# Patient Record
Sex: FEMALE | Race: BLACK OR AFRICAN AMERICAN | Employment: FULL TIME | URBAN - METROPOLITAN AREA
[De-identification: names, ages, dates, MRNs, and addresses within clinical notes are randomized per-mention and may not be internally consistent; named-entity substitution may affect disease eponyms.]

---

## 2017-03-12 ENCOUNTER — HOSPITAL ENCOUNTER (EMERGENCY)
Age: 42
Discharge: HOME | End: 2017-03-12
Attending: EMERGENCY MEDICINE
Payer: COMMERCIAL

## 2017-03-12 VITALS
DIASTOLIC BLOOD PRESSURE: 103 MMHG | TEMPERATURE: 98.7 F | RESPIRATION RATE: 16 BRPM | HEART RATE: 107 BPM | WEIGHT: 167 LBS | BODY MASS INDEX: 26.21 KG/M2 | OXYGEN SATURATION: 98 % | HEIGHT: 67 IN | SYSTOLIC BLOOD PRESSURE: 147 MMHG

## 2017-03-12 DIAGNOSIS — N30.00 ACUTE CYSTITIS WITHOUT HEMATURIA: Primary | ICD-10-CM

## 2017-03-12 LAB
BACTERIA URNS QL MICRO: ABNORMAL /HPF
CASTS URNS QL MICRO: 0 /LPF
EPI CELLS #/AREA URNS HPF: ABNORMAL /HPF
GLUCOSE BLD STRIP.AUTO-MCNC: 313 MG/DL (ref 65–100)
RBC #/AREA URNS HPF: ABNORMAL /HPF
WBC URNS QL MICRO: ABNORMAL /HPF

## 2017-03-12 PROCEDURE — 82962 GLUCOSE BLOOD TEST: CPT

## 2017-03-12 PROCEDURE — 81015 MICROSCOPIC EXAM OF URINE: CPT | Performed by: EMERGENCY MEDICINE

## 2017-03-12 PROCEDURE — 99284 EMERGENCY DEPT VISIT MOD MDM: CPT | Performed by: EMERGENCY MEDICINE

## 2017-03-12 PROCEDURE — 74011250637 HC RX REV CODE- 250/637: Performed by: EMERGENCY MEDICINE

## 2017-03-12 PROCEDURE — 81003 URINALYSIS AUTO W/O SCOPE: CPT | Performed by: EMERGENCY MEDICINE

## 2017-03-12 RX ORDER — SULFAMETHOXAZOLE AND TRIMETHOPRIM 800; 160 MG/1; MG/1
1 TABLET ORAL 2 TIMES DAILY
Qty: 10 TAB | Refills: 0 | Status: SHIPPED | OUTPATIENT
Start: 2017-03-12 | End: 2017-03-17

## 2017-03-12 RX ORDER — SULFAMETHOXAZOLE AND TRIMETHOPRIM 800; 160 MG/1; MG/1
1 TABLET ORAL
Status: COMPLETED | OUTPATIENT
Start: 2017-03-12 | End: 2017-03-12

## 2017-03-12 RX ORDER — PHENAZOPYRIDINE HYDROCHLORIDE 100 MG/1
200 TABLET, FILM COATED ORAL
Status: COMPLETED | OUTPATIENT
Start: 2017-03-12 | End: 2017-03-12

## 2017-03-12 RX ADMIN — SULFAMETHOXAZOLE AND TRIMETHOPRIM 1 TABLET: 800; 160 TABLET ORAL at 20:52

## 2017-03-12 RX ADMIN — PHENAZOPYRIDINE HYDROCHLORIDE 200 MG: 100 TABLET ORAL at 20:52

## 2017-03-13 PROBLEM — G60.9 NEUROPATHY, PERIPHERAL, IDIOPATHIC: Status: ACTIVE | Noted: 2017-03-13

## 2017-03-13 PROBLEM — H35.00 RETINOPATHY, BILATERAL: Status: ACTIVE | Noted: 2017-03-13

## 2017-03-13 PROBLEM — N18.4 CKD (CHRONIC KIDNEY DISEASE), STAGE IV (HCC): Status: ACTIVE | Noted: 2017-03-13

## 2017-03-13 PROBLEM — L98.499: Status: ACTIVE | Noted: 2017-03-13

## 2017-03-13 NOTE — DISCHARGE INSTRUCTIONS
As we discussed, having normal blood sugars elevated increases your risk for developing bladder and other kinds of infections. Therefore, it is important for you to try to keep her blood sugars under control. Return to the emergency department with any fevers, vomiting, worsening symptoms, or additional concerns. Otherwise, follow-up with your primary care doctor in 2 or 3 days.

## 2017-03-13 NOTE — ED PROVIDER NOTES
HPI Comments: 49-year-old lady with a history of diabetes who presents with concerns about painful urination and some suprapubic pain. Patient says that she also takes her blood sugar is high. She denies any fevers, nausea, vomiting, or diarrhea. Additionally, she notes that she saw her gynecologist last week who did a Pap smear and a pelvic exam that was positive for a yeast infection. She was then treated with a single dose of Diflucan. Patient says in the past 24 hours she has developed the painful urination. She denies any blood in her urine. Patient says she is not having any current vaginal itch, discharge, burning, or odor. Elements of this note were created using speech recognition software. As such, errors of speech recognition may be present. Patient is a 35 y.o. female presenting with urinary pain. The history is provided by the patient. Urinary Pain    Pertinent negatives include no chills, no nausea, no vomiting, no hematuria and no abdominal pain. 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      Social History Main Topics    Smoking status: Never Smoker    Smokeless tobacco: Not on file    Alcohol use Yes      Comment: socially    Drug use: No    Sexual activity: No     Other Topics Concern    Not on file     Social History Narrative         ALLERGIES: Review of patient's allergies indicates no known allergies. Review of Systems   Constitutional: Negative for chills, diaphoresis and fever. HENT: Negative for congestion, rhinorrhea and sore throat. Eyes: Negative for redness and visual disturbance. Respiratory: Negative for cough, chest tightness, shortness of breath and wheezing. Cardiovascular: Negative for chest pain and palpitations. Gastrointestinal: Negative for abdominal pain, blood in stool, diarrhea, nausea and vomiting. Endocrine: Positive for polyuria. Negative for polydipsia. Genitourinary: Positive for dysuria. Negative for hematuria. Musculoskeletal: Negative for arthralgias, myalgias and neck stiffness. Skin: Negative for rash. Allergic/Immunologic: Negative for environmental allergies and food allergies. Neurological: Negative for dizziness, weakness and headaches. Hematological: Negative for adenopathy. Does not bruise/bleed easily. Psychiatric/Behavioral: Negative for confusion and sleep disturbance. The patient is not nervous/anxious. Vitals:    03/12/17 2005   BP: (!) 147/103   Pulse: (!) 107   Resp: 16   Temp: 98.7 °F (37.1 °C)   SpO2: 98%   Weight: 75.8 kg (167 lb)   Height: 5' 7\" (1.702 m)            Physical Exam   Constitutional: She is oriented to person, place, and time. She appears well-developed and well-nourished. Abdominal: There is no tenderness. There is no rebound and no guarding. Neurological: She is alert and oriented to person, place, and time. Nursing note and vitals reviewed. MDM  Number of Diagnoses or Management Options  Diagnosis management comments: Patient's UA is consistent with UTI. I will plan on prescribing some antibodies for that.     ED Course       Procedures

## 2017-04-12 ENCOUNTER — HOSPITAL ENCOUNTER (EMERGENCY)
Age: 42
Discharge: HOME | End: 2017-04-12
Attending: EMERGENCY MEDICINE
Payer: COMMERCIAL

## 2017-04-12 VITALS
OXYGEN SATURATION: 95 % | TEMPERATURE: 98.1 F | SYSTOLIC BLOOD PRESSURE: 135 MMHG | HEIGHT: 68 IN | BODY MASS INDEX: 25.61 KG/M2 | HEART RATE: 85 BPM | WEIGHT: 169 LBS | DIASTOLIC BLOOD PRESSURE: 83 MMHG | RESPIRATION RATE: 16 BRPM

## 2017-04-12 DIAGNOSIS — R30.9 URINARY PAIN: Primary | ICD-10-CM

## 2017-04-12 LAB
ALBUMIN SERPL BCP-MCNC: 3.5 G/DL (ref 3.5–5)
ALBUMIN/GLOB SERPL: ABNORMAL {RATIO} (ref 1.2–3.5)
ALP SERPL-CCNC: 188 U/L (ref 50–136)
ALT SERPL-CCNC: 44 U/L (ref 12–65)
ANION GAP BLD CALC-SCNC: 11 MMOL/L (ref 7–16)
AST SERPL W P-5'-P-CCNC: 43 U/L (ref 15–37)
ATRIAL RATE: 85 BPM
BACTERIA URNS QL MICRO: 0 /HPF
BASOPHILS # BLD AUTO: 0 K/UL (ref 0–0.2)
BASOPHILS # BLD: 0 % (ref 0–2)
BILIRUB SERPL-MCNC: 0.4 MG/DL (ref 0.2–1.1)
BUN SERPL-MCNC: 18 MG/DL (ref 6–23)
CALCIUM SERPL-MCNC: 8.9 MG/DL (ref 8.3–10.4)
CALCULATED P AXIS, ECG09: 50 DEGREES
CALCULATED R AXIS, ECG10: 131 DEGREES
CALCULATED T AXIS, ECG11: 45 DEGREES
CASTS URNS QL MICRO: 0 /LPF
CHLORIDE SERPL-SCNC: 108 MMOL/L (ref 98–107)
CO2 SERPL-SCNC: 23 MMOL/L (ref 21–32)
CREAT SERPL-MCNC: 2.06 MG/DL (ref 0.6–1)
DIAGNOSIS, 93000: NORMAL
DIFFERENTIAL METHOD BLD: ABNORMAL
EOSINOPHIL # BLD: 0.5 K/UL (ref 0–0.8)
EOSINOPHIL NFR BLD: 8 % (ref 0.5–7.8)
EPI CELLS #/AREA URNS HPF: NORMAL /HPF
ERYTHROCYTE [DISTWIDTH] IN BLOOD BY AUTOMATED COUNT: 15.3 % (ref 11.9–14.6)
GLOBULIN SER CALC-MCNC: ABNORMAL G/DL
GLUCOSE SERPL-MCNC: 184 MG/DL (ref 65–100)
HCG UR QL: NEGATIVE
HCT VFR BLD AUTO: 40.8 % (ref 35.8–46.3)
HGB BLD-MCNC: 13 G/DL (ref 11.7–15.4)
IMM GRANULOCYTES # BLD: 0 K/UL (ref 0–0.5)
IMM GRANULOCYTES NFR BLD AUTO: 0.1 % (ref 0–5)
LYMPHOCYTES # BLD AUTO: 35 % (ref 13–44)
LYMPHOCYTES # BLD: 2.3 K/UL (ref 0.5–4.6)
MCH RBC QN AUTO: 26.6 PG (ref 26.1–32.9)
MCHC RBC AUTO-ENTMCNC: 31.9 G/DL (ref 31.4–35)
MCV RBC AUTO: 83.4 FL (ref 79.6–97.8)
MONOCYTES # BLD: 0.4 K/UL (ref 0.1–1.3)
MONOCYTES NFR BLD AUTO: 6 % (ref 4–12)
NEUTS SEG # BLD: 3.3 K/UL (ref 1.7–8.2)
NEUTS SEG NFR BLD AUTO: 51 % (ref 43–78)
P-R INTERVAL, ECG05: 104 MS
PLATELET # BLD AUTO: 357 K/UL (ref 150–450)
PMV BLD AUTO: 9.3 FL (ref 10.8–14.1)
POTASSIUM SERPL-SCNC: 4.3 MMOL/L (ref 3.5–5.1)
POTASSIUM SERPL-SCNC: 5.3 MMOL/L (ref 3.5–5.1)
PROT SERPL-MCNC: 8.4 G/DL (ref 6.3–8.2)
Q-T INTERVAL, ECG07: 364 MS
QRS DURATION, ECG06: 102 MS
QTC CALCULATION (BEZET), ECG08: 433 MS
RBC # BLD AUTO: 4.89 M/UL (ref 4.05–5.25)
RBC #/AREA URNS HPF: NORMAL /HPF
SERVICE CMNT-IMP: NORMAL
SODIUM SERPL-SCNC: 142 MMOL/L (ref 136–145)
VENTRICULAR RATE, ECG03: 85 BPM
WBC # BLD AUTO: 6.7 K/UL (ref 4.3–11.1)
WBC URNS QL MICRO: NORMAL /HPF
WET PREP GENITAL: NORMAL

## 2017-04-12 PROCEDURE — 84132 ASSAY OF SERUM POTASSIUM: CPT | Performed by: EMERGENCY MEDICINE

## 2017-04-12 PROCEDURE — 74011250636 HC RX REV CODE- 250/636: Performed by: EMERGENCY MEDICINE

## 2017-04-12 PROCEDURE — 81025 URINE PREGNANCY TEST: CPT

## 2017-04-12 PROCEDURE — 74011000250 HC RX REV CODE- 250: Performed by: EMERGENCY MEDICINE

## 2017-04-12 PROCEDURE — 81003 URINALYSIS AUTO W/O SCOPE: CPT | Performed by: EMERGENCY MEDICINE

## 2017-04-12 PROCEDURE — 87491 CHLMYD TRACH DNA AMP PROBE: CPT | Performed by: EMERGENCY MEDICINE

## 2017-04-12 PROCEDURE — 96372 THER/PROPH/DIAG INJ SC/IM: CPT | Performed by: EMERGENCY MEDICINE

## 2017-04-12 PROCEDURE — 93005 ELECTROCARDIOGRAM TRACING: CPT | Performed by: EMERGENCY MEDICINE

## 2017-04-12 PROCEDURE — 85025 COMPLETE CBC W/AUTO DIFF WBC: CPT | Performed by: EMERGENCY MEDICINE

## 2017-04-12 PROCEDURE — 99285 EMERGENCY DEPT VISIT HI MDM: CPT | Performed by: EMERGENCY MEDICINE

## 2017-04-12 PROCEDURE — 87210 SMEAR WET MOUNT SALINE/INK: CPT | Performed by: EMERGENCY MEDICINE

## 2017-04-12 PROCEDURE — 80053 COMPREHEN METABOLIC PANEL: CPT | Performed by: EMERGENCY MEDICINE

## 2017-04-12 PROCEDURE — 81015 MICROSCOPIC EXAM OF URINE: CPT | Performed by: EMERGENCY MEDICINE

## 2017-04-12 RX ORDER — METRONIDAZOLE 500 MG/1
500 TABLET ORAL 2 TIMES DAILY
Qty: 14 TAB | Refills: 0 | Status: SHIPPED | OUTPATIENT
Start: 2017-04-12 | End: 2017-04-12

## 2017-04-12 RX ORDER — DOXYCYCLINE HYCLATE 100 MG
100 TABLET ORAL 2 TIMES DAILY
Qty: 20 TAB | Refills: 0 | Status: SHIPPED | OUTPATIENT
Start: 2017-04-12 | End: 2017-04-12

## 2017-04-12 RX ORDER — METRONIDAZOLE 500 MG/1
500 TABLET ORAL 2 TIMES DAILY
Qty: 14 TAB | Refills: 0 | Status: SHIPPED | OUTPATIENT
Start: 2017-04-12 | End: 2017-04-19

## 2017-04-12 RX ORDER — DOXYCYCLINE HYCLATE 100 MG
100 TABLET ORAL 2 TIMES DAILY
Qty: 20 TAB | Refills: 0 | Status: SHIPPED | OUTPATIENT
Start: 2017-04-12 | End: 2017-04-22

## 2017-04-12 RX ADMIN — LIDOCAINE HYDROCHLORIDE 250 MG: 10 INJECTION, SOLUTION INFILTRATION; PERINEURAL at 14:57

## 2017-04-12 NOTE — DISCHARGE INSTRUCTIONS
Painful Urination (Dysuria): Care Instructions  Your Care Instructions  Burning pain with urination (dysuria) is a common symptom of a urinary tract infection or other urinary problems. The bladder may become inflamed. This can cause pain when the bladder fills and empties. You may also feel pain if the tube that carries urine from the bladder to the outside of the body (urethra) gets irritated or infected. Sexually transmitted infections (STIs) also may cause pain when you urinate. Sometimes the pain can be caused by things other than an infection. The urethra can be irritated by soaps, perfumes, or foreign objects in the urethra. Kidney stones can cause pain when they pass through the urethra. The cause may be hard to find. You may need tests. Treatment for painful urination depends on the cause. Follow-up care is a key part of your treatment and safety. Be sure to make and go to all appointments, and call your doctor if you are having problems. It's also a good idea to know your test results and keep a list of the medicines you take. How can you care for yourself at home? · Drink extra water for the next day or two. This will help make the urine less concentrated. (If you have kidney, heart, or liver disease and have to limit fluids, talk with your doctor before you increase the amount of fluids you drink.)  · Avoid drinks that are carbonated or have caffeine. They can irritate the bladder. · Urinate often. Try to empty your bladder each time. For women:  · Urinate right after you have sex. · After going to the bathroom, wipe from front to back. · Avoid douches, bubble baths, and feminine hygiene sprays. And avoid other feminine hygiene products that have deodorants. When should you call for help? Call your doctor now or seek immediate medical care if:  · You have new symptoms, such as fever, nausea, or vomiting. · You have new or worse symptoms of a urinary problem.  For example:  ¨ You have blood or pus in your urine. ¨ You have chills or body aches. ¨ It hurts worse to urinate. ¨ You have groin or belly pain. ¨ You have pain in your back just below your rib cage (the flank area). Watch closely for changes in your health, and be sure to contact your doctor if you have any problems. Where can you learn more? Go to http://damon-chad.info/. Enter K205 in the search box to learn more about \"Painful Urination (Dysuria): Care Instructions. \"  Current as of: November 28, 2016  Content Version: 11.2  © 7336-9813 [employer]. Care instructions adapted under license by [employer] (which disclaims liability or warranty for this information). If you have questions about a medical condition or this instruction, always ask your healthcare professional. [facility] any warranty or liability for your use of this information. Metronidazole (By mouth)   Metronidazole (met-destiny-HI-da-zole)  Treats bacterial infections. Brand Name(s):Flagyl, Flagyl 375, Flagyl ER   There may be other brand names for this medicine. When This Medicine Should Not Be Used: This medicine is not right for everyone. Do not use if you had an allergic reaction to metronidazole or similar medicines. Do not use this medicine to treat trichomoniasis if you are in the first 3 months of pregnancy. How to Use This Medicine:   Capsule, Tablet, Long Acting Tablet  · Take this medicine as directed, and take it at the same time each day. · Capsule or Tablet: Take with food or milk to avoid stomach upset. · Extended-release tablet: Take it on an empty stomach, 1 hour before or 2 hours after a meal.  · Swallow the extended-release tablet whole. Do not crush, break, or chew it. · Take all of the medicine in your prescription to clear up your infection, even if you feel better after the first few doses. · Missed dose: Take a dose as soon as you remember.  If it is almost time for your next dose, wait until then and take a regular dose. Do not take extra medicine to make up for a missed dose. · Store the medicine in a closed container at room temperature, away from heat, moisture, and direct light. Drugs and Foods to Avoid:   Ask your doctor or pharmacist before using any other medicine, including over-the-counter medicines, vitamins, and herbal products. · Do not use this medicine if you have taken disulfiram within the last 2 weeks. Do not drink alcohol or use medicine that contains alcohol or propylene glycol while using this medicine and for at least 3 days after you have finished metronidazole treatment. · Some foods and medicines can affect how metronidazole works. Tell your doctor if you are using busulfan, cimetidine, lithium, phenobarbital, phenytoin, or warfarin or another blood thinner. Warnings While Using This Medicine:   · Tell your doctor if you are pregnant or breastfeeding, or if you have kidney disease, liver disease, blood or bone marrow problems, oral thrush, yeast infection, or a history of seizures. · This medicine may cause the following problems:  ¨ Brain or nervous system problems, including seizures, meningitis, or vision problems  · Trichomoniasis treatment:  Your doctor may want to also treat your sexual partner, even if he or she has no symptoms. Also, you may want to use a condom during sexual intercourse. These measures will help keep you from getting the infection back again from your partner. If you have any questions, ask your doctor. · Call your doctor if your symptoms do not improve or if they get worse. · Your doctor will do lab tests at regular visits to check on the effects of this medicine. Keep all appointments. · Tell any doctor or dentist who treats you that you are using this medicine. This medicine may affect certain medical test results. · Keep all medicine out of the reach of children.  Never share your medicine with anyone. Possible Side Effects While Using This Medicine:   Call your doctor right away if you notice any of these side effects:  · Allergic reaction: Itching or hives, swelling in your face or hands, swelling or tingling in your mouth or throat, chest tightness, trouble breathing  · Confusion, drowsiness, fever, headache, loss of appetite, nausea or vomiting, stiff neck or back  · Dizziness, problems with muscle control, clumsiness, shakiness, trouble talking  · Fever, chills, cough, sore throat, and body aches  · Numbness, tingling, or burning pain in your hands, arms, legs, or feet  · Seizures  If you notice these less serious side effects, talk with your doctor:   · Mild nausea  · Unusual or unpleasant taste in your mouth  If you notice other side effects that you think are caused by this medicine, tell your doctor. Call your doctor for medical advice about side effects. You may report side effects to FDA at 6-648-BBU-9142  © 2016 [address] is for End User's use only and may not be sold, redistributed or otherwise used for commercial purposes. The above information is an  only. It is not intended as medical advice for individual conditions or treatments. Talk to your doctor, nurse or pharmacist before following any medical regimen to see if it is safe and effective for you. Exposure to Sexually Transmitted Infections: Care Instructions  Your Care Instructions  Sexually transmitted infections (STIs) are those diseases spread by sexual contact. There are at least 20 different STIs, including chlamydia, gonorrhea, syphilis, and human immunodeficiency virus (HIV), which causes AIDS. Bacteria-caused STIs can be treated and cured. STIs caused by viruses, such as HIV, can be treated but not cured. Some STIs can reduce a woman's chances of getting pregnant in the future.   STIs are spread during sexual contact, such as vaginal intercourse and oral or anal sex.  Follow-up care is a key part of your treatment and safety. Be sure to make and go to all appointments, and call your doctor if you are having problems. Its also a good idea to know your test results and keep a list of the medicines you take. How can you care for yourself at home? · Your doctor may have given you a shot of antibiotics. If your doctor prescribed antibiotic pills, take them as directed. Do not stop taking them just because you feel better. You need to take the full course of antibiotics. · Do not have sexual contact while you have symptoms of an STI or are being treated for an STI. · Tell your sex partner (or partners) that he or she will need treatment. · If you are a woman, do not douche. Douching changes the normal balance of bacteria in the vagina and may spread an infection up into your reproductive organs. To prevent exposure to STIs in the future  · Use latex condoms every time you have sex. Use them from the beginning to the end of sexual contact. · Talk to your partner before you have sex. Find out if he or she has or is at risk for any STI. Keep in mind that a person may be able to spread an STI even if he or she does not have symptoms. · Do not have sex if you are being treated for an STI. · Do not have sex with anyone who has symptoms of an STI, such as sores on the genitals or mouth. · Having one sex partner (who does not have STIs and does not have sex with anyone else) is a good way to avoid STIs. When should you call for help? Call your doctor now or seek immediate medical care if:  · You have new pain in your belly or pelvis. · You have symptoms of a urinary tract infection. These may include:  ¨ Pain or burning when you urinate. ¨ A frequent need to urinate without being able to pass much urine. ¨ Pain in the flank, which is just below the rib cage and above the waist on either side of the back. ¨ Blood in your urine. ¨ A fever.   · You have new or worsening pain or swelling in the scrotum. Watch closely for changes in your health, and be sure to contact your doctor if:  · You have unusual vaginal bleeding. · You have a discharge from the vagina or penis. · You have any new symptoms, such as sores, bumps, rashes, blisters, or warts. · You have itching, tingling, pain, or burning in the genital or anal area. · You think you may have an STI. Where can you learn more? Go to http://damon-chad.info/. Enter V025 in the search box to learn more about \"Exposure to Sexually Transmitted Infections: Care Instructions. \"  Current as of: May 27, 2016  Content Version: 11.2  © 2897-4521 [employer]. Care instructions adapted under license by [employer] (which disclaims liability or warranty for this information). If you have questions about a medical condition or this instruction, always ask your healthcare professional. [place] [address] any warranty or liability for your use of this information. Doxycycline (By mouth)   Doxycycline (fyu-u-ZZV-kleen)  Treats and prevents infections. Also used to prevent malaria and treat rosacea or severe acne. This medicine is a tetracycline antibiotic. Brand Name(s):Acticlate, Adoxa, Adoxa Tarun 1/150, Avidoxy, Avidoxy DK, BenzoDox 30 Kit, BenzoDox 60 Kit, Doryx, Monodox, Morgidox [id number], Morgidox [id number] Kit, Morgidox [id number], Morgidox [id number] Kit, NutriDox Convenience, Oracea   There may be other brand names for this medicine. When This Medicine Should Not Be Used: This medicine is not right for everyone. Do not use it if you had an allergic reaction to doxycycline or another tetracycline antibiotic, or if you are pregnant or breastfeeding. How to Use This Medicine:   Capsule, Delayed Release Capsule, Long Acting Capsule, Liquid, Tablet, Delayed Release Tablet  · Your doctor will tell you how much medicine to use. Do not use more than directed.   · Ask your pharmacist or doctor if you need to take this medicine with or without food. Some forms can be taken with food or milk, but others must be taken on an empty stomach. · Tablets: You may take this medicine with food or milk to avoid stomach irritation. To break a tablet, hold the tablet between your thumb and index fingers close to the appropriate scored line. Then, apply enough pressure to snap the tablet segments apart. Do not use the tablet if it does not break on the scored lines. · Delayed-release tablets: You may also take this medicine by sprinkling the broken tablets onto room-temperature applesauce. Swallow this mixture right away; do not chew. Do not store the mixture for later use. · Oracea® capsules: This medicine must be taken on an empty stomach, at least 1 hour before or 2 hours after a meal.  · Capsule: Swallow whole. Do not break, crush, chew, or open it. · Oral liquid: Shake the bottle well just before each use. Measure the oral liquid medicine with a marked measuring spoon, oral syringe, or medicine cup. · Take all of the medicine in your prescription to clear up your infection, even if you feel better after the first few doses. · Drink plenty of fluids to avoid throat problems, if you take the capsule or tablet form. · Malaria prevention: Start taking the medicine 1 or 2 days before you travel. Take the medicine every day during your trip. Keep taking it for 4 weeks after you return. However, do not use the medicine for longer than 4 months. · Do not use this medicine for more than 9 months if you are using it for rosacea. · Use only the brand of medicine your doctor prescribed. Other brands may not work the same way. · Read and follow the patient instructions that come with this medicine. Talk to your doctor or pharmacist if you have any questions. · Missed dose: Take a dose as soon as you remember. If it is almost time for your next dose, wait until then and take a regular dose.  Do not take extra medicine to make up for a missed dose. · Store the medicine in a closed container at room temperature, away from heat, moisture, and direct light. Do not freeze the oral liquid. Drugs and Foods to Avoid:   Ask your doctor or pharmacist before using any other medicine, including over-the-counter medicines, vitamins, and herbal products. · Some foods and medicines can affect how doxycycline works. Tell your doctor if you are using any of the following:  ¨ Bismuth subsalicylate, isotretinoin or other acne medicines, acitretin or other medicine to treat psoriasis  ¨ Penicillin antibiotic, birth control pills, medicine for seizures (such as carbamazepine, phenobarbital, phenytoin), stomach medicine, a blood thinner (such as warfarin), or any medicine that contains aluminum, calcium, or iron (such an antacid or vitamin supplement)  Warnings While Using This Medicine:   · This medicine may cause birth defects if either partner is using it during conception or pregnancy. Tell your doctor right away if you or your partner becomes pregnant. Birth control pills may not work as well when used with this medicine. Use a second form of birth control to keep from getting pregnant. · Tell your doctor if you have kidney disease, liver disease, asthma, or an allergy to sulfites. Tell your doctor if you had surgery on your stomach, or if you have a history of yeast infections. · This medicine may cause the following problems:  ¨ Permanent change in tooth color (in children younger than 6years old)  ¨ Increased pressure inside the head  ¨ Yeast infection  ¨ Immune system problems  · This medicine can cause diarrhea. Call your doctor if the diarrhea becomes severe, does not stop, or is bloody. Do not take any medicine to stop diarrhea until you have talked to your doctor. Diarrhea can occur 2 months or more after you stop taking this medicine. · This medicine may make your skin more sensitive to sunlight. Wear sunscreen.  Do not use sunlamps or tanning beds. · Tell any doctor or dentist who treats you that you are using this medicine. This medicine may affect certain medical test results. · Call your doctor if your symptoms do not improve or if they get worse. · Keep all medicine out of the reach of children. Never share your medicine with anyone. Possible Side Effects While Using This Medicine:   Call your doctor right away if you notice any of these side effects:  · Allergic reaction: Itching or hives, swelling in your face or hands, swelling or tingling in your mouth or throat, chest tightness, trouble breathing  · Blistering, peeling, red skin rash  · Burning, pain, or irritation in your upper stomach or throat  · Diarrhea that may contain blood  · Fever, chills, cough, runny or stuffy nose, sore throat, and body aches  · Joint pain, fever, rash, and unusual tiredness or weakness  · Severe headache, dizziness, or vision changes  If you notice these less serious side effects, talk with your doctor:   · Darkening of your skin, scars, teeth, or gums  · Sores or white patches on your lips, mouth, or throat  If you notice other side effects that you think are caused by this medicine, tell your doctor. Call your doctor for medical advice about side effects. You may report side effects to FDA at 7-896-FDA-0376  © 2016 [address] is for End User's use only and may not be sold, redistributed or otherwise used for commercial purposes. The above information is an  only. It is not intended as medical advice for individual conditions or treatments. Talk to your doctor, nurse or pharmacist before following any medical regimen to see if it is safe and effective for you.

## 2017-04-12 NOTE — ED TRIAGE NOTES
Patient complains of dysuria that began about a month ago. States she was given abx and completed them. States she has been seen at a [address] and  for the same. Reports continued burning with urination. Denies vaginal discharge.

## 2017-04-12 NOTE — ED PROVIDER NOTES
HPI Comments: Patient is a 36 yo female with burning with urination. Patient states pain after urinating \"in my vagina\". States pain has been present for 2-3 weeks, s/p abx for UTI however continues. She denies any abdominal pain, no nausea or vomiting, no fevers or chills, no chest pain or SOB, no further complaints. Denies vaginal discharge, or and swelling or mass noted. Overall patient well appearing, NAD. Patient is a 35 y.o. female presenting with urinary pain. The history is provided by the patient. No  was used. Urinary Pain    Pertinent negatives include no chills, no nausea, no vomiting, no urgency, no flank pain, no vaginal discharge, no abdominal pain and no back pain. Past Medical History:   Diagnosis Date    Acute renal failure ([address] 1/24/2012    CKD (chronic kidney disease) stage 3, GFR 30-59 ml/min     CKD (chronic kidney disease), stage IV ([address] 3/13/2017    Gastroenteritis, acute 1/24/2012    IDDM (insulin dependent diabetes mellitus) ([address] 1/24/2012    Intractable nausea and vomiting 12/25/2014    Nausea & vomiting 1/24/2012    Neuropathy, peripheral, idiopathic 3/13/2017    Retinopathy, bilateral 3/13/2017    Trichomoniasis 3/13/2017    Ulcer, skin, chronic ([address] 3/13/2017       Past Surgical History:   Procedure Laterality Date    HX AMPUTATION  1/27/12    gangrene    HX AMPUTATION Left     5th Toe due to Diabetic Ulcer         Family History:   Problem Relation Age of Onset    Diabetes Father      DM Type II    Breast Cancer Mother     Diabetes Maternal Grandmother      DM Type II    Hypertension Maternal Grandmother     Diabetes Paternal Grandfather      DM Type II        Social History     Social History    Marital status: SINGLE     Spouse name: N/A    Number of children: N/A    Years of education: N/A     Occupational History    Not on file.      Social History Main Topics    Smoking status: Former Smoker     Quit date: 11/2013    Smokeless tobacco: Not on file    Alcohol use Yes      Comment: socially; occasional alcohol use    Drug use: No    Sexual activity: No     Other Topics Concern    Not on file     Social History Narrative         ALLERGIES: Review of patient's allergies indicates no known allergies. Review of Systems   Constitutional: Negative for chills and fever. HENT: Negative for rhinorrhea and sore throat. Eyes: Negative for visual disturbance. Respiratory: Negative for cough and shortness of breath. Cardiovascular: Negative for chest pain and leg swelling. Gastrointestinal: Negative for abdominal pain, diarrhea, nausea and vomiting. Genitourinary: Positive for dysuria and vaginal pain. Negative for decreased urine volume, flank pain, pelvic pain, urgency, vaginal bleeding and vaginal discharge. Musculoskeletal: Negative for back pain and neck pain. Skin: Negative for rash. Neurological: Negative for weakness and headaches. Psychiatric/Behavioral: The patient is not nervous/anxious. Vitals:    04/12/17 1125   BP: 135/83   Pulse: (!) 104   Resp: 16   Temp: 98.1 °F (36.7 °C)   SpO2: 95%   Weight: 76.7 kg (169 lb)   Height: 5' 8\" (1.727 m)            Physical Exam   Constitutional: She is oriented to person, place, and time. She appears well-developed and well-nourished. HENT:   Head: Normocephalic. Right Ear: External ear normal.   Left Ear: External ear normal.   Eyes: Conjunctivae and EOM are normal. Pupils are equal, round, and reactive to light. Neck: Normal range of motion. Neck supple. No tracheal deviation present. Cardiovascular: Normal rate, regular rhythm, normal heart sounds and intact distal pulses. No murmur heard. Pulmonary/Chest: Effort normal and breath sounds normal. No respiratory distress. Abdominal: Soft. She exhibits no distension. There is no tenderness. There is no rebound. Non-tender to deep palpation in all quadrants. Very benign abdomen. Musculoskeletal: Normal range of motion. Neurological: She is alert and oriented to person, place, and time. No cranial nerve deficit. Skin: No rash noted. Nursing note and vitals reviewed. MDM  Number of Diagnoses or Management Options  Urinary pain: new and requires workup     Amount and/or Complexity of Data Reviewed  Clinical lab tests: ordered and reviewed  Tests in the radiology section of CPT®: ordered and reviewed  Tests in the medicine section of CPT®: ordered and reviewed  Review and summarize past medical records: yes    Risk of Complications, Morbidity, and/or Mortality  Presenting problems: moderate  Diagnostic procedures: moderate  Management options: moderate    Patient Progress  Patient progress: stable    ED Course       Procedures    Recent Results (from the past 12 hour(s))   URINE MICROSCOPIC    Collection Time: 04/12/17 11:30 AM   Result Value Ref Range    WBC 0-3 0 /hpf    RBC 0-3 0 /hpf    Epithelial cells 5-10 0 /hpf    Bacteria 0 0 /hpf    Casts 0 0 /lpf   HCG URINE, QL. - POC    Collection Time: 04/12/17 11:34 AM   Result Value Ref Range    Pregnancy test,urine (POC) NEGATIVE  NEG     CBC WITH AUTOMATED DIFF    Collection Time: 04/12/17 12:15 PM   Result Value Ref Range    WBC 6.7 4.3 - 11.1 K/uL    RBC 4.89 4.05 - 5.25 M/uL    HGB 13.0 11.7 - 15.4 g/dL    HCT 40.8 35.8 - 46.3 %    MCV 83.4 79.6 - 97.8 FL    MCH 26.6 26.1 - 32.9 PG    MCHC 31.9 31.4 - 35.0 g/dL    RDW 15.3 (H) 11.9 - 14.6 %    PLATELET 497 852 - 872 K/uL    MPV 9.3 (L) 10.8 - 14.1 FL    DF AUTOMATED      NEUTROPHILS 51 43 - 78 %    LYMPHOCYTES 35 13 - 44 %    MONOCYTES 6 4.0 - 12.0 %    EOSINOPHILS 8 (H) 0.5 - 7.8 %    BASOPHILS 0 0.0 - 2.0 %    IMMATURE GRANULOCYTES 0.1 0.0 - 5.0 %    ABS. NEUTROPHILS 3.3 1.7 - 8.2 K/UL    ABS. LYMPHOCYTES 2.3 0.5 - 4.6 K/UL    ABS. MONOCYTES 0.4 0.1 - 1.3 K/UL    ABS. EOSINOPHILS 0.5 0.0 - 0.8 K/UL    ABS. BASOPHILS 0.0 0.0 - 0.2 K/UL    ABS. IMM.  GRANS. 0.0 0.0 - 0.5 K/UL METABOLIC PANEL, COMPREHENSIVE    Collection Time: 04/12/17 12:15 PM   Result Value Ref Range    Sodium 142 136 - 145 mmol/L    Potassium 5.3 (H) 3.5 - 5.1 mmol/L    Chloride 108 (H) 98 - 107 mmol/L    CO2 23 21 - 32 mmol/L    Anion gap 11 7 - 16 mmol/L    Glucose 184 (H) 65 - 100 mg/dL    BUN 18 6 - 23 MG/DL    Creatinine 2.06 (H) 0.6 - 1.0 MG/DL    GFR est AA 34 (L) >60 ml/min/1.73m2    GFR est non-AA 28 (L) >60 ml/min/1.73m2    Calcium 8.9 8.3 - 10.4 MG/DL    Bilirubin, total 0.4 0.2 - 1.1 MG/DL    ALT (SGPT) 44 12 - 65 U/L    AST (SGOT) 43 (H) 15 - 37 U/L    Alk. phosphatase 188 (H) 50 - 136 U/L    Protein, total 8.4 (H) 6.3 - 8.2 g/dL    Albumin 3.5 3.5 - 5.0 g/dL    Globulin Cannot be calulated g/dL    A-G Ratio Cannot be calulated 1.2 - 3.5     POTASSIUM    Collection Time: 04/12/17  1:57 PM   Result Value Ref Range    Potassium 4.3 3.5 - 5.1 mmol/L   EKG, 12 LEAD, INITIAL    Collection Time: 04/12/17  2:04 PM   Result Value Ref Range    Ventricular Rate 85 BPM    Atrial Rate 85 BPM    P-R Interval 104 ms    QRS Duration 102 ms    Q-T Interval 364 ms    QTC Calculation (Bezet) 433 ms    Calculated P Axis 50 degrees    Calculated R Axis 131 degrees    Calculated T Axis 45 degrees    Diagnosis       !! AGE AND GENDER SPECIFIC ECG ANALYSIS !! Sinus rhythm with short PA  Right axis deviation  Cannot rule out Anterior infarct , age undetermined  Abnormal ECG  When compared with ECG of 18-APR-2001 12:26,  Significant changes have occurred  Confirmed by Barbara Moreno ([id number]) on 4/12/2017 3:01:32 PM     WET PREP    Collection Time: 04/12/17  2:18 PM   Result Value Ref Range    Special Requests: NO SPECIAL REQUESTS      Wet prep 15 TO 20 WBCS SEEN PER HPF     Wet prep MANY  CLUE CELLS PRESENT        Wet prep NO YEAST SEEN      Wet prep NO TRICHOMONAS SEEN       No results found. 34 yo female with urinary pain:    Patient treated for GC/Chlamydia and bacterial vaginosis.   Vaginal exam with NO cervical motion tenderness, moderate white discharge with positive whiff test with NO pelvic tenderness/abdominal tenderness or adnexal masses. Patient stable for discharge at this time with follow up with PCP tomorrow for recheck and follow up on cultures sent with return with any abdominal pain, any nausea or vomiting, any fevers or chills, or any chest pain or SOB or any further concerns.

## 2017-04-13 LAB
C TRACH RRNA SPEC QL NAA+PROBE: NEGATIVE
N GONORRHOEA RRNA SPEC QL NAA+PROBE: NEGATIVE
SPECIMEN SOURCE: NORMAL

## 2017-04-13 NOTE — PROGRESS NOTES
Return call from [facility] at Dr Catrina Moss office who states patient has an appointment today at 3pm.  States she has had several 'no shows' with them with both the providers and the lab. Asked if they could refer her to [facility] or an Endocrinologist at [employer]. Notified patient stated that she cannot return to her previous Endocrinologist because of too many no shows. Amilcar Martinez states she will call me and let me know if patient shows for the appointment.   Called patient and left her a message to remind her of importance of 3pm appointment today so she can be referred to an Endocrinologist.

## 2017-04-18 NOTE — PROGRESS NOTES
No call from [facility] at [facility] - called her and she states patient did show up for appointment. States in looking at record she does not see a referral to an Endocrinologist and states that MD increased her Lantis at bedtime.

## 2017-04-24 PROBLEM — R10.2 PELVIC PAIN: Status: ACTIVE | Noted: 2017-04-24

## 2017-04-24 PROBLEM — B37.31 YEAST VAGINITIS: Status: ACTIVE | Noted: 2017-04-24

## 2018-01-30 PROBLEM — B37.31 YEAST VAGINITIS: Status: RESOLVED | Noted: 2017-04-24 | Resolved: 2018-01-30

## 2018-01-30 PROBLEM — E28.39 PREMATURE OVARIAN FAILURE: Status: ACTIVE | Noted: 2018-01-30

## 2018-01-30 PROBLEM — B37.31 CANDIDA VAGINITIS: Status: ACTIVE | Noted: 2018-01-30

## 2018-01-30 PROBLEM — R10.2 PELVIC PAIN: Status: RESOLVED | Noted: 2017-04-24 | Resolved: 2018-01-30

## 2018-01-30 PROBLEM — N91.0 AMENORRHEA, PRIMARY: Status: RESOLVED | Noted: 2018-01-30 | Resolved: 2018-01-30

## 2018-01-30 PROBLEM — E11.319 TYPE 2 DIABETES MELLITUS WITH DIABETIC RETINOPATHY (HCC): Status: ACTIVE | Noted: 2018-01-30

## 2018-01-30 PROBLEM — E11.21 TYPE 2 DIABETES MELLITUS WITH NEPHROPATHY (HCC): Status: ACTIVE | Noted: 2018-01-30

## 2018-01-30 PROBLEM — N91.0 AMENORRHEA, PRIMARY: Status: ACTIVE | Noted: 2018-01-30

## 2018-02-05 NOTE — PROGRESS NOTES
Visited with patient who states she sees Dr Farrah Osullivan at [facility] as PCP and last saw them about a month ago. States her last A1C was '10' and she has not been able to regulate her blood sugars. States she has a meter at home and checks her blood sugar 3-4x/day. States she was seeing an endocrinologist at [facility] on [address] but the 'let her go' because they said she missed too many appointments. Call to Dr Saroj Reddy' office and left message with his assistance to request referral to [facility] since they don't take referrals from ER. I also counseled patient on the importance of keeping her appointments so she does not get 'let go' again. Patient is aware that someone will call her to make an appointment after referral is made. I have given patient my contact information and told her to call me if she did not hear from them by next week. Patient verbalizes understanding. NPO. Discussed with family possibly NGT for meds and feeding. Family states she would NOT want that. Discussed if still not able to take PO - PEG?? Informed them of comfort feed option.

## 2018-08-16 PROBLEM — Z91.14 NONCOMPLIANCE WITH MEDICATION REGIMEN: Status: ACTIVE | Noted: 2018-08-16

## 2018-08-21 PROBLEM — E10.42 DIABETIC POLYNEUROPATHY ASSOCIATED WITH TYPE 1 DIABETES MELLITUS (HCC): Status: ACTIVE | Noted: 2017-02-03

## 2018-08-21 PROBLEM — B96.89 BACTERIAL VAGINOSIS: Status: ACTIVE | Noted: 2017-04-13

## 2018-08-21 PROBLEM — N18.30 TYPE 1 DIABETES MELLITUS WITH STAGE 3 CHRONIC KIDNEY DISEASE (HCC): Status: ACTIVE | Noted: 2018-08-16

## 2018-08-21 PROBLEM — N76.0 BACTERIAL VAGINOSIS: Status: ACTIVE | Noted: 2017-04-13

## 2018-08-21 PROBLEM — E10.22 TYPE 1 DIABETES MELLITUS WITH STAGE 3 CHRONIC KIDNEY DISEASE (HCC): Status: ACTIVE | Noted: 2018-08-16

## 2018-08-21 PROBLEM — Z12.4 CERVICAL CANCER SCREENING: Status: ACTIVE | Noted: 2017-02-03

## 2018-08-21 PROBLEM — E11.3599 TYPE 2 DIABETES MELLITUS WITH PROLIFERATIVE RETINOPATHY (HCC): Status: ACTIVE | Noted: 2018-08-21

## 2018-08-21 PROBLEM — F32.1 MODERATE SINGLE CURRENT EPISODE OF MAJOR DEPRESSIVE DISORDER (HCC): Status: ACTIVE | Noted: 2017-02-03

## 2018-08-21 PROBLEM — F41.8 DEPRESSION WITH ANXIETY: Status: ACTIVE | Noted: 2017-07-11

## 2018-08-22 PROBLEM — Z71.2 ENCOUNTER TO DISCUSS TEST RESULTS: Status: ACTIVE | Noted: 2018-08-22

## 2018-11-27 PROBLEM — Z12.4 CERVICAL CANCER SCREENING: Status: RESOLVED | Noted: 2017-02-03 | Resolved: 2018-11-27

## 2018-11-27 PROBLEM — Z01.419 ENCOUNTER FOR ANNUAL ROUTINE GYNECOLOGICAL EXAMINATION: Status: ACTIVE | Noted: 2018-11-27

## 2019-01-18 PROBLEM — E11.319 TYPE 2 DIABETES MELLITUS WITH DIABETIC RETINOPATHY (HCC): Status: RESOLVED | Noted: 2018-01-30 | Resolved: 2019-01-18

## 2019-01-18 PROBLEM — E11.3599 TYPE 2 DIABETES MELLITUS WITH PROLIFERATIVE RETINOPATHY (HCC): Status: RESOLVED | Noted: 2018-08-21 | Resolved: 2019-01-18

## 2019-01-18 PROBLEM — E11.21 TYPE 2 DIABETES MELLITUS WITH NEPHROPATHY (HCC): Status: RESOLVED | Noted: 2018-01-30 | Resolved: 2019-01-18

## 2019-01-18 PROBLEM — E10.319 DIABETIC RETINOPATHY ASSOCIATED WITH TYPE 1 DIABETES MELLITUS (HCC): Status: ACTIVE | Noted: 2017-03-13

## 2019-02-22 PROBLEM — E10.3319: Status: ACTIVE | Noted: 2017-03-13

## 2019-03-31 PROBLEM — B37.31 CANDIDA VAGINITIS: Chronic | Status: ACTIVE | Noted: 2018-01-30

## 2019-07-20 ENCOUNTER — HOSPITAL ENCOUNTER (OUTPATIENT)
Dept: MAMMOGRAPHY | Age: 44
Discharge: HOME | End: 2019-07-20
Attending: INTERNAL MEDICINE
Payer: COMMERCIAL

## 2019-07-20 DIAGNOSIS — Z12.39 ENCOUNTER FOR SCREENING FOR MALIGNANT NEOPLASM OF BREAST: ICD-10-CM

## 2019-07-20 PROCEDURE — 77067 SCR MAMMO BI INCL CAD: CPT

## 2019-08-15 ENCOUNTER — HOSPITAL ENCOUNTER (OUTPATIENT)
Dept: MAMMOGRAPHY | Age: 44
Discharge: HOME | End: 2019-08-15
Payer: COMMERCIAL

## 2019-08-15 DIAGNOSIS — R92.8 ABNORMAL SCREENING MAMMOGRAM: ICD-10-CM

## 2019-08-15 PROCEDURE — 76642 ULTRASOUND BREAST LIMITED: CPT

## 2019-08-15 PROCEDURE — 77065 DX MAMMO INCL CAD UNI: CPT

## 2019-08-30 ENCOUNTER — APPOINTMENT (OUTPATIENT)
Dept: GENERAL RADIOLOGY | Age: 44
End: 2019-08-30
Attending: EMERGENCY MEDICINE
Payer: COMMERCIAL

## 2019-08-30 ENCOUNTER — HOSPITAL ENCOUNTER (EMERGENCY)
Age: 44
Discharge: HOME | End: 2019-08-31
Payer: COMMERCIAL

## 2019-08-30 DIAGNOSIS — J06.9 ACUTE UPPER RESPIRATORY INFECTION: Primary | ICD-10-CM

## 2019-08-30 LAB — HCG UR QL: NEGATIVE

## 2019-08-30 PROCEDURE — 81025 URINE PREGNANCY TEST: CPT

## 2019-08-30 PROCEDURE — 99284 EMERGENCY DEPT VISIT MOD MDM: CPT

## 2019-08-30 PROCEDURE — 71046 X-RAY EXAM CHEST 2 VIEWS: CPT

## 2019-08-30 RX ORDER — AZITHROMYCIN 250 MG/1
TABLET, FILM COATED ORAL
Qty: 6 TAB | Refills: 0 | Status: SHIPPED | OUTPATIENT
Start: 2019-08-30 | End: 2019-09-16

## 2019-08-30 RX ORDER — PREDNISONE 10 MG/1
TABLET ORAL
Qty: 21 TAB | Refills: 0 | Status: SHIPPED | OUTPATIENT
Start: 2019-08-30 | End: 2019-09-16

## 2019-08-30 RX ORDER — HYDROXYZINE PAMOATE 25 MG/1
25 CAPSULE ORAL
Qty: 10 CAP | Refills: 0 | Status: SHIPPED | OUTPATIENT
Start: 2019-08-30 | End: 2019-09-02

## 2019-08-31 VITALS
BODY MASS INDEX: 27 KG/M2 | HEIGHT: 67 IN | SYSTOLIC BLOOD PRESSURE: 150 MMHG | OXYGEN SATURATION: 97 % | WEIGHT: 172 LBS | TEMPERATURE: 98.8 F | HEART RATE: 99 BPM | DIASTOLIC BLOOD PRESSURE: 90 MMHG | RESPIRATION RATE: 17 BRPM

## 2019-08-31 NOTE — ED NOTES
I have reviewed discharge instructions with the patient. The patient verbalized understanding. Patient left ED via Discharge Method: ambulatory to Home with self. Opportunity for questions and clarification provided. Patient given 3 scripts. Medication explained to pt and pt v\u to medication. Pt in no acute distress at discharge. To continue your aftercare when you leave the hospital, you may receive an automated call from our care team to check in on how you are doing. This is a free service and part of our promise to provide the best care and service to meet your aftercare needs.  If you have questions, or wish to unsubscribe from this service please call [phone]. Thank you for Choosing our [facility] Emergency Department.

## 2019-08-31 NOTE — ED PROVIDER NOTES
68-year-old female with cough congestion generalized malaise. Patient also had a sore throat yesterday but is gotten better. The history is provided by the patient. Nasal Congestion   This is a new problem. The current episode started 2 days ago. The problem occurs constantly. The problem has not changed since onset. Nothing aggravates the symptoms. Nothing relieves the symptoms. She has tried nothing for the symptoms.    Cough          Past Medical History:   Diagnosis Date    Acute renal failure ([facility] 1/24/2012    CKD (chronic kidney disease) stage 3, GFR 30-59 ml/min ([facility])     CKD (chronic kidney disease), stage IV ([address] 3/13/2017    Gastroenteritis, acute 1/24/2012    IDDM (insulin dependent diabetes mellitus) ([address] 1/24/2012    Intractable nausea and vomiting 12/25/2014    Irregular menses     Nausea & vomiting 1/24/2012    Neuropathy, peripheral, idiopathic 3/13/2017    Retinopathy, bilateral 3/13/2017    Trichomoniasis 3/13/2017    Ulcer, skin, chronic ([address] 3/13/2017    Vaginal burning        Past Surgical History:   Procedure Laterality Date    HX AMPUTATION  1/27/12    gangrene    HX AMPUTATION Left     5th Toe due to Diabetic Ulcer    HX OPEN REDUCTION INTERNAL FIXATION Right 2011    ankle         Family History:   Problem Relation Age of Onset    Diabetes Father         DM Type II    Prostate Cancer Father     Stroke Father     Breast Cancer Mother 76    Diabetes Maternal Grandmother         DM Type II    Hypertension Maternal Grandmother     Diabetes Paternal Grandfather         DM Type II     Diabetes Paternal Grandmother        Social History     Socioeconomic History    Marital status: SINGLE     Spouse name: Not on file    Number of children: Not on file    Years of education: Not on file    Highest education level: Not on file   Occupational History    Not on file   Social Needs    Financial resource strain: Not on file    Food insecurity:     Worry: Not on file Inability: Not on file    Transportation needs:     Medical: Not on file     Non-medical: Not on file   Tobacco Use    Smoking status: Former Smoker     Last attempt to quit: 2013     Years since quittin.8    Smokeless tobacco: Never Used   Substance and Sexual Activity    Alcohol use: Yes     Comment: socially; occasional alcohol use    Drug use: No    Sexual activity: Yes     Partners: Male     Birth control/protection: None   Lifestyle    Physical activity:     Days per week: Not on file     Minutes per session: Not on file    Stress: Not on file   Relationships    Social connections:     Talks on phone: Not on file     Gets together: Not on file     Attends Hoahaoism service: Not on file     Active member of club or organization: Not on file     Attends meetings of clubs or organizations: Not on file     Relationship status: Not on file    Intimate partner violence:     Fear of current or ex partner: Not on file     Emotionally abused: Not on file     Physically abused: Not on file     Forced sexual activity: Not on file   Other Topics Concern     Service Not Asked    Blood Transfusions Not Asked    Caffeine Concern Yes    Occupational Exposure Not Asked   Steva Bertrand Hazards Not Asked    Sleep Concern Not Asked    Stress Concern Not Asked    Weight Concern Not Asked    Special Diet Not Asked    Back Care Not Asked    Exercise No    Bike Helmet Not Asked    Seat Belt Yes    Self-Exams No   Social History Narrative    Abuse: Feels safe at home, no history of physical abuse, no history of sexual abuse         ALLERGIES: Patient has no known allergies. Review of Systems   Constitutional: Negative. Negative for activity change. HENT: Negative. Eyes: Negative. Respiratory: Positive for cough. Cardiovascular: Negative. Gastrointestinal: Negative. Genitourinary: Negative. Musculoskeletal: Negative. Skin: Negative. Neurological: Negative. Psychiatric/Behavioral: Negative. All other systems reviewed and are negative. Vitals:    08/30/19 2154   BP: (!) 155/91   Pulse: (!) 105   Resp: 17   Temp: 99.9 °F (37.7 °C)   SpO2: 97%   Weight: 78 kg (172 lb)   Height: 5' 7\" (1.702 m)            Physical Exam   Constitutional: She is oriented to person, place, and time. She appears well-developed and well-nourished. No distress. HENT:   Head: Normocephalic and atraumatic. Right Ear: External ear normal.   Left Ear: External ear normal.   Nose: Mucosal edema and rhinorrhea present. Mouth/Throat: Mucous membranes are normal. Posterior oropharyngeal edema present. No posterior oropharyngeal erythema. Eyes: Pupils are equal, round, and reactive to light. Conjunctivae and EOM are normal. Right eye exhibits no discharge. Left eye exhibits no discharge. No scleral icterus. Neck: Normal range of motion. Neck supple. No JVD present. No tracheal deviation present. Cardiovascular: Normal rate, regular rhythm and intact distal pulses. Pulmonary/Chest: Effort normal and breath sounds normal. No stridor. No respiratory distress. She has no wheezes. She exhibits no tenderness. Abdominal: Soft. Bowel sounds are normal. She exhibits no distension and no mass. There is no tenderness. Musculoskeletal: Normal range of motion. She exhibits no edema or tenderness. Neurological: She is alert and oriented to person, place, and time. No cranial nerve deficit. Skin: Skin is warm and dry. No rash noted. She is not diaphoretic. No erythema. No pallor. Psychiatric: She has a normal mood and affect. Her behavior is normal. Thought content normal.   Nursing note and vitals reviewed.        MDM  Number of Diagnoses or Management Options  Diagnosis management comments: Assessment: Patient appears to have an upper respiratory infection is been going on for greater than 24 hours slightly tachycardic but able to take p.o. without difficulty we will start her on antibiotics have her follow-up closely with her primary care physician.          Procedures

## 2019-08-31 NOTE — DISCHARGE INSTRUCTIONS
Patient Education        Upper Respiratory Infection (Cold): Care Instructions  Your Care Instructions    An upper respiratory infection, or URI, is an infection of the nose, sinuses, or throat. URIs are spread by coughs, sneezes, and direct contact. The common cold is the most frequent kind of URI. The flu and sinus infections are other kinds of URIs. Almost all URIs are caused by viruses. Antibiotics won't cure them. But you can treat most infections with home care. This may include drinking lots of fluids and taking over-the-counter pain medicine. You will probably feel better in 4 to 10 days. The doctor has checked you carefully, but problems can develop later. If you notice any problems or new symptoms, get medical treatment right away. Follow-up care is a key part of your treatment and safety. Be sure to make and go to all appointments, and call your doctor if you are having problems. It's also a good idea to know your test results and keep a list of the medicines you take. How can you care for yourself at home? · To prevent dehydration, drink plenty of fluids, enough so that your urine is light yellow or clear like water. Choose water and other caffeine-free clear liquids until you feel better. If you have kidney, heart, or liver disease and have to limit fluids, talk with your doctor before you increase the amount of fluids you drink. · Take an over-the-counter pain medicine, such as acetaminophen (Tylenol), ibuprofen (Advil, Motrin), or naproxen (Aleve). Read and follow all instructions on the label. · Before you use cough and cold medicines, check the label. These medicines may not be safe for young children or for people with certain health problems. · Be careful when taking over-the-counter cold or flu medicines and Tylenol at the same time. Many of these medicines have acetaminophen, which is Tylenol. Read the labels to make sure that you are not taking more than the recommended dose.  Too much acetaminophen (Tylenol) can be harmful. · Get plenty of rest.  · Do not smoke or allow others to smoke around you. If you need help quitting, talk to your doctor about stop-smoking programs and medicines. These can increase your chances of quitting for good. When should you call for help? Call 911 anytime you think you may need emergency care. For example, call if:    · You have severe trouble breathing.    Call your doctor now or seek immediate medical care if:    · You seem to be getting much sicker.     · You have new or worse trouble breathing.     · You have a new or higher fever.     · You have a new rash.    Watch closely for changes in your health, and be sure to contact your doctor if:    · You have a new symptom, such as a sore throat, an earache, or sinus pain.     · You cough more deeply or more often, especially if you notice more mucus or a change in the color of your mucus.     · You do not get better as expected. Where can you learn more? Go to http://damon-chad.info/. Enter K415 in the search box to learn more about \"Upper Respiratory Infection (Cold): Care Instructions. \"  Current as of: September 5, 2018  Content Version: 12.1  © 9225-2175 [employer]. Care instructions adapted under license by [employer] (which disclaims liability or warranty for this information). If you have questions about a medical condition or this instruction, always ask your healthcare professional. [place] [address] any warranty or liability for your use of this information.

## 2020-02-15 ENCOUNTER — HOSPITAL ENCOUNTER (EMERGENCY)
Age: 45
Discharge: HOME | End: 2020-02-15
Attending: EMERGENCY MEDICINE
Payer: COMMERCIAL

## 2020-02-15 VITALS
DIASTOLIC BLOOD PRESSURE: 87 MMHG | HEART RATE: 84 BPM | OXYGEN SATURATION: 99 % | RESPIRATION RATE: 18 BRPM | SYSTOLIC BLOOD PRESSURE: 132 MMHG | BODY MASS INDEX: 26.07 KG/M2 | HEIGHT: 68 IN | TEMPERATURE: 98.4 F | WEIGHT: 172 LBS

## 2020-02-15 DIAGNOSIS — A59.9 TRICHOMONAS INFECTION: ICD-10-CM

## 2020-02-15 DIAGNOSIS — B96.89 BV (BACTERIAL VAGINOSIS): Primary | ICD-10-CM

## 2020-02-15 DIAGNOSIS — N76.0 BV (BACTERIAL VAGINOSIS): Primary | ICD-10-CM

## 2020-02-15 LAB
BACTERIA URNS QL MICRO: ABNORMAL /HPF
CASTS URNS QL MICRO: ABNORMAL /LPF
EPI CELLS #/AREA URNS HPF: ABNORMAL /HPF
HCG UR QL: NEGATIVE
RBC #/AREA URNS HPF: ABNORMAL /HPF
SERVICE CMNT-IMP: NORMAL
WBC URNS QL MICRO: ABNORMAL /HPF
WET PREP GENITAL: NORMAL

## 2020-02-15 PROCEDURE — 99283 EMERGENCY DEPT VISIT LOW MDM: CPT

## 2020-02-15 PROCEDURE — 96372 THER/PROPH/DIAG INJ SC/IM: CPT

## 2020-02-15 PROCEDURE — 87491 CHLMYD TRACH DNA AMP PROBE: CPT

## 2020-02-15 PROCEDURE — 81015 MICROSCOPIC EXAM OF URINE: CPT

## 2020-02-15 PROCEDURE — 87186 SC STD MICRODIL/AGAR DIL: CPT

## 2020-02-15 PROCEDURE — 74011250636 HC RX REV CODE- 250/636: Performed by: EMERGENCY MEDICINE

## 2020-02-15 PROCEDURE — 87086 URINE CULTURE/COLONY COUNT: CPT

## 2020-02-15 PROCEDURE — 74011250637 HC RX REV CODE- 250/637: Performed by: EMERGENCY MEDICINE

## 2020-02-15 PROCEDURE — 81025 URINE PREGNANCY TEST: CPT

## 2020-02-15 PROCEDURE — 87088 URINE BACTERIA CULTURE: CPT

## 2020-02-15 PROCEDURE — 87210 SMEAR WET MOUNT SALINE/INK: CPT

## 2020-02-15 PROCEDURE — 74011000250 HC RX REV CODE- 250: Performed by: EMERGENCY MEDICINE

## 2020-02-15 RX ORDER — METRONIDAZOLE 500 MG/1
500 TABLET ORAL 2 TIMES DAILY
Qty: 14 TAB | Refills: 0 | Status: SHIPPED | OUTPATIENT
Start: 2020-02-15 | End: 2020-02-22

## 2020-02-15 RX ORDER — AZITHROMYCIN 250 MG/1
1000 TABLET, FILM COATED ORAL
Status: COMPLETED | OUTPATIENT
Start: 2020-02-15 | End: 2020-02-15

## 2020-02-15 RX ADMIN — AZITHROMYCIN 1000 MG: 250 TABLET, FILM COATED ORAL at 21:18

## 2020-02-15 RX ADMIN — LIDOCAINE HYDROCHLORIDE 250 MG: 10 INJECTION, SOLUTION INFILTRATION; PERINEURAL at 21:20

## 2020-02-16 NOTE — ED NOTES
I have reviewed discharge instructions with the patient. The patient verbalized understanding. Patient to follow up with PMD and/or health department. Patient advised that any partners will need to be treated before resuming sexual contact. Patent expresses understanding. Patient ambulatory from ED in NAD with Rx x 1.

## 2020-02-16 NOTE — ED PROVIDER NOTES
The history is provided by the patient. Urinary Pain    This is a new problem. The current episode started more than 2 days ago. The problem occurs every urination. The problem has been gradually worsening. The quality of the pain is described as burning and aching. The pain is mild. There has been no fever. She is sexually active. There is no history of pyelonephritis. Associated symptoms include frequency and vaginal discharge. Pertinent negatives include no chills, no sweats, no nausea, no vomiting, no hematuria, no hesitancy, no urgency, no flank pain, no abdominal pain and no back pain. The patient is not pregnant. Treatments tried: Patient received antibiotics for 7 days approximately 2 weeks ago with partial improvement. The treatment provided no relief. Her past medical history is significant for recurrent UTIs. Her past medical history does not include kidney stones, single kidney, urological procedure, urinary stasis, catheterization or urinary catheter problem.         Past Medical History:   Diagnosis Date    Acute renal failure ([facility] [address] 1/24/2012    CKD (chronic kidney disease) stage 3, GFR 30-59 ml/min ([facility])     CKD (chronic kidney disease), stage IV ([address] 3/13/2017    Gastroenteritis, acute 1/24/2012    IDDM (insulin dependent diabetes mellitus) ([address] 1/24/2012    Intractable nausea and vomiting 12/25/2014    Irregular menses     Nausea & vomiting 1/24/2012    Neuropathy, peripheral, idiopathic 3/13/2017    Retinopathy, bilateral 3/13/2017    Trichomoniasis 3/13/2017    Ulcer, skin, chronic ([address] 3/13/2017    Vaginal burning        Past Surgical History:   Procedure Laterality Date    HX AMPUTATION  1/27/12    gangrene    HX AMPUTATION Left     5th Toe due to Diabetic Ulcer    HX OPEN REDUCTION INTERNAL FIXATION Right 2011    ankle         Family History:   Problem Relation Age of Onset    Diabetes Father         DM Type II    Prostate Cancer Father     Stroke Father     Breast Cancer Mother 76    Diabetes Maternal Grandmother         DM Type II    Hypertension Maternal Grandmother     Diabetes Paternal Grandfather         DM Type II     Diabetes Paternal Grandmother        Social History     Socioeconomic History    Marital status: SINGLE     Spouse name: Not on file    Number of children: Not on file    Years of education: Not on file    Highest education level: Not on file   Occupational History    Not on file   Social Needs    Financial resource strain: Not on file    Food insecurity:     Worry: Not on file     Inability: Not on file    Transportation needs:     Medical: Not on file     Non-medical: Not on file   Tobacco Use    Smoking status: Former Smoker     Last attempt to quit: 2013     Years since quittin.2    Smokeless tobacco: Never Used   Substance and Sexual Activity    Alcohol use: Yes     Comment: socially; occasional alcohol use    Drug use: No    Sexual activity: Yes     Partners: Male     Birth control/protection: None   Lifestyle    Physical activity:     Days per week: Not on file     Minutes per session: Not on file    Stress: Not on file   Relationships    Social connections:     Talks on phone: Not on file     Gets together: Not on file     Attends [employer] service: Not on file     Active member of club or organization: Not on file     Attends meetings of clubs or organizations: Not on file     Relationship status: Not on file    Intimate partner violence:     Fear of current or ex partner: Not on file     Emotionally abused: Not on file     Physically abused: Not on file     Forced sexual activity: Not on file   Other Topics Concern     Service Not Asked    Blood Transfusions Not Asked    Caffeine Concern Yes    Occupational Exposure Not Asked   [place] Hazards Not Asked    Sleep Concern Not Asked    Stress Concern Not Asked    Weight Concern Not Asked    Special Diet Not Asked    Back Care Not Asked    Exercise No    Bike Helmet Not Asked    Seat Belt Yes    Self-Exams No   Social History Narrative    Abuse: Feels safe at home, no history of physical abuse, no history of sexual abuse         ALLERGIES: Patient has no known allergies. Review of Systems   Constitutional: Negative for chills and fever. Gastrointestinal: Negative for abdominal pain, nausea and vomiting. Endocrine: Negative for polydipsia, polyphagia and polyuria. Genitourinary: Positive for frequency, pelvic pain and vaginal discharge. Negative for flank pain, hematuria, hesitancy, urgency and vaginal bleeding. Musculoskeletal: Negative for back pain. All other systems reviewed and are negative. Vitals:    02/15/20 1954   Pulse: 85   Resp: 15   Temp: 98.8 °F (37.1 °C)   SpO2: 97%   Weight: 78 kg (172 lb)   Height: 5' 8\" (1.727 m)            Physical Exam  Vitals signs and nursing note reviewed. Constitutional:       General: She is not in acute distress. Appearance: She is well-developed. HENT:      Head: Normocephalic and atraumatic. Right Ear: External ear normal.      Left Ear: External ear normal.      Nose: Nose normal.      Mouth/Throat:      Mouth: Mucous membranes are moist.   Eyes:      Extraocular Movements: Extraocular movements intact. Conjunctiva/sclera: Conjunctivae normal.      Pupils: Pupils are equal, round, and reactive to light. Neck:      Musculoskeletal: Normal range of motion and neck supple. Cardiovascular:      Rate and Rhythm: Normal rate and regular rhythm. Heart sounds: Normal heart sounds. Pulmonary:      Effort: Pulmonary effort is normal.      Breath sounds: Normal breath sounds. Abdominal:      General: Bowel sounds are normal.      Palpations: Abdomen is soft. Tenderness: There is no abdominal tenderness. There is no right CVA tenderness or left CVA tenderness. Hernia: No hernia is present. Musculoskeletal: Normal range of motion. Skin:     General: Skin is warm and dry. Capillary Refill: Capillary refill takes less than 2 seconds. Neurological:      General: No focal deficit present. Mental Status: She is alert and oriented to person, place, and time. Psychiatric:         Mood and Affect: Mood normal.         Behavior: Behavior normal.          MDM  Number of Diagnoses or Management Options  BV (bacterial vaginosis): new and requires workup  Trichomonas infection: new and requires workup     Amount and/or Complexity of Data Reviewed  Clinical lab tests: ordered and reviewed  Review and summarize past medical records: yes    Risk of Complications, Morbidity, and/or Mortality  Presenting problems: low  Diagnostic procedures: minimal  Management options: low    Patient Progress  Patient progress: improved         Procedures        Results Reviewed:      Recent Results (from the past 24 hour(s))   HCG URINE, QL. - POC    Collection Time: 02/15/20  8:22 PM   Result Value Ref Range    Pregnancy test,urine (POC) NEGATIVE  NEG     URINE MICROSCOPIC    Collection Time: 02/15/20  8:23 PM   Result Value Ref Range    WBC 20-50 0 /hpf    RBC 0-3 0 /hpf    Epithelial cells 3-5 0 /hpf    Bacteria 4+ (H) 0 /hpf    Casts 0-3 0 /lpf   WET PREP    Collection Time: 02/15/20  8:44 PM   Result Value Ref Range    Special Requests: NO SPECIAL REQUESTS      Wet prep >100 WBCS PER HPF     Wet prep MANY  CLUE CELLS PRESENT        Wet prep NO YEAST SEEN      Wet prep MANY  MOTILE TRICHOMONAS NOTED           I discussed the results of all labs, procedures, radiographs, and treatments with the patient and available family. Treatment plan is agreed upon and the patient is ready for discharge. All voiced understanding of the discharge plan and medication instructions or changes as appropriate. Questions about treatment in the ED were answered. All were encouraged to return should symptoms worsen or new problems develop.

## 2020-02-16 NOTE — DISCHARGE INSTRUCTIONS
Patient Education           Bacterial Vaginosis: Care Instructions  Your Care Instructions    Bacterial vaginosis is a type of vaginal infection. It is caused by excess growth of certain bacteria that are normally found in the vagina. Symptoms can include itching, swelling, pain when you urinate or have sex, and a gray or yellow discharge with a \"fishy\" odor. It is not considered an infection that is spread through sexual contact. Although symptoms can be annoying and uncomfortable, bacterial vaginosis does not usually cause other health problems. However, if you have it while you are pregnant, it can cause complications. While the infection may go away on its own, most doctors use antibiotics to treat it. You may have been prescribed pills or vaginal cream. With treatment, bacterial vaginosis usually clears up in 5 to 7 days. Follow-up care is a key part of your treatment and safety. Be sure to make and go to all appointments, and call your doctor if you are having problems. It's also a good idea to know your test results and keep a list of the medicines you take. How can you care for yourself at home? · Take your antibiotics as directed. Do not stop taking them just because you feel better. You need to take the full course of antibiotics. · Do not eat or drink anything that contains alcohol if you are taking metronidazole (Flagyl). · Keep using your medicine if you start your period. Use pads instead of tampons while using a vaginal cream or suppository. Tampons can absorb the medicine. · Wear loose cotton clothing. Do not wear nylon and other materials that hold body heat and moisture close to the skin. · Do not scratch. Relieve itching with a cold pack or a cool bath. · Do not wash your vaginal area more than once a day. Use plain water or a mild, unscented soap. Do not douche. When should you call for help?   Watch closely for changes in your health, and be sure to contact your doctor if:    · You have unexpected vaginal bleeding.     · You have a fever.     · You have new or increased pain in your vagina or pelvis.     · You are not getting better after 1 week.     · Your symptoms return after you finish the course of your medicine. Where can you learn more? Go to http://damon-chad.info/. Job Ben in the search box to learn more about \"Bacterial Vaginosis: Care Instructions. \"  Current as of: February 19, 2019  Content Version: 12.2  © 4352-6025 [employer]. Care instructions adapted under license by [employer] (which disclaims liability or warranty for this information). If you have questions about a medical condition or this instruction, always ask your healthcare professional. [address] any warranty or liability for your use of this information. Trichomoniasis: Care Instructions  Your Care Instructions  Trichomoniasis is a sexually transmitted infection (STI) that is spread by having sex with an infected partner. Trichomoniasis is commonly called trich (say \"trick\"). In women, trich may cause vaginal itching and a smelly discharge. But in many cases, especially in men, there are no symptoms. Analy Ruth is treated so that you do not spread it to others. Both you and your sex partner or partners should be treated at the same time so you do not infect each other again. Trich may cause problems with pregnancy. Your doctor will talk with you about treatment for Trich if you are pregnant. Follow-up care is a key part of your treatment and safety. Be sure to make and go to all appointments, and call your doctor if you are having problems. It's also a good idea to know your test results and keep a list of the medicines you take. How can you care for yourself at home? · Take your antibiotics as directed. Do not stop taking them just because you feel better. You need to take the full course of antibiotics.   · Do not have sex while you are being treated. If your doctor gave you a single dose of antibiotics, do not have sex for one week after being treated and until your partner also has been treated. · Tell your sex partner (or partners) that he or she will also need to be tested and treated. · Use a cold water compress or cool baths to relieve itching. To prevent trichomoniasis in the future  · Use latex condoms every time you have sex. Use them from the beginning to the end of sexual contact. · Talk to your partner before having sex. Find out if he or she has or is at risk for trich or any other STI. Keep in mind that a person may be able to spread an STI even if he or she does not have symptoms. · Do not have sex if you are being treated for trich or any other STI. · Do not have sex with anyone who has symptoms of an STI, such as sores on the genitals or mouth. · Having one sex partner (who does not have STIs and does not have sex with anyone else) is a good way to avoid STIs. When should you call for help? Call your doctor now or seek immediate medical care if:    · You have unusual vaginal bleeding.     · You have a fever.     · You have new discharge from the vagina or penis.     · You have pelvic pain.    Watch closely for changes in your health, and be sure to contact your doctor if:    · You do not get better as expected.     · You have any new symptoms or your symptoms get worse. Where can you learn more? Go to http://damon-chad.info/. Enter S162 in the search box to learn more about \"Trichomoniasis: Care Instructions. \"  Current as of: September 11, 2018  Content Version: 12.2  © 0213-5580 [employer]. Care instructions adapted under license by [employer] (which disclaims liability or warranty for this information).  If you have questions about a medical condition or this instruction, always ask your healthcare professional. Kizzy Connors disclaims any warranty or liability for your use of this information.

## 2020-02-16 NOTE — ED TRIAGE NOTES
Pt was treated for a UTI 2 weeks ago and states she feels like it has not gone away even though she finished her antibiotics. She still has burning with urination.

## 2020-02-18 LAB
BACTERIA SPEC CULT: ABNORMAL
SERVICE CMNT-IMP: ABNORMAL

## 2020-03-04 ENCOUNTER — HOSPITAL ENCOUNTER (EMERGENCY)
Age: 45
Discharge: HOME | End: 2020-03-04
Attending: EMERGENCY MEDICINE
Payer: COMMERCIAL

## 2020-03-04 VITALS
HEIGHT: 68 IN | OXYGEN SATURATION: 100 % | SYSTOLIC BLOOD PRESSURE: 156 MMHG | RESPIRATION RATE: 16 BRPM | WEIGHT: 172 LBS | BODY MASS INDEX: 26.07 KG/M2 | TEMPERATURE: 98.2 F | DIASTOLIC BLOOD PRESSURE: 98 MMHG | HEART RATE: 85 BPM

## 2020-03-04 DIAGNOSIS — N76.0 ACUTE VAGINITIS: Primary | ICD-10-CM

## 2020-03-04 LAB
SERVICE CMNT-IMP: NORMAL
WET PREP GENITAL: NORMAL

## 2020-03-04 PROCEDURE — 99283 EMERGENCY DEPT VISIT LOW MDM: CPT

## 2020-03-04 PROCEDURE — 87491 CHLMYD TRACH DNA AMP PROBE: CPT

## 2020-03-04 PROCEDURE — 87210 SMEAR WET MOUNT SALINE/INK: CPT

## 2020-03-04 RX ORDER — METRONIDAZOLE 500 MG/1
2000 TABLET ORAL
Status: DISCONTINUED | OUTPATIENT
Start: 2020-03-04 | End: 2020-03-04 | Stop reason: HOSPADM

## 2020-03-04 RX ORDER — ONDANSETRON 4 MG/1
4 TABLET, ORALLY DISINTEGRATING ORAL
Status: DISCONTINUED | OUTPATIENT
Start: 2020-03-04 | End: 2020-03-04 | Stop reason: HOSPADM

## 2020-03-04 NOTE — ED PROVIDER NOTES
Patient states she was seen 2 weeks ago diagnosed with trichomonas and BV, she admits to taking only 4 days of antibiotics and wants to be rechecked for same infections. She denies any unprotected sex    The history is provided by the patient. Vaginal Discharge    This is a recurrent problem. The current episode started more than 2 days ago. The problem occurs constantly. The problem has not changed since onset. The discharge occurs spontaneously. The discharge was white. She is not pregnant. She has not missed her period. Treatments tried: flagyl. The treatment provided mild relief. Her past medical history is significant for STD.         Past Medical History:   Diagnosis Date    Acute renal failure ([facility] [address] 1/24/2012    CKD (chronic kidney disease) stage 3, GFR 30-59 ml/min [facility]     CKD (chronic kidney disease), stage IV ([address] 3/13/2017    Gastroenteritis, acute 1/24/2012    IDDM (insulin dependent diabetes mellitus) ([address] 1/24/2012    Intractable nausea and vomiting 12/25/2014    Irregular menses     Nausea & vomiting 1/24/2012    Neuropathy, peripheral, idiopathic 3/13/2017    Retinopathy, bilateral 3/13/2017    Trichomoniasis 3/13/2017    Ulcer, skin, chronic ([address] 3/13/2017    Vaginal burning        Past Surgical History:   Procedure Laterality Date    HX AMPUTATION  1/27/12    gangrene    HX AMPUTATION Left     5th Toe due to Diabetic Ulcer    HX OPEN REDUCTION INTERNAL FIXATION Right 2011    ankle         Family History:   Problem Relation Age of Onset    Diabetes Father         DM Type II    Prostate Cancer Father     Stroke Father     Breast Cancer Mother 76    Diabetes Maternal Grandmother         DM Type II    Hypertension Maternal Grandmother     Diabetes Paternal Grandfather         DM Type II     Diabetes Paternal Grandmother        Social History     Socioeconomic History    Marital status: SINGLE     Spouse name: Not on file    Number of children: Not on file    Years of education: Not on file    Highest education level: Not on file   Occupational History    Not on file   Social Needs    Financial resource strain: Not on file    Food insecurity:     Worry: Not on file     Inability: Not on file    Transportation needs:     Medical: Not on file     Non-medical: Not on file   Tobacco Use    Smoking status: Former Smoker     Last attempt to quit: 2013     Years since quittin.3    Smokeless tobacco: Never Used   Substance and Sexual Activity    Alcohol use: Yes     Comment: socially; occasional alcohol use    Drug use: No    Sexual activity: Yes     Partners: Male     Birth control/protection: None   Lifestyle    Physical activity:     Days per week: Not on file     Minutes per session: Not on file    Stress: Not on file   Relationships    Social connections:     Talks on phone: Not on file     Gets together: Not on file     Attends [employer] service: Not on file     Active member of club or organization: Not on file     Attends meetings of clubs or organizations: Not on file     Relationship status: Not on file    Intimate partner violence:     Fear of current or ex partner: Not on file     Emotionally abused: Not on file     Physically abused: Not on file     Forced sexual activity: Not on file   Other Topics Concern     Service Not Asked    Blood Transfusions Not Asked    Caffeine Concern Yes    Occupational Exposure Not Asked   [place] [employer] Hazards Not Asked    Sleep Concern Not Asked    Stress Concern Not Asked    Weight Concern Not Asked    Special Diet Not Asked    Back Care Not Asked    Exercise No    Bike Helmet Not Asked    Seat Belt Yes    Self-Exams No   Social History Narrative    Abuse: Feels safe at home, no history of physical abuse, no history of sexual abuse         ALLERGIES: Patient has no known allergies. Review of Systems   Genitourinary: Positive for vaginal discharge.    All other systems reviewed and are negative. Vitals:    03/04/20 1204   BP: 144/89   Pulse: 88   Resp: 16   Temp: 98.2 °F (36.8 °C)   SpO2: 97%   Weight: 78 kg (172 lb)   Height: 5' 8\" (1.727 m)            Physical Exam  Vitals signs and nursing note reviewed. Constitutional:       General: She is not in acute distress. Appearance: Normal appearance. She is well-developed and normal weight. She is not diaphoretic. HENT:      Head: Normocephalic and atraumatic. Nose: Nose normal.      Mouth/Throat:      Mouth: Mucous membranes are moist.   Eyes:      Pupils: Pupils are equal, round, and reactive to light. Neck:      Musculoskeletal: Normal range of motion and neck supple. Cardiovascular:      Rate and Rhythm: Normal rate and regular rhythm. Pulmonary:      Effort: Pulmonary effort is normal.      Breath sounds: Normal breath sounds. Abdominal:      General: Bowel sounds are normal.      Palpations: Abdomen is soft. Tenderness: There is no abdominal tenderness. Genitourinary:     Vagina: Vaginal discharge present. Comments: sMall amount of thin white discharge in the vault, no lesions seen no cervical motion tenderness no adnexal pain  Musculoskeletal: Normal range of motion. Skin:     General: Skin is warm. Neurological:      Mental Status: She is alert and oriented to person, place, and time.           MDM  Number of Diagnoses or Management Options  Diagnosis management comments: Wet prep with few wbc abd clue cells, no trich  Single dose flagyl given to complete treatment  Stressed to avoid any unprotected sex        Amount and/or Complexity of Data Reviewed  Clinical lab tests: ordered and reviewed  Review and summarize past medical records: yes    Risk of Complications, Morbidity, and/or Mortality  Presenting problems: low  Diagnostic procedures: low  Management options: low    Patient Progress  Patient progress: improved         Procedures

## 2020-03-04 NOTE — ED TRIAGE NOTES
Pt states she was treated for BV and trichomas on Feb. 15 here and states she is still having some discharge and cannot be evaluated by GYN until the end of March.

## 2020-03-04 NOTE — ED NOTES
I have reviewed discharge instructions with the patient. The patient verbalized understanding. Patient left ED via Discharge Method: ambulatory to Home with self. Opportunity for questions and clarification provided. Patient given 0 scripts. To continue your aftercare when you leave the hospital, you may receive an automated call from our care team to check in on how you are doing. This is a free service and part of our promise to provide the best care and service to meet your aftercare needs.  If you have questions, or wish to unsubscribe from this service please call [phone]. Thank you for Choosing our [facility] Emergency Department.

## 2020-04-11 ENCOUNTER — HOSPITAL ENCOUNTER (EMERGENCY)
Age: 45
Discharge: HOME | End: 2020-04-12
Attending: STUDENT IN AN ORGANIZED HEALTH CARE EDUCATION/TRAINING PROGRAM
Payer: COMMERCIAL

## 2020-04-11 DIAGNOSIS — R73.9 HYPERGLYCEMIA: Primary | ICD-10-CM

## 2020-04-11 DIAGNOSIS — N39.0 URINARY TRACT INFECTION WITHOUT HEMATURIA, SITE UNSPECIFIED: ICD-10-CM

## 2020-04-11 LAB
ALBUMIN SERPL-MCNC: 3.2 G/DL (ref 3.5–5)
ALBUMIN/GLOB SERPL: 0.8 {RATIO} (ref 1.2–3.5)
ALP SERPL-CCNC: 267 U/L (ref 50–136)
ALT SERPL-CCNC: 27 U/L (ref 12–65)
ANION GAP SERPL CALC-SCNC: 10 MMOL/L (ref 7–16)
AST SERPL-CCNC: 24 U/L (ref 15–37)
BACTERIA URNS QL MICRO: ABNORMAL /HPF
BASOPHILS # BLD: 0 K/UL (ref 0–0.2)
BASOPHILS NFR BLD: 0 % (ref 0–2)
BILIRUB SERPL-MCNC: 0.2 MG/DL (ref 0.2–1.1)
BUN SERPL-MCNC: 16 MG/DL (ref 6–23)
CALCIUM SERPL-MCNC: 8.1 MG/DL (ref 8.3–10.4)
CASTS URNS QL MICRO: ABNORMAL /LPF
CHLORIDE SERPL-SCNC: 107 MMOL/L (ref 98–107)
CO2 SERPL-SCNC: 18 MMOL/L (ref 21–32)
CREAT SERPL-MCNC: 2.08 MG/DL (ref 0.6–1)
DIFFERENTIAL METHOD BLD: ABNORMAL
EOSINOPHIL # BLD: 0.5 K/UL (ref 0–0.8)
EOSINOPHIL NFR BLD: 6 % (ref 0.5–7.8)
EPI CELLS #/AREA URNS HPF: ABNORMAL /HPF
ERYTHROCYTE [DISTWIDTH] IN BLOOD BY AUTOMATED COUNT: 14.6 % (ref 11.9–14.6)
GLOBULIN SER CALC-MCNC: 3.9 G/DL (ref 2.3–3.5)
GLUCOSE SERPL-MCNC: 438 MG/DL (ref 65–100)
HCG UR QL: NEGATIVE
HCT VFR BLD AUTO: 38.1 % (ref 35.8–46.3)
HGB BLD-MCNC: 11.8 G/DL (ref 11.7–15.4)
IMM GRANULOCYTES # BLD AUTO: 0 K/UL (ref 0–0.5)
IMM GRANULOCYTES NFR BLD AUTO: 0 % (ref 0–5)
LYMPHOCYTES # BLD: 1.2 K/UL (ref 0.5–4.6)
LYMPHOCYTES NFR BLD: 15 % (ref 13–44)
MAGNESIUM SERPL-MCNC: 2 MG/DL (ref 1.8–2.4)
MCH RBC QN AUTO: 26.5 PG (ref 26.1–32.9)
MCHC RBC AUTO-ENTMCNC: 31 G/DL (ref 31.4–35)
MCV RBC AUTO: 85.4 FL (ref 79.6–97.8)
MONOCYTES # BLD: 0.6 K/UL (ref 0.1–1.3)
MONOCYTES NFR BLD: 7 % (ref 4–12)
NEUTS SEG # BLD: 5.6 K/UL (ref 1.7–8.2)
NEUTS SEG NFR BLD: 71 % (ref 43–78)
NRBC # BLD: 0 K/UL (ref 0–0.2)
PLATELET # BLD AUTO: 289 K/UL (ref 150–450)
PMV BLD AUTO: 9.8 FL (ref 9.4–12.3)
POTASSIUM SERPL-SCNC: 3.7 MMOL/L (ref 3.5–5.1)
PROT SERPL-MCNC: 7.1 G/DL (ref 6.3–8.2)
RBC # BLD AUTO: 4.46 M/UL (ref 4.05–5.2)
RBC #/AREA URNS HPF: ABNORMAL /HPF
SODIUM SERPL-SCNC: 135 MMOL/L (ref 136–145)
WBC # BLD AUTO: 7.9 K/UL (ref 4.3–11.1)
WBC URNS QL MICRO: ABNORMAL /HPF

## 2020-04-11 PROCEDURE — 81025 URINE PREGNANCY TEST: CPT

## 2020-04-11 PROCEDURE — 85025 COMPLETE CBC W/AUTO DIFF WBC: CPT

## 2020-04-11 PROCEDURE — 99285 EMERGENCY DEPT VISIT HI MDM: CPT

## 2020-04-11 PROCEDURE — 74011250636 HC RX REV CODE- 250/636: Performed by: STUDENT IN AN ORGANIZED HEALTH CARE EDUCATION/TRAINING PROGRAM

## 2020-04-11 PROCEDURE — 93005 ELECTROCARDIOGRAM TRACING: CPT | Performed by: STUDENT IN AN ORGANIZED HEALTH CARE EDUCATION/TRAINING PROGRAM

## 2020-04-11 PROCEDURE — 74011000258 HC RX REV CODE- 258: Performed by: STUDENT IN AN ORGANIZED HEALTH CARE EDUCATION/TRAINING PROGRAM

## 2020-04-11 PROCEDURE — 80053 COMPREHEN METABOLIC PANEL: CPT

## 2020-04-11 PROCEDURE — 81003 URINALYSIS AUTO W/O SCOPE: CPT

## 2020-04-11 PROCEDURE — 83735 ASSAY OF MAGNESIUM: CPT

## 2020-04-11 PROCEDURE — 96375 TX/PRO/DX INJ NEW DRUG ADDON: CPT

## 2020-04-11 PROCEDURE — 96365 THER/PROPH/DIAG IV INF INIT: CPT

## 2020-04-11 PROCEDURE — 81015 MICROSCOPIC EXAM OF URINE: CPT

## 2020-04-11 RX ADMIN — SODIUM CHLORIDE 1000 ML: 900 INJECTION, SOLUTION INTRAVENOUS at 23:23

## 2020-04-11 RX ADMIN — CEFTRIAXONE 1 G: 1 INJECTION, POWDER, FOR SOLUTION INTRAMUSCULAR; INTRAVENOUS at 23:57

## 2020-04-12 VITALS
DIASTOLIC BLOOD PRESSURE: 67 MMHG | TEMPERATURE: 98.3 F | HEIGHT: 68 IN | SYSTOLIC BLOOD PRESSURE: 150 MMHG | RESPIRATION RATE: 16 BRPM | OXYGEN SATURATION: 100 % | WEIGHT: 171 LBS | HEART RATE: 82 BPM | BODY MASS INDEX: 25.91 KG/M2

## 2020-04-12 LAB
ATRIAL RATE: 103 BPM
CALCULATED P AXIS, ECG09: 74 DEGREES
CALCULATED R AXIS, ECG10: 108 DEGREES
CALCULATED T AXIS, ECG11: 74 DEGREES
DIAGNOSIS, 93000: NORMAL
GLUCOSE BLD STRIP.AUTO-MCNC: 294 MG/DL (ref 65–100)
P-R INTERVAL, ECG05: 146 MS
Q-T INTERVAL, ECG07: 366 MS
QRS DURATION, ECG06: 80 MS
QTC CALCULATION (BEZET), ECG08: 479 MS
VENTRICULAR RATE, ECG03: 103 BPM

## 2020-04-12 PROCEDURE — 74011636637 HC RX REV CODE- 636/637: Performed by: STUDENT IN AN ORGANIZED HEALTH CARE EDUCATION/TRAINING PROGRAM

## 2020-04-12 PROCEDURE — 74011250636 HC RX REV CODE- 250/636: Performed by: STUDENT IN AN ORGANIZED HEALTH CARE EDUCATION/TRAINING PROGRAM

## 2020-04-12 PROCEDURE — 82962 GLUCOSE BLOOD TEST: CPT

## 2020-04-12 RX ORDER — CEPHALEXIN 500 MG/1
500 CAPSULE ORAL 4 TIMES DAILY
Qty: 28 CAP | Refills: 0 | Status: SHIPPED | OUTPATIENT
Start: 2020-04-12 | End: 2020-04-19

## 2020-04-12 RX ADMIN — SODIUM CHLORIDE 500 ML: 900 INJECTION, SOLUTION INTRAVENOUS at 00:50

## 2020-04-12 RX ADMIN — INSULIN HUMAN 10 UNITS: 100 INJECTION, SOLUTION PARENTERAL at 00:50

## 2020-04-12 NOTE — ED NOTES
I have reviewed discharge instructions with the patient. The patient verbalized understanding. Patient left ED via Discharge Method: ambulatory to Home with self. Opportunity for questions and clarification provided. Patient given 1 script. To continue your aftercare when you leave the hospital, you may receive an automated call from our care team to check in on how you are doing. This is a free service and part of our promise to provide the best care and service to meet your aftercare needs.  If you have questions, or wish to unsubscribe from this service please call [phone]. Thank you for Choosing our [facility] Emergency Department.

## 2020-04-12 NOTE — DISCHARGE INSTRUCTIONS
Patient Education   Take the antibiotic prescribed as directed to run out of medication. Drink plenty of clear liquids to ensure hydration and check your blood sugar regularly. Arrange follow-up care with both your primary care provider and endocrinologist as discussed. Return immediately for worsening symptoms, concerns or questions. Learning About High Blood Sugar  What is high blood sugar? Your body turns the food you eat into glucose (sugar), which it uses for energy. But if your body isn't able to use the sugar right away, it can build up in your blood and lead to high blood sugar. When the amount of sugar in your blood stays too high for too much of the time, you may have diabetes. Diabetes is a disease that can cause serious health problems. The good news is that lifestyle changes may help you get your blood sugar back to normal and avoid or delay diabetes. What causes high blood sugar? Sugar (glucose) can build up in your blood if you:  · Are overweight. · Have a family history of diabetes. · Take certain medicines, such as steroids. What are the symptoms? Having high blood sugar may not cause any symptoms at all. Or it may make you feel very thirsty or very hungry. You may also urinate more often than usual, have blurry vision, or lose weight without trying. How is high blood sugar treated? You can take steps to lower your blood sugar level if you understand what makes it get higher. Your doctor may want you to learn how to test your blood sugar level at home. Then you can see how illness, stress, or different kinds of food or medicine raise or lower your blood sugar level. Other tests may be needed to see if you have diabetes. How can you prevent high blood sugar? · Watch your weight. If you're overweight, losing just a small amount of weight may help. Reducing fat around your waist is most important. · Limit the amount of calories, sweets, and unhealthy fat you eat.  Ask your doctor if a dietitian can help you. A registered dietitian can help you create meal plans that fit your lifestyle. · Get at least 30 minutes of exercise on most days of the week. Exercise helps control your blood sugar. It also helps you maintain a healthy weight. Walking is a good choice. You also may want to do other activities, such as running, swimming, cycling, or playing tennis or team sports. · If your doctor prescribed medicines, take them exactly as prescribed. Call your doctor if you think you are having a problem with your medicine. You will get more details on the specific medicines your doctor prescribes. Follow-up care is a key part of your treatment and safety. Be sure to make and go to all appointments, and call your doctor if you are having problems. It's also a good idea to know your test results and keep a list of the medicines you take. Where can you learn more? Go to http://damon-chad.info/  Enter O108 in the search box to learn more about \"Learning About High Blood Sugar. \"  Current as of: December 19, 2019Content Version: 12.4  © 0343-2826 Healthwise, [employer]. Care instructions adapted under license by [employer] (which disclaims liability or warranty for this information). If you have questions about a medical condition or this instruction, always ask your healthcare professional. [place] [address] any warranty or liability for your use of this information. Patient Education        Urinary Tract Infection in Women: Care Instructions  Your Care Instructions    A urinary tract infection, or UTI, is a general term for an infection anywhere between the kidneys and the urethra (where urine comes out). Most UTIs are bladder infections. They often cause pain or burning when you urinate. UTIs are caused by bacteria and can be cured with antibiotics. Be sure to complete your treatment so that the infection goes away.   Follow-up care is a key part of your treatment and safety. Be sure to make and go to all appointments, and call your doctor if you are having problems. It's also a good idea to know your test results and keep a list of the medicines you take. How can you care for yourself at home? · Take your antibiotics as directed. Do not stop taking them just because you feel better. You need to take the full course of antibiotics. · Drink extra water and other fluids for the next day or two. This may help wash out the bacteria that are causing the infection. (If you have kidney, heart, or liver disease and have to limit fluids, talk with your doctor before you increase your fluid intake.)  · Avoid drinks that are carbonated or have caffeine. They can irritate the bladder. · Urinate often. Try to empty your bladder each time. · To relieve pain, take a hot bath or lay a heating pad set on low over your lower belly or genital area. Never go to sleep with a heating pad in place. To prevent UTIs  · Drink plenty of water each day. This helps you urinate often, which clears bacteria from your system. (If you have kidney, heart, or liver disease and have to limit fluids, talk with your doctor before you increase your fluid intake.)  · Urinate when you need to. · Urinate right after you have sex. · Change sanitary pads often. · Avoid douches, bubble baths, feminine hygiene sprays, and other feminine hygiene products that have deodorants. · After going to the bathroom, wipe from front to back. When should you call for help? Call your doctor now or seek immediate medical care if:    · Symptoms such as fever, chills, nausea, or vomiting get worse or appear for the first time.     · You have new pain in your back just below your rib cage.  This is called flank pain.     · There is new blood or pus in your urine.     · You have any problems with your antibiotic medicine.    Watch closely for changes in your health, and be sure to contact your doctor if:    · You are not getting better after taking an antibiotic for 2 days.     · Your symptoms go away but then come back. Where can you learn more? Go to http://damon-chad.info/  Enter O968 in the search box to learn more about \"Urinary Tract Infection in Women: Care Instructions. \"  Current as of: August 21, 2019Content Version: 12.4  © 8658-7886 Healthwise, [employer]. Care instructions adapted under license by [employer] (which disclaims liability or warranty for this information). If you have questions about a medical condition or this instruction, always ask your healthcare professional. [address] any warranty or liability for your use of this information.

## 2020-04-12 NOTE — ED TRIAGE NOTES
Pt reports she started feeling dizzy, lightheaded and tingling this afternoon. Pt reports history of anxiety. Pt denies chest pain or shortness of breath.     Ciera Santiago RN

## 2020-04-12 NOTE — ED PROVIDER NOTES
49-year-old female patient with a history of insulin-dependent diabetes presents to the emergency department with reports of anxious feeling, hand and foot tingling and lightheadedness. Patient states her symptoms started yesterday. She notes lightheadedness upon standing, after ambulating for a while. She denies full syncopal episode. Symptoms resolve with rest and food intake. She states they have been more present today throughout the day. She states her hand and foot tingling occurred when walking into the department tonight. This is resolved at this time. She reported tingling over the pad of the third and fourth digit on the left hand and at the arch of her foot on the left side. Is no associated numbness or weakness. She denies any perioral numbness or numbness on the other side of her body. She denies visual change, trouble speaking or swallowing. Patient states she has been unable to check her blood sugar over the past 24 hours as she is currently out of strips. She is to obtain these tomorrow. She denies any nausea, vomiting, chest pain pressure or tightness. There is no significant shortness of breath at this time though she does report some occasional exertional dyspnea that is chronic in nature. Patient does report some darkened appearing, foul-smelling urine. She reports normal bowel movements as of late. Patient reports onset of symptoms this evening after discussing coronavirus with her friend.            Past Medical History:   Diagnosis Date    Acute renal failure ([address] 1/24/2012    CKD (chronic kidney disease) stage 3, GFR 30-59 ml/min (formerly [facility])     CKD (chronic kidney disease), stage IV ([address] 3/13/2017    Gastroenteritis, acute 1/24/2012    IDDM (insulin dependent diabetes mellitus) ([address] 1/24/2012    Intractable nausea and vomiting 12/25/2014    Irregular menses     Nausea & vomiting 1/24/2012    Neuropathy, peripheral, idiopathic 3/13/2017    Retinopathy, bilateral 3/13/2017    Trichomoniasis 3/13/2017    Ulcer, skin, chronic ([facility] 3/13/2017    Vaginal burning        Past Surgical History:   Procedure Laterality Date    HX AMPUTATION  12    gangrene    HX AMPUTATION Left     5th Toe due to Diabetic Ulcer    HX OPEN REDUCTION INTERNAL FIXATION Right 2011    ankle         Family History:   Problem Relation Age of Onset    Diabetes Father         DM Type II    Prostate Cancer Father     Stroke Father     Breast Cancer Mother 76    Diabetes Maternal Grandmother         DM Type II    Hypertension Maternal Grandmother     Diabetes Paternal Grandfather         DM Type II     Diabetes Paternal Grandmother        Social History     Socioeconomic History    Marital status: SINGLE     Spouse name: Not on file    Number of children: Not on file    Years of education: Not on file    Highest education level: Not on file   Occupational History    Not on file   Social Needs    Financial resource strain: Not on file    Food insecurity     Worry: Not on file     Inability: Not on file    Transportation needs     Medical: Not on file     Non-medical: Not on file   Tobacco Use    Smoking status: Former Smoker     Last attempt to quit: 2013     Years since quittin.4    Smokeless tobacco: Never Used   Substance and Sexual Activity    Alcohol use: Yes     Comment: socially; occasional alcohol use    Drug use: No    Sexual activity: Yes     Partners: Male     Birth control/protection: None   Lifestyle    Physical activity     Days per week: Not on file     Minutes per session: Not on file    Stress: Not on file   Relationships    Social connections     Talks on phone: Not on file     Gets together: Not on file     Attends [employer] service: Not on file     Active member of club or organization: Not on file     Attends meetings of clubs or organizations: Not on file     Relationship status: Not on file    Intimate partner violence     Fear of current or ex partner: Not on file     Emotionally abused: Not on file     Physically abused: Not on file     Forced sexual activity: Not on file   Other Topics Concern     Service Not Asked    Blood Transfusions Not Asked    Caffeine Concern Yes    Occupational Exposure Not Asked    Hobby Hazards Not Asked    Sleep Concern Not Asked    Stress Concern Not Asked    Weight Concern Not Asked    Special Diet Not Asked    Back Care Not Asked    Exercise No    Bike Helmet Not Asked    Seat Belt Yes    Self-Exams No   Social History Narrative    Abuse: Feels safe at home, no history of physical abuse, no history of sexual abuse         ALLERGIES: Patient has no known allergies. Review of Systems   Constitutional: Negative for chills, diaphoresis and fever. HENT: Negative for congestion, sneezing and sore throat. Eyes: Negative for visual disturbance. Respiratory: Negative for cough, chest tightness, shortness of breath and wheezing. Cardiovascular: Negative for chest pain and leg swelling. Gastrointestinal: Negative for abdominal pain, blood in stool, diarrhea, nausea and vomiting. Endocrine: Negative for polyuria. Genitourinary: Negative for difficulty urinating, dysuria, flank pain, hematuria and urgency. Musculoskeletal: Negative for back pain, myalgias, neck pain and neck stiffness. Skin: Negative for color change and rash. Neurological: Positive for light-headedness. Negative for dizziness, syncope, speech difficulty, weakness, numbness and headaches. Psychiatric/Behavioral: Negative for behavioral problems. All other systems reviewed and are negative. Vitals:    04/11/20 2245   BP: (!) 155/96   Pulse: (!) 112   Resp: 16   Temp: 98.3 °F (36.8 °C)   SpO2: 98%   Weight: 77.6 kg (171 lb)   Height: 5' 8\" (1.727 m)            Physical Exam  Vitals signs and nursing note reviewed. Constitutional:       General: She is not in acute distress. Appearance: She is well-developed. She is not diaphoretic. Comments: Alert and oriented to person place and time. No acute distress, speaks in clear, fluid sentences. HENT:      Head: Normocephalic and atraumatic. Right Ear: External ear normal.      Left Ear: External ear normal.      Nose: Nose normal.   Eyes:      Pupils: Pupils are equal, round, and reactive to light. Neck:      Musculoskeletal: Normal range of motion. Cardiovascular:      Rate and Rhythm: Normal rate and regular rhythm. Heart sounds: Normal heart sounds. No murmur. No friction rub. No gallop. Pulmonary:      Effort: Pulmonary effort is normal. No respiratory distress. Breath sounds: Normal breath sounds. No stridor. No decreased breath sounds, wheezing, rhonchi or rales. Chest:      Chest wall: No tenderness. Abdominal:      General: There is no distension. Palpations: Abdomen is soft. There is no mass. Tenderness: There is no abdominal tenderness. There is no guarding or rebound. Hernia: No hernia is present. Musculoskeletal: Normal range of motion. General: No tenderness or deformity. Skin:     General: Skin is warm and dry. Neurological:      General: No focal deficit present. Mental Status: She is alert and oriented to person, place, and time. GCS: GCS eye subscore is 4. GCS verbal subscore is 5. GCS motor subscore is 6. Cranial Nerves: No cranial nerve deficit. Sensory: No sensory deficit. Motor: Motor function is intact. Coordination: Coordination is intact. MDM  Number of Diagnoses or Management Options  Hyperglycemia: new and requires workup  Urinary tract infection without hematuria, site unspecified: new and requires workup  Diagnosis management comments: EKG obtained on arrival shows sinus tachycardia with a rate of 103. No evidence of acute ischemia. Patient's labs show significant hyperglycemia at 438. She does not have a anion gap present. Bicarb is low at 18. We will give a subsequent fluid bolus and small dose of insulin and reassess blood sugar. Patient admittedly has not been checking her blood sugar over the past day. She is to  her glucose monitoring strips tomorrow. In light of this hyperglycemia and urinalysis result, we will treat with Rocephin and plan to put patient on outpatient antibiotics for UTI. She is very well-appearing, ambulating about the department without difficulty and displays very stable vital signs. Her labs do not suggest DKA nor do I think she requires admission at this time. Voice dictation software was used during the making of this note. This software is not perfect and grammatical and other typographical errors may be present. This note has been proofread, but may still contain errors.   [address] [facility]; 4/12/2020 @12:40 AM   ===================================================================         Amount and/or Complexity of Data Reviewed  Clinical lab tests: ordered and reviewed  Tests in the radiology section of CPT®: ordered and reviewed  Tests in the medicine section of CPT®: ordered and reviewed  Independent visualization of images, tracings, or specimens: yes    Risk of Complications, Morbidity, and/or Mortality  Presenting problems: moderate  Diagnostic procedures: low  Management options: moderate    Patient Progress  Patient progress: stable         Procedures

## 2020-04-13 ENCOUNTER — PATIENT OUTREACH (OUTPATIENT)
Dept: CASE MANAGEMENT | Age: 45
End: 2020-04-13

## 2020-04-14 ENCOUNTER — PATIENT OUTREACH (OUTPATIENT)
Dept: CASE MANAGEMENT | Age: 45
End: 2020-04-14

## 2020-04-14 NOTE — PROGRESS NOTES
RNCM received call back from patient. RNCM spoke w/pt regarding recent discharge and COVID-19 risk and performed post discharge assessment. Verified name and  with patient as identifiers. Patient has following risk factors of:    DM       ACM reviewed discharge instructions, medical action plan and red flags related to discharge diagnosis. Reviewed and educated them on any new and changed medications related to discharge diagnosis. Advised obtaining a 90-day supply of all daily and as-needed medications. Education provided regarding infection prevention, and signs and symptoms of COVID-19 and when to seek medical attention with patient who verbalized understanding. Discussed exposure protocols and quarantine from [address] you at higher risk for severe illness  and given an opportunity for questions and concerns. The patient agrees to contact the COVID-19 hotline [phone] or PCP office for questions related to their healthcare. ACM provided contact information for future reference. From CDC: Are you at higher risk for severe illness?  Wash your hands often.  Avoid close contact (6 feet, which is about two arm lengths) with people who are sick.  Put distance between yourself and other people if COVID-19 is spreading in your community.  Clean and disinfect frequently touched surfaces.  Avoid all cruise travel and non-essential air travel.  Call your healthcare professional if you have concerns about COVID-19 and your underlying condition or if you are sick. For more information on steps you can take to protect yourself, see CDC's How to Protect Yourself     Patient/family/caregiver given information for [facility] and agrees to enroll     Yes  Patient's preferred e-mail: [email or web address]    Patient's preferred phone number: [phone]  Based on Loop alert triggers, patient will be contacted by nurse care manager for worsening symptoms.     Plan for follow-up call in 7-14d based on severity of symptoms and risk factors.

## 2020-04-15 ENCOUNTER — HOSPITAL ENCOUNTER (EMERGENCY)
Age: 45
Discharge: HOME | End: 2020-04-15
Attending: EMERGENCY MEDICINE
Payer: COMMERCIAL

## 2020-04-15 VITALS
DIASTOLIC BLOOD PRESSURE: 71 MMHG | RESPIRATION RATE: 22 BRPM | HEART RATE: 82 BPM | SYSTOLIC BLOOD PRESSURE: 130 MMHG | TEMPERATURE: 97.5 F | OXYGEN SATURATION: 100 %

## 2020-04-15 DIAGNOSIS — R07.89 ATYPICAL CHEST PAIN: ICD-10-CM

## 2020-04-15 DIAGNOSIS — R42 DIZZINESS: Primary | ICD-10-CM

## 2020-04-15 DIAGNOSIS — F41.1 ANXIETY STATE: ICD-10-CM

## 2020-04-15 LAB
ALBUMIN SERPL-MCNC: 3.1 G/DL (ref 3.5–5)
ALBUMIN/GLOB SERPL: 0.7 {RATIO} (ref 1.2–3.5)
ALP SERPL-CCNC: 272 U/L (ref 50–130)
ALT SERPL-CCNC: 27 U/L (ref 12–65)
ANION GAP SERPL CALC-SCNC: 7 MMOL/L (ref 7–16)
APPEARANCE UR: CLEAR
AST SERPL-CCNC: 28 U/L (ref 15–37)
ATRIAL RATE: 84 BPM
BACTERIA URNS QL MICRO: 0 /HPF
BASOPHILS # BLD: 0 K/UL (ref 0–0.2)
BASOPHILS NFR BLD: 0 % (ref 0–2)
BILIRUB SERPL-MCNC: 0.2 MG/DL (ref 0.2–1.1)
BILIRUB UR QL: NEGATIVE
BUN SERPL-MCNC: 12 MG/DL (ref 6–23)
CALCIUM SERPL-MCNC: 8.2 MG/DL (ref 8.3–10.4)
CALCULATED P AXIS, ECG09: 68 DEGREES
CALCULATED R AXIS, ECG10: 131 DEGREES
CALCULATED T AXIS, ECG11: 63 DEGREES
CHLORIDE SERPL-SCNC: 112 MMOL/L (ref 98–107)
CO2 SERPL-SCNC: 20 MMOL/L (ref 21–32)
COLOR UR: YELLOW
CREAT SERPL-MCNC: 1.83 MG/DL (ref 0.6–1)
DIAGNOSIS, 93000: NORMAL
DIFFERENTIAL METHOD BLD: ABNORMAL
EOSINOPHIL # BLD: 0.4 K/UL (ref 0–0.8)
EOSINOPHIL NFR BLD: 6 % (ref 0.5–7.8)
EPI CELLS #/AREA URNS HPF: ABNORMAL /HPF
ERYTHROCYTE [DISTWIDTH] IN BLOOD BY AUTOMATED COUNT: 14.9 % (ref 11.9–14.6)
GLOBULIN SER CALC-MCNC: 4.3 G/DL (ref 2.3–3.5)
GLUCOSE SERPL-MCNC: 266 MG/DL (ref 65–100)
GLUCOSE UR STRIP.AUTO-MCNC: NEGATIVE MG/DL
HCG UR QL: NEGATIVE
HCT VFR BLD AUTO: 40.8 % (ref 35.8–46.3)
HGB BLD-MCNC: 12.7 G/DL (ref 11.7–15.4)
HGB UR QL STRIP: ABNORMAL
IMM GRANULOCYTES # BLD AUTO: 0 K/UL (ref 0–0.5)
IMM GRANULOCYTES NFR BLD AUTO: 0 % (ref 0–5)
KETONES UR QL STRIP.AUTO: NEGATIVE MG/DL
LEUKOCYTE ESTERASE UR QL STRIP.AUTO: ABNORMAL
LYMPHOCYTES # BLD: 1.2 K/UL (ref 0.5–4.6)
LYMPHOCYTES NFR BLD: 19 % (ref 13–44)
MCH RBC QN AUTO: 26.8 PG (ref 26.1–32.9)
MCHC RBC AUTO-ENTMCNC: 31.1 G/DL (ref 31.4–35)
MCV RBC AUTO: 86.3 FL (ref 79.6–97.8)
MONOCYTES # BLD: 0.5 K/UL (ref 0.1–1.3)
MONOCYTES NFR BLD: 8 % (ref 4–12)
NEUTS SEG # BLD: 4.5 K/UL (ref 1.7–8.2)
NEUTS SEG NFR BLD: 67 % (ref 43–78)
NITRITE UR QL STRIP.AUTO: NEGATIVE
NRBC # BLD: 0 K/UL (ref 0–0.2)
OTHER OBSERVATIONS,UCOM: ABNORMAL
P-R INTERVAL, ECG05: 144 MS
PH UR STRIP: 6 [PH] (ref 5–9)
PLATELET # BLD AUTO: 285 K/UL (ref 150–450)
PMV BLD AUTO: 9.6 FL (ref 9.4–12.3)
POTASSIUM SERPL-SCNC: 3.9 MMOL/L (ref 3.5–5.1)
PROT SERPL-MCNC: 7.4 G/DL (ref 6.3–8.2)
PROT UR STRIP-MCNC: 30 MG/DL
Q-T INTERVAL, ECG07: 346 MS
QRS DURATION, ECG06: 80 MS
QTC CALCULATION (BEZET), ECG08: 408 MS
RBC # BLD AUTO: 4.73 M/UL (ref 4.05–5.2)
RBC #/AREA URNS HPF: ABNORMAL /HPF
SODIUM SERPL-SCNC: 139 MMOL/L (ref 136–145)
SP GR UR REFRACTOMETRY: 1.01 (ref 1–1.02)
TROPONIN I SERPL-MCNC: <0.02 NG/ML (ref 0.02–0.05)
UROBILINOGEN UR QL STRIP.AUTO: 0.2 EU/DL (ref 0.2–1)
VENTRICULAR RATE, ECG03: 84 BPM
WBC # BLD AUTO: 6.6 K/UL (ref 4.3–11.1)
WBC URNS QL MICRO: ABNORMAL /HPF
YEAST URNS QL MICRO: ABNORMAL

## 2020-04-15 PROCEDURE — 74011250636 HC RX REV CODE- 250/636: Performed by: EMERGENCY MEDICINE

## 2020-04-15 PROCEDURE — 93005 ELECTROCARDIOGRAM TRACING: CPT | Performed by: EMERGENCY MEDICINE

## 2020-04-15 PROCEDURE — 85025 COMPLETE CBC W/AUTO DIFF WBC: CPT

## 2020-04-15 PROCEDURE — 81025 URINE PREGNANCY TEST: CPT

## 2020-04-15 PROCEDURE — 80053 COMPREHEN METABOLIC PANEL: CPT

## 2020-04-15 PROCEDURE — 84484 ASSAY OF TROPONIN QUANT: CPT

## 2020-04-15 PROCEDURE — 81001 URINALYSIS AUTO W/SCOPE: CPT

## 2020-04-15 PROCEDURE — 99285 EMERGENCY DEPT VISIT HI MDM: CPT

## 2020-04-15 RX ORDER — SODIUM CHLORIDE 0.9 % (FLUSH) 0.9 %
5-40 SYRINGE (ML) INJECTION EVERY 8 HOURS
Status: DISCONTINUED | OUTPATIENT
Start: 2020-04-15 | End: 2020-04-15 | Stop reason: HOSPADM

## 2020-04-15 RX ORDER — PAROXETINE HYDROCHLORIDE 20 MG/1
TABLET, FILM COATED ORAL
COMMUNITY
Start: 2020-04-14 | End: 2020-04-20 | Stop reason: SINTOL

## 2020-04-15 RX ORDER — SODIUM CHLORIDE 0.9 % (FLUSH) 0.9 %
5-40 SYRINGE (ML) INJECTION AS NEEDED
Status: DISCONTINUED | OUTPATIENT
Start: 2020-04-15 | End: 2020-04-15 | Stop reason: HOSPADM

## 2020-04-15 RX ADMIN — SODIUM CHLORIDE 1000 ML: 900 INJECTION, SOLUTION INTRAVENOUS at 04:38

## 2020-04-15 NOTE — ED PROVIDER NOTES
57-year-old diabetic presents emergency department complaining of feeling strange, dizzy for the last couple of days and worse tonight. Patient states she talked to her psychiatrist earlier today and he called in Paxil for suspected panic attack which she started tonight. Patient states she was seen 4 days ago here with elevated blood sugars and a UTI and felt a little better when she left, but is now feeling worse again. The history is provided by the patient. Dizziness   This is a new problem. The current episode started more than 2 days ago. The problem has not changed since onset. There was no focality noted. Pertinent negatives include no focal weakness, no loss of sensation, no loss of balance, no slurred speech, no speech difficulty, no memory loss, no movement disorder, no agitation, no visual change, no auditory change, no mental status change, no unresponsiveness and no disorientation. There has been no fever. Associated symptoms include chest pain (slight earlier today, none now. Patient spoke to her psychiatrist who started her on Paxil this evening). Pertinent negatives include no shortness of breath, no vomiting, no altered mental status, no confusion, no headaches, no choking, no nausea, no bowel incontinence and no bladder incontinence. There were no medications administered prior to arrival. Associated medical issues include mood changes. Associated medical issues do not include trauma, bleeding disorder, seizures, dementia, CVA or clotting disorder.         Past Medical History:   Diagnosis Date    Acute renal failure ([facility] [address] 1/24/2012    CKD (chronic kidney disease) stage 3, GFR 30-59 ml/min ([facility])     CKD (chronic kidney disease), stage IV ([address] 3/13/2017    Gastroenteritis, acute 1/24/2012    IDDM (insulin dependent diabetes mellitus) ([facility] [address] 1/24/2012    Intractable nausea and vomiting 12/25/2014    Irregular menses     Nausea & vomiting 1/24/2012    Neuropathy, peripheral, idiopathic 3/13/2017    Retinopathy, bilateral 3/13/2017    Trichomoniasis 3/13/2017    Ulcer, skin, chronic ([facility] [address] 3/13/2017    Vaginal burning        Past Surgical History:   Procedure Laterality Date    HX AMPUTATION  12    gangrene    HX AMPUTATION Left     5th Toe due to Diabetic Ulcer    HX OPEN REDUCTION INTERNAL FIXATION Right 2011    ankle         Family History:   Problem Relation Age of Onset    Diabetes Father         DM Type II    Prostate Cancer Father     Stroke Father     Breast Cancer Mother 76    Diabetes Maternal Grandmother         DM Type II    Hypertension Maternal Grandmother     Diabetes Paternal Grandfather         DM Type II     Diabetes Paternal Grandmother        Social History     Socioeconomic History    Marital status: SINGLE     Spouse name: Not on file    Number of children: Not on file    Years of education: Not on file    Highest education level: Not on file   Occupational History    Not on file   Social Needs    Financial resource strain: Not on file    Food insecurity     Worry: Not on file     Inability: Not on file    Transportation needs     Medical: Not on file     Non-medical: Not on file   Tobacco Use    Smoking status: Former Smoker     Last attempt to quit: 2013     Years since quittin.4    Smokeless tobacco: Never Used   Substance and Sexual Activity    Alcohol use: Yes     Comment: socially; occasional alcohol use    Drug use: No    Sexual activity: Yes     Partners: Male     Birth control/protection: None   Lifestyle    Physical activity     Days per week: Not on file     Minutes per session: Not on file    Stress: Not on file   Relationships    Social connections     Talks on phone: Not on file     Gets together: Not on file     Attends Voodoo service: Not on file     Active member of club or organization: Not on file     Attends meetings of clubs or organizations: Not on file     Relationship status: Not on file    Intimate partner violence     Fear of current or ex partner: Not on file     Emotionally abused: Not on file     Physically abused: Not on file     Forced sexual activity: Not on file   Other Topics Concern     Service Not Asked    Blood Transfusions Not Asked    Caffeine Concern Yes    Occupational Exposure Not Asked    Hobby Hazards Not Asked    Sleep Concern Not Asked    Stress Concern Not Asked    Weight Concern Not Asked    Special Diet Not Asked    Back Care Not Asked    Exercise No    Bike Helmet Not Asked    Seat Belt Yes    Self-Exams No   Social History Narrative    Abuse: Feels safe at home, no history of physical abuse, no history of sexual abuse         ALLERGIES: Patient has no known allergies. Review of Systems   Constitutional: Positive for fatigue. Negative for appetite change, chills and fever. Respiratory: Negative for choking and shortness of breath. Cardiovascular: Positive for chest pain (slight earlier today, none now. Patient spoke to her psychiatrist who started her on Paxil this evening). Gastrointestinal: Negative for abdominal pain, bowel incontinence, nausea and vomiting. Genitourinary: Negative for bladder incontinence, dysuria, frequency and vaginal discharge. Musculoskeletal: Negative for neck pain and neck stiffness. Skin: Negative for rash. Neurological: Positive for dizziness. Negative for focal weakness, speech difficulty, headaches and loss of balance. Psychiatric/Behavioral: Negative for agitation, confusion and memory loss. All other systems reviewed and are negative. Vitals:    04/15/20 0354 04/15/20 0358   BP: (!) 165/101    Pulse: 95    Resp: 18    Temp: 97.5 °F (36.4 °C)    SpO2: 99% 99%            Physical Exam  Vitals signs and nursing note reviewed. Constitutional:       General: She is not in acute distress. Appearance: She is well-developed. HENT:      Head: Normocephalic and atraumatic.       Right Ear: External ear normal. Left Ear: External ear normal.      Nose: Nose normal.      Mouth/Throat:      Mouth: Mucous membranes are moist.   Eyes:      Extraocular Movements: Extraocular movements intact. Conjunctiva/sclera: Conjunctivae normal.      Pupils: Pupils are equal, round, and reactive to light. Neck:      Musculoskeletal: Normal range of motion and neck supple. Cardiovascular:      Rate and Rhythm: Normal rate and regular rhythm. Pulses: Normal pulses. Heart sounds: Normal heart sounds. No murmur. Pulmonary:      Effort: Pulmonary effort is normal.      Breath sounds: Normal breath sounds. No wheezing, rhonchi or rales. Abdominal:      General: Bowel sounds are normal.      Palpations: Abdomen is soft. Tenderness: There is no abdominal tenderness. There is no right CVA tenderness or left CVA tenderness. Hernia: No hernia is present. Musculoskeletal: Normal range of motion. Skin:     General: Skin is warm and dry. Capillary Refill: Capillary refill takes less than 2 seconds. Neurological:      General: No focal deficit present. Mental Status: She is alert and oriented to person, place, and time. Psychiatric:         Mood and Affect: Mood normal.         Behavior: Behavior normal.          MDM  Number of Diagnoses or Management Options  Anxiety state: new and requires workup  Atypical chest pain: new and requires workup  Dizziness: new and requires workup     Amount and/or Complexity of Data Reviewed  Clinical lab tests: ordered and reviewed  Review and summarize past medical records: yes  Independent visualization of images, tracings, or specimens: yes    Risk of Complications, Morbidity, and/or Mortality  Presenting problems: moderate  Diagnostic procedures: minimal  Management options: moderate    Patient Progress  Patient progress: improved         Procedures      The patient was observed in the ED.  after review of the old labs from 4 days ago with a bicarb of 18, the patient was given another liter of saline and labs were repeated. UTI appears to be of treated, and bicarbonate had improved to 20 with a normal anion gap. I suspect a lot of the patient's dizziness has to do with starting her Paxil this evening, and the patient was reassured that this side effect should go away in the next week or 2. Results Reviewed:      Recent Results (from the past 24 hour(s))   URINALYSIS W/ RFLX MICROSCOPIC    Collection Time: 04/15/20  4:05 AM   Result Value Ref Range    Color YELLOW      Appearance CLEAR      Specific gravity 1.008 1.001 - 1.023      pH (UA) 6.0 5.0 - 9.0      Protein 30 (A) NEG mg/dL    Glucose Negative mg/dL    Ketone Negative NEG mg/dL    Bilirubin Negative NEG      Blood TRACE (A) NEG      Urobilinogen 0.2 0.2 - 1.0 EU/dL    Nitrites Negative NEG      Leukocyte Esterase TRACE (A) NEG      WBC 3-5 0 /hpf    RBC 0-3 0 /hpf    Epithelial cells 3-5 0 /hpf    Bacteria 0 0 /hpf    Yeast OCCASIONAL      Other observations RESULTS VERIFIED MANUALLY     HCG URINE, QL. - POC    Collection Time: 04/15/20  4:05 AM   Result Value Ref Range    Pregnancy test,urine (POC) Negative NEG     CBC WITH AUTOMATED DIFF    Collection Time: 04/15/20  4:34 AM   Result Value Ref Range    WBC 6.6 4.3 - 11.1 K/uL    RBC 4.73 4.05 - 5.2 M/uL    HGB 12.7 11.7 - 15.4 g/dL    HCT 40.8 35.8 - 46.3 %    MCV 86.3 79.6 - 97.8 FL    MCH 26.8 26.1 - 32.9 PG    MCHC 31.1 (L) 31.4 - 35.0 g/dL    RDW 14.9 (H) 11.9 - 14.6 %    PLATELET 521 397 - 591 K/uL    MPV 9.6 9.4 - 12.3 FL    ABSOLUTE NRBC 0.00 0.0 - 0.2 K/uL    DF AUTOMATED      NEUTROPHILS 67 43 - 78 %    LYMPHOCYTES 19 13 - 44 %    MONOCYTES 8 4.0 - 12.0 %    EOSINOPHILS 6 0.5 - 7.8 %    BASOPHILS 0 0.0 - 2.0 %    IMMATURE GRANULOCYTES 0 0.0 - 5.0 %    ABS. NEUTROPHILS 4.5 1.7 - 8.2 K/UL    ABS. LYMPHOCYTES 1.2 0.5 - 4.6 K/UL    ABS. MONOCYTES 0.5 0.1 - 1.3 K/UL    ABS. EOSINOPHILS 0.4 0.0 - 0.8 K/UL    ABS. BASOPHILS 0.0 0.0 - 0.2 K/UL    ABS. IMM. GRANS. 0.0 0.0 - 0.5 K/UL   METABOLIC PANEL, COMPREHENSIVE    Collection Time: 04/15/20  4:34 AM   Result Value Ref Range    Sodium 139 136 - 145 mmol/L    Potassium 3.9 3.5 - 5.1 mmol/L    Chloride 112 (H) 98 - 107 mmol/L    CO2 20 (L) 21 - 32 mmol/L    Anion gap 7 7 - 16 mmol/L    Glucose 266 (H) 65 - 100 mg/dL    BUN 12 6 - 23 MG/DL    Creatinine 1.83 (H) 0.6 - 1.0 MG/DL    GFR est AA 38 (L) >60 ml/min/1.73m2    GFR est non-AA 32 (L) >60 ml/min/1.73m2    Calcium 8.2 (L) 8.3 - 10.4 MG/DL    Bilirubin, total 0.2 0.2 - 1.1 MG/DL    ALT (SGPT) 27 12 - 65 U/L    AST (SGOT) 28 15 - 37 U/L    Alk. phosphatase 272 (H) 50 - 130 U/L    Protein, total 7.4 6.3 - 8.2 g/dL    Albumin 3.1 (L) 3.5 - 5.0 g/dL    Globulin 4.3 (H) 2.3 - 3.5 g/dL    A-G Ratio 0.7 (L) 1.2 - 3.5     TROPONIN I    Collection Time: 04/15/20  4:34 AM   Result Value Ref Range    Troponin-I, Qt. <0.02 (L) 0.02 - 0.05 NG/ML       I discussed the results of all labs, procedures, radiographs, and treatments with the patient and available family. Treatment plan is agreed upon and the patient is ready for discharge. All voiced understanding of the discharge plan and medication instructions or changes as appropriate. Questions about treatment in the ED were answered. All were encouraged to return should symptoms worsen or new problems develop.

## 2020-04-15 NOTE — ED TRIAGE NOTES
Pt started on Keflex for UTI on Saturday, states the abx is \"too strong\" so she stopped taking it. She also started on Paxil today and states she has been waking up feeling lightheaded and off-balance.

## 2020-04-15 NOTE — ED NOTES
I have reviewed discharge instructions with the patient. The patient verbalized understanding. Patient left ED via Discharge Method: ambulatory to Home with self. Opportunity for questions and clarification provided. Patient given 0 scripts. To continue your aftercare when you leave the hospital, you may receive an automated call from our care team to check in on how you are doing. This is a free service and part of our promise to provide the best care and service to meet your aftercare needs.  If you have questions, or wish to unsubscribe from this service please call [phone]. Thank you for Choosing our [facility] Emergency Department.

## 2020-04-15 NOTE — DISCHARGE INSTRUCTIONS
Patient Education        Dizziness: Care Instructions  Your Care Instructions  Dizziness is the feeling of unsteadiness or fuzziness in your head. It is different than having vertigo, which is a feeling that the room is spinning or that you are moving or falling. It is also different from lightheadedness, which is the feeling that you are about to faint. It can be hard to know what causes dizziness. Some people feel dizzy when they have migraine headaches. Sometimes bouts of flu can make you feel dizzy. Some medical conditions, such as heart problems or high blood pressure, can make you feel dizzy. Many medicines can cause dizziness, including medicines for high blood pressure, pain, or anxiety. If a medicine causes your symptoms, your doctor may recommend that you stop or change the medicine. If it is a problem with your heart, you may need medicine to help your heart work better. If there is no clear reason for your symptoms, your doctor may suggest watching and waiting for a while to see if the dizziness goes away on its own. Follow-up care is a key part of your treatment and safety. Be sure to make and go to all appointments, and call your doctor if you are having problems. It's also a good idea to know your test results and keep a list of the medicines you take. How can you care for yourself at home? · If your doctor recommends or prescribes medicine, take it exactly as directed. Call your doctor if you think you are having a problem with your medicine. · Do not drive while you feel dizzy. · Try to prevent falls. Steps you can take include:  ? Using nonskid mats, adding grab bars near the tub, and using night-lights. ? Clearing your home so that walkways are free of anything you might trip on.  ? Letting family and friends know that you have been feeling dizzy. This will help them know how to help you. When should you call for help? Call 911 anytime you think you may need emergency care.  For example, call if:    · You passed out (lost consciousness).     · You have dizziness along with symptoms of a heart attack. These may include:  ? Chest pain or pressure, or a strange feeling in the chest.  ? Sweating. ? Shortness of breath. ? Nausea or vomiting. ? Pain, pressure, or a strange feeling in the back, neck, jaw, or upper belly or in one or both shoulders or arms. ? Lightheadedness or sudden weakness. ? A fast or irregular heartbeat.     · You have symptoms of a stroke. These may include:  ? Sudden numbness, tingling, weakness, or loss of movement in your face, arm, or leg, especially on only one side of your body. ? Sudden vision changes. ? Sudden trouble speaking. ? Sudden confusion or trouble understanding simple statements. ? Sudden problems with walking or balance. ? A sudden, severe headache that is different from past headaches.    Call your doctor now or seek immediate medical care if:    · You feel dizzy and have a fever, headache, or ringing in your ears.     · You have new or increased nausea and vomiting.     · Your dizziness does not go away or comes back.    Watch closely for changes in your health, and be sure to contact your doctor if:    · You do not get better as expected. Where can you learn more? Go to http://damon-chad.info/  Enter Q823 in the search box to learn more about \"Dizziness: Care Instructions. \"  Current as of: June 26, 2019Content Version: 12.4  © 4768-3069 [employer]. Care instructions adapted under license by [employer] (which disclaims liability or warranty for this information). If you have questions about a medical condition or this instruction, always ask your healthcare professional. [place] [address] any warranty or liability for your use of this information. Patient Education        Chest Pain: Care Instructions  Your Care Instructions    There are many things that can cause chest pain. Some are not serious and will get better on their own in a few days. But some kinds of chest pain need more testing and treatment. Your doctor may have recommended a follow-up visit in the next 8 to 12 hours. If you are not getting better, you may need more tests or treatment. Even though your doctor has released you, you still need to watch for any problems. The doctor carefully checked you, but sometimes problems can develop later. If you have new symptoms or if your symptoms do not get better, get medical care right away. If you have worse or different chest pain or pressure that lasts more than 5 minutes or you passed out (lost consciousness), call 911 or seek other emergency help right away. A medical visit is only one step in your treatment. Even if you feel better, you still need to do what your doctor recommends, such as going to all suggested follow-up appointments and taking medicines exactly as directed. This will help you recover and help prevent future problems. How can you care for yourself at home? · Rest until you feel better. · Take your medicine exactly as prescribed. Call your doctor if you think you are having a problem with your medicine. · Do not drive after taking a prescription pain medicine. When should you call for help? Call 911 if:    · You passed out (lost consciousness).     · You have severe difficulty breathing.     · You have symptoms of a heart attack. These may include:  ? Chest pain or pressure, or a strange feeling in your chest.  ? Sweating. ? Shortness of breath. ? Nausea or vomiting. ? Pain, pressure, or a strange feeling in your back, neck, jaw, or upper belly or in one or both shoulders or arms. ? Lightheadedness or sudden weakness. ? A fast or irregular heartbeat. After you call  911, the  may tell you to chew 1 adult-strength or 2 to 4 low-dose aspirin. Wait for an ambulance.  Do not try to drive yourself.    Call your doctor today if:    · You have any trouble breathing.     · Your chest pain gets worse.     · You are dizzy or lightheaded, or you feel like you may faint.     · You are not getting better as expected.     · You are having new or different chest pain. Where can you learn more? Go to http://damon-chad.info/  Enter A120 in the search box to learn more about \"Chest Pain: Care Instructions. \"  Current as of: June 26, 2019Content Version: 12.4  © 7729-2357 [employer]. Care instructions adapted under license by [employer] (which disclaims liability or warranty for this information). If you have questions about a medical condition or this instruction, always ask your healthcare professional. [address] any warranty or liability for your use of this information.

## 2020-04-16 ENCOUNTER — PATIENT OUTREACH (OUTPATIENT)
Dept: CASE MANAGEMENT | Age: 45
End: 2020-04-16

## 2020-04-16 NOTE — PROGRESS NOTES
Patient contacted regarding recent discharge and COVID-19 risk. Ambulatory Care Manager contacted the patient by telephone to perform post discharge assessment. Verified name and  with patient as identifiers. Patient has following risk factors of:  DM    Pt states she did not receive [employer]vation email for [facility], or may have deleted by accident. Will request be resent to pt. ACM reviewed discharge instructions, medical action plan and red flags related to discharge diagnosis. Reviewed and educated them on any new and changed medications related to discharge diagnosis. Advised obtaining a 90-day supply of all daily and as-needed medications. Education provided regarding infection prevention, and signs and symptoms of COVID-19 and when to seek medical attention with patient who verbalized understanding. Discussed exposure protocols and quarantine from [address] you at higher risk for severe illness  and given an opportunity for questions and concerns. The patient agrees to contact the COVID-19 hotline [phone] or PCP office for questions related to their healthcare. ACM provided contact information for future reference. From CDC: Are you at higher risk for severe illness?  Wash your hands often.  Avoid close contact (6 feet, which is about two arm lengths) with people who are sick.  Put distance between yourself and other people if COVID-19 is spreading in your community.  Clean and disinfect frequently touched surfaces.  Avoid all cruise travel and non-essential air travel.  Call your healthcare professional if you have concerns about COVID-19 and your underlying condition or if you are sick. For more information on steps you can take to protect yourself, see CDC's How to Protect Yourself     Patient/family/caregiver given information for Rinku García and agrees to enroll   Yes  Patient's preferred e-mail:   [email or web address]. [email or web address]    Patient's preferred phone number:  [phone]  Based on Loop alert triggers, patient will be contacted by nurse care manager for worsening symptoms.

## 2020-05-01 ENCOUNTER — PATIENT OUTREACH (OUTPATIENT)
Dept: CASE MANAGEMENT | Age: 45
End: 2020-05-01

## 2020-05-01 ENCOUNTER — APPOINTMENT (OUTPATIENT)
Dept: GENERAL RADIOLOGY | Age: 45
End: 2020-05-01
Attending: EMERGENCY MEDICINE
Payer: COMMERCIAL

## 2020-05-01 ENCOUNTER — HOSPITAL ENCOUNTER (EMERGENCY)
Age: 45
Discharge: HOME | End: 2020-05-01
Attending: EMERGENCY MEDICINE
Payer: COMMERCIAL

## 2020-05-01 VITALS
DIASTOLIC BLOOD PRESSURE: 83 MMHG | BODY MASS INDEX: 25.33 KG/M2 | WEIGHT: 171 LBS | OXYGEN SATURATION: 99 % | TEMPERATURE: 98 F | HEART RATE: 93 BPM | SYSTOLIC BLOOD PRESSURE: 138 MMHG | RESPIRATION RATE: 23 BRPM | HEIGHT: 69 IN

## 2020-05-01 DIAGNOSIS — K59.00 CONSTIPATION, UNSPECIFIED CONSTIPATION TYPE: ICD-10-CM

## 2020-05-01 DIAGNOSIS — R07.89 ATYPICAL CHEST PAIN: Primary | ICD-10-CM

## 2020-05-01 LAB
ALBUMIN SERPL-MCNC: 3.6 G/DL (ref 3.5–5)
ALBUMIN/GLOB SERPL: 0.8 {RATIO} (ref 1.2–3.5)
ALP SERPL-CCNC: 224 U/L (ref 50–136)
ALT SERPL-CCNC: 22 U/L (ref 12–65)
ANION GAP SERPL CALC-SCNC: 10 MMOL/L (ref 7–16)
AST SERPL-CCNC: 26 U/L (ref 15–37)
ATRIAL RATE: 102 BPM
BASOPHILS # BLD: 0 K/UL (ref 0–0.2)
BASOPHILS NFR BLD: 0 % (ref 0–2)
BILIRUB SERPL-MCNC: 0.4 MG/DL (ref 0.2–1.1)
BUN SERPL-MCNC: 16 MG/DL (ref 6–23)
CALCIUM SERPL-MCNC: 8.7 MG/DL (ref 8.3–10.4)
CALCULATED P AXIS, ECG09: 57 DEGREES
CALCULATED R AXIS, ECG10: 132 DEGREES
CALCULATED T AXIS, ECG11: 52 DEGREES
CHLORIDE SERPL-SCNC: 113 MMOL/L (ref 98–107)
CO2 SERPL-SCNC: 19 MMOL/L (ref 21–32)
CREAT SERPL-MCNC: 1.8 MG/DL (ref 0.6–1)
DIAGNOSIS, 93000: NORMAL
DIFFERENTIAL METHOD BLD: ABNORMAL
EOSINOPHIL # BLD: 0.6 K/UL (ref 0–0.8)
EOSINOPHIL NFR BLD: 6 % (ref 0.5–7.8)
ERYTHROCYTE [DISTWIDTH] IN BLOOD BY AUTOMATED COUNT: 14.7 % (ref 11.9–14.6)
GLOBULIN SER CALC-MCNC: 4.6 G/DL (ref 2.3–3.5)
GLUCOSE BLD STRIP.AUTO-MCNC: 111 MG/DL (ref 65–100)
GLUCOSE SERPL-MCNC: 104 MG/DL (ref 65–100)
HCT VFR BLD AUTO: 41.7 % (ref 35.8–46.3)
HGB BLD-MCNC: 13.1 G/DL (ref 11.7–15.4)
IMM GRANULOCYTES # BLD AUTO: 0 K/UL (ref 0–0.5)
IMM GRANULOCYTES NFR BLD AUTO: 0 % (ref 0–5)
LYMPHOCYTES # BLD: 2.7 K/UL (ref 0.5–4.6)
LYMPHOCYTES NFR BLD: 27 % (ref 13–44)
MCH RBC QN AUTO: 26.7 PG (ref 26.1–32.9)
MCHC RBC AUTO-ENTMCNC: 31.4 G/DL (ref 31.4–35)
MCV RBC AUTO: 85.1 FL (ref 79.6–97.8)
MONOCYTES # BLD: 0.8 K/UL (ref 0.1–1.3)
MONOCYTES NFR BLD: 8 % (ref 4–12)
NEUTS SEG # BLD: 5.8 K/UL (ref 1.7–8.2)
NEUTS SEG NFR BLD: 58 % (ref 43–78)
NRBC # BLD: 0 K/UL (ref 0–0.2)
P-R INTERVAL, ECG05: 152 MS
PLATELET # BLD AUTO: 340 K/UL (ref 150–450)
PMV BLD AUTO: 10.1 FL (ref 9.4–12.3)
POTASSIUM SERPL-SCNC: 4.5 MMOL/L (ref 3.5–5.1)
PROT SERPL-MCNC: 8.2 G/DL (ref 6.3–8.2)
Q-T INTERVAL, ECG07: 340 MS
QRS DURATION, ECG06: 74 MS
QTC CALCULATION (BEZET), ECG08: 443 MS
RBC # BLD AUTO: 4.9 M/UL (ref 4.05–5.2)
SODIUM SERPL-SCNC: 142 MMOL/L (ref 136–145)
TROPONIN I SERPL-MCNC: <0.02 NG/ML (ref 0.02–0.05)
VENTRICULAR RATE, ECG03: 102 BPM
WBC # BLD AUTO: 10 K/UL (ref 4.3–11.1)

## 2020-05-01 PROCEDURE — 84484 ASSAY OF TROPONIN QUANT: CPT

## 2020-05-01 PROCEDURE — 85025 COMPLETE CBC W/AUTO DIFF WBC: CPT

## 2020-05-01 PROCEDURE — 82962 GLUCOSE BLOOD TEST: CPT

## 2020-05-01 PROCEDURE — 93005 ELECTROCARDIOGRAM TRACING: CPT | Performed by: EMERGENCY MEDICINE

## 2020-05-01 PROCEDURE — 74011250637 HC RX REV CODE- 250/637: Performed by: EMERGENCY MEDICINE

## 2020-05-01 PROCEDURE — 99285 EMERGENCY DEPT VISIT HI MDM: CPT

## 2020-05-01 PROCEDURE — 80053 COMPREHEN METABOLIC PANEL: CPT

## 2020-05-01 PROCEDURE — 74022 RADEX COMPL AQT ABD SERIES: CPT

## 2020-05-01 RX ORDER — SUCRALFATE 1 G/1
1 TABLET ORAL ONCE
Status: COMPLETED | OUTPATIENT
Start: 2020-05-01 | End: 2020-05-01

## 2020-05-01 RX ORDER — DIAZEPAM 5 MG/1
2.5 TABLET ORAL
Status: COMPLETED | OUTPATIENT
Start: 2020-05-01 | End: 2020-05-01

## 2020-05-01 RX ORDER — HYDROXYZINE 25 MG/1
25 TABLET, FILM COATED ORAL 2 TIMES DAILY
Qty: 30 TAB | Refills: 0 | Status: SHIPPED | OUTPATIENT
Start: 2020-05-01 | End: 2020-05-13

## 2020-05-01 RX ADMIN — SUCRALFATE 1 G: 1 TABLET ORAL at 01:36

## 2020-05-01 RX ADMIN — DIAZEPAM 2.5 MG: 5 TABLET ORAL at 01:36

## 2020-05-01 NOTE — DISCHARGE INSTRUCTIONS
Return with any fevers, vomiting, worsening symptoms, or additional concerns. You need to follow-up with your primary care doctor in 7 to 10 days for reevaluation.

## 2020-05-01 NOTE — ED NOTES
I have reviewed discharge instructions with the patient. The patient verbalized understanding. Patient left ED via Discharge Method: ambulatory to Home with self. Opportunity for questions and clarification provided. Patient given 2 scripts. To continue your aftercare when you leave the hospital, you may receive an automated call from our care team to check in on how you are doing. This is a free service and part of our promise to provide the best care and service to meet your aftercare needs.  If you have questions, or wish to unsubscribe from this service please call [phone]. Thank you for Choosing our [facility] Emergency Department.

## 2020-05-01 NOTE — ED TRIAGE NOTES
Pt c/o chest tightness/ feeling anxious beginning a few hours ago. Pt denies SOB, fever, or cough. Pt reports hx of anxiety.

## 2020-05-01 NOTE — ED PROVIDER NOTES
66-year-old lady presents with concerns about chest discomfort as well as anxiety. She says that she has been at home and is thinking about COVID-19 and the pandemic all the time. She said that this evening she started to feel anxious and started to get some chest discomfort. She said it was not really pain or pressure she said it just did not feel quite right. She did not take any medications for it. She denies nausea, vomiting, cough, fevers, chills, diarrhea, loss of sense of smell, or shortness of breath. No other associated symptoms. Elements of this note were created using speech recognition software. As such, errors of speech recognition may be present.            Past Medical History:   Diagnosis Date    Acute renal failure ([facility] 1/24/2012    CKD (chronic kidney disease) stage 3, GFR 30-59 ml/min ([facility])     CKD (chronic kidney disease), stage IV ([address] 3/13/2017    Gastroenteritis, acute 1/24/2012    IDDM (insulin dependent diabetes mellitus) ([address] 1/24/2012    Intractable nausea and vomiting 12/25/2014    Irregular menses     Nausea & vomiting 1/24/2012    Neuropathy, peripheral, idiopathic 3/13/2017    Retinopathy, bilateral 3/13/2017    Trichomoniasis 3/13/2017    Ulcer, skin, chronic ([address] 3/13/2017    Vaginal burning        Past Surgical History:   Procedure Laterality Date    HX AMPUTATION  1/27/12    gangrene    HX AMPUTATION Left     5th Toe due to Diabetic Ulcer    HX OPEN REDUCTION INTERNAL FIXATION Right 2011    ankle         Family History:   Problem Relation Age of Onset    Diabetes Father         DM Type II    Prostate Cancer Father     Stroke Father     Breast Cancer Mother 76    Diabetes Maternal Grandmother         DM Type II    Hypertension Maternal Grandmother     Diabetes Paternal Grandfather         DM Type II     Diabetes Paternal Grandmother        Social History     Socioeconomic History    Marital status: SINGLE     Spouse name: Not on file    Number of children: Not on file    Years of education: Not on file    Highest education level: Not on file   Occupational History    Not on file   Social Needs    Financial resource strain: Not on file    Food insecurity     Worry: Not on file     Inability: Not on file    Transportation needs     Medical: Not on file     Non-medical: Not on file   Tobacco Use    Smoking status: Former Smoker     Last attempt to quit: 2013     Years since quittin.5    Smokeless tobacco: Never Used   Substance and Sexual Activity    Alcohol use: Yes     Comment: socially; occasional alcohol use    Drug use: No    Sexual activity: Yes     Partners: Male     Birth control/protection: None   Lifestyle    Physical activity     Days per week: Not on file     Minutes per session: Not on file    Stress: Not on file   Relationships    Social connections     Talks on phone: Not on file     Gets together: Not on file     Attends Jain service: Not on file     Active member of club or organization: Not on file     Attends meetings of clubs or organizations: Not on file     Relationship status: Not on file    Intimate partner violence     Fear of current or ex partner: Not on file     Emotionally abused: Not on file     Physically abused: Not on file     Forced sexual activity: Not on file   Other Topics Concern     Service Not Asked    Blood Transfusions Not Asked    Caffeine Concern Yes    Occupational Exposure Not Asked   [employer] Hazards Not Asked    Sleep Concern Not Asked    Stress Concern Not Asked    Weight Concern Not Asked    Special Diet Not Asked    Back Care Not Asked    Exercise No    Bike Helmet Not Asked    Seat Belt Yes    Self-Exams No   Social History Narrative    Abuse: Feels safe at home, no history of physical abuse, no history of sexual abuse         ALLERGIES: Patient has no known allergies. Review of Systems   Constitutional: Negative for chills, diaphoresis and fever. HENT: Negative for congestion, rhinorrhea and sore throat. Eyes: Negative for redness and visual disturbance. Respiratory: Negative for cough, chest tightness, shortness of breath and wheezing. Cardiovascular: Positive for chest pain. Negative for palpitations. Gastrointestinal: Negative for abdominal pain, blood in stool, diarrhea, nausea and vomiting. Endocrine: Negative for polydipsia and polyuria. Genitourinary: Negative for dysuria and hematuria. Musculoskeletal: Negative for arthralgias, myalgias and neck stiffness. Skin: Negative for rash. Allergic/Immunologic: Negative for environmental allergies and food allergies. Neurological: Negative for dizziness, weakness and headaches. Hematological: Negative for adenopathy. Does not bruise/bleed easily. Psychiatric/Behavioral: Negative for confusion, sleep disturbance and suicidal ideas. The patient is nervous/anxious. Vitals:    05/01/20 0107   BP: (!) 176/95   Pulse: (!) 103   Resp: 18   Temp: 98 °F (36.7 °C)   SpO2: 100%   Weight: 77.6 kg (171 lb)   Height: 5' 9\" (1.753 m)            Physical Exam  Vitals signs and nursing note reviewed. Constitutional:       General: She is not in acute distress. Appearance: She is well-developed. She is not toxic-appearing. HENT:      Head: Normocephalic and atraumatic. Eyes:      General: No scleral icterus. Right eye: No discharge. Left eye: No discharge. Conjunctiva/sclera: Conjunctivae normal.      Pupils: Pupils are equal, round, and reactive to light. Neck:      Musculoskeletal: Normal range of motion. No neck rigidity. Cardiovascular:      Rate and Rhythm: Normal rate and regular rhythm. Heart sounds: Normal heart sounds. Pulmonary:      Effort: Pulmonary effort is normal. No respiratory distress. Breath sounds: Normal breath sounds. No wheezing or rales. Chest:      Chest wall: No tenderness.    Abdominal:      General: Bowel sounds are normal. There is no distension. Palpations: Abdomen is soft. Tenderness: There is no guarding or rebound. Musculoskeletal: Normal range of motion. General: No tenderness. Lymphadenopathy:      Cervical: No cervical adenopathy. Skin:     General: Skin is warm and dry. Neurological:      General: No focal deficit present. Mental Status: She is alert and oriented to person, place, and time. Psychiatric:         Mood and Affect: Mood normal.         Behavior: Behavior normal.          MDM  Number of Diagnoses or Management Options  Diagnosis management comments: Initial EKG shows no acute ischemic changes with a normal sinus rhythm. I will check a troponin and her basic blood work. I will treat with some Valium and Carafate.          Procedures

## 2020-05-01 NOTE — ED NOTES
Pt informed RN that she has not had BM in almost 2 weeks and that this is abnormal for her. Pt states that she did not take any laxatives because she was \"unsure of what to take\".  Pt denies any pain in abdomen

## 2020-05-01 NOTE — PROGRESS NOTES
Attempted outreach # 1 to patient for [facility] for ER visit on 5/1/20 for chest pain and anxiety  UTR at this time no answer  PLAN:  CCM will attempt outreach # 2 next week

## 2020-05-01 NOTE — Clinical Note
Cherise Brown, I tried outreaching to this patient of yours on 5/1/20 for [facility] for ER visit on 5/1/20 for chest pain and anxiety - UTR (probably because she didn't discharge until after 3 in the morning). Just thought I would let you know.

## 2020-05-04 ENCOUNTER — PATIENT OUTREACH (OUTPATIENT)
Dept: CASE MANAGEMENT | Age: 45
End: 2020-05-04

## 2020-05-04 NOTE — PROGRESS NOTES
RNCM attempted to reach pt regarding ED ROGER, no answer and no name on vm. Will continue attempts to reach pt.

## 2020-05-04 NOTE — PROGRESS NOTES
RNCM received call back from pt. Patient contacted regarding recent discharge and COVID-19 risk   Ambulatory Care Manager contacted the patient by telephone to perform post discharge assessment. Verified name and  with patient as identifiers. Patient has following risk factors of: diabetes and chronic kidney disease. CTN/ACM reviewed discharge instructions, medical action plan and red flags related to discharge diagnosis. Reviewed and educated them on any new and changed medications related to discharge diagnosis. Advised obtaining a 90-day supply of all daily and as-needed medications. Education provided regarding infection prevention, and signs and symptoms of COVID-19 and when to seek medical attention with patient who verbalized understanding. Discussed exposure protocols and quarantine from [address] you at higher risk for severe illness  and given an opportunity for questions and concerns. The patient agrees to contact the COVID-19 hotline [phone] or PCP office for questions related to their healthcare. ACM provided contact information for future reference. From CDC: Are you at higher risk for severe illness?  Wash your hands often.  Avoid close contact (6 feet, which is about two arm lengths) with people who are sick.  Put distance between yourself and other people if COVID-19 is spreading in your community.  Clean and disinfect frequently touched surfaces.  Avoid all cruise travel and non-essential air travel.  Call your healthcare professional if you have concerns about COVID-19 and your underlying condition or if you are sick. For more information on steps you can take to protect yourself, see CDC's How to Protect Yourself      Patient/family/caregiver given information for Rinku García and agrees to enroll no      Plan for follow-up call in 7-14 days based on severity of symptoms and risk factors.

## 2020-05-16 ENCOUNTER — HOSPITAL ENCOUNTER (EMERGENCY)
Age: 45
Discharge: HOME | End: 2020-05-16
Payer: COMMERCIAL

## 2020-05-16 VITALS
OXYGEN SATURATION: 97 % | WEIGHT: 172 LBS | DIASTOLIC BLOOD PRESSURE: 91 MMHG | HEIGHT: 68 IN | BODY MASS INDEX: 26.07 KG/M2 | RESPIRATION RATE: 18 BRPM | TEMPERATURE: 98.1 F | HEART RATE: 125 BPM | SYSTOLIC BLOOD PRESSURE: 157 MMHG

## 2020-05-16 DIAGNOSIS — E16.2 HYPOGLYCEMIA: Primary | ICD-10-CM

## 2020-05-16 LAB
AMPHET UR QL SCN: NEGATIVE
BACTERIA URNS QL MICRO: 0 /HPF
BARBITURATES UR QL SCN: NEGATIVE
BENZODIAZ UR QL: NEGATIVE
CANNABINOIDS UR QL SCN: NEGATIVE
CASTS URNS QL MICRO: 0 /LPF
COCAINE UR QL SCN: NEGATIVE
CRYSTALS URNS QL MICRO: 0 /LPF
EPI CELLS #/AREA URNS HPF: NORMAL /HPF
GLUCOSE BLD STRIP.AUTO-MCNC: 308 MG/DL (ref 65–100)
GLUCOSE BLD STRIP.AUTO-MCNC: 315 MG/DL (ref 65–100)
METHADONE UR QL: NEGATIVE
MUCOUS THREADS URNS QL MICRO: 0 /LPF
OPIATES UR QL: NEGATIVE
PCP UR QL: NEGATIVE
RBC #/AREA URNS HPF: NORMAL /HPF
WBC URNS QL MICRO: NORMAL /HPF
YEAST URNS QL MICRO: NORMAL

## 2020-05-16 PROCEDURE — 81003 URINALYSIS AUTO W/O SCOPE: CPT

## 2020-05-16 PROCEDURE — 82962 GLUCOSE BLOOD TEST: CPT

## 2020-05-16 PROCEDURE — 80307 DRUG TEST PRSMV CHEM ANLYZR: CPT

## 2020-05-16 PROCEDURE — 99284 EMERGENCY DEPT VISIT MOD MDM: CPT

## 2020-05-16 PROCEDURE — 81015 MICROSCOPIC EXAM OF URINE: CPT

## 2020-05-16 RX ORDER — SODIUM CHLORIDE 9 MG/ML
1000 INJECTION, SOLUTION INTRAVENOUS ONCE
Status: DISCONTINUED | OUTPATIENT
Start: 2020-05-16 | End: 2020-05-16

## 2020-05-16 NOTE — ED PROVIDER NOTES
38-year-old female diabetic who had low blood sugar earlier. Patient is being run out of her test strips after that last blood test.  She ate some carbohydrates and drink some fluids and her blood sugars now above 300 however she still feels like her heart is racing and she does not feel well. Low Blood Sugar    This is a new problem. The current episode started 1 to 2 hours ago. The problem has not changed since onset. Associated symptoms include weakness. Pertinent negatives include no confusion.  Mental status baseline is normal.         Past Medical History:   Diagnosis Date    Acute renal failure ([address] 1/24/2012    CKD (chronic kidney disease) stage 3, GFR 30-59 ml/min [facility]     CKD (chronic kidney disease), stage IV ([address] 3/13/2017    Gastroenteritis, acute 1/24/2012    IDDM (insulin dependent diabetes mellitus) 1/24/2012    Intractable nausea and vomiting 12/25/2014    Irregular menses     Nausea & vomiting 1/24/2012    Neuropathy, peripheral, idiopathic 3/13/2017    Retinopathy, bilateral 3/13/2017    Trichomoniasis 3/13/2017    Ulcer, skin, chronic ([address] 3/13/2017    Vaginal burning        Past Surgical History:   Procedure Laterality Date    HX AMPUTATION  1/27/12    gangrene    HX AMPUTATION Left     5th Toe due to Diabetic Ulcer    HX OPEN REDUCTION INTERNAL FIXATION Right 2011    ankle         Family History:   Problem Relation Age of Onset    Diabetes Father         DM Type II    Prostate Cancer Father     Stroke Father     Breast Cancer Mother 76    Diabetes Maternal Grandmother         DM Type II    Hypertension Maternal Grandmother     Diabetes Paternal Grandfather         DM Type II     Diabetes Paternal Grandmother        Social History     Socioeconomic History    Marital status: SINGLE     Spouse name: Not on file    Number of children: Not on file    Years of education: Not on file    Highest education level: Not on file   Occupational History    Not on file Social Needs    Financial resource strain: Not on file    Food insecurity     Worry: Not on file     Inability: Not on file    Transportation needs     Medical: Not on file     Non-medical: Not on file   Tobacco Use    Smoking status: Former Smoker     Last attempt to quit: 2013     Years since quittin.5    Smokeless tobacco: Never Used   Substance and Sexual Activity    Alcohol use: Yes     Frequency: Monthly or less     Comment: socially; occasional alcohol use    Drug use: No    Sexual activity: Yes     Partners: Male     Birth control/protection: None   Lifestyle    Physical activity     Days per week: Not on file     Minutes per session: Not on file    Stress: Not on file   Relationships    Social connections     Talks on phone: Not on file     Gets together: Not on file     Attends Hindu service: Not on file     Active member of club or organization: Not on file     Attends meetings of clubs or organizations: Not on file     Relationship status: Not on file    Intimate partner violence     Fear of current or ex partner: Not on file     Emotionally abused: Not on file     Physically abused: Not on file     Forced sexual activity: Not on file   Other Topics Concern     Service Not Asked    Blood Transfusions Not Asked    Caffeine Concern Yes    Occupational Exposure Not Asked   Tershandra Hylan Hazards Not Asked    Sleep Concern Not Asked    Stress Concern Not Asked    Weight Concern Not Asked    Special Diet Not Asked    Back Care Not Asked    Exercise No    Bike Helmet Not Asked    Seat Belt Yes    Self-Exams No   Social History Narrative    Abuse: Feels safe at home, no history of physical abuse, no history of sexual abuse         ALLERGIES: Patient has no known allergies. Review of Systems   Constitutional: Negative. Negative for activity change. HENT: Negative. Eyes: Negative. Respiratory: Negative. Cardiovascular: Negative. Gastrointestinal: Negative. Genitourinary: Negative. Musculoskeletal: Negative. Skin: Negative. Neurological: Positive for weakness. Psychiatric/Behavioral: Negative. Negative for confusion. All other systems reviewed and are negative. Vitals:    05/16/20 0033   BP: (!) 157/91   Pulse: (!) 125   Resp: 18   Temp: 98.1 °F (36.7 °C)   SpO2: 97%   Weight: 78 kg (172 lb)   Height: 5' 8\" (1.727 m)            Physical Exam  Vitals signs and nursing note reviewed. Constitutional:       General: She is not in acute distress. Appearance: She is well-developed. She is not diaphoretic. HENT:      Head: Normocephalic and atraumatic. Right Ear: External ear normal.      Left Ear: External ear normal.      Nose: Nose normal.      Mouth/Throat:      Pharynx: No oropharyngeal exudate. Eyes:      General: No scleral icterus. Right eye: No discharge. Left eye: No discharge. Conjunctiva/sclera: Conjunctivae normal.      Pupils: Pupils are equal, round, and reactive to light. Neck:      Musculoskeletal: Normal range of motion and neck supple. Vascular: No JVD. Trachea: No tracheal deviation. Cardiovascular:      Rate and Rhythm: Normal rate and regular rhythm. Pulmonary:      Effort: Pulmonary effort is normal. No respiratory distress. Breath sounds: Normal breath sounds. No stridor. No wheezing. Chest:      Chest wall: No tenderness. Abdominal:      General: Bowel sounds are normal. There is no distension. Palpations: Abdomen is soft. There is no mass. Tenderness: There is no abdominal tenderness. Musculoskeletal: Normal range of motion. General: No tenderness. Skin:     General: Skin is warm and dry. Coloration: Skin is not pale. Findings: Erythema present. No rash. Neurological:      Mental Status: She is alert and oriented to person, place, and time. Cranial Nerves: No cranial nerve deficit.    Psychiatric:         Behavior: Behavior normal.         Thought Content: Thought content normal.          MDM  Number of Diagnoses or Management Options  Hypoglycemia:   Diagnosis management comments: Patient has a small bruise on her left inner thigh approximately size 3 cm. Patient was feeling better deferred any blood work tonight. She is to take her insulin as prescribed and follow-up with diabetic nurse educator.        Amount and/or Complexity of Data Reviewed  Clinical lab tests: ordered and reviewed    Risk of Complications, Morbidity, and/or Mortality  Presenting problems: low  Diagnostic procedures: moderate  Management options: low           Procedures

## 2020-05-16 NOTE — ED NOTES
I have reviewed discharge instructions with the patient. The patient verbalized understanding. Patient left ED via Discharge Method: ambulatory to Home with self. Opportunity for questions and clarification provided. Patient given 0 scripts. To continue your aftercare when you leave the hospital, you may receive an automated call from our care team to check in on how you are doing. This is a free service and part of our promise to provide the best care and service to meet your aftercare needs.  If you have questions, or wish to unsubscribe from this service please call [phone]. Thank you for Choosing our [facility] Emergency Department.

## 2020-05-16 NOTE — ED TRIAGE NOTES
Pt states that she had a episode of hypoglycemia. Pt states that her blood sugar 57 she believes. Pt states that she ate a snack and drank a Mt Dew. Pt states that she does not have any more strips to check her blood sugar. Pt states that she still feels off. Pt states that she has a spot on her left leg that she wants to get checked out.

## 2020-05-16 NOTE — ED NOTES
Patient refusing IV and blood work and only wants the spot on her leg looked at. MD informed. No further orders received.

## 2020-05-18 ENCOUNTER — PATIENT OUTREACH (OUTPATIENT)
Dept: CASE MANAGEMENT | Age: 45
End: 2020-05-18

## 2020-05-18 NOTE — PROGRESS NOTES
RNCM attempted to reach pt for ED ROGER, no answer and no name on vm. Will continue attempts to reach pt.

## 2020-05-19 ENCOUNTER — PATIENT OUTREACH (OUTPATIENT)
Dept: CASE MANAGEMENT | Age: 45
End: 2020-05-19

## 2020-05-19 NOTE — PROGRESS NOTES
RNCM 2nd unsuccessful attempt to reach pt regarding ED ROGER, no answer vm full, and no name on mobile. Will close ED ROGER as pt is unable to be reached.

## 2020-05-20 ENCOUNTER — HOSPITAL ENCOUNTER (EMERGENCY)
Age: 45
Discharge: HOME | End: 2020-05-20
Attending: EMERGENCY MEDICINE | Admitting: EMERGENCY MEDICINE
Payer: COMMERCIAL

## 2020-05-20 VITALS
RESPIRATION RATE: 16 BRPM | WEIGHT: 172 LBS | DIASTOLIC BLOOD PRESSURE: 84 MMHG | BODY MASS INDEX: 27 KG/M2 | HEART RATE: 102 BPM | TEMPERATURE: 98.4 F | SYSTOLIC BLOOD PRESSURE: 128 MMHG | OXYGEN SATURATION: 98 % | HEIGHT: 67 IN

## 2020-05-20 DIAGNOSIS — F41.1 ANXIETY STATE: Primary | ICD-10-CM

## 2020-05-20 PROCEDURE — 93005 ELECTROCARDIOGRAM TRACING: CPT | Performed by: EMERGENCY MEDICINE

## 2020-05-20 PROCEDURE — 99283 EMERGENCY DEPT VISIT LOW MDM: CPT

## 2020-05-20 NOTE — ED TRIAGE NOTES
Pt ambulatory to triage wearing a mask. Pt c/o \"panic attacks\" x 2 months. Pt states, \"I feel real nervous all the time, and I can't sleep at night. I feel panicky. It all started when all this coronavirus stuff started happening. \" Pt also c/o post-nasal drip and bilateral ear pain x 3 weeks. Pt states, \"I feel like I have to clear my throat all the time. I've been seen on telehealth, but I can't really get anyone to do anything to help me with it. \" Pt reports her concerns over the nasal congestion and cough \"might be adding to my anxiety. \" Pt reports she has been Nasocort and Claritin for the nasal congestion and post-nasal drip. Pt states, \"It seems like it helps, but then it just comes back. \" Pt also reports she was also prescribed Amoxicillan and is still taking it. Pt becomes upset in triage and begins visibly shaking and crying. Pt states, \"I'm just so scared. I see all this stuff about the coronavirus, and I'm so scared these are the symptoms or I'm going to get it. \"

## 2020-05-21 ENCOUNTER — PATIENT OUTREACH (OUTPATIENT)
Dept: CASE MANAGEMENT | Age: 45
End: 2020-05-21

## 2020-05-21 LAB
ATRIAL RATE: 110 BPM
CALCULATED P AXIS, ECG09: 75 DEGREES
CALCULATED R AXIS, ECG10: 145 DEGREES
CALCULATED T AXIS, ECG11: 48 DEGREES
DIAGNOSIS, 93000: NORMAL
P-R INTERVAL, ECG05: 130 MS
Q-T INTERVAL, ECG07: 322 MS
QRS DURATION, ECG06: 80 MS
QTC CALCULATION (BEZET), ECG08: 435 MS
VENTRICULAR RATE, ECG03: 110 BPM

## 2020-05-21 NOTE — ED NOTES
I have reviewed discharge instructions with the patient. The patient verbalized understanding. Patient left ED via Discharge Method: ambulatory to Home with self. Opportunity for questions and clarification provided. Patient given 0 scripts. To continue your aftercare when you leave the hospital, you may receive an automated call from our care team to check in on how you are doing. This is a free service and part of our promise to provide the best care and service to meet your aftercare needs.  If you have questions, or wish to unsubscribe from this service please call [phone]. Thank you for Choosing our [facility] AT THE [facility] Emergency Department.

## 2020-05-21 NOTE — PROGRESS NOTES
This note will not be viewable in [address] Referral via COVID-19 ED Response call List  Patient present to ED 5/20 with ear pain, right dental pain, high anxiety re: COVID-19. Unable to reach today. Will make second attempt tomorrow. Of note - patient has been counseled/educated on numerous calls by our team secondary to previous and frequent visits to the ED.

## 2020-05-21 NOTE — DISCHARGE INSTRUCTIONS
Follow-up with your doctor to discuss medications for anxiety. Continue Augmentin, Zyrtec or Claritin, and try Mucinex. Return for worsening or concerning symptoms.

## 2020-05-21 NOTE — ED PROVIDER NOTES
75-year-old female presents with 3 weeks of intermittent bilateral ear pain, right dental pain, and a thick feeling in her throat. She was seen by PCP on the 13th and prescribed Augmentin for rhinosinusitis. She just got it filled yesterday. She had a virtual visit with endocrinology yesterday. She has been extremely anxious and does not take antidepressants or anxiety medications. She was followed by psychiatry previously and took Paxil, but did not help. She is very concerned about catching coronavirus. She has been tearful and shaky. She denies any fever, cough, or shortness of breath. No travel or ill contacts. Panic Attack    Pertinent negatives include no abdominal pain, no back pain, no cough, no fever, no headaches, no nausea, no palpitations, no shortness of breath, no vomiting and no weakness. Nasal Congestion   Pertinent negatives include no chest pain, no abdominal pain, no headaches and no shortness of breath.         Past Medical History:   Diagnosis Date    Acute renal failure ([facility] [address] 1/24/2012    CKD (chronic kidney disease) stage 3, GFR 30-59 ml/min ([facility])     CKD (chronic kidney disease), stage IV ([address] 3/13/2017    Gastroenteritis, acute 1/24/2012    IDDM (insulin dependent diabetes mellitus) 1/24/2012    Intractable nausea and vomiting 12/25/2014    Irregular menses     Nausea & vomiting 1/24/2012    Neuropathy, peripheral, idiopathic 3/13/2017    Retinopathy, bilateral 3/13/2017    Trichomoniasis 3/13/2017    Ulcer, skin, chronic ([address] 3/13/2017    Vaginal burning        Past Surgical History:   Procedure Laterality Date    HX AMPUTATION  1/27/12    gangrene    HX AMPUTATION Left     5th Toe due to Diabetic Ulcer    HX OPEN REDUCTION INTERNAL FIXATION Right 2011    ankle         Family History:   Problem Relation Age of Onset    Diabetes Father         DM Type II    Prostate Cancer Father     Stroke Father     Breast Cancer Mother 76    Diabetes Maternal Grandmother         DM Type II    Hypertension Maternal Grandmother     Diabetes Paternal Grandfather         DM Type II     Diabetes Paternal Grandmother        Social History     Socioeconomic History    Marital status: SINGLE     Spouse name: Not on file    Number of children: Not on file    Years of education: Not on file    Highest education level: Not on file   Occupational History    Not on file   Social Needs    Financial resource strain: Not on file    Food insecurity     Worry: Not on file     Inability: Not on file    Transportation needs     Medical: Not on file     Non-medical: Not on file   Tobacco Use    Smoking status: Former Smoker     Last attempt to quit: 2013     Years since quittin.5    Smokeless tobacco: Never Used   Substance and Sexual Activity    Alcohol use: Yes     Frequency: Monthly or less     Comment: socially; occasional alcohol use    Drug use: No    Sexual activity: Yes     Partners: Male     Birth control/protection: None   Lifestyle    Physical activity     Days per week: Not on file     Minutes per session: Not on file    Stress: Not on file   Relationships    Social connections     Talks on phone: Not on file     Gets together: Not on file     Attends Jehovah's witness service: Not on file     Active member of club or organization: Not on file     Attends meetings of clubs or organizations: Not on file     Relationship status: Not on file    Intimate partner violence     Fear of current or ex partner: Not on file     Emotionally abused: Not on file     Physically abused: Not on file     Forced sexual activity: Not on file   Other Topics Concern     Service Not Asked    Blood Transfusions Not Asked    Caffeine Concern Yes    Occupational Exposure Not Asked   Caprice Fontan Hazards Not Asked    Sleep Concern Not Asked    Stress Concern Not Asked    Weight Concern Not Asked    Special Diet Not Asked    Back Care Not Asked    Exercise No    Bike Helmet Not Asked    Seat Belt Yes    Self-Exams No   Social History Narrative    Abuse: Feels safe at home, no history of physical abuse, no history of sexual abuse         ALLERGIES: Patient has no known allergies. Review of Systems   Constitutional: Negative for chills and fever. HENT: Positive for ear pain and trouble swallowing. Negative for ear discharge, hearing loss and sore throat. Eyes: Negative for visual disturbance. Respiratory: Negative for cough and shortness of breath. Cardiovascular: Negative for chest pain and palpitations. Gastrointestinal: Negative for abdominal pain, diarrhea, nausea and vomiting. Musculoskeletal: Negative for back pain. Skin: Negative for rash. Neurological: Negative for weakness and headaches. Psychiatric/Behavioral: Negative for confusion. The patient is nervous/anxious. Vitals:    05/20/20 1943   BP: 132/89   Pulse: (!) 121   Resp: 18   Temp: 98.2 °F (36.8 °C)   SpO2: 97%   Weight: 78 kg (172 lb)   Height: 5' 7\" (1.702 m)            Physical Exam  Vitals signs and nursing note reviewed. Constitutional:       Appearance: She is well-developed. HENT:      Head: Normocephalic and atraumatic. Right Ear: Tympanic membrane normal.      Left Ear: Tympanic membrane normal.      Nose: Nose normal.      Mouth/Throat:      Mouth: Mucous membranes are moist.      Pharynx: No oropharyngeal exudate or posterior oropharyngeal erythema. Eyes:      Conjunctiva/sclera: Conjunctivae normal.      Pupils: Pupils are equal, round, and reactive to light. Neck:      Musculoskeletal: Normal range of motion and neck supple. Cardiovascular:      Rate and Rhythm: Regular rhythm. Tachycardia present. Heart sounds: Normal heart sounds. Pulmonary:      Effort: Pulmonary effort is normal.      Breath sounds: Normal breath sounds. Abdominal:      Palpations: Abdomen is soft. Tenderness: There is no abdominal tenderness.    Musculoskeletal: Normal range of motion. Skin:     General: Skin is warm and dry. Neurological:      Mental Status: She is alert. Psychiatric:         Mood and Affect: Mood is anxious. MDM  Number of Diagnoses or Management Options  Anxiety state:   Diagnosis management comments: Parts of this document were created using dragon voice recognition software. The chart has been reviewed but errors may still be present. I wore appropriate PPE throughout this patient's ED visit. Jihan Cervantes MD, 8:15 PM      I do not believe she requires further work-up. Advised to continue Augmentin, Claritin, and Mucinex. No indication for COVID testing. I discussed the results of all labs, procedures, radiographs, and treatments with the patient and available family. Treatment plan is agreed upon and the patient is ready for discharge. Questions about treatment in the ED and differential diagnosis of presenting condition were answered. Patient was given verbal discharge instructions including, but not limited to, importance of returning to the emergency department for any concern of worsening or continued symptoms. Instructions were given to follow up with a primary care provider or specialist within 1-2 days. Adverse effects of medications, if prescribed, were discussed and patient was advised to refrain from significant physical activity until followed up by primary care physician and to not drive or operate heavy machinery after taking any sedating substances.              Procedures

## 2020-05-22 ENCOUNTER — PATIENT OUTREACH (OUTPATIENT)
Dept: CASE MANAGEMENT | Age: 45
End: 2020-05-22

## 2020-05-22 NOTE — PROGRESS NOTES
Second outreach relative to ED visit 5/20  Unable to reach  No episode opened    This note will not be viewable in [address]

## 2020-05-25 ENCOUNTER — HOSPITAL ENCOUNTER (EMERGENCY)
Age: 45
Discharge: HOME | End: 2020-05-25
Attending: EMERGENCY MEDICINE
Payer: COMMERCIAL

## 2020-05-25 ENCOUNTER — APPOINTMENT (OUTPATIENT)
Dept: CT IMAGING | Age: 45
End: 2020-05-25
Attending: EMERGENCY MEDICINE
Payer: COMMERCIAL

## 2020-05-25 VITALS
RESPIRATION RATE: 18 BRPM | SYSTOLIC BLOOD PRESSURE: 175 MMHG | DIASTOLIC BLOOD PRESSURE: 87 MMHG | BODY MASS INDEX: 27 KG/M2 | HEART RATE: 101 BPM | WEIGHT: 172 LBS | OXYGEN SATURATION: 100 % | HEIGHT: 67 IN | TEMPERATURE: 97.9 F

## 2020-05-25 DIAGNOSIS — R51.9 NONINTRACTABLE HEADACHE, UNSPECIFIED CHRONICITY PATTERN, UNSPECIFIED HEADACHE TYPE: Primary | ICD-10-CM

## 2020-05-25 LAB — GLUCOSE BLD STRIP.AUTO-MCNC: 187 MG/DL (ref 65–100)

## 2020-05-25 PROCEDURE — 70450 CT HEAD/BRAIN W/O DYE: CPT

## 2020-05-25 PROCEDURE — 74011250637 HC RX REV CODE- 250/637: Performed by: EMERGENCY MEDICINE

## 2020-05-25 PROCEDURE — 99284 EMERGENCY DEPT VISIT MOD MDM: CPT

## 2020-05-25 PROCEDURE — 82962 GLUCOSE BLOOD TEST: CPT

## 2020-05-25 RX ORDER — DIPHENHYDRAMINE HCL 25 MG
25 CAPSULE ORAL
Status: COMPLETED | OUTPATIENT
Start: 2020-05-25 | End: 2020-05-25

## 2020-05-25 RX ORDER — BUTALBITAL, ACETAMINOPHEN AND CAFFEINE 300; 40; 50 MG/1; MG/1; MG/1
1 CAPSULE ORAL
Qty: 14 CAP | Refills: 0 | Status: SHIPPED | OUTPATIENT
Start: 2020-05-25 | End: 2020-06-01

## 2020-05-25 RX ORDER — METOCLOPRAMIDE 10 MG/1
10 TABLET ORAL
Status: COMPLETED | OUTPATIENT
Start: 2020-05-25 | End: 2020-05-25

## 2020-05-25 RX ADMIN — DIPHENHYDRAMINE HYDROCHLORIDE 25 MG: 25 CAPSULE ORAL at 01:42

## 2020-05-25 RX ADMIN — METOCLOPRAMIDE 10 MG: 10 TABLET ORAL at 01:42

## 2020-05-25 NOTE — ED NOTES
I have reviewed discharge instructions with the patient. The patient verbalized understanding. Patient left ED via Discharge Method: ambulatory to Home with friend. Opportunity for questions and clarification provided. Patient given 2 scripts. To continue your aftercare when you leave the hospital, you may receive an automated call from our care team to check in on how you are doing. This is a free service and part of our promise to provide the best care and service to meet your aftercare needs.  If you have questions, or wish to unsubscribe from this service please call [phone]. Thank you for Choosing our [facility] Emergency Department.

## 2020-05-25 NOTE — DISCHARGE INSTRUCTIONS
Take medication as directed for headache/migraine. Schedule close follow-up with primary care physician in regards to headache as well as elevated blood pressure. Will need to be reevaluated and rechecked for elevated blood pressure. Schedule outpatient follow-up with neurology in regards to headache. Return to ED if symptoms worsen or progress in any way. Additionally patient to be sent home with a prescription for test strips. Head or Face Pain: Care Instructions  Your Care Instructions    Common causes of head or face pain are allergies, stress, and injuries. Other causes include tooth problems and sinus infections. Eating certain foods, such as chocolate or cheese, or drinking certain liquids, such as coffee or cola, can cause head pain for some people. If you have mild head pain, you may not need treatment. It is important to watch your symptoms and talk to your doctor if your pain continues or gets worse. Follow-up care is a key part of your treatment and safety. Be sure to make and go to all appointments, and call your doctor if you are having problems. It's also a good idea to know your test results and keep a list of the medicines you take. How can you care for yourself at home? · Take pain medicines exactly as directed. ? If the doctor gave you a prescription medicine for pain, take it as prescribed. ? If you are not taking a prescription pain medicine, ask your doctor if you can take an over-the-counter pain medicine. · Take it easy for the next few days or longer if you are not feeling well. · Use a warm, moist towel or heating pad set on low to relax tight muscles in your shoulder and neck. Have someone gently massage your neck and shoulders. · Put ice or a cold pack on the area for 10 to 20 minutes at a time. Put a thin cloth between the ice and your skin. When should you call for help? Call 911 anytime you think you may need emergency care.  For example, call if:    · You have twitching, jerking, or a seizure.     · You passed out (lost consciousness).     · You have symptoms of a stroke. These may include:  ? Sudden numbness, tingling, weakness, or loss of movement in your face, arm, or leg, especially on only one side of your body. ? Sudden vision changes. ? Sudden trouble speaking. ? Sudden confusion or trouble understanding simple statements. ? Sudden problems with walking or balance. ? A sudden, severe headache that is different from past headaches.     · You have jaw pain and pain in your chest, shoulder, neck, or arm.    Call your doctor now or seek immediate medical care if:    · You have a fever with a stiff neck or a severe headache.     · You have nausea and vomiting, or you cannot keep food or liquids down.    Watch closely for changes in your health, and be sure to contact your doctor if:    · Your head or face pain does not get better as expected. Where can you learn more? Go to http://damon-chad.info/  Enter P568 in the search box to learn more about \"Head or Face Pain: Care Instructions. \"  Current as of: June 26, 2019Content Version: 12.4  © 9665-2197 [employer]. Care instructions adapted under license by [employer] (which disclaims liability or warranty for this information). If you have questions about a medical condition or this instruction, always ask your healthcare professional. [address] any warranty or liability for your use of this information. Patient Education        Headache: Care Instructions  Your Care Instructions    Headaches have many possible causes. Most headaches aren't a sign of a more serious problem, and they will get better on their own. Home treatment may help you feel better faster. The doctor has checked you carefully, but problems can develop later. If you notice any problems or new symptoms, get medical treatment right away.   Follow-up care is a key part of your treatment and safety. Be sure to make and go to all appointments, and call your doctor if you are having problems. It's also a good idea to know your test results and keep a list of the medicines you take. How can you care for yourself at home? · Do not drive if you have taken a prescription pain medicine. · Rest in a quiet, dark room until your headache is gone. Close your eyes and try to relax or go to sleep. Don't watch TV or read. · Put a cold, moist cloth or cold pack on the painful area for 10 to 20 minutes at a time. Put a thin cloth between the cold pack and your skin. · Use a warm, moist towel or a heating pad set on low to relax tight shoulder and neck muscles. · Have someone gently massage your neck and shoulders. · Take pain medicines exactly as directed. ? If the doctor gave you a prescription medicine for pain, take it as prescribed. ? If you are not taking a prescription pain medicine, ask your doctor if you can take an over-the-counter medicine. · Be careful not to take pain medicine more often than the instructions allow, because you may get worse or more frequent headaches when the medicine wears off. · Do not ignore new symptoms that occur with a headache, such as a fever, weakness or numbness, vision changes, or confusion. These may be signs of a more serious problem. To prevent headaches  · Keep a headache diary so you can figure out what triggers your headaches. Avoiding triggers may help you prevent headaches. Record when each headache began, how long it lasted, and what the pain was like (throbbing, aching, stabbing, or dull). Write down any other symptoms you had with the headache, such as nausea, flashing lights or dark spots, or sensitivity to bright light or loud noise. Note if the headache occurred near your period.  List anything that might have triggered the headache, such as certain foods (chocolate, cheese, wine) or odors, smoke, bright light, stress, or lack of sleep.  · Find healthy ways to deal with stress. Headaches are most common during or right after stressful times. Take time to relax before and after you do something that has caused a headache in the past.  · Try to keep your muscles relaxed by keeping good posture. Check your jaw, face, neck, and shoulder muscles for tension, and try relaxing them. When sitting at a desk, change positions often, and stretch for 30 seconds each hour. · Get plenty of sleep and exercise. · Eat regularly and well. Long periods without food can trigger a headache. · Treat yourself to a massage. Some people find that regular massages are very helpful in relieving tension. · Limit caffeine by not drinking too much coffee, tea, or soda. But don't quit caffeine suddenly, because that can also give you headaches. · Reduce eyestrain from computers by blinking frequently and looking away from the computer screen every so often. Make sure you have proper eyewear and that your monitor is set up properly, about an arm's length away. · Seek help if you have depression or anxiety. Your headaches may be linked to these conditions. Treatment can both prevent headaches and help with symptoms of anxiety or depression. When should you call for help? Call 911 anytime you think you may need emergency care. For example, call if:    · You have signs of a stroke. These may include:  ? Sudden numbness, paralysis, or weakness in your face, arm, or leg, especially on only one side of your body. ? Sudden vision changes. ? Sudden trouble speaking. ? Sudden confusion or trouble understanding simple statements. ? Sudden problems with walking or balance. ? A sudden, severe headache that is different from past headaches.    Call your doctor now or seek immediate medical care if:    · You have a new or worse headache.     · Your headache gets much worse. Where can you learn more?   Go to http://damon-chad.info/  Enter M271 in the search box to learn more about \"Headache: Care Instructions. \"  Current as of: November 19, 2019Content Version: 12.4  © 0445-7356 [employer]. Care instructions adapted under license by [employer] (which disclaims liability or warranty for this information). If you have questions about a medical condition or this instruction, always ask your healthcare professional. [address] any warranty or liability for your use of this information. Elevated Blood Pressure: Care Instructions  Your Care Instructions    Blood pressure is a measure of how hard the blood pushes against the walls of your arteries. It's normal for blood pressure to go up and down throughout the day. But if it stays up over time, you have high blood pressure. Two numbers tell you your blood pressure. The first number is the systolic pressure. It shows how hard the blood pushes when your heart is pumping. The second number is the diastolic pressure. It shows how hard the blood pushes between heartbeats, when your heart is relaxed and filling with blood. An ideal blood pressure in adults is less than 120/80 (say \"120 over 80\"). High blood pressure is 140/90 or higher. You have high blood pressure if your top number is 140 or higher or your bottom number is 90 or higher, or both. The main test for high blood pressure is simple, fast, and painless. To diagnose high blood pressure, your doctor will test your blood pressure at different times. After testing your blood pressure, your doctor may ask you to test it again when you are home. If you are diagnosed with high blood pressure, you can work with your doctor to make a long-term plan to manage it. Follow-up care is a key part of your treatment and safety. Be sure to make and go to all appointments, and call your doctor if you are having problems. It's also a good idea to know your test results and keep a list of the medicines you take.   How can you care for yourself at home? · Do not smoke. Smoking increases your risk for heart attack and stroke. If you need help quitting, talk to your doctor about stop-smoking programs and medicines. These can increase your chances of quitting for good. · Stay at a healthy weight. · Try to limit how much sodium you eat to less than 2,300 milligrams (mg) a day. Your doctor may ask you to try to eat less than 1,500 mg a day. · Be physically active. Get at least 30 minutes of exercise on most days of the week. Walking is a good choice. You also may want to do other activities, such as running, swimming, cycling, or playing tennis or team sports. · Avoid or limit alcohol. Talk to your doctor about whether you can drink any alcohol. · Eat plenty of fruits, vegetables, and low-fat dairy products. Eat less saturated and total fats. · Learn how to check your blood pressure at home. When should you call for help? Call your doctor now or seek immediate medical care if:  ? · Your blood pressure is much higher than normal (such as 180/110 or higher). ? · You think high blood pressure is causing symptoms such as:  ? Severe headache.  ? Blurry vision. ? Watch closely for changes in your health, and be sure to contact your doctor if:  ? · You do not get better as expected. Where can you learn more? Go to http://damon-chad.info/. Enter H491 in the search box to learn more about \"Elevated Blood Pressure: Care Instructions. \"  Current as of: September 21, 2016  Content Version: 11.4  © 5073-2353 [employer]. Care instructions adapted under license by [employer] (which disclaims liability or warranty for this information).  If you have questions about a medical condition or this instruction, always ask your healthcare professional. [address] any warranty or liability for your use of this information.

## 2020-05-25 NOTE — ED PROVIDER NOTES
53-year-old female with history of diabetes presents with complaint of left frontal headache without radiation is been present over the past several days. Reports mild nausea associated w/ HA. Denies sudden onset of pain. Denies neck pain, fever, chills, vision disturbance, dizziness, focal weakness, numbness, tingling, facial droop, slurred speech, recent trauma or injury, chest pain, shortness of breath, vomiting. Patient denies cough, rhinorrhea, nasal congestion. Patient states that she had similar headaches in the past.  Denies \"worst headache of life\". Patient denies any significant recent sick contacts. Patient has recently been seen numerous times in the ER for different complaints including anxiousness and ear pain and dental pain. Patient denies exposure to anyone that has tested positive for COVID-19. Denies any alleviating exacerbating factors. Reports attempting over-the-counter medications without relief. In contrast to triage note, patient denies any back discomfort at this time. The history is provided by the patient. No  was used. Headache    The current episode started more than 2 days ago. The problem occurs constantly. The problem has not changed since onset. The pain is located in the frontal and left unilateral region. The pain is at a severity of 3/10. The pain is mild. Associated symptoms include nausea. Pertinent negatives include no anorexia, no fever, no malaise/fatigue, no chest pressure, no near-syncope, no orthopnea, no palpitations, no shortness of breath, no weakness, no tingling, no dizziness, no visual change and no vomiting. The treatment provided no relief.         Past Medical History:   Diagnosis Date    Acute renal failure ([address] 1/24/2012    CKD (chronic kidney disease) stage 3, GFR 30-59 ml/min ([facility])     CKD (chronic kidney disease), stage IV ([address] 3/13/2017    Gastroenteritis, acute 1/24/2012    IDDM (insulin dependent diabetes mellitus) 2012    Intractable nausea and vomiting 2014    Irregular menses     Nausea & vomiting 2012    Neuropathy, peripheral, idiopathic 3/13/2017    Retinopathy, bilateral 3/13/2017    Trichomoniasis 3/13/2017    Ulcer, skin, chronic ([facility] 3/13/2017    Vaginal burning        Past Surgical History:   Procedure Laterality Date    HX AMPUTATION  12    gangrene    HX AMPUTATION Left     5th Toe due to Diabetic Ulcer    HX OPEN REDUCTION INTERNAL FIXATION Right 2011    ankle         Family History:   Problem Relation Age of Onset    Diabetes Father         DM Type II    Prostate Cancer Father     Stroke Father     Breast Cancer Mother 76    Diabetes Maternal Grandmother         DM Type II    Hypertension Maternal Grandmother     Diabetes Paternal Grandfather         DM Type II     Diabetes Paternal Grandmother        Social History     Socioeconomic History    Marital status: SINGLE     Spouse name: Not on file    Number of children: Not on file    Years of education: Not on file    Highest education level: Not on file   Occupational History    Not on file   Social Needs    Financial resource strain: Not on file    Food insecurity     Worry: Not on file     Inability: Not on file    Transportation needs     Medical: Not on file     Non-medical: Not on file   Tobacco Use    Smoking status: Former Smoker     Last attempt to quit: 2013     Years since quittin.5    Smokeless tobacco: Never Used   Substance and Sexual Activity    Alcohol use: Yes     Frequency: Monthly or less     Comment: socially; occasional alcohol use    Drug use: No    Sexual activity: Yes     Partners: Male     Birth control/protection: None   Lifestyle    Physical activity     Days per week: Not on file     Minutes per session: Not on file    Stress: Not on file   Relationships    Social connections     Talks on phone: Not on file     Gets together: Not on file     Attends Zoroastrianism service: Not on file     Active member of club or organization: Not on file     Attends meetings of clubs or organizations: Not on file     Relationship status: Not on file    Intimate partner violence     Fear of current or ex partner: Not on file     Emotionally abused: Not on file     Physically abused: Not on file     Forced sexual activity: Not on file   Other Topics Concern     Service Not Asked    Blood Transfusions Not Asked    Caffeine Concern Yes    Occupational Exposure Not Asked   [place] [employer] Hazards Not Asked    Sleep Concern Not Asked    Stress Concern Not Asked    Weight Concern Not Asked    Special Diet Not Asked    Back Care Not Asked    Exercise No    Bike Helmet Not Asked    Seat Belt Yes    Self-Exams No   Social History Narrative    Abuse: Feels safe at home, no history of physical abuse, no history of sexual abuse         ALLERGIES: Patient has no known allergies. Review of Systems   Constitutional: Negative for chills, diaphoresis, fatigue, fever and malaise/fatigue. HENT: Negative for congestion, rhinorrhea and sore throat. Eyes: Negative for photophobia, pain, redness and visual disturbance. Respiratory: Negative for cough and shortness of breath. Cardiovascular: Negative for chest pain, palpitations, orthopnea and near-syncope. Gastrointestinal: Positive for nausea. Negative for abdominal pain, anorexia, diarrhea and vomiting. Genitourinary: Negative for dysuria and flank pain. Musculoskeletal: Negative for back pain, gait problem, neck pain and neck stiffness. Skin: Negative for rash. Neurological: Positive for headaches. Negative for dizziness, tingling, seizures, syncope, speech difficulty, weakness, light-headedness and numbness. Psychiatric/Behavioral: Negative for confusion.        Vitals:    05/25/20 0111   BP: (!) 172/96   Pulse: 100   Resp: 18   Temp: 97.9 °F (36.6 °C)   SpO2: 100%   Weight: 78 kg (172 lb)   Height: 5' 7\" (1.702 m)            Physical Exam  Vitals signs and nursing note reviewed. Constitutional:       Appearance: Normal appearance. Comments: Patient sitting up in bed no acute distress. HENT:      Head: Normocephalic. Comments: Atraumatic. Right Ear: Tympanic membrane and ear canal normal.      Left Ear: Tympanic membrane and ear canal normal.      Mouth/Throat:      Mouth: Mucous membranes are moist.      Comments: Poor dentition; no dental abscess visaluzed. Uvula midline. No tonsillar erythema or exudate noted. Eyes:      Extraocular Movements: Extraocular movements intact. Pupils: Pupils are equal, round, and reactive to light. Comments: No nystagmus. Neck:      Musculoskeletal: Normal range of motion. No neck rigidity. Comments: FROM. No nuchal rigidity. Cardiovascular:      Rate and Rhythm: Normal rate. Pulses: Normal pulses. Pulmonary:      Effort: Pulmonary effort is normal.      Breath sounds: Normal breath sounds. Abdominal:      Palpations: Abdomen is soft. Tenderness: There is no abdominal tenderness. There is no guarding. Musculoskeletal: Normal range of motion. Skin:     Findings: No erythema or rash. Neurological:      General: No focal deficit present. Mental Status: She is alert and oriented to person, place, and time. Motor: No weakness. Comments: Strength 5 out of 5 throughout. Normal sensory exam.  No facial droop. No dysarthria. No meningeal signs. Normal gait. MDM  Number of Diagnoses or Management Options  Diagnosis management comments: 28-year-old female presents with complaint of left frontal headache without radiation is been present for the past several days. States no relief with over-the-counter medication. Reports mild nausea. Denies any other symptoms. States similar headaches in the past.  Initial blood pressure slightly elevated. Will repeat.   Patient declines urine pregnancy test.  States that she has not had a menstrual cycle in several years. States she is also not sexually active and there is no way she is pregnant. Given patient with mild headache symptoms CT head without IV contrast ordered. POC glucose 187. Order placed for Reglan 10 mg po + Benadryl 25 mg po. Will reassess once patient has received medications to ensure improvement of discomfort. Amount and/or Complexity of Data Reviewed  Tests in the radiology section of CPT®: ordered and reviewed  Tests in the medicine section of CPT®: ordered and reviewed  Review and summarize past medical records: yes  Independent visualization of images, tracings, or specimens: yes    Risk of Complications, Morbidity, and/or Mortality  Presenting problems: moderate  Diagnostic procedures: low  Management options: low  General comments: Plan is to discharge home if improvement of headache and normal CT head. Will instruct on need for close outpatient follow-up with PCP and neurology. Patient additionally instructed on need for follow-up with primary care physician for blood pressure recheck. Patient given strict return precautions. Patient Progress  Patient progress: stable    ED Course as of May 25 0151   Mon May 25, 2020   0150 CT head IMPRESSION:     No acute abnormalities.     Ethmoid sinusitis. [DF]      ED Course User Index  [DF] Kelly Shah MD       Procedures        Results Include:    Recent Results (from the past 24 hour(s))   GLUCOSE, POC    Collection Time: 05/25/20  1:15 AM   Result Value Ref Range    Glucose (POC) 187 (H) 65 - 100 mg/dL            Paco Menezes MD; 5/25/2020 @1:33 AM Voice dictation software was used during the making of this note. This software is not perfect and grammatical and other typographical errors may be present.   This note has not been proofread for errors.  ===================================================================     '

## 2020-05-26 ENCOUNTER — PATIENT OUTREACH (OUTPATIENT)
Dept: CASE MANAGEMENT | Age: 45
End: 2020-05-26

## 2020-05-26 NOTE — PROGRESS NOTES
Attempted outreach # 1 to patient for [facility] for ER visit on 5/25/20 for headache  UTR at either numbers listed  PLAN:  Will attempt outreach # 2 tomorrow

## 2020-05-27 ENCOUNTER — PATIENT OUTREACH (OUTPATIENT)
Dept: CASE MANAGEMENT | Age: 45
End: 2020-05-27

## 2020-05-27 NOTE — PROGRESS NOTES
Attempted outreach # 2 to patient for [facility] for ER visit on 5/25/20 for headache  UTR at either numbers listed  PLAN:  Case closed due to 2 unsuccessful outreach attempts

## 2020-06-05 PROBLEM — K21.9 GASTROESOPHAGEAL REFLUX DISEASE WITHOUT ESOPHAGITIS: Status: ACTIVE | Noted: 2020-06-05

## 2020-06-13 ENCOUNTER — APPOINTMENT (OUTPATIENT)
Dept: GENERAL RADIOLOGY | Age: 45
End: 2020-06-13
Attending: EMERGENCY MEDICINE
Payer: COMMERCIAL

## 2020-06-13 ENCOUNTER — HOSPITAL ENCOUNTER (EMERGENCY)
Age: 45
Discharge: HOME | End: 2020-06-13
Attending: EMERGENCY MEDICINE
Payer: COMMERCIAL

## 2020-06-13 VITALS
DIASTOLIC BLOOD PRESSURE: 86 MMHG | HEIGHT: 68 IN | HEART RATE: 89 BPM | WEIGHT: 172 LBS | RESPIRATION RATE: 16 BRPM | BODY MASS INDEX: 26.07 KG/M2 | TEMPERATURE: 98.6 F | OXYGEN SATURATION: 99 % | SYSTOLIC BLOOD PRESSURE: 136 MMHG

## 2020-06-13 DIAGNOSIS — K59.00 CONSTIPATION, UNSPECIFIED CONSTIPATION TYPE: Primary | ICD-10-CM

## 2020-06-13 PROCEDURE — 74018 RADEX ABDOMEN 1 VIEW: CPT

## 2020-06-13 PROCEDURE — 99282 EMERGENCY DEPT VISIT SF MDM: CPT

## 2020-06-13 PROCEDURE — 99283 EMERGENCY DEPT VISIT LOW MDM: CPT

## 2020-06-13 RX ORDER — DOCUSATE SODIUM 100 MG/1
100 CAPSULE, LIQUID FILLED ORAL 2 TIMES DAILY
Qty: 60 CAP | Refills: 2 | Status: SHIPPED | OUTPATIENT
Start: 2020-06-13 | End: 2020-09-11

## 2020-06-13 RX ORDER — POLYETHYLENE GLYCOL 3350 17 G/17G
17 POWDER, FOR SOLUTION ORAL
Qty: 289 G | Refills: 0 | Status: SHIPPED | OUTPATIENT
Start: 2020-06-13 | End: 2021-08-16

## 2020-06-13 NOTE — ED PROVIDER NOTES
Mask was worn during the entire patient examination. Jere Jerry is a 53 y.o. female who presents to the ED with a chief complaint of constipation she has been dealing with it for a while we did take some Dulcolax and  had some relief from in a couple days ago. Has not taken anything for it today. She occasionally has some reflux and burning in her esophagus which occurred yesterday. Today she denies any abdominal pain or other symptoms.            Past Medical History:   Diagnosis Date    Acute renal failure ([address] 1/24/2012    CKD (chronic kidney disease) stage 3, GFR 30-59 ml/min ([facility])     CKD (chronic kidney disease), stage IV ([address] 3/13/2017    Gastroenteritis, acute 1/24/2012    IDDM (insulin dependent diabetes mellitus) 1/24/2012    Intractable nausea and vomiting 12/25/2014    Irregular menses     Nausea & vomiting 1/24/2012    Neuropathy, peripheral, idiopathic 3/13/2017    Retinopathy, bilateral 3/13/2017    Trichomoniasis 3/13/2017    Ulcer, skin, chronic ([address] 3/13/2017    Vaginal burning        Past Surgical History:   Procedure Laterality Date    HX AMPUTATION  1/27/12    gangrene    HX AMPUTATION Left     5th Toe due to Diabetic Ulcer    HX OPEN REDUCTION INTERNAL FIXATION Right 2011    ankle         Family History:   Problem Relation Age of Onset    Diabetes Father         DM Type II    Prostate Cancer Father     Stroke Father     Breast Cancer Mother 76    Diabetes Maternal Grandmother         DM Type II    Hypertension Maternal Grandmother     Diabetes Paternal Grandfather         DM Type II     Diabetes Paternal Grandmother        Social History     Socioeconomic History    Marital status: SINGLE     Spouse name: Not on file    Number of children: Not on file    Years of education: Not on file    Highest education level: Not on file   Occupational History    Not on file   Social Needs    Financial resource strain: Not on file    Food insecurity     Worry: Not on file     Inability: Not on file    Transportation needs     Medical: Not on file     Non-medical: Not on file   Tobacco Use    Smoking status: Former Smoker     Last attempt to quit: 2013     Years since quittin.6    Smokeless tobacco: Never Used   Substance and Sexual Activity    Alcohol use: Yes     Frequency: Monthly or less     Comment: socially; occasional alcohol use    Drug use: No    Sexual activity: Yes     Partners: Male     Birth control/protection: None   Lifestyle    Physical activity     Days per week: Not on file     Minutes per session: Not on file    Stress: Not on file   Relationships    Social connections     Talks on phone: Not on file     Gets together: Not on file     Attends Congregation service: Not on file     Active member of club or organization: Not on file     Attends meetings of clubs or organizations: Not on file     Relationship status: Not on file    Intimate partner violence     Fear of current or ex partner: Not on file     Emotionally abused: Not on file     Physically abused: Not on file     Forced sexual activity: Not on file   Other Topics Concern     Service Not Asked    Blood Transfusions Not Asked    Caffeine Concern Yes    Occupational Exposure Not Asked   Onalee Colon Hazards Not Asked    Sleep Concern Not Asked    Stress Concern Not Asked    Weight Concern Not Asked    Special Diet Not Asked    Back Care Not Asked    Exercise No    Bike Helmet Not Asked    Seat Belt Yes    Self-Exams No   Social History Narrative    Abuse: Feels safe at home, no history of physical abuse, no history of sexual abuse         ALLERGIES: Patient has no known allergies. Review of Systems   Constitutional: Negative for chills and fever. Respiratory: Negative for chest tightness and shortness of breath. Cardiovascular: Negative for chest pain and palpitations. Gastrointestinal: Negative for abdominal pain, diarrhea, nausea and vomiting. Musculoskeletal: Negative for arthralgias, gait problem, joint swelling and myalgias. Skin: Negative for pallor and rash. All other systems reviewed and are negative. Vitals:    06/13/20 1114   BP: 136/86   Pulse: 100   Resp: 16   Temp: 98.6 °F (37 °C)   SpO2: 98%   Weight: 78 kg (172 lb)   Height: 5' 8\" (1.727 m)            Physical Exam  Vitals signs and nursing note reviewed. Constitutional:       General: She is not in acute distress. Appearance: Normal appearance. She is well-developed. She is not ill-appearing, toxic-appearing or diaphoretic. HENT:      Head: Normocephalic and atraumatic. Eyes:      General: No scleral icterus. Conjunctiva/sclera: Conjunctivae normal.   Neck:      Musculoskeletal: No neck rigidity. Cardiovascular:      Rate and Rhythm: Normal rate and regular rhythm. Pulmonary:      Effort: Pulmonary effort is normal. No respiratory distress. Breath sounds: No stridor. No wheezing, rhonchi or rales. Abdominal:      General: Abdomen is flat. There is no distension. Palpations: There is no mass. Tenderness: There is no abdominal tenderness. There is no guarding or rebound. Hernia: No hernia is present. Skin:     Capillary Refill: Capillary refill takes less than 2 seconds. Coloration: Skin is not jaundiced or pale. Neurological:      General: No focal deficit present. Mental Status: She is alert and oriented to person, place, and time. Mental status is at baseline. Cranial Nerves: No cranial nerve deficit. Psychiatric:         Mood and Affect: Mood normal.         Behavior: Behavior normal.         Thought Content: Thought content normal.          MDM  Number of Diagnoses or Management Options  Diagnosis management comments: Patient will be treated aggressively for constipation. Her abdomen is completely benign. Deyvi Singh MD; 6/13/2020 @11:59 AM Voice dictation software was used during the making of this note. This software is not perfect and grammatical and other typographical errors may be present. This note has not been proofread for errors.  ===================================================================          Amount and/or Complexity of Data Reviewed  Tests in the radiology section of CPT®: reviewed and ordered (Xr Abd (kub)    Result Date: 6/13/2020  Supine frontal view of the abdomen 6/13/2020 COMPARISON: Abdominal series 5/1/2020 INDICATION: Constipation, nausea FINDINGS: Large amount retained colonic stool again identified throughout the colon. No bowel junction. No osseous lesion.      IMPRESSION: Constipation.   )           Procedures

## 2020-06-13 NOTE — ED TRIAGE NOTES
Pt c/o constipation and nausea, states she has not had a bowel movement in three weeks. Pt states she took laxative on Monday and had a hard small stool. Pt states she had a virtual doctor several days ago for reflux and was placed on medication.

## 2020-06-13 NOTE — ED NOTES
I have reviewed discharge instructions with the patient. The patient verbalized understanding. Patient left ED via Discharge Method: ambulatory to Home with self. Opportunity for questions and clarification provided. Patient given 3 scripts. To continue your aftercare when you leave the hospital, you may receive an automated call from our care team to check in on how you are doing. This is a free service and part of our promise to provide the best care and service to meet your aftercare needs.  If you have questions, or wish to unsubscribe from this service please call [phone]. Thank you for Choosing our [facility].

## 2020-06-15 ENCOUNTER — PATIENT OUTREACH (OUTPATIENT)
Dept: CASE MANAGEMENT | Age: 45
End: 2020-06-15

## 2020-06-15 NOTE — PROGRESS NOTES
CTN attempted to outreach to patient per ED discharge 6/13/2020 for concern for COVID call (to provide education due to at risk conditions) Unable to reach patient. Message left with contact information requesting a return call.  Will continue to outreach per protocol

## 2020-06-16 ENCOUNTER — PATIENT OUTREACH (OUTPATIENT)
Dept: CASE MANAGEMENT | Age: 45
End: 2020-06-16

## 2020-06-16 NOTE — PROGRESS NOTES
2nd attempt to outreach to patient for concern for COVID call (to provide education due to at risk conditions). Another message left with contact information requesting a return call. Episode will be resolved at this time due to no return call.

## 2020-06-19 PROBLEM — Z87.19 H/O GASTROESOPHAGEAL REFLUX (GERD): Status: ACTIVE | Noted: 2020-06-19

## 2020-08-04 ENCOUNTER — HOSPITAL ENCOUNTER (EMERGENCY)
Age: 45
Discharge: HOME | End: 2020-08-04
Attending: EMERGENCY MEDICINE
Payer: COMMERCIAL

## 2020-08-04 VITALS
TEMPERATURE: 98.1 F | SYSTOLIC BLOOD PRESSURE: 137 MMHG | HEART RATE: 91 BPM | RESPIRATION RATE: 18 BRPM | BODY MASS INDEX: 24.55 KG/M2 | DIASTOLIC BLOOD PRESSURE: 75 MMHG | HEIGHT: 68 IN | WEIGHT: 162 LBS | OXYGEN SATURATION: 99 %

## 2020-08-04 DIAGNOSIS — M62.838 MUSCLE SPASM: Primary | ICD-10-CM

## 2020-08-04 DIAGNOSIS — F41.9 ANXIETY: ICD-10-CM

## 2020-08-04 LAB — HCG UR QL: NEGATIVE

## 2020-08-04 PROCEDURE — 99284 EMERGENCY DEPT VISIT MOD MDM: CPT

## 2020-08-04 PROCEDURE — 81003 URINALYSIS AUTO W/O SCOPE: CPT

## 2020-08-04 PROCEDURE — 81025 URINE PREGNANCY TEST: CPT

## 2020-08-04 PROCEDURE — 74011250636 HC RX REV CODE- 250/636: Performed by: EMERGENCY MEDICINE

## 2020-08-04 PROCEDURE — 96372 THER/PROPH/DIAG INJ SC/IM: CPT

## 2020-08-04 PROCEDURE — 74011250637 HC RX REV CODE- 250/637: Performed by: EMERGENCY MEDICINE

## 2020-08-04 RX ORDER — HYDROXYZINE PAMOATE 25 MG/1
50 CAPSULE ORAL
Status: COMPLETED | OUTPATIENT
Start: 2020-08-04 | End: 2020-08-04

## 2020-08-04 RX ORDER — KETOROLAC TROMETHAMINE 30 MG/ML
30 INJECTION, SOLUTION INTRAMUSCULAR; INTRAVENOUS
Status: COMPLETED | OUTPATIENT
Start: 2020-08-04 | End: 2020-08-04

## 2020-08-04 RX ORDER — MELOXICAM 7.5 MG/1
7.5 TABLET ORAL
Qty: 30 TAB | Refills: 0 | Status: SHIPPED | OUTPATIENT
Start: 2020-08-04 | End: 2020-09-03

## 2020-08-04 RX ORDER — CYCLOBENZAPRINE HCL 10 MG
10 TABLET ORAL
Qty: 10 TAB | Refills: 0 | Status: SHIPPED | OUTPATIENT
Start: 2020-08-04 | End: 2020-08-14

## 2020-08-04 RX ORDER — HYDROXYZINE 50 MG/1
50 TABLET, FILM COATED ORAL
Qty: 20 TAB | Refills: 0 | Status: SHIPPED | OUTPATIENT
Start: 2020-08-04 | End: 2020-08-14

## 2020-08-04 RX ADMIN — KETOROLAC TROMETHAMINE 30 MG: 30 INJECTION, SOLUTION INTRAMUSCULAR at 01:54

## 2020-08-04 RX ADMIN — HYDROXYZINE PAMOATE 50 MG: 25 CAPSULE ORAL at 02:14

## 2020-08-04 NOTE — ED PROVIDER NOTES
Patient is a 49-year-old female presenting to the emergency department today stating that she is just been very nervous lately since the pandemic started it has made her underlying anxiety much worse. She says that she follows with her family doctor on a regular basis and went and saw a GI specialist today and had an ultrasound done. She has a colonoscopy scheduled. She is not complaining of any abdominal pain chest pain shortness of breath or nausea and vomiting. Her main complaint today is that her back is been hurting her. She says she lives alone but since the pandemic started she is just been more nervous than normal and she is falling asleep watching TV on the couch and staying there most of the night. Patient works from home and sits in a chair a computer for hours during the day. She does not have any kind of exercise or workout routine. Patient says that her back from the neck to the mid thoracic and lower lumbar spine hurts with a throbbing aching pain which is made worse with movement.            Past Medical History:   Diagnosis Date    Acute renal failure ([address] 1/24/2012    CKD (chronic kidney disease) stage 3, GFR 30-59 ml/min ([facility])     CKD (chronic kidney disease), stage IV ([address] 3/13/2017    Gastroenteritis, acute 1/24/2012    IDDM (insulin dependent diabetes mellitus) 1/24/2012    Intractable nausea and vomiting 12/25/2014    Irregular menses     Nausea & vomiting 1/24/2012    Neuropathy, peripheral, idiopathic 3/13/2017    Retinopathy, bilateral 3/13/2017    Trichomoniasis 3/13/2017    Ulcer, skin, chronic ([address] 3/13/2017    Vaginal burning        Past Surgical History:   Procedure Laterality Date    HX AMPUTATION  1/27/12    gangrene    HX AMPUTATION Left     5th Toe due to Diabetic Ulcer    HX OPEN REDUCTION INTERNAL FIXATION Right 2011    ankle         Family History:   Problem Relation Age of Onset    Diabetes Father         DM Type II    Prostate Cancer Father    [facility] Stroke Father     Breast Cancer Mother 76    Diabetes Maternal Grandmother         DM Type II    Hypertension Maternal Grandmother     Diabetes Paternal Grandfather         DM Type II     Diabetes Paternal Grandmother        Social History     Socioeconomic History    Marital status: SINGLE     Spouse name: Not on file    Number of children: Not on file    Years of education: Not on file    Highest education level: Not on file   Occupational History    Not on file   Social Needs    Financial resource strain: Not on file    Food insecurity     Worry: Not on file     Inability: Not on file    Transportation needs     Medical: Not on file     Non-medical: Not on file   Tobacco Use    Smoking status: Former Smoker     Last attempt to quit: 2013     Years since quittin.7    Smokeless tobacco: Never Used   Substance and Sexual Activity    Alcohol use: Yes     Frequency: Monthly or less     Comment: socially; occasional alcohol use    Drug use: No    Sexual activity: Yes     Partners: Male     Birth control/protection: None   Lifestyle    Physical activity     Days per week: Not on file     Minutes per session: Not on file    Stress: Not on file   Relationships    Social connections     Talks on phone: Not on file     Gets together: Not on file     Attends Bahai service: Not on file     Active member of club or organization: Not on file     Attends meetings of clubs or organizations: Not on file     Relationship status: Not on file    Intimate partner violence     Fear of current or ex partner: Not on file     Emotionally abused: Not on file     Physically abused: Not on file     Forced sexual activity: Not on file   Other Topics Concern     Service Not Asked    Blood Transfusions Not Asked    Caffeine Concern Yes    Occupational Exposure Not Asked   Kurtis Fent Hazards Not Asked    Sleep Concern Not Asked    Stress Concern Not Asked    Weight Concern Not Asked    Special Diet Not Asked    Back Care Not Asked    Exercise No    Bike Helmet Not Asked    Seat Belt Yes    Self-Exams No   Social History Narrative    Abuse: Feels safe at home, no history of physical abuse, no history of sexual abuse         ALLERGIES: Patient has no known allergies. Review of Systems   Constitutional: Negative. Respiratory: Negative. Cardiovascular: Negative. Genitourinary: Negative. Musculoskeletal: Positive for back pain. Skin: Negative. Neurological: Negative. Hematological: Negative. Vitals:    08/04/20 0121 08/04/20 0126 08/04/20 0129   BP: 137/78  137/78   Pulse:   93   Resp:   17   Temp:   98.1 °F (36.7 °C)   SpO2:  99% 98%   Weight:   73.5 kg (162 lb)   Height:   5' 8\" (1.727 m)            Physical Exam     GENERAL:The patient is well nourished, and well-hydrated. VITAL SIGNS: Heart rate, blood pressure, respiratory rate reviewed as recorded in  nurse's notes  EYES: Pupils reactive. Extraocular motion intact. No conjunctival redness or drainage. NECK: Supple, no meningeal signs. Trachea midline. No masses or thyromegaly. LUNGS: no accessory muscle use  CHEST: No deformity  CARDIOVASCULAR: Regular rate and rhythm  ABDOMEN: Soft without tenderness. No palpable masses or organomegaly. No  peritoneal signs. No rigidity. BACK: Increased tenderness palpation along the right paraspinal muscles of the cervical thoracic and lumbar spine. The patient does not have any step-off deformity or tenderness palpation of the spinous processes. The patient has some increased tension on the left but not as bad as on the right. EXTREMITIES: No clubbing or cyanosis. No joint swelling. Normal muscle tone. No  restricted range of motion appreciated. NEUROLOGIC: Sensation is grossly intact. Cranial nerve exam reveals face is  symmetrical, tongue is midline speech is clear. SKIN: No rash or petechiae. Good skin turgor palpated. PSYCHIATRIC: Alert and oriented. Anxious      MDM  Number of Diagnoses or Management Options  Diagnosis management comments: Chronic back pain, contusion, myofascial strain, musculoskeletal injury, lumbar strain,  muscle spasm,    Disc herniation, compression fracture,    Retroperitoneal injury, renal colic, pyelonephritis, nephrolithiasis           Amount and/or Complexity of Data Reviewed  Tests in the medicine section of CPT®: reviewed and ordered  Review and summarize past medical records: yes           Procedures

## 2020-08-04 NOTE — DISCHARGE INSTRUCTIONS
Take the meloxicam once daily in the morning with food for the next week then as needed. Do the stretching exercises like we discussed. Drink 1 to 2 L of water daily. Take the Flexeril at nighttime before going to bed as needed. Use the Lidoderm patch over the affected area 12 hours on and a minimum of 12 hours off before putting a new patch on your body    Take the Atarax as needed. Talk to your family doctor if your symptoms persist about getting started on something to help control your anxiety on a daily basis.   Move back to the bed and start doing the exercises as directed

## 2020-08-04 NOTE — ED NOTES
I have reviewed discharge instructions with the patient. The patient verbalized understanding. Patient left ED via Discharge Method: ambulatory to Home with self. Opportunity for questions and clarification provided. Patient given 4 scripts. To continue your aftercare when you leave the hospital, you may receive an automated call from our care team to check in on how you are doing. This is a free service and part of our promise to provide the best care and service to meet your aftercare needs.  If you have questions, or wish to unsubscribe from this service please call [phone]. Thank you for Choosing our [facility] Emergency Department.

## 2020-08-04 NOTE — ED TRIAGE NOTES
Pt complains of upper back pain since Thursday of last week, \"maybe before that. \" Pt reports she has been sleeping on her couch and has been sitting in an unsupportive chair for work. Pt states she has not taken anything for her back pain.

## 2020-10-10 ENCOUNTER — HOSPITAL ENCOUNTER (EMERGENCY)
Age: 45
Discharge: HOME | End: 2020-10-10
Attending: EMERGENCY MEDICINE
Payer: COMMERCIAL

## 2020-10-10 VITALS
BODY MASS INDEX: 26.07 KG/M2 | TEMPERATURE: 98.6 F | OXYGEN SATURATION: 99 % | HEART RATE: 90 BPM | RESPIRATION RATE: 18 BRPM | HEIGHT: 68 IN | WEIGHT: 172 LBS | DIASTOLIC BLOOD PRESSURE: 91 MMHG | SYSTOLIC BLOOD PRESSURE: 142 MMHG

## 2020-10-10 DIAGNOSIS — B37.31 CANDIDA VAGINITIS: Primary | ICD-10-CM

## 2020-10-10 DIAGNOSIS — N76.0 BV (BACTERIAL VAGINOSIS): ICD-10-CM

## 2020-10-10 DIAGNOSIS — B96.89 BV (BACTERIAL VAGINOSIS): ICD-10-CM

## 2020-10-10 LAB
BACTERIA URNS QL MICRO: ABNORMAL /HPF
CASTS URNS QL MICRO: 0 /LPF
CRYSTALS URNS QL MICRO: 0 /LPF
EPI CELLS #/AREA URNS HPF: ABNORMAL /HPF
GLUCOSE BLD STRIP.AUTO-MCNC: 267 MG/DL (ref 65–100)
HCG UR QL: NEGATIVE
MUCOUS THREADS URNS QL MICRO: 0 /LPF
OTHER OBSERVATIONS,UCOM: ABNORMAL
RBC #/AREA URNS HPF: ABNORMAL /HPF
SERVICE CMNT-IMP: NORMAL
WBC URNS QL MICRO: ABNORMAL /HPF
WET PREP GENITAL: NORMAL
YEAST URNS QL MICRO: ABNORMAL

## 2020-10-10 PROCEDURE — 87210 SMEAR WET MOUNT SALINE/INK: CPT

## 2020-10-10 PROCEDURE — 87491 CHLMYD TRACH DNA AMP PROBE: CPT

## 2020-10-10 PROCEDURE — 81003 URINALYSIS AUTO W/O SCOPE: CPT

## 2020-10-10 PROCEDURE — 81025 URINE PREGNANCY TEST: CPT

## 2020-10-10 PROCEDURE — 74011250637 HC RX REV CODE- 250/637: Performed by: EMERGENCY MEDICINE

## 2020-10-10 PROCEDURE — 81015 MICROSCOPIC EXAM OF URINE: CPT

## 2020-10-10 PROCEDURE — 82962 GLUCOSE BLOOD TEST: CPT

## 2020-10-10 PROCEDURE — 99285 EMERGENCY DEPT VISIT HI MDM: CPT

## 2020-10-10 RX ORDER — FLUCONAZOLE 100 MG/1
200 TABLET ORAL
Status: COMPLETED | OUTPATIENT
Start: 2020-10-10 | End: 2020-10-10

## 2020-10-10 RX ORDER — FLUCONAZOLE 150 MG/1
150 TABLET ORAL
Qty: 1 TAB | Refills: 0 | Status: SHIPPED | OUTPATIENT
Start: 2020-10-10 | End: 2020-10-10

## 2020-10-10 RX ORDER — METRONIDAZOLE 500 MG/1
500 TABLET ORAL 2 TIMES DAILY
Qty: 14 TAB | Refills: 0 | Status: SHIPPED | OUTPATIENT
Start: 2020-10-10 | End: 2020-11-14

## 2020-10-10 RX ADMIN — FLUCONAZOLE 200 MG: 100 TABLET ORAL at 21:11

## 2020-10-11 NOTE — ED NOTES
I have reviewed discharge instructions with the patient. The patient verbalized understanding. Patient left ED via Discharge Method: ambulatory to Home with self. Opportunity for questions and clarification provided. Patient given 1 scripts. To continue your aftercare when you leave the hospital, you may receive an automated call from our care team to check in on how you are doing. This is a free service and part of our promise to provide the best care and service to meet your aftercare needs.  If you have questions, or wish to unsubscribe from this service please call [phone]. Thank you for Choosing our [facility] Emergency Department.

## 2020-10-11 NOTE — ED PROVIDER NOTES
68-year-old female has history of insulin-dependent diabetes. She has had 24-hour history of vaginal burning. No dysuria. No abnormal bleeding. Minimal discharge. No vomiting. Slight suprapubic pressure for the last 24 hours. Patient states she has a history of trichomonas and this feels similar. Foul smell. Concern exposed STD last week. The history is provided by the patient. Exposure to STD   The incident occurred more than 1 week ago. Associated symptoms include abdominal pain and vaginal pain. Pertinent negatives include no vomiting, no vaginal discharge and no vaginal bleeding. There has been no physical assault. There is a concern regarding sexually transmitted diseases.         Past Medical History:   Diagnosis Date    Acute renal failure ([address] 1/24/2012    CKD (chronic kidney disease) stage 3, GFR 30-59 ml/min     CKD (chronic kidney disease), stage IV ([address] 3/13/2017    Gastroenteritis, acute 1/24/2012    IDDM (insulin dependent diabetes mellitus) 1/24/2012    Intractable nausea and vomiting 12/25/2014    Irregular menses     Nausea & vomiting 1/24/2012    Neuropathy, peripheral, idiopathic 3/13/2017    Retinopathy, bilateral 3/13/2017    Trichomoniasis 3/13/2017    Ulcer, skin, chronic ([address] 3/13/2017    Vaginal burning        Past Surgical History:   Procedure Laterality Date    HX AMPUTATION  1/27/12    gangrene    HX AMPUTATION Left     5th Toe due to Diabetic Ulcer    HX OPEN REDUCTION INTERNAL FIXATION Right 2011    ankle         Family History:   Problem Relation Age of Onset    Diabetes Father         DM Type II    Prostate Cancer Father     Stroke Father     Breast Cancer Mother 76    Diabetes Maternal Grandmother         DM Type II    Hypertension Maternal Grandmother     Diabetes Paternal Grandfather         DM Type II     Diabetes Paternal Grandmother        Social History     Socioeconomic History    Marital status: SINGLE     Spouse name: Not on file    Number of children: Not on file    Years of education: Not on file    Highest education level: Not on file   Occupational History    Not on file   Social Needs    Financial resource strain: Not on file    Food insecurity     Worry: Not on file     Inability: Not on file    Transportation needs     Medical: Not on file     Non-medical: Not on file   Tobacco Use    Smoking status: Former Smoker     Last attempt to quit: 2013     Years since quittin.9    Smokeless tobacco: Never Used   Substance and Sexual Activity    Alcohol use: Yes     Frequency: Monthly or less     Comment: socially; occasional alcohol use    Drug use: No    Sexual activity: Yes     Partners: Male     Birth control/protection: None   Lifestyle    Physical activity     Days per week: Not on file     Minutes per session: Not on file    Stress: Not on file   Relationships    Social connections     Talks on phone: Not on file     Gets together: Not on file     Attends Buddhist service: Not on file     Active member of club or organization: Not on file     Attends meetings of clubs or organizations: Not on file     Relationship status: Not on file    Intimate partner violence     Fear of current or ex partner: Not on file     Emotionally abused: Not on file     Physically abused: Not on file     Forced sexual activity: Not on file   Other Topics Concern     Service Not Asked    Blood Transfusions Not Asked    Caffeine Concern Yes    Occupational Exposure Not Asked   Natalee  Hazards Not Asked    Sleep Concern Not Asked    Stress Concern Not Asked    Weight Concern Not Asked    Special Diet Not Asked    Back Care Not Asked    Exercise No    Bike Helmet Not Asked    Seat Belt Yes    Self-Exams No   Social History Narrative    Abuse: Feels safe at home, no history of physical abuse, no history of sexual abuse         ALLERGIES: Patient has no known allergies.     Review of Systems   Constitutional: Negative for chills and fever. Gastrointestinal: Positive for abdominal pain. Negative for diarrhea and vomiting. Genitourinary: Positive for vaginal pain. Negative for dysuria, frequency, hematuria, vaginal bleeding and vaginal discharge. Musculoskeletal: Negative for back pain. Skin: Negative for color change and rash. Vitals:    10/10/20 2005   BP: (!) 142/91   Pulse: 90   Resp: 18   Temp: 98.6 °F (37 °C)   SpO2: 99%   Weight: 78 kg (172 lb)   Height: 5' 8\" (1.727 m)            Physical Exam  Vitals signs reviewed. Constitutional:       Appearance: She is not ill-appearing. Abdominal:      General: Abdomen is flat. Tenderness: There is abdominal tenderness (Minimal suprapubic.). Skin:     General: Skin is warm and dry. Neurological:      Mental Status: She is alert. MDM  Number of Diagnoses or Management Options  Diagnosis management comments: Patient declines Pap examination. Will rather self swab. Check for STDs. Check blood sugar. Screen for vaginitis.        Amount and/or Complexity of Data Reviewed  Clinical lab tests: reviewed and ordered    Risk of Complications, Morbidity, and/or Mortality  Presenting problems: moderate  Diagnostic procedures: minimal  Management options: low    Patient Progress  Patient progress: stable         Procedures      Results Include:    Recent Results (from the past 24 hour(s))   HCG URINE, QL. - POC    Collection Time: 10/10/20  8:11 PM   Result Value Ref Range    Pregnancy test,urine (POC) Negative NEG     URINE MICROSCOPIC    Collection Time: 10/10/20  8:14 PM   Result Value Ref Range    WBC 20-50 0 /hpf    RBC 3-5 0 /hpf    Epithelial cells 5-10 0 /hpf    Bacteria 4+ (H) 0 /hpf    Casts 0 0 /lpf    Crystals, urine 0 0 /LPF    Mucus 0 0 /lpf    Yeast OCCASIONAL      Other observations RESULTS VERIFIED MANUALLY     WET PREP    Collection Time: 10/10/20  8:19 PM    Specimen: Vagina   Result Value Ref Range    Special Requests: NO SPECIAL REQUESTS Wet prep 0 TO 3 WBCS PER HPF     Wet prep FEW  CLUE CELLS PRESENT        Wet prep NO TRICHOMONAS SEEN      Wet prep NO YEAST SEEN     GLUCOSE, POC    Collection Time: 10/10/20  8:33 PM   Result Value Ref Range    Glucose (POC) 267 (H) 65 - 100 mg/dL

## 2020-10-11 NOTE — ED TRIAGE NOTES
Patient presents from home with c/o with c/o vaginal burning and pain. Wants to be tested for STDs.  Patient masked on arrival.

## 2020-10-11 NOTE — DISCHARGE INSTRUCTIONS
Medications as directed. Recheck with primary care doctor 1 week if not improving. Recheck sooner for worse or worrisome symptoms. Patient Education        Vaginal Yeast Infection: Care Instructions  Your Care Instructions     A vaginal yeast infection is caused by too many yeast cells in the vagina. This is common in women of all ages. Itching, vaginal discharge and irritation, and other symptoms can bother you. But yeast infections don't often cause other health problems. Some medicines can increase your risk of getting a yeast infection. These include antibiotics, birth control pills, hormones, and steroids. You may also be more likely to get a yeast infection if you are pregnant, have diabetes, douche, or wear tight clothes. With treatment, most yeast infections get better in 2 to 3 days. Follow-up care is a key part of your treatment and safety. Be sure to make and go to all appointments, and call your doctor if you are having problems. It's also a good idea to know your test results and keep a list of the medicines you take. How can you care for yourself at home? · Take your medicines exactly as prescribed. Call your doctor if you think you are having a problem with your medicine. · Ask your doctor about over-the-counter (OTC) medicines for yeast infections. They may cost less than prescription medicines. If you use an OTC treatment, read and follow all instructions on the label. · Do not use tampons while using a vaginal cream or suppository. The tampons can absorb the medicine. Use pads instead. · Wear loose cotton clothing. Do not wear nylon or other fabric that holds body heat and moisture close to the skin. · Try sleeping without underwear. · Do not scratch. Relieve itching with a cold pack or a cool bath. · Do not wash your vaginal area more than once a day. Use plain water or a mild, unscented soap. Air-dry the vaginal area. · Change out of wet swimsuits after swimming.   · Do not have sex until you have finished your treatment. · Do not douche. When should you call for help? Call your doctor now or seek immediate medical care if:    · You have unexpected vaginal bleeding.     · You have new or increased pain in your vagina or pelvis. Watch closely for changes in your health, and be sure to contact your doctor if:    · You have a fever.     · You are not getting better after 2 days.     · Your symptoms come back after you finish your medicines. Where can you learn more? Go to http://www.gray.com/  Enter C621 in the search box to learn more about \"Vaginal Yeast Infection: Care Instructions. \"  Current as of: November 8, 2019               Content Version: 12.6  © 9569-7696 [employer]. Care instructions adapted under license by [employer] (which disclaims liability or warranty for this information). If you have questions about a medical condition or this instruction, always ask your healthcare professional. [place] [address] any warranty or liability for your use of this information. Patient Education        Bacterial Vaginosis: Care Instructions  Overview     Bacterial vaginosis is a type of vaginal infection. It is caused by excess growth of certain bacteria that are normally found in the vagina. Symptoms can include itching, swelling, pain when you urinate or have sex, and a gray or yellow discharge with a \"fishy\" odor. It is not considered an infection that is spread through sexual contact. Symptoms can be annoying and uncomfortable. But bacterial vaginosis does not usually cause other health problems. However, if you have it while you are pregnant, it can cause complications. While the infection may go away on its own, most doctors use antibiotics to treat it. You may have been prescribed pills or vaginal cream. With treatment, bacterial vaginosis usually clears up in 5 to 7 days.   Follow-up care is a key part of your treatment and safety. Be sure to make and go to all appointments, and call your doctor if you are having problems. It's also a good idea to know your test results and keep a list of the medicines you take. How can you care for yourself at home? · Take your antibiotics as directed. Do not stop taking them just because you feel better. You need to take the full course of antibiotics. · Do not eat or drink anything that contains alcohol if you are taking metronidazole or tinidazole. · Keep using your medicine if you start your period. Use pads instead of tampons while using a vaginal cream or suppository. Tampons can absorb the medicine. · Wear loose cotton clothing. Do not wear nylon and other materials that hold body heat and moisture close to the skin. · Do not scratch. Relieve itching with a cold pack or a cool bath. · Do not wash your vaginal area more than once a day. Use plain water or a mild, unscented soap. Do not douche. When should you call for help? Watch closely for changes in your health, and be sure to contact your doctor if:    · You have unexpected vaginal bleeding.     · You have a fever.     · You have new or increased pain in your vagina or pelvis.     · You are not getting better after 1 week.     · Your symptoms return after you finish the course of your medicine. Where can you learn more? Go to http://www.gray.com/  Enter X360 in the search box to learn more about \"Bacterial Vaginosis: Care Instructions. \"  Current as of: November 8, 2019               Content Version: 12.6  © 8698-8175 [employer]. Care instructions adapted under license by [employer] (which disclaims liability or warranty for this information). If you have questions about a medical condition or this instruction, always ask your healthcare professional. [address] any warranty or liability for your use of this information.

## 2020-10-14 ENCOUNTER — PATIENT OUTREACH (OUTPATIENT)
Dept: CASE MANAGEMENT | Age: 45
End: 2020-10-14

## 2020-10-14 NOTE — PROGRESS NOTES
Transitions of care outreach in follow up to ED visit 10/10/2020  Presented with vaginal burning. Chart indicates [employer] message dated 10/12 re: a foot wound  Unable to reach today  Unable to leave a message, mailbox full  No episode opened  Reminder set for another call.

## 2020-10-15 ENCOUNTER — PATIENT OUTREACH (OUTPATIENT)
Dept: CASE MANAGEMENT | Age: 45
End: 2020-10-15

## 2020-10-15 NOTE — PROGRESS NOTES
Transitions of Care outreach - second attempt  Patient has been in touch with PCP and also with podiatrist re foot wound.   No plans for further outreach

## 2020-11-14 ENCOUNTER — HOSPITAL ENCOUNTER (EMERGENCY)
Age: 45
Discharge: HOME | End: 2020-11-15
Attending: EMERGENCY MEDICINE
Payer: COMMERCIAL

## 2020-11-14 VITALS
HEART RATE: 85 BPM | BODY MASS INDEX: 24.73 KG/M2 | OXYGEN SATURATION: 100 % | TEMPERATURE: 97.8 F | SYSTOLIC BLOOD PRESSURE: 155 MMHG | WEIGHT: 163.14 LBS | HEIGHT: 68 IN | RESPIRATION RATE: 16 BRPM | DIASTOLIC BLOOD PRESSURE: 81 MMHG

## 2020-11-14 DIAGNOSIS — N30.00 ACUTE CYSTITIS WITHOUT HEMATURIA: Primary | ICD-10-CM

## 2020-11-14 LAB
BACTERIA URNS QL MICRO: 0 /HPF
CASTS URNS QL MICRO: NORMAL /LPF
EPI CELLS #/AREA URNS HPF: NORMAL /HPF
RBC #/AREA URNS HPF: NORMAL /HPF
SERVICE CMNT-IMP: NORMAL
WBC URNS QL MICRO: NORMAL /HPF
WET PREP GENITAL: NORMAL

## 2020-11-14 PROCEDURE — 81015 MICROSCOPIC EXAM OF URINE: CPT

## 2020-11-14 PROCEDURE — 87210 SMEAR WET MOUNT SALINE/INK: CPT

## 2020-11-14 PROCEDURE — 87491 CHLMYD TRACH DNA AMP PROBE: CPT

## 2020-11-14 PROCEDURE — 81003 URINALYSIS AUTO W/O SCOPE: CPT

## 2020-11-14 PROCEDURE — 99284 EMERGENCY DEPT VISIT MOD MDM: CPT

## 2020-11-14 RX ORDER — CIPROFLOXACIN 500 MG/1
500 TABLET ORAL 2 TIMES DAILY
Qty: 14 TAB | Refills: 0 | Status: SHIPPED | OUTPATIENT
Start: 2020-11-14 | End: 2020-11-21

## 2020-11-15 NOTE — ED TRIAGE NOTES
Pt states that she is having frequent, painful urination and burning in her vagina for several days.  Masked on arrival to the ED

## 2020-11-15 NOTE — ED PROVIDER NOTES
49-year-old lady presents with concerns about painful urination. She said that she was diagnosed with a bladder infection a couple of weeks ago but only took 2 days of her antibiotic. She initially thought that the antibiotic was Keflex but on further reflection she thinks it was Cipro. Additionally, she is concerned because she has been having unprotected sex and she wanted to check for possible STD. She said that she has had no vaginal discharge or symptoms but was worried. No other associated symptoms. Elements of this note were created using speech recognition software. As such, errors of speech recognition may be present.            Past Medical History:   Diagnosis Date    Acute renal failure ([facility] [address] 1/24/2012    CKD (chronic kidney disease) stage 3, GFR 30-59 ml/min     CKD (chronic kidney disease), stage IV ([address] 3/13/2017    Gastroenteritis, acute 1/24/2012    IDDM (insulin dependent diabetes mellitus) 1/24/2012    Intractable nausea and vomiting 12/25/2014    Irregular menses     Nausea & vomiting 1/24/2012    Neuropathy, peripheral, idiopathic 3/13/2017    Retinopathy, bilateral 3/13/2017    Trichomoniasis 3/13/2017    Ulcer, skin, chronic ([address] 3/13/2017    Vaginal burning        Past Surgical History:   Procedure Laterality Date    HX AMPUTATION  1/27/12    gangrene    HX AMPUTATION Left     5th Toe due to Diabetic Ulcer    HX OPEN REDUCTION INTERNAL FIXATION Right 2011    ankle         Family History:   Problem Relation Age of Onset    Diabetes Father         DM Type II    Prostate Cancer Father     Stroke Father     Breast Cancer Mother 76    Diabetes Maternal Grandmother         DM Type II    Hypertension Maternal Grandmother     Diabetes Paternal Grandfather         DM Type II     Diabetes Paternal Grandmother        Social History     Socioeconomic History    Marital status: SINGLE     Spouse name: Not on file    Number of children: Not on file    Years of education: Not on file    Highest education level: Not on file   Occupational History    Not on file   Social Needs    Financial resource strain: Not on file    Food insecurity     Worry: Not on file     Inability: Not on file    Transportation needs     Medical: Not on file     Non-medical: Not on file   Tobacco Use    Smoking status: Former Smoker     Last attempt to quit: 2013     Years since quittin.0    Smokeless tobacco: Never Used   Substance and Sexual Activity    Alcohol use: Yes     Frequency: Monthly or less     Comment: socially; occasional alcohol use    Drug use: No    Sexual activity: Yes     Partners: Male     Birth control/protection: None   Lifestyle    Physical activity     Days per week: Not on file     Minutes per session: Not on file    Stress: Not on file   Relationships    Social connections     Talks on phone: Not on file     Gets together: Not on file     Attends Jehovah's witness service: Not on file     Active member of club or organization: Not on file     Attends meetings of clubs or organizations: Not on file     Relationship status: Not on file    Intimate partner violence     Fear of current or ex partner: Not on file     Emotionally abused: Not on file     Physically abused: Not on file     Forced sexual activity: Not on file   Other Topics Concern     Service Not Asked    Blood Transfusions Not Asked    Caffeine Concern Yes    Occupational Exposure Not Asked   Jose Deidre Hazards Not Asked    Sleep Concern Not Asked    Stress Concern Not Asked    Weight Concern Not Asked    Special Diet Not Asked    Back Care Not Asked    Exercise No    Bike Helmet Not Asked    Seat Belt Yes    Self-Exams No   Social History Narrative    Abuse: Feels safe at home, no history of physical abuse, no history of sexual abuse         ALLERGIES: Patient has no known allergies. Review of Systems   Constitutional: Negative for chills and fever.    Respiratory: Negative for cough and shortness of breath. Gastrointestinal: Negative for nausea and vomiting. Genitourinary: Positive for dysuria and frequency. Negative for vaginal discharge and vaginal pain. Vitals:    11/14/20 2254   BP: (!) 155/81   Pulse: 85   Resp: 16   Temp: 97.8 °F (36.6 °C)   SpO2: 100%   Weight: 74 kg (163 lb 2.3 oz)   Height: 5' 8\" (1.727 m)            Physical Exam  Vitals signs reviewed. Constitutional:       Appearance: She is well-developed. Abdominal:      General: Bowel sounds are normal.      Palpations: Abdomen is soft. Genitourinary:     Vagina: No vaginal discharge. Comments: No cervical motion tenderness  Skin:     General: Skin is warm and dry. Neurological:      Mental Status: She is alert and oriented to person, place, and time. Psychiatric:         Behavior: Behavior normal.          [facility]    ED Course as of Nov 14 2358   Sat Nov 14, 2020 2354 Patient's urine does show signs of infection. Her vaginal swabs were unremarkable and her exam was negative. I do not think the rare clue cells represent a bacterial vaginosis infection. I will discharge her home with antibiotics to treat a probable UTI. [AC]   6886 Review of the urgent care chart reveals that her antibiotic was in fact Keflex.   For I will give her a prescription for a different antibiotic.    [AC]      ED Course User Index  [AC] Tello Ospina MD       Procedures

## 2020-11-15 NOTE — ED NOTES
I have reviewed discharge instructions with the patient. The patient verbalized understanding. Patient left ED via Discharge Method: ambulatory to Home with self. Opportunity for questions and clarification provided. Patient given 1 scripts. To continue your aftercare when you leave the hospital, you may receive an automated call from our care team to check in on how you are doing. This is a free service and part of our promise to provide the best care and service to meet your aftercare needs.  If you have questions, or wish to unsubscribe from this service please call [phone]. Thank you for Choosing our [address] [place] [address] Emergency Department.

## 2020-11-17 ENCOUNTER — PATIENT OUTREACH (OUTPATIENT)
Dept: CASE MANAGEMENT | Age: 45
End: 2020-11-17

## 2020-11-17 NOTE — PROGRESS NOTES
Transitions of Care outreach in follow up to ED visit 11/14/2020  Presented with probably UTI - discharged home with Cipro    Unable to reach today - unable to leave a voice mail, phone just rings and rings  Reminder sent for a second call

## 2020-11-20 ENCOUNTER — PATIENT OUTREACH (OUTPATIENT)
Dept: CASE MANAGEMENT | Age: 45
End: 2020-11-20

## 2020-11-20 NOTE — PROGRESS NOTES
Second outreach for Transitions of Care subsequent to ED visit 11/14/2020 and to evaluate for ongoing care management needs. Unable to reach, unable to leave a voice mail.     No Episode opened  No further outreach planned

## 2021-02-27 ENCOUNTER — HOSPITAL ENCOUNTER (EMERGENCY)
Age: 46
Discharge: HOME | End: 2021-02-27
Attending: EMERGENCY MEDICINE
Payer: COMMERCIAL

## 2021-02-27 ENCOUNTER — APPOINTMENT (OUTPATIENT)
Dept: GENERAL RADIOLOGY | Age: 46
End: 2021-02-27
Attending: NURSE PRACTITIONER
Payer: COMMERCIAL

## 2021-02-27 VITALS
RESPIRATION RATE: 16 BRPM | HEART RATE: 90 BPM | SYSTOLIC BLOOD PRESSURE: 153 MMHG | WEIGHT: 172 LBS | OXYGEN SATURATION: 97 % | DIASTOLIC BLOOD PRESSURE: 91 MMHG | HEIGHT: 68 IN | TEMPERATURE: 98.9 F | BODY MASS INDEX: 26.07 KG/M2

## 2021-02-27 DIAGNOSIS — R07.89 MUSCULOSKELETAL CHEST PAIN: Primary | ICD-10-CM

## 2021-02-27 LAB — GLUCOSE BLD STRIP.AUTO-MCNC: 86 MG/DL (ref 65–100)

## 2021-02-27 PROCEDURE — 71046 X-RAY EXAM CHEST 2 VIEWS: CPT

## 2021-02-27 PROCEDURE — 99283 EMERGENCY DEPT VISIT LOW MDM: CPT

## 2021-02-27 PROCEDURE — 82962 GLUCOSE BLOOD TEST: CPT

## 2021-02-27 RX ORDER — LIDOCAINE 4 G/100G
PATCH TOPICAL
Qty: 10 PATCH | Refills: 0 | Status: SHIPPED | OUTPATIENT
Start: 2021-02-27 | End: 2021-05-19 | Stop reason: ALTCHOICE

## 2021-02-27 RX ORDER — METHOCARBAMOL 500 MG/1
500 TABLET, FILM COATED ORAL 3 TIMES DAILY
Qty: 30 TAB | Refills: 0 | Status: SHIPPED | OUTPATIENT
Start: 2021-02-27 | End: 2021-03-09

## 2021-02-27 NOTE — Clinical Note
[address] [address] EMERGENCY DEPT 
[address] [facility] [id number] [phone] Work/School Note Date: 2/27/2021 To Whom It May concern: 
 
 
Daja Fritz was seen and treated today in the emergency room by the following provider(s): 
Attending Provider: Agustín Anne MD 
Nurse Practitioner: Anahi Stroud NP. Daja Fritz is excused from work/school on 02/27/21. She is clear to return to work/school on 02/28/21. Sincerely, Donald [place] NP

## 2021-02-27 NOTE — ED PROVIDER NOTES
51 y/o f w hsx ckd, iddm, and gerd to ed with complaint of pain to left beast since Wednesday. She says it hurts when she pushes on it and when she moves her arm. No pain now Also tells me her bgl was 67 this am, and was low enough earlier in the week that she had a near syncopal episode that was relieved by drinking orange juice.   Records reviewed           Past Medical History:   Diagnosis Date    Acute renal failure ([address] 1/24/2012    CKD (chronic kidney disease) stage 3, GFR 30-59 ml/min     CKD (chronic kidney disease), stage IV ([address] 3/13/2017    Gastroenteritis, acute 1/24/2012    IDDM (insulin dependent diabetes mellitus) 1/24/2012    Intractable nausea and vomiting 12/25/2014    Irregular menses     Nausea & vomiting 1/24/2012    Neuropathy, peripheral, idiopathic 3/13/2017    Retinopathy, bilateral 3/13/2017    Trichomoniasis 3/13/2017    Ulcer, skin, chronic ([address] 3/13/2017    Vaginal burning        Past Surgical History:   Procedure Laterality Date    HX AMPUTATION  1/27/12    gangrene    HX AMPUTATION Left     5th Toe due to Diabetic Ulcer    HX OPEN REDUCTION INTERNAL FIXATION Right 2011    ankle         Family History:   Problem Relation Age of Onset    Diabetes Father         DM Type II    Prostate Cancer Father     Stroke Father     Breast Cancer Mother 76    Diabetes Maternal Grandmother         DM Type II    Hypertension Maternal Grandmother     Diabetes Paternal Grandfather         DM Type II     Diabetes Paternal Grandmother        Social History     Socioeconomic History    Marital status: SINGLE     Spouse name: Not on file    Number of children: Not on file    Years of education: Not on file    Highest education level: Not on file   Occupational History    Not on file   Social Needs    Financial resource strain: Not on file    Food insecurity     Worry: Not on file     Inability: Not on file    Transportation needs     Medical: Not on file     Non-medical: Not on file   Tobacco Use    Smoking status: Former Smoker     Quit date: 2013     Years since quittin.3    Smokeless tobacco: Never Used   Substance and Sexual Activity    Alcohol use: Yes     Frequency: Monthly or less     Comment: socially; occasional alcohol use    Drug use: No    Sexual activity: Yes     Partners: Male     Birth control/protection: None   Lifestyle    Physical activity     Days per week: Not on file     Minutes per session: Not on file    Stress: Not on file   Relationships    Social connections     Talks on phone: Not on file     Gets together: Not on file     Attends Taoist service: Not on file     Active member of club or organization: Not on file     Attends meetings of clubs or organizations: Not on file     Relationship status: Not on file    Intimate partner violence     Fear of current or ex partner: Not on file     Emotionally abused: Not on file     Physically abused: Not on file     Forced sexual activity: Not on file   Other Topics Concern     Service Not Asked    Blood Transfusions Not Asked    Caffeine Concern Yes    Occupational Exposure Not Asked   Lindle Magyar Hazards Not Asked    Sleep Concern Not Asked    Stress Concern Not Asked    Weight Concern Not Asked    Special Diet Not Asked    Back Care Not Asked    Exercise No    Bike Helmet Not Asked    Seat Belt Yes    Self-Exams No   Social History Narrative    Abuse: Feels safe at home, no history of physical abuse, no history of sexual abuse         ALLERGIES: Patient has no known allergies. Review of Systems   Constitutional: Negative for chills and fever. HENT: Negative for facial swelling and sore throat. Eyes: Negative for photophobia and visual disturbance. Respiratory: Negative for cough and shortness of breath. Cardiovascular: Positive for chest pain. Negative for palpitations. Gastrointestinal: Negative for abdominal pain, diarrhea, nausea and vomiting.    Endocrine: Negative for polydipsia and polyuria. Genitourinary: Negative for difficulty urinating and dysuria. Musculoskeletal: Negative for back pain and neck pain. Skin: Negative for rash and wound. Neurological: Positive for light-headedness. Negative for dizziness and syncope. Psychiatric/Behavioral: Negative for confusion and decreased concentration. Vitals:    02/27/21 1132   BP: (!) 153/91   Pulse: 90   Resp: 16   Temp: 98.9 °F (37.2 °C)   SpO2: 97%   Weight: 78 kg (172 lb)   Height: 5' 8\" (1.727 m)            Physical Exam  Vitals signs and nursing note reviewed. Constitutional:       General: She is not in acute distress. Appearance: She is well-developed. HENT:      Head: Normocephalic and atraumatic. Right Ear: External ear normal.      Left Ear: External ear normal.      Nose: Nose normal.   Eyes:      Conjunctiva/sclera: Conjunctivae normal.      Pupils: Pupils are equal, round, and reactive to light. Neck:      Musculoskeletal: Normal range of motion and neck supple. Cardiovascular:      Rate and Rhythm: Normal rate and regular rhythm. Heart sounds: Normal heart sounds. Pulmonary:      Effort: Pulmonary effort is normal. No respiratory distress. Breath sounds: Normal breath sounds. No wheezing. Chest:       Abdominal:      General: Bowel sounds are normal. There is no distension. Palpations: Abdomen is soft. Tenderness: There is no abdominal tenderness. Musculoskeletal: Normal range of motion. General: No tenderness. Skin:     General: Skin is warm and dry. Findings: No rash. Neurological:      Mental Status: She is alert and oriented to person, place, and time. Cranial Nerves: No cranial nerve deficit. Coordination: Coordination normal.   Psychiatric:         Behavior: Behavior normal.         Thought Content:  Thought content normal.         Judgment: Judgment normal.          MDM  Number of Diagnoses or Management Options  Diagnosis management comments: 49 y/o f w hsx ckd, iddm, and gerd to ed with complaint of pain to left beast since Wednesday. She says it hurts when she pushes on it and when she moves her arm. No pain now Also tells me her bgl was 67 this am, and was low enough earlier in the week that she had a near syncopal episode that was relieved by drinking orange juice. Records reviewed    I have discussed with dr Pierre Hurtado, lester eval baseline labs, cxr and ekg. 1:14 PM  bgl 86. Pt has refused blood work and ekg. Will wait cxr  1:26 PM    cxr is neg.   Dc to hoem       Amount and/or Complexity of Data Reviewed  Clinical lab tests: ordered and reviewed  Tests in the radiology section of CPT®: ordered and reviewed  Discuss the patient with other providers: yes (samria)    Risk of Complications, Morbidity, and/or Mortality  Presenting problems: minimal  Diagnostic procedures: minimal  Management options: minimal    Patient Progress  Patient progress: stable         Procedures

## 2021-05-19 PROBLEM — B96.89 BACTERIAL VAGINOSIS: Status: RESOLVED | Noted: 2017-04-13 | Resolved: 2021-05-19

## 2021-05-19 PROBLEM — N76.0 BACTERIAL VAGINOSIS: Status: RESOLVED | Noted: 2017-04-13 | Resolved: 2021-05-19

## 2021-05-19 PROBLEM — B37.31 CANDIDA VAGINITIS: Status: RESOLVED | Noted: 2018-01-30 | Resolved: 2021-05-19

## 2021-05-25 ENCOUNTER — DOCUMENTATION ONLY (OUTPATIENT)
Dept: DIABETES SERVICES | Age: 46
End: 2021-05-25

## 2021-05-25 NOTE — PROGRESS NOTES
No show Diabetes Assessment today. Called patient. She reports she thought it was tomorrow.  Rescheduled for 6-1-2021 at 6:30 am.

## 2021-06-01 ENCOUNTER — DOCUMENTATION ONLY (OUTPATIENT)
Dept: DIABETES SERVICES | Age: 46
End: 2021-06-01

## 2021-07-10 NOTE — DISCHARGE INSTRUCTIONS
Problem: At Risk for Falls  Goal: # Patient does not fall  Outcome: Outcome Met, Continue evaluating goal progress toward completion  Goal: # Takes action to control fall-related risks  Outcome: Outcome Met, Continue evaluating goal progress toward completion  Goal: # Verbalizes understanding of fall risk/precautions  Description: Document education using the patient education activity  Outcome: Outcome Met, Continue evaluating goal progress toward completion     Problem: Pain  Goal: #Acceptable pain level achieved/maintained at rest using NRS/Faces  Description: This goal is used for patients who can self-report.  Acceptable means the level is at or below the identified comfort/function goal.  Outcome: Outcome Met, Continue evaluating goal progress toward completion  Goal: # Acceptable pain level achieved/maintained at rest using NRS/Faces without oversedation (opioid naive or PCA/Epidural infusion)  Description: This goal is used if Opioid-naïve or on PCA/Epidural Infusion.  Outcome: Outcome Met, Continue evaluating goal progress toward completion  Goal: # Acceptable pain level achieved/maintained with activity using NRS/Faces  Description: This goal is used for patients who can self-report and are not achieving acceptable pain control during activity.  Outcome: Outcome Met, Continue evaluating goal progress toward completion     Problem: VTE (Actual)  Goal: # Verbalizes understanding of VTE and treatment plan  Description: Document education using the patient education activity.   Outcome: Outcome Met, Continue evaluating goal progress toward completion      Patient Education        Hypoglycemia: Care Instructions  Your Care Instructions    Hypoglycemia means that your blood sugar is low and your body is not getting enough fuel. Some people get low blood sugar from not eating often enough. Some medicines to treat diabetes can cause low blood sugar. People who have had surgery on their stomachs or intestines may get hypoglycemia. Problems with the pancreas, kidneys, or liver also can cause low blood sugar. A snack or drink with sugar in it will raise your blood sugar and should ease your symptoms right away. Your doctor may recommend that you change or stop your medicines until you can get your blood sugar levels under control. In the long run, you may need to change your diet and eating habits so that you get enough fuel for your body throughout the day. Follow-up care is a key part of your treatment and safety. Be sure to make and go to all appointments, and call your doctor if you are having problems. It's also a good idea to know your test results and keep a list of the medicines you take. How can you care for yourself at home? · Learn to recognize the early signs of low blood sugar. Signs include:  ? Nausea. ? Hunger. ? Feeling nervous, irritable, or shaky. ? Cold, clammy, wet skin. ? Sweating (when you are not exercising). ? A fast heartbeat.  ? Numbness or tingling of the fingertips or lips. · If you feel an episode of low blood sugar coming on, eat or drink a quick-sugar food. Some examples of quick-sugar foods are glucose tablets, table sugar, hard candy (such as Life Savers), fruit juice, and regular (not diet) soda. · Eat small, frequent meals so that you do not get too hungry between meals. · Balance extra exercise with eating more. · Keep a written record of your low blood sugar episodes, including when you last ate and what you ate, so that you can learn what causes your blood sugar to drop.   · Make sure your family, friends, and coworkers know the symptoms of low blood sugar and know what to do to get your sugar level up. · Wear medical alert jewelry that lists your condition. You can buy this at most drugstores. When should you call for help? Call 911 anytime you think you may need emergency care. For example, call if:    · You passed out (lost consciousness).     · You are confused or cannot think clearly.     · Your blood sugar is very high or very low.    Watch closely for changes in your health, and be sure to contact your doctor if:    · Your blood sugar stays outside the level your doctor set for you.     · You have any problems. Where can you learn more? Go to http://damon-chad.info/  Enter R955 in the search box to learn more about \"Hypoglycemia: Care Instructions. \"  Current as of: December 19, 2019Content Version: 12.4  © 5872-1198 [employer]. Care instructions adapted under license by [employer] (which disclaims liability or warranty for this information). If you have questions about a medical condition or this instruction, always ask your healthcare professional. [address] any warranty or liability for your use of this information.

## 2021-07-19 ENCOUNTER — HOSPITAL ENCOUNTER (OUTPATIENT)
Dept: DIABETES SERVICES | Age: 46
Discharge: HOME | End: 2021-07-19
Payer: COMMERCIAL

## 2021-07-19 PROCEDURE — 95249 CONT GLUC MNTR PT PROV EQP: CPT

## 2021-08-23 ENCOUNTER — HOSPITAL ENCOUNTER (EMERGENCY)
Age: 46
Discharge: LEFT BEFORE DISCHARGE | End: 2021-08-24
Payer: COMMERCIAL

## 2021-08-23 PROCEDURE — 75810000275 HC EMERGENCY DEPT VISIT NO LEVEL OF CARE

## 2021-11-05 ENCOUNTER — HOSPITAL ENCOUNTER (EMERGENCY)
Age: 46
Discharge: LEFT BEFORE DISCHARGE | End: 2021-11-05
Attending: EMERGENCY MEDICINE
Payer: COMMERCIAL

## 2021-11-05 VITALS
BODY MASS INDEX: 29.1 KG/M2 | WEIGHT: 192 LBS | HEART RATE: 98 BPM | SYSTOLIC BLOOD PRESSURE: 174 MMHG | TEMPERATURE: 98.1 F | HEIGHT: 68 IN | DIASTOLIC BLOOD PRESSURE: 91 MMHG | OXYGEN SATURATION: 98 % | RESPIRATION RATE: 18 BRPM

## 2021-11-05 PROCEDURE — 75810000275 HC EMERGENCY DEPT VISIT NO LEVEL OF CARE

## 2021-11-05 NOTE — ED NOTES
Advised by registration patient approached desk and told them her head felt better and she was leaving. Eloped. IV

## 2021-11-05 NOTE — ED TRIAGE NOTES
Patient reports trip and fall while retreaving trash can from curb. Patient states she hit head, right knee and left hand. Ambulatory to triage.  Masked

## 2021-11-05 NOTE — PROGRESS NOTES
6:13 PM  Notified by RN that pt stated \"my head feels better\" and walked out. She left prior to my evaluation.

## 2021-11-16 PROBLEM — R82.90 MALODOROUS URINE: Status: ACTIVE | Noted: 2021-11-16

## 2022-03-18 PROBLEM — L98.499: Status: ACTIVE | Noted: 2017-03-13

## 2022-03-18 PROBLEM — F41.8 DEPRESSION WITH ANXIETY: Status: ACTIVE | Noted: 2017-07-11

## 2022-03-19 PROBLEM — N18.30 TYPE 1 DIABETES MELLITUS WITH STAGE 3 CHRONIC KIDNEY DISEASE (HCC): Status: ACTIVE | Noted: 2018-08-16

## 2022-03-19 PROBLEM — E10.42 DIABETIC POLYNEUROPATHY ASSOCIATED WITH TYPE 1 DIABETES MELLITUS (HCC): Status: ACTIVE | Noted: 2017-02-03

## 2022-03-19 PROBLEM — Z87.19 H/O GASTROESOPHAGEAL REFLUX (GERD): Status: ACTIVE | Noted: 2020-06-19

## 2022-03-19 PROBLEM — G60.9 NEUROPATHY, PERIPHERAL, IDIOPATHIC: Status: ACTIVE | Noted: 2017-03-13

## 2022-03-19 PROBLEM — Z71.2 ENCOUNTER TO DISCUSS TEST RESULTS: Status: ACTIVE | Noted: 2018-08-22

## 2022-03-19 PROBLEM — N18.4 CKD (CHRONIC KIDNEY DISEASE), STAGE IV (HCC): Status: ACTIVE | Noted: 2017-03-13

## 2022-03-19 PROBLEM — Z01.419 ENCOUNTER FOR ANNUAL ROUTINE GYNECOLOGICAL EXAMINATION: Status: ACTIVE | Noted: 2018-11-27

## 2022-03-19 PROBLEM — E10.22 TYPE 1 DIABETES MELLITUS WITH STAGE 3 CHRONIC KIDNEY DISEASE (HCC): Status: ACTIVE | Noted: 2018-08-16

## 2022-03-19 PROBLEM — F32.1 MODERATE SINGLE CURRENT EPISODE OF MAJOR DEPRESSIVE DISORDER (HCC): Status: ACTIVE | Noted: 2017-02-03

## 2022-03-19 PROBLEM — R82.90 MALODOROUS URINE: Status: ACTIVE | Noted: 2021-11-16

## 2022-03-20 PROBLEM — E10.3319: Status: ACTIVE | Noted: 2017-03-13

## 2022-03-20 PROBLEM — Z91.148 NONCOMPLIANCE WITH MEDICATION REGIMEN: Status: ACTIVE | Noted: 2018-08-16

## 2022-03-20 PROBLEM — Z91.14 NONCOMPLIANCE WITH MEDICATION REGIMEN: Status: ACTIVE | Noted: 2018-08-16

## 2022-03-20 PROBLEM — E28.39 PREMATURE OVARIAN FAILURE: Status: ACTIVE | Noted: 2018-01-30

## 2022-03-21 ENCOUNTER — HOSPITAL ENCOUNTER (EMERGENCY)
Age: 47
Discharge: HOME | End: 2022-03-21
Attending: EMERGENCY MEDICINE
Payer: COMMERCIAL

## 2022-03-21 VITALS
RESPIRATION RATE: 20 BRPM | WEIGHT: 179 LBS | BODY MASS INDEX: 27.13 KG/M2 | HEART RATE: 87 BPM | DIASTOLIC BLOOD PRESSURE: 95 MMHG | OXYGEN SATURATION: 99 % | HEIGHT: 68 IN | TEMPERATURE: 98.3 F | SYSTOLIC BLOOD PRESSURE: 193 MMHG

## 2022-03-21 DIAGNOSIS — N39.0 URINARY TRACT INFECTION WITHOUT HEMATURIA, SITE UNSPECIFIED: Primary | ICD-10-CM

## 2022-03-21 DIAGNOSIS — R03.0 ELEVATED BP WITHOUT DIAGNOSIS OF HYPERTENSION: ICD-10-CM

## 2022-03-21 LAB
APPEARANCE UR: ABNORMAL
BACTERIA URNS QL MICRO: ABNORMAL /HPF
BILIRUB UR QL: NEGATIVE
CASTS URNS QL MICRO: 0 /LPF
COLOR UR: YELLOW
CRYSTALS URNS QL MICRO: 0 /LPF
EPI CELLS #/AREA URNS HPF: ABNORMAL /HPF
GLUCOSE UR STRIP.AUTO-MCNC: 500 MG/DL
HCG UR QL: NEGATIVE
HGB UR QL STRIP: ABNORMAL
KETONES UR QL STRIP.AUTO: NEGATIVE MG/DL
LEUKOCYTE ESTERASE UR QL STRIP.AUTO: ABNORMAL
MUCOUS THREADS URNS QL MICRO: 0 /LPF
NITRITE UR QL STRIP.AUTO: NEGATIVE
PH UR STRIP: 7 [PH] (ref 5–9)
PROT UR STRIP-MCNC: ABNORMAL MG/DL
RBC #/AREA URNS HPF: ABNORMAL /HPF
SP GR UR REFRACTOMETRY: 1.01 (ref 1–1.02)
UROBILINOGEN UR QL STRIP.AUTO: 0.2 EU/DL (ref 0.2–1)
WBC URNS QL MICRO: ABNORMAL /HPF

## 2022-03-21 PROCEDURE — 81001 URINALYSIS AUTO W/SCOPE: CPT

## 2022-03-21 PROCEDURE — 87086 URINE CULTURE/COLONY COUNT: CPT

## 2022-03-21 PROCEDURE — 99283 EMERGENCY DEPT VISIT LOW MDM: CPT

## 2022-03-21 PROCEDURE — 74011250637 HC RX REV CODE- 250/637: Performed by: PHYSICIAN ASSISTANT

## 2022-03-21 PROCEDURE — 81015 MICROSCOPIC EXAM OF URINE: CPT

## 2022-03-21 PROCEDURE — 81025 URINE PREGNANCY TEST: CPT

## 2022-03-21 PROCEDURE — 87088 URINE BACTERIA CULTURE: CPT

## 2022-03-21 PROCEDURE — 87186 SC STD MICRODIL/AGAR DIL: CPT

## 2022-03-21 RX ORDER — ONDANSETRON 4 MG/1
4 TABLET, ORALLY DISINTEGRATING ORAL
Qty: 20 TABLET | Refills: 0 | Status: ON HOLD | OUTPATIENT
Start: 2022-03-21 | End: 2022-04-22 | Stop reason: SDUPTHER

## 2022-03-21 RX ORDER — ONDANSETRON 4 MG/1
4 TABLET, ORALLY DISINTEGRATING ORAL
Status: COMPLETED | OUTPATIENT
Start: 2022-03-21 | End: 2022-03-21

## 2022-03-21 RX ORDER — CEFDINIR 300 MG/1
300 CAPSULE ORAL 2 TIMES DAILY
Qty: 20 CAPSULE | Refills: 0 | Status: SHIPPED | OUTPATIENT
Start: 2022-03-21 | End: 2022-03-31

## 2022-03-21 RX ADMIN — ONDANSETRON 4 MG: 4 TABLET, ORALLY DISINTEGRATING ORAL at 09:08

## 2022-03-21 NOTE — DISCHARGE INSTRUCTIONS
Keep a check on BP, check it once or twice weekly with same blood pressure monitor, write the number down with date and time and take that with you to see a PCP for recheck of BP and UTI. Take all of the antibiotics as directed. Return to the ED if worse.

## 2022-03-21 NOTE — ED PROVIDER NOTES
Patient is here with dysuria and some pelvic pain that started yesterday. She has some nausea but no vomiting this morning. She has recurrent urinary tract infections and thinks she might have one now. She is a diabetic. She had a urinary tract infection at the end of November, Klebsiella pneumoniae that was pansensitive to cephalosporins. No vaginal discharge or bleeding. She has a history of very irregular periods and cannot remember her last one. She does have stage IV kidney disease. She has had no chest pain, shortness of breath, upper abdominal pain, dizziness, weakness, back pain, swelling/tingling or weakness to her arms or legs or other new symptoms. She ambulated to the room without difficulty and is well-hydrated. The history is provided by the patient. Urinary Pain  This is a new problem. The current episode started yesterday. The problem occurs constantly. The problem has been gradually worsening. Pertinent negatives include no chest pain, no abdominal pain, no headaches and no shortness of breath. Nothing aggravates the symptoms. Nothing relieves the symptoms. She has tried nothing for the symptoms.         Past Medical History:   Diagnosis Date    Acute renal failure ([address] 1/24/2012    CKD (chronic kidney disease) stage 3, GFR 30-59 ml/min ([facility])     CKD (chronic kidney disease), stage IV ([address] 3/13/2017    Gastroenteritis, acute 1/24/2012    IDDM (insulin dependent diabetes mellitus) 1/24/2012    Intractable nausea and vomiting 12/25/2014    Irregular menses     Nausea & vomiting 1/24/2012    Neuropathy, peripheral, idiopathic 3/13/2017    Retinopathy, bilateral 3/13/2017    Trichomoniasis 3/13/2017    Ulcer, skin, chronic ([address] 3/13/2017    Vaginal burning        Past Surgical History:   Procedure Laterality Date    HX AMPUTATION  1/27/12    gangrene    HX AMPUTATION Left     5th Toe due to Diabetic Ulcer    HX OPEN REDUCTION INTERNAL FIXATION Right 2011    ankle Family History:   Problem Relation Age of Onset    Diabetes Father         DM Type II    Prostate Cancer Father     Stroke Father     Breast Cancer Mother 76    Diabetes Maternal Grandmother         DM Type II    Hypertension Maternal Grandmother     Diabetes Paternal Grandfather         DM Type II     Diabetes Paternal Grandmother     Colon Cancer Neg Hx     Ovarian Cancer Neg Hx     Uterine Cancer Neg Hx        Social History     Socioeconomic History    Marital status: SINGLE     Spouse name: Not on file    Number of children: Not on file    Years of education: Not on file    Highest education level: Not on file   Occupational History    Not on file   Tobacco Use    Smoking status: Former Smoker     Quit date: 2013     Years since quittin.3    Smokeless tobacco: Never Used   Substance and Sexual Activity    Alcohol use: Yes     Comment: socially; occasional alcohol use    Drug use: No    Sexual activity: Yes     Partners: Male     Birth control/protection: None   Other Topics Concern     Service Not Asked    Blood Transfusions Not Asked    Caffeine Concern Yes    Occupational Exposure Not Asked    Hobby Hazards Not Asked    Sleep Concern Not Asked    Stress Concern Not Asked    Weight Concern Not Asked    Special Diet Not Asked    Back Care Not Asked    Exercise No    Bike Helmet Not Asked    Seat Belt Yes    Self-Exams No   Social History Narrative    Abuse: Feels safe at home, no history of physical abuse, no history of sexual abuse     Social Determinants of Health     Financial Resource Strain:     Difficulty of Paying Living Expenses: Not on file   Food Insecurity:     Worried About Running Out of Food in the Last Year: Not on file    Jennifer of Food in the Last Year: Not on file   Transportation Needs:     Lack of Transportation (Medical): Not on file    Lack of Transportation (Non-Medical):  Not on file   Physical Activity:     Days of Exercise per Week: Not on file    Minutes of Exercise per Session: Not on file   Stress:     Feeling of Stress : Not on file   Social Connections:     Frequency of Communication with Friends and Family: Not on file    Frequency of Social Gatherings with Friends and Family: Not on file    Attends Mu-ism Services: Not on file    Active Member of [address] [place] [address] or Organizations: Not on file    Attends Club or Organization Meetings: Not on file    Marital Status: Not on file   Intimate Partner Violence:     Fear of Current or Ex-Partner: Not on file    Emotionally Abused: Not on file    Physically Abused: Not on file    Sexually Abused: Not on file   Housing Stability:     Unable to Pay for Housing in the Last Year: Not on file    Number of [place] in the Last Year: Not on file    Unstable Housing in the Last Year: Not on file         ALLERGIES: Patient has no known allergies. Review of Systems   Constitutional: Negative. HENT: Negative. Eyes: Negative. Respiratory: Negative. Negative for shortness of breath. Cardiovascular: Negative. Negative for chest pain. Gastrointestinal: Negative. Negative for abdominal pain. Genitourinary: Positive for dysuria and pelvic pain. Musculoskeletal: Negative. Skin: Negative. Neurological: Negative. Negative for headaches. Psychiatric/Behavioral: Negative. All other systems reviewed and are negative. Vitals:    03/21/22 0820 03/21/22 0830   BP: (!) 187/86 (!) 193/95   Pulse: 87    Resp: 20    Temp: 98.3 °F (36.8 °C)    SpO2: 100% 99%   Weight: 81.2 kg (179 lb)    Height: 5' 8\" (1.727 m)             Physical Exam  Vitals and nursing note reviewed. Constitutional:       Appearance: Normal appearance. She is well-developed. HENT:      Head: Normocephalic and atraumatic.       Right Ear: External ear normal.      Left Ear: External ear normal.      Nose: Nose normal.      Mouth/Throat:      Mouth: Mucous membranes are moist.   Eyes: Extraocular Movements: Extraocular movements intact. Conjunctiva/sclera: Conjunctivae normal.      Pupils: Pupils are equal, round, and reactive to light. Cardiovascular:      Rate and Rhythm: Normal rate. Pulses: Normal pulses. Pulmonary:      Effort: Pulmonary effort is normal.   Abdominal:      General: Abdomen is flat. Palpations: Abdomen is soft. Tenderness: There is no abdominal tenderness. Musculoskeletal:         General: Normal range of motion. Cervical back: Normal range of motion. Skin:     General: Skin is warm and dry. Capillary Refill: Capillary refill takes less than 2 seconds. Neurological:      General: No focal deficit present. Mental Status: She is alert and oriented to person, place, and time. Deep Tendon Reflexes: Reflexes are normal and symmetric. Psychiatric:         Mood and Affect: Mood normal.         Behavior: Behavior normal.         Thought Content: Thought content normal.         Judgment: Judgment normal.          MDM  Number of Diagnoses or Management Options     Amount and/or Complexity of Data Reviewed  Clinical lab tests: reviewed    Risk of Complications, Morbidity, and/or Mortality  Presenting problems: moderate  Diagnostic procedures: moderate  Management options: moderate    Patient Progress  Patient progress: improved         Procedures      The patient was observed in the ED.     Results Reviewed:      Recent Results (from the past 24 hour(s))   URINALYSIS W/ RFLX MICROSCOPIC    Collection Time: 03/21/22  8:23 AM   Result Value Ref Range    Color YELLOW      Appearance CLOUDY      Specific gravity 1.009 1.001 - 1.023      pH (UA) 7.0 5.0 - 9.0      Protein TRACE (A) NEG mg/dL    Glucose 500 mg/dL    Ketone Negative NEG mg/dL    Bilirubin Negative NEG      Blood SMALL (A) NEG      Urobilinogen 0.2 0.2 - 1.0 EU/dL    Nitrites Negative NEG      Leukocyte Esterase LARGE (A) NEG     URINE MICROSCOPIC    Collection Time: 03/21/22 8: 23 AM   Result Value Ref Range    WBC  0 /hpf    RBC 0-3 0 /hpf    Epithelial cells 0-3 0 /hpf    Bacteria 1+ (H) 0 /hpf    Casts 0 0 /lpf    Crystals, urine 0 0 /LPF    Mucus 0 0 /lpf   HCG URINE, QL. - POC    Collection Time: 03/21/22  8:25 AM   Result Value Ref Range    Pregnancy test,urine (POC) Negative NEG       Patient's last urine in November was pansensitive to cephalosporins. I have written her for [employer] with her history of recurrent UTIs. She does have a history of stage IV kidney disease but does have a nephrologist she can follow-up with. She is not having CVA tenderness. No vomiting. No fever. She can take her current medications she takes daily when she gets home. She has no history of hypertension but was told to keep a check on BP, check it once or twice weekly with same blood pressure monitor, write the number down with date and time and take that with you to see a PCP for recheck of BP. She should also have her urine rechecked for resolution by the PCP. It was sent for culture with her history of recurrent UTIs. We will call her if it looks like the antibiotics need to be changed. She was given Zofran here for her nausea. She does not want us to check her blood sugar so she will monitor it when she gets home. I will let that take effect and she can fill her prescription to get her antibiotic going. She was instructed to return to the ED if she is worsening in any way. She is stable for discharge and ambulatory out of the ED without difficulty at this time. I discussed the results of all labs, procedures, radiographs, and treatments with the patient and available family. Treatment plan is agreed upon and the patient is ready for discharge. All voiced understanding of the discharge plan and medication instructions or changes as appropriate. Questions about treatment in the ED were answered.   All were encouraged to return should symptoms worsen or new problems develop.

## 2022-03-21 NOTE — ED TRIAGE NOTES
Pt to ED with c/o lower abdominal pain, odorous urine, urinary frequency, and back pain starting yesterday. Masked.

## 2022-03-21 NOTE — ED NOTES
I have reviewed discharge instructions with the patient. The patient verbalized understanding. Patient left ED via Discharge Method: ambulatory to Home with self    Opportunity for questions and clarification provided. Patient given 2 scripts sent to pharmacy. To continue your aftercare when you leave the hospital, you may receive an automated call from our care team to check in on how you are doing. This is a free service and part of our promise to provide the best care and service to meet your aftercare needs.  If you have questions, or wish to unsubscribe from this service please call [phone]. Thank you for Choosing our [facility] Emergency Department.

## 2022-03-24 LAB
BACTERIA SPEC CULT: ABNORMAL
BACTERIA SPEC CULT: ABNORMAL
SERVICE CMNT-IMP: ABNORMAL

## 2022-04-18 ENCOUNTER — HOSPITAL ENCOUNTER (OUTPATIENT)
Age: 47
Setting detail: OBSERVATION
Discharge: OTHER INSTITUTION | End: 2022-04-19
Attending: EMERGENCY MEDICINE | Admitting: HOSPITALIST
Payer: COMMERCIAL

## 2022-04-18 DIAGNOSIS — E87.6 HYPOKALEMIA: ICD-10-CM

## 2022-04-18 DIAGNOSIS — N20.1 RIGHT URETERAL STONE: Primary | ICD-10-CM

## 2022-04-18 DIAGNOSIS — E83.42 HYPOMAGNESEMIA: ICD-10-CM

## 2022-04-18 DIAGNOSIS — N28.89 URETERITIS: ICD-10-CM

## 2022-04-18 DIAGNOSIS — N39.0 ACUTE UTI: ICD-10-CM

## 2022-04-18 DIAGNOSIS — R11.2 INTRACTABLE NAUSEA AND VOMITING: ICD-10-CM

## 2022-04-18 LAB
BACTERIA URNS QL MICRO: ABNORMAL /HPF
BASOPHILS # BLD: 0 K/UL (ref 0–0.2)
BASOPHILS NFR BLD: 0 % (ref 0–2)
CASTS URNS QL MICRO: 0 /LPF
DIFFERENTIAL METHOD BLD: ABNORMAL
EOSINOPHIL # BLD: 0.7 K/UL (ref 0–0.8)
EOSINOPHIL NFR BLD: 7 % (ref 0.5–7.8)
EPI CELLS #/AREA URNS HPF: ABNORMAL /HPF
ERYTHROCYTE [DISTWIDTH] IN BLOOD BY AUTOMATED COUNT: 15.4 % (ref 11.9–14.6)
HCG UR QL: NEGATIVE
HCT VFR BLD AUTO: 39.4 % (ref 35.8–46.3)
HGB BLD-MCNC: 12.4 G/DL (ref 11.7–15.4)
IMM GRANULOCYTES # BLD AUTO: 0 K/UL (ref 0–0.5)
IMM GRANULOCYTES NFR BLD AUTO: 0 % (ref 0–5)
LIPASE SERPL-CCNC: 13 U/L (ref 73–393)
LYMPHOCYTES # BLD: 1.8 K/UL (ref 0.5–4.6)
LYMPHOCYTES NFR BLD: 20 % (ref 13–44)
MCH RBC QN AUTO: 27.6 PG (ref 26.1–32.9)
MCHC RBC AUTO-ENTMCNC: 31.5 G/DL (ref 31.4–35)
MCV RBC AUTO: 87.8 FL (ref 79.6–97.8)
MONOCYTES # BLD: 0.7 K/UL (ref 0.1–1.3)
MONOCYTES NFR BLD: 8 % (ref 4–12)
NEUTS SEG # BLD: 5.9 K/UL (ref 1.7–8.2)
NEUTS SEG NFR BLD: 65 % (ref 43–78)
NRBC # BLD: 0 K/UL (ref 0–0.2)
PLATELET # BLD AUTO: 340 K/UL (ref 150–450)
PMV BLD AUTO: 9.4 FL (ref 9.4–12.3)
RBC # BLD AUTO: 4.49 M/UL (ref 4.05–5.2)
RBC #/AREA URNS HPF: ABNORMAL /HPF
WBC # BLD AUTO: 9.2 K/UL (ref 4.3–11.1)
WBC URNS QL MICRO: >100 /HPF

## 2022-04-18 PROCEDURE — 99285 EMERGENCY DEPT VISIT HI MDM: CPT

## 2022-04-18 PROCEDURE — 81025 URINE PREGNANCY TEST: CPT

## 2022-04-18 PROCEDURE — 74011250636 HC RX REV CODE- 250/636: Performed by: EMERGENCY MEDICINE

## 2022-04-18 PROCEDURE — 85025 COMPLETE CBC W/AUTO DIFF WBC: CPT

## 2022-04-18 PROCEDURE — 83605 ASSAY OF LACTIC ACID: CPT

## 2022-04-18 PROCEDURE — 81015 MICROSCOPIC EXAM OF URINE: CPT

## 2022-04-18 PROCEDURE — 83690 ASSAY OF LIPASE: CPT

## 2022-04-18 PROCEDURE — 83735 ASSAY OF MAGNESIUM: CPT

## 2022-04-18 PROCEDURE — 96375 TX/PRO/DX INJ NEW DRUG ADDON: CPT

## 2022-04-18 PROCEDURE — 81003 URINALYSIS AUTO W/O SCOPE: CPT

## 2022-04-18 PROCEDURE — 87040 BLOOD CULTURE FOR BACTERIA: CPT

## 2022-04-18 PROCEDURE — 87086 URINE CULTURE/COLONY COUNT: CPT

## 2022-04-18 PROCEDURE — 80053 COMPREHEN METABOLIC PANEL: CPT

## 2022-04-18 RX ORDER — SODIUM CHLORIDE 0.9 % (FLUSH) 0.9 %
5-10 SYRINGE (ML) INJECTION AS NEEDED
Status: DISCONTINUED | OUTPATIENT
Start: 2022-04-18 | End: 2022-04-19 | Stop reason: SDUPTHER

## 2022-04-18 RX ORDER — ONDANSETRON 2 MG/ML
4 INJECTION INTRAMUSCULAR; INTRAVENOUS
Status: COMPLETED | OUTPATIENT
Start: 2022-04-18 | End: 2022-04-18

## 2022-04-18 RX ORDER — SODIUM CHLORIDE 0.9 % (FLUSH) 0.9 %
5-10 SYRINGE (ML) INJECTION EVERY 8 HOURS
Status: DISCONTINUED | OUTPATIENT
Start: 2022-04-18 | End: 2022-04-19 | Stop reason: SDUPTHER

## 2022-04-18 RX ORDER — ONDANSETRON 2 MG/ML
4 INJECTION INTRAMUSCULAR; INTRAVENOUS
Status: DISCONTINUED | OUTPATIENT
Start: 2022-04-18 | End: 2022-04-18

## 2022-04-18 RX ADMIN — ONDANSETRON 4 MG: 2 INJECTION INTRAMUSCULAR; INTRAVENOUS at 23:57

## 2022-04-19 ENCOUNTER — APPOINTMENT (OUTPATIENT)
Dept: CT IMAGING | Age: 47
End: 2022-04-19
Attending: EMERGENCY MEDICINE
Payer: COMMERCIAL

## 2022-04-19 ENCOUNTER — APPOINTMENT (OUTPATIENT)
Dept: ULTRASOUND IMAGING | Age: 47
End: 2022-04-19
Attending: STUDENT IN AN ORGANIZED HEALTH CARE EDUCATION/TRAINING PROGRAM
Payer: COMMERCIAL

## 2022-04-19 ENCOUNTER — ANESTHESIA EVENT (OUTPATIENT)
Dept: SURGERY | Age: 47
End: 2022-04-19
Payer: COMMERCIAL

## 2022-04-19 ENCOUNTER — ANESTHESIA (OUTPATIENT)
Dept: SURGERY | Age: 47
End: 2022-04-19
Payer: COMMERCIAL

## 2022-04-19 ENCOUNTER — HOSPITAL ENCOUNTER (OUTPATIENT)
Age: 47
Setting detail: OBSERVATION
LOS: 1 days | Discharge: HOME | End: 2022-04-22
Attending: INTERNAL MEDICINE | Admitting: HOSPITALIST
Payer: COMMERCIAL

## 2022-04-19 VITALS
BODY MASS INDEX: 25.61 KG/M2 | OXYGEN SATURATION: 98 % | TEMPERATURE: 99 F | HEIGHT: 68 IN | HEART RATE: 97 BPM | DIASTOLIC BLOOD PRESSURE: 92 MMHG | SYSTOLIC BLOOD PRESSURE: 198 MMHG | WEIGHT: 169 LBS | RESPIRATION RATE: 18 BRPM

## 2022-04-19 DIAGNOSIS — N30.00 ACUTE CYSTITIS WITHOUT HEMATURIA: ICD-10-CM

## 2022-04-19 DIAGNOSIS — N20.1 URETERIC STONE: ICD-10-CM

## 2022-04-19 DIAGNOSIS — N13.30 HYDRONEPHROSIS OF RIGHT KIDNEY: ICD-10-CM

## 2022-04-19 PROBLEM — N39.0 UTI (URINARY TRACT INFECTION): Status: ACTIVE | Noted: 2022-04-19

## 2022-04-19 PROBLEM — E10.9 DIABETES MELLITUS TYPE 1 (HCC): Status: ACTIVE | Noted: 2018-08-22

## 2022-04-19 LAB
ALBUMIN SERPL-MCNC: 2.1 G/DL (ref 3.5–5)
ALBUMIN/GLOB SERPL: 0.6 {RATIO} (ref 1.2–3.5)
ALP SERPL-CCNC: 139 U/L (ref 50–136)
ALT SERPL-CCNC: 15 U/L (ref 12–65)
ANION GAP SERPL CALC-SCNC: 8 MMOL/L (ref 7–16)
AST SERPL-CCNC: 18 U/L (ref 15–37)
ATRIAL RATE: 106 BPM
BILIRUB SERPL-MCNC: 0.2 MG/DL (ref 0.2–1.1)
BUN SERPL-MCNC: 12 MG/DL (ref 6–23)
CALCIUM SERPL-MCNC: 6.2 MG/DL (ref 8.3–10.4)
CALCULATED P AXIS, ECG09: 43 DEGREES
CALCULATED R AXIS, ECG10: 89 DEGREES
CALCULATED T AXIS, ECG11: 18 DEGREES
CHLORIDE SERPL-SCNC: 117 MMOL/L (ref 98–107)
CO2 SERPL-SCNC: 19 MMOL/L (ref 21–32)
CREAT SERPL-MCNC: 1.68 MG/DL (ref 0.6–1)
DIAGNOSIS, 93000: NORMAL
GLOBULIN SER CALC-MCNC: 3.4 G/DL (ref 2.3–3.5)
GLUCOSE BLD STRIP.AUTO-MCNC: 142 MG/DL (ref 65–100)
GLUCOSE BLD STRIP.AUTO-MCNC: 164 MG/DL (ref 65–100)
GLUCOSE BLD STRIP.AUTO-MCNC: 207 MG/DL (ref 65–100)
GLUCOSE BLD STRIP.AUTO-MCNC: 249 MG/DL (ref 65–100)
GLUCOSE BLD STRIP.AUTO-MCNC: 285 MG/DL (ref 65–100)
GLUCOSE BLD STRIP.AUTO-MCNC: 331 MG/DL (ref 65–100)
GLUCOSE BLD STRIP.AUTO-MCNC: 442 MG/DL (ref 65–100)
GLUCOSE BLD STRIP.AUTO-MCNC: 496 MG/DL (ref 65–100)
GLUCOSE BLD STRIP.AUTO-MCNC: 529 MG/DL (ref 65–100)
GLUCOSE BLD STRIP.AUTO-MCNC: 99 MG/DL (ref 65–100)
GLUCOSE SERPL-MCNC: 227 MG/DL (ref 65–100)
LACTATE SERPL-SCNC: 1 MMOL/L (ref 0.4–2)
MAGNESIUM SERPL-MCNC: 1.6 MG/DL (ref 1.8–2.4)
P-R INTERVAL, ECG05: 152 MS
POTASSIUM SERPL-SCNC: 3 MMOL/L (ref 3.5–5.1)
POTASSIUM SERPL-SCNC: 4.3 MMOL/L (ref 3.5–5.1)
PROT SERPL-MCNC: 5.5 G/DL (ref 6.3–8.2)
Q-T INTERVAL, ECG07: 338 MS
QRS DURATION, ECG06: 80 MS
QTC CALCULATION (BEZET), ECG08: 448 MS
SERVICE CMNT-IMP: ABNORMAL
SERVICE CMNT-IMP: NORMAL
SODIUM SERPL-SCNC: 144 MMOL/L (ref 136–145)
VENTRICULAR RATE, ECG03: 106 BPM

## 2022-04-19 PROCEDURE — 84132 ASSAY OF SERUM POTASSIUM: CPT

## 2022-04-19 PROCEDURE — 96367 TX/PROPH/DG ADDL SEQ IV INF: CPT

## 2022-04-19 PROCEDURE — 74011250636 HC RX REV CODE- 250/636: Performed by: ANESTHESIOLOGY

## 2022-04-19 PROCEDURE — 96365 THER/PROPH/DIAG IV INF INIT: CPT

## 2022-04-19 PROCEDURE — G0378 HOSPITAL OBSERVATION PER HR: HCPCS

## 2022-04-19 PROCEDURE — 74011636637 HC RX REV CODE- 636/637: Performed by: STUDENT IN AN ORGANIZED HEALTH CARE EDUCATION/TRAINING PROGRAM

## 2022-04-19 PROCEDURE — 96375 TX/PRO/DX INJ NEW DRUG ADDON: CPT

## 2022-04-19 PROCEDURE — 93005 ELECTROCARDIOGRAM TRACING: CPT | Performed by: EMERGENCY MEDICINE

## 2022-04-19 PROCEDURE — 96374 THER/PROPH/DIAG INJ IV PUSH: CPT

## 2022-04-19 PROCEDURE — 77030040361 HC SLV COMPR DVT MDII -B: Performed by: UROLOGY

## 2022-04-19 PROCEDURE — 77030039425 HC BLD LARYNG TRULITE DISP TELE -A: Performed by: ANESTHESIOLOGY

## 2022-04-19 PROCEDURE — 96372 THER/PROPH/DIAG INJ SC/IM: CPT

## 2022-04-19 PROCEDURE — 74011000636 HC RX REV CODE- 636: Performed by: EMERGENCY MEDICINE

## 2022-04-19 PROCEDURE — C1769 GUIDE WIRE: HCPCS | Performed by: UROLOGY

## 2022-04-19 PROCEDURE — 96361 HYDRATE IV INFUSION ADD-ON: CPT

## 2022-04-19 PROCEDURE — 74011250636 HC RX REV CODE- 250/636: Performed by: EMERGENCY MEDICINE

## 2022-04-19 PROCEDURE — 36415 COLL VENOUS BLD VENIPUNCTURE: CPT

## 2022-04-19 PROCEDURE — 74011250637 HC RX REV CODE- 250/637: Performed by: EMERGENCY MEDICINE

## 2022-04-19 PROCEDURE — 2709999900 HC NON-CHARGEABLE SUPPLY

## 2022-04-19 PROCEDURE — 76770 US EXAM ABDO BACK WALL COMP: CPT

## 2022-04-19 PROCEDURE — 99204 OFFICE O/P NEW MOD 45 MIN: CPT | Performed by: UROLOGY

## 2022-04-19 PROCEDURE — 82803 BLOOD GASES ANY COMBINATION: CPT

## 2022-04-19 PROCEDURE — 74011000250 HC RX REV CODE- 250: Performed by: ANESTHESIOLOGY

## 2022-04-19 PROCEDURE — 76010000154 HC OR TIME FIRST 0.5 HR: Performed by: UROLOGY

## 2022-04-19 PROCEDURE — 74011250636 HC RX REV CODE- 250/636: Performed by: HOSPITALIST

## 2022-04-19 PROCEDURE — 77030037088 HC TUBE ENDOTRACH ORAL NSL COVD-A: Performed by: ANESTHESIOLOGY

## 2022-04-19 PROCEDURE — 2709999900 HC NON-CHARGEABLE SUPPLY: Performed by: UROLOGY

## 2022-04-19 PROCEDURE — 52332 CYSTOSCOPY AND TREATMENT: CPT | Performed by: UROLOGY

## 2022-04-19 PROCEDURE — 74011000250 HC RX REV CODE- 250: Performed by: STUDENT IN AN ORGANIZED HEALTH CARE EDUCATION/TRAINING PROGRAM

## 2022-04-19 PROCEDURE — C2617 STENT, NON-COR, TEM W/O DEL: HCPCS | Performed by: UROLOGY

## 2022-04-19 PROCEDURE — 96366 THER/PROPH/DIAG IV INF ADDON: CPT

## 2022-04-19 PROCEDURE — 74011250637 HC RX REV CODE- 250/637: Performed by: ANESTHESIOLOGY

## 2022-04-19 PROCEDURE — 76060000032 HC ANESTHESIA 0.5 TO 1 HR: Performed by: UROLOGY

## 2022-04-19 PROCEDURE — 74177 CT ABD & PELVIS W/CONTRAST: CPT

## 2022-04-19 PROCEDURE — 82962 GLUCOSE BLOOD TEST: CPT

## 2022-04-19 PROCEDURE — 74011000258 HC RX REV CODE- 258: Performed by: EMERGENCY MEDICINE

## 2022-04-19 PROCEDURE — 76210000063 HC OR PH I REC FIRST 0.5 HR: Performed by: UROLOGY

## 2022-04-19 PROCEDURE — 74011636637 HC RX REV CODE- 636/637: Performed by: HOSPITALIST

## 2022-04-19 PROCEDURE — 74011250636 HC RX REV CODE- 250/636: Performed by: STUDENT IN AN ORGANIZED HEALTH CARE EDUCATION/TRAINING PROGRAM

## 2022-04-19 PROCEDURE — 96376 TX/PRO/DX INJ SAME DRUG ADON: CPT

## 2022-04-19 DEVICE — URETERAL STENT
Type: IMPLANTABLE DEVICE | Site: URETER | Status: FUNCTIONAL
Brand: PERCUFLEX™ PLUS

## 2022-04-19 RX ORDER — ONDANSETRON 2 MG/ML
4 INJECTION INTRAMUSCULAR; INTRAVENOUS
Status: DISCONTINUED | OUTPATIENT
Start: 2022-04-19 | End: 2022-04-19

## 2022-04-19 RX ORDER — ACETAMINOPHEN 650 MG/1
650 SUPPOSITORY RECTAL
Status: CANCELLED | OUTPATIENT
Start: 2022-04-19

## 2022-04-19 RX ORDER — INSULIN LISPRO 100 [IU]/ML
INJECTION, SOLUTION INTRAVENOUS; SUBCUTANEOUS
Status: DISCONTINUED | OUTPATIENT
Start: 2022-04-19 | End: 2022-04-20

## 2022-04-19 RX ORDER — DEXTROSE MONOHYDRATE 100 MG/ML
125-250 INJECTION, SOLUTION INTRAVENOUS AS NEEDED
Status: DISCONTINUED | OUTPATIENT
Start: 2022-04-19 | End: 2022-04-22 | Stop reason: HOSPADM

## 2022-04-19 RX ORDER — SODIUM CHLORIDE, SODIUM LACTATE, POTASSIUM CHLORIDE, CALCIUM CHLORIDE 600; 310; 30; 20 MG/100ML; MG/100ML; MG/100ML; MG/100ML
75 INJECTION, SOLUTION INTRAVENOUS CONTINUOUS
Status: DISCONTINUED | OUTPATIENT
Start: 2022-04-19 | End: 2022-04-22

## 2022-04-19 RX ORDER — POTASSIUM CHLORIDE 14.9 MG/ML
20 INJECTION INTRAVENOUS
Status: CANCELLED | OUTPATIENT
Start: 2022-04-19 | End: 2022-04-19

## 2022-04-19 RX ORDER — SODIUM CHLORIDE 0.9 % (FLUSH) 0.9 %
5-40 SYRINGE (ML) INJECTION EVERY 8 HOURS
Status: DISCONTINUED | OUTPATIENT
Start: 2022-04-19 | End: 2022-04-19 | Stop reason: HOSPADM

## 2022-04-19 RX ORDER — ONDANSETRON 2 MG/ML
4 INJECTION INTRAMUSCULAR; INTRAVENOUS
Status: CANCELLED | OUTPATIENT
Start: 2022-04-19

## 2022-04-19 RX ORDER — POTASSIUM CHLORIDE 14.9 MG/ML
20 INJECTION INTRAVENOUS ONCE
Status: COMPLETED | OUTPATIENT
Start: 2022-04-19 | End: 2022-04-19

## 2022-04-19 RX ORDER — ONDANSETRON 8 MG/1
4 TABLET, ORALLY DISINTEGRATING ORAL
Status: DISCONTINUED | OUTPATIENT
Start: 2022-04-19 | End: 2022-04-22 | Stop reason: HOSPADM

## 2022-04-19 RX ORDER — ACETAMINOPHEN 325 MG/1
650 TABLET ORAL
Status: CANCELLED | OUTPATIENT
Start: 2022-04-19

## 2022-04-19 RX ORDER — INSULIN LISPRO 100 [IU]/ML
0-10 INJECTION, SOLUTION INTRAVENOUS; SUBCUTANEOUS
Status: DISCONTINUED | OUTPATIENT
Start: 2022-04-19 | End: 2022-04-19

## 2022-04-19 RX ORDER — ACETAMINOPHEN 650 MG/1
650 SUPPOSITORY RECTAL
Status: DISCONTINUED | OUTPATIENT
Start: 2022-04-19 | End: 2022-04-19

## 2022-04-19 RX ORDER — INSULIN LISPRO 100 [IU]/ML
INJECTION, SOLUTION INTRAVENOUS; SUBCUTANEOUS
Status: DISCONTINUED | OUTPATIENT
Start: 2022-04-19 | End: 2022-04-19

## 2022-04-19 RX ORDER — ONDANSETRON 4 MG/1
4 TABLET, ORALLY DISINTEGRATING ORAL
Status: CANCELLED | OUTPATIENT
Start: 2022-04-19

## 2022-04-19 RX ORDER — INSULIN GLARGINE 100 [IU]/ML
30 INJECTION, SOLUTION SUBCUTANEOUS
Status: DISCONTINUED | OUTPATIENT
Start: 2022-04-19 | End: 2022-04-22 | Stop reason: HOSPADM

## 2022-04-19 RX ORDER — CEFAZOLIN SODIUM/WATER 2 G/20 ML
SYRINGE (ML) INTRAVENOUS AS NEEDED
Status: DISCONTINUED | OUTPATIENT
Start: 2022-04-19 | End: 2022-04-19 | Stop reason: HOSPADM

## 2022-04-19 RX ORDER — ACETAMINOPHEN 650 MG/1
650 SUPPOSITORY RECTAL
Status: DISCONTINUED | OUTPATIENT
Start: 2022-04-19 | End: 2022-04-22 | Stop reason: HOSPADM

## 2022-04-19 RX ORDER — ONDANSETRON 4 MG/1
4 TABLET, ORALLY DISINTEGRATING ORAL
Status: DISCONTINUED | OUTPATIENT
Start: 2022-04-19 | End: 2022-04-19

## 2022-04-19 RX ORDER — SODIUM CHLORIDE 0.9 % (FLUSH) 0.9 %
5-40 SYRINGE (ML) INJECTION AS NEEDED
Status: DISCONTINUED | OUTPATIENT
Start: 2022-04-19 | End: 2022-04-19

## 2022-04-19 RX ORDER — ACETAMINOPHEN 325 MG/1
650 TABLET ORAL
Status: DISCONTINUED | OUTPATIENT
Start: 2022-04-19 | End: 2022-04-19

## 2022-04-19 RX ORDER — MORPHINE SULFATE 4 MG/ML
4 INJECTION INTRAVENOUS
Status: DISCONTINUED | OUTPATIENT
Start: 2022-04-19 | End: 2022-04-19

## 2022-04-19 RX ORDER — MORPHINE SULFATE 4 MG/ML
4 INJECTION INTRAVENOUS
Status: CANCELLED | OUTPATIENT
Start: 2022-04-19

## 2022-04-19 RX ORDER — INSULIN LISPRO 100 [IU]/ML
INJECTION, SOLUTION INTRAVENOUS; SUBCUTANEOUS
Status: CANCELLED | OUTPATIENT
Start: 2022-04-19

## 2022-04-19 RX ORDER — HYDRALAZINE HYDROCHLORIDE 20 MG/ML
20 INJECTION INTRAMUSCULAR; INTRAVENOUS
Status: DISCONTINUED | OUTPATIENT
Start: 2022-04-19 | End: 2022-04-22 | Stop reason: HOSPADM

## 2022-04-19 RX ORDER — ONDANSETRON 2 MG/ML
4 INJECTION INTRAMUSCULAR; INTRAVENOUS
Status: COMPLETED | OUTPATIENT
Start: 2022-04-19 | End: 2022-04-19

## 2022-04-19 RX ORDER — POLYETHYLENE GLYCOL 3350 17 G/17G
17 POWDER, FOR SOLUTION ORAL DAILY PRN
Status: DISCONTINUED | OUTPATIENT
Start: 2022-04-19 | End: 2022-04-22 | Stop reason: HOSPADM

## 2022-04-19 RX ORDER — LIDOCAINE HYDROCHLORIDE 20 MG/ML
INJECTION, SOLUTION EPIDURAL; INFILTRATION; INTRACAUDAL; PERINEURAL AS NEEDED
Status: DISCONTINUED | OUTPATIENT
Start: 2022-04-19 | End: 2022-04-19 | Stop reason: HOSPADM

## 2022-04-19 RX ORDER — SODIUM CHLORIDE 0.9 % (FLUSH) 0.9 %
5-40 SYRINGE (ML) INJECTION AS NEEDED
Status: CANCELLED | OUTPATIENT
Start: 2022-04-19

## 2022-04-19 RX ORDER — SODIUM CHLORIDE 0.9 % (FLUSH) 0.9 %
5-40 SYRINGE (ML) INJECTION AS NEEDED
Status: DISCONTINUED | OUTPATIENT
Start: 2022-04-19 | End: 2022-04-22 | Stop reason: HOSPADM

## 2022-04-19 RX ORDER — POLYETHYLENE GLYCOL 3350 17 G/17G
17 POWDER, FOR SOLUTION ORAL DAILY PRN
Status: DISCONTINUED | OUTPATIENT
Start: 2022-04-19 | End: 2022-04-19

## 2022-04-19 RX ORDER — PROPOFOL 10 MG/ML
INJECTION, EMULSION INTRAVENOUS AS NEEDED
Status: DISCONTINUED | OUTPATIENT
Start: 2022-04-19 | End: 2022-04-19 | Stop reason: HOSPADM

## 2022-04-19 RX ORDER — SODIUM CHLORIDE 0.9 % (FLUSH) 0.9 %
10 SYRINGE (ML) INJECTION
Status: COMPLETED | OUTPATIENT
Start: 2022-04-19 | End: 2022-04-19

## 2022-04-19 RX ORDER — MAGNESIUM SULFATE HEPTAHYDRATE 40 MG/ML
2 INJECTION, SOLUTION INTRAVENOUS ONCE
Status: COMPLETED | OUTPATIENT
Start: 2022-04-19 | End: 2022-04-19

## 2022-04-19 RX ORDER — INSULIN GLARGINE 100 [IU]/ML
10 INJECTION, SOLUTION SUBCUTANEOUS DAILY
Status: DISCONTINUED | OUTPATIENT
Start: 2022-04-20 | End: 2022-04-19

## 2022-04-19 RX ORDER — PROMETHAZINE HYDROCHLORIDE 25 MG/ML
25 INJECTION, SOLUTION INTRAMUSCULAR; INTRAVENOUS
Status: COMPLETED | OUTPATIENT
Start: 2022-04-19 | End: 2022-04-19

## 2022-04-19 RX ORDER — ACETAMINOPHEN 325 MG/1
650 TABLET ORAL
Status: DISCONTINUED | OUTPATIENT
Start: 2022-04-19 | End: 2022-04-22 | Stop reason: HOSPADM

## 2022-04-19 RX ORDER — ONDANSETRON 2 MG/ML
4 INJECTION INTRAMUSCULAR; INTRAVENOUS
Status: DISCONTINUED | OUTPATIENT
Start: 2022-04-19 | End: 2022-04-22 | Stop reason: HOSPADM

## 2022-04-19 RX ORDER — EPHEDRINE SULFATE/0.9% NACL/PF 50 MG/5 ML
SYRINGE (ML) INTRAVENOUS AS NEEDED
Status: DISCONTINUED | OUTPATIENT
Start: 2022-04-19 | End: 2022-04-19 | Stop reason: HOSPADM

## 2022-04-19 RX ORDER — INSULIN LISPRO 100 [IU]/ML
5 INJECTION, SOLUTION INTRAVENOUS; SUBCUTANEOUS
Status: DISCONTINUED | OUTPATIENT
Start: 2022-04-19 | End: 2022-04-22 | Stop reason: HOSPADM

## 2022-04-19 RX ORDER — CEFAZOLIN SODIUM/WATER 2 G/20 ML
2 SYRINGE (ML) INTRAVENOUS
Status: DISCONTINUED | OUTPATIENT
Start: 2022-04-19 | End: 2022-04-19 | Stop reason: HOSPADM

## 2022-04-19 RX ORDER — POLYETHYLENE GLYCOL 3350 17 G/17G
17 POWDER, FOR SOLUTION ORAL DAILY PRN
Status: CANCELLED | OUTPATIENT
Start: 2022-04-19

## 2022-04-19 RX ORDER — INSULIN GLARGINE 100 [IU]/ML
3 INJECTION, SOLUTION SUBCUTANEOUS DAILY
Status: DISCONTINUED | OUTPATIENT
Start: 2022-04-19 | End: 2022-04-19

## 2022-04-19 RX ORDER — POTASSIUM CHLORIDE 14.9 MG/ML
20 INJECTION INTRAVENOUS
Status: COMPLETED | OUTPATIENT
Start: 2022-04-19 | End: 2022-04-19

## 2022-04-19 RX ORDER — MORPHINE SULFATE 4 MG/ML
4 INJECTION INTRAVENOUS ONCE
Status: COMPLETED | OUTPATIENT
Start: 2022-04-19 | End: 2022-04-19

## 2022-04-19 RX ORDER — ONDANSETRON 2 MG/ML
INJECTION INTRAMUSCULAR; INTRAVENOUS AS NEEDED
Status: DISCONTINUED | OUTPATIENT
Start: 2022-04-19 | End: 2022-04-19 | Stop reason: HOSPADM

## 2022-04-19 RX ORDER — MORPHINE SULFATE 4 MG/ML
4 INJECTION INTRAVENOUS
Status: DISCONTINUED | OUTPATIENT
Start: 2022-04-19 | End: 2022-04-22 | Stop reason: HOSPADM

## 2022-04-19 RX ORDER — MAGNESIUM SULFATE HEPTAHYDRATE 40 MG/ML
2 INJECTION, SOLUTION INTRAVENOUS
Status: COMPLETED | OUTPATIENT
Start: 2022-04-19 | End: 2022-04-19

## 2022-04-19 RX ADMIN — INSULIN LISPRO 10 UNITS: 100 INJECTION, SOLUTION INTRAVENOUS; SUBCUTANEOUS at 11:52

## 2022-04-19 RX ADMIN — INSULIN GLARGINE 30 UNITS: 100 INJECTION, SOLUTION SUBCUTANEOUS at 23:36

## 2022-04-19 RX ADMIN — SODIUM CHLORIDE 100 ML: 9 INJECTION, SOLUTION INTRAVENOUS at 03:53

## 2022-04-19 RX ADMIN — MAGNESIUM SULFATE HEPTAHYDRATE 2 G: 40 INJECTION, SOLUTION INTRAVENOUS at 01:38

## 2022-04-19 RX ADMIN — POTASSIUM BICARBONATE 40 MEQ: 391 TABLET, EFFERVESCENT ORAL at 00:32

## 2022-04-19 RX ADMIN — LIDOCAINE HYDROCHLORIDE 100 MG: 20 INJECTION, SOLUTION EPIDURAL; INFILTRATION; INTRACAUDAL; PERINEURAL at 21:52

## 2022-04-19 RX ADMIN — POTASSIUM CHLORIDE 20 MEQ: 14.9 INJECTION, SOLUTION INTRAVENOUS at 07:40

## 2022-04-19 RX ADMIN — Medication 10 MG: at 21:55

## 2022-04-19 RX ADMIN — Medication 10 ML: at 03:53

## 2022-04-19 RX ADMIN — ONDANSETRON 4 MG: 2 INJECTION INTRAMUSCULAR; INTRAVENOUS at 07:36

## 2022-04-19 RX ADMIN — MORPHINE SULFATE 4 MG: 4 INJECTION INTRAVENOUS at 04:34

## 2022-04-19 RX ADMIN — IOPAMIDOL 100 ML: 755 INJECTION, SOLUTION INTRAVENOUS at 03:52

## 2022-04-19 RX ADMIN — ONDANSETRON 4 MG: 2 INJECTION INTRAMUSCULAR; INTRAVENOUS at 01:40

## 2022-04-19 RX ADMIN — INSULIN LISPRO 6 UNITS: 100 INJECTION, SOLUTION INTRAVENOUS; SUBCUTANEOUS at 16:01

## 2022-04-19 RX ADMIN — INSULIN LISPRO 5 UNITS: 100 INJECTION, SOLUTION INTRAVENOUS; SUBCUTANEOUS at 17:16

## 2022-04-19 RX ADMIN — SODIUM CHLORIDE, POTASSIUM CHLORIDE, SODIUM LACTATE AND CALCIUM CHLORIDE 250 ML/HR: 600; 310; 30; 20 INJECTION, SOLUTION INTRAVENOUS at 15:44

## 2022-04-19 RX ADMIN — PROMETHAZINE HYDROCHLORIDE 25 MG: 25 INJECTION INTRAMUSCULAR; INTRAVENOUS at 02:32

## 2022-04-19 RX ADMIN — SODIUM CHLORIDE 1000 ML: 900 INJECTION, SOLUTION INTRAVENOUS at 00:04

## 2022-04-19 RX ADMIN — INSULIN LISPRO 10 UNITS: 100 INJECTION, SOLUTION INTRAVENOUS; SUBCUTANEOUS at 08:12

## 2022-04-19 RX ADMIN — PROCHLORPERAZINE EDISYLATE 5 MG: 5 INJECTION INTRAMUSCULAR; INTRAVENOUS at 13:09

## 2022-04-19 RX ADMIN — Medication 3 UNITS: at 09:00

## 2022-04-19 RX ADMIN — Medication 2 G: at 21:52

## 2022-04-19 RX ADMIN — SODIUM CHLORIDE 1000 ML: 900 INJECTION, SOLUTION INTRAVENOUS at 03:17

## 2022-04-19 RX ADMIN — MAGNESIUM SULFATE HEPTAHYDRATE 2 G: 40 INJECTION, SOLUTION INTRAVENOUS at 15:43

## 2022-04-19 RX ADMIN — DEXTROSE MONOHYDRATE 125 ML: 100 INJECTION, SOLUTION INTRAVENOUS at 20:19

## 2022-04-19 RX ADMIN — SODIUM CHLORIDE, POTASSIUM CHLORIDE, SODIUM LACTATE AND CALCIUM CHLORIDE 250 ML/HR: 600; 310; 30; 20 INJECTION, SOLUTION INTRAVENOUS at 19:29

## 2022-04-19 RX ADMIN — PROPOFOL 200 MG: 10 INJECTION, EMULSION INTRAVENOUS at 21:52

## 2022-04-19 RX ADMIN — SODIUM CHLORIDE 1000 ML: 900 INJECTION, SOLUTION INTRAVENOUS at 03:00

## 2022-04-19 RX ADMIN — ONDANSETRON 4 MG: 2 INJECTION INTRAMUSCULAR; INTRAVENOUS at 03:50

## 2022-04-19 RX ADMIN — POTASSIUM CHLORIDE 20 MEQ: 14.9 INJECTION, SOLUTION INTRAVENOUS at 04:11

## 2022-04-19 RX ADMIN — POTASSIUM BICARBONATE 20 MEQ: 782 TABLET, EFFERVESCENT ORAL at 15:54

## 2022-04-19 RX ADMIN — MORPHINE SULFATE 4 MG: 4 INJECTION INTRAVENOUS at 07:37

## 2022-04-19 RX ADMIN — INSULIN LISPRO 4 UNITS: 100 INJECTION, SOLUTION INTRAVENOUS; SUBCUTANEOUS at 23:37

## 2022-04-19 RX ADMIN — ONDANSETRON 4 MG: 2 INJECTION INTRAMUSCULAR; INTRAVENOUS at 22:01

## 2022-04-19 RX ADMIN — PHENYLEPHRINE HYDROCHLORIDE 100 MCG: 10 INJECTION INTRAVENOUS at 21:58

## 2022-04-19 RX ADMIN — CEFTRIAXONE 1 G: 1 INJECTION, POWDER, FOR SOLUTION INTRAMUSCULAR; INTRAVENOUS at 00:27

## 2022-04-19 RX ADMIN — POTASSIUM CHLORIDE 20 MEQ: 14.9 INJECTION, SOLUTION INTRAVENOUS at 15:54

## 2022-04-19 NOTE — PROGRESS NOTES
[address] attempted to visit PT, but PT was sleeping. [address] prayed silently for PT in [facility] as chart states PT is [address]. Farzad Barbour M.Div.

## 2022-04-19 NOTE — PROGRESS NOTES
Chart screened by  for discharge planning. No needs identified at this time. Please consult  if any new issues arise. Care Management Interventions  Mode of Transport at Discharge:  Other (see comment)  Transition of Care Consult (CM Consult): Discharge Planning  Discharge Durable Medical Equipment: No  Physical Therapy Consult: No  Occupational Therapy Consult: No  Speech Therapy Consult: No  Support Systems: Parent(s)  Confirm Follow Up Transport: Family  The Patient and/or Patient Representative was Provided with a Choice of Provider and Agrees with the Discharge Plan?: No  Freedom of Choice List was Provided with Basic Dialogue that Supports the Patient's Individualized Plan of Care/Goals, Treatment Preferences and Shares the Quality Data Associated with the Providers?: No   Resource Information Provided?: No  Discharge Location  Patient Expects to be Discharged to[de-identified] Home

## 2022-04-19 NOTE — ED NOTES
Pt has been voiding large amounts of clear yellow urine into bedpan. Pt chose to use bedpan as getting up to restroom was making her more nauseous.

## 2022-04-19 NOTE — ED NOTES
Accompanied pt to CT. Took Zofran along per MD order. CT tech attempted to transfer pt, pt began vomiting. Zofran was given. Pt was able to continue with CT. Pt vomited again once back into her room in the ED.

## 2022-04-19 NOTE — PROGRESS NOTES
Pt was admitted to room 602 at 07:15. BP elevated and pt is tachy to low 100s at time of admission. Blood glucose >500 at time of admission, MD notified. Pt has n/v at time of admit. PRN morphine and PRN Zofran given. Pt declined admission questions at this time, requested to be completed later.

## 2022-04-19 NOTE — PROGRESS NOTES
Pt conrad via transport for scheduled surgery. Informed monitor room, pt will be off the monitor while conrad.

## 2022-04-19 NOTE — ED PROVIDER NOTES
23-year-old female with history of CKD, hypertension, gastroenteritis, diabetes mellitus, CVA who presents with complaint of worsening right lower quadrant pain over the past several days with subjective fever and chills. Patient states she was diagnosed with UTI at the end of March. States she completed antibiotics. Denies diarrhea, constipation, chest pain, shortness breath, cough. Denies pelvic pain, vaginal pain, vaginal discharge. Rates pain is moderate. Reports persistent nausea, vomiting. Denies alcohol use. The history is provided by the patient. No  was used. Nausea   This is a new problem. The current episode started more than 2 days ago. The problem occurs 2 to 4 times per day. The problem has not changed since onset. The emesis has an appearance of stomach contents. Patient reports a subjective fever - was not measured. Associated symptoms include chills, a fever and abdominal pain. Pertinent negatives include no sweats, no diarrhea, no headaches, no arthralgias, no myalgias, no cough, no URI and no headaches.         Past Medical History:   Diagnosis Date    Acute renal failure ([address] 1/24/2012    CKD (chronic kidney disease) stage 3, GFR 30-59 ml/min ([facility])     CKD (chronic kidney disease), stage IV ([address] 3/13/2017    Gastroenteritis, acute 1/24/2012    IDDM (insulin dependent diabetes mellitus) 1/24/2012    Intractable nausea and vomiting 12/25/2014    Irregular menses     Nausea & vomiting 1/24/2012    Neuropathy, peripheral, idiopathic 3/13/2017    Retinopathy, bilateral 3/13/2017    Trichomoniasis 3/13/2017    Ulcer, skin, chronic ([address] 3/13/2017    Vaginal burning        Past Surgical History:   Procedure Laterality Date    HX AMPUTATION  1/27/12    gangrene    HX AMPUTATION Left     5th Toe due to Diabetic Ulcer    HX OPEN REDUCTION INTERNAL FIXATION Right 2011    ankle         Family History:   Problem Relation Age of Onset    Diabetes Father DM Type II    Prostate Cancer Father     Stroke Father     Breast Cancer Mother 76    Diabetes Maternal Grandmother         DM Type II    Hypertension Maternal Grandmother     Diabetes Paternal Grandfather         DM Type II     Diabetes Paternal Grandmother     Colon Cancer Neg Hx     Ovarian Cancer Neg Hx     Uterine Cancer Neg Hx        Social History     Socioeconomic History    Marital status: SINGLE     Spouse name: Not on file    Number of children: Not on file    Years of education: Not on file    Highest education level: Not on file   Occupational History    Not on file   Tobacco Use    Smoking status: Former Smoker     Quit date: 2013     Years since quittin.4    Smokeless tobacco: Never Used   Substance and Sexual Activity    Alcohol use: Yes     Comment: socially; occasional alcohol use    Drug use: No    Sexual activity: Yes     Partners: Male     Birth control/protection: None   Other Topics Concern     Service Not Asked    Blood Transfusions Not Asked    Caffeine Concern Yes    Occupational Exposure Not Asked    Hobby Hazards Not Asked    Sleep Concern Not Asked    Stress Concern Not Asked    Weight Concern Not Asked    Special Diet Not Asked    Back Care Not Asked    Exercise No    Bike Helmet Not Asked    Seat Belt Yes    Self-Exams No   Social History Narrative    Abuse: Feels safe at home, no history of physical abuse, no history of sexual abuse     Social Determinants of Health     Financial Resource Strain:     Difficulty of Paying Living Expenses: Not on file   Food Insecurity:     Worried About Running Out of Food in the Last Year: Not on file    Jennifer of Food in the Last Year: Not on file   Transportation Needs:     Lack of Transportation (Medical): Not on file    Lack of Transportation (Non-Medical):  Not on file   Physical Activity:     Days of Exercise per Week: Not on file    Minutes of Exercise per Session: Not on file   Stress:  Feeling of Stress : Not on file   Social Connections:     Frequency of Communication with Friends and Family: Not on file    Frequency of Social Gatherings with Friends and Family: Not on file    Attends Christianity Services: Not on file    Active Member of Clubs or Organizations: Not on file    Attends Club or Organization Meetings: Not on file    Marital Status: Not on file   Intimate Partner Violence:     Fear of Current or Ex-Partner: Not on file    Emotionally Abused: Not on file    Physically Abused: Not on file    Sexually Abused: Not on file   Housing Stability:     Unable to Pay for Housing in the Last Year: Not on file    Number of [place] in the Last Year: Not on file    Unstable Housing in the Last Year: Not on file         ALLERGIES: Patient has no known allergies. Review of Systems   Constitutional: Positive for chills, fatigue and fever. HENT: Negative for congestion, rhinorrhea, sore throat, trouble swallowing and voice change. Respiratory: Negative for cough and shortness of breath. Cardiovascular: Negative for chest pain. Gastrointestinal: Positive for abdominal pain, nausea and vomiting. Negative for blood in stool, constipation and diarrhea. Genitourinary: Positive for dysuria. Negative for flank pain, hematuria, pelvic pain, vaginal bleeding, vaginal discharge and vaginal pain. Musculoskeletal: Negative for arthralgias and myalgias. Skin: Negative for rash and wound. Neurological: Negative for dizziness, syncope, weakness, light-headedness and headaches. Hematological: Does not bruise/bleed easily. Vitals:    04/18/22 2234 04/18/22 2248   BP: (!) 161/93    Pulse: (!) 105    Resp: 18    Temp:  99.7 °F (37.6 °C)   SpO2: 98%    Weight: 76.7 kg (169 lb)    Height: 5' 8\" (1.727 m)             Physical Exam  Vitals and nursing note reviewed. Constitutional:       Appearance: She is ill-appearing. HENT:      Head: Normocephalic.       Nose: Nose normal.      Mouth/Throat:      Mouth: Mucous membranes are moist.   Eyes:      Extraocular Movements: Extraocular movements intact. Pupils: Pupils are equal, round, and reactive to light. Neck:      Comments: No nuchal rigidity. Cardiovascular:      Rate and Rhythm: Normal rate. Pulses: Normal pulses. Heart sounds: Normal heart sounds. Pulmonary:      Effort: Pulmonary effort is normal.      Breath sounds: Normal breath sounds. Comments: CTAB. Abdominal:      General: Bowel sounds are normal.      Palpations: Abdomen is soft. Tenderness: There is abdominal tenderness. There is no right CVA tenderness, left CVA tenderness, guarding or rebound. Comments: Right lower quadrant tenderness palpation. No rebound or guarding. No CVA tenderness. Musculoskeletal:         General: No tenderness or deformity. Normal range of motion. Cervical back: Normal range of motion. Skin:     Findings: No erythema or rash. Neurological:      General: No focal deficit present. Mental Status: She is alert and oriented to person, place, and time. Cranial Nerves: No cranial nerve deficit. Sensory: No sensory deficit. Motor: No weakness. Comments: No meningismus. MDM  Number of Diagnoses or Management Options  Acute UTI: new and requires workup  Hypokalemia: new and requires workup  Hypomagnesemia: new and requires workup  Intractable nausea and vomiting: new and requires workup  Right ureteral stone  Ureteritis  Diagnosis management comments: Vital signs stable. Patient slightly hypertensive. No leukocytosis. UA with greater than 100 WBCs, 1+ bacteria with urine culture added on. Rocephin 1 g IV given. CMP with glucose of 227. CO2 19. No elevated anion gap. Creatinine 1.69. Most recent creatinine for comparison 2.03 in June 2020. VBG w/ pH 7.38. Potassium 3.0. Mag 1.6. Repleted with oral potassium the patient subsequently vomited.   Magnesium 2 g IV given. IV potassium supplementation ordered. Patient with persistent vomiting throughout ED course. Patient given numerous doses of Zofran, Phenergan. Patient was taken to CT and reportedly vomited and was brought back. Patient with prolonged ED course before CT scan was obtained. Called CT tech numerous times for patient to go back for imaging. Also informed nurses of concern and need for CT.  ================================================  Official read of CT abdomen pelvis pending at this time. Hospitalist to be consulted for admission. CT with 3 mm obstructing calculus in the proximal right ureter causing hydronephrosis with enhancement suggesting ureteritis. Discussed case with urology Dr. Elza Berumen request the hospitalist transfer and admit downtown.        Amount and/or Complexity of Data Reviewed  Clinical lab tests: ordered and reviewed  Tests in the radiology section of CPT®: ordered and reviewed  Tests in the medicine section of CPT®: ordered and reviewed  Review and summarize past medical records: yes  Discuss the patient with other providers: yes  Independent visualization of images, tracings, or specimens: yes    Risk of Complications, Morbidity, and/or Mortality  Presenting problems: high  Diagnostic procedures: moderate  Management options: high  General comments: Results Include:    Recent Results (from the past 24 hour(s))  -HCG URINE, QL. - POC:   Collection Time: 04/18/22 10:58 PM       Result                      Value             Ref Range           Pregnancy test,urine (*     Negative          NEG            -CBC WITH AUTOMATED DIFF:   Collection Time: 04/18/22 11:13 PM       Result                      Value             Ref Range           WBC                         9.2               4.3 - 11.1 K*       RBC                         4.49              4.05 - 5.2 M*       HGB                         12.4              11.7 - 15.4 *       HCT                         39.4 35.8 - 46.3 %       MCV                         87.8              79.6 - 97.8 *       MCH                         27.6              26.1 - 32.9 *       MCHC                        31.5              31.4 - 35.0 *       RDW                         15.4 (H)          11.9 - 14.6 %       PLATELET                    340               150 - 450 K/*       MPV                         9.4               9.4 - 12.3 FL       ABSOLUTE NRBC               0.00              0.0 - 0.2 K/*       DF                          AUTOMATED                             NEUTROPHILS                 65                43 - 78 %           LYMPHOCYTES                 20                13 - 44 %           MONOCYTES                   8                 4.0 - 12.0 %        EOSINOPHILS                 7                 0.5 - 7.8 %         BASOPHILS                   0                 0.0 - 2.0 %         IMMATURE GRANULOCYTES       0                 0.0 - 5.0 %         ABS. NEUTROPHILS            5.9               1.7 - 8.2 K/*       ABS. LYMPHOCYTES            1.8               0.5 - 4.6 K/*       ABS. MONOCYTES              0.7               0.1 - 1.3 K/*       ABS. EOSINOPHILS            0.7               0.0 - 0.8 K/*       ABS. BASOPHILS              0.0               0.0 - 0.2 K/*       ABS. IMM.  GRANS.            0.0               0.0 - 0.5 K/*  -LIPASE:   Collection Time: 04/18/22 11:13 PM       Result                      Value             Ref Range           Lipase                      13 (L)            73 - 393 U/L   -URINE MICROSCOPIC:   Collection Time: 04/18/22 11:13 PM       Result                      Value             Ref Range           WBC                         >100 (H)          0 /hpf              RBC                         3-5               0 /hpf              Epithelial cells            10-20             0 /hpf              Bacteria                    1+ (H)            0 /hpf              Casts                       0 0 /lpf         -LACTIC ACID:   Collection Time: 04/18/22 11:13 PM       Result                      Value             Ref Range           Lactic acid                 1.0               0.4 - 2.0 MM*  -METABOLIC PANEL, COMPREHENSIVE:   Collection Time: 04/18/22 11:53 PM       Result                      Value             Ref Range           Sodium                      144               136 - 145 mm*       Potassium                   3.0 (L)           3.5 - 5.1 mm*       Chloride                    117 (H)           98 - 107 mmo*       CO2                         19 (L)            21 - 32 mmol*       Anion gap                   8                 7 - 16 mmol/L       Glucose                     227 (H)           65 - 100 mg/*       BUN                         12                6 - 23 MG/DL        Creatinine                  1.68 (H)          0.6 - 1.0 MG*       GFR est AA                  42 (L)            >60 ml/min/1*       GFR est non-AA              35 (L)            >60 ml/min/1*       Calcium                     6.2 (L)           8.3 - 10.4 M*       Bilirubin, total            0.2               0.2 - 1.1 MG*       ALT (SGPT)                  15                12 - 65 U/L         AST (SGOT)                  18                15 - 37 U/L         Alk.  phosphatase            139 (H)           50 - 136 U/L        Protein, total              5.5 (L)           6.3 - 8.2 g/*       Albumin                     2.1 (L)           3.5 - 5.0 g/*       Globulin                    3.4               2.3 - 3.5 g/*       A-G Ratio                   0.6 (L)           1.2 - 3.5      -MAGNESIUM:   Collection Time: 04/18/22 11:53 PM       Result                      Value             Ref Range           Magnesium                   1.6 (L)           1.8 - 2.4 mg*      Patient Progress  Patient progress: stable    ED Course as of 04/19/22 0434   Mon Apr 18, 2022   2355 Bacteria(!): 1+ [DF]   2355 WBC(!): >100 [DF]   Tue Apr 19, 2022 0159 CO2(!): 19 [DF]   0308 VBG w/ pH 7.38. [DF]   0424 CT abdomen/pelvis IMPRESSION  There is an approximately 3 mm obstructing calcification in the proximal right  ureter. This is causing mild right hydronephrosis.      There is enhancement of the right ureteral wall suggesting ureteritis.     Both kidneys contain small cystic appearing structures.     A grossly normal-appearing appendix is seen in the right pelvis. [DF]      ED Course User Index  [DF] Eleonora Cruz MD       EKG    Date/Time: 4/19/2022 4:36 AM  Performed by: Eleonora Cruz MD  Authorized by: Eleonora Cruz MD     ECG reviewed by ED Physician in the absence of a cardiologist: yes    Rate:     ECG rate:  106    ECG rate assessment: tachycardic    Rhythm:     Rhythm: sinus tachycardia    Ectopy:     Ectopy: PVCs    QRS:     QRS intervals:  Normal  Conduction:     Conduction: normal    ST segments:     ST segments:  Normal  T waves:     T waves: normal                       Paco Marinelli MD; 4/19/2022 @12:36 AM Voice dictation software was used during the making of this note. This software is not perfect and grammatical and other typographical errors may be present.   This note has not been proofread for errors.  ===================================================================

## 2022-04-19 NOTE — PROGRESS NOTES
Paged MD Jenniffer Rout at 15:50 to verify K+ orders. 20 mEq IV K+ given this morning. Additional 20 mEq IV K+ and PO K+ ordered for pre-procedure. This RN clarified orders w MD and requested telemetry.

## 2022-04-19 NOTE — CONSULTS
Urology Consult    Requesting MD:  Rutha Kayser     Patient: Aislinn Guevara MRN: [id number]  SSN: [id number]    YOB: 1975  Age: 48 y.o. Sex: female      Subjective:      Aislinn Guevara is a 48 y.o. female with history of CKD, hypertension, DM1 presented to ER with c/o worsening right lower quadrant abd pain for past several days with fever,chills. Patient reports some nausea going on for past several days with few episodes of vomiting. Patient reportedly was treated for UTI in March. Patient completed antibiotics. Evaluated in ER, UA with >100 WBC, 3-5 RBC, +1 bacteria. Cr 1.68 (baseline 1.8-2.0). WBC 9.2. Afebrile while in hospital.  . BP elevated. CT AP shows 3 mm proximal right ureteral stone with hydroureter/hydronephrosis. Pt was admitted by hospitalist and transferred downtown. We are consulted. Pt currently in bed, she is NPO, she continues to have intermittent vomiting. She is not feeling well. Reports ongoing abd pain. She is voiding clear yellow urine.     Past Medical History:   Diagnosis Date    Acute renal failure ([address] 1/24/2012    CKD (chronic kidney disease) stage 3, GFR 30-59 ml/min ([facility]ly [facility])     CKD (chronic kidney disease), stage IV ([address] 3/13/2017    Gastroenteritis, acute 1/24/2012    IDDM (insulin dependent diabetes mellitus) 1/24/2012    Intractable nausea and vomiting 12/25/2014    Irregular menses     Nausea & vomiting 1/24/2012    Neuropathy, peripheral, idiopathic 3/13/2017    Retinopathy, bilateral 3/13/2017    Trichomoniasis 3/13/2017    Ulcer, skin, chronic ([address] 3/13/2017    Vaginal burning      Past Surgical History:   Procedure Laterality Date    HX AMPUTATION  1/27/12    gangrene    HX AMPUTATION Left     5th Toe due to Diabetic Ulcer    HX OPEN REDUCTION INTERNAL FIXATION Right 2011    ankle      Family History   Problem Relation Age of Onset    Diabetes Father         DM Type II    Prostate Cancer Father     Stroke Father     Breast Cancer Mother 76    Diabetes Maternal Grandmother         DM Type II    Hypertension Maternal Grandmother     Diabetes Paternal Grandfather         DM Type II     Diabetes Paternal Grandmother     Colon Cancer Neg Hx     Ovarian Cancer Neg Hx     Uterine Cancer Neg Hx      Social History     Tobacco Use    Smoking status: Former Smoker     Quit date: 2013     Years since quittin.4    Smokeless tobacco: Never Used   Substance Use Topics    Alcohol use: Yes     Comment: socially; occasional alcohol use      Prior to Admission medications    Medication Sig Start Date End Date Taking? Authorizing Provider   ondansetron (ZOFRAN ODT) 4 mg disintegrating tablet Take 1 Tablet by mouth every eight (8) hours as needed for Nausea or Vomiting. 3/21/22   Annemarie Ferrera PA   HumaLOG KwikPen Insulin 100 unit/mL kwikpen 6 TIDAC correction scale >150, max daily dose 50 units 21   Cayden Bass PA-C   Tresiba FlexTouch U-200 200 unit/mL (3 mL) inpn pen 30 Units by SubCUTAneous route daily. 21   Nikos Bell PA-C   Dexcom G6 Sensor wolf Use to monitor blood glucose. E10.65 21   Nikos Bell PA-C   Dexcom G6 Transmitter wolf Use to monitor blood glucose. E10.65 21   Nikos Bell PA-C   losartan (COZAAR) 50 mg tablet  21   Provider, Historical   brimonidine (Alphagan P) 0.1 % ophthalmic solution 1 drop in left eye twice a day 21   Provider, Historical   gabapentin (NEURONTIN) 300 mg capsule Take 300 mg by mouth daily. 21   Provider, Historical   aspirin (ASPIRIN) 325 mg tablet  6/10/21   Provider, Historical   atorvastatin (LIPITOR) 80 mg tablet Take 1 Tablet by mouth daily. 21   Nikos Bell PA-C   Oriana Pen Needle 32 gauge x \" ndle Use with insulin injections 4 times per day.   Dx:  E10.8,  E10.65 21   Nikos Bell PA-C   ergocalciferol (ERGOCALCIFEROL) 1,250 mcg (50,000 unit) capsule Take 1 Cap by mouth two (2) times a week on Wednesday and Saturday. 4/7/21   Rhunette Wallacek JERSEY HOUGH   Blood-Glucose Meter misc Check blood sugar 4 times daily. E10.65 7/21/20   Rhunette Mink JERSEY HOUGH   glucose blood VI test strips (ASCENSIA AUTODISC VI, ONE TOUCH ULTRA TEST VI) strip Check blood glucose 4 times daily. E10.65 7/21/20   Rhunette Mink JERSEY HOUGH   lancets 31 gauge misc Check blood glucose 4 times daily. E10.65 7/21/20   Glorikatie FootJERSEY kahn        No Known Allergies    Review of Systems:  A comprehensive review of systems was negative except for that written in the History of Present Illness. Objective:     Vitals:    04/19/22 0714   BP: (!) 165/76   Pulse: (!) 105   Resp: 20   Temp: 98.7 °F (37.1 °C)   SpO2: 91%        Physical Exam:  GENERAL: alert, non interactive, currently uncomfortable in pain and with nausea  EYE: PERRLA  THROAT & NECK:neck supple  LUNG: clear to auscultation bilaterally  HEART: regular rate and rhythm, S1, S2   ABDOMEN: soft, non-tender  EXTREMITIES:  extremities normal, atraumatic, no cyanosis or edema  SKIN: no rash or abnormalities  NEUROLOGIC: AOx3    Assessment:     Hospital Problems  Date Reviewed: 11/16/2021            Codes Class Noted POA    Ureteric stone ICD-10-CM: N20.1  ICD-9-CM: 592.1  4/19/2022 Unknown        UTI (urinary tract infection) ICD-10-CM: N39.0  ICD-9-CM: 599.0  4/19/2022 Unknown        Hydronephrosis of right kidney ICD-10-CM: N13.30  ICD-9-CM: 469  4/19/2022 Unknown                Plan:     Due to concern for fever at home, previous UTI in March, current UTI and ongoing N/V and pain from 3 mm ureteral stone, we recommend cystoscopy, R ureteral stent placement and possible R ureteroscopy and R laser lithotripsy (stone will only be treated today if urine DOES NOT appear infected). This will be determined intraoperatively. Pt understands she may only have stent placed and that this is temporary and stone would be treated at later date.   Pt opts to move forward with procedure. This is scheduled for later this evening. Continue NPO. Continue IV pain/nausea meds. Continue IV abx, follow up cultures. Verify consent has been obtained for cystoscopy, RIGHT ureteral stent placement and possible R ureteroscopy and R laser lithotripsy. Signed By: Cristi Norman NP     April 19, 2022      Urology Attending Physician Note:     Admit Date: 4/19/2022    Subjective:     Patient reports R flank pain today . Has not passed stone. Does report subjective fevers/chills. U/A with + 1 bacteria. Blood glucose also very elevated. No history of stones. Objective:     Patient Vitals for the past 8 hrs:   BP Temp Pulse Resp SpO2   04/19/22 1553 (!) 114/56 98.5 °F (36.9 °C) 96 20 93 %   04/19/22 1056 (!) 151/80 98.7 °F (37.1 °C) 100 24 97 %     04/19 0701 - 04/19 1900  In: 0   Out: 1500 [Urine:1400]  No intake/output data recorded.     Physical Exam:   Visit Vitals  BP (!) 114/56 (BP 1 Location: Right upper arm, BP Patient Position: At rest;Lying left side)   Pulse 96   Temp 98.5 °F (36.9 °C)   Resp 20   SpO2 93%        GENERAL: No acute distress, Awake, Alert, Oriented X 3  CARDIAC: regular rate and rhythm  CHEST AND LUNG: Easy work of breathing  ABDOMEN: soft, RUQ / RLQ and R flank tender, non-distended,          Data Review   Recent Results (from the past 24 hour(s))   HCG URINE, QL. - POC    Collection Time: 04/18/22 10:58 PM   Result Value Ref Range    Pregnancy test,urine (POC) Negative NEG     CBC WITH AUTOMATED DIFF    Collection Time: 04/18/22 11:13 PM   Result Value Ref Range    WBC 9.2 4.3 - 11.1 K/uL    RBC 4.49 4.05 - 5.2 M/uL    HGB 12.4 11.7 - 15.4 g/dL    HCT 39.4 35.8 - 46.3 %    MCV 87.8 79.6 - 97.8 FL    MCH 27.6 26.1 - 32.9 PG    MCHC 31.5 31.4 - 35.0 g/dL    RDW 15.4 (H) 11.9 - 14.6 %    PLATELET 512 426 - 111 K/uL    MPV 9.4 9.4 - 12.3 FL    ABSOLUTE NRBC 0.00 0.0 - 0.2 K/uL    DF AUTOMATED      NEUTROPHILS 65 43 - 78 %    LYMPHOCYTES 20 13 - 44 %    MONOCYTES 8 4.0 - 12.0 %    EOSINOPHILS 7 0.5 - 7.8 %    BASOPHILS 0 0.0 - 2.0 %    IMMATURE GRANULOCYTES 0 0.0 - 5.0 %    ABS. NEUTROPHILS 5.9 1.7 - 8.2 K/UL    ABS. LYMPHOCYTES 1.8 0.5 - 4.6 K/UL    ABS. MONOCYTES 0.7 0.1 - 1.3 K/UL    ABS. EOSINOPHILS 0.7 0.0 - 0.8 K/UL    ABS. BASOPHILS 0.0 0.0 - 0.2 K/UL    ABS. IMM. GRANS. 0.0 0.0 - 0.5 K/UL   LIPASE    Collection Time: 04/18/22 11:13 PM   Result Value Ref Range    Lipase 13 (L) 73 - 393 U/L   URINE MICROSCOPIC    Collection Time: 04/18/22 11:13 PM   Result Value Ref Range    WBC >100 (H) 0 /hpf    RBC 3-5 0 /hpf    Epithelial cells 10-20 0 /hpf    Bacteria 1+ (H) 0 /hpf    Casts 0 0 /lpf   LACTIC ACID    Collection Time: 04/18/22 11:13 PM   Result Value Ref Range    Lactic acid 1.0 0.4 - 2.0 MMOL/L   CULTURE, URINE    Collection Time: 04/18/22 11:17 PM    Specimen: Clean catch; Urine   Result Value Ref Range    Special Requests: NO SPECIAL REQUESTS      Culture result:        NO GROWTH AFTER SHORT PERIOD OF INCUBATION. FURTHER RESULTS TO FOLLOW AFTER OVERNIGHT INCUBATION. METABOLIC PANEL, COMPREHENSIVE    Collection Time: 04/18/22 11:53 PM   Result Value Ref Range    Sodium 144 136 - 145 mmol/L    Potassium 3.0 (L) 3.5 - 5.1 mmol/L    Chloride 117 (H) 98 - 107 mmol/L    CO2 19 (L) 21 - 32 mmol/L    Anion gap 8 7 - 16 mmol/L    Glucose 227 (H) 65 - 100 mg/dL    BUN 12 6 - 23 MG/DL    Creatinine 1.68 (H) 0.6 - 1.0 MG/DL    GFR est AA 42 (L) >60 ml/min/1.73m2    GFR est non-AA 35 (L) >60 ml/min/1.73m2    Calcium 6.2 (L) 8.3 - 10.4 MG/DL    Bilirubin, total 0.2 0.2 - 1.1 MG/DL    ALT (SGPT) 15 12 - 65 U/L    AST (SGOT) 18 15 - 37 U/L    Alk.  phosphatase 139 (H) 50 - 136 U/L    Protein, total 5.5 (L) 6.3 - 8.2 g/dL    Albumin 2.1 (L) 3.5 - 5.0 g/dL    Globulin 3.4 2.3 - 3.5 g/dL    A-G Ratio 0.6 (L) 1.2 - 3.5     MAGNESIUM    Collection Time: 04/18/22 11:53 PM   Result Value Ref Range    Magnesium 1.6 (L) 1.8 - 2.4 mg/dL   EKG, 12 LEAD, INITIAL    Collection Time: 04/19/22 4:30 AM   Result Value Ref Range    Ventricular Rate 106 BPM    Atrial Rate 106 BPM    P-R Interval 152 ms    QRS Duration 80 ms    Q-T Interval 338 ms    QTC Calculation (Bezet) 448 ms    Calculated P Axis 43 degrees    Calculated R Axis 89 degrees    Calculated T Axis 18 degrees    Diagnosis       !! AGE AND GENDER SPECIFIC ECG ANALYSIS !!   Sinus tachycardia with Premature supraventricular complexes  Possible Left atrial enlargement  RSR' or QR pattern in V1 suggests right ventricular conduction delay  Abnormal ECG  When compared with ECG of 20-MAY-2020 19:46,  Premature supraventricular complexes are now Present  RSR' pattern in V1 is now Present  Anteroseptal infarct is now Present  Confirmed by ST MIR MORROW MD (), ADORE GRAJEDA ([id number]) on 4/19/2022 9:07:12 AM     GLUCOSE, POC    Collection Time: 04/19/22  8:11 AM   Result Value Ref Range    Glucose (POC) 529 (HH) 65 - 100 mg/dL    Performed by LewisMalloryBSN    GLUCOSE, POC    Collection Time: 04/19/22 10:38 AM   Result Value Ref Range    Glucose (POC) 496 (HH) 65 - 100 mg/dL    Performed by LewisMalloryBSN    GLUCOSE, POC    Collection Time: 04/19/22 11:49 AM   Result Value Ref Range    Glucose (POC) 442 (H) 65 - 100 mg/dL    Performed by LewisMalloryBSN    GLUCOSE, POC    Collection Time: 04/19/22  2:33 PM   Result Value Ref Range    Glucose (POC) 331 (H) 65 - 100 mg/dL    Performed by LewisMalloryBSN    GLUCOSE, POC    Collection Time: 04/19/22  3:43 PM   Result Value Ref Range    Glucose (POC) 285 (H) 65 - 100 mg/dL    Performed by LewisMalloryBSN    GLUCOSE, POC    Collection Time: 04/19/22  4:57 PM   Result Value Ref Range    Glucose (POC) 207 (H) 65 - 100 mg/dL    Performed by LewisMalloryBSN            Assessment:     Active Problems:    Ureteric stone (4/19/2022)      UTI (urinary tract infection) (4/19/2022)      Hydronephrosis of right kidney (4/19/2022)      48year old female with poorly controlled DM type 2, 3 mm R proximal ureteral stone and R flank pain admitted for treatment. Plan:     -To OR today for cystoscopy, right ureteral stent placement, possible right ureteroscopy, laser lithotripsy. Discussed likely need for interval treatment of stone given UTi and poorly controlled DM after she is more medically optimized with R ureteral stent.   -Consented  -Antibiotic on call to OR  -NPO for procedure. In addition to my documentation above, I have also reviewed the nurse practitioner note and personally examined the patient. I agree with the HPI, exam, assessment and plan. Marco A Mcdermott M.D.     [facility]  [address]  Phone: [phone]  Fax: [phone]

## 2022-04-19 NOTE — PROGRESS NOTES
TRANSFER - IN REPORT:    Verbal report received from Emily Horta RN on [employer]  being received as transfer from OSH routine progression of care      Report consisted of patients Situation, Background, Assessment and   Recommendations(SBAR). Information from the following report(s) SBAR was reviewed with the receiving nurse. Opportunity for questions and clarification was provided. Assessment completed upon patients arrival to unit and care assumed.

## 2022-04-19 NOTE — PROGRESS NOTES
Eliceo Ba at 17:08 to clarify insulin orders. Pt was given 6 units sliding scale at 16:01 for blood glucose of 282. POCT BS at 17:00 is 208. Clarifying if MD would like to administer 5 units standing dose of Humalog while pt is NPO. MD clarified to continue, and to administer the 5 units standing humalog even w pt NPO.

## 2022-04-19 NOTE — PROGRESS NOTES
Pt is A&Ox4. All VSS on RA. Pt c/o 7/10 pain, medicated w PRN IV Morphine w adequate pain control. Pt cont to have nausea and vomiting, medicated w PRN Zofran. Sup IV K+ administered this morning. Pt had a retroperitoneal ultrasound completed this morning. Pt remains NPO for potential cysto. Q1h safety checks cont, safety precautions in place, will cont to monitor. Problem: Pain  Goal: *Control of Pain  Outcome: Progressing Towards Goal     Problem: Patient Education: Go to Patient Education Activity  Goal: Patient/Family Education  Outcome: Progressing Towards Goal     Problem: Diabetes Self-Management  Goal: *Disease process and treatment process  Description: Define diabetes and identify own type of diabetes; list 3 options for treating diabetes. Outcome: Progressing Towards Goal  Goal: *Incorporating nutritional management into lifestyle  Description: Describe effect of type, amount and timing of food on blood glucose; list 3 methods for planning meals. Outcome: Progressing Towards Goal  Goal: *Incorporating physical activity into lifestyle  Description: State effect of exercise on blood glucose levels. Outcome: Progressing Towards Goal  Goal: *Developing strategies to promote health/change behavior  Description: Define the ABC's of diabetes; identify appropriate screenings, schedule and personal plan for screenings. Outcome: Progressing Towards Goal  Goal: *Using medications safely  Description: State effect of diabetes medications on diabetes; name diabetes medication taking, action and side effects. Outcome: Progressing Towards Goal  Goal: *Monitoring blood glucose, interpreting and using results  Description: Identify recommended blood glucose targets  and personal targets.   Outcome: Progressing Towards Goal  Goal: *Prevention, detection, treatment of acute complications  Description: List symptoms of hyper- and hypoglycemia; describe how to treat low blood sugar and actions for lowering  high blood glucose level. Outcome: Progressing Towards Goal  Goal: *Prevention, detection and treatment of chronic complications  Description: Define the natural course of diabetes and describe the relationship of blood glucose levels to long term complications of diabetes.   Outcome: Progressing Towards Goal  Goal: *Developing strategies to address psychosocial issues  Description: Describe feelings about living with diabetes; identify support needed and support network  Outcome: Progressing Towards Goal  Goal: *Insulin pump training  Outcome: Progressing Towards Goal  Goal: *Sick day guidelines  Outcome: Progressing Towards Goal  Goal: *Patient Specific Goal (EDIT GOAL, INSERT TEXT)  Outcome: Progressing Towards Goal

## 2022-04-19 NOTE — ED NOTES
TRANSFER - OUT REPORT:    Verbal report given to Alejandro Kong RN on [employer]  being transferred to [facility] for routine progression of care       Report consisted of patients Situation, Background, Assessment and   Recommendations(SBAR). Information from the following report(s) ED Summary was reviewed with the receiving nurse. Lines:   Peripheral IV 04/18/22 Right Hand (Active)   Site Assessment Clean, dry, & intact 04/18/22 2325   Phlebitis Assessment 0 04/18/22 2325   Infiltration Assessment 0 04/18/22 2325   Dressing Status Clean, dry, & intact 04/18/22 2325   Dressing Type Tape;Transparent 04/18/22 2325   Hub Color/Line Status Pink;Flushed;Patent 04/18/22 2325   Action Taken Blood drawn 04/18/22 2325       Peripheral IV 04/19/22 Left Hand (Active)   Site Assessment Clean, dry, & intact 04/19/22 0247   Phlebitis Assessment 0 04/19/22 0247   Infiltration Assessment 0 04/19/22 0247   Dressing Status Clean, dry, & intact 04/19/22 0247        Opportunity for questions and clarification was provided.       Patient transported with:   Monitor with Jeannie

## 2022-04-19 NOTE — H&P
Hospitalist History and Physical   Admit Date:  2022 11:00 PM   Name:  Lindsay Vaughn   Age:  48 y.o. Sex:  female  :  1975   MRN:  [id number]     Presenting Complaint: Flank Pain and Nausea    Reason(s) for Admission: Hydronephrosis of right kidney [N13.30]  Ureteric stone [N20.1]  UTI (urinary tract infection) [N39.0]     History of Present Illness:     48years old female with history of CKD, hypertension, diabetes type 1 presented to emergency room with chief complaint of worsening of abdominal pain or right lower quadrant for past several days with fever, chills. Patient reports some nausea going on for past several days with few episodes of vomiting. Patient was recently treated for UTI in March. Patient completed antibiotics, denies any diarrhea. Evaluated in emergency room, CT scan of abdomen was obtained shows evidence of ureteric stone which is 3 mm with hydroureter/hydronephrosis. Case was discussed with urology by ER physician who requested hospitalist to admit and transfer the patient to the [facility]. While I was in the room patient still complaining of pain, not feeling very well. Patient is agreed for admission. Review of Systems:  10 systems reviewed and negative except as noted in HPI. Assessment & Plan:   Principal Problem:    Hydronephrosis of right kidney (2022)  We will transfer patient to [facility], requested neurology consultation, neurology decide further plans. Active Problems:    Ureteric stone (2022)  Management as per urology      UTI (urinary tract infection) (2022)  Initiated on antibiotics      CKD (chronic kidney disease) stage 3, GFR 30-59 ml/min ([facility]) (2014)  Creatinine looks better than before.       Diabetes mellitus type 1 ([facility] [address] (2018)  Placed on sliding scale insulin, monitor blood sugars closely if no procedure planned will restart back on home dose of insulin after initiating diet.        Disposition/Discharge Planning:     Diet: N.p.o. VTE ppx: SCDs, Lovenox after  procedure  Code status: Full Code      Past Medical History:   Diagnosis Date    Acute renal failure ([facility] [address] 2012    CKD (chronic kidney disease) stage 3, GFR 30-59 ml/min ([facility])     CKD (chronic kidney disease), stage IV ([facility] [address] 3/13/2017    Gastroenteritis, acute 2012    IDDM (insulin dependent diabetes mellitus) 2012    Intractable nausea and vomiting 2014    Irregular menses     Nausea & vomiting 2012    Neuropathy, peripheral, idiopathic 3/13/2017    Retinopathy, bilateral 3/13/2017    Trichomoniasis 3/13/2017    Ulcer, skin, chronic ([facility] [address] 3/13/2017    Vaginal burning      Past Surgical History:   Procedure Laterality Date    HX AMPUTATION  12    gangrene    HX AMPUTATION Left     5th Toe due to Diabetic Ulcer    HX OPEN REDUCTION INTERNAL FIXATION Right     ankle      No Known Allergies   Social History     Tobacco Use    Smoking status: Former Smoker     Quit date: 2013     Years since quittin.4    Smokeless tobacco: Never Used   Substance Use Topics    Alcohol use: Yes     Comment: socially; occasional alcohol use      Family History   Problem Relation Age of Onset    Diabetes Father         DM Type II    Prostate Cancer Father     Stroke Father     Breast Cancer Mother 76    Diabetes Maternal Grandmother         DM Type II    Hypertension Maternal Grandmother     Diabetes Paternal Grandfather         DM Type II     Diabetes Paternal Grandmother     Colon Cancer Neg Hx     Ovarian Cancer Neg Hx     Uterine Cancer Neg Hx       Family history reviewed and noncontributory to patient's acute condition; no relevant family history unless otherwise noted above.   Immunization History   Administered Date(s) Administered    COVID-19, Pfizer Purple top, DILUTE for use, 12+ yrs, 30mcg/0.3mL dose 02/10/2021     PTA Medications:  Current Outpatient Medications Medication Instructions    aspirin (ASPIRIN) 325 mg tablet No dose, route, or frequency recorded.  atorvastatin (LIPITOR) 80 mg, Oral, DAILY    Blood-Glucose Meter misc Check blood sugar 4 times daily. E10.65    brimonidine (Alphagan P) 0.1 % ophthalmic solution 1 drop in left eye twice a day    Dexcom G6 Sensor wolf Use to monitor blood glucose. E10.65    Dexcom G6 Transmitter wolf Use to monitor blood glucose. E10.65    ergocalciferol (ERGOCALCIFEROL) 50,000 Units, Oral, 2 TIMES A WEEK (WED & SAT)    gabapentin (NEURONTIN) 300 mg, Oral, DAILY    glucose blood VI test strips (ASCENSIA AUTODISC VI, ONE TOUCH ULTRA TEST VI) strip Check blood glucose 4 times daily. E10.65    HumaLOG KwikPen Insulin 100 unit/mL kwikpen 6 TIDAC correction scale 1/50>150, max daily dose 50 units    lancets 31 gauge misc Check blood glucose 4 times daily. E10.65    losartan (COZAAR) 50 mg tablet No dose, route, or frequency recorded.  Oriana Pen Needle 32 gauge x 5/32\" ndle Use with insulin injections 4 times per day. Dx:  E10.8,  E10.65    ondansetron (ZOFRAN ODT) 4 mg, Oral, EVERY 8 HOURS AS NEEDED    Tresiba FlexTouch U-200 30 Units, SubCUTAneous, DAILY       Objective:     Patient Vitals for the past 24 hrs:   Temp Pulse Resp BP SpO2   04/19/22 0316  97  (!) 198/92 98 %   04/19/22 0304 99 °F (37.2 °C) 97  (!) 179/85 97 %   04/19/22 0138 100.2 °F (37.9 °C) (!) 105  (!) 184/86    04/18/22 2248 99.7 °F (37.6 °C)       04/18/22 2234  (!) 105 18 (!) 161/93 98 %     Oxygen Therapy  O2 Sat (%): 98 % (04/19/22 0316)  Pulse via Oximetry: 97 beats per minute (04/19/22 0316)  O2 Device: None (Room air) (04/18/22 2234)    Estimated body mass index is 25.7 kg/m² as calculated from the following:    Height as of this encounter: 5' 8\" (1.727 m). Weight as of this encounter: 76.7 kg (169 lb).   No intake or output data in the 24 hours ending 04/19/22 5528      Physical Exam:    General:    Patient looks like in discomfort. Head:  Normocephalic, atraumatic  Eyes:  Sclerae appear normal.  Pupils equally round. HENT:  Nares appear normal, no drainage. Moist mucous membranes  Neck:  No restricted ROM. Trachea midline  CV:   RRR. No m/r/g. No JVD  Lungs:   CTAB. No wheezing, rhonchi, or rales. Appears even, unlabored  Abdomen:   Right lower quadrant tenderness noted. Extremities: Warm and dry. No cyanosis or clubbing. No edema. Skin:     No rashes. Normal turgor. Normal coloration  Neuro:  Cranial nerves II-XII grossly intact. Sensation intact  Psych:  Normal mood and affect. Alert and oriented x3    Data Ordered and Personally Reviewed:    Last 24hr Labs:  Recent Results (from the past 24 hour(s))   HCG URINE, QL. - POC    Collection Time: 04/18/22 10:58 PM   Result Value Ref Range    Pregnancy test,urine (POC) Negative NEG     CBC WITH AUTOMATED DIFF    Collection Time: 04/18/22 11:13 PM   Result Value Ref Range    WBC 9.2 4.3 - 11.1 K/uL    RBC 4.49 4.05 - 5.2 M/uL    HGB 12.4 11.7 - 15.4 g/dL    HCT 39.4 35.8 - 46.3 %    MCV 87.8 79.6 - 97.8 FL    MCH 27.6 26.1 - 32.9 PG    MCHC 31.5 31.4 - 35.0 g/dL    RDW 15.4 (H) 11.9 - 14.6 %    PLATELET 383 421 - 028 K/uL    MPV 9.4 9.4 - 12.3 FL    ABSOLUTE NRBC 0.00 0.0 - 0.2 K/uL    DF AUTOMATED      NEUTROPHILS 65 43 - 78 %    LYMPHOCYTES 20 13 - 44 %    MONOCYTES 8 4.0 - 12.0 %    EOSINOPHILS 7 0.5 - 7.8 %    BASOPHILS 0 0.0 - 2.0 %    IMMATURE GRANULOCYTES 0 0.0 - 5.0 %    ABS. NEUTROPHILS 5.9 1.7 - 8.2 K/UL    ABS. LYMPHOCYTES 1.8 0.5 - 4.6 K/UL    ABS. MONOCYTES 0.7 0.1 - 1.3 K/UL    ABS. EOSINOPHILS 0.7 0.0 - 0.8 K/UL    ABS. BASOPHILS 0.0 0.0 - 0.2 K/UL    ABS. IMM.  GRANS. 0.0 0.0 - 0.5 K/UL   LIPASE    Collection Time: 04/18/22 11:13 PM   Result Value Ref Range    Lipase 13 (L) 73 - 393 U/L   URINE MICROSCOPIC    Collection Time: 04/18/22 11:13 PM   Result Value Ref Range    WBC >100 (H) 0 /hpf    RBC 3-5 0 /hpf    Epithelial cells 10-20 0 /hpf Bacteria 1+ (H) 0 /hpf    Casts 0 0 /lpf   LACTIC ACID    Collection Time: 04/18/22 11:13 PM   Result Value Ref Range    Lactic acid 1.0 0.4 - 2.0 MMOL/L   METABOLIC PANEL, COMPREHENSIVE    Collection Time: 04/18/22 11:53 PM   Result Value Ref Range    Sodium 144 136 - 145 mmol/L    Potassium 3.0 (L) 3.5 - 5.1 mmol/L    Chloride 117 (H) 98 - 107 mmol/L    CO2 19 (L) 21 - 32 mmol/L    Anion gap 8 7 - 16 mmol/L    Glucose 227 (H) 65 - 100 mg/dL    BUN 12 6 - 23 MG/DL    Creatinine 1.68 (H) 0.6 - 1.0 MG/DL    GFR est AA 42 (L) >60 ml/min/1.73m2    GFR est non-AA 35 (L) >60 ml/min/1.73m2    Calcium 6.2 (L) 8.3 - 10.4 MG/DL    Bilirubin, total 0.2 0.2 - 1.1 MG/DL    ALT (SGPT) 15 12 - 65 U/L    AST (SGOT) 18 15 - 37 U/L    Alk. phosphatase 139 (H) 50 - 136 U/L    Protein, total 5.5 (L) 6.3 - 8.2 g/dL    Albumin 2.1 (L) 3.5 - 5.0 g/dL    Globulin 3.4 2.3 - 3.5 g/dL    A-G Ratio 0.6 (L) 1.2 - 3.5     MAGNESIUM    Collection Time: 04/18/22 11:53 PM   Result Value Ref Range    Magnesium 1.6 (L) 1.8 - 2.4 mg/dL       All Micro Results     Procedure Component Value Units Date/Time    CULTURE, BLOOD [[id number]] Collected: 04/18/22 2311    Order Status: Completed Specimen: Blood Updated: 04/19/22 0041    CULTURE, BLOOD [[id number]] Collected: 04/18/22 2311    Order Status: Completed Specimen: Blood Updated: 04/19/22 0041    CULTURE, URINE [[id number]] Collected: 04/18/22 2317    Order Status: Completed Specimen: Urine from Clean catch Updated: 04/19/22 0036          Other Studies:  CT ABD PELV W CONT    Result Date: 4/19/2022  EXAM: CT ABD PELV W CONT HISTORY: RLQ pain. TECHNIQUE: Axial images of the abdomen and pelvis were performed following the administration of intravenous contrast. Images were obtained in the axial plane and coronal reformatted images were created and reviewed. Dose reduction technique used:  Automated exposure control/Adjustment of the mA and/or kV according to patient size/Use of iterative reconstruction technique. 100 mL of Isovue-370 were utilized. COMPARISON: None. FINDINGS: The visualized lung bases and mediastinum are unremarkable. The liver, spleen, pancreas, adrenal glands, gallbladder, are within normal limits. Both kidneys contain small cystic appearing structures. There is a mildly delayed nephrogram on the right. There is mild right perinephric fatty stranding. There is an approximately 3 mm obstructing calcification in the proximal right ureter. This is causing mild right hydronephrosis. There is enhancement of the right ureteral wall suggesting ureteritis. The bladder appears grossly normal. Distal esophagus and stomach are unremarkable. Small and large bowel are without evidence of obstruction. There is a normal appendix in the right pelvis. Pelvic reproductive organs are seen and appear grossly normal. The uterus appears to be tilted to the left. No evidence of free fluid, free air, focal fluid collection. No pathologic adenopathy. No abdominal aortic aneurysm. Osseous structures are without evidence of acute fracture or suspicious lesion. There is an approximately 3 mm obstructing calcification in the proximal right ureter. This is causing mild right hydronephrosis. There is enhancement of the right ureteral wall suggesting ureteritis. Both kidneys contain small cystic appearing structures. A grossly normal-appearing appendix is seen in the right pelvis.          Medications Administered     cefTRIAXone (ROCEPHIN) 1 g in 0.9% sodium chloride (MBP/ADV) 50 mL MBP     Admin Date  04/19/2022 Action  New Bag Dose  1 g Rate  100 mL/hr Route  IntraVENous Administered By  Jovon Oconnor, JERMAINE          iopamidoL (ISOVUE-370) 76 % injection 100 mL     Admin Date  04/19/2022 Action  Given Dose  100 mL Route  IntraVENous Administered By  Chata Noonan, RT, R, CT          magnesium sulfate 2 g/50 ml IVPB (premix or compounded)     Admin Date  04/19/2022 Action  New Bag Dose  2 g Rate  50 mL/hr Route  IntraVENous Administered By  Sushma Cee, RN          morphine injection 4 mg     Admin Date  04/19/2022 Action  Given Dose  4 mg Route  IntraVENous Administered By  Sada Myers, RN          [facility]ron [facility] injection 4 mg     Admin Date  04/18/2022 Action  Given Dose  4 mg Route  IntraVENous Administered By  Sushma Cee, RN           Admin Date  04/19/2022 Action  Given Dose  4 mg Route  IntraVENous Administered By  Sushma Cee, RN           Admin Date  04/19/2022 Action  Given Dose  4 mg Route  IntraVENous Administered By  Sada Myers, RN          potassium bicarb-citric acid (EFFER-K) tablet 40 mEq     Admin Date  04/19/2022 Action  Given Dose  40 mEq Route  Oral Administered By  Sushma Cee, RN          potassium chloride 20 mEq in 100 ml IVPB     Admin Date  04/19/2022 Action  New Bag Dose  20 mEq Rate  50 mL/hr Route  IntraVENous Administered By  Sada Myers, JERMAINE          promethazine (PHENERGAN) injection 25 mg     Admin Date  04/19/2022 Action  Given Dose  25 mg Route  IntraMUSCular Administered By  Sushma Cee, RN          saline peripheral flush soln 10 mL     Admin Date  04/19/2022 Action  Given Dose  10 mL Route  InterCATHeter Administered By  Will Rosales, RT, R, CT          sodium chloride 0.9 % bolus infusion 1,000 mL     Admin Date  04/19/2022 Action  New Bag Dose  1,000 mL Route  IntraVENous Administered By  Sushma Cee, RN           Admin Date  04/19/2022 Action  New Bag Dose  1,000 mL Route  IntraVENous Administered By  Sushma Cee, JERMAINE           Admin Date  04/19/2022 Action  New Bag Dose  1,000 mL Route  IntraVENous Administered By  Sada Myers, RN          sodium chloride 0.9 % bolus infusion 100 mL     Admin Date  04/19/2022 Action  New Bag Dose  100 mL Route  IntraVENous Administered By  Will Rosales, RT, R, CT                  Signed:  Natalie Horton MD    Part of this note may have been written by using a voice dictation software.   The note has been proof read but may still contain some grammatical/other typographical errors.

## 2022-04-20 ENCOUNTER — APPOINTMENT (OUTPATIENT)
Dept: ULTRASOUND IMAGING | Age: 47
End: 2022-04-20
Attending: STUDENT IN AN ORGANIZED HEALTH CARE EDUCATION/TRAINING PROGRAM
Payer: COMMERCIAL

## 2022-04-20 ENCOUNTER — APPOINTMENT (OUTPATIENT)
Dept: GENERAL RADIOLOGY | Age: 47
End: 2022-04-20
Attending: STUDENT IN AN ORGANIZED HEALTH CARE EDUCATION/TRAINING PROGRAM
Payer: COMMERCIAL

## 2022-04-20 LAB
ALBUMIN SERPL-MCNC: 2.9 G/DL (ref 3.5–5)
ALBUMIN/GLOB SERPL: 0.7 {RATIO} (ref 1.2–3.5)
ALP SERPL-CCNC: 163 U/L (ref 50–136)
ALT SERPL-CCNC: 20 U/L (ref 12–65)
ANION GAP SERPL CALC-SCNC: 12 MMOL/L (ref 7–16)
AST SERPL-CCNC: 18 U/L (ref 15–37)
BASE DEFICIT BLDV-SCNC: 3.5 MMOL/L
BILIRUB SERPL-MCNC: 0.2 MG/DL (ref 0.2–1.1)
BUN SERPL-MCNC: 26 MG/DL (ref 6–23)
CALCIUM SERPL-MCNC: 8.5 MG/DL (ref 8.3–10.4)
CHLORIDE SERPL-SCNC: 114 MMOL/L (ref 98–107)
CO2 SERPL-SCNC: 21 MMOL/L (ref 21–32)
CREAT SERPL-MCNC: 3 MG/DL (ref 0.6–1)
ERYTHROCYTE [DISTWIDTH] IN BLOOD BY AUTOMATED COUNT: 15.9 % (ref 11.9–14.6)
GAS FLOW.O2 O2 DELIVERY SYS: ABNORMAL L/MIN
GLOBULIN SER CALC-MCNC: 4.3 G/DL (ref 2.3–3.5)
GLUCOSE BLD STRIP.AUTO-MCNC: 102 MG/DL (ref 65–100)
GLUCOSE BLD STRIP.AUTO-MCNC: 114 MG/DL (ref 65–100)
GLUCOSE BLD STRIP.AUTO-MCNC: 138 MG/DL (ref 65–100)
GLUCOSE BLD STRIP.AUTO-MCNC: 183 MG/DL (ref 65–100)
GLUCOSE BLD STRIP.AUTO-MCNC: 192 MG/DL (ref 65–100)
GLUCOSE BLD STRIP.AUTO-MCNC: 408 MG/DL (ref 65–100)
GLUCOSE BLD STRIP.AUTO-MCNC: 536 MG/DL (ref 65–100)
GLUCOSE BLD STRIP.AUTO-MCNC: 91 MG/DL (ref 65–100)
GLUCOSE SERPL-MCNC: 408 MG/DL (ref 65–100)
HCO3 BLDV-SCNC: 21 MMOL/L (ref 23–28)
HCT VFR BLD AUTO: 35.2 % (ref 35.8–46.3)
HGB BLD-MCNC: 11.2 G/DL (ref 11.7–15.4)
MAGNESIUM SERPL-MCNC: 2.8 MG/DL (ref 1.8–2.4)
MCH RBC QN AUTO: 27.4 PG (ref 26.1–32.9)
MCHC RBC AUTO-ENTMCNC: 31.8 G/DL (ref 31.4–35)
MCV RBC AUTO: 86.1 FL (ref 79.6–97.8)
NRBC # BLD: 0 K/UL (ref 0–0.2)
PCO2 BLDV: 35.4 MMHG (ref 41–51)
PH BLDV: 7.38 [PH] (ref 7.32–7.42)
PLATELET # BLD AUTO: 355 K/UL (ref 150–450)
PMV BLD AUTO: 9.6 FL (ref 9.4–12.3)
PO2 BLDV: 50 MMHG
POTASSIUM SERPL-SCNC: 4.1 MMOL/L (ref 3.5–5.1)
PROT SERPL-MCNC: 7.2 G/DL (ref 6.3–8.2)
RBC # BLD AUTO: 4.09 M/UL (ref 4.05–5.2)
SAO2 % BLDV: 84.9 % (ref 65–88)
SERVICE CMNT-IMP: ABNORMAL
SERVICE CMNT-IMP: NORMAL
SODIUM SERPL-SCNC: 147 MMOL/L (ref 136–145)
SPECIMEN TYPE: ABNORMAL
WBC # BLD AUTO: 15.5 K/UL (ref 4.3–11.1)

## 2022-04-20 PROCEDURE — 99214 OFFICE O/P EST MOD 30 MIN: CPT | Performed by: UROLOGY

## 2022-04-20 PROCEDURE — 85027 COMPLETE CBC AUTOMATED: CPT

## 2022-04-20 PROCEDURE — 74011636637 HC RX REV CODE- 636/637: Performed by: STUDENT IN AN ORGANIZED HEALTH CARE EDUCATION/TRAINING PROGRAM

## 2022-04-20 PROCEDURE — 97161 PT EVAL LOW COMPLEX 20 MIN: CPT

## 2022-04-20 PROCEDURE — G0378 HOSPITAL OBSERVATION PER HR: HCPCS

## 2022-04-20 PROCEDURE — 83735 ASSAY OF MAGNESIUM: CPT

## 2022-04-20 PROCEDURE — 97165 OT EVAL LOW COMPLEX 30 MIN: CPT

## 2022-04-20 PROCEDURE — 80053 COMPREHEN METABOLIC PANEL: CPT

## 2022-04-20 PROCEDURE — 74011250636 HC RX REV CODE- 250/636: Performed by: HOSPITALIST

## 2022-04-20 PROCEDURE — 74011000258 HC RX REV CODE- 258: Performed by: HOSPITALIST

## 2022-04-20 PROCEDURE — 74011000250 HC RX REV CODE- 250: Performed by: HOSPITALIST

## 2022-04-20 PROCEDURE — 36415 COLL VENOUS BLD VENIPUNCTURE: CPT

## 2022-04-20 PROCEDURE — 74011000250 HC RX REV CODE- 250: Performed by: FAMILY MEDICINE

## 2022-04-20 PROCEDURE — 74011250636 HC RX REV CODE- 250/636: Performed by: FAMILY MEDICINE

## 2022-04-20 PROCEDURE — 2709999900 HC NON-CHARGEABLE SUPPLY

## 2022-04-20 PROCEDURE — 93971 EXTREMITY STUDY: CPT

## 2022-04-20 PROCEDURE — 74011250636 HC RX REV CODE- 250/636: Performed by: ANESTHESIOLOGY

## 2022-04-20 PROCEDURE — 74011250637 HC RX REV CODE- 250/637: Performed by: FAMILY MEDICINE

## 2022-04-20 PROCEDURE — 74011250636 HC RX REV CODE- 250/636: Performed by: STUDENT IN AN ORGANIZED HEALTH CARE EDUCATION/TRAINING PROGRAM

## 2022-04-20 RX ORDER — DIPHENHYDRAMINE HCL 25 MG
25 CAPSULE ORAL
Status: DISCONTINUED | OUTPATIENT
Start: 2022-04-20 | End: 2022-04-22 | Stop reason: HOSPADM

## 2022-04-20 RX ORDER — HEPARIN SODIUM 5000 [USP'U]/ML
5000 INJECTION, SOLUTION INTRAVENOUS; SUBCUTANEOUS EVERY 8 HOURS
Status: DISCONTINUED | OUTPATIENT
Start: 2022-04-20 | End: 2022-04-22 | Stop reason: HOSPADM

## 2022-04-20 RX ORDER — METOPROLOL TARTRATE 5 MG/5ML
5 INJECTION INTRAVENOUS
Status: DISCONTINUED | OUTPATIENT
Start: 2022-04-20 | End: 2022-04-22 | Stop reason: HOSPADM

## 2022-04-20 RX ORDER — INSULIN LISPRO 100 [IU]/ML
INJECTION, SOLUTION INTRAVENOUS; SUBCUTANEOUS
Status: DISCONTINUED | OUTPATIENT
Start: 2022-04-20 | End: 2022-04-22 | Stop reason: HOSPADM

## 2022-04-20 RX ORDER — METOPROLOL TARTRATE 5 MG/5ML
5 INJECTION INTRAVENOUS
Status: DISCONTINUED | OUTPATIENT
Start: 2022-04-20 | End: 2022-04-20

## 2022-04-20 RX ORDER — DIPHENHYDRAMINE HYDROCHLORIDE 50 MG/ML
25 INJECTION, SOLUTION INTRAMUSCULAR; INTRAVENOUS
Status: DISCONTINUED | OUTPATIENT
Start: 2022-04-20 | End: 2022-04-22 | Stop reason: HOSPADM

## 2022-04-20 RX ADMIN — DIPHENHYDRAMINE HYDROCHLORIDE 25 MG: 50 INJECTION, SOLUTION INTRAMUSCULAR; INTRAVENOUS at 02:41

## 2022-04-20 RX ADMIN — INSULIN LISPRO 5 UNITS: 100 INJECTION, SOLUTION INTRAVENOUS; SUBCUTANEOUS at 08:29

## 2022-04-20 RX ADMIN — INSULIN LISPRO 10 UNITS: 100 INJECTION, SOLUTION INTRAVENOUS; SUBCUTANEOUS at 08:28

## 2022-04-20 RX ADMIN — HEPARIN SODIUM 5000 UNITS: 5000 INJECTION INTRAVENOUS; SUBCUTANEOUS at 13:37

## 2022-04-20 RX ADMIN — METOPROLOL TARTRATE 5 MG: 1 INJECTION, SOLUTION INTRAVENOUS at 04:18

## 2022-04-20 RX ADMIN — INSULIN GLARGINE 30 UNITS: 100 INJECTION, SOLUTION SUBCUTANEOUS at 21:03

## 2022-04-20 RX ADMIN — HEPARIN SODIUM 5000 UNITS: 5000 INJECTION INTRAVENOUS; SUBCUTANEOUS at 21:02

## 2022-04-20 RX ADMIN — DIPHENHYDRAMINE HYDROCHLORIDE 25 MG: 25 CAPSULE ORAL at 20:06

## 2022-04-20 RX ADMIN — SODIUM CHLORIDE, PRESERVATIVE FREE 10 ML: 5 INJECTION INTRAVENOUS at 13:37

## 2022-04-20 RX ADMIN — CEFTRIAXONE 1 G: 1 INJECTION, POWDER, FOR SOLUTION INTRAMUSCULAR; INTRAVENOUS at 00:24

## 2022-04-20 RX ADMIN — INSULIN LISPRO 2 UNITS: 100 INJECTION, SOLUTION INTRAVENOUS; SUBCUTANEOUS at 11:54

## 2022-04-20 RX ADMIN — INSULIN LISPRO 5 UNITS: 100 INJECTION, SOLUTION INTRAVENOUS; SUBCUTANEOUS at 11:55

## 2022-04-20 RX ADMIN — INSULIN LISPRO 1 UNITS: 100 INJECTION, SOLUTION INTRAVENOUS; SUBCUTANEOUS at 21:03

## 2022-04-20 RX ADMIN — HYDRALAZINE HYDROCHLORIDE 20 MG: 20 INJECTION INTRAMUSCULAR; INTRAVENOUS at 01:49

## 2022-04-20 RX ADMIN — SODIUM CHLORIDE, POTASSIUM CHLORIDE, SODIUM LACTATE AND CALCIUM CHLORIDE 250 ML/HR: 600; 310; 30; 20 INJECTION, SOLUTION INTRAVENOUS at 07:06

## 2022-04-20 RX ADMIN — PROCHLORPERAZINE EDISYLATE 5 MG: 5 INJECTION INTRAMUSCULAR; INTRAVENOUS at 00:13

## 2022-04-20 NOTE — DISCHARGE INSTRUCTIONS
Ureteral Stent Placement: What to Expect at [address] [place] [address]  A ureteral (say \"you-REE-ter-ul\") stent is a thin, hollow tube that is placed in the ureter to help urine pass from the kidney into the bladder. Ureters are the tubes that connect the kidneys to the bladder. You may have a small amount of blood in your urine for 1 to 3 days after the procedure. While the stent is in place, you may have to urinate more often, feel a sudden need to urinate, or feel like you cannot completely empty your bladder. You may feel some pain when you urinate or do strenuous activity. You also may notice a small amount of blood in your urine after strenuous activities. These side effects usually do not prevent people from doing their normal daily activities. You may have a string attached to your stent. Do not to pull the string unless the doctor tells you to. The doctor will use the string to pull out the stent when you no longer need it. After the procedure, urine may flow better from your kidneys to your bladder. A ureteral stent may be left in place for several days or for as long as several months. Your doctor will take it out when you no longer need it. Cystoscopy: What to Expect at [address] [facility] [facility] [address]  A cystoscopy is a procedure that lets a doctor look inside of the bladder and the urethra. The urethra is the tube that carries urine from the bladder to outside the body. The doctor uses a thin, lighted tube called a cystoscope to look for kidney or bladder stones, tumors, bleeding, or infection. After the cystoscopy, your urethra may be sore at first, and it may burn when you urinate for the first few days after the procedure. You may feel the need to urinate more often, and your urine may be pink. These symptoms should get better in 1 or 2 days. You will probably be able to go back to work or most of your usual activities in 1 or 2 days. How can you care for yourself at home?   Activity  · Rest when you feel tired. Getting enough sleep will help you recover. · Try to walk each day. Start by walking a little more than you did the day before. Bit by bit, increase the amount you walk. Walking boosts blood flow and helps prevent pneumonia and constipation. · Avoid strenuous activities, such as bicycle riding, jogging, weight lifting, or aerobic exercise, until your doctor says it is okay. · Ask your doctor when you can drive again. · Most people are able to return to work within 1 or 2 days after the procedure. · You may shower and take baths as usual.  · Ask your doctor when it is okay for you to have sex. Diet  · You can eat your normal diet. If your stomach is upset, try bland, low-fat foods like plain rice, broiled chicken, toast, and yogurt. · Drink plenty of fluids (unless your doctor tells you not to). Medicines  · Take pain medicines exactly as directed. ¨ If the doctor gave you a prescription medicine for pain, take it as prescribed. ¨ If you are not taking a prescription pain medicine, ask your doctor if you can take an over-the-counter medicine. · If you think your pain medicine is making you sick to your stomach:  ¨ Take your medicine after meals (unless your doctor has told you not to). ¨ Ask your doctor for a different pain medicine. · If your doctor prescribed antibiotics, take them as directed. Do not stop taking them just because you feel better. You need to take the full course of antibiotics. Medication Interaction:  During your procedure you potentially received a medication or medications which may reduce the effectiveness of oral contraceptives. Please consider other forms of contraception for 1 month following your procedure if you are currently using oral contraceptives as your primary form of birth control. In addition to this, we recommend continuing your oral contraceptive as prescribed, unless otherwise instructed by your physician, during this time.     Follow-up care is a key part of your treatment and safety. Be sure to make and go to all appointments, and call your doctor if you are having problems. It's also a good idea to know your test results and keep a list of the medicines you take. When should you call for help? Call 911 anytime you think you may need emergency care. For example, call if:  · You passed out (lost consciousness). · You have severe trouble breathing. · You have sudden chest pain and shortness of breath, or you cough up blood. · You have severe belly pain. Call your doctor now or seek immediate medical care if:  · You are sick to your stomach or cannot keep fluids down. · Your urine is still red or you see blood clots after you have urinated several times. · You have trouble passing urine or stool, especially if you have pain or swelling in your lower belly. · You have signs of a blood clot, such as:  ¨ Pain in your calf, back of the knee, thigh, or groin. ¨ Redness and swelling in your leg or groin. · You develop a fever or severe chills. · You have pain in your back just below your rib cage. This is called flank pain. Watch closely for changes in your health, and be sure to contact your doctor if:  · A burning feeling is normal for a day or two after the test, but call if it does not get better. · You have a frequent urge to urinate but can pass only small amounts of urine. · It is normal for the urine to have a pinkish color for a few days after the test, but call if it does not get better or if your urine is cloudy, smells bad, or has pus in it. After general anesthesia or intravenous sedation, for 24 hours or while taking prescription Narcotics:  · Limit your activities. · A responsible adult needs to be with you for the next 24 hours. · Do not drive and operate hazardous machinery. · Do not make important personal or business decisions. · Do not drink alcoholic beverages.   · If you have not urinated within 8 hours after discharge, and you are experiencing discomfort from urinary retention, please go to the nearest Emergency Dept. · If you have sleep apnea and have a CPAP machine, please use it for all naps and sleeping. · Please use caution when taking narcotics and any of your home medications that may cause drowsiness. *  Please give a list of your current medications to your Primary Care Provider. *  Please update this list whenever your medications are discontinued, doses are      changed, or new medications (including over-the-counter products) are added. *  Please carry medication information at all times in case of emergency situations. These are general instructions for a healthy lifestyle:  No smoking/ No tobacco products/ Avoid exposure to second hand smoke  Surgeon General's Warning:  Quitting smoking now greatly reduces serious risk to your health. Obesity, smoking, and sedentary lifestyle greatly increases your risk for illness  A healthy diet, regular physical exercise & weight monitoring are important for maintaining a healthy lifestyle    You may be retaining fluid if you have a history of heart failure or if you experience any of the following symptoms:  Weight gain of 3 pounds or more overnight or 5 pounds in a week, increased swelling in our hands or feet or shortness of breath while lying flat in bed. Please call your doctor as soon as you notice any of these symptoms; do not wait until your next office visit.

## 2022-04-20 NOTE — PROGRESS NOTES
Admit Date: 4/19/2022    Subjective:     Treasure Mancera is resting. Feeling better today. No complaints. Voiding.       Objective:     Patient Vitals for the past 8 hrs:   BP Temp Pulse Resp SpO2   04/20/22 1141 (!) 152/83 98.5 °F (36.9 °C) (!) 102 18 97 %   04/20/22 0724 (!) 154/95 98.6 °F (37 °C) (!) 112 18 97 %     04/20 0701 - 04/20 1900  In: -   Out: 500 [Urine:500]  04/18 1901 - 04/20 0700  In: 1747 [I.V.:1747]  Out: 2150 [Urine:1600]    Physical Exam:  GENERAL: alert, cooperative, no distress, slowed mentation, appears stated age  LUNG: clear to auscultation bilaterally  HEART: regular rate and rhythm, S1, S2  ABDOMEN: soft, non-tender  NEUROLOGIC: AOx3      Data Review   Recent Results (from the past 24 hour(s))   GLUCOSE, POC    Collection Time: 04/19/22  2:33 PM   Result Value Ref Range    Glucose (POC) 331 (H) 65 - 100 mg/dL    [facility]    GLUCOSE, POC    Collection Time: 04/19/22  3:43 PM   Result Value Ref Range    Glucose (POC) 285 (H) 65 - 100 mg/dL    Performed by [facility]    POTASSIUM    Collection Time: 04/19/22  4:43 PM   Result Value Ref Range    Potassium 4.3 3.5 - 5.1 mmol/L   GLUCOSE, POC    Collection Time: 04/19/22  4:57 PM   Result Value Ref Range    Glucose (POC) 207 (H) 65 - 100 mg/dL    Performed by Avinash    GLUCOSE, POC    Collection Time: 04/19/22  7:25 PM   Result Value Ref Range    Glucose (POC) 99 65 - 100 mg/dL    Performed by Siomara    GLUCOSE, POC    Collection Time: 04/19/22  9:08 PM   Result Value Ref Range    Glucose (POC) 142 (H) 65 - 100 mg/dL    Performed by Siomara    GLUCOSE, POC    Collection Time: 04/19/22 10:22 PM   Result Value Ref Range    Glucose (POC) 164 (H) 65 - 100 mg/dL    Performed by Lu    GLUCOSE, POC    Collection Time: 04/19/22 11:05 PM   Result Value Ref Range    Glucose (POC) 249 (H) 65 - 100 mg/dL    Performed by Prerna Lucas, POC    Collection Time: 04/20/22 7:21 AM   Result Value Ref Range    Glucose (POC) 408 (H) 65 - 100 mg/dL    [facility] by [facility]    CBC W/O DIFF    Collection Time: 04/20/22  8:01 AM   Result Value Ref Range    WBC 15.5 (H) 4.3 - 11.1 K/uL    RBC 4.09 4.05 - 5.2 M/uL    HGB 11.2 (L) 11.7 - 15.4 g/dL    HCT 35.2 (L) 35.8 - 46.3 %    MCV 86.1 79.6 - 97.8 FL    MCH 27.4 26.1 - 32.9 PG    MCHC 31.8 31.4 - 35.0 g/dL    RDW 15.9 (H) 11.9 - 14.6 %    PLATELET 293 830 - 166 K/uL    MPV 9.6 9.4 - 12.3 FL    ABSOLUTE NRBC 0.00 0.0 - 0.2 K/uL   MAGNESIUM    Collection Time: 04/20/22  8:01 AM   Result Value Ref Range    Magnesium 2.8 (H) 1.8 - 2.4 mg/dL   METABOLIC PANEL, COMPREHENSIVE    Collection Time: 04/20/22  8:01 AM   Result Value Ref Range    Sodium 147 (H) 136 - 145 mmol/L    Potassium 4.1 3.5 - 5.1 mmol/L    Chloride 114 (H) 98 - 107 mmol/L    CO2 21 21 - 32 mmol/L    Anion gap 12 7 - 16 mmol/L    Glucose 408 (H) 65 - 100 mg/dL    BUN 26 (H) 6 - 23 MG/DL    Creatinine 3.00 (H) 0.6 - 1.0 MG/DL    GFR est AA 21 (L) >60 ml/min/1.73m2    GFR est non-AA 18 (L) >60 ml/min/1.73m2    Calcium 8.5 8.3 - 10.4 MG/DL    Bilirubin, total 0.2 0.2 - 1.1 MG/DL    ALT (SGPT) 20 12 - 65 U/L    AST (SGOT) 18 15 - 37 U/L    Alk.  phosphatase 163 (H) 50 - 136 U/L    Protein, total 7.2 6.3 - 8.2 g/dL    Albumin 2.9 (L) 3.5 - 5.0 g/dL    Globulin 4.3 (H) 2.3 - 3.5 g/dL    A-G Ratio 0.7 (L) 1.2 - 3.5     GLUCOSE, POC    Collection Time: 04/20/22 11:40 AM   Result Value Ref Range    Glucose (POC) 192 (H) 65 - 100 mg/dL    Performed by Marcela Dunn    GLUCOSE, POC    Collection Time: 04/20/22  1:19 PM   Result Value Ref Range    Glucose (POC) 114 (H) 65 - 100 mg/dL    Performed by Mendez        Assessment:     Principal Problem:    Hydronephrosis of right kidney (4/19/2022)    Active Problems:    Ureteric stone (4/19/2022)      UTI (urinary tract infection) (4/19/2022)      48year old female with poorly controlled DM type 2, 3 mm R proximal ureteral stone and R flank pain admitted for treatment. Questionable UTI. S/P cystoscopy/R stent 4/19. WBC up to 15.5. Cr up to 3.00. Plan:     Cont txt for UTI, follow up cultures. Continue IVF. Needs abx txt for at least 1 week. She will follow up for cystoscopy, right ureteroscopy, laser lithotripsy and right ureteral stent exchange in 1-2 weeks, we will get this scheduled. D/c per primary team.      We will s/o. Alita Goodpasture, NP  [facility]    Urology Attending Physician Note:     Admit Date: 4/19/2022    Subjective:     Afebrile. Pain much improved. Overall reports feeling much better today. Objective:     Patient Vitals for the past 8 hrs:   BP Temp Pulse Resp SpO2   04/20/22 1856 137/89 98.9 °F (37.2 °C) 98 16 100 %   04/20/22 1608 (!) 156/81 99.2 °F (37.3 °C) (!) 105 18 98 %     No intake/output data recorded.   04/19 0701 - 04/20 1900  In: 2189 [I.V.:1747]  Out: 2650 [Urine:2100]    Physical Exam:   Visit Vitals  /89 (BP 1 Location: Right upper arm, BP Patient Position: At rest)   Pulse 98   Temp 98.9 °F (37.2 °C)   Resp 16   Ht 5' 8\" (1.727 m)   Wt 172 lb (78 kg)   SpO2 100%   BMI 26.15 kg/m²        GENERAL: No acute distress, Awake, Alert, Oriented X 3, Gait normal  CARDIAC: regular rate and rhythm  CHEST AND LUNG: Easy work of breathing, clear to auscultation bilaterally, no cyanosis  ABDOMEN: soft, non tender, non-distended, positive bowel sounds, no organomegaly, no palpable masses, no guarding, no rebound tenderness  SKIN: No rash, no erythema, no lacerations or abrasions, no ecchymosis  NEUROLOGIC: cranial nerves 2-12 grossly intact           Data Review   Recent Results (from the past 24 hour(s))   GLUCOSE, POC    Collection Time: 04/19/22  9:08 PM   Result Value Ref Range    Glucose (POC) 142 (H) 65 - 100 mg/dL    Performed by [facility]ara[facility]    GLUCOSE, POC    Collection Time: 04/19/22 10:22 PM   Result Value Ref Range    Glucose (POC) 164 (H) 65 - 100 mg/dL    Performed by Lu    GLUCOSE, POC    Collection Time: 04/19/22 11:05 PM   Result Value Ref Range    Glucose (POC) 249 (H) 65 - 100 mg/dL    Performed by Prerna Lucas, POC    Collection Time: 04/20/22  7:21 AM   Result Value Ref Range    Glucose (POC) 408 (H) 65 - 100 mg/dL    Performed by Tasha    CBC W/O DIFF    Collection Time: 04/20/22  8:01 AM   Result Value Ref Range    WBC 15.5 (H) 4.3 - 11.1 K/uL    RBC 4.09 4.05 - 5.2 M/uL    HGB 11.2 (L) 11.7 - 15.4 g/dL    HCT 35.2 (L) 35.8 - 46.3 %    MCV 86.1 79.6 - 97.8 FL    MCH 27.4 26.1 - 32.9 PG    MCHC 31.8 31.4 - 35.0 g/dL    RDW 15.9 (H) 11.9 - 14.6 %    PLATELET 325 557 - 749 K/uL    MPV 9.6 9.4 - 12.3 FL    ABSOLUTE NRBC 0.00 0.0 - 0.2 K/uL   MAGNESIUM    Collection Time: 04/20/22  8:01 AM   Result Value Ref Range    Magnesium 2.8 (H) 1.8 - 2.4 mg/dL   METABOLIC PANEL, COMPREHENSIVE    Collection Time: 04/20/22  8:01 AM   Result Value Ref Range    Sodium 147 (H) 136 - 145 mmol/L    Potassium 4.1 3.5 - 5.1 mmol/L    Chloride 114 (H) 98 - 107 mmol/L    CO2 21 21 - 32 mmol/L    Anion gap 12 7 - 16 mmol/L    Glucose 408 (H) 65 - 100 mg/dL    BUN 26 (H) 6 - 23 MG/DL    Creatinine 3.00 (H) 0.6 - 1.0 MG/DL    GFR est AA 21 (L) >60 ml/min/1.73m2    GFR est non-AA 18 (L) >60 ml/min/1.73m2    Calcium 8.5 8.3 - 10.4 MG/DL    Bilirubin, total 0.2 0.2 - 1.1 MG/DL    ALT (SGPT) 20 12 - 65 U/L    AST (SGOT) 18 15 - 37 U/L    Alk.  phosphatase 163 (H) 50 - 136 U/L    Protein, total 7.2 6.3 - 8.2 g/dL    Albumin 2.9 (L) 3.5 - 5.0 g/dL    Globulin 4.3 (H) 2.3 - 3.5 g/dL    A-G Ratio 0.7 (L) 1.2 - 3.5     GLUCOSE, POC    Collection Time: 04/20/22 11:40 AM   Result Value Ref Range    Glucose (POC) 192 (H) 65 - 100 mg/dL    Performed by Tasha    GLUCOSE, POC    Collection Time: 04/20/22  1:19 PM   Result Value Ref Range    Glucose (POC) 114 (H) 65 - 100 mg/dL    Performed by Neeta Tam    GLUCOSE, POC    Collection Time: 04/20/22  1:48 PM Result Value Ref Range    Glucose (POC) 102 (H) 65 - 100 mg/dL    Performed by Arely Gillespie    GLUCOSE, POC    Collection Time: 04/20/22  4:05 PM   Result Value Ref Range    Glucose (POC) 91 65 - 100 mg/dL    Performed by Isamar    GLUCOSE, POC    Collection Time: 04/20/22  5:49 PM   Result Value Ref Range    Glucose (POC) 138 (H) 65 - 100 mg/dL    Performed by Isamar    GLUCOSE, POC    Collection Time: 04/20/22  7:56 PM   Result Value Ref Range    Glucose (POC) 183 (H) 65 - 100 mg/dL    Performed by Tasha            Assessment:     Principal Problem:    Hydronephrosis of right kidney (4/19/2022)    Active Problems:    Ureteric stone (4/19/2022)      UTI (urinary tract infection) (4/19/2022)      POD 1 s/p R stent placement. Much improved this AM.    Plan:     -Cont txt for UTI, follow up cultures.    -Continue IVF. -  -Needs abx txt for at least 1 week. -follow up for cystoscopy, right ureteroscopy, laser lithotripsy and right ureteral stent exchange in 1-2 weeks, we will get this scheduled.    -Will sign off. In addition to my documentation above, I have also reviewed the nurse practitioner note and personally examined the patient. I agree with the HPI, exam, assessment and plan. Marco A Pacheco M.D.     [facility]  [place] [address] [address] [facility]  Phone: [phone]  Fax: [phone]

## 2022-04-20 NOTE — OP NOTES
[facility]  OPERATIVE REPORT    Name:  Lefty Dowling  MR#:  [id number]  :  1975  ACCOUNT #:  [de-identified]  DATE OF SERVICE:  2022    PREOPERATIVE DIAGNOSIS:  Right ureter stone. POSTOPERATIVE DIAGNOSIS:  Right ureter stone. PROCEDURE PERFORMED:  Cystoscopy, right ureteral stent placement. SURGEON:  David Bell MD    ASSISTANT:  None. ANESTHESIA:  General.    COMPLICATIONS:  None. SPECIMENS REMOVED:  None. IMPLANTS:  A 6 x 26 double-J right ureteral stent without strings. ESTIMATED BLOOD LOSS:  Minimal.    DRAINS:  None. FINDINGS:  Cloudy urine. INDICATIONS FOR OPERATIVE PROCEDURE:  The patient is a 49-year-old female who presented with right flank pain and concern for UTI and subjective fevers. She also had worsening creatinine and poorly controlled diabetes. She was taken urgently to the operating room today for cysto, right stent placement after risk-benefit discussion was had. PROCEDURE:  After informed consent was obtained and the correct patient was identified in the preoperative holding area, she was taken back to operating suite and placed on the table in supine position. Time out was performed confirming correct patient and planned procedure. She received 2 g IV Ancef prior to the smooth induction of general endotracheal anesthesia. She was moved into dorsal lithotomy position and prepped and draped in the usual sterile fashion. I began the case by inserting a 22-Yoruba rigid cystoscope with a 30-degree lens in the urethra. I advanced it in the patient's bladder. Pancystoscopy was performed which was unremarkable. The right ureteral orifice was cannulated with a sensor wire with return of cloudy urine. I then loaded a 6 x 26 stent onto the wire and passed under fluoroscopic guidance into the right renal pelvis. Once the stent was in position, I pulled the wire noting a good curl in the renal pelvis as well as the bladder.   I cut the strings short. I left a small tail at the bladder. I confirmed placement on fluoroscopy and the stent was in the correct place. I then drained the bladder completely. She was awoken from anesthesia, transferred to PACU in stable condition. She tolerated the procedure well. There were no complications. All counts were correct at the end of the procedure. Once her infection clears, she will be scheduled for interval ureteroscopy to treat her stone.       Cathy Muñoz MD      [id number]  D:  04/19/2022 22:12  T:  04/20/2022 3:08  JOB #:  [id number]

## 2022-04-20 NOTE — PROGRESS NOTES
04/19/22 0248   Dual Skin Pressure Injury Assessment   Dual Skin Pressure Injury Assessment WDL   Second Care Provider (Based on [address] [place] [address]) JERMAINE Waldron   Skin Integumentary   Skin Integumentary (WDL) X    Pressure  Injury Documentation No Pressure Injury Noted-Pressure Ulcer Prevention Initiated   Skin Condition/Temp Warm;Dry;Flaky   Skin Integrity Abrasion; Other (comment); Tattoos (comment)  (RFA, callus on left foot, amputated 5th toe, )   Dual skin assessment completed with Cass Washington RN. Abrasion on Right Forearm. Scattered tattoos, Callus and amputated 5th toe on Left foot.

## 2022-04-20 NOTE — PERIOP NOTES
TRANSFER - OUT REPORT:    Verbal report given to Yesi Menard RN on [employer] being transferred to room 602 for routine post - op. Report consisted of patients Situation, Background, Assessment and Recommendations (SBAR). Information from the following report(s) SBAR, OR Summary, Procedure Summary, Intake/Output, MAR and Recent Results was reviewed with the receiving nurse. Lines:   Peripheral IV 04/18/22 Right Hand (Active)   Site Assessment Clean, dry, & intact 04/19/22 2215   Phlebitis Assessment 0 04/19/22 2215   Infiltration Assessment 0 04/19/22 2215   Dressing Status Clean, dry, & intact; Occlusive 04/19/22 2215   Dressing Type Tape;Transparent 04/19/22 2215   Hub Color/Line Status Pink; Infusing;Patent 04/19/22 2215   Action Taken Blood drawn 04/18/22 2325   Alcohol Cap Used No 04/19/22 2215       Peripheral IV 04/19/22 Left Hand (Active)   Site Assessment Clean, dry, & intact 04/19/22 2215   Phlebitis Assessment 0 04/19/22 2215   Infiltration Assessment 0 04/19/22 2215   Dressing Status Clean, dry, & intact; Occlusive 04/19/22 2215   Dressing Type Tape;Transparent 04/19/22 2215   Hub Color/Line Status Pink;Capped; Patent 04/19/22 2215   Alcohol Cap Used No 04/19/22 2215        Opportunity for questions and clarification was provided. Patient transported with:   O2 @ 0 liters    VTE prophylaxis orders have been written for [employer]. Patient and family given floor number and nurses name. Family updated re: pt status after security code verified.

## 2022-04-20 NOTE — PERIOP NOTES
D10 up per md orders after blood sugar-99 upon arrival to Preop. Pt remains asymptomatic and states she feels 'much better' and denies pain or lightheadedness.   Continue to await surgery/thogan,rn

## 2022-04-20 NOTE — PROGRESS NOTES
Hospitalist Progress Note   Admit Date:  2022  7:08 AM   Name:  Victorina Méndez   Age:  48 y.o. Sex:  female  :  1975   MRN:  [id number]   Room:  602/    Presenting Complaint: No chief complaint on file. Reason(s) for Admission: Hydronephrosis of right kidney [N13.30]  Ureteric stone [N20.1]  UTI (urinary tract infection) [N39.0]     Hospital Course & Interval History:   22-year-old female past medical history of type 1 diabetes, hypertension, CKD presented with chief complaint of worsening abdominal pain and right lower quadrant pain for the past several days with fevers and chills. Patient also reported continuous nausea with episodes of vomiting. Patient recently treated for urinary tract infection in March and completed antibiotics. CT of abdomen showed evidence of ureteric stone 3 mm with hydroureter/hydronephrosis. Case was discussed with urology. Subjective/24hr Events (22): Patient was seen and examined at the bedside. No overnight events. Patient continues to feel nauseous this morning. Patient denied any cardiac chest pain, shortness of breath, abdominal pain. Patient feeling slightly better than she did yesterday with the exception of the nausea. ROS:  10 systems reviewed and negative except as noted above.      Assessment & Plan:   Hydronephrosis of right kidney (2022)  -Secondary to kidney stone  - Urology consulted  - Status post cystoscopy with stent placement      Ureteric stone (2022)  -Urology consulted, appreciate recommendations  - Status post cystoscopy with stent placement  -will need outpatient follow-up with urology    Nausea  - As needed Zofran  - As needed Phenergan    UTI (urinary tract infection) (2022)  -Continue IV antibiotics    CKD stage III  - Pending improvement  - Follow-up daily BMP    Type 1 diabetes  - Glucose difficult to control  - Diabetic educator consulted  - Lantus 30 units nightly  - Sliding scale insulin    Discharge Planning:    Dispo pending clinical course    Diet:  ADULT DIET Regular; 3 carb choices (45 gm/meal)  DVT PPx: Heparin subcu  Code status: Full Code    Hospital Problems as of 4/20/2022 Date Reviewed: 11/16/2021          Codes Class Noted - Resolved POA    Ureteric stone ICD-10-CM: N20.1  ICD-9-CM: 592.1  4/19/2022 - Present Unknown        UTI (urinary tract infection) ICD-10-CM: N39.0  ICD-9-CM: 599.0  4/19/2022 - Present Unknown        * (Principal) Hydronephrosis of right kidney ICD-10-CM: N13.30  ICD-9-CM: 532  4/19/2022 - Present Unknown              Objective:     Patient Vitals for the past 24 hrs:   Temp Pulse Resp BP SpO2   04/20/22 0724 98.6 °F (37 °C) (!) 112 18 (!) 154/95 97 %   04/20/22 0457 -- (!) 103 -- -- --   04/20/22 0403 98.6 °F (37 °C) (!) 113 24 (!) 158/78 97 %   04/20/22 0131 -- (!) 112 -- (!) 172/88 --   04/20/22 0053 -- (!) 111 -- -- --   04/19/22 2346 -- (!) 116 -- -- --   04/19/22 2308 98.8 °F (37.1 °C) (!) 118 21 (!) 166/88 98 %   04/19/22 2240 98.7 °F (37.1 °C) (!) 114 17 (!) 173/85 97 %   04/19/22 2235 -- (!) 114 20 (!) 168/81 97 %   04/19/22 2230 -- (!) 118 20 (!) 161/80 94 %   04/19/22 2225 -- (!) 112 19 (!) 168/79 96 %   04/19/22 2220 -- (!) 110 20 (!) 145/70 97 %   04/19/22 2215 99.1 °F (37.3 °C) (!) 111 19 (!) 147/66 96 %   04/19/22 1932 98.6 °F (37 °C) 94 18 108/60 97 %   04/19/22 1902 -- 93 -- -- --   04/19/22 1553 98.5 °F (36.9 °C) 96 20 (!) 114/56 93 %     Oxygen Therapy  O2 Sat (%): 97 % (04/20/22 0724)  Pulse via Oximetry: 114 beats per minute (04/19/22 2240)  O2 Device: None (Room air) (04/19/22 2240)    Estimated body mass index is 26.15 kg/m² as calculated from the following:    Height as of this encounter: 5' 8\" (1.727 m). Weight as of this encounter: 78 kg (172 lb).     Intake/Output Summary (Last 24 hours) at 4/20/2022 1139  Last data filed at 4/20/2022 1043  Gross per 24 hour   Intake 1747 ml   Output 1950 ml   Net -203 ml         Physical Exam: Blood pressure (!) 154/95, pulse (!) 112, temperature 98.6 °F (37 °C), resp. rate 18, height 5' 8\" (1.727 m), weight 78 kg (172 lb), SpO2 97 %. General:    Well nourished. No overt distress  Head:  Normocephalic, atraumatic  Eyes:  Sclerae appear normal.  Pupils equally round. ENT:  Nares appear normal, no drainage. Moist oral mucosa  Neck:  No restricted ROM. Trachea midline   CV:   RRR. No m/r/g. No jugular venous distension. Lungs:   CTAB. No wheezing, rhonchi, or rales. Respirations even, unlabored  Abdomen: Bowel sounds present. Soft, mild flank tenderness to the right., nondistended. Extremities: No cyanosis or clubbing. No edema  Skin:     No rashes and normal coloration. Warm and dry. Neuro:  CN II-XII grossly intact. Sensation intact. A&Ox3  Psych:  Normal mood and affect.       I have reviewed ordered lab tests and independently visualized imaging below:    Recent Labs:  Recent Results (from the past 48 hour(s))   HCG URINE, QL. - POC    Collection Time: 04/18/22 10:58 PM   Result Value Ref Range    Pregnancy test,urine (POC) Negative NEG     CULTURE, BLOOD    Collection Time: 04/18/22 11:11 PM    Specimen: Blood   Result Value Ref Range    Special Requests: RIGHT  HAND        Culture result: NO GROWTH 1 DAY     CULTURE, BLOOD    Collection Time: 04/18/22 11:11 PM    Specimen: Blood   Result Value Ref Range    Special Requests: RIGHT ANTECUBITAL      Culture result: NO GROWTH 1 DAY     CBC WITH AUTOMATED DIFF    Collection Time: 04/18/22 11:13 PM   Result Value Ref Range    WBC 9.2 4.3 - 11.1 K/uL    RBC 4.49 4.05 - 5.2 M/uL    HGB 12.4 11.7 - 15.4 g/dL    HCT 39.4 35.8 - 46.3 %    MCV 87.8 79.6 - 97.8 FL    MCH 27.6 26.1 - 32.9 PG    MCHC 31.5 31.4 - 35.0 g/dL    RDW 15.4 (H) 11.9 - 14.6 %    PLATELET 122 261 - 026 K/uL    MPV 9.4 9.4 - 12.3 FL    ABSOLUTE NRBC 0.00 0.0 - 0.2 K/uL    DF AUTOMATED      NEUTROPHILS 65 43 - 78 %    LYMPHOCYTES 20 13 - 44 %    MONOCYTES 8 4.0 - 12.0 % EOSINOPHILS 7 0.5 - 7.8 %    BASOPHILS 0 0.0 - 2.0 %    IMMATURE GRANULOCYTES 0 0.0 - 5.0 %    ABS. NEUTROPHILS 5.9 1.7 - 8.2 K/UL    ABS. LYMPHOCYTES 1.8 0.5 - 4.6 K/UL    ABS. MONOCYTES 0.7 0.1 - 1.3 K/UL    ABS. EOSINOPHILS 0.7 0.0 - 0.8 K/UL    ABS. BASOPHILS 0.0 0.0 - 0.2 K/UL    ABS. IMM. GRANS. 0.0 0.0 - 0.5 K/UL   LIPASE    Collection Time: 04/18/22 11:13 PM   Result Value Ref Range    Lipase 13 (L) 73 - 393 U/L   URINE MICROSCOPIC    Collection Time: 04/18/22 11:13 PM   Result Value Ref Range    WBC >100 (H) 0 /hpf    RBC 3-5 0 /hpf    Epithelial cells 10-20 0 /hpf    Bacteria 1+ (H) 0 /hpf    Casts 0 0 /lpf   LACTIC ACID    Collection Time: 04/18/22 11:13 PM   Result Value Ref Range    Lactic acid 1.0 0.4 - 2.0 MMOL/L   CULTURE, URINE    Collection Time: 04/18/22 11:17 PM    Specimen: Clean catch; Urine   Result Value Ref Range    Special Requests: NO SPECIAL REQUESTS      Culture result:        50,000-100,000 COLONIES/mL MIXED SKIN SOHAM ISOLATED    Culture result: CULTURE IN PROGRESS,FURTHER UPDATES TO FOLLOW     METABOLIC PANEL, COMPREHENSIVE    Collection Time: 04/18/22 11:53 PM   Result Value Ref Range    Sodium 144 136 - 145 mmol/L    Potassium 3.0 (L) 3.5 - 5.1 mmol/L    Chloride 117 (H) 98 - 107 mmol/L    CO2 19 (L) 21 - 32 mmol/L    Anion gap 8 7 - 16 mmol/L    Glucose 227 (H) 65 - 100 mg/dL    BUN 12 6 - 23 MG/DL    Creatinine 1.68 (H) 0.6 - 1.0 MG/DL    GFR est AA 42 (L) >60 ml/min/1.73m2    GFR est non-AA 35 (L) >60 ml/min/1.73m2    Calcium 6.2 (L) 8.3 - 10.4 MG/DL    Bilirubin, total 0.2 0.2 - 1.1 MG/DL    ALT (SGPT) 15 12 - 65 U/L    AST (SGOT) 18 15 - 37 U/L    Alk.  phosphatase 139 (H) 50 - 136 U/L    Protein, total 5.5 (L) 6.3 - 8.2 g/dL    Albumin 2.1 (L) 3.5 - 5.0 g/dL    Globulin 3.4 2.3 - 3.5 g/dL    A-G Ratio 0.6 (L) 1.2 - 3.5     MAGNESIUM    Collection Time: 04/18/22 11:53 PM   Result Value Ref Range    Magnesium 1.6 (L) 1.8 - 2.4 mg/dL   POC VENOUS BLOOD GAS    Collection Time: 04/19/22  2:53 AM   Result Value Ref Range    Device: ROOM AIR      pH, venous (POC) 7.38 7.32 - 7.42      pCO2, venous (POC) 35.4 (L) 41 - 51 MMHG    pO2, venous (POC) 50 mmHg    HCO3, venous (POC) 21.0 (L) 23 - 28 MMOL/L    sO2, venous (POC) 84.9 65 - 88 %    Base deficit, venous (POC) 3.5 mmol/L    Specimen type (POC) VENOUS BLOOD      Performed by [facility]    EKG, 12 LEAD, INITIAL    Collection Time: 04/19/22  4:30 AM   Result Value Ref Range    Ventricular Rate 106 BPM    Atrial Rate 106 BPM    P-R Interval 152 ms    QRS Duration 80 ms    Q-T Interval 338 ms    QTC Calculation (Bezet) 448 ms    Calculated P Axis 43 degrees    Calculated R Axis 89 degrees    Calculated T Axis 18 degrees    Diagnosis       !! AGE AND GENDER SPECIFIC ECG ANALYSIS !!   Sinus tachycardia with Premature supraventricular complexes  Possible Left atrial enlargement  RSR' or QR pattern in V1 suggests right ventricular conduction delay  Abnormal ECG  When compared with ECG of 20-MAY-2020 19:46,  Premature supraventricular complexes are now Present  RSR' pattern in V1 is now Present  Anteroseptal infarct is now Present  Confirmed by ST MIR MORROW MD (), ADORE GRAJEDA ([id number]) on 4/19/2022 9:07:12 AM     GLUCOSE, POC    Collection Time: 04/19/22  7:12 AM   Result Value Ref Range    Glucose (POC) 536 (HH) 65 - 100 mg/dL    Performed by Houston Griffith    GLUCOSE, POC    Collection Time: 04/19/22  8:11 AM   Result Value Ref Range    Glucose (POC) 529 (HH) 65 - 100 mg/dL    Performed by Avinash    GLUCOSE, POC    Collection Time: 04/19/22 10:38 AM   Result Value Ref Range    Glucose (POC) 496 (HH) 65 - 100 mg/dL    Performed by [email or web address]    GLUCOSE, POC    Collection Time: 04/19/22 11:49 AM   Result Value Ref Range    Glucose (POC) 442 (H) 65 - 100 mg/dL    Performed by Avinash    GLUCOSE, POC    Collection Time: 04/19/22  2:33 PM   Result Value Ref Range    Glucose (POC) 331 (H) 65 - 100 mg/dL    Performed by Avinash    GLUCOSE, POC    Collection Time: 04/19/22  3:43 PM   Result Value Ref Range    Glucose (POC) 285 (H) 65 - 100 mg/dL    Performed by [facility]    POTASSIUM    Collection Time: 04/19/22  4:43 PM   Result Value Ref Range    Potassium 4.3 3.5 - 5.1 mmol/L   GLUCOSE, POC    Collection Time: 04/19/22  4:57 PM   Result Value Ref Range    Glucose (POC) 207 (H) 65 - 100 mg/dL    Performed by Avinash    GLUCOSE, POC    Collection Time: 04/19/22  7:25 PM   Result Value Ref Range    Glucose (POC) 99 65 - 100 mg/dL    Performed by Siomara    GLUCOSE, POC    Collection Time: 04/19/22  9:08 PM   Result Value Ref Range    Glucose (POC) 142 (H) 65 - 100 mg/dL    Performed by Siomara    GLUCOSE, POC    Collection Time: 04/19/22 10:22 PM   Result Value Ref Range    Glucose (POC) 164 (H) 65 - 100 mg/dL    Performed by Lu    GLUCOSE, POC    Collection Time: 04/19/22 11:05 PM   Result Value Ref Range    Glucose (POC) 249 (H) 65 - 100 mg/dL    Performed by Prerna Lucas, POC    Collection Time: 04/20/22  7:21 AM   Result Value Ref Range    Glucose (POC) 408 (H) 65 - 100 mg/dL    Performed by Tasha    CBC W/O DIFF    Collection Time: 04/20/22  8:01 AM   Result Value Ref Range    WBC 15.5 (H) 4.3 - 11.1 K/uL    RBC 4.09 4.05 - 5.2 M/uL    HGB 11.2 (L) 11.7 - 15.4 g/dL    HCT 35.2 (L) 35.8 - 46.3 %    MCV 86.1 79.6 - 97.8 FL    MCH 27.4 26.1 - 32.9 PG    MCHC 31.8 31.4 - 35.0 g/dL    RDW 15.9 (H) 11.9 - 14.6 %    PLATELET 120 312 - 428 K/uL    MPV 9.6 9.4 - 12.3 FL    ABSOLUTE NRBC 0.00 0.0 - 0.2 K/uL   MAGNESIUM    Collection Time: 04/20/22  8:01 AM   Result Value Ref Range    Magnesium 2.8 (H) 1.8 - 2.4 mg/dL   METABOLIC PANEL, COMPREHENSIVE    Collection Time: 04/20/22  8:01 AM   Result Value Ref Range    Sodium 147 (H) 136 - 145 mmol/L    Potassium 4.1 3.5 - 5.1 mmol/L    Chloride 114 (H) 98 - 107 mmol/L    CO2 21 21 - 32 mmol/L    Anion gap 12 7 - 16 mmol/L    Glucose 408 (H) 65 - 100 mg/dL    BUN 26 (H) 6 - 23 MG/DL    Creatinine 3.00 (H) 0.6 - 1.0 MG/DL    GFR est AA 21 (L) >60 ml/min/1.73m2    GFR est non-AA 18 (L) >60 ml/min/1.73m2    Calcium 8.5 8.3 - 10.4 MG/DL    Bilirubin, total 0.2 0.2 - 1.1 MG/DL    ALT (SGPT) 20 12 - 65 U/L    AST (SGOT) 18 15 - 37 U/L    Alk. phosphatase 163 (H) 50 - 136 U/L    Protein, total 7.2 6.3 - 8.2 g/dL    Albumin 2.9 (L) 3.5 - 5.0 g/dL    Globulin 4.3 (H) 2.3 - 3.5 g/dL    A-G Ratio 0.7 (L) 1.2 - 3.5         All Micro Results     None          Other Studies:  NC XR TECHNOLOGIST SERVICE    Result Date: 4/20/2022  Administrative Report Fluoroscopy was provided in the operating room ON 4/19/2022. Images may have been saved as a reference for the surgical procedure. The operative report can be viewed in [address] [place] [address] and/or retrieved by the Medical Records department.      A Radiologist has not reviewed images associated with this exam.      Current Meds:  Current Facility-Administered Medications   Medication Dose Route Frequency    prochlorperazine (COMPAZINE) with saline injection 5 mg  5 mg IntraVENous Q6H PRN    diphenhydrAMINE (BENADRYL) injection 25 mg  25 mg IntraVENous Q6H PRN    metoprolol (LOPRESSOR) injection 5 mg  5 mg IntraVENous Q6H PRN    acetaminophen (TYLENOL) tablet 650 mg  650 mg Oral Q6H PRN    Or    acetaminophen (TYLENOL) suppository 650 mg  650 mg Rectal Q6H PRN    ondansetron (ZOFRAN ODT) tablet 4 mg  4 mg Oral Q8H PRN    Or    ondansetron (ZOFRAN) injection 4 mg  4 mg IntraVENous Q6H PRN    polyethylene glycol (MIRALAX) packet 17 g  17 g Oral DAILY PRN    sodium chloride (NS) flush 5-40 mL  5-40 mL IntraVENous PRN    cefTRIAXone (ROCEPHIN) 1 g in 0.9% sodium chloride (MBP/ADV) 50 mL MBP  1 g IntraVENous Q24H    morphine injection 4 mg  4 mg IntraVENous Q4H PRN    insulin lispro (HUMALOG) injection   SubCUTAneous AC&HS    hydrALAZINE (APRESOLINE) 20 mg/mL injection 20 mg  20 mg IntraVENous Q4H PRN    insulin glargine (LANTUS) injection 30 Units  30 Units SubCUTAneous QHS    And    insulin lispro (HUMALOG) injection 5 Units  5 Units SubCUTAneous TID WITH MEALS    lactated Ringers infusion  250 mL/hr IntraVENous CONTINUOUS    dextrose 10% infusion 125-250 mL  125-250 mL IntraVENous PRN       Signed:  Neva Francois MD    Part of this note may have been written by using a voice dictation software. The note has been proof read but may still contain some grammatical/other typographical errors.

## 2022-04-20 NOTE — DIABETES MGMT
Patient admitted with hydronephrosis of right kidney. Patient has PMH of  Type 1 diabetes. Admitting blood glucose 227. Current HbA1c unavailable. Last available HgbA1c 9.6 in June 2020. Blood glucose ranged  yesterday with patient receiving Lantus 33 units and Humalog 35 units. Blood glucose this morning was 408 . Reviewed patient current regimen: Lantus 30 units, Humalog 5 units prandial, and Humalog correctional insulin. Patient would likely benefit from a change in correctional insulin to sensitive scale to reduce the risk of overcorrection of hyperglycemia and subsequent hypoglycemia. Provider updated via [employer] regarding recommendations and glycemic control. Patient seen for assessment regarding diabetes management. Patient resting in bed with eyes closed. Patient not interested in talking. Patient responds to questions appropriately. Patient itching all over (primary RN aware). Patient has a past medical history of DM type 1, CKD stage 4, and neuropathy. Patient states they have been living with diabetes since she was in her 25s. Per patient they typically check blood glucose levels 3 times a day. Patient states she did have a Dexcom, but is currently unable to afford the supplies. Patient states they are currently taking Novolin 70/30 40 units AM and 15 units PM vials from [facility] at home for management of diabetes. Patient voices that they have experienced hypoglycemia in the past. Educated regarding hypoglycemia signs, symptoms, and treatment. Patient has attended formal diabetes education in the past.  Discussed with patient [place] $35 coupon as an affordable insulin option. Patient does see Praful Moe with endocrinology. Noted patient states she cannot eat at this time. Patient received Humalog 7 units at 1155. Requested fingerstick blood sugar.

## 2022-04-20 NOTE — DIABETES MGMT
Finger stick blood sugar 114. Patient provided 8 oz apple juice as she states she cannot eat a meal right night and lunch tray is untouched. Patient required encouragement to drink juice to reduce risk of hypoglycemia. So far patient has drank 1 oz of juice. Encouraged to continue. Primary RN updated. Recommend POC glucose around 1400, if patient not willing or able to take PO intake may require dextrose if patient becomes hypoglycemic.

## 2022-04-20 NOTE — ANESTHESIA PREPROCEDURE EVALUATION
Anesthetic History     PONV          Review of Systems / Medical History  Patient summary reviewed, nursing notes reviewed and pertinent labs reviewed    Pulmonary                   Neuro/Psych         Psychiatric history     Cardiovascular                  Exercise tolerance: <4 METS     GI/Hepatic/Renal         Renal disease: CRI      Comments: Gastroenteritis Endo/Other    Diabetes: poorly controlled, type 1, using insulin         Other Findings              Physical Exam    Airway  Mallampati: I  TM Distance: 4 - 6 cm  Neck ROM: normal range of motion   Mouth opening: Normal     Cardiovascular  Regular rate and rhythm,  S1 and S2 normal,  no murmur, click, rub, or gallop  Rhythm: regular  Rate: normal      Pertinent negatives: No murmur   Dental    Dentition: Full upper dentures     Pulmonary  Breath sounds clear to auscultation               Abdominal  GI exam deferred       Other Findings            Anesthetic Plan    ASA: 3, emergent  Anesthesia type: general          Induction: Intravenous  Anesthetic plan and risks discussed with: Patient      LMA

## 2022-04-20 NOTE — PROGRESS NOTES
ACUTE PHYSICAL THERAPY GOALS:  (Developed with and agreed upon by patient and/or caregiver.)  (1.) Nicole Tang  will move from supine to sit and sit to supine , scoot up and down and roll side to side with INDEPENDENCE within 7 treatment day(s). (2.) Nicole Tang will transfer from bed to chair and chair to bed with INDEPENDENCE using the least restrictive device within 7 treatment day(s). (3.) Nicole Tang will ambulate with INDEPENDENCE for 450+ feet with the least restrictive device within 7 treatment day(s). (4.) Nicole Tang will perform standing static and dynamic balance activities x 25 minutes with INDEPENDENCE to improve safety within 7 treatment day(s). (5.) Nicole Tang will perform therapeutic exercises x 20 min for HEP with INDEPENDENCE to improve strength, endurance, and functional mobility within 7 treatment day(s).      PHYSICAL THERAPY ASSESSMENT: Initial Assessment PT Treatment Day #      Nicole Tang is a 48 y.o. female   PRIMARY DIAGNOSIS: Hydronephrosis of right kidney  Hydronephrosis of right kidney [N13.30]  Ureteric stone [N20.1]  UTI (urinary tract infection) [N39.0]  Procedure(s) (LRB):  CYSTOSCOPY, RIGHT STENT PLACEMENT (Right)  1 Day Post-Op  Reason for Referral:    ICD-10: Treatment Diagnosis: Difficulty in walking, Not elsewhere classified (R26.2)  Other abnormalities of gait and mobility (R26.89)  OBSERVATION: Payor[place] BLUE CR[place] / [place] [facility] / Product Type: PPO /     ASSESSMENT:     REHAB RECOMMENDATIONS:   Recommendation to date pending progress:  Setting:   No further skilled therapy after discharge from hospital  Equipment:    None     PRIOR LEVEL OF FUNCTION:  (Prior to Hospitalization) INITIAL/CURRENT LEVEL OF FUNCTION:  (Most Recently Demonstrated)   Bed Mobility:   Independent  Sit to Stand:   Independent  Transfers:   Independent  Gait/Mobility:   Independent Bed Mobility:   Standby Assistance  Sit to Stand:   Contact Guard Assistance  Transfers:   Contact Guard Assistance  Gait/Mobility:   Contact Guard Assistance     ASSESSMENT:  Ms. Belgica Monzon is a 48year old F who presents s/p cystoscoy and R stent placement. This date pt performs mobility including bed mobility, sitting balance activities, sit <> stand transfers, and a bit of ambulation around the room with CGA. Pt lethargic and with mild unsteadiness, appears to be quite drowsy. Did not tolerate much mobility, she states she will do fine once she is feeling better. Will keep on caseload as a precaution to ensure return to baseline prior to DC. Pt presents as functioning below her baseline, with deficits in mobility including transfers, gait, balance, and activity tolerance. Pt will benefit from skilled therapy services to address stated deficits to promote return to highest level of function, independence, and safety. Will continue to follow. SUBJECTIVE:   Ms. Belgica Monzon states, \"I am just so tired\"    SOCIAL HISTORY/LIVING ENVIRONMENT: Lives alone in a 1 level home with 3 steps to enter with a handrail. No falls, no DME use.   Home Environment: Independent living  One/Two Story Residence: One story  Living Alone: Yes  Support Systems: Parent(s)  OBJECTIVE:     PAIN: VITAL SIGNS: LINES/DRAINS:   Pre Treatment: Pain Screen  Pain Scale 1: Numeric (0 - 10)  Pain Intensity 1: 0  Post Treatment: 0/10   None  O2 Device: None (Room air)     GROSS EVALUATION:  BLE Within Functional Limits Abnormal/ Functional Abnormal/ Non-Functional (see comments) Not Tested Comments:   AROM [x] [] [] []    PROM [x] [] [] []    Strength [x] [] [] []    Balance [] [x] [] []  unsteadiness on feet   Posture [x] [] [] []    Sensation [x] [] [] []    Coordination [x] [] [] []    Tone [] [] [] [x]    Edema [] [] [] [x]    Activity Tolerance [] [x] [] []  fatigues quickly    [] [] [] []      COGNITION/  PERCEPTION: Intact Impaired   (see comments) Comments:   Orientation [x] []    Vision [x] []    Hearing [x] []    Command Following [x] []    Safety Awareness [x] []     [] []      MOBILITY: I Mod I S SBA CGA Min Mod Max Total  NT x2 Comments:   Bed Mobility    Rolling [] [] [] [x] [] [] [] [] [] [] []    Supine to Sit [] [] [] [x] [] [] [] [] [] [] []    Scooting [] [] [] [x] [] [] [] [] [] [] []    Sit to Supine [] [] [] [x] [] [] [] [] [] [] []    Transfers    Sit to Stand [] [] [] [] [x] [] [] [] [] [] []    Bed to Chair [] [] [] [] [x] [] [] [] [] [] []    Stand to Sit [] [] [] [] [x] [] [] [] [] [] []    I=Independent, Mod I=Modified Independent, S=Supervision, SBA=Standby Assistance, CGA=Contact Guard Assistance,   Min=Minimal Assistance, Mod=Moderate Assistance, Max=Maximal Assistance, Total=Total Assistance, NT=Not Tested  GAIT: I Mod I S SBA CGA Min Mod Max Total  NT x2 Comments:   Level of Assistance [] [] [] [] [x] [] [] [] [] [] []    Distance 20 ft    DME None    Gait Quality Slow, unsteady    Weightbearing Status N/A     I=Independent, Mod I=Modified Independent, S=Supervision, SBA=Standby Assistance, CGA=Contact Guard Assistance,   Min=Minimal Assistance, Mod=Moderate Assistance, Max=Maximal Assistance, Total=Total Assistance, NT=Not Tested    [facility] Form       How much difficulty does the patient currently have. .. Unable A Lot A Little None   1. Turning over in bed (including adjusting bedclothes, sheets and blankets)? [] 1   [] 2   [] 3   [x] 4   2. Sitting down on and standing up from a chair with arms ( e.g., wheelchair, bedside commode, etc.)   [] 1   [] 2   [x] 3   [] 4   3. Moving from lying on back to sitting on the side of the bed? [] 1   [] 2   [] 3   [x] 4   How much help from another person does the patient currently need. .. Total A Lot A Little None   4. Moving to and from a bed to a chair (including a wheelchair)? [] 1   [] 2   [] 3   [x] 4   5. Need to walk in hospital room? [] 1   [] 2   [x] 3   [] 4   6. Climbing 3-5 steps with a railing? [] 1   [] 2   [x] 3   [] 4   © 2007, Trustees of [address] [place] [address] [address] [address], under license to [employer]. All rights reserved     Score:  Initial: 21 Most Recent: X (Date: -- )    Interpretation of Tool:  Represents activities that are increasingly more difficult (i.e. Bed mobility, Transfers, Gait). PLAN:   FREQUENCY/DURATION: PT Plan of Care: 3 times/week for duration of hospital stay or until stated goals are met, whichever comes first.    PROBLEM LIST:   (Skilled intervention is medically necessary to address:)  1. Decreased Activity Tolerance  2. Decreased Balance  3. Decreased Coordination  4. Decreased Gait Ability  5. Decreased Strength  6. Decreased Transfer Abilities   INTERVENTIONS PLANNED:   (Benefits and precautions of physical therapy have been discussed with the patient.)  1. Therapeutic Activity  2. Therapeutic Exercise/HEP  3. Neuromuscular Re-education  4. Gait Training  5. Manual Therapy  6.  Education     TREATMENT:     EVALUATION: Low Complexity : (Untimed Charge)    TREATMENT:   (     )  No tx provided or billed this date    TREATMENT GRID:  N/A    AFTER TREATMENT POSITION/PRECAUTIONS:  Bed, Needs within reach and RN notified    INTERDISCIPLINARY COLLABORATION:  RN/PCT, PT/PTA and OT/MCCLELLAND    TOTAL TREATMENT DURATION:  PT Patient Time In/Time Out  Time In: 1126  Time Out: [address] [facility],

## 2022-04-20 NOTE — PROGRESS NOTES
Ch attempted to visit PT twice. PT was with Staff or sleeping. [address] prayed for PT as chart states PT is [address]. Vandana Damian M.Div. .

## 2022-04-20 NOTE — ANESTHESIA POSTPROCEDURE EVALUATION
Procedure(s):  CYSTOSCOPY, RIGHT STENT PLACEMENT. general    Anesthesia Post Evaluation      Multimodal analgesia: multimodal analgesia used between 6 hours prior to anesthesia start to PACU discharge  Patient location during evaluation: bedside  Patient participation: complete - patient participated  Level of consciousness: awake and responsive to light touch  Pain management: adequate  Airway patency: patent  Anesthetic complications: no  Cardiovascular status: acceptable, hemodynamically stable, blood pressure returned to baseline and stable  Respiratory status: acceptable, unassisted, spontaneous ventilation and nonlabored ventilation  Hydration status: acceptable  Post anesthesia nausea and vomiting:  controlled  Final Post Anesthesia Temperature Assessment:  Normothermia (36.0-37.5 degrees C)      INITIAL Post-op Vital signs:   Vitals Value Taken Time   /70 04/19/22 2218   Temp     Pulse 111 04/19/22 2223   Resp     SpO2 97 % 04/19/22 2223   Vitals shown include unvalidated device data.

## 2022-04-20 NOTE — BRIEF OP NOTE
Brief Postoperative Note    Patient: Yulissa Bethea  YOB: 1975  MRN: [id number]    Date of Procedure: 4/19/2022     Pre-Op Diagnosis: RIGHT URETERAL STONE    Post-Op Diagnosis: Same as preoperative diagnosis. Procedure(s):  CYSTOSCOPY, RIGHT URETERAL STENT PLACEMENT    Surgeon(s): Eulalio Ramirez MD    Surgical Assistant: None    Anesthesia: General     Estimated Blood Loss (mL): Minimal    Complications: None    Specimens: * No specimens in log *     Implants:   Implant Name Type Inv. Item Serial No.  Lot No. LRB No. Used Action   STENT URET 6F 26CM -- [address] [place] [address] [id number] - [id number]  STENT URET 6F 26CM -- [address] [place] [address] [id number]  [facility] UROLOGY_ [id number] Right 1 Implanted       Drains: * No LDAs found *    Findings: Cloudy urine.       Electronically Signed by Haresh Logan MD on 4/19/2022 at 10:08 PM

## 2022-04-20 NOTE — PROGRESS NOTES
.. TRANSFER - IN REPORT:    Verbal report received from JERMAINE Albarran on [employer]  being received from PACU for routine post - op      Report consisted of patients Situation, Background, Assessment and   Recommendations(SBAR). Information from the following report(s) Kardex, OR Summary and MAR was reviewed with the receiving nurse. Opportunity for questions and clarification was provided. Assessment completed upon patients arrival to unit and care assumed.

## 2022-04-20 NOTE — PROGRESS NOTES
ACUTE OT GOALS:  (Developed with and agreed upon by patient and/or caregiver.)  1. Patient will complete total body bathing and dressing with independence. 2. Patient will complete toileting with independence. 3. Patient will tolerate 30 minutes of OT treatment with 1-2 rest breaks to increase activity tolerance for ADLs. 4. Patient will complete functional transfers and functional mobility with independence and good safety awareness. Timeframe: 7 visits       OCCUPATIONAL THERAPY ASSESSMENT: Initial Assessment OT Treatment Day Liudmila Rosario is a 48 y.o. female   PRIMARY DIAGNOSIS: Hydronephrosis of right kidney  Hydronephrosis of right kidney [N13.30]  Ureteric stone [N20.1]  UTI (urinary tract infection) [N39.0]  Procedure(s) (LRB):  CYSTOSCOPY, RIGHT STENT PLACEMENT (Right)  1 Day Post-Op  Reason for Referral:    ICD-10: Treatment Diagnosis: Generalized Muscle Weakness (M62.81)  Other lack of cordination (R27.8)  OBSERVATION: Pay[place]UE CR[place] [address] / Product Type: PPO /   ASSESSMENT:     REHAB RECOMMENDATIONS:   Recommendation to date pending progress:  Setting:   Likely no needs at discharge  Equipment:    To Be Determined     PRIOR LEVEL OF FUNCTION:  (Prior to Hospitalization)  INITIAL/CURRENT LEVEL OF FUNCTION:  (Based on today's evaluation)   Bathing:   Independent  Dressing:   Independent  Feeding/Grooming:   Independent  Toileting:   Independent  Functional Mobility:   Independent Bathing:   Contact Guard Assistance  [address]in [address]  Feeding/Grooming:   Set Up  Toileting:   Contact Guard Assistance  Functional Mobility:   Contact Guard Assistance     ASSESSMENT:  Ms. Rosa Sanchez presents to the hospital with hydronephrosis and ureteric stone and is s/p cystoscopy/stent placement. Pt lives alone and baseline and typically independent. Pt appears \"sleepy\" and appears drowsy throughout session.  Pt reports difficulty keeping anything down but denies any vomiting today. Pt sat to edge of bed with SBA and was able to don R sock with encouragement and therapist assisted with L sock due to pt having swelling in L hand with decreased use. Pt completed standing and functional mobility around in the room and was a bit unsteady needing CGA for balance (medication related?) Pt did not tolerate much activity today and was asking to return back to bed. Pt currently demonstrated decreased activity tolerance but will likely have no needs at discharge. Will continue to follow for acute OT services to address stated goals and plan of care.       SUBJECTIVE:   Ms. Kenia Batres states, \"I'm just so sleepy\"    SOCIAL HISTORY/LIVING ENVIRONMENT: Lives alone; 1 level home; 3 ARSEN; tub/shower; independent with ADL/functional mobility; no falls; drives and gets out into community with no difficulty at baseline  Home Environment: Independent living  One/Two Story Residence: One story  Living Alone: Yes  Support Systems: Parent(s)    OBJECTIVE:     PAIN: VITAL SIGNS: LINES/DRAINS:   Pre Treatment: Pain Screen  Pain Scale 1: Numeric (0 - 10)  Pain Intensity 1: 0  Post Treatment: 0   None  O2 Device: None (Room air)     GROSS EVALUATION:   Within Functional Limits Abnormal/ Functional Abnormal/ Non-Functional (see comments) Not Tested Comments:   AROM [x] [] [] []    PROM [] [] [] [x]    Strength [] [x] [] [] Generally decreased   Balance [] [x] [] [] Fair+ sitting; fair standing   Posture [] [x] [] []    Sensation [] [x] [] [] Numbness in L hand    Coordination [] [x] [] []    Tone [x] [] [] []    Edema [] [x] [] [] L hand (where IV previously placed)   Activity Tolerance [] [x] [] []     [] [] [] []      COGNITION/  PERCEPTION: Intact Impaired   (see comments) Comments:   Orientation [x] []    Vision [x] []    Hearing [x] []    Judgment/ Insight [x] []    Attention [] [x] drowsy   Memory [x] []    Command Following [x] []    Emotional Regulation [x] []     [] []      ACTIVITIES OF DAILY LIVING: I Mod I S SBA CGA Min Mod Max Total NT Comments   BASIC ADLs:              Bathing/ Showering [] [] [] [] [] [] [] [] [] [x]    Toileting [] [] [] [] [] [] [] [] [] [x]    Dressing [] [] [] [] [] [x] [] [] [] [] Donning L sock    Feeding [] [] [] [] [] [] [] [] [] [x]    Grooming [] [] [] [] [x] [] [] [] [] []    Personal Device Care [] [] [] [] [] [] [] [] [] [x]    Functional Mobility [] [] [] [] [x] [] [] [] [] []    I=Independent, Mod I=Modified Independent, S=Supervision, SBA=Standby Assistance, CGA=Contact Guard Assistance,   Min=Minimal Assistance, Mod=Moderate Assistance, Max=Maximal Assistance, Total=Total Assistance, NT=Not Tested    MOBILITY: I Mod I S SBA CGA Min Mod Max Total  NT x2 Comments:   Supine to sit [] [] [] [x] [] [] [] [] [] [] []    Sit to supine [] [] [] [x] [] [] [] [] [] [] []    Sit to stand [] [] [] [] [x] [] [] [] [] [] []    Bed to chair [] [] [] [] [x] [] [] [] [] [] []    I=Independent, Mod I=Modified Independent, S=Supervision, SBA=Standby Assistance, CGA=Contact Guard Assistance,   Min=Minimal Assistance, Mod=Moderate Assistance, Max=Maximal Assistance, Total=Total Assistance, NT=Not Tested    [address] [facility] [place] [address] [address] AM-PAC 6 Clicks   Daily Activity Inpatient Short Form        How much help from another person does the patient currently need. .. Total A Lot A Little None   1. Putting on and taking off regular lower body clothing? [] 1   [] 2   [x] 3   [] 4   2. Bathing (including washing, rinsing, drying)? [] 1   [] 2   [x] 3   [] 4   3. Toileting, which includes using toilet, bedpan or urinal?   [] 1   [] 2   [x] 3   [] 4   4. Putting on and taking off regular upper body clothing? [] 1   [] 2   [x] 3   [] 4   5. Taking care of personal grooming such as brushing teeth? [] 1   [] 2   [x] 3   [] 4   6. Eating meals? [] 1   [] 2   [x] 3   [] 4   © 2007, Trustees of [address] [place] [address] [address] [address], under license to [facility].  All rights reserved Score:  Initial: 18 Most Recent: X (Date: -- )   Interpretation of Tool:  Represents activities that are increasingly more difficult (i.e. Bed mobility, Transfers, Gait). PLAN:   FREQUENCY/DURATION: OT Plan of Care: 3 times/week for duration of hospital stay or until stated goals are met, whichever comes first.    PROBLEM LIST:   (Skilled intervention is medically necessary to address:)  1. Decreased ADL/Functional Activities  2. Decreased Activity Tolerance  3. Decreased Balance  4. Decreased Coordination  5. Decreased Gait Ability  6. Decreased Strength  7. Decreased Transfer Abilities   INTERVENTIONS PLANNED:   (Benefits and precautions of occupational therapy have been discussed with the patient.)  1. Self Care Training  2. Therapeutic Activity  3. Therapeutic Exercise/HEP  4. Neuromuscular Re-education  5.  Education     TREATMENT:     EVALUATION: Low Complexity : (Untimed Charge)    TREATMENT:   (     )  Evaluation only    TREATMENT GRID:  N/A    AFTER TREATMENT POSITION/PRECAUTIONS:  Bed, Needs within reach and RN notified    INTERDISCIPLINARY COLLABORATION:  RN/PCT, PT/PTA and OT/MCCLELLAND    TOTAL TREATMENT DURATION:  OT Patient Time In/Time Out  Time In: 1126  Time Out: Ca Orozco 1160, OT

## 2022-04-21 PROBLEM — E10.65 HYPERGLYCEMIA DUE TO TYPE 1 DIABETES MELLITUS (HCC): Status: ACTIVE | Noted: 2022-04-21

## 2022-04-21 PROBLEM — E83.42 HYPOMAGNESEMIA: Status: RESOLVED | Noted: 2022-04-18 | Resolved: 2022-04-20

## 2022-04-21 PROBLEM — N17.9 AKI (ACUTE KIDNEY INJURY) (HCC): Status: ACTIVE | Noted: 2022-04-21

## 2022-04-21 PROBLEM — E87.6 HYPOKALEMIA: Status: RESOLVED | Noted: 2022-04-18 | Resolved: 2022-04-20

## 2022-04-21 LAB
ALBUMIN SERPL-MCNC: 3.1 G/DL (ref 3.5–5)
ALBUMIN/GLOB SERPL: 0.7 {RATIO} (ref 1.2–3.5)
ALP SERPL-CCNC: 160 U/L (ref 50–136)
ALT SERPL-CCNC: 17 U/L (ref 12–65)
ANION GAP SERPL CALC-SCNC: 7 MMOL/L (ref 7–16)
AST SERPL-CCNC: 19 U/L (ref 15–37)
BACTERIA SPEC CULT: NORMAL
BACTERIA SPEC CULT: NORMAL
BILIRUB SERPL-MCNC: 0.3 MG/DL (ref 0.2–1.1)
BUN SERPL-MCNC: 20 MG/DL (ref 6–23)
CALCIUM SERPL-MCNC: 8.7 MG/DL (ref 8.3–10.4)
CHLORIDE SERPL-SCNC: 115 MMOL/L (ref 98–107)
CO2 SERPL-SCNC: 24 MMOL/L (ref 21–32)
CREAT SERPL-MCNC: 2.6 MG/DL (ref 0.6–1)
ERYTHROCYTE [DISTWIDTH] IN BLOOD BY AUTOMATED COUNT: 15.8 % (ref 11.9–14.6)
GLOBULIN SER CALC-MCNC: 4.5 G/DL (ref 2.3–3.5)
GLUCOSE BLD STRIP.AUTO-MCNC: 120 MG/DL (ref 65–100)
GLUCOSE BLD STRIP.AUTO-MCNC: 222 MG/DL (ref 65–100)
GLUCOSE BLD STRIP.AUTO-MCNC: 72 MG/DL (ref 65–100)
GLUCOSE BLD STRIP.AUTO-MCNC: 93 MG/DL (ref 65–100)
GLUCOSE SERPL-MCNC: 227 MG/DL (ref 65–100)
HCT VFR BLD AUTO: 35.8 % (ref 35.8–46.3)
HGB BLD-MCNC: 11.4 G/DL (ref 11.7–15.4)
MAGNESIUM SERPL-MCNC: 2.5 MG/DL (ref 1.8–2.4)
MCH RBC QN AUTO: 27.7 PG (ref 26.1–32.9)
MCHC RBC AUTO-ENTMCNC: 31.8 G/DL (ref 31.4–35)
MCV RBC AUTO: 87.1 FL (ref 79.6–97.8)
NRBC # BLD: 0 K/UL (ref 0–0.2)
PLATELET # BLD AUTO: 331 K/UL (ref 150–450)
PMV BLD AUTO: 9.6 FL (ref 9.4–12.3)
POTASSIUM SERPL-SCNC: 3.6 MMOL/L (ref 3.5–5.1)
PROT SERPL-MCNC: 7.6 G/DL (ref 6.3–8.2)
RBC # BLD AUTO: 4.11 M/UL (ref 4.05–5.2)
SERVICE CMNT-IMP: ABNORMAL
SERVICE CMNT-IMP: ABNORMAL
SERVICE CMNT-IMP: NORMAL
SODIUM SERPL-SCNC: 146 MMOL/L (ref 136–145)
WBC # BLD AUTO: 12.8 K/UL (ref 4.3–11.1)

## 2022-04-21 PROCEDURE — 76937 US GUIDE VASCULAR ACCESS: CPT

## 2022-04-21 PROCEDURE — 82962 GLUCOSE BLOOD TEST: CPT

## 2022-04-21 PROCEDURE — 74011250637 HC RX REV CODE- 250/637: Performed by: HOSPITALIST

## 2022-04-21 PROCEDURE — 96376 TX/PRO/DX INJ SAME DRUG ADON: CPT

## 2022-04-21 PROCEDURE — 77030041974 HC CATH SYS PERIPH TELE -B

## 2022-04-21 PROCEDURE — 85027 COMPLETE CBC AUTOMATED: CPT

## 2022-04-21 PROCEDURE — 74011000250 HC RX REV CODE- 250: Performed by: HOSPITALIST

## 2022-04-21 PROCEDURE — 74011000250 HC RX REV CODE- 250: Performed by: FAMILY MEDICINE

## 2022-04-21 PROCEDURE — 74011250636 HC RX REV CODE- 250/636: Performed by: ANESTHESIOLOGY

## 2022-04-21 PROCEDURE — 36415 COLL VENOUS BLD VENIPUNCTURE: CPT

## 2022-04-21 PROCEDURE — 74011250636 HC RX REV CODE- 250/636: Performed by: FAMILY MEDICINE

## 2022-04-21 PROCEDURE — 74011000258 HC RX REV CODE- 258: Performed by: HOSPITALIST

## 2022-04-21 PROCEDURE — 77030018842 HC SOL IRR SOD CL 9% BAXT -A

## 2022-04-21 PROCEDURE — 74011250636 HC RX REV CODE- 250/636: Performed by: STUDENT IN AN ORGANIZED HEALTH CARE EDUCATION/TRAINING PROGRAM

## 2022-04-21 PROCEDURE — 74011250636 HC RX REV CODE- 250/636: Performed by: HOSPITALIST

## 2022-04-21 PROCEDURE — 74011636637 HC RX REV CODE- 636/637: Performed by: STUDENT IN AN ORGANIZED HEALTH CARE EDUCATION/TRAINING PROGRAM

## 2022-04-21 PROCEDURE — 83735 ASSAY OF MAGNESIUM: CPT

## 2022-04-21 PROCEDURE — 96372 THER/PROPH/DIAG INJ SC/IM: CPT

## 2022-04-21 PROCEDURE — 80053 COMPREHEN METABOLIC PANEL: CPT

## 2022-04-21 PROCEDURE — G0378 HOSPITAL OBSERVATION PER HR: HCPCS

## 2022-04-21 PROCEDURE — 2709999900 HC NON-CHARGEABLE SUPPLY

## 2022-04-21 PROCEDURE — 96375 TX/PRO/DX INJ NEW DRUG ADDON: CPT

## 2022-04-21 RX ADMIN — DIPHENHYDRAMINE HYDROCHLORIDE 25 MG: 50 INJECTION, SOLUTION INTRAMUSCULAR; INTRAVENOUS at 05:57

## 2022-04-21 RX ADMIN — INSULIN LISPRO 2 UNITS: 100 INJECTION, SOLUTION INTRAVENOUS; SUBCUTANEOUS at 08:12

## 2022-04-21 RX ADMIN — CEFTRIAXONE 1 G: 1 INJECTION, POWDER, FOR SOLUTION INTRAMUSCULAR; INTRAVENOUS at 00:07

## 2022-04-21 RX ADMIN — DIPHENHYDRAMINE HYDROCHLORIDE 25 MG: 50 INJECTION, SOLUTION INTRAMUSCULAR; INTRAVENOUS at 00:03

## 2022-04-21 RX ADMIN — CEFTRIAXONE 1 G: 1 INJECTION, POWDER, FOR SOLUTION INTRAMUSCULAR; INTRAVENOUS at 23:35

## 2022-04-21 RX ADMIN — HEPARIN SODIUM 5000 UNITS: 5000 INJECTION INTRAVENOUS; SUBCUTANEOUS at 21:41

## 2022-04-21 RX ADMIN — ACETAMINOPHEN 650 MG: 325 TABLET ORAL at 19:41

## 2022-04-21 RX ADMIN — SODIUM CHLORIDE, POTASSIUM CHLORIDE, SODIUM LACTATE AND CALCIUM CHLORIDE 75 ML/HR: 600; 310; 30; 20 INJECTION, SOLUTION INTRAVENOUS at 19:03

## 2022-04-21 RX ADMIN — HEPARIN SODIUM 5000 UNITS: 5000 INJECTION INTRAVENOUS; SUBCUTANEOUS at 05:55

## 2022-04-21 RX ADMIN — SODIUM CHLORIDE, PRESERVATIVE FREE 10 ML: 5 INJECTION INTRAVENOUS at 21:57

## 2022-04-21 RX ADMIN — PROCHLORPERAZINE EDISYLATE 5 MG: 5 INJECTION INTRAMUSCULAR; INTRAVENOUS at 01:31

## 2022-04-21 RX ADMIN — SODIUM CHLORIDE, PRESERVATIVE FREE 10 ML: 5 INJECTION INTRAVENOUS at 13:18

## 2022-04-21 RX ADMIN — SODIUM CHLORIDE, POTASSIUM CHLORIDE, SODIUM LACTATE AND CALCIUM CHLORIDE 250 ML/HR: 600; 310; 30; 20 INJECTION, SOLUTION INTRAVENOUS at 10:28

## 2022-04-21 RX ADMIN — INSULIN LISPRO 5 UNITS: 100 INJECTION, SOLUTION INTRAVENOUS; SUBCUTANEOUS at 08:11

## 2022-04-21 RX ADMIN — HEPARIN SODIUM 5000 UNITS: 5000 INJECTION INTRAVENOUS; SUBCUTANEOUS at 13:17

## 2022-04-21 RX ADMIN — HYDRALAZINE HYDROCHLORIDE 20 MG: 20 INJECTION INTRAMUSCULAR; INTRAVENOUS at 05:55

## 2022-04-21 RX ADMIN — PROCHLORPERAZINE EDISYLATE 5 MG: 5 INJECTION INTRAMUSCULAR; INTRAVENOUS at 16:08

## 2022-04-21 NOTE — PROGRESS NOTES
Hourly rounds completed. All needs met. 1955 Pt without an adequate IV, however, pt c/o itching. Made MD aware. Orders placed for PO benedryl. After several attempts, ICU rosharon was able to place an IV. However, IV was not patent enough for prescribed continuous IVF. Called VAT to place a more patent IV in patient d/t patient being a hard stick. Pt c/o itching and n/v. Interventions per MAR. Pt hypertensive this am but asymptomatic. Interventions per MAR. Will give report to the oncoming day shift nurse.

## 2022-04-21 NOTE — DIABETES MGMT
Patient admitted with hydronephrosis of right kidney. Blood glucose ranged  yesterday with patient receiving Lantus 30 units and Humalog 23 units. Blood glucose this morning was 222. Creatinine 2.60. GFR 25. Reviewed with patient current regimen: Lantus 30 units nightly, Humalog 5 units with meals, and Humalog correctional insulin. Noted patient breakfast tray untouched. Patient states she does not want to eat right now, educated patient that if she chooses not to eat she needs to let primary RN know so she does not receive her prandial insulin that is meant to cover caloric intake. Patient verbalized understanding. Reviewed affordability of insulin information. Patient accepted coupons for $35 per month Humalog pens (already have prescription at pharmacy) and [address] (will need prescription for at discharge). Provider updated regarding patient insulin cost concerns. Encouraged compliance with discharge regimen. Reviewed prevention of DKA and sick day management. Patient voices PMH of DKA. Encouraged patient to continue to work on lifestyle modifications and to follow up with endocrinology  for further titration of regimen. Blood glucose log provided, encouraged patient to fill out and take to endocrinology appointment for further titration of regimen. Reviewed target blood glucose goals. Patient verbalized understanding and voices no further questions regarding diabetes management at this time.

## 2022-04-21 NOTE — PROGRESS NOTES
Patient has vomited several times this shift, clear in color. Compazine given, as patients choice over zofran. Pt states she has not been in any pain this shift.

## 2022-04-21 NOTE — PROGRESS NOTES
US Guided PIV access-   Skin was cleaned and disinfected prior to IV puncture. Ultrasound was used to find the vein which was compressible and does not have any ultrasound features of an artery or nerve bundle. Under real-time ultrasound guidance peripheral access was obtained in the left forearm using 20 G 6 CM Extended Dwell Peripheral IV catheter LOT # [id number] . Blood return was present and IV flushed without difficulty with no clinical signs of infiltration. IV dressing applied and there were no immediate complications noted and patient tolerated the procedure well.

## 2022-04-21 NOTE — PROGRESS NOTES
Hospitalist Progress Note   Admit Date:  2022  7:08 AM   Name:  Yves Dee   Age:  48 y.o. Sex:  female  :  1975   MRN:  [id number]   Room:  602/    Presenting Complaint: No chief complaint on file. Reason(s) for Admission: Hydronephrosis of right kidney [N13.30]  Ureteric stone [N20.1]  UTI (urinary tract infection) [N39.0]     Hospital Course & Interval History:   Patient is a 35 y/o female with PMH Type 1 DM, HTN, CKD III who presented with cc worsening RLQ abdominal pain of several-day duration a/w fever, chills, nausea, vomiting. Patient completed antibiotic course for Klebsiella pneumoniae UTI 2022. CT A/P showed evidence of obstructing right ureteric stone 3 mm causing hydronephrosis, ureteritis, and appendix grossly-normal. Urology was consulted. Patient underwent cystoscopy with right ureteral stent placement  with Dr. Nikita De Leon. Renal function improving. UCx finalized contaminated, will continue po antibiotic at discharge to complete 10 day course. Anticipate d/c in am, outpatient Urology follow-up 1-2 weeks. Subjective/24hr Events (22): Patient evaluated supine in bed, denies chest pain, abdominal pain, SOB. Still with nausea.      Assessment & Plan:     Ureteric stone   -s/p cystoscopy with right ureteral stent placement  with Dr. Nikita De Leon, Urology  -Urology will schedule 1-2 week follow-up for cystoscopy, right ureteroscopy, laser lithotripsy and right ureteral stent exchange    Hydronephrosis of right kidney   -due to above, s/p cystoscopy and stent placement with Urology    UTI (urinary tract infection)   -UA infectious, UCx finalized contaminated, cont Ceftriaxone day 3, will transition to po agent at discharge for additional 7 day course at discharge (10 day total)     MEGHANA on CKD Stage III ([address]  -Improving, dec IVF today, monitor am BMP    Type 1 Diabetes Mellitus ([address]  -DM educator following, appreciate assistance  -Cost of insulin an issue, providing coupons and adjusting to Basaglar at d/c  -Cont Lantus 30 U QHS, Humalog prandial 5 units, and Humalog SSI ACHS    Hypertension  -Patient denies home medications, cont prn Hydralazine and Metoprolol, dec IVF today, should improve BP, may need to initiate ACEi or ARB    Nausea  -prn anti-emetics    Hypokalemia, resolved  Hypomagnesemia, resolved    Discharge Planning:  Anticipate d/c home in am    Diet:  ADULT DIET Regular; 3 carb choices (45 gm/meal)  DVT PPx: Heparin SQ  Code status: Full Code    Hospital Problems as of 4/21/2022 Date Reviewed: 11/16/2021          Codes Class Noted - Resolved POA    MEGHANA (acute kidney injury) ([facility] ICD-10-CM: N17.9  ICD-9-CM: 584.9  4/21/2022 - Present Unknown        Ureteric stone ICD-10-CM: N20.1  ICD-9-CM: 592.1  4/19/2022 - Present Unknown        UTI (urinary tract infection) ICD-10-CM: N39.0  ICD-9-CM: 599.0  4/19/2022 - Present Unknown        * (Principal) Hydronephrosis of right kidney ICD-10-CM: N13.30  ICD-9-CM: 620  4/19/2022 - Present Unknown        Diabetes mellitus type 1 ([facility] ICD-10-CM: E10.9  ICD-9-CM: 250.01  8/22/2018 - Present Yes    Overview Signed 8/22/2018  7:07 PM by Jackson Lou MD     Start lantus 10 units  humalog sliding scale  Keep BS log-call in few days to titrate lantus  Diet discussed  Need to see her labs for further addition of meds             CKD (chronic kidney disease) stage 3, GFR 30-59 ml/min (HCC) (Chronic) ICD-10-CM: N18.30  ICD-9-CM: 585.3  12/25/2014 - Present Yes    Overview Addendum 8/21/2018 12:35 PM by Katty HAJI     Last Assessment & Plan:   Secondary to uncontrolled DM type 1. Continue management of underlying diabetes. Avoid nephrotoxic medications such as NSAIDs.   Baseline Cr~1.8mg/dl             Nausea ICD-10-CM: R11.0  ICD-9-CM: 787.02  1/24/2012 - Present Unknown        RESOLVED: Hypokalemia ICD-10-CM: E87.6  ICD-9-CM: 276.8  4/18/2022 - 4/20/2022 Yes        RESOLVED: Hypomagnesemia ICD-10-CM: E83.42  ICD-9-CM: 275.2  4/18/2022 - 4/20/2022 Yes              Objective:     Patient Vitals for the past 24 hrs:   Temp Pulse Resp BP SpO2   04/21/22 0540 98.8 °F (37.1 °C) 98 20 (!) 172/100 95 %   04/21/22 0319 -- (!) 101 -- -- --   04/21/22 0136 -- -- -- (!) 163/100 --   04/20/22 2244 98.5 °F (36.9 °C) (!) 110 17 (!) 180/116 96 %   04/20/22 2207 -- (!) 112 -- -- --   04/20/22 1856 98.9 °F (37.2 °C) 98 16 137/89 100 %   04/20/22 1608 99.2 °F (37.3 °C) (!) 105 18 (!) 156/81 98 %   04/20/22 1141 98.5 °F (36.9 °C) (!) 102 18 (!) 152/83 97 %   04/20/22 0724 98.6 °F (37 °C) (!) 112 18 (!) 154/95 97 %     Oxygen Therapy  O2 Sat (%): 95 % (04/21/22 0540)  Pulse via Oximetry: 114 beats per minute (04/19/22 2240)  O2 Device: None (Room air) (04/19/22 2240)    Estimated body mass index is 26.15 kg/m² as calculated from the following:    Height as of this encounter: 5' 8\" (1.727 m). Weight as of this encounter: 78 kg (172 lb). Intake/Output Summary (Last 24 hours) at 4/21/2022 0711  Last data filed at 4/21/2022 0136  Gross per 24 hour   Intake --   Output 1400 ml   Net -1400 ml         Physical Exam:   Blood pressure (!) 172/100, pulse 98, temperature 98.8 °F (37.1 °C), resp. rate 20, height 5' 8\" (1.727 m), weight 78 kg (172 lb), SpO2 95 %. General:    Alert, ill-appearing, no acute distress, calm, conversational.  Head:  Normocephalic, atraumatic  Eyes:  Sclerae appear normal.  Pupils equally round. ENT:  Nares appear normal, no drainage. Moist oral mucosa  Neck:  No restricted ROM. Trachea midline   CV:   RRR. Int tachycardia, regular. No m/r/g. No jugular venous distension. Lungs:   CTAB. No wheezing, rhonchi. Respirations even, unlabored on RA  Abdomen: Bowel sounds present. Soft, nontender, nondistended. Extremities: No cyanosis or clubbing. No edema  Skin:     No rashes and normal coloration. Warm and dry. Neuro:  CN II-XII grossly intact. Sensation intact. A&Ox3  Psych:  Calm.     I have reviewed ordered lab tests and independently visualized imaging below:    Recent Labs:  Recent Results (from the past 48 hour(s))   GLUCOSE, POC    Collection Time: 04/19/22  7:12 AM   Result Value Ref Range    Glucose (POC) 536 (HH) 65 - 100 mg/dL    Performed by Aurea Negro, POC    Collection Time: 04/19/22  8:11 AM   Result Value Ref Range    Glucose (POC) 529 (HH) 65 - 100 mg/dL    Performed by JulienloryBSN    GLUCOSE, POC    Collection Time: 04/19/22 10:38 AM   Result Value Ref Range    Glucose (POC) 496 (HH) 65 - 100 mg/dL    Performed by JulienloryBSN    GLUCOSE, POC    Collection Time: 04/19/22 11:49 AM   Result Value Ref Range    Glucose (POC) 442 (H) 65 - 100 mg/dL    Performed by JulienloryBSN    GLUCOSE, POC    Collection Time: 04/19/22  2:33 PM   Result Value Ref Range    Glucose (POC) 331 (H) 65 - 100 mg/dL    Performed by JulienloryBSN    GLUCOSE, POC    Collection Time: 04/19/22  3:43 PM   Result Value Ref Range    Glucose (POC) 285 (H) 65 - 100 mg/dL    Performed by [facility]    POTASSIUM    Collection Time: 04/19/22  4:43 PM   Result Value Ref Range    Potassium 4.3 3.5 - 5.1 mmol/L   GLUCOSE, POC    Collection Time: 04/19/22  4:57 PM   Result Value Ref Range    Glucose (POC) 207 (H) 65 - 100 mg/dL    Performed by PattySN    GLUCOSE, POC    Collection Time: 04/19/22  7:25 PM   Result Value Ref Range    Glucose (POC) 99 65 - 100 mg/dL    Performed by [facility]    GLUCOSE, POC    Collection Time: 04/19/22  9:08 PM   Result Value Ref Range    Glucose (POC) 142 (H) 65 - 100 mg/dL    Performed by [facility]    GLUCOSE, POC    Collection Time: 04/19/22 10:22 PM   Result Value Ref Range    Glucose (POC) 164 (H) 65 - 100 mg/dL    Performed by Lu    GLUCOSE, POC    Collection Time: 04/19/22 11:05 PM   Result Value Ref Range    Glucose (POC) 249 (H) 65 - 100 mg/dL    Performed by Prerna Lucas, POC    Collection Time: 04/20/22  7:21 AM   Result Value Ref Range    Glucose (POC) 408 (H) 65 - 100 mg/dL    Performed by [place]    CBC W/O DIFF    Collection Time: 04/20/22  8:01 AM   Result Value Ref Range    WBC 15.5 (H) 4.3 - 11.1 K/uL    RBC 4.09 4.05 - 5.2 M/uL    HGB 11.2 (L) 11.7 - 15.4 g/dL    HCT 35.2 (L) 35.8 - 46.3 %    MCV 86.1 79.6 - 97.8 FL    MCH 27.4 26.1 - 32.9 PG    MCHC 31.8 31.4 - 35.0 g/dL    RDW 15.9 (H) 11.9 - 14.6 %    PLATELET 049 850 - 243 K/uL    MPV 9.6 9.4 - 12.3 FL    ABSOLUTE NRBC 0.00 0.0 - 0.2 K/uL   MAGNESIUM    Collection Time: 04/20/22  8:01 AM   Result Value Ref Range    Magnesium 2.8 (H) 1.8 - 2.4 mg/dL   METABOLIC PANEL, COMPREHENSIVE    Collection Time: 04/20/22  8:01 AM   Result Value Ref Range    Sodium 147 (H) 136 - 145 mmol/L    Potassium 4.1 3.5 - 5.1 mmol/L    Chloride 114 (H) 98 - 107 mmol/L    CO2 21 21 - 32 mmol/L    Anion gap 12 7 - 16 mmol/L    Glucose 408 (H) 65 - 100 mg/dL    BUN 26 (H) 6 - 23 MG/DL    Creatinine 3.00 (H) 0.6 - 1.0 MG/DL    GFR est AA 21 (L) >60 ml/min/1.73m2    GFR est non-AA 18 (L) >60 ml/min/1.73m2    Calcium 8.5 8.3 - 10.4 MG/DL    Bilirubin, total 0.2 0.2 - 1.1 MG/DL    ALT (SGPT) 20 12 - 65 U/L    AST (SGOT) 18 15 - 37 U/L    Alk.  phosphatase 163 (H) 50 - 136 U/L    Protein, total 7.2 6.3 - 8.2 g/dL    Albumin 2.9 (L) 3.5 - 5.0 g/dL    Globulin 4.3 (H) 2.3 - 3.5 g/dL    A-G Ratio 0.7 (L) 1.2 - 3.5     GLUCOSE, POC    Collection Time: 04/20/22 11:40 AM   Result Value Ref Range    Glucose (POC) 192 (H) 65 - 100 mg/dL    Performed by Anahi Bloom    GLUCOSE, POC    Collection Time: 04/20/22  1:19 PM   Result Value Ref Range    Glucose (POC) 114 (H) 65 - 100 mg/dL    Performed by Dalton Jaimes    GLUCOSE, POC    Collection Time: 04/20/22  1:48 PM   Result Value Ref Range    Glucose (POC) 102 (H) 65 - 100 mg/dL    Performed by Tasha    GLUCOSE, POC    Collection Time: 04/20/22  4:05 PM   Result Value Ref Range    Glucose (POC) 91 65 - 100 mg/dL    Performed by Iasmar    GLUCOSE, POC    Collection Time: 04/20/22  5:49 PM   Result Value Ref Range    Glucose (POC) 138 (H) 65 - 100 mg/dL    Performed by Isamar    GLUCOSE, POC    Collection Time: 04/20/22  7:56 PM   Result Value Ref Range    Glucose (POC) 183 (H) 65 - 100 mg/dL    Performed by Tasha    CBC W/O DIFF    Collection Time: 04/21/22  4:38 AM   Result Value Ref Range    WBC 12.8 (H) 4.3 - 11.1 K/uL    RBC 4.11 4.05 - 5.2 M/uL    HGB 11.4 (L) 11.7 - 15.4 g/dL    HCT 35.8 35.8 - 46.3 %    MCV 87.1 79.6 - 97.8 FL    MCH 27.7 26.1 - 32.9 PG    MCHC 31.8 31.4 - 35.0 g/dL    RDW 15.8 (H) 11.9 - 14.6 %    PLATELET 151 006 - 586 K/uL    MPV 9.6 9.4 - 12.3 FL    ABSOLUTE NRBC 0.00 0.0 - 0.2 K/uL   METABOLIC PANEL, COMPREHENSIVE    Collection Time: 04/21/22  4:38 AM   Result Value Ref Range    Sodium 146 (H) 136 - 145 mmol/L    Potassium 3.6 3.5 - 5.1 mmol/L    Chloride 115 (H) 98 - 107 mmol/L    CO2 24 21 - 32 mmol/L    Anion gap 7 7 - 16 mmol/L    Glucose 227 (H) 65 - 100 mg/dL    BUN 20 6 - 23 MG/DL    Creatinine 2.60 (H) 0.6 - 1.0 MG/DL    GFR est AA 25 (L) >60 ml/min/1.73m2    GFR est non-AA 21 (L) >60 ml/min/1.73m2    Calcium 8.7 8.3 - 10.4 MG/DL    Bilirubin, total 0.3 0.2 - 1.1 MG/DL    ALT (SGPT) 17 12 - 65 U/L    AST (SGOT) 19 15 - 37 U/L    Alk. phosphatase 160 (H) 50 - 136 U/L    Protein, total 7.6 6.3 - 8.2 g/dL    Albumin 3.1 (L) 3.5 - 5.0 g/dL    Globulin 4.5 (H) 2.3 - 3.5 g/dL    A-G Ratio 0.7 (L) 1.2 - 3.5     MAGNESIUM    Collection Time: 04/21/22  4:38 AM   Result Value Ref Range    Magnesium 2.5 (H) 1.8 - 2.4 mg/dL       All Micro Results     None          Other Studies:  NC XR TECHNOLOGIST SERVICE    Result Date: 4/20/2022  Administrative Report Fluoroscopy was provided in the operating room ON 4/19/2022. Images may have been saved as a reference for the surgical procedure. The operative report can be viewed in [address] [place] [address] and/or retrieved by the Medical Records department. A Radiologist has not reviewed images associated with this exam.    DUPLEX UPPER EXT VENOUS LEFT    Result Date: 4/20/2022  Left upper extremity venous duplex exam. CLINICAL INDICATION: Left upper extremity swelling. PROCEDURE: Real-time grayscale, color, and spectral Doppler analysis of the left upper extremity deep venous system from the internal jugular vein through the radial and ulnar veins. COMPARISON: No prior similar study available for direct comparison. FINDINGS: There is normal compressibility where the veins are compressible. No intraluminal echogenic filling defect identified to indicate deep venous thrombosis. Negative left upper extremity venous duplex exam. No evidence for DVT.        Current Meds:  Current Facility-Administered Medications   Medication Dose Route Frequency    prochlorperazine (COMPAZINE) with saline injection 5 mg  5 mg IntraVENous Q6H PRN    diphenhydrAMINE (BENADRYL) injection 25 mg  25 mg IntraVENous Q6H PRN    metoprolol (LOPRESSOR) injection 5 mg  5 mg IntraVENous Q6H PRN    heparin (porcine) injection 5,000 Units  5,000 Units SubCUTAneous Q8H    insulin lispro (HUMALOG) injection   SubCUTAneous AC&HS    diphenhydrAMINE (BENADRYL) capsule 25 mg  25 mg Oral Q6H PRN    acetaminophen (TYLENOL) tablet 650 mg  650 mg Oral Q6H PRN    Or    acetaminophen (TYLENOL) suppository 650 mg  650 mg Rectal Q6H PRN    ondansetron (ZOFRAN ODT) tablet 4 mg  4 mg Oral Q8H PRN    Or    ondansetron (ZOFRAN) injection 4 mg  4 mg IntraVENous Q6H PRN    polyethylene glycol (MIRALAX) packet 17 g  17 g Oral DAILY PRN    sodium chloride (NS) flush 5-40 mL  5-40 mL IntraVENous PRN    cefTRIAXone (ROCEPHIN) 1 g in 0.9% sodium chloride (MBP/ADV) 50 mL MBP  1 g IntraVENous Q24H    morphine injection 4 mg  4 mg IntraVENous Q4H PRN    hydrALAZINE (APRESOLINE) 20 mg/mL injection 20 mg  20 mg IntraVENous Q4H PRN    insulin glargine (LANTUS) injection 30 Units  30 Units SubCUTAneous QHS And    insulin lispro (HUMALOG) injection 5 Units  5 Units SubCUTAneous TID WITH MEALS    lactated Ringers infusion  250 mL/hr IntraVENous CONTINUOUS    dextrose 10% infusion 125-250 mL  125-250 mL IntraVENous PRN     Labs, vital signs, diagnostic imaging reviewed. Case discussed with patient, care team, Dr. Aurora De La Rosa.   Signed:  Bharathi Diaz NP

## 2022-04-22 VITALS
RESPIRATION RATE: 20 BRPM | DIASTOLIC BLOOD PRESSURE: 79 MMHG | SYSTOLIC BLOOD PRESSURE: 141 MMHG | TEMPERATURE: 98.7 F | WEIGHT: 172 LBS | BODY MASS INDEX: 26.07 KG/M2 | HEART RATE: 98 BPM | OXYGEN SATURATION: 99 % | HEIGHT: 68 IN

## 2022-04-22 LAB
ALBUMIN SERPL-MCNC: 2.6 G/DL (ref 3.5–5)
ALBUMIN/GLOB SERPL: 0.6 {RATIO} (ref 1.2–3.5)
ALP SERPL-CCNC: 126 U/L (ref 50–136)
ALT SERPL-CCNC: 15 U/L (ref 12–65)
ANION GAP SERPL CALC-SCNC: 10 MMOL/L (ref 7–16)
AST SERPL-CCNC: 21 U/L (ref 15–37)
BASOPHILS # BLD: 0 K/UL (ref 0–0.2)
BASOPHILS NFR BLD: 0 % (ref 0–2)
BILIRUB SERPL-MCNC: 0.1 MG/DL (ref 0.2–1.1)
BUN SERPL-MCNC: 16 MG/DL (ref 6–23)
CALCIUM SERPL-MCNC: 8.4 MG/DL (ref 8.3–10.4)
CHLORIDE SERPL-SCNC: 111 MMOL/L (ref 98–107)
CO2 SERPL-SCNC: 22 MMOL/L (ref 21–32)
CREAT SERPL-MCNC: 2.5 MG/DL (ref 0.6–1)
DIFFERENTIAL METHOD BLD: ABNORMAL
EOSINOPHIL # BLD: 0 K/UL (ref 0–0.8)
EOSINOPHIL NFR BLD: 0 % (ref 0.5–7.8)
ERYTHROCYTE [DISTWIDTH] IN BLOOD BY AUTOMATED COUNT: 17.3 % (ref 11.9–14.6)
GLOBULIN SER CALC-MCNC: 4.3 G/DL (ref 2.3–3.5)
GLUCOSE BLD STRIP.AUTO-MCNC: 133 MG/DL (ref 65–100)
GLUCOSE BLD STRIP.AUTO-MCNC: 208 MG/DL (ref 65–100)
GLUCOSE BLD STRIP.AUTO-MCNC: 363 MG/DL (ref 65–100)
GLUCOSE BLD STRIP.AUTO-MCNC: 90 MG/DL (ref 65–100)
GLUCOSE SERPL-MCNC: 345 MG/DL (ref 65–100)
HCT VFR BLD AUTO: 40.2 % (ref 35.8–46.3)
HGB BLD-MCNC: 12.4 G/DL (ref 11.7–15.4)
IMM GRANULOCYTES # BLD AUTO: 0.1 K/UL (ref 0–0.5)
IMM GRANULOCYTES NFR BLD AUTO: 1 % (ref 0–5)
LYMPHOCYTES # BLD: 1.8 K/UL (ref 0.5–4.6)
LYMPHOCYTES NFR BLD: 18 % (ref 13–44)
MAGNESIUM SERPL-MCNC: 2.2 MG/DL (ref 1.8–2.4)
MCH RBC QN AUTO: 27.7 PG (ref 26.1–32.9)
MCHC RBC AUTO-ENTMCNC: 30.8 G/DL (ref 31.4–35)
MCV RBC AUTO: 89.7 FL (ref 79.6–97.8)
MONOCYTES # BLD: 0.7 K/UL (ref 0.1–1.3)
MONOCYTES NFR BLD: 7 % (ref 4–12)
NEUTS SEG # BLD: 7.3 K/UL (ref 1.7–8.2)
NEUTS SEG NFR BLD: 74 % (ref 43–78)
NRBC # BLD: 0 K/UL (ref 0–0.2)
PLATELET # BLD AUTO: 331 K/UL (ref 150–450)
PMV BLD AUTO: 10.6 FL (ref 9.4–12.3)
POTASSIUM SERPL-SCNC: 4 MMOL/L (ref 3.5–5.1)
PROT SERPL-MCNC: 6.9 G/DL (ref 6.3–8.2)
RBC # BLD AUTO: 4.48 M/UL (ref 4.05–5.2)
SERVICE CMNT-IMP: ABNORMAL
SERVICE CMNT-IMP: NORMAL
SODIUM SERPL-SCNC: 143 MMOL/L (ref 136–145)
WBC # BLD AUTO: 9.9 K/UL (ref 4.3–11.1)

## 2022-04-22 PROCEDURE — 80053 COMPREHEN METABOLIC PANEL: CPT

## 2022-04-22 PROCEDURE — 74011250637 HC RX REV CODE- 250/637: Performed by: STUDENT IN AN ORGANIZED HEALTH CARE EDUCATION/TRAINING PROGRAM

## 2022-04-22 PROCEDURE — 36415 COLL VENOUS BLD VENIPUNCTURE: CPT

## 2022-04-22 PROCEDURE — 74011636637 HC RX REV CODE- 636/637: Performed by: FAMILY MEDICINE

## 2022-04-22 PROCEDURE — 74011000250 HC RX REV CODE- 250: Performed by: HOSPITALIST

## 2022-04-22 PROCEDURE — 74011250636 HC RX REV CODE- 250/636: Performed by: STUDENT IN AN ORGANIZED HEALTH CARE EDUCATION/TRAINING PROGRAM

## 2022-04-22 PROCEDURE — 74011636637 HC RX REV CODE- 636/637: Performed by: STUDENT IN AN ORGANIZED HEALTH CARE EDUCATION/TRAINING PROGRAM

## 2022-04-22 PROCEDURE — 82962 GLUCOSE BLOOD TEST: CPT

## 2022-04-22 PROCEDURE — 97530 THERAPEUTIC ACTIVITIES: CPT

## 2022-04-22 PROCEDURE — 85025 COMPLETE CBC W/AUTO DIFF WBC: CPT

## 2022-04-22 PROCEDURE — 83735 ASSAY OF MAGNESIUM: CPT

## 2022-04-22 PROCEDURE — G0378 HOSPITAL OBSERVATION PER HR: HCPCS

## 2022-04-22 PROCEDURE — 96372 THER/PROPH/DIAG INJ SC/IM: CPT

## 2022-04-22 RX ORDER — CEPHALEXIN 500 MG/1
500 CAPSULE ORAL 4 TIMES DAILY
Qty: 28 CAPSULE | Refills: 0 | Status: SHIPPED | OUTPATIENT
Start: 2022-04-22 | End: 2022-04-29

## 2022-04-22 RX ORDER — LOSARTAN POTASSIUM 25 MG/1
25 TABLET ORAL DAILY
Qty: 14 TABLET | Refills: 0 | Status: SHIPPED | OUTPATIENT
Start: 2022-04-22 | End: 2022-05-06

## 2022-04-22 RX ORDER — INSULIN GLARGINE 100 [IU]/ML
8 INJECTION, SOLUTION SUBCUTANEOUS DAILY
Status: DISCONTINUED | OUTPATIENT
Start: 2022-04-22 | End: 2022-04-22 | Stop reason: HOSPADM

## 2022-04-22 RX ORDER — LOSARTAN POTASSIUM 50 MG/1
25 TABLET ORAL DAILY
Status: DISCONTINUED | OUTPATIENT
Start: 2022-04-22 | End: 2022-04-22 | Stop reason: HOSPADM

## 2022-04-22 RX ORDER — INSULIN GLARGINE 100 [IU]/ML
30 INJECTION, SOLUTION SUBCUTANEOUS DAILY
Qty: 9 ML | Refills: 0 | Status: SHIPPED | OUTPATIENT
Start: 2022-04-22 | End: 2022-05-22

## 2022-04-22 RX ORDER — ONDANSETRON 4 MG/1
4 TABLET, ORALLY DISINTEGRATING ORAL
Qty: 15 TABLET | Refills: 0 | Status: SHIPPED | OUTPATIENT
Start: 2022-04-22 | End: 2022-04-27

## 2022-04-22 RX ADMIN — INSULIN LISPRO 5 UNITS: 100 INJECTION, SOLUTION INTRAVENOUS; SUBCUTANEOUS at 11:59

## 2022-04-22 RX ADMIN — METOPROLOL TARTRATE 5 MG: 1 INJECTION, SOLUTION INTRAVENOUS at 00:57

## 2022-04-22 RX ADMIN — INSULIN GLARGINE 8 UNITS: 100 INJECTION, SOLUTION SUBCUTANEOUS at 08:10

## 2022-04-22 RX ADMIN — INSULIN LISPRO 10 UNITS: 100 INJECTION, SOLUTION INTRAVENOUS; SUBCUTANEOUS at 11:33

## 2022-04-22 RX ADMIN — SODIUM CHLORIDE, PRESERVATIVE FREE 10 ML: 5 INJECTION INTRAVENOUS at 05:20

## 2022-04-22 RX ADMIN — SODIUM CHLORIDE, PRESERVATIVE FREE 10 ML: 5 INJECTION INTRAVENOUS at 13:18

## 2022-04-22 RX ADMIN — LOSARTAN POTASSIUM 25 MG: 50 TABLET, FILM COATED ORAL at 08:10

## 2022-04-22 RX ADMIN — HEPARIN SODIUM 5000 UNITS: 5000 INJECTION INTRAVENOUS; SUBCUTANEOUS at 13:17

## 2022-04-22 NOTE — DISCHARGE SUMMARY
Hospitalist Discharge Summary   Admit Date:  2022  7:08 AM   DC Note date: 2022  Name:  La Castillo   Age:  48 y.o. Sex:  female  :  1975   MRN:  [id number]   Room:  [facility]  PCP:  Khloe Hunter MD    Presenting Complaint: No chief complaint on file. Initial Admission Diagnosis: Hydronephrosis of right kidney [N13.30]  Ureteric stone [N20.1]  UTI (urinary tract infection) [N39.0]     Problem List for this Hospitalization:  Hospital Problems as of 2022 Date Reviewed: 2021          Codes Class Noted - Resolved POA    MEGHANA (acute kidney injury) ([facility] ICD-10-CM: N17.9  ICD-9-CM: 584.9  2022 - Present Unknown        Ureteric stone ICD-10-CM: N20.1  ICD-9-CM: 592.1  2022 - Present Unknown        UTI (urinary tract infection) ICD-10-CM: N39.0  ICD-9-CM: 599.0  2022 - Present Unknown        * (Principal) Hydronephrosis of right kidney ICD-10-CM: N13.30  ICD-9-CM: 591  2022 - Present Unknown        Diabetes mellitus type 1 ([facility] ICD-10-CM: E10.9  ICD-9-CM: 250.01  2018 - Present Yes    Overview Signed 2018  7:07 PM by Brayden Hayes MD     Start lantus 10 units  humalog sliding scale  Keep BS log-call in few days to titrate lantus  Diet discussed  Need to see her labs for further addition of meds             CKD (chronic kidney disease) stage 3, GFR 30-59 ml/min (HCC) (Chronic) ICD-10-CM: N18.30  ICD-9-CM: 585.3  2014 - Present Yes    Overview Addendum 2018 12:35 PM by Claudette Spell D     Last Assessment & Plan:   Secondary to uncontrolled DM type 1. Continue management of underlying diabetes. Avoid nephrotoxic medications such as NSAIDs.   Baseline Cr~1.8mg/dl             Nausea ICD-10-CM: R11.0  ICD-9-CM: 787.02  2012 - Present Unknown        RESOLVED: Hypokalemia ICD-10-CM: E87.6  ICD-9-CM: 276.8  2022 - 2022 Yes        RESOLVED: Hypomagnesemia ICD-10-CM: E83.42  ICD-9-CM: 275.2  2022 - 2022 Yes Did Patient have Sepsis (YES OR NO): NO    Hospital Course:  Patient is a 33 y/o female with PMH Type 1 DM, HTN, CKD III who presented with cc worsening RLQ abdominal pain of several-day duration a/w fever, chills, nausea, vomiting. Patient completed antibiotic course for Klebsiella pneumoniae UTI March 2022. CT A/P showed evidence of obstructing right ureteric stone 3 mm causing hydronephrosis, ureteritis; noted appendix grossly-normal. Labs remarkable for MEGHANA on CKD, hypokalemia and hypomagnesemia, leukocytosis. Urology was consulted. Patient underwent cystoscopy with right ureteral stent placement 4/19 with Dr. Melva Mccann. Renal function not currently at baseline but has continued to improve daily, patient declines continued hospitalization for renal monitoring. Recommend PCP repeat BMP in 1 week to ensure renal function returns to baseline. UCx finalized contaminated, will continue po antibiotic at discharge to complete 10 day course. Dosage appropriate for current renal function, CrCl 34 mL/min. Blood cultures negative x 3 days. Urology will call patient to schedule 1-2 week follow-up for cystoscopy, right ureteroscopy, laser lithotripsy and right ureteral stent exchange. DM educator assisted with patient education due to hx of DM I with cost of insulin an issue. Patient currently has Rx for Humalog prandial and sliding scale pen ready for pharmacy pickup. [employer] and will provide Rx for Basaglar 30 units daily. DM educator has provided coupons for both Humalog and Basaglar pens to assist with cost of insulin. Encourage diet compliance at discharge. PCP follow-up 1 week. Patient persistently hypertensive while hospitalized. Initiated Losartan given CKD history. Tolerated well, continue at discharge. PCP follow-up 1 week. May need to titrate dosage further. Electrolyte abnormalities have resolved. Leukocytosis resolved, remains afebrile at time of discharge.      Nausea and vomiting persistent but improving, likely component of gastroparesis, tolerated clear liquid diet and fruit at lunch without further nausea or vomiting today, does not wish to remain hospitalized for further diet progression. Continue prn anti-emetics at discharge. Patient requesting discharge home today. Discussed discharge plan in depth with patient including medications, necessary follow-up appointments, s/sx that would indicate need for prompt re-evaluation by physician. She is hemodynamically stable for discharge home this afternoon. Disposition: Home or Self Care  Diet: ADULT DIET Clear Liquid; 3 carb choices (45 gm/meal)  Code Status: Full Code    Follow Up Orders: Follow-up Appointments   Procedures    FOLLOW UP VISIT Appointment in: Other (Specify) PCP 1 week Urology will call to schedule 1-2 week follow-up     PCP 1 week  Urology will call to schedule 1-2 week follow-up     Standing Status:   Standing     Number of Occurrences:   1     Order Specific Question:   Appointment in     Answer: Other (Specify)       Follow-up Information     Follow up With Specialties Details Why Contact Juana Johnston MD Internal Medicine   [place] [phone]            Follow up labs/diagnostics (ultimately defer to outpatient provider): BMP 1 week    Time spent in patient discharge and coordination 40 minutes. Discharge Info:   Current Discharge Medication List      START taking these medications    Details   insulin glargine (Basaglar KwikPen U-100 Insulin) 100 unit/mL (3 mL) inpn 30 Units by SubCUTAneous route daily for 30 days. Indications: type 1 diabetes mellitus  Qty: 9 mL, Refills: 0  Start date: 4/22/2022, End date: 5/22/2022      cephALEXin (KEFLEX) 500 mg capsule Take 1 Capsule by mouth four (4) times daily for 7 days.  Indications: bacterial urinary tract infection  Qty: 28 Capsule, Refills: 0  Start date: 4/22/2022, End date: 4/29/2022         CONTINUE these medications which have CHANGED    Details   ondansetron (ZOFRAN ODT) 4 mg disintegrating tablet Take 1 Tablet by mouth every eight (8) hours as needed for Nausea or Vomiting for up to 5 days. Qty: 15 Tablet, Refills: 0  Start date: 4/22/2022, End date: 4/27/2022      losartan (COZAAR) 25 mg tablet Take 1 Tablet by mouth daily for 14 days. Indications: high blood pressure  Qty: 14 Tablet, Refills: 0  Start date: 4/22/2022, End date: 5/6/2022         CONTINUE these medications which have NOT CHANGED    Details   gabapentin (NEURONTIN) 300 mg capsule Take 300 mg by mouth daily. ergocalciferol (ERGOCALCIFEROL) 1,250 mcg (50,000 unit) capsule Take 1 Cap by mouth two (2) times a week on Wednesday and Saturday. Qty: 24 Cap, Refills: 1    Associated Diagnoses: Vitamin D deficiency      HumaLOG KwikPen Insulin 100 unit/mL kwikpen 6 TIDAC correction scale 1/50>150, max daily dose 50 units  Qty: 15 Pen, Refills: 3    Comments: Please dispense 15 PENS  Associated Diagnoses: Uncontrolled type 1 diabetes mellitus with complication      Dexcom G6 Sensor wolf Use to monitor blood glucose. E10.65  Qty: 9 Each, Refills: 3    Associated Diagnoses: Uncontrolled type 1 diabetes mellitus with complication      Dexcom G6 Transmitter wolf Use to monitor blood glucose. E10.65  Qty: 1 Each, Refills: 3    Associated Diagnoses: Uncontrolled type 1 diabetes mellitus with complication      brimonidine (Alphagan P) 0.1 % ophthalmic solution 1 drop in left eye twice a day      atorvastatin (LIPITOR) 80 mg tablet Take 1 Tablet by mouth daily. Qty: 30 Tablet, Refills: 11    Associated Diagnoses: Uncontrolled type 1 diabetes mellitus with complication      Oriana Pen Needle 32 gauge x 5/32\" ndle Use with insulin injections 4 times per day. Dx:  E10.8,  E10.65  Qty: 200 Pen Needle, Refills: 3    Associated Diagnoses: Uncontrolled type 1 diabetes mellitus with complication      Blood-Glucose Meter misc Check blood sugar 4 times daily. E10.65  Qty: 1 Each, Refills: 0    Comments: accu chek or one touch whichever is covered by insurance. glucose blood VI test strips (ASCENSIA AUTODISC VI, ONE TOUCH ULTRA TEST VI) strip Check blood glucose 4 times daily. E10.65  Qty: 400 Strip, Refills: 3    Comments: accu chek or one touch whichever is covered by insurance. lancets 31 gauge misc Check blood glucose 4 times daily. E10.65  Qty: 400 Lancet, Refills: 3         STOP taking these medications       insulin NPH/insulin regular (NovoLIN 70/30 U-100 Insulin) 100 unit/mL (70-30) injection Comments:   Reason for Stopping:         Tresiba FlexTouch U-200 200 unit/mL (3 mL) inpn pen Comments:   Reason for Stopping:         aspirin (ASPIRIN) 325 mg tablet Comments:   Reason for Stopping:               Procedures done this admission:  Procedure(s):  CYSTOSCOPY, RIGHT STENT PLACEMENT    Consults this admission:  IP CONSULT TO UROLOGY    Echocardiogram/EKG results:  No results found for this or any previous visit. EKG Results     None          Diagnostic Imaging/Tests:   CT ABD PELV W CONT    Result Date: 4/19/2022  EXAM: CT ABD PELV W CONT HISTORY: RLQ pain. TECHNIQUE: Axial images of the abdomen and pelvis were performed following the administration of intravenous contrast. Images were obtained in the axial plane and coronal reformatted images were created and reviewed. Dose reduction technique used: Automated exposure control/Adjustment of the mA and/or kV according to patient size/Use of iterative reconstruction technique. 100 mL of Isovue-370 were utilized. COMPARISON: None. FINDINGS: The visualized lung bases and mediastinum are unremarkable. The liver, spleen, pancreas, adrenal glands, gallbladder, are within normal limits. Both kidneys contain small cystic appearing structures. There is a mildly delayed nephrogram on the right. There is mild right perinephric fatty stranding.  There is an approximately 3 mm obstructing calcification in the proximal right ureter. This is causing mild right hydronephrosis. There is enhancement of the right ureteral wall suggesting ureteritis. The bladder appears grossly normal. Distal esophagus and stomach are unremarkable. Small and large bowel are without evidence of obstruction. There is a normal appendix in the right pelvis. Pelvic reproductive organs are seen and appear grossly normal. The uterus appears to be tilted to the left. No evidence of free fluid, free air, focal fluid collection. No pathologic adenopathy. No abdominal aortic aneurysm. Osseous structures are without evidence of acute fracture or suspicious lesion. There is an approximately 3 mm obstructing calcification in the proximal right ureter. This is causing mild right hydronephrosis. There is enhancement of the right ureteral wall suggesting ureteritis. Both kidneys contain small cystic appearing structures. A grossly normal-appearing appendix is seen in the right pelvis. US RETROPERITONEUM COMP    Result Date: 4/19/2022  INDICATION:  Hydronephrosis TECHNIQUE: Real-time sonography of the kidneys, retroperitoneum and bladder was performed with multiple static images obtained. FINDINGS: The right kidney measures 10.5 cm and the left kidney measures 10.3 cm in length. Both kidneys are echogenic. There is mild right hydronephrosis. Left lower pole calyx is also slightly prominent. There is a small cyst in the lower pole the left kidney. No other renal masses. The aorta and IVC are normal in caliber. The urinary bladder is distended. Persistent right hydronephrosis, probably improved compared to the earlier CT scan. NC XR TECHNOLOGIST SERVICE    Result Date: 4/20/2022  Administrative Report Fluoroscopy was provided in the operating room ON 4/19/2022. Images may have been saved as a reference for the surgical procedure. The operative report can be viewed in [facility] and/or retrieved by the Medical Records department.      A Radiologist has not reviewed images associated with this exam.    Hallie Frederick EXT VENOUS LEFT    Result Date: 4/20/2022  Left upper extremity venous duplex exam. CLINICAL INDICATION: Left upper extremity swelling. PROCEDURE: Real-time grayscale, color, and spectral Doppler analysis of the left upper extremity deep venous system from the internal jugular vein through the radial and ulnar veins. COMPARISON: No prior similar study available for direct comparison. FINDINGS: There is normal compressibility where the veins are compressible. No intraluminal echogenic filling defect identified to indicate deep venous thrombosis. Negative left upper extremity venous duplex exam. No evidence for DVT.        All Micro Results     None          Labs: Results:       BMP, Mg, Phos Recent Labs     04/22/22  1000 04/21/22  0438 04/20/22  0801    146* 147*   K 4.0 3.6 4.1   * 115* 114*   CO2 22 24 21   AGAP 10 7 12   BUN 16 20 26*   CREA 2.50* 2.60* 3.00*   CA 8.4 8.7 8.5   * 227* 408*   MG 2.2 2.5* 2.8*      CBC Recent Labs     04/22/22  0502 04/21/22  0438 04/20/22  0801   WBC 9.9 12.8* 15.5*   RBC 4.48 4.11 4.09   HGB 12.4 11.4* 11.2*   HCT 40.2 35.8 35.2*    331 355   GRANS 74  --   --    LYMPH 18  --   --    EOS 0*  --   --    MONOS 7  --   --    BASOS 0  --   --    IG 1  --   --    ANEU 7.3  --   --    ABL 1.8  --   --    MARIA LUZ 0.0  --   --    ABM 0.7  --   --    ABB 0.0  --   --    AIG 0.1  --   --       LFT Recent Labs     04/22/22  1000 04/21/22  0438 04/20/22  0801   ALT 15 17 20    160* 163*   TP 6.9 7.6 7.2   ALB 2.6* 3.1* 2.9*   GLOB 4.3* 4.5* 4.3*   AGRAT 0.6* 0.7* 0.7*      Cardiac Testing Lab Results   Component Value Date/Time    Troponin-I, Qt. <0.02 (L) 05/01/2020 01:20 AM    Troponin-I, Qt. <0.02 (L) 04/15/2020 04:34 AM      Coagulation Tests No results found for: PTP, INR, APTT, INREXT   A1c Lab Results   Component Value Date/Time    Hemoglobin A1c 9.6 (H) 06/19/2020 12:40 PM Hemoglobin A1c 10.5 (H) 12/25/2014 07:20 AM    Hemoglobin A1c, External 10.3 07/12/2016 12:00 AM      Lipid Panel Lab Results   Component Value Date/Time    Cholesterol, total 156 06/19/2020 12:11 PM    HDL Cholesterol 52 06/19/2020 12:11 PM    LDL, calculated 84 06/19/2020 12:11 PM    VLDL, calculated 20 06/19/2020 12:11 PM    Triglyceride 100 06/19/2020 12:11 PM      Thyroid Panel Lab Results   Component Value Date/Time    TSH 2.400 06/19/2020 12:11 PM    TSH 0.972 09/16/2019 02:50 PM        Most Recent UA Lab Results   Component Value Date/Time    Color YELLOW 03/21/2022 08:23 AM    Appearance CLOUDY 03/21/2022 08:23 AM    Specific gravity 1.009 03/21/2022 08:23 AM    pH (UA) 7.0 03/21/2022 08:23 AM    Protein TRACE (A) 03/21/2022 08:23 AM    Glucose 500 03/21/2022 08:23 AM    Ketone Negative 03/21/2022 08:23 AM    Bilirubin Negative 03/21/2022 08:23 AM    Blood SMALL (A) 03/21/2022 08:23 AM    Urobilinogen 0.2 03/21/2022 08:23 AM    Nitrites Negative 03/21/2022 08:23 AM    Leukocyte Esterase LARGE (A) 03/21/2022 08:23 AM    WBC >100 (H) 04/18/2022 11:13 PM    RBC 3-5 04/18/2022 11:13 PM    Epithelial cells 10-20 04/18/2022 11:13 PM    Bacteria 1+ (H) 04/18/2022 11:13 PM    Casts 0 04/18/2022 11:13 PM    Crystals, urine 0 03/21/2022 08:23 AM    Mucus 0 03/21/2022 08:23 AM    Other observations RESULTS VERIFIED MANUALLY 10/10/2020 08:14 PM          All Labs from Last 24 Hrs:  Recent Results (from the past 24 hour(s))   GLUCOSE, POC    Collection Time: 04/21/22  4:11 PM   Result Value Ref Range    Glucose (POC) 120 (H) 65 - 100 mg/dL    Performed by Rosette Franco    GLUCOSE, POC    Collection Time: 04/21/22  9:04 PM   Result Value Ref Range    Glucose (POC) 72 65 - 100 mg/dL    Performed by SynataSHRUTI    GLUCOSE, POC    Collection Time: 04/22/22 12:18 AM   Result Value Ref Range    Glucose (POC) 90 65 - 100 mg/dL    Performed by IngrisInform TechnologiesJean Carlos    CBC WITH AUTOMATED DIFF    Collection Time: 04/22/22  5:02 AM Result Value Ref Range    WBC 9.9 4.3 - 11.1 K/uL    RBC 4.48 4.05 - 5.2 M/uL    HGB 12.4 11.7 - 15.4 g/dL    HCT 40.2 35.8 - 46.3 %    MCV 89.7 79.6 - 97.8 FL    MCH 27.7 26.1 - 32.9 PG    MCHC 30.8 (L) 31.4 - 35.0 g/dL    RDW 17.3 (H) 11.9 - 14.6 %    PLATELET 250 118 - 032 K/uL    MPV 10.6 9.4 - 12.3 FL    ABSOLUTE NRBC 0.00 0.0 - 0.2 K/uL    DF AUTOMATED      NEUTROPHILS 74 43 - 78 %    LYMPHOCYTES 18 13 - 44 %    MONOCYTES 7 4.0 - 12.0 %    EOSINOPHILS 0 (L) 0.5 - 7.8 %    BASOPHILS 0 0.0 - 2.0 %    IMMATURE GRANULOCYTES 1 0.0 - 5.0 %    ABS. NEUTROPHILS 7.3 1.7 - 8.2 K/UL    ABS. LYMPHOCYTES 1.8 0.5 - 4.6 K/UL    ABS. MONOCYTES 0.7 0.1 - 1.3 K/UL    ABS. EOSINOPHILS 0.0 0.0 - 0.8 K/UL    ABS. BASOPHILS 0.0 0.0 - 0.2 K/UL    ABS. IMM. GRANS. 0.1 0.0 - 0.5 K/UL   GLUCOSE, POC    Collection Time: 04/22/22  7:05 AM   Result Value Ref Range    Glucose (POC) 133 (H) 65 - 100 mg/dL    Performed by Cortez    MAGNESIUM    Collection Time: 04/22/22 10:00 AM   Result Value Ref Range    Magnesium 2.2 1.8 - 2.4 mg/dL   METABOLIC PANEL, COMPREHENSIVE    Collection Time: 04/22/22 10:00 AM   Result Value Ref Range    Sodium 143 136 - 145 mmol/L    Potassium 4.0 3.5 - 5.1 mmol/L    Chloride 111 (H) 98 - 107 mmol/L    CO2 22 21 - 32 mmol/L    Anion gap 10 7 - 16 mmol/L    Glucose 345 (H) 65 - 100 mg/dL    BUN 16 6 - 23 MG/DL    Creatinine 2.50 (H) 0.6 - 1.0 MG/DL    GFR est AA 26 (L) >60 ml/min/1.73m2    GFR est non-AA 22 (L) >60 ml/min/1.73m2    Calcium 8.4 8.3 - 10.4 MG/DL    Bilirubin, total 0.1 (L) 0.2 - 1.1 MG/DL    ALT (SGPT) 15 12 - 65 U/L    AST (SGOT) 21 15 - 37 U/L    Alk.  phosphatase 126 50 - 136 U/L    Protein, total 6.9 6.3 - 8.2 g/dL    Albumin 2.6 (L) 3.5 - 5.0 g/dL    Globulin 4.3 (H) 2.3 - 3.5 g/dL    A-G Ratio 0.6 (L) 1.2 - 3.5     GLUCOSE, POC    Collection Time: 04/22/22 11:00 AM   Result Value Ref Range    Glucose (POC) 363 (H) 65 - 100 mg/dL    Performed by Cortez    GLUCOSE, POC Collection Time: 04/22/22  1:08 PM   Result Value Ref Range    Glucose (POC) 208 (H) 65 - 100 mg/dL    Performed by Austyn Guzmán in Hospital:   Current Facility-Administered Medications   Medication Dose Route Frequency    losartan (COZAAR) tablet 25 mg  25 mg Oral DAILY    insulin glargine (LANTUS) injection 8 Units  8 Units SubCUTAneous DAILY    prochlorperazine (COMPAZINE) with saline injection 5 mg  5 mg IntraVENous Q6H PRN    diphenhydrAMINE (BENADRYL) injection 25 mg  25 mg IntraVENous Q6H PRN    metoprolol (LOPRESSOR) injection 5 mg  5 mg IntraVENous Q6H PRN    heparin (porcine) injection 5,000 Units  5,000 Units SubCUTAneous Q8H    insulin lispro (HUMALOG) injection   SubCUTAneous AC&HS    diphenhydrAMINE (BENADRYL) capsule 25 mg  25 mg Oral Q6H PRN    acetaminophen (TYLENOL) tablet 650 mg  650 mg Oral Q6H PRN    Or    acetaminophen (TYLENOL) suppository 650 mg  650 mg Rectal Q6H PRN    ondansetron (ZOFRAN ODT) tablet 4 mg  4 mg Oral Q8H PRN    Or    ondansetron (ZOFRAN) injection 4 mg  4 mg IntraVENous Q6H PRN    polyethylene glycol (MIRALAX) packet 17 g  17 g Oral DAILY PRN    sodium chloride (NS) flush 5-40 mL  5-40 mL IntraVENous PRN    cefTRIAXone (ROCEPHIN) 1 g in 0.9% sodium chloride (MBP/ADV) 50 mL MBP  1 g IntraVENous Q24H    morphine injection 4 mg  4 mg IntraVENous Q4H PRN    hydrALAZINE (APRESOLINE) 20 mg/mL injection 20 mg  20 mg IntraVENous Q4H PRN    [Held by provider] insulin glargine (LANTUS) injection 30 Units  30 Units SubCUTAneous QHS    And    insulin lispro (HUMALOG) injection 5 Units  5 Units SubCUTAneous TID WITH MEALS    dextrose 10% infusion 125-250 mL  125-250 mL IntraVENous PRN       No Known Allergies  Immunization History   Administered Date(s) Administered    COVID-19, Pfizer Purple top, DILUTE for use, 12+ yrs, 30mcg/0.3mL dose 02/10/2021       Recent Vital Data:  Patient Vitals for the past 24 hrs:   Temp Pulse Resp BP SpO2 04/22/22 1508 98.7 °F (37.1 °C) 98 20 (!) 141/79 99 %   04/22/22 1110 99.3 °F (37.4 °C) 92 12 (!) 168/91 99 %   04/22/22 0717 98.4 °F (36.9 °C) 98 12 (!) 163/99 100 %   04/22/22 0328 99.2 °F (37.3 °C) 92 18 (!) 153/87 99 %   04/21/22 2336 99 °F (37.2 °C) (!) 106 -- (!) 161/97 99 %   04/21/22 2013 -- 98 -- -- --   04/21/22 1915 98.7 °F (37.1 °C) (!) 102 18 (!) 157/88 99 %     Oxygen Therapy  O2 Sat (%): 99 % (04/22/22 1508)  Pulse via Oximetry: 114 beats per minute (04/19/22 2240)  O2 Device: None (Room air) (04/19/22 2240)    Estimated body mass index is 26.15 kg/m² as calculated from the following:    Height as of this encounter: 5' 8\" (1.727 m). Weight as of this encounter: 78 kg (172 lb). Intake/Output Summary (Last 24 hours) at 4/22/2022 1549  Last data filed at 4/21/2022 1643  Gross per 24 hour   Intake --   Output 200 ml   Net -200 ml         Physical Exam:  General:          Alert, no acute distress, calm, conversational.  Head:               Normocephalic, atraumatic  Eyes:               Sclerae appear normal.  Pupils equally round. ENT:                Nares appear normal, no drainage. Moist oral mucosa  Neck:               No restricted ROM. Trachea midline   CV:                  RRR. No m/r/g. No jugular venous distension. Lungs:             CTAB. No wheezing, rhonchi. Respirations even, unlabored on RA  Abdomen: Bowel sounds present. Soft, nontender, nondistended. Extremities:     No cyanosis or clubbing. No edema  Skin:                No rashes and normal coloration. Warm and dry. Neuro:             CN II-XII grossly intact. Sensation intact. A&Ox3  Psych:             Calm.       Signed:  Emogene Peels, NP    Part of this note may have been written by using a voice dictation software. The note has been proof read but may still contain some grammatical/other typographical errors.

## 2022-04-22 NOTE — PROGRESS NOTES
Chart reviewed by CM. No supportive care orders received or CM needs identified. Patient discharged home.     Care Management Interventions  Transition of Care Consult (CM Consult): Discharge Planning  Discharge Durable Medical Equipment: No  Physical Therapy Consult: No  Occupational Therapy Consult: No  Speech Therapy Consult: No  Support Systems: Parent(s)  Confirm Follow Up Transport: Family  The Patient and/or Patient Representative was Provided with a Choice of Provider and Agrees with the Discharge Plan?: No  Freedom of Choice List was Provided with Basic Dialogue that Supports the Patient's Individualized Plan of Care/Goals, Treatment Preferences and Shares the Quality Data Associated with the Providers?: No  [facility] Resource Information Provided?: No  Discharge Location  Patient Expects to be Discharged to[de-identified] Home

## 2022-04-22 NOTE — PROGRESS NOTES
ACUTE PHYSICAL THERAPY GOALS:  (Developed with and agreed upon by patient and/or caregiver.)  (1.) Se Carbone  will move from supine to sit and sit to supine , scoot up and down and roll side to side with INDEPENDENCE within 7 treatment day(s). (2.) Se Carbone will transfer from bed to chair and chair to bed with INDEPENDENCE using the least restrictive device within 7 treatment day(s). (3.) Se Carbone will ambulate with INDEPENDENCE for 450+ feet with the least restrictive device within 7 treatment day(s). (4.) Se Carbone will perform standing static and dynamic balance activities x 25 minutes with INDEPENDENCE to improve safety within 7 treatment day(s). (5.) Se Carbone will perform therapeutic exercises x 20 min for HEP with INDEPENDENCE to improve strength, endurance, and functional mobility within 7 treatment day(s). PHYSICAL THERAPY: Daily Note and PM Treatment Day Liudmila Carbone is a 48 y.o. female   PRIMARY DIAGNOSIS: Hydronephrosis of right kidney  Hydronephrosis of right kidney [N13.30]  Ureteric stone [N20.1]  UTI (urinary tract infection) [N39.0]  Procedure(s) (LRB):  CYSTOSCOPY, RIGHT STENT PLACEMENT (Right)  3 Days Post-Op    ASSESSMENT:     REHAB RECOMMENDATIONS: CURRENT LEVEL OF FUNCTION:  (Most Recently Demonstrated)   Recommendation to date pending progress:  Setting:   No further skilled therapy after discharge from hospital  Equipment:    None Bed Mobility:   Independent  Sit to Stand:   Independent  Transfers:   Independent  Gait/Mobility:   Supervision     ASSESSMENT:  Ms. Kenia Batres was supine at arrival waking up. She agreed to participate and stated that she has been getting up by herself. She ambulated 200ft with no AD. Several times feet crossed midline and was able to recover with no difficulty. She returned to room and wanted to go BTB.  Suggested to pt to cont walking around when desired during day. Improved since last visit with longer distance and less asst levels. SUBJECTIVE:   Ms. Jacqueline Ryan states, \"I get up walking around by myself\"    SOCIAL HISTORY/ LIVING ENVIRONMENT: Lives alone in a 1 level home with 3 steps to enter with a handrail. No falls, no DME use.   Home Environment: Independent living  One/Two Story Residence: One story  Living Alone: Yes  Support Systems: Parent(s)  OBJECTIVE:     PAIN: VITAL SIGNS: LINES/DRAINS:   Pre Treatment: Pain Screen  Pain Scale 1: Numeric (0 - 10)  Pain Intensity 1: 0  Post Treatment: 0   None  O2 Device: None (Room air)     MOBILITY: I Mod I S SBA CGA Min Mod Max Total  NT x2 Comments:   Bed Mobility    Rolling [x] [] [] [] [] [] [] [] [] [] []    Supine to Sit [x] [] [] [] [] [] [] [] [] [] []    Scooting [x] [] [] [] [] [] [] [] [] [] []    Sit to Supine [x] [] [] [] [] [] [] [] [] [] []    Transfers    Sit to Stand [x] [] [] [] [] [] [] [] [] [] []    Bed to Chair [] [] [] [] [] [] [] [] [] [x] []    Stand to Sit [x] [] [] [] [] [] [] [] [] [] []    I=Independent, Mod I=Modified Independent, S=Supervision, SBA=Standby Assistance, CGA=Contact Guard Assistance,   Min=Minimal Assistance, Mod=Moderate Assistance, Max=Maximal Assistance, Total=Total Assistance, NT=Not Tested    BALANCE: Good Fair+ Fair Fair- Poor NT Comments   Sitting Static [x] [] [] [] [] []    Sitting Dynamic [x] [] [] [] [] []              Standing Static [] [x] [] [] [] []    Standing Dynamic [] [x] [] [] [] []      GAIT: I Mod I S SBA CGA Min Mod Max Total  NT x2 Comments:   Level of Assistance [] [] [] [x] [] [] [] [] [] [] []    Distance 200    DME None    Gait Quality Some midline cross steps    Weightbearing  Status N/A     I=Independent, Mod I=Modified Independent, S=Supervision, SBA=Standby Assistance, CGA=Contact Guard Assistance,   Min=Minimal Assistance, Mod=Moderate Assistance, Max=Maximal Assistance, Total=Total Assistance, NT=Not Tested    PLAN: FREQUENCY/DURATION: PT Plan of Care: 3 times/week for duration of hospital stay or until stated goals are met, whichever comes first.  TREATMENT:     TREATMENT:   (     )  Therapeutic Activity (24 Minutes): Therapeutic activity included Rolling, Supine to Sit, Scooting, Lateral Scooting, Transfer Training, Ambulation on level ground, Sitting balance  and Standing balance to improve functional Mobility, Strength and Activity tolerance.     TREATMENT GRID:  N/A    AFTER TREATMENT POSITION/PRECAUTIONS:  Bed, Needs within reach and RN notified    INTERDISCIPLINARY COLLABORATION:  RN/PCT and PT/PTA    TOTAL TREATMENT DURATION:  PT Patient Time In/Time Out  Time In: 1340  Time Out: [address], DPT

## 2022-04-22 NOTE — DIABETES MGMT
Patient admitted with hydronephrosis of right kidney. Blood glucose ranged  yesterday with patient receiving Humalog 7 units. Blood glucose this morning was 133. Reviewed patient current regimen: Lantus 30 units nightly (on hold per provider), Humalog 5 units with meals (on hold per provider), and Humalog correctional insulin sensitive scale. Patient has PMH of type 1 diabetes. Patient would likely benefit from low dose basal insulin this AM to reduce risk of hyperglycemia. Provider updated via [facility] regarding recommendations and patient glycemic control. Patient seen for follow up diabetes education. Noted patient ate 0% of breakfast but drank 1 can of regular samir mist. Also noted large bottle of regular Gatorade at bedside and 1 empty can diet samir mist, and 1 can regular samir mist. Reviewed sick day rules. Discussed hydration with unsweetened beverages as well encouraged patient to drink 8 ounces of sugar-free fluids every hour. Patient provided with diet samir mist as requested and has pitcher of ice water at bedside. Encouraged follow up with endocrinology at discharge. Patient will need prescription for Martir Valiente at discharge as patient states she would be willing to try using $35 coupons for Humalog and Basaglar pens instead of 70/30 insulin.

## 2022-04-22 NOTE — PROGRESS NOTES
Hourly rounds completed. All needs met. 2017 Requested to d/c cardiac monitor d/t patient heart rhythm and rate was NSR @ 98 bpm and k+ witihin normal range. Orders were placed to d/c monitor. Absence of n/v during the shift. Pt c/o abdominal pain. Interventions per MAR. Pt tachycardiac and hypertensive in the middle of the shift. Interventions per MAR. Will give report to the oncoming day shift nurse.

## 2022-04-24 LAB
BACTERIA SPEC CULT: NORMAL
BACTERIA SPEC CULT: NORMAL
SERVICE CMNT-IMP: NORMAL
SERVICE CMNT-IMP: NORMAL

## 2022-05-13 ENCOUNTER — APPOINTMENT (OUTPATIENT)
Dept: CT IMAGING | Age: 47
End: 2022-05-13
Attending: EMERGENCY MEDICINE
Payer: COMMERCIAL

## 2022-05-13 ENCOUNTER — APPOINTMENT (OUTPATIENT)
Dept: GENERAL RADIOLOGY | Age: 47
End: 2022-05-13
Attending: EMERGENCY MEDICINE
Payer: COMMERCIAL

## 2022-05-13 ENCOUNTER — HOSPITAL ENCOUNTER (EMERGENCY)
Age: 47
Discharge: HOME | End: 2022-05-14
Attending: EMERGENCY MEDICINE
Payer: COMMERCIAL

## 2022-05-13 DIAGNOSIS — N12 PYELONEPHRITIS: Primary | ICD-10-CM

## 2022-05-13 LAB
APPEARANCE UR: ABNORMAL
BACTERIA URNS QL MICRO: ABNORMAL /HPF
BASOPHILS # BLD: 0 K/UL (ref 0–0.2)
BASOPHILS NFR BLD: 0 % (ref 0–2)
BILIRUB UR QL: NEGATIVE
CASTS URNS QL MICRO: 0 /LPF
COLOR UR: YELLOW
CRYSTALS URNS QL MICRO: 0 /LPF
DIFFERENTIAL METHOD BLD: ABNORMAL
EOSINOPHIL # BLD: 0.2 K/UL (ref 0–0.8)
EOSINOPHIL NFR BLD: 2 % (ref 0.5–7.8)
EPI CELLS #/AREA URNS HPF: ABNORMAL /HPF
ERYTHROCYTE [DISTWIDTH] IN BLOOD BY AUTOMATED COUNT: 14.6 % (ref 11.9–14.6)
GLUCOSE UR STRIP.AUTO-MCNC: NEGATIVE MG/DL
HCT VFR BLD AUTO: 35 % (ref 35.8–46.3)
HGB BLD-MCNC: 10.9 G/DL (ref 11.7–15.4)
HGB UR QL STRIP: ABNORMAL
IMM GRANULOCYTES # BLD AUTO: 0 K/UL (ref 0–0.5)
IMM GRANULOCYTES NFR BLD AUTO: 0 % (ref 0–5)
KETONES UR QL STRIP.AUTO: NEGATIVE MG/DL
LEUKOCYTE ESTERASE UR QL STRIP.AUTO: ABNORMAL
LYMPHOCYTES # BLD: 1.7 K/UL (ref 0.5–4.6)
LYMPHOCYTES NFR BLD: 15 % (ref 13–44)
MCH RBC QN AUTO: 26.5 PG (ref 26.1–32.9)
MCHC RBC AUTO-ENTMCNC: 31.1 G/DL (ref 31.4–35)
MCV RBC AUTO: 85 FL (ref 79.6–97.8)
MONOCYTES # BLD: 1.1 K/UL (ref 0.1–1.3)
MONOCYTES NFR BLD: 10 % (ref 4–12)
MUCOUS THREADS URNS QL MICRO: 0 /LPF
NEUTS SEG # BLD: 8 K/UL (ref 1.7–8.2)
NEUTS SEG NFR BLD: 73 % (ref 43–78)
NITRITE UR QL STRIP.AUTO: POSITIVE
NRBC # BLD: 0 K/UL (ref 0–0.2)
OTHER OBSERVATIONS,UCOM: ABNORMAL
PH UR STRIP: 6.5 [PH] (ref 5–9)
PLATELET # BLD AUTO: 280 K/UL (ref 150–450)
PMV BLD AUTO: 9 FL (ref 9.4–12.3)
PROT UR STRIP-MCNC: 100 MG/DL
RBC # BLD AUTO: 4.12 M/UL (ref 4.05–5.2)
RBC #/AREA URNS HPF: ABNORMAL /HPF
SP GR UR REFRACTOMETRY: 1.01 (ref 1–1.02)
UROBILINOGEN UR QL STRIP.AUTO: 0.2 EU/DL (ref 0.2–1)
WBC # BLD AUTO: 11 K/UL (ref 4.3–11.1)
WBC URNS QL MICRO: >100 /HPF

## 2022-05-13 PROCEDURE — 99285 EMERGENCY DEPT VISIT HI MDM: CPT

## 2022-05-13 PROCEDURE — 83605 ASSAY OF LACTIC ACID: CPT

## 2022-05-13 PROCEDURE — 71045 X-RAY EXAM CHEST 1 VIEW: CPT

## 2022-05-13 PROCEDURE — 74011000258 HC RX REV CODE- 258: Performed by: EMERGENCY MEDICINE

## 2022-05-13 PROCEDURE — 85025 COMPLETE CBC W/AUTO DIFF WBC: CPT

## 2022-05-13 PROCEDURE — 80053 COMPREHEN METABOLIC PANEL: CPT

## 2022-05-13 PROCEDURE — 81003 URINALYSIS AUTO W/O SCOPE: CPT

## 2022-05-13 PROCEDURE — 93005 ELECTROCARDIOGRAM TRACING: CPT

## 2022-05-13 PROCEDURE — 96374 THER/PROPH/DIAG INJ IV PUSH: CPT

## 2022-05-13 PROCEDURE — 81015 MICROSCOPIC EXAM OF URINE: CPT

## 2022-05-13 PROCEDURE — 87086 URINE CULTURE/COLONY COUNT: CPT

## 2022-05-13 PROCEDURE — 74011250637 HC RX REV CODE- 250/637: Performed by: EMERGENCY MEDICINE

## 2022-05-13 PROCEDURE — 94762 N-INVAS EAR/PLS OXIMTRY CONT: CPT

## 2022-05-13 PROCEDURE — 96372 THER/PROPH/DIAG INJ SC/IM: CPT

## 2022-05-13 PROCEDURE — 74011250636 HC RX REV CODE- 250/636: Performed by: EMERGENCY MEDICINE

## 2022-05-13 RX ORDER — SODIUM CHLORIDE 0.9 % (FLUSH) 0.9 %
5-10 SYRINGE (ML) INJECTION EVERY 8 HOURS
Status: DISCONTINUED | OUTPATIENT
Start: 2022-05-13 | End: 2022-05-14 | Stop reason: HOSPADM

## 2022-05-13 RX ORDER — SODIUM CHLORIDE 0.9 % (FLUSH) 0.9 %
5-10 SYRINGE (ML) INJECTION AS NEEDED
Status: DISCONTINUED | OUTPATIENT
Start: 2022-05-13 | End: 2022-05-14 | Stop reason: HOSPADM

## 2022-05-13 RX ORDER — ACETAMINOPHEN 325 MG/1
650 TABLET ORAL
Status: COMPLETED | OUTPATIENT
Start: 2022-05-13 | End: 2022-05-13

## 2022-05-13 RX ADMIN — SODIUM CHLORIDE 1000 ML: 9 INJECTION, SOLUTION INTRAVENOUS at 23:29

## 2022-05-13 RX ADMIN — ACETAMINOPHEN 650 MG: 325 TABLET ORAL at 19:05

## 2022-05-13 RX ADMIN — CEFTRIAXONE 1 G: 1 INJECTION, POWDER, FOR SOLUTION INTRAMUSCULAR; INTRAVENOUS at 23:29

## 2022-05-13 NOTE — ED NOTES
49-year-old female presents to the ED with complaint of right flank and bilateral hip pain, difficulty urinating, decreased oral intake. States she has a history of renal stones, CKD, DM. Patient with lithotripsy and ureteral stent placement at this facility by Dr. Darleen Chaidez on 4/19, UTI,  Admission. With right flank tenderness in the ED, febrile and tachycardic. Requesting patient be roomed promptly  Patient evaluated initially in triage. Rapid Medical Evaluation was conducted and necessary orders have been placed. I have performed a medical screening exam.  Care will now be transferred to the provider in the back of the emergency department.   Antonietta Dietrich NP 7:26 PM

## 2022-05-13 NOTE — ED TRIAGE NOTES
Pt arrives ambulatory to triage. States hx of kidney stones. Reports right flank pain and bilateral hip pain. Also states trouble with urinating and decreased appetite. NAD. Masked.

## 2022-05-14 ENCOUNTER — APPOINTMENT (OUTPATIENT)
Dept: CT IMAGING | Age: 47
End: 2022-05-14
Attending: EMERGENCY MEDICINE
Payer: COMMERCIAL

## 2022-05-14 ENCOUNTER — HOSPITAL ENCOUNTER (EMERGENCY)
Dept: CT IMAGING | Age: 47
Discharge: HOME | End: 2022-05-14
Attending: EMERGENCY MEDICINE
Payer: COMMERCIAL

## 2022-05-14 VITALS
DIASTOLIC BLOOD PRESSURE: 96 MMHG | WEIGHT: 172 LBS | HEIGHT: 68 IN | HEART RATE: 113 BPM | OXYGEN SATURATION: 98 % | SYSTOLIC BLOOD PRESSURE: 188 MMHG | TEMPERATURE: 98.6 F | BODY MASS INDEX: 26.07 KG/M2 | RESPIRATION RATE: 17 BRPM

## 2022-05-14 VITALS
RESPIRATION RATE: 20 BRPM | BODY MASS INDEX: 26.07 KG/M2 | HEIGHT: 68 IN | SYSTOLIC BLOOD PRESSURE: 133 MMHG | TEMPERATURE: 98.4 F | HEART RATE: 96 BPM | OXYGEN SATURATION: 99 % | WEIGHT: 172 LBS | DIASTOLIC BLOOD PRESSURE: 99 MMHG

## 2022-05-14 DIAGNOSIS — N30.00 ACUTE CYSTITIS WITHOUT HEMATURIA: ICD-10-CM

## 2022-05-14 DIAGNOSIS — N18.9 CHRONIC KIDNEY DISEASE, UNSPECIFIED CKD STAGE: ICD-10-CM

## 2022-05-14 DIAGNOSIS — R73.9 HYPERGLYCEMIA: Primary | ICD-10-CM

## 2022-05-14 LAB
ALBUMIN SERPL-MCNC: 2.4 G/DL (ref 3.5–5)
ALBUMIN/GLOB SERPL: 0.5 {RATIO} (ref 1.2–3.5)
ALBUMIN/GLOB SERPL: ABNORMAL {RATIO} (ref 1.2–3.5)
ALP SERPL-CCNC: 213 U/L (ref 50–136)
ALP SERPL-CCNC: 252 U/L (ref 50–130)
ALT SERPL-CCNC: 38 U/L (ref 12–65)
ALT SERPL-CCNC: 39 U/L (ref 12–65)
ANION GAP SERPL CALC-SCNC: 13 MMOL/L (ref 7–16)
ANION GAP SERPL CALC-SCNC: 6 MMOL/L (ref 7–16)
ARTERIAL PATENCY WRIST A: ABNORMAL
AST SERPL-CCNC: 25 U/L (ref 15–37)
AST SERPL-CCNC: 37 U/L (ref 15–37)
BASE DEFICIT BLDV-SCNC: 2.7 MMOL/L
BASOPHILS # BLD: 0 K/UL (ref 0–0.2)
BASOPHILS NFR BLD: 0 % (ref 0–2)
BDY SITE: ABNORMAL
BILIRUB SERPL-MCNC: 0.4 MG/DL (ref 0.2–1.1)
BILIRUB SERPL-MCNC: 0.5 MG/DL (ref 0.2–1.1)
BUN SERPL-MCNC: 17 MG/DL (ref 6–23)
BUN SERPL-MCNC: 20 MG/DL (ref 6–23)
CALCIUM SERPL-MCNC: 8.9 MG/DL (ref 8.3–10.4)
CALCIUM SERPL-MCNC: 9.4 MG/DL (ref 8.3–10.4)
CHLORIDE SERPL-SCNC: 108 MMOL/L (ref 98–107)
CHLORIDE SERPL-SCNC: 109 MMOL/L (ref 98–107)
CO2 BLD-SCNC: 23 MMOL/L (ref 13–23)
CO2 SERPL-SCNC: 19 MMOL/L (ref 21–32)
CO2 SERPL-SCNC: 25 MMOL/L (ref 21–32)
CREAT SERPL-MCNC: 2.5 MG/DL (ref 0.6–1)
CREAT SERPL-MCNC: 2.69 MG/DL (ref 0.6–1)
DIFFERENTIAL METHOD BLD: ABNORMAL
EOSINOPHIL # BLD: 0 K/UL (ref 0–0.8)
EOSINOPHIL NFR BLD: 0 % (ref 0.5–7.8)
ERYTHROCYTE [DISTWIDTH] IN BLOOD BY AUTOMATED COUNT: 14.5 % (ref 11.9–14.6)
GAS FLOW.O2 O2 DELIVERY SYS: ABNORMAL L/MIN
GLOBULIN SER CALC-MCNC: 4.9 G/DL (ref 2.3–3.5)
GLOBULIN SER CALC-MCNC: ABNORMAL G/DL (ref 2.3–3.5)
GLUCOSE BLD STRIP.AUTO-MCNC: 230 MG/DL (ref 65–100)
GLUCOSE BLD STRIP.AUTO-MCNC: 402 MG/DL (ref 65–100)
GLUCOSE SERPL-MCNC: 234 MG/DL (ref 65–100)
GLUCOSE SERPL-MCNC: 464 MG/DL (ref 65–100)
HCO3 BLDV-SCNC: 22.2 MMOL/L (ref 23–28)
HCT VFR BLD AUTO: 40.8 % (ref 35.8–46.3)
HGB BLD-MCNC: 12.6 G/DL (ref 11.7–15.4)
IMM GRANULOCYTES # BLD AUTO: 0 K/UL (ref 0–0.5)
IMM GRANULOCYTES NFR BLD AUTO: 0 % (ref 0–5)
LACTATE SERPL-SCNC: 0.7 MMOL/L (ref 0.4–2)
LACTATE SERPL-SCNC: 1.7 MMOL/L (ref 0.4–2)
LIPASE SERPL-CCNC: 57 U/L (ref 73–393)
LYMPHOCYTES # BLD: 1.6 K/UL (ref 0.5–4.6)
LYMPHOCYTES NFR BLD: 17 % (ref 13–44)
MAGNESIUM SERPL-MCNC: 2.2 MG/DL (ref 1.8–2.4)
MCH RBC QN AUTO: 26.6 PG (ref 26.1–32.9)
MCHC RBC AUTO-ENTMCNC: 30.9 G/DL (ref 31.4–35)
MCV RBC AUTO: 86.1 FL (ref 79.6–97.8)
MONOCYTES # BLD: 0.7 K/UL (ref 0.1–1.3)
MONOCYTES NFR BLD: 7 % (ref 4–12)
NEUTS SEG # BLD: 6.7 K/UL (ref 1.7–8.2)
NEUTS SEG NFR BLD: 74 % (ref 43–78)
NRBC # BLD: 0 K/UL (ref 0–0.2)
O2/TOTAL GAS SETTING VFR VENT: 21 %
PCO2 BLDV: 37.8 MMHG (ref 41–51)
PH BLDV: 7.38 [PH] (ref 7.32–7.42)
PLATELET # BLD AUTO: 310 K/UL (ref 150–450)
PMV BLD AUTO: 9.5 FL (ref 9.4–12.3)
PO2 BLDV: 27 MMHG
POTASSIUM SERPL-SCNC: 4 MMOL/L (ref 3.5–5.1)
POTASSIUM SERPL-SCNC: 5.3 MMOL/L (ref 3.5–5.1)
PROT SERPL-MCNC: 7.3 G/DL (ref 6.3–8.2)
PROT SERPL-MCNC: 8.4 G/DL (ref 6.3–8.2)
RBC # BLD AUTO: 4.74 M/UL (ref 4.05–5.2)
SAO2 % BLDV: 48.8 % (ref 65–88)
SERVICE CMNT-IMP: ABNORMAL
SODIUM SERPL-SCNC: 140 MMOL/L (ref 136–145)
SODIUM SERPL-SCNC: 140 MMOL/L (ref 136–145)
SPECIMEN TYPE: ABNORMAL
WBC # BLD AUTO: 9 K/UL (ref 4.3–11.1)

## 2022-05-14 PROCEDURE — 99284 EMERGENCY DEPT VISIT MOD MDM: CPT

## 2022-05-14 PROCEDURE — 74011636637 HC RX REV CODE- 636/637: Performed by: EMERGENCY MEDICINE

## 2022-05-14 PROCEDURE — 96374 THER/PROPH/DIAG INJ IV PUSH: CPT

## 2022-05-14 PROCEDURE — 85025 COMPLETE CBC W/AUTO DIFF WBC: CPT

## 2022-05-14 PROCEDURE — 83605 ASSAY OF LACTIC ACID: CPT

## 2022-05-14 PROCEDURE — 80053 COMPREHEN METABOLIC PANEL: CPT

## 2022-05-14 PROCEDURE — 74011250636 HC RX REV CODE- 250/636: Performed by: EMERGENCY MEDICINE

## 2022-05-14 PROCEDURE — 36600 WITHDRAWAL OF ARTERIAL BLOOD: CPT

## 2022-05-14 PROCEDURE — 74011000250 HC RX REV CODE- 250: Performed by: EMERGENCY MEDICINE

## 2022-05-14 PROCEDURE — 96361 HYDRATE IV INFUSION ADD-ON: CPT

## 2022-05-14 PROCEDURE — 74176 CT ABD & PELVIS W/O CONTRAST: CPT

## 2022-05-14 PROCEDURE — 83690 ASSAY OF LIPASE: CPT

## 2022-05-14 PROCEDURE — 96376 TX/PRO/DX INJ SAME DRUG ADON: CPT

## 2022-05-14 PROCEDURE — 82803 BLOOD GASES ANY COMBINATION: CPT

## 2022-05-14 PROCEDURE — 82962 GLUCOSE BLOOD TEST: CPT

## 2022-05-14 PROCEDURE — 83735 ASSAY OF MAGNESIUM: CPT

## 2022-05-14 PROCEDURE — 96375 TX/PRO/DX INJ NEW DRUG ADDON: CPT

## 2022-05-14 RX ORDER — ONDANSETRON 4 MG/1
4 TABLET, ORALLY DISINTEGRATING ORAL
Qty: 20 TABLET | Refills: 0 | OUTPATIENT
Start: 2022-05-14 | End: 2022-05-14

## 2022-05-14 RX ORDER — MORPHINE SULFATE 4 MG/ML
4 INJECTION INTRAVENOUS
Status: COMPLETED | OUTPATIENT
Start: 2022-05-14 | End: 2022-05-14

## 2022-05-14 RX ORDER — ONDANSETRON 2 MG/ML
4 INJECTION INTRAMUSCULAR; INTRAVENOUS ONCE
Status: COMPLETED | OUTPATIENT
Start: 2022-05-14 | End: 2022-05-14

## 2022-05-14 RX ORDER — ONDANSETRON 4 MG/1
4 TABLET, ORALLY DISINTEGRATING ORAL
Qty: 20 TABLET | Refills: 0 | Status: SHIPPED | OUTPATIENT
Start: 2022-05-14

## 2022-05-14 RX ORDER — CEFDINIR 300 MG/1
300 CAPSULE ORAL 2 TIMES DAILY
Qty: 10 CAPSULE | Refills: 0 | Status: SHIPPED | OUTPATIENT
Start: 2022-05-14 | End: 2022-05-16

## 2022-05-14 RX ORDER — SODIUM CHLORIDE 0.9 % (FLUSH) 0.9 %
5-10 SYRINGE (ML) INJECTION EVERY 8 HOURS
Status: DISCONTINUED | OUTPATIENT
Start: 2022-05-14 | End: 2022-05-14 | Stop reason: HOSPADM

## 2022-05-14 RX ORDER — SODIUM CHLORIDE 0.9 % (FLUSH) 0.9 %
5-10 SYRINGE (ML) INJECTION AS NEEDED
Status: DISCONTINUED | OUTPATIENT
Start: 2022-05-14 | End: 2022-05-14 | Stop reason: HOSPADM

## 2022-05-14 RX ADMIN — SODIUM CHLORIDE 1000 ML: 900 INJECTION, SOLUTION INTRAVENOUS at 14:00

## 2022-05-14 RX ADMIN — LIDOCAINE HYDROCHLORIDE 1 G: 10 INJECTION, SOLUTION INFILTRATION; PERINEURAL at 00:07

## 2022-05-14 RX ADMIN — SODIUM CHLORIDE 1000 ML: 900 INJECTION, SOLUTION INTRAVENOUS at 12:53

## 2022-05-14 RX ADMIN — Medication 10 UNITS: at 14:00

## 2022-05-14 RX ADMIN — ONDANSETRON 4 MG: 2 INJECTION INTRAMUSCULAR; INTRAVENOUS at 12:53

## 2022-05-14 RX ADMIN — MORPHINE SULFATE 4 MG: 4 INJECTION INTRAVENOUS at 14:00

## 2022-05-14 RX ADMIN — Medication 10 UNITS: at 15:34

## 2022-05-14 NOTE — DISCHARGE INSTRUCTIONS
Call urologist on Monday and inform of active infection as this may change your surgical plan. Take next dose of antibiotic tomorrow. Return for worsening or concerning symptoms. Continue Tylenol as needed for fever.

## 2022-05-14 NOTE — ED NOTES
Unable to initiate IV access in triage after 2 attempts. 2 other RNs attempted and were unsuccessful.

## 2022-05-14 NOTE — ED PROVIDER NOTES
Patient seen in our ER yesterday. Note follows. 49-year-old female presents with generalized weakness and right-sided abdominal pain with urinary frequency for the past 3 days. Pain now only occurs with urination. She has had excessive sleepiness today and upon arrival had a fever of 101. She was unaware of a fever at home. She denies nausea or vomiting. She had right ureteral stent placement last month for 3 mm obstructing stone and hydronephrosis. She is scheduled for a stent exchange and lithotripsy next week. They had difficulty getting an IV but were able to get blood work. IM Rocephin given for UTI and pyelonephritis. Patient returns today feeling worse with nausea and vomiting and continued pain. Most of her pain is around her umbilicus. Cannot keep any medication down. The history is provided by the patient. No  was used. Vomiting   This is a new problem. The current episode started yesterday. The problem occurs continuously. The problem has been gradually worsening. The emesis has an appearance of stomach contents. There has been no fever. Associated symptoms include abdominal pain. Pertinent negatives include no chills, no fever, no diarrhea, no headaches, no myalgias, no cough and no headaches. Risk factors include new medication. Her past medical history is significant for DM.         Past Medical History:   Diagnosis Date    Acute renal failure ([address] 1/24/2012    CKD (chronic kidney disease) stage 3, GFR 30-59 ml/min ([facility])     CKD (chronic kidney disease), stage IV ([address] 3/13/2017    Gastroenteritis, acute 1/24/2012    IDDM (insulin dependent diabetes mellitus) 1/24/2012    Intractable nausea and vomiting 12/25/2014    Irregular menses     Nausea & vomiting 1/24/2012    Neuropathy, peripheral, idiopathic 3/13/2017    Retinopathy, bilateral 3/13/2017    Trichomoniasis 3/13/2017    Ulcer, skin, chronic ([address] 3/13/2017    Vaginal burning        Past Surgical History:   Procedure Laterality Date    HX AMPUTATION  12    gangrene    HX AMPUTATION Left     5th Toe due to Diabetic Ulcer    HX OPEN REDUCTION INTERNAL FIXATION Right 2011    ankle         Family History:   Problem Relation Age of Onset    Diabetes Father         DM Type II    Prostate Cancer Father     Stroke Father     Breast Cancer Mother 76    Diabetes Maternal Grandmother         DM Type II    Hypertension Maternal Grandmother     Diabetes Paternal Grandfather         DM Type II     Diabetes Paternal Grandmother     Colon Cancer Neg Hx     Ovarian Cancer Neg Hx     Uterine Cancer Neg Hx        Social History     Socioeconomic History    Marital status: SINGLE     Spouse name: Not on file    Number of children: Not on file    Years of education: Not on file    Highest education level: Not on file   Occupational History    Not on file   Tobacco Use    Smoking status: Former Smoker     Quit date: 2013     Years since quittin.5    Smokeless tobacco: Never Used   Substance and Sexual Activity    Alcohol use: Yes     Comment: socially; occasional alcohol use    Drug use: No    Sexual activity: Yes     Partners: Male     Birth control/protection: None   Other Topics Concern     Service Not Asked    Blood Transfusions Not Asked    Caffeine Concern Yes    Occupational Exposure Not Asked    Hobby Hazards Not Asked    Sleep Concern Not Asked    Stress Concern Not Asked    Weight Concern Not Asked    Special Diet Not Asked    Back Care Not Asked    Exercise No    Bike Helmet Not Asked    Seat Belt Yes    Self-Exams No   Social History Narrative    Abuse: Feels safe at home, no history of physical abuse, no history of sexual abuse     Social Determinants of Health     Financial Resource Strain:     Difficulty of Paying Living Expenses: Not on file   Food Insecurity:     Worried About Running Out of Food in the Last Year: Not on file    [employer] in the Last Year: Not on file   Transportation Needs:     Lack of Transportation (Medical): Not on file    Lack of Transportation (Non-Medical): Not on file   Physical Activity:     Days of Exercise per Week: Not on file    Minutes of Exercise per Session: Not on file   Stress:     Feeling of Stress : Not on file   Social Connections:     Frequency of Communication with Friends and Family: Not on file    Frequency of Social Gatherings with Friends and Family: Not on file    Attends [employer] Services: Not on file    Active Member of [address] [place] [address] [employer] or Organizations: Not on file    Attends Club or Organization Meetings: Not on file    Marital Status: Not on file   Intimate Partner Violence:     Fear of Current or Ex-Partner: Not on file    Emotionally Abused: Not on file    Physically Abused: Not on file    Sexually Abused: Not on file   Housing Stability:     Unable to Pay for Housing in the Last Year: Not on file    Number of [place] in the Last Year: Not on file    Unstable Housing in the Last Year: Not on file         ALLERGIES: Patient has no known allergies. Review of Systems   Constitutional: Positive for fatigue. Negative for chills and fever. HENT: Negative for rhinorrhea and sore throat. Eyes: Negative for pain and redness. Respiratory: Negative for cough, chest tightness, shortness of breath and wheezing. Cardiovascular: Negative for chest pain and leg swelling. Gastrointestinal: Positive for abdominal pain, nausea and vomiting. Negative for diarrhea. Genitourinary: Positive for dysuria. Negative for hematuria, vaginal bleeding and vaginal discharge. Musculoskeletal: Negative for back pain, gait problem, myalgias, neck pain and neck stiffness. Skin: Negative for color change and rash. Neurological: Negative for weakness, numbness and headaches.        Vitals:    05/14/22 1250   BP: (!) 188/96   Pulse: (!) 113   Resp: 17   Temp: 98.6 °F (37 °C)   SpO2: 98%   Weight: 78 kg (172 lb)   Height: 5' 8\" (1.727 m)            Physical Exam  Constitutional:       Appearance: Normal appearance. She is well-developed. HENT:      Head: Normocephalic and atraumatic. Cardiovascular:      Rate and Rhythm: Regular rhythm. Tachycardia present. Pulmonary:      Effort: Pulmonary effort is normal. No respiratory distress. Breath sounds: Normal breath sounds. No wheezing. Abdominal:      General: Bowel sounds are normal.      Palpations: Abdomen is soft. Tenderness: There is no abdominal tenderness. Musculoskeletal:         General: No swelling. Normal range of motion. Cervical back: Normal range of motion and neck supple. Skin:     General: Skin is warm and dry. Neurological:      General: No focal deficit present. Mental Status: She is alert and oriented to person, place, and time. MDM  Number of Diagnoses or Management Options  Chronic kidney disease, unspecified CKD stage  Hyperglycemia  Diagnosis management comments: Pt with diabetes and hyperglycemia here having NV and diffuse abd pain. Did not take her insulin today. Does not appear to be in DKA. pH 7.38. pt with urinary stent in place on right. CT shows stable stent. abx on board for UTI. Will continue and discharge with zofran for nausea.         Amount and/or Complexity of Data Reviewed  Clinical lab tests: ordered and reviewed  Tests in the radiology section of CPT®: ordered and reviewed  Tests in the medicine section of CPT®: ordered and reviewed    Patient Progress  Patient progress: stable         Procedures        Results Include:    Recent Results (from the past 24 hour(s))   EKG, 12 LEAD, INITIAL    Collection Time: 05/13/22  6:59 PM   Result Value Ref Range    Ventricular Rate 115 BPM    Atrial Rate 115 BPM    P-R Interval 126 ms    QRS Duration 76 ms    Q-T Interval 310 ms    QTC Calculation (Bezet) 428 ms    Calculated P Axis 72 degrees    Calculated R Axis 150 degrees    Calculated T Axis 63 degrees    Diagnosis       Sinus tachycardia  Right atrial enlargement  Right ventricular hypertrophy  Abnormal ECG  When compared with ECG of 19-APR-2022 04:30,  Premature supraventricular complexes are no longer Present  Criteria for Anteroseptal infarct are no longer Present  T wave amplitude has decreased in Lateral leads     CULTURE, URINE    Collection Time: 05/13/22  9:53 PM    Specimen: Clean catch; Urine    URINE   Result Value Ref Range    Special Requests: NO SPECIAL REQUESTS      Culture result:        NO GROWTH AFTER SHORT PERIOD OF INCUBATION. FURTHER RESULTS TO FOLLOW AFTER OVERNIGHT INCUBATION.    URINALYSIS W/ RFLX MICROSCOPIC    Collection Time: 05/13/22  9:53 PM   Result Value Ref Range    Color YELLOW      Appearance TURBID      Specific gravity 1.015 1.001 - 1.023      pH (UA) 6.5 5.0 - 9.0      Protein 100 (A) NEG mg/dL    Glucose Negative mg/dL    Ketone Negative NEG mg/dL    Bilirubin Negative NEG      Blood MODERATE (A) NEG      Urobilinogen 0.2 0.2 - 1.0 EU/dL    Nitrites Positive (A) NEG      Leukocyte Esterase MODERATE (A) NEG     URINE MICROSCOPIC    Collection Time: 05/13/22  9:53 PM   Result Value Ref Range    WBC >100 0 /hpf    RBC  0 /hpf    Epithelial cells 5-10 0 /hpf    Bacteria 1+ (H) 0 /hpf    Casts 0 0 /lpf    Crystals, urine 0 0 /LPF    Mucus 0 0 /lpf    Other observations RESULTS VERIFIED MANUALLY     LACTIC ACID    Collection Time: 05/13/22 11:19 PM   Result Value Ref Range    Lactic acid 0.7 0.4 - 2.0 MMOL/L   CBC WITH AUTOMATED DIFF    Collection Time: 05/13/22 11:19 PM   Result Value Ref Range    WBC 11.0 4.3 - 11.1 K/uL    RBC 4.12 4.05 - 5.2 M/uL    HGB 10.9 (L) 11.7 - 15.4 g/dL    HCT 35.0 (L) 35.8 - 46.3 %    MCV 85.0 79.6 - 97.8 FL    MCH 26.5 26.1 - 32.9 PG    MCHC 31.1 (L) 31.4 - 35.0 g/dL    RDW 14.6 11.9 - 14.6 %    PLATELET 429 141 - 762 K/uL    MPV 9.0 (L) 9.4 - 12.3 FL    ABSOLUTE NRBC 0.00 0.0 - 0.2 K/uL    DF AUTOMATED      NEUTROPHILS 73 43 - 78 % LYMPHOCYTES 15 13 - 44 %    MONOCYTES 10 4.0 - 12.0 %    EOSINOPHILS 2 0.5 - 7.8 %    BASOPHILS 0 0.0 - 2.0 %    IMMATURE GRANULOCYTES 0 0.0 - 5.0 %    ABS. NEUTROPHILS 8.0 1.7 - 8.2 K/UL    ABS. LYMPHOCYTES 1.7 0.5 - 4.6 K/UL    ABS. MONOCYTES 1.1 0.1 - 1.3 K/UL    ABS. EOSINOPHILS 0.2 0.0 - 0.8 K/UL    ABS. BASOPHILS 0.0 0.0 - 0.2 K/UL    ABS. IMM. GRANS. 0.0 0.0 - 0.5 K/UL   METABOLIC PANEL, COMPREHENSIVE    Collection Time: 05/13/22 11:19 PM   Result Value Ref Range    Sodium 140 136 - 145 mmol/L    Potassium 4.0 3.5 - 5.1 mmol/L    Chloride 109 (H) 98 - 107 mmol/L    CO2 25 21 - 32 mmol/L    Anion gap 6 (L) 7 - 16 mmol/L    Glucose 234 (H) 65 - 100 mg/dL    BUN 17 6 - 23 MG/DL    Creatinine 2.50 (H) 0.6 - 1.0 MG/DL    GFR est AA 26 (L) >60 ml/min/1.73m2    GFR est non-AA 22 (L) >60 ml/min/1.73m2    Calcium 8.9 8.3 - 10.4 MG/DL    Bilirubin, total 0.4 0.2 - 1.1 MG/DL    ALT (SGPT) 38 12 - 65 U/L    AST (SGOT) 25 15 - 37 U/L    Alk. phosphatase 213 (H) 50 - 136 U/L    Protein, total 7.3 6.3 - 8.2 g/dL    Albumin 2.4 (L) 3.5 - 5.0 g/dL    Globulin 4.9 (H) 2.3 - 3.5 g/dL    A-G Ratio 0.5 (L) 1.2 - 3.5     CBC WITH AUTOMATED DIFF    Collection Time: 05/14/22  1:05 PM   Result Value Ref Range    WBC 9.0 4.3 - 11.1 K/uL    RBC 4.74 4.05 - 5.2 M/uL    HGB 12.6 11.7 - 15.4 g/dL    HCT 40.8 35.8 - 46.3 %    MCV 86.1 79.6 - 97.8 FL    MCH 26.6 26.1 - 32.9 PG    MCHC 30.9 (L) 31.4 - 35.0 g/dL    RDW 14.5 11.9 - 14.6 %    PLATELET 177 898 - 320 K/uL    MPV 9.5 9.4 - 12.3 FL    ABSOLUTE NRBC 0.00 0.0 - 0.2 K/uL    DF AUTOMATED      NEUTROPHILS 74 43 - 78 %    LYMPHOCYTES 17 13 - 44 %    MONOCYTES 7 4.0 - 12.0 %    EOSINOPHILS 0 (L) 0.5 - 7.8 %    BASOPHILS 0 0.0 - 2.0 %    IMMATURE GRANULOCYTES 0 0.0 - 5.0 %    ABS. NEUTROPHILS 6.7 1.7 - 8.2 K/UL    ABS. LYMPHOCYTES 1.6 0.5 - 4.6 K/UL    ABS. MONOCYTES 0.7 0.1 - 1.3 K/UL    ABS. EOSINOPHILS 0.0 0.0 - 0.8 K/UL    ABS. BASOPHILS 0.0 0.0 - 0.2 K/UL    ABS. IMM.  GRANS. 0.0 0.0 - 0.5 K/UL   METABOLIC PANEL, COMPREHENSIVE    Collection Time: 05/14/22  1:05 PM   Result Value Ref Range    Sodium 140 136 - 145 mmol/L    Potassium 5.3 (H) 3.5 - 5.1 mmol/L    Chloride 108 (H) 98 - 107 mmol/L    CO2 19 (L) 21 - 32 mmol/L    Anion gap 13 7 - 16 mmol/L    Glucose 464 (HH) 65 - 100 mg/dL    BUN 20 6 - 23 MG/DL    Creatinine 2.69 (H) 0.6 - 1.0 MG/DL    GFR est AA 24 (L) >60 ml/min/1.73m2    GFR est non-AA 20 (L) >60 ml/min/1.73m2    Calcium 9.4 8.3 - 10.4 MG/DL    Bilirubin, total 0.5 0.2 - 1.1 MG/DL    ALT (SGPT) 39 12 - 65 U/L    AST (SGOT) 37 15 - 37 U/L    Alk. phosphatase 252 (H) 50 - 130 U/L    Protein, total 8.4 (H) 6.3 - 8.2 g/dL    Globulin Cannot be calculated 2.3 - 3.5 g/dL    A-G Ratio Cannot be calculated 1.2 - 3.5     MAGNESIUM    Collection Time: 05/14/22  1:05 PM   Result Value Ref Range    Magnesium 2.2 1.8 - 2.4 mg/dL   LIPASE    Collection Time: 05/14/22  1:05 PM   Result Value Ref Range    Lipase 57 (L) 73 - 393 U/L   POC VENOUS BLOOD GAS    Collection Time: 05/14/22  2:24 PM   Result Value Ref Range    Device: ROOM AIR      FIO2 (POC) 21 %    pH, venous (POC) 7.38 7.32 - 7.42      pCO2, venous (POC) 37.8 (L) 41 - 51 MMHG    pO2, venous (POC) 27 (LL) mmHg    HCO3, venous (POC) 22.2 (L) 23 - 28 MMOL/L    sO2, venous (POC) 48.8 (L) 65 - 88 %    Base deficit, venous (POC) 2.7 mmol/L    Allens test (POC) NOT APPLICABLE      Site VENOUS BLOOD      Specimen type (POC) VENOUS BLOOD      Performed by Amador     CO2, POC 23 13 - 23 MMOL/L   LACTIC ACID    Collection Time: 05/14/22  2:26 PM   Result Value Ref Range    Lactic acid 1.7 0.4 - 2.0 MMOL/L       CT ABD/PEL FOR RENAL STONE (Final result)  Result time 05/14/22 16:02:50  Final result by Chalo Molina MD (05/14/22 16:02:50)                Impression:    1.  Tiny nonobstructing left renal stone. 2.  Right double-J nephroureteral stent with proximal pigtail located within the   renal pelvis. Mild right pelvocaliectasis. 3.  Tiny calculi within the right proximal ureter. Narrative:    CT OF THE ABDOMEN AND PELVIS WITHOUT CONTRAST STONE PROTOCOL     INDICATION: flank pain. .     COMPARISON: CT 4/19/2022     TECHNIQUE: Multiple axial images were obtained through the abdomen and pelvis   without IV contrast. Radiation dose reduction techniques were used for this   study. Our CT scanners use one or all of the following: Automated exposure   control, adjustment of the mA and/or kV according to patient size, iterative   reconstruction. FINDINGS:   Visualized thorax: Normal.     Liver: Normal in size and morphology.  No focal lesions. Gallbladder/biliary: Normal gallbladder. No biliary dilatation. Pancreas: Normal.     Spleen: Normal.     Adrenals: Normal.     Kidneys: Right double-J nephroureteral stent. The proximal pigtail is located   within the renal pelvis. Mild right pelvocaliectasis. Small stones appear to be   present within the proximal ureter on axial image 38. Left renal simple   appearing cysts. Tiny nonobstructing left renal stone. Bladder: Normal.     Pelvic organs: Status post hysterectomy.  No adnexal mass.      Gastrointestinal: Normal.     Peritoneum/retroperitoneum: Normal.     Lymph nodes: Normal.     Vessels: Normal.     Bones/Soft tissues: Normal.

## 2022-05-14 NOTE — PROGRESS NOTES
Venous Blood Gas Results as of 5/14/2022 14:35     Ref.  Range 4/19/2022 02:53 5/14/2022 14:24   FIO2 (POC) Latest Units: %  21   Specimen type (POC)  VENOUS BLOOD VENOUS BLOOD   pH, venous (POC) Latest Ref Range: 7.32 - 7.42   7.38 7.38   pCO2, venous (POC) Latest Ref Range: 41 - 51 MMHG 35.4 (L) 37.8 (L)   pO2, venous (POC) Latest Units: mmHg 50 27 (LL)   HCO3, venous (POC) Latest Ref Range: 23 - 28 MMOL/L 21.0 (L) 22.2 (L)   sO2, venous (POC) Latest Ref Range: 65 - 88 % 84.9 48.8 (L)   Base deficit, venous (POC) Latest Units: mmol/L 3.5 2.7   Site   VENOUS BLOOD   Device:  ROOM AIR ROOM AIR   Allens test (POC)   NOT APPLICABLE

## 2022-05-14 NOTE — ED NOTES
I have reviewed discharge instructions with the patient. The patient verbalized understanding. Patient left ED via Discharge Method: ambulatory to Home by self     Opportunity for questions and clarification provided. Patient given 1 scripts. To continue your aftercare when you leave the hospital, you may receive an automated call from our care team to check in on how you are doing. This is a free service and part of our promise to provide the best care and service to meet your aftercare needs.  If you have questions, or wish to unsubscribe from this service please call [phone]. Thank you for Choosing our [address] [place] [address] Emergency Department.

## 2022-05-14 NOTE — ED NOTES
I have reviewed discharge instructions with the patient. The patient verbalized understanding. Patient left ED via Discharge Method: ambulatory to Home with self. Opportunity for questions and clarification provided. Patient given 2 scripts. To continue your aftercare when you leave the hospital, you may receive an automated call from our care team to check in on how you are doing. This is a free service and part of our promise to provide the best care and service to meet your aftercare needs.  If you have questions, or wish to unsubscribe from this service please call [phone]. Thank you for Choosing our [address] [place] [address] Emergency Department.

## 2022-05-14 NOTE — ED TRIAGE NOTES
Pt c/o vomiting that started this morning. Pt denies abdominal pain, denies diarrhea. Pt states she was seen at [facility] yesterday for kidney infection.

## 2022-05-14 NOTE — ED PROVIDER NOTES
40-year-old female presents with generalized weakness and right-sided abdominal pain with urinary frequency for the past 3 days. Pain now only occurs with urination. She has had excessive sleepiness today and upon arrival had a fever of 101. She was unaware of a fever at home. She denies nausea or vomiting. She had right ureteral stent placement last month for 3 mm obstructing stone and hydronephrosis. She is scheduled for a stent exchange and lithotripsy next week. Flank Pain   Associated symptoms include a fever, abdominal pain and dysuria. Pertinent negatives include no chest pain and no headaches.         Past Medical History:   Diagnosis Date    Acute renal failure ([address] 1/24/2012    CKD (chronic kidney disease) stage 3, GFR 30-59 ml/min ([facility])     CKD (chronic kidney disease), stage IV ([address] 3/13/2017    Gastroenteritis, acute 1/24/2012    IDDM (insulin dependent diabetes mellitus) 1/24/2012    Intractable nausea and vomiting 12/25/2014    Irregular menses     Nausea & vomiting 1/24/2012    Neuropathy, peripheral, idiopathic 3/13/2017    Retinopathy, bilateral 3/13/2017    Trichomoniasis 3/13/2017    Ulcer, skin, chronic ([address] 3/13/2017    Vaginal burning        Past Surgical History:   Procedure Laterality Date    HX AMPUTATION  1/27/12    gangrene    HX AMPUTATION Left     5th Toe due to Diabetic Ulcer    HX OPEN REDUCTION INTERNAL FIXATION Right 2011    ankle         Family History:   Problem Relation Age of Onset    Diabetes Father         DM Type II    Prostate Cancer Father     Stroke Father     Breast Cancer Mother 76    Diabetes Maternal Grandmother         DM Type II    Hypertension Maternal Grandmother     Diabetes Paternal Grandfather         DM Type II     Diabetes Paternal Grandmother     Colon Cancer Neg Hx     Ovarian Cancer Neg Hx     Uterine Cancer Neg Hx        Social History     Socioeconomic History    Marital status: SINGLE     Spouse name: Not on file  Number of children: Not on file    Years of education: Not on file    Highest education level: Not on file   Occupational History    Not on file   Tobacco Use    Smoking status: Former Smoker     Quit date: 2013     Years since quittin.5    Smokeless tobacco: Never Used   Substance and Sexual Activity    Alcohol use: Yes     Comment: socially; occasional alcohol use    Drug use: No    Sexual activity: Yes     Partners: Male     Birth control/protection: None   Other Topics Concern     Service Not Asked    Blood Transfusions Not Asked    Caffeine Concern Yes    Occupational Exposure Not Asked    Hobby Hazards Not Asked    Sleep Concern Not Asked    Stress Concern Not Asked    Weight Concern Not Asked    Special Diet Not Asked    Back Care Not Asked    Exercise No    Bike Helmet Not Asked    Seat Belt Yes    Self-Exams No   Social History Narrative    Abuse: Feels safe at home, no history of physical abuse, no history of sexual abuse     Social Determinants of Health     Financial Resource Strain:     Difficulty of Paying Living Expenses: Not on file   Food Insecurity:     Worried About Running Out of Food in the Last Year: Not on file    Jennifer of Food in the Last Year: Not on file   Transportation Needs:     Lack of Transportation (Medical): Not on file    Lack of Transportation (Non-Medical):  Not on file   Physical Activity:     Days of Exercise per Week: Not on file    Minutes of Exercise per Session: Not on file   Stress:     Feeling of Stress : Not on file   Social Connections:     Frequency of Communication with Friends and Family: Not on file    Frequency of Social Gatherings with Friends and Family: Not on file    Attends Adventism Services: Not on file    Active Member of Clubs or Organizations: Not on file    Attends Club or Organization Meetings: Not on file    Marital Status: Not on file   Intimate Partner Violence:     Fear of Current or Ex-Partner: Not on file    Emotionally Abused: Not on file    Physically Abused: Not on file    Sexually Abused: Not on file   Housing Stability:     Unable to Pay for Housing in the Last Year: Not on file    Number of Places Lived in the Last Year: Not on file    Unstable Housing in the Last Year: Not on file         ALLERGIES: Patient has no known allergies. Review of Systems   Constitutional: Positive for fatigue and fever. HENT: Negative for hearing loss. Eyes: Negative for visual disturbance. Respiratory: Negative for cough and shortness of breath. Cardiovascular: Negative for chest pain. Gastrointestinal: Positive for abdominal pain. Negative for diarrhea, nausea and vomiting. Genitourinary: Positive for difficulty urinating, dysuria, flank pain and frequency. Musculoskeletal: Positive for back pain. Skin: Negative for rash. Neurological: Negative for headaches. Psychiatric/Behavioral: Negative for confusion. All other systems reviewed and are negative. Vitals:    05/13/22 1857 05/13/22 2128   BP: (!) 134/101 137/82   Pulse: (!) 110 (!) 103   Resp: 20 23   Temp: (!) 101 °F (38.3 °C) 99.2 °F (37.3 °C)   SpO2: 98% 99%   Weight: 78 kg (172 lb)    Height: 5' 8\" (1.727 m)             Physical Exam  Vitals and nursing note reviewed. Constitutional:       Appearance: Normal appearance. She is well-developed. HENT:      Head: Normocephalic and atraumatic. Nose: Nose normal.      Mouth/Throat:      Mouth: Mucous membranes are moist.   Eyes:      Pupils: Pupils are equal, round, and reactive to light. Cardiovascular:      Rate and Rhythm: Regular rhythm. Tachycardia present. Heart sounds: Normal heart sounds. Pulmonary:      Effort: Pulmonary effort is normal.      Breath sounds: Normal breath sounds. Abdominal:      Palpations: Abdomen is soft. Tenderness: There is no abdominal tenderness. Musculoskeletal:         General: No deformity.  Normal range of motion. Cervical back: Normal range of motion and neck supple. Skin:     General: Skin is warm and dry. Neurological:      General: No focal deficit present. Mental Status: She is alert. Mental status is at baseline. Psychiatric:         Mood and Affect: Mood normal.         Behavior: Behavior normal.          MDM  Number of Diagnoses or Management Options  Diagnosis management comments: ItParts of this document were created using dragon voice recognition software. The chart has been reviewed but errors may still be present. I wore appropriate PPE throughout this patient's ED visit. Matilda Montague MD, 9:36 PM    No tenderness on exam    12:23 AM  Renal function near baseline. Patient has kidney infection with known stone. CT ordered, but patient does not want to stay for imaging. She has had 2 IVs infiltrated, so eventually received IM Rocephin cata importance of calling urology on Monday to update on current infection prior to her procedure on Tuesday as this may change their plan. She expressed understanding and understands to return for any worsening or concerning symptoms. I discussed the results of all labs, procedures, radiographs, and treatments with the patient and available family. Treatment plan is agreed upon and the patient is ready for discharge. Questions about treatment in the ED and differential diagnosis of presenting condition were answered. Patient was given verbal discharge instructions including, but not limited to, importance of returning to the emergency department for any concern of worsening or continued symptoms. Instructions were given to follow up with a primary care provider or specialist within 1-2 days.   Adverse effects of medications, if prescribed, were discussed and patient was advised to refrain from significant physical activity until followed up by primary care physician and to not drive or operate heavy machinery after taking any sedating substances. Amount and/or Complexity of Data Reviewed  Clinical lab tests: ordered and reviewed (Results for orders placed or performed during the hospital encounter of 05/13/22  -LACTIC ACID:        Result                      Value             Ref Range           Lactic acid                 0.7               0.4 - 2.0 MM*  -CBC WITH AUTOMATED DIFF:        Result                      Value             Ref Range           WBC                         11.0              4.3 - 11.1 K*       RBC                         4.12              4.05 - 5.2 M*       HGB                         10.9 (L)          11.7 - 15.4 *       HCT                         35.0 (L)          35.8 - 46.3 %       MCV                         85.0              79.6 - 97.8 *       MCH                         26.5              26.1 - 32.9 *       MCHC                        31.1 (L)          31.4 - 35.0 *       RDW                         14.6              11.9 - 14.6 %       PLATELET                    280               150 - 450 K/*       MPV                         9.0 (L)           9.4 - 12.3 FL       ABSOLUTE NRBC               0.00              0.0 - 0.2 K/*       DF                          AUTOMATED                             NEUTROPHILS                 73                43 - 78 %           LYMPHOCYTES                 15                13 - 44 %           MONOCYTES                   10                4.0 - 12.0 %        EOSINOPHILS                 2                 0.5 - 7.8 %         BASOPHILS                   0                 0.0 - 2.0 %         IMMATURE GRANULOCYTES       0                 0.0 - 5.0 %         ABS. NEUTROPHILS            8.0               1.7 - 8.2 K/*       ABS. LYMPHOCYTES            1.7               0.5 - 4.6 K/*       ABS. MONOCYTES              1.1               0.1 - 1.3 K/*       ABS. EOSINOPHILS            0.2               0.0 - 0.8 K/*       ABS.  BASOPHILS              0.0               0.0 - 0.2 K/* ABS. IMM. GRANS.            0.0               0.0 - 0.5 K/*  -METABOLIC PANEL, COMPREHENSIVE:        Result                      Value             Ref Range           Sodium                      140               136 - 145 mm*       Potassium                   4.0               3.5 - 5.1 mm*       Chloride                    109 (H)           98 - 107 mmo*       CO2                         25                21 - 32 mmol*       Anion gap                   6 (L)             7 - 16 mmol/L       Glucose                     234 (H)           65 - 100 mg/*       BUN                         17                6 - 23 MG/DL        Creatinine                  2.50 (H)          0.6 - 1.0 MG*       GFR est AA                  26 (L)            >60 ml/min/1*       GFR est non-AA              22 (L)            >60 ml/min/1*       Calcium                     8.9               8.3 - 10.4 M*       Bilirubin, total            0.4               0.2 - 1.1 MG*       ALT (SGPT)                  38                12 - 65 U/L         AST (SGOT)                  25                15 - 37 U/L         Alk.  phosphatase            213 (H)           50 - 136 U/L        Protein, total              7.3               6.3 - 8.2 g/*       Albumin                     2.4 (L)           3.5 - 5.0 g/*       Globulin                    4.9 (H)           2.3 - 3.5 g/*       A-G Ratio                   0.5 (L)           1.2 - 3.5      -URINALYSIS W/ RFLX MICROSCOPIC:        Result                      Value             Ref Range           Color                       YELLOW                                Appearance                  TURBID                                Specific gravity            1.015             1.001 - 1.02*       pH (UA)                     6.5               5.0 - 9.0           Protein                     100 (A)           NEG mg/dL           Glucose                     Negative          mg/dL               Ketone Negative          NEG mg/dL           Bilirubin                   Negative          NEG                 Blood                       MODERATE (A)      NEG                 Urobilinogen                0.2               0.2 - 1.0 EU*       Nitrites                    Positive (A)      NEG                 Leukocyte Esterase          MODERATE (A)      NEG            -URINE MICROSCOPIC:        Result                      Value             Ref Range           WBC                         >100              0 /hpf              RBC                                     0 /hpf              Epithelial cells            5-10              0 /hpf              Bacteria                    1+ (H)            0 /hpf              Casts                       0                 0 /lpf              Crystals, urine             0                 0 /LPF              Mucus                       0                 0 /lpf              Other observations                                            RESULTS VERIFIED MANUALLY  -EKG, 12 LEAD, INITIAL:        Result                      Value             Ref Range           Ventricular Rate            115               BPM                 Atrial Rate                 115               BPM                 P-R Interval                126               ms                  QRS Duration                76                ms                  Q-T Interval                310               ms                  QTC Calculation (Bezet)     428               ms                  Calculated P Axis           72                degrees             Calculated R Axis           150               degrees             Calculated T Axis           63                degrees             Diagnosis                                                     Sinus tachycardia   Right atrial enlargement   Right ventricular hypertrophy   Abnormal ECG   When compared with ECG of 19-APR-2022 04:30,   Premature supraventricular complexes are no longer Present   Criteria for Anteroseptal infarct are no longer Present   T wave amplitude has decreased in Lateral leads     )  Tests in the medicine section of CPT®: reviewed and ordered           EKG    Date/Time: 5/13/2022 9:36 PM  Performed by: Claudio Santoro MD  Authorized by: Claudio Santoro MD     ECG reviewed by ED Physician in the absence of a cardiologist: yes    Interpretation:     Interpretation: abnormal    Rate:     ECG rate:  115    ECG rate assessment: tachycardic    Rhythm:     Rhythm: sinus tachycardia    Ectopy:     Ectopy: none    Conduction:     Conduction: normal    ST segments:     ST segments:  Non-specific  T waves:     T waves: non-specific    Other findings:     Other findings: ALBERTO    Other Procedure    Date/Time: 5/13/2022 11:12 PM  Performed by: Claudio Santoro MD  Authorized by: Claudio Santoro MD     Consent:     Consent obtained:  Verbal    Consent given by:  Patient    Risks discussed:  Pain    Alternatives discussed:  Delayed treatment  Indications:     Indications:  IV access, labs  Pre-procedure details:     Skin preparation:  ChloraPrep  Anesthesia (see MAR for exact dosages): Anesthesia method:  None  Post-procedure details:     Patient tolerance of procedure:   Tolerated well, no immediate complications  Comments:      Left AC US guided 20 G IV placed with blood draw

## 2022-05-15 LAB
ATRIAL RATE: 115 BPM
CALCULATED P AXIS, ECG09: 72 DEGREES
CALCULATED R AXIS, ECG10: 150 DEGREES
CALCULATED T AXIS, ECG11: 63 DEGREES
DIAGNOSIS, 93000: NORMAL
P-R INTERVAL, ECG05: 126 MS
Q-T INTERVAL, ECG07: 310 MS
QRS DURATION, ECG06: 76 MS
QTC CALCULATION (BEZET), ECG08: 428 MS
VENTRICULAR RATE, ECG03: 115 BPM

## 2022-05-16 ENCOUNTER — ANESTHESIA EVENT (OUTPATIENT)
Dept: SURGERY | Age: 47
End: 2022-05-16
Payer: COMMERCIAL

## 2022-05-16 LAB
BACTERIA SPEC CULT: NORMAL
SERVICE CMNT-IMP: NORMAL

## 2022-05-17 ENCOUNTER — ANESTHESIA (OUTPATIENT)
Dept: SURGERY | Age: 47
End: 2022-05-17
Payer: COMMERCIAL

## 2022-05-17 ENCOUNTER — HOSPITAL ENCOUNTER (OUTPATIENT)
Age: 47
Setting detail: OUTPATIENT SURGERY
Discharge: HOME | End: 2022-05-17
Attending: UROLOGY | Admitting: UROLOGY
Payer: COMMERCIAL

## 2022-05-17 VITALS
TEMPERATURE: 97.8 F | WEIGHT: 172 LBS | DIASTOLIC BLOOD PRESSURE: 75 MMHG | RESPIRATION RATE: 16 BRPM | BODY MASS INDEX: 26.07 KG/M2 | OXYGEN SATURATION: 100 % | HEART RATE: 107 BPM | SYSTOLIC BLOOD PRESSURE: 151 MMHG | HEIGHT: 68 IN

## 2022-05-17 DIAGNOSIS — N20.1 RIGHT URETERAL STONE: ICD-10-CM

## 2022-05-17 LAB
APPEARANCE UR: CLEAR
BACTERIA URNS QL MICRO: 0 /HPF
BILIRUB UR QL: NEGATIVE
CASTS URNS QL MICRO: 0 /LPF
COLOR UR: YELLOW
CRYSTALS URNS QL MICRO: 0 /LPF
EPI CELLS #/AREA URNS HPF: NORMAL /HPF
GLUCOSE BLD STRIP.AUTO-MCNC: 305 MG/DL (ref 65–100)
GLUCOSE BLD STRIP.AUTO-MCNC: 339 MG/DL (ref 65–100)
GLUCOSE UR STRIP.AUTO-MCNC: 500 MG/DL
HCG UR QL: NEGATIVE
HGB UR QL STRIP: ABNORMAL
KETONES UR QL STRIP.AUTO: NEGATIVE MG/DL
LEUKOCYTE ESTERASE UR QL STRIP.AUTO: ABNORMAL
MUCOUS THREADS URNS QL MICRO: 0 /LPF
NITRITE UR QL STRIP.AUTO: NEGATIVE
OTHER OBSERVATIONS,UCOM: NORMAL
PH UR STRIP: 7.5 [PH] (ref 5–9)
POTASSIUM BLD-SCNC: 4.2 MMOL/L (ref 3.5–5.1)
PROT UR STRIP-MCNC: 30 MG/DL
RBC #/AREA URNS HPF: NORMAL /HPF
SERVICE CMNT-IMP: ABNORMAL
SERVICE CMNT-IMP: ABNORMAL
SP GR UR REFRACTOMETRY: 1.01 (ref 1–1.02)
UROBILINOGEN UR QL STRIP.AUTO: 0.2 EU/DL (ref 0.2–1)
WBC URNS QL MICRO: NORMAL /HPF

## 2022-05-17 PROCEDURE — 77030019927 HC TBNG IRR CYSTO BAXT -A: Performed by: UROLOGY

## 2022-05-17 PROCEDURE — 84132 ASSAY OF SERUM POTASSIUM: CPT

## 2022-05-17 PROCEDURE — 74011000250 HC RX REV CODE- 250: Performed by: NURSE ANESTHETIST, CERTIFIED REGISTERED

## 2022-05-17 PROCEDURE — 77030038846 HC SCPE URETSCP LITHOVUE DISP BSC -H: Performed by: UROLOGY

## 2022-05-17 PROCEDURE — 88300 SURGICAL PATH GROSS: CPT

## 2022-05-17 PROCEDURE — 81015 MICROSCOPIC EXAM OF URINE: CPT

## 2022-05-17 PROCEDURE — 74011250636 HC RX REV CODE- 250/636: Performed by: ANESTHESIOLOGY

## 2022-05-17 PROCEDURE — 76210000021 HC REC RM PH II 0.5 TO 1 HR: Performed by: UROLOGY

## 2022-05-17 PROCEDURE — 77030040361 HC SLV COMPR DVT MDII -B: Performed by: UROLOGY

## 2022-05-17 PROCEDURE — C2617 STENT, NON-COR, TEM W/O DEL: HCPCS | Performed by: UROLOGY

## 2022-05-17 PROCEDURE — 74420 UROGRAPHY RTRGR +-KUB: CPT | Performed by: UROLOGY

## 2022-05-17 PROCEDURE — 74011250636 HC RX REV CODE- 250/636: Performed by: NURSE ANESTHETIST, CERTIFIED REGISTERED

## 2022-05-17 PROCEDURE — 77030037088 HC TUBE ENDOTRACH ORAL NSL COVD-A: Performed by: ANESTHESIOLOGY

## 2022-05-17 PROCEDURE — 81025 URINE PREGNANCY TEST: CPT

## 2022-05-17 PROCEDURE — 82962 GLUCOSE BLOOD TEST: CPT

## 2022-05-17 PROCEDURE — 74011636637 HC RX REV CODE- 636/637: Performed by: ANESTHESIOLOGY

## 2022-05-17 PROCEDURE — 77030006974 HC BSKT URET RTVR BSC -C: Performed by: UROLOGY

## 2022-05-17 PROCEDURE — C1894 INTRO/SHEATH, NON-LASER: HCPCS | Performed by: UROLOGY

## 2022-05-17 PROCEDURE — 76010000161 HC OR TIME 1 TO 1.5 HR INTENSV-TIER 1: Performed by: UROLOGY

## 2022-05-17 PROCEDURE — 74011250636 HC RX REV CODE- 250/636: Performed by: UROLOGY

## 2022-05-17 PROCEDURE — 76210000006 HC OR PH I REC 0.5 TO 1 HR: Performed by: UROLOGY

## 2022-05-17 PROCEDURE — 77030039425 HC BLD LARYNG TRULITE DISP TELE -A: Performed by: ANESTHESIOLOGY

## 2022-05-17 PROCEDURE — 76060000033 HC ANESTHESIA 1 TO 1.5 HR: Performed by: UROLOGY

## 2022-05-17 PROCEDURE — 77030019908 HC STETH ESOPH SIMS -A: Performed by: ANESTHESIOLOGY

## 2022-05-17 PROCEDURE — 74011000250 HC RX REV CODE- 250: Performed by: ANESTHESIOLOGY

## 2022-05-17 PROCEDURE — C1769 GUIDE WIRE: HCPCS | Performed by: UROLOGY

## 2022-05-17 PROCEDURE — 82355 CALCULUS ANALYSIS QUAL: CPT

## 2022-05-17 PROCEDURE — 81003 URINALYSIS AUTO W/O SCOPE: CPT

## 2022-05-17 PROCEDURE — 77030040922 HC BLNKT HYPOTHRM STRY -A: Performed by: ANESTHESIOLOGY

## 2022-05-17 PROCEDURE — 52356 CYSTO/URETERO W/LITHOTRIPSY: CPT | Performed by: UROLOGY

## 2022-05-17 PROCEDURE — 2709999900 HC NON-CHARGEABLE SUPPLY: Performed by: UROLOGY

## 2022-05-17 DEVICE — URETERAL STENT
Type: IMPLANTABLE DEVICE | Site: URETER | Status: FUNCTIONAL
Brand: PERCUFLEX™ PLUS

## 2022-05-17 RX ORDER — CEPHALEXIN 500 MG/1
500 CAPSULE ORAL 2 TIMES DAILY
Qty: 10 CAPSULE | Refills: 0 | Status: SHIPPED | OUTPATIENT
Start: 2022-05-17 | End: 2022-05-22

## 2022-05-17 RX ORDER — HYOSCYAMINE SULFATE 0.12 MG/1
0.12 TABLET SUBLINGUAL
Qty: 30 TABLET | Refills: 1 | Status: SHIPPED | OUTPATIENT
Start: 2022-05-17

## 2022-05-17 RX ORDER — SUCCINYLCHOLINE CHLORIDE 20 MG/ML
INJECTION INTRAMUSCULAR; INTRAVENOUS AS NEEDED
Status: DISCONTINUED | OUTPATIENT
Start: 2022-05-17 | End: 2022-05-17 | Stop reason: HOSPADM

## 2022-05-17 RX ORDER — ROCURONIUM BROMIDE 10 MG/ML
INJECTION, SOLUTION INTRAVENOUS AS NEEDED
Status: DISCONTINUED | OUTPATIENT
Start: 2022-05-17 | End: 2022-05-17 | Stop reason: HOSPADM

## 2022-05-17 RX ORDER — MIDAZOLAM HYDROCHLORIDE 1 MG/ML
2 INJECTION, SOLUTION INTRAMUSCULAR; INTRAVENOUS
Status: DISCONTINUED | OUTPATIENT
Start: 2022-05-17 | End: 2022-05-17 | Stop reason: HOSPADM

## 2022-05-17 RX ORDER — HYDROMORPHONE HYDROCHLORIDE 2 MG/ML
0.5 INJECTION, SOLUTION INTRAMUSCULAR; INTRAVENOUS; SUBCUTANEOUS ONCE
Status: COMPLETED | OUTPATIENT
Start: 2022-05-17 | End: 2022-05-17

## 2022-05-17 RX ORDER — HYDROCODONE BITARTRATE AND ACETAMINOPHEN 5; 325 MG/1; MG/1
1 TABLET ORAL
Qty: 20 TABLET | Refills: 0 | Status: SHIPPED | OUTPATIENT
Start: 2022-05-17 | End: 2022-05-24

## 2022-05-17 RX ORDER — SODIUM CHLORIDE, SODIUM LACTATE, POTASSIUM CHLORIDE, CALCIUM CHLORIDE 600; 310; 30; 20 MG/100ML; MG/100ML; MG/100ML; MG/100ML
100 INJECTION, SOLUTION INTRAVENOUS CONTINUOUS
Status: DISCONTINUED | OUTPATIENT
Start: 2022-05-17 | End: 2022-05-17 | Stop reason: HOSPADM

## 2022-05-17 RX ORDER — MIDAZOLAM HYDROCHLORIDE 1 MG/ML
2 INJECTION, SOLUTION INTRAMUSCULAR; INTRAVENOUS ONCE
Status: DISCONTINUED | OUTPATIENT
Start: 2022-05-17 | End: 2022-05-17 | Stop reason: HOSPADM

## 2022-05-17 RX ORDER — FENTANYL CITRATE 50 UG/ML
100 INJECTION, SOLUTION INTRAMUSCULAR; INTRAVENOUS ONCE
Status: DISCONTINUED | OUTPATIENT
Start: 2022-05-17 | End: 2022-05-17 | Stop reason: HOSPADM

## 2022-05-17 RX ORDER — LIDOCAINE HYDROCHLORIDE 10 MG/ML
0.1 INJECTION INFILTRATION; PERINEURAL AS NEEDED
Status: DISCONTINUED | OUTPATIENT
Start: 2022-05-17 | End: 2022-05-17 | Stop reason: HOSPADM

## 2022-05-17 RX ORDER — NALOXONE HYDROCHLORIDE 0.4 MG/ML
0.04 INJECTION, SOLUTION INTRAMUSCULAR; INTRAVENOUS; SUBCUTANEOUS
Status: DISCONTINUED | OUTPATIENT
Start: 2022-05-17 | End: 2022-05-17 | Stop reason: HOSPADM

## 2022-05-17 RX ORDER — ONDANSETRON 2 MG/ML
4 INJECTION INTRAMUSCULAR; INTRAVENOUS ONCE
Status: COMPLETED | OUTPATIENT
Start: 2022-05-17 | End: 2022-05-17

## 2022-05-17 RX ORDER — OXYCODONE HYDROCHLORIDE 5 MG/1
5 TABLET ORAL
Status: DISCONTINUED | OUTPATIENT
Start: 2022-05-17 | End: 2022-05-17 | Stop reason: HOSPADM

## 2022-05-17 RX ORDER — LABETALOL HYDROCHLORIDE 5 MG/ML
15 INJECTION, SOLUTION INTRAVENOUS ONCE
Status: COMPLETED | OUTPATIENT
Start: 2022-05-17 | End: 2022-05-17

## 2022-05-17 RX ORDER — PROCHLORPERAZINE EDISYLATE 5 MG/ML
5 INJECTION INTRAMUSCULAR; INTRAVENOUS ONCE
Status: COMPLETED | OUTPATIENT
Start: 2022-05-17 | End: 2022-05-17

## 2022-05-17 RX ORDER — CEFAZOLIN SODIUM/WATER 2 G/20 ML
2 SYRINGE (ML) INTRAVENOUS
Status: COMPLETED | OUTPATIENT
Start: 2022-05-17 | End: 2022-05-17

## 2022-05-17 RX ORDER — PROPOFOL 10 MG/ML
INJECTION, EMULSION INTRAVENOUS AS NEEDED
Status: DISCONTINUED | OUTPATIENT
Start: 2022-05-17 | End: 2022-05-17 | Stop reason: HOSPADM

## 2022-05-17 RX ORDER — INSULIN LISPRO 100 [IU]/ML
6 INJECTION, SOLUTION INTRAVENOUS; SUBCUTANEOUS ONCE
Status: COMPLETED | OUTPATIENT
Start: 2022-05-17 | End: 2022-05-17

## 2022-05-17 RX ORDER — ONDANSETRON 2 MG/ML
INJECTION INTRAMUSCULAR; INTRAVENOUS AS NEEDED
Status: DISCONTINUED | OUTPATIENT
Start: 2022-05-17 | End: 2022-05-17 | Stop reason: HOSPADM

## 2022-05-17 RX ORDER — HYDROMORPHONE HYDROCHLORIDE 2 MG/ML
0.5 INJECTION, SOLUTION INTRAMUSCULAR; INTRAVENOUS; SUBCUTANEOUS
Status: DISCONTINUED | OUTPATIENT
Start: 2022-05-17 | End: 2022-05-17 | Stop reason: HOSPADM

## 2022-05-17 RX ORDER — LIDOCAINE HYDROCHLORIDE 20 MG/ML
INJECTION, SOLUTION EPIDURAL; INFILTRATION; INTRACAUDAL; PERINEURAL AS NEEDED
Status: DISCONTINUED | OUTPATIENT
Start: 2022-05-17 | End: 2022-05-17 | Stop reason: HOSPADM

## 2022-05-17 RX ADMIN — PHENYLEPHRINE HYDROCHLORIDE 100 MCG: 10 INJECTION INTRAVENOUS at 11:30

## 2022-05-17 RX ADMIN — PROPOFOL 200 MG: 10 INJECTION, EMULSION INTRAVENOUS at 11:14

## 2022-05-17 RX ADMIN — HYDROMORPHONE HYDROCHLORIDE 0.5 MG: 2 INJECTION, SOLUTION INTRAMUSCULAR; INTRAVENOUS; SUBCUTANEOUS at 10:49

## 2022-05-17 RX ADMIN — PHENYLEPHRINE HYDROCHLORIDE 100 MCG: 10 INJECTION INTRAVENOUS at 11:39

## 2022-05-17 RX ADMIN — INSULIN LISPRO 6 UNITS: 100 INJECTION, SOLUTION INTRAVENOUS; SUBCUTANEOUS at 10:30

## 2022-05-17 RX ADMIN — SUCCINYLCHOLINE CHLORIDE 160 MG: 20 INJECTION, SOLUTION INTRAMUSCULAR; INTRAVENOUS at 11:14

## 2022-05-17 RX ADMIN — Medication 2 G: at 11:20

## 2022-05-17 RX ADMIN — ONDANSETRON 4 MG: 2 INJECTION INTRAMUSCULAR; INTRAVENOUS at 11:20

## 2022-05-17 RX ADMIN — PHENYLEPHRINE HYDROCHLORIDE 200 MCG: 10 INJECTION INTRAVENOUS at 11:37

## 2022-05-17 RX ADMIN — ONDANSETRON 4 MG: 2 INJECTION INTRAMUSCULAR; INTRAVENOUS at 10:03

## 2022-05-17 RX ADMIN — PROCHLORPERAZINE EDISYLATE 5 MG: 5 INJECTION INTRAMUSCULAR; INTRAVENOUS at 13:03

## 2022-05-17 RX ADMIN — LABETALOL HYDROCHLORIDE 15 MG: 5 INJECTION INTRAVENOUS at 12:23

## 2022-05-17 RX ADMIN — ROCURONIUM BROMIDE 5 MG: 50 INJECTION, SOLUTION INTRAVENOUS at 11:14

## 2022-05-17 RX ADMIN — PHENYLEPHRINE HYDROCHLORIDE 100 MCG: 10 INJECTION INTRAVENOUS at 11:25

## 2022-05-17 RX ADMIN — LIDOCAINE HYDROCHLORIDE 100 MG: 20 INJECTION, SOLUTION EPIDURAL; INFILTRATION; INTRACAUDAL; PERINEURAL at 11:14

## 2022-05-17 RX ADMIN — SODIUM CHLORIDE, POTASSIUM CHLORIDE, SODIUM LACTATE AND CALCIUM CHLORIDE 100 ML/HR: 600; 310; 30; 20 INJECTION, SOLUTION INTRAVENOUS at 10:05

## 2022-05-17 NOTE — BRIEF OP NOTE
Brief Postoperative Note    Patient: Jesús Roldan  YOB: 1975  MRN: [id number]    Date of Procedure: 5/17/2022     Pre-Op Diagnosis: Kidney stone [N20.0]  Hydronephrosis, unspecified hydronephrosis type [N13.30]    Post-Op Diagnosis: Same as preoperative diagnosis. Procedure(s):  CYSTOSCOPY RIGHT URETEROSCOPY/ RIGHT STENT EXCHANGE/ LASER LITHOTRIPSY/BASKET STONE EXTRACTION    Surgeon(s): Josefina Jean MD    Surgical Assistant: None    Anesthesia: General     Estimated Blood Loss (mL): Minimal    Complications: None    Specimens:   ID Type Source Tests Collected by Time Destination   1 : Right ureteral stone Fresh URETER, RIGHT  Josefina Jean MD 5/17/2022 1146 Pathology        Implants:   Implant Name Type Inv. Item Serial No.  Lot No. LRB No. Used Action   STENT URET 6F 26CM -- PERCUFLEX PLUS [id number] - [id number]  STENT URET 6F 26CM -- PERCUFLEX PLUS [id number]  [employer]_ [id number] Right 1 Implanted       Drains: * No LDAs found *    Findings: 3 mm proximal R ureter stone fragmented and removed.      Electronically Signed by Geoff Ayala MD on 5/17/2022 at 12:15 PM

## 2022-05-17 NOTE — ANESTHESIA PREPROCEDURE EVALUATION
Anesthetic History     PONV          Review of Systems / Medical History  Patient summary reviewed, nursing notes reviewed and pertinent labs reviewed    Pulmonary                   Neuro/Psych         Psychiatric history    Comments: neuropathy Cardiovascular                  Exercise tolerance: <4 METS  Comments: 6/21:  There is concentric left ventricular hypertrophy present. · The left ventricular systolic function is normal (55-65%). · Normal left ventricular diastolic function. · The right ventricular systolic function is normal.   · No significant valvular abnormalities. · In apical color doppler views, there is possible color flow across the   atrial septum which could represent flow in the coronary sinus. Cannot   rule out small ASD though bubble study was negative for shunt. GI/Hepatic/Renal         Renal disease: CRI      Comments: Gastroenteritis Endo/Other    Diabetes: poorly controlled, type 1, using insulin         Other Findings            Physical Exam    Airway  Mallampati: I  TM Distance: 4 - 6 cm  Neck ROM: normal range of motion   Mouth opening: Normal     Cardiovascular  Regular rate and rhythm,  S1 and S2 normal,  no murmur, click, rub, or gallop  Rhythm: regular  Rate: normal      Pertinent negatives: No murmur   Dental    Dentition: Full upper dentures     Pulmonary  Breath sounds clear to auscultation               Abdominal  GI exam deferred       Other Findings            Anesthetic Plan    ASA: 3  Anesthesia type: general          Induction: Intravenous and RSI  Anesthetic plan and risks discussed with: Patient      GETA.  +N/V today. Will treat elevated BG.

## 2022-05-17 NOTE — PERIOP NOTES
Pt and mother Maria Eugenia Wyman given discharge instructions at bedside, both verbalize understanding. All questions answered.

## 2022-05-17 NOTE — OP NOTES
[facility]  OPERATIVE REPORT    Name:  Tim Oleary  MR#:  [id number]  :  1975  ACCOUNT #:  [de-identified]  DATE OF SERVICE:  2022    PREOPERATIVE DIAGNOSIS:  Right ureter stone. POSTOPERATIVE DIAGNOSIS:  Right ureter stone. PROCEDURE PERFORMED:  Cystoscopy, right ureteroscopy, laser lithotripsy, basket stone extraction, right ureteral stent exchange. SURGEON:  Araceli Adair MD    ASSISTANT:  None    ANESTHESIA:  General.    COMPLICATIONS:  None. SPECIMENS REMOVED:  Right ureter stone for analysis. IMPLANTS:  6 x 26 double-J right ureteral stent with strings. ESTIMATED BLOOD LOSS:  Minimal.    DRAINS:  None. FINDINGS:  A 3-mm proximal right ureter stone, fragmented and removed. INDICATIONS FOR OPERATIVE PROCEDURE:  The patient is a 25-year-old female with a history of a 3-mm obstructing right ureter stone and urosepsis. She underwent urgent stent placement back in 2022. Her sepsis was treated with culture-appropriate antibiotics and resolved and she presents today for interval treatment of the stone now that her infection has cleared. After risk-benefit discussion was had, she wanted to proceed. PROCEDURE:  After informed consent was obtained and the correct patient was identified in the preoperative holding area, she was taken back to the operating room suite and placed on the table in the supine position. Time-out was performed confirming the correct patient and planned procedure. She received 2 g IV Ancef prior to the smooth induction of general endotracheal anesthesia. She was moved in the dorsal lithotomy position, prepped and draped in the usual sterile fashion. I began the case by inserting a 22-Danish rigid cystoscope with a 30-degree lens in the urethra and advanced in the patient's bladder. Pancystoscopy was performed, which was unremarkable except for a stent extruding from the right ureteral orifice.   I cannulated the orifice with a sensor wire and advanced alongside the stent under fluoroscopic guidance into the right renal pelvis. Once the wire was in position, I backloaded the scope off the wire. I reinserted it with the flexible stent grasper. I grasped the stent and removed it completely intact. I then inserted my semi-rigid ureteroscope and inspected the entire course of the ureter. It was clear of stone and the small stone fragments in the proximal right ureter had blown back up into the kidney. I, therefore, replaced the second sensor wire through the semi-rigid ureteroscope and removed the semi-rigid ureteroscope, leaving two wires in place. One was secured to the drape as a safety wire. The other wire was used as a working wire and I placed an 11 x 13 ureteral access catheter over this wire into the proximal right ureter. Once the access catheter was in place, I removed the working wire and inner sheath, leaving the outer sheath for access. I then switched to my flexible ureteroscope and used pressure bag to perform pyeloscopy. The patient had two 3-mm stones in the right renal pelvis. I used a 242-micron laser fiber with the settings of 0.6 joules and 6 Hz to laser these into a few pieces. The [place] basket was used to remove the pieces and sent them off for stone analysis. I then performed complete pyeloscopy after removing the stone fragments. No further fragments were identified. I then carefully backed my scope down the ureter, inspecting the ureter on the way out. It was completely clear of stone. The access sheath and ureteroscope were removed together, leaving the wire in place as a safety. I then loaded the wire onto the rigid cystoscope and passed a 6 x 26 double-J right ureteral stent with strings over the wire under fluoroscopic guidance in the right renal pelvis. Once the stent was in position, I pulled the wire, noting a good curl in the right renal pelvis as well as the bladder.   I then drained the bladder completely. I left strings extruding from the meatus and cut them to size. The patient was then awoken from anesthesia, transferred to the PACU in stable condition. She tolerated the procedure well. There were no complications. All counts were correct at the end of the procedure. We will have her follow up later this week, on Thursday or Friday, for stent removal.  My office will contact her to schedule.       Cameron Rojas MD      [id number]  D:  05/17/2022 12:25  T:  05/17/2022 17:05  JOB #:  [id number]

## 2022-05-17 NOTE — DISCHARGE INSTRUCTIONS
You will be called to schedule stent removal later this week. Please call my office at [phone] if you develop fever, uncontrolled pain, inability to urinate, other symptoms/concerns. Ureteral Stent Placement: What to Expect at [address] [place] [address]  A ureteral (say \"you-REE-ter-ul\") stent is a thin, hollow tube that is placed in the ureter to help urine pass from the kidney into the bladder. Ureters are the tubes that connect the kidneys to the bladder. You may have a small amount of blood in your urine for 1 to 3 days after the procedure. While the stent is in place, you may have to urinate more often, feel a sudden need to urinate, or feel like you cannot completely empty your bladder. You may feel some pain when you urinate or do strenuous activity. You also may notice a small amount of blood in your urine after strenuous activities. These side effects usually do not prevent people from doing their normal daily activities. You may have a string attached to your stent. Do not to pull the string unless the doctor tells you to. The doctor will use the string to pull out the stent when you no longer need it. After the procedure, urine may flow better from your kidneys to your bladder. A ureteral stent may be left in place for several days or for as long as several months. Your doctor will take it out when you no longer need it. Cystoscopy: What to Expect at [address] [place] [address]  A cystoscopy is a procedure that lets a doctor look inside of the bladder and the urethra. The urethra is the tube that carries urine from the bladder to outside the body. The doctor uses a thin, lighted tube called a cystoscope to look for kidney or bladder stones, tumors, bleeding, or infection. After the cystoscopy, your urethra may be sore at first, and it may burn when you urinate for the first few days after the procedure. You may feel the need to urinate more often, and your urine may be pink.  These symptoms should get better in 1 or 2 days. You will probably be able to go back to work or most of your usual activities in 1 or 2 days. How can you care for yourself at home? Activity  · Rest when you feel tired. Getting enough sleep will help you recover. · Try to walk each day. Start by walking a little more than you did the day before. Bit by bit, increase the amount you walk. Walking boosts blood flow and helps prevent pneumonia and constipation. · Avoid strenuous activities, such as bicycle riding, jogging, weight lifting, or aerobic exercise, until your doctor says it is okay. · Ask your doctor when you can drive again. · Most people are able to return to work within 1 or 2 days after the procedure. · You may shower and take baths as usual.  · Ask your doctor when it is okay for you to have sex. Diet  · You can eat your normal diet. If your stomach is upset, try bland, low-fat foods like plain rice, broiled chicken, toast, and yogurt. · Drink plenty of fluids (unless your doctor tells you not to). Medicines  · Take pain medicines exactly as directed. ¨ If the doctor gave you a prescription medicine for pain, take it as prescribed. ¨ If you are not taking a prescription pain medicine, ask your doctor if you can take an over-the-counter medicine. · If you think your pain medicine is making you sick to your stomach:  ¨ Take your medicine after meals (unless your doctor has told you not to). ¨ Ask your doctor for a different pain medicine. · If your doctor prescribed antibiotics, take them as directed. Do not stop taking them just because you feel better. You need to take the full course of antibiotics. Medication Interaction:  During your procedure you potentially received a medication or medications which may reduce the effectiveness of oral contraceptives.  Please consider other forms of contraception for 1 month following your procedure if you are currently using oral contraceptives as your primary form of birth control. In addition to this, we recommend continuing your oral contraceptive as prescribed, unless otherwise instructed by your physician, during this time. Follow-up care is a key part of your treatment and safety. Be sure to make and go to all appointments, and call your doctor if you are having problems. It's also a good idea to know your test results and keep a list of the medicines you take. When should you call for help? Call 911 anytime you think you may need emergency care. For example, call if:  · You passed out (lost consciousness). · You have severe trouble breathing. · You have sudden chest pain and shortness of breath, or you cough up blood. · You have severe belly pain. Call your doctor now or seek immediate medical care if:  · You are sick to your stomach or cannot keep fluids down. · Your urine is still red or you see blood clots after you have urinated several times. · You have trouble passing urine or stool, especially if you have pain or swelling in your lower belly. · You have signs of a blood clot, such as:  ¨ Pain in your calf, back of the knee, thigh, or groin. ¨ Redness and swelling in your leg or groin. · You develop a fever or severe chills. · You have pain in your back just below your rib cage. This is called flank pain. Watch closely for changes in your health, and be sure to contact your doctor if:  · A burning feeling is normal for a day or two after the test, but call if it does not get better. · You have a frequent urge to urinate but can pass only small amounts of urine. · It is normal for the urine to have a pinkish color for a few days after the test, but call if it does not get better or if your urine is cloudy, smells bad, or has pus in it.     After general anesthesia or intravenous sedation, for 24 hours or while taking prescription Narcotics:  · Limit your activities  · A responsible adult needs to be with you for the next 24 hours  · Do not drive and operate hazardous machinery  · Do not make important personal or business decisions  · Do not drink alcoholic beverages  · If you have not urinated within 8 hours after discharge, and you are experiencing discomfort from urinary retention, please go to the nearest ED. · If you have sleep apnea and have a CPAP machine, please use it for all naps and sleeping. · Please use caution when taking narcotics and any of your home medications that may cause drowsiness. *  Please give a list of your current medications to your Primary Care Provider. *  Please update this list whenever your medications are discontinued, doses are      changed, or new medications (including over-the-counter products) are added. *  Please carry medication information at all times in case of emergency situations. These are general instructions for a healthy lifestyle:  No smoking/ No tobacco products/ Avoid exposure to second hand smoke  Surgeon General's Warning:  Quitting smoking now greatly reduces serious risk to your health. Obesity, smoking, and sedentary lifestyle greatly increases your risk for illness  A healthy diet, regular physical exercise & weight monitoring are important for maintaining a healthy lifestyle    You may be retaining fluid if you have a history of heart failure or if you experience any of the following symptoms:  Weight gain of 3 pounds or more overnight or 5 pounds in a week, increased swelling in our hands or feet or shortness of breath while lying flat in bed. Please call your doctor as soon as you notice any of these symptoms; do not wait until your next office visit.

## 2022-05-18 ENCOUNTER — HOSPITAL ENCOUNTER (OUTPATIENT)
Dept: GENERAL RADIOLOGY | Age: 47
Discharge: HOME | End: 2022-05-18
Attending: UROLOGY

## 2022-05-19 PROBLEM — N39.0 UTI (URINARY TRACT INFECTION): Status: RESOLVED | Noted: 2022-04-19 | Resolved: 2022-05-19

## 2022-05-21 ENCOUNTER — APPOINTMENT (OUTPATIENT)
Dept: CT IMAGING | Age: 47
DRG: 870 | End: 2022-05-21
Payer: COMMERCIAL

## 2022-05-21 ENCOUNTER — HOSPITAL ENCOUNTER (INPATIENT)
Age: 47
LOS: 12 days | Discharge: NURSING, REHAB OR LONG-TERM CARE | DRG: 870 | End: 2022-06-02
Attending: STUDENT IN AN ORGANIZED HEALTH CARE EDUCATION/TRAINING PROGRAM | Admitting: INTERNAL MEDICINE
Payer: COMMERCIAL

## 2022-05-21 ENCOUNTER — APPOINTMENT (OUTPATIENT)
Dept: GENERAL RADIOLOGY | Age: 47
DRG: 870 | End: 2022-05-21
Payer: COMMERCIAL

## 2022-05-21 DIAGNOSIS — I46.9 CARDIAC ARREST (HCC): Primary | ICD-10-CM

## 2022-05-21 DIAGNOSIS — R65.21 SEPTIC SHOCK (HCC): ICD-10-CM

## 2022-05-21 DIAGNOSIS — J96.00 ACUTE RESPIRATORY FAILURE, UNSPECIFIED WHETHER WITH HYPOXIA OR HYPERCAPNIA (HCC): ICD-10-CM

## 2022-05-21 DIAGNOSIS — A41.9 SEPTIC SHOCK (HCC): ICD-10-CM

## 2022-05-21 DIAGNOSIS — N17.9 ACUTE RENAL FAILURE, UNSPECIFIED ACUTE RENAL FAILURE TYPE (HCC): ICD-10-CM

## 2022-05-21 DIAGNOSIS — E87.5 HYPERKALEMIA: ICD-10-CM

## 2022-05-21 PROBLEM — J96.01 ACUTE RESPIRATORY FAILURE WITH HYPOXIA (HCC): Status: ACTIVE | Noted: 2022-05-21

## 2022-05-21 PROBLEM — I12.9 BENIGN HYPERTENSION WITH CKD (CHRONIC KIDNEY DISEASE) STAGE III (HCC): Status: ACTIVE | Noted: 2022-01-04

## 2022-05-21 PROBLEM — J15.9 PNEUMONIA, BACTERIAL: Status: ACTIVE | Noted: 2022-05-21

## 2022-05-21 PROBLEM — N18.30 BENIGN HYPERTENSION WITH CKD (CHRONIC KIDNEY DISEASE) STAGE III (HCC): Status: ACTIVE | Noted: 2022-01-04

## 2022-05-21 PROBLEM — N18.9 ACUTE KIDNEY INJURY SUPERIMPOSED ON CHRONIC KIDNEY DISEASE (HCC): Status: ACTIVE | Noted: 2021-06-09

## 2022-05-21 PROBLEM — E10.11 DIABETIC KETOACIDOSIS WITH COMA ASSOCIATED WITH TYPE 1 DIABETES MELLITUS (HCC): Status: ACTIVE | Noted: 2022-05-21

## 2022-05-21 PROBLEM — N39.0 URINARY TRACT INFECTION WITHOUT HEMATURIA: Status: ACTIVE | Noted: 2022-04-19

## 2022-05-21 LAB
ALBUMIN SERPL-MCNC: 2.3 G/DL (ref 3.5–5)
ALBUMIN/GLOB SERPL: 0.5 {RATIO} (ref 1.2–3.5)
ALP SERPL-CCNC: 179 U/L (ref 50–136)
ALT SERPL-CCNC: 11 U/L (ref 12–65)
AMMONIA PLAS-SCNC: 68 UMOL/L (ref 11–32)
ANION GAP SERPL CALC-SCNC: 29 MMOL/L (ref 7–16)
ANION GAP SERPL CALC-SCNC: 32 MMOL/L (ref 7–16)
APPEARANCE UR: CLEAR
ARTERIAL PATENCY WRIST A: ABNORMAL
ARTERIAL PATENCY WRIST A: POSITIVE
ARTERIAL PATENCY WRIST A: POSITIVE
AST SERPL-CCNC: 14 U/L (ref 15–37)
BACTERIA URNS QL MICRO: 0 /HPF
BASE DEFICIT BLD-SCNC: 17.5 MMOL/L
BASE DEFICIT BLD-SCNC: 27.5 MMOL/L
BASE DEFICIT BLDV-SCNC: 25.8 MMOL/L
BDY SITE: ABNORMAL
BDY SITE: ABNORMAL
BILIRUB SERPL-MCNC: 0.4 MG/DL (ref 0.2–1.1)
BILIRUB UR QL: NEGATIVE
BUN SERPL-MCNC: 116 MG/DL (ref 6–23)
BUN SERPL-MCNC: 130 MG/DL (ref 6–23)
CA-I BLD-MCNC: 1.2 MMOL/L (ref 1.12–1.32)
CALCIUM SERPL-MCNC: 8 MG/DL (ref 8.3–10.4)
CALCIUM SERPL-MCNC: 9.3 MG/DL (ref 8.3–10.4)
CASTS URNS QL MICRO: 0 /LPF
CHLORIDE SERPL-SCNC: 103 MMOL/L (ref 98–107)
CHLORIDE SERPL-SCNC: 89 MMOL/L (ref 98–107)
CHP ED QC CHECK: YES
CO2 BLD-SCNC: 11 MMOL/L (ref 13–23)
CO2 SERPL-SCNC: 5 MMOL/L (ref 21–32)
CO2 SERPL-SCNC: 9 MMOL/L (ref 21–32)
COLOR UR: YELLOW
CREAT SERPL-MCNC: 9.2 MG/DL (ref 0.6–1)
CREAT SERPL-MCNC: 9.7 MG/DL (ref 0.6–1)
CRYSTALS URNS QL MICRO: 0 /LPF
EPI CELLS #/AREA URNS HPF: NORMAL /HPF
ERYTHROCYTE [DISTWIDTH] IN BLOOD BY AUTOMATED COUNT: 15.9 % (ref 11.9–14.6)
FIO2 ON VENT: 60 %
GAS FLOW.O2 O2 DELIVERY SYS: ABNORMAL L/MIN
GLOBULIN SER CALC-MCNC: 4.2 G/DL (ref 2.3–3.5)
GLUCOSE BLD STRIP.AUTO-MCNC: 550 MG/DL (ref 65–100)
GLUCOSE BLD STRIP.AUTO-MCNC: >700 MG/DL (ref 65–100)
GLUCOSE SERPL-MCNC: 1593 MG/DL (ref 65–100)
GLUCOSE SERPL-MCNC: 2075 MG/DL (ref 65–100)
GLUCOSE UR STRIP.AUTO-MCNC: >1000 MG/DL
HCG SERPL QL: NEGATIVE
HCO3 BLD-SCNC: 5.4 MMOL/L (ref 22–26)
HCO3 BLD-SCNC: 9.2 MMOL/L (ref 22–26)
HCO3 BLDV-SCNC: 4.1 MMOL/L (ref 23–28)
HCT VFR BLD AUTO: 43 % (ref 35.8–46.3)
HGB BLD-MCNC: 10.1 G/DL (ref 11.7–15.4)
HGB UR QL STRIP: ABNORMAL
KETONES UR QL STRIP.AUTO: ABNORMAL MG/DL
LACTATE SERPL-SCNC: 3.9 MMOL/L (ref 0.4–2)
LACTATE SERPL-SCNC: 5.8 MMOL/L (ref 0.4–2)
LEUKOCYTE ESTERASE UR QL STRIP.AUTO: ABNORMAL
MCH RBC QN AUTO: 26.6 PG (ref 26.1–32.9)
MCHC RBC AUTO-ENTMCNC: 23.5 G/DL (ref 31.4–35)
MCV RBC AUTO: 113.2 FL (ref 79.6–97.8)
MUCOUS THREADS URNS QL MICRO: 0 /LPF
NITRITE UR QL STRIP.AUTO: NEGATIVE
NRBC # BLD: 0.04 K/UL (ref 0–0.2)
O2/TOTAL GAS SETTING VFR VENT: 100 %
O2/TOTAL GAS SETTING VFR VENT: 21 %
PCO2 BLD: 24.5 MMHG (ref 35–45)
PCO2 BLD: 30.4 MMHG (ref 35–45)
PCO2 BLDV: 17.3 MMHG (ref 41–51)
PEEP RESPIRATORY: 8 CMH2O
PEEP RESPIRATORY: 8 CM[H2O]
PH BLD: 6.86 [PH] (ref 7.35–7.45)
PH BLD: 7.18 [PH] (ref 7.35–7.45)
PH BLDV: 6.99 [PH] (ref 7.32–7.42)
PH UR STRIP: 8 [PH] (ref 5–9)
PLATELET # BLD AUTO: 502 K/UL (ref 150–450)
PMV BLD AUTO: 10.6 FL (ref 9.4–12.3)
PO2 BLD: 242 MMHG (ref 75–100)
PO2 BLD: 450 MMHG (ref 75–100)
PO2 BLDV: 86 MMHG
POTASSIUM BLD-SCNC: 3 MMOL/L (ref 3.5–5.1)
POTASSIUM SERPL-SCNC: 4.2 MMOL/L (ref 3.5–5.1)
POTASSIUM SERPL-SCNC: 6.7 MMOL/L (ref 3.5–5.1)
PROT SERPL-MCNC: 6.5 G/DL (ref 6.3–8.2)
PROT UR STRIP-MCNC: 100 MG/DL
RBC # BLD AUTO: 3.8 M/UL (ref 4.05–5.2)
RBC #/AREA URNS HPF: >100 /HPF
RESPIRATORY RATE: 28
SAO2 % BLD: 100 %
SAO2 % BLD: 99.9 % (ref 95–98)
SAO2 % BLDV: 89.6 % (ref 65–88)
SERVICE CMNT-IMP: ABNORMAL
SODIUM BLD-SCNC: 141 MMOL/L (ref 136–145)
SODIUM SERPL-SCNC: 126 MMOL/L (ref 136–145)
SODIUM SERPL-SCNC: 141 MMOL/L (ref 136–145)
SP GR UR REFRACTOMETRY: 1.02 (ref 1–1.02)
SPECIMEN SITE: ABNORMAL
SPECIMEN TYPE: ABNORMAL
SPECIMEN TYPE: ABNORMAL
UROBILINOGEN UR QL STRIP.AUTO: 0.2 EU/DL (ref 0.2–1)
VENTILATION MODE VENT: ABNORMAL
VENTILATION MODE VENT: ABNORMAL
VT SETTING VENT: 500 ML
VT SETTING VENT: 500 ML
WBC # BLD AUTO: 29.1 K/UL (ref 4.3–11.1)
WBC URNS QL MICRO: NORMAL /HPF

## 2022-05-21 PROCEDURE — 96367 TX/PROPH/DG ADDL SEQ IV INF: CPT

## 2022-05-21 PROCEDURE — 82962 GLUCOSE BLOOD TEST: CPT

## 2022-05-21 PROCEDURE — 83605 ASSAY OF LACTIC ACID: CPT

## 2022-05-21 PROCEDURE — 71045 X-RAY EXAM CHEST 1 VIEW: CPT

## 2022-05-21 PROCEDURE — 96365 THER/PROPH/DIAG IV INF INIT: CPT

## 2022-05-21 PROCEDURE — 96366 THER/PROPH/DIAG IV INF ADDON: CPT

## 2022-05-21 PROCEDURE — 96374 THER/PROPH/DIAG INJ IV PUSH: CPT

## 2022-05-21 PROCEDURE — 81003 URINALYSIS AUTO W/O SCOPE: CPT

## 2022-05-21 PROCEDURE — 84703 CHORIONIC GONADOTROPIN ASSAY: CPT

## 2022-05-21 PROCEDURE — 2500000003 HC RX 250 WO HCPCS

## 2022-05-21 PROCEDURE — 36556 INSERT NON-TUNNEL CV CATH: CPT

## 2022-05-21 PROCEDURE — 96376 TX/PRO/DX INJ SAME DRUG ADON: CPT

## 2022-05-21 PROCEDURE — 2580000003 HC RX 258: Performed by: INTERNAL MEDICINE

## 2022-05-21 PROCEDURE — 6370000000 HC RX 637 (ALT 250 FOR IP): Performed by: STUDENT IN AN ORGANIZED HEALTH CARE EDUCATION/TRAINING PROGRAM

## 2022-05-21 PROCEDURE — 82140 ASSAY OF AMMONIA: CPT

## 2022-05-21 PROCEDURE — 74018 RADEX ABDOMEN 1 VIEW: CPT

## 2022-05-21 PROCEDURE — 36600 WITHDRAWAL OF ARTERIAL BLOOD: CPT

## 2022-05-21 PROCEDURE — 82803 BLOOD GASES ANY COMBINATION: CPT

## 2022-05-21 PROCEDURE — 99291 CRITICAL CARE FIRST HOUR: CPT | Performed by: INTERNAL MEDICINE

## 2022-05-21 PROCEDURE — 5A1955Z RESPIRATORY VENTILATION, GREATER THAN 96 CONSECUTIVE HOURS: ICD-10-PCS | Performed by: STUDENT IN AN ORGANIZED HEALTH CARE EDUCATION/TRAINING PROGRAM

## 2022-05-21 PROCEDURE — 96368 THER/DIAG CONCURRENT INF: CPT

## 2022-05-21 PROCEDURE — 99292 CRITICAL CARE ADDL 30 MIN: CPT | Performed by: INTERNAL MEDICINE

## 2022-05-21 PROCEDURE — 2000000000 HC ICU R&B

## 2022-05-21 PROCEDURE — 2500000003 HC RX 250 WO HCPCS: Performed by: INTERNAL MEDICINE

## 2022-05-21 PROCEDURE — 2580000003 HC RX 258: Performed by: STUDENT IN AN ORGANIZED HEALTH CARE EDUCATION/TRAINING PROGRAM

## 2022-05-21 PROCEDURE — 6360000002 HC RX W HCPCS

## 2022-05-21 PROCEDURE — 84100 ASSAY OF PHOSPHORUS: CPT

## 2022-05-21 PROCEDURE — 51702 INSERT TEMP BLADDER CATH: CPT

## 2022-05-21 PROCEDURE — 96375 TX/PRO/DX INJ NEW DRUG ADDON: CPT

## 2022-05-21 PROCEDURE — 0BH17EZ INSERTION OF ENDOTRACHEAL AIRWAY INTO TRACHEA, VIA NATURAL OR ARTIFICIAL OPENING: ICD-10-PCS | Performed by: STUDENT IN AN ORGANIZED HEALTH CARE EDUCATION/TRAINING PROGRAM

## 2022-05-21 PROCEDURE — 80048 BASIC METABOLIC PNL TOTAL CA: CPT

## 2022-05-21 PROCEDURE — 70450 CT HEAD/BRAIN W/O DYE: CPT

## 2022-05-21 PROCEDURE — 2500000003 HC RX 250 WO HCPCS: Performed by: STUDENT IN AN ORGANIZED HEALTH CARE EDUCATION/TRAINING PROGRAM

## 2022-05-21 PROCEDURE — 99285 EMERGENCY DEPT VISIT HI MDM: CPT

## 2022-05-21 PROCEDURE — 6360000002 HC RX W HCPCS: Performed by: STUDENT IN AN ORGANIZED HEALTH CARE EDUCATION/TRAINING PROGRAM

## 2022-05-21 PROCEDURE — 6370000000 HC RX 637 (ALT 250 FOR IP): Performed by: INTERNAL MEDICINE

## 2022-05-21 PROCEDURE — 80053 COMPREHEN METABOLIC PANEL: CPT

## 2022-05-21 PROCEDURE — 94640 AIRWAY INHALATION TREATMENT: CPT

## 2022-05-21 PROCEDURE — 31500 INSERT EMERGENCY AIRWAY: CPT

## 2022-05-21 PROCEDURE — 85027 COMPLETE CBC AUTOMATED: CPT

## 2022-05-21 PROCEDURE — 93005 ELECTROCARDIOGRAM TRACING: CPT

## 2022-05-21 PROCEDURE — 81015 MICROSCOPIC EXAM OF URINE: CPT

## 2022-05-21 PROCEDURE — 94002 VENT MGMT INPAT INIT DAY: CPT

## 2022-05-21 PROCEDURE — 83735 ASSAY OF MAGNESIUM: CPT

## 2022-05-21 PROCEDURE — 84132 ASSAY OF SERUM POTASSIUM: CPT

## 2022-05-21 PROCEDURE — 6360000002 HC RX W HCPCS: Performed by: INTERNAL MEDICINE

## 2022-05-21 RX ORDER — SODIUM CHLORIDE 0.9 % (FLUSH) 0.9 %
5-40 SYRINGE (ML) INJECTION EVERY 12 HOURS SCHEDULED
Status: DISCONTINUED | OUTPATIENT
Start: 2022-05-21 | End: 2022-06-02 | Stop reason: HOSPADM

## 2022-05-21 RX ORDER — NOREPINEPHRINE BIT/0.9 % NACL 16MG/250ML
2-100 INFUSION BOTTLE (ML) INTRAVENOUS CONTINUOUS
Status: DISCONTINUED | OUTPATIENT
Start: 2022-05-21 | End: 2022-05-27

## 2022-05-21 RX ORDER — ROCURONIUM BROMIDE 10 MG/ML
INJECTION, SOLUTION INTRAVENOUS
Status: COMPLETED | OUTPATIENT
Start: 2022-05-21 | End: 2022-05-21

## 2022-05-21 RX ORDER — ACETAMINOPHEN 325 MG/1
650 TABLET ORAL EVERY 6 HOURS PRN
Status: DISCONTINUED | OUTPATIENT
Start: 2022-05-21 | End: 2022-06-02 | Stop reason: HOSPADM

## 2022-05-21 RX ORDER — FENTANYL CITRATE-0.9 % NACL/PF 10 MCG/ML
25-200 PLASTIC BAG, INJECTION (ML) INTRAVENOUS CONTINUOUS
Status: DISCONTINUED | OUTPATIENT
Start: 2022-05-21 | End: 2022-05-25

## 2022-05-21 RX ORDER — EPINEPHRINE 0.1 MG/ML
SYRINGE (ML) INJECTION
Status: COMPLETED | OUTPATIENT
Start: 2022-05-21 | End: 2022-05-21

## 2022-05-21 RX ORDER — ACETAMINOPHEN 650 MG/1
650 SUPPOSITORY RECTAL EVERY 6 HOURS PRN
Status: DISCONTINUED | OUTPATIENT
Start: 2022-05-21 | End: 2022-06-02 | Stop reason: HOSPADM

## 2022-05-21 RX ORDER — LORAZEPAM 2 MG/ML
1 INJECTION INTRAMUSCULAR ONCE
Status: COMPLETED | OUTPATIENT
Start: 2022-05-21 | End: 2022-05-21

## 2022-05-21 RX ORDER — POLYETHYLENE GLYCOL 3350 17 G/17G
17 POWDER, FOR SOLUTION ORAL DAILY PRN
Status: DISCONTINUED | OUTPATIENT
Start: 2022-05-21 | End: 2022-06-02 | Stop reason: HOSPADM

## 2022-05-21 RX ORDER — DEXTROSE MONOHYDRATE 50 MG/ML
100 INJECTION, SOLUTION INTRAVENOUS PRN
Status: DISCONTINUED | OUTPATIENT
Start: 2022-05-21 | End: 2022-05-21 | Stop reason: CLARIF

## 2022-05-21 RX ORDER — DEXMEDETOMIDINE HYDROCHLORIDE 4 UG/ML
.1-1.5 INJECTION, SOLUTION INTRAVENOUS CONTINUOUS
Status: DISCONTINUED | OUTPATIENT
Start: 2022-05-21 | End: 2022-05-21

## 2022-05-21 RX ORDER — SODIUM CHLORIDE 9 MG/ML
INJECTION, SOLUTION INTRAVENOUS CONTINUOUS
Status: DISCONTINUED | OUTPATIENT
Start: 2022-05-21 | End: 2022-05-23

## 2022-05-21 RX ORDER — 0.9 % SODIUM CHLORIDE 0.9 %
15 INTRAVENOUS SOLUTION INTRAVENOUS ONCE
Status: COMPLETED | OUTPATIENT
Start: 2022-05-21 | End: 2022-05-21

## 2022-05-21 RX ORDER — CALCIUM GLUCONATE 94 MG/ML
1000 INJECTION, SOLUTION INTRAVENOUS ONCE
Status: COMPLETED | OUTPATIENT
Start: 2022-05-21 | End: 2022-05-21

## 2022-05-21 RX ORDER — FENTANYL CITRATE 50 UG/ML
50 INJECTION, SOLUTION INTRAMUSCULAR; INTRAVENOUS ONCE
Status: COMPLETED | OUTPATIENT
Start: 2022-05-21 | End: 2022-05-21

## 2022-05-21 RX ORDER — POTASSIUM CHLORIDE 7.45 MG/ML
10 INJECTION INTRAVENOUS PRN
Status: DISCONTINUED | OUTPATIENT
Start: 2022-05-21 | End: 2022-06-02 | Stop reason: HOSPADM

## 2022-05-21 RX ORDER — CALCIUM CHLORIDE 100 MG/ML
1000 INJECTION INTRAVENOUS; INTRAVENTRICULAR ONCE
Status: DISCONTINUED | OUTPATIENT
Start: 2022-05-21 | End: 2022-05-21 | Stop reason: SDUPTHER

## 2022-05-21 RX ORDER — DEXTROSE AND SODIUM CHLORIDE 5; .45 G/100ML; G/100ML
INJECTION, SOLUTION INTRAVENOUS CONTINUOUS PRN
Status: DISCONTINUED | OUTPATIENT
Start: 2022-05-21 | End: 2022-06-02 | Stop reason: HOSPADM

## 2022-05-21 RX ORDER — CALCIUM GLUCONATE 94 MG/ML
1000 INJECTION, SOLUTION INTRAVENOUS
Status: DISCONTINUED | OUTPATIENT
Start: 2022-05-21 | End: 2022-05-21 | Stop reason: SDUPTHER

## 2022-05-21 RX ORDER — SODIUM CHLORIDE 0.9 % (FLUSH) 0.9 %
5-40 SYRINGE (ML) INJECTION PRN
Status: DISCONTINUED | OUTPATIENT
Start: 2022-05-21 | End: 2022-06-02 | Stop reason: HOSPADM

## 2022-05-21 RX ORDER — ONDANSETRON 2 MG/ML
4 INJECTION INTRAMUSCULAR; INTRAVENOUS EVERY 6 HOURS PRN
Status: DISCONTINUED | OUTPATIENT
Start: 2022-05-21 | End: 2022-06-02 | Stop reason: HOSPADM

## 2022-05-21 RX ORDER — MAGNESIUM SULFATE 1 G/100ML
1000 INJECTION INTRAVENOUS PRN
Status: DISCONTINUED | OUTPATIENT
Start: 2022-05-21 | End: 2022-06-02 | Stop reason: HOSPADM

## 2022-05-21 RX ORDER — CHLORHEXIDINE GLUCONATE 0.12 MG/ML
15 RINSE ORAL 2 TIMES DAILY
Status: DISCONTINUED | OUTPATIENT
Start: 2022-05-21 | End: 2022-05-25

## 2022-05-21 RX ORDER — CALCIUM CHLORIDE 100 MG/ML
1000 INJECTION INTRAVENOUS; INTRAVENTRICULAR ONCE
Status: COMPLETED | OUTPATIENT
Start: 2022-05-21 | End: 2022-05-21

## 2022-05-21 RX ORDER — SODIUM CHLORIDE 9 MG/ML
INJECTION, SOLUTION INTRAVENOUS PRN
Status: DISCONTINUED | OUTPATIENT
Start: 2022-05-21 | End: 2022-05-24

## 2022-05-21 RX ORDER — PROPOFOL 10 MG/ML
5-50 INJECTION, EMULSION INTRAVENOUS CONTINUOUS
Status: DISCONTINUED | OUTPATIENT
Start: 2022-05-21 | End: 2022-05-25

## 2022-05-21 RX ORDER — ONDANSETRON 4 MG/1
4 TABLET, ORALLY DISINTEGRATING ORAL EVERY 8 HOURS PRN
Status: DISCONTINUED | OUTPATIENT
Start: 2022-05-21 | End: 2022-06-02 | Stop reason: HOSPADM

## 2022-05-21 RX ORDER — SODIUM CHLORIDE 9 MG/ML
INJECTION, SOLUTION INTRAVENOUS PRN
Status: DISCONTINUED | OUTPATIENT
Start: 2022-05-21 | End: 2022-06-02 | Stop reason: HOSPADM

## 2022-05-21 RX ORDER — KETAMINE HYDROCHLORIDE 50 MG/ML
INJECTION, SOLUTION, CONCENTRATE INTRAMUSCULAR; INTRAVENOUS
Status: COMPLETED
Start: 2022-05-21 | End: 2022-05-21

## 2022-05-21 RX ORDER — ETOMIDATE 2 MG/ML
INJECTION INTRAVENOUS
Status: COMPLETED | OUTPATIENT
Start: 2022-05-21 | End: 2022-05-21

## 2022-05-21 RX ORDER — FENTANYL CITRATE 50 UG/ML
50 INJECTION, SOLUTION INTRAMUSCULAR; INTRAVENOUS ONCE
Status: DISCONTINUED | OUTPATIENT
Start: 2022-05-21 | End: 2022-05-21 | Stop reason: SDUPTHER

## 2022-05-21 RX ORDER — FENTANYL CITRATE-0.9 % NACL/PF 20 MCG/2ML
50 SYRINGE (ML) INTRAVENOUS EVERY 30 MIN PRN
Status: DISCONTINUED | OUTPATIENT
Start: 2022-05-21 | End: 2022-05-25

## 2022-05-21 RX ORDER — KETAMINE HYDROCHLORIDE 50 MG/ML
100 INJECTION, SOLUTION, CONCENTRATE INTRAMUSCULAR; INTRAVENOUS ONCE
Status: COMPLETED | OUTPATIENT
Start: 2022-05-21 | End: 2022-05-21

## 2022-05-21 RX ORDER — LORAZEPAM 1 MG/1
1 TABLET ORAL ONCE
Status: DISCONTINUED | OUTPATIENT
Start: 2022-05-21 | End: 2022-05-21 | Stop reason: CLARIF

## 2022-05-21 RX ORDER — HEPARIN SODIUM 5000 [USP'U]/ML
5000 INJECTION, SOLUTION INTRAVENOUS; SUBCUTANEOUS EVERY 8 HOURS
Status: DISCONTINUED | OUTPATIENT
Start: 2022-05-21 | End: 2022-06-02 | Stop reason: HOSPADM

## 2022-05-21 RX ADMIN — SODIUM CHLORIDE, PRESERVATIVE FREE 10 ML: 5 INJECTION INTRAVENOUS at 21:28

## 2022-05-21 RX ADMIN — ROCURONIUM BROMIDE 80 MG: 50 INJECTION, SOLUTION INTRAVENOUS at 16:32

## 2022-05-21 RX ADMIN — ETOMIDATE 20 MG: 2 INJECTION, SOLUTION INTRAVENOUS at 16:30

## 2022-05-21 RX ADMIN — SODIUM BICARBONATE 50 MEQ: 84 INJECTION, SOLUTION INTRAVENOUS at 19:18

## 2022-05-21 RX ADMIN — ALBUTEROL SULFATE 10 MG: 2.5 SOLUTION RESPIRATORY (INHALATION) at 16:55

## 2022-05-21 RX ADMIN — INSULIN HUMAN 10 UNITS: 100 INJECTION, SOLUTION PARENTERAL at 16:07

## 2022-05-21 RX ADMIN — SODIUM CHLORIDE 1170 ML: 9 INJECTION, SOLUTION INTRAVENOUS at 21:25

## 2022-05-21 RX ADMIN — EPINEPHRINE 1 MG: 0.1 INJECTION, SOLUTION ENDOTRACHEAL; INTRACARDIAC; INTRAVENOUS at 16:25

## 2022-05-21 RX ADMIN — PROPOFOL 10 MCG/KG/MIN: 10 INJECTION, EMULSION INTRAVENOUS at 17:36

## 2022-05-21 RX ADMIN — KETAMINE HYDROCHLORIDE 100 MG: 50 INJECTION, SOLUTION INTRAMUSCULAR; INTRAVENOUS at 16:48

## 2022-05-21 RX ADMIN — SODIUM CHLORIDE 32.5 UNITS/HR: 9 INJECTION, SOLUTION INTRAVENOUS at 22:33

## 2022-05-21 RX ADMIN — VASOPRESSIN 0.03 UNITS/MIN: 0.2 INJECTION INTRAVENOUS at 22:47

## 2022-05-21 RX ADMIN — HEPARIN SODIUM 15 ML: 5000 INJECTION INTRAVENOUS; SUBCUTANEOUS at 22:16

## 2022-05-21 RX ADMIN — Medication 1750 MG: at 17:17

## 2022-05-21 RX ADMIN — FENTANYL CITRATE 50 MCG: 50 INJECTION INTRAMUSCULAR; INTRAVENOUS at 16:45

## 2022-05-21 RX ADMIN — CALCIUM CHLORIDE 1000 MG: 100 INJECTION INTRAVENOUS; INTRAVENTRICULAR at 16:54

## 2022-05-21 RX ADMIN — PIPERACILLIN SODIUM AND TAZOBACTAM SODIUM 4500 MG: 4; .5 INJECTION, POWDER, LYOPHILIZED, FOR SOLUTION INTRAVENOUS at 22:15

## 2022-05-21 RX ADMIN — KETAMINE HYDROCHLORIDE 100 MG: 50 INJECTION, SOLUTION, CONCENTRATE INTRAMUSCULAR; INTRAVENOUS at 16:48

## 2022-05-21 RX ADMIN — SODIUM BICARBONATE 50 MEQ: 84 INJECTION, SOLUTION INTRAVENOUS at 16:25

## 2022-05-21 RX ADMIN — CALCIUM GLUCONATE 1000 MG: 98 INJECTION, SOLUTION INTRAVENOUS at 16:05

## 2022-05-21 RX ADMIN — CEFEPIME HYDROCHLORIDE 2000 MG: 2 INJECTION, POWDER, FOR SOLUTION INTRAVENOUS at 17:08

## 2022-05-21 RX ADMIN — SODIUM CHLORIDE 10.8 UNITS/HR: 9 INJECTION, SOLUTION INTRAVENOUS at 17:47

## 2022-05-21 RX ADMIN — HEPARIN SODIUM 5000 UNITS: 5000 INJECTION INTRAVENOUS; SUBCUTANEOUS at 22:22

## 2022-05-21 RX ADMIN — Medication 50 MCG/HR: at 23:47

## 2022-05-21 RX ADMIN — POTASSIUM CHLORIDE 10 MEQ: 7.46 INJECTION, SOLUTION INTRAVENOUS at 22:41

## 2022-05-21 RX ADMIN — SODIUM CHLORIDE: 9 INJECTION, SOLUTION INTRAVENOUS at 22:26

## 2022-05-21 RX ADMIN — SODIUM CHLORIDE 19.6 UNITS/HR: 9 INJECTION, SOLUTION INTRAVENOUS at 20:12

## 2022-05-21 RX ADMIN — LORAZEPAM 1 MG: 2 INJECTION INTRAMUSCULAR; INTRAVENOUS at 17:07

## 2022-05-21 RX ADMIN — SODIUM BICARBONATE 50 MEQ: 84 INJECTION, SOLUTION INTRAVENOUS at 16:26

## 2022-05-21 RX ADMIN — NOREPINEPHRINE BITARTRATE 12 MCG/MIN: 1 SOLUTION INTRAVENOUS at 16:23

## 2022-05-21 RX ADMIN — CALCIUM CHLORIDE 1000 MG: 100 INJECTION INTRAVENOUS; INTRAVENTRICULAR at 16:45

## 2022-05-21 RX ADMIN — VASOPRESSIN 0.03 UNITS/MIN: 20 INJECTION PARENTERAL at 17:29

## 2022-05-21 ASSESSMENT — PULMONARY FUNCTION TESTS
PIF_VALUE: 25
PIF_VALUE: 25
PIF_VALUE: 24

## 2022-05-21 NOTE — PROGRESS NOTES
ABG results: pH                6.86                        PCO2        30.4                        PO2         450.1                        HCO3          5.4                        BE            -28.2                        SO2           99.9    Results given to Dr Melody Gamino

## 2022-05-21 NOTE — ED TRIAGE NOTES
Patient arrives via EMS from home. Family called d/t unresponsiveness. Alert to only painful stimuli. None verbal at this time. Hypotensive, 62/34, SR. 100% RA. Unable to obtain temp. BGL \"HI\". Ureter stent placement Tuesday.  Per family, patient went to sleep last night normal, found today lethargic

## 2022-05-21 NOTE — ED PROVIDER NOTES
[facility] Emergency Department Provider Note                     PCP:                Alejandro Dos Santos MD               Age: 48 y.o. Sex: female         No diagnosis found. DISPOSITION         New Prescriptions    No medications on file         Orders Placed This Encounter   Procedures    XR CHEST PORTABLE    XR ABDOMEN (KUB) (SINGLE AP VIEW)    CBC    Comprehensive Metabolic Panel    Blood Gas, Venous    HCG Qualitative, Serum    Lactic Acid    Ammonia    Urinalysis    Drug Screen Psych, Urine    Urine with Reflexed Micro    HYPOGLYCEMIA TREATMENT: blood glucose less than 50 mg/dL and patient  ALERT and TOLERATING PO    HYPOGLYCEMIA TREATMENT: blood glucose less than 70 mg/dL and patient ALERT and TOLERATING PO    HYPOGLYCEMIA TREATMENT: blood glucose less than 70 mg/dL and patient NOT ALERT or NPO    Insert hernandez catheter    POCT Urine Dipstick    POCT Glucose    POCT Glucose    POCT Glucose    POCT Glucose    Venous Blood Gas, POC    EKG 12 Lead    Restraints non-violent or non-self-destructive        No follow-up provider specified. Beth Schilder is a 48 y.o. female who presents to the Emergency Department with chief complaint of    Chief Complaint   Patient presents with    Altered Mental Status      58-year-old female presents to the emerged part via EMS with concern for dehydration. Patient lives alone, unclear who called EMS. EMS is primary historians. EMS unable to get IV access in route, reports hypotension and states patient was exquisitely somnolent. Apparently patient recently had some type of urologic procedure that she was prescribed antibiotics as well as pain medication for. EMS is unclear if patient has been taking his medications as the antibiotics bottle appeared to be full. Review of Systems   Unable to perform ROS: Acuity of condition      All other systems reviewed and are negative.       Past Medical History:   Diagnosis Date    Acute renal failure ([facility] [address] 1/24/2012    CKD (chronic kidney disease) stage 3, GFR 30-59 ml/min ([facility])     CKD (chronic kidney disease), stage IV ([facility] [address] 3/13/2017    Gastroenteritis, acute 1/24/2012    IDDM (insulin dependent diabetes mellitus) 1/24/2012    Type 1 av -150 can tell Hypo below 70    Intractable nausea and vomiting 12/25/2014    Irregular menses     Kidney stone     Nausea & vomiting 1/24/2012    Neuropathy, peripheral, idiopathic 3/13/2017    Retinopathy, bilateral 3/13/2017    Trichomoniasis 3/13/2017    Ulcer, skin, chronic ([facility] [address] 3/13/2017    Vaginal burning         Past Surgical History:   Procedure Laterality Date    AMPUTATION Left     5th Toe due to Diabetic Ulcer    AMPUTATION  1/27/12    gangrene    CYSTOSCOPY,INSERT URETERAL STENT  04/2022    HX OPEN REDUCTION INTERNAL FIXATION Right 2011    ankle        Family History   Problem Relation Age of Onset    Prostate Cancer Father     Diabetes Paternal Grandmother     Colon Cancer Neg Hx     Uterine Cancer Neg Hx     Diabetes Father         DM Type II    Stroke Father     Diabetes Paternal Grandfather         DM Type II     Hypertension Maternal Grandmother     Ovarian Cancer Neg Hx     Breast Cancer Mother 76    Diabetes Maternal Grandmother         DM Type II           Social Connections:     Frequency of Communication with Friends and Family: Not on file    Frequency of Social Gatherings with Friends and Family: Not on file    Attends Mandaen Services: Not on file    Active Member of Clubs or Organizations: Not on file    Attends Club or Organization Meetings: Not on file    Marital Status: Not on file        No Known Allergies     Vitals signs and nursing note reviewed.    Patient Vitals for the past 4 hrs:   Temp Pulse Resp BP SpO2   05/21/22 1708 -- 153 -- 103/66 --   05/21/22 1706 -- 141 -- 93/67 --   05/21/22 1700 -- 131 -- (!) 67/43 --   05/21/22 1658 -- 128 -- (!) 63/38 --   05/21/22 1656 -- 117 -- Placement assessment:     Tube secured with:  ETT nelson    Breath sounds:  Equal    CXR findings:  ETT in proper place  Post-procedure details:     Patient tolerance of procedure: Tolerated well, no immediate complications  Critical Care  Performed by: Adair Curling, DO  Authorized by: Adair Curling, DO     Critical care provider statement:     Critical care time (minutes):  48    Critical care time was exclusive of:  Separately billable procedures and treating other patients    Critical care was necessary to treat or prevent imminent or life-threatening deterioration of the following conditions:  Renal failure, sepsis, shock, metabolic crisis and dehydration    Critical care was time spent personally by me on the following activities:  Blood draw for specimens, development of treatment plan with patient or surrogate, discussions with consultants, evaluation of patient's response to treatment, examination of patient, obtaining history from patient or surrogate, ordering and performing treatments and interventions, ordering and review of laboratory studies, ordering and review of radiographic studies, re-evaluation of patient's condition and review of old charts  Central Line    Date/Time: 5/21/2022 7:32 PM  Performed by: Adair Curling, DO  Authorized by: Adair Curling, DO     Consent:     Consent obtained:  Emergent situation  Pre-procedure details:     Sterile barrier technique:  All elements of maximal sterile technique followed      Skin preparation:  2% chlorhexidine    Skin preparation agent: Skin preparation agent completely dried prior to procedure    Procedure details:     Patient position:  Flat    Procedural supplies:  Triple lumen    Catheter size:  7.5 Fr    Ultrasound guidance: yes      Sterile ultrasound techniques: Sterile gel and sterile probe covers were used      Number of attempts:  1  Post-procedure details:     Post-procedure:  Dressing applied and line sutured    Assessment: Blood return through all ports, free fluid flow, no pneumothorax on x-ray and placement verified by x-ray    Patient tolerance of procedure: Tolerated well, no immediate complications        Labs Reviewed   CBC - Abnormal; Notable for the following components:       Result Value    WBC 29.1 (*)     RBC 3.80 (*)     Hemoglobin 10.1 (*)     .2 (*)     MCHC 23.5 (*)     RDW 15.9 (*)     Platelets 871 (*)     All other components within normal limits   URINALYSIS - Abnormal; Notable for the following components:    Protein,  (*)     Ketones, Urine TRACE (*)     Blood, Urine LARGE (*)     Leukocyte Esterase, Urine   (*)     Value: SMALL  SMALL      All other components within normal limits   POCT GLUCOSE - Normal   COMPREHENSIVE METABOLIC PANEL   HCG, SERUM, QUALITATIVE   LACTIC ACID   URINE WITH REFLEXED MICRO   BLOOD GAS, VENOUS   AMMONIA   DRUG SCREEN PSYCH, URINE   VENOUS BLOOD GAS, POINT OF CARE   POCT GLUCOSE   POCT GLUCOSE   POCT GLUCOSE   POCT GLUCOSE   POCT GLUCOSE        XR CHEST PORTABLE    (Results Pending)   XR ABDOMEN (KUB) (SINGLE AP VIEW)    (Results Pending)              [place] Coma Scale  Eye Opening: To pain  Best Verbal Response: None  Best Motor Response: Localizes pain  [place] Coma Scale Score: 8                     CIWA Assessment  BP: 103/66  Pulse: 153                        MDM  Number of Diagnoses or Management Options  Acute renal failure, unspecified acute renal failure type (HCC)  Acute respiratory failure, unspecified whether with hypoxia or hypercapnia (HCC)  Hyperkalemia  Septic shock (HCC)  Diagnosis management comments: 57-year-old female arrives via EMS hypotensive with decreased level consciousness. Patient placed on monitor, IV access established, blood work obtained. Point-of-care labs show elevated creatinine, elevated potassium as well as elevated glucose. Patient given calcium gluconate, albuterol for hyper-K, as well as 10 units IV insulin.   Patient rapidly infused with 2 L normal saline. Deep peripheral IV placed via ultrasound by me in patient's right AC. Patient remained hypotensive, given peripheral Levophed initially. Patient blood pressure began to improve, patient did have a seizure-like episode where her eyes deviated to the left, patient began to become bradycardic and had nonsustained V. tach. Patient then given sodium bicarb as well as additional doses of calcium as patient's apnea likely led to worsening acidosis and worsening hyperkalemia. Patient was intubated using etomidate and rocuronium. Right IJ central line was also placed. Lab resulted showing white blood cell count 29,000, given broad-spectrum antibiotics. Patient given additional 500 cc IV fluid for total of 30 cc/kg. Patient requiring Levophed as well as vasopressin for blood pressure support. Patient vent rate increased to 28 secondary to patient's PCO2 rising causing her to become acidotic. ICU consulted for admission. Patient's family was updated. EKG interpretation from 1612 shows sinus rhythm, rate 78, , , QTc 519, appears normal axis, abnormal EKG with T wave inversion with some scattered ST depression, no significant ST elevation.              Amount and/or Complexity of Data Reviewed  Clinical lab tests: ordered and reviewed  Tests in the radiology section of CPT®: ordered and reviewed  Discussion of test results with the performing providers: yes  Decide to obtain previous medical records or to obtain history from someone other than the patient: yes  Obtain history from someone other than the patient: yes  Review and summarize past medical records: yes  Discuss the patient with other providers: yes  Independent visualization of images, tracings, or specimens: yes    Risk of Complications, Morbidity, and/or Mortality  Presenting problems: high  Diagnostic procedures: high  Management options: high         Voice dictation software was used during the making of this note. This software is not perfect and grammatical and other typographical errors may be present. This note has not been completely proofread for errors.       Brandie Cheema DO  05/21/22 1943

## 2022-05-21 NOTE — PROGRESS NOTES
Venous ABG results: pH           6.99                                     PCO2    17.3                                     PO2       89.5                                      HCO3       4.1                                     BE         -27.4                                     SO2        89.6    Results given to Dr Castle Favor

## 2022-05-22 ENCOUNTER — APPOINTMENT (OUTPATIENT)
Dept: NON INVASIVE DIAGNOSTICS | Age: 47
DRG: 870 | End: 2022-05-22
Payer: COMMERCIAL

## 2022-05-22 LAB
ANION GAP SERPL CALC-SCNC: 13 MMOL/L (ref 7–16)
ANION GAP SERPL CALC-SCNC: 15 MMOL/L (ref 7–16)
ANION GAP SERPL CALC-SCNC: 18 MMOL/L (ref 7–16)
ANION GAP SERPL CALC-SCNC: 19 MMOL/L (ref 7–16)
ANION GAP SERPL CALC-SCNC: 24 MMOL/L (ref 7–16)
ARTERIAL PATENCY WRIST A: ABNORMAL
ARTERIAL PATENCY WRIST A: POSITIVE
BASE DEFICIT BLD-SCNC: 11.9 MMOL/L
BASE DEFICIT BLDV-SCNC: 12.3 MMOL/L
BASE DEFICIT BLDV-SCNC: 12.6 MMOL/L
BASE DEFICIT BLDV-SCNC: 13.6 MMOL/L
BASE DEFICIT BLDV-SCNC: 14 MMOL/L
BASE DEFICIT BLDV-SCNC: 15.3 MMOL/L
BASOPHILS # BLD: 0.1 K/UL (ref 0–0.2)
BASOPHILS # BLD: 0.1 K/UL (ref 0–0.2)
BASOPHILS NFR BLD: 0 % (ref 0–2)
BASOPHILS NFR BLD: 0 % (ref 0–2)
BDY SITE: ABNORMAL
BUN SERPL-MCNC: 106 MG/DL (ref 6–23)
BUN SERPL-MCNC: 113 MG/DL (ref 6–23)
BUN SERPL-MCNC: 121 MG/DL (ref 6–23)
BUN SERPL-MCNC: 90 MG/DL (ref 6–23)
BUN SERPL-MCNC: 93 MG/DL (ref 6–23)
BUN SERPL-MCNC: 95 MG/DL (ref 6–23)
BUN SERPL-MCNC: 96 MG/DL (ref 6–23)
CALCIUM SERPL-MCNC: 7.9 MG/DL (ref 8.3–10.4)
CALCIUM SERPL-MCNC: 8.1 MG/DL (ref 8.3–10.4)
CALCIUM SERPL-MCNC: 8.2 MG/DL (ref 8.3–10.4)
CALCIUM SERPL-MCNC: 8.3 MG/DL (ref 8.3–10.4)
CALCIUM SERPL-MCNC: 8.5 MG/DL (ref 8.3–10.4)
CALCIUM SERPL-MCNC: 8.5 MG/DL (ref 8.3–10.4)
CALCIUM SERPL-MCNC: 8.8 MG/DL (ref 8.3–10.4)
CHLORIDE SERPL-SCNC: 112 MMOL/L (ref 98–107)
CHLORIDE SERPL-SCNC: 116 MMOL/L (ref 98–107)
CHLORIDE SERPL-SCNC: 118 MMOL/L (ref 98–107)
CHLORIDE SERPL-SCNC: 120 MMOL/L (ref 98–107)
CHLORIDE SERPL-SCNC: 122 MMOL/L (ref 98–107)
CHLORIDE SERPL-SCNC: 123 MMOL/L (ref 98–107)
CHLORIDE SERPL-SCNC: 124 MMOL/L (ref 98–107)
CO2 SERPL-SCNC: 11 MMOL/L (ref 21–32)
CO2 SERPL-SCNC: 14 MMOL/L (ref 21–32)
CO2 SERPL-SCNC: 15 MMOL/L (ref 21–32)
CO2 SERPL-SCNC: 16 MMOL/L (ref 21–32)
CO2 SERPL-SCNC: 16 MMOL/L (ref 21–32)
CREAT SERPL-MCNC: 7.8 MG/DL (ref 0.6–1)
CREAT SERPL-MCNC: 7.9 MG/DL (ref 0.6–1)
CREAT SERPL-MCNC: 8.1 MG/DL (ref 0.6–1)
CREAT SERPL-MCNC: 8.2 MG/DL (ref 0.6–1)
CREAT SERPL-MCNC: 8.3 MG/DL (ref 0.6–1)
CREAT SERPL-MCNC: 8.6 MG/DL (ref 0.6–1)
CREAT SERPL-MCNC: 8.7 MG/DL (ref 0.6–1)
DIFFERENTIAL METHOD BLD: ABNORMAL
DIFFERENTIAL METHOD BLD: ABNORMAL
ECHO AO ASC DIAM: 2.7 CM
ECHO AO ASCENDING AORTA INDEX: 1.38 CM/M2
ECHO AO ROOT DIAM: 2.9 CM
ECHO AO ROOT INDEX: 1.48 CM/M2
ECHO AV MEAN GRADIENT: 23 MMHG
ECHO AV MEAN VELOCITY: 2.3 M/S
ECHO AV PEAK GRADIENT: 41 MMHG
ECHO AV PEAK VELOCITY: 3.2 M/S
ECHO AV VTI: 40.4 CM
ECHO BSA: 2 M2
ECHO LA AREA 2C: 18.2 CM2
ECHO LA AREA 4C: 18.4 CM2
ECHO LA DIAMETER INDEX: 1.68 CM/M2
ECHO LA DIAMETER: 3.3 CM
ECHO LA MAJOR AXIS: 6.2 CM
ECHO LA MINOR AXIS: 6.4 CM
ECHO LA TO AORTIC ROOT RATIO: 1.14
ECHO LA VOL 2C: 42 ML (ref 22–52)
ECHO LA VOL 4C: 44 ML (ref 22–52)
ECHO LA VOL BP: 43 ML (ref 22–52)
ECHO LA VOL/BSA BIPLANE: 22 ML/M2 (ref 16–34)
ECHO LA VOLUME INDEX A2C: 21 ML/M2 (ref 16–34)
ECHO LA VOLUME INDEX A4C: 22 ML/M2 (ref 16–34)
ECHO LV E' LATERAL VELOCITY: 7 CM/S
ECHO LV E' SEPTAL VELOCITY: 7 CM/S
ECHO LV EDV A2C: 37 ML
ECHO LV EDV A4C: 63 ML
ECHO LV EDV INDEX A4C: 32 ML/M2
ECHO LV EDV NDEX A2C: 19 ML/M2
ECHO LV ESV A2C: 16 ML
ECHO LV ESV A4C: 20 ML
ECHO LV ESV INDEX A2C: 8 ML/M2
ECHO LV ESV INDEX A4C: 10 ML/M2
ECHO LV FRACTIONAL SHORTENING: 50 % (ref 28–44)
ECHO LV INTERNAL DIMENSION DIASTOLE INDEX: 2.04 CM/M2
ECHO LV INTERNAL DIMENSION DIASTOLIC: 4 CM (ref 3.9–5.3)
ECHO LV INTERNAL DIMENSION SYSTOLIC INDEX: 1.02 CM/M2
ECHO LV INTERNAL DIMENSION SYSTOLIC: 2 CM
ECHO LV IVSD: 1.1 CM (ref 0.6–0.9)
ECHO LV MASS 2D: 136.2 G (ref 67–162)
ECHO LV MASS INDEX 2D: 69.5 G/M2 (ref 43–95)
ECHO LV POSTERIOR WALL DIASTOLIC: 1 CM (ref 0.6–0.9)
ECHO LV RELATIVE WALL THICKNESS RATIO: 0.5
ECHO LVOT AREA: 2.3 CM2
ECHO LVOT DIAM: 1.7 CM
ECHO MV A VELOCITY: 1.28 M/S
ECHO MV E DECELERATION TIME (DT): 269 MS
ECHO MV E VELOCITY: 0.92 M/S
ECHO MV E/A RATIO: 0.72
ECHO MV E/E' LATERAL: 13.14
ECHO MV E/E' RATIO (AVERAGED): 13.14
ECHO MV E/E' SEPTAL: 13.14
ECHO MV MAX VELOCITY: 2 M/S
ECHO MV MEAN GRADIENT: 5 MMHG
ECHO MV MEAN VELOCITY: 1 M/S
ECHO MV PEAK GRADIENT: 17 MMHG
ECHO MV VTI: 29.9 CM
ECHO RV INTERNAL DIMENSION: 2.6 CM
ECHO RV TAPSE: 1.9 CM (ref 1.7–?)
EKG ATRIAL RATE: 78 BPM
EKG DIAGNOSIS: NORMAL
EKG P AXIS: 75 DEGREES
EKG P-R INTERVAL: 148 MS
EKG Q-T INTERVAL: 455 MS
EKG QRS DURATION: 125 MS
EKG QTC CALCULATION (BAZETT): 519 MS
EKG R AXIS: 137 DEGREES
EKG T AXIS: -25 DEGREES
EKG VENTRICULAR RATE: 78 BPM
EOSINOPHIL # BLD: 0.3 K/UL (ref 0–0.8)
EOSINOPHIL # BLD: 0.4 K/UL (ref 0–0.8)
EOSINOPHIL NFR BLD: 1 % (ref 0.5–7.8)
EOSINOPHIL NFR BLD: 1 % (ref 0.5–7.8)
ERYTHROCYTE [DISTWIDTH] IN BLOOD BY AUTOMATED COUNT: 14.7 % (ref 11.9–14.6)
ERYTHROCYTE [DISTWIDTH] IN BLOOD BY AUTOMATED COUNT: 15 % (ref 11.9–14.6)
GAS FLOW.O2 O2 DELIVERY SYS: ABNORMAL L/MIN
GLUCOSE BLD STRIP.AUTO-MCNC: 156 MG/DL (ref 65–100)
GLUCOSE BLD STRIP.AUTO-MCNC: 162 MG/DL (ref 65–100)
GLUCOSE BLD STRIP.AUTO-MCNC: 207 MG/DL (ref 65–100)
GLUCOSE BLD STRIP.AUTO-MCNC: 218 MG/DL (ref 65–100)
GLUCOSE BLD STRIP.AUTO-MCNC: 218 MG/DL (ref 65–100)
GLUCOSE BLD STRIP.AUTO-MCNC: 222 MG/DL (ref 65–100)
GLUCOSE BLD STRIP.AUTO-MCNC: 233 MG/DL (ref 65–100)
GLUCOSE BLD STRIP.AUTO-MCNC: 239 MG/DL (ref 65–100)
GLUCOSE BLD STRIP.AUTO-MCNC: 296 MG/DL (ref 65–100)
GLUCOSE BLD STRIP.AUTO-MCNC: 399 MG/DL (ref 65–100)
GLUCOSE BLD STRIP.AUTO-MCNC: 406 MG/DL (ref 65–100)
GLUCOSE BLD STRIP.AUTO-MCNC: 418 MG/DL (ref 65–100)
GLUCOSE BLD STRIP.AUTO-MCNC: 458 MG/DL (ref 65–100)
GLUCOSE BLD STRIP.AUTO-MCNC: 478 MG/DL (ref 65–100)
GLUCOSE BLD STRIP.AUTO-MCNC: 506 MG/DL (ref 65–100)
GLUCOSE BLD STRIP.AUTO-MCNC: 524 MG/DL (ref 65–100)
GLUCOSE SERPL-MCNC: 111 MG/DL (ref 65–100)
GLUCOSE SERPL-MCNC: 1157 MG/DL (ref 65–100)
GLUCOSE SERPL-MCNC: 193 MG/DL (ref 65–100)
GLUCOSE SERPL-MCNC: 257 MG/DL (ref 65–100)
GLUCOSE SERPL-MCNC: 457 MG/DL (ref 65–100)
GLUCOSE SERPL-MCNC: 716 MG/DL (ref 65–100)
GLUCOSE SERPL-MCNC: 995 MG/DL (ref 65–100)
HCO3 BLD-SCNC: 13.6 MMOL/L (ref 22–26)
HCO3 BLDV-SCNC: 10.9 MMOL/L (ref 23–28)
HCO3 BLDV-SCNC: 12.6 MMOL/L (ref 23–28)
HCO3 BLDV-SCNC: 13 MMOL/L (ref 23–28)
HCO3 BLDV-SCNC: 13.5 MMOL/L (ref 23–28)
HCO3 BLDV-SCNC: 14 MMOL/L (ref 23–28)
HCT VFR BLD AUTO: 31.9 % (ref 35.8–46.3)
HCT VFR BLD AUTO: 32.4 % (ref 35.8–46.3)
HGB BLD-MCNC: 10 G/DL (ref 11.7–15.4)
HGB BLD-MCNC: 10 G/DL (ref 11.7–15.4)
IMM GRANULOCYTES # BLD AUTO: 0.8 K/UL (ref 0–0.5)
IMM GRANULOCYTES # BLD AUTO: 0.9 K/UL (ref 0–0.5)
IMM GRANULOCYTES NFR BLD AUTO: 3 % (ref 0–5)
IMM GRANULOCYTES NFR BLD AUTO: 3 % (ref 0–5)
IPAP/PIP/HIGH PEEP: 21
LACTATE SERPL-SCNC: 4.2 MMOL/L (ref 0.4–2)
LACTATE SERPL-SCNC: 4.2 MMOL/L (ref 0.4–2)
LACTATE SERPL-SCNC: 4.4 MMOL/L (ref 0.4–2)
LACTATE SERPL-SCNC: 4.8 MMOL/L (ref 0.4–2)
LYMPHOCYTES # BLD: 1.2 K/UL (ref 0.5–4.6)
LYMPHOCYTES # BLD: 1.3 K/UL (ref 0.5–4.6)
LYMPHOCYTES NFR BLD: 5 % (ref 13–44)
LYMPHOCYTES NFR BLD: 5 % (ref 13–44)
MAGNESIUM SERPL-MCNC: 1.6 MG/DL (ref 1.8–2.4)
MAGNESIUM SERPL-MCNC: 1.8 MG/DL (ref 1.8–2.4)
MAGNESIUM SERPL-MCNC: 1.8 MG/DL (ref 1.8–2.4)
MAGNESIUM SERPL-MCNC: 2 MG/DL (ref 1.8–2.4)
MAGNESIUM SERPL-MCNC: 2.2 MG/DL (ref 1.8–2.4)
MAGNESIUM SERPL-MCNC: 2.3 MG/DL (ref 1.8–2.4)
MAGNESIUM SERPL-MCNC: 2.6 MG/DL (ref 1.8–2.4)
MCH RBC QN AUTO: 26.3 PG (ref 26.1–32.9)
MCH RBC QN AUTO: 26.4 PG (ref 26.1–32.9)
MCHC RBC AUTO-ENTMCNC: 30.9 G/DL (ref 31.4–35)
MCHC RBC AUTO-ENTMCNC: 31.3 G/DL (ref 31.4–35)
MCV RBC AUTO: 83.9 FL (ref 79.6–97.8)
MCV RBC AUTO: 85.5 FL (ref 79.6–97.8)
MONOCYTES # BLD: 1.3 K/UL (ref 0.1–1.3)
MONOCYTES # BLD: 1.6 K/UL (ref 0.1–1.3)
MONOCYTES NFR BLD: 5 % (ref 4–12)
MONOCYTES NFR BLD: 6 % (ref 4–12)
NEUTS SEG # BLD: 22.3 K/UL (ref 1.7–8.2)
NEUTS SEG # BLD: 24.2 K/UL (ref 1.7–8.2)
NEUTS SEG NFR BLD: 85 % (ref 43–78)
NEUTS SEG NFR BLD: 86 % (ref 43–78)
NRBC # BLD: 0.04 K/UL (ref 0–0.2)
NRBC # BLD: 0.04 K/UL (ref 0–0.2)
O2/TOTAL GAS SETTING VFR VENT: 30 %
O2/TOTAL GAS SETTING VFR VENT: 30 %
O2/TOTAL GAS SETTING VFR VENT: 40 %
PCO2 BLD: 29.2 MMHG (ref 35–45)
PCO2 BLDV: 26.3 MMHG (ref 41–51)
PCO2 BLDV: 31.1 MMHG (ref 41–51)
PCO2 BLDV: 31.3 MMHG (ref 41–51)
PCO2 BLDV: 31.7 MMHG (ref 41–51)
PCO2 BLDV: 32.3 MMHG (ref 41–51)
PEEP RESPIRATORY: 8 CMH2O
PH BLD: 7.28 [PH] (ref 7.35–7.45)
PH BLDV: 7.21 [PH] (ref 7.32–7.42)
PH BLDV: 7.22 [PH] (ref 7.32–7.42)
PH BLDV: 7.22 [PH] (ref 7.32–7.42)
PH BLDV: 7.24 [PH] (ref 7.32–7.42)
PH BLDV: 7.24 [PH] (ref 7.32–7.42)
PHOSPHATE SERPL-MCNC: 2.1 MG/DL (ref 2.5–4.5)
PHOSPHATE SERPL-MCNC: 2.7 MG/DL (ref 2.5–4.5)
PHOSPHATE SERPL-MCNC: 3.3 MG/DL (ref 2.5–4.5)
PHOSPHATE SERPL-MCNC: 3.4 MG/DL (ref 2.5–4.5)
PHOSPHATE SERPL-MCNC: 3.4 MG/DL (ref 2.5–4.5)
PHOSPHATE SERPL-MCNC: 3.7 MG/DL (ref 2.5–4.5)
PHOSPHATE SERPL-MCNC: 3.7 MG/DL (ref 2.5–4.5)
PLATELET # BLD AUTO: 433 K/UL (ref 150–450)
PLATELET # BLD AUTO: 441 K/UL (ref 150–450)
PMV BLD AUTO: 9.6 FL (ref 9.4–12.3)
PMV BLD AUTO: 9.9 FL (ref 9.4–12.3)
PO2 BLD: 140 MMHG (ref 75–100)
PO2 BLDV: 40 MMHG
PO2 BLDV: 41 MMHG
PO2 BLDV: 43 MMHG
PO2 BLDV: 43 MMHG
PO2 BLDV: 50 MMHG
POTASSIUM SERPL-SCNC: 3.2 MMOL/L (ref 3.5–5.1)
POTASSIUM SERPL-SCNC: 3.4 MMOL/L (ref 3.5–5.1)
POTASSIUM SERPL-SCNC: 3.5 MMOL/L (ref 3.5–5.1)
POTASSIUM SERPL-SCNC: 3.6 MMOL/L (ref 3.5–5.1)
POTASSIUM SERPL-SCNC: 3.6 MMOL/L (ref 3.5–5.1)
POTASSIUM SERPL-SCNC: 3.7 MMOL/L (ref 3.5–5.1)
POTASSIUM SERPL-SCNC: 3.8 MMOL/L (ref 3.5–5.1)
RBC # BLD AUTO: 3.79 M/UL (ref 4.05–5.2)
RBC # BLD AUTO: 3.8 M/UL (ref 4.05–5.2)
RESPIRATORY RATE, POC: 24 (ref 5–40)
RESPIRATORY RATE, POC: 24 (ref 5–40)
SAO2 % BLD: 98.9 % (ref 95–98)
SAO2 % BLDV: 66 % (ref 65–88)
SAO2 % BLDV: 67.6 % (ref 65–88)
SAO2 % BLDV: 69.9 % (ref 65–88)
SAO2 % BLDV: 70.8 % (ref 65–88)
SAO2 % BLDV: 78.8 % (ref 65–88)
SERVICE CMNT-IMP: 33
SERVICE CMNT-IMP: ABNORMAL
SODIUM SERPL-SCNC: 147 MMOL/L (ref 136–145)
SODIUM SERPL-SCNC: 149 MMOL/L (ref 136–145)
SODIUM SERPL-SCNC: 150 MMOL/L (ref 136–145)
SODIUM SERPL-SCNC: 151 MMOL/L (ref 136–145)
SODIUM SERPL-SCNC: 152 MMOL/L (ref 136–145)
SODIUM SERPL-SCNC: 152 MMOL/L (ref 136–145)
SODIUM SERPL-SCNC: 153 MMOL/L (ref 136–145)
SPECIMEN TYPE: ABNORMAL
VENTILATION MODE VENT: ABNORMAL
VT SETTING VENT: 460 ML
VT SETTING VENT: 500 ML
WBC # BLD AUTO: 26.3 K/UL (ref 4.3–11.1)
WBC # BLD AUTO: 28.1 K/UL (ref 4.3–11.1)

## 2022-05-22 PROCEDURE — 85025 COMPLETE CBC W/AUTO DIFF WBC: CPT

## 2022-05-22 PROCEDURE — 80048 BASIC METABOLIC PNL TOTAL CA: CPT

## 2022-05-22 PROCEDURE — 36600 WITHDRAWAL OF ARTERIAL BLOOD: CPT

## 2022-05-22 PROCEDURE — 2580000003 HC RX 258: Performed by: INTERNAL MEDICINE

## 2022-05-22 PROCEDURE — 2500000003 HC RX 250 WO HCPCS: Performed by: INTERNAL MEDICINE

## 2022-05-22 PROCEDURE — 83605 ASSAY OF LACTIC ACID: CPT

## 2022-05-22 PROCEDURE — 84100 ASSAY OF PHOSPHORUS: CPT

## 2022-05-22 PROCEDURE — 99291 CRITICAL CARE FIRST HOUR: CPT | Performed by: INTERNAL MEDICINE

## 2022-05-22 PROCEDURE — 94003 VENT MGMT INPAT SUBQ DAY: CPT

## 2022-05-22 PROCEDURE — 83735 ASSAY OF MAGNESIUM: CPT

## 2022-05-22 PROCEDURE — 6360000002 HC RX W HCPCS: Performed by: INTERNAL MEDICINE

## 2022-05-22 PROCEDURE — 82803 BLOOD GASES ANY COMBINATION: CPT

## 2022-05-22 PROCEDURE — 82962 GLUCOSE BLOOD TEST: CPT

## 2022-05-22 PROCEDURE — 87086 URINE CULTURE/COLONY COUNT: CPT

## 2022-05-22 PROCEDURE — 6370000000 HC RX 637 (ALT 250 FOR IP): Performed by: INTERNAL MEDICINE

## 2022-05-22 PROCEDURE — 2700000000 HC OXYGEN THERAPY PER DAY

## 2022-05-22 PROCEDURE — 2000000000 HC ICU R&B

## 2022-05-22 PROCEDURE — 93306 TTE W/DOPPLER COMPLETE: CPT | Performed by: INTERNAL MEDICINE

## 2022-05-22 PROCEDURE — 93306 TTE W/DOPPLER COMPLETE: CPT

## 2022-05-22 RX ORDER — DIMETHICONE, CAMPHOR (SYNTHETIC), MENTHOL, AND PHENOL 1.1; .5; .625; .5 G/100G; G/100G; G/100G; G/100G
OINTMENT TOPICAL PRN
Status: DISCONTINUED | OUTPATIENT
Start: 2022-05-22 | End: 2022-06-02 | Stop reason: HOSPADM

## 2022-05-22 RX ORDER — DEXTROSE, SODIUM CHLORIDE, AND POTASSIUM CHLORIDE 5; .45; .15 G/100ML; G/100ML; G/100ML
INJECTION INTRAVENOUS CONTINUOUS
Status: DISCONTINUED | OUTPATIENT
Start: 2022-05-22 | End: 2022-05-23

## 2022-05-22 RX ORDER — DEXMEDETOMIDINE HYDROCHLORIDE 4 UG/ML
.1-1.5 INJECTION, SOLUTION INTRAVENOUS CONTINUOUS
Status: DISCONTINUED | OUTPATIENT
Start: 2022-05-22 | End: 2022-05-26

## 2022-05-22 RX ORDER — POTASSIUM CHLORIDE AND SODIUM CHLORIDE 450; 150 MG/100ML; MG/100ML
INJECTION, SOLUTION INTRAVENOUS CONTINUOUS
Status: DISCONTINUED | OUTPATIENT
Start: 2022-05-22 | End: 2022-05-22

## 2022-05-22 RX ADMIN — POTASSIUM CHLORIDE 10 MEQ: 7.46 INJECTION, SOLUTION INTRAVENOUS at 00:49

## 2022-05-22 RX ADMIN — Medication 26 MCG/MIN: at 21:00

## 2022-05-22 RX ADMIN — SODIUM CHLORIDE, PRESERVATIVE FREE 10 ML: 5 INJECTION INTRAVENOUS at 20:18

## 2022-05-22 RX ADMIN — SODIUM CHLORIDE 54.1 UNITS/HR: 9 INJECTION, SOLUTION INTRAVENOUS at 04:01

## 2022-05-22 RX ADMIN — SODIUM CHLORIDE, PRESERVATIVE FREE 20 MG: 5 INJECTION INTRAVENOUS at 08:51

## 2022-05-22 RX ADMIN — Medication 0.2 MCG/KG/HR: at 17:25

## 2022-05-22 RX ADMIN — SODIUM CHLORIDE 65 UNITS/HR: 9 INJECTION, SOLUTION INTRAVENOUS at 09:27

## 2022-05-22 RX ADMIN — HEPARIN SODIUM 5000 UNITS: 5000 INJECTION INTRAVENOUS; SUBCUTANEOUS at 13:39

## 2022-05-22 RX ADMIN — HEPARIN SODIUM 5000 UNITS: 5000 INJECTION INTRAVENOUS; SUBCUTANEOUS at 21:51

## 2022-05-22 RX ADMIN — SODIUM CHLORIDE 29.4 UNITS/HR: 9 INJECTION, SOLUTION INTRAVENOUS at 15:07

## 2022-05-22 RX ADMIN — POTASSIUM CHLORIDE AND SODIUM CHLORIDE: 450; 150 INJECTION, SOLUTION INTRAVENOUS at 09:51

## 2022-05-22 RX ADMIN — SODIUM CHLORIDE, PRESERVATIVE FREE 10 ML: 5 INJECTION INTRAVENOUS at 08:55

## 2022-05-22 RX ADMIN — PIPERACILLIN AND TAZOBACTAM 3375 MG: 3; .375 INJECTION, POWDER, LYOPHILIZED, FOR SOLUTION INTRAVENOUS at 05:43

## 2022-05-22 RX ADMIN — SODIUM CHLORIDE 75.6 UNITS/HR: 9 INJECTION, SOLUTION INTRAVENOUS at 07:44

## 2022-05-22 RX ADMIN — SODIUM CHLORIDE 48.7 UNITS/HR: 9 INJECTION, SOLUTION INTRAVENOUS at 01:52

## 2022-05-22 RX ADMIN — SODIUM CHLORIDE 21.1 UNITS/HR: 9 INJECTION, SOLUTION INTRAVENOUS at 22:49

## 2022-05-22 RX ADMIN — SODIUM CHLORIDE: 9 INJECTION, SOLUTION INTRAVENOUS at 02:28

## 2022-05-22 RX ADMIN — PIPERACILLIN AND TAZOBACTAM 3375 MG: 3; .375 INJECTION, POWDER, LYOPHILIZED, FOR SOLUTION INTRAVENOUS at 18:00

## 2022-05-22 RX ADMIN — PROPOFOL 10 MCG/KG/MIN: 10 INJECTION, EMULSION INTRAVENOUS at 01:34

## 2022-05-22 RX ADMIN — SODIUM CHLORIDE 60.9 UNITS/HR: 9 INJECTION, SOLUTION INTRAVENOUS at 13:15

## 2022-05-22 RX ADMIN — HEPARIN SODIUM 5000 UNITS: 5000 INJECTION INTRAVENOUS; SUBCUTANEOUS at 05:39

## 2022-05-22 RX ADMIN — SODIUM CHLORIDE 20.4 UNITS/HR: 9 INJECTION, SOLUTION INTRAVENOUS at 18:30

## 2022-05-22 RX ADMIN — Medication 30 MCG/MIN: at 01:26

## 2022-05-22 RX ADMIN — Medication 50 MCG/HR: at 16:03

## 2022-05-22 RX ADMIN — SODIUM CHLORIDE 64.9 UNITS/HR: 9 INJECTION, SOLUTION INTRAVENOUS at 05:33

## 2022-05-22 RX ADMIN — POTASSIUM CHLORIDE AND SODIUM CHLORIDE: 450; 150 INJECTION, SOLUTION INTRAVENOUS at 05:47

## 2022-05-22 RX ADMIN — Medication 30 MCG/MIN: at 11:26

## 2022-05-22 RX ADMIN — HEPARIN SODIUM 15 ML: 5000 INJECTION INTRAVENOUS; SUBCUTANEOUS at 08:51

## 2022-05-22 RX ADMIN — HEPARIN SODIUM 15 ML: 5000 INJECTION INTRAVENOUS; SUBCUTANEOUS at 20:17

## 2022-05-22 RX ADMIN — POTASSIUM CHLORIDE, DEXTROSE MONOHYDRATE AND SODIUM CHLORIDE: 150; 5; 450 INJECTION, SOLUTION INTRAVENOUS at 16:43

## 2022-05-22 RX ADMIN — POTASSIUM CHLORIDE AND SODIUM CHLORIDE: 450; 150 INJECTION, SOLUTION INTRAVENOUS at 13:40

## 2022-05-22 RX ADMIN — SODIUM CHLORIDE 71.4 UNITS/HR: 9 INJECTION, SOLUTION INTRAVENOUS at 11:23

## 2022-05-22 RX ADMIN — POTASSIUM CHLORIDE 10 MEQ: 7.46 INJECTION, SOLUTION INTRAVENOUS at 02:28

## 2022-05-22 RX ADMIN — POTASSIUM CHLORIDE, DEXTROSE MONOHYDRATE AND SODIUM CHLORIDE: 150; 5; 450 INJECTION, SOLUTION INTRAVENOUS at 23:06

## 2022-05-22 ASSESSMENT — PULMONARY FUNCTION TESTS
PIF_VALUE: 20
PIF_VALUE: 21
PIF_VALUE: 20
PIF_VALUE: 21
PIF_VALUE: 23
PIF_VALUE: 20
PIF_VALUE: 21
PIF_VALUE: 20

## 2022-05-22 ASSESSMENT — PAIN SCALES - GENERAL
PAINLEVEL_OUTOF10: 0

## 2022-05-22 NOTE — PROGRESS NOTES
Ventilator check complete; patient has a #7.5 ET tube secured at the 24 at the lip. Patient is sedated. Patient is able to open eyes for her  Name. Breath sounds are coarse to clear with sx. Trachea is midline, negative for subcutaneous air, and chest excursion is symmetric. Patient is also negative for cyanosis and is megatie for pitting edema. All alarms are set and audible. Resuscitation bag is at the head of the bed. Ventilator Settings  Mode FIO2 Rate Tidal Volume Pressure PEEP I:E Ratio        20           1:2.0      Peak airway pressure:     Minute ventilation:       ABG: No results for input(s): PH, PCO2, PO2, HCO3 in the last 72 hours.       Mitzi Schaefer RCP

## 2022-05-22 NOTE — PROGRESS NOTES
Last two BG checks 233/ 215    LA 4.2  K 3.8  Na 151    MD contacted     New orders for D5 KCL . 45% NaCL @ 150 mL/ hr    Will stop propofol and will add precedex if needed

## 2022-05-22 NOTE — PROGRESS NOTES
[facility] Critical Care Note[de-identified] 5/22/2022  Manjit Molina  Admission Date: 5/21/2022     Length of Stay: 1 days    Background: 48 y.o. female presents with unresponsiveness.     46F with htn, hld, dm, ckd3, recent admission for right sided ureteral obstruction s/p stent placement presents with unresponsiveness. The patient had not been heard from the last several days. He was found unresponsive in her home. Brought in by EMS and was found to have numerous lab abnormalities. Patient was found to be in DKA with a serum glucose of greater than 2000. She was in acute renal failure with a creatinine of 9 and a potassium of 6.7. She was markedly acidotic with a pH of 6.8. The patient was intubated. She suffered a brief cardiac arrest.  She was started on IV hydration, insulin drip, and broad-spectrum antibiotics. CT of the head was negative    Notable PMH:  has a past medical history of Acute renal failure ([address] CKD (chronic kidney disease) stage 3, GFR 30-59 ml/min ([facility]), CKD (chronic kidney disease), stage IV ([facility]), Gastroenteritis, acute, IDDM (insulin dependent diabetes mellitus), Intractable nausea and vomiting, Irregular menses, Kidney stone, Nausea & vomiting, Neuropathy, peripheral, idiopathic, Retinopathy, bilateral, Trichomoniasis, Ulcer, skin, chronic ([address] and Vaginal burning. 24 Hour events:   Admitted with dka, hypotension with sepsis- remains on pressors , resp. failure    ROS: unable to obtain/negative except as listed elsewhere.      Lines: (insertion date)   ETT:   Avalos:   OGT:   Central line:       Drips: current dose (range)  Dose (mcg/kg/min) Propofol : 5 mcg/kg/min  Dose (mcg/hr) Fentanyl: 50 mcg/hr  Dose (mcg/min) Norepinephrine: 30 mcg/min  Dose (units/hr) Vasopressin: 0.6 Units/hr (MAP=79)  Dose (units/hr) Insulin : 62.7 Units/hr (478)     Pertinent Exam:         Blood pressure (!) 95/53, pulse 111, temperature 97.9 °F (36.6 °C), temperature source Axillary, resp. rate 24, height 5' 6\" (1.676 m), weight 190 lb 0.6 oz (86.2 kg), SpO2 100 %. Intake/Output Summary (Last 24 hours) at 5/22/2022 0951  Last data filed at 5/22/2022 0855  Gross per 24 hour   Intake 2142.09 ml   Output 275 ml   Net 1867.09 ml     Constitutional:  intubated and mechanically ventilated. EENMT:  Sclera clear, pupils equal, oral mucosa moist  Respiratory: clear  Cardiovascular:  RRR  Gastrointestinal:  soft with no tenderness; positive bowel sounds present  Musculoskeletal:  warm with no cyanosis, no lower extremity edema  Skin:  no jaundice or ecchymosis  Neurologic:sedated  Psychiatric: sedated     CXR:       Recent Labs     05/21/22 1600 05/22/22  0306 05/22/22  0631   WBC 29.1* 28.1* 26.3*   HGB 10.1* 10.0* 10.0*   HCT 43.0 32.4* 31.9*   * 441 433     Recent Labs     05/21/22 1600 05/21/22 1942 05/21/22  2345 05/22/22  0306 05/22/22  0631   *   < > 147* 149* 150*   K 6.7*   < > 3.2* 3.4* 3.5   CL 89*   < > 112* 116* 118*   CO2 5*   < > 11* 14* 14*   *   < > 121* 113* 106*   MG  --   --  2.6* 2.3 2.2   PHOS  --   --  3.3 3.4 3.7   BILITOT 0.4  --   --   --   --    AST 14*  --   --   --   --    ALT 11*  --   --   --   --    ALKPHOS 179*  --   --   --   --     < > = values in this interval not displayed. No results for input(s): TROPHS, BNPNT, CRP, ESR in the last 72 hours. No results for input(s): GLUCPOC, A1 in the last 72 hours. ECHO: No results found for this or any previous visit. Microbiology:       Ventilator Settings:  Ideal body weight: 59.3 kg (130 lb 11.7 oz)  Adjusted ideal body weight: 70.1 kg (154 lb 7.3 oz)  Mode FIO2 Rate Tidal Volume Pressure PEEP        20             Peak airway pressure:         Plateau Pressure: Plateau Pressure (cm H2O): 18 cm H2O   Minute ventilation:    ABG:No results for input(s): PH, PCO2, PO2, HCO3 in the last 72 hours.   Assessment and Plan:  (Medical Decision Making)     46F with htn, hld, dm, ckd3, recent admission for right sided ureteral obstruction s/p stent placement presents with cardiac arrest 2/2 septic shock and dka. ARF on ckd.      Neuro - Encephalopathy  - 2/2 sepsis and severe hyperglycemia  - ct head neg     CV - shock  - shock 2/2 sepsis and possible cardiogenic component  - iv hydration - getting 1/4 ns with kcl na up to 150  - levo-30 kinjal, vasopressin-wean as tolerated- keep mean >65  - check tte     Pulm - acute hypoxic respiratory failure  - 2/2 aspiration pneumonia  - wean vent as tolerated     ID - septic shock  - 2/2 aspiration pneumonia vs recurrent UTI   - iv hydration  - check cultures  - empiric vanc/zosyn  - serial lactates  - sepsis protocol     GI - npo     Renal - no with hyperkalemia, hyponatremia  - 2/2 dka/sepsis  - pseudohyponatremia 2/2 hyperglycemia  - iv hydration  - strict I/o  - monitor lytes  - renal consult  Done       - Urology eval for stent removal if urine infected     Heme - monitor cbc     Endo - DKA with coma  - aggressive hydration  - insulin gtt  - serial bmp  - replete lytes     Lines - RIJ TLC  PPx - h2b/hsq  Code - full  Condition - Critical  Dispo - Admit to ICU for multiorgan system failure requiring vent/vasopressor support. High risk for morbidity and mortality. Full Code    The patient is critically ill with respiratory failure, circulatory failure and requires high complexity decision making for assessment and support including frequent ventilator adjustment , frequent evaluation and titration of therapies , application of advanced monitoring technologies and extensive interpretation of multiple databases    Cumulative time devoted to patient care services by me for day of service is 46 mins.     Charlotte Preston MD

## 2022-05-22 NOTE — CONSULTS
Comprehensive Nutrition Assessment    Type and Reason for Visit: Initial,Consult,Positive Nutrition Screen  Tube Feeding Management (Pulmonology)    Nutrition Recommendations/Plan:    Defer start of TF today d/t hemodynamic instability. Malnutrition Assessment:  Malnutrition Status: At risk for malnutrition (Comment) (found down at home-unknown time, intubated on admission, possible wt loss-unable to validate)  Nutrition Assessment:  Nutrition History:     5/22: Unable to obtain  Nutrition Background:   H/O: HTN, HLD, DM, CKD3, recent admission for right sided ureteral obstruction s/p stent placement. P/W found unresponsiveness after not heard from the last several days. Findings of DKA, BLANCA. Had brief cardiac arrest and was intubated in ED. Nutrition Interval:  Remains on vent  Abdominal Status (last documented):   soft abdomen with active   bowel sounds.  Last BM  .5/22  Pertinent Medications: pepcid, zosyn, vancomycin  Electrolyte replacement:  Unable to determine at this time  Continuous: fentanyl, levophed (currently at 30 mcg/min), diprivan (27 ml received yesterday, currently at 2 ml/hr),insulin(502 units received 5/21, currently at 60 units/hr))  vasostrict stopped 5/22-AM  IVF: 1/2 NS with 20 meq KCL at 250 ml/hr  Pertinent Labs:   Lab Results   Component Value Date     05/22/2022    K 3.7 05/22/2022     05/22/2022    CO2 16 05/22/2022    BUN 96 05/22/2022    MG 2.0 05/22/2022    PHOS 3.7 05/22/2022   24 hr POC  Range:399-2075    Nutrition Related Findings:   NFPE deferred d/t remote assessment      Current Nutrition Therapies:  Diet NPO    Current Intake:   Average Meal Intake: NPO        Anthropometric Measures:  Height: 5' 6\" (167.6 cm)  Current Body Wt: 190 lb 0.6 oz (86.2 kg) (5/22), Weight source: Bed Scale  BMI: 30.7  Admission Body Weight: 172 lb (78 kg) (estimated)  Ideal Body Weight (Kg) (Calculated): 59 kg (130 lbs),    Usual Body Wt:  (see below),   179# (OV GYN 11/26/21), 180# OV PCP 8/16/2021)  Percent weight change:   .Weight variances likely d/t fluid and scale variances with h/o CKD         Edema: No data recorded  BMI Category  (defer d/t wide weight range)  Estimated Daily Nutrient Needs:  Energy (kcal/day): 3358-1745 (25-30 kcal/kg) (Kcal/kg Weight used: 59 kg Ideal (59 kg)  Protein (g/day):  (1.5-1.7 g/kg)-BLANCA/CKD-critically ill Weight Used: (Ideal (59 kg)) 59 kg  Fluid (ml/day):   (Other (Comment) (per MD))    Nutrition Diagnosis:   · Inadequate oral intake related to impaired respiratory function as evidenced by NPO or clear liquid status due to medical condition,intubation    Nutrition Interventions:   Food and/or Nutrient Delivery: Continue NPO     Coordination of Nutrition Care: Continue to monitor while inpatient  Plan of Care discussed with: Glendy Cristobal RN    Goals: Active Goal: Initiate nutrition support,within 2 days       Nutrition Monitoring and Evaluation:         Physical Signs/Symptoms Outcomes: Hemodynamic Status    Discharge Planning:     Too soon to determine    Patrick Garcia, RD,LD, [id number] [facility]

## 2022-05-22 NOTE — PROGRESS NOTES
Pt intubated by Dr Nikhil Redman using Glidescope. Pt has a 7.5 ETT inserted to 23 cm secured at the lip. Pt placed on the ventilator with settings of 24 RR, 500 VT, 100% FIO2 and +5 peep. Pt has a current 02 sat of 100%. Dr Nikhil Redman is aware of ventilator settings. Pt is sedated and resting comfortably on ventilator. Will continue to monitor pt and make changes as needed.

## 2022-05-22 NOTE — PROGRESS NOTES
VANCO DAILY FOLLOW UP RENAL INSUFFICIENCY PATIENT   [address] [facility] Pharmacokinetic Monitoring Service - Vancomycin    Consulting Provider: Vu  Indication: aspiration PNA  Target Concentration: Random level ? 15 mg/L  Day of Therapy: 2  Additional Antimicrobials: zosyn    Pertinent Laboratory Values: Wt Readings from Last 1 Encounters:   05/22/22 190 lb 0.6 oz (86.2 kg)     Temp Readings from Last 1 Encounters:   05/22/22 97.9 °F (36.6 °C) (Axillary)     Recent Labs     05/21/22  1600 05/21/22  1942 05/21/22  2345 05/22/22  0306 05/22/22  0631   *   < > 121* 113* 106*   WBC 29.1*  --   --  28.1* 26.3*    < > = values in this interval not displayed. No components found for: Maryan Barron      Date:  Dose/Freq Admin Times Level/Time:   5/21 1750mg x 1  1717    5/22 5/23   Rd @ (0400)                 Plan:  Concentration-guided dosing due to renal impairment  Vancomycin concentration ordered for 5/23 @ 0400    Pharmacy will continue to monitor patient and adjust therapy as indicated    Thank you for the consult,  [place].  Rock De Jesus, PharmD, BCPS  Clinical Pharmacist

## 2022-05-22 NOTE — PROGRESS NOTES
Pt arrived via stretcher, VSS on multiple pressors. CHG bath completed upon pt's arrival to the unit. Dual skin assessment completed with Laurie De La Torre RN. Sacrum unremarkable- new Allevyn dressing applied. Bilateral heels unremarkable- Prevalon boots applied. Scattered tattoos present.

## 2022-05-22 NOTE — CONSULTS
Nephrology consult    Admission Date:  5/21/2022    Admission Diagnosis  Cardiac arrest ([facility] [I46.9]  Hyperkalemia [E87.5]  Septic shock ([facility] [A41.9, R65.21]  Acute respiratory failure, unspecified whether with hypoxia or hypercapnia (HCC) [J96.00]  Acute renal failure, unspecified acute renal failure type ([facility] [N17.9]    We are asked by Dr. Magdiel Yi    History of Present Illness:    Moni Barone is an 48 y.o. female has been admitted after being found unresponsive. She has a history of hypertension, diabetes, CKD stage III with baseline creatinine 1.5-2.0. Recently had a right sided ureteral obstruction which required a stent placement most recent imaging shows the stent is in place and there is no hydronephrosis. The scan was May 14. She was found unresponsive at home in the emergency department found to have DKA with a serum glucose of greater than 2000 acute kidney injury with a creatinine of 9 hyperkalemia 6.7 markedly acidemic with a pH of 6.8. She required intubation and briefly coded requiring chest compressions. Now is in the ICU intubated on support. Most recent blood sugar is 716 potassium 3.5.  CO2/bicarb is improving and anion gap is closing. She is making some urine      No Known Allergies    No current facility-administered medications on file prior to encounter. Current Outpatient Medications on File Prior to Encounter   Medication Sig Dispense Refill    atorvastatin (LIPITOR) 80 MG tablet Take 80 mg by mouth daily      brimonidine (ALPHAGAN P) 0.1 % SOLN 1 drop in left eye twice a day      ergocalciferol (ERGOCALCIFEROL) 1.25 MG (70924 UT) capsule Take 50,000 Units by mouth      gabapentin (NEURONTIN) 300 MG capsule Take 300 mg by mouth daily.       Insulin Degludec (TRESIBA FLEXTOUCH) 200 UNIT/ML SOPN Inject 30 Units into the skin daily      insulin lispro, 1 Unit Dial, (HUMALOG KWIKPEN) 100 UNIT/ML SOPN 6 TIDAC correction scale 1/50>150, max daily dose 50 units  ondansetron (ZOFRAN-ODT) 4 MG disintegrating tablet Take 4 mg by mouth every 8 hours as needed       Immunization History   Administered Date(s) Administered    COVID-19, Pfizer Purple top, DILUTE for use, 12+ yrs, 30mcg/0.3mL dose 02/10/2021         Past Medical History:   Diagnosis Date    Acute renal failure ([facility] 1/24/2012    CKD (chronic kidney disease) stage 3, GFR 30-59 ml/min ([facility])     CKD (chronic kidney disease), stage IV ([address] 3/13/2017    Gastroenteritis, acute 1/24/2012    IDDM (insulin dependent diabetes mellitus) 1/24/2012    Type 1 av -150 can tell Hypo below 70    Intractable nausea and vomiting 12/25/2014    Irregular menses     Kidney stone     Nausea & vomiting 1/24/2012    Neuropathy, peripheral, idiopathic 3/13/2017    Retinopathy, bilateral 3/13/2017    Trichomoniasis 3/13/2017    Ulcer, skin, chronic ([facility] 3/13/2017    Vaginal burning      Past Surgical History:   Procedure Laterality Date    AMPUTATION Left     5th Toe due to Diabetic Ulcer    AMPUTATION  1/27/12    gangrene    CYSTOSCOPY,INSERT URETERAL STENT  04/2022    HX OPEN REDUCTION INTERNAL FIXATION Right 2011    ankle         Patient Care Team:  Alejandro Dos Santos MD as PCP - Shae Blum MD as PCP - [facility] Empaneled Provider  Cecelia Hollingsworth RN as Care Transitions Nurse      Family History   Problem Relation Age of Onset    Prostate Cancer Father     Diabetes Paternal Grandmother     Colon Cancer Neg Hx     Uterine Cancer Neg Hx     Diabetes Father         DM Type II    Stroke Father     Diabetes Paternal Grandfather         DM Type II     Hypertension Maternal Grandmother     Ovarian Cancer Neg Hx     Breast Cancer Mother 76    Diabetes Maternal Grandmother         DM Type II      Social History     Socioeconomic History    Marital status: Single     Spouse name: Not on file    Number of children: Not on file    Years of education: Not on file    Highest education level: Not on file   Occupational History    Not on file   Tobacco Use    Smoking status: Former Smoker     Quit date: 2013     Years since quittin.5    Smokeless tobacco: Never Used   Substance and Sexual Activity    Alcohol use: Yes    Drug use: No    Sexual activity: Not on file   Other Topics Concern    Not on file   Social History Narrative    Abuse: Feels safe at home, no history of physical abuse, no history of sexual abuse       Social Determinants of Health     Financial Resource Strain:     Difficulty of Paying Living Expenses: Not on file   Food Insecurity:     Worried About Running Out of Food in the Last Year: Not on file    Slava of Food in the Last Year: Not on file   Transportation Needs:     Lack of Transportation (Medical): Not on file    Lack of Transportation (Non-Medical):  Not on file   Physical Activity:     Days of Exercise per Week: Not on file    Minutes of Exercise per Session: Not on file   Stress:     Feeling of Stress : Not on file   Social Connections:     Frequency of Communication with Friends and Family: Not on file    Frequency of Social Gatherings with Friends and Family: Not on file    Attends Religion Services: Not on file    Active Member of [address] [place] [address] or Organizations: Not on file    Attends Club or Organization Meetings: Not on file    Marital Status: Not on file   Intimate Partner Violence:     Fear of Current or Ex-Partner: Not on file    Emotionally Abused: Not on file    Physically Abused: Not on file    Sexually Abused: Not on file   Housing Stability:     Unable to Pay for Housing in the Last Year: Not on file    Number of [place] in the Last Year: Not on file    Unstable Housing in the Last Year: Not on file         Review of Systems  Unable       Objective:     Vitals:    22 0730 22 0745 22 0800 22 0803   BP: (!) 101/59 (!) 97/50 (!) 95/53    Pulse: 111 110 111 111   Resp: 24 11 14 24   Temp:       TempSrc: SpO2: 100% 100% 100% 100%   Weight:       Height:           Intake/Output Summary (Last 24 hours) at 5/22/2022 0840  Last data filed at 5/22/2022 0700  Gross per 24 hour   Intake 2132.09 ml   Output 275 ml   Net 1857.09 ml       Physical Exam  General appearance: Intubated and sedated on the ventilator. Head: atraumatic  Eyes: conjunctivae/corneas clear. Nose: no discharge  Throat: Lips, mucosa, and tongue normal.   Neck: supple, symmetrical, trachea midline and no JVD  Lungs: clear to auscultation bilaterally  Heart: regular rate and rhythm, S1, S2, no pericardial friction rub  Abdomen: soft, non-tender. Bowel sounds normal.   Extremities: No edema    Skin:  No rashes or lesions        Data Review:   Recent Labs     05/21/22  1600 05/22/22  0306 05/22/22  0631   WBC 29.1* 28.1* 26.3*   HGB 10.1* 10.0* 10.0*   HCT 43.0 32.4* 31.9*   * 441 433     Recent Labs     05/21/22  2345 05/22/22  0306 05/22/22  0631   * 149* 150*   K 3.2* 3.4* 3.5   * 116* 118*   CO2 11* 14* 14*   * 113* 106*   MG 2.6* 2.3 2.2   PHOS 3.3 3.4 3.7     No results for input(s): PH, PCO2, PO2, PCO2 in the last 72 hours.     Problem List:     Patient Active Problem List    Diagnosis Date Noted    Card[facility] arrest ([facility] 05/21/2022    Diabetic ketoacidosis with coma associated with type 1 diabetes mellitus ([facility] 05/21/2022    Shock, septic ([facility] 05/21/2022    Pneumonia, bacterial 05/21/2022    Acute respiratory failure with hypoxia ([facility] 05/21/2022    Benign hypertension with CKD (chronic kidney disease) stage III ([facility] 01/04/2022    Acute kidney injury superimposed on chronic kidney disease ([facility] 06/09/2021    IDDM (insulin dependent diabetes mellitus) 01/24/2012    BLANCA (acute kidney injury) ([address] 04/21/2022    Ureteric stone 04/19/2022    Urinary tract infection without hematuria 04/19/2022    Hydronephrosis of right kidney 04/19/2022    Malodorous urine 11/16/2021    H/O gastroesophageal reflux (GERD) 06/19/2020    Encounter for annual routine gynecological examination 11/27/2018    Encounter to discuss test results 08/22/2018    Diabetes mellitus type 1 ([facility] 08/22/2018    Type 1 diabetes mellitus with stage 3 chronic kidney disease ([facility] 08/16/2018    Noncompliance with medication regimen 08/16/2018    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    Depression with anxiety 07/11/2017    Ulcer, skin, chronic ([facility] 03/13/2017    CKD (chronic kidney disease), stage IV ([facility] 03/13/2017    Neuropathy, peripheral, idiopathic 03/13/2017    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 And shock    Currently support is volume resuscitation treatment of her hyperglycemia. Electrolytes have improved    Recent imaging with no signs of obstruction. It might be worth reimaging the kidneys at some point to make sure the stent is still in place and is functional but as long as the creatinine is improving, would expect it to be. She certainly is at risk for needing renal replacement therapy given the degree and severity of the injury pattern.   At this point there is no indication for dialysis and would continue general support with fluid and electrolyte management    Will be following closely

## 2022-05-22 NOTE — ED NOTES
Critical reported to dr Corey Majano of pts GBL 1593 and CO2 243 [facility] [place]  05/21/22 2042

## 2022-05-22 NOTE — PROGRESS NOTES
TRANSFER - IN REPORT:    Verbal report received from Sury jean baptiste RN on [employer]  being received from ED for routine progression of patient care      Report consisted of patient's Situation, Background, Assessment and   Recommendations(SBAR). Information from the following report(s) Nurse Handoff Report, ED Encounter Summary, MAR, Recent Results, Med Rec Status, Cardiac Rhythm ST/NSR and Alarm Parameters was reviewed with the receiving nurse. Opportunity for questions and clarification was provided. Assessment completed upon patient's arrival to unit and care assumed.

## 2022-05-22 NOTE — H&P
HISTORY AND PHYSICAL  Roosevelt Gamino  5/21/2022   Date of Admission:  5/21/2022    The patient's chart is reviewed and the patient is discussed with the staff. Subjective:     Patient is a 48 y.o. female presents with unresponsiveness. 46F with htn, hld, dm, ckd3, recent admission for right sided ureteral obstruction s/p stent placement presents with unresponsiveness. The patient had not been heard from the last several days. He was found unresponsive in her home. Brought in by EMS and was found to have numerous lab abnormalities. Patient was found to be in DKA with a serum glucose of greater than 2000. She was in acute renal failure with a creatinine of 9 and a potassium of 6.7. She was markedly acidotic with a pH of 6.8. The patient was intubated. She suffered a brief cardiac arrest.  She was started on IV hydration, insulin drip, and broad-spectrum antibiotics. CT of the head was negative. Review of Systems  Review of systems not obtained due to patient factors.     Current Outpatient Medications   Medication Instructions    atorvastatin (LIPITOR) 80 mg, Oral, DAILY    brimonidine (ALPHAGAN P) 0.1 % SOLN 1 drop in left eye twice a day    ergocalciferol (ERGOCALCIFEROL) 50,000 Units, Oral    gabapentin (NEURONTIN) 300 mg, Oral, DAILY    insulin lispro, 1 Unit Dial, (HUMALOG KWIKPEN) 100 UNIT/ML SOPN 6 TIDAC correction scale 1/50>150, max daily dose 50 units    ondansetron (ZOFRAN-ODT) 4 mg, Oral, EVERY 8 HOURS PRN   [facility] Anel Witt FlexTouch 30 Units, SubCUTAneous, DAILY      Past Medical History:   Diagnosis Date    Acute renal failure (HCC) 1/24/2012    CKD (chronic kidney disease) stage 3, GFR 30-59 ml/min ([facility])     CKD (chronic kidney disease), stage IV ([address] 3/13/2017    Gastroenteritis, acute 1/24/2012    IDDM (insulin dependent diabetes mellitus) 1/24/2012    Type 1 av -150 can tell Hypo below 70    Intractable nausea and vomiting 2014    Irregular menses     Kidney stone     Nausea & vomiting 2012    Neuropathy, peripheral, idiopathic 3/13/2017    Retinopathy, bilateral 3/13/2017    Trichomoniasis 3/13/2017    Ulcer, skin, chronic ([facility] [address] 3/13/2017    Vaginal burning      Past Surgical History:   Procedure Laterality Date    AMPUTATION Left     5th Toe due to Diabetic Ulcer    AMPUTATION  12    gangrene    CYSTOSCOPY,INSERT URETERAL STENT  2022    HX OPEN REDUCTION INTERNAL FIXATION Right 2011    ankle     Social History     Socioeconomic History    Marital status: Single     Spouse name: Not on file    Number of children: Not on file    Years of education: Not on file    Highest education level: Not on file   Occupational History    Not on file   Tobacco Use    Smoking status: Former Smoker     Quit date: 2013     Years since quittin.5    Smokeless tobacco: Never Used   Substance and Sexual Activity    Alcohol use: Yes    Drug use: No    Sexual activity: Not on file   Other Topics Concern    Not on file   Social History Narrative    Abuse: Feels safe at home, no history of physical abuse, no history of sexual abuse       Social Determinants of Health     Financial Resource Strain:     Difficulty of Paying Living Expenses: Not on file   Food Insecurity:     Worried About Running Out of Food in the Last Year: Not on file    Slava of Food in the Last Year: Not on file   Transportation Needs:     Lack of Transportation (Medical): Not on file    Lack of Transportation (Non-Medical):  Not on file   Physical Activity:     Days of Exercise per Week: Not on file    Minutes of Exercise per Session: Not on file   Stress:     Feeling of Stress : Not on file   Social Connections:     Frequency of Communication with Friends and Family: Not on file    Frequency of Social Gatherings with Friends and Family: Not on file    Attends Congregational Services: Not on file   Kyle Rubio Active Member of Clubs or Organizations: Not on file    Attends Club or Organization Meetings: Not on file    Marital Status: Not on file   Intimate Partner Violence:     Fear of Current or Ex-Partner: Not on file    Emotionally Abused: Not on file    Physically Abused: Not on file    Sexually Abused: Not on file   Housing Stability:     Unable to Pay for Housing in the Last Year: Not on file    Number of Places Lived in the Last Year: Not on file    Unstable Housing in the Last Year: Not on file     Family History   Problem Relation Age of Onset    Prostate Cancer Father     Diabetes Paternal Grandmother     Colon Cancer Neg Hx     Uterine Cancer Neg Hx     Diabetes Father         DM Type II    Stroke Father     Diabetes Paternal Grandfather         DM Type II     Hypertension Maternal Grandmother     Ovarian Cancer Neg Hx     Breast Cancer Mother 76    Diabetes Maternal Grandmother         DM Type II     No Known Allergies  CURRENT DRIPS:    propofol Last Rate: Stopped (05/21/22 1941)    vasopressin Last Rate: 0.03 Units/min (05/21/22 1930)    insulin Last Rate: 19.6 Units/hr (05/21/22 2012)    dexmedetomidine    fentaNYL **AND** fentaNYL  SCHEDULED MEDS:  sodium zirconium cyclosilicate, 10 g, Oral, Once    chlorhexidine, 15 mL, Mouth/Throat, BID    [START ON 5/22/2022] famotidine (PEPCID) injection, 20 mg, IntraVENous, Daily  PRN MEDS:  fentaNYL **AND** fentaNYL  Objective:     Vitals:    05/21/22 1845 05/21/22 1853 05/21/22 1900 05/21/22 1915   BP: (!) 100/55 (!) 105/50 (!) 99/51 (!) 100/54   Pulse: 112 113 114 122   Resp:       Temp: 95 °F (35 °C)      TempSrc: Rectal      SpO2:  99% 94% 100%   Weight:       Height:         PHYSICAL EXAM   Constitutional:  Intubated and sedated  HEENMT:  Ncat, pupils pinpoint  Respiratory:  +ronchi  Cardiovascular:  S1/s2, tachy  Gastrointestinal: soft and non-tender; with positive bowel sounds.   Musculoskeletal: no cce, +prior toe amputation  Skin: no martine  Neurologic: unresponsive    CXR:  1.  ET tube in appropriate position. 2.  Right IJ vascular catheter tip overlies the inferior cavoatrial junction. Consider retraction of 6 cm to overlie the superior cavoatrial junction. 3.  Mild left basilar atelectasis. LAB:  Recent Labs     05/21/22  1600   WBC 29.1*   HGB 10.1*   HCT 43.0   *     Recent Labs     05/21/22  1600   *   K 6.7*   CL 89*   CO2 5*   *   BILITOT 0.4   AST 14*   ALT 11*   ALKPHOS 179*     No results for input(s): LACTATE, TROPHS, BNPNT, CRP, ESR in the last 72 hours. No results for input(s): PH, PCO2, PO2, HCO3 in the last 72 hours. Microbiology:   Urine Culture:  No components found for: CURINE  Blood Culture:  No components found for: CBLOOD, CFUNGUSBL     Assessment and Plan:  (Medical Decision Making)   46F with htn, hld, dm, ckd3, recent admission for right sided ureteral obstruction s/p stent placement presents with cardiac arrest 2/2 septic shock and dka. ARF on ckd.      Neuro - Encephalopathy  - 2/2 sepsis and severe hyperglycemia  - ct head neg    CV - shock  - shock 2/2 sepsis and possible cardiogenic component  - iv hydration  - levo/vaso for bp support  - check tte    Pulm - acute hypoxic respiratory failure  - 2/2 aspiration pneumonia  - wean vent as tolerated    ID - septic shock  - 2/2 aspiration pneumonia vs recurrent UTI   - iv hydration  - check cultures  - empiric vanc/zosyn  - serial lactates  - sepsis protocol    GI - npo    Renal - no with hyperkalemia, hyponatremia  - 2/2 dka/sepsis  - pseudohyponatremia 2/2 hyperglycemia  - iv hydration  - strict I/o  - monitor lytes  - renal consult in am     - Urology eval for stent removal if urine infected    Heme - monitor cbc    Endo - DKA with coma  - aggressive hydration  - insulin gtt  - serial bmp  - replete lytes    Lines - RIJ TLC  PPx - h2b/hsq  Code - full  Condition - Critical  Dispo - Admit to ICU for multiorgan system failure requiring vent/vasopressor support. High risk for morbidity and mortality. Critical Care Time: 92 mins     More than 50% of the time documented was spent in face-to-face contact with the patient and in the care of the patient on the floor/unit where the patient is located. Nellie Snellen, DO    Dictated using voice recognition software.   Proof read but unrecognized errors may exist.

## 2022-05-22 NOTE — PROGRESS NOTES
Ventilator check complete; patient has a #7.5 ET tube secured at the 24 at the lip. Patient NEG sedated. Patient NEG able to follow commands. Breath sounds are clear. Trachea is midline, NEG for subcutaneous air, and chest excursion is symmetric. Patient is also NEG for cyanosis and is NEG for pitting edema. All alarms are set and audible. Resuscitation bag IS at the head of the bed.       Ventilator Settings  Mode FIO2 Rate Tidal Volume Pressure PEEP I:E Ratio     PRVC  40 28   500     8  1:2      Peak airway pressure:   23  Minute ventilation:   13.8        Candida Mayorga RCP

## 2022-05-22 NOTE — PROGRESS NOTES
VANCO RENAL INSUFFICIENCY NOTE [address] [facility] Pharmacokinetic Monitoring Service - Vancomycin    Silvia Arauz is a 48 y.o. female starting on vancomycin therapy for asp pna. Pharmacy consulted by gio for monitoring and adjustment. Target Concentration: Random level ? 15 mg/L  Additional Antimicrobials: zosyn    Pertinent Laboratory Values: Wt Readings from Last 1 Encounters:   05/21/22 171 lb 15.3 oz (78 kg)     Temp Readings from Last 1 Encounters:   05/21/22 96.7 °F (35.9 °C) (Rectal)     Recent Labs     05/21/22  1600 05/21/22  1942   * 116*   WBC 29.1*  --        No components found for: Bartolome Lipa    MRSA Nasal Swab: not ordered. Order placed by pharmacy. .    Plan:  Concentration-guided dosing due to renal impairment  Start vancomycin 1.75gm x1  Vancomycin concentrations will be ordered as clinically appropriate   Pharmacy will continue to monitor patient and adjust therapy as indicated    Thank you for the consult,  Sariah Nelson, LashaunD

## 2022-05-22 NOTE — PROGRESS NOTES
Bedside and verbal shift change report received from  Sheryl  (offgoing nurse). Report included the following information SBAR, Kardex, ED Summary, Intake/Output, MAR, Recent Results, Med Rec Status, Cardiac Rhythm ST, Alarm Parameters  and Quality Measures.      Dual skin assessment completed at bedside: WNL  (list pertinent skin assessment findings)    Dual verification of gtts completed (name of gtts verified): FENT/ INSULIN/ PROP/ K CL/ VASO/ LEVO/ ABX

## 2022-05-23 ENCOUNTER — APPOINTMENT (OUTPATIENT)
Dept: CT IMAGING | Age: 47
DRG: 870 | End: 2022-05-23
Payer: COMMERCIAL

## 2022-05-23 ENCOUNTER — APPOINTMENT (OUTPATIENT)
Dept: GENERAL RADIOLOGY | Age: 47
DRG: 870 | End: 2022-05-23
Payer: COMMERCIAL

## 2022-05-23 ENCOUNTER — APPOINTMENT (OUTPATIENT)
Dept: ULTRASOUND IMAGING | Age: 47
DRG: 870 | End: 2022-05-23
Payer: COMMERCIAL

## 2022-05-23 PROBLEM — J96.00 ACUTE RESPIRATORY FAILURE (HCC): Status: ACTIVE | Noted: 2022-05-21

## 2022-05-23 LAB
ANION GAP SERPL CALC-SCNC: 10 MMOL/L (ref 7–16)
ANION GAP SERPL CALC-SCNC: 11 MMOL/L (ref 7–16)
ANION GAP SERPL CALC-SCNC: 13 MMOL/L (ref 7–16)
ANION GAP SERPL CALC-SCNC: 14 MMOL/L (ref 7–16)
ARTERIAL PATENCY WRIST A: POSITIVE
ARTERIAL PATENCY WRIST A: POSITIVE
BASE DEFICIT BLD-SCNC: 11.5 MMOL/L
BASE DEFICIT BLD-SCNC: 13 MMOL/L
BDY SITE: ABNORMAL
BDY SITE: ABNORMAL
BUN SERPL-MCNC: 81 MG/DL (ref 6–23)
BUN SERPL-MCNC: 85 MG/DL (ref 6–23)
BUN SERPL-MCNC: 86 MG/DL (ref 6–23)
BUN SERPL-MCNC: 86 MG/DL (ref 6–23)
CALCIUM SERPL-MCNC: 7.7 MG/DL (ref 8.3–10.4)
CALCIUM SERPL-MCNC: 8 MG/DL (ref 8.3–10.4)
CALCIUM SERPL-MCNC: 8.1 MG/DL (ref 8.3–10.4)
CALCIUM SERPL-MCNC: 8.2 MG/DL (ref 8.3–10.4)
CHLORIDE SERPL-SCNC: 118 MMOL/L (ref 98–107)
CHLORIDE SERPL-SCNC: 121 MMOL/L (ref 98–107)
CHLORIDE SERPL-SCNC: 123 MMOL/L (ref 98–107)
CHLORIDE SERPL-SCNC: 124 MMOL/L (ref 98–107)
CO2 SERPL-SCNC: 13 MMOL/L (ref 21–32)
CO2 SERPL-SCNC: 15 MMOL/L (ref 21–32)
CREAT SERPL-MCNC: 8 MG/DL (ref 0.6–1)
CREAT SERPL-MCNC: 8.1 MG/DL (ref 0.6–1)
GAS FLOW.O2 O2 DELIVERY SYS: ABNORMAL L/MIN
GAS FLOW.O2 O2 DELIVERY SYS: ABNORMAL L/MIN
GLUCOSE BLD STRIP.AUTO-MCNC: 116 MG/DL (ref 65–100)
GLUCOSE BLD STRIP.AUTO-MCNC: 119 MG/DL (ref 65–100)
GLUCOSE BLD STRIP.AUTO-MCNC: 159 MG/DL (ref 65–100)
GLUCOSE BLD STRIP.AUTO-MCNC: 276 MG/DL (ref 65–100)
GLUCOSE BLD STRIP.AUTO-MCNC: 302 MG/DL (ref 65–100)
GLUCOSE BLD STRIP.AUTO-MCNC: 367 MG/DL (ref 65–100)
GLUCOSE BLD STRIP.AUTO-MCNC: 398 MG/DL (ref 65–100)
GLUCOSE BLD STRIP.AUTO-MCNC: 58 MG/DL (ref 65–100)
GLUCOSE BLD STRIP.AUTO-MCNC: 68 MG/DL (ref 65–100)
GLUCOSE BLD STRIP.AUTO-MCNC: 77 MG/DL (ref 65–100)
GLUCOSE BLD STRIP.AUTO-MCNC: 90 MG/DL (ref 65–100)
GLUCOSE BLD STRIP.AUTO-MCNC: 93 MG/DL (ref 65–100)
GLUCOSE BLD STRIP.AUTO-MCNC: >600 MG/DL (ref 65–100)
GLUCOSE SERPL-MCNC: 227 MG/DL (ref 65–100)
GLUCOSE SERPL-MCNC: 340 MG/DL (ref 65–100)
GLUCOSE SERPL-MCNC: 460 MG/DL (ref 65–100)
GLUCOSE SERPL-MCNC: 67 MG/DL (ref 65–100)
HAV IGM SER QL: NONREACTIVE
HBV CORE IGM SER QL: NONREACTIVE
HBV SURFACE AG SER QL: NONREACTIVE
HBV SURFACE AG SER QL: NONREACTIVE
HCO3 BLD-SCNC: 12.9 MMOL/L (ref 22–26)
HCO3 BLD-SCNC: 13.5 MMOL/L (ref 22–26)
HCV AB SER QL: NONREACTIVE
LACTATE SERPL-SCNC: 1.6 MMOL/L (ref 0.4–2)
LACTATE SERPL-SCNC: 1.6 MMOL/L (ref 0.4–2)
LACTATE SERPL-SCNC: 1.8 MMOL/L (ref 0.4–2)
LACTATE SERPL-SCNC: 4 MMOL/L (ref 0.4–2)
LACTATE SERPL-SCNC: 5.2 MMOL/L (ref 0.4–2)
MAGNESIUM SERPL-MCNC: 1.6 MG/DL (ref 1.8–2.4)
MAGNESIUM SERPL-MCNC: 1.7 MG/DL (ref 1.8–2.4)
O2/TOTAL GAS SETTING VFR VENT: 30 %
O2/TOTAL GAS SETTING VFR VENT: 30 %
PCO2 BLD: 27.1 MMHG (ref 35–45)
PCO2 BLD: 29 MMHG (ref 35–45)
PEEP RESPIRATORY: 8 CMH2O
PEEP RESPIRATORY: 8 CMH2O
PH BLD: 7.26 [PH] (ref 7.35–7.45)
PH BLD: 7.31 [PH] (ref 7.35–7.45)
PHOSPHATE SERPL-MCNC: 1.9 MG/DL (ref 2.5–4.5)
PHOSPHATE SERPL-MCNC: 2.1 MG/DL (ref 2.5–4.5)
PHOSPHATE SERPL-MCNC: 2.5 MG/DL (ref 2.5–4.5)
PHOSPHATE SERPL-MCNC: 2.5 MG/DL (ref 2.5–4.5)
PO2 BLD: 111 MMHG (ref 75–100)
PO2 BLD: 116 MMHG (ref 75–100)
POTASSIUM SERPL-SCNC: 3.7 MMOL/L (ref 3.5–5.1)
POTASSIUM SERPL-SCNC: 4.7 MMOL/L (ref 3.5–5.1)
POTASSIUM SERPL-SCNC: 5.4 MMOL/L (ref 3.5–5.1)
POTASSIUM SERPL-SCNC: 5.9 MMOL/L (ref 3.5–5.1)
SAO2 % BLD: 97.6 % (ref 95–98)
SAO2 % BLD: 98.2 % (ref 95–98)
SERVICE CMNT-IMP: ABNORMAL
SERVICE CMNT-IMP: NORMAL
SODIUM SERPL-SCNC: 142 MMOL/L (ref 136–145)
SODIUM SERPL-SCNC: 146 MMOL/L (ref 136–145)
SODIUM SERPL-SCNC: 151 MMOL/L (ref 136–145)
SODIUM SERPL-SCNC: 153 MMOL/L (ref 136–145)
SPECIMEN TYPE: ABNORMAL
SPECIMEN TYPE: ABNORMAL
VANCOMYCIN SERPL-MCNC: 31.6 UG/ML
VENTILATION MODE VENT: ABNORMAL
VENTILATION MODE VENT: ABNORMAL
VT SETTING VENT: 460 ML
VT SETTING VENT: 460 ML

## 2022-05-23 PROCEDURE — 83735 ASSAY OF MAGNESIUM: CPT

## 2022-05-23 PROCEDURE — 6370000000 HC RX 637 (ALT 250 FOR IP): Performed by: INTERNAL MEDICINE

## 2022-05-23 PROCEDURE — A4216 STERILE WATER/SALINE, 10 ML: HCPCS | Performed by: INTERNAL MEDICINE

## 2022-05-23 PROCEDURE — 36600 WITHDRAWAL OF ARTERIAL BLOOD: CPT

## 2022-05-23 PROCEDURE — 2580000003 HC RX 258: Performed by: NURSE PRACTITIONER

## 2022-05-23 PROCEDURE — 80202 ASSAY OF VANCOMYCIN: CPT

## 2022-05-23 PROCEDURE — 2000000000 HC ICU R&B

## 2022-05-23 PROCEDURE — 82803 BLOOD GASES ANY COMBINATION: CPT

## 2022-05-23 PROCEDURE — C1713 ANCHOR/SCREW BN/BN,TIS/BN: HCPCS

## 2022-05-23 PROCEDURE — 99222 1ST HOSP IP/OBS MODERATE 55: CPT | Performed by: INTERNAL MEDICINE

## 2022-05-23 PROCEDURE — 71045 X-RAY EXAM CHEST 1 VIEW: CPT

## 2022-05-23 PROCEDURE — C1894 INTRO/SHEATH, NON-LASER: HCPCS

## 2022-05-23 PROCEDURE — 87040 BLOOD CULTURE FOR BACTERIA: CPT

## 2022-05-23 PROCEDURE — 36415 COLL VENOUS BLD VENIPUNCTURE: CPT

## 2022-05-23 PROCEDURE — 2500000003 HC RX 250 WO HCPCS: Performed by: INTERNAL MEDICINE

## 2022-05-23 PROCEDURE — 6360000002 HC RX W HCPCS

## 2022-05-23 PROCEDURE — 83605 ASSAY OF LACTIC ACID: CPT

## 2022-05-23 PROCEDURE — 36556 INSERT NON-TUNNEL CV CATH: CPT | Performed by: INTERNAL MEDICINE

## 2022-05-23 PROCEDURE — 84100 ASSAY OF PHOSPHORUS: CPT

## 2022-05-23 PROCEDURE — 94003 VENT MGMT INPAT SUBQ DAY: CPT

## 2022-05-23 PROCEDURE — 82962 GLUCOSE BLOOD TEST: CPT

## 2022-05-23 PROCEDURE — 36620 INSERTION CATHETER ARTERY: CPT | Performed by: INTERNAL MEDICINE

## 2022-05-23 PROCEDURE — 80074 ACUTE HEPATITIS PANEL: CPT

## 2022-05-23 PROCEDURE — 99233 SBSQ HOSP IP/OBS HIGH 50: CPT | Performed by: INTERNAL MEDICINE

## 2022-05-23 PROCEDURE — 76770 US EXAM ABDO BACK WALL COMP: CPT

## 2022-05-23 PROCEDURE — 70450 CT HEAD/BRAIN W/O DYE: CPT

## 2022-05-23 PROCEDURE — 36620 INSERTION CATHETER ARTERY: CPT

## 2022-05-23 PROCEDURE — 80048 BASIC METABOLIC PNL TOTAL CA: CPT

## 2022-05-23 PROCEDURE — 03HY32Z INSERTION OF MONITORING DEVICE INTO UPPER ARTERY, PERCUTANEOUS APPROACH: ICD-10-PCS | Performed by: INTERNAL MEDICINE

## 2022-05-23 PROCEDURE — 2580000003 HC RX 258: Performed by: INTERNAL MEDICINE

## 2022-05-23 PROCEDURE — 37799 UNLISTED PX VASCULAR SURGERY: CPT

## 2022-05-23 PROCEDURE — 87340 HEPATITIS B SURFACE AG IA: CPT

## 2022-05-23 PROCEDURE — 36556 INSERT NON-TUNNEL CV CATH: CPT

## 2022-05-23 PROCEDURE — 6360000002 HC RX W HCPCS: Performed by: INTERNAL MEDICINE

## 2022-05-23 RX ORDER — GLUCAGON 1 MG/ML
1 KIT INJECTION PRN
Status: DISCONTINUED | OUTPATIENT
Start: 2022-05-23 | End: 2022-06-02 | Stop reason: HOSPADM

## 2022-05-23 RX ORDER — DEXTROSE MONOHYDRATE 50 MG/ML
INJECTION, SOLUTION INTRAVENOUS CONTINUOUS
Status: DISCONTINUED | OUTPATIENT
Start: 2022-05-23 | End: 2022-05-24

## 2022-05-23 RX ORDER — INSULIN LISPRO 100 [IU]/ML
0-4 INJECTION, SOLUTION INTRAVENOUS; SUBCUTANEOUS EVERY 4 HOURS
Status: DISCONTINUED | OUTPATIENT
Start: 2022-05-23 | End: 2022-05-23

## 2022-05-23 RX ORDER — LORAZEPAM 2 MG/ML
INJECTION INTRAMUSCULAR
Status: COMPLETED
Start: 2022-05-23 | End: 2022-05-23

## 2022-05-23 RX ORDER — HEPARIN SODIUM 1000 [USP'U]/ML
1000 INJECTION, SOLUTION INTRAVENOUS; SUBCUTANEOUS ONCE
Status: DISCONTINUED | OUTPATIENT
Start: 2022-05-23 | End: 2022-05-24

## 2022-05-23 RX ORDER — INSULIN LISPRO 100 [IU]/ML
0-16 INJECTION, SOLUTION INTRAVENOUS; SUBCUTANEOUS EVERY 4 HOURS
Status: DISCONTINUED | OUTPATIENT
Start: 2022-05-23 | End: 2022-05-24

## 2022-05-23 RX ORDER — LORAZEPAM 2 MG/ML
2 INJECTION INTRAMUSCULAR ONCE
Status: COMPLETED | OUTPATIENT
Start: 2022-05-23 | End: 2022-05-23

## 2022-05-23 RX ADMIN — SODIUM ZIRCONIUM CYCLOSILICATE 10 G: 10 POWDER, FOR SUSPENSION ORAL at 16:09

## 2022-05-23 RX ADMIN — SODIUM CHLORIDE, PRESERVATIVE FREE 20 MG: 5 INJECTION INTRAVENOUS at 08:54

## 2022-05-23 RX ADMIN — INSULIN LISPRO 3 UNITS: 100 INJECTION, SOLUTION INTRAVENOUS; SUBCUTANEOUS at 13:13

## 2022-05-23 RX ADMIN — LORAZEPAM 2 MG: 2 INJECTION INTRAMUSCULAR at 03:59

## 2022-05-23 RX ADMIN — DEXTROSE MONOHYDRATE 17 ML: 100 INJECTION, SOLUTION INTRAVENOUS at 04:11

## 2022-05-23 RX ADMIN — HEPARIN SODIUM 5000 UNITS: 5000 INJECTION INTRAVENOUS; SUBCUTANEOUS at 22:28

## 2022-05-23 RX ADMIN — INSULIN LISPRO 2 UNITS: 100 INJECTION, SOLUTION INTRAVENOUS; SUBCUTANEOUS at 07:07

## 2022-05-23 RX ADMIN — SODIUM CHLORIDE, PRESERVATIVE FREE 10 ML: 5 INJECTION INTRAVENOUS at 08:55

## 2022-05-23 RX ADMIN — Medication 0.4 MCG/KG/HR: at 03:27

## 2022-05-23 RX ADMIN — POTASSIUM CHLORIDE, DEXTROSE MONOHYDRATE AND SODIUM CHLORIDE: 150; 5; 450 INJECTION, SOLUTION INTRAVENOUS at 06:07

## 2022-05-23 RX ADMIN — SODIUM CHLORIDE, PRESERVATIVE FREE 10 ML: 5 INJECTION INTRAVENOUS at 20:18

## 2022-05-23 RX ADMIN — PIPERACILLIN AND TAZOBACTAM 3375 MG: 3; .375 INJECTION, POWDER, LYOPHILIZED, FOR SOLUTION INTRAVENOUS at 06:06

## 2022-05-23 RX ADMIN — DEXTROSE MONOHYDRATE: 50 INJECTION, SOLUTION INTRAVENOUS at 19:09

## 2022-05-23 RX ADMIN — DEXTROSE MONOHYDRATE: 50 INJECTION, SOLUTION INTRAVENOUS at 10:19

## 2022-05-23 RX ADMIN — Medication 18 MCG/MIN: at 21:06

## 2022-05-23 RX ADMIN — Medication 18 MCG/MIN: at 09:20

## 2022-05-23 RX ADMIN — Medication 0.2 MCG/KG/HR: at 21:06

## 2022-05-23 RX ADMIN — PIPERACILLIN AND TAZOBACTAM 3375 MG: 3; .375 INJECTION, POWDER, LYOPHILIZED, FOR SOLUTION INTRAVENOUS at 17:42

## 2022-05-23 RX ADMIN — DEXTROSE MONOHYDRATE 125 ML: 100 INJECTION, SOLUTION INTRAVENOUS at 04:43

## 2022-05-23 RX ADMIN — Medication 60 MCG/HR: at 10:55

## 2022-05-23 RX ADMIN — LORAZEPAM 2 MG: 2 INJECTION INTRAMUSCULAR; INTRAVENOUS at 03:59

## 2022-05-23 RX ADMIN — INSULIN LISPRO 16 UNITS: 100 INJECTION, SOLUTION INTRAVENOUS; SUBCUTANEOUS at 22:15

## 2022-05-23 RX ADMIN — HEPARIN SODIUM 5000 UNITS: 5000 INJECTION INTRAVENOUS; SUBCUTANEOUS at 06:06

## 2022-05-23 RX ADMIN — HEPARIN SODIUM 15 ML: 5000 INJECTION INTRAVENOUS; SUBCUTANEOUS at 20:18

## 2022-05-23 RX ADMIN — HEPARIN SODIUM 15 ML: 5000 INJECTION INTRAVENOUS; SUBCUTANEOUS at 08:54

## 2022-05-23 RX ADMIN — INSULIN LISPRO 16 UNITS: 100 INJECTION, SOLUTION INTRAVENOUS; SUBCUTANEOUS at 17:40

## 2022-05-23 ASSESSMENT — PAIN SCALES - GENERAL
PAINLEVEL_OUTOF10: 0

## 2022-05-23 ASSESSMENT — PULMONARY FUNCTION TESTS
PIF_VALUE: 21
PIF_VALUE: 22
PIF_VALUE: 21
PIF_VALUE: 22
PIF_VALUE: 22

## 2022-05-23 NOTE — PROGRESS NOTES
05/23/22 0823   Patient Observation   Pulse 95   Resp 22   SpO2 100 %   Vent Information   Vent Mode PRVC   Vent Settings   FiO2  30 %   Resp Rate (Set) 22 bmp   Vt (Set, mL) 460 mL   PEEP/CPAP (cmH2O) 8   PEEP High (cm H2O) 15 cm H2O   PEEP Low (cm H2O) 5 cm H2O   Insp Time (sec) 0.91 sec   Insp Rise Time (%) 5 %   PIP Set (cm H2O) 50 cm H2O   Trigger Sensitivity Flow (L/min) 2 L/min   Insp Pressure Low (cm H2O) 5 cm H2O   Insp Pressure High (cm H2O) 50 cm H2O   Vent Patient Data (Readings)   Vt Exhaled 457 mL   Rate Measured 22 br/min   Minute Ventilation (l/min) 10 l/min   PEEP Intrinsic (cm H2O) 0 cm H2O   Vent Alarm Settings   High Pressure  50 cmH2O   Low Pressure 2 cmH2O   Low Minute Volume Alarm 2 L/min   RR High 50 br/min   RR Low  5   Ambu Bag With Mask At Bedside Yes   ETT (adult)   Placement Date/Time: 05/21/22 1635   Airway Type: Cuffed  Airway Tube Size: 7.5 mm  Location: Oral  Insertion attempts: 1  Secured At: 24 cm  Measured From: Lips   Secured At 24 cm   Measured From Lips   Secured By Commercial tube nelson   Cuff Pressure 32 cm H2O   Respiratory   Respiratory (WDL) X

## 2022-05-23 NOTE — PROGRESS NOTES
Bedside and verbal shift change report received from  [facility] (offgoing nurse). Report included the following information SBAR, Kardex, ED Summary, Intake/Output, MAR, Recent Results, Med Rec Status, Cardiac Rhythm ST/NSR and Alarm Parameters . Dual skin assessment completed at bedside:  WDL- Allevyn dressing C/D/I, Prevalon boots in place (list pertinent skin assessment findings)    Dual verification of gtts completed (name of gtts verified): Fent/Insulin/Precedex/Levo

## 2022-05-23 NOTE — PROGRESS NOTES
Ventilator check complete; patient has a #7.5 ET tube secured at the 24 at the lip. Patient IS sedated. Patient IS NOT able to follow commands. Breath sounds are diminished. Trachea is midline, NEG for subcutaneous air, and chest excursion is symmetric. Patient is also NEG for cyanosis and is NEG for pitting edema. All alarms are set and audible. Resuscitation bag IS at the head of the bed.       Ventilator Settings  Mode FIO2 Rate Tidal Volume Pressure PEEP I:E Ratio     PRVC  30 22   460      8 1:2      Peak airway pressure:  21   Minute ventilation:   10.4        [place]ow[place] Look, RCP

## 2022-05-23 NOTE — PROGRESS NOTES
VANCO DAILY FOLLOW UP RENAL INSUFFICIENCY PATIENT   [address] [facility] Pharmacokinetic Monitoring Service - Vancomycin    Consulting Provider: Dr. Mila Smith  Indication: aspiration PNA  Target Concentration: Random level ? 15 mg/L  Day of Therapy: 3  Additional Antimicrobials: pip/tazo    Pertinent Laboratory Values: Wt Readings from Last 1 Encounters:   05/22/22 190 lb (86.2 kg)     Temp Readings from Last 1 Encounters:   05/23/22 97.6 °F (36.4 °C) (Oral)     Recent Labs     05/21/22  1600 05/21/22  1942 05/22/22  0306 05/22/22  0306 05/22/22  0631 05/22/22  1028 05/22/22  2315 05/23/22  0401 05/23/22  0917   *   < > 113*   < > 106*   < > 90* 85* 86*   WBC 29.1*  --  28.1*  --  26.3*  --   --   --   --     < > = values in this interval not displayed.      No components found for: Yariel Beasley    Date:  Dose/Freq Admin Times Level/Time:   5/21 1750mg x 1  1717    5/22 5/23   Rd @ 7001 = 31.6                 Plan:  Concentration-guided dosing due to renal impairment  Random level is supra-therapeutic   No dose needed at this time  Repeat vancomycin concentrations will be ordered as clinically appropriate   Pharmacy will continue to monitor patient and adjust therapy as indicated    Thank you for the consult,  Lashaun IngramD

## 2022-05-23 NOTE — PROGRESS NOTES
Ventilator check complete; patient has a #7.5 ET tube secured at the 24 at the lip. Patient IS NOT sedated. Patient IS able to follow commands. Breath sounds are coarse. Trachea is midline, NEG for subcutaneous air, and chest excursion is symmetric. Patient is also NEG for cyanosis and is NEG for pitting edema. All alarms are set and audible. Resuscitation bag IS at the head of the bed.       Ventilator Settings  Mode FIO2 Rate Tidal Volume Pressure PEEP I:E Ratio     PRVC  40 24   460      8 1:2      Peak airway pressure:   20  Minute ventilation:   11.0      America Urbina RCP

## 2022-05-23 NOTE — PROCEDURES
Procedure:     US GUIDED 15 cm Mahurkar temporary dialysis catheter placement. Indication:     Acute oliguric renal failure    Chlorohexidine Skin Antiseptic: Yes  Hand Hygiene: Yes  Maximal Barrier Precautions: Yes  Optimal Catheter Site Selection: Yes  Sterile Ultrasound Technique: Yes    Summary:    Informed consent was obtained from the patient's family. Using the [employer] I-LOOK 25 ultrasound with the 5-10 MHz vascular probe transducer and sterile seldinger technique, the L IJ vein was identified and under direct real time visualization was cannulated. The dialysis catheter kit guide wire was then inserted and its position within the vein verified in long axis views on vascular probe US. The dialysis catheter was then inserted over the guide wire without complication after adequate dilation of the insertion site. There was no significant bleeding during the procedure and good flush and drawback was noted. The catheter was then affixed with supplied sutures via the proximal limiter with the insertion point affixed at the hub. A chest x-ray to confirm proper placement of dialysis catheter is pending. There were no immediate complications.     Giacomo Goodman MD

## 2022-05-23 NOTE — PROGRESS NOTES
[facility]    Follow-Up on: BLANCA on CKD stage IV    HPI: Pt seen and examined. Intubated and sedated    ROS:  Denies CP, SOB.     Current Facility-Administered Medications   Medication Dose Route Frequency    dextrose bolus 10% 125 mL  125 mL IntraVENous PRN    Or    dextrose bolus 10% 250 mL  250 mL IntraVENous PRN    glucagon injection 1 mg  1 mg IntraMUSCular PRN    insulin lispro (HUMALOG) injection vial 0-4 Units  0-4 Units SubCUTAneous Q4H    dextrose 5 % solution   IntraVENous Continuous    dexmedetomidine (PRECEDEX) 400 mcg in sodium chloride 0.9 % 100 mL infusion  0.1-1.5 mcg/kg/hr IntraVENous Continuous    medicated lip ointment (BLISTEX)   Topical PRN    propofol injection  5-50 mcg/kg/min IntraVENous Continuous    sodium zirconium cyclosilicate (LOKELMA) oral suspension 10 g  10 g Oral Once    sodium chloride flush 0.9 % injection 5-40 mL  5-40 mL IntraVENous 2 times per day    sodium chloride flush 0.9 % injection 5-40 mL  5-40 mL IntraVENous PRN    0.9 % sodium chloride infusion   IntraVENous PRN    heparin (porcine) injection 5,000 Units  5,000 Units SubCUTAneous q8h    ondansetron (ZOFRAN-ODT) disintegrating tablet 4 mg  4 mg Oral Q8H PRN    Or    ondansetron (ZOFRAN) injection 4 mg  4 mg IntraVENous Q6H PRN    polyethylene glycol (GLYCOLAX) packet 17 g  17 g Oral Daily PRN    acetaminophen (TYLENOL) tablet 650 mg  650 mg Oral Q6H PRN    Or    acetaminophen (TYLENOL) suppository 650 mg  650 mg Rectal Q6H PRN    norepinephrine (LEVOPHED) 16 mg in sodium chloride 0.9 % 250 mL infusion  2-100 mcg/min IntraVENous Continuous    dextrose bolus 10% 250 mL  250 mL IntraVENous PRN    potassium chloride 10 mEq/100 mL IVPB (Peripheral Line)  10 mEq IntraVENous PRN    magnesium sulfate 1000 mg in dextrose 5% 100 mL IVPB  1,000 mg IntraVENous PRN    sodium phosphate 10 mmol in sodium chloride 0.9 % 250 mL IVPB  10 mmol IntraVENous PRN    Or    sodium phosphate 15 mmol in dextrose 5 % 250 mL IVPB  15 mmol IntraVENous PRN    Or    sodium phosphate 20 mmol in dextrose 5 % 500 mL IVPB  20 mmol IntraVENous PRN    dextrose 5 % and 0.45 % sodium chloride infusion   IntraVENous Continuous PRN    sodium chloride flush 0.9 % injection 5-40 mL  5-40 mL IntraVENous 2 times per day    sodium chloride flush 0.9 % injection 5-40 mL  5-40 mL IntraVENous PRN    0.9 % sodium chloride infusion   IntraVENous PRN    piperacillin-tazobactam (ZOSYN) 3,375 mg in sodium chloride 0.9 % 50 mL IVPB (mini-bag)  3,375 mg IntraVENous Q12H    chlorhexidine (PERIDEX) 0.12 % solution 15 mL  15 mL Mouth/Throat BID    famotidine (PEPCID) 20 mg in sodium chloride (PF) 10 mL injection  20 mg IntraVENous Daily    fentaNYL (SUBLIMAZE) 1,000 mcg in sodium chloride 0.9% 100 mL infusion   mcg/hr IntraVENous Continuous    And    fentaNYL bolus from bag  50 mcg IntraVENous Q30 Min PRN    vancomycin (VANCOCIN) intermittent dosing (placeholder)   Other RX Placeholder    vasopressin (VASOSTRICT) 20 units in dextrose 5% 100 mL infusion  0.01-0.03 Units/min IntraVENous Continuous       Exam:  Vitals:    05/23/22 0845 05/23/22 0900 05/23/22 0945 05/23/22 1000   BP: 102/68 101/68 (!) 86/57 89/60   Pulse: 94 93 92 91   Resp: 22 22 22 22   Temp:       TempSrc:       SpO2: 100% 100% 100% 100%   Weight:       Height:             Intake/Output Summary (Last 24 hours) at 5/23/2022 1103  Last data filed at 5/23/2022 0475  Gross per 24 hour   Intake 6105.06 ml   Output 600 ml   Net 5505.06 ml     PE:  GEN - in no distress  CV - regular, no murmur, no rub  Lung - clear bilaterally  Abd - soft, nontender  Ext - no edema    Labs  Recent Labs     05/21/22  1600 05/22/22  0306 05/22/22  0631   WBC 29.1* 28.1* 26.3*   HGB 10.1* 10.0* 10.0*   HCT 43.0 32.4* 31.9*   * 441 433     Recent Labs     05/22/22  2315 05/23/22  0401 05/23/22  0917   * 153* 151*   K 3.6 3.7 4.7   * 124* 123*   CO2 14* 15* 15*   BUN 90* 85* 86*   MG 1. 6* 1.7* 1.6*   PHOS 2.1* 1.9* 2.1*     No results for input(s): PH, PCO2, PO2, PCO2 in the last 72 hours. Problem List:  Patient Active Problem List    Diagnosis Date Noted    Cardiac arrest ([facility] 05/21/2022    Diabetic ketoacidosis with coma associated with type 1 diabetes mellitus ([facility] 05/21/2022    Septic shock ([address] 05/21/2022    Pneumonia, bacterial 05/21/2022    Acute respiratory failure with hypoxia ([address] 05/21/2022    Benign hypertension with CKD (chronic kidney disease) stage III ([address] 01/04/2022    Acute kidney injury superimposed on chronic kidney disease ([facility] 06/09/2021    IDDM (insulin dependent diabetes mellitus) 01/24/2012    BLANCA (acute kidney injury) ([facility] [address] [facility] 04/21/2022    Ureteric stone 04/19/2022    Urinary tract infection without hematuria 04/19/2022    Hydronephrosis of right kidney 04/19/2022    Malodorous urine 11/16/2021    H/O gastroesophageal reflux (GERD) 06/19/2020    Encounter for annual routine gynecological examination 11/27/2018    Encounter to discuss test results 08/22/2018    Diabetes mellitus type 1 ([facility] 08/22/2018    Type 1 diabetes mellitus with stage 3 chronic kidney disease ([facility] 08/16/2018    Noncompliance with medication regimen 08/16/2018    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    Depression with anxiety 07/11/2017    Ulcer, skin, chronic ([address] 03/13/2017    CKD (chronic kidney disease), stage IV ([address] 03/13/2017    Neuropathy, peripheral, idiopathic 03/13/2017    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 Type I DM  2. DKA BS>2000 on admit  3. BLANCA on CKD stage IV Now oliguric. Pt will need HD/SED in next 24 hours. I have spoken to both of her parents and explained dialysis to them and all questions answered. Case d/w Dr. Archana Abreu who has graciously agreed to place a temp line and I will initiate dialysis within the next 24 hours, then prn.

## 2022-05-23 NOTE — PROGRESS NOTES
[facility] Critical Care Note[de-identified] 5/23/2022  Derick Solomon  Admission Date: 5/21/2022     Length of Stay: 2 days    Background: 48 y.o. y/o female with unresponsiveness.     46F with htn, hld, dm, ckd3, had recent admission for right sided ureteral obstruction s/p stent placement presents with unresponsiveness.  The patient had not been heard from the last several days. Kristi Jones was found unresponsive in her home.  Brought in by EMS and was found to have numerous lab abnormalities. Jacquelyn Sy was found to be in DKA with a serum glucose of greater than 2000.  She was in acute renal failure with a creatinine of 9 and a potassium of 6.7.  She was acidotic with a pH of 6.8.  The patient was intubated on admission.  She suffered a brief cardiac arrest.  She was started on IV hydration, insulin drip, and broad-spectrum antibiotics.  CT of the head was negative. Nephrology is following for renal failure. Notable PMH:    CKD (chronic kidney disease) stage 3, GFR 30-59 ml/min ([facility]), Gastroenteritis, acute, IDDM (insulin dependent diabetes mellitus), Intractable nausea and vomiting, Irregular menses, Kidney stone, Neuropathy, peripheral, idiopathic, Retinopathy, bilateral, Trichomoniasis, Ulcer, skin, chronic ([address] and Vaginal burning. 24 Hour events:    Had an episode of bradycardia with HR in the 50s but MAP dropped to the 30s. Has been less responsive so had head CT which was negative. Weaning off Precedex and Fentanyl. Still on Levophed. + NG with dark brown drainage. WBC up further.        ROS: unable to obtain    Lines: (insertion date)   ETT: (5/21)  Avalos: (5/21)  OGT: (5/21)  Central line: (5/21)  Arterial Line (NA)  PIV (5/21)    Drips: current dose (range)  Dose (mcg/kg/min) Propofol : 0 mcg/kg/min  Dose (mcg/kg/hr) Dexmedetomidine: 0.4 mcg/kg/hr  Dose (mcg/hr) Fentanyl: 60 mcg/hr  Dose (mcg/min) Norepinephrine: 21 mcg/min (MAP=78)  Dose (units/hr) Vasopressin: 0 Units/hr  Dose (units/hr) Insulin : 0 Units/hr     Pertinent Exam:         Blood pressure 109/71, pulse 99, temperature 99.3 °F (37.4 °C), temperature source Oral, resp. rate 22, height 5' 6\" (1.676 m), weight 190 lb (86.2 kg), SpO2 100 %. Intake/Output Summary (Last 24 hours) at 5/23/2022 0812  Last data filed at 5/23/2022 9963  Gross per 24 hour   Intake 6115.06 ml   Output 600 ml   Net 5515.06 ml     Constitutional:  intubated and mechanically ventilated. EENMT:  Sclera clear, pupils equal, orally intubated  Respiratory: clear bilaterally from anterior. Decreased on the left. Cardiovascular:  RRR - sinus per telemetry  Gastrointestinal:  soft with no evidence tenderness; positive bowel sounds but hypoactive. OG to wall suction  Musculoskeletal:  warm (except cool feet),  1+ generalized extremity edema  Skin:  no jaundice or ecchymosis  Neurologic: sedated but tries to open eyes with stimulation. Moved toes some on left foot  Psychiatric: cannot assess    CXR: pending    Head CT:   1. No CT evidence of acute intracranial abnormality. 2. No significant change compared to prior exam.       Recent Labs     05/21/22  1600 05/22/22  0306 05/22/22  0631   WBC 29.1* 28.1* 26.3*   HGB 10.1* 10.0* 10.0*   HCT 43.0 32.4* 31.9*   * 441 433     Recent Labs     05/21/22  1600 05/21/22  1942 05/22/22  1832 05/22/22  2315 05/23/22  0401   *   < > 152* 153* 153*   K 6.7*   < > 3.6 3.6 3.7   CL 89*   < > 123* 124* 124*   CO2 5*   < > 16* 14* 15*   *   < > 93* 90* 85*   MG  --    < > 1.8 1.6* 1.7*   PHOS  --    < > 2.7 2.1* 1.9*   BILITOT 0.4  --   --   --   --    AST 14*  --   --   --   --    ALT 11*  --   --   --   --    ALKPHOS 179*  --   --   --   --     < > = values in this interval not displayed.      ECHO: 05/21/22    TRANSTHORACIC ECHOCARDIOGRAM (TTE) COMPLETE (CONTRAST/BUBBLE/3D PRN) 05/22/2022  1:30 PM (Final)    Interpretation Summary    Left Ventricle: Hyperdynamic left ventricular systolic function with a visually estimated EF of 70 - 75%. Left ventricle size is normal. Mildly increased wall thickness. Normal diastolic function.   Aortic Valve: Tricuspid valve. Mild sclerosis of the aortic valve cusp. The AV is incompletely interrogated though the peak velocity and mean gradient is suggestive of moderate stenosis of the aortic valve. AV mean gradient is 23 mmHg. AV peak velocity is 3.2 m/s. Signed by: Mayte De Leon MD on 5/22/2022  1:30 PM    Microbiology:   Urine culture negative,   Blood cultures pending    Ventilator Settings:  Ideal body weight: 59.3 kg (130 lb 11.7 oz)  Adjusted ideal body weight: 70.1 kg (154 lb 7 oz)  Mode FIO2 Rate Tidal Volume Pressure PEEP   PRVC 30% 24  460     8   Peak airway pressure:         Plateau Pressure: Plateau Pressure (cm H2O): 18 cm H2O   Minute ventilation:    ABG:   ph 7.26/PO2 111/CO2 29/base excess 13    Assessment and Plan:  (Medical Decision Making)   Impression: 48 y.o. female with found unresponsive at home. Admitted with BS > 2000 - corrected with insulin infusion. Acute on chronic renal failure. Nephrology following. Had recent ureteral stent placed. Required intubation on admission for airway maintenance. + septic shock requiring pressor support. On abx for suspected aspiration event. NEURO:   Sedation: On Precedex but weaning off - episode bradycardia overnight and now with decreased LOC. Head CT negative  Analgesia: Fentanyl infusion - trying to wean secondary to decreased LOC  Paralytics: NA  CV:   Volume Status: + 5.5 liters over past 24 hours and + 7.3 liters overall. IV fluids per nephrology  Septic shock: on Levo for support but weaning per nursing. IV fluids per nephrology. Urine culture negative. Blood cultures pending. Day 3 Vanc and Zosyn. Bradycardia: episode of - will stop Precedex and reassess. Engage cardiology if needed  PULM:   Acute hypoxemic/hypercapneic respiratory failure:  Day 3 intubation. Low FIO2 needs. Will check CXR.  On abx for ? Aspiration pneumonia. RENAL  BLANCA:  Acute on chronic renal failure. Urine output 525 mls over the past 24 hours. Creatinine stable at 8.1  Lactic acidosis: NA  Electrolytes: K+ corrected. Supplementing phos and mg. Na up more - will change IV fluids. GI:   Nutrition: none at present - patient on pressor  ? GI bleed: NG to wall suction for now. 75 mls out over past 24 hours. Hgb stable. Check gastrocult  HEME:   Anemia: stable  Thrombocytopenia: NA  Anticoagulation: low dose heparin  ID:   Day 3 Vanc and Zosyn. Urine culture negative. Blood cultures pending. WBC up more. Has multiple lines and recent ureteral stent. ENDO:   DM: BS down to 67. Off insulin gtt. Add glucose to IV fluids if needed. Using SSI for now. hospitalist following   Skin: no decub, turns, preventive care  Prophy: Pepcid/low dose heparin      Full Code    The patient is critically ill with respiratory failure, circulatory failure and requires high complexity decision making for assessment and support including frequent ventilator adjustment , frequent evaluation and titration of therapies , application of advanced monitoring technologies and extensive interpretation of multiple databases   mins. Buster Jarvis, NP    I have spoken with and examined the patient. I agree with the above assessment and plan as documented. I contributed more than 50% of the clinical time to this encounter today. Gen: NAD, sedated, intubated  Lungs:  CTA B, no w/r/r  Heart:  RRR with no Murmur/Rubs/Gallops  Abd: soft, nt, nd, nabs  Ext: 2+ B LE edema    Patient will need HD catheter placed today per nephrology. In the meantime will collect blood and sputum cultures with leukocytosis. On vanc and zosyn. Continue. Renal US to eval patency of stents. Give D5 with hypernatremia and follow. Bradycardic episodes but on precedex. Will try to wean off and monitor. AMS but in the setting of hypotension.  Will likewise try to address likely underlying cause and monitor.      Leia Valverde MD

## 2022-05-23 NOTE — CARE COORDINATION
Chart reviewed and pt seen in ICU ,currently intubated/vent. 30% fio2, unresponsive per staff. Nephrology following for poss dialysis needs. Spoke with mother, Sandra Tavarez, via phone. Confirms demographics. Pt lives alone and independent of ADLS. No DME's for ambulation, or Hh/rehab services. Does have glucometer, but mother states, not sure how compliant. ACP completed, not , no children. Parents are legal NOK. No siblings. Aware CM to follow for any assist and d/c POC when stable per MD.        05/23/22 1521   Service Assessment   Patient Orientation Sedated;Unresponsive   History Provided By Other (see comment)  (Mother, Sandra Tavarez)   Primary [address] [facility]  Parent   Patient's Healthcare Decision Maker is: Legal Next of Kin   PCP Verified by CM Yes   Prior Functional Level Independent in ADLs/IADLs   Can patient return to prior living arrangement Unknown at present   Financial Resources Other (Comment)  (BCBS)   Social/Functional History   Lives With Alone   Type of [address] Responsibilities Yes   Ambulation Assistance Independent   Active  Yes   Mode of Transportation Car   Condition of Participation: Discharge Planning   Freedom of Choice list was provided with basic dialogue that supports the patient's individualized plan of care/goals, treatment preferences, and shares the quality data associated with the providers?   Yes

## 2022-05-23 NOTE — PROGRESS NOTES
Bedside and verbal shift change report received from  Dasha [facility] (offgoing nurse). Report included the following information SBAR, Kardex, ED Summary, Procedure Summary, Intake/Output, MAR, Recent Results, Cardiac Rhythm SR and Alarm Parameters . Dual skin assessment completed at bedside:  WNL - allevyn foam pad c/d/i, heel boots in place (list pertinent skin assessment findings)    Dual verification of gtts completed (name of gtts verified): Fentanyl, precedex, and levophed gtts

## 2022-05-23 NOTE — ACP (ADVANCE CARE PLANNING)
Advance Care Planning     General Advance Care Planning (ACP) Conversation    Date of Conversation: 5/23/2022    Healthcare Decision Maker:    Primary Decision Maker: Milvia Sparks - Parent - [phone]    Primary Decision Maker: Anna Fox - Parent  Click here to complete Healthcare Decision Makers including selection of the Healthcare Decision Maker Relationship (ie \"Primary\"). Today we documented Decision Maker(s) consistent with Legal Next of Kin hierarchy. Content/Action Overview:  No LW/HCPOA. Pt not . No children. Parents are legal NOK unless document presented stating otherwise. Full code per MD order.      Length of Voluntary ACP Conversation in minutes:  <16 minutes (Non-Billable)    Danielle Pickens RN

## 2022-05-23 NOTE — CONSULTS
Patient: Roosevelt Gamino MRN: [id number]  SSN: [id number]    YOB: 1975  Age: 48 y.o. Sex: female       Date of Request: 5/23/2022  Date of Consult:  5/23/2022  Reason for Consult:  family support and education and goals of care  Requesting Physician: Dr. Kole Gill     Assessment/Plan:     Principal Diagnosis:     Altered Mental Status R41.82    Additional Diagnoses:   · Counseling, Encounter for Medical Advice  Z71.9  · Encounter for Palliative Care  Z51.5    Palliative Performance Scale (PPS):       Medical Decision Making:   Reviewed and summarized labs and imaging from admission. Pt intubated and unresponsive. Still on pressors with renal failure. Unclear neurological status at this time. I spoke with pt mother via phone. Updated on current condition and concerns. Answered questions regarding ongoing care. Mother is taking things one day at a time. She wishes to continue FULL code at this time. Will follow and continue goals discussions    Will discuss findings with members of the interdisciplinary team.      Thank you for this referral.         Subjective:     History obtained from:  Family and Chart    Chief Complaint: altered mental status  History of Present Illness:  46F with htn, hld, dm, ckd3, had recent admission for right sided ureteral obstruction s/p stent placement presents with unresponsiveness.  The patient had not been heard from the last several days. Roosevelt Rawls was found unresponsive in her home.  Brought in by EMS and was found to have numerous lab abnormalities. Yesenia Verde was found to be in DKA with a serum glucose of greater than 2000.  She was in acute renal failure with a creatinine of 9 and a potassium of 6.7.  She was acidotic with a pH of 6.8.  The patient was intubated on admission.  She suffered a brief cardiac arrest.  She was started on IV hydration, insulin drip, and broad-spectrum antibiotics.  CT of the head was negative.   Nephrology is following for renal failure    Advance Directive: No       Code Status:  Full Code            Health Care Power of : pt mother is decision maker    Past Medical History:   Diagnosis Date    Acute renal failure ([facility] [address] 2012    CKD (chronic kidney disease) stage 3, GFR 30-59 ml/min ([facility])     Gastroenteritis, acute 2012    IDDM (insulin dependent diabetes mellitus) 2012    Type 1 av -150 can tell Hypo below 70    Intractable nausea and vomiting 2014    Irregular menses     Kidney stone     Neuropathy, peripheral, idiopathic 3/13/2017    Retinopathy, bilateral 3/13/2017    Trichomoniasis 3/13/2017    Ulcer, skin, chronic ([facility] [address] 3/13/2017    Vaginal burning       Past Surgical History:   Procedure Laterality Date    AMPUTATION Left     5th Toe due to Diabetic Ulcer    AMPUTATION  12    gangrene    CYSTOSCOPY,INSERT URETERAL STENT  2022    HX OPEN REDUCTION INTERNAL FIXATION Right     ankle     Family History   Problem Relation Age of Onset    Prostate Cancer Father     Diabetes Paternal Grandmother     Colon Cancer Neg Hx     Uterine Cancer Neg Hx     Diabetes Father         DM Type II    Stroke Father     Diabetes Paternal Grandfather         DM Type II     Hypertension Maternal Grandmother     Ovarian Cancer Neg Hx     Breast Cancer Mother 76    Diabetes Maternal Grandmother         DM Type II      Social History     Tobacco Use    Smoking status: Former Smoker     Quit date: 2013     Years since quittin.5    Smokeless tobacco: Never Used   Substance Use Topics    Alcohol use: Yes     Prior to Admission medications    Medication Sig Start Date End Date Taking?  Authorizing Provider   atorvastatin (LIPITOR) 80 MG tablet Take 80 mg by mouth daily 21   Ar Automatic Reconciliation   brimonidine (ALPHAGAN P) 0.1 % SOLN 1 drop in left eye twice a day 21   Ar Automatic Reconciliation   ergocalciferol (ERGOCALCIFEROL) 1.25 MG (03473 UT) capsule Take 50,000 Units by mouth 4/7/21   Ar Automatic Reconciliation   gabapentin (NEURONTIN) 300 MG capsule Take 300 mg by mouth daily. 5/20/21   Ar Automatic Reconciliation   Insulin Degludec (TRESIBA FLEXTOUCH) 200 UNIT/ML SOPN Inject 30 Units into the skin daily 11/16/21   Ar Automatic Reconciliation   insulin lispro, 1 Unit Dial, (HUMALOG KWIKPEN) 100 UNIT/ML SOPN 6 TIDAC correction scale 1/50>150, max daily dose 50 units 11/16/21   Ar Automatic Reconciliation   ondansetron (ZOFRAN-ODT) 4 MG disintegrating tablet Take 4 mg by mouth every 8 hours as needed 3/21/22   Ar Automatic Reconciliation       No Known Allergies     Comprehensive review of systems unable to obtain due to mentation. Objective:     Visit Vitals  BP (!) 97/58   Pulse 90   Temp 97.6 °F (36.4 °C) (Oral)   Resp 22   Ht 5' 6\" (1.676 m)   Wt 190 lb (86.2 kg)   SpO2 100%   BMI 30.67 kg/m²        Physical Exam:    General:  Unresponsive on vent. Eyes:  Conjunctivae/corneas clear    Nose: Nares normal. Septum midline. Neck: Supple, symmetrical, trachea midline, no JVD   Lungs:   Coarse bilateral bs   Heart:  Regular rate and rhythm, no murmur    Abdomen:   Soft, non-tender, non-distended. Positive bowel sounds   Extremities: Normal, atraumatic, no cyanosis or edema   Skin: Skin color, texture, turgor normal. No rash or lesions.    Neurologic: Not following commands   Psych: Unresponsive       Assessment:     [unfilled]    Signed By: Bree Nash MD     May 23, 2022

## 2022-05-23 NOTE — PROGRESS NOTES
Multi-D Rounds/Checklist (leapfrog):  Lines: can any be removed?: No   DVT Prophylaxis: Yes   Vent: HOB elevated? Yes              CC/Kg?: 5             Vent Day? 3  Nutrition Ordered/appropriate: Yes   Can antibiotics or other drugs be stopped?: No   Consults needed: No   A: Is pain control adequate? Yes   B: Sedation break and SBT? Yes   C: Is sedation choice appropriate? Yes   D: Delirium/CAM-ICU? Unscreenable   E: Mobility goals/appropriateness? Yes   F: Family update and plan? Mother is primary contact and is being updated daily by primary attending and nursing staff.     Isabella Neri, APRN - CNP

## 2022-05-24 ENCOUNTER — APPOINTMENT (OUTPATIENT)
Dept: GENERAL RADIOLOGY | Age: 47
DRG: 870 | End: 2022-05-24
Payer: COMMERCIAL

## 2022-05-24 LAB
ALBUMIN SERPL-MCNC: 1.5 G/DL (ref 3.5–5)
ALBUMIN/GLOB SERPL: 0.4 {RATIO} (ref 1.2–3.5)
ALP SERPL-CCNC: 227 U/L (ref 50–136)
ALT SERPL-CCNC: 165 U/L (ref 12–65)
ANION GAP SERPL CALC-SCNC: 10 MMOL/L (ref 7–16)
ANION GAP SERPL CALC-SCNC: 12 MMOL/L (ref 7–16)
ANION GAP SERPL CALC-SCNC: 14 MMOL/L (ref 7–16)
ANION GAP SERPL CALC-SCNC: 14 MMOL/L (ref 7–16)
ANION GAP SERPL CALC-SCNC: 7 MMOL/L (ref 7–16)
AST SERPL-CCNC: 120 U/L (ref 15–37)
BACTERIA SPEC CULT: NORMAL
BASE DEFICIT BLD-SCNC: 15.2 MMOL/L
BASOPHILS # BLD: 0 K/UL (ref 0–0.2)
BASOPHILS NFR BLD: 0 % (ref 0–2)
BDY SITE: ABNORMAL
BILIRUB SERPL-MCNC: 0.3 MG/DL (ref 0.2–1.1)
BUN SERPL-MCNC: 24 MG/DL (ref 6–23)
BUN SERPL-MCNC: 27 MG/DL (ref 6–23)
BUN SERPL-MCNC: 58 MG/DL (ref 6–23)
BUN SERPL-MCNC: 79 MG/DL (ref 6–23)
BUN SERPL-MCNC: 82 MG/DL (ref 6–23)
CALCIUM SERPL-MCNC: 7.6 MG/DL (ref 8.3–10.4)
CALCIUM SERPL-MCNC: 8 MG/DL (ref 8.3–10.4)
CALCIUM SERPL-MCNC: 8.1 MG/DL (ref 8.3–10.4)
CHLORIDE SERPL-SCNC: 102 MMOL/L (ref 98–107)
CHLORIDE SERPL-SCNC: 103 MMOL/L (ref 98–107)
CHLORIDE SERPL-SCNC: 113 MMOL/L (ref 98–107)
CHLORIDE SERPL-SCNC: 116 MMOL/L (ref 98–107)
CHLORIDE SERPL-SCNC: 117 MMOL/L (ref 98–107)
CO2 SERPL-SCNC: 11 MMOL/L (ref 21–32)
CO2 SERPL-SCNC: 13 MMOL/L (ref 21–32)
CO2 SERPL-SCNC: 19 MMOL/L (ref 21–32)
CO2 SERPL-SCNC: 27 MMOL/L (ref 21–32)
CO2 SERPL-SCNC: 30 MMOL/L (ref 21–32)
CREAT SERPL-MCNC: 3.3 MG/DL (ref 0.6–1)
CREAT SERPL-MCNC: 3.4 MG/DL (ref 0.6–1)
CREAT SERPL-MCNC: 6 MG/DL (ref 0.6–1)
CREAT SERPL-MCNC: 8.2 MG/DL (ref 0.6–1)
CREAT SERPL-MCNC: 8.3 MG/DL (ref 0.6–1)
DIFFERENTIAL METHOD BLD: ABNORMAL
EOSINOPHIL # BLD: 0 K/UL (ref 0–0.8)
EOSINOPHIL NFR BLD: 0 % (ref 0.5–7.8)
ERYTHROCYTE [DISTWIDTH] IN BLOOD BY AUTOMATED COUNT: 15.8 % (ref 11.9–14.6)
GAS FLOW.O2 O2 DELIVERY SYS: ABNORMAL L/MIN
GLOBULIN SER CALC-MCNC: 3.8 G/DL (ref 2.3–3.5)
GLUCOSE BLD STRIP.AUTO-MCNC: 103 MG/DL (ref 65–100)
GLUCOSE BLD STRIP.AUTO-MCNC: 126 MG/DL (ref 65–100)
GLUCOSE BLD STRIP.AUTO-MCNC: 130 MG/DL (ref 65–100)
GLUCOSE BLD STRIP.AUTO-MCNC: 135 MG/DL (ref 65–100)
GLUCOSE BLD STRIP.AUTO-MCNC: 139 MG/DL (ref 65–100)
GLUCOSE BLD STRIP.AUTO-MCNC: 140 MG/DL (ref 65–100)
GLUCOSE BLD STRIP.AUTO-MCNC: 140 MG/DL (ref 65–100)
GLUCOSE BLD STRIP.AUTO-MCNC: 156 MG/DL (ref 65–100)
GLUCOSE BLD STRIP.AUTO-MCNC: 202 MG/DL (ref 65–100)
GLUCOSE BLD STRIP.AUTO-MCNC: 217 MG/DL (ref 65–100)
GLUCOSE BLD STRIP.AUTO-MCNC: 224 MG/DL (ref 65–100)
GLUCOSE BLD STRIP.AUTO-MCNC: 256 MG/DL (ref 65–100)
GLUCOSE BLD STRIP.AUTO-MCNC: 266 MG/DL (ref 65–100)
GLUCOSE BLD STRIP.AUTO-MCNC: 267 MG/DL (ref 65–100)
GLUCOSE BLD STRIP.AUTO-MCNC: 296 MG/DL (ref 65–100)
GLUCOSE BLD STRIP.AUTO-MCNC: 339 MG/DL (ref 65–100)
GLUCOSE BLD STRIP.AUTO-MCNC: 443 MG/DL (ref 65–100)
GLUCOSE BLD STRIP.AUTO-MCNC: 460 MG/DL (ref 65–100)
GLUCOSE SERPL-MCNC: 113 MG/DL (ref 65–100)
GLUCOSE SERPL-MCNC: 124 MG/DL (ref 65–100)
GLUCOSE SERPL-MCNC: 237 MG/DL (ref 65–100)
GLUCOSE SERPL-MCNC: 285 MG/DL (ref 65–100)
GLUCOSE SERPL-MCNC: 402 MG/DL (ref 65–100)
HCO3 BLD-SCNC: 10.4 MMOL/L (ref 22–26)
HCT VFR BLD AUTO: 29.4 % (ref 35.8–46.3)
HGB BLD-MCNC: 9.5 G/DL (ref 11.7–15.4)
IMM GRANULOCYTES # BLD AUTO: 0.4 K/UL (ref 0–0.5)
IMM GRANULOCYTES NFR BLD AUTO: 2 % (ref 0–5)
LACTATE SERPL-SCNC: 2.6 MMOL/L (ref 0.4–2)
LYMPHOCYTES # BLD: 1.5 K/UL (ref 0.5–4.6)
LYMPHOCYTES NFR BLD: 8 % (ref 13–44)
MAGNESIUM SERPL-MCNC: 1.5 MG/DL (ref 1.8–2.4)
MAGNESIUM SERPL-MCNC: 1.6 MG/DL (ref 1.8–2.4)
MCH RBC QN AUTO: 26.6 PG (ref 26.1–32.9)
MCHC RBC AUTO-ENTMCNC: 32.3 G/DL (ref 31.4–35)
MCV RBC AUTO: 82.4 FL (ref 79.6–97.8)
MONOCYTES # BLD: 1 K/UL (ref 0.1–1.3)
MONOCYTES NFR BLD: 5 % (ref 4–12)
NEUTS SEG # BLD: 16.1 K/UL (ref 1.7–8.2)
NEUTS SEG NFR BLD: 85 % (ref 43–78)
NRBC # BLD: 0.06 K/UL (ref 0–0.2)
O2/TOTAL GAS SETTING VFR VENT: 30 %
PCO2 BLD: 23.5 MMHG (ref 35–45)
PEEP RESPIRATORY: 8 CMH2O
PH BLD: 7.25 [PH] (ref 7.35–7.45)
PHOSPHATE SERPL-MCNC: 1.3 MG/DL (ref 2.5–4.5)
PHOSPHATE SERPL-MCNC: 1.7 MG/DL (ref 2.5–4.5)
PHOSPHATE SERPL-MCNC: 2.2 MG/DL (ref 2.5–4.5)
PHOSPHATE SERPL-MCNC: 2.4 MG/DL (ref 2.5–4.5)
PLATELET # BLD AUTO: 276 K/UL (ref 150–450)
PLATELET COMMENT: ADEQUATE
PMV BLD AUTO: 9.8 FL (ref 9.4–12.3)
PO2 BLD: 114 MMHG (ref 75–100)
POTASSIUM SERPL-SCNC: 3.6 MMOL/L (ref 3.5–5.1)
POTASSIUM SERPL-SCNC: 3.9 MMOL/L (ref 3.5–5.1)
POTASSIUM SERPL-SCNC: 4 MMOL/L (ref 3.5–5.1)
POTASSIUM SERPL-SCNC: 4.5 MMOL/L (ref 3.5–5.1)
POTASSIUM SERPL-SCNC: 4.6 MMOL/L (ref 3.5–5.1)
PROT SERPL-MCNC: 5.3 G/DL (ref 6.3–8.2)
RBC # BLD AUTO: 3.57 M/UL (ref 4.05–5.2)
RBC MORPH BLD: ABNORMAL
SAO2 % BLD: 97.8 % (ref 95–98)
SERVICE CMNT-IMP: ABNORMAL
SERVICE CMNT-IMP: NORMAL
SODIUM SERPL-SCNC: 139 MMOL/L (ref 136–145)
SODIUM SERPL-SCNC: 140 MMOL/L (ref 136–145)
SODIUM SERPL-SCNC: 141 MMOL/L (ref 136–145)
SODIUM SERPL-SCNC: 144 MMOL/L (ref 136–145)
SODIUM SERPL-SCNC: 144 MMOL/L (ref 136–145)
SPECIMEN TYPE: ABNORMAL
VENTILATION MODE VENT: ABNORMAL
VT SETTING VENT: 460 ML
WBC # BLD AUTO: 19 K/UL (ref 4.3–11.1)
WBC MORPH BLD: ABNORMAL

## 2022-05-24 PROCEDURE — 6370000000 HC RX 637 (ALT 250 FOR IP): Performed by: INTERNAL MEDICINE

## 2022-05-24 PROCEDURE — 37799 UNLISTED PX VASCULAR SURGERY: CPT

## 2022-05-24 PROCEDURE — 80053 COMPREHEN METABOLIC PANEL: CPT

## 2022-05-24 PROCEDURE — 99232 SBSQ HOSP IP/OBS MODERATE 35: CPT | Performed by: INTERNAL MEDICINE

## 2022-05-24 PROCEDURE — 99291 CRITICAL CARE FIRST HOUR: CPT | Performed by: INTERNAL MEDICINE

## 2022-05-24 PROCEDURE — 82803 BLOOD GASES ANY COMBINATION: CPT

## 2022-05-24 PROCEDURE — 2580000003 HC RX 258: Performed by: INTERNAL MEDICINE

## 2022-05-24 PROCEDURE — 2500000003 HC RX 250 WO HCPCS: Performed by: INTERNAL MEDICINE

## 2022-05-24 PROCEDURE — 83735 ASSAY OF MAGNESIUM: CPT

## 2022-05-24 PROCEDURE — 2000000000 HC ICU R&B

## 2022-05-24 PROCEDURE — 83605 ASSAY OF LACTIC ACID: CPT

## 2022-05-24 PROCEDURE — 90945 DIALYSIS ONE EVALUATION: CPT

## 2022-05-24 PROCEDURE — 36415 COLL VENOUS BLD VENIPUNCTURE: CPT

## 2022-05-24 PROCEDURE — 6360000002 HC RX W HCPCS: Performed by: INTERNAL MEDICINE

## 2022-05-24 PROCEDURE — 85025 COMPLETE CBC W/AUTO DIFF WBC: CPT

## 2022-05-24 PROCEDURE — 82962 GLUCOSE BLOOD TEST: CPT

## 2022-05-24 PROCEDURE — A4216 STERILE WATER/SALINE, 10 ML: HCPCS | Performed by: INTERNAL MEDICINE

## 2022-05-24 PROCEDURE — 71045 X-RAY EXAM CHEST 1 VIEW: CPT

## 2022-05-24 PROCEDURE — 94003 VENT MGMT INPAT SUBQ DAY: CPT

## 2022-05-24 PROCEDURE — 87040 BLOOD CULTURE FOR BACTERIA: CPT

## 2022-05-24 PROCEDURE — 80048 BASIC METABOLIC PNL TOTAL CA: CPT

## 2022-05-24 PROCEDURE — 84100 ASSAY OF PHOSPHORUS: CPT

## 2022-05-24 RX ORDER — DEXTROSE AND SODIUM CHLORIDE 5; .45 G/100ML; G/100ML
INJECTION, SOLUTION INTRAVENOUS CONTINUOUS PRN
Status: DISCONTINUED | OUTPATIENT
Start: 2022-05-24 | End: 2022-06-02 | Stop reason: HOSPADM

## 2022-05-24 RX ORDER — INSULIN GLARGINE 100 [IU]/ML
10 INJECTION, SOLUTION SUBCUTANEOUS ONCE
Status: COMPLETED | OUTPATIENT
Start: 2022-05-24 | End: 2022-05-24

## 2022-05-24 RX ORDER — GLUCAGON 1 MG/ML
1 KIT INJECTION PRN
Status: DISCONTINUED | OUTPATIENT
Start: 2022-05-24 | End: 2022-06-02 | Stop reason: HOSPADM

## 2022-05-24 RX ORDER — HEPARIN SODIUM 1000 [USP'U]/ML
5000 INJECTION, SOLUTION INTRAVENOUS; SUBCUTANEOUS
Status: COMPLETED | OUTPATIENT
Start: 2022-05-24 | End: 2022-05-24

## 2022-05-24 RX ORDER — HEPARIN SODIUM 1000 [USP'U]/ML
1000 INJECTION, SOLUTION INTRAVENOUS; SUBCUTANEOUS ONCE
Status: DISCONTINUED | OUTPATIENT
Start: 2022-05-24 | End: 2022-05-25 | Stop reason: SDUPTHER

## 2022-05-24 RX ORDER — INSULIN LISPRO 100 [IU]/ML
0-16 INJECTION, SOLUTION INTRAVENOUS; SUBCUTANEOUS EVERY 6 HOURS
Status: DISCONTINUED | OUTPATIENT
Start: 2022-05-25 | End: 2022-05-31

## 2022-05-24 RX ORDER — DEXTROSE AND SODIUM CHLORIDE 5; .45 G/100ML; G/100ML
INJECTION, SOLUTION INTRAVENOUS CONTINUOUS
Status: DISCONTINUED | OUTPATIENT
Start: 2022-05-24 | End: 2022-05-24

## 2022-05-24 RX ORDER — DEXTROSE MONOHYDRATE 50 MG/ML
100 INJECTION, SOLUTION INTRAVENOUS PRN
Status: DISCONTINUED | OUTPATIENT
Start: 2022-05-24 | End: 2022-06-02 | Stop reason: HOSPADM

## 2022-05-24 RX ORDER — SODIUM CHLORIDE 450 MG/100ML
INJECTION, SOLUTION INTRAVENOUS CONTINUOUS
Status: DISCONTINUED | OUTPATIENT
Start: 2022-05-24 | End: 2022-05-24

## 2022-05-24 RX ORDER — HEPARIN SODIUM 1000 [USP'U]/ML
1000 INJECTION, SOLUTION INTRAVENOUS; SUBCUTANEOUS ONCE
Status: DISCONTINUED | OUTPATIENT
Start: 2022-05-24 | End: 2022-05-24 | Stop reason: SDUPTHER

## 2022-05-24 RX ADMIN — SODIUM CHLORIDE: 450 INJECTION, SOLUTION INTRAVENOUS at 08:39

## 2022-05-24 RX ADMIN — Medication 50 MCG: at 12:29

## 2022-05-24 RX ADMIN — INSULIN LISPRO 16 UNITS: 100 INJECTION, SOLUTION INTRAVENOUS; SUBCUTANEOUS at 02:30

## 2022-05-24 RX ADMIN — Medication 50 MCG/HR: at 07:03

## 2022-05-24 RX ADMIN — PIPERACILLIN AND TAZOBACTAM 3375 MG: 3; .375 INJECTION, POWDER, LYOPHILIZED, FOR SOLUTION INTRAVENOUS at 06:19

## 2022-05-24 RX ADMIN — SODIUM CHLORIDE: 450 INJECTION, SOLUTION INTRAVENOUS at 04:04

## 2022-05-24 RX ADMIN — SODIUM CHLORIDE, PRESERVATIVE FREE 10 ML: 5 INJECTION INTRAVENOUS at 09:45

## 2022-05-24 RX ADMIN — SODIUM CHLORIDE, PRESERVATIVE FREE 10 ML: 5 INJECTION INTRAVENOUS at 09:15

## 2022-05-24 RX ADMIN — SODIUM CHLORIDE, PRESERVATIVE FREE 10 ML: 5 INJECTION INTRAVENOUS at 21:46

## 2022-05-24 RX ADMIN — INSULIN LISPRO 8 UNITS: 100 INJECTION, SOLUTION INTRAVENOUS; SUBCUTANEOUS at 23:03

## 2022-05-24 RX ADMIN — HEPARIN SODIUM 15 ML: 5000 INJECTION INTRAVENOUS; SUBCUTANEOUS at 21:07

## 2022-05-24 RX ADMIN — SODIUM CHLORIDE, PRESERVATIVE FREE 10 ML: 5 INJECTION INTRAVENOUS at 21:06

## 2022-05-24 RX ADMIN — SODIUM PHOSPHATE, MONOBASIC, MONOHYDRATE 10 MMOL: 276; 142 INJECTION, SOLUTION INTRAVENOUS at 07:19

## 2022-05-24 RX ADMIN — INSULIN GLARGINE 10 UNITS: 100 INJECTION, SOLUTION SUBCUTANEOUS at 21:06

## 2022-05-24 RX ADMIN — HEPARIN SODIUM 5000 UNITS: 1000 INJECTION INTRAVENOUS; SUBCUTANEOUS at 10:00

## 2022-05-24 RX ADMIN — PIPERACILLIN AND TAZOBACTAM 3375 MG: 3; .375 INJECTION, POWDER, LYOPHILIZED, FOR SOLUTION INTRAVENOUS at 17:47

## 2022-05-24 RX ADMIN — Medication 50 MCG: at 11:45

## 2022-05-24 RX ADMIN — DEXTROSE AND SODIUM CHLORIDE: 5; 450 INJECTION, SOLUTION INTRAVENOUS at 10:05

## 2022-05-24 RX ADMIN — HEPARIN SODIUM 15 ML: 5000 INJECTION INTRAVENOUS; SUBCUTANEOUS at 09:14

## 2022-05-24 RX ADMIN — SODIUM CHLORIDE 8 UNITS/HR: 9 INJECTION, SOLUTION INTRAVENOUS at 03:59

## 2022-05-24 RX ADMIN — HEPARIN SODIUM 5000 UNITS: 5000 INJECTION INTRAVENOUS; SUBCUTANEOUS at 06:19

## 2022-05-24 RX ADMIN — Medication 50 MCG: at 19:27

## 2022-05-24 RX ADMIN — INSULIN HUMAN 12 UNITS: 100 INJECTION, SOLUTION PARENTERAL at 01:07

## 2022-05-24 RX ADMIN — MAGNESIUM SULFATE HEPTAHYDRATE 1000 MG: 1 INJECTION, SOLUTION INTRAVENOUS at 05:56

## 2022-05-24 RX ADMIN — HEPARIN SODIUM 5000 UNITS: 5000 INJECTION INTRAVENOUS; SUBCUTANEOUS at 21:30

## 2022-05-24 RX ADMIN — SODIUM CHLORIDE 8.3 UNITS/HR: 9 INJECTION, SOLUTION INTRAVENOUS at 07:16

## 2022-05-24 RX ADMIN — HEPARIN SODIUM 5000 UNITS: 5000 INJECTION INTRAVENOUS; SUBCUTANEOUS at 13:38

## 2022-05-24 RX ADMIN — SODIUM CHLORIDE, PRESERVATIVE FREE 20 MG: 5 INJECTION INTRAVENOUS at 09:14

## 2022-05-24 RX ADMIN — SODIUM CHLORIDE 3.6 UNITS/HR: 9 INJECTION, SOLUTION INTRAVENOUS at 10:28

## 2022-05-24 ASSESSMENT — PULMONARY FUNCTION TESTS
PIF_VALUE: 19
PIF_VALUE: 15
PIF_VALUE: 19
PIF_VALUE: 19
PIF_VALUE: 18
PIF_VALUE: 21
PIF_VALUE: 18

## 2022-05-24 ASSESSMENT — PAIN SCALES - GENERAL
PAINLEVEL_OUTOF10: 0

## 2022-05-24 NOTE — PROGRESS NOTES
05/24/22 0815   Patient Observation   Pulse 91   Resp 21   SpO2 99 %   Airway Clearance   Suction ET Tube   Sputum Method Obtained Endotracheal   Vent Information   Ventilator Day(s) 4   Vent Mode AC/PC   Vent Settings   FiO2  30 %   Resp Rate (Set) 12 bmp   Vt (Set, mL) 0 mL   PEEP/CPAP (cmH2O) 8   Insp Time (sec) 1 sec   Trigger Sensitivity Flow (L/min) 2 L/min   Pressure Control 10 cmH2O   Vent Patient Data (Readings)   Vt Exhaled 560 mL   Rate Measured 23 br/min   Minute Ventilation (l/min) 12.8 l/min   Peak Inspiratory Pressure 19 cmH2O   Mean Airway Pressure 12 cmH20   Auto PEEP Observed (cm H2O) 0 cm H2O   Plateau Pressure (cm H2O) 0 cm H2O   Vent Alarm Settings   High Pressure  50 cmH2O   Low Pressure 2 cmH2O   Low Minute Volume Alarm 2 L/min   Ve Max 22   Ve Min 0   RR High 50 br/min   RR Low  5   Ambu Bag With Mask At Bedside Yes   ETT (adult)   Placement Date/Time: 05/21/22 1635   Airway Type: Cuffed  Airway Tube Size: 7.5 mm  Location: Oral  Insertion attempts: 1  Secured At: 24 cm  Measured From: Lips   Secured At 24 cm   Measured From Lips   Secured By Commercial tube nelson   Cuff Pressure 28 cm H2O   Respiratory   Respiratory (WDL) X

## 2022-05-24 NOTE — INTERDISCIPLINARY ROUNDS
Interdisciplinary team rounds were held 5/24/2022 with the following team members:Care Management, Nursing, Nurse Practitioner, Nutrition, Palliative Care, Pastoral Care, Pharmacy, Physician, Respiratory Therapy and Clinical Coordinator and the patient. Plan of care discussed. See clinical pathway and/or care plan for interventions and desired outcomes.

## 2022-05-24 NOTE — DIALYSIS
8 HR CRRT  initiated using left IJ. Aspirated and flushed both ports without difficulty. Machine settings per MD order. 400 UFR  300 DFR  200 BFR  4K Bath. Reported to primary nurse. Dialysis nurse available as needed.

## 2022-05-24 NOTE — PROGRESS NOTES
Bedside and verbal shift change report received from  Nai [facility] (offgoing nurse). Report included the following information SBAR, Kardex, ED Summary, Intake/Output, MAR, Recent Results, Med Rec Status, Cardiac Rhythm ST/NSR and Alarm Parameters .      Dual skin assessment completed at bedside: Sacrum unremarkable- Allevyn dressing C/D/I, Prevalon Boots in place (list pertinent skin assessment findings)    Dual verification of gtts completed (name of gtts verified): Fentanyl

## 2022-05-24 NOTE — PROGRESS NOTES
(units/hr) Vasopressin: 0 Units/hr  Dose (units/hr) Insulin : 8.3 Units/hr (titrate per glucostabilizer)     Pertinent Exam:         Blood pressure (!) 140/65, pulse 92, temperature 98.4 °F (36.9 °C), temperature source Axillary, resp. rate 19, height 5' 6\" (1.676 m), weight 190 lb (86.2 kg), SpO2 100 %. Intake/Output Summary (Last 24 hours) at 5/24/2022 0743  Last data filed at 5/24/2022 4193  Gross per 24 hour   Intake 5090.91 ml   Output 725 ml   Net 4365.91 ml     Constitutional:  intubated and mechanically ventilated. EENMT:  Sclera clear, pupils equal, orally intubated  Respiratory: CTA B, no w/r/r. Cardiovascular:  RRR no m/r/g  Gastrointestinal:  soft with no evidence tenderness; positive bowel sounds but hypoactive. Musculoskeletal:  warm (except cool feet),  1+ generalized extremity edema  Skin:  no jaundice or ecchymosis  Neurologic: eyes open. Follows commands with both hands. Psychiatric: calm    CXR: pending    Head CT:   1. No CT evidence of acute intracranial abnormality. 2. No significant change compared to prior exam.       Recent Labs     05/22/22  0306 05/22/22  0631 05/24/22  0229   WBC 28.1* 26.3* 19.0*   HGB 10.0* 10.0* 9.5*   HCT 32.4* 31.9* 29.4*    433 276     Recent Labs     05/21/22  1600 05/21/22  1942 05/23/22  1253 05/23/22  1253 05/23/22  1745 05/24/22  0229 05/24/22  0549   *   < > 146*   < > 142 141 144   K 6.7*   < > 5.4*   < > 5.9* 4.6 4.5   CL 89*   < > 121*   < > 118* 116* 117*   CO2 5*   < > 15*   < > 13* 11* 13*   *   < > 86*   < > 81* 82* 79*   MG  --    < > 1.6*  --  1.6* 1.6*  --    PHOS  --    < > 2.5  --  2.5 2.4*  --    BILITOT 0.4  --   --   --   --   --  0.3   AST 14*  --   --   --   --   --  120*   ALT 11*  --   --   --   --   --  165*   ALKPHOS 179*  --   --   --   --   --  227*    < > = values in this interval not displayed.      ECHO: 05/21/22    TRANSTHORACIC ECHOCARDIOGRAM (TTE) COMPLETE (CONTRAST/BUBBLE/3D PRN) 05/22/2022  1:30 PM (Final)    Interpretation Summary    Left Ventricle: Hyperdynamic left ventricular systolic function with a visually estimated EF of 70 - 75%. Left ventricle size is normal. Mildly increased wall thickness. Normal diastolic function.   Aortic Valve: Tricuspid valve. Mild sclerosis of the aortic valve cusp. The AV is incompletely interrogated though the peak velocity and mean gradient is suggestive of moderate stenosis of the aortic valve. AV mean gradient is 23 mmHg. AV peak velocity is 3.2 m/s. Signed by: Jameson Stoll MD on 5/22/2022  1:30 PM    Microbiology:   Urine culture negative,   Blood cultures pending    Ventilator Settings:  Ideal body weight: 59.3 kg (130 lb 11.7 oz)  Adjusted ideal body weight: 70.1 kg (154 lb 7 oz)  Mode FIO2 Rate Tidal Volume Pressure PEEP   PRVC 30% 24  460     8   Peak airway pressure:         Plateau Pressure: Plateau Pressure (cm H2O): 18 cm H2O   Minute ventilation:    ABG:   ph 7.26/PO2 111/CO2 29/base excess 13    Assessment and Plan:  (Medical Decision Making)   Impression: 48 y.o. female with found unresponsive at home. Admitted with BS > 2000 - corrected with insulin infusion. Acute on chronic renal failure. Nephrology following. Had recent ureteral stent placed. Required intubation on admission for airway maintenance. + septic shock requiring pressor support. On abx for suspected aspiration event. NEURO:   Sedation: On Precedex but weaning off  Analgesia: Fentanyl infusion - trying to wean secondary to decreased LOC  Paralytics: NA  CV:   Volume Status: volume overloaded. HD cath placed and to start dialysis soon. Septic shock: on Levo for support. Art line placed and using flotrack now. Bradycardia: no further episodes with reduction in precedex dose. PULM:   Acute hypoxemic/hypercapneic respiratory failure:  Day 4 intubation. Low FIO2 needs. On abx for ? Aspiration pneumonia. RENAL  BLANCA:  Acute on chronic renal failure.  HD cath in and dialysis per nephrology. Metabolic acidosis: correct with dialysis. No hydro seen on renal US  Lactic acidosis: NA  Electrolytes: dialysis  GI:   Nutrition: nutrition consult to start TF today. HEME:   Anemia: stable. Monitor and transfuse if < 7. Anticoagulation: low dose heparin  ID:   ?Possible Aspiration: Day 4 Vanc and Zosyn. Urine culture negative. Blood cultures pending. WBC coming down. ENDO:   DM: back on insulin drip evening of 5/23. Continue for now. Change IVF to contain glucose. Skin: no decub, turns, preventive care  Prophy: Pepcid/low dose heparin      Full Code      Critically ill patient with complicated medical issues and high risk of further decompensation.  Total critical care time spent thus far (exclusive of procedures): 33 minutes    Janee Miles MD

## 2022-05-24 NOTE — PROGRESS NOTES
MD notified of pt's AM lab results: Glucose 402, Gap 14, CO2 11. New orders given for Insulin gtt with glucostabilizer protocol- see MAR.

## 2022-05-24 NOTE — PROGRESS NOTES
[facility]    Follow-Up on: BLANCA on CKD stage IV    HPI: Pt seen and examined. Intubated and opening eyes. Seen on SED    ROS:  Denies CP, SOB.     Current Facility-Administered Medications   Medication Dose Route Frequency    insulin regular (HUMULIN R;NOVOLIN R) 100 Units in sodium chloride 0.9 % 100 mL infusion  1-50 Units/hr IntraVENous Continuous    insulin regular (HUMULIN R;NOVOLIN R) injection 0-10 Units  0-10 Units IntraVENous PRN    dextrose 5 % and 0.45 % sodium chloride infusion   IntraVENous Continuous PRN    dextrose 5 % and 0.45 % sodium chloride infusion   IntraVENous Continuous    dextrose bolus 10% 125 mL  125 mL IntraVENous PRN    Or    dextrose bolus 10% 250 mL  250 mL IntraVENous PRN    glucagon injection 1 mg  1 mg IntraMUSCular PRN    dexmedetomidine (PRECEDEX) 400 mcg in sodium chloride 0.9 % 100 mL infusion  0.1-1.5 mcg/kg/hr IntraVENous Continuous    medicated lip ointment (BLISTEX)   Topical PRN    propofol injection  5-50 mcg/kg/min IntraVENous Continuous    sodium zirconium cyclosilicate (LOKELMA) oral suspension 10 g  10 g Oral Once    sodium chloride flush 0.9 % injection 5-40 mL  5-40 mL IntraVENous 2 times per day    sodium chloride flush 0.9 % injection 5-40 mL  5-40 mL IntraVENous PRN    heparin (porcine) injection 5,000 Units  5,000 Units SubCUTAneous q8h    ondansetron (ZOFRAN-ODT) disintegrating tablet 4 mg  4 mg Oral Q8H PRN    Or    ondansetron (ZOFRAN) injection 4 mg  4 mg IntraVENous Q6H PRN    polyethylene glycol (GLYCOLAX) packet 17 g  17 g Oral Daily PRN    acetaminophen (TYLENOL) tablet 650 mg  650 mg Oral Q6H PRN    Or    acetaminophen (TYLENOL) suppository 650 mg  650 mg Rectal Q6H PRN    norepinephrine (LEVOPHED) 16 mg in sodium chloride 0.9 % 250 mL infusion  2-100 mcg/min IntraVENous Continuous    dextrose bolus 10% 250 mL  250 mL IntraVENous PRN    potassium chloride 10 mEq/100 mL IVPB (Peripheral Line)  10 mEq IntraVENous PRN    magnesium sulfate 1000 mg in dextrose 5% 100 mL IVPB  1,000 mg IntraVENous PRN    sodium phosphate 10 mmol in sodium chloride 0.9 % 250 mL IVPB  10 mmol IntraVENous PRN    Or    sodium phosphate 15 mmol in dextrose 5 % 250 mL IVPB  15 mmol IntraVENous PRN    Or    sodium phosphate 20 mmol in dextrose 5 % 500 mL IVPB  20 mmol IntraVENous PRN    dextrose 5 % and 0.45 % sodium chloride infusion   IntraVENous Continuous PRN    sodium chloride flush 0.9 % injection 5-40 mL  5-40 mL IntraVENous 2 times per day    sodium chloride flush 0.9 % injection 5-40 mL  5-40 mL IntraVENous PRN    0.9 % sodium chloride infusion   IntraVENous PRN    piperacillin-tazobactam (ZOSYN) 3,375 mg in sodium chloride 0.9 % 50 mL IVPB (mini-bag)  3,375 mg IntraVENous Q12H    chlorhexidine (PERIDEX) 0.12 % solution 15 mL  15 mL Mouth/Throat BID    famotidine (PEPCID) 20 mg in sodium chloride (PF) 10 mL injection  20 mg IntraVENous Daily    fentaNYL (SUBLIMAZE) 1,000 mcg in sodium chloride 0.9% 100 mL infusion   mcg/hr IntraVENous Continuous    And    fentaNYL bolus from bag  50 mcg IntraVENous Q30 Min PRN    vancomycin (VANCOCIN) intermittent dosing (placeholder)   Other RX Placeholder    vasopressin (VASOSTRICT) 20 units in dextrose 5% 100 mL infusion  0.01-0.03 Units/min IntraVENous Continuous       Exam:  Vitals:    05/24/22 1115 05/24/22 1130 05/24/22 1145 05/24/22 1147   BP:       Pulse: 109 109 128 120   Resp: 18 19 26 20   Temp:       TempSrc:       SpO2: 100% 99% 100% 99%   Weight:       Height:             Intake/Output Summary (Last 24 hours) at 5/24/2022 1223  Last data filed at 5/24/2022 1100  Gross per 24 hour   Intake 5090.91 ml   Output 1242 ml   Net 3848.91 ml     PE:  GEN - in no distress  CV - regular, no murmur, no rub  Lung - clear bilaterally  Abd - soft, nontender  Ext - no edema    Labs  Recent Labs     05/22/22  0306 05/22/22  0631 05/24/22  0229   WBC 28.1* 26.3* 19.0*   HGB 10.0* 10.0* 9.5*   HCT 32.4* 31.9* 29.4*    433 276     Recent Labs     05/23/22  1253 05/23/22  1253 05/23/22  1745 05/24/22  0229 05/24/22  0549   *   < > 142 141 144   K 5.4*   < > 5.9* 4.6 4.5   *   < > 118* 116* 117*   CO2 15*   < > 13* 11* 13*   BUN 86*   < > 81* 82* 79*   MG 1.6*  --  1.6* 1.6*  --    PHOS 2.5  --  2.5 2.4*  --     < > = values in this interval not displayed. No results for input(s): PH, PCO2, PO2, PCO2 in the last 72 hours.     Problem List:  Patient Active Problem List    Diagnosis Date Noted    Altered mental status     Frailty     Cardiac arrest ([facility] 05/21/2022    Diabetic ketoacidosis with coma associated with type 1 diabetes mellitus ([facility] 05/21/2022    Severe sepsis with septic shock (CODE) ([facility] 05/21/2022    Pneumonia, bacterial 05/21/2022    Acute respiratory failure ([facility] 05/21/2022    Benign hypertension with CKD (chronic kidney disease) stage III ([facility] 01/04/2022    Acute renal failure ([facility] 06/09/2021    IDDM (insulin dependent diabetes mellitus) 01/24/2012    BLANCA (acute kidney injury) ([facility] 04/21/2022    Ureteric stone 04/19/2022    Urinary tract infection without hematuria 04/19/2022    Hydronephrosis of right kidney 04/19/2022    Malodorous urine 11/16/2021    H/O gastroesophageal reflux (GERD) 06/19/2020    Encounter for annual routine gynecological examination 11/27/2018    Encounter to discuss test results 08/22/2018    Diabetes mellitus type 1 ([facility] 08/22/2018    Type 1 diabetes mellitus with stage 3 chronic kidney disease ([facility] 08/16/2018    Noncompliance with medication regimen 08/16/2018    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    Depression with anxiety 07/11/2017    Ulcer, skin, chronic ([facility] 03/13/2017    CKD (chronic kidney disease), stage IV ([facility] 03/13/2017    Neuropathy, peripheral, idiopathic 03/13/2017    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 Type I DM  2. DKA BS>2000 on admit  3. BLANCA on CKD stage IV Now oliguric. Pt  Seen on SED. SED tomorrow again  4. AGMA 2/2 renal failure. This will improve with dialysis. Labs drawn during dialysis will be inaccurate because of intracellular shifts. I recommend suspending labs until 2 hours after treatment and this will be more accurate.   Case d/w DYANA cantu

## 2022-05-24 NOTE — INTERDISCIPLINARY ROUNDS
Multi-D Rounds/Checklist (leapfrog):  Lines: can any be removed?: No   DVT Prophylaxis: Yes   Vent: HOB elevated? Yes              CC/Kg?: 5              Vent Day?: 4  Nutrition Ordered/appropriate: No   Can antibiotics or other drugs be stopped?: No   Consults needed: Yes   A: Is pain control adequate? Yes   B: Sedation break and SBT? Yes   C: Is sedation choice appropriate? No   D: Delirium/CAM-ICU? No   E: Mobility goals/appropriateness? Yes   F: Family update and plan? Mother is primary contact and is being updated daily by primary attending and nursing staff.     Isabella Neri, APRN - CNP

## 2022-05-24 NOTE — PROGRESS NOTES
This is a follow-up visit to the patient, providing a spiritual presence, emotional support and prayer. The patient appears to be resting.     Tien Danielle, [address] [facility], [facility]

## 2022-05-24 NOTE — PROGRESS NOTES
Bedside shift change report given to Alba Persaud RN (oncoming nurse) by Kleber Mares RN (offgoing nurse). Report included the following information Nurse Handoff Report, ED Encounter Summary, Intake/Output, MAR, Recent Results, Med Rec Status, Cardiac Rhythm NSR/ST and Alarm Parameters. Fentanyl/Insulin gtt dual verified. Dual skin assessment completed at bedside.

## 2022-05-24 NOTE — PROGRESS NOTES
Comprehensive Nutrition Assessment    Type and Reason for Visit: Reassess  Tube Feeding Management (Pulmonology)    Nutrition Recommendations/Plan:   Enteral Nutrition:   Enteral Access: Orogastric  Formula: Renal (Nepro with Carbsteady)  Delivery: Continuous feeding: Initiate  10ml/hour, progress by 10ml/8 hours to goal rate of 40ml/hour. Water flush 25 ml every hour  Modulars: None deferred until tolerance of base tube feeding regimen achieved   Enteral regimen at goal to provide:  1558 calories (100% estimated calorie needs), 71 grams protein (80% estimated protein needs) and 1132 ml free fluid (standard renal) calculations based on 22 hour infusion. IV Fluids:  Continue per MD order Anticipate pt will require decreasing IVF once TF established. Nutritional Supplement Therapy:   Active electrolyte replacement per nutrition support protocols  Replacement indicated:  Completed  Labs:   EN labs: BMP, Mg and Phos active at RD assessment. POC Glucoses Active  Meals and Snacks:  Continue current diet. NPO. Malnutrition Assessment:  Malnutrition Status: At risk for malnutrition (Comment) (found down at home-unknown time, intubated on admission, possible wt loss-unable to validate)  Nutrition Assessment:  Nutrition History:     5/22: Unable to obtain  Nutrition Background:   H/O: HTN, HLD, DM, CKD3, recent admission for right sided ureteral obstruction s/p stent placement. P/W found unresponsiveness after not heard from the last several days. Findings of DKA, BLANCA. Had brief cardiac arrest and was intubated in ED. Nutrition Interval:  Remains intubated, alert unable to follow commands. +CRRT started 5/24. Abdominal Status (last documented): Abdominal Status (last documented):   Last BM (including prior to admit): 05/22/22  Pertinent Medications: pepcid, zosyn, vancomycin  Continuous: fentanyl, insulin, levophed (4 mcg/min), propofol (stopped 5/22)   Electrolyte Replacement: 5/24: Mg sulfate 1000 mg  IVF: D51/2NS @ 100ml/hr (5/24 start)  Pertinent Labs:   Lab Results   Component Value Date     05/24/2022    K 4.0 05/24/2022     05/24/2022    CO2 19 05/24/2022    BUN 58 05/24/2022    CREATININE 6.00 05/24/2022    GLUCOSE 113 05/24/2022    CALCIUM 8.0 05/24/2022    PHOS 2.2 05/24/2022    MG 1.6 05/24/2022   Last POC glucose 103    Nutrition Related Findings:   5/4: NFPE limited to exposed upper body secondary to intubation, dialysis.         Current Nutrition Therapies:  Diet NPO  ADULT TUBE FEEDING; Orogastric; Renal Formula; Continuous; 10; Yes; 10; Q 8 hours; 40; 25; Q 1 hour    Current Intake:   Average Meal Intake: NPO        Anthropometric Measures:  Height: 5' 6\" (167.6 cm)  Current Body Wt: 190 lb 0.6 oz (86.2 kg) (5/22), Weight source: Bed Scale  BMI: 30.7  Admission Body Weight: 172 lb (78 kg) (estimated)  Ideal Body Weight (Kg) (Calculated): 59 kg (130 lbs),    Usual Body Wt:  (see below),   179# (OV GYN 11/26/21), 180# OV PCP 8/16/2021)  Percent weight change:   .Weight variances likely d/t fluid and scale variances with h/o CKD         Edema: No data recorded  BMI Category  (defer d/t wide weight range)  Estimated Daily Nutrient Needs:  Energy (kcal/day): 9895-4592 (25-30 kcal/kg) (Kcal/kg Weight used: 59 kg Ideal (59 kg)  Protein (g/day):  (1.5-2 g/kg +CRRT) Weight Used: (Ideal (59 kg)) 59 kg  Fluid (ml/day):   (Standard Renal)    Nutrition Diagnosis:   · Inadequate oral intake related to impaired respiratory function as evidenced by NPO or clear liquid status due to medical condition,intubation    Nutrition Interventions:   Food and/or Nutrient Delivery: Continue NPO,Start Tube Feeding     Coordination of Nutrition Care: Continue to monitor while inpatient  Plan of Care discussed with: Bev Souza RN    Goals:   Previous Goal Met: Goal(s) Achieved  Active Goal: Tolerate nutrition support at goal rate,within 7 days       Nutrition Monitoring and Evaluation:      Food/Nutrient Intake Outcomes: Enteral Nutrition Intake/Tolerance  Physical Signs/Symptoms Outcomes: Biochemical Data,GI Status,Fluid Status or Edema,Weight    Discharge Planning:     Too soon to determine    Rito Navjot Duran [address], [address]

## 2022-05-24 NOTE — PROGRESS NOTES
Palliative Care Progress Note    Patient: Aaron Leyva MRN: [id number]  SSN: [id number]    YOB: 1975  Age: 48 y.o. Sex: female       Assessment/Plan:     Chief Complaint/Interval History: pt opens eyes but not following commands. Starting SLED today    Principal Diagnosis:    · Altered Mental Status R41.82    Additional Diagnoses:   · Frailty  R54  · Counseling, Encounter for Medical Advice  Z71.9  · Encounter for Palliative Care  Z51.5    Palliative Performance Scale (PPS)       Medical Decision Making:   Reviewed and summarized labs and imaging over past 24 hours. Pt opens eyes this am.  Starting SLED. I updated pt mother via phone and reviewed ongoing care. Encouraged her to take things one day at a time. Still unclear of overall neurological status or potential deficits at this time. Will continue to follow. Will discuss findings with members of the interdisciplinary team.      More than 50% of this 25 minute visit was spent counseling and coordination of care as outlined above. Subjective:     Comprehensive Review of Systems unable to complete due to mentation. Objective:     Visit Vitals  BP (!) 140/65   Pulse 99   Temp 98.2 °F (36.8 °C) (Oral)   Resp 21   Ht 5' 6\" (1.676 m)   Wt 190 lb (86.2 kg)   SpO2 100%   BMI 30.67 kg/m²       Physical Exam:    General:  Calm. Eyes:  Conjunctivae/corneas clear    Nose: Nares normal. Septum midline. Neck: Supple, symmetrical, trachea midline, no JVD   Lungs:   Clear to auscultation bilaterally, unlabored   Heart:  Regular rate and rhythm, no murmur    Abdomen:   Soft, non-tender, non-distended   Extremities: Normal, atraumatic, no cyanosis or edema   Skin: Skin color, texture, turgor normal. No rash or lesions. Neurologic: Opening eyes.   Not moving extremities or following commands   Psych: Alert, calm     Signed By: Cedric Falcon MD     May 24, 2022

## 2022-05-25 ENCOUNTER — APPOINTMENT (OUTPATIENT)
Dept: GENERAL RADIOLOGY | Age: 47
DRG: 870 | End: 2022-05-25
Payer: COMMERCIAL

## 2022-05-25 LAB
ANION GAP SERPL CALC-SCNC: 7 MMOL/L (ref 7–16)
BASE EXCESS BLD CALC-SCNC: 6.1 MMOL/L
BASOPHILS # BLD: 0 K/UL (ref 0–0.2)
BASOPHILS NFR BLD: 0 % (ref 0–2)
BDY SITE: ABNORMAL
BUN SERPL-MCNC: 30 MG/DL (ref 6–23)
CALCIUM SERPL-MCNC: 7.4 MG/DL (ref 8.3–10.4)
CHLORIDE SERPL-SCNC: 101 MMOL/L (ref 98–107)
CO2 SERPL-SCNC: 30 MMOL/L (ref 21–32)
CREAT SERPL-MCNC: 4 MG/DL (ref 0.6–1)
DIFFERENTIAL METHOD BLD: ABNORMAL
EOSINOPHIL # BLD: 0 K/UL (ref 0–0.8)
EOSINOPHIL NFR BLD: 0 % (ref 0.5–7.8)
ERYTHROCYTE [DISTWIDTH] IN BLOOD BY AUTOMATED COUNT: 14.7 % (ref 11.9–14.6)
GAS FLOW.O2 O2 DELIVERY SYS: ABNORMAL L/MIN
GLUCOSE BLD STRIP.AUTO-MCNC: 145 MG/DL (ref 65–100)
GLUCOSE BLD STRIP.AUTO-MCNC: 234 MG/DL (ref 65–100)
GLUCOSE BLD STRIP.AUTO-MCNC: 357 MG/DL (ref 65–100)
GLUCOSE SERPL-MCNC: 314 MG/DL (ref 65–100)
HCO3 BLD-SCNC: 29.3 MMOL/L (ref 22–26)
HCT VFR BLD AUTO: 25.4 % (ref 35.8–46.3)
HGB BLD-MCNC: 8.8 G/DL (ref 11.7–15.4)
IMM GRANULOCYTES # BLD AUTO: 0.4 K/UL (ref 0–0.5)
IMM GRANULOCYTES NFR BLD AUTO: 2 % (ref 0–5)
LACTATE SERPL-SCNC: 1.4 MMOL/L (ref 0.4–2)
LACTATE SERPL-SCNC: 3.5 MMOL/L (ref 0.4–2)
LYMPHOCYTES # BLD: 1.6 K/UL (ref 0.5–4.6)
LYMPHOCYTES NFR BLD: 8 % (ref 13–44)
MAGNESIUM SERPL-MCNC: 1.7 MG/DL (ref 1.8–2.4)
MCH RBC QN AUTO: 27.2 PG (ref 26.1–32.9)
MCHC RBC AUTO-ENTMCNC: 34.6 G/DL (ref 31.4–35)
MCV RBC AUTO: 78.4 FL (ref 79.6–97.8)
MONOCYTES # BLD: 1.3 K/UL (ref 0.1–1.3)
MONOCYTES NFR BLD: 6 % (ref 4–12)
NEUTS SEG # BLD: 17.1 K/UL (ref 1.7–8.2)
NEUTS SEG NFR BLD: 84 % (ref 43–78)
NRBC # BLD: 0.02 K/UL (ref 0–0.2)
O2/TOTAL GAS SETTING VFR VENT: 30 %
PCO2 BLD: 36 MMHG (ref 35–45)
PEEP RESPIRATORY: 8 CMH2O
PH BLD: 7.52 [PH] (ref 7.35–7.45)
PHOSPHATE SERPL-MCNC: 2.4 MG/DL (ref 2.5–4.5)
PHOSPHATE SERPL-MCNC: 2.5 MG/DL (ref 2.5–4.5)
PHOSPHATE SERPL-MCNC: 2.8 MG/DL (ref 2.5–4.5)
PLATELET # BLD AUTO: 217 K/UL (ref 150–450)
PMV BLD AUTO: 10.2 FL (ref 9.4–12.3)
PO2 BLD: 126 MMHG (ref 75–100)
POTASSIUM SERPL-SCNC: 4.4 MMOL/L (ref 3.5–5.1)
RBC # BLD AUTO: 3.24 M/UL (ref 4.05–5.2)
SAO2 % BLD: 99.2 % (ref 95–98)
SERVICE CMNT-IMP: ABNORMAL
SODIUM SERPL-SCNC: 138 MMOL/L (ref 136–145)
SPECIMEN TYPE: ABNORMAL
VENTILATION MODE VENT: ABNORMAL
WBC # BLD AUTO: 20.5 K/UL (ref 4.3–11.1)

## 2022-05-25 PROCEDURE — 99232 SBSQ HOSP IP/OBS MODERATE 35: CPT | Performed by: INTERNAL MEDICINE

## 2022-05-25 PROCEDURE — 82803 BLOOD GASES ANY COMBINATION: CPT

## 2022-05-25 PROCEDURE — 2500000003 HC RX 250 WO HCPCS: Performed by: INTERNAL MEDICINE

## 2022-05-25 PROCEDURE — 2580000003 HC RX 258: Performed by: INTERNAL MEDICINE

## 2022-05-25 PROCEDURE — 83735 ASSAY OF MAGNESIUM: CPT

## 2022-05-25 PROCEDURE — 83605 ASSAY OF LACTIC ACID: CPT

## 2022-05-25 PROCEDURE — 71045 X-RAY EXAM CHEST 1 VIEW: CPT

## 2022-05-25 PROCEDURE — 6370000000 HC RX 637 (ALT 250 FOR IP): Performed by: INTERNAL MEDICINE

## 2022-05-25 PROCEDURE — 94003 VENT MGMT INPAT SUBQ DAY: CPT

## 2022-05-25 PROCEDURE — 6360000002 HC RX W HCPCS: Performed by: INTERNAL MEDICINE

## 2022-05-25 PROCEDURE — 90945 DIALYSIS ONE EVALUATION: CPT

## 2022-05-25 PROCEDURE — 80048 BASIC METABOLIC PNL TOTAL CA: CPT

## 2022-05-25 PROCEDURE — 84100 ASSAY OF PHOSPHORUS: CPT

## 2022-05-25 PROCEDURE — 85025 COMPLETE CBC W/AUTO DIFF WBC: CPT

## 2022-05-25 PROCEDURE — 82962 GLUCOSE BLOOD TEST: CPT

## 2022-05-25 PROCEDURE — 37799 UNLISTED PX VASCULAR SURGERY: CPT

## 2022-05-25 PROCEDURE — 99291 CRITICAL CARE FIRST HOUR: CPT | Performed by: INTERNAL MEDICINE

## 2022-05-25 PROCEDURE — A4216 STERILE WATER/SALINE, 10 ML: HCPCS | Performed by: INTERNAL MEDICINE

## 2022-05-25 PROCEDURE — 2000000000 HC ICU R&B

## 2022-05-25 RX ORDER — ALBUTEROL SULFATE 2.5 MG/3ML
2.5 SOLUTION RESPIRATORY (INHALATION) EVERY 4 HOURS PRN
Status: DISCONTINUED | OUTPATIENT
Start: 2022-05-25 | End: 2022-06-02 | Stop reason: HOSPADM

## 2022-05-25 RX ORDER — INSULIN GLARGINE 100 [IU]/ML
15 INJECTION, SOLUTION SUBCUTANEOUS DAILY
Status: DISCONTINUED | OUTPATIENT
Start: 2022-05-25 | End: 2022-05-31

## 2022-05-25 RX ORDER — HEPARIN SODIUM 1000 [USP'U]/ML
10000 INJECTION, SOLUTION INTRAVENOUS; SUBCUTANEOUS PRN
Status: DISCONTINUED | OUTPATIENT
Start: 2022-05-25 | End: 2022-06-02 | Stop reason: HOSPADM

## 2022-05-25 RX ADMIN — HEPARIN SODIUM 9000 UNITS: 1000 INJECTION INTRAVENOUS; SUBCUTANEOUS at 08:53

## 2022-05-25 RX ADMIN — HEPARIN SODIUM 5000 UNITS: 5000 INJECTION INTRAVENOUS; SUBCUTANEOUS at 05:30

## 2022-05-25 RX ADMIN — INSULIN GLARGINE 15 UNITS: 100 INJECTION, SOLUTION SUBCUTANEOUS at 08:59

## 2022-05-25 RX ADMIN — SODIUM CHLORIDE, PRESERVATIVE FREE 10 ML: 5 INJECTION INTRAVENOUS at 09:03

## 2022-05-25 RX ADMIN — HEPARIN SODIUM 15 ML: 5000 INJECTION INTRAVENOUS; SUBCUTANEOUS at 22:09

## 2022-05-25 RX ADMIN — Medication 50 MCG: at 02:47

## 2022-05-25 RX ADMIN — SODIUM CHLORIDE, PRESERVATIVE FREE 10 ML: 5 INJECTION INTRAVENOUS at 21:45

## 2022-05-25 RX ADMIN — HEPARIN SODIUM 5000 UNITS: 5000 INJECTION INTRAVENOUS; SUBCUTANEOUS at 14:29

## 2022-05-25 RX ADMIN — Medication 50 MCG: at 00:16

## 2022-05-25 RX ADMIN — INSULIN LISPRO 12 UNITS: 100 INJECTION, SOLUTION INTRAVENOUS; SUBCUTANEOUS at 05:25

## 2022-05-25 RX ADMIN — Medication 25 MCG/HR: at 05:15

## 2022-05-25 RX ADMIN — HEPARIN SODIUM 5000 UNITS: 5000 INJECTION INTRAVENOUS; SUBCUTANEOUS at 22:09

## 2022-05-25 RX ADMIN — HEPARIN SODIUM 15 ML: 5000 INJECTION INTRAVENOUS; SUBCUTANEOUS at 09:03

## 2022-05-25 RX ADMIN — INSULIN LISPRO 4 UNITS: 100 INJECTION, SOLUTION INTRAVENOUS; SUBCUTANEOUS at 11:47

## 2022-05-25 RX ADMIN — PIPERACILLIN AND TAZOBACTAM 3375 MG: 3; .375 INJECTION, POWDER, LYOPHILIZED, FOR SOLUTION INTRAVENOUS at 17:59

## 2022-05-25 RX ADMIN — PIPERACILLIN AND TAZOBACTAM 3375 MG: 3; .375 INJECTION, POWDER, LYOPHILIZED, FOR SOLUTION INTRAVENOUS at 05:30

## 2022-05-25 RX ADMIN — SODIUM CHLORIDE, PRESERVATIVE FREE 20 MG: 5 INJECTION INTRAVENOUS at 09:02

## 2022-05-25 ASSESSMENT — PAIN SCALES - GENERAL
PAINLEVEL_OUTOF10: 0

## 2022-05-25 ASSESSMENT — PULMONARY FUNCTION TESTS
PIF_VALUE: 18
PIF_VALUE: 15

## 2022-05-25 NOTE — INTERDISCIPLINARY ROUNDS
Interdisciplinary team rounds were held 5/25/2022 with the following team members:Care Management, Nursing, Nurse Practitioner, Nutrition, Palliative Care, Pastoral Care, Pharmacy, Physical Therapy, Physician, Respiratory Therapy and Clinical Coordinator and the patient. Plan of care discussed. See clinical pathway and/or care plan for interventions and desired outcomes.

## 2022-05-25 NOTE — PROGRESS NOTES
[place] [facility]    Follow-Up on: BLANCA on CKD stage IV    HPI: Pt seen and examined. Intubated and opening eyes. Seen on SED    ROS:  Denies CP, SOB.     Current Facility-Administered Medications   Medication Dose Route Frequency    insulin glargine (LANTUS) injection vial 15 Units  15 Units SubCUTAneous Daily    heparin (porcine) injection 10,000 Units  10,000 Units IntraVENous PRN    insulin regular (HUMULIN R;NOVOLIN R) injection 0-10 Units  0-10 Units IntraVENous PRN    dextrose 5 % and 0.45 % sodium chloride infusion   IntraVENous Continuous PRN    glucose chewable tablet 16 g  4 tablet Oral PRN    dextrose bolus 10% 125 mL  125 mL IntraVENous PRN    Or    dextrose bolus 10% 250 mL  250 mL IntraVENous PRN    glucagon injection 1 mg  1 mg IntraMUSCular PRN    dextrose 5 % solution  100 mL/hr IntraVENous PRN    insulin lispro (HUMALOG) injection vial 0-16 Units  0-16 Units SubCUTAneous Q6H    dextrose bolus 10% 125 mL  125 mL IntraVENous PRN    Or    dextrose bolus 10% 250 mL  250 mL IntraVENous PRN    glucagon injection 1 mg  1 mg IntraMUSCular PRN    dexmedetomidine (PRECEDEX) 400 mcg in sodium chloride 0.9 % 100 mL infusion  0.1-1.5 mcg/kg/hr IntraVENous Continuous    medicated lip ointment (BLISTEX)   Topical PRN    propofol injection  5-50 mcg/kg/min IntraVENous Continuous    sodium zirconium cyclosilicate (LOKELMA) oral suspension 10 g  10 g Oral Once    sodium chloride flush 0.9 % injection 5-40 mL  5-40 mL IntraVENous 2 times per day    sodium chloride flush 0.9 % injection 5-40 mL  5-40 mL IntraVENous PRN    heparin (porcine) injection 5,000 Units  5,000 Units SubCUTAneous q8h    ondansetron (ZOFRAN-ODT) disintegrating tablet 4 mg  4 mg Oral Q8H PRN    Or    ondansetron (ZOFRAN) injection 4 mg  4 mg IntraVENous Q6H PRN    polyethylene glycol (GLYCOLAX) packet 17 g  17 g Oral Daily PRN    acetaminophen (TYLENOL) tablet 650 mg  650 mg Oral Q6H PRN    Or    acetaminophen (TYLENOL) suppository 650 mg  650 mg Rectal Q6H PRN    norepinephrine (LEVOPHED) 16 mg in sodium chloride 0.9 % 250 mL infusion  2-100 mcg/min IntraVENous Continuous    dextrose bolus 10% 250 mL  250 mL IntraVENous PRN    potassium chloride 10 mEq/100 mL IVPB (Peripheral Line)  10 mEq IntraVENous PRN    magnesium sulfate 1000 mg in dextrose 5% 100 mL IVPB  1,000 mg IntraVENous PRN    sodium phosphate 10 mmol in sodium chloride 0.9 % 250 mL IVPB  10 mmol IntraVENous PRN    Or    sodium phosphate 15 mmol in dextrose 5 % 250 mL IVPB  15 mmol IntraVENous PRN    Or    sodium phosphate 20 mmol in dextrose 5 % 500 mL IVPB  20 mmol IntraVENous PRN    dextrose 5 % and 0.45 % sodium chloride infusion   IntraVENous Continuous PRN    sodium chloride flush 0.9 % injection 5-40 mL  5-40 mL IntraVENous 2 times per day    sodium chloride flush 0.9 % injection 5-40 mL  5-40 mL IntraVENous PRN    0.9 % sodium chloride infusion   IntraVENous PRN    piperacillin-tazobactam (ZOSYN) 3,375 mg in sodium chloride 0.9 % 50 mL IVPB (mini-bag)  3,375 mg IntraVENous Q12H    chlorhexidine (PERIDEX) 0.12 % solution 15 mL  15 mL Mouth/Throat BID    famotidine (PEPCID) 20 mg in sodium chloride (PF) 10 mL injection  20 mg IntraVENous Daily    fentaNYL (SUBLIMAZE) 1,000 mcg in sodium chloride 0.9% 100 mL infusion   mcg/hr IntraVENous Continuous    And    fentaNYL bolus from bag  50 mcg IntraVENous Q30 Min PRN    vasopressin (VASOSTRICT) 20 units in dextrose 5% 100 mL infusion  0.01-0.03 Units/min IntraVENous Continuous       Exam:  Vitals:    05/25/22 0837 05/25/22 0845 05/25/22 0900 05/25/22 0910   BP:       Pulse:  (!) 104 (!) 107 (!) 109   Resp:  14 15 18   Temp: 96.8 °F (36 °C)      TempSrc:       SpO2:  100% 100% 100%   Weight:       Height:             Intake/Output Summary (Last 24 hours) at 5/25/2022 0954  Last data filed at 5/25/2022 0545  Gross per 24 hour   Intake 2395.84 ml   Output 3674 ml   Net -1278.16 ml     PE:  GEN - in no distress  CV - regular, no murmur, no rub  Lung - clear bilaterally  Abd - soft, nontender  Ext - no edema    Labs  Recent Labs     05/24/22  0229 05/25/22  0238   WBC 19.0* 20.5*   HGB 9.5* 8.8*   HCT 29.4* 25.4*    217     Recent Labs     05/24/22  1540 05/24/22  1540 05/24/22  1944 05/25/22  0238 05/25/22  0725     --  139 138  --    K 3.6  --  3.9 4.4  --      --  102 101  --    CO2 30  --  27 30  --    BUN 27*  --  24* 30*  --    MG 1.6*  --  1.5* 1.7*  --    PHOS 1.3*   < > 1.7* 2.5 2.8    < > = values in this interval not displayed. No results for input(s): PH, PCO2, PO2, PCO2 in the last 72 hours.     Problem List:  Patient Active Problem List    Diagnosis Date Noted    Altered mental status     Frailty     [facility] arrest ([facility] 05/21/2022    Diabetic ketoacidosis with coma associated with type 1 diabetes mellitus ([facility] 05/21/2022    Severe sepsis with septic shock (CODE) ([facility] 05/21/2022    Pneumonia, bacterial 05/21/2022    Acute respiratory failure ([facility] 05/21/2022    Benign hypertension with CKD (chronic kidney disease) stage III ([facility] 01/04/2022    Acute renal failure ([facility] 06/09/2021    IDDM (insulin dependent diabetes mellitus) 01/24/2012    BLANCA (acute kidney injury) ([facility] 04/21/2022    Ureteric stone 04/19/2022    Urinary tract infection without hematuria 04/19/2022    Hydronephrosis of right kidney 04/19/2022    Malodorous urine 11/16/2021    H/O gastroesophageal reflux (GERD) 06/19/2020    Encounter for annual routine gynecological examination 11/27/2018    Encounter to discuss test results 08/22/2018    Diabetes mellitus type 1 (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 Anticipate next SLED/HD will be Friday. 2) Volume-  Aggressive UF with SLED. Nearing euvolumia    3) Hypoxic respiratory failure-  Weaning vent. Possible extubation after dialysis today. 4) Hypotension-  On levophed with HD. Weaning as tolerated    5) Anemia-  Monitoring Hb. Stable.

## 2022-05-25 NOTE — PROGRESS NOTES
Multi-D Rounds/Checklist (leapfrog):  Lines: can any be removed?: No   DVT Prophylaxis: Yes   Vent: HOB elevated? Yes              CC/Kg?: 5             Vent Day?: 5   Nutrition Ordered/appropriate: Yes   Can antibiotics or other drugs be stopped?: No   Consults needed: No   A: Is pain control adequate? Yes   B: Sedation break and SBT? Yes   C: Is sedation choice appropriate? Yes   D: Delirium/CAM-ICU? No   E: Mobility goals/appropriateness? Yes   F: Family update and plan? Mother is primary contact and is being updated daily by primary attending and nursing staff.     Mandie Schaeffer, DEANDRE, APRN - CNP

## 2022-05-25 NOTE — PROGRESS NOTES
Chart reviewed and pt discussed in am IDR. Continues ICU. Intubated/vent. SLED per Nephrology. Levo with dialysis. MD hoping to extubate post dialysis per notes. CM will continue to follow for d/c needs/POC. LOS 4 days.

## 2022-05-25 NOTE — DIALYSIS
8 hr SLED initiated using  Left I J catheter. Verified consent on file. Aspirated and flushed both ports without problem. Machine settings per MD order. 150 BFR,  300 DFR,  375 UF Rate. Heparin 1000 unit bolus and 1000 units/hr programmed. Dialysis nurse available as needed. Report given to Chio Sellers RN and machine settings verified at bedside.

## 2022-05-25 NOTE — PROGRESS NOTES
Palliative Care Progress Note    Patient: Shahida Whitehead MRN: [id number]  SSN: [id number]    YOB: 1975  Age: 48 y.o. Sex: female       Assessment/Plan:     Chief Complaint/Interval History: pt opens eyes and following commands. Has tolerated SED    Principal Diagnosis:    · Altered Mental Status R41.82    Additional Diagnoses:   · Frailty  R54  · Counseling, Encounter for Medical Advice  Z71.9  · Encounter for Palliative Care  Z51.5    Palliative Performance Scale (PPS)       Medical Decision Making:   Reviewed and summarized labs and imaging over past 24 hours. Improving daily. pulm with plans for extubation. Attempted to call mother to provide update but was unable to reach  Will sign off but remain available if needed. Will discuss findings with members of the interdisciplinary team.      More than 50% of this 25 minute visit was spent counseling and coordination of care as outlined above. Subjective:     Comprehensive Review of Systems unable to complete due to mentation. Objective:     Visit Vitals  BP (!) 112/50   Pulse (!) 110   Temp 98.9 °F (37.2 °C) (Oral)   Resp 17   Ht 5' 6\" (1.676 m)   Wt 190 lb (86.2 kg)   SpO2 100%   BMI 30.67 kg/m²       Physical Exam:    General:  Calm. Eyes:  Conjunctivae/corneas clear    Nose: Nares normal. Septum midline. Neck: Supple, symmetrical, trachea midline, no JVD   Lungs:   Clear to auscultation bilaterally, unlabored   Heart:  Regular rate and rhythm, no murmur    Abdomen:   Soft, non-tender, non-distended   Extremities: Normal, atraumatic, no cyanosis or edema   Skin: Skin color, texture, turgor normal. No rash or lesions. Neurologic: Opening eyes.   Not moving extremities or following commands   Psych: Alert, calm     Signed By: Bob Villaseñor MD     May 25, 2022

## 2022-05-25 NOTE — PROGRESS NOTES
Patient mother called, provided security code, and updated on patient current status. All questions answered.

## 2022-05-25 NOTE — PROGRESS NOTES
Ventilator check complete; patient has a #7.5 ET tube secured at the 24 at the lip. .  Breath sounds are coarse and diminished. Trachea is midline, Negative for subcutaneous air, and chest excursion is symmetric. Patient is also Negative for cyanosis and is Negative for pitting edema. All alarms are set and audible. Resuscitation bag is at the head of the bed. Ventilator Settings  Mode FIO2 Rate Tidal Volume Pressure PEEP I:E Ratio        20     10 cm H2O     1:2      Peak airway pressure:     Minute ventilation:       ABG: No results for input(s): PH, PCO2, PO2, HCO3 in the last 72 hours.       Misha Colón RCP

## 2022-05-25 NOTE — PROGRESS NOTES
Ventilator check complete; patient has a #7.5 ET tube secured at the 24 at the lip. Patient IS NOT sedated. Patient IS NOT able to follow commands. Breath sounds are clear. Trachea is midline, NEG for subcutaneous air, and chest excursion is symmetric. Patient is also NEG for cyanosis and is NEG for pitting edema. All alarms are set and audible. Resuscitation bag IS at the head of the bed.       Ventilator Settings  Mode FIO2 Rate Tidal Volume Pressure PEEP I:E Ratio     PS  30     10 cm H2O    8 1:2      Peak airway pressure:   18  Minute ventilation:   9.5        Tho Churchill RCP

## 2022-05-25 NOTE — PROGRESS NOTES
Respiratory Mechanics completed and are as follows: RSBI -40. NIF-20     Patient extubated to a 4L NC. Patient is able to communicate and  Is negative for stridor. Breath sounds are coarse. No complications with extubation.      Sophia Patel RCP

## 2022-05-25 NOTE — PROGRESS NOTES
Patient extubated to 4L NC and tolerating well. O2sat 100%. Breath sounds coarse bilaterally. Patient educated on suction device at bedside. No complaints at this time.

## 2022-05-25 NOTE — PROGRESS NOTES
[facility] Critical Care Note[de-identified] 5/25/2022  Hollis Pollock  Admission Date: 5/21/2022     Length of Stay: 4 days    Background: 48 y.o. y/o female with unresponsiveness.     46F with htn, hld, dm, ckd3, had recent admission for right sided ureteral obstruction s/p stent placement presents with unresponsiveness.  The patient had not been heard from the last several days. Juliane Elder was found unresponsive in her home.  Brought in by EMS and was found to have numerous lab abnormalities. Jaimie Samuel was found to be in DKA with a serum glucose of greater than 2000.  She was in acute renal failure with a creatinine of 9 and a potassium of 6.7.  She was acidotic with a pH of 6.8.  The patient was intubated on admission.  She suffered a brief cardiac arrest.  She was started on IV hydration, insulin drip, and broad-spectrum antibiotics.  CT of the head was negative. Nephrology is following for renal failure. Notable PMH:    CKD (chronic kidney disease) stage 3, GFR 30-59 ml/min ([facility]), Gastroenteritis, acute, IDDM (insulin dependent diabetes mellitus), Intractable nausea and vomiting, Irregular menses, Kidney stone, Neuropathy, peripheral, idiopathic, Retinopathy, bilateral, Trichomoniasis, Ulcer, skin, chronic ([address] and Vaginal burning. 24 Hour events:    Pt was able to wean off insulin drip again overnight. Was placed on PS 10 and has tolerated this well. Day 5 on vent. Fentanyl for sedation. Following commands. Plan for additional dialysis today. Levo weaned to 4mcg.        ROS: unable to obtain    Lines: (insertion date)   ETT: (5/21)  Avalos: (5/21)  OGT: (5/21)  Central line: (5/21)  Arterial Line (NA)  PIV (5/21)    Drips: current dose (range)  Dose (mcg/kg/min) Propofol : 0 mcg/kg/min  Dose (mcg/kg/hr) Dexmedetomidine: 0 mcg/kg/hr  Dose (mcg/hr) Fentanyl: 25 mcg/hr  Dose (mcg/min) Norepinephrine: 2 mcg/min (MAP 72)  Dose (units/hr) Vasopressin: 0 Units/hr  Dose (units/hr) Insulin : 0 Units/hr     Pertinent Exam:         Blood pressure (!) 112/50, pulse (!) 109, temperature 99.3 °F (37.4 °C), temperature source Oral, resp. rate 14, height 5' 6\" (1.676 m), weight 190 lb (86.2 kg), SpO2 100 %. Intake/Output Summary (Last 24 hours) at 5/25/2022 0804  Last data filed at 5/25/2022 0545  Gross per 24 hour   Intake 2395.84 ml   Output 3674 ml   Net -1278.16 ml     Constitutional:  intubated and mechanically ventilated. EENMT:  Sclera clear, pupils equal, orally intubated  Respiratory: CTA B, no w/r/r. Cardiovascular:  RRR no m/r/g  Gastrointestinal:  soft with no evidence tenderness; positive bowel sounds but hypoactive. Musculoskeletal:  warm,  No LE edema. Skin:  no jaundice or ecchymosis  Neurologic: eyes open. Follows commands with both hands. Psychiatric: calm    CXR:   5/25/22      Head CT:   1. No CT evidence of acute intracranial abnormality. 2. No significant change compared to prior exam.       Recent Labs     05/24/22  0229 05/25/22  0238   WBC 19.0* 20.5*   HGB 9.5* 8.8*   HCT 29.4* 25.4*    217     Recent Labs     05/24/22  0549 05/24/22  1117 05/24/22  1540 05/24/22  1944 05/25/22  0238      < > 140 139 138   K 4.5   < > 3.6 3.9 4.4   *   < > 103 102 101   CO2 13*   < > 30 27 30   BUN 79*   < > 27* 24* 30*   MG  --    < > 1.6* 1.5* 1.7*   PHOS  --    < > 1.3* 1.7* 2.5   BILITOT 0.3  --   --   --   --    *  --   --   --   --    *  --   --   --   --    ALKPHOS 227*  --   --   --   --     < > = values in this interval not displayed. ECHO: 05/21/22    TRANSTHORACIC ECHOCARDIOGRAM (TTE) COMPLETE (CONTRAST/BUBBLE/3D PRN) 05/22/2022  1:30 PM (Final)    Interpretation Summary    Left Ventricle: Hyperdynamic left ventricular systolic function with a visually estimated EF of 70 - 75%. Left ventricle size is normal. Mildly increased wall thickness. Normal diastolic function.   Aortic Valve: Tricuspid valve.  Mild sclerosis of the aortic valve cusp. The AV is incompletely interrogated though the peak velocity and mean gradient is suggestive of moderate stenosis of the aortic valve. AV mean gradient is 23 mmHg. AV peak velocity is 3.2 m/s. Signed by: Hernan Sesay MD on 5/22/2022  1:30 PM    Microbiology:   Urine culture negative,   Blood cultures pending    Ventilator Settings:  Ideal body weight: 59.3 kg (130 lb 11.7 oz)  Adjusted ideal body weight: 70.1 kg (154 lb 7 oz)  Mode FIO2 Rate Tidal Volume Pressure PEEP   PRVC 30% 24  460     8   Peak airway pressure:         Plateau Pressure: Plateau Pressure (cm H2O): 0 cm H2O   Minute ventilation:    ABG:   ph 7.26/PO2 111/CO2 29/base excess 13    Assessment and Plan:  (Medical Decision Making)   Impression: 48 y.o. female with found unresponsive at home. Admitted with BS > 2000 - corrected with insulin infusion. Acute on chronic renal failure. Nephrology following. Had recent ureteral stent placed. Required intubation on admission for airway maintenance. + septic shock requiring pressor support. On abx for suspected aspiration event. NEURO:   Sedation: none  Analgesia: Fentanyl infusion - on 25mcg. CV:   Volume Status: appears more euvolemic. SLED restarted and BP dropping and HR climbing some. SVV up to 12. May not need additional fluid removal at this time. Septic shock: on Levo for support. Art line placed and using flotrack now. Bradycardia: no further episodes with reduction in precedex dose. PULM:   Acute hypoxemic/hypercapneic respiratory failure:  Day 5 intubation. Low FIO2 needs. Changed to 8/8 PS and if doing well after 1 hour will extubate. RENAL  BLANCA:  Acute on chronic renal failure. Getting SLED  Metabolic acidosis: corrected with dialysis. Lactic acidosis: NA  Electrolytes: dialysis  GI:   Nutrition: TF  HEME:   Anemia: stable. Monitor and transfuse if < 7. Anticoagulation: low dose heparin  ID:   ?Possible Aspiration: Day 5 Vanc and Zosyn. Stop vanc today.    ENDO: DM: back on insulin drip evening of 5/23. Weaned off last night. Replacing with glargine and monitor. SSI   Skin: no decub, turns, preventive care  Prophy: Pepcid/low dose heparin      Full Code      Critically ill patient with complicated medical issues and high risk of further decompensation.  Total critical care time spent thus far (exclusive of procedures): 31 minutes    Mounika Rosado MD

## 2022-05-26 ENCOUNTER — APPOINTMENT (OUTPATIENT)
Dept: GENERAL RADIOLOGY | Age: 47
DRG: 870 | End: 2022-05-26
Payer: COMMERCIAL

## 2022-05-26 ENCOUNTER — CARE COORDINATION (OUTPATIENT)
Dept: CARE COORDINATION | Facility: CLINIC | Age: 47
End: 2022-05-26

## 2022-05-26 LAB
ANION GAP SERPL CALC-SCNC: 4 MMOL/L (ref 7–16)
BUN SERPL-MCNC: 23 MG/DL (ref 6–23)
CALCIUM SERPL-MCNC: 7.8 MG/DL (ref 8.3–10.4)
CHLORIDE SERPL-SCNC: 101 MMOL/L (ref 98–107)
CO2 SERPL-SCNC: 34 MMOL/L (ref 21–32)
CREAT SERPL-MCNC: 3.4 MG/DL (ref 0.6–1)
ERYTHROCYTE [DISTWIDTH] IN BLOOD BY AUTOMATED COUNT: 14.4 % (ref 11.9–14.6)
GLUCOSE BLD STRIP.AUTO-MCNC: 165 MG/DL (ref 65–100)
GLUCOSE BLD STRIP.AUTO-MCNC: 184 MG/DL (ref 65–100)
GLUCOSE BLD STRIP.AUTO-MCNC: 197 MG/DL (ref 65–100)
GLUCOSE BLD STRIP.AUTO-MCNC: 213 MG/DL (ref 65–100)
GLUCOSE SERPL-MCNC: 168 MG/DL (ref 65–100)
HCT VFR BLD AUTO: 25.3 % (ref 35.8–46.3)
HGB BLD-MCNC: 8.6 G/DL (ref 11.7–15.4)
MCH RBC QN AUTO: 26.6 PG (ref 26.1–32.9)
MCHC RBC AUTO-ENTMCNC: 34 G/DL (ref 31.4–35)
MCV RBC AUTO: 78.3 FL (ref 79.6–97.8)
NRBC # BLD: 0 K/UL (ref 0–0.2)
PHOSPHATE SERPL-MCNC: 2.6 MG/DL (ref 2.5–4.5)
PLATELET # BLD AUTO: 192 K/UL (ref 150–450)
PMV BLD AUTO: 10.3 FL (ref 9.4–12.3)
POTASSIUM SERPL-SCNC: 4.1 MMOL/L (ref 3.5–5.1)
RBC # BLD AUTO: 3.23 M/UL (ref 4.05–5.2)
SERVICE CMNT-IMP: ABNORMAL
SODIUM SERPL-SCNC: 139 MMOL/L (ref 136–145)
WBC # BLD AUTO: 17 K/UL (ref 4.3–11.1)

## 2022-05-26 PROCEDURE — 82962 GLUCOSE BLOOD TEST: CPT

## 2022-05-26 PROCEDURE — 92610 EVALUATE SWALLOWING FUNCTION: CPT

## 2022-05-26 PROCEDURE — 6370000000 HC RX 637 (ALT 250 FOR IP): Performed by: INTERNAL MEDICINE

## 2022-05-26 PROCEDURE — 85027 COMPLETE CBC AUTOMATED: CPT

## 2022-05-26 PROCEDURE — 6360000002 HC RX W HCPCS

## 2022-05-26 PROCEDURE — 99233 SBSQ HOSP IP/OBS HIGH 50: CPT | Performed by: INTERNAL MEDICINE

## 2022-05-26 PROCEDURE — 6360000002 HC RX W HCPCS: Performed by: INTERNAL MEDICINE

## 2022-05-26 PROCEDURE — 84100 ASSAY OF PHOSPHORUS: CPT

## 2022-05-26 PROCEDURE — 80048 BASIC METABOLIC PNL TOTAL CA: CPT

## 2022-05-26 PROCEDURE — 2580000003 HC RX 258: Performed by: INTERNAL MEDICINE

## 2022-05-26 PROCEDURE — 2000000000 HC ICU R&B

## 2022-05-26 PROCEDURE — 71045 X-RAY EXAM CHEST 1 VIEW: CPT

## 2022-05-26 RX ORDER — HALOPERIDOL 5 MG/ML
2 INJECTION INTRAMUSCULAR ONCE
Status: COMPLETED | OUTPATIENT
Start: 2022-05-26 | End: 2022-05-26

## 2022-05-26 RX ORDER — HALOPERIDOL 5 MG/ML
INJECTION INTRAMUSCULAR
Status: COMPLETED
Start: 2022-05-26 | End: 2022-05-26

## 2022-05-26 RX ADMIN — HEPARIN SODIUM 5000 UNITS: 5000 INJECTION INTRAVENOUS; SUBCUTANEOUS at 05:56

## 2022-05-26 RX ADMIN — HALOPERIDOL 2 MG: 5 INJECTION INTRAMUSCULAR at 13:07

## 2022-05-26 RX ADMIN — INSULIN GLARGINE 15 UNITS: 100 INJECTION, SOLUTION SUBCUTANEOUS at 08:26

## 2022-05-26 RX ADMIN — SODIUM CHLORIDE, PRESERVATIVE FREE 10 ML: 5 INJECTION INTRAVENOUS at 08:28

## 2022-05-26 RX ADMIN — HEPARIN SODIUM 5000 UNITS: 5000 INJECTION INTRAVENOUS; SUBCUTANEOUS at 13:07

## 2022-05-26 RX ADMIN — SODIUM CHLORIDE, PRESERVATIVE FREE 10 ML: 5 INJECTION INTRAVENOUS at 21:54

## 2022-05-26 RX ADMIN — PIPERACILLIN AND TAZOBACTAM 3375 MG: 3; .375 INJECTION, POWDER, LYOPHILIZED, FOR SOLUTION INTRAVENOUS at 05:56

## 2022-05-26 RX ADMIN — HEPARIN SODIUM 5000 UNITS: 5000 INJECTION INTRAVENOUS; SUBCUTANEOUS at 21:55

## 2022-05-26 RX ADMIN — SODIUM CHLORIDE, PRESERVATIVE FREE 10 ML: 5 INJECTION INTRAVENOUS at 08:25

## 2022-05-26 RX ADMIN — SODIUM CHLORIDE, PRESERVATIVE FREE 10 ML: 5 INJECTION INTRAVENOUS at 21:55

## 2022-05-26 RX ADMIN — Medication: at 08:26

## 2022-05-26 RX ADMIN — INSULIN LISPRO 4 UNITS: 100 INJECTION, SOLUTION INTRAVENOUS; SUBCUTANEOUS at 17:59

## 2022-05-26 RX ADMIN — PIPERACILLIN AND TAZOBACTAM 3375 MG: 3; .375 INJECTION, POWDER, LYOPHILIZED, FOR SOLUTION INTRAVENOUS at 17:58

## 2022-05-26 RX ADMIN — HALOPERIDOL LACTATE 2 MG: 5 INJECTION, SOLUTION INTRAMUSCULAR at 13:07

## 2022-05-26 ASSESSMENT — PAIN SCALES - GENERAL
PAINLEVEL_OUTOF10: 0

## 2022-05-26 NOTE — CARE COORDINATION
CTN will close episode at this time. Patient currently inpatient ICU. Possible reassignment per discharge.

## 2022-05-26 NOTE — PROGRESS NOTES
PT Note:   Participated in IDT rounds and believe patient could benefit from skilled physical therapy when deemed appropriate. Please place consult when/if patient cleared for mobility. Thanks,  Lynsey Morgan.  Terell Hu

## 2022-05-26 NOTE — PROGRESS NOTES
[facility]    Follow-Up on: BLANCA on CKD stage IV    HPI: Pt seen and examined. Awake and alert extubated. ROS:  Denies CP, SOB.     Current Facility-Administered Medications   Medication Dose Route Frequency    insulin glargine (LANTUS) injection vial 15 Units  15 Units SubCUTAneous Daily    heparin (porcine) injection 10,000 Units  10,000 Units IntraVENous PRN    albuterol (PROVENTIL) nebulizer solution 2.5 mg  2.5 mg Nebulization Q4H PRN    insulin regular (HUMULIN R;NOVOLIN R) injection 0-10 Units  0-10 Units IntraVENous PRN    dextrose 5 % and 0.45 % sodium chloride infusion   IntraVENous Continuous PRN    glucose chewable tablet 16 g  4 tablet Oral PRN    dextrose bolus 10% 125 mL  125 mL IntraVENous PRN    Or    dextrose bolus 10% 250 mL  250 mL IntraVENous PRN    glucagon injection 1 mg  1 mg IntraMUSCular PRN    dextrose 5 % solution  100 mL/hr IntraVENous PRN    insulin lispro (HUMALOG) injection vial 0-16 Units  0-16 Units SubCUTAneous Q6H    dextrose bolus 10% 125 mL  125 mL IntraVENous PRN    Or    dextrose bolus 10% 250 mL  250 mL IntraVENous PRN    glucagon injection 1 mg  1 mg IntraMUSCular PRN    medicated lip ointment (BLISTEX)   Topical PRN    sodium zirconium cyclosilicate (LOKELMA) oral suspension 10 g  10 g Oral Once    sodium chloride flush 0.9 % injection 5-40 mL  5-40 mL IntraVENous 2 times per day    sodium chloride flush 0.9 % injection 5-40 mL  5-40 mL IntraVENous PRN    heparin (porcine) injection 5,000 Units  5,000 Units SubCUTAneous q8h    ondansetron (ZOFRAN-ODT) disintegrating tablet 4 mg  4 mg Oral Q8H PRN    Or    ondansetron (ZOFRAN) injection 4 mg  4 mg IntraVENous Q6H PRN    polyethylene glycol (GLYCOLAX) packet 17 g  17 g Oral Daily PRN    acetaminophen (TYLENOL) tablet 650 mg  650 mg Oral Q6H PRN    Or    acetaminophen (TYLENOL) suppository 650 mg  650 mg Rectal Q6H PRN    norepinephrine (LEVOPHED) 16 mg in sodium chloride 0.9 % 250 mL infusion  2-100 mcg/min IntraVENous Continuous    dextrose bolus 10% 250 mL  250 mL IntraVENous PRN    potassium chloride 10 mEq/100 mL IVPB (Peripheral Line)  10 mEq IntraVENous PRN    magnesium sulfate 1000 mg in dextrose 5% 100 mL IVPB  1,000 mg IntraVENous PRN    sodium phosphate 10 mmol in sodium chloride 0.9 % 250 mL IVPB  10 mmol IntraVENous PRN    Or    sodium phosphate 15 mmol in dextrose 5 % 250 mL IVPB  15 mmol IntraVENous PRN    Or    sodium phosphate 20 mmol in dextrose 5 % 500 mL IVPB  20 mmol IntraVENous PRN    dextrose 5 % and 0.45 % sodium chloride infusion   IntraVENous Continuous PRN    sodium chloride flush 0.9 % injection 5-40 mL  5-40 mL IntraVENous 2 times per day    sodium chloride flush 0.9 % injection 5-40 mL  5-40 mL IntraVENous PRN    0.9 % sodium chloride infusion   IntraVENous PRN    piperacillin-tazobactam (ZOSYN) 3,375 mg in sodium chloride 0.9 % 50 mL IVPB (mini-bag)  3,375 mg IntraVENous Q12H       Exam:  Vitals:    05/26/22 0930 05/26/22 1004 05/26/22 1103 05/26/22 1118   BP:  127/70 132/72    Pulse: (!) 107 (!) 106 (!) 105    Resp: 21 21     Temp:    98.2 °F (36.8 °C)   TempSrc:    Oral   SpO2: 97% 95% 97%    Weight:       Height:             Intake/Output Summary (Last 24 hours) at 5/26/2022 1247  Last data filed at 5/26/2022 1119  Gross per 24 hour   Intake 655.85 ml   Output 5630 ml   Net -4974.15 ml     PE:  GEN - in no distress  CV - regular, no murmur, no rub  Lung - clear bilaterally  Abd - soft, nontender  Ext - 2+ edema    Labs  Recent Labs     05/24/22  0229 05/25/22  0238 05/26/22  0323   WBC 19.0* 20.5* 17.0*   HGB 9.5* 8.8* 8.6*   HCT 29.4* 25.4* 25.3*    217 192     Recent Labs     05/24/22  1540 05/24/22  1540 05/24/22  1944 05/24/22  1944 05/25/22  0238 05/25/22  0238 05/25/22  0725 05/25/22  1138 05/26/22  0323      < > 139  --  138  --   --   --  139   K 3.6   < > 3.9  --  4.4  --   --   --  4.1      < > 102  --  101  --   --   -- 101   CO2 30   < > 27  --  30  --   --   --  34*   BUN 27*   < > 24*  --  30*  --   --   --  23   MG 1.6*  --  1.5*  --  1.7*  --   --   --   --    PHOS 1.3*   < > 1.7*   < > 2.5   < > 2.8 2.4* 2.6    < > = values in this interval not displayed. No results for input(s): PH, PCO2, PO2, PCO2 in the last 72 hours.     Problem List:  Patient Active Problem List    Diagnosis Date Noted    Altered mental status     Frailty     Cardiac arrest ([facility] 05/21/2022    Diabetic ketoacidosis with coma associated with type 1 diabetes mellitus ([facility] 05/21/2022    Severe sepsis with septic shock (CODE) ([facility] 05/21/2022    Pneumonia, bacterial 05/21/2022    Acute respiratory failure ([facility] 05/21/2022    Benign hypertension with CKD (chronic kidney disease) stage III ([facility] 01/04/2022    Acute renal failure ([facility] 06/09/2021    IDDM (insulin dependent diabetes mellitus) 01/24/2012    BLANCA (acute kidney injury) ([facility] 04/21/2022    Ureteric stone 04/19/2022    Urinary tract infection without hematuria 04/19/2022    Hydronephrosis of right kidney 04/19/2022    Malodorous urine 11/16/2021    H/O gastroesophageal reflux (GERD) 06/19/2020    Encounter for annual routine gynecological examination 11/27/2018    Encounter to discuss test results 08/22/2018    Diabetes mellitus type 1 ([facility] 08/22/2018    Type 1 diabetes mellitus with stage 3 chronic kidney disease ([facility] 08/16/2018    Noncompliance with medication regimen 08/16/2018    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    Depression with anxiety 07/11/2017    Ulcer, skin, chronic ([facility] 03/13/2017    CKD (chronic kidney disease), stage IV ([facility] 03/13/2017    Neuropathy, peripheral, idiopathic 03/13/2017    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 Type I DM  2. DKA BS>2000 on admit  3. BLANCA on CKD stage IV Now oliguric. Pt  Looks good. 4. Anemia   5.  Plan HD in am

## 2022-05-26 NOTE — PROGRESS NOTES
LTG: patient will tolerate safest, least restrictive oral diet without s/sx airway compromise  STG: Patient will tolerate ongoing po trials in efforts to advance diet  STG: Patient will participate in modified barium swallow study to objectively assess oropharyngeal swallow      SPEECH LANGUAGE PATHOLOGY: DYSPHAGIA  Initial Assessment    NAME: Manjit Molina  : 1975  MRN: [id number]    ADMISSION DATE: 2022  ADMITTING DIAGNOSIS: has Ulcer, skin, chronic ([address] Depression with anxiety; Moderate single current episode of major depressive disorder ([address] Encounter to discuss test results; Encounter for annual routine gynecological examination; CKD (chronic kidney disease), stage IV ([address] Diabetic polyneuropathy associated with type 1 diabetes mellitus ([address] Malodorous urine; CKD (chronic kidney disease) stage 3, GFR 30-59 ml/min ([address] Type 1 diabetes mellitus with stage 3 chronic kidney disease ([address] H/O gastroesophageal reflux (GERD); Neuropathy, peripheral, idiopathic; 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 associated with type 1 diabetes mellitus ([address] Premature ovarian failure; Noncompliance with medication regimen; Ureteric stone; Urinary tract infection without hematuria; Hydronephrosis of right kidney; Diabetes mellitus type 1 ([address] Nausea; BLANCA (acute kidney injury) ([address] Acute renal failure ([address] Benign hypertension with CKD (chronic kidney disease) stage III (HCC); IDDM (insulin dependent diabetes mellitus); Cardiac arrest ([address] Diabetic ketoacidosis with coma associated with type 1 diabetes mellitus ([address] Severe sepsis with septic shock (CODE) ([address] Pneumonia, bacterial; Acute respiratory failure ([address]  Altered mental status; and Frailty on their problem list.  Date of Eval: 2022  ICD-10: Treatment Diagnosis: R13.12 Dysphagia, Oropharyngeal Phase    RECOMMENDATIONS   Diet:  Diet Solids Recommendation: NPO  Liquid Consistency Recommendation: NPO    Medications:  (Crushed in puree if must be given orally)     Recommendations: NPO;Dysphagia treatment   Compensatory Swallowing Strategies: (N/A)   Therapeutic Intervention:Oral care; Patient/Family education; Therapeutic PO trials with SLP   Patient continues to require skilled intervention: Yes   D/C Recommendations: Ongoing speech therapy is recommended during this hospitalization     ASSESSMENT    Dysphagia Diagnosis: Moderate to severe oral stage dysphagia,Moderate to severe pharyngeal stage dysphagia    Patient presents with moderate-severe oropharyngeal dysphagia. She is slow to respond to questions and voice remains hoarse s/p extubation. Minimal oral opening to accept po trials despite verbal cues to fully open mouth. Delayed and incoordinated swallow upon palpation with thin by tsp which resulted in immediate strong cough response. Oral holding and suspected piecemeal swallow with puree. She took over 15 seconds to clear oral cavity of less than 1/2 tsp of puree and multiple swallows palpated. Unable to rule out pharyngeal stasis at this time. Recommend NPO with Q4 hour oral care. Non-oral medications, but can be crushed in puree if must be given orally. Speech will continue following for dysphagia management in attempt to identify least restrictive oral diet. GENERAL    Chart Reviewed: Yes  Subjective: Awake, slow to respond. S/p extubation 5/25. Voice remains hoarse.   Follows Directions: Simple  Respiratory Status: O2 via nasual cannula      Current Diet : NPO     Pain:   Patient does not c/o pain         History of Present Injury/Illness: Ms. Martha Cantor  has a past medical history of Acute renal failure ([facility] [address] CKD (chronic kidney disease) stage 3, GFR 30-59 ml/min ([facility]), Gastroenteritis, acute, IDDM (insulin dependent diabetes mellitus), Intractable nausea and vomiting, Irregular menses, Kidney stone, Neuropathy, peripheral, idiopathic, Retinopathy, bilateral, Trichomoniasis, Ulcer, skin, chronic ([address] and Vaginal burning. . She also  has a past surgical history that includes cystoscopy,insert ureteral stent (04/2022); amputation (Left); amputation (1/27/12); and hx open reduction internal fixation (Right, 2011). OBJECTIVE        Oral Motor   Labial: No impairment  Dentition: Intact  Oral Hygiene: Moist  Lingual: Decreased rate;Decreased strength  Dentition: Adequate     Oropharyngeal Phase     Vocal Quality: Hoarse  Consistency Presented: Ice Chips; Thin;Pureed  How Presented: Self-fed/presented;Spoon  Bolus Acceptance: Impaired (Minimal oral opening to accept trials despite moderate verbal cues to open mouth)  Bolus Formation/Control: Impaired  Type of Impairment: Oral holding;Piecemeal  Propulsion: Delayed (# of seconds); Discoordination  Oral Residue: Less than 10% of bolus  Initiation of Swallow: Delayed (# of seconds)  Laryngeal Elevation: Weak;Decreased  Aspiration Signs/Symptoms: Strong cough; Watery eyes (with thin liquids by tsp)  Pharyngeal Phase Characteristics: Poor endurance; Suspected pharyngeal residue;Multiple swallows (Noted with puree trials.)  Effective Modifications: None  Cues for Modifications: Moderate to maximal       Oral Phase - Comment: Moderate-severe impairment  Pharyngeal Phase: Moderate-severe impairment    PLAN    Duration/Frequency: Continue to follow patient 3x/week for duration of hospitalization and/or until goals met    Dysphagia Outcome and Severity Scale (OTIS)  Dysphagia Outcome Severity Scale: Level 2: Moderate Severe dysphagia- Maximum assistance or maximum use of strategies with partial PO only  Interpretation of Tool: The Dysphagia Outcome and Severity Scale (OTIS) is a simple, easy-to-use, 7-point scale developed to systematically rate the functional severity of dysphagia based on objective assessment and make recommendations for diet level, independence level, and type of nutrition.    Normal(7), Functional(6), Mild(5), Mild-Moderate(4), Moderate(3), Moderate-Severe(2), Severe(1)    Speech Therapy Prognosis  Prognosis: Good  Prognosis Considerations: Medical Diagnosis;Participation Level    Education: Alexander Aviles Patient Education: Role of speech therapy, dysphagia   Patient Education Response: Verbalizes understanding,Needs reinforcement    Current Medications:   No current facility-administered medications on file prior to encounter. Current Outpatient Medications on File Prior to Encounter   Medication Sig Dispense Refill    atorvastatin (LIPITOR) 80 MG tablet Take 80 mg by mouth daily      brimonidine (ALPHAGAN P) 0.1 % SOLN 1 drop in left eye twice a day      ergocalciferol (ERGOCALCIFEROL) 1.25 MG (90055 UT) capsule Take 50,000 Units by mouth      gabapentin (NEURONTIN) 300 MG capsule Take 300 mg by mouth daily.  Insulin Degludec (TRESIBA FLEXTOUCH) 200 UNIT/ML SOPN Inject 30 Units into the skin daily      insulin lispro, 1 Unit Dial, (HUMALOG KWIKPEN) 100 UNIT/ML SOPN 6 TIDAC correction scale 1/50>150, max daily dose 50 units      ondansetron (ZOFRAN-ODT) 4 MG disintegrating tablet Take 4 mg by mouth every 8 hours as needed         PRECAUTIONS/ALLERGIES: Patient has no known allergies.    Safety Devices in place: Yes  Type of devices: Left in bed,Nurse notified    Therapy Time  SLP Individual Minutes  Time In: 7354  Time Out: 4605  Minutes: [address] RYLEY Sams  5/26/2022 9:15 AM
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
## 2022-05-26 NOTE — PLAN OF CARE
Problem: Pain  Goal: Verbalizes/displays adequate comfort level or baseline comfort level  Outcome: Progressing     Problem: Confusion  Goal: Confusion, delirium, dementia, or psychosis is improved or at baseline  Description: INTERVENTIONS:  1. Assess for possible contributors to thought disturbance, including medications, impaired vision or hearing, underlying metabolic abnormalities, dehydration, psychiatric diagnoses, and notify attending LIP  2. [place] high risk fall precautions, as indicated  3. Provide frequent short contacts to provide reality reorientation, refocusing and direction  4. Decrease environmental stimuli, including noise as appropriate  5. Monitor and intervene to maintain adequate nutrition, hydration, elimination, sleep and activity  6. If unable to ensure safety without constant attention obtain sitter and review sitter guidelines with assigned personnel  7.  Initiate Psychosocial CNS and Spiritual Care consult, as indicated  Outcome: Progressing     Problem: Respiratory - Adult  Goal: Achieves optimal ventilation and oxygenation  Outcome: Progressing     Problem: Cardiovascular - Adult  Goal: Maintains optimal cardiac output and hemodynamic stability  Outcome: Progressing  Flowsheets (Taken 5/26/2022 0710)  Maintains optimal cardiac output and hemodynamic stability: Monitor urine output and notify Licensed Independent Practitioner for values outside of normal range  Goal: Absence of cardiac dysrhythmias or at baseline  Outcome: Progressing  Flowsheets (Taken 5/26/2022 0710)  Absence of cardiac dysrhythmias or at baseline: Monitor cardiac rate and rhythm

## 2022-05-26 NOTE — PROGRESS NOTES
Comprehensive Nutrition Assessment    Type and Reason for Visit: Reassess  Tube Feeding Management (Pulmonology)    Nutrition Recommendations/Plan:   Enteral Nutrition:   Discontinue TF orders pt currently without EN access. Nutritional Supplement Therapy:   Active electrolyte replacement per nutrition support protocols  Replacement indicated:  None  Labs:   EN labs: Discontinued. POC Glucoses Active  Meals and Snacks:  Continue current diet. NPO. Progression per SLP. If pt is unable to initiate oral diet with SLP follow-up, NGFT should be pursued for primary needs if within goals of care. Malnutrition Assessment:  Malnutrition Status: At risk for malnutrition (Comment) (found down at home-unknown time, intubated on admission, possible wt loss-unable to validate)  Nutrition Assessment:  Nutrition History:     5/22: Unable to obtain  Nutrition Background:   H/O: HTN, HLD, DM, CKD3, recent admission for right sided ureteral obstruction s/p stent placement. P/W found unresponsiveness after not heard from the last several days. Findings of DKA, BLANCA. Had brief cardiac arrest and was intubated in ED. Nutrition Interval:  TF initiated 5/24. Extubated 5/25, EN access d/c. +SLED 5/25, plan for HD 5/27. SLP eval 5/26: NPO. PT sleeping at RD visit, s/p haldol. Pertinent Medications: lantus 15 units daily, SSI, zosyn    Pertinent Labs:   Lab Results   Component Value Date     05/26/2022    K 4.1 05/26/2022     05/26/2022    CO2 34 05/26/2022    BUN 23 05/26/2022    CREATININE 3.40 05/26/2022    GLUCOSE 168 05/26/2022    CALCIUM 7.8 05/26/2022    PHOS 2.6 05/26/2022    MG 1.7 05/25/2022     Nutrition Related Findings:   5/24: NFPE limited to exposed upper body secondary to intubation, dialysis.         Current Nutrition Therapies:  Diet NPO  ADULT TUBE FEEDING; Orogastric; Renal Formula; Continuous; 10; Yes; 10; Q 8 hours; 40; 25; Q 1 hour    Current Intake:   Average Meal Intake: NPO Anthropometric Measures:  Height: 5' 6\" (167.6 cm)  Current Body Wt: 196 lb 13.9 oz (89.3 kg), Weight source: Bed Scale  BMI: 31.8  Admission Body Weight: 172 lb (78 kg) (estimated)  Ideal Body Weight (Kg) (Calculated): 59 kg (130 lbs),    Usual Body Wt:  (see below),   179# (OV GYN 11/26/21), 180# OV PCP 8/16/2021)  Percent weight change:   .Weight variances likely d/t fluid and scale variances with h/o CKD         Edema: No data recorded  BMI Category  (defer d/t wide weight range)  Estimated Daily Nutrient Needs:  Energy (kcal/day): 5533-6646 (25-30 kcal/kg) (Kcal/kg Weight used: 59 kg Ideal (59 kg)  Protein (g/day): 77-89 (1.3-1.5 g/kg) Weight Used: (Ideal (59 kg)) 59 kg  Fluid (ml/day):   (Standard Renal)    Nutrition Diagnosis:   · Inadequate oral intake related to swallowing difficulty as evidenced by swallow study results (extubated)    Nutrition Interventions:   Food and/or Nutrient Delivery: Continue NPO,Discontinue Tube Feeding     Coordination of Nutrition Care: Continue to monitor while inpatient  Plan of Care discussed with: Teri Rehman RN    Goals:   Previous Goal Met: Progress towards Goal(s) Declining  Active Goal: by next RD assessment (Tolerate initiation of nutrition regimen)       Nutrition Monitoring and Evaluation:      Food/Nutrient Intake Outcomes: Diet Advancement/Tolerance  Physical Signs/Symptoms Outcomes: Biochemical Data,GI Status,Fluid Status or Edema,Weight    Discharge Planning:     Too soon to determine    Rito Navjot [address], [address]

## 2022-05-26 NOTE — PROGRESS NOTES
[facility] Critical Care Note[de-identified] 5/26/2022  Radha Montague  Admission Date: 5/21/2022     Length of Stay: 5 days    Background: 48 y.o. y/o female with unresponsiveness.     46F with htn, hld, dm, ckd3, had recent admission for right sided ureteral obstruction s/p stent placement presents with unresponsiveness.  The patient had not been heard from the last several days. Karolina Moore was found unresponsive in her home.  Brought in by EMS and was found to have numerous lab abnormalities. Karyn Stevenson was found to be in DKA with a serum glucose of greater than 2000.  She was in acute renal failure with a creatinine of 9 and a potassium of 6.7.  She was acidotic with a pH of 6.8.  The patient was intubated on admission.  She suffered a brief cardiac arrest.  She was started on IV hydration, insulin drip, and broad-spectrum antibiotics.  CT of the head was negative. Nephrology is following for renal failure. Extubated 5/25       Notable PMH:    CKD (chronic kidney disease) stage 3, GFR 30-59 ml/min ([facility]), Gastroenteritis, acute, IDDM (insulin dependent diabetes mellitus), Intractable nausea and vomiting, Irregular menses, Kidney stone, Neuropathy, peripheral, idiopathic, Retinopathy, bilateral, Trichomoniasis, Ulcer, skin, chronic ([address] and Vaginal burning. 24 Hour events:  Patient was extubated yesterday. On RA. Ran SLED yesterday. Off pressors. Pt with raspy sounding voice. No specific complaints. ROS: Negative except as above.     Lines: (insertion date)   ETT: (5/21-5/25)  Avalos: (5/21)  OGT: (5/21)  Central line: (5/21)  Arterial Line (NA)  PIV (5/21)    Drips: current dose (range)  Dose (mcg/kg/min) Propofol : 0 mcg/kg/min  Dose (mcg/kg/hr) Dexmedetomidine: 0 mcg/kg/hr  Dose (mcg/hr) Fentanyl: 0 mcg/hr  Dose (mcg/min) Norepinephrine: 0 mcg/min (map 79)  Dose (units/hr) Vasopressin: 0 Units/hr  Dose (units/hr) Insulin : 0 Units/hr     Pertinent Exam:         Blood pressure (!) 125/57, pulse (!) 111, temperature 98.2 °F (36.8 °C), temperature source Oral, resp. rate 20, height 5' 6\" (1.676 m), weight 196 lb 13.9 oz (89.3 kg), SpO2 98 %. Intake/Output Summary (Last 24 hours) at 5/26/2022 7157  Last data filed at 5/26/2022 0600  Gross per 24 hour   Intake 655.85 ml   Output 6601 ml   Net -5945.15 ml     Constitutional:  alert and in NAD  EENMT:  Sclera clear, pupils equal, orally intubated  Respiratory: Mild B rhonchi that mostly clears with cough. Cardiovascular:  RRR no m/r/g  Gastrointestinal:  soft with no evidence tenderness; positive bowel sounds but hypoactive. Musculoskeletal:  warm,  No LE edema. Skin:  no jaundice or ecchymosis  Neurologic: alert and oriented to person, place. Weak extremities. Psychiatric: calm    CXR:   5/26/22      Head CT:   1. No CT evidence of acute intracranial abnormality. 2. No significant change compared to prior exam.       Recent Labs     05/24/22  0229 05/25/22  0238 05/26/22  0323   WBC 19.0* 20.5* 17.0*   HGB 9.5* 8.8* 8.6*   HCT 29.4* 25.4* 25.3*    217 192     Recent Labs     05/24/22  0549 05/24/22  1117 05/24/22  1540 05/24/22  1540 05/24/22  1944 05/24/22  1944 05/25/22  0238 05/25/22  0238 05/25/22  0725 05/25/22  1138 05/26/22  0323      < > 140   < > 139  --  138  --   --   --  139   K 4.5   < > 3.6   < > 3.9  --  4.4  --   --   --  4.1   *   < > 103   < > 102  --  101  --   --   --  101   CO2 13*   < > 30   < > 27  --  30  --   --   --  34*   BUN 79*   < > 27*   < > 24*  --  30*  --   --   --  23   MG  --    < > 1.6*  --  1.5*  --  1.7*  --   --   --   --    PHOS  --    < > 1.3*   < > 1.7*   < > 2.5   < > 2.8 2.4* 2.6   BILITOT 0.3  --   --   --   --   --   --   --   --   --   --    *  --   --   --   --   --   --   --   --   --   --    *  --   --   --   --   --   --   --   --   --   --    ALKPHOS 227*  --   --   --   --   --   --   --   --   --   --     < > = values in this interval not displayed.      ECHO: 05/21/22    TRANSTHORACIC ECHOCARDIOGRAM (TTE) COMPLETE (CONTRAST/BUBBLE/3D PRN) 05/22/2022  1:30 PM (Final)    Interpretation Summary    Left Ventricle: Hyperdynamic left ventricular systolic function with a visually estimated EF of 70 - 75%. Left ventricle size is normal. Mildly increased wall thickness. Normal diastolic function.   Aortic Valve: Tricuspid valve. Mild sclerosis of the aortic valve cusp. The AV is incompletely interrogated though the peak velocity and mean gradient is suggestive of moderate stenosis of the aortic valve. AV mean gradient is 23 mmHg. AV peak velocity is 3.2 m/s. Signed by: Piero Macias MD on 5/22/2022  1:30 PM    Microbiology:   Urine culture negative,   Blood cultures pending    Ventilator Settings:  Ideal body weight: 59.3 kg (130 lb 11.7 oz)  Adjusted ideal body weight: 71.3 kg (157 lb 3 oz)  Mode FIO2 Rate Tidal Volume Pressure PEEP   PRVC 30% 24  460     8   Peak airway pressure:         Plateau Pressure: Plateau Pressure (cm H2O): 0 cm H2O   Minute ventilation:    ABG:   ph 7.26/PO2 111/CO2 29/base excess 13    Assessment and Plan:  (Medical Decision Making)   Impression: 48 y.o. female with found unresponsive at home. Admitted with BS > 2000 - corrected with insulin infusion. Acute on chronic renal failure. Nephrology following. Had recent ureteral stent placed. Required intubation on admission for airway maintenance. + septic shock requiring pressor support. On abx for suspected aspiration event. NEURO:   Sedation: none  Analgesia: fentanyl off  CV:   Volume Status: appears more euvolemic. SLED  Septic shock: now off pressors. Bradycardia: no further episodes with reduction in precedex dose. PULM:   Acute hypoxemic/hypercapneic respiratory failure:  Extubated 5/25. RENAL  BLANCA:  Acute on chronic renal failure. Getting SLED  Metabolic acidosis: corrected with dialysis.   Lactic acidosis: NA  Electrolytes: dialysis  GI:   Nutrition: Speech eval then diet if

## 2022-05-26 NOTE — INTERDISCIPLINARY ROUNDS
Multi-D Rounds/Checklist (leapfrog):  Lines: can any be removed?: Yes   DVT Prophylaxis: Yes   Vent: HOB elevated? N/A              CC/Kg?: N/A              Vent Day?: N/A   Nutrition Ordered/appropriate: Yes   Can antibiotics or other drugs be stopped?: No  Consults needed: Yes   A: Is pain control adequate? Yes   B: Sedation break and SBT? N/A   C: Is sedation choice appropriate? N/A   D: Delirium/CAM-ICU? No   E: Mobility goals/appropriateness? Yes   F: Family update and plan? Mother is primary contact and is being updated daily by primary attending and nursing staff.     Lisa Cheema, APRN - CNP

## 2022-05-27 PROBLEM — N17.9 AKI (ACUTE KIDNEY INJURY) (HCC): Status: ACTIVE | Noted: 2022-04-21

## 2022-05-27 PROBLEM — E10.9 DIABETES MELLITUS TYPE 1 (HCC): Status: ACTIVE | Noted: 2018-08-22

## 2022-05-27 LAB
ANION GAP SERPL CALC-SCNC: 9 MMOL/L (ref 7–16)
BUN SERPL-MCNC: 38 MG/DL (ref 6–23)
CALCIUM SERPL-MCNC: 8.1 MG/DL (ref 8.3–10.4)
CHLORIDE SERPL-SCNC: 103 MMOL/L (ref 98–107)
CO2 SERPL-SCNC: 30 MMOL/L (ref 21–32)
CREAT SERPL-MCNC: 5.2 MG/DL (ref 0.6–1)
ERYTHROCYTE [DISTWIDTH] IN BLOOD BY AUTOMATED COUNT: 14.6 % (ref 11.9–14.6)
GLUCOSE BLD STRIP.AUTO-MCNC: 124 MG/DL (ref 65–100)
GLUCOSE BLD STRIP.AUTO-MCNC: 134 MG/DL (ref 65–100)
GLUCOSE BLD STRIP.AUTO-MCNC: 168 MG/DL (ref 65–100)
GLUCOSE BLD STRIP.AUTO-MCNC: 179 MG/DL (ref 65–100)
GLUCOSE BLD STRIP.AUTO-MCNC: 182 MG/DL (ref 65–100)
GLUCOSE SERPL-MCNC: 180 MG/DL (ref 65–100)
HCT VFR BLD AUTO: 24.7 % (ref 35.8–46.3)
HGB BLD-MCNC: 8 G/DL (ref 11.7–15.4)
MCH RBC QN AUTO: 26.4 PG (ref 26.1–32.9)
MCHC RBC AUTO-ENTMCNC: 32.4 G/DL (ref 31.4–35)
MCV RBC AUTO: 81.5 FL (ref 79.6–97.8)
NRBC # BLD: 0.02 K/UL (ref 0–0.2)
PHOSPHATE SERPL-MCNC: 3.7 MG/DL (ref 2.5–4.5)
PLATELET # BLD AUTO: 185 K/UL (ref 150–450)
PMV BLD AUTO: 10.4 FL (ref 9.4–12.3)
POTASSIUM SERPL-SCNC: 3.9 MMOL/L (ref 3.5–5.1)
RBC # BLD AUTO: 3.03 M/UL (ref 4.05–5.2)
SERVICE CMNT-IMP: ABNORMAL
SODIUM SERPL-SCNC: 142 MMOL/L (ref 136–145)
WBC # BLD AUTO: 13.9 K/UL (ref 4.3–11.1)

## 2022-05-27 PROCEDURE — 2580000003 HC RX 258: Performed by: INTERNAL MEDICINE

## 2022-05-27 PROCEDURE — 6360000002 HC RX W HCPCS: Performed by: INTERNAL MEDICINE

## 2022-05-27 PROCEDURE — 99232 SBSQ HOSP IP/OBS MODERATE 35: CPT | Performed by: INTERNAL MEDICINE

## 2022-05-27 PROCEDURE — 97162 PT EVAL MOD COMPLEX 30 MIN: CPT

## 2022-05-27 PROCEDURE — 80048 BASIC METABOLIC PNL TOTAL CA: CPT

## 2022-05-27 PROCEDURE — 84100 ASSAY OF PHOSPHORUS: CPT

## 2022-05-27 PROCEDURE — 1100000000 HC RM PRIVATE

## 2022-05-27 PROCEDURE — 92526 ORAL FUNCTION THERAPY: CPT

## 2022-05-27 PROCEDURE — 82962 GLUCOSE BLOOD TEST: CPT

## 2022-05-27 PROCEDURE — 85027 COMPLETE CBC AUTOMATED: CPT

## 2022-05-27 PROCEDURE — 6370000000 HC RX 637 (ALT 250 FOR IP): Performed by: INTERNAL MEDICINE

## 2022-05-27 PROCEDURE — 97166 OT EVAL MOD COMPLEX 45 MIN: CPT

## 2022-05-27 PROCEDURE — 97530 THERAPEUTIC ACTIVITIES: CPT

## 2022-05-27 PROCEDURE — 97535 SELF CARE MNGMENT TRAINING: CPT

## 2022-05-27 RX ADMIN — PIPERACILLIN AND TAZOBACTAM 3375 MG: 3; .375 INJECTION, POWDER, LYOPHILIZED, FOR SOLUTION INTRAVENOUS at 17:22

## 2022-05-27 RX ADMIN — PIPERACILLIN AND TAZOBACTAM 3375 MG: 3; .375 INJECTION, POWDER, LYOPHILIZED, FOR SOLUTION INTRAVENOUS at 05:33

## 2022-05-27 RX ADMIN — SODIUM CHLORIDE, PRESERVATIVE FREE 10 ML: 5 INJECTION INTRAVENOUS at 08:51

## 2022-05-27 RX ADMIN — HEPARIN SODIUM 10000 UNITS: 1000 INJECTION INTRAVENOUS; SUBCUTANEOUS at 09:44

## 2022-05-27 RX ADMIN — SODIUM CHLORIDE, PRESERVATIVE FREE 5 ML: 5 INJECTION INTRAVENOUS at 21:30

## 2022-05-27 RX ADMIN — HEPARIN SODIUM 5000 UNITS: 5000 INJECTION INTRAVENOUS; SUBCUTANEOUS at 05:32

## 2022-05-27 RX ADMIN — HEPARIN SODIUM 5000 UNITS: 5000 INJECTION INTRAVENOUS; SUBCUTANEOUS at 14:30

## 2022-05-27 RX ADMIN — SODIUM CHLORIDE, PRESERVATIVE FREE 10 ML: 5 INJECTION INTRAVENOUS at 21:30

## 2022-05-27 RX ADMIN — INSULIN GLARGINE 15 UNITS: 100 INJECTION, SOLUTION SUBCUTANEOUS at 08:48

## 2022-05-27 RX ADMIN — HEPARIN SODIUM 5000 UNITS: 5000 INJECTION INTRAVENOUS; SUBCUTANEOUS at 21:30

## 2022-05-27 RX ADMIN — SODIUM CHLORIDE, PRESERVATIVE FREE 10 ML: 5 INJECTION INTRAVENOUS at 08:48

## 2022-05-27 ASSESSMENT — PAIN SCALES - GENERAL
PAINLEVEL_OUTOF10: 0

## 2022-05-27 NOTE — PROGRESS NOTES
[facility] Hospitalist Consult   Admit Date:  2022  3:51 PM   Name:  Kary Lopes   Age:  48 y.o. Sex:  female  :  1975   MRN:  [id number]   Room:  [address] [place] [address]    Presenting Complaint: Altered Mental Status    Reason(s) for Admission: Cardiac arrest ([facility] [address] [I46.9]  Hyperkalemia [E87.5]  Septic shock ([facility] [address] [A41.9, R65.21]  Acute respiratory failure, unspecified whether with hypoxia or hypercapnia (HCC) [J96.00]  Acute renal failure, unspecified acute renal failure type [facility]) [N17.9]     Hospitalists consulted by Gabe Borden MD for: assuming of care     History of Presenting Illness:   Kary Lopes is a 48 y.o. female with history of   Diabetes mellitus type 1  Hypertension   CKD stage III     who was admitted initially to intensivist service for unresponsiveness. Prior to this admission, patient was admitted recently for right sided ureteral obstruction s/p stent placement. Patient was not heard from by family members, so EMS was called to see her and she was found unresponsive. Initially her BS was more than 2000. She had DKA. She had BLANCA with creatinine of 9 and K of 6.7. patient was intubated. She suffered a brief cardiac arrest. She was treated for DKA and respiratory failure. CT of head was negative for acute findings. Nephrology was consulted for her renal failure and she was started on SLED. She was extubated on May 25, 2022. Currently she is alert and oriented. Patient is being transferred out of ICU and Hospitalist service is requested to assume care for the patient. Review of Systems:  10 systems reviewed and negative except as noted in HPI. Assessment & Plan:     DKA at the time of admission   Now improved. Blood sugar is in mid to high 100s ranges. No acidosis now. Patient is on dialysis to help acidosis too. On Lantus and sliding scale coverage. Respiratory failure   S/P intubation and extubation   Improved. Resolved. BLANCA on CKD stage III   Now dialysis dependent. Will continue to monitor renal function and see if renal function will recover. Hypertension   BP is controlled at 138/72 mmHg today. Recent septic shock   So far, cultures are negative. On empiric Zosyn   Will continue to follow on clinical response and culture result. I have discussed the plan of care with patient and family members at bedside.            Discharge Planning:    to be determined     Diet:  ADULT DIET; Easy to Chew  DVT PPx: heparin SC   Code status: full code     Past History:  Past Medical History:   Diagnosis Date    Acute renal failure ([facility] [address] 2012    CKD (chronic kidney disease) stage 3, GFR 30-59 ml/min ([facility])     Gastroenteritis, acute 2012    IDDM (insulin dependent diabetes mellitus) 2012    Type 1 av -150 can tell Hypo below 70    Intractable nausea and vomiting 2014    Irregular menses     Kidney stone     Neuropathy, peripheral, idiopathic 3/13/2017    Retinopathy, bilateral 3/13/2017    Trichomoniasis 3/13/2017    Ulcer, skin, chronic ([facility] [address] 3/13/2017    Vaginal burning       Past Surgical History:   Procedure Laterality Date    AMPUTATION Left     5th Toe due to Diabetic Ulcer    AMPUTATION  12    gangrene    CYSTOSCOPY,INSERT URETERAL STENT  2022    HX OPEN REDUCTION INTERNAL FIXATION Right     ankle      No Known Allergies   Social History     Tobacco Use    Smoking status: Former Smoker     Quit date: 2013     Years since quittin.5    Smokeless tobacco: Never Used   Substance Use Topics    Alcohol use: Yes      Family History   Problem Relation Age of Onset    Prostate Cancer Father     Diabetes Paternal Grandmother     Colon Cancer Neg Hx     Uterine Cancer Neg Hx     Diabetes Father         DM Type II    Stroke Father     Diabetes Paternal Grandfather         DM Type II     Hypertension Maternal Grandmother     Ovarian Cancer Neg Hx     Breast Cancer Mother 76    Diabetes Maternal Grandmother         DM Type II      Family history reviewed and negative except as otherwise noted.     Immunization History   Administered Date(s) Administered    COVID-19, Pfizer Purple top, DILUTE for use, 12+ yrs, 30mcg/0.3mL dose 02/10/2021     Current Facility-Administered Medications   Medication Dose Route Frequency    insulin glargine (LANTUS) injection vial 15 Units  15 Units SubCUTAneous Daily    heparin (porcine) injection 10,000 Units  10,000 Units IntraVENous PRN    albuterol (PROVENTIL) nebulizer solution 2.5 mg  2.5 mg Nebulization Q4H PRN    insulin regular (HUMULIN R;NOVOLIN R) injection 0-10 Units  0-10 Units IntraVENous PRN    dextrose 5 % and 0.45 % sodium chloride infusion   IntraVENous Continuous PRN    glucose chewable tablet 16 g  4 tablet Oral PRN    dextrose bolus 10% 125 mL  125 mL IntraVENous PRN    Or    dextrose bolus 10% 250 mL  250 mL IntraVENous PRN    glucagon injection 1 mg  1 mg IntraMUSCular PRN    dextrose 5 % solution  100 mL/hr IntraVENous PRN    insulin lispro (HUMALOG) injection vial 0-16 Units  0-16 Units SubCUTAneous Q6H    dextrose bolus 10% 125 mL  125 mL IntraVENous PRN    Or    dextrose bolus 10% 250 mL  250 mL IntraVENous PRN    glucagon injection 1 mg  1 mg IntraMUSCular PRN    medicated lip ointment (BLISTEX)   Topical PRN    sodium zirconium cyclosilicate (LOKELMA) oral suspension 10 g  10 g Oral Once    sodium chloride flush 0.9 % injection 5-40 mL  5-40 mL IntraVENous 2 times per day    sodium chloride flush 0.9 % injection 5-40 mL  5-40 mL IntraVENous PRN    heparin (porcine) injection 5,000 Units  5,000 Units SubCUTAneous q8h    ondansetron (ZOFRAN-ODT) disintegrating tablet 4 mg  4 mg Oral Q8H PRN    Or    ondansetron (ZOFRAN) injection 4 mg  4 mg IntraVENous Q6H PRN    polyethylene glycol (GLYCOLAX) packet 17 g  17 g Oral Daily PRN    acetaminophen (TYLENOL) tablet 650 mg  650 mg Oral Q6H PRN Or    acetaminophen (TYLENOL) suppository 650 mg  650 mg Rectal Q6H PRN    norepinephrine (LEVOPHED) 16 mg in sodium chloride 0.9 % 250 mL infusion  2-100 mcg/min IntraVENous Continuous    dextrose bolus 10% 250 mL  250 mL IntraVENous PRN    potassium chloride 10 mEq/100 mL IVPB (Peripheral Line)  10 mEq IntraVENous PRN    magnesium sulfate 1000 mg in dextrose 5% 100 mL IVPB  1,000 mg IntraVENous PRN    sodium phosphate 10 mmol in sodium chloride 0.9 % 250 mL IVPB  10 mmol IntraVENous PRN    Or    sodium phosphate 15 mmol in dextrose 5 % 250 mL IVPB  15 mmol IntraVENous PRN    Or    sodium phosphate 20 mmol in dextrose 5 % 500 mL IVPB  20 mmol IntraVENous PRN    dextrose 5 % and 0.45 % sodium chloride infusion   IntraVENous Continuous PRN    sodium chloride flush 0.9 % injection 5-40 mL  5-40 mL IntraVENous 2 times per day    sodium chloride flush 0.9 % injection 5-40 mL  5-40 mL IntraVENous PRN    0.9 % sodium chloride infusion   IntraVENous PRN    piperacillin-tazobactam (ZOSYN) 3,375 mg in sodium chloride 0.9 % 50 mL IVPB (mini-bag)  3,375 mg IntraVENous Q12H       Objective:     Patient Vitals for the past 24 hrs:   Temp Pulse Resp BP SpO2   05/27/22 1145 -- (!) 101 19 138/71 (!) 88 %   05/27/22 1130 -- (!) 102 19 -- 91 %   05/27/22 1115 -- (!) 101 19 -- 99 %   05/27/22 1100 -- (!) 103 19 138/71 96 %   05/27/22 1045 -- (!) 101 17 -- 96 %   05/27/22 1030 -- (!) 108 19 -- (!) 80 %   05/27/22 1016 -- (!) 103 -- 134/72 --   05/27/22 1015 -- (!) 101 19 -- 97 %   05/27/22 1000 -- (!) 102 21 134/72 97 %   05/27/22 0945 -- (!) 101 19 -- 96 %   05/27/22 0932 -- (!) 103 21 107/64 95 %   05/27/22 0930 -- (!) 106 14 -- 96 %   05/27/22 0915 -- (!) 105 22 107/64 97 %   05/27/22 0900 -- (!) 106 -- 130/60 97 %   05/27/22 0845 -- (!) 104 23 -- 95 %   05/27/22 0830 -- (!) 105 20 -- 98 %   05/27/22 0815 -- (!) 104 20 -- 99 %   05/27/22 0800 98.4 °F (36.9 °C) (!) 102 23 117/61 97 %   05/27/22 0745 -- (!) 106 21 -- 99 % 05/27/22 0730 -- (!) 105 21 -- 95 %   05/27/22 0715 -- (!) 104 19 -- 94 %   05/27/22 0700 -- (!) 105 21 130/72 94 %   05/27/22 0604 -- (!) 107 21 132/65 94 %   05/27/22 0530 -- (!) 108 29 -- 95 %   05/27/22 0504 -- (!) 109 22 (!) 121/56 94 %   05/27/22 0430 -- (!) 109 22 -- 92 %   05/27/22 0404 -- (!) 109 21 (!) 114/59 92 %   05/27/22 0331 98.6 °F (37 °C) (!) 109 21 (!) 114/59 93 %   05/27/22 0304 -- (!) 112 22 101/60 94 %   05/27/22 0229 -- (!) 112 23 -- 92 %   05/27/22 0204 -- (!) 112 22 (!) 103/50 94 %   05/27/22 0130 -- (!) 109 23 -- 94 %   05/27/22 0105 -- (!) 110 24 110/71 91 %   05/27/22 0030 -- (!) 107 21 -- 94 %   05/27/22 0004 98.6 °F (37 °C) (!) 108 22 114/61 94 %   05/26/22 2231 -- (!) 101 20 -- 92 %   05/26/22 2204 -- (!) 102 19 (!) 111/56 92 %   05/26/22 2104 -- (!) 102 -- 111/60 93 %   05/26/22 2030 -- 100 -- -- 96 %   05/26/22 2007 98.7 °F (37.1 °C) (!) 103 20 (!) 104/58 94 %   05/26/22 1930 -- (!) 107 19 -- 96 %   05/26/22 1904 -- (!) 109 19 119/71 94 %   05/26/22 1804 -- (!) 107 17 125/71 95 %   05/26/22 1718 -- (!) 108 17 133/69 95 %   05/26/22 1604 -- (!) 108 -- 127/64 97 %   05/26/22 1524 98.2 °F (36.8 °C) -- -- -- --   05/26/22 1504 -- (!) 109 -- 130/68 95 %   05/26/22 1404 -- (!) 107 -- 129/61 94 %   05/26/22 1304 -- (!) 113 -- 128/62 92 %       Estimated body mass index is 31.35 kg/m² as calculated from the following:    Height as of this encounter: 5' 6\" (1.676 m). Weight as of this encounter: 194 lb 3.6 oz (88.1 kg). Intake/Output Summary (Last 24 hours) at 5/27/2022 1224  Last data filed at 5/27/2022 0548  Gross per 24 hour   Intake 111.25 ml   Output 725 ml   Net -613.75 ml         Physical Exam:    Blood pressure 138/71, pulse (!) 101, temperature 98.4 °F (36.9 °C), temperature source Oral, resp. rate 19, height 5' 6\" (1.676 m), weight 194 lb 3.6 oz (88.1 kg), SpO2 (!) 88 %. General:    Well nourished. No overt distress. Patient is lying flat in bed.  alert and oriented to MCV 78.3 (L) 79.6 - 97.8 FL    MCH 26.6 26.1 - 32.9 PG    MCHC 34.0 31.4 - 35.0 g/dL    RDW 14.4 11.9 - 14.6 %    Platelets 456 094 - 576 K/uL    MPV 10.3 9.4 - 12.3 FL    nRBC 0.00 0.0 - 0.2 K/uL   POCT Glucose    Collection Time: 05/26/22  5:53 AM   Result Value Ref Range    POC Glucose 184 (H) 65 - 100 mg/dL    Performed by: Lisa    POCT Glucose    Collection Time: 05/26/22 11:17 AM   Result Value Ref Range    POC Glucose 197 (H) 65 - 100 mg/dL    Performed by: Trujillo (Alverson)    POCT Glucose    Collection Time: 05/26/22  5:57 PM   Result Value Ref Range    POC Glucose 213 (H) 65 - 100 mg/dL    Performed by: Trujillo (Alverson)    POCT Glucose    Collection Time: 05/26/22 11:48 PM   Result Value Ref Range    POC Glucose 134 (H) 65 - 100 mg/dL    Performed by: Judy Blount    Basic Metabolic Panel    Collection Time: 05/27/22  4:59 AM   Result Value Ref Range    Sodium 142 136 - 145 mmol/L    Potassium 3.9 3.5 - 5.1 mmol/L    Chloride 103 98 - 107 mmol/L    CO2 30 21 - 32 mmol/L    Anion Gap 9 7 - 16 mmol/L    Glucose 180 (H) 65 - 100 mg/dL    BUN 38 (H) 6 - 23 MG/DL    CREATININE 5.20 (H) 0.6 - 1.0 MG/DL    GFR African American 11 (L) >60 ml/min/1.73m2    GFR Non- 9 (L) >60 ml/min/1.73m2    Calcium 8.1 (L) 8.3 - 10.4 MG/DL   Phosphorus    Collection Time: 05/27/22  4:59 AM   Result Value Ref Range    Phosphorus 3.7 2.5 - 4.5 MG/DL   CBC    Collection Time: 05/27/22  4:59 AM   Result Value Ref Range    WBC 13.9 (H) 4.3 - 11.1 K/uL    RBC 3.03 (L) 4.05 - 5.2 M/uL    Hemoglobin 8.0 (L) 11.7 - 15.4 g/dL    Hematocrit 24.7 (L) 35.8 - 46.3 %    MCV 81.5 79.6 - 97.8 FL    MCH 26.4 26.1 - 32.9 PG    MCHC 32.4 31.4 - 35.0 g/dL    RDW 14.6 11.9 - 14.6 %    Platelets 398 870 - 785 K/uL    MPV 10.4 9.4 - 12.3 FL    nRBC 0.02 0.0 - 0.2 K/uL   POCT Glucose    Collection Time: 05/27/22  5:25 AM   Result Value Ref Range    POC Glucose 179 (H) 65 - 100 mg/dL    Performed by: Katie    POCT Glucose    Collection Time: 05/27/22 11:50 AM   Result Value Ref Range    POC Glucose 182 (H) 65 - 100 mg/dL    Performed by: Aga        [email or web address]    Other Studies:  [unfilled]    Signed:  Christophre Daniel MD    Part of this note may have been written by using a voice dictation software. The note has been proof read but may still contain some grammatical/other typographical errors.

## 2022-05-27 NOTE — PROGRESS NOTES
Bedside and verbal shift change report received [facility]  [facility] Report included the following information SBAR, Kardex, Procedure Summary, Intake/Output, MAR, Recent Results and Cardiac Rhythm Sinus Tach. Dual skin assessment completed at bedside:   Scattered abrasions on upper extremities and back.    (list pertinent skin assessment findings)    Dual verification of gtts completed (name of gtts verified): N/A

## 2022-05-27 NOTE — PROGRESS NOTES
Bedside and verbal shift change report received from  [facility] nurse). Report included the following information SBAR, Kardex, Intake/Output, MAR, Recent Results and Cardiac Rhythm SR/ST. Dual skin assessment completed at bedside: moisture blister gluteal cleft.  Abrasions L shoulder (list pertinent skin assessment findings)    Dual verification of gtts completed (name of gtts verified): none

## 2022-05-27 NOTE — PROGRESS NOTES
Pt arrived to floor via patient bed. Alert and oriented times 3 at this time. On RA. No s/sx of distress noted. Dual skin assessment done with Tia RN. Small tear on right side of back noted. No pressure injuries noted. Avalos in place and draining clear yellow urine. Denies any pain. Pt oriented to room and surroundings. Call light explained and placed in reach of pt. Encouraged to call for assistance as needed. Will continue to monitor.

## 2022-05-27 NOTE — PROGRESS NOTES
Pt completed 8 Hr run of dialysis. Blood rinsed back and lines disconnected.  BP 97/56.   NAD  Total removed: 4K

## 2022-05-27 NOTE — PROGRESS NOTES
PHYSICAL THERAPY Initial Assessment and AM  (Link to Caseload Tracking: PT Visit Days : 1  Acknowledge Orders  Time In/Out  PT Charge Capture  Rehab Caseload Tracker    Annalisa Butler is a 48 y.o. female   PRIMARY DIAGNOSIS: Cardiac arrest ([facility] [address]  Cardiac arrest ([facility] [address] [I46.9]  Hyperkalemia [E87.5]  Septic shock ([facility] [address] [A41.9, R65.21]  Acute respiratory failure, unspecified whether with hypoxia or hypercapnia (HCC) [J96.00]  Acute renal failure, unspecified acute renal failure type ([facility] [address] [N17.9]       Reason for Referral: Generalized Muscle Weakness (M62.81)  Difficulty in walking, Not elsewhere classified (R26.2)  Inpatient: Payor: Mike Sen / Plan: [facility] [place] PPO / Product Type: *No Product type* /     ASSESSMENT:     REHAB RECOMMENDATIONS:   Recommendation to date pending progress:  [address] [place] [address]    Equipment:     To Be Determined     ASSESSMENT:  Ms. Hilaria Peterson presents to PT with [facility] and decreased strength in B LEs. Pt was alert but not completely oriented today. She reported in tact sensation in B LEs today but decreased coordination. Pt reported increased dizziness with upright activity but no hypotension noted. Pt was able to come to sitting on EOB with modA x 2 and fair- sitting balance. She stood several times with min-modA x 2/RW and poor standing balance. Pt fatigued quickly today requiring frequent rest breaks. She also demonstrated decreased safety awareness today. Ms. Hilaria Peterson could benefit from skilled PT as she is currently functioning below her baseline.         [address] [facility] [address] [address] AM-PAC 6 Clicks Basic Mobility Inpatient Short Form  AM-PAC Mobility Inpatient   How much difficulty turning over in bed?: A Little  How much difficulty sitting down on / standing up from a chair with arms?: A Lot  How much difficulty moving from lying on back to sitting on side of bed?: A Lot  How much help from another person moving to and from a bed to a chair?: A Lot  How much help from another person needed to walk in hospital room?: A Lot  How much help from another person for climbing 3-5 steps with a railing?: Total  AM-PAC Inpatient Mobility Raw Score : 12  AM-PAC Inpatient T-Scale Score : 35.33  Mobility Inpatient CMS 0-100% Score: 68.66  Mobility Inpatient CMS G-Code Modifier : CL    SUBJECTIVE:   Ms. Nella Negrete states, \"What happened to me\"     Social/Functional Lives With: Alone  Type of Home: House  ADL Assistance: [place]: Independent  Homemaking Responsibilities: Yes  Ambulation Assistance: Independent  Active : Yes  Mode of Transportation: Car    OBJECTIVE:     PAIN: Gale  / O2: Leitha Cork / Leim Locker / Felicita Pian:   Pre Treatment:   Pain Assessment: None - Denies Pain  Pain Level: 0      Post Treatment:0 Vitals        Oxygen      Continuous Pulse Oximetry, Avalos Catheter, IV and Telemetry     RESTRICTIONS/PRECAUTIONS:  Restrictions/Precautions: Fall Risk                 GROSS EVALUATION: Intact Impaired (Comments):   AROM [x]     PROM []    Strength []  generalized weakness observed   Balance []  fair- sitting and poor standing   Posture [] N/A   Sensation [x]     Coordination []   decreased   Tone []     Edema []    Activity Tolerance []  fatigued quickly with activity    []      COGNITION/  PERCEPTION: Intact Impaired (Comments):   Orientation []  confused to situation   Vision []     Hearing []     Cognition  []  decreased insight in to deficits and safety awareness     MOBILITY: I Mod I S SBA CGA Min Mod Max Total  NT x2 Comments:   Bed Mobility    Rolling [] [] [] [] [] [] [x] [] [] [] [x]    Supine to Sit [] [] [] [] [] [] [x] [] [] [] [x]    Scooting [] [] [] [] [] [x] [x] [] [] [] [x]    Sit to Supine [] [] [] [] [] [x] [] [] [] [] [x]    Transfers    Sit to Stand [] [] [] [] [] [] [x] [] [] [] [x]    Bed to Chair [] [] [] [] [] [] [] [] [] [] []    Stand to Sit [] [] [] [] [] [] [x] [] [] [] [x]    I=Independent, Mod I=Modified Independent, S=Supervision, SBA=Standby Assistance, CGA=Contact Guard Assistance,   Min=Minimal Assistance, Mod=Moderate Assistance, Max=Maximal Assistance, Total=Total Assistance, NT=Not Tested    GAIT: I Mod I S SBA CGA Min Mod Max Total  NT x2 Comments:   Level of Assistance [] [] [] [] [] [] [] [] [] [x] []    Distance   feet    DME NT    Gait Quality NT    Weightbearing Status      Stairs      I=Independent, Mod I=Modified Independent, S=Supervision, SBA=Standby Assistance, CGA=Contact Guard Assistance,   Min=Minimal Assistance, Mod=Moderate Assistance, Max=Maximal Assistance, Total=Total Assistance, NT=Not Tested    PLAN:   ACUTE PHYSICAL THERAPY GOALS:   (Developed with and agreed upon by patient and/or caregiver. )  LTG:  (1.)Ms. Julia Russo will move from supine to sit and sit to supine , scoot up and down and roll side to side in bed with SUPERVISION within 7 treatment day(s). (2.)Ms. Julia Russo will transfer from bed to chair and chair to bed with STAND BY ASSIST using the least restrictive device within 7 treatment day(s). (3.)Ms. Julia Russo will ambulate with CONTACT GUARD ASSIST for 100 feet with the least restrictive device within 7 treatment day(s). (4.)Ms. Julia Russo will participate in therapeutic activity/exercises x 25 minutes for increased activity tolerance within 7 treatment days. (5.)Ms. Julia Russo will perform standing static and dynamic balance activities x 15 minutes with STAND BY ASSIST to improve safety within 7 day(s).     ________________________________________________________________________________________________          FREQUENCY AND DURATION: 3 times/week for duration of hospital stay or until stated goals are met, whichever comes first.    THERAPY PROGNOSIS: Good    PROBLEM LIST:   (Skilled intervention is medically necessary to address:)  Decreased ADL/Functional Activities  Decreased Activity Tolerance  Decreased Balance  Decreased Cognition  Decreased Coordination  Decreased Gait Ability  Decreased Safety Awareness  Decreased Strength  Decreased Transfer Abilities INTERVENTIONS PLANNED:   (Benefits and precautions of physical therapy have been discussed with the patient.)  Self Care Training  Therapeutic Activity  Therapeutic Exercise/HEP  Neuromuscular Re-education  Gait Training  Education       TREATMENT:   EVALUATION: MODERATE COMPLEXITY: (Untimed Charge)    TREATMENT:   Co-Treatment PT/OT necessary due to patient's decreased overall endurance/tolerance levels, as well as need for high level skilled assistance to complete functional transfers/mobility and functional tasks  Therapeutic Activity (24 Minutes): Therapeutic activity included Rolling, Supine to Sit, Sit to Supine, Scooting, Transfer Training, Sitting balance  and Standing balance to improve functional Activity tolerance, Balance, Coordination, Mobility and Strength. TREATMENT GRID:  N/A    AFTER TREATMENT PRECAUTIONS: Bed, Call light within reach and RN notified    INTERDISCIPLINARY COLLABORATION:  RN/ PCT, PT/ PTA and OT/ KAUR    EDUCATION: Education Given To: Patient  Education Provided: Role of Therapy    TIME IN/OUT:  Time In: 4581  Time Out: Giuseppe Ferrara 45  Minutes: [address]  Stefanie Kim

## 2022-05-27 NOTE — PLAN OF CARE
Problem: Discharge Planning  Goal: Discharge to home or other facility with appropriate resources  Outcome: Progressing     Problem: Pain  Goal: Verbalizes/displays adequate comfort level or baseline comfort level  5/27/2022 1518 by Preston Fuentes RN  Outcome: Progressing  5/27/2022 0312 by Angela Philip RN  Outcome: Progressing     Problem: Confusion  Goal: Confusion, delirium, dementia, or psychosis is improved or at baseline  Description: INTERVENTIONS:  1. Assess for possible contributors to thought disturbance, including medications, impaired vision or hearing, underlying metabolic abnormalities, dehydration, psychiatric diagnoses, and notify attending LIP  2. [place] high risk fall precautions, as indicated  3. Provide frequent short contacts to provide reality reorientation, refocusing and direction  4. Decrease environmental stimuli, including noise as appropriate  5. Monitor and intervene to maintain adequate nutrition, hydration, elimination, sleep and activity  6. If unable to ensure safety without constant attention obtain sitter and review sitter guidelines with assigned personnel  7.  Initiate Psychosocial CNS and Spiritual Care consult, as indicated  5/27/2022 1518 by Preston Fuentes RN  Outcome: Progressing  5/27/2022 0312 by Angela Philip RN  Outcome: Progressing     Problem: Neurosensory - Adult  Goal: Achieves stable or improved neurological status  Outcome: Progressing  Goal: Achieves maximal functionality and self care  Outcome: Progressing     Problem: Respiratory - Adult  Goal: Achieves optimal ventilation and oxygenation  5/27/2022 1518 by Preston Fuentes RN  Outcome: Progressing  5/27/2022 0312 by Angela Philip RN  Outcome: Adequate for Discharge     Problem: Cardiovascular - Adult  Goal: Maintains optimal cardiac output and hemodynamic stability  5/27/2022 1518 by Preston Fuentes RN  Outcome: Progressing  5/27/2022 0312 by Angela Philip RN  Outcome: Progressing  Goal: Absence of cardiac dysrhythmias or at baseline  5/27/2022 1518 by Shelby Franco RN  Outcome: Progressing  5/27/2022 0312 by Alexsandra Salamanca RN  Outcome: Progressing     Problem: Skin/Tissue Integrity - Adult  Goal: Skin integrity remains intact  5/27/2022 1518 by Shelby Franco RN  Outcome: Progressing  5/27/2022 0312 by Alexsandra Salamanca RN  Outcome: Progressing  Flowsheets (Taken 5/26/2022 2007)  Skin Integrity Remains Intact: Monitor for areas of redness and/or skin breakdown  Goal: Incisions, wounds, or drain sites healing without S/S of infection  5/27/2022 1518 by Shelby Franco RN  Outcome: Progressing  5/27/2022 0312 by Alexsandra Salamanca RN  Outcome: Progressing  Goal: Oral mucous membranes remain intact  5/27/2022 1518 by Shelby Franco RN  Outcome: Progressing  5/27/2022 0312 by Alexsandra Salamanca RN  Outcome: Progressing     Problem: Musculoskeletal - Adult  Goal: Return mobility to safest level of function  Outcome: Progressing  Goal: Maintain proper alignment of affected body part  Outcome: Progressing  Goal: Return ADL status to a safe level of function  Outcome: Progressing     Problem: Gastrointestinal - Adult  Goal: Minimal or absence of nausea and vomiting  Outcome: Progressing  Goal: Maintains or returns to baseline bowel function  Outcome: Progressing  Goal: Maintains adequate nutritional intake  Outcome: Progressing     Problem: Genitourinary - Adult  Goal: Absence of urinary retention  Outcome: Progressing  Goal: Urinary catheter remains patent  Outcome: Progressing     Problem: Infection - Adult  Goal: Absence of infection at discharge  Outcome: Progressing  Goal: Absence of infection during hospitalization  Outcome: Progressing  Goal: Absence of fever/infection during anticipated neutropenic period  Outcome: Progressing     Problem: Metabolic/Fluid and Electrolytes - Adult  Goal: Electrolytes maintained within normal limits  Outcome: Progressing  Goal: Hemodynamic stability and optimal renal function maintained  Outcome: Progressing  Goal: Glucose maintained within prescribed range  Outcome: Progressing     Problem: Hematologic - Adult  Goal: Maintains hematologic stability  Outcome: Progressing     Problem: Skin/Tissue Integrity  Goal: Absence of new skin breakdown  Description: 1. Monitor for areas of redness and/or skin breakdown  2. Assess vascular access sites hourly  3. Every 4-6 hours minimum:  Change oxygen saturation probe site  4. Every 4-6 hours:  If on nasal continuous positive airway pressure, respiratory therapy assess nares and determine need for appliance change or resting period.   Outcome: Progressing     Problem: ABCDS Injury Assessment  Goal: Absence of physical injury  Outcome: Progressing     Problem: Chronic Conditions and Co-morbidities  Goal: Patient's chronic conditions and co-morbidity symptoms are monitored and maintained or improved  Outcome: Progressing

## 2022-05-27 NOTE — PROGRESS NOTES
Chart reviewed and pt discussed in am IDR. Continues HD per Nephrology. On RA. PT/OT/ST following. OT recommending Inpt rehab. Consult placed for Dr. Claudy Castañeda to zack. CM following. LOS 6 days.

## 2022-05-27 NOTE — PROGRESS NOTES
OCCUPATIONAL THERAPY Initial Assessment, Daily Note and AM       OT Visit Days: 1  Acknowledge Orders  Time  OT Charge Capture  Rehab Caseload Tracker      Aaron Leyva is a 48 y.o. female   PRIMARY DIAGNOSIS: Cardiac arrest ([facility]  Cardiac arrest ([facility] [I46.9]  Hyperkalemia [E87.5]  Septic shock ([facility] [A41.9, R65.21]  Acute respiratory failure, unspecified whether with hypoxia or hypercapnia (HCC) [J96.00]  Acute renal failure, unspecified acute renal failure type ([facility] [N17.9]       Reason for Referral: Generalized Muscle Weakness (M62.81)  Other lack of cordination (R27.8)  Difficulty in walking, Not elsewhere classified (R26.2)  Dizziness and Giddiness (R42)  Inpatient: Payor: Rosa Sen / Plan: Rosa Sen - [place] PPO / Product Type: *No Product type* /     ASSESSMENT:     REHAB RECOMMENDATIONS:   Recommendation to date pending progress:  [address] [place] [address]    Equipment:     To Be Determined     ASSESSMENT:  Ms. Lisandro Carrasco is a 47 YO R dominant AAF admitted from home found unresponsive on the floor, BS 2000 and MI. Pt doesn't remember anything. Today, agreeable to OT and getting up but reported increased dizziness with positional changes, however BPs were WFLs throughout session. Groggy overall. Mod A x2 up to EOB, unable to hold and hunter socks. Fair sitting balance. Pt with B swollen hands, unable to make complete fists, and thus had trouble with holding toothbrush and paste, unable to squeeze. After setup, was able to brush her teeth with min A, needed 2 hands to hold cup with water for rise and spit. Trouble holding and wiping mouth with paper towel. Washed face after setup with open hand. Trouble holding comb to brush hair. Mod A bed mobility and sit to stand to RW with mod A. Sit to stand mod A x2 for 3 standing attempts for BM cleaning, dep cleaning, changing linens, pt unaware she had BM, unable to bear much weight and reported increased dizziness and asked to return to supine.  Mod Ax2 return to supine and all lines reattached. Hernandez in place. OT called pt's mom Xochilt Hu to bring in eyeglasses as pt reports she wears them all the time, thinking this might help with her dizziness. Mom reports she only wears them for reading. Pt is functioning below her baseline and could benefit from skilled OT to address her deficits and maximize her independence, safety and activity tolerance for ADLs and functional mobility. Would be a good IRC candidate. Discussed with Dr Veronique Matute. [address] [facility] [address] [id number] AM-PAC 6 Clicks Daily Activity Inpatient Short Form:    AM-PAC Daily Activity Inpatient   How much help for putting on and taking off regular lower body clothing?: A Lot  How much help for Bathing?: A Lot  How much help for Toileting?: Total (hernandez in place)  How much help for putting on and taking off regular upper body clothing?: A Lot  How much help for taking care of personal grooming?: A Lot  How much help for eating meals?: A Lot  AM-[facility] Inpatient Daily Activity Raw Score: 11  AM-PAC Inpatient ADL T-Scale Score : 29.04  ADL Inpatient CMS 0-100% Score: 70.42  ADL Inpatient CMS G-Code Modifier : CL           SUBJECTIVE:     Ms. Tash Angeles states, \"I need to lay back down. \"     Social/Functional Lives With: Alone  Type of Home: House  ADL Assistance: Independent  Homemaking Assistance: Independent  Homemaking Responsibilities: Yes  Ambulation Assistance: Independent  Active : Yes  Mode of Transportation: Car    OBJECTIVE:     Puma Sanchez / Jose Second / AIRWAY: Continuous Pulse Oximetry, Hernandez Catheter, IV, Telemetry  and ICU monitoring    RESTRICTIONS/PRECAUTIONS:  Restrictions/Precautions: Fall Risk    PAIN: VITALS / O2:   Pre Treatment:   Pain Assessment: None - Denies Pain      Post Treatment: reported pain from chest compressions once up and moving, RN aware       Vitals 130/60, 107/64 WFLs throughout session         Oxygen  O2 Therapy: Room air         GROSS EVALUATION: INTACT IMPAIRED   (See Comments)   UE AROM [] Limited B throughout, edema, can't make fists   UE PROM [] []imited by edema   Strength []  generally weak throughout B UEs     Posture / Balance [] Posture: Good  Sitting - Static: Fair  Sitting - Dynamic: Fair,-  Standing - Static: Poor  Standing - Dynamic: Poor   Sensation []  light touch impaired, d/t edema?    Coordination []   impaired B UEs, [address] and [facility], missing L pinky toe ampuation   Tone [x]       Edema [] 2+ in B hands and feet    Activity Tolerance [] Patient limited by fatigue,Patient limited by pain     Hand Dominance R [x] L []      COGNITION/  PERCEPTION: INTACT IMPAIRED   (See Comments)   Orientation [] Oriented to place,Oriented to person,Disoriented to time,Disoriented to situation   Vision [] Vision: Impaired  Vision Exceptions: Wears glasses for reading,Wears glasses at all times   Hearing []     Cognition  [] Overall Cognitive Status: Exceptions  Arousal/Alertness: Inconsistent responses to stimuli  Following Commands: Inconsistently follows commands  Attention Span: Difficulty attending to directions  Memory: Decreased recall of biographical Information,Decreased recall of precautions,Decreased recall of recent events  Safety Judgement: Decreased awareness of need for assistance,Decreased awareness of need for safety  Problem Solving: Decreased awareness of errors  Insights: Decreased awareness of deficits  Initiation: Requires cues for some  Sequencing: Requires cues for some     MOBILITY: I Mod I S SBA CGA Min Mod Max Total  NT x2 Comments:   Bed Mobility    Rolling [] [] [] [] [] [] [x] [] [] [] [x]    Supine to Sit [] [] [] [] [] [] [x] [] [] [] []    Scooting [] [] [] [] [] [] [x] [] [] [] []    Sit to Supine [] [] [] [] [] [] [x] [] [] [] [x]    Transfers    Sit to Stand [] [] [] [] [] [] [x] [] [] [] [x]    Bed to Chair [] [] [] [] [] [] [] [] [] [x] []    Stand to Sit [] [] [] [] [] [] [x] [] [] [] [x]    I=Independent, Mod I=Modified Independent, S=Supervision/Setup, SBA=Standby Assistance, CGA=Contact Guard Assistance, Min=Minimal Assistance, Mod=Moderate Assistance, Max=Maximal Assistance, Total=Total Assistance, NT=Not Tested    ACTIVITIES OF DAILY LIVING: I Mod I S SBA CGA Min Mod Max Total NT Comments   BASIC ADLs:              Bathing/ Showering [] [] [] [] [] [] [] [x] [] []    Toileting [] [] [] [] [] [] [] [] [x] [] Avalos in place, had BM in bed, unaware   Upper Body Dressing [] [] [] [] [] [] [x] [] [] []    Lower Body Dressing [] [] [] [] [] [] [] [] [x] [] Unable to hunter socks   Feeding [] [] [] [] [] [] [x] [] [] [] Hand over hand help, unable to squeeze and hold items   Grooming [] [] [] [] [] [] [x] [] [] []    Personal Device Care [] [] [] [] [] [] [] [] [] []    Functional Mobility [] [] [] [] [] [] [x] [] [] [] x2   I=Independent, Mod I=Modified Independent, S=Supervision/Setup, SBA=Standby Assistance, CGA=Contact Guard Assistance, Min=Minimal Assistance, Mod=Moderate Assistance, Max=Maximal Assistance, Total=Total Assistance, NT=Not Tested    PLAN:     FREQUENCY/DURATION   OT Plan of Care: 3 times/week for duration of hospital stay or until stated goals are met, whichever comes first.    ACUTE OCCUPATIONAL THERAPY GOALS:   (Developed with and agreed upon by patient and/or caregiver.)  1. Patient will complete upper body bathing and dressing with setup and adaptive equipment as needed. 2. Patient will complete lower body bathing and dressing with min A and adaptive equipment as needed. 3. Patient will complete toileting with min A.   4. Patient will tolerate 23 minutes of OT treatment with as needed rest breaks to increase activity tolerance for ADLs. 5. Patient will complete functional transfers with mod I and adaptive equipment as needed. 6. Patient will complete grooming tasks in standing at sink level with SBA. 7. Patient will complete self feeding 100% of meals after setup.      Timeframe: 7 visits       PROBLEM LIST:   (Skilled intervention is medically necessary to address:)  Decreased ADL/Functional Activities  Decreased Activity Tolerance  Decreased AROM/PROM  Decreased Balance  Decreased Cognition  Decreased Coordination  Decreased Gait Ability  Decreased Safety Awareness  Decreased Strength  Decreased Transfer Abilities  Increased Pain   INTERVENTIONS PLANNED:  (Benefits and precautions of occupational therapy have been discussed with the patient.)  Self Care Training  Therapeutic Activity  Therapeutic Exercise/HEP  Neuromuscular Re-education  Manual Therapy  Education         TREATMENT:     EVALUATION: MODERATE COMPLEXITY: (Untimed Charge)    TREATMENT:   Co-Treatment PT/OT necessary due to patient's decreased overall endurance/tolerance levels, as well as need for high level skilled assistance to complete functional transfers/mobility and functional tasks  Self Care: (27): Procedure(s) (per grid) utilized to improve and/or restore self-care/home management as related to dressing, toileting, grooming, self feeding and sitting balance on EOB. Required moderate visual, verbal, manual, tactile, physical hand over hand assist and   cueing to facilitate activities of daily living skills and compensatory activities. TREATMENT GRID:  N/A    AFTER TREATMENT PRECAUTIONS: Alarm Activated, Bed, Call light within reach, Heels floated, Needs within reach, RN at bedside, RN notified and Side rails x3    INTERDISCIPLINARY COLLABORATION:  RN/ PCT, PT/ PTA, OT/ KAUR and RN Case Manager/      EDUCATION:  Education Given To: Patient  Education Provided: Role of Therapy;Plan of Care;Precautions; ADL Adaptive Strategies;Transfer Training;Energy Conservation; Family Education; Fall Prevention Strategies  Education Method: Demonstration;Verbal  Barriers to Learning: Cognition  Education Outcome: Continued education needed    TOTAL TREATMENT DURATION AND TIME:  Time In: 3711  Time Out: 2219  Minutes: 32    LANDON ALBERTO, [id number] [facility], MS, OTR/L

## 2022-05-27 NOTE — INTERDISCIPLINARY ROUNDS
Multi-D Rounds/Checklist (leapfrog):  Lines: can any be removed?: No   DVT Prophylaxis: Yes   Vent: HOB elevated? N/A              CC/Kg?: N/A              Vent Day?: N/A   Nutrition Ordered/appropriate: Yes   Can antibiotics or other drugs be stopped?: No   Consults needed: No   A: Is pain control adequate? Yes   B: Sedation break and SBT? N/A   C: Is sedation choice appropriate? N/A   D: Delirium/CAM-ICU? No   E: Mobility goals/appropriateness? Yes   F: Family update and plan? Mother is primary contact and is being updated daily by primary attending and nursing staff.     Gilbert Bruce, APRN - CNP

## 2022-05-27 NOTE — PROGRESS NOTES
[facility]    Follow-Up on: BLANCA on CKD stage IV    HPI: Pt seen and examined. Awake and alert looks great! Seen on HD     ROS:  Denies CP, SOB.     Current Facility-Administered Medications   Medication Dose Route Frequency    insulin glargine (LANTUS) injection vial 15 Units  15 Units SubCUTAneous Daily    heparin (porcine) injection 10,000 Units  10,000 Units IntraVENous PRN    albuterol (PROVENTIL) nebulizer solution 2.5 mg  2.5 mg Nebulization Q4H PRN    insulin regular (HUMULIN R;NOVOLIN R) injection 0-10 Units  0-10 Units IntraVENous PRN    dextrose 5 % and 0.45 % sodium chloride infusion   IntraVENous Continuous PRN    glucose chewable tablet 16 g  4 tablet Oral PRN    dextrose bolus 10% 125 mL  125 mL IntraVENous PRN    Or    dextrose bolus 10% 250 mL  250 mL IntraVENous PRN    glucagon injection 1 mg  1 mg IntraMUSCular PRN    dextrose 5 % solution  100 mL/hr IntraVENous PRN    insulin lispro (HUMALOG) injection vial 0-16 Units  0-16 Units SubCUTAneous Q6H    dextrose bolus 10% 125 mL  125 mL IntraVENous PRN    Or    dextrose bolus 10% 250 mL  250 mL IntraVENous PRN    glucagon injection 1 mg  1 mg IntraMUSCular PRN    medicated lip ointment (BLISTEX)   Topical PRN    sodium zirconium cyclosilicate (LOKELMA) oral suspension 10 g  10 g Oral Once    sodium chloride flush 0.9 % injection 5-40 mL  5-40 mL IntraVENous 2 times per day    sodium chloride flush 0.9 % injection 5-40 mL  5-40 mL IntraVENous PRN    heparin (porcine) injection 5,000 Units  5,000 Units SubCUTAneous q8h    ondansetron (ZOFRAN-ODT) disintegrating tablet 4 mg  4 mg Oral Q8H PRN    Or    ondansetron (ZOFRAN) injection 4 mg  4 mg IntraVENous Q6H PRN    polyethylene glycol (GLYCOLAX) packet 17 g  17 g Oral Daily PRN    acetaminophen (TYLENOL) tablet 650 mg  650 mg Oral Q6H PRN    Or    acetaminophen (TYLENOL) suppository 650 mg  650 mg Rectal Q6H PRN    norepinephrine (LEVOPHED) 16 mg in sodium chloride 0.9 % 250 mL infusion  2-100 mcg/min IntraVENous Continuous    dextrose bolus 10% 250 mL  250 mL IntraVENous PRN    potassium chloride 10 mEq/100 mL IVPB (Peripheral Line)  10 mEq IntraVENous PRN    magnesium sulfate 1000 mg in dextrose 5% 100 mL IVPB  1,000 mg IntraVENous PRN    sodium phosphate 10 mmol in sodium chloride 0.9 % 250 mL IVPB  10 mmol IntraVENous PRN    Or    sodium phosphate 15 mmol in dextrose 5 % 250 mL IVPB  15 mmol IntraVENous PRN    Or    sodium phosphate 20 mmol in dextrose 5 % 500 mL IVPB  20 mmol IntraVENous PRN    dextrose 5 % and 0.45 % sodium chloride infusion   IntraVENous Continuous PRN    sodium chloride flush 0.9 % injection 5-40 mL  5-40 mL IntraVENous 2 times per day    sodium chloride flush 0.9 % injection 5-40 mL  5-40 mL IntraVENous PRN    0.9 % sodium chloride infusion   IntraVENous PRN    piperacillin-tazobactam (ZOSYN) 3,375 mg in sodium chloride 0.9 % 50 mL IVPB (mini-bag)  3,375 mg IntraVENous Q12H       Exam:  Vitals:    05/27/22 0815 05/27/22 0830 05/27/22 0845 05/27/22 0932   BP:    (P) 107/64   Pulse: (!) 104 (!) 105 (!) 104 (!) 103   Resp: 20 20 23 21   Temp:       TempSrc:       SpO2: 99% 98% 95% 95%   Weight:       Height:             Intake/Output Summary (Last 24 hours) at 5/27/2022 0936  Last data filed at 5/27/2022 0548  Gross per 24 hour   Intake 111.25 ml   Output 975 ml   Net -863.75 ml     PE:  GEN - in no distress  CV - regular, no murmur, no rub  Lung - clear bilaterally  Abd - soft, nontender  Ext - 1-2+ edema    Labs  Recent Labs     05/25/22  0238 05/26/22  0323 05/27/22  0459   WBC 20.5* 17.0* 13.9*   HGB 8.8* 8.6* 8.0*   HCT 25.4* 25.3* 24.7*    192 185     Recent Labs     05/24/22  1540 05/24/22  1540 05/24/22  1944 05/24/22  1944 05/25/22  0238 05/25/22  0725 05/25/22  1138 05/26/22  0323 05/27/22  0459      < > 139   < > 138  --   --  139 142   K 3.6   < > 3.9   < > 4.4  --   --  4.1 3.9      < > 102   < > 101  --   --  101 103 CO2 30   < > 27   < > 30  --   --  34* 30   BUN 27*   < > 24*   < > 30*  --   --  23 38*   MG 1.6*  --  1.5*  --  1.7*  --   --   --   --    PHOS 1.3*   < > 1.7*   < > 2.5   < > 2.4* 2.6 3.7    < > = values in this interval not displayed. No results for input(s): PH, PCO2, PO2, PCO2 in the last 72 hours.     Problem List:  Patient Active Problem List    Diagnosis Date Noted    Altered mental status     Frailty     Cardiac arrest ([facility] 05/21/2022    Diabetic ketoacidosis with coma associated with type 1 diabetes mellitus ([facility] 05/21/2022    Severe sepsis with septic shock (CODE) ([facility] 05/21/2022    Pneumonia, bacterial 05/21/2022    Acute respiratory failure ([facility] 05/21/2022    Benign hypertension with CKD (chronic kidney disease) stage III ([facility] 01/04/2022    Acute renal failure ([facility] 06/09/2021    IDDM (insulin dependent diabetes mellitus) 01/24/2012    BLANCA (acute kidney injury) ([facility] 04/21/2022    Ureteric stone 04/19/2022    Urinary tract infection without hematuria 04/19/2022    Hydronephrosis of right kidney 04/19/2022    Malodorous urine 11/16/2021    H/O gastroesophageal reflux (GERD) 06/19/2020    Encounter for annual routine gynecological examination 11/27/2018    Encounter to discuss test results 08/22/2018    Diabetes mellitus type 1 ([facility] 08/22/2018    Type 1 diabetes mellitus with stage 3 chronic kidney disease ([facility] 08/16/2018    Noncompliance with medication regimen 08/16/2018    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    Depression with anxiety 07/11/2017    Ulcer, skin, chronic ([address] 03/13/2017    CKD (chronic kidney disease), stage IV ([address] 03/13/2017    Neuropathy, peripheral, idiopathic 03/13/2017    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 Type I DM  2. DKA BS>2000 on admit  3. BLANCA on CKD stage IV Now oliguric. Pt  Looks good. 4. Anemia   5.  Plan seen on HD  Hold HD over weekend evaluating for renal recovery

## 2022-05-27 NOTE — PROGRESS NOTES
TRANSFER - IN REPORT:    Verbal report received from ICU RN on [facility]  being received from [phone] for routine progression of patient care      Report consisted of patient's Situation, Background, Assessment and   Recommendations(SBAR). Information from the following report(s) MAR and Recent Results was reviewed with the receiving nurse. Opportunity for questions and clarification was provided. Assessment to be completed upon patient's arrival to unit and care assumed.

## 2022-05-27 NOTE — DIALYSIS
8 hr SLED initiated using  L IJ. Verified consent on file. Aspirated and flushed both ports without problem. Machine settings per MD order. 150 BFR,  300 DFR,  500 UF Rate. Heparin 1000 unit bolus and 1000 units/hr programmed. Dialysis nurse available as needed. Report given to primary RN and machine settings verified at bedside.

## 2022-05-27 NOTE — PROGRESS NOTES
[facility] Critical Care Note[de-identified] 5/27/2022  Suly Smith  Admission Date: 5/21/2022     Length of Stay: 6 days    Background: 48 y.o. y/o female with unresponsiveness.     46F with htn, hld, dm, ckd3, had recent admission for right sided ureteral obstruction s/p stent placement presents with unresponsiveness.  The patient had not been heard from the last several days. Jorge Craft was found unresponsive in her home.  Brought in by EMS and was found to have numerous lab abnormalities. Jany Iverson was found to be in DKA with a serum glucose of greater than 2000.  She was in acute renal failure with a creatinine of 9 and a potassium of 6.7.  She was acidotic with a pH of 6.8.  The patient was intubated on admission.  She suffered a brief cardiac arrest.  She was started on IV hydration, insulin drip, and broad-spectrum antibiotics.  CT of the head was negative. Nephrology is following for renal failure. Extubated 5/25       Notable PMH:    CKD (chronic kidney disease) stage 3, GFR 30-59 ml/min ([facility]), Gastroenteritis, acute, IDDM (insulin dependent diabetes mellitus), Intractable nausea and vomiting, Irregular menses, Kidney stone, Neuropathy, peripheral, idiopathic, Retinopathy, bilateral, Trichomoniasis, Ulcer, skin, chronic ([address] and Vaginal burning. 24 Hour events:  Patient remains extubated. On room air. Currently running 8 hour SLED session. She denies any complaints currently. Some combativeness yesterday and was given haldol. ROS: Negative except as above.     Lines: (insertion date)   ETT: (5/21-5/25)  Avalos: (5/21)  OGT: (5/21)  Central line: (5/21)  Arterial Line (NA)  PIV (5/21)    Drips: current dose (range)  Dose (mcg/kg/min) Propofol : 0 mcg/kg/min  Dose (mcg/kg/hr) Dexmedetomidine: 0 mcg/kg/hr  Dose (mcg/hr) Fentanyl: 0 mcg/hr  Dose (mcg/min) Norepinephrine: 0 mcg/min (map 79)  Dose (units/hr) Vasopressin: 0 Units/hr  Dose (units/hr) Insulin : 0 Units/hr     Pertinent Exam: Blood pressure 132/65, pulse (!) 107, temperature 98.6 °F (37 °C), temperature source Oral, resp. rate 21, height 5' 6\" (1.676 m), weight 194 lb 3.6 oz (88.1 kg), SpO2 94 %. Intake/Output Summary (Last 24 hours) at 5/27/2022 0742  Last data filed at 5/27/2022 0548  Gross per 24 hour   Intake 111.25 ml   Output 975 ml   Net -863.75 ml     Constitutional:  alert and in NAD  EENMT:  Sclera clear, pupils equal, orally intubated  Respiratory: Mild B rhonchi that mostly clears with cough. Cardiovascular:  RRR no m/r/g  Gastrointestinal:  soft with no evidence tenderness; positive bowel sounds but hypoactive. Musculoskeletal:  warm,  No LE edema. Skin:  no jaundice or ecchymosis  Neurologic: alert and oriented to person, place. Weak extremities. Psychiatric: calm    CXR:   5/26/22      Head CT:   1. No CT evidence of acute intracranial abnormality. 2. No significant change compared to prior exam.       Recent Labs     05/25/22  0238 05/26/22  0323 05/27/22  0459   WBC 20.5* 17.0* 13.9*   HGB 8.8* 8.6* 8.0*   HCT 25.4* 25.3* 24.7*    192 185     Recent Labs     05/24/22  1540 05/24/22  1540 05/24/22  1944 05/24/22  1944 05/25/22  0238 05/25/22  0725 05/25/22  1138 05/26/22  0323 05/27/22  0459      < > 139   < > 138  --   --  139 142   K 3.6   < > 3.9   < > 4.4  --   --  4.1 3.9      < > 102   < > 101  --   --  101 103   CO2 30   < > 27   < > 30  --   --  34* 30   BUN 27*   < > 24*   < > 30*  --   --  23 38*   MG 1.6*  --  1.5*  --  1.7*  --   --   --   --    PHOS 1.3*   < > 1.7*   < > 2.5   < > 2.4* 2.6 3.7    < > = values in this interval not displayed. ECHO: 05/21/22    TRANSTHORACIC ECHOCARDIOGRAM (TTE) COMPLETE (CONTRAST/BUBBLE/3D PRN) 05/22/2022  1:30 PM (Final)    Interpretation Summary    Left Ventricle: Hyperdynamic left ventricular systolic function with a visually estimated EF of 70 - 75%. Left ventricle size is normal. Mildly increased wall thickness.  Normal diastolic function.   Aortic Valve: Tricuspid valve. Mild sclerosis of the aortic valve cusp. The AV is incompletely interrogated though the peak velocity and mean gradient is suggestive of moderate stenosis of the aortic valve. AV mean gradient is 23 mmHg. AV peak velocity is 3.2 m/s. Signed by: Yogesh Bautista MD on 5/22/2022  1:30 PM    Microbiology:   Urine culture negative,   Blood cultures pending    Ventilator Settings:  Ideal body weight: 59.3 kg (130 lb 11.7 oz)  Adjusted ideal body weight: 70.8 kg (156 lb 2.1 oz)  Mode FIO2 Rate Tidal Volume Pressure PEEP   PRVC 30% 24  460     8   Peak airway pressure:         Plateau Pressure: Plateau Pressure (cm H2O): 0 cm H2O   Minute ventilation:    ABG:   ph 7.26/PO2 111/CO2 29/base excess 13    Assessment and Plan:  (Medical Decision Making)   Impression: 48 y.o. female with found unresponsive at home. Admitted with BS > 2000 - corrected with insulin infusion. Acute on chronic renal failure. Nephrology following. Had recent ureteral stent placed. Required intubation on admission for airway maintenance. + septic shock requiring pressor support. On abx for suspected aspiration event. NEURO:   Sedation: none  Analgesia: none  CV:   Volume Status: appears more euvolemic. SLED today again   Septic shock: off pressors. Bradycardia: no further episodes with reduction in precedex dose. PULM:   Acute hypoxemic/hypercapneic respiratory failure:  Extubated 5/25. Currently on room air. RENAL  BLANCA:  Acute on chronic renal failure. Getting SLED  Metabolic acidosis: corrected with dialysis. Lactic acidosis: NA  Electrolytes: dialysis  GI:   Nutrition: Speech eval then diet if able. HEME:   Anemia: stable. Monitor and transfuse if < 7. Anticoagulation: low dose heparin  ID:   ?Possible Aspiration: Day 6  Zosyn. EOT 5/28. Completed 5 days of vanc prior to dc. ENDO:   DM: off insulin drip. Cont glargine with SSI. Well controlled at present.     Skin: no decub, turns, preventive care  Prophy: Pepcid/low dose heparin    PT/OT consults. Transfer to floor today.      Full Code    Johanny Robbins MD

## 2022-05-27 NOTE — PROGRESS NOTES
LTG: patient will tolerate safest, least restrictive oral diet without s/sx airway compromise  STG: Patient will tolerate ongoing po trials in efforts to advance diet- PROGRESSING   STG: Patient will consume easy to chew diet and thin liquids without overt s/sx of airway compromise., ADDED   STG: Patient will participate in modified barium swallow study to objectively assess oropharyngeal swallow    SPEECH LANGUAGE PATHOLOGY: DYSPHAGIA  Daily Note #1    NAME: Mallika Riddle  : 1975  MRN: [id number]    ADMISSION DATE: 2022  ADMITTING DIAGNOSIS: has Ulcer, skin, chronic ([address] Depression with anxiety; Moderate single current episode of major depressive disorder ([address] Encounter to discuss test results; Encounter for annual routine gynecological examination; CKD (chronic kidney disease), stage IV ([address] Diabetic polyneuropathy associated with type 1 diabetes mellitus ([address] Malodorous urine; CKD (chronic kidney disease) stage 3, GFR 30-59 ml/min ([address] Type 1 diabetes mellitus with stage 3 chronic kidney disease ([address] H/O gastroesophageal reflux (GERD); Neuropathy, peripheral, idiopathic; 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 associated with type 1 diabetes mellitus ([address] Premature ovarian failure; Noncompliance with medication regimen; Ureteric stone; Urinary tract infection without hematuria; Hydronephrosis of right kidney; Diabetes mellitus type 1 ([address] Nausea; BLANCA (acute kidney injury) ([address] Acute renal failure ([address] Benign hypertension with CKD (chronic kidney disease) stage III (HCC); IDDM (insulin dependent diabetes mellitus); Cardiac arrest ([address] Diabetic ketoacidosis with coma associated with type 1 diabetes mellitus ([address] Severe sepsis with septic shock (CODE) ([address] Pneumonia, bacterial; Acute respiratory failure ([address]  Altered mental status; and Frailty on their problem list.  Date of Eval: 2022  ICD-10: Treatment Diagnosis: R13.11 Dysphagia, Oral Phase    RECOMMENDATIONS   Diet:  Diet Solids Recommendation: Easy to Chew  Liquid Consistency Recommendation: Thin    Medications:  (As tolerated)     Recommendations: Assistance with meals; Dysphagia treatment;Setup assist   Compensatory Swallowing Strategies:Small bites/sips;Upright as possible for all oral intake   Therapeutic Intervention:Patient/Family education;Diet tolerance monitoring   Patient continues to require skilled intervention: Yes   D/C Recommendations: Ongoing speech therapy is recommended during this hospitalization     ASSESSMENT    Dysphagia Diagnosis: Mild oral stage dysphagia  Dysphagia Impression : Improving swallow function. Mild dysphagia persists. Patient presents with improving swallow function. Mildly prolonged oral prep with chewable textures due to missing dentition. Functional oral prep with extended time. She reports using partials at home and mother can bring these in later this admission. Voice clear throughout trials. Cough on 1/4 mixed consistencies trials which occurred with visitor entered the room. No additional s/sx of airway compromise with thin liquids by serial straw sips, puree, mixed, or solid textures. Recommend easy to chew diet/thin liquids. Medications with liquid wash. Will follow up for diet tolerance x1. GENERAL    History of Present Injury/Illness: Ms. Angelia Campbell  has a past medical history of Acute renal failure ([address] CKD (chronic kidney disease) stage 3, GFR 30-59 ml/min ([address] Gastroenteritis, acute, IDDM (insulin dependent diabetes mellitus), Intractable nausea and vomiting, Irregular menses, Kidney stone, Neuropathy, peripheral, idiopathic, Retinopathy, bilateral, Trichomoniasis, Ulcer, skin, chronic ([address] and Vaginal burning. . She also  has a past surgical history that includes cystoscopy,insert ureteral stent (04/2022); amputation (Left); amputation (1/27/12); and hx open reduction internal fixation (Right, 2011).   Chart Reviewed: Yes  Subjective: Patient more awake and responsive today. Oriented x4. Voice still soft. Behavior/Cognition: Alert  Communication Observation: Functional  Follows Directions: Simple  Respiratory Status: O2 via nasual cannula     Current Diet : NPO     Pain:   Patient does not c/o pain                   OBJECTIVE    Oropharyngeal Phase:     Vocal Quality: Low volume  Consistency Presented: Thin;Mixed consistency; Regular;Pureed  How Presented: Self-fed/presented (with assistance)  Bolus Acceptance: No impairment  Bolus Formation/Control: Impaired  Type of Impairment: Delayed (due to missing dentition)  Propulsion: No impairment  Oral Residue: None  Initiation of Swallow: No impairment  Laryngeal Elevation: Functional  Aspiration Signs/Symptoms:  (Cough on 1 trials of mixed fruit only)  Pharyngeal Phase Characteristics: No impairment, issues, or problems  Effective Modifications:  (N/A)  Cues for Modifications: None        Oral Phase - Comment: MIld impairement due to missing dentition  Pharyngeal Phase: WFL     PLAN    Duration/Frequency: Continue to follow patient 3x/week for duration of hospitalization and/or until goals met    Dysphagia Outcome and Severity Scale (OTIS)  Dysphagia Outcome Severity Scale: Level 5: Mild dysphagia- Distant supervision. May need one diet consistency restricted  Interpretation of Tool: The Dysphagia Outcome and Severity Scale (OTIS) is a simple, easy-to-use, 7-point scale developed to systematically rate the functional severity of dysphagia based on objective assessment and make recommendations for diet level, independence level, and type of nutrition.    Normal(7), Functional(6), Mild(5), Mild-Moderate(4), Moderate(3), Moderate-Severe(2), Severe(1)    Speech Therapy Prognosis  Prognosis: Excellent  Prognosis Considerations: Medical Prognosis    Education: RN,Patient   Patient Education: Dysphagia, role of speech therapy   Patient Education Response: Verbalizes understanding    Current Medications:   No current facility-administered medications on file prior to encounter. Current Outpatient Medications on File Prior to Encounter   Medication Sig Dispense Refill    atorvastatin (LIPITOR) 80 MG tablet Take 80 mg by mouth daily      brimonidine (ALPHAGAN P) 0.1 % SOLN 1 drop in left eye twice a day      ergocalciferol (ERGOCALCIFEROL) 1.25 MG (09906 UT) capsule Take 50,000 Units by mouth      gabapentin (NEURONTIN) 300 MG capsule Take 300 mg by mouth daily.  Insulin Degludec (TRESIBA FLEXTOUCH) 200 UNIT/ML SOPN Inject 30 Units into the skin daily      insulin lispro, 1 Unit Dial, (HUMALOG KWIKPEN) 100 UNIT/ML SOPN 6 TIDAC correction scale 1/50>150, max daily dose 50 units      ondansetron (ZOFRAN-ODT) 4 MG disintegrating tablet Take 4 mg by mouth every 8 hours as needed         PRECAUTIONS/ALLERGIES: Patient has no known allergies.    Safety Devices in place: Yes  Type of devices: Nurse notified    Therapy Time  SLP Individual Minutes  Time In: 7222  Time Out: 4501  Minutes: [id number] [id number] RYLEY King  5/27/2022 10:53 AM

## 2022-05-27 NOTE — PLAN OF CARE
Problem: Pain  Goal: Verbalizes/displays adequate comfort level or baseline comfort level  5/27/2022 0312 by Natalya Love RN  Outcome: Progressing  5/26/2022 1814 by Trixie Vega RN  Outcome: Progressing     Problem: Confusion  Goal: Confusion, delirium, dementia, or psychosis is improved or at baseline  Description: INTERVENTIONS:  1. Assess for possible contributors to thought disturbance, including medications, impaired vision or hearing, underlying metabolic abnormalities, dehydration, psychiatric diagnoses, and notify attending LIP  2. [place] high risk fall precautions, as indicated  3. Provide frequent short contacts to provide reality reorientation, refocusing and direction  4. Decrease environmental stimuli, including noise as appropriate  5. Monitor and intervene to maintain adequate nutrition, hydration, elimination, sleep and activity  6. If unable to ensure safety without constant attention obtain sitter and review sitter guidelines with assigned personnel  7.  Initiate Psychosocial CNS and Spiritual Care consult, as indicated  5/27/2022 2075 by Natalya Love RN  Outcome: Progressing  5/26/2022 1814 by Trixie Vega RN  Outcome: Progressing     Problem: Cardiovascular - Adult  Goal: Maintains optimal cardiac output and hemodynamic stability  5/27/2022 0312 by Natalya Love RN  Outcome: Progressing  5/26/2022 1814 by Trixie Vega RN  Outcome: Progressing  Flowsheets (Taken 5/26/2022 0710)  Maintains optimal cardiac output and hemodynamic stability: Monitor urine output and notify Licensed Independent Practitioner for values outside of normal range  Goal: Absence of cardiac dysrhythmias or at baseline  5/27/2022 0312 by Natalya Love RN  Outcome: Progressing  5/26/2022 1814 by Trixie Vega RN  Outcome: Progressing  Flowsheets (Taken 5/26/2022 0710)  Absence of cardiac dysrhythmias or at baseline: Monitor cardiac rate and rhythm     Problem: Skin/Tissue Integrity - Adult  Goal: Skin integrity remains intact  Outcome: Progressing  Flowsheets (Taken 5/26/2022 2007)  Skin Integrity Remains Intact: Monitor for areas of redness and/or skin breakdown  Goal: Incisions, wounds, or drain sites healing without S/S of infection  Outcome: Progressing  Goal: Oral mucous membranes remain intact  Outcome: Progressing

## 2022-05-27 NOTE — CONSULTS
Harish White MD  Medical Director  [address] [facility], [address] [facility]  Tel: [phone]       Physical Medicine & Rehabilitation Consult    Subjective:     Date of Consultation:  May 27, 2022  Referring Provider: Hospitalist service    Aixa Cherry is a 48 y.o. Black female who is being evaluated at the request of Hospitalist service for consideration for inpatient rehabilitation following altered mental status. HPI: The patient is a right-hand dominant, previously functionally independent 46yo BF with PMH including DM1, CKD4, retinopathy, neuropathy and ureteral stones. 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 She was found unresponsive in her home by EMS and brought into the ED 5/21/2022. ED findings included BS ~1600, Cr 9.7, K+ 6.7, pH 6.8, WBC 29K. Initial /150, she was soon hypotensive to 62/31. She was intubated and admitted to ICU for septic shock, DKA and ARF. Head CT unrevealing. She had cardiac arrest that required chest compressions. Pressors, IV insulin, broad-spectrum ABX and resuscitation fluids were started. Nephrology consulted for ARF; dialysis initiated when she became oliguric (5/24). UCx NGTD. Tube feeds started 5/24. She was extubated 5/25 and vancomycin was stopped. SLP kept her NPO 5/26 due to moderate-severe oropharyngeal dysphagia. She was combative and agitated 5/26 but calmed with Haldol. SLP re-evaluation 5/27 cleared patient for easy-to-chew solids with thin liquids. BP and BS normalized. BCx x 2 remained NGTD; Zosyn EOT 5/28. Nephrology planned to hold HD over weekend to assess renal recovery. PT and OT were initiated, and patient was found to have significant mobility and self-care deficits.     PLOF: functionally independent at baseline for ADLs, IADLs, mobility and driving  Per SLP: easy-to-chew solids with thin liquids  Per PT: mod A x 2 for bed mobility, transfers; poor standing balance, not safe to ambulate  Per OT: mod A for feeding / grooming, UB dressing, max A for bathing, total A for toileting, LB dressing  Home environment: lives alone in private residence      Principal Problem:    Cardiac arrest [facility])  Active Problems:    Acute renal failure ([facility]    Benign hypertension with CKD (chronic kidney disease) stage III ([facility]    Diabetic ketoacidosis with coma associated with type 1 diabetes mellitus ([facility]    Severe sepsis with septic shock (CODE) ([facility])    Pneumonia, bacterial    Acute respiratory failure ([facility])    Altered mental status    Frailty    Urinary tract infection without hematuria    Diabetes mellitus type 1 ([facility]    BLANCA (acute kidney injury) ([facility]  Resolved Problems:    * No resolved hospital problems.  *    Past Medical History:   Diagnosis Date    Acute renal failure ([facility] 1/24/2012    CKD (chronic kidney disease) stage 3, GFR 30-59 ml/min ([facility])     Gastroenteritis, acute 1/24/2012    IDDM (insulin dependent diabetes mellitus) 1/24/2012    Type 1 av -150 can tell Hypo below 70    Intractable nausea and vomiting 12/25/2014    Irregular menses     Kidney stone     Neuropathy, peripheral, idiopathic 3/13/2017    Retinopathy, bilateral 3/13/2017    Trichomoniasis 3/13/2017    Ulcer, skin, chronic ([facility] 3/13/2017    Vaginal burning       Past Surgical History:   Procedure Laterality Date    AMPUTATION Left     5th Toe due to Diabetic Ulcer    AMPUTATION  1/27/12    gangrene    CYSTOSCOPY,INSERT URETERAL STENT  04/2022    HX OPEN REDUCTION INTERNAL FIXATION Right 2011    ankle      Family History   Problem Relation Age of Onset    Prostate Cancer Father     Diabetes Paternal Grandmother     Colon Cancer Neg Hx     Uterine Cancer Neg Hx     Diabetes Father         DM Type II    Stroke Father     Diabetes Paternal Grandfather         DM Type II     Hypertension Maternal Grandmother     Ovarian Cancer Neg Hx  Breast Cancer Mother 76    Diabetes Maternal Grandmother         DM Type II      Social History     Tobacco Use    Smoking status: Former Smoker     Quit date: 2013     Years since quittin.5    Smokeless tobacco: Never Used   Substance Use Topics    Alcohol use: Yes     Prior to Admission medications    Medication Sig Start Date End Date Taking? Authorizing Provider   atorvastatin (LIPITOR) 80 MG tablet Take 80 mg by mouth daily  Patient not taking: Reported on 2022   Ar Automatic Reconciliation   brimonidine (ALPHAGAN P) 0.1 % SOLN 1 drop in left eye twice a day  Patient not taking: Reported on 2022   Ar Automatic Reconciliation   ergocalciferol (ERGOCALCIFEROL) 1.25 MG (80308 UT) capsule Take 50,000 Units by mouth  Patient not taking: Reported on 2022   Ar Automatic Reconciliation   gabapentin (NEURONTIN) 300 MG capsule Take 300 mg by mouth daily.   Patient not taking: Reported on 2022   Ar Automatic Reconciliation   Insulin Degludec (TRESIBA FLEXTOUCH) 200 UNIT/ML SOPN Inject 30 Units into the skin daily  Patient not taking: Reported on 21   Ar Automatic Reconciliation   insulin lispro, 1 Unit Dial, (HUMALOG KWIKPEN) 100 UNIT/ML SOPN 6 TIDAC correction scale 1/50>150, max daily dose 50 units  Patient not taking: Reported on 21   Ar Automatic Reconciliation   ondansetron (ZOFRAN-ODT) 4 MG disintegrating tablet Take 4 mg by mouth every 8 hours as needed  Patient not taking: Reported on 2022 3/21/22   Ar Automatic Reconciliation     No Known Allergies     Review of Systems:   Constitutional: positive for fatigue, negative for anorexia, chills, fevers and malaise  Eyes: negative for vision changes  Ears, nose, mouth, throat, and face: positive for hoarseness, voice change and sore throat, negative for epistaxis, nasal congestion and tinnitus  Respiratory: positive for cough, dyspnea on exertion and shortness of breath  Cardiovascular: negative for chest pain, orthopnea and palpitations  Gastrointestinal: negative for abdominal pain, dysphagia, nausea and vomiting  Genitourinary: negative for dysuria  Integument: negative for pruritus and rash  Musculoskeletal: positive for muscle weakness and MSK chest pain (from compressions), negative for back pain and myalgias  Neurological: positive for coordination problems, gait problems, memory problems and weakness, negative for dizziness, headaches, seizures and tremors    Objective:     Vitals:  Blood pressure (!) 97/55, pulse (!) 106, temperature 98.4 °F (36.9 °C), temperature source Oral, resp. rate 22, height 5' 6\" (1.676 m), weight 194 lb 3.6 oz (88.1 kg), SpO2 99 %.   Temp (24hrs), Av.5 °F (36.9 °C), Min:98.2 °F (36.8 °C), Max:98.7 °F (37.1 °C)    Physical Exam:  General: Drowsy but alerts to voice, overweight, cooperative BF   Skin:  Warm, moist with texture appropriate for age  Eyes:  Extraocular muscles grossly intact  HENT:  Normocephalic; nares patent, oral mucosa moist, neck supple  Respiratory: Rhonchi bilaterally with shallow respirations until prompted, breathing is non-labored   CV:  Tachycardic, regular underlying rhythm, no appreciable murmur  Abdomen: Soft, non-tender, protuberant but not distended; bowel sounds infrequent but of normal pitch and character  Extremities: UE strength at biceps and WE 4+/5, unable to follow commands for MMT of triceps, WF and finger flexion, decreased but symmetric  strength; lifts B LE off bed against gravity, strength at hip flexion 4/5, knee flexion 4+/5, knee extension 4-/5, ankle DF/PF 4+/5 bilaterally; mild distal extremity edema, no cyanosis or clubbing  Neurological: Oriented to person, year, situation, remembered day of week from being told earlier; follows ~75% 1-step commands, impaired attention and focus, good comprehension but impaired STM; hypophonia but no dysarthria, no noted word-finding deficits; face symmetric to cheek puff / brow furrow / grin / smile, tongue midline; EOM intact, visual fields are full to finger confrontation; symmetric shoulder shrug, diminished but symmetric rapid alternating movements; able to read, perform simple math, identify common object and state its use      Labs/Studies:  Recent Results (from the past 72 hour(s))   POCT Glucose    Collection Time: 05/24/22  3:39 PM   Result Value Ref Range    POC Glucose 130 (H) 65 - 100 mg/dL    Performed by: Dayami Jay    Basic Metabolic Panel    Collection Time: 05/24/22  3:40 PM   Result Value Ref Range    Sodium 140 136 - 145 mmol/L    Potassium 3.6 3.5 - 5.1 mmol/L    Chloride 103 98 - 107 mmol/L    CO2 30 21 - 32 mmol/L    Anion Gap 7 7 - 16 mmol/L    Glucose 124 (H) 65 - 100 mg/dL    BUN 27 (H) 6 - 23 MG/DL    CREATININE 3.40 (H) 0.6 - 1.0 MG/DL    GFR  19 (L) >60 ml/min/1.73m2    GFR Non- 15 (L) >60 ml/min/1.73m2    Calcium 8.1 (L) 8.3 - 10.4 MG/DL   Magnesium    Collection Time: 05/24/22  3:40 PM   Result Value Ref Range    Magnesium 1.6 (L) 1.8 - 2.4 mg/dL   Phosphorus    Collection Time: 05/24/22  3:40 PM   Result Value Ref Range    Phosphorus 1.3 (L) 2.5 - 4.5 MG/DL   POCT Glucose    Collection Time: 05/24/22  4:42 PM   Result Value Ref Range    POC Glucose 139 (H) 65 - 100 mg/dL    Performed by: Saira    POCT Glucose    Collection Time: 05/24/22  5:45 PM   Result Value Ref Range    POC Glucose 156 (H) 65 - 100 mg/dL    Performed by: Dayami Jay    POCT Glucose    Collection Time: 05/24/22  6:48 PM   Result Value Ref Range    POC Glucose 202 (H) 65 - 100 mg/dL    Performed by: Saira    Magnesium    Collection Time: 05/24/22  7:44 PM   Result Value Ref Range    Magnesium 1.5 (L) 1.8 - 2.4 mg/dL   Phosphorus    Collection Time: 05/24/22  7:44 PM   Result Value Ref Range    Phosphorus 1.7 (L) 2.5 - 4.5 MG/DL   Lactic Acid    Collection Time: 05/24/22  7:44 PM   Result Value Ref Range    Lactic Acid, Plasma 2.6 (H) 0.4 - 2.0 MMOL/L   Basic Metabolic Panel    Collection Time: 05/24/22  7:44 PM   Result Value Ref Range    Sodium 139 136 - 145 mmol/L    Potassium 3.9 3.5 - 5.1 mmol/L    Chloride 102 98 - 107 mmol/L    CO2 27 21 - 32 mmol/L    Anion Gap 10 7 - 16 mmol/L    Glucose 237 (H) 65 - 100 mg/dL    BUN 24 (H) 6 - 23 MG/DL    CREATININE 3.30 (H) 0.6 - 1.0 MG/DL    GFR  19 (L) >60 ml/min/1.73m2    GFR Non- 16 (L) >60 ml/min/1.73m2    Calcium 7.6 (L) 8.3 - 10.4 MG/DL   POCT Glucose    Collection Time: 05/24/22  7:51 PM   Result Value Ref Range    POC Glucose 224 (H) 65 - 100 mg/dL    Performed by: Federica Tolentino    POCT Glucose    Collection Time: 05/24/22 10:58 PM   Result Value Ref Range    POC Glucose 296 (H) 65 - 100 mg/dL    Performed by: Federica Tolentino    CBC with Auto Differential    Collection Time: 05/25/22  2:38 AM   Result Value Ref Range    WBC 20.5 (H) 4.3 - 11.1 K/uL    RBC 3.24 (L) 4.05 - 5.2 M/uL    Hemoglobin 8.8 (L) 11.7 - 15.4 g/dL    Hematocrit 25.4 (L) 35.8 - 46.3 %    MCV 78.4 (L) 79.6 - 97.8 FL    MCH 27.2 26.1 - 32.9 PG    MCHC 34.6 31.4 - 35.0 g/dL    RDW 14.7 (H) 11.9 - 14.6 %    Platelets 306 105 - 550 K/uL    MPV 10.2 9.4 - 12.3 FL    nRBC 0.02 0.0 - 0.2 K/uL    Differential Type AUTOMATED      Seg Neutrophils 84 (H) 43 - 78 %    Lymphocytes 8 (L) 13 - 44 %    Monocytes 6 4.0 - 12.0 %    Eosinophils % 0 (L) 0.5 - 7.8 %    Basophils 0 0.0 - 2.0 %    Immature Granulocytes 2 0.0 - 5.0 %    Segs Absolute 17.1 (H) 1.7 - 8.2 K/UL    Absolute Lymph # 1.6 0.5 - 4.6 K/UL    Absolute Mono # 1.3 0.1 - 1.3 K/UL    Absolute Eos # 0.0 0.0 - 0.8 K/UL    Basophils Absolute 0.0 0.0 - 0.2 K/UL    Absolute Immature Granulocyte 0.4 0.0 - 0.5 K/UL   Basic Metabolic Panel    Collection Time: 05/25/22  2:38 AM   Result Value Ref Range    Sodium 138 136 - 145 mmol/L    Potassium 4.4 3.5 - 5.1 mmol/L    Chloride 101 98 - 107 mmol/L    CO2 30 21 - 32 mmol/L    Anion Gap 7 7 - 16 mmol/L    Glucose 314 (H) 65 - 100 mg/dL    BUN 30 (H) 6 - 23 MG/DL    CREATININE 4.00 (H) 0.6 - 1.0 MG/DL    GFR African American 15 (L) >60 ml/min/1.73m2    GFR Non- 13 (L) >60 ml/min/1.73m2    Calcium 7.4 (L) 8.3 - 10.4 MG/DL   Magnesium    Collection Time: 05/25/22  2:38 AM   Result Value Ref Range    Magnesium 1.7 (L) 1.8 - 2.4 mg/dL   Phosphorus    Collection Time: 05/25/22  2:38 AM   Result Value Ref Range    Phosphorus 2.5 2.5 - 4.5 MG/DL   POC Blood, Cord, Arterial    Collection Time: 05/25/22  4:18 AM   Result Value Ref Range    DEVICE ADULT VENT      FIO2 30 %    pH, Arterial, POC 7.52 (H) 7.35 - 7.45      pCO2, Arterial, POC 36.0 35 - 45 MMHG    pO2, Arterial,  (H) 75 - 100 MMHG    HCO3, Mixed 29.3 (H) 22 - 26 MMOL/L    SO2c, Arterial, POC 99.2 (H) 95 - 98 %    Base Excess 6.1 mmol/L    Mode CPAP/PS      POC PEEP 8 cmH2O    Site DRAWN FROM ARTERIAL LINE      Specimen type: ARTERIAL      Performed by:  Morteza    POCT Glucose    Collection Time: 05/25/22  5:24 AM   Result Value Ref Range    POC Glucose 357 (H) 65 - 100 mg/dL    Performed by: Paige Cifuentes    Lactic Acid    Collection Time: 05/25/22  7:25 AM   Result Value Ref Range    Lactic Acid, Plasma 3.5 (H) 0.4 - 2.0 MMOL/L   Phosphorus    Collection Time: 05/25/22  7:25 AM   Result Value Ref Range    Phosphorus 2.8 2.5 - 4.5 MG/DL   Lactic Acid    Collection Time: 05/25/22 11:38 AM   Result Value Ref Range    Lactic Acid, Plasma 1.4 0.4 - 2.0 MMOL/L   Phosphorus    Collection Time: 05/25/22 11:38 AM   Result Value Ref Range    Phosphorus 2.4 (L) 2.5 - 4.5 MG/DL   POCT Glucose    Collection Time: 05/25/22 11:43 AM   Result Value Ref Range    POC Glucose 234 (H) 65 - 100 mg/dL    Performed by: Maik Steele    POCT Glucose    Collection Time: 05/25/22  5:59 PM   Result Value Ref Range    POC Glucose 145 (H) 65 - 100 mg/dL    Performed by: Maik Steele    POCT Glucose    Collection Time: 05/26/22 12:40 AM   Result Value Ref Range    POC Glucose 165 (H) 65 - 100 mg/dL    Performed by: Feroz Randolph    Basic Metabolic Panel    Collection Time: 05/26/22  3:23 AM   Result Value Ref Range    Sodium 139 136 - 145 mmol/L    Potassium 4.1 3.5 - 5.1 mmol/L    Chloride 101 98 - 107 mmol/L    CO2 34 (H) 21 - 32 mmol/L    Anion Gap 4 (L) 7 - 16 mmol/L    Glucose 168 (H) 65 - 100 mg/dL    BUN 23 6 - 23 MG/DL    CREATININE 3.40 (H) 0.6 - 1.0 MG/DL    GFR  19 (L) >60 ml/min/1.73m2    GFR Non- 15 (L) >60 ml/min/1.73m2    Calcium 7.8 (L) 8.3 - 10.4 MG/DL   Phosphorus    Collection Time: 05/26/22  3:23 AM   Result Value Ref Range    Phosphorus 2.6 2.5 - 4.5 MG/DL   CBC    Collection Time: 05/26/22  3:23 AM   Result Value Ref Range    WBC 17.0 (H) 4.3 - 11.1 K/uL    RBC 3.23 (L) 4.05 - 5.2 M/uL    Hemoglobin 8.6 (L) 11.7 - 15.4 g/dL    Hematocrit 25.3 (L) 35.8 - 46.3 %    MCV 78.3 (L) 79.6 - 97.8 FL    MCH 26.6 26.1 - 32.9 PG    MCHC 34.0 31.4 - 35.0 g/dL    RDW 14.4 11.9 - 14.6 %    Platelets 009 493 - 776 K/uL    MPV 10.3 9.4 - 12.3 FL    nRBC 0.00 0.0 - 0.2 K/uL   POCT Glucose    Collection Time: 05/26/22  5:53 AM   Result Value Ref Range    POC Glucose 184 (H) 65 - 100 mg/dL    Performed by: Lisa    POCT Glucose    Collection Time: 05/26/22 11:17 AM   Result Value Ref Range    POC Glucose 197 (H) 65 - 100 mg/dL    Performed by: Trujillo ([place])    POCT Glucose    Collection Time: 05/26/22  5:57 PM   Result Value Ref Range    POC Glucose 213 (H) 65 - 100 mg/dL    Performed by: Trujillo ([place])    POCT Glucose    Collection Time: 05/26/22 11:48 PM   Result Value Ref Range    POC Glucose 134 (H) 65 - 100 mg/dL    Performed by: Isabel Morales    Basic Metabolic Panel    Collection Time: 05/27/22  4:59 AM   Result Value Ref Range    Sodium 142 136 - 145 mmol/L    Potassium 3.9 3.5 - 5.1 mmol/L    Chloride 103 98 - 107 mmol/L    CO2 30 21 - 32 mmol/L Anion Gap 9 7 - 16 mmol/L    Glucose 180 (H) 65 - 100 mg/dL    BUN 38 (H) 6 - 23 MG/DL    CREATININE 5.20 (H) 0.6 - 1.0 MG/DL    GFR African American 11 (L) >60 ml/min/1.73m2    GFR Non- 9 (L) >60 ml/min/1.73m2    Calcium 8.1 (L) 8.3 - 10.4 MG/DL   Phosphorus    Collection Time: 05/27/22  4:59 AM   Result Value Ref Range    Phosphorus 3.7 2.5 - 4.5 MG/DL   CBC    Collection Time: 05/27/22  4:59 AM   Result Value Ref Range    WBC 13.9 (H) 4.3 - 11.1 K/uL    RBC 3.03 (L) 4.05 - 5.2 M/uL    Hemoglobin 8.0 (L) 11.7 - 15.4 g/dL    Hematocrit 24.7 (L) 35.8 - 46.3 %    MCV 81.5 79.6 - 97.8 FL    MCH 26.4 26.1 - 32.9 PG    MCHC 32.4 31.4 - 35.0 g/dL    RDW 14.6 11.9 - 14.6 %    Platelets 154 926 - 757 K/uL    MPV 10.4 9.4 - 12.3 FL    nRBC 0.02 0.0 - 0.2 K/uL   POCT Glucose    Collection Time: 05/27/22  5:25 AM   Result Value Ref Range    POC Glucose 179 (H) 65 - 100 mg/dL    Performed by: Derrell Ferreira    POCT Glucose    Collection Time: 05/27/22 11:50 AM   Result Value Ref Range    POC Glucose 182 (H) 65 - 100 mg/dL    Performed by: Aga        Functional Exam:  Per PT (5/27): decreased strength in B LE. Pt was alert but not completely oriented today. She reported intact sensation in B LE today but decreased coordination. Pt reported increased dizziness with upright activity but no hypotension noted. Pt was able to come to sitting on EOB with mod A x 2 and fair- sitting balance. She stood several times with min-mod A x 2 / RW and poor standing balance. Pt fatigued quickly today requiring frequent rest breaks. She also demonstrated decreased safety awareness today. Per OT (5/27): Groggy overall. Mod A x 2 up to EOB, unable to hold and hunter socks. Fair sitting balance. Pt with B swollen hands, unable to make complete fists, and thus had trouble with holding toothbrush and paste, unable to squeeze.  After setup, was able to brush her teeth with min A, needed 2 hands to hold cup with water for rise and spit. Trouble holding and wiping mouth with paper towel. Washed face after setup with open hand. Trouble holding comb to brush hair. Mod A bed mobility and sit to stand to RW with mod A. Sit to stand mod A x 2 for 3 standing attempts for BM cleaning, dep cleaning, changing linens, pt unaware she had BM, unable to bear much weight and reported increased dizziness and asked to return to supine. Mod A x 2 return to supine and all lines reattached. Speech Assessment:  5/27 - presents with improving swallow function. Mildly prolonged oral prep with chewable textures due to missing dentition. Functional oral prep with extended time. She reports using partials at home and mother can bring these in later this admission. Voice clear throughout trials. Cough on 1/4 mixed consistencies trials which occurred with visitor entered the room. No additional s/sx of airway compromise with thin liquids by serial straw sips, puree, mixed, or solid textures.        Impression / Assessment:     Principal Problem:    Cardiac arrest [facility])  Active Problems:    Acute renal failure (HCC)    Benign hypertension with CKD (chronic kidney disease) stage III ([facility])    Diabetic ketoacidosis with coma associated with type 1 diabetes mellitus ([facility])    Severe sepsis with septic shock (CODE) ([facility])    Pneumonia, bacterial    Acute respiratory failure ([facility])    Altered mental status    Frailty    Urinary tract infection without hematuria    Diabetes mellitus type 1 ([facility])    BLANCA (acute kidney injury) ([facility] [address]      Plan / Recommendations / Medical Decision Making:     Recommendations:   Continue Acute Rehab Program  Coordination of rehab / medical care  Counseling of PM&R care issues management  Monitoring and management of medical conditions per plan of care / orders     · Physical debility after cardiac arrest, septic shock, respiratory failure, diabetic ketoacidosis and acute renal failure; most critical medical issues are stable / resolving, but she remains somewhat fragile; awaiting return / declaration of renal function  · Patient is currently quite low-level functioning, especially vs. PLOF, but with good potential for rehab  · She will likely require 24/7 supervision at discharge  · Will discuss patient with Stephanie Lewis Director and admissions coordinators    Discussion with Family / Caregiver / Staff    Reviewed Therapies / Shubham Dempsey / Kayla Jackson / Records    Thank you very much for this referral. We appreciate the opportunity to participate in this patient's care. We will continue to follow. Nga De La Torre PA-C  Physician Assistant with [facility]  Shantal Ramos MD, Medical Director

## 2022-05-28 LAB
ANION GAP SERPL CALC-SCNC: 9 MMOL/L (ref 7–16)
BACTERIA SPEC CULT: NORMAL
BUN SERPL-MCNC: 22 MG/DL (ref 6–23)
CALCIUM SERPL-MCNC: 8.6 MG/DL (ref 8.3–10.4)
CHLORIDE SERPL-SCNC: 107 MMOL/L (ref 98–107)
CO2 SERPL-SCNC: 27 MMOL/L (ref 21–32)
CREAT SERPL-MCNC: 3.4 MG/DL (ref 0.6–1)
ERYTHROCYTE [DISTWIDTH] IN BLOOD BY AUTOMATED COUNT: 14.7 % (ref 11.9–14.6)
GLUCOSE BLD STRIP.AUTO-MCNC: 106 MG/DL (ref 65–100)
GLUCOSE BLD STRIP.AUTO-MCNC: 153 MG/DL (ref 65–100)
GLUCOSE BLD STRIP.AUTO-MCNC: 237 MG/DL (ref 65–100)
GLUCOSE BLD STRIP.AUTO-MCNC: 304 MG/DL (ref 65–100)
GLUCOSE SERPL-MCNC: 143 MG/DL (ref 65–100)
HCT VFR BLD AUTO: 27.7 % (ref 35.8–46.3)
HGB BLD-MCNC: 9 G/DL (ref 11.7–15.4)
MCH RBC QN AUTO: 26.1 PG (ref 26.1–32.9)
MCHC RBC AUTO-ENTMCNC: 32.5 G/DL (ref 31.4–35)
MCV RBC AUTO: 80.3 FL (ref 79.6–97.8)
NRBC # BLD: 0.06 K/UL (ref 0–0.2)
PHOSPHATE SERPL-MCNC: 2.8 MG/DL (ref 2.5–4.5)
PLATELET # BLD AUTO: 251 K/UL (ref 150–450)
PMV BLD AUTO: 10.8 FL (ref 9.4–12.3)
POTASSIUM SERPL-SCNC: 4.2 MMOL/L (ref 3.5–5.1)
RBC # BLD AUTO: 3.45 M/UL (ref 4.05–5.2)
SERVICE CMNT-IMP: ABNORMAL
SERVICE CMNT-IMP: NORMAL
SODIUM SERPL-SCNC: 143 MMOL/L (ref 136–145)
WBC # BLD AUTO: 13.6 K/UL (ref 4.3–11.1)

## 2022-05-28 PROCEDURE — 99232 SBSQ HOSP IP/OBS MODERATE 35: CPT | Performed by: INTERNAL MEDICINE

## 2022-05-28 PROCEDURE — 6370000000 HC RX 637 (ALT 250 FOR IP): Performed by: INTERNAL MEDICINE

## 2022-05-28 PROCEDURE — 2580000003 HC RX 258: Performed by: INTERNAL MEDICINE

## 2022-05-28 PROCEDURE — 82962 GLUCOSE BLOOD TEST: CPT

## 2022-05-28 PROCEDURE — 84100 ASSAY OF PHOSPHORUS: CPT

## 2022-05-28 PROCEDURE — 80048 BASIC METABOLIC PNL TOTAL CA: CPT

## 2022-05-28 PROCEDURE — 85027 COMPLETE CBC AUTOMATED: CPT

## 2022-05-28 PROCEDURE — 1100000000 HC RM PRIVATE

## 2022-05-28 PROCEDURE — 36415 COLL VENOUS BLD VENIPUNCTURE: CPT

## 2022-05-28 PROCEDURE — 6360000002 HC RX W HCPCS: Performed by: INTERNAL MEDICINE

## 2022-05-28 RX ADMIN — HEPARIN SODIUM 5000 UNITS: 5000 INJECTION INTRAVENOUS; SUBCUTANEOUS at 14:42

## 2022-05-28 RX ADMIN — INSULIN GLARGINE 15 UNITS: 100 INJECTION, SOLUTION SUBCUTANEOUS at 09:05

## 2022-05-28 RX ADMIN — SODIUM CHLORIDE, PRESERVATIVE FREE 10 ML: 5 INJECTION INTRAVENOUS at 09:09

## 2022-05-28 RX ADMIN — PIPERACILLIN AND TAZOBACTAM 3375 MG: 3; .375 INJECTION, POWDER, LYOPHILIZED, FOR SOLUTION INTRAVENOUS at 05:35

## 2022-05-28 RX ADMIN — HEPARIN SODIUM 5000 UNITS: 5000 INJECTION INTRAVENOUS; SUBCUTANEOUS at 21:42

## 2022-05-28 RX ADMIN — HEPARIN SODIUM 5000 UNITS: 5000 INJECTION INTRAVENOUS; SUBCUTANEOUS at 05:35

## 2022-05-28 RX ADMIN — SODIUM CHLORIDE, PRESERVATIVE FREE 5 ML: 5 INJECTION INTRAVENOUS at 21:42

## 2022-05-28 RX ADMIN — INSULIN LISPRO 12 UNITS: 100 INJECTION, SOLUTION INTRAVENOUS; SUBCUTANEOUS at 12:16

## 2022-05-28 RX ADMIN — PIPERACILLIN AND TAZOBACTAM 3375 MG: 3; .375 INJECTION, POWDER, LYOPHILIZED, FOR SOLUTION INTRAVENOUS at 17:23

## 2022-05-28 RX ADMIN — INSULIN LISPRO 4 UNITS: 100 INJECTION, SOLUTION INTRAVENOUS; SUBCUTANEOUS at 00:42

## 2022-05-28 ASSESSMENT — PAIN SCALES - GENERAL
PAINLEVEL_OUTOF10: 0
PAINLEVEL_OUTOF10: 0

## 2022-05-28 NOTE — PROGRESS NOTES
Pt resting quietly in bed. Respirations are even and unlabored on RA . No signs of distress noted or needs stated at this time. Call light in reach. Will continue to monitor. SBAR to be given to oncoming nurse.

## 2022-05-28 NOTE — PROGRESS NOTES
[facility]    Follow-Up on: BLANCA on CKD stage IV    HPI: Pt seen and examined. Awake and alert looks great! ROS:  Denies CP, SOB.     Current Facility-Administered Medications   Medication Dose Route Frequency    insulin glargine (LANTUS) injection vial 15 Units  15 Units SubCUTAneous Daily    heparin (porcine) injection 10,000 Units  10,000 Units IntraVENous PRN    albuterol (PROVENTIL) nebulizer solution 2.5 mg  2.5 mg Nebulization Q4H PRN    insulin regular (HUMULIN R;NOVOLIN R) injection 0-10 Units  0-10 Units IntraVENous PRN    dextrose 5 % and 0.45 % sodium chloride infusion   IntraVENous Continuous PRN    glucose chewable tablet 16 g  4 tablet Oral PRN    dextrose bolus 10% 125 mL  125 mL IntraVENous PRN    Or    dextrose bolus 10% 250 mL  250 mL IntraVENous PRN    glucagon injection 1 mg  1 mg IntraMUSCular PRN    dextrose 5 % solution  100 mL/hr IntraVENous PRN    insulin lispro (HUMALOG) injection vial 0-16 Units  0-16 Units SubCUTAneous Q6H    dextrose bolus 10% 125 mL  125 mL IntraVENous PRN    Or    dextrose bolus 10% 250 mL  250 mL IntraVENous PRN    glucagon injection 1 mg  1 mg IntraMUSCular PRN    medicated lip ointment (BLISTEX)   Topical PRN    sodium chloride flush 0.9 % injection 5-40 mL  5-40 mL IntraVENous 2 times per day    sodium chloride flush 0.9 % injection 5-40 mL  5-40 mL IntraVENous PRN    heparin (porcine) injection 5,000 Units  5,000 Units SubCUTAneous q8h    ondansetron (ZOFRAN-ODT) disintegrating tablet 4 mg  4 mg Oral Q8H PRN    Or    ondansetron (ZOFRAN) injection 4 mg  4 mg IntraVENous Q6H PRN    polyethylene glycol (GLYCOLAX) packet 17 g  17 g Oral Daily PRN    acetaminophen (TYLENOL) tablet 650 mg  650 mg Oral Q6H PRN    Or    acetaminophen (TYLENOL) suppository 650 mg  650 mg Rectal Q6H PRN    dextrose bolus 10% 250 mL  250 mL IntraVENous PRN    potassium chloride 10 mEq/100 mL IVPB (Peripheral Line)  10 mEq IntraVENous PRN    magnesium sulfate 1000 mg in dextrose 5% 100 mL IVPB  1,000 mg IntraVENous PRN    sodium phosphate 10 mmol in sodium chloride 0.9 % 250 mL IVPB  10 mmol IntraVENous PRN    Or    sodium phosphate 15 mmol in dextrose 5 % 250 mL IVPB  15 mmol IntraVENous PRN    Or    sodium phosphate 20 mmol in dextrose 5 % 500 mL IVPB  20 mmol IntraVENous PRN    dextrose 5 % and 0.45 % sodium chloride infusion   IntraVENous Continuous PRN    sodium chloride flush 0.9 % injection 5-40 mL  5-40 mL IntraVENous 2 times per day    sodium chloride flush 0.9 % injection 5-40 mL  5-40 mL IntraVENous PRN    0.9 % sodium chloride infusion   IntraVENous PRN    piperacillin-tazobactam (ZOSYN) 3,375 mg in sodium chloride 0.9 % 50 mL IVPB (mini-bag)  3,375 mg IntraVENous Q12H       Exam:  Vitals:    05/28/22 0545 05/28/22 0615 05/28/22 0744 05/28/22 1152   BP:   (!) 119/55 (!) 141/78   Pulse: (!) 114 (!) 109 (!) 107 (!) 111   Resp:   18 18   Temp:   99.1 °F (37.3 °C) 99.1 °F (37.3 °C)   TempSrc:   Oral    SpO2:   95% 91%   Weight:       Height:             Intake/Output Summary (Last 24 hours) at 5/28/2022 1159  Last data filed at 5/27/2022 1700  Gross per 24 hour   Intake 0 ml   Output 3137 ml   Net -3137 ml     PE:  GEN - in no distress  CV - regular, no murmur, no rub  Lung - clear bilaterally  Abd - soft, nontender  Ext - 1-2+ edema    Labs  Recent Labs     05/26/22  0323 05/27/22  0459 05/28/22  0441   WBC 17.0* 13.9* 13.6*   HGB 8.6* 8.0* 9.0*   HCT 25.3* 24.7* 27.7*    185 251     Recent Labs     05/26/22  0323 05/27/22  0459 05/28/22  0441    142 143   K 4.1 3.9 4.2    103 107   CO2 34* 30 27   BUN 23 38* 22   PHOS 2.6 3.7 2.8     No results for input(s): PH, PCO2, PO2, PCO2 in the last 72 hours.     Problem List:  Patient Active Problem List    Diagnosis Date Noted    Altered mental status     Frailty     Cardiac arrest ([facility] 05/21/2022    Diabetic ketoacidosis with coma associated with type 1 diabetes mellitus ([facility] 05/21/2022    Severe sepsis with septic shock (CODE) ([facility] 05/21/2022    Pneumonia, bacterial 05/21/2022    Acute respiratory failure ([facility] 05/21/2022    Benign hypertension with CKD (chronic kidney disease) stage III ([facility] 01/04/2022    Acute renal failure ([facility] 06/09/2021    IDDM (insulin dependent diabetes mellitus) 01/24/2012    BLANCA (acute kidney injury) ([facility] 04/21/2022    Ureteric stone 04/19/2022    Urinary tract infection without hematuria 04/19/2022    Hydronephrosis of right kidney 04/19/2022    Malodorous urine 11/16/2021    H/O gastroesophageal reflux (GERD) 06/19/2020    Encounter for annual routine gynecological examination 11/27/2018    Encounter to discuss test results 08/22/2018    Diabetes mellitus type 1 ([facility] 08/22/2018    Type 1 diabetes mellitus with stage 3 chronic kidney disease ([facility] 08/16/2018    Noncompliance with medication regimen 08/16/2018    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    Depression with anxiety 07/11/2017    Ulcer, skin, chronic ([facility] 03/13/2017    CKD (chronic kidney disease), stage IV ([facility] 03/13/2017    Neuropathy, peripheral, idiopathic 03/13/2017    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 associated with type 1 diabetes mellitus ([facility] 03/13/2017    Moderate single current episode of major depressive disorder ([facility] 02/03/2017    Diabetic polyneuropathy associated with type 1 diabetes mellitus ([facility] 02/03/2017    CKD (chronic kidney disease) stage 3, GFR 30-59 ml/min ([facility]) 12/25/2014    Nausea 01/24/2012       Issues Addressed By Nephrology:    Plan:  1. Type I DM  2. DKA BS>2000 on admit  3. BLANCA on CKD stage IV Now oliguric. Pt  Looks good. 4. Anemia   5.  Plan Hold HD over weekend evaluating for renal recovery

## 2022-05-28 NOTE — PROGRESS NOTES
Derick Solomon  Admission Date: 5/21/2022         Daily Progress Note: 5/28/2022    The patient's chart is reviewed and the patient is discussed with the staff. Background: 46F with htn, hld, dm, ckd3, had recent admission for right sided ureteral obstruction s/p stent placement presents with unresponsiveness.  The patient had not been heard from the last several days. Kristi Jones was found unresponsive in her home.  Brought in by EMS and was found to have numerous lab abnormalities. Jacquelyn Sy was found to be in DKA with a serum glucose of greater than 2000.  She was in acute renal failure with a creatinine of 9 and a potassium of 6.7.  She was acidotic with a pH of 6.8.  The patient was intubated on admission.  She suffered a brief cardiac arrest.  She was started on IV hydration, insulin drip, and broad-spectrum antibiotics.  CT of the head was negative. Nephrology is following for renal failure.    Extubated 5/25    Subjective:   Pt. Moved to floor and is off O2-     Current Facility-Administered Medications   Medication Dose Route Frequency    insulin glargine (LANTUS) injection vial 15 Units  15 Units SubCUTAneous Daily    heparin (porcine) injection 10,000 Units  10,000 Units IntraVENous PRN    albuterol (PROVENTIL) nebulizer solution 2.5 mg  2.5 mg Nebulization Q4H PRN    insulin regular (HUMULIN R;NOVOLIN R) injection 0-10 Units  0-10 Units IntraVENous PRN    dextrose 5 % and 0.45 % sodium chloride infusion   IntraVENous Continuous PRN    glucose chewable tablet 16 g  4 tablet Oral PRN    dextrose bolus 10% 125 mL  125 mL IntraVENous PRN    Or    dextrose bolus 10% 250 mL  250 mL IntraVENous PRN    glucagon injection 1 mg  1 mg IntraMUSCular PRN    dextrose 5 % solution  100 mL/hr IntraVENous PRN    insulin lispro (HUMALOG) injection vial 0-16 Units  0-16 Units SubCUTAneous Q6H    dextrose bolus 10% 125 mL  125 mL IntraVENous PRN    Or    dextrose bolus 10% 250 mL  250 mL IntraVENous PRN    glucagon injection 1 mg  1 mg IntraMUSCular PRN    medicated lip ointment (BLISTEX)   Topical PRN    sodium chloride flush 0.9 % injection 5-40 mL  5-40 mL IntraVENous 2 times per day    sodium chloride flush 0.9 % injection 5-40 mL  5-40 mL IntraVENous PRN    heparin (porcine) injection 5,000 Units  5,000 Units SubCUTAneous q8h    ondansetron (ZOFRAN-ODT) disintegrating tablet 4 mg  4 mg Oral Q8H PRN    Or    ondansetron (ZOFRAN) injection 4 mg  4 mg IntraVENous Q6H PRN    polyethylene glycol (GLYCOLAX) packet 17 g  17 g Oral Daily PRN    acetaminophen (TYLENOL) tablet 650 mg  650 mg Oral Q6H PRN    Or    acetaminophen (TYLENOL) suppository 650 mg  650 mg Rectal Q6H PRN    dextrose bolus 10% 250 mL  250 mL IntraVENous PRN    potassium chloride 10 mEq/100 mL IVPB (Peripheral Line)  10 mEq IntraVENous PRN    magnesium sulfate 1000 mg in dextrose 5% 100 mL IVPB  1,000 mg IntraVENous PRN    sodium phosphate 10 mmol in sodium chloride 0.9 % 250 mL IVPB  10 mmol IntraVENous PRN    Or    sodium phosphate 15 mmol in dextrose 5 % 250 mL IVPB  15 mmol IntraVENous PRN    Or    sodium phosphate 20 mmol in dextrose 5 % 500 mL IVPB  20 mmol IntraVENous PRN    dextrose 5 % and 0.45 % sodium chloride infusion   IntraVENous Continuous PRN    sodium chloride flush 0.9 % injection 5-40 mL  5-40 mL IntraVENous 2 times per day    sodium chloride flush 0.9 % injection 5-40 mL  5-40 mL IntraVENous PRN    0.9 % sodium chloride infusion   IntraVENous PRN    piperacillin-tazobactam (ZOSYN) 3,375 mg in sodium chloride 0.9 % 50 mL IVPB (mini-bag)  3,375 mg IntraVENous Q12H     Review of Systems    Constitutional: negative for fever, chills, sweats  Cardiovascular: negative for chest pain, palpitations, syncope, edema  Gastrointestinal:  negative for dysphagia, reflux, vomiting, diarrhea, abdominal pain, or melena  Neurologic:  negative for focal weakness, numbness, headache  Objective:     Vitals: 05/28/22 0545 05/28/22 0615 05/28/22 0744 05/28/22 1152   BP:   (!) 119/55 (!) 141/78   Pulse: (!) 114 (!) 109 (!) 107 (!) 111   Resp:   18 18   Temp:   99.1 °F (37.3 °C) 99.1 °F (37.3 °C)   TempSrc:   Oral    SpO2:   95% 91%   Weight:       Height:         [unfilled]  Physical Exam:   Constitution:  the patient is well developed and in no acute distress  HEENT:  Sclera clear, pupils equal, oral mucosa moist  Respiratory: clear  Cardiovascular:  RRR without M,G,R  Gastrointestinal: soft and non-tender; with positive bowel sounds. Musculoskeletal: warm without cyanosis. There is no lower extremity edema. Skin:  no jaundice or rashes, no wounds   Neurologic: no gross neuro deficits     Psychiatric:  alert and  calm    CXR:       LAB:  Recent Labs     05/26/22  0323 05/27/22 0459 05/28/22  0441   WBC 17.0* 13.9* 13.6*   HGB 8.6* 8.0* 9.0*   HCT 25.3* 24.7* 27.7*    185 251     Recent Labs     05/26/22  0323 05/27/22  0459 05/28/22  0441    142 143   K 4.1 3.9 4.2    103 107   CO2 34* 30 27   BUN 23 38* 22   PHOS 2.6 3.7 2.8     No results for input(s): TROPHS, BNPNT, CRP, ESR in the last 72 hours. Invalid input(s): LAC  No results for input(s): PH, PCO2, PO2, HCO3 in the last 72 hours. Invalid input(s): PHI, PCO2I, PO2I, HCO3I  No results for input(s): SDES in the last 72 hours. Invalid input(s): CULT  Assessment and Plan:  (Medical Decision Making)     Impression: 48 y.o. female with found unresponsive at home. Admitted with BS > 2000 - corrected with insulin infusion. Acute on chronic renal failure. Nephrology following. Had recent ureteral stent placed. Required intubation on admission for airway maintenance. + septic shock requiring pressor support. On abx for suspected aspiration event.      NEURO:   Sedation: none  Analgesia: none  CV:   Volume Status: appears more euvolemic. Dialysis per renal   Septic shock: off pressors.    Bradycardia: no further episodes with reduction in precedex dose. PULM:   Acute hypoxemic/hypercapneic respiratory failure:  Extubated 5/25. Currently on room air. RENAL  BLANCA:  Acute on chronic renal failure. Getting SLED  Metabolic acidosis: corrected with dialysis. Lactic acidosis: NA  Electrolytes: dialysis  GI:   Nutrition: Speech eval then diet if able. HEME:   Anemia: stable. Monitor and transfuse if < 7. Anticoagulation: low dose heparin  ID:   ?Possible Aspiration: Day 7  Zosyn. EOT 5/28. stop today Completed 5 days of vanc prior to dc. ENDO:   DM: off insulin drip. Cont glargine with SSI. Well controlled at present.     Skin: no decub, turns, preventive care  Prophy: Pepcid/low dose heparin     PT/OT consults  Agustin Sexton MD

## 2022-05-28 NOTE — PROGRESS NOTES
Patient resting in bed, alert with periods of confusion, but cooperative with care. Patient denies pain or distress, safety measures in place, call light within reach.

## 2022-05-28 NOTE — PROGRESS NOTES
Hospitalist Progress Note   Admit Date:  2022  3:51 PM   Name:  Annalisa Butler   Age:  48 y.o. Sex:  female  :  1975   MRN:  [id number]   Room:  [facility]/    Presenting Complaint: Altered Mental Status     Reason(s) for Admission: Cardiac arrest ([facility] [I46.9]  Hyperkalemia [E87.5]  Septic shock ([facility] [A41.9, R65.21]  Acute respiratory failure, unspecified whether with hypoxia or hypercapnia ([facility] [J96.00]  Acute renal failure, unspecified acute renal failure type [facility]) [N17.9]     Hospital Course & Interval History:   Annalisa Butler is a 48 y.o. female with history of   Diabetes mellitus type 1  Hypertension   CKD stage III      who was admitted initially to intensivist service for unresponsiveness.      Prior to this admission, patient was admitted recently for right sided ureteral obstruction s/p stent placement. Patient was not heard from by family members, so EMS was called to see her and she was found unresponsive.      Initially her BS was more than 2000. She had DKA. She had BLANCA with creatinine of 9 and K of 6.7. patient was intubated. She suffered a brief cardiac arrest. She was treated for DKA and respiratory failure.      CT of head was negative for acute findings. Nephrology was consulted for her renal failure and she was started on SLED.      She was extubated on May 25, 2022. Currently she is alert and oriented.      Patient is being transferred out of ICU and Hospitalist service is requested to assume care for the patient. Subjective/24hr Events (22):  Awake alert, says doing good, no difficulty breathing  Improved creatinine      Assessment & Plan:   DKA at the time of admission   Now improved. Blood sugar is in mid to high 100s ranges. No acidosis now. Patient is on dialysis to help acidosis too. On Lantus and sliding scale coverage.      Respiratory failure   S/P intubation and extubation   Improved.  Resolved.      BLANCA on CKD stage III Now dialysis dependent. Will continue to monitor renal function and see if renal function will recover. 5/28-improving creatinine      Hypertension   BP is controlled at 138/72 mmHg today.      Recent septic shock   So far, cultures are negative. On empiric Zosyn   Will continue to follow on clinical response and culture result.    5/28-continue Zosyn     I have discussed the plan of care with patient.             Discharge Planning:    to be determined      Diet:  ADULT DIET; Easy to Chew  DVT PPx: heparin SC   Code status: full code          Hospital Problems           Last Modified POA    * (Principal) Cardiac arrest ([address] 5/21/2022 Yes    Acute renal failure ([address] 5/23/2022 Yes    Benign hypertension with CKD (chronic kidney disease) stage III ([address] 5/27/2022 Yes    Diabetic ketoacidosis with coma associated with type 1 diabetes mellitus ([address] 5/21/2022 Yes    Severe sepsis with septic shock (CODE) ([address] 5/23/2022 Yes    Pneumonia, bacterial 5/21/2022 Yes    Acute respiratory failure ([address] 5/23/2022 Yes    Altered mental status 5/24/2022 Yes    Frailty 5/24/2022 Yes    Urinary tract infection without hematuria 5/21/2022 Yes    Diabetes mellitus type 1 ([address] 5/27/2022 Yes    Overview Signed 4/21/2022 12:01 PM by Maureen Feng MD     Start lantus 10 units  humalog sliding scale  Keep BS log-call in few days to titrate lantus  Diet discussed  Need to see her labs for further addition of meds           BLANCA (acute kidney injury) ([address] 5/27/2022 Yes            Objective:     Patient Vitals for the past 24 hrs:   Temp Pulse Resp BP SpO2   05/28/22 0744 99.1 °F (37.3 °C) (!) 107 18 (!) 119/55 95 %   05/28/22 0615 -- (!) 109 -- -- --   05/28/22 0545 -- (!) 114 -- -- --   05/28/22 0236 99.5 °F (37.5 °C) (!) 105 18 (!) 109/57 99 %   05/27/22 2257 99.1 °F (37.3 °C) (!) 105 18 120/66 95 %   05/27/22 1928 99.5 °F (37.5 °C) (!) 109 22 119/69 95 %   05/27/22 1815 -- (!) 114 (!) 32 -- 98 %   05/27/22 1800 -- (!) 113 22 110/61 93 %   05/27/22 1745 -- (!) 119 (!) 36 -- 91 %   05/27/22 1730 -- (!) 106 22 -- 98 %   05/27/22 1715 -- (!) 105 24 -- 98 %   05/27/22 1700 -- (!) 104 24 (!) 97/56 98 %   05/27/22 1645 -- (!) 106 24 -- 99 %   05/27/22 1631 -- (!) 106 -- (!) 84/51 --   05/27/22 1630 -- (!) 106 24 -- 96 %   05/27/22 1615 -- (!) 105 28 -- 94 %   05/27/22 1600 -- (!) 105 (!) 41 (!) 84/51 96 %   05/27/22 1530 -- (!) 104 22 -- --   05/27/22 1515 -- (!) 104 23 -- --   05/27/22 1500 98.6 °F (37 °C) (!) 104 24 (!) 107/58 97 %   05/27/22 1445 -- (!) 106 22 -- 99 %   05/27/22 1430 -- (!) 104 21 -- 95 %   05/27/22 1415 -- (!) 104 24 -- 97 %   05/27/22 1400 -- (!) 104 26 (!) 97/55 93 %   05/27/22 1345 -- (!) 104 23 -- 94 %   05/27/22 1330 -- (!) 104 21 -- 93 %   05/27/22 1315 -- (!) 106 23 -- 93 %   05/27/22 1300 -- (!) 104 26 (!) 101/57 93 %   05/27/22 1245 -- (!) 112 28 -- 94 %   05/27/22 1230 -- (!) 110 (!) 33 -- 92 %   05/27/22 1215 -- (!) 119 (!) 32 -- 92 %   05/27/22 1200 -- (!) 107 23 133/66 97 %   05/27/22 1145 98.4 °F (36.9 °C) (!) 101 19 138/71 (!) 88 %       Oxygen Therapy  SpO2: 95 %  Pulse Oximeter Device Mode: Continuous  Pulse Oximeter Device Location: Finger  O2 Device: None (Room air)  Skin Assessment: Clean, dry, & intact  Skin Protection for O2 Device: Yes  Orientation: Bilateral  Location: Cheek  Intervention(s): Mouth Care,Skin Barrier  FiO2 : 30 %  O2 Flow Rate (L/min): 2 L/min    Estimated body mass index is 28.63 kg/m² as calculated from the following:    Height as of this encounter: 5' 6\" (1.676 m). Weight as of this encounter: 177 lb 6.4 oz (80.5 kg). Intake/Output Summary (Last 24 hours) at 5/28/2022 1130  Last data filed at 5/27/2022 1700  Gross per 24 hour   Intake 60 ml   Output 3738 ml   Net -3678 ml         Physical Exam:     Blood pressure (!) 119/55, pulse (!) 107, temperature 99.1 °F (37.3 °C), temperature source Oral, resp.  rate 18, height 5' 6\" (1.676 m), weight 177 lb 6.4 oz (80.5 kg), SpO2 95 %.  General:    Well nourished. No overt distress  Head:  Normocephalic, atraumatic  Eyes:  Sclerae appear normal.  Pupils equally round. ENT:  Nares appear normal, no drainage. Moist oral mucosa  Neck:  No restricted ROM. Trachea midline   Left IJ dialysis catheter  CV:   RRR. No m/r/g. No jugular venous distension. Lungs:   Bilateral coarse breath sounds  Abdomen: Bowel sounds present. Soft, nontender, nondistended. Extremities: No cyanosis or clubbing. Skin:     No rashes and normal coloration. Warm and dry. Neuro:  CN II-XII grossly intact. Sensation intact. A&Ox3  Psych:  Normal mood and affect.       I have reviewed ordered lab tests and independently visualized imaging below:    Recent Labs:  Recent Results (from the past 48 hour(s))   POCT Glucose    Collection Time: 05/26/22  5:57 PM   Result Value Ref Range    POC Glucose 213 (H) 65 - 100 mg/dL    Performed by: [place] ([place])    POCT Glucose    Collection Time: 05/26/22 11:48 PM   Result Value Ref Range    POC Glucose 134 (H) 65 - 100 mg/dL    Performed by: Giorgio Sow    Basic Metabolic Panel    Collection Time: 05/27/22  4:59 AM   Result Value Ref Range    Sodium 142 136 - 145 mmol/L    Potassium 3.9 3.5 - 5.1 mmol/L    Chloride 103 98 - 107 mmol/L    CO2 30 21 - 32 mmol/L    Anion Gap 9 7 - 16 mmol/L    Glucose 180 (H) 65 - 100 mg/dL    BUN 38 (H) 6 - 23 MG/DL    CREATININE 5.20 (H) 0.6 - 1.0 MG/DL    GFR African American 11 (L) >60 ml/min/1.73m2    GFR Non- 9 (L) >60 ml/min/1.73m2    Calcium 8.1 (L) 8.3 - 10.4 MG/DL   Phosphorus    Collection Time: 05/27/22  4:59 AM   Result Value Ref Range    Phosphorus 3.7 2.5 - 4.5 MG/DL   CBC    Collection Time: 05/27/22  4:59 AM   Result Value Ref Range    WBC 13.9 (H) 4.3 - 11.1 K/uL    RBC 3.03 (L) 4.05 - 5.2 M/uL    Hemoglobin 8.0 (L) 11.7 - 15.4 g/dL    Hematocrit 24.7 (L) 35.8 - 46.3 %    MCV 81.5 79.6 - 97.8 FL    MCH 26.4 26.1 - 32.9 PG    MCHC 32.4 31.4 - 4:41 AM   Result Value Ref Range    Phosphorus 2.8 2.5 - 4.5 MG/DL         Other Studies:  No results found.     Current Meds:  Current Facility-Administered Medications   Medication Dose Route Frequency    insulin glargine (LANTUS) injection vial 15 Units  15 Units SubCUTAneous Daily    heparin (porcine) injection 10,000 Units  10,000 Units IntraVENous PRN    albuterol (PROVENTIL) nebulizer solution 2.5 mg  2.5 mg Nebulization Q4H PRN    insulin regular (HUMULIN R;NOVOLIN R) injection 0-10 Units  0-10 Units IntraVENous PRN    dextrose 5 % and 0.45 % sodium chloride infusion   IntraVENous Continuous PRN    glucose chewable tablet 16 g  4 tablet Oral PRN    dextrose bolus 10% 125 mL  125 mL IntraVENous PRN    Or    dextrose bolus 10% 250 mL  250 mL IntraVENous PRN    glucagon injection 1 mg  1 mg IntraMUSCular PRN    dextrose 5 % solution  100 mL/hr IntraVENous PRN    insulin lispro (HUMALOG) injection vial 0-16 Units  0-16 Units SubCUTAneous Q6H    dextrose bolus 10% 125 mL  125 mL IntraVENous PRN    Or    dextrose bolus 10% 250 mL  250 mL IntraVENous PRN    glucagon injection 1 mg  1 mg IntraMUSCular PRN    medicated lip ointment (BLISTEX)   Topical PRN    sodium zirconium cyclosilicate (LOKELMA) oral suspension 10 g  10 g Oral Once    sodium chloride flush 0.9 % injection 5-40 mL  5-40 mL IntraVENous 2 times per day    sodium chloride flush 0.9 % injection 5-40 mL  5-40 mL IntraVENous PRN    heparin (porcine) injection 5,000 Units  5,000 Units SubCUTAneous q8h    ondansetron (ZOFRAN-ODT) disintegrating tablet 4 mg  4 mg Oral Q8H PRN    Or    ondansetron (ZOFRAN) injection 4 mg  4 mg IntraVENous Q6H PRN    polyethylene glycol (GLYCOLAX) packet 17 g  17 g Oral Daily PRN    acetaminophen (TYLENOL) tablet 650 mg  650 mg Oral Q6H PRN    Or    acetaminophen (TYLENOL) suppository 650 mg  650 mg Rectal Q6H PRN    dextrose bolus 10% 250 mL  250 mL IntraVENous PRN    potassium chloride 10 mEq/100 mL IVPB (Peripheral Line)  10 mEq IntraVENous PRN    magnesium sulfate 1000 mg in dextrose 5% 100 mL IVPB  1,000 mg IntraVENous PRN    sodium phosphate 10 mmol in sodium chloride 0.9 % 250 mL IVPB  10 mmol IntraVENous PRN    Or    sodium phosphate 15 mmol in dextrose 5 % 250 mL IVPB  15 mmol IntraVENous PRN    Or    sodium phosphate 20 mmol in dextrose 5 % 500 mL IVPB  20 mmol IntraVENous PRN    dextrose 5 % and 0.45 % sodium chloride infusion   IntraVENous Continuous PRN    sodium chloride flush 0.9 % injection 5-40 mL  5-40 mL IntraVENous 2 times per day    sodium chloride flush 0.9 % injection 5-40 mL  5-40 mL IntraVENous PRN    0.9 % sodium chloride infusion   IntraVENous PRN    piperacillin-tazobactam (ZOSYN) 3,375 mg in sodium chloride 0.9 % 50 mL IVPB (mini-bag)  3,375 mg IntraVENous Q12H       Signed:  Rakel Castillo MD

## 2022-05-28 NOTE — PROGRESS NOTES
[facility]ITICAL CARE OUTREACH NURSE PROGRESS REPORT      SUBJECTIVE: Called to assess patient secondary to transfer from ICU. Vitals:    05/27/22 1745 05/27/22 1800 05/27/22 1815 05/27/22 1928   BP:  110/61  119/69   Pulse: (!) 119 (!) 113 (!) 114 (!) 109   Resp: (!) 36 22 (!) 32 22   Temp:    99.5 °F (37.5 °C)   TempSrc:       SpO2: 91% 93% 98% 95%   Weight:       Height:              LAB DATA:    Recent Labs     05/25/22  0238 05/25/22  0725 05/25/22  1138 05/26/22  0323 05/27/22  0459     --   --  139 142   K 4.4  --   --  4.1 3.9     --   --  101 103   CO2 30  --   --  34* 30   BUN 30*  --   --  23 38*   GFRAA 15*  --   --  19* 11*   MG 1.7*  --   --   --   --    PHOS 2.5   < > 2.4* 2.6 3.7    < > = values in this interval not displayed. Recent Labs     05/25/22  0238 05/26/22  0323 05/27/22  0459   WBC 20.5* 17.0* 13.9*   HGB 8.8* 8.6* 8.0*   HCT 25.4* 25.3* 24.7*    192 185          OBJECTIVE: On arrival to room, I found patient to be resting in bed. ASSESSMENT:  Alert and oriented x4, lung sounds course, room air, respirations even and unlabored. VSS. Patient states she is tired, was dialyzed today. PT/OT worked with patient. Labs and chart reviewed. No concerns at this time. PLAN:  Will continue to follow per outreach protocol. Paige Bustillo

## 2022-05-29 LAB
ANION GAP SERPL CALC-SCNC: 7 MMOL/L (ref 7–16)
BACTERIA SPEC CULT: NORMAL
BUN SERPL-MCNC: 27 MG/DL (ref 6–23)
CALCIUM SERPL-MCNC: 9 MG/DL (ref 8.3–10.4)
CHLORIDE SERPL-SCNC: 105 MMOL/L (ref 98–107)
CO2 SERPL-SCNC: 28 MMOL/L (ref 21–32)
CREAT SERPL-MCNC: 5.4 MG/DL (ref 0.6–1)
ERYTHROCYTE [DISTWIDTH] IN BLOOD BY AUTOMATED COUNT: 15.1 % (ref 11.9–14.6)
GLUCOSE BLD STRIP.AUTO-MCNC: 177 MG/DL (ref 65–100)
GLUCOSE BLD STRIP.AUTO-MCNC: 199 MG/DL (ref 65–100)
GLUCOSE BLD STRIP.AUTO-MCNC: 294 MG/DL (ref 65–100)
GLUCOSE BLD STRIP.AUTO-MCNC: 378 MG/DL (ref 65–100)
GLUCOSE SERPL-MCNC: 86 MG/DL (ref 65–100)
HCT VFR BLD AUTO: 26.1 % (ref 35.8–46.3)
HGB BLD-MCNC: 8.2 G/DL (ref 11.7–15.4)
MCH RBC QN AUTO: 26.4 PG (ref 26.1–32.9)
MCHC RBC AUTO-ENTMCNC: 31.4 G/DL (ref 31.4–35)
MCV RBC AUTO: 83.9 FL (ref 79.6–97.8)
NRBC # BLD: 0.07 K/UL (ref 0–0.2)
PHOSPHATE SERPL-MCNC: 2.8 MG/DL (ref 2.5–4.5)
PLATELET # BLD AUTO: 299 K/UL (ref 150–450)
PMV BLD AUTO: 10.2 FL (ref 9.4–12.3)
POTASSIUM SERPL-SCNC: 3.7 MMOL/L (ref 3.5–5.1)
RBC # BLD AUTO: 3.11 M/UL (ref 4.05–5.2)
SERVICE CMNT-IMP: ABNORMAL
SERVICE CMNT-IMP: NORMAL
SODIUM SERPL-SCNC: 140 MMOL/L (ref 136–145)
WBC # BLD AUTO: 17.6 K/UL (ref 4.3–11.1)

## 2022-05-29 PROCEDURE — 82962 GLUCOSE BLOOD TEST: CPT

## 2022-05-29 PROCEDURE — 6370000000 HC RX 637 (ALT 250 FOR IP): Performed by: FAMILY MEDICINE

## 2022-05-29 PROCEDURE — 2580000003 HC RX 258: Performed by: INTERNAL MEDICINE

## 2022-05-29 PROCEDURE — 6370000000 HC RX 637 (ALT 250 FOR IP): Performed by: INTERNAL MEDICINE

## 2022-05-29 PROCEDURE — 36415 COLL VENOUS BLD VENIPUNCTURE: CPT

## 2022-05-29 PROCEDURE — 85027 COMPLETE CBC AUTOMATED: CPT

## 2022-05-29 PROCEDURE — 1100000000 HC RM PRIVATE

## 2022-05-29 PROCEDURE — 92526 ORAL FUNCTION THERAPY: CPT

## 2022-05-29 PROCEDURE — 6360000002 HC RX W HCPCS: Performed by: FAMILY MEDICINE

## 2022-05-29 PROCEDURE — 99232 SBSQ HOSP IP/OBS MODERATE 35: CPT | Performed by: INTERNAL MEDICINE

## 2022-05-29 PROCEDURE — 84100 ASSAY OF PHOSPHORUS: CPT

## 2022-05-29 PROCEDURE — 2580000003 HC RX 258: Performed by: FAMILY MEDICINE

## 2022-05-29 PROCEDURE — 80048 BASIC METABOLIC PNL TOTAL CA: CPT

## 2022-05-29 PROCEDURE — 6360000002 HC RX W HCPCS: Performed by: INTERNAL MEDICINE

## 2022-05-29 RX ADMIN — SODIUM CHLORIDE, PRESERVATIVE FREE 10 ML: 5 INJECTION INTRAVENOUS at 08:17

## 2022-05-29 RX ADMIN — INSULIN LISPRO 8 UNITS: 100 INJECTION, SOLUTION INTRAVENOUS; SUBCUTANEOUS at 00:22

## 2022-05-29 RX ADMIN — PIPERACILLIN AND TAZOBACTAM 3375 MG: 3; .375 INJECTION, POWDER, LYOPHILIZED, FOR SOLUTION INTRAVENOUS at 17:01

## 2022-05-29 RX ADMIN — SODIUM CHLORIDE, PRESERVATIVE FREE 5 ML: 5 INJECTION INTRAVENOUS at 22:05

## 2022-05-29 RX ADMIN — INSULIN GLARGINE 15 UNITS: 100 INJECTION, SOLUTION SUBCUTANEOUS at 08:15

## 2022-05-29 RX ADMIN — HEPARIN SODIUM 5000 UNITS: 5000 INJECTION INTRAVENOUS; SUBCUTANEOUS at 21:13

## 2022-05-29 RX ADMIN — SODIUM CHLORIDE, PRESERVATIVE FREE 10 ML: 5 INJECTION INTRAVENOUS at 08:18

## 2022-05-29 RX ADMIN — HEPARIN SODIUM 5000 UNITS: 5000 INJECTION INTRAVENOUS; SUBCUTANEOUS at 06:24

## 2022-05-29 RX ADMIN — INSULIN LISPRO 15 UNITS: 100 INJECTION, SOLUTION INTRAVENOUS; SUBCUTANEOUS at 12:55

## 2022-05-29 RX ADMIN — HEPARIN SODIUM 5000 UNITS: 5000 INJECTION INTRAVENOUS; SUBCUTANEOUS at 14:21

## 2022-05-29 RX ADMIN — PIPERACILLIN AND TAZOBACTAM 3375 MG: 3; .375 INJECTION, POWDER, LYOPHILIZED, FOR SOLUTION INTRAVENOUS at 09:35

## 2022-05-29 NOTE — PROGRESS NOTES
[facility]    Follow-Up on: BLANCA on CKD stage IV    HPI: Pt seen and examined. Awake and alert looks great! ROS:  Denies CP, SOB.     Current Facility-Administered Medications   Medication Dose Route Frequency    piperacillin-tazobactam (ZOSYN) 3,375 mg in sodium chloride 0.9 % 50 mL IVPB (mini-bag)  3,375 mg IntraVENous Q8H    insulin glargine (LANTUS) injection vial 15 Units  15 Units SubCUTAneous Daily    heparin (porcine) injection 10,000 Units  10,000 Units IntraVENous PRN    albuterol (PROVENTIL) nebulizer solution 2.5 mg  2.5 mg Nebulization Q4H PRN    insulin regular (HUMULIN R;NOVOLIN R) injection 0-10 Units  0-10 Units IntraVENous PRN    dextrose 5 % and 0.45 % sodium chloride infusion   IntraVENous Continuous PRN    glucose chewable tablet 16 g  4 tablet Oral PRN    dextrose bolus 10% 125 mL  125 mL IntraVENous PRN    Or    dextrose bolus 10% 250 mL  250 mL IntraVENous PRN    glucagon injection 1 mg  1 mg IntraMUSCular PRN    dextrose 5 % solution  100 mL/hr IntraVENous PRN    insulin lispro (HUMALOG) injection vial 0-16 Units  0-16 Units SubCUTAneous Q6H    dextrose bolus 10% 125 mL  125 mL IntraVENous PRN    Or    dextrose bolus 10% 250 mL  250 mL IntraVENous PRN    glucagon injection 1 mg  1 mg IntraMUSCular PRN    medicated lip ointment (BLISTEX)   Topical PRN    sodium chloride flush 0.9 % injection 5-40 mL  5-40 mL IntraVENous 2 times per day    sodium chloride flush 0.9 % injection 5-40 mL  5-40 mL IntraVENous PRN    heparin (porcine) injection 5,000 Units  5,000 Units SubCUTAneous q8h    ondansetron (ZOFRAN-ODT) disintegrating tablet 4 mg  4 mg Oral Q8H PRN    Or    ondansetron (ZOFRAN) injection 4 mg  4 mg IntraVENous Q6H PRN    polyethylene glycol (GLYCOLAX) packet 17 g  17 g Oral Daily PRN    acetaminophen (TYLENOL) tablet 650 mg  650 mg Oral Q6H PRN    Or    acetaminophen (TYLENOL) suppository 650 mg  650 mg Rectal Q6H PRN    dextrose bolus 10% 250 mL  250 mL IntraVENous PRN    potassium chloride 10 mEq/100 mL IVPB (Peripheral Line)  10 mEq IntraVENous PRN    magnesium sulfate 1000 mg in dextrose 5% 100 mL IVPB  1,000 mg IntraVENous PRN    sodium phosphate 10 mmol in sodium chloride 0.9 % 250 mL IVPB  10 mmol IntraVENous PRN    Or    sodium phosphate 15 mmol in dextrose 5 % 250 mL IVPB  15 mmol IntraVENous PRN    Or    sodium phosphate 20 mmol in dextrose 5 % 500 mL IVPB  20 mmol IntraVENous PRN    dextrose 5 % and 0.45 % sodium chloride infusion   IntraVENous Continuous PRN    sodium chloride flush 0.9 % injection 5-40 mL  5-40 mL IntraVENous 2 times per day    sodium chloride flush 0.9 % injection 5-40 mL  5-40 mL IntraVENous PRN    0.9 % sodium chloride infusion   IntraVENous PRN       Exam:  Vitals:    05/29/22 0407 05/29/22 0742 05/29/22 1123 05/29/22 1501   BP: 129/62 (!) 108/53 104/61 (!) 99/51   Pulse: (!) 102 (!) 105 (!) 103 97   Resp: 18 19 19 19   Temp: 99.3 °F (37.4 °C) 98.8 °F (37.1 °C) 99.7 °F (37.6 °C) 99.9 °F (37.7 °C)   TempSrc:  Oral Oral Oral   SpO2: 95% 95% 94% 94%   Weight:       Height:             Intake/Output Summary (Last 24 hours) at 5/29/2022 1503  Last data filed at 5/29/2022 5074  Gross per 24 hour   Intake --   Output 500 ml   Net -500 ml     PE:  GEN - in no distress  CV - regular, no murmur, no rub  Lung - clear bilaterally  Abd - soft, nontender  Ext - 1-2+ edema    Labs  Recent Labs     05/27/22  0459 05/28/22  0441 05/29/22  0324   WBC 13.9* 13.6* 17.6*   HGB 8.0* 9.0* 8.2*   HCT 24.7* 27.7* 26.1*    251 299     Recent Labs     05/27/22  0459 05/28/22  0441 05/29/22  0523    143 140   K 3.9 4.2 3.7    107 105   CO2 30 27 28   BUN 38* 22 27*   PHOS 3.7 2.8 2.8     No results for input(s): PH, PCO2, PO2, PCO2 in the last 72 hours.     Problem List:  Patient Active Problem List    Diagnosis Date Noted    Septic shock ([facility]     Altered mental status     Frailty     [facility] 05/21/2022    Diabetic ketoacidosis with coma associated with type 1 diabetes mellitus ([facility] 05/21/2022    Severe sepsis with septic shock (CODE) ([facility] 05/21/2022    Pneumonia, bacterial 05/21/2022    Acute respiratory failure ([address] 05/21/2022    Benign hypertension with CKD (chronic kidney disease) stage III ([address] 01/04/2022    Acute kidney injury superimposed on chronic kidney disease ([address] 06/09/2021    IDDM (insulin dependent diabetes mellitus) 01/24/2012    BLANCA (acute kidney injury) ([facility] 04/21/2022    Ureteric stone 04/19/2022    Urinary tract infection without hematuria 04/19/2022    Hydronephrosis of right kidney 04/19/2022    Malodorous urine 11/16/2021    H/O gastroesophageal reflux (GERD) 06/19/2020    Encounter for annual routine gynecological examination 11/27/2018    Encounter to discuss test results 08/22/2018    Diabetes mellitus type 1 ([facility] 08/22/2018    Type 1 diabetes mellitus with stage 3 chronic kidney disease ([facility] 08/16/2018    Noncompliance with medication regimen 08/16/2018    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    Depression with anxiety 07/11/2017    Ulcer, skin, chronic ([address] 03/13/2017    CKD (chronic kidney disease), stage IV ([address] 03/13/2017    Neuropathy, peripheral, idiopathic 03/13/2017    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 Type I DM  2. DKA BS>2000 on admit  3. BLANCA on CKD stage IV Now oliguric. Pt  Looks good. 4. Anemia   5. Plan CR rising. Will arrange HD in am. May look at outpt slot next week.

## 2022-05-29 NOTE — PROGRESS NOTES
Patient resting in bed, alert, lethargic, but  cooperative with care. Patient denies pain or distress, safety measures in place, call light within reach.

## 2022-05-29 NOTE — PROGRESS NOTES
Hospitalist Progress Note   Admit Date:  2022  3:51 PM   Name:  Marquez Shane   Age:  48 y.o. Sex:  female  :  1975   MRN:  [id number]   Room:  [facility]/    Presenting Complaint: Altered Mental Status     Reason(s) for Admission: Cardiac arrest ([facility] [I46.9]  Hyperkalemia [E87.5]  Septic shock ([facility] [A41.9, R65.21]  Acute respiratory failure, unspecified whether with hypoxia or hypercapnia ([facility] [J96.00]  Acute renal failure, unspecified acute renal failure type [facility]) [N17.9]     Hospital Course & Interval History:   Marquez Shane is a 48 y.o. female with history of   Diabetes mellitus type 1  Hypertension   CKD stage III      who was admitted initially to intensivist service for unresponsiveness.      Prior to this admission, patient was admitted recently for right sided ureteral obstruction s/p stent placement. Patient was not heard from by family members, so EMS was called to see her and she was found unresponsive.      Initially her BS was more than 2000. She had DKA. She had BLANCA with creatinine of 9 and K of 6.7. patient was intubated. She suffered a brief cardiac arrest. She was treated for DKA and respiratory failure.      CT of head was negative for acute findings. Nephrology was consulted for her renal failure and she was started on SLED.      She was extubated on May 25, 2022. Currently she is alert and oriented.      Patient is being transferred out of ICU and Hospitalist service is requested to assume care for the patient. Subjective/24hr Events (22):    Awake alert, says doing good, no difficulty breathing  Improved creatinine      Mild elevated white blood count  Says cough while eating  Mild increased glucose last night      Assessment & Plan:   DKA at the time of admission   Now improved. Blood sugar is in mid to high 100s ranges. No acidosis now. Patient is on dialysis to help acidosis too. On Lantus and sliding scale coverage. addition of meds           BLANCA (acute kidney injury) ([facility] 5/27/2022 Yes            Objective:     Patient Vitals for the past 24 hrs:   Temp Pulse Resp BP SpO2   05/29/22 0742 98.8 °F (37.1 °C) (!) 105 19 (!) 108/53 95 %   05/29/22 0407 99.3 °F (37.4 °C) (!) 102 18 129/62 95 %   05/28/22 2303 99.1 °F (37.3 °C) (!) 104 18 (!) 102/44 93 %   05/28/22 2011 100 °F (37.8 °C) (!) 104 18 (!) 109/52 94 %   05/28/22 1603 99.3 °F (37.4 °C) (!) 102 18 (!) 115/59 94 %   05/28/22 1152 99.1 °F (37.3 °C) (!) 111 18 (!) 141/78 91 %       Oxygen Therapy  SpO2: 95 %  Pulse Oximeter Device Mode: Continuous  Pulse Oximeter Device Location: Finger  O2 Device: None (Room air)  Skin Assessment: Clean, dry, & intact  Skin Protection for O2 Device: Yes  Orientation: Bilateral  Location: Cheek  Intervention(s): Mouth Care,Skin Barrier  FiO2 : 30 %  O2 Flow Rate (L/min): 2 L/min    Estimated body mass index is 28.63 kg/m² as calculated from the following:    Height as of this encounter: 5' 6\" (1.676 m). Weight as of this encounter: 177 lb 6.4 oz (80.5 kg). Intake/Output Summary (Last 24 hours) at 5/29/2022 0838  Last data filed at 5/29/2022 6331  Gross per 24 hour   Intake 473 ml   Output 500 ml   Net -27 ml         Physical Exam:     Blood pressure (!) 108/53, pulse (!) 105, temperature 98.8 °F (37.1 °C), temperature source Oral, resp. rate 19, height 5' 6\" (1.676 m), weight 177 lb 6.4 oz (80.5 kg), SpO2 95 %. General:    Well nourished. No overt distress  Head:  Normocephalic, atraumatic  Eyes:  Sclerae appear normal.  Pupils equally round. ENT:  Nares appear normal, no drainage. Moist oral mucosa  Neck:  No restricted ROM. Trachea midline   Left IJ dialysis catheter  CV:   RRR. No m/r/g. No jugular venous distension. Lungs:   Bilateral coarse breath sounds  Abdomen: Bowel sounds present. Soft, nontender, nondistended. Extremities: No cyanosis or clubbing. Skin:     No rashes and normal coloration. Warm and dry. Neuro:  CN II-XII grossly intact. Sensation intact. A&Ox3  Psych:  Normal mood and affect.       I have reviewed ordered lab tests and independently visualized imaging below:    Recent Labs:  Recent Results (from the past 48 hour(s))   POCT Glucose    Collection Time: 05/27/22 11:50 AM   Result Value Ref Range    POC Glucose 182 (H) 65 - 100 mg/dL    Performed by: Aga    POCT Glucose    Collection Time: 05/27/22  5:17 PM   Result Value Ref Range    POC Glucose 124 (H) 65 - 100 mg/dL    Performed by: Aga    POCT Glucose    Collection Time: 05/27/22  8:29 PM   Result Value Ref Range    POC Glucose 168 (H) 65 - 100 mg/dL    Performed by: Des[facility]wn[facility]I    POCT Glucose    Collection Time: 05/28/22 12:17 AM   Result Value Ref Range    POC Glucose 237 (H) 65 - 100 mg/dL    Performed by: [facility]    POCT Glucose    Collection Time: 05/28/22  4:37 AM   Result Value Ref Range    POC Glucose 153 (H) 65 - 100 mg/dL    Performed by: [employer] [employer]    Basic Metabolic Panel    Collection Time: 05/28/22  4:41 AM   Result Value Ref Range    Sodium 143 136 - 145 mmol/L    Potassium 4.2 3.5 - 5.1 mmol/L    Chloride 107 98 - 107 mmol/L    CO2 27 21 - 32 mmol/L    Anion Gap 9 7 - 16 mmol/L    Glucose 143 (H) 65 - 100 mg/dL    BUN 22 6 - 23 MG/DL    CREATININE 3.40 (H) 0.6 - 1.0 MG/DL    GFR  19 (L) >60 ml/min/1.73m2    GFR Non- 15 (L) >60 ml/min/1.73m2    Calcium 8.6 8.3 - 10.4 MG/DL   CBC    Collection Time: 05/28/22  4:41 AM   Result Value Ref Range    WBC 13.6 (H) 4.3 - 11.1 K/uL    RBC 3.45 (L) 4.05 - 5.2 M/uL    Hemoglobin 9.0 (L) 11.7 - 15.4 g/dL    Hematocrit 27.7 (L) 35.8 - 46.3 %    MCV 80.3 79.6 - 97.8 FL    MCH 26.1 26.1 - 32.9 PG    MCHC 32.5 31.4 - 35.0 g/dL    RDW 14.7 (H) 11.9 - 14.6 %    Platelets 800 991 - 741 K/uL    MPV 10.8 9.4 - 12.3 FL    nRBC 0.06 0.0 - 0.2 K/uL   Phosphorus    Collection Time: 05/28/22  4:41 AM   Result Value Ref Range Phosphorus 2.8 2.5 - 4.5 MG/DL   POCT Glucose    Collection Time: 05/28/22 12:01 PM   Result Value Ref Range    POC Glucose 304 (H) 65 - 100 mg/dL    Performed by: [employer]    POCT Glucose    Collection Time: 05/28/22  5:53 PM   Result Value Ref Range    POC Glucose 106 (H) 65 - 100 mg/dL    Performed by: [employer]    POCT Glucose    Collection Time: 05/29/22 12:10 AM   Result Value Ref Range    POC Glucose 294 (H) 65 - 100 mg/dL    Performed by: [employer] [facility] Friends[facility]    CBC    Collection Time: 05/29/22  3:24 AM   Result Value Ref Range    WBC 17.6 (H) 4.3 - 11.1 K/uL    RBC 3.11 (L) 4.05 - 5.2 M/uL    Hemoglobin 8.2 (L) 11.7 - 15.4 g/dL    Hematocrit 26.1 (L) 35.8 - 46.3 %    MCV 83.9 79.6 - 97.8 FL    MCH 26.4 26.1 - 32.9 PG    MCHC 31.4 31.4 - 35.0 g/dL    RDW 15.1 (H) 11.9 - 14.6 %    Platelets 754 264 - 768 K/uL    MPV 10.2 9.4 - 12.3 FL    nRBC 0.07 0.0 - 0.2 K/uL   POCT Glucose    Collection Time: 05/29/22  6:22 AM   Result Value Ref Range    POC Glucose 177 (H) 65 - 100 mg/dL    Performed by: Neo Lara          Other Studies:  No results found.     Current Meds:  Current Facility-Administered Medications   Medication Dose Route Frequency    piperacillin-tazobactam (ZOSYN) 3,375 mg in sodium chloride 0.9 % 50 mL IVPB (mini-bag)  3,375 mg IntraVENous Q8H    insulin glargine (LANTUS) injection vial 15 Units  15 Units SubCUTAneous Daily    heparin (porcine) injection 10,000 Units  10,000 Units IntraVENous PRN    albuterol (PROVENTIL) nebulizer solution 2.5 mg  2.5 mg Nebulization Q4H PRN    insulin regular (HUMULIN R;NOVOLIN R) injection 0-10 Units  0-10 Units IntraVENous PRN    dextrose 5 % and 0.45 % sodium chloride infusion   IntraVENous Continuous PRN    glucose chewable tablet 16 g  4 tablet Oral PRN    dextrose bolus 10% 125 mL  125 mL IntraVENous PRN    Or    dextrose bolus 10% 250 mL  250 mL IntraVENous PRN    glucagon injection 1 mg  1 mg IntraMUSCular PRN    dextrose 5 % solution  100 mL/hr IntraVENous PRN    insulin lispro (HUMALOG) injection vial 0-16 Units  0-16 Units SubCUTAneous Q6H    dextrose bolus 10% 125 mL  125 mL IntraVENous PRN    Or    dextrose bolus 10% 250 mL  250 mL IntraVENous PRN    glucagon injection 1 mg  1 mg IntraMUSCular PRN    medicated lip ointment (BLISTEX)   Topical PRN    sodium chloride flush 0.9 % injection 5-40 mL  5-40 mL IntraVENous 2 times per day    sodium chloride flush 0.9 % injection 5-40 mL  5-40 mL IntraVENous PRN    heparin (porcine) injection 5,000 Units  5,000 Units SubCUTAneous q8h    ondansetron (ZOFRAN-ODT) disintegrating tablet 4 mg  4 mg Oral Q8H PRN    Or    ondansetron (ZOFRAN) injection 4 mg  4 mg IntraVENous Q6H PRN    polyethylene glycol (GLYCOLAX) packet 17 g  17 g Oral Daily PRN    acetaminophen (TYLENOL) tablet 650 mg  650 mg Oral Q6H PRN    Or    acetaminophen (TYLENOL) suppository 650 mg  650 mg Rectal Q6H PRN    dextrose bolus 10% 250 mL  250 mL IntraVENous PRN    potassium chloride 10 mEq/100 mL IVPB (Peripheral Line)  10 mEq IntraVENous PRN    magnesium sulfate 1000 mg in dextrose 5% 100 mL IVPB  1,000 mg IntraVENous PRN    sodium phosphate 10 mmol in sodium chloride 0.9 % 250 mL IVPB  10 mmol IntraVENous PRN    Or    sodium phosphate 15 mmol in dextrose 5 % 250 mL IVPB  15 mmol IntraVENous PRN    Or    sodium phosphate 20 mmol in dextrose 5 % 500 mL IVPB  20 mmol IntraVENous PRN    dextrose 5 % and 0.45 % sodium chloride infusion   IntraVENous Continuous PRN    sodium chloride flush 0.9 % injection 5-40 mL  5-40 mL IntraVENous 2 times per day    sodium chloride flush 0.9 % injection 5-40 mL  5-40 mL IntraVENous PRN    0.9 % sodium chloride infusion   IntraVENous PRN       Signed:  Long Wallace MD

## 2022-05-29 NOTE — PROGRESS NOTES
LTG: patient will tolerate safest, least restrictive oral diet without s/sx airway compromise  STG: Patient will tolerate ongoing po trials in efforts to advance diet- PROGRESSING   STG: Patient will consume easy to chew diet and thin liquids without overt s/sx of airway compromise., ADDED   STG: Patient will participate in modified barium swallow study to objectively assess oropharyngeal swallow    SPEECH LANGUAGE PATHOLOGY: DYSPHAGIA  Daily Note #2    NAME: Adin Sanchez  : 1975  MRN: [id number]    ADMISSION DATE: 2022  ADMITTING DIAGNOSIS: has Ulcer, skin, chronic ([address] Depression with anxiety; Moderate single current episode of major depressive disorder ([address] Encounter to discuss test results; Encounter for annual routine gynecological examination; CKD (chronic kidney disease), stage IV ([address] Diabetic polyneuropathy associated with type 1 diabetes mellitus ([address] Malodorous urine; CKD (chronic kidney disease) stage 3, GFR 30-59 ml/min ([address] Type 1 diabetes mellitus with stage 3 chronic kidney disease ([address] H/O gastroesophageal reflux (GERD); Neuropathy, peripheral, idiopathic; 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 associated with type 1 diabetes mellitus ([address] Premature ovarian failure; Noncompliance with medication regimen; Ureteric stone; Urinary tract infection without hematuria; Hydronephrosis of right kidney; Diabetes mellitus type 1 ([address] Nausea; BLANCA (acute kidney injury) ([address] Acute kidney injury superimposed on chronic kidney disease ([address] Benign hypertension with CKD (chronic kidney disease) stage III (HCC); IDDM (insulin dependent diabetes mellitus); Cardiac arrest ([address] Diabetic ketoacidosis with coma associated with type 1 diabetes mellitus ([address] Severe sepsis with septic shock (CODE) ([address] Pneumonia, bacterial; Acute respiratory failure ([address]  Altered mental status; Frailty; and Septic shock ([address] on their problem list.  Date of Eval: 5/29/2022  ICD-10: Treatment Diagnosis: R13.11 Dysphagia, Oral Phase    RECOMMENDATIONS   Diet:  Diet Solids Recommendation: Easy to Chew  Liquid Consistency Recommendation: Thin    Medications:       Recommendations: Assistance with meals; Dysphagia treatment;Setup assist   Compensatory Swallowing Strategies:Small bites/sips;Upright as possible for all oral intake;Remain upright for 30-45 minutes after meals   Therapeutic Intervention:Patient/Family education;Diet tolerance monitoring   Patient continues to require skilled intervention: Yes  D/C Recommendations: Ongoing speech therapy is recommended during this hospitalization     ASSESSMENT    Dysphagia Diagnosis: Mild oral stage dysphagia  Dysphagia Impression : Improving swallow function. Mild dysphagia persists. Possible fatigue. Encouraged use of safe swallowing strategies including fully upright, small sips and breaks as needed     No overt s/sx with assessment of all consistencies including thin liquids, puree, mixed consistency fruit and solids. Increased mastication time with solids attributed to lack of upper denture plate which patient reports her mom is bringing hopefully today. Recommend continue easy to chew diet/thin liquids. Medications with liquid wash. Safe swallowing guidelines reviewed including fully upright positioning, small presentations and taking breaks as needed. GENERAL    History of Present Injury/Illness: Ms. oTm Corcoran  has a past medical history of Acute renal failure ([address] CKD (chronic kidney disease) stage 3, GFR 30-59 ml/min ([address] Gastroenteritis, acute, IDDM (insulin dependent diabetes mellitus), Intractable nausea and vomiting, Irregular menses, Kidney stone, Neuropathy, peripheral, idiopathic, Retinopathy, bilateral, Trichomoniasis, Ulcer, skin, chronic ([address] and Vaginal burning. . She also  has a past surgical history that includes cystoscopy,insert ureteral stent (04/2022); amputation (Left); amputation (1/27/12); and hx open reduction internal fixation (Right, 2011). Chart Reviewed: Yes  General Comment  Comments: nurse states family reported some coughing with meals  Subjective: Patient alert, reports some coughing at end of meals  Behavior/Cognition: Alert  Communication Observation: Functional  Follows Directions: Simple     Patient Complaint: coughing at ends of meals with liquids  Current Diet : Easy to chew  Current Liquid Diet : Thin  Pain:   Patient does not c/o pain                   OBJECTIVE    Oropharyngeal Phase: All  Assessment Method(s): Observation  Vocal Quality: Low volume  Consistency Presented: All consistencies  How Presented: Cup/sip;Spoon;Straw;SLP-fed/Presented; Successive Swallows  Bolus Acceptance: No impairment  Bolus Formation/Control: Impaired  Type of Impairment: Piecemeal;Delayed;Mastication  Oral Residue: Less than 10% of bolus  Aspiration Signs/Symptoms: None  Pharyngeal Phase Characteristics: No impairment, issues, or problems                 PLAN    Duration/Frequency: Continue to follow patient 3x/week for duration of hospitalization and/or until goals met    Dysphagia Outcome and Severity Scale (OTIS)  Dysphagia Outcome Severity Scale: Level 5: Mild dysphagia- Distant supervision. May need one diet consistency restricted  Interpretation of Tool: The Dysphagia Outcome and Severity Scale (OTIS) is a simple, easy-to-use, 7-point scale developed to systematically rate the functional severity of dysphagia based on objective assessment and make recommendations for diet level, independence level, and type of nutrition. Normal(7), Functional(6), Mild(5), Mild-Moderate(4), Moderate(3), Moderate-Severe(2), Severe(1)         Education:    Patient Education: Aspiration precautions  Patient Education Response: Verbalizes understanding    Current Medications:   No current facility-administered medications on file prior to encounter.      Current Outpatient Medications on File Prior to Encounter   Medication Sig Dispense Refill    atorvastatin (LIPITOR) 80 MG tablet Take 80 mg by mouth daily (Patient not taking: Reported on 5/27/2022)      brimonidine (ALPHAGAN P) 0.1 % SOLN 1 drop in left eye twice a day (Patient not taking: Reported on 5/27/2022)      ergocalciferol (ERGOCALCIFEROL) 1.25 MG (39510 UT) capsule Take 50,000 Units by mouth (Patient not taking: Reported on 5/27/2022)      gabapentin (NEURONTIN) 300 MG capsule Take 300 mg by mouth daily. (Patient not taking: Reported on 5/27/2022)      Insulin Degludec (TRESIBA FLEXTOUCH) 200 UNIT/ML SOPN Inject 30 Units into the skin daily (Patient not taking: Reported on 5/27/2022)      insulin lispro, 1 Unit Dial, (HUMALOG KWIKPEN) 100 UNIT/ML SOPN 6 TIDAC correction scale 1/50>150, max daily dose 50 units (Patient not taking: Reported on 5/27/2022)      ondansetron (ZOFRAN-ODT) 4 MG disintegrating tablet Take 4 mg by mouth every 8 hours as needed (Patient not taking: Reported on 5/27/2022)         PRECAUTIONS/ALLERGIES: Patient has no known allergies. Therapy Time  SLP Individual Minutes  Time In: 1971  Time Out: 1034  Minutes: [address]  Gissell Diaz MS, CCC-SLP         Speech Language Pathologist          Acute Rehabilitation Services                   Contact: Kelvin

## 2022-05-29 NOTE — PROGRESS NOTES
Date of Outreach Update:  Lorrin Mcburney was seen and assessed. @[facility]([id number])@  Vitals:    05/28/22 0744 05/28/22 1152 05/28/22 1603 05/28/22 2011   BP: (!) 119/55 (!) 141/78 (!) 115/59 (!) 109/52   Pulse: (!) 107 (!) 111 (!) 102 (!) 104   Resp: 18 18 18 18   Temp: 99.1 °F (37.3 °C) 99.1 °F (37.3 °C) 99.3 °F (37.4 °C) 100 °F (37.8 °C)   TempSrc: Oral Oral Oral Oral   SpO2: 95% 91% 94% 94%   Weight:       Height:             Pain Assessment  @[facility]([id number])@               Previous Outreach assessment has been reviewed. Alert and oriented, VSS, lung sounds cta, respirations even and unlabored. Labs reviewed. Resting in bed. No concerns at this time. Will continue to follow up per outreach protocol.     Signed By:   France Paiz RN    May 28, 2022 9:50 PM

## 2022-05-29 NOTE — PROGRESS NOTES
[facility]  Admission Date: 5/21/2022         Daily Progress Note: 5/29/2022    The patient's chart is reviewed and the patient is discussed with the staff. Background: 46F with htn, hld, dm, ckd3, had recent admission for right sided ureteral obstruction s/p stent placement presents with unresponsiveness.  The patient had not been heard from the last several days. Roosevelt Rawls was found unresponsive in her home.  Brought in by EMS and was found to have numerous lab abnormalities. Yesenia Verde was found to be in DKA with a serum glucose of greater than 2000.  She was in acute renal failure with a creatinine of 9 and a potassium of 6.7.  She was acidotic with a pH of 6.8.  The patient was intubated on admission.  She suffered a brief cardiac arrest.  She was started on IV hydration, insulin drip, and broad-spectrum antibiotics.  CT of the head was negative.  Nephrology is following for renal failure. Extubated 5/25  Now on floor on RA     Subjective:   Remains on RA. Hospitalist now primary. Prior to Admission medications    Medication Sig Start Date End Date Taking? Authorizing Provider   atorvastatin (LIPITOR) 80 MG tablet Take 80 mg by mouth daily  Patient not taking: Reported on 5/27/2022 7/14/21   Ar Automatic Reconciliation   brimonidine (ALPHAGAN P) 0.1 % SOLN 1 drop in left eye twice a day  Patient not taking: Reported on 5/27/2022 6/23/21   Ar Automatic Reconciliation   ergocalciferol (ERGOCALCIFEROL) 1.25 MG (31157 UT) capsule Take 50,000 Units by mouth  Patient not taking: Reported on 5/27/2022 4/7/21   Ar Automatic Reconciliation   gabapentin (NEURONTIN) 300 MG capsule Take 300 mg by mouth daily.   Patient not taking: Reported on 5/27/2022 5/20/21   Ar Automatic Reconciliation   Insulin Degludec (TRESIBA FLEXTOUCH) 200 UNIT/ML SOPN Inject 30 Units into the skin daily  Patient not taking: Reported on 5/27/2022 11/16/21   Ar Automatic Reconciliation   insulin lispro, 1 Unit Dial, (HUMALOG KWIKPEN) 100 UNIT/ML SOPN 6 TIDAC correction scale 1/50>150, max daily dose 50 units  Patient not taking: Reported on 5/27/2022 11/16/21   Ar Automatic Reconciliation   ondansetron (ZOFRAN-ODT) 4 MG disintegrating tablet Take 4 mg by mouth every 8 hours as needed  Patient not taking: Reported on 5/27/2022 3/21/22   Ar Automatic Reconciliation        Current Facility-Administered Medications: piperacillin-tazobactam (ZOSYN) 3,375 mg in sodium chloride 0.9 % 50 mL IVPB (mini-bag), 3,375 mg, IntraVENous, Q8H  insulin glargine (LANTUS) injection vial 15 Units, 15 Units, SubCUTAneous, Daily  heparin (porcine) injection 10,000 Units, 10,000 Units, IntraVENous, PRN  albuterol (PROVENTIL) nebulizer solution 2.5 mg, 2.5 mg, Nebulization, Q4H PRN  insulin regular (HUMULIN R;NOVOLIN R) injection 0-10 Units, 0-10 Units, IntraVENous, PRN  dextrose 5 % and 0.45 % sodium chloride infusion, , IntraVENous, Continuous PRN  glucose chewable tablet 16 g, 4 tablet, Oral, PRN  dextrose bolus 10% 125 mL, 125 mL, IntraVENous, PRN **OR** dextrose bolus 10% 250 mL, 250 mL, IntraVENous, PRN  glucagon injection 1 mg, 1 mg, IntraMUSCular, PRN  dextrose 5 % solution, 100 mL/hr, IntraVENous, PRN  insulin lispro (HUMALOG) injection vial 0-16 Units, 0-16 Units, SubCUTAneous, Q6H  dextrose bolus 10% 125 mL, 125 mL, IntraVENous, PRN **OR** dextrose bolus 10% 250 mL, 250 mL, IntraVENous, PRN  glucagon injection 1 mg, 1 mg, IntraMUSCular, PRN  medicated lip ointment (BLISTEX), , Topical, PRN  sodium chloride flush 0.9 % injection 5-40 mL, 5-40 mL, IntraVENous, 2 times per day  sodium chloride flush 0.9 % injection 5-40 mL, 5-40 mL, IntraVENous, PRN  heparin (porcine) injection 5,000 Units, 5,000 Units, SubCUTAneous, q8h  ondansetron (ZOFRAN-ODT) disintegrating tablet 4 mg, 4 mg, Oral, Q8H PRN **OR** ondansetron (ZOFRAN) injection 4 mg, 4 mg, IntraVENous, Q6H PRN  polyethylene glycol (GLYCOLAX) packet 17 g, 17 g, Oral, Daily PRN  acetaminophen (TYLENOL) tablet 650 mg, 650 mg, Oral, Q6H PRN **OR** acetaminophen (TYLENOL) suppository 650 mg, 650 mg, Rectal, Q6H PRN  dextrose bolus 10% 250 mL, 250 mL, IntraVENous, PRN  potassium chloride 10 mEq/100 mL IVPB (Peripheral Line), 10 mEq, IntraVENous, PRN  magnesium sulfate 1000 mg in dextrose 5% 100 mL IVPB, 1,000 mg, IntraVENous, PRN  sodium phosphate 10 mmol in sodium chloride 0.9 % 250 mL IVPB, 10 mmol, IntraVENous, PRN **OR** sodium phosphate 15 mmol in dextrose 5 % 250 mL IVPB, 15 mmol, IntraVENous, PRN **OR** sodium phosphate 20 mmol in dextrose 5 % 500 mL IVPB, 20 mmol, IntraVENous, PRN  dextrose 5 % and 0.45 % sodium chloride infusion, , IntraVENous, Continuous PRN  sodium chloride flush 0.9 % injection 5-40 mL, 5-40 mL, IntraVENous, 2 times per day  sodium chloride flush 0.9 % injection 5-40 mL, 5-40 mL, IntraVENous, PRN  0.9 % sodium chloride infusion, , IntraVENous, PRN     Review of Systems  Constitutional: negative for fever, chills, sweats  Cardiovascular: negative for chest pain, palpitations, syncope, edema  Gastrointestinal:  negative for dysphagia, reflux, vomiting, diarrhea, abdominal pain, or melena  Neurologic:  negative for focal weakness, numbness, headache  Objective:     Vitals:    05/28/22 2303 05/29/22 0407 05/29/22 0742 05/29/22 1123   BP: (!) 102/44 129/62 (!) 108/53 104/61   Pulse: (!) 104 (!) 102 (!) 105 (!) 103   Resp: 18 18 19 19   Temp: 99.1 °F (37.3 °C) 99.3 °F (37.4 °C) 98.8 °F (37.1 °C) 99.7 °F (37.6 °C)   TempSrc: Oral  Oral Oral   SpO2: 93% 95% 95% 94%   Weight:       Height:         I/O last 3 completed shifts: In: 473 [P.O.:473]  Out: 500 [Urine:500]  No intake/output data recorded.      Physical Exam:   Constitution:  the patient is well developed and in no acute distress  HEENT:  Sclera clear, pupils equal, oral mucosa moist  Respiratory: clear  Cardiovascular:  RRR without M,G,R  Gastrointestinal: soft and non-tender; with positive bowel sounds. Musculoskeletal: warm without cyanosis. There is no lower extremity edema. Skin:  no jaundice or rashes, no wounds   Neurologic: no gross neuro deficits     Psychiatric:  alert and oriented x 3    CXR:   None today     LAB:  Recent Labs     05/27/22  0459 05/28/22  0441 05/29/22  0324   WBC 13.9* 13.6* 17.6*   HGB 8.0* 9.0* 8.2*   HCT 24.7* 27.7* 26.1*    251 299     Recent Labs     05/27/22  0459 05/28/22  0441 05/29/22  0523    143 140   K 3.9 4.2 3.7    107 105   CO2 30 27 28   BUN 38* 22 27*   PHOS 3.7 2.8 2.8     No results for input(s): TROPHS, BNPNT, CRP, ESR in the last 72 hours. Invalid input(s): LAC  No results for input(s): PH, PCO2, PO2, HCO3 in the last 72 hours. Invalid input(s): PHI, PCO2I, PO2I, HCO3I  No results for input(s): SDES in the last 72 hours.     Invalid input(s): CULT  Assessment and Plan:  (Medical Decision Making)     Hospital Problems           Last Modified POA    * (Principal) Cardiac arrest ([address] 5/21/2022 Yes    Acute kidney injury superimposed on chronic kidney disease ([address] 5/28/2022 Yes    Benign hypertension with CKD (chronic kidney disease) stage III ([address] 5/27/2022 Yes    Diabetic ketoacidosis with coma associated with type 1 diabetes mellitus ([address] 5/21/2022 Yes    Severe sepsis with septic shock (CODE) ([address] 5/23/2022 Yes    Pneumonia, bacterial 5/21/2022 Yes    Acute respiratory failure ([address] 5/23/2022 Yes    Altered mental status 5/24/2022 Yes    Frailty 5/24/2022 Yes    Septic shock ([address] 5/28/2022 Yes    Urinary tract infection without hematuria 5/21/2022 Yes    Diabetes mellitus type 1 ([address] 5/27/2022 Yes    Overview Signed 4/21/2022 12:01 PM by Tsering Resendiz MD     Start lantus 10 units  humalog sliding scale  Keep BS log-call in few days to titrate lantus  Diet discussed  Need to see her labs for further addition of meds           BLANCA (acute kidney injury) ([facility] 5/27/2022 Yes         (Principle) Cardiac arrest  Plan: resolved, s/p extubation and now on RA    Acute kidney injury superimposed on CKD  Plan: nephrology following    Benign hypertension with CKD  Plan: cont meds, per primary     Diabetic ketoacidosis with coma associated with type 1 diabetes mellitus  Plan: resolved, cont meds, per primary     Severe sepsis with septic shock  Plan: improved, per primary    PNA, bacterial  Plan: cont ABX therapy    Acute respiratory failure:   Plan: resolved, now on RA and extubated    Frailty:   Plan, chronic, per primary    UTI:   Plan: cont ABX therapy per primary    Diabetes type I:   Plan: per primary, cont meds     BLANCA:   Plan: cont to monitor, nephrology following      Nothing further to add from pulmonary standpoint, will sign off. Call if needed. More than 50% of the time documented was spent in face-to-face contact with the patient and in the care of the patient on the floor/unit where the patient is located. TANISHA Negrete - MAIRA   I have spoken with and examined the patient. I agree with the above assessment and plan as documented. I contributed more than 50% of the clinical time to this encounter today.     Gen:alert  Lungs:  clear  Heart:  RRR with no Murmur/Rubs/Gallops  Abd:soft  Ext: no edema     pt alert ,  Needs pt/ot,mobilize   Blood sugar 31 this am at 3:30- nurses say no one was notified until 8 AM!  hospitalist now primary-will sign off    Orquidea Lawrence MD

## 2022-05-30 ENCOUNTER — APPOINTMENT (OUTPATIENT)
Dept: GENERAL RADIOLOGY | Age: 47
DRG: 870 | End: 2022-05-30
Payer: COMMERCIAL

## 2022-05-30 LAB
ANION GAP SERPL CALC-SCNC: 13 MMOL/L (ref 7–16)
BASOPHILS # BLD: 0.1 K/UL (ref 0–0.2)
BASOPHILS NFR BLD: 0 % (ref 0–2)
BUN SERPL-MCNC: 28 MG/DL (ref 6–23)
CALCIUM SERPL-MCNC: 9 MG/DL (ref 8.3–10.4)
CHLORIDE SERPL-SCNC: 106 MMOL/L (ref 98–107)
CO2 SERPL-SCNC: 23 MMOL/L (ref 21–32)
CREAT SERPL-MCNC: 6.3 MG/DL (ref 0.6–1)
DIFFERENTIAL METHOD BLD: ABNORMAL
EOSINOPHIL # BLD: 0.3 K/UL (ref 0–0.8)
EOSINOPHIL NFR BLD: 2 % (ref 0.5–7.8)
ERYTHROCYTE [DISTWIDTH] IN BLOOD BY AUTOMATED COUNT: 15 % (ref 11.9–14.6)
GLUCOSE BLD STRIP.AUTO-MCNC: 139 MG/DL (ref 65–100)
GLUCOSE BLD STRIP.AUTO-MCNC: 258 MG/DL (ref 65–100)
GLUCOSE BLD STRIP.AUTO-MCNC: 258 MG/DL (ref 65–100)
GLUCOSE BLD STRIP.AUTO-MCNC: 77 MG/DL (ref 65–100)
GLUCOSE SERPL-MCNC: 35 MG/DL (ref 65–100)
GLUCOSE SERPL-MCNC: 96 MG/DL (ref 65–100)
HCT VFR BLD AUTO: 26 % (ref 35.8–46.3)
HGB BLD-MCNC: 8.3 G/DL (ref 11.7–15.4)
IMM GRANULOCYTES # BLD AUTO: 1.7 K/UL (ref 0–0.5)
IMM GRANULOCYTES NFR BLD AUTO: 9 % (ref 0–5)
LYMPHOCYTES # BLD: 3.6 K/UL (ref 0.5–4.6)
LYMPHOCYTES NFR BLD: 18 % (ref 13–44)
MCH RBC QN AUTO: 26.4 PG (ref 26.1–32.9)
MCHC RBC AUTO-ENTMCNC: 31.9 G/DL (ref 31.4–35)
MCV RBC AUTO: 82.8 FL (ref 79.6–97.8)
MONOCYTES # BLD: 3.6 K/UL (ref 0.1–1.3)
MONOCYTES NFR BLD: 18 % (ref 4–12)
NEUTS SEG # BLD: 10.7 K/UL (ref 1.7–8.2)
NEUTS SEG NFR BLD: 53 % (ref 43–78)
NRBC # BLD: 0.09 K/UL (ref 0–0.2)
PHOSPHATE SERPL-MCNC: 2.7 MG/DL (ref 2.5–4.5)
PLATELET # BLD AUTO: 367 K/UL (ref 150–450)
PMV BLD AUTO: 10 FL (ref 9.4–12.3)
POTASSIUM SERPL-SCNC: 3.3 MMOL/L (ref 3.5–5.1)
RBC # BLD AUTO: 3.14 M/UL (ref 4.05–5.2)
SERVICE CMNT-IMP: ABNORMAL
SERVICE CMNT-IMP: NORMAL
SODIUM SERPL-SCNC: 142 MMOL/L (ref 136–145)
WBC # BLD AUTO: 20 K/UL (ref 4.3–11.1)

## 2022-05-30 PROCEDURE — 80048 BASIC METABOLIC PNL TOTAL CA: CPT

## 2022-05-30 PROCEDURE — 2580000003 HC RX 258: Performed by: INTERNAL MEDICINE

## 2022-05-30 PROCEDURE — 6370000000 HC RX 637 (ALT 250 FOR IP): Performed by: INTERNAL MEDICINE

## 2022-05-30 PROCEDURE — 84100 ASSAY OF PHOSPHORUS: CPT

## 2022-05-30 PROCEDURE — 51702 INSERT TEMP BLADDER CATH: CPT

## 2022-05-30 PROCEDURE — 5A1D70Z PERFORMANCE OF URINARY FILTRATION, INTERMITTENT, LESS THAN 6 HOURS PER DAY: ICD-10-PCS | Performed by: INTERNAL MEDICINE

## 2022-05-30 PROCEDURE — 1100000000 HC RM PRIVATE

## 2022-05-30 PROCEDURE — 8010000000 HC HEMODIALYSIS ACUTE INPT

## 2022-05-30 PROCEDURE — 71046 X-RAY EXAM CHEST 2 VIEWS: CPT

## 2022-05-30 PROCEDURE — 6360000002 HC RX W HCPCS: Performed by: FAMILY MEDICINE

## 2022-05-30 PROCEDURE — 6370000000 HC RX 637 (ALT 250 FOR IP): Performed by: FAMILY MEDICINE

## 2022-05-30 PROCEDURE — 36415 COLL VENOUS BLD VENIPUNCTURE: CPT

## 2022-05-30 PROCEDURE — 6360000002 HC RX W HCPCS: Performed by: INTERNAL MEDICINE

## 2022-05-30 PROCEDURE — 85025 COMPLETE CBC W/AUTO DIFF WBC: CPT

## 2022-05-30 PROCEDURE — 82962 GLUCOSE BLOOD TEST: CPT

## 2022-05-30 PROCEDURE — 87040 BLOOD CULTURE FOR BACTERIA: CPT

## 2022-05-30 PROCEDURE — 2580000003 HC RX 258: Performed by: FAMILY MEDICINE

## 2022-05-30 RX ORDER — GUAIFENESIN/DEXTROMETHORPHAN 100-10MG/5
5 SYRUP ORAL EVERY 4 HOURS PRN
Status: DISCONTINUED | OUTPATIENT
Start: 2022-05-30 | End: 2022-06-02 | Stop reason: HOSPADM

## 2022-05-30 RX ADMIN — SODIUM CHLORIDE, PRESERVATIVE FREE 5 ML: 5 INJECTION INTRAVENOUS at 21:08

## 2022-05-30 RX ADMIN — HEPARIN SODIUM 5000 UNITS: 5000 INJECTION INTRAVENOUS; SUBCUTANEOUS at 21:05

## 2022-05-30 RX ADMIN — INSULIN LISPRO 6 UNITS: 100 INJECTION, SOLUTION INTRAVENOUS; SUBCUTANEOUS at 00:43

## 2022-05-30 RX ADMIN — PIPERACILLIN AND TAZOBACTAM 3375 MG: 3; .375 INJECTION, POWDER, LYOPHILIZED, FOR SOLUTION INTRAVENOUS at 12:45

## 2022-05-30 RX ADMIN — HEPARIN SODIUM 5000 UNITS: 5000 INJECTION INTRAVENOUS; SUBCUTANEOUS at 12:45

## 2022-05-30 RX ADMIN — INSULIN GLARGINE 15 UNITS: 100 INJECTION, SOLUTION SUBCUTANEOUS at 11:17

## 2022-05-30 RX ADMIN — HEPARIN SODIUM 5000 UNITS: 5000 INJECTION INTRAVENOUS; SUBCUTANEOUS at 06:08

## 2022-05-30 RX ADMIN — PIPERACILLIN AND TAZOBACTAM 3375 MG: 3; .375 INJECTION, POWDER, LYOPHILIZED, FOR SOLUTION INTRAVENOUS at 00:16

## 2022-05-30 RX ADMIN — VANCOMYCIN HYDROCHLORIDE 1500 MG: 10 INJECTION, POWDER, LYOPHILIZED, FOR SOLUTION INTRAVENOUS at 10:22

## 2022-05-30 RX ADMIN — INSULIN LISPRO 4 UNITS: 100 INJECTION, SOLUTION INTRAVENOUS; SUBCUTANEOUS at 12:46

## 2022-05-30 RX ADMIN — ACETAMINOPHEN 650 MG: 325 TABLET ORAL at 08:00

## 2022-05-30 ASSESSMENT — PAIN SCALES - GENERAL: PAINLEVEL_OUTOF10: 3

## 2022-05-30 NOTE — PROGRESS NOTES
Pt alert and oriented times 3. Pt is on room air with even and unlabored respirations. Pt has no complaints of pain or discomfort. Call light is in place, hernandez is clamped to the end of the bed and draining.

## 2022-05-30 NOTE — PROGRESS NOTES
Comprehensive Nutrition Assessment    Type and Reason for Visit: Reassess  Tube Feeding Management (Pulmonology)    Nutrition Recommendations/Plan:   Meals and Snacks:  Diet: Continue current order  Nutrition Supplement Therapy:  Medical food supplement therapy:  Initiate Glucerna Shake three times per day (this provides 220 kcal and 10 grams protein per bottle)     Malnutrition Assessment:  Malnutrition Status: At risk for malnutrition (Comment) (found down at home-unknown time, intubated on admission, possible wt loss-unable to validate)  Nutrition Assessment:  Nutrition History:     5/22: Unable to obtain  Nutrition Background:   H/O: HTN, HLD, DM, CKD3, recent admission for right sided ureteral obstruction s/p stent placement. P/W found unresponsiveness after not heard from the last several days. Findings of DKA, BLANCA. Had brief cardiac arrest and was intubated in ED. Nutrition Interval:  Pt seen lying in bed at time of visit. She provides limited answers when asked how much she is eating at meals. Pt stated eating ~50% of lunch today. She states she's not eating as much d/t chewing difficulties. She denies having her partial at this time. SLP continues to follow. RN states pt has been coughing while eating. Observed candy at bedside table. Nutrition Related Findings:   5/24: NFPE limited to exposed upper body secondary to intubation, dialysis. NPO per SLP 5/26. Diet advanced to easy to chew 5/27 per SLP. HD held over weekend (5/28-29). HD restarted 5/30.       Current Nutrition Therapies:  ADULT DIET; Easy to Chew    Current Intake:   Average Meal Intake: 1-25% Average Supplements Intake: None Ordered      Anthropometric Measures:  Height: 5' 6\" (167.6 cm)  Current Body Wt: 181 lb 3.5 oz (82.2 kg) (5/30), Weight source: Bed Scale  BMI: 29.3  Admission Body Weight: 172 lb (78 kg) (estimated)  Ideal Body Weight (Kg) (Calculated): 59 kg (130 lbs),    Usual Body Wt:  (see below),   179# (OV GYN 11/26/21), 180# OV PCP 8/16/2021)  Percent weight change:   .Weight variances likely d/t fluid and scale variances with h/o CKD         Edema: No data recorded  BMI Category  (defer d/t wide weight range)  Estimated Daily Nutrient Needs:  Energy (kcal/day): 0284-5330 (Kcal/kg (30-35) Weight used: 59 kg Ideal (59.1kg)  Protein (g/day): 71-77 (1.2-1.3gm/kg) Weight Used: (Ideal (59.1kg)) 59 kg  Fluid (ml/day):   (Standard Renal)    Nutrition Diagnosis:   · Inadequate oral intake related to biting/chewing (masticatory) difficulty as evidenced by intake 0-25% (pt states barrier to intake, missing dentition)    Nutrition Interventions:   Food and/or Nutrient Delivery: Modify Current Diet,Start Oral Nutrition Supplement     Coordination of Nutrition Care: Continue to monitor while inpatient    Goals:   Previous Goal Met: Goal(s) Achieved  Active Goal: Meet at least 75% of estimated needs,by next RD assessment       Nutrition Monitoring and Evaluation:      Food/Nutrient Intake Outcomes: Food and Nutrient Intake,Supplement Intake  Physical Signs/Symptoms Outcomes: Biochemical Data,GI Status,Meal Time Behavior,Weight    Discharge Planning:     Too soon to determine    Aarti Dia MS, RDN, LD  Contact: [phone]

## 2022-05-30 NOTE — PROGRESS NOTES
Patient asked if she could sleep through dinner tonight, and keep her tray until later if she wanted it. Reminded patient if she wanted to eat she needed to sit at a 90degree angle. Denies pain at this time. Call light within reach. Will monitor until shift report.

## 2022-05-30 NOTE — PLAN OF CARE
Problem: Pain  Goal: Verbalizes/displays adequate comfort level or baseline comfort level  Recent Flowsheet Documentation  Taken 5/30/2022 1017 by Nestor Mas RN  Verbalizes/displays adequate comfort level or baseline comfort level: Encourage patient to monitor pain and request assistance     Problem: Neurosensory - Adult  Goal: Achieves stable or improved neurological status  Recent Flowsheet Documentation  Taken 5/30/2022 1017 by Nestor Mas RN  Achieves stable or improved neurological status:   Assess for and report changes in neurological status   Initiate measures to prevent increased intracranial pressure   Maintain blood pressure and fluid volume within ordered parameters to optimize cerebral perfusion and minimize risk of hemorrhage   Monitor temperature, glucose, and sodium.  Initiate appropriate interventions as ordered  Goal: Achieves maximal functionality and self care  Recent Flowsheet Documentation  Taken 5/30/2022 1017 by Nestor Mas RN  Achieves maximal functionality and self care:   Monitor swallowing and airway patency with patient fatigue and changes in neurological status   Encourage and assist patient to increase activity and self care with guidance from physical therapy/occupational therapy   Encourage visually impaired, hearing impaired and aphasic patients to use assistive/communication devices     Problem: Respiratory - Adult  Goal: Achieves optimal ventilation and oxygenation  Recent Flowsheet Documentation  Taken 5/30/2022 1017 by Arik Salazar RN  Achieves optimal ventilation and oxygenation: Assess for changes in respiratory status     Problem: Cardiovascular - Adult  Goal: Maintains optimal cardiac output and hemodynamic stability  Recent Flowsheet Documentation  Taken 5/30/2022 1017 by Nestor Mas RN  Maintains optimal cardiac output and hemodynamic stability:   Monitor blood pressure and heart rate   Monitor urine output and notify Licensed Independent Practitioner for values outside of normal range   Assess for signs of decreased cardiac output   Administer fluid and/or volume expanders as ordered     Problem: Skin/Tissue Integrity - Adult  Goal: Skin integrity remains intact  Recent Flowsheet Documentation  Taken 5/30/2022 1017 by Farzana Salazar RN  Skin Integrity Remains Intact: Monitor for areas of redness and/or skin breakdown     Problem: Musculoskeletal - Adult  Goal: Return mobility to safest level of function  Recent Flowsheet Documentation  Taken 5/30/2022 1017 by Tre Escobedo RN  Return Mobility to [facility] Level of Function: Assess patient stability and activity tolerance for standing, transferring and ambulating with or without assistive devices     Problem: Genitourinary - Adult  Goal: Absence of urinary retention  Recent Flowsheet Documentation  Taken 5/30/2022 1017 by Tre Escobedo RN  Absence of urinary retention: Assess patients ability to void and empty bladder  Goal: Urinary catheter remains patent  Recent Flowsheet Documentation  Taken 5/30/2022 1017 by Tre Escobedo RN  Urinary catheter remains patent: Assess patency of urinary catheter     Problem: ABCDS Injury Assessment  Goal: Absence of physical injury  Recent Flowsheet Documentation  Taken 5/30/2022 1017 by Tre Escobedo RN  Absence of Physical Injury: Implement safety measures based on patient assessment     Problem: Safety - Adult  Goal: Free from fall injury  Recent Flowsheet Documentation  Taken 5/30/2022 1017 by Tre Escobedo RN  Free From Fall Injury: Instruct family/caregiver on patient safety

## 2022-05-30 NOTE — DIALYSIS
TRANSFER OUT- DIALYSIS    Hemodialysis treatment completed. PT ran 2.5 hours, with low BP. Machine clotted off 30 minutes prior to scheduled ending of treatment. Patient a/ox3 and VS stable  /70  P 101       0.1 Kg removed. Flushed both ports with 10 mL of NS.  CVC dressing clean, dry, and intact, tego caps intact, curos caps placed. Patient to [phone] after dialysis.

## 2022-05-30 NOTE — PROGRESS NOTES
VANCO DAILY FOLLOW UP RENAL INSUFFICIENCY PATIENT   [address] [facility] Pharmacokinetic Monitoring Service - Vancomycin    Consulting Provider: Dr. Alex Jenkins   Indication: PNA  Target Concentration: Pre-Dialysis Concentration 21-24 mg/L  Day of Therapy: 1  Additional Antimicrobials: pip/tazo    Pertinent Laboratory Values: Wt Readings from Last 1 Encounters:   05/30/22 181 lb 3.2 oz (82.2 kg)     Temp Readings from Last 1 Encounters:   05/30/22 98.8 °F (37.1 °C) (Oral)     Recent Labs     05/28/22  0441 05/29/22  0324 05/29/22  0523 05/30/22  0356   BUN 22  --  27* 28*   CREATININE 3.40*  --  5.40* 6.30*   WBC 13.6* 17.6*  --  20.0*     Lab Results   Component Value Date    VANCORANDOM 31.6 05/23/2022     MRSA Nasal Swab: not ordered. Order placed by pharmacy.     Assessment:  Date:  Dose/Freq Admin Times Level/Time:   5/30 HD 1500 mg x 1 (09)                              Plan:  Concentration-guided dosing due to intermittent hemodialysis  Start vancomycin 1500 mg x 1  Vancomycin concentrations will be ordered as clinically appropriate   Pharmacy will continue to monitor patient and adjust therapy as indicated    Thank you for the consult,  Boris Patricia, [address] [facility]

## 2022-05-30 NOTE — PROGRESS NOTES
Pt fingerstick glucose 139. Awaiting confirmatory from lab. Hypoglycemic protocol protocol in chart if we need.

## 2022-05-30 NOTE — PROGRESS NOTES
Physical Therapy Note:    Attempted to see patient this AM for physical therapy treatment  session. Patient off floor in HD. Will follow and re-attempt as schedule permits/patient available.  Thank you,    Novamichelle Meyer, PT     Rehab Caseload Tracker

## 2022-05-30 NOTE — PROGRESS NOTES
Hospitalist Progress Note   Admit Date:  2022  3:51 PM   Name:  Claire Mckeon   Age:  48 y.o. Sex:  female  :  1975   MRN:  [id number]   Room:  [facility]    Presenting Complaint: Altered Mental Status     Reason(s) for Admission: Cardiac arrest ([facility] [I46.9]  Hyperkalemia [E87.5]  Septic shock ([facility] [A41.9, R65.21]  Acute respiratory failure, unspecified whether with hypoxia or hypercapnia ([facility] [J96.00]  Acute renal failure, unspecified acute renal failure type [facility]) [N17.9]     Hospital Course & Interval History:   Claire Mckeon is a 48 y.o. female with history of   Diabetes mellitus type 1  Hypertension   CKD stage III      who was admitted initially to intensivist service for unresponsiveness.      Prior to this admission, patient was admitted recently for right sided ureteral obstruction s/p stent placement. Patient was not heard from by family members, so EMS was called to see her and she was found unresponsive.      Initially her BS was more than 2000. She had DKA. She had BLANCA with creatinine of 9 and K of 6.7. patient was intubated. She suffered a brief cardiac arrest. She was treated for DKA and respiratory failure.      CT of head was negative for acute findings. Nephrology was consulted for her renal failure and she was started on SLED.      She was extubated on May 25, 2022. Currently she is alert and oriented.      Patient is being transferred out of ICU and Hospitalist service is requested to assume care for the patient. Subjective/24hr Events (22):    Awake alert, says doing good, no difficulty breathing  Improved creatinine      Mild elevated white blood count  Says cough while eating  Mild increased glucose last night      C/o mild cough  Elevated wbc on zosyn  Hypoglycemic early this am      Assessment & Plan:   DKA at the time of admission   Now improved. Blood sugar is in mid to high 100s ranges. No acidosis now.    Patient is on dialysis to help acidosis too. On Lantus and sliding scale coverage. 5/29-decrease sliding scale parameters  5/30- changed insulin parameters     Respiratory failure   S/P intubation and extubation   Improved. Resolved. Pneumonia  Continue Zosyn  5/29-mild elevated white blood count. Finished course of Zosyn. Will extend the course as white blood count was still high. 5/30- elevated wbc on zosyn, added blood culture, added vancomycin and repeated the blood culture     BLANCA on CKD stage III   Now dialysis dependent. Will continue to monitor renal function and see if renal function will recover. 5/28-improving creatinine      Hypertension   BP is controlled at 138/72 mmHg today. 0/59 - in 87F systolic. Will follow     Recent septic shock   So far, cultures are negative. On empiric Zosyn   Will continue to follow on clinical response and culture result. 5/28-continue Zosyn    Cough while eating   ordered speech therapy consult    H/o-right sided ureteral obstruction s/p stent placement.     Cardiac arrest during the stay  Normal echo       I have discussed the plan of care with patient.             Discharge Planning:    to be determined      Diet:  ADULT DIET; Easy to Chew  DVT PPx: heparin SC   Code status: full code          Hospital Problems           Last Modified POA    * (Principal) Cardiac arrest ([address] 5/21/2022 Yes    Acute kidney injury superimposed on chronic kidney disease ([address] 5/28/2022 Yes    Benign hypertension with CKD (chronic kidney disease) stage III ([address] 5/27/2022 Yes    Diabetic ketoacidosis with coma associated with type 1 diabetes mellitus ([address] 5/21/2022 Yes    Severe sepsis with septic shock (CODE) ([address] 5/23/2022 Yes    Pneumonia, bacterial 5/21/2022 Yes    Acute respiratory failure ([address] 5/23/2022 Yes    Altered mental status 5/24/2022 Yes    Frailty 5/24/2022 Yes    Septic shock ([address] 5/28/2022 Yes    Urinary tract infection without hematuria 5/21/2022 Yes    Diabetes mellitus type 1 ([facility] 5/27/2022 Yes    Overview Signed 4/21/2022 12:01 PM by Rose Marie Estrella MD     Start lantus 10 units  humalog sliding scale  Keep BS log-call in few days to titrate lantus  Diet discussed  Need to see her labs for further addition of meds           BLANCA (acute kidney injury) ([facility] 5/27/2022 Yes            Objective:     Patient Vitals for the past 24 hrs:   Temp Pulse Resp BP SpO2   05/30/22 1017 99 °F (37.2 °C) 79 20 (!) 94/55 98 %   05/30/22 0927 -- (!) 101 -- 114/70 --   05/30/22 0900 -- (!) 101 -- 112/70 --   05/30/22 0830 -- (!) 105 -- 107/67 --   05/30/22 0800 -- (!) 104 -- 98/65 --   05/30/22 0730 -- 99 -- (!) 92/56 --   05/30/22 0700 -- 97 -- 107/63 --   05/30/22 0647 -- (!) 102 -- (!) 89/53 --   05/30/22 0437 98.8 °F (37.1 °C) 98 19 123/67 98 %   05/29/22 2357 99 °F (37.2 °C) 99 19 119/61 93 %   05/29/22 2041 99.5 °F (37.5 °C) 100 20 (!) 109/53 94 %   05/29/22 2004 -- (!) 102 -- (!) 105/54 --   05/29/22 1501 99.9 °F (37.7 °C) 97 19 (!) 99/51 94 %       Oxygen Therapy  SpO2: 98 %  Pulse Oximeter Device Mode: Continuous  Pulse Oximeter Device Location: Finger  O2 Device: None (Room air)  Skin Assessment: Clean, dry, & intact  Skin Protection for O2 Device: Yes  Orientation: Bilateral  Location: Cheek  Intervention(s): Mouth Care,Skin Barrier  FiO2 : 30 %  O2 Flow Rate (L/min): 2 L/min    Estimated body mass index is 29.25 kg/m² as calculated from the following:    Height as of this encounter: 5' 6\" (1.676 m). Weight as of this encounter: 181 lb 3.2 oz (82.2 kg). Intake/Output Summary (Last 24 hours) at 5/30/2022 1410  Last data filed at 5/30/2022 1355  Gross per 24 hour   Intake 720 ml   Output 1901 ml   Net -1181 ml         Physical Exam:     Blood pressure (!) 94/55, pulse 79, temperature 99 °F (37.2 °C), temperature source Axillary, resp. rate 20, height 5' 6\" (1.676 m), weight 181 lb 3.2 oz (82.2 kg), SpO2 98 %. General:    Well nourished.   No overt distress  Head:  Normocephalic, atraumatic  Eyes:  Sclerae appear normal.  Pupils equally round. ENT:  Nares appear normal, no drainage. Moist oral mucosa  Neck:  No restricted ROM. Trachea midline   Left IJ dialysis catheter  CV:   RRR. No m/r/g. No jugular venous distension. Lungs:   Bilateral coarse breath sounds  Abdomen: Bowel sounds present. Soft, nontender, nondistended. Extremities: No cyanosis or clubbing. Skin:     No rashes and normal coloration. Warm and dry. Neuro:  CN II-XII grossly intact. Sensation intact. A&Ox3  Psych:  Normal mood and affect.       I have reviewed ordered lab tests and independently visualized imaging below:    Recent Labs:  Recent Results (from the past 48 hour(s))   POCT Glucose    Collection Time: 05/28/22  5:53 PM   Result Value Ref Range    POC Glucose 106 (H) 65 - 100 mg/dL    Performed by: [employer]    POCT Glucose    Collection Time: 05/29/22 12:10 AM   Result Value Ref Range    POC Glucose 294 (H) 65 - 100 mg/dL    Performed by: [facility]    CBC    Collection Time: 05/29/22  3:24 AM   Result Value Ref Range    WBC 17.6 (H) 4.3 - 11.1 K/uL    RBC 3.11 (L) 4.05 - 5.2 M/uL    Hemoglobin 8.2 (L) 11.7 - 15.4 g/dL    Hematocrit 26.1 (L) 35.8 - 46.3 %    MCV 83.9 79.6 - 97.8 FL    MCH 26.4 26.1 - 32.9 PG    MCHC 31.4 31.4 - 35.0 g/dL    RDW 15.1 (H) 11.9 - 14.6 %    Platelets 468 703 - 959 K/uL    MPV 10.2 9.4 - 12.3 FL    nRBC 0.07 0.0 - 0.2 K/uL   Basic Metabolic Panel    Collection Time: 05/29/22  5:23 AM   Result Value Ref Range    Sodium 140 136 - 145 mmol/L    Potassium 3.7 3.5 - 5.1 mmol/L    Chloride 105 98 - 107 mmol/L    CO2 28 21 - 32 mmol/L    Anion Gap 7 7 - 16 mmol/L    Glucose 86 65 - 100 mg/dL    BUN 27 (H) 6 - 23 MG/DL    CREATININE 5.40 (H) 0.6 - 1.0 MG/DL    GFR African American 11 (L) >60 ml/min/1.73m2    GFR Non- 9 (L) >60 ml/min/1.73m2    Calcium 9.0 8.3 - 10.4 MG/DL   Phosphorus    Collection Time: 05/29/22  5:23 AM   Result Value Ref Range Phosphorus 2.8 2.5 - 4.5 MG/DL   POCT Glucose    Collection Time: 05/29/22  6:22 AM   Result Value Ref Range    POC Glucose 177 (H) 65 - 100 mg/dL    Performed by: Jeaneth Rosa    POCT Glucose    Collection Time: 05/29/22 12:20 PM   Result Value Ref Range    POC Glucose 378 (H) 65 - 100 mg/dL    Performed by: Albino    POCT Glucose    Collection Time: 05/29/22  6:02 PM   Result Value Ref Range    POC Glucose 199 (H) 65 - 100 mg/dL    Performed by:  FREDDY Mcclain    POCT Glucose    Collection Time: 05/30/22 12:24 AM   Result Value Ref Range    POC Glucose 258 (H) 65 - 100 mg/dL    Performed by: [facility] [address] Metabolic Panel    Collection Time: 05/30/22  3:56 AM   Result Value Ref Range    Sodium 142 136 - 145 mmol/L    Potassium 3.3 (L) 3.5 - 5.1 mmol/L    Chloride 106 98 - 107 mmol/L    CO2 23 21 - 32 mmol/L    Anion Gap 13 7 - 16 mmol/L    Glucose 35 (LL) 65 - 100 mg/dL    BUN 28 (H) 6 - 23 MG/DL    CREATININE 6.30 (H) 0.6 - 1.0 MG/DL    GFR African American 9 (L) >60 ml/min/1.73m2    GFR Non- 8 (L) >60 ml/min/1.73m2    Calcium 9.0 8.3 - 10.4 MG/DL   Phosphorus    Collection Time: 05/30/22  3:56 AM   Result Value Ref Range    Phosphorus 2.7 2.5 - 4.5 MG/DL   CBC with Auto Differential    Collection Time: 05/30/22  3:56 AM   Result Value Ref Range    WBC 20.0 (H) 4.3 - 11.1 K/uL    RBC 3.14 (L) 4.05 - 5.2 M/uL    Hemoglobin 8.3 (L) 11.7 - 15.4 g/dL    Hematocrit 26.0 (L) 35.8 - 46.3 %    MCV 82.8 79.6 - 97.8 FL    MCH 26.4 26.1 - 32.9 PG    MCHC 31.9 31.4 - 35.0 g/dL    RDW 15.0 (H) 11.9 - 14.6 %    Platelets 266 293 - 813 K/uL    MPV 10.0 9.4 - 12.3 FL    nRBC 0.09 0.0 - 0.2 K/uL    Differential Type AUTOMATED      Seg Neutrophils 53 43 - 78 %    Lymphocytes 18 13 - 44 %    Monocytes 18 (H) 4.0 - 12.0 %    Eosinophils % 2 0.5 - 7.8 %    Basophils 0 0.0 - 2.0 %    Immature Granulocytes 9 (H) 0.0 - 5.0 %    Segs Absolute 10.7 (H) 1.7 - 8.2 K/UL    Absolute Lymph # 3.6 0.5 - 4.6 K/UL Absolute Mono # 3.6 (H) 0.1 - 1.3 K/UL    Absolute Eos # 0.3 0.0 - 0.8 K/UL    Basophils Absolute 0.1 0.0 - 0.2 K/UL    Absolute Immature Granulocyte 1.7 (H) 0.0 - 0.5 K/UL   POCT Glucose    Collection Time: 05/30/22  5:41 AM   Result Value Ref Range    POC Glucose 139 (H) 65 - 100 mg/dL    Performed by: Suman    Glucose, Random    Collection Time: 05/30/22  6:01 AM   Result Value Ref Range    Glucose 96 65 - 100 mg/dL   POCT Glucose    Collection Time: 05/30/22 12:21 PM   Result Value Ref Range    POC Glucose 258 (H) 65 - 100 mg/dL    Performed by: Radha          Other Studies:  No results found.     Current Meds:  Current Facility-Administered Medications   Medication Dose Route Frequency    vancomycin (VANCOCIN) intermittent dosing (placeholder)   Other RX Placeholder    piperacillin-tazobactam (ZOSYN) 3,375 mg in sodium chloride 0.9 % 50 mL IVPB (mini-bag)  3,375 mg IntraVENous Q12H    insulin glargine (LANTUS) injection vial 15 Units  15 Units SubCUTAneous Daily    heparin (porcine) injection 10,000 Units  10,000 Units IntraVENous PRN    albuterol (PROVENTIL) nebulizer solution 2.5 mg  2.5 mg Nebulization Q4H PRN    insulin regular (HUMULIN R;NOVOLIN R) injection 0-10 Units  0-10 Units IntraVENous PRN    dextrose 5 % and 0.45 % sodium chloride infusion   IntraVENous Continuous PRN    glucose chewable tablet 16 g  4 tablet Oral PRN    dextrose bolus 10% 125 mL  125 mL IntraVENous PRN    Or    dextrose bolus 10% 250 mL  250 mL IntraVENous PRN    glucagon injection 1 mg  1 mg IntraMUSCular PRN    dextrose 5 % solution  100 mL/hr IntraVENous PRN    insulin lispro (HUMALOG) injection vial 0-16 Units  0-16 Units SubCUTAneous Q6H    dextrose bolus 10% 125 mL  125 mL IntraVENous PRN    Or    dextrose bolus 10% 250 mL  250 mL IntraVENous PRN    glucagon injection 1 mg  1 mg IntraMUSCular PRN    medicated lip ointment (BLISTEX)   Topical PRN    sodium chloride flush 0.9 % injection 5-40 mL  5-40 mL IntraVENous 2 times per day    sodium chloride flush 0.9 % injection 5-40 mL  5-40 mL IntraVENous PRN    heparin (porcine) injection 5,000 Units  5,000 Units SubCUTAneous q8h    ondansetron (ZOFRAN-ODT) disintegrating tablet 4 mg  4 mg Oral Q8H PRN    Or    ondansetron (ZOFRAN) injection 4 mg  4 mg IntraVENous Q6H PRN    polyethylene glycol (GLYCOLAX) packet 17 g  17 g Oral Daily PRN    acetaminophen (TYLENOL) tablet 650 mg  650 mg Oral Q6H PRN    Or    acetaminophen (TYLENOL) suppository 650 mg  650 mg Rectal Q6H PRN    dextrose bolus 10% 250 mL  250 mL IntraVENous PRN    potassium chloride 10 mEq/100 mL IVPB (Peripheral Line)  10 mEq IntraVENous PRN    magnesium sulfate 1000 mg in dextrose 5% 100 mL IVPB  1,000 mg IntraVENous PRN    sodium phosphate 10 mmol in sodium chloride 0.9 % 250 mL IVPB  10 mmol IntraVENous PRN    Or    sodium phosphate 15 mmol in dextrose 5 % 250 mL IVPB  15 mmol IntraVENous PRN    Or    sodium phosphate 20 mmol in dextrose 5 % 500 mL IVPB  20 mmol IntraVENous PRN    dextrose 5 % and 0.45 % sodium chloride infusion   IntraVENous Continuous PRN    sodium chloride flush 0.9 % injection 5-40 mL  5-40 mL IntraVENous 2 times per day    sodium chloride flush 0.9 % injection 5-40 mL  5-40 mL IntraVENous PRN    0.9 % sodium chloride infusion   IntraVENous PRN       Signed:  Gustavo Carlos MD

## 2022-05-30 NOTE — FLOWSHEET NOTE
Patient just returned from dialysis. Patient has a R triaylsis catheter. Pigtail not pulling blood. Patient states that she had a BM in her brief on the way to her room from dialysis. Patient weak throughout all extremities. Denies pain. No further needs voiced at this time. Call light within reach. Will monitor. 05/30/22 0703   Handoff   Communication Given Shift Handoff   Handoff Received From DYANA Leigh   Handoff Communication Face to Face; At bedside   Time Handoff Given 0682

## 2022-05-30 NOTE — PROGRESS NOTES
Pt resting in bed with no complaints of pain or discomfort. Pt has family at the bedside. Call light is in place.

## 2022-05-30 NOTE — PROGRESS NOTES
Pt alert and oriented times 3. Pt has family at the bedside. Pt is resting on room air with even and unlabored respirations. Pt hernandez is clamped to bed and draining. Call light is in place.

## 2022-05-30 NOTE — PROGRESS NOTES
[facility]    Follow-Up on: BLANCA on CKD stage IV    HPI: Pt seen and examined. She is on dialysis and hypotensive, UF adjusted. Awake and alert looks great! ROS:  Denies CP, SOB.     Current Facility-Administered Medications   Medication Dose Route Frequency    vancomycin (VANCOCIN) 1500 mg in sodium chloride 0.9% 500 mL IVPB  1,500 mg IntraVENous Once    vancomycin (VANCOCIN) intermittent dosing (placeholder)   Other RX Placeholder    piperacillin-tazobactam (ZOSYN) 3,375 mg in sodium chloride 0.9 % 50 mL IVPB (mini-bag)  3,375 mg IntraVENous Q12H    insulin glargine (LANTUS) injection vial 15 Units  15 Units SubCUTAneous Daily    heparin (porcine) injection 10,000 Units  10,000 Units IntraVENous PRN    albuterol (PROVENTIL) nebulizer solution 2.5 mg  2.5 mg Nebulization Q4H PRN    insulin regular (HUMULIN R;NOVOLIN R) injection 0-10 Units  0-10 Units IntraVENous PRN    dextrose 5 % and 0.45 % sodium chloride infusion   IntraVENous Continuous PRN    glucose chewable tablet 16 g  4 tablet Oral PRN    dextrose bolus 10% 125 mL  125 mL IntraVENous PRN    Or    dextrose bolus 10% 250 mL  250 mL IntraVENous PRN    glucagon injection 1 mg  1 mg IntraMUSCular PRN    dextrose 5 % solution  100 mL/hr IntraVENous PRN    insulin lispro (HUMALOG) injection vial 0-16 Units  0-16 Units SubCUTAneous Q6H    dextrose bolus 10% 125 mL  125 mL IntraVENous PRN    Or    dextrose bolus 10% 250 mL  250 mL IntraVENous PRN    glucagon injection 1 mg  1 mg IntraMUSCular PRN    medicated lip ointment (BLISTEX)   Topical PRN    sodium chloride flush 0.9 % injection 5-40 mL  5-40 mL IntraVENous 2 times per day    sodium chloride flush 0.9 % injection 5-40 mL  5-40 mL IntraVENous PRN    heparin (porcine) injection 5,000 Units  5,000 Units SubCUTAneous q8h    ondansetron (ZOFRAN-ODT) disintegrating tablet 4 mg  4 mg Oral Q8H PRN    Or    ondansetron (ZOFRAN) injection 4 mg  4 mg IntraVENous Q6H PRN    polyethylene glycol (GLYCOLAX) packet 17 g  17 g Oral Daily PRN    acetaminophen (TYLENOL) tablet 650 mg  650 mg Oral Q6H PRN    Or    acetaminophen (TYLENOL) suppository 650 mg  650 mg Rectal Q6H PRN    dextrose bolus 10% 250 mL  250 mL IntraVENous PRN    potassium chloride 10 mEq/100 mL IVPB (Peripheral Line)  10 mEq IntraVENous PRN    magnesium sulfate 1000 mg in dextrose 5% 100 mL IVPB  1,000 mg IntraVENous PRN    sodium phosphate 10 mmol in sodium chloride 0.9 % 250 mL IVPB  10 mmol IntraVENous PRN    Or    sodium phosphate 15 mmol in dextrose 5 % 250 mL IVPB  15 mmol IntraVENous PRN    Or    sodium phosphate 20 mmol in dextrose 5 % 500 mL IVPB  20 mmol IntraVENous PRN    dextrose 5 % and 0.45 % sodium chloride infusion   IntraVENous Continuous PRN    sodium chloride flush 0.9 % injection 5-40 mL  5-40 mL IntraVENous 2 times per day    sodium chloride flush 0.9 % injection 5-40 mL  5-40 mL IntraVENous PRN    0.9 % sodium chloride infusion   IntraVENous PRN       Exam:  Vitals:    05/30/22 0830 05/30/22 0900 05/30/22 0927 05/30/22 1017   BP: 107/67 112/70 114/70 (!) 94/55   Pulse: (!) 105 (!) 101 (!) 101 79   Resp:    20   Temp:    99 °F (37.2 °C)   TempSrc:    Axillary   SpO2:    98%   Weight:       Height:             Intake/Output Summary (Last 24 hours) at 5/30/2022 1143  Last data filed at 5/30/2022 3120  Gross per 24 hour   Intake 600 ml   Output 1901 ml   Net -1301 ml     PE:  GEN - in no distress  CV - regular, no murmur, no rub  Lung - clear bilaterally  Abd - soft, nontender  Ext - 1-2+ edema    Labs  Recent Labs     05/28/22  0441 05/29/22  0324 05/30/22  0356   WBC 13.6* 17.6* 20.0*   HGB 9.0* 8.2* 8.3*   HCT 27.7* 26.1* 26.0*    299 367     Recent Labs     05/28/22  0441 05/29/22  0523 05/30/22  0356    140 142   K 4.2 3.7 3.3*    105 106   CO2 27 28 23   BUN 22 27* 28*   PHOS 2.8 2.8 2.7     No results for input(s): PH, PCO2, PO2, PCO2 in the last 72 hours.     Problem List:  Patient Active Problem List    Diagnosis Date Noted    Sept[facility] shock ([facility]     Altered mental status     Frailty     [facility] 05/21/2022    Diabetic ketoacidosis with coma associated with type 1 diabetes mellitus ([facility] 05/21/2022    Severe sepsis with septic shock (CODE) ([facility] 05/21/2022    Pneumonia, bacterial 05/21/2022    Acute respiratory failure ([facility] 05/21/2022    Benign hypertension with CKD (chronic kidney disease) stage III ([facility] 01/04/2022    Acute kidney injury superimposed on chronic kidney disease ([facility] 06/09/2021    IDDM (insulin dependent diabetes mellitus) 01/24/2012    BLANCA (acute kidney injury) ([facility] 04/21/2022    Ureteric stone 04/19/2022    Urinary tract infection without hematuria 04/19/2022    Hydronephrosis of right kidney 04/19/2022    Malodorous urine 11/16/2021    H/O gastroesophageal reflux (GERD) 06/19/2020    Encounter for annual routine gynecological examination 11/27/2018    Encounter to discuss test results 08/22/2018    Diabetes mellitus type 1 ([facility] 08/22/2018    Type 1 diabetes mellitus with stage 3 chronic kidney disease ([facility] 08/16/2018    Noncompliance with medication regimen 08/16/2018    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    Depression with anxiety 07/11/2017    Ulcer, skin, chronic ([facility] 03/13/2017    CKD (chronic kidney disease), stage IV ([facility] 03/13/2017    Neuropathy, peripheral, idiopathic 03/13/2017    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 associated with type 1 diabetes mellitus ([facility] 03/13/2017    Moderate single current episode of major depressive disorder ([facility] 02/03/2017    Diabetic polyneuropathy associated with type 1 diabetes mellitus ([facility] 02/03/2017    CKD (chronic kidney disease) stage 3, GFR 30-59 ml/min ([facility]) 12/25/2014    Nausea 01/24/2012       Issues Addressed By Nephrology:    Plan:  1. Type I DM  2. DKA BS>2000 on admit  3. BLANCA on CKD stage IV Now oliguric.  Seen on HD no UF 2/2 hypotension  4. Anemia   5. Plan  May need to look at outpt slot this week.

## 2022-05-30 NOTE — DIALYSIS
TRANSFER IN - DIALYSIS    Received patient in dialysis unit  from [facility] (unit) for ordered procedure. Consent verified for renal replacement therapy. Procedure explained to patient, opportunity for Q&A provided. Call light given. Patient a/ox3 and vital signs stable. BP89/53,  P102  , 0L O2 via RA. Hemodialysis initiated using L temp IJ. Aspirated and flushed both ports without difficulty. Dressing clean, dry and intact. Machine settings per MD order. Heparin 0 unit bolus and 0 units/hr. Will monitor during treatment.

## 2022-05-31 LAB
ANION GAP SERPL CALC-SCNC: 8 MMOL/L (ref 7–16)
BASOPHILS # BLD: 0 K/UL (ref 0–0.2)
BASOPHILS NFR BLD: 0 % (ref 0–2)
BUN SERPL-MCNC: 18 MG/DL (ref 6–23)
CALCIUM SERPL-MCNC: 8 MG/DL (ref 8.3–10.4)
CHLORIDE SERPL-SCNC: 103 MMOL/L (ref 98–107)
CO2 SERPL-SCNC: 27 MMOL/L (ref 21–32)
CREAT SERPL-MCNC: 4.9 MG/DL (ref 0.6–1)
DIFFERENTIAL METHOD BLD: ABNORMAL
EOSINOPHIL # BLD: 0.3 K/UL (ref 0–0.8)
EOSINOPHIL NFR BLD: 2 % (ref 0.5–7.8)
ERYTHROCYTE [DISTWIDTH] IN BLOOD BY AUTOMATED COUNT: 14.9 % (ref 11.9–14.6)
FERRITIN SERPL-MCNC: 380 NG/ML (ref 8–388)
GLUCOSE BLD STRIP.AUTO-MCNC: 110 MG/DL (ref 65–100)
GLUCOSE BLD STRIP.AUTO-MCNC: 160 MG/DL (ref 65–100)
GLUCOSE BLD STRIP.AUTO-MCNC: 175 MG/DL (ref 65–100)
GLUCOSE BLD STRIP.AUTO-MCNC: 320 MG/DL (ref 65–100)
GLUCOSE SERPL-MCNC: 296 MG/DL (ref 65–100)
HCT VFR BLD AUTO: 23.6 % (ref 35.8–46.3)
HGB BLD-MCNC: 7.4 G/DL (ref 11.7–15.4)
HGB RETIC QN AUTO: 33 PG (ref 29–35)
IMM GRANULOCYTES # BLD AUTO: 1.3 K/UL (ref 0–0.5)
IMM GRANULOCYTES NFR BLD AUTO: 9 % (ref 0–5)
IMM RETICS NFR: 17.5 % (ref 3–15.9)
IRON SATN MFR SERPL: 32 %
IRON SERPL-MCNC: 45 UG/DL (ref 35–150)
LYMPHOCYTES # BLD: 2.2 K/UL (ref 0.5–4.6)
LYMPHOCYTES NFR BLD: 15 % (ref 13–44)
MCH RBC QN AUTO: 25.7 PG (ref 26.1–32.9)
MCHC RBC AUTO-ENTMCNC: 31.4 G/DL (ref 31.4–35)
MCV RBC AUTO: 81.9 FL (ref 79.6–97.8)
MONOCYTES # BLD: 2.1 K/UL (ref 0.1–1.3)
MONOCYTES NFR BLD: 14 % (ref 4–12)
NEUTS SEG # BLD: 8.9 K/UL (ref 1.7–8.2)
NEUTS SEG NFR BLD: 60 % (ref 43–78)
NRBC # BLD: 0.05 K/UL (ref 0–0.2)
PLATELET # BLD AUTO: 316 K/UL (ref 150–450)
PLATELET COMMENT: ADEQUATE
PMV BLD AUTO: 9.5 FL (ref 9.4–12.3)
POTASSIUM SERPL-SCNC: 3.6 MMOL/L (ref 3.5–5.1)
RBC # BLD AUTO: 2.88 M/UL (ref 4.05–5.2)
RBC MORPH BLD: ABNORMAL
RETICS # AUTO: 0.02 M/UL (ref 0.03–0.1)
RETICS/RBC NFR AUTO: 0.8 % (ref 0.3–2)
SERVICE CMNT-IMP: ABNORMAL
SODIUM SERPL-SCNC: 138 MMOL/L (ref 136–145)
TIBC SERPL-MCNC: 139 UG/DL (ref 250–450)
TRANSFERRIN SERPL-MCNC: 120 MG/DL (ref 202–364)
WBC # BLD AUTO: 14.8 K/UL (ref 4.3–11.1)
WBC MORPH BLD: ABNORMAL

## 2022-05-31 PROCEDURE — 6370000000 HC RX 637 (ALT 250 FOR IP): Performed by: FAMILY MEDICINE

## 2022-05-31 PROCEDURE — 2580000003 HC RX 258: Performed by: INTERNAL MEDICINE

## 2022-05-31 PROCEDURE — 1100000000 HC RM PRIVATE

## 2022-05-31 PROCEDURE — 97112 NEUROMUSCULAR REEDUCATION: CPT

## 2022-05-31 PROCEDURE — 97530 THERAPEUTIC ACTIVITIES: CPT

## 2022-05-31 PROCEDURE — 99231 SBSQ HOSP IP/OBS SF/LOW 25: CPT | Performed by: PHYSICAL MEDICINE & REHABILITATION

## 2022-05-31 PROCEDURE — 6370000000 HC RX 637 (ALT 250 FOR IP): Performed by: HOSPITALIST

## 2022-05-31 PROCEDURE — 85025 COMPLETE CBC W/AUTO DIFF WBC: CPT

## 2022-05-31 PROCEDURE — 82962 GLUCOSE BLOOD TEST: CPT

## 2022-05-31 PROCEDURE — 84466 ASSAY OF TRANSFERRIN: CPT

## 2022-05-31 PROCEDURE — 92526 ORAL FUNCTION THERAPY: CPT

## 2022-05-31 PROCEDURE — 51702 INSERT TEMP BLADDER CATH: CPT

## 2022-05-31 PROCEDURE — 36415 COLL VENOUS BLD VENIPUNCTURE: CPT

## 2022-05-31 PROCEDURE — 85046 RETICYTE/HGB CONCENTRATE: CPT

## 2022-05-31 PROCEDURE — 2580000003 HC RX 258: Performed by: FAMILY MEDICINE

## 2022-05-31 PROCEDURE — 80048 BASIC METABOLIC PNL TOTAL CA: CPT

## 2022-05-31 PROCEDURE — 6360000002 HC RX W HCPCS: Performed by: FAMILY MEDICINE

## 2022-05-31 PROCEDURE — 6370000000 HC RX 637 (ALT 250 FOR IP): Performed by: INTERNAL MEDICINE

## 2022-05-31 PROCEDURE — 6360000002 HC RX W HCPCS: Performed by: INTERNAL MEDICINE

## 2022-05-31 PROCEDURE — 82728 ASSAY OF FERRITIN: CPT

## 2022-05-31 RX ORDER — INSULIN GLARGINE 100 [IU]/ML
8 INJECTION, SOLUTION SUBCUTANEOUS DAILY
Status: DISCONTINUED | OUTPATIENT
Start: 2022-06-01 | End: 2022-06-01

## 2022-05-31 RX ORDER — INSULIN LISPRO 100 [IU]/ML
0-4 INJECTION, SOLUTION INTRAVENOUS; SUBCUTANEOUS EVERY 6 HOURS
Status: DISCONTINUED | OUTPATIENT
Start: 2022-05-31 | End: 2022-06-02 | Stop reason: HOSPADM

## 2022-05-31 RX ORDER — LOPERAMIDE HYDROCHLORIDE 2 MG/1
2 CAPSULE ORAL 4 TIMES DAILY PRN
Status: DISCONTINUED | OUTPATIENT
Start: 2022-05-31 | End: 2022-06-02 | Stop reason: HOSPADM

## 2022-05-31 RX ORDER — INSULIN LISPRO 100 [IU]/ML
1 INJECTION, SOLUTION INTRAVENOUS; SUBCUTANEOUS
Status: DISCONTINUED | OUTPATIENT
Start: 2022-05-31 | End: 2022-06-02

## 2022-05-31 RX ADMIN — HEPARIN SODIUM 5000 UNITS: 5000 INJECTION INTRAVENOUS; SUBCUTANEOUS at 06:12

## 2022-05-31 RX ADMIN — SODIUM CHLORIDE, PRESERVATIVE FREE 10 ML: 5 INJECTION INTRAVENOUS at 08:54

## 2022-05-31 RX ADMIN — SODIUM CHLORIDE, PRESERVATIVE FREE 10 ML: 5 INJECTION INTRAVENOUS at 08:58

## 2022-05-31 RX ADMIN — LOPERAMIDE HYDROCHLORIDE 2 MG: 2 CAPSULE ORAL at 00:27

## 2022-05-31 RX ADMIN — PIPERACILLIN AND TAZOBACTAM 3375 MG: 3; .375 INJECTION, POWDER, LYOPHILIZED, FOR SOLUTION INTRAVENOUS at 00:28

## 2022-05-31 RX ADMIN — HEPARIN SODIUM 5000 UNITS: 5000 INJECTION INTRAVENOUS; SUBCUTANEOUS at 13:31

## 2022-05-31 RX ADMIN — HEPARIN SODIUM 5000 UNITS: 5000 INJECTION INTRAVENOUS; SUBCUTANEOUS at 22:02

## 2022-05-31 RX ADMIN — INSULIN GLARGINE 15 UNITS: 100 INJECTION, SOLUTION SUBCUTANEOUS at 08:53

## 2022-05-31 RX ADMIN — INSULIN LISPRO 6 UNITS: 100 INJECTION, SOLUTION INTRAVENOUS; SUBCUTANEOUS at 06:39

## 2022-05-31 RX ADMIN — INSULIN LISPRO 1 UNITS: 100 INJECTION, SOLUTION INTRAVENOUS; SUBCUTANEOUS at 17:58

## 2022-05-31 RX ADMIN — SODIUM CHLORIDE, PRESERVATIVE FREE 5 ML: 5 INJECTION INTRAVENOUS at 21:32

## 2022-05-31 RX ADMIN — SODIUM CHLORIDE, PRESERVATIVE FREE 5 ML: 5 INJECTION INTRAVENOUS at 21:33

## 2022-05-31 RX ADMIN — PIPERACILLIN AND TAZOBACTAM 3375 MG: 3; .375 INJECTION, POWDER, LYOPHILIZED, FOR SOLUTION INTRAVENOUS at 13:31

## 2022-05-31 RX ADMIN — GUAIFENESIN SYRUP AND DEXTROMETHORPHAN 5 ML: 100; 10 SYRUP ORAL at 22:02

## 2022-05-31 ASSESSMENT — PAIN SCALES - GENERAL: PAINLEVEL_OUTOF10: 0

## 2022-05-31 NOTE — PROGRESS NOTES
OCCUPATIONAL THERAPY AM     OT Visit Days: 2   Time  OT Charge Capture  Rehab Caseload Tracker  OT Orders    Silvia Arauz is a 48 y.o. female   PRIMARY DIAGNOSIS: Cardiac arrest ([facility]  Cardiac arrest ([facility] [I46.9]  Hyperkalemia [E87.5]  Septic shock ([facility] [A41.9, R65.21]  Acute respiratory failure, unspecified whether with hypoxia or hypercapnia (HCC) [J96.00]  Acute renal failure, unspecified acute renal failure type ([facility] [N17.9]       Inpatient: Payor: Cr Ahn / Plan: [employer] - [place] PPO / Product Type: *No Product type* /     ASSESSMENT:     REHAB RECOMMENDATIONS: CURRENT LEVEL OF FUNCTION:  (Most Recently Demonstrated)   Recommendation to date pending progress:  [address] [place] [address]    Equipment:     To Be Determined Bathing:   Not Tested  Dressing:   Supervision/Setup  Feeding/Grooming:   Supervision/Setup  Toileting:   Not Tested  Functional Mobility:   Minimal Assist     ASSESSMENT:  Ms. Rosy Cardenas was supine in bed upon arrival. Pt completed bed mobility with supervision. Pt would sit up for a while and then state, \"I'm weak I need to lay back down. \" Pt was able to don sock independently. Pt required total A for bowel hygiene and brief change. Pt completed functional mobility  with min A X 2 for safety. Pt would stand and then suddenly sit back down saying, \"I can't do this, I am to weak. \" Continue POC.         SUBJECTIVE:     Ms. Rosy Cardenas states, \"Hey\"     Social/Functional Lives With: Alone  Type of Home: House  ADL Assistance: Independent  Homemaking Assistance: Independent  Homemaking Responsibilities: Yes  Ambulation Assistance: Independent  Active : Yes  Mode of Transportation: Car    OBJECTIVE:     Mekhi Chapa / Lou Marshalls / Lazarus iLlly: Avalos Catheter and IV    RESTRICTIONS/PRECAUTIONS:  Restrictions/Precautions  Restrictions/Precautions: Fall Risk,General Precautions        PAIN: VITALS / O2:   Pre Treatment:   Pain Assessment: (P) 0-10          Post Treatment: 0 Vitals and dressing with setup and adaptive equipment as needed. 2. Patient will complete lower body bathing and dressing with min A and adaptive equipment as needed. 3. Patient will complete toileting with min A.   4. Patient will tolerate 23 minutes of OT treatment with as needed rest breaks to increase activity tolerance for ADLs. 5. Patient will complete functional transfers with mod I and adaptive equipment as needed. 6. Patient will complete grooming tasks in standing at sink level with SBA. 7. Patient will complete self feeding 100% of meals after setup. TREATMENT:     TREATMENT:   Neuromuscular Re-education (23 Minutes): Neuromuscular Re-education included Balance Training, Coordination training, Postural training, Sitting balance training and Standing balance training to improve Coordination and Functional Mobility.     TREATMENT GRID:  N/A    AFTER TREATMENT PRECAUTIONS: Call light within reach, Chair, Needs within reach and RN notified    INTERDISCIPLINARY COLLABORATION:  RN/ PCT, PT/ PTA and OT/ KAUR    EDUCATION:       TOTAL TREATMENT DURATION AND TIME:  Time In: 8454  Time Out: Amy 58  Minutes: 1305 [facility]

## 2022-05-31 NOTE — PROGRESS NOTES
[facility]    Follow-Up on: BLANCA on CKD stage IV    HPI: Pt seen and examined in room, feeling better overall, good uop, no overt uremic symptoms.      ROS:  General: no fever/chills  CV: no CP  Lung: no SOB, no cough  GI: no N/V/D  : no dysuria  Ext: no edema       Current Facility-Administered Medications   Medication Dose Route Frequency    loperamide (IMODIUM) capsule 2 mg  2 mg Oral 4x Daily PRN    vancomycin (VANCOCIN) intermittent dosing (placeholder)   Other RX Placeholder    piperacillin-tazobactam (ZOSYN) 3,375 mg in sodium chloride 0.9 % 50 mL IVPB (mini-bag)  3,375 mg IntraVENous Q12H    guaiFENesin-dextromethorphan (ROBITUSSIN DM) 100-10 MG/5ML syrup 5 mL  5 mL Oral Q4H PRN    insulin glargine (LANTUS) injection vial 15 Units  15 Units SubCUTAneous Daily    heparin (porcine) injection 10,000 Units  10,000 Units IntraVENous PRN    albuterol (PROVENTIL) nebulizer solution 2.5 mg  2.5 mg Nebulization Q4H PRN    insulin regular (HUMULIN R;NOVOLIN R) injection 0-10 Units  0-10 Units IntraVENous PRN    dextrose 5 % and 0.45 % sodium chloride infusion   IntraVENous Continuous PRN    glucose chewable tablet 16 g  4 tablet Oral PRN    dextrose bolus 10% 125 mL  125 mL IntraVENous PRN    Or    dextrose bolus 10% 250 mL  250 mL IntraVENous PRN    glucagon injection 1 mg  1 mg IntraMUSCular PRN    dextrose 5 % solution  100 mL/hr IntraVENous PRN    insulin lispro (HUMALOG) injection vial 0-16 Units  0-16 Units SubCUTAneous Q6H    dextrose bolus 10% 125 mL  125 mL IntraVENous PRN    Or    dextrose bolus 10% 250 mL  250 mL IntraVENous PRN    glucagon injection 1 mg  1 mg IntraMUSCular PRN    medicated lip ointment (BLISTEX)   Topical PRN    sodium chloride flush 0.9 % injection 5-40 mL  5-40 mL IntraVENous 2 times per day    sodium chloride flush 0.9 % injection 5-40 mL  5-40 mL IntraVENous PRN    heparin (porcine) injection 5,000 Units  5,000 Units SubCUTAneous q8h    ondansetron (ZOFRAN-ODT) disintegrating tablet 4 mg  4 mg Oral Q8H PRN    Or    ondansetron (ZOFRAN) injection 4 mg  4 mg IntraVENous Q6H PRN    polyethylene glycol (GLYCOLAX) packet 17 g  17 g Oral Daily PRN    acetaminophen (TYLENOL) tablet 650 mg  650 mg Oral Q6H PRN    Or    acetaminophen (TYLENOL) suppository 650 mg  650 mg Rectal Q6H PRN    dextrose bolus 10% 250 mL  250 mL IntraVENous PRN    potassium chloride 10 mEq/100 mL IVPB (Peripheral Line)  10 mEq IntraVENous PRN    magnesium sulfate 1000 mg in dextrose 5% 100 mL IVPB  1,000 mg IntraVENous PRN    sodium phosphate 10 mmol in sodium chloride 0.9 % 250 mL IVPB  10 mmol IntraVENous PRN    Or    sodium phosphate 15 mmol in dextrose 5 % 250 mL IVPB  15 mmol IntraVENous PRN    Or    sodium phosphate 20 mmol in dextrose 5 % 500 mL IVPB  20 mmol IntraVENous PRN    dextrose 5 % and 0.45 % sodium chloride infusion   IntraVENous Continuous PRN    sodium chloride flush 0.9 % injection 5-40 mL  5-40 mL IntraVENous 2 times per day    sodium chloride flush 0.9 % injection 5-40 mL  5-40 mL IntraVENous PRN    0.9 % sodium chloride infusion   IntraVENous PRN       Exam:  Vitals:    05/30/22 2330 05/31/22 0421 05/31/22 0600 05/31/22 0728   BP: 118/65 116/63  (!) 116/53   Pulse: 99 93  94   Resp: 18 18  19   Temp: 99.5 °F (37.5 °C) 99.9 °F (37.7 °C)  99 °F (37.2 °C)   TempSrc: Oral Oral     SpO2: 96% 93%  99%   Weight:   181 lb 1.6 oz (82.1 kg)    Height:             Intake/Output Summary (Last 24 hours) at 5/31/2022 0924  Last data filed at 5/31/2022 0421  Gross per 24 hour   Intake 1020 ml   Output 1276 ml   Net -256 ml     PE:  GEN - in no distress, lalert and oriented   CV - regular rate, S1, S2, no murmur, no rub  Lung - clear bilaterally  Abd - soft, nontender  Ext - 1 BLE edema  Access-  Left temp line- intact    Labs  Recent Labs     05/29/22  0324 05/30/22  0356 05/31/22  0411   WBC 17.6* 20.0* 14.8*   HGB 8.2* 8.3* 7.4*   HCT 26.1* 26.0* 23.6*    367 316 Recent Labs     05/29/22  0523 05/30/22  0356 05/31/22  0411    142 138   K 3.7 3.3* 3.6    106 103   CO2 28 23 27   BUN 27* 28* 18   PHOS 2.8 2.7  --      No results for input(s): PH, PCO2, PO2, PCO2 in the last 72 hours.     Problem List:  Patient Active Problem List    Diagnosis Date Noted    Sept[facility] ([facility]     Altered mental status     Frailty     Cardiac arrest ([facility] 05/21/2022    Diabetic ketoacidosis with coma associated with type 1 diabetes mellitus ([facility] 05/21/2022    Severe sepsis [facility] shock ([facility]) [facility] 05/21/2022    Pneumonia, bacterial 05/21/2022    Acute respiratory failure ([facility] 05/21/2022    Benign hypertension with CKD (chronic kidney disease) stage III ([facility] 01/04/2022    Acute kidney injury superimposed on chronic kidney disease ([facility] 06/09/2021    IDDM (insulin dependent diabetes mellitus) 01/24/2012    BLANCA (acute kidney injury) ([facility] 04/21/2022    Ureteric stone 04/19/2022    Urinary tract infection without hematuria 04/19/2022    Hydronephrosis of right kidney 04/19/2022    Malodorous urine 11/16/2021    H/O gastroesophageal reflux (GERD) 06/19/2020    Encounter for annual routine gynecological examination 11/27/2018    Encounter to discuss test results 08/22/2018    Diabetes mellitus type 1 ([facility] 08/22/2018    Type 1 diabetes mellitus with stage 3 chronic kidney disease ([facility] 08/16/2018    Noncompliance with medication regimen 08/16/2018    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    Depression with anxiety 07/11/2017    Ulcer, skin, chronic ([facility] 03/13/2017    CKD (chronic kidney disease), stage IV ([facility] 03/13/2017    Neuropathy, peripheral, idiopathic 03/13/2017    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 BLANCA on CKD stage IV Now oliguric. uop 975 cc recorded last 24 hrs. 1st HD 5/24  -Check labs in AM will determine further dialysis and TCC in AM, NPO after midnight in case we need to have TCC placed tomorrow     2. Type I DM- SSI per primary     3. DKA BS>2000 on admit- un controlled- SSI per primary     4.  Anemia- check Fe studies

## 2022-05-31 NOTE — PROGRESS NOTES
PHYSICAL THERAPY Daily Note and AM  (Link to Caseload Tracking: PT Visit Days : 2  Time In/Out PT Charge Capture  Rehab Caseload Tracker  Orders      Shyann Claudio is a 48 y.o. female   PRIMARY DIAGNOSIS: Cardiac arrest ([facility]  Cardiac arrest ([address] [I46.9]  Hyperkalemia [E87.5]  Septic shock ([address] [A41.9, R65.21]  Acute respiratory failure, unspecified whether with hypoxia or hypercapnia ([address] [J96.00]  Acute renal failure, unspecified acute renal failure type ([address] [N17.9]       Inpatient: Payor: Eva Calderon / Plan: [facility] - [place] PPO / Product Type: *No Product type* /     ASSESSMENT:     REHAB RECOMMENDATIONS:   Recommendation to date pending progress:  [address] [place] [address]    Equipment:     To Be Determined     ASSESSMENT:  Ms. Kavita Acosta is supine on arrival, agreeable to mobility. Pt with improved bed mobility and overall strength this date. Pt grossly 4/5 B LEs MMT in sitting, good to fair+ sitting balance. Pt somewhat impulsive, throwing self back onto bed or sitting immediately when standing stating \"I can't, I'm tired\". Pt with less assist overall, MIN A x 2 for transfers and ambulation to chair in room for safety due to impulsiveness. Pt remains fall risk, safety concerns with mobility. PT to cont to follow for acute care needs, pt functioning well below baseline of independent. SUBJECTIVE:   Ms. Kavita Acosta states, \"I am tired. I am depressed. my neck hurts. my back hurts. I need to do this tomorrow. \"     Social/Functional Lives With: Alone  Type of Home: House  ADL Assistance: Independent  Homemaking Assistance: Independent  Homemaking Responsibilities: Yes  Ambulation Assistance: Independent  Active : Yes  Mode of Transportation: Car  OBJECTIVE:     PAIN: VITALS / O2: Cantrell Babinski / [place] / DRAINS:   Pre Treatment:   Pain Assessment: None - Denies Pain      Post Treatment: 0 Vitals   Vitals  BP: 127/63  Patient Position: Sitting    Oxygen  O2 Therapy: Room air Avalos Catheter    RESTRICTIONS/PRECAUTIONS:  Restrictions/Precautions  Restrictions/Precautions: Fall Risk,General Precautions  Restrictions/Precautions: Fall Risk,General Precautions     MOBILITY: I Mod I S SBA CGA Min Mod Max Total  NT x2 Comments:   Bed Mobility    Rolling [] [] [] [x] [] [] [] [] [] [] []    Supine to Sit [] [] [] [x] [x] [] [] [] [] [] []    Scooting [] [] [] [x] [] [] [] [] [] [] []    Sit to Supine [] [] [] [x] [] [] [] [] [] [] []    Transfers    Sit to Stand [] [] [] [] [] [x] [] [] [] [] [x]    Bed to Chair [] [] [] [] [] [x] [] [] [] [] [x] For safety   Stand to Sit [] [] [] [] [] [x] [] [] [] [] [x]    I=Independent, Mod I=Modified Independent, S=Supervision, SBA=Standby Assistance, CGA=Contact Guard Assistance,   Min=Minimal Assistance, Mod=Moderate Assistance, Max=Maximal Assistance, Total=Total Assistance, NT=Not Tested    BALANCE: Good Fair+ Fair Fair- Poor NT Comments   Sitting Static [x] [x] [] [] [] []    Sitting Dynamic [] [x] [] [] [] []              Standing Static [x] [x] [] [] [] []    Standing Dynamic [] [x] [] [] [] []      GAIT: I Mod I S SBA CGA Min Mod Max Total  NT x2 Comments:   Level of Assistance [] [] [] [] [] [x] [] [] [] [] [x]    Distance 5 feet    DME Gait Belt and Rolling Walker    Gait Quality Decreased cindy , Decreased step clearance, Decreased step length, Narrow base of support and Shuffling     Weightbearing Status      Stairs      I=Independent, Mod I=Modified Independent, S=Supervision, SBA=Standby Assistance, CGA=Contact Guard Assistance,   Min=Minimal Assistance, Mod=Moderate Assistance, Max=Maximal Assistance, Total=Total Assistance, NT=Not Tested    PLAN:   ACUTE PHYSICAL THERAPY GOALS:   (Developed with and agreed upon by patient and/or caregiver.)  (1.)Ms. Acevedo Early will move from supine to sit and sit to supine , scoot up and down and roll side to side in bed with SUPERVISION within 7 treatment day(s). (2.)Ms. Acevedo Early will transfer from bed to chair and chair to bed with STAND BY ASSIST using the least restrictive device within 7 treatment day(s). (3.)Ms. Damon Meyers will ambulate with CONTACT GUARD ASSIST for 100 feet with the least restrictive device within 7 treatment day(s). (4.)Ms. Damon Meyers will participate in therapeutic activity/exercises x 25 minutes for increased activity tolerance within 7 treatment days. (5.)Ms. Damon Meyers will perform standing static and dynamic balance activities x 15 minutes with STAND BY ASSIST to improve safety within 7 day(s). FREQUENCY AND DURATION: 3 times/week for duration of hospital stay or until stated goals are met, whichever comes first.    TREATMENT:   TREATMENT:   Co-Treatment PT/OT necessary due to patient's decreased overall endurance/tolerance levels, as well as need for high level skilled assistance to complete functional transfers/mobility and functional tasks  Therapeutic Activity (23 Minutes): Therapeutic activity included Supine to Sit, Sit to Supine, Scooting, Transfer Training, Ambulation on level ground, Sitting balance  and Standing balance to improve functional Activity tolerance, Balance, Mobility and Strength.     TREATMENT GRID:  N/A    AFTER TREATMENT PRECAUTIONS: Call light within reach, Chair, Needs within reach and RN notified    INTERDISCIPLINARY COLLABORATION:  RN/ PCT, PT/ PTA and OT/ KAUR    EDUCATION: Education Given To: Patient  Education Provided: Precautions;Transfer Training;Equipment  Education Method: Verbal  Barriers to Learning: None  Education Outcome: Continued education needed    TIME IN/OUT:  Time In: 7834  Time Out: Amy 58  Minutes: [address] [facility], PT

## 2022-05-31 NOTE — PROGRESS NOTES
LTG: Patient will tolerate least restrictive diet without overt signs or symptoms of airway compromise. STG: Patient will tolerate minced and moist and thin liquids without overt signs or symptoms of airway compromise. STG: Patient will participate in modified barium swallow study as clinically indicated. SPEECH LANGUAGE PATHOLOGY: DYSPHAGIA  Progress Report    NAME: Roosevelt Gamino  : 1975  MRN: [id number]    ADMISSION DATE: 2022  ADMITTING DIAGNOSIS: has Ulcer, skin, chronic ([address] Depression with anxiety; Moderate single current episode of major depressive disorder ([address] Encounter to discuss test results; Encounter for annual routine gynecological examination; CKD (chronic kidney disease), stage IV ([address] Diabetic polyneuropathy associated with type 1 diabetes mellitus ([address] Malodorous urine; CKD (chronic kidney disease) stage 3, GFR 30-59 ml/min ([address] Type 1 diabetes mellitus with stage 3 chronic kidney disease ([address] H/O gastroesophageal reflux (GERD); Neuropathy, peripheral, idiopathic; 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 associated with type 1 diabetes mellitus ([address] Premature ovarian failure; Noncompliance with medication regimen; Ureteric stone; Urinary tract infection without hematuria; Hydronephrosis of right kidney; Diabetes mellitus type 1 ([address] Nausea; BLANCA (acute kidney injury) ([address] Acute kidney injury superimposed on chronic kidney disease ([address] Benign hypertension with CKD (chronic kidney disease) stage III (HCC); IDDM (insulin dependent diabetes mellitus); Cardiac arrest ([address] Diabetic ketoacidosis with coma associated with type 1 diabetes mellitus ([address] Severe sepsis with septic shock (CODE) ([address] Pneumonia, bacterial; Acute respiratory failure ([address]  Altered mental status; Frailty; and Septic shock ([address] on their problem list.  Date of Eval: 2022  ICD-10: Treatment Diagnosis: R13.13 Dysphagia, Pharyngeal Phase    RECOMMENDATIONS   Diet:  Diet Solids Recommendation: Minced & Moist  Liquid Consistency Recommendation: Thin    Medications:  As Tolerated     Recommendations: Dysphagia treatment   Compensatory Swallowing Strategies:    Therapeutic Intervention:    Patient continues to require skilled intervention: Yes   D/C Recommendations: Ongoing speech therapy is recommended during this hospitalization,To be determined     ASSESSMENT    Dysphagia Diagnosis: Mild to moderate pharyngeal stage dysphagia  Dysphagia Impression : Coughing noted on single instance with solid trials. Patient requesting diet downgrade from easy to chew to minced as softer textures are easier to consume. Discussed options for swallow assessment including modified barium swallow study. Patient denies need for instrumental swallow assessment on this date though agreeable for ST to follow up tomorrow and if difficulty observed by patient/staff, patient will reconsider MBSS. GENERAL    History of Present Injury/Illness: Ms. Kristina Yanez  has a past medical history of Acute renal failure ([address] CKD (chronic kidney disease) stage 3, GFR 30-59 ml/min ([address] Gastroenteritis, acute, IDDM (insulin dependent diabetes mellitus), Intractable nausea and vomiting, Irregular menses, Kidney stone, Neuropathy, peripheral, idiopathic, Retinopathy, bilateral, Trichomoniasis, Ulcer, skin, chronic ([address] and Vaginal burning. . She also  has a past surgical history that includes cystoscopy,insert ureteral stent (04/2022); amputation (Left); amputation (1/27/12); and hx open reduction internal fixation (Right, 2011). Chart Reviewed: Yes  Subjective: Patient awake, alert, and agreeable to speech therapy services on this date. Patient's nurse, Susi Anderson, reports patient did exhibit coughing during PO intake on previous date.   Behavior/Cognition: Alert  Patient Position: Upright in chair     Respiratory Status: Room air                    Current Diet : Easy to chew  Current Liquid Diet : Thin  Pain:   Patient does not c/o pain                             Vision Exceptions: Wears glasses for reading,Wears glasses at all times          OBJECTIVE    Patient Positioning: Upright in chair            Volitional Cough: Strong  Baseline Vocal Quality: Dysphonic (Patient reporting dysphonia since hospitalization and post extubation. When questioned patient reports voice is \"improving\" daily.)  Oropharyngeal Phase:     Assessment Method(s): Observation  Patient Position: Upright in chair  Vocal Quality: Breathy;Low volume  Consistency Presented: Pureed;Regular;Thin;Mildly Thick  How Presented: Self-fed/presented  Bolus Formation/Control: No impairment  Oral Residue: None  Aspiration Signs/Symptoms: Strong cough (Single instance of coughing noted on solid trial.)  Effective Modifications:  (Patient reports swallowing improved when sitting upright.)        Oral Phase - Comment: Unremarkable aside from limited dentition. Pharyngeal Phase: Unremarkable as able to be assessed at bedside. Coughing noted per staff and observed on single instance during session thus unable to rule out possible phayngeal phase deficit. PLAN    Duration/Frequency: Continue to follow patient 3x/week for duration of hospitalization and/or until goals met    Dysphagia Outcome and Severity Scale (OTIS)  Dysphagia Outcome Severity Scale: Level 4: Mild moderate dysphagia- Intermittent supervision/cueing. One - two diet consistencies restricted  Interpretation of Tool: The Dysphagia Outcome and Severity Scale (OTIS) is a simple, easy-to-use, 7-point scale developed to systematically rate the functional severity of dysphagia based on objective assessment and make recommendations for diet level, independence level, and type of nutrition.    Normal(7), Functional(6), Mild(5), Mild-Moderate(4), Moderate(3), Moderate-Severe(2), Severe(1)         Education:   Provided as to role of ST services, diet options, as well as options for swallow assessment.           Current Medications:   No current facility-administered medications on file prior to encounter. Current Outpatient Medications on File Prior to Encounter   Medication Sig Dispense Refill    atorvastatin (LIPITOR) 80 MG tablet Take 80 mg by mouth daily (Patient not taking: Reported on 5/27/2022)      brimonidine (ALPHAGAN P) 0.1 % SOLN 1 drop in left eye twice a day (Patient not taking: Reported on 5/27/2022)      ergocalciferol (ERGOCALCIFEROL) 1.25 MG (78571 UT) capsule Take 50,000 Units by mouth (Patient not taking: Reported on 5/27/2022)      gabapentin (NEURONTIN) 300 MG capsule Take 300 mg by mouth daily. (Patient not taking: Reported on 5/27/2022)      Insulin Degludec (TRESIBA FLEXTOUCH) 200 UNIT/ML SOPN Inject 30 Units into the skin daily (Patient not taking: Reported on 5/27/2022)      insulin lispro, 1 Unit Dial, (HUMALOG KWIKPEN) 100 UNIT/ML SOPN 6 TIDAC correction scale 1/50>150, max daily dose 50 units (Patient not taking: Reported on 5/27/2022)      ondansetron (ZOFRAN-ODT) 4 MG disintegrating tablet Take 4 mg by mouth every 8 hours as needed (Patient not taking: Reported on 5/27/2022)         PRECAUTIONS/ALLERGIES: Patient has no known allergies.         Therapy Time  SLP Individual Minutes  Time In: 1998  Time Out: [facility]  Minutes: [address] [place], [place]  5/31/2022 11:57 AM

## 2022-05-31 NOTE — DIABETES MGMT
Patient admitted with cardiac arrest. Admitting blood glucose 2075. Blood glucose ranged  yesterday with patient receiving Lantus 15 units and Humalog 10 units. Blood glucose this morning was 320. Creatinine 4.90. GFR 12. Reviewed patient current regimen: Lantus 15 units daily and Humalog correctional insulin low dose scale q6h. Noted patient has dysphagia diet ordered with diabetic supplements and is to be NPO at midnight. Patient seen for assessment regarding diabetes management. Patient last seen by inpatient diabetes educator 4/22/22. Patient has a past medical history of DM type 1, CKD stage 4, and neuropathy. Patient states they have been living with type 1 diabetes for over 20 years. Patient states she doesn't remember anything from before coming to the hospital. Questioned if patient may have missed insulin doses or run out of insulin given patient admitting status. Patient states she was using insulin from [employer] with coupons and that she used 2 types but could not tell me the names or doses. Patient has endocrinology appointment scheduled 6/14/22. Patient had seen Shruthi Bell with endocrinology in the past but cancelled her appointment in March 2022. Patient states she currently has health insurance. Patient voices that they have experienced hypoglycemia in the past. Educated regarding hypoglycemia signs, symptoms, and treatment. Patient has attended formal diabetes education in the past. Discussed current glycemic control with patient. She states last night she had a regular pepsi which likely contributed to her hyperglycemia this AM. Discussed provider order for diabetic diet. Patient in agreement with educator throwing away almost empty 16oz pepsi bottle, 1/2 empty ginger ale can, and full giana mist can. Patient verbalized understanding that she may have diet drinks and that if she has a low glucose that is when staff would provider her with a regular soda. Encouraged dietary compliance. Patient in chair today and states she plans to continue to work with therapy. Patient states she has been seen by nephrology and required dialysis during this admission. Discussed renal impact on diabetes medications and glycemic control. Discussed importance of frequent blood glucose monitoring 4-6x per day, to record in log book. Reviewed target blood glucose goals. Patient verbalized understanding and voices no questions at this time. Patient would likely benefit from a reduction in basal insulin to reduce risk of hypoglycemia as patient is to be NPO at midnight. Patient would also likely benefit from initiation of prandial insulin to help offset hyperglycemia during the day related to caloric intake. Discussed with provider. New orders received to change Lantus to 8 units daily and start Humalog 1 unit with meals.

## 2022-05-31 NOTE — PROGRESS NOTES
Tonya Lawson MD  Medical Director  [address] [facility], [address] [facility]  Tel: [phone]       Physical Medicine & Rehab Consult Progress Note      Patient: Jaycee Garcia  Admit Date: 5/21/2022  Admit Diagnosis: Cardiac arrest ([address] [I46.9]  Hyperkalemia [E87.5]  Septic shock ([address] [A41.9, R65.21]  Acute respiratory failure, unspecified whether with hypoxia or hypercapnia ([address] [J96.00]  Acute renal failure, unspecified acute renal failure type ([address] [N17.9]    Recommendations:   Hospital Inpatient Rehab, Continue acute rehabilitation program, Coordination of rehab / medical care, Counseling of PM&R care issues management  -will begin [employer] precert for [facility] today. Very good rehab potential. Needs encouragement for full participation  -will cont on HD. Having some issues with hypotension during dialysis. Scheduled for tunnel cath placement tomorrow. History / Subjective / Complaint:     Patient seen and examined, interim EMR reviewed. Sitting in bedside chair. Worked with PT earlier. Fatigues quickly.  On RA sats 99%  No Known Allergies  Current Facility-Administered Medications   Medication Dose Route Frequency    loperamide (IMODIUM) capsule 2 mg  2 mg Oral 4x Daily PRN    vancomycin (VANCOCIN) intermittent dosing (placeholder)   Other RX Placeholder    piperacillin-tazobactam (ZOSYN) 3,375 mg in sodium chloride 0.9 % 50 mL IVPB (mini-bag)  3,375 mg IntraVENous Q12H    guaiFENesin-dextromethorphan (ROBITUSSIN DM) 100-10 MG/5ML syrup 5 mL  5 mL Oral Q4H PRN    insulin glargine (LANTUS) injection vial 15 Units  15 Units SubCUTAneous Daily    heparin (porcine) injection 10,000 Units  10,000 Units IntraVENous PRN    albuterol (PROVENTIL) nebulizer solution 2.5 mg  2.5 mg Nebulization Q4H PRN    insulin regular (HUMULIN R;NOVOLIN R) injection 0-10 Units  0-10 Units IntraVENous PRN    dextrose 5 % and 0.45 % sodium chloride infusion IntraVENous Continuous PRN    glucose chewable tablet 16 g  4 tablet Oral PRN    dextrose bolus 10% 125 mL  125 mL IntraVENous PRN    Or    dextrose bolus 10% 250 mL  250 mL IntraVENous PRN    glucagon injection 1 mg  1 mg IntraMUSCular PRN    dextrose 5 % solution  100 mL/hr IntraVENous PRN    insulin lispro (HUMALOG) injection vial 0-16 Units  0-16 Units SubCUTAneous Q6H    dextrose bolus 10% 125 mL  125 mL IntraVENous PRN    Or    dextrose bolus 10% 250 mL  250 mL IntraVENous PRN    glucagon injection 1 mg  1 mg IntraMUSCular PRN    medicated lip ointment (BLISTEX)   Topical PRN    sodium chloride flush 0.9 % injection 5-40 mL  5-40 mL IntraVENous 2 times per day    sodium chloride flush 0.9 % injection 5-40 mL  5-40 mL IntraVENous PRN    heparin (porcine) injection 5,000 Units  5,000 Units SubCUTAneous q8h    ondansetron (ZOFRAN-ODT) disintegrating tablet 4 mg  4 mg Oral Q8H PRN    Or    ondansetron (ZOFRAN) injection 4 mg  4 mg IntraVENous Q6H PRN    polyethylene glycol (GLYCOLAX) packet 17 g  17 g Oral Daily PRN    acetaminophen (TYLENOL) tablet 650 mg  650 mg Oral Q6H PRN    Or    acetaminophen (TYLENOL) suppository 650 mg  650 mg Rectal Q6H PRN    dextrose bolus 10% 250 mL  250 mL IntraVENous PRN    potassium chloride 10 mEq/100 mL IVPB (Peripheral Line)  10 mEq IntraVENous PRN    magnesium sulfate 1000 mg in dextrose 5% 100 mL IVPB  1,000 mg IntraVENous PRN    sodium phosphate 10 mmol in sodium chloride 0.9 % 250 mL IVPB  10 mmol IntraVENous PRN    Or    sodium phosphate 15 mmol in dextrose 5 % 250 mL IVPB  15 mmol IntraVENous PRN    Or    sodium phosphate 20 mmol in dextrose 5 % 500 mL IVPB  20 mmol IntraVENous PRN    dextrose 5 % and 0.45 % sodium chloride infusion   IntraVENous Continuous PRN    sodium chloride flush 0.9 % injection 5-40 mL  5-40 mL IntraVENous 2 times per day    sodium chloride flush 0.9 % injection 5-40 mL  5-40 mL IntraVENous PRN    0.9 % sodium chloride infusion   IntraVENous PRN       Objective:     Vitals:  Patient Vitals for the past 8 hrs:   BP Temp Temp src Pulse Resp SpO2 Weight   05/31/22 0926 127/63 -- -- -- -- -- --   05/31/22 0728 (!) 116/53 99 °F (37.2 °C) -- 94 19 99 % --   05/31/22 0600 -- -- -- -- -- -- 181 lb 1.6 oz (82.1 kg)   05/31/22 0421 116/63 99.9 °F (37.7 °C) Oral 93 18 93 % --        Physical Exam:  No significant changes  Awake , alert, Ox 3. Cooperative.  NAD  CV rrr no m; Left IJ c/d/i  LUNGS bilaterally coarse bs  ABD soft ntnd bs+  EXT; no edema  NEURO ; generalized prox>distal weakness  Labs/Studies:  Recent Results (from the past 72 hour(s))   POCT Glucose    Collection Time: 05/28/22 12:01 PM   Result Value Ref Range    POC Glucose 304 (H) 65 - 100 mg/dL    Performed by: Dokkankom    POCT Glucose    Collection Time: 05/28/22  5:53 PM   Result Value Ref Range    POC Glucose 106 (H) 65 - 100 mg/dL    Performed by: Dokkankom    POCT Glucose    Collection Time: 05/29/22 12:10 AM   Result Value Ref Range    POC Glucose 294 (H) 65 - 100 mg/dL    Performed by: Jamii    CBC    Collection Time: 05/29/22  3:24 AM   Result Value Ref Range    WBC 17.6 (H) 4.3 - 11.1 K/uL    RBC 3.11 (L) 4.05 - 5.2 M/uL    Hemoglobin 8.2 (L) 11.7 - 15.4 g/dL    Hematocrit 26.1 (L) 35.8 - 46.3 %    MCV 83.9 79.6 - 97.8 FL    MCH 26.4 26.1 - 32.9 PG    MCHC 31.4 31.4 - 35.0 g/dL    RDW 15.1 (H) 11.9 - 14.6 %    Platelets 311 217 - 806 K/uL    MPV 10.2 9.4 - 12.3 FL    nRBC 0.07 0.0 - 0.2 K/uL   Basic Metabolic Panel    Collection Time: 05/29/22  5:23 AM   Result Value Ref Range    Sodium 140 136 - 145 mmol/L    Potassium 3.7 3.5 - 5.1 mmol/L    Chloride 105 98 - 107 mmol/L    CO2 28 21 - 32 mmol/L    Anion Gap 7 7 - 16 mmol/L    Glucose 86 65 - 100 mg/dL    BUN 27 (H) 6 - 23 MG/DL    CREATININE 5.40 (H) 0.6 - 1.0 MG/DL    GFR African American 11 (L) >60 ml/min/1.73m2    GFR Non- 9 (L) >60 ml/min/1.73m2    Calcium 9.0 8.3 - 10.4 MG/DL Phosphorus    Collection Time: 05/29/22  5:23 AM   Result Value Ref Range    Phosphorus 2.8 2.5 - 4.5 MG/DL   POCT Glucose    Collection Time: 05/29/22  6:22 AM   Result Value Ref Range    POC Glucose 177 (H) 65 - 100 mg/dL    Performed by: Radha Mary    POCT Glucose    Collection Time: 05/29/22 12:20 PM   Result Value Ref Range    POC Glucose 378 (H) 65 - 100 mg/dL    Performed by: Albino    POCT Glucose    Collection Time: 05/29/22  6:02 PM   Result Value Ref Range    POC Glucose 199 (H) 65 - 100 mg/dL    Performed by:  FREDDY Mcclain    POCT Glucose    Collection Time: 05/30/22 12:24 AM   Result Value Ref Range    POC Glucose 258 (H) 65 - 100 mg/dL    Performed by: Muna [address] Metabolic Panel    Collection Time: 05/30/22  3:56 AM   Result Value Ref Range    Sodium 142 136 - 145 mmol/L    Potassium 3.3 (L) 3.5 - 5.1 mmol/L    Chloride 106 98 - 107 mmol/L    CO2 23 21 - 32 mmol/L    Anion Gap 13 7 - 16 mmol/L    Glucose 35 (LL) 65 - 100 mg/dL    BUN 28 (H) 6 - 23 MG/DL    CREATININE 6.30 (H) 0.6 - 1.0 MG/DL    GFR African American 9 (L) >60 ml/min/1.73m2    GFR Non- 8 (L) >60 ml/min/1.73m2    Calcium 9.0 8.3 - 10.4 MG/DL   Phosphorus    Collection Time: 05/30/22  3:56 AM   Result Value Ref Range    Phosphorus 2.7 2.5 - 4.5 MG/DL   CBC with Auto Differential    Collection Time: 05/30/22  3:56 AM   Result Value Ref Range    WBC 20.0 (H) 4.3 - 11.1 K/uL    RBC 3.14 (L) 4.05 - 5.2 M/uL    Hemoglobin 8.3 (L) 11.7 - 15.4 g/dL    Hematocrit 26.0 (L) 35.8 - 46.3 %    MCV 82.8 79.6 - 97.8 FL    MCH 26.4 26.1 - 32.9 PG    MCHC 31.9 31.4 - 35.0 g/dL    RDW 15.0 (H) 11.9 - 14.6 %    Platelets 336 483 - 417 K/uL    MPV 10.0 9.4 - 12.3 FL    nRBC 0.09 0.0 - 0.2 K/uL    Differential Type AUTOMATED      Seg Neutrophils 53 43 - 78 %    Lymphocytes 18 13 - 44 %    Monocytes 18 (H) 4.0 - 12.0 %    Eosinophils % 2 0.5 - 7.8 %    Basophils 0 0.0 - 2.0 %    Immature Granulocytes 9 (H) 0.0 - 5.0 % 11.7 - 15.4 g/dL    Hematocrit 23.6 (L) 35.8 - 46.3 %    MCV 81.9 79.6 - 97.8 FL    MCH 25.7 (L) 26.1 - 32.9 PG    MCHC 31.4 31.4 - 35.0 g/dL    RDW 14.9 (H) 11.9 - 14.6 %    Platelets 074 161 - 313 K/uL    MPV 9.5 9.4 - 12.3 FL    nRBC 0.05 0.0 - 0.2 K/uL    Seg Neutrophils 60 43 - 78 %    Lymphocytes 15 13 - 44 %    Monocytes 14 (H) 4.0 - 12.0 %    Eosinophils % 2 0.5 - 7.8 %    Basophils 0 0.0 - 2.0 %    Immature Granulocytes 9 (H) 0.0 - 5.0 %    Segs Absolute 8.9 (H) 1.7 - 8.2 K/UL    Absolute Lymph # 2.2 0.5 - 4.6 K/UL    Absolute Mono # 2.1 (H) 0.1 - 1.3 K/UL    Absolute Eos # 0.3 0.0 - 0.8 K/UL    Basophils Absolute 0.0 0.0 - 0.2 K/UL    Absolute Immature Granulocyte 1.3 (H) 0.0 - 0.5 K/UL    RBC Comment SLIGHT  HYPOCHROMIA        WBC Comment Result Confirmed By Smear      Platelet Comment ADEQUATE      Differential Type AUTOMATED     POCT Glucose    Collection Time: 05/31/22  6:34 AM   Result Value Ref Range    POC Glucose 320 (H) 65 - 100 mg/dL    Performed by: CharleyinaldBSN         Assessment:     Principal Problem:    Cardiac arrest (HCC)  Active Problems:    Acute kidney injury superimposed on chronic kidney disease ([facility])    Benign hypertension with CKD (chronic kidney disease) stage III ([facility])    Diabetic ketoacidosis with coma associated with type 1 diabetes mellitus ([facility]    Severe sepsis with septic shock (CODE) ([facility])    Pneumonia, bacterial    Acute respiratory failure ([facility])    Altered mental status    Frailty    Septic shock ([facility]    Urinary tract infection without hematuria    Diabetes mellitus type 1 ([facility])    BLANCA (acute kidney injury) ([facility]  Resolved Problems:    * No resolved hospital problems.  *      Plan / Recommendations / Medical Decision Making:     Recommendations:   Continue acute rehabilitation program  Coordination of rehab / medical care  Counseling of PM&R care issues management  Monitoring and management of medical conditions per plan of care / orders    Discussion with Family / Caregiver / Staff    Reviewed Therapies / Renell Pean / Medications / Records

## 2022-05-31 NOTE — PROGRESS NOTES
Chart reviewed by [facility] for discharge planning patient is being followed by 9th floor.  Vi Huff is being sent (pending)  If insurance is approve patient to go to [facility] 9th floor for next Level of care    Will continue to follow for discharge planning needs  Please consult  if any new issues arise

## 2022-05-31 NOTE — PLAN OF CARE
Problem: Respiratory - Adult  Goal: Achieves optimal ventilation and oxygenation  Recent Flowsheet Documentation  Taken 5/31/2022 0886 by Skip Rosario RN  Achieves optimal ventilation and oxygenation: Assess for changes in respiratory status     Problem: Cardiovascular - Adult  Goal: Maintains optimal cardiac output and hemodynamic stability  Recent Flowsheet Documentation  Taken 5/31/2022 7642 by Rayna Salazar RN  Maintains optimal cardiac output and hemodynamic stability: Monitor blood pressure and heart rate  Goal: Absence of cardiac dysrhythmias or at baseline  Recent Flowsheet Documentation  Taken 5/31/2022 9849 by Rayna Salazar RN  Absence of cardiac dysrhythmias or at baseline: Monitor cardiac rate and rhythm     Problem: Skin/Tissue Integrity - Adult  Goal: Skin integrity remains intact  Recent Flowsheet Documentation  Taken 5/31/2022 7413 by Rayna Salazar RN  Skin Integrity Remains Intact: Monitor for areas of redness and/or skin breakdown  Taken 5/31/2022 0727 by Rayna Salazar RN  Skin Integrity Remains Intact: Monitor for areas of redness and/or skin breakdown  Goal: Incisions, wounds, or drain sites healing without S/S of infection  Recent Flowsheet Documentation  Taken 5/31/2022 0728 by Rayna Salazar RN  Incisions, Wounds, or Drain Sites Healing Without Sign and Symptoms of Infection: ADMISSION and DAILY: Assess and document risk factors for pressure ulcer development  Taken 5/31/2022 0727 by Rayna Salazar RN  Incisions, Wounds, or Drain Sites Healing Without Sign and Symptoms of Infection: ADMISSION and DAILY: Assess and document risk factors for pressure ulcer development  Goal: Oral mucous membranes remain intact  Recent Flowsheet Documentation  Taken 5/31/2022 6682 by Rayna Salazar RN  Oral Mucous Membranes Remain Intact: Assess oral mucosa and hygiene practices  Taken 5/31/2022 0727 by Rayna Salazar RN  Oral Mucous Membranes Remain Intact: Assess oral mucosa and hygiene practices     Problem: Musculoskeletal - Adult  Goal: Return mobility to safest level of function  Recent Flowsheet Documentation  Taken 5/31/2022 2975 by Skip Rosario RN  Return Mobility to Safest Level of Function: Assess patient stability and activity tolerance for standing, transferring and ambulating with or without assistive devices  Goal: Maintain proper alignment of affected body part  Recent Flowsheet Documentation  Taken 5/31/2022 1107 by Rayna Salazar RN  Maintain proper alignment of affected body part: Support and protect limb and body alignment per provider's orders  Goal: Return ADL status to a safe level of function  Recent Flowsheet Documentation  Taken 5/31/2022 9237 by Skip Rosario RN  Return ADL Status to a Safe Level of Function: Administer medication as ordered     Problem: Genitourinary - Adult  Goal: Absence of urinary retention  Recent Flowsheet Documentation  Taken 5/31/2022 0728 by Skip Rosario RN  Absence of urinary retention: Assess patients ability to void and empty bladder  Goal: Urinary catheter remains patent  Recent Flowsheet Documentation  Taken 5/31/2022 7527 by Skip Rosario RN  Urinary catheter remains patent: Assess patency of urinary catheter     Problem: ABCDS Injury Assessment  Goal: Absence of physical injury  Recent Flowsheet Documentation  Taken 5/31/2022 0737 by Skip Rosario RN  Absence of Physical Injury: Implement safety measures based on patient assessment     Problem: Safety - Adult  Goal: Free from fall injury  Recent Flowsheet Documentation  Taken 5/31/2022 2498 by Skip Rosario RN  Free From Fall Injury: Instruct family/caregiver on patient safety

## 2022-05-31 NOTE — PROGRESS NOTES
Patient resting in bed. She sat in the recliner for approx 3.5 hours today. Denies pain. Triaylsis cath saline locked at this time. No further needs voiced. Call light within reach. Will monitor until shift report.

## 2022-05-31 NOTE — PROGRESS NOTES
Pt resting in bed with no complaints of pain or discomfort at this time. Pt is on room air with even and unlabored respirations. Pt is alert and oriented times 3. Door to pt room remains open. Call light is in place.

## 2022-05-31 NOTE — PROGRESS NOTES
Hospitalist Progress Note   Admit Date:  2022  3:51 PM   Name:  Marco Brice   Age:  48 y.o. Sex:  female  :  1975   MRN:  [id number]   Room:  [facility]/    Presenting Complaint: Altered Mental Status     Reason(s) for Admission: Cardiac arrest ([facility] [I46.9]  Hyperkalemia [E87.5]  Septic shock ([facility] [A41.9, R65.21]  Acute respiratory failure, unspecified whether with hypoxia or hypercapnia ([facility] [J96.00]  Acute renal failure, unspecified acute renal failure type [facility]) [N17.9]     Hospital Course & Interval History:   Marco Brice is a 48 y.o. female with history of   Diabetes mellitus type 1  Hypertension   CKD stage III      who was admitted initially to intensivist service for unresponsiveness.      Prior to this admission, patient was admitted recently for right sided ureteral obstruction s/p stent placement. Patient was not heard from by family members, so EMS was called to see her and she was found unresponsive.      Initially her BS was more than 2000. She had DKA. She had BLANCA with creatinine of 9 and K of 6.7. patient was intubated. She suffered a brief cardiac arrest. She was treated for DKA and respiratory failure.      CT of head was negative for acute findings. Nephrology was consulted for her renal failure and she was started on SLED.      She was extubated on May 25, 2022. Currently she is alert and oriented.      Patient is being transferred out of ICU and Hospitalist service is requested to assume care for the patient. Course since patient was transferred to the floor  Few episodes of hypoglycemia, adjusting insulin. mild elevated white blood count even on Zosyn, on  ordered blood cultures added vancomycin. Repeat blood cultures improving pneumonia and fluid. Improving white blood count, mild decrease in hemoglobin. Continuing dialysis as per nephrology. PT OT consulted.   May need rehab    Subjective/24hr Events (22):    Awake alert, says doing good, no difficulty breathing  Improved creatinine    5/29  Mild elevated white blood count  Says cough while eating  Mild increased glucose last night    5/30  C/o mild cough  Elevated wbc on zosyn  Hypoglycemic early this am    5/31  Mild elevated glucose this morning  Says cough better  Chest x-ray done yesterday showed improving fluid and infiltrate  Patient was started on vancomycin apart from patient being on Zosyn for elevated white blood count? Etiology. Improving white blood count, mild decrease in hemoglobin  N.p.o. after midnight for tunneled catheter placement tomorrow      Assessment & Plan:   DKA at the time of admission   Now improved. Blood sugar is in mid to high 100s ranges. No acidosis now. Patient is on dialysis to help acidosis too. On Lantus and sliding scale coverage. 5/29-decrease sliding scale parameters  5/30- changed insulin parameters  5/31-mildly elevated glucose, adjusting insulin, diabetic nurse management consult    Recent septic shock   So far, cultures are negative. On empiric Zosyn   Will continue to follow on clinical response and culture result. 5/28-continue Zosyn  5/30-with elevated white blood count still being on Zosyn, added blood cultures and also added vancomycin.     Respiratory failure   S/P intubation and extubation   Improved. Resolved. Pneumonia  Continue Zosyn  5/29-mild elevated white blood count. Finished course of Zosyn. Will extend the course as white blood count was still high. 5/30- elevated wbc on zosyn, added blood culture, added vancomycin and repeated the blood culture  5/31-chest x-ray improving infiltrate and edema, improving white blood count, continue vancomycin and Zosyn     BLANCA on CKD stage III   Now dialysis dependent. Will continue to monitor renal function and see if renal function will recover.    5/28-improving creatinine  5/31-dialysis as per nephrology  N.p.o. after midnight for tunneled catheter placement tomorrow. Cough while eating   ordered speech therapy consult  Continue speech therapy recommendation    Anemia  5/31-hemoglobin of 7.4  Will order iron profile  Clinically no active bleeding. Hypertension   BP is controlled at 138/72 mmHg today. 2/10 - in 59S systolic. Will follow  5/31-improving, stable     H/o-right sided ureteral obstruction s/p stent placement.     Cardiac arrest during the stay  Normal echo          Diet:  ADULT DIET; Easy to Chew  DVT PPx: heparin SC   Code status: full code          Hospital Problems           Last Modified POA    * (Principal) Cardiac arrest ([address] 5/21/2022 Yes    Acute kidney injury superimposed on chronic kidney disease ([address] 5/28/2022 Yes    Benign hypertension with CKD (chronic kidney disease) stage III ([address] 5/27/2022 Yes    Diabetic ketoacidosis with coma associated with type 1 diabetes mellitus ([address] 5/21/2022 Yes    Severe sepsis with septic shock (CODE) ([address] 5/23/2022 Yes    Pneumonia, bacterial 5/21/2022 Yes    Acute respiratory failure ([address] 5/23/2022 Yes    Altered mental status 5/24/2022 Yes    Frailty 5/24/2022 Yes    Septic shock ([address] 5/28/2022 Yes    Urinary tract infection without hematuria 5/21/2022 Yes    Diabetes mellitus type 1 ([address] 5/27/2022 Yes    Overview Signed 4/21/2022 12:01 PM by Daija Cuevas MD     Start lantus 10 units  humalog sliding scale  Keep BS log-call in few days to titrate lantus  Diet discussed  Need to see her labs for further addition of meds           BLANCA (acute kidney injury) ([address] 5/27/2022 Yes            Objective:     Patient Vitals for the past 24 hrs:   Temp Pulse Resp BP SpO2   05/31/22 0926 -- -- -- 127/63 --   05/31/22 0728 99 °F (37.2 °C) 94 19 (!) 116/53 99 %   05/31/22 0421 99.9 °F (37.7 °C) 93 18 116/63 93 %   05/30/22 2330 99.5 °F (37.5 °C) 99 18 118/65 96 %   05/30/22 1927 98.8 °F (37.1 °C) 98 18 112/60 96 %   05/30/22 1513 98.6 °F (37 °C) 98 20 116/69 96 %   05/30/22 1017 99 °F (37.2 °C) 79 20 (!) 94/55 98 Collection Time: 05/30/22 12:24 AM   Result Value Ref Range    POC Glucose 258 (H) 65 - 100 mg/dL    Performed by: [employer] [facility] Metabolic Panel    Collection Time: 05/30/22  3:56 AM   Result Value Ref Range    Sodium 142 136 - 145 mmol/L    Potassium 3.3 (L) 3.5 - 5.1 mmol/L    Chloride 106 98 - 107 mmol/L    CO2 23 21 - 32 mmol/L    Anion Gap 13 7 - 16 mmol/L    Glucose 35 (LL) 65 - 100 mg/dL    BUN 28 (H) 6 - 23 MG/DL    CREATININE 6.30 (H) 0.6 - 1.0 MG/DL    GFR African American 9 (L) >60 ml/min/1.73m2    GFR Non- 8 (L) >60 ml/min/1.73m2    Calcium 9.0 8.3 - 10.4 MG/DL   Phosphorus    Collection Time: 05/30/22  3:56 AM   Result Value Ref Range    Phosphorus 2.7 2.5 - 4.5 MG/DL   CBC with Auto Differential    Collection Time: 05/30/22  3:56 AM   Result Value Ref Range    WBC 20.0 (H) 4.3 - 11.1 K/uL    RBC 3.14 (L) 4.05 - 5.2 M/uL    Hemoglobin 8.3 (L) 11.7 - 15.4 g/dL    Hematocrit 26.0 (L) 35.8 - 46.3 %    MCV 82.8 79.6 - 97.8 FL    MCH 26.4 26.1 - 32.9 PG    MCHC 31.9 31.4 - 35.0 g/dL    RDW 15.0 (H) 11.9 - 14.6 %    Platelets 052 869 - 928 K/uL    MPV 10.0 9.4 - 12.3 FL    nRBC 0.09 0.0 - 0.2 K/uL    Differential Type AUTOMATED      Seg Neutrophils 53 43 - 78 %    Lymphocytes 18 13 - 44 %    Monocytes 18 (H) 4.0 - 12.0 %    Eosinophils % 2 0.5 - 7.8 %    Basophils 0 0.0 - 2.0 %    Immature Granulocytes 9 (H) 0.0 - 5.0 %    Segs Absolute 10.7 (H) 1.7 - 8.2 K/UL    Absolute Lymph # 3.6 0.5 - 4.6 K/UL    Absolute Mono # 3.6 (H) 0.1 - 1.3 K/UL    Absolute Eos # 0.3 0.0 - 0.8 K/UL    Basophils Absolute 0.1 0.0 - 0.2 K/UL    Absolute Immature Granulocyte 1.7 (H) 0.0 - 0.5 K/UL   POCT Glucose    Collection Time: 05/30/22  5:41 AM   Result Value Ref Range    POC Glucose 139 (H) 65 - 100 mg/dL    Performed by: Suman    Glucose, Random    Collection Time: 05/30/22  6:01 AM   Result Value Ref Range    Glucose 96 65 - 100 mg/dL   Culture, Blood 1    Collection Time: 05/30/22 8:46 AM    Specimen: Blood   Result Value Ref Range    Special Requests left cvc     Culture NO GROWTH AFTER 20 HOURS     Culture, Blood 1    Collection Time: 05/30/22  9:15 AM    Specimen: Blood   Result Value Ref Range    Special Requests left cvc     Culture NO GROWTH AFTER 20 HOURS     POCT Glucose    Collection Time: 05/30/22 12:21 PM   Result Value Ref Range    POC Glucose 258 (H) 65 - 100 mg/dL    Performed by: Tristen Nova    POCT Glucose    Collection Time: 05/30/22  6:26 PM   Result Value Ref Range    POC Glucose 77 65 - 100 mg/dL    Performed by: Radha    POCT Glucose    Collection Time: 05/31/22 12:50 AM   Result Value Ref Range    POC Glucose 160 (H) 65 - 100 mg/dL    Performed by: Suman    Basic Metabolic Panel    Collection Time: 05/31/22  4:11 AM   Result Value Ref Range    Sodium 138 136 - 145 mmol/L    Potassium 3.6 3.5 - 5.1 mmol/L    Chloride 103 98 - 107 mmol/L    CO2 27 21 - 32 mmol/L    Anion Gap 8 7 - 16 mmol/L    Glucose 296 (H) 65 - 100 mg/dL    BUN 18 6 - 23 MG/DL    CREATININE 4.90 (H) 0.6 - 1.0 MG/DL    GFR African American 12 (L) >60 ml/min/1.73m2    GFR Non- 10 (L) >60 ml/min/1.73m2    Calcium 8.0 (L) 8.3 - 10.4 MG/DL   CBC with Auto Differential    Collection Time: 05/31/22  4:11 AM   Result Value Ref Range    WBC 14.8 (H) 4.3 - 11.1 K/uL    RBC 2.88 (L) 4.05 - 5.2 M/uL    Hemoglobin 7.4 (L) 11.7 - 15.4 g/dL    Hematocrit 23.6 (L) 35.8 - 46.3 %    MCV 81.9 79.6 - 97.8 FL    MCH 25.7 (L) 26.1 - 32.9 PG    MCHC 31.4 31.4 - 35.0 g/dL    RDW 14.9 (H) 11.9 - 14.6 %    Platelets 904 793 - 250 K/uL    MPV 9.5 9.4 - 12.3 FL    nRBC 0.05 0.0 - 0.2 K/uL    Seg Neutrophils 60 43 - 78 %    Lymphocytes 15 13 - 44 %    Monocytes 14 (H) 4.0 - 12.0 %    Eosinophils % 2 0.5 - 7.8 %    Basophils 0 0.0 - 2.0 %    Immature Granulocytes 9 (H) 0.0 - 5.0 %    Segs Absolute 8.9 (H) 1.7 - 8.2 K/UL    Absolute Lymph # 2.2 0.5 - 4.6 K/UL    Absolute Mono # 2.1 (H) 0.1 - 1.3 K/UL    Absolute Eos # 0.3 0.0 - 0.8 K/UL    Basophils Absolute 0.0 0.0 - 0.2 K/UL    Absolute Immature Granulocyte 1.3 (H) 0.0 - 0.5 K/UL    RBC Comment SLIGHT  HYPOCHROMIA        WBC Comment Result Confirmed By Smear      Platelet Comment ADEQUATE      Differential Type AUTOMATED     POCT Glucose    Collection Time: 05/31/22  6:34 AM   Result Value Ref Range    POC Glucose 320 (H) 65 - 100 mg/dL    Performed by: Suman          Other Studies:  No results found.     Current Meds:  Current Facility-Administered Medications   Medication Dose Route Frequency    loperamide (IMODIUM) capsule 2 mg  2 mg Oral 4x Daily PRN    vancomycin (VANCOCIN) intermittent dosing (placeholder)   Other RX Placeholder    piperacillin-tazobactam (ZOSYN) 3,375 mg in sodium chloride 0.9 % 50 mL IVPB (mini-bag)  3,375 mg IntraVENous Q12H    guaiFENesin-dextromethorphan (ROBITUSSIN DM) 100-10 MG/5ML syrup 5 mL  5 mL Oral Q4H PRN    insulin glargine (LANTUS) injection vial 15 Units  15 Units SubCUTAneous Daily    heparin (porcine) injection 10,000 Units  10,000 Units IntraVENous PRN    albuterol (PROVENTIL) nebulizer solution 2.5 mg  2.5 mg Nebulization Q4H PRN    insulin regular (HUMULIN R;NOVOLIN R) injection 0-10 Units  0-10 Units IntraVENous PRN    dextrose 5 % and 0.45 % sodium chloride infusion   IntraVENous Continuous PRN    glucose chewable tablet 16 g  4 tablet Oral PRN    dextrose bolus 10% 125 mL  125 mL IntraVENous PRN    Or    dextrose bolus 10% 250 mL  250 mL IntraVENous PRN    glucagon injection 1 mg  1 mg IntraMUSCular PRN    dextrose 5 % solution  100 mL/hr IntraVENous PRN    insulin lispro (HUMALOG) injection vial 0-16 Units  0-16 Units SubCUTAneous Q6H    dextrose bolus 10% 125 mL  125 mL IntraVENous PRN    Or    dextrose bolus 10% 250 mL  250 mL IntraVENous PRN    glucagon injection 1 mg  1 mg IntraMUSCular PRN    medicated lip ointment (BLISTEX)   Topical PRN    sodium chloride flush 0.9 % injection 5-40 mL  5-40 mL IntraVENous 2 times per day    sodium chloride flush 0.9 % injection 5-40 mL  5-40 mL IntraVENous PRN    heparin (porcine) injection 5,000 Units  5,000 Units SubCUTAneous q8h    ondansetron (ZOFRAN-ODT) disintegrating tablet 4 mg  4 mg Oral Q8H PRN    Or    ondansetron (ZOFRAN) injection 4 mg  4 mg IntraVENous Q6H PRN    polyethylene glycol (GLYCOLAX) packet 17 g  17 g Oral Daily PRN    acetaminophen (TYLENOL) tablet 650 mg  650 mg Oral Q6H PRN    Or    acetaminophen (TYLENOL) suppository 650 mg  650 mg Rectal Q6H PRN    dextrose bolus 10% 250 mL  250 mL IntraVENous PRN    potassium chloride 10 mEq/100 mL IVPB (Peripheral Line)  10 mEq IntraVENous PRN    magnesium sulfate 1000 mg in dextrose 5% 100 mL IVPB  1,000 mg IntraVENous PRN    sodium phosphate 10 mmol in sodium chloride 0.9 % 250 mL IVPB  10 mmol IntraVENous PRN    Or    sodium phosphate 15 mmol in dextrose 5 % 250 mL IVPB  15 mmol IntraVENous PRN    Or    sodium phosphate 20 mmol in dextrose 5 % 500 mL IVPB  20 mmol IntraVENous PRN    dextrose 5 % and 0.45 % sodium chloride infusion   IntraVENous Continuous PRN    sodium chloride flush 0.9 % injection 5-40 mL  5-40 mL IntraVENous 2 times per day    sodium chloride flush 0.9 % injection 5-40 mL  5-40 mL IntraVENous PRN    0.9 % sodium chloride infusion   IntraVENous PRN       Signed:  Abida Green MD

## 2022-06-01 ENCOUNTER — APPOINTMENT (OUTPATIENT)
Dept: INTERVENTIONAL RADIOLOGY/VASCULAR | Age: 47
DRG: 870 | End: 2022-06-01
Payer: COMMERCIAL

## 2022-06-01 PROBLEM — D64.9 NORMOCYTIC ANEMIA: Status: ACTIVE | Noted: 2022-06-01

## 2022-06-01 LAB
ANION GAP SERPL CALC-SCNC: 9 MMOL/L (ref 7–16)
BUN SERPL-MCNC: 19 MG/DL (ref 6–23)
CALCIUM SERPL-MCNC: 8.3 MG/DL (ref 8.3–10.4)
CHLORIDE SERPL-SCNC: 106 MMOL/L (ref 98–107)
CO2 SERPL-SCNC: 27 MMOL/L (ref 21–32)
CREAT SERPL-MCNC: 5.7 MG/DL (ref 0.6–1)
ERYTHROCYTE [DISTWIDTH] IN BLOOD BY AUTOMATED COUNT: 15 % (ref 11.9–14.6)
FOLATE SERPL-MCNC: 4.9 NG/ML (ref 3.1–17.5)
GLUCOSE BLD STRIP.AUTO-MCNC: 113 MG/DL (ref 65–100)
GLUCOSE BLD STRIP.AUTO-MCNC: 212 MG/DL (ref 65–100)
GLUCOSE BLD STRIP.AUTO-MCNC: 255 MG/DL (ref 65–100)
GLUCOSE BLD STRIP.AUTO-MCNC: 386 MG/DL (ref 65–100)
GLUCOSE BLD STRIP.AUTO-MCNC: 518 MG/DL (ref 65–100)
GLUCOSE SERPL-MCNC: 113 MG/DL (ref 65–100)
HAV IGM SER QL: NONREACTIVE
HBV CORE IGM SER QL: NONREACTIVE
HBV SURFACE AG SER QL: NONREACTIVE
HCT VFR BLD AUTO: 24.3 % (ref 35.8–46.3)
HCV AB SER QL: NONREACTIVE
HGB BLD-MCNC: 7.7 G/DL (ref 11.7–15.4)
IRON SATN MFR SERPL: 24 %
IRON SERPL-MCNC: 36 UG/DL (ref 35–150)
MCH RBC QN AUTO: 26.4 PG (ref 26.1–32.9)
MCHC RBC AUTO-ENTMCNC: 31.7 G/DL (ref 31.4–35)
MCV RBC AUTO: 83.2 FL (ref 79.6–97.8)
NRBC # BLD: 0.05 K/UL (ref 0–0.2)
PLATELET # BLD AUTO: 389 K/UL (ref 150–450)
PMV BLD AUTO: 9.5 FL (ref 9.4–12.3)
POTASSIUM SERPL-SCNC: 3 MMOL/L (ref 3.5–5.1)
RBC # BLD AUTO: 2.92 M/UL (ref 4.05–5.2)
SERVICE CMNT-IMP: ABNORMAL
SODIUM SERPL-SCNC: 142 MMOL/L (ref 136–145)
TIBC SERPL-MCNC: 153 UG/DL (ref 250–450)
VANCOMYCIN SERPL-MCNC: 25.2 UG/ML
VIT B12 SERPL-MCNC: 1767 PG/ML (ref 193–986)
WBC # BLD AUTO: 13.9 K/UL (ref 4.3–11.1)

## 2022-06-01 PROCEDURE — 6360000002 HC RX W HCPCS: Performed by: PHYSICIAN ASSISTANT

## 2022-06-01 PROCEDURE — 36415 COLL VENOUS BLD VENIPUNCTURE: CPT

## 2022-06-01 PROCEDURE — 80048 BASIC METABOLIC PNL TOTAL CA: CPT

## 2022-06-01 PROCEDURE — 99152 MOD SED SAME PHYS/QHP 5/>YRS: CPT

## 2022-06-01 PROCEDURE — 6370000000 HC RX 637 (ALT 250 FOR IP): Performed by: FAMILY MEDICINE

## 2022-06-01 PROCEDURE — 82962 GLUCOSE BLOOD TEST: CPT

## 2022-06-01 PROCEDURE — 2500000003 HC RX 250 WO HCPCS: Performed by: PHYSICIAN ASSISTANT

## 2022-06-01 PROCEDURE — 2500000003 HC RX 250 WO HCPCS: Performed by: FAMILY MEDICINE

## 2022-06-01 PROCEDURE — 6360000002 HC RX W HCPCS: Performed by: INTERNAL MEDICINE

## 2022-06-01 PROCEDURE — 2580000003 HC RX 258: Performed by: FAMILY MEDICINE

## 2022-06-01 PROCEDURE — 6370000000 HC RX 637 (ALT 250 FOR IP): Performed by: HOSPITALIST

## 2022-06-01 PROCEDURE — 85027 COMPLETE CBC AUTOMATED: CPT

## 2022-06-01 PROCEDURE — 02HV33Z INSERTION OF INFUSION DEVICE INTO SUPERIOR VENA CAVA, PERCUTANEOUS APPROACH: ICD-10-PCS | Performed by: RADIOLOGY

## 2022-06-01 PROCEDURE — 6370000000 HC RX 637 (ALT 250 FOR IP): Performed by: NURSE PRACTITIONER

## 2022-06-01 PROCEDURE — 82607 VITAMIN B-12: CPT

## 2022-06-01 PROCEDURE — 2580000003 HC RX 258: Performed by: INTERNAL MEDICINE

## 2022-06-01 PROCEDURE — 82746 ASSAY OF FOLIC ACID SERUM: CPT

## 2022-06-01 PROCEDURE — 83550 IRON BINDING TEST: CPT

## 2022-06-01 PROCEDURE — 76937 US GUIDE VASCULAR ACCESS: CPT

## 2022-06-01 PROCEDURE — 80202 ASSAY OF VANCOMYCIN: CPT

## 2022-06-01 PROCEDURE — 1100000000 HC RM PRIVATE

## 2022-06-01 PROCEDURE — 8010000000 HC HEMODIALYSIS ACUTE INPT

## 2022-06-01 PROCEDURE — 6360000002 HC RX W HCPCS: Performed by: FAMILY MEDICINE

## 2022-06-01 PROCEDURE — 80074 ACUTE HEPATITIS PANEL: CPT

## 2022-06-01 PROCEDURE — 6360000002 HC RX W HCPCS: Performed by: RADIOLOGY

## 2022-06-01 RX ORDER — HEPARIN SODIUM 1000 [USP'U]/ML
INJECTION, SOLUTION INTRAVENOUS; SUBCUTANEOUS
Status: COMPLETED | OUTPATIENT
Start: 2022-06-01 | End: 2022-06-01

## 2022-06-01 RX ORDER — MIDAZOLAM HYDROCHLORIDE 1 MG/ML
INJECTION INTRAMUSCULAR; INTRAVENOUS
Status: COMPLETED | OUTPATIENT
Start: 2022-06-01 | End: 2022-06-01

## 2022-06-01 RX ORDER — FENTANYL CITRATE 50 UG/ML
INJECTION, SOLUTION INTRAMUSCULAR; INTRAVENOUS
Status: COMPLETED | OUTPATIENT
Start: 2022-06-01 | End: 2022-06-01

## 2022-06-01 RX ORDER — LIDOCAINE HYDROCHLORIDE AND EPINEPHRINE 10; 10 MG/ML; UG/ML
INJECTION, SOLUTION INFILTRATION; PERINEURAL
Status: COMPLETED | OUTPATIENT
Start: 2022-06-01 | End: 2022-06-01

## 2022-06-01 RX ORDER — FERROUS SULFATE 325(65) MG
325 TABLET ORAL 2 TIMES DAILY WITH MEALS
Status: DISCONTINUED | OUTPATIENT
Start: 2022-06-01 | End: 2022-06-02 | Stop reason: HOSPADM

## 2022-06-01 RX ORDER — INSULIN GLARGINE 100 [IU]/ML
8 INJECTION, SOLUTION SUBCUTANEOUS NIGHTLY
Status: DISCONTINUED | OUTPATIENT
Start: 2022-06-01 | End: 2022-06-02

## 2022-06-01 RX ADMIN — HEPARIN SODIUM 4200 UNITS: 1000 INJECTION, SOLUTION INTRAVENOUS; SUBCUTANEOUS at 13:54

## 2022-06-01 RX ADMIN — INSULIN GLARGINE 8 UNITS: 100 INJECTION, SOLUTION SUBCUTANEOUS at 23:12

## 2022-06-01 RX ADMIN — MIDAZOLAM 1 MG: 1 INJECTION INTRAMUSCULAR; INTRAVENOUS at 13:29

## 2022-06-01 RX ADMIN — INSULIN LISPRO 2 UNITS: 100 INJECTION, SOLUTION INTRAVENOUS; SUBCUTANEOUS at 01:56

## 2022-06-01 RX ADMIN — INSULIN HUMAN 5 UNITS: 100 INJECTION, SOLUTION PARENTERAL at 23:22

## 2022-06-01 RX ADMIN — INSULIN LISPRO 1 UNITS: 100 INJECTION, SOLUTION INTRAVENOUS; SUBCUTANEOUS at 17:22

## 2022-06-01 RX ADMIN — HEPARIN SODIUM 5000 UNITS: 5000 INJECTION INTRAVENOUS; SUBCUTANEOUS at 22:07

## 2022-06-01 RX ADMIN — LIDOCAINE HYDROCHLORIDE,EPINEPHRINE BITARTRATE 15 ML: 10; .01 INJECTION, SOLUTION INFILTRATION; PERINEURAL at 13:33

## 2022-06-01 RX ADMIN — SODIUM CHLORIDE, PRESERVATIVE FREE 10 ML: 5 INJECTION INTRAVENOUS at 22:07

## 2022-06-01 RX ADMIN — SODIUM CHLORIDE, PRESERVATIVE FREE 10 ML: 5 INJECTION INTRAVENOUS at 08:51

## 2022-06-01 RX ADMIN — FERROUS SULFATE TAB 325 MG (65 MG ELEMENTAL FE) 325 MG: 325 (65 FE) TAB at 17:14

## 2022-06-01 RX ADMIN — FENTANYL CITRATE 50 MCG: 50 INJECTION, SOLUTION INTRAMUSCULAR; INTRAVENOUS at 13:35

## 2022-06-01 RX ADMIN — MIDAZOLAM 1 MG: 1 INJECTION INTRAMUSCULAR; INTRAVENOUS at 13:35

## 2022-06-01 RX ADMIN — TUBERCULIN PURIFIED PROTEIN DERIVATIVE 5 UNITS: 5 INJECTION, SOLUTION INTRADERMAL at 17:23

## 2022-06-01 RX ADMIN — PIPERACILLIN AND TAZOBACTAM 3375 MG: 3; .375 INJECTION, POWDER, LYOPHILIZED, FOR SOLUTION INTRAVENOUS at 01:00

## 2022-06-01 RX ADMIN — INSULIN LISPRO 4 UNITS: 100 INJECTION, SOLUTION INTRAVENOUS; SUBCUTANEOUS at 17:22

## 2022-06-01 RX ADMIN — LOPERAMIDE HYDROCHLORIDE 2 MG: 2 CAPSULE ORAL at 22:07

## 2022-06-01 RX ADMIN — HEPARIN SODIUM 5000 UNITS: 5000 INJECTION INTRAVENOUS; SUBCUTANEOUS at 05:53

## 2022-06-01 RX ADMIN — PIPERACILLIN AND TAZOBACTAM 3375 MG: 3; .375 INJECTION, POWDER, LYOPHILIZED, FOR SOLUTION INTRAVENOUS at 14:51

## 2022-06-01 RX ADMIN — FENTANYL CITRATE 50 MCG: 50 INJECTION, SOLUTION INTRAMUSCULAR; INTRAVENOUS at 13:30

## 2022-06-01 ASSESSMENT — PAIN SCALES - GENERAL
PAINLEVEL_OUTOF10: 0
PAINLEVEL_OUTOF10: 0

## 2022-06-01 NOTE — CARE COORDINATION
Chart reviewed for continued stay. LOS 11 days. [facility]- with Ad[facility],  to alert CM when final determination has been made. CM continues to follow care plan. Update 5:08pm- CM received consult to assist with outpatient HD slot. Referral faxed to [facility] Admissions this afternoon. Patient aware. PPD Ordered. CM continues to follow.

## 2022-06-01 NOTE — PRE SEDATION
intervSedation Pre-Procedure Note    Patient Name: Lorrin Mcburney   YOB: 1975  Room/Bed: [facility]  Medical Record Number: [id number]  Date: 6/1/2022   Time: 1:15 PM       Indication:  BLANCA/CKD    Consent: I have discussed with the patient and/or the patient representative the indication, alternatives, and the possible risks and/or complications of the planned procedure and the anesthesia methods. The patient and/or patient representative appear to understand and agree to proceed. Vital Signs:   Vitals:    06/01/22 1246   BP: 108/60   Pulse: (!) 101   Resp: 17   Temp: 97.8 °F (36.6 °C)   SpO2: 96%       Past Medical History:   has a past medical history of Acute renal failure ([address] CKD (chronic kidney disease) stage 3, GFR 30-59 ml/min ([facility]), Gastroenteritis, acute, IDDM (insulin dependent diabetes mellitus), Intractable nausea and vomiting, Irregular menses, Kidney stone, Neuropathy, peripheral, idiopathic, Retinopathy, bilateral, Trichomoniasis, Ulcer, skin, chronic ([address] and Vaginal burning. Past Surgical History:   has a past surgical history that includes cystoscopy,insert ureteral stent (04/2022); amputation (Left); amputation (1/27/12); and hx open reduction internal fixation (Right, 2011).     Medications:   Scheduled Meds:    ferrous sulfate  325 mg Oral BID WC    insulin glargine  8 Units SubCUTAneous Daily    insulin lispro  0-4 Units SubCUTAneous Q6H    insulin lispro  1 Units SubCUTAneous TID WC    vancomycin (VANCOCIN) intermittent dosing (placeholder)   Other RX Placeholder    piperacillin-tazobactam  3,375 mg IntraVENous Q12H    sodium chloride flush  5-40 mL IntraVENous 2 times per day    heparin (porcine)  5,000 Units SubCUTAneous q8h    sodium chloride flush  5-40 mL IntraVENous 2 times per day     Continuous Infusions:    dextrose 5 % and 0.45 % NaCl      dextrose      dextrose 5 % and 0.45 % NaCl      sodium chloride       PRN Meds: loperamide, guaiFENesin-dextromethorphan, heparin (porcine), albuterol, dextrose 5 % and 0.45 % NaCl, glucose, dextrose bolus **OR** dextrose bolus, glucagon (rDNA), dextrose, dextrose bolus **OR** dextrose bolus, glucagon (rDNA), medicated lip ointment, sodium chloride flush, ondansetron **OR** ondansetron, polyethylene glycol, acetaminophen **OR** acetaminophen, dextrose bolus, potassium chloride, magnesium sulfate, sodium phosphate IVPB **OR** sodium phosphate IVPB **OR** sodium phosphate IVPB, dextrose 5 % and 0.45 % NaCl, sodium chloride flush, sodium chloride  Home Meds:   Prior to Admission medications    Medication Sig Start Date End Date Taking? Authorizing Provider   atorvastatin (LIPITOR) 80 MG tablet Take 80 mg by mouth daily  Patient not taking: Reported on 5/27/2022 7/14/21   Ar Automatic Reconciliation   brimonidine (ALPHAGAN P) 0.1 % SOLN 1 drop in left eye twice a day  Patient not taking: Reported on 5/27/2022 6/23/21   Ar Automatic Reconciliation   ergocalciferol (ERGOCALCIFEROL) 1.25 MG (90334 UT) capsule Take 50,000 Units by mouth  Patient not taking: Reported on 5/27/2022 4/7/21   Ar Automatic Reconciliation   gabapentin (NEURONTIN) 300 MG capsule Take 300 mg by mouth daily.   Patient not taking: Reported on 5/27/2022 5/20/21   Ar Automatic Reconciliation   Insulin Degludec (TRESIBA FLEXTOUCH) 200 UNIT/ML SOPN Inject 30 Units into the skin daily  Patient not taking: Reported on 5/27/2022 11/16/21   Ar Automatic Reconciliation   insulin lispro, 1 Unit Dial, (HUMALOG KWIKPEN) 100 UNIT/ML SOPN 6 TIDAC correction scale 1/50>150, max daily dose 50 units  Patient not taking: Reported on 5/27/2022 11/16/21   Ar Automatic Reconciliation   ondansetron (ZOFRAN-ODT) 4 MG disintegrating tablet Take 4 mg by mouth every 8 hours as needed  Patient not taking: Reported on 5/27/2022 3/21/22   Ar Automatic Reconciliation   Pre-Sedation Documentation and Exam:   I have personally completed a history, physical exam & review of systems for this patient (see notes).     Mallampati Airway Assessment:  dentition not prohibitive    ASA Classification:  Class 3 - A patient with severe systemic disease that limits activity but is not incapacitating    Sedation/ Anesthesia Plan:   intravenous sedation    Medications Planned:   midazolam (Versed)  intravenously and fentanyl intravenously    Patient is an appropriate candidate for plan of sedation: yes    Electronically signed by Arturo Puri PA-C on 6/1/2022 at 1:15 PM

## 2022-06-01 NOTE — FLOWSHEET NOTE
Dr Robertson Scale notified, continue to monitor        06/01/22 1507   Vital Signs   Temp 98.8 °F (37.1 °C)   Temp Source Oral   Heart Rate (!) 117   Resp 21   BP (!) 91/58   MAP (Calculated) 69   Level of Consciousness Alert (0)   MEWS Score 5

## 2022-06-01 NOTE — DIABETES MGMT
Noted per chart review patient has diet ordered. Per [facility] patient has not received basal insulin today. Patient has PMH of type 1 diabetes. Provider updated via [employer]. New orders received to change Lantus from daily to 8 units nightly to start tonight.

## 2022-06-01 NOTE — PROGRESS NOTES
[facility]    Follow-Up on: BLANCA on CKD stage IV    HPI: Pt seen and examined on HD,.  Dialyzing via left temp line 350 Qb,  tolerating dialysis     ROS:  General: no fever/chills  CV: no CP  Lung: no SOB, no cough  GI: no N/V/D  : no dysuria  Ext: no edema       Current Facility-Administered Medications   Medication Dose Route Frequency    loperamide (IMODIUM) capsule 2 mg  2 mg Oral 4x Daily PRN    insulin glargine (LANTUS) injection vial 8 Units  8 Units SubCUTAneous Daily    insulin lispro (HUMALOG) injection vial 0-4 Units  0-4 Units SubCUTAneous Q6H    insulin lispro (HUMALOG) injection vial 1 Units  1 Units SubCUTAneous TID WC    vancomycin (VANCOCIN) intermittent dosing (placeholder)   Other RX Placeholder    piperacillin-tazobactam (ZOSYN) 3,375 mg in sodium chloride 0.9 % 50 mL IVPB (mini-bag)  3,375 mg IntraVENous Q12H    guaiFENesin-dextromethorphan (ROBITUSSIN DM) 100-10 MG/5ML syrup 5 mL  5 mL Oral Q4H PRN    heparin (porcine) injection 10,000 Units  10,000 Units IntraVENous PRN    albuterol (PROVENTIL) nebulizer solution 2.5 mg  2.5 mg Nebulization Q4H PRN    dextrose 5 % and 0.45 % sodium chloride infusion   IntraVENous Continuous PRN    glucose chewable tablet 16 g  4 tablet Oral PRN    dextrose bolus 10% 125 mL  125 mL IntraVENous PRN    Or    dextrose bolus 10% 250 mL  250 mL IntraVENous PRN    glucagon injection 1 mg  1 mg IntraMUSCular PRN    dextrose 5 % solution  100 mL/hr IntraVENous PRN    dextrose bolus 10% 125 mL  125 mL IntraVENous PRN    Or    dextrose bolus 10% 250 mL  250 mL IntraVENous PRN    glucagon injection 1 mg  1 mg IntraMUSCular PRN    medicated lip ointment (BLISTEX)   Topical PRN    sodium chloride flush 0.9 % injection 5-40 mL  5-40 mL IntraVENous 2 times per day    sodium chloride flush 0.9 % injection 5-40 mL  5-40 mL IntraVENous PRN    heparin (porcine) injection 5,000 Units  5,000 Units SubCUTAneous q8h    ondansetron (ZOFRAN-ODT) disintegrating tablet 4 mg  4 mg Oral Q8H PRN    Or    ondansetron (ZOFRAN) injection 4 mg  4 mg IntraVENous Q6H PRN    polyethylene glycol (GLYCOLAX) packet 17 g  17 g Oral Daily PRN    acetaminophen (TYLENOL) tablet 650 mg  650 mg Oral Q6H PRN    Or    acetaminophen (TYLENOL) suppository 650 mg  650 mg Rectal Q6H PRN    dextrose bolus 10% 250 mL  250 mL IntraVENous PRN    potassium chloride 10 mEq/100 mL IVPB (Peripheral Line)  10 mEq IntraVENous PRN    magnesium sulfate 1000 mg in dextrose 5% 100 mL IVPB  1,000 mg IntraVENous PRN    sodium phosphate 10 mmol in sodium chloride 0.9 % 250 mL IVPB  10 mmol IntraVENous PRN    Or    sodium phosphate 15 mmol in dextrose 5 % 250 mL IVPB  15 mmol IntraVENous PRN    Or    sodium phosphate 20 mmol in dextrose 5 % 500 mL IVPB  20 mmol IntraVENous PRN    dextrose 5 % and 0.45 % sodium chloride infusion   IntraVENous Continuous PRN    sodium chloride flush 0.9 % injection 5-40 mL  5-40 mL IntraVENous 2 times per day    sodium chloride flush 0.9 % injection 5-40 mL  5-40 mL IntraVENous PRN    0.9 % sodium chloride infusion   IntraVENous PRN       Exam:  Vitals:    06/01/22 0600 06/01/22 0723 06/01/22 0955 06/01/22 1000   BP:  103/62 128/68 130/74   Pulse:  98 96 93   Resp:  19     Temp:  99 °F (37.2 °C)     TempSrc:  Oral     SpO2:  99%     Weight: 174 lb 1.6 oz (79 kg)      Height:             Intake/Output Summary (Last 24 hours) at 6/1/2022 1022  Last data filed at 6/1/2022 0428  Gross per 24 hour   Intake 0 ml   Output 1130 ml   Net -1130 ml     PE:  GEN - in no distress, lalert and oriented   CV - regular rate, S1, S2, no murmur, no rub  Lung - clear bilaterally  Abd - soft, nontender  Ext - 1 BLE edema  Access-  Left temp line- intact    Labs  Recent Labs     05/30/22  0356 05/31/22  0411 06/01/22  0424   WBC 20.0* 14.8* 13.9*   HGB 8.3* 7.4* 7.7*   HCT 26.0* 23.6* 24.3*    316 389     Recent Labs     05/30/22  0356 05/31/22  0411 06/01/22  0424   NA 142 138 142   K 3.3* 3.6 3.0*    103 106   CO2 23 27 27   BUN 28* 18 19   PHOS 2.7  --   --      No results for input(s): PH, PCO2, PO2, PCO2 in the last 72 hours.     Problem List:  Patient Active Problem List    Diagnosis Date Noted    Normocytic anemia 06/01/2022    Septic shock ([facility]     Altered mental status     Frailty     Cardiac arrest ([facility] [address] 05/21/2022    Diabetic ketoacidosis with coma associated with type 1 diabetes mellitus ([facility] [address] 05/21/2022    Severe sepsis with septic shock ([facility]) ([facility] 05/21/2022    Pneumonia, bacterial 05/21/2022    Acute respiratory failure ([facility] [address] 05/21/2022    Benign hypertension with CKD (chronic kidney disease) stage III ([facility] [address] 01/04/2022    Acute kidney injury superimposed on chronic kidney disease ([facility] [address] 06/09/2021    IDDM (insulin dependent diabetes mellitus) 01/24/2012    BLANCA (acute kidney injury) ([facility] [facility] 04/21/2022    Ureteric stone 04/19/2022    Urinary tract infection without hematuria 04/19/2022    Hydronephrosis of right kidney 04/19/2022    Malodorous urine 11/16/2021    H/O gastroesophageal reflux (GERD) 06/19/2020    Encounter for annual routine gynecological examination 11/27/2018    Encounter to discuss test results 08/22/2018    Diabetes mellitus type 1 ([facility] [address] 08/22/2018    Type 1 diabetes mellitus with stage 3 chronic kidney disease ([facility] [address] 08/16/2018    Noncompliance with medication regimen 08/16/2018    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    Depression with anxiety 07/11/2017    Ulcer, skin, chronic ([address] 03/13/2017    CKD (chronic kidney disease), stage IV ([address] 03/13/2017    Neuropathy, peripheral, idiopathic 03/13/2017    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 associated with type 1 diabetes mellitus ([address] 03/13/2017    Moderate single current episode of major depressive disorder ([facility] [address] 02/03/2017    Diabetic polyneuropathy associated with type 1 diabetes mellitus ([facility] [address] 02/03/2017    CKD (chronic kidney disease) stage 3, GFR 30-59 ml/min ([facility]) 12/25/2014    Nausea 01/24/2012       Issues Addressed By Nephrology:    Plan:  1. BLANCA on CKD stage IV Now oliguric. .   1st HD 5/24  -seen on HD, tolerating dialysis, plan for TCC and consulted [facility] for outpt HD slot     2. Type I DM- SSI per primary     3. DKA BS>2000 on admit- un controlled- SSI per primary     4.  Anemia-  Add Fe

## 2022-06-01 NOTE — PROGRESS NOTES
Patient attempted initial visit, though patient was out of room at the time.  provided prayer and is available to follow up as needed.   Signed by  Merlinda Marchi, M.Div.

## 2022-06-01 NOTE — OP NOTE
Department of Interventional Radiology  [phone]        Interventional Radiology Brief Procedure Note    Patient: Aaron Leyva MRN: [id number]  SSN: [id number]    YOB: 1975  Age: 48 y.o.   Sex: female      Date of Procedure: 6/1/2022    Pre-Procedure Diagnosis: BLANCA/CKD    Post-Procedure Diagnosis: SAME    Procedure(s): Tunneled Central Venous Catheter    Brief Description of Procedure: as above    Performed By: Priscilla David PA-C     Assistants: None    Anesthesia:Moderate Sedation per Alma Rosa Smith MD    Estimated Blood Loss: Less than 10ml    Specimens:  None    Implants:  Tunneled Hemodialysis Catheter    Findings: catheter tip in right atrium     Complications: None    Recommendations: ok to use catheter     Follow Up: prn    Signed By: Priscilla David PA-C     June 1, 2022

## 2022-06-01 NOTE — DIABETES MGMT
Patient admitted with cardiac arrest. Blood glucose ranged 110-320 yesterday with patient receiving Lantus 15 units and Humalog 9 units. Blood glucose this morning was 113 at 0449. Creatinine 5.70. GFR 10. Patient NPO. Reviewed patient current regimen: Lantus 8 units daily, Humalog 1 unit with meals, and Humalog correctional insulin q6h. Patient currently in bed. Reviewed with patient current regimen. Reviewed hypoglycemia signs, symptoms, and treatment and encouraged patient to notify primary RN if patient develops any of these symptoms. Patient verbalized understanding and has no questions regarding diabetes management at this time.

## 2022-06-01 NOTE — DIALYSIS
TRANSFER IN - DIALYSIS    Received patient in dialysis unit  from [facility] (unit) for ordered procedure. Consent verified for renal replacement therapy. Procedure explained to patient, opportunity for Q&A provided. Call light given. Patient alert and vital signs stable. /69,  P96 , room air. Hemodialysis initiated using right IJ catheter. arterial ports without difficulty. Venus port does not Aspirate, does  Flush. Dressing clean, dry and intact. Machine settings per MD order. Will monitor during treatment.

## 2022-06-01 NOTE — DIALYSIS
TRANSFER OUT- DIALYSIS    Hemodialysis treatment completed. PT ran 2 hours, without complications. Patient a/ox3 and VS stable  /64  P 99       0 Kg removed. Flushed both ports with 10 mL of NS.  CVC dressing clean, dry, and intact, tego caps intact, curos caps placed. Meds given: 0.    RBCs given during dialysis: 0    Patient to IR after dialysis.

## 2022-06-01 NOTE — PROGRESS NOTES
Pt laying in bed with no complaints of pain or discomfort. Pt is on room air with even and unlabored respirations. Door to pt room remains open upon request. Call light is in place.

## 2022-06-01 NOTE — PROGRESS NOTES
Hospitalist Progress Note   Admit Date:  2022  3:51 PM   Name:  Kay Pollack   Age:  48 y.o. Sex:  female  :  1975   MRN:  [id number]   Room:  [facility]    Presenting Complaint: Altered Mental Status     Reason(s) for Admission: Cardiac arrest ([facility] [I46.9]  Hyperkalemia [E87.5]  Septic shock ([facility] [A41.9, R65.21]  Acute respiratory failure, unspecified whether with hypoxia or hypercapnia ([facility] [J96.00]  Acute renal failure, unspecified acute renal failure type [facility]) [N17.9]     Hospital Course & Interval History:   78-year-old female with PMHx DM1, HTN, CKD stage III who was admitted to intensivist service on  with unresponsiveness. She was not heard for several days at home and was found unresponsive and brought in by EMS with numerous lab abnormalities. She was found to be in DKA with coma and BLANCA. CT head was unremarkable. She was markedly acidotic with a pH of 6.8, suffered a brief cardiac arrest due to septic shock and DKA and was intubated. There was a concern for aspiration PNA VS recurrent UTI. She was started on broad-spectrum antibiotics. She was started on vasopressors and glucose stabilizer per DKA protocol. Neurology was consulted for BLANCA. She required SLED that was initiated on . Palliative care has seen and patient's mother desires full code. IRC has evaluated and pre-CERT is pending. She was extubated on  and hospitalist was requested to assume care. Plans for tunneled cath on  and awaiting for renal recovery. Blood cultures on  and  NGTD. Urine culture negative. Patient remains on Vanco/Zosyn    Subjective/24hr Events (22): Patient alert and oriented x4 at bedside. Stated that she lives alone. Awaiting dialysis in tunnel cath today. Asked for ice. She is not aware of any melena or hematochezia. No fever, chills, SOB, chest pain, abdominal pain.       Assessment & Plan:     Cardiac arrest   Acute respiratory failure  Multifactorial most likely due to DKA with coma and septic shock. Intubated 5/21 and extubated 5/25. TTE on 5/21/2022 shows EF 70-75% with normal systolic and diastolic function; moderate stenosis of aortic valve. Treatment for infection per below  Now on room air    Severe sepsis with septic shock  Pneumonia, bacterial  Has been on broad-spectrum antibiotics since admission  Completing Vanco/Zosyn on 6/2  Blood cultures: NGTD   WBC trending down. Suspect leukocytosis due to stress response. Patient has received adequate course of antibiotics. BLANCA on CKD stage 3  On admission CR >9 (baseline~2.5). Renal US shows medical renal disease without hydronephrosis; stent located at right renal pelvis. Started on SLED on 5/24. Oliguric. Avoid nephrotoxic meds and avoid hypotension  Accurate I's and O's  Nephrology on board, appreciate recs  Tunneled cath plan for 6/1    Normocytic anemia  Hgb 7-8 (baseline >10-11). Iron studies 5/31 c/w anemia of chronic disease, suspect secondary to BLANCA on CKD and frequent blood draws. No active bleeding noted.   CTM, transfuse if Hgb <7 or if symptomatic  Check hemoccult, vitamin B12, folate    Benign hypertension with CKD stage 3  BP low- normal.  No antihypertensives needed    DKA with coma associated with T1DM  Resolved    Altered mental status, resolved  Most likely due to DKA with coma    Frailty  Adds to complexity of care  PT/OT--IRC pre-CERT pending    UTI  Most likely d/t contamination d/t DKA but has completed 7 day course of Vanc/Zosyn empirically  UCx: NGTD    Diabetes mellitus type 1  Holding home Tresiba in lispro  Continue Lantus and SSI  Diabetes management to assist        Discharge Planning:      TBD pending IRC precert    Diet:  Diet NPO  DVT PPx: Heparin SQ  Code status: Full Code    I spent 42 minutes of time caring for this patient at bedside or nearby on the unit, more than 50 percent of which was spent on coordination of care and/or counseling regarding the disease process, treatment options, and treatment plan. Hospital Problems:  Principal Problem:    Cardiac arrest [facility])  Active Problems:    Acute kidney injury superimposed on chronic kidney disease (HCC)    Benign hypertension with CKD (chronic kidney disease) stage III ([facility])    Diabetic ketoacidosis with coma associated with type 1 diabetes mellitus (HCC)    Severe sepsis with septic shock (CODE) ([facility])    Pneumonia, bacterial    Acute respiratory failure ([facility])    Altered mental status    Frailty    Septic shock ([facility]    Urinary tract infection without hematuria    Diabetes mellitus type 1 ([facility]    [facility]CA (acute kidney injury) ([facility]  Resolved Problems:    * No resolved hospital problems. *      Objective:     Patient Vitals for the past 24 hrs:   Temp Pulse Resp BP SpO2   06/01/22 0723 99 °F (37.2 °C) 98 19 103/62 99 %   06/01/22 0350 98.4 °F (36.9 °C) 91 18 114/72 97 %   05/31/22 2301 98.4 °F (36.9 °C) 89 17 112/63 96 %   05/31/22 2047 -- 85 -- -- --   05/31/22 1932 99.5 °F (37.5 °C) 94 17 109/61 96 %   05/31/22 1512 99.3 °F (37.4 °C) 94 19 (!) 102/55 96 %   05/31/22 1105 99.1 °F (37.3 °C) 88 19 106/60 98 %   05/31/22 0926 -- -- -- 127/63 --       Oxygen Therapy  SpO2: 99 %  Pulse Oximeter Device Mode: Continuous  Pulse Oximeter Device Location: Finger  O2 Device: None (Room air)  Skin Assessment: Clean, dry, & intact  Skin Protection for O2 Device: Yes  Orientation: Bilateral  Location: Cheek  Intervention(s): Mouth Care,Skin Barrier  FiO2 : 30 %  O2 Flow Rate (L/min): 2 L/min    Estimated body mass index is 28.1 kg/m² as calculated from the following:    Height as of this encounter: 5' 6\" (1.676 m). Weight as of this encounter: 174 lb 1.6 oz (79 kg).     Intake/Output Summary (Last 24 hours) at 6/1/2022 3546  Last data filed at 6/1/2022 0428  Gross per 24 hour   Intake 0 ml   Output 1130 ml   Net -1130 ml         Physical Exam:     Blood pressure 103/62, pulse 98, temperature 99 °F (37.2 °C), temperature source Oral, resp. rate 19, height 5' 6\" (1.676 m), weight 174 lb 1.6 oz (79 kg), SpO2 99 %. General:    Chronically ill-appearing  Head:  Normocephalic, atraumatic  Eyes:  Sclerae appear normal.  Pupils equally round. ENT:  Nares appear normal, no drainage. Moist oral mucosa. Central line in place  Neck:  No restricted ROM. Trachea midline   CV:   RRR. No m/r/g. No jugular venous distension. Lungs:   CTAB. No wheezing, rhonchi, or rales. Respirations even, unlabored  Abdomen: Bowel sounds present. Soft, nontender, nondistended. Extremities: No cyanosis or clubbing. No edema  Skin:     No rashes and normal coloration. Warm and dry. Neuro:  CN II-XII grossly intact. Sensation intact. A&Ox3  Psych:  Normal mood and affect.       I have reviewed ordered lab tests and independently visualized imaging below:    Recent Labs:  Recent Results (from the past 48 hour(s))   Culture, Blood 1    Collection Time: 05/30/22  9:15 AM    Specimen: Blood   Result Value Ref Range    Special Requests left cvc     Culture NO GROWTH 2 DAYS     POCT Glucose    Collection Time: 05/30/22 12:21 PM   Result Value Ref Range    POC Glucose 258 (H) 65 - 100 mg/dL    Performed by: GurmeetDearLocalRosa Elena    POCT Glucose    Collection Time: 05/30/22  6:26 PM   Result Value Ref Range    POC Glucose 77 65 - 100 mg/dL    Performed by: GurmeetDearLocalnCNA    POCT Glucose    Collection Time: 05/31/22 12:50 AM   Result Value Ref Range    POC Glucose 160 (H) 65 - 100 mg/dL    Performed by: CharleyinalLaura    Basic Metabolic Panel    Collection Time: 05/31/22  4:11 AM   Result Value Ref Range    Sodium 138 136 - 145 mmol/L    Potassium 3.6 3.5 - 5.1 mmol/L    Chloride 103 98 - 107 mmol/L    CO2 27 21 - 32 mmol/L    Anion Gap 8 7 - 16 mmol/L    Glucose 296 (H) 65 - 100 mg/dL    BUN 18 6 - 23 MG/DL    CREATININE 4.90 (H) 0.6 - 1.0 MG/DL    GFR African American 12 (L) >60 ml/min/1.73m2    GFR Non- 10 (L) >60 ml/min/1.73m2 Calcium 8.0 (L) 8.3 - 10.4 MG/DL   CBC with Auto Differential    Collection Time: 05/31/22  4:11 AM   Result Value Ref Range    WBC 14.8 (H) 4.3 - 11.1 K/uL    RBC 2.88 (L) 4.05 - 5.2 M/uL    Hemoglobin 7.4 (L) 11.7 - 15.4 g/dL    Hematocrit 23.6 (L) 35.8 - 46.3 %    MCV 81.9 79.6 - 97.8 FL    MCH 25.7 (L) 26.1 - 32.9 PG    MCHC 31.4 31.4 - 35.0 g/dL    RDW 14.9 (H) 11.9 - 14.6 %    Platelets 904 063 - 405 K/uL    MPV 9.5 9.4 - 12.3 FL    nRBC 0.05 0.0 - 0.2 K/uL    Seg Neutrophils 60 43 - 78 %    Lymphocytes 15 13 - 44 %    Monocytes 14 (H) 4.0 - 12.0 %    Eosinophils % 2 0.5 - 7.8 %    Basophils 0 0.0 - 2.0 %    Immature Granulocytes 9 (H) 0.0 - 5.0 %    Segs Absolute 8.9 (H) 1.7 - 8.2 K/UL    Absolute Lymph # 2.2 0.5 - 4.6 K/UL    Absolute Mono # 2.1 (H) 0.1 - 1.3 K/UL    Absolute Eos # 0.3 0.0 - 0.8 K/UL    Basophils Absolute 0.0 0.0 - 0.2 K/UL    Absolute Immature Granulocyte 1.3 (H) 0.0 - 0.5 K/UL    RBC Comment SLIGHT  HYPOCHROMIA        WBC Comment Result Confirmed By Smear      Platelet Comment ADEQUATE      Differential Type AUTOMATED     Transferrin Saturation    Collection Time: 05/31/22  4:11 AM   Result Value Ref Range    Iron 45 35 - 150 ug/dL    TIBC 139 (L) 250 - 450 ug/dL    TRANSFERRIN SATURATION 32 >20 %   Reticulocytes    Collection Time: 05/31/22  4:11 AM   Result Value Ref Range    Reticulocyte Count,Automated 0.8 0.3 - 2.0 %    Absolute Retic # 0.0222 (L) 0.026 - 0.095 M/ul    Immature Retic Fraction 17.5 (H) 3.0 - 15.9 %    Retic Hemoglobin conc. 33 29 - 35 pg   Ferritin    Collection Time: 05/31/22  4:11 AM   Result Value Ref Range    Ferritin 380 8 - 388 NG/ML   Transferrin    Collection Time: 05/31/22  4:11 AM   Result Value Ref Range    Transferrin 120 (L) 202 - 364 mg/dL   POCT Glucose    Collection Time: 05/31/22  6:34 AM   Result Value Ref Range    POC Glucose 320 (H) 65 - 100 mg/dL    Performed by: Suman    POCT Glucose    Collection Time: 05/31/22 11:31 AM   Result Value Ref Range    POC Glucose 175 (H) 65 - 100 mg/dL    Performed by: MollyCT    POCT Glucose    Collection Time: 05/31/22  5:53 PM   Result Value Ref Range    POC Glucose 110 (H) 65 - 100 mg/dL    Performed by: NathalieaPCT    POCT Glucose    Collection Time: 06/01/22  1:50 AM   Result Value Ref Range    POC Glucose 255 (H) 65 - 100 mg/dL    Performed by: NoePCT    Basic Metabolic Panel    Collection Time: 06/01/22  4:24 AM   Result Value Ref Range    Sodium 142 136 - 145 mmol/L    Potassium 3.0 (L) 3.5 - 5.1 mmol/L    Chloride 106 98 - 107 mmol/L    CO2 27 21 - 32 mmol/L    Anion Gap 9 7 - 16 mmol/L    Glucose 113 (H) 65 - 100 mg/dL    BUN 19 6 - 23 MG/DL    CREATININE 5.70 (H) 0.6 - 1.0 MG/DL    GFR African American 10 (L) >60 ml/min/1.73m2    GFR Non- 8 (L) >60 ml/min/1.73m2    Calcium 8.3 8.3 - 10.4 MG/DL   Vancomycin Level, Random    Collection Time: 06/01/22  4:24 AM   Result Value Ref Range    Vancomycin Rm 25.2 UG/ML   CBC    Collection Time: 06/01/22  4:24 AM   Result Value Ref Range    WBC 13.9 (H) 4.3 - 11.1 K/uL    RBC 2.92 (L) 4.05 - 5.2 M/uL    Hemoglobin 7.7 (L) 11.7 - 15.4 g/dL    Hematocrit 24.3 (L) 35.8 - 46.3 %    MCV 83.2 79.6 - 97.8 FL    MCH 26.4 26.1 - 32.9 PG    MCHC 31.7 31.4 - 35.0 g/dL    RDW 15.0 (H) 11.9 - 14.6 %    Platelets 094 140 - 841 K/uL    MPV 9.5 9.4 - 12.3 FL    nRBC 0.05 0.0 - 0.2 K/uL   POCT Glucose    Collection Time: 06/01/22  4:49 AM   Result Value Ref Range    POC Glucose 113 (H) 65 - 100 mg/dL    Performed by: Eric Gene        Other Studies:  XR CHEST (2 VW)    Result Date: 5/30/2022  Chest 2 view CLINICAL INDICATION: Coughing, subacute, moderate, leukocytosis, follow-up of bilateral lung opacities and left retrocardiac density COMPARISON: 5/26/2022 TECHNIQUE: Sitting upright AP and lateral views of the chest FINDINGS: Lung volumes are well inflated, overall slightly improved aeration since prior.  No evidence of a pneumothorax, increasing effusion, overt CHF or new consolidation. Persisting although decreased groundglass and partially consolidative opacity of the left lower lobe. Diffuse mild pulmonary reticular opacities are again seen most compatible with pulmonary edema although decreased since prior. Stable left jugular catheter. Interval removal of previous right jugular catheter. Artifact due to external tubes and wires. Cardiomediastinal silhouette and hilar contours within stable given technique and positioning. Patient is rotated slightly. No acute osseous abnormalities are seen. Lateral view details are limited given suboptimal positioning, prominence of overlapping structures. Stent tubing and/or drainage catheter is partially seen in the abdomen. 1. Decreasing left lower lobe infiltrate. 2. Decreasing pulmonary edema.        Current Meds:  Current Facility-Administered Medications   Medication Dose Route Frequency    loperamide (IMODIUM) capsule 2 mg  2 mg Oral 4x Daily PRN    insulin glargine (LANTUS) injection vial 8 Units  8 Units SubCUTAneous Daily    insulin lispro (HUMALOG) injection vial 0-4 Units  0-4 Units SubCUTAneous Q6H    insulin lispro (HUMALOG) injection vial 1 Units  1 Units SubCUTAneous TID WC    vancomycin (VANCOCIN) intermittent dosing (placeholder)   Other RX Placeholder    piperacillin-tazobactam (ZOSYN) 3,375 mg in sodium chloride 0.9 % 50 mL IVPB (mini-bag)  3,375 mg IntraVENous Q12H    guaiFENesin-dextromethorphan (ROBITUSSIN DM) 100-10 MG/5ML syrup 5 mL  5 mL Oral Q4H PRN    heparin (porcine) injection 10,000 Units  10,000 Units IntraVENous PRN    albuterol (PROVENTIL) nebulizer solution 2.5 mg  2.5 mg Nebulization Q4H PRN    dextrose 5 % and 0.45 % sodium chloride infusion   IntraVENous Continuous PRN    glucose chewable tablet 16 g  4 tablet Oral PRN    dextrose bolus 10% 125 mL  125 mL IntraVENous PRN    Or    dextrose bolus 10% 250 mL  250 mL IntraVENous PRN    glucagon injection 1 mg  1 mg IntraMUSCular PRN    dextrose 5 % solution  100 mL/hr IntraVENous PRN    dextrose bolus 10% 125 mL  125 mL IntraVENous PRN    Or    dextrose bolus 10% 250 mL  250 mL IntraVENous PRN    glucagon injection 1 mg  1 mg IntraMUSCular PRN    medicated lip ointment (BLISTEX)   Topical PRN    sodium chloride flush 0.9 % injection 5-40 mL  5-40 mL IntraVENous 2 times per day    sodium chloride flush 0.9 % injection 5-40 mL  5-40 mL IntraVENous PRN    heparin (porcine) injection 5,000 Units  5,000 Units SubCUTAneous q8h    ondansetron (ZOFRAN-ODT) disintegrating tablet 4 mg  4 mg Oral Q8H PRN    Or    ondansetron (ZOFRAN) injection 4 mg  4 mg IntraVENous Q6H PRN    polyethylene glycol (GLYCOLAX) packet 17 g  17 g Oral Daily PRN    acetaminophen (TYLENOL) tablet 650 mg  650 mg Oral Q6H PRN    Or    acetaminophen (TYLENOL) suppository 650 mg  650 mg Rectal Q6H PRN    dextrose bolus 10% 250 mL  250 mL IntraVENous PRN    potassium chloride 10 mEq/100 mL IVPB (Peripheral Line)  10 mEq IntraVENous PRN    magnesium sulfate 1000 mg in dextrose 5% 100 mL IVPB  1,000 mg IntraVENous PRN    sodium phosphate 10 mmol in sodium chloride 0.9 % 250 mL IVPB  10 mmol IntraVENous PRN    Or    sodium phosphate 15 mmol in dextrose 5 % 250 mL IVPB  15 mmol IntraVENous PRN    Or    sodium phosphate 20 mmol in dextrose 5 % 500 mL IVPB  20 mmol IntraVENous PRN    dextrose 5 % and 0.45 % sodium chloride infusion   IntraVENous Continuous PRN    sodium chloride flush 0.9 % injection 5-40 mL  5-40 mL IntraVENous 2 times per day    sodium chloride flush 0.9 % injection 5-40 mL  5-40 mL IntraVENous PRN    0.9 % sodium chloride infusion   IntraVENous PRN       Signed: Queen China DO    Part of this note may have been written by using a voice dictation software. The note has been proof read but may still contain some grammatical/other typographical errors.

## 2022-06-01 NOTE — PROGRESS NOTES
Pt laying in bed with no complaints at this time. Pt is on room air with even and unlabored respirations. Pt remains NPO. Call light is in place.

## 2022-06-01 NOTE — PROGRESS NOTES
Speech-Language Pathology    Patient NPO for procedure on this date. Speech therapy will continue plan of care and treat, as patient is available.      Lazarus Midget, M.S., ERIKA-PRATEEKT

## 2022-06-01 NOTE — PROGRESS NOTES
VANCO DAILY FOLLOW UP RENAL INSUFFICIENCY PATIENT   [address] [facility] Pharmacokinetic Monitoring Service - Vancomycin    Consulting Provider: Dr. Isabella Ch   Indication: PNA  Target Concentration: Pre-Dialysis Concentration 21-24 mg/L  Day of Therapy: 3  Additional Antimicrobials: pip/tazo    Pertinent Laboratory Values: Wt Readings from Last 1 Encounters:   06/01/22 174 lb 1.6 oz (79 kg)     Temp Readings from Last 1 Encounters:   06/01/22 97.8 °F (36.6 °C) (Temporal)     Recent Labs     05/30/22  0356 05/31/22  0411 06/01/22  0424   BUN 28* 18 19   CREATININE 6.30* 4.90* 5.70*   WBC 20.0* 14.8* 13.9*     Lab Results   Component Value Date    VANCORANDOM 25.2 06/01/2022     MRSA Nasal Swab: not ordered. Order placed by pharmacy.     Assessment:  Date:  Dose/Freq Admin Times Level/Time:   5/30 HD 1500 mg x 1 1022    5/31 6/1 HD   Rd @ 9601 = 25.2   6/2              Plan:  Concentration-guided dosing due to intermittent hemodialysis  HD performed today, pre-HD level is supratherapeutic  No dose today, will redose post next HD session  Will check pre-HD level tomorrow prior to re-dosing  Pharmacy will continue to monitor patient and adjust therapy as indicated    Thank you for the consult,  Johanna Diaz, [facility]

## 2022-06-01 NOTE — PROGRESS NOTES
TRANSFER - OUT REPORT:    Verbal report given to Amisha Gage RN on Nichole Tee  being transferred to [address] [place] [address] for routine progression of patient care       Report consisted of patient's Situation, Background, Assessment and   Recommendations(SBAR). Information from the following report(s) Nurse Handoff Report, Surgery Report, MAR and Event Log was reviewed with the receiving nurse. Lines:   Tunneled Hemodialysis Catheter Right Neck (Active)       Triple Lumen Hemodialysis Catheter (Trialysis) Left Neck (Active)   $Vascath $Yes 05/23/22 1648   Continued need for line? Yes 06/01/22 0955   Site Assessment Clean, dry & intact 06/01/22 0955   CVC Lumen Status Capped 06/01/22 0458   Venous Lumen Status No blood return;Flushed; Infusing 06/01/22 0955   Arterial Lumen Status Blood return noted; Flushed; Infusing 06/01/22 0955   Alcohol Cap Used Yes 06/01/22 [facility] disinfected 06/01/22 0955   Dressing Type Antimicrobial;Transparent 06/01/22 0955   Date of Last Dressing Change 05/30/22 06/01/22 0955   Dressing Status Clean, dry & intact 06/01/22 0458   Dressing Intervention New;Dressing changed 05/30/22 1926   Dressing Change Due 05/30/22 05/26/22 0710        Opportunity for questions and clarification was provided.

## 2022-06-02 ENCOUNTER — HOSPITAL ENCOUNTER (INPATIENT)
Age: 47
LOS: 9 days | Discharge: HOME HEALTH | DRG: 948 | End: 2022-06-11
Attending: PHYSICAL MEDICINE & REHABILITATION | Admitting: PHYSICAL MEDICINE & REHABILITATION
Payer: COMMERCIAL

## 2022-06-02 VITALS
WEIGHT: 174.1 LBS | OXYGEN SATURATION: 96 % | SYSTOLIC BLOOD PRESSURE: 101 MMHG | HEIGHT: 66 IN | TEMPERATURE: 99.5 F | RESPIRATION RATE: 18 BRPM | DIASTOLIC BLOOD PRESSURE: 52 MMHG | HEART RATE: 97 BPM | BODY MASS INDEX: 27.98 KG/M2

## 2022-06-02 PROBLEM — F41.8 DEPRESSION WITH ANXIETY: Status: ACTIVE | Noted: 2017-07-11

## 2022-06-02 PROBLEM — E10.42 DIABETIC POLYNEUROPATHY ASSOCIATED WITH TYPE 1 DIABETES MELLITUS (HCC): Status: ACTIVE | Noted: 2017-02-03

## 2022-06-02 PROBLEM — E10.3319: Status: ACTIVE | Noted: 2017-03-13

## 2022-06-02 PROBLEM — R53.81 PHYSICAL DEBILITY: Status: ACTIVE | Noted: 2022-06-02

## 2022-06-02 LAB
ANION GAP SERPL CALC-SCNC: 13 MMOL/L (ref 7–16)
APPEARANCE UR: ABNORMAL
BACTERIA URNS QL MICRO: ABNORMAL /HPF
BILIRUB UR QL: ABNORMAL
BUN SERPL-MCNC: 19 MG/DL (ref 6–23)
CALCIUM SERPL-MCNC: 7.9 MG/DL (ref 8.3–10.4)
CASTS URNS QL MICRO: 0 /LPF
CHLORIDE SERPL-SCNC: 100 MMOL/L (ref 98–107)
CO2 SERPL-SCNC: 25 MMOL/L (ref 21–32)
COLOR UR: YELLOW
CREAT SERPL-MCNC: 4.6 MG/DL (ref 0.6–1)
CRYSTALS URNS QL MICRO: 0 /LPF
EPI CELLS #/AREA URNS HPF: ABNORMAL /HPF
ERYTHROCYTE [DISTWIDTH] IN BLOOD BY AUTOMATED COUNT: 15.3 % (ref 11.9–14.6)
GLUCOSE BLD STRIP.AUTO-MCNC: 170 MG/DL (ref 65–100)
GLUCOSE BLD STRIP.AUTO-MCNC: 184 MG/DL (ref 65–100)
GLUCOSE BLD STRIP.AUTO-MCNC: 198 MG/DL (ref 65–100)
GLUCOSE BLD STRIP.AUTO-MCNC: 388 MG/DL (ref 65–100)
GLUCOSE BLD STRIP.AUTO-MCNC: 498 MG/DL (ref 65–100)
GLUCOSE SERPL-MCNC: 360 MG/DL (ref 65–100)
GLUCOSE UR STRIP.AUTO-MCNC: NEGATIVE MG/DL
HCT VFR BLD AUTO: 22.9 % (ref 35.8–46.3)
HGB BLD-MCNC: 7.3 G/DL (ref 11.7–15.4)
HGB UR QL STRIP: ABNORMAL
KETONES UR QL STRIP.AUTO: ABNORMAL MG/DL
LEUKOCYTE ESTERASE UR QL STRIP.AUTO: ABNORMAL
MCH RBC QN AUTO: 26.4 PG (ref 26.1–32.9)
MCHC RBC AUTO-ENTMCNC: 31.9 G/DL (ref 31.4–35)
MCV RBC AUTO: 83 FL (ref 79.6–97.8)
MM INDURATION, POC: 0 MM (ref 0–5)
MUCOUS THREADS URNS QL MICRO: 0 /LPF
NITRITE UR QL STRIP.AUTO: NEGATIVE
NRBC # BLD: 0.03 K/UL (ref 0–0.2)
OTHER OBSERVATIONS: ABNORMAL
PH UR STRIP: 7.5 [PH] (ref 5–9)
PLATELET # BLD AUTO: 381 K/UL (ref 150–450)
PMV BLD AUTO: 9.3 FL (ref 9.4–12.3)
POTASSIUM SERPL-SCNC: 3.5 MMOL/L (ref 3.5–5.1)
PPD, POC: NEGATIVE
PROT UR STRIP-MCNC: 30 MG/DL
RBC # BLD AUTO: 2.76 M/UL (ref 4.05–5.2)
RBC #/AREA URNS HPF: ABNORMAL /HPF
SERVICE CMNT-IMP: ABNORMAL
SODIUM SERPL-SCNC: 138 MMOL/L (ref 136–145)
SP GR UR REFRACTOMETRY: 1.02 (ref 1–1.02)
URINE CULTURE IF INDICATED: ABNORMAL
UROBILINOGEN UR QL STRIP.AUTO: 0.2 EU/DL (ref 0.2–1)
WBC # BLD AUTO: 15.2 K/UL (ref 4.3–11.1)
WBC URNS QL MICRO: ABNORMAL /HPF

## 2022-06-02 PROCEDURE — 87106 FUNGI IDENTIFICATION YEAST: CPT

## 2022-06-02 PROCEDURE — 97530 THERAPEUTIC ACTIVITIES: CPT

## 2022-06-02 PROCEDURE — 6370000000 HC RX 637 (ALT 250 FOR IP): Performed by: FAMILY MEDICINE

## 2022-06-02 PROCEDURE — 6360000002 HC RX W HCPCS: Performed by: FAMILY MEDICINE

## 2022-06-02 PROCEDURE — 81001 URINALYSIS AUTO W/SCOPE: CPT

## 2022-06-02 PROCEDURE — 97165 OT EVAL LOW COMPLEX 30 MIN: CPT

## 2022-06-02 PROCEDURE — 85027 COMPLETE CBC AUTOMATED: CPT

## 2022-06-02 PROCEDURE — 6370000000 HC RX 637 (ALT 250 FOR IP): Performed by: PHYSICIAN ASSISTANT

## 2022-06-02 PROCEDURE — 92526 ORAL FUNCTION THERAPY: CPT

## 2022-06-02 PROCEDURE — 6360000002 HC RX W HCPCS: Performed by: INTERNAL MEDICINE

## 2022-06-02 PROCEDURE — 97162 PT EVAL MOD COMPLEX 30 MIN: CPT

## 2022-06-02 PROCEDURE — 87086 URINE CULTURE/COLONY COUNT: CPT

## 2022-06-02 PROCEDURE — 2580000003 HC RX 258: Performed by: FAMILY MEDICINE

## 2022-06-02 PROCEDURE — 80048 BASIC METABOLIC PNL TOTAL CA: CPT

## 2022-06-02 PROCEDURE — 1180000000 HC REHAB R&B

## 2022-06-02 PROCEDURE — 36415 COLL VENOUS BLD VENIPUNCTURE: CPT

## 2022-06-02 PROCEDURE — 97535 SELF CARE MNGMENT TRAINING: CPT

## 2022-06-02 PROCEDURE — 99222 1ST HOSP IP/OBS MODERATE 55: CPT | Performed by: PHYSICAL MEDICINE & REHABILITATION

## 2022-06-02 PROCEDURE — 2580000003 HC RX 258: Performed by: INTERNAL MEDICINE

## 2022-06-02 PROCEDURE — 99152 MOD SED SAME PHYS/QHP 5/>YRS: CPT

## 2022-06-02 PROCEDURE — 6370000000 HC RX 637 (ALT 250 FOR IP): Performed by: NURSE PRACTITIONER

## 2022-06-02 PROCEDURE — 82962 GLUCOSE BLOOD TEST: CPT

## 2022-06-02 RX ORDER — FOLIC ACID 1 MG/1
1 TABLET ORAL DAILY
Status: CANCELLED | OUTPATIENT
Start: 2022-06-02

## 2022-06-02 RX ORDER — ACETAMINOPHEN 325 MG/1
650 TABLET ORAL EVERY 6 HOURS PRN
Status: CANCELLED | OUTPATIENT
Start: 2022-06-02

## 2022-06-02 RX ORDER — ACETAMINOPHEN 325 MG/1
650 TABLET ORAL EVERY 6 HOURS PRN
Status: DISCONTINUED | OUTPATIENT
Start: 2022-06-02 | End: 2022-06-11 | Stop reason: HOSPADM

## 2022-06-02 RX ORDER — ALBUTEROL SULFATE 2.5 MG/3ML
2.5 SOLUTION RESPIRATORY (INHALATION) EVERY 4 HOURS PRN
Status: CANCELLED | OUTPATIENT
Start: 2022-06-02

## 2022-06-02 RX ORDER — ONDANSETRON 4 MG/1
4 TABLET, ORALLY DISINTEGRATING ORAL EVERY 6 HOURS PRN
Status: DISCONTINUED | OUTPATIENT
Start: 2022-06-02 | End: 2022-06-11 | Stop reason: HOSPADM

## 2022-06-02 RX ORDER — FOLIC ACID 1 MG/1
1 TABLET ORAL DAILY
Qty: 30 TABLET | Refills: 3 | Status: ON HOLD
Start: 2022-06-03 | End: 2022-06-10 | Stop reason: SDUPTHER

## 2022-06-02 RX ORDER — GLUCAGON 1 MG/ML
1 KIT INJECTION PRN
Status: CANCELLED | OUTPATIENT
Start: 2022-06-02

## 2022-06-02 RX ORDER — LOPERAMIDE HYDROCHLORIDE 2 MG/1
2 CAPSULE ORAL 4 TIMES DAILY PRN
Status: CANCELLED | OUTPATIENT
Start: 2022-06-02

## 2022-06-02 RX ORDER — ACETAMINOPHEN 650 MG/1
650 SUPPOSITORY RECTAL EVERY 6 HOURS PRN
Status: CANCELLED | OUTPATIENT
Start: 2022-06-02

## 2022-06-02 RX ORDER — POLYETHYLENE GLYCOL 3350 17 G/17G
17 POWDER, FOR SOLUTION ORAL DAILY PRN
Status: DISCONTINUED | OUTPATIENT
Start: 2022-06-02 | End: 2022-06-11 | Stop reason: HOSPADM

## 2022-06-02 RX ORDER — FOLIC ACID 1 MG/1
1 TABLET ORAL DAILY
Status: DISCONTINUED | OUTPATIENT
Start: 2022-06-02 | End: 2022-06-02 | Stop reason: HOSPADM

## 2022-06-02 RX ORDER — FERROUS SULFATE 325(65) MG
325 TABLET ORAL 2 TIMES DAILY WITH MEALS
Qty: 60 TABLET | Refills: 3 | Status: ON HOLD
Start: 2022-06-02 | End: 2022-06-10 | Stop reason: SDUPTHER

## 2022-06-02 RX ORDER — POLYETHYLENE GLYCOL 3350 17 G/17G
17 POWDER, FOR SOLUTION ORAL DAILY PRN
Status: CANCELLED | OUTPATIENT
Start: 2022-06-02

## 2022-06-02 RX ORDER — ACETAMINOPHEN 650 MG/1
650 SUPPOSITORY RECTAL EVERY 6 HOURS PRN
Status: DISCONTINUED | OUTPATIENT
Start: 2022-06-02 | End: 2022-06-11 | Stop reason: HOSPADM

## 2022-06-02 RX ORDER — GLUCAGON 1 MG/ML
1 KIT INJECTION PRN
Status: DISCONTINUED | OUTPATIENT
Start: 2022-06-02 | End: 2022-06-03

## 2022-06-02 RX ORDER — GUAIFENESIN/DEXTROMETHORPHAN 100-10MG/5
5 SYRUP ORAL EVERY 4 HOURS PRN
Status: DISCONTINUED | OUTPATIENT
Start: 2022-06-02 | End: 2022-06-11 | Stop reason: HOSPADM

## 2022-06-02 RX ORDER — INSULIN GLARGINE 100 [IU]/ML
10 INJECTION, SOLUTION SUBCUTANEOUS NIGHTLY
Status: DISCONTINUED | OUTPATIENT
Start: 2022-06-02 | End: 2022-06-03

## 2022-06-02 RX ORDER — GUAIFENESIN/DEXTROMETHORPHAN 100-10MG/5
5 SYRUP ORAL EVERY 4 HOURS PRN
Status: CANCELLED | OUTPATIENT
Start: 2022-06-02

## 2022-06-02 RX ORDER — INSULIN LISPRO 100 [IU]/ML
5 INJECTION, SOLUTION INTRAVENOUS; SUBCUTANEOUS
Status: DISCONTINUED | OUTPATIENT
Start: 2022-06-02 | End: 2022-06-02 | Stop reason: HOSPADM

## 2022-06-02 RX ORDER — ALBUTEROL SULFATE 2.5 MG/3ML
2.5 SOLUTION RESPIRATORY (INHALATION) EVERY 4 HOURS PRN
Status: DISCONTINUED | OUTPATIENT
Start: 2022-06-02 | End: 2022-06-11 | Stop reason: HOSPADM

## 2022-06-02 RX ORDER — FOLIC ACID 1 MG/1
1 TABLET ORAL DAILY
Status: DISCONTINUED | OUTPATIENT
Start: 2022-06-02 | End: 2022-06-11 | Stop reason: HOSPADM

## 2022-06-02 RX ORDER — FERROUS SULFATE 325(65) MG
325 TABLET ORAL 2 TIMES DAILY WITH MEALS
Status: DISCONTINUED | OUTPATIENT
Start: 2022-06-02 | End: 2022-06-11 | Stop reason: HOSPADM

## 2022-06-02 RX ORDER — DIMETHICONE, CAMPHOR (SYNTHETIC), MENTHOL, AND PHENOL 1.1; .5; .625; .5 G/100G; G/100G; G/100G; G/100G
OINTMENT TOPICAL PRN
Status: DISCONTINUED | OUTPATIENT
Start: 2022-06-02 | End: 2022-06-11 | Stop reason: HOSPADM

## 2022-06-02 RX ORDER — INSULIN LISPRO 100 [IU]/ML
5 INJECTION, SOLUTION INTRAVENOUS; SUBCUTANEOUS ONCE
Status: COMPLETED | OUTPATIENT
Start: 2022-06-02 | End: 2022-06-02

## 2022-06-02 RX ORDER — ONDANSETRON 4 MG/1
4 TABLET, ORALLY DISINTEGRATING ORAL EVERY 6 HOURS PRN
Status: CANCELLED | OUTPATIENT
Start: 2022-06-02

## 2022-06-02 RX ORDER — FERROUS SULFATE 325(65) MG
325 TABLET ORAL 2 TIMES DAILY WITH MEALS
Status: CANCELLED | OUTPATIENT
Start: 2022-06-02

## 2022-06-02 RX ORDER — INSULIN LISPRO 100 [IU]/ML
5 INJECTION, SOLUTION INTRAVENOUS; SUBCUTANEOUS
Status: DISCONTINUED | OUTPATIENT
Start: 2022-06-02 | End: 2022-06-08

## 2022-06-02 RX ORDER — DIMETHICONE, CAMPHOR (SYNTHETIC), MENTHOL, AND PHENOL 1.1; .5; .625; .5 G/100G; G/100G; G/100G; G/100G
OINTMENT TOPICAL PRN
Status: CANCELLED | OUTPATIENT
Start: 2022-06-02

## 2022-06-02 RX ORDER — INSULIN GLARGINE 100 [IU]/ML
10 INJECTION, SOLUTION SUBCUTANEOUS NIGHTLY
Qty: 10 ML | Refills: 3 | Status: ON HOLD
Start: 2022-06-02 | End: 2022-06-10 | Stop reason: HOSPADM

## 2022-06-02 RX ORDER — INSULIN LISPRO 100 [IU]/ML
0-4 INJECTION, SOLUTION INTRAVENOUS; SUBCUTANEOUS
Qty: 10 ML | Refills: 0
Start: 2022-06-02

## 2022-06-02 RX ORDER — LOPERAMIDE HYDROCHLORIDE 2 MG/1
2 CAPSULE ORAL 4 TIMES DAILY PRN
Status: DISCONTINUED | OUTPATIENT
Start: 2022-06-02 | End: 2022-06-11 | Stop reason: HOSPADM

## 2022-06-02 RX ORDER — INSULIN LISPRO 100 [IU]/ML
0-4 INJECTION, SOLUTION INTRAVENOUS; SUBCUTANEOUS EVERY 6 HOURS
Status: CANCELLED | OUTPATIENT
Start: 2022-06-02

## 2022-06-02 RX ORDER — INSULIN GLARGINE 100 [IU]/ML
10 INJECTION, SOLUTION SUBCUTANEOUS NIGHTLY
Status: DISCONTINUED | OUTPATIENT
Start: 2022-06-02 | End: 2022-06-02 | Stop reason: HOSPADM

## 2022-06-02 RX ORDER — INSULIN LISPRO 100 [IU]/ML
5 INJECTION, SOLUTION INTRAVENOUS; SUBCUTANEOUS
Status: CANCELLED | OUTPATIENT
Start: 2022-06-02

## 2022-06-02 RX ORDER — INSULIN LISPRO 100 [IU]/ML
5 INJECTION, SOLUTION INTRAVENOUS; SUBCUTANEOUS
Qty: 10 ML | Refills: 0 | Status: ON HOLD
Start: 2022-06-02 | End: 2022-06-10 | Stop reason: SDUPTHER

## 2022-06-02 RX ORDER — ALBUTEROL SULFATE 2.5 MG/3ML
2.5 SOLUTION RESPIRATORY (INHALATION) EVERY 4 HOURS PRN
Qty: 120 EACH | Refills: 3 | Status: ON HOLD
Start: 2022-06-02 | End: 2022-06-10 | Stop reason: SDUPTHER

## 2022-06-02 RX ORDER — INSULIN GLARGINE 100 [IU]/ML
10 INJECTION, SOLUTION SUBCUTANEOUS NIGHTLY
Status: CANCELLED | OUTPATIENT
Start: 2022-06-02

## 2022-06-02 RX ORDER — INSULIN LISPRO 100 [IU]/ML
0-4 INJECTION, SOLUTION INTRAVENOUS; SUBCUTANEOUS EVERY 6 HOURS
Status: DISCONTINUED | OUTPATIENT
Start: 2022-06-02 | End: 2022-06-03

## 2022-06-02 RX ADMIN — SODIUM CHLORIDE, PRESERVATIVE FREE 10 ML: 5 INJECTION INTRAVENOUS at 07:29

## 2022-06-02 RX ADMIN — INSULIN LISPRO 5 UNITS: 100 INJECTION, SOLUTION INTRAVENOUS; SUBCUTANEOUS at 01:44

## 2022-06-02 RX ADMIN — PIPERACILLIN AND TAZOBACTAM 3375 MG: 3; .375 INJECTION, POWDER, LYOPHILIZED, FOR SOLUTION INTRAVENOUS at 00:27

## 2022-06-02 RX ADMIN — INSULIN LISPRO 5 UNITS: 100 INJECTION, SOLUTION INTRAVENOUS; SUBCUTANEOUS at 17:09

## 2022-06-02 RX ADMIN — HEPARIN SODIUM 5000 UNITS: 5000 INJECTION INTRAVENOUS; SUBCUTANEOUS at 05:33

## 2022-06-02 RX ADMIN — FOLIC ACID 1 MG: 1 TABLET ORAL at 08:22

## 2022-06-02 RX ADMIN — FERROUS SULFATE TAB 325 MG (65 MG ELEMENTAL FE) 325 MG: 325 (65 FE) TAB at 17:34

## 2022-06-02 RX ADMIN — INSULIN LISPRO 5 UNITS: 100 INJECTION, SOLUTION INTRAVENOUS; SUBCUTANEOUS at 11:32

## 2022-06-02 RX ADMIN — SODIUM CHLORIDE, PRESERVATIVE FREE 10 ML: 5 INJECTION INTRAVENOUS at 00:28

## 2022-06-02 RX ADMIN — INSULIN GLARGINE 10 UNITS: 100 INJECTION, SOLUTION SUBCUTANEOUS at 22:17

## 2022-06-02 RX ADMIN — SODIUM CHLORIDE, PRESERVATIVE FREE 10 ML: 5 INJECTION INTRAVENOUS at 00:26

## 2022-06-02 RX ADMIN — FERROUS SULFATE TAB 325 MG (65 MG ELEMENTAL FE) 325 MG: 325 (65 FE) TAB at 08:22

## 2022-06-02 RX ADMIN — SODIUM CHLORIDE, PRESERVATIVE FREE 10 ML: 5 INJECTION INTRAVENOUS at 07:30

## 2022-06-02 RX ADMIN — INSULIN LISPRO 5 UNITS: 100 INJECTION, SOLUTION INTRAVENOUS; SUBCUTANEOUS at 07:29

## 2022-06-02 RX ADMIN — INSULIN LISPRO 4 UNITS: 100 INJECTION, SOLUTION INTRAVENOUS; SUBCUTANEOUS at 07:28

## 2022-06-02 RX ADMIN — SODIUM CHLORIDE, PRESERVATIVE FREE 10 ML: 5 INJECTION INTRAVENOUS at 00:27

## 2022-06-02 ASSESSMENT — PAIN SCALES - GENERAL
PAINLEVEL_OUTOF10: 0
PAINLEVEL_OUTOF10: 0

## 2022-06-02 NOTE — PROGRESS NOTES
LTG: Patient will tolerate least restrictive diet without overt signs or symptoms of airway compromise. MET 22  STG: Patient will tolerate minced and moist and thin liquids without overt signs or symptoms of airway compromise. MET 22  STG: Patient will participate in modified barium swallow study as clinically indicated. NOT INDICATED    SPEECH LANGUAGE PATHOLOGY: DYSPHAGIA  Daily Note and Discharge    NAME: Marquez Sahne  : 1975  MRN: [id number]    ADMISSION DATE: 2022  ADMITTING DIAGNOSIS: has Ulcer, skin, chronic ([address] Depression with anxiety; Moderate single current episode of major depressive disorder ([address] Encounter to discuss test results; Encounter for annual routine gynecological examination; CKD (chronic kidney disease), stage IV ([address] Diabetic polyneuropathy associated with type 1 diabetes mellitus ([address] Malodorous urine; CKD (chronic kidney disease) stage 3, GFR 30-59 ml/min ([address] Type 1 diabetes mellitus with stage 3 chronic kidney disease ([address] H/O gastroesophageal reflux (GERD); Neuropathy, peripheral, idiopathic; 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 associated with type 1 diabetes mellitus ([address] Premature ovarian failure; Noncompliance with medication regimen; Ureteric stone; Urinary tract infection without hematuria; Hydronephrosis of right kidney; Diabetes mellitus type 1 ([address] Nausea; BLANCA (acute kidney injury) ([address] Acute kidney injury superimposed on chronic kidney disease ([address] Benign hypertension with CKD (chronic kidney disease) stage III (HCC); IDDM (insulin dependent diabetes mellitus); Cardiac arrest ([address] Diabetic ketoacidosis with coma associated with type 1 diabetes mellitus ([address] Severe sepsis with septic shock (CODE) ([address] Pneumonia, bacterial; Acute respiratory failure ([address]  Altered mental status; Frailty; Septic shock ([address] and Normocytic anemia on their problem list.    Date of Eval: 2022  ICD-10: Treatment Diagnosis: R13.12 Dysphagia, Oropharyngeal Phase    RECOMMENDATIONS   Diet:  Diet Solids Recommendation: Easy to Chew  Liquid Consistency Recommendation: Thin    Medications: PO     Recommendations:  (No additional dysphagia treatment indicated at this time.)     Compensatory Swallowing Strategies:Small bites/sips;Upright as possible for all oral intake     Therapeutic Intervention:Patient/Family education     Patient continues to require skilled intervention: No  D/C Recommendations: No follow up therapy recommended post discharge       ASSESSMENT    Dysphagia Diagnosis: Swallow function appears [facility]    Patient presents with functional swallow function. Mild throat clear on first sip of thin liquids when consuming serial sips by straw only. All additional thin liquids trials tolerated without difficulty. Encouraged her to consume only single sips to improve safety with swallow function. Mildly prolonged oral prep with chewables, but functional.     Recommend continue easy to chew diet (per her request) and thin liquids. Medications whole with liquid wash. No additional dysphagia treatment indicated at this time. Patient continues to present with hoarse vocal quality which is slowly improving. Should hoarseness persist, may want to consider ENT consult for further assessment. GENERAL    History of Present Injury/Illness: Ms. Tom Corcoran  has a past medical history of Acute renal failure ([address] CKD (chronic kidney disease) stage 3, GFR 30-59 ml/min ([address] Gastroenteritis, acute, IDDM (insulin dependent diabetes mellitus), Intractable nausea and vomiting, Irregular menses, Kidney stone, Neuropathy, peripheral, idiopathic, Retinopathy, bilateral, Trichomoniasis, Ulcer, skin, chronic ([address] and Vaginal burning. . She also  has a past surgical history that includes cystoscopy,insert ureteral stent (04/2022); amputation (Left); amputation (1/27/12); hx open reduction internal fixation (Right, 2011); and IR TUNNELED CVC PLACE WO SQ PORT/PUMP > 5 YEARS (6/1/2022). Chart Reviewed: Yes  Subjective: Patient upright in chair eating AM meal. Voice remains hoarse, but continues to improve. Behavior/Cognition: Alert; Cooperative  Communication Observation: Functional  Follows Directions: Complex  Respiratory Status: Room air               Current Diet : Easy to chew  Current Liquid Diet : Thin    Pain:   Patient does not c/o pain                             Vision Exceptions: Wears glasses for reading,Wears glasses at all times            OBJECTIVE    Oropharyngeal Phase:   Patient self-fed AM meal of easy to chew textures and thin liquids. Mildly prolonged,but functional oral prep with solid textures. She independently used liquid wash to assist with bolus formation and oral clearing. Delayed throat clear after first sips of thin by serial straw sips. Suspect volume and rate contributing to difficulty. Verbal cues provided for her to take small single straw sips. 100% accurate with performing strategy. No s/sx of airway compromise when strategy was implemented. PLAN    Duration/Frequency: No treatment indicated at this time    Dysphagia Outcome and Severity Scale (OTIS)  Dysphagia Outcome Severity Scale: Level 6: Within functional limits/Modified independence  Interpretation of Tool: The Dysphagia Outcome and Severity Scale (OTIS) is a simple, easy-to-use, 7-point scale developed to systematically rate the functional severity of dysphagia based on objective assessment and make recommendations for diet level, independence level, and type of nutrition. Normal(7), Functional(6), Mild(5), Mild-Moderate(4), Moderate(3), Moderate-Severe(2), Severe(1)    Speech Therapy Prognosis  Prognosis: Good    Education: Patient,RN  Patient Education: Dysphagia, need for single sips  Patient Education Response: Verbalizes understanding    Current Medications:   No current facility-administered medications on file prior to encounter. Current Outpatient Medications on File Prior to Encounter   Medication Sig Dispense Refill    atorvastatin (LIPITOR) 80 MG tablet Take 80 mg by mouth daily (Patient not taking: Reported on 5/27/2022)      brimonidine (ALPHAGAN P) 0.1 % SOLN 1 drop in left eye twice a day (Patient not taking: Reported on 5/27/2022)      ergocalciferol (ERGOCALCIFEROL) 1.25 MG (57619 UT) capsule Take 50,000 Units by mouth (Patient not taking: Reported on 5/27/2022)      gabapentin (NEURONTIN) 300 MG capsule Take 300 mg by mouth daily. (Patient not taking: Reported on 5/27/2022)      Insulin Degludec (TRESIBA FLEXTOUCH) 200 UNIT/ML SOPN Inject 30 Units into the skin daily (Patient not taking: Reported on 5/27/2022)      insulin lispro, 1 Unit Dial, (HUMALOG KWIKPEN) 100 UNIT/ML SOPN 6 TIDAC correction scale 1/50>150, max daily dose 50 units (Patient not taking: Reported on 5/27/2022)      ondansetron (ZOFRAN-ODT) 4 MG disintegrating tablet Take 4 mg by mouth every 8 hours as needed (Patient not taking: Reported on 5/27/2022)         PRECAUTIONS/ALLERGIES: Patient has no known allergies.    Safety Devices in place: Yes  Type of devices: Left in bed,Nurse notified    Therapy Time  SLP Individual Minutes  Time In: 5082  Time Out: 2708  Minutes: [address]Krunal  6/2/2022 8:42 AM

## 2022-06-02 NOTE — PROGRESS NOTES
Pt admitted from the 8th floor to 903. Pt is alert and oriented with stable vital signs. Dual skin assessment completed with Carol Ann Jaramillo RN. Open blisters noted to left upper and left mid buttocks. Old incisions healing appearing on back. Call light given to pt and instructed to call before getting out of bed. Pt c/o burning where hernandez catheter was removed yesterday. Marylee, PA, notified and UA/Culture sample sent to lab.

## 2022-06-02 NOTE — CARE COORDINATION
CM was notified by Re Canchola with 9th floor, patient has been approved for inpatient rehabilitation with [facility]. Patient to admit today. CM notified attending via [employer]. CM remains available.

## 2022-06-02 NOTE — PROGRESS NOTES
[facility]    Follow-Up on: BLANCA on CKD stage IV    HPI: Pt seen and examined in room reclining in chair, no new complaints.      ROS:  General: no fever/chills  CV: no CP  Lung: no SOB, no cough  GI: no N/V/D  : no dysuria  Ext: no edema       Current Facility-Administered Medications   Medication Dose Route Frequency    folic acid (FOLVITE) tablet 1 mg  1 mg Oral Daily    insulin glargine (LANTUS) injection vial 10 Units  10 Units SubCUTAneous Nightly    insulin lispro (HUMALOG) injection vial 5 Units  5 Units SubCUTAneous TID WC    ferrous sulfate (IRON 325) tablet 325 mg  325 mg Oral BID WC    tuberculin injection 5 Units  5 Units IntraDERmal Once    loperamide (IMODIUM) capsule 2 mg  2 mg Oral 4x Daily PRN    insulin lispro (HUMALOG) injection vial 0-4 Units  0-4 Units SubCUTAneous Q6H    guaiFENesin-dextromethorphan (ROBITUSSIN DM) 100-10 MG/5ML syrup 5 mL  5 mL Oral Q4H PRN    heparin (porcine) injection 10,000 Units  10,000 Units IntraVENous PRN    albuterol (PROVENTIL) nebulizer solution 2.5 mg  2.5 mg Nebulization Q4H PRN    dextrose 5 % and 0.45 % sodium chloride infusion   IntraVENous Continuous PRN    glucose chewable tablet 16 g  4 tablet Oral PRN    dextrose bolus 10% 125 mL  125 mL IntraVENous PRN    Or    dextrose bolus 10% 250 mL  250 mL IntraVENous PRN    glucagon injection 1 mg  1 mg IntraMUSCular PRN    dextrose 5 % solution  100 mL/hr IntraVENous PRN    dextrose bolus 10% 125 mL  125 mL IntraVENous PRN    Or    dextrose bolus 10% 250 mL  250 mL IntraVENous PRN    glucagon injection 1 mg  1 mg IntraMUSCular PRN    medicated lip ointment (BLISTEX)   Topical PRN    sodium chloride flush 0.9 % injection 5-40 mL  5-40 mL IntraVENous 2 times per day    sodium chloride flush 0.9 % injection 5-40 mL  5-40 mL IntraVENous PRN    [Held by provider] heparin (porcine) injection 5,000 Units  5,000 Units SubCUTAneous q8h    ondansetron (ZOFRAN-ODT) disintegrating tablet 4 mg  4 mg Oral Q8H PRN    Or    ondansetron (ZOFRAN) injection 4 mg  4 mg IntraVENous Q6H PRN    polyethylene glycol (GLYCOLAX) packet 17 g  17 g Oral Daily PRN    acetaminophen (TYLENOL) tablet 650 mg  650 mg Oral Q6H PRN    Or    acetaminophen (TYLENOL) suppository 650 mg  650 mg Rectal Q6H PRN    dextrose bolus 10% 250 mL  250 mL IntraVENous PRN    potassium chloride 10 mEq/100 mL IVPB (Peripheral Line)  10 mEq IntraVENous PRN    magnesium sulfate 1000 mg in dextrose 5% 100 mL IVPB  1,000 mg IntraVENous PRN    sodium phosphate 10 mmol in sodium chloride 0.9 % 250 mL IVPB  10 mmol IntraVENous PRN    Or    sodium phosphate 15 mmol in dextrose 5 % 250 mL IVPB  15 mmol IntraVENous PRN    Or    sodium phosphate 20 mmol in dextrose 5 % 500 mL IVPB  20 mmol IntraVENous PRN    dextrose 5 % and 0.45 % sodium chloride infusion   IntraVENous Continuous PRN    sodium chloride flush 0.9 % injection 5-40 mL  5-40 mL IntraVENous 2 times per day    sodium chloride flush 0.9 % injection 5-40 mL  5-40 mL IntraVENous PRN    0.9 % sodium chloride infusion   IntraVENous PRN       Exam:  Vitals:    06/01/22 1929 06/01/22 2303 06/02/22 0242 06/02/22 0712   BP: (!) 96/53 (!) 100/57 (!) 98/55 (!) 104/51   Pulse: (!) 102 (!) 105 (!) 102 98   Resp:    18   Temp: 99.1 °F (37.3 °C) 99.3 °F (37.4 °C) 99.1 °F (37.3 °C) 99.1 °F (37.3 °C)   TempSrc:    Oral   SpO2: 94% 96% 94% 95%   Weight:       Height:             Intake/Output Summary (Last 24 hours) at 6/2/2022 1001  Last data filed at 6/1/2022 2218  Gross per 24 hour   Intake 600 ml   Output 1100 ml   Net -500 ml     PE:  GEN - in no distress, alert and oriented   CV - regular rate, S1, S2, no rub  Lung - clear bilaterally  Abd - soft, nontender  Ext - +1 BLE edema  Access-  Left temp line, right TCC- intact    Labs  Recent Labs     05/31/22  0411 06/01/22  0424 06/02/22  0448   WBC 14.8* 13.9* 15.2*   HGB 7.4* 7.7* 7.3*   HCT 23.6* 24.3* 22.9*    389 381     Recent Labs 05/31/22  0411 06/01/22  0424 06/02/22  0448    142 138   K 3.6 3.0* 3.5    106 100   CO2 27 27 25   BUN 18 19 19     No results for input(s): PH, PCO2, PO2, PCO2 in the last 72 hours.     Problem List:  Patient Active Problem List    Diagnosis Date Noted    Normocytic anemia 06/01/2022    Septic [facility] ([facility]     Altered mental status     Frailty     Cardiac arrest ([facility] 05/21/2022    Diabetic ketoacidosis with coma associated with type 1 diabetes mellitus ([facility] 05/21/2022    Severe sepsis with septic shock (CODE) ([facility] 05/21/2022    Pneumonia, bacterial 05/21/2022    Acute respiratory failure ([facility] 05/21/2022    Benign hypertension with CKD (chronic kidney disease) stage III ([address] 01/04/2022    Acute kidney injury superimposed on chronic kidney disease ([facility] 06/09/2021    IDDM (insulin dependent diabetes mellitus) 01/24/2012    BLANCA (acute kidney injury) ([facility] 04/21/2022    Ureteric stone 04/19/2022    Urinary tract infection without hematuria 04/19/2022    Hydronephrosis of right kidney 04/19/2022    Malodorous urine 11/16/2021    H/O gastroesophageal reflux (GERD) 06/19/2020    Encounter for annual routine gynecological examination 11/27/2018    Encounter to discuss test results 08/22/2018    Diabetes mellitus type 1 ([facility] 08/22/2018    Type 1 diabetes mellitus with stage 3 chronic kidney disease ([facility] 08/16/2018    Noncompliance with medication regimen 08/16/2018    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    Depression with anxiety 07/11/2017    Ulcer, skin, chronic ([address] 03/13/2017    CKD (chronic kidney disease), stage IV ([address] 03/13/2017    Neuropathy, peripheral, idiopathic 03/13/2017    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 BLANCA on CKD stage IV non oliguric   1st HD 5/24, TCC placed 6/1  -next HD tomorrow for volume and clearance  appreciate CW for outpt HD slot     2. Type I DM- SSI per primary     3. DKA BS>2000 on admit- un controlled- SSI per primary     4.  Anemia-  on Fe

## 2022-06-02 NOTE — H&P
Yoandy Palencia MD  Medical Director  [address] [facility], [address] [facility]  Tel: [phone]       Admission History and Physical Exam  Gavin Tabares Date: 6/2/2022  Primary Care Provider: Valerio Dawkins MD  Specialty Group / Referring Service: Hospitalist service    Chief Complaint : \"I want to get better so I can go home. \"  Admitting Diagnosis:   Principal Problem:    Physical debility  Active Problems:    Cardiac arrest ([address]    Acute respiratory failure (HCC)    Diabetic ketoacidosis with coma associated with type 1 diabetes mellitus ([address]    Severe sepsis with septic shock (CODE) ([address]    Acute kidney injury superimposed on chronic kidney disease ([address]    Hypertension with CKD (chronic kidney disease) stage III ([facility])    Pneumonia, bacterial    Altered mental status    Frailty    Normocytic anemia    Depression with anxiety    Diabetic polyneuropathy associated with type 1 diabetes mellitus (HCC)    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 associated with type 1 diabetes mellitus ([address]      Acute Rehab Dx:  Gait impairment / gait dysfunction  Debility  Deconditioning  Mobility and ambulation deficits  Self care / ADL deficits    Medical Dx:  Past Medical History:   Diagnosis Date    Acute renal failure ([address] 1/24/2012    CKD (chronic kidney disease) stage 3, GFR 30-59 ml/min ([facility])     Gastroenteritis, acute 1/24/2012    IDDM (insulin dependent diabetes mellitus) 1/24/2012    Type 1 av -150 can tell Hypo below 70    Intractable nausea and vomiting 12/25/2014    Irregular menses     Kidney stone     Neuropathy, peripheral, idiopathic 3/13/2017    Retinopathy, bilateral 3/13/2017    Trichomoniasis 3/13/2017    Ulcer, skin, chronic ([address] 3/13/2017    Vaginal burning        Date of Evaluation: June 2, 2022    Silvia Arauz is a 48 y.o.  Black female patient at [address] [place] [address] [facility] who was admitted to [address] [facility] on 6/2/2022 by General Steve MD of Physical Medicine and Rehab service with below-mentioned medical history. HPI: This patient is a right-hand dominant, previously functionally independent BF with PMH including DM1, CKD4, retinopathy, neuropathy and ureteral stones. 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 She was found unresponsive in her home by EMS and brought into the ED 5/21/2022. ED findings included BS ~1600, Cr 9.7, K+ 6.7, pH 6.8, WBC 29K. Initial /150, she was soon hypotensive to 62/31. She was intubated and admitted to ICU for septic shock, DKA and ARF. Head CT unrevealing. She had cardiac arrest that required chest compressions. Pressors, IV insulin, broad-spectrum ABX and resuscitation fluids were started. TTE 5/22 with EF 70-75%, moderate AV stenosis. Nephrology consulted for ARF; dialysis initiated when she became oliguric (5/24). UCx NGTD. Tube feeds started 5/24. She was extubated 5/25 and vancomycin was stopped. SLP kept her NPO 5/26 due to moderate-severe oropharyngeal dysphagia. She was combative and agitated 5/26 but calmed with Haldol. SLP re-evaluation 5/27 cleared patient for easy-to-chew solids with thin liquids. BP and BS normalized. BCx x 2 remained NGTD. Acute-on-chronic anemia with Hgb 7-8 during hospitalization (baseline >10-11); iron studies 5/31 with anemia of chronic disease. Vitamin B12 adequate, folate 4.9 thus supplemented. Tunneled CVC placed 6/1 while awaiting renal recovery. Vancomycin x 1 additional dose given 5/30. All IV ABX EOT 6/2. PT and OT were initiated, and patient was found to have significant mobility and self-care deficits. Physical Medicine and Rehab service was consulted, and patient was initially evaluated by JUAN MIGUEL Morrissey on 5/27.      During reevaluation earlier today, patient shows improvement from initial consult but still shows significant functional deficits. We recommend inpatient hospital rehabilitation as reasonable and necessary due to ongoing acute medical issues which have not changed since initial pre-admission evaluation. We reviewed the preadmission screening and have approved the patient for admission to the [facility]. Attending service cleared patient for transfer to rehab today. Patient continues to have ongoing medical issues outlined above requiring physician / PA medical supervision and has functional deficits which would benefit from continued intensive therapies. Current Level of Function: (evaluated by acute therapy staff, with bed mobility, transfers, balance personally observed post-admission in the [facility] setting minutes prior to submission of document)   Per SLP: easy to chew solids, thin liquids  Per PT: SBA / CGA for bed mobility, min A x 2 (for safety) for transfers; fair+ to good for sitting and standing balance, ambulated 5' min A x 2 with RW and gait belt  Per OT: supervision / set-up for feeding / grooming, dressing; max A for bathing; total A for toileting    Prior Home Situation:   Lives alone in a private residence. Previous Level of Function / Work / Activity:   At baseline, independent for ADLs, IADLs, mobility and driving. Worked at [employer] x 11y until 12/2021, when she was deemed disabled.         Past Medical History:   Diagnosis Date    [facility] renal failure [facility] 1/24/2012    CKD (chronic kidney disease) stage 3, GFR 30-59 ml/min ([facility])     Gastroenteritis, acute 1/24/2012    IDDM (insulin dependent diabetes mellitus) 1/24/2012    Type 1 av -150 can tell Hypo below 70    Intractable nausea and vomiting 12/25/2014    Irregular menses     Kidney stone     Neuropathy, peripheral, idiopathic 3/13/2017    Retinopathy, bilateral 3/13/2017    Trichomoniasis 3/13/2017    Ulcer, skin, chronic [facility] 3/13/2017    Vaginal burning       Past Surgical History:   Procedure Laterality Date    AMPUTATION Left     5th Toe due to Diabetic Ulcer    AMPUTATION  12    gangrene    CYSTOSCOPY,INSERT URETERAL STENT  2022    HX OPEN REDUCTION INTERNAL FIXATION Right     ankle    IR TUNNELED CATHETER PLACEMENT GREATER THAN 5 YEARS  2022    IR TUNNELED CATHETER PLACEMENT GREATER THAN 5 YEARS 2022 [facility]     No Known Allergies   Family History   Problem Relation Age of Onset    Prostate Cancer Father     Diabetes Paternal Grandmother     Colon Cancer Neg Hx     Uterine Cancer Neg Hx     Diabetes Father         DM Type II    Stroke Father     Diabetes Paternal Grandfather         DM Type II     Hypertension Maternal Grandmother     Ovarian Cancer Neg Hx     Breast Cancer Mother 76    Diabetes Maternal Grandmother         DM Type II      Social History     Tobacco Use    Smoking status: Former Smoker     Quit date: 2013     Years since quittin.5    Smokeless tobacco: Never Used   Substance Use Topics    Alcohol use: Yes      Current Facility-Administered Medications   Medication Dose Route Frequency    acetaminophen (TYLENOL) tablet 650 mg  650 mg Oral Q6H PRN    Or    acetaminophen (TYLENOL) suppository 650 mg  650 mg Rectal Q6H PRN    polyethylene glycol (GLYCOLAX) packet 17 g  17 g Oral Daily PRN    glucagon injection 1 mg  1 mg IntraMUSCular PRN    glucose chewable tablet 16 g  4 tablet Oral PRN    albuterol (PROVENTIL) nebulizer solution 2.5 mg  2.5 mg Nebulization Q4H PRN    ferrous sulfate (IRON 325) tablet 325 mg  325 mg Oral BID WC    folic acid (FOLVITE) tablet 1 mg  1 mg Oral Daily    guaiFENesin-dextromethorphan (ROBITUSSIN DM) 100-10 MG/5ML syrup 5 mL  5 mL Oral Q4H PRN    insulin glargine (LANTUS) injection vial 10 Units  10 Units SubCUTAneous Nightly    insulin lispro (HUMALOG) injection vial 0-4 Units  0-4 Units SubCUTAneous Q6H    insulin lispro (HUMALOG) injection vial 5 Units  5 Units SubCUTAneous TID WC    loperamide (IMODIUM) capsule 2 mg  2 mg Oral 4x Daily PRN    medicated lip ointment (BLISTEX)   Topical PRN    ondansetron (ZOFRAN-ODT) disintegrating tablet 4 mg  4 mg Oral Q6H PRN       Review of Systems:  General: Admits: fatigue, malaise,Denies: fevers, chills, sweats,  anorexia, weight loss   Eyes:  Admits: baseline impaired vision, Denies: vision change, loss of vision, eye pain, photophobia   HENT:  Denies: hearing loss, tinnitus, earache, epistaxis, sore throat, hoarseness  Lungs: Admits: cough, Denies: wheeze, hemoptysis, dyspnea  CV:  Denies: chest pain, palpitations, syncope, orthopnea, paroxysmal nocturnal dyspnea, claudication   GI:  Denies: dysphagia, odynophagia, nausea, vomiting, diarrhea, constipation, abdominal pain, melena   :  Admits: perineal discomfort, recent kidney stone, Denies: frequency, dysuria, nocturia, urinary incontinence, hematuria   Endocrine: Admits: temperature intolerance, Denies: polydipsia/polyuria, skin changes, unexpected weight gain or loss  MSK:  Admits: back pain, joint pain, muscle weakness, Denies: joint swelling, muscle pain   Heme:  Denies: bleeding problems, blood transfusions, bruising, pallor, swollen lymph nodes   Neuro:  Admits: gait problems, memory problems, previous TIA, Denies: headache, dysarthria, vision loss / change, diplopia, seizure, speech problems, coordination problems      Objective:     Visit Vitals  /73   Pulse 99   Temp 99 °F (37.2 °C) (Oral)   Resp 20   SpO2 94%      Intake and Output:  No intake/output data recorded.     Physical Exam:   General: Drowsy, overweight, cooperative BF in no acute distress   Skin:  Warm, moist with texture appropriate for age  Eyes:  PERRL, sclera are clear, extraocular muscles intact without nystagmus  HENT:  Normocephalic; nares patent, oral mucosa moist, dressing at left neck from temp cath removal  Respiratory: Lungs clear to auscultation bilaterally, breathing is non-labored Chest:  Symmetric throughout one respiratory excursion; no supraclavicular lymphadenopathy  CV:  Fast-regular rate, regular underlying rhythm, systolic murmur  Abdomen: Soft, non-tender, slightly protuberant but not distended; bowel sounds normoactive   Extremities: UE strength at biceps, triceps, finger flexion 5-/5 (R), 4+/5 (L),  strength symmetric; lifts B LE off bed against gravity, strength at hip flexion 4/5, knee flexion 4+/5, knee extension 4-/5, ankle DF/PF 4+/5 bilaterally; trace pedal edema, no cyanosis or clubbing  Neurological: Oriented to person, day/date, city, situation; delayed processing, follows commands, impaired attention, good comprehension but impaired STM; hypophonia but no dysarthria or word-finding deficits; face symmetric to cheek puff / grin, tongue midline; EOM intact without nystagmus, impaired right visual fields; diminished but symmetric shoulder shrug; able to perform simple math, identify common object and state its use      Recent Results (from the past 72 hour(s))   POCT Glucose    Collection Time: 05/30/22  6:26 PM   Result Value Ref Range    POC Glucose 77 65 - 100 mg/dL    Performed by: Radha    POCT Glucose    Collection Time: 05/31/22 12:50 AM   Result Value Ref Range    POC Glucose 160 (H) 65 - 100 mg/dL    Performed by: Suman    Basic Metabolic Panel    Collection Time: 05/31/22  4:11 AM   Result Value Ref Range    Sodium 138 136 - 145 mmol/L    Potassium 3.6 3.5 - 5.1 mmol/L    Chloride 103 98 - 107 mmol/L    CO2 27 21 - 32 mmol/L    Anion Gap 8 7 - 16 mmol/L    Glucose 296 (H) 65 - 100 mg/dL    BUN 18 6 - 23 MG/DL    CREATININE 4.90 (H) 0.6 - 1.0 MG/DL    GFR African American 12 (L) >60 ml/min/1.73m2    GFR Non- 10 (L) >60 ml/min/1.73m2    Calcium 8.0 (L) 8.3 - 10.4 MG/DL   CBC with Auto Differential    Collection Time: 05/31/22  4:11 AM   Result Value Ref Range    WBC 14.8 (H) 4.3 - 11.1 K/uL    RBC 2.88 (L) 4.05 - 5.2 M/uL Hemoglobin 7.4 (L) 11.7 - 15.4 g/dL    Hematocrit 23.6 (L) 35.8 - 46.3 %    MCV 81.9 79.6 - 97.8 FL    MCH 25.7 (L) 26.1 - 32.9 PG    MCHC 31.4 31.4 - 35.0 g/dL    RDW 14.9 (H) 11.9 - 14.6 %    Platelets 021 956 - 768 K/uL    MPV 9.5 9.4 - 12.3 FL    nRBC 0.05 0.0 - 0.2 K/uL    Seg Neutrophils 60 43 - 78 %    Lymphocytes 15 13 - 44 %    Monocytes 14 (H) 4.0 - 12.0 %    Eosinophils % 2 0.5 - 7.8 %    Basophils 0 0.0 - 2.0 %    Immature Granulocytes 9 (H) 0.0 - 5.0 %    Segs Absolute 8.9 (H) 1.7 - 8.2 K/UL    Absolute Lymph # 2.2 0.5 - 4.6 K/UL    Absolute Mono # 2.1 (H) 0.1 - 1.3 K/UL    Absolute Eos # 0.3 0.0 - 0.8 K/UL    Basophils Absolute 0.0 0.0 - 0.2 K/UL    Absolute Immature Granulocyte 1.3 (H) 0.0 - 0.5 K/UL    RBC Comment SLIGHT  HYPOCHROMIA        WBC Comment Result Confirmed By Smear      Platelet Comment ADEQUATE      Differential Type AUTOMATED     Transferrin Saturation    Collection Time: 05/31/22  4:11 AM   Result Value Ref Range    Iron 45 35 - 150 ug/dL    TIBC 139 (L) 250 - 450 ug/dL    TRANSFERRIN SATURATION 32 >20 %   Reticulocytes    Collection Time: 05/31/22  4:11 AM   Result Value Ref Range    Reticulocyte Count,Automated 0.8 0.3 - 2.0 %    Absolute Retic # 0.0222 (L) 0.026 - 0.095 M/ul    Immature Retic Fraction 17.5 (H) 3.0 - 15.9 %    Retic Hemoglobin conc. 33 29 - 35 pg   Ferritin    Collection Time: 05/31/22  4:11 AM   Result Value Ref Range    Ferritin 380 8 - 388 NG/ML   Transferrin    Collection Time: 05/31/22  4:11 AM   Result Value Ref Range    Transferrin 120 (L) 202 - 364 mg/dL   POCT Glucose    Collection Time: 05/31/22  6:34 AM   Result Value Ref Range    POC Glucose 320 (H) 65 - 100 mg/dL    Performed by: Suman    POCT Glucose    Collection Time: 05/31/22 11:31 AM   Result Value Ref Range    POC Glucose 175 (H) 65 - 100 mg/dL    Performed by: Rere    POCT Glucose    Collection Time: 05/31/22  5:53 PM   Result Value Ref Range    POC Glucose 110 (H) 65 - 100 mg/dL    Performed by: Rere    POCT Glucose    Collection Time: 06/01/22  1:50 AM   Result Value Ref Range    POC Glucose 255 (H) 65 - 100 mg/dL    Performed by: Aman    Basic Metabolic Panel    Collection Time: 06/01/22  4:24 AM   Result Value Ref Range    Sodium 142 136 - 145 mmol/L    Potassium 3.0 (L) 3.5 - 5.1 mmol/L    Chloride 106 98 - 107 mmol/L    CO2 27 21 - 32 mmol/L    Anion Gap 9 7 - 16 mmol/L    Glucose 113 (H) 65 - 100 mg/dL    BUN 19 6 - 23 MG/DL    CREATININE 5.70 (H) 0.6 - 1.0 MG/DL    GFR African American 10 (L) >60 ml/min/1.73m2    GFR Non- 8 (L) >60 ml/min/1.73m2    Calcium 8.3 8.3 - 10.4 MG/DL   Vancomycin Level, Random    Collection Time: 06/01/22  4:24 AM   Result Value Ref Range    Vancomycin Rm 25.2 UG/ML   CBC    Collection Time: 06/01/22  4:24 AM   Result Value Ref Range    WBC 13.9 (H) 4.3 - 11.1 K/uL    RBC 2.92 (L) 4.05 - 5.2 M/uL    Hemoglobin 7.7 (L) 11.7 - 15.4 g/dL    Hematocrit 24.3 (L) 35.8 - 46.3 %    MCV 83.2 79.6 - 97.8 FL    MCH 26.4 26.1 - 32.9 PG    MCHC 31.7 31.4 - 35.0 g/dL    RDW 15.0 (H) 11.9 - 14.6 %    Platelets 073 329 - 846 K/uL    MPV 9.5 9.4 - 12.3 FL    nRBC 0.05 0.0 - 0.2 K/uL   POCT Glucose    Collection Time: 06/01/22  4:49 AM   Result Value Ref Range    POC Glucose 113 (H) 65 - 100 mg/dL    Performed by: Suman    POCT Glucose    Collection Time: 06/01/22 10:34 AM   Result Value Ref Range    POC Glucose 212 (H) 65 - 100 mg/dL    Performed by:  Carlo    POCT Glucose    Collection Time: 06/01/22  5:14 PM   Result Value Ref Range    POC Glucose 386 (H) 65 - 100 mg/dL    Performed by: Anne Orona    Folate    Collection Time: 06/01/22  5:40 PM   Result Value Ref Range    Folate 4.9 3.1 - 17.5 ng/mL   Vitamin B12    Collection Time: 06/01/22  5:40 PM   Result Value Ref Range    Vitamin B-12 1767 (H) 193 - 986 pg/mL   Transferrin Saturation    Collection Time: 06/01/22  5:40 PM   Result Value Ref Range    Iron 36 35 - 150 ug/dL    TIBC 153 (L) 250 - 450 ug/dL    TRANSFERRIN SATURATION 24 >20 %   Hepatitis Panel, Acute    Collection Time: 06/01/22  5:40 PM   Result Value Ref Range    Hep A IgM NONREACTIVE NR      Hep B Core Ab, IgM NONREACTIVE NR      Hepatitis B Surface Ag NONREACTIVE NR      Hepatitis C Ab NONREACTIVE NR     POCT Glucose    Collection Time: 06/01/22 10:59 PM   Result Value Ref Range    POC Glucose 518 (HH) 65 - 100 mg/dL    Performed by: Topher    POCT Glucose    Collection Time: 06/02/22  1:21 AM   Result Value Ref Range    POC Glucose 498 (HH) 65 - 100 mg/dL    Performed by: [employer]    Basic Metabolic Panel    Collection Time: 06/02/22  4:48 AM   Result Value Ref Range    Sodium 138 136 - 145 mmol/L    Potassium 3.5 3.5 - 5.1 mmol/L    Chloride 100 98 - 107 mmol/L    CO2 25 21 - 32 mmol/L    Anion Gap 13 7 - 16 mmol/L    Glucose 360 (H) 65 - 100 mg/dL    BUN 19 6 - 23 MG/DL    CREATININE 4.60 (H) 0.6 - 1.0 MG/DL    GFR African American 13 (L) >60 ml/min/1.73m2    GFR Non- 11 (L) >60 ml/min/1.73m2    Calcium 7.9 (L) 8.3 - 10.4 MG/DL   CBC    Collection Time: 06/02/22  4:48 AM   Result Value Ref Range    WBC 15.2 (H) 4.3 - 11.1 K/uL    RBC 2.76 (L) 4.05 - 5.2 M/uL    Hemoglobin 7.3 (L) 11.7 - 15.4 g/dL    Hematocrit 22.9 (L) 35.8 - 46.3 %    MCV 83.0 79.6 - 97.8 FL    MCH 26.4 26.1 - 32.9 PG    MCHC 31.9 31.4 - 35.0 g/dL    RDW 15.3 (H) 11.9 - 14.6 %    Platelets 371 025 - 696 K/uL    MPV 9.3 (L) 9.4 - 12.3 FL    nRBC 0.03 0.0 - 0.2 K/uL   POCT Glucose    Collection Time: 06/02/22  7:13 AM   Result Value Ref Range    POC Glucose 388 (H) 65 - 100 mg/dL    Performed by: Jaden    POCT Glucose    Collection Time: 06/02/22 11:16 AM   Result Value Ref Range    POC Glucose 184 (H) 65 - 100 mg/dL    Performed by: Malathi          Condition on Admission: Good        Assessment:     The Post Assessment Physician Evaluation (ATA) found the current functional status to be comparable with the pre-admission screening. The patient is a good candidate for acute inpatient rehabilitation. Nothing since the pre-admission screen has changed that determination. Rehabilitation Plan  The patient has shown the ability to tolerate and benefit from 3 hours of therapy daily and is being admitted to a comprehensive acute inpatient rehabilitation program consisting of at least 3 hours of combined physical and occupational therapies. - Begin intensive Physical Therapy for a minimum of 1.5 hours a day, at least 5 out of 7 days per week to address bed mobility, transfers, ambulation, strengthening, balance, and endurance. - Begin intensive Occupational Therapy for a minimum of 1.5 hours a day, at least 5 out of 7 days per week to address ADLs (bathing, LE dressing, toileting) and adaptive equipment as needed. - Continue Speech Therapy for: dysphagia, cognitive evaluation post-arrest, DKA; therapy schedule may be adjusted by MD based on patient's needs. Each of these therapies will be continued as above for the duration of the inpatient rehab stay. The patient will also require 24-hour skilled rehabilitation nursing for bowel and bladder management, skin care for decubitus ulcer prevention, pain management and ongoing medication administration. Physical debility following DKA with coma, septic shock, acute renal failure, acute respiratory failure, cardiac arrest and altered mental status    Plan / Recommendations / Medical Decision Making:     Daily physician / PA medical management:    Physical debility [R53.81] - good rehab potential, aggressive PT, OT to tolerance. SLP for swallowing surveillance, cognitive evaluation post-arrest, DKA with coma. Cardiac arrest, acute respiratory failure - resolved; multifactorial, most likely due to DKA with coma, septic shock. Intubated 5/21, extubated 5/25, now on room air; no prior history of supplemental O2 use. TTE (5/22) with EF 82-59%, normal systolic and diastolic function, moderate AV stenosis. Severe sepsis with septic shock - question of bacterial pneumonia vs UTI; was on vanc+Zosyn throughout acute hospitalization, EOT 6/2. UCx, BCx NGTD. Leukocytosis attributed to stress response. Acute renal failure, BLANCA on CKD3 - Cr on admission 9.7 (baseline ~2.5), oliguric; dialysis started 5/24, temp cath switched to tunneled cath 6.1. Nephrology managing HD, patient will run next HD 6/4 for TThSa transition. Avoid hypotension and nephrotoxic drugs. Blood pressure management - monitor. Patient occasionally hypertensive but mostly normo- or hypotensive. Diabetes mellitus - reportedly type 1, last HgbA1c found in EMR was 9.6% but from 6/2020; with admission for DKA and BS >2000, patient likely with poor glycemic control. Will require ACHS fasting glucose monitoring and medication adjustment to optimize glycemic control in setting of acute illness and hospitalization. Per DM RN, current regimen is Lantus 10u QHS with Humalog 5u TID with meals. Monitor and adjust.    Anemia - normocytic, chronic. Baseline Hgb >10-11 but dipped to 7-8 in acute; Hgb 7.3 at [facility] admission on 6/2. Multifactorial: anemia of CKD, frequent blood draws; no active bleeding noted, hemoccult pending. Iron studies (6/1) c/w chronic disease. Vitamin B12 adequate, folate low 4.9; on PO iron, folic acid supplements. Holding heparin SQ for now. Pain management - no current joint or muscle symptoms, essentially pain-free. Will require regular pain assessment and comprehensive pain management. Electrolyte management - no current abnormality, has been both hyperK+ and hypoK+; monitor and replace as needed. Pneumonia prophylaxis - incentive spirometer every hour while awake. DVT risk / DVT prophylaxis - daily physician / PA exam to assess as patient is at increased risk for of thromboembolism. Mobilize as tolerated.  Sequential pneumatic compression devices (SCDs) when in bed; thigh-high or knee-high thromboembolic deterrent hose when out of bed. Heparin SQ held due to anemia. GI prophylaxis - consider PPI. At times may need additional antacids, Maalox prn. History of tobacco use - former cigarette smoker, reportedly quit in 2013. No current nicotine cravings. Depression - noted in problem list in 2017, no current pharmacotherapy. Monitor. At risk of depression / exacerbation due to physical debility, loss of independence. General skin care / wound prevention - monitor general skin wound status daily per staff and physician / PA. At risk for failure due to impaired mobility. Bladder program - schedule voids q6-8h. Check post-void residual as needed; in-and-out catheter if post-void residual is more than 400ml. Bowel program - at risk for constipation as a side effect of medications, impaired mobility, etc. MiraLAX daily for regularity, Berta-Colace for stool softener. PRN MOM, bisacodyl suppository or tablets for constipation. Disposition: The patient's prognosis for significant practical improvement within a reasonable period of time appears good and the estimated length of stay is 10 days; patient is expected to return home with spouse / family supervision and continued rehabilitation with home health therapy. Given the patient's complex neurologic / medical condition, risks of further medical complications include but are not limited to: thromboembolism / pulmonary embolism, skin breakdown, pneumonia due to decreased mobility, CVA, MI, cardiac arrhythmias due to HTN, postural hypotension. For these ongoing medical issues, rehabilitation services could not be safely provided at a lower level of care such as a skilled nursing facility or nursing home. Signed By: Patito Patiño PA-C    June 2, 2022      Physician Assistant with [facility]  Shantal Rojas MD, Medical Director

## 2022-06-02 NOTE — PROGRESS NOTES
Patient's blood sugar now 388 by fingerstick, notified physician by Kelvin. No orders recd as of yet.

## 2022-06-02 NOTE — CARE COORDINATION
Patient is for discharge today. Patient to transfer to [facility] [place]- 9th floor for inpatient rehabilitation. Referral placed with [facility] for outpatient dialysis slot. Per Reji Lewis with [facility] [phone], referral cannot be processed at this time, as staff is awaiting PPD and Hepatitis Panel. CM faxed Hepatitis Panel to admissions department this morning. CM is awaiting PPD results. CM provided contact information for colleague David Bryant and Hawa Carreon , who will follow patient during [facility] stay. Information will need to be faxed, when available. Dialysis slot pending at this time. Patient's Room Number- 060  Report- ext. 1965     No additional CM needs identified. Patient to transfer to 9th floor this AM.       06/01/22 1941   Service Assessment   Patient Orientation Alert and [address] is: Legal Next of [address] Discharge   Services At/After Discharge Inpatient rehab  [facility])   Mode of Transport at Discharge   (9th Floor Transfer)   Condition of Participation: Discharge Planning   The Plan for Transition of Care is related to the following treatment goals: Return to prior level of functioning.

## 2022-06-02 NOTE — PROGRESS NOTES
Hospitalist Progress Note   Admit Date:  2022  3:51 PM   Name:  Radha Montague   Age:  48 y.o. Sex:  female  :  1975   MRN:  [id number]   Room:  [facility]    Presenting Complaint: Altered Mental Status     Reason(s) for Admission: Cardiac arrest ([facility] [I46.9]  Hyperkalemia [E87.5]  Septic shock ([facility] [A41.9, R65.21]  Acute respiratory failure, unspecified whether with hypoxia or hypercapnia ([facility] [J96.00]  Acute renal failure, unspecified acute renal failure type [facility]) [N17.9]     Hospital Course & Interval History:   29-year-old female with PMHx DM1, HTN, CKD stage III who was admitted to intensivist service on  with unresponsiveness. She was not heard for several days at home and was found unresponsive and brought in by EMS with numerous lab abnormalities. She was found to be in DKA with coma and BLANCA. CT head was unremarkable. She was markedly acidotic with a pH of 6.8, suffered a brief cardiac arrest due to septic shock and DKA and was intubated. There was a concern for aspiration PNA VS recurrent UTI. She was started on broad-spectrum antibiotics. She was started on vasopressors and glucose stabilizer per DKA protocol. Neurology was consulted for BLANCA. She required SLED that was initiated on . Palliative care has seen and patient's mother desires full code. IRC has evaluated and pre-CERT is pending. She was extubated on  and hospitalist was requested to assume care. Pt underwent tunneled cath placement on  and awaiting for renal recovery. IRC pending, CM working on outpatient HD slot. Blood cultures on  and  NGTD. Urine culture negative. Patient remains on Vanco/Zosyn and completed course on     Subjective/24hr Events (22): Patient alert and oriented x4 at bedside. Sat up and eating her breakfast. Stated her BG high because she forgot and ate fruit. She feels well. No needs. Denies any melena or hematochezia.     No fever, chills, SOB, chest pain, abdominal pain. Assessment & Plan:     Cardiac arrest   Acute respiratory failure, resolved  Multifactorial most likely due to DKA with coma and septic shock. Intubated 5/21 and extubated 5/25. TTE on 5/21/2022 shows EF 70-75% with normal systolic and diastolic function; moderate stenosis of aortic valve. Treatment for infection per below  Now on room air    Severe sepsis with septic shock  Pneumonia, bacterial  Has been on broad-spectrum antibiotics since admission  Completed Vanco/Zosyn on 6/2  Blood cultures: NGTD   WBC stable. Suspect leukocytosis due to stress response. Patient has received adequate course of antibiotics. BLANCA on CKD stage 3  On admission CR >9 (baseline~2.5). Renal US shows medical renal disease without hydronephrosis; stent located at right renal pelvis. Started on SLED on 5/24. Oliguric. Avoid nephrotoxic meds and avoid hypotension  Accurate I's and O's  Nephrology on board, appreciate recs  Tunneled cath plan for 6/1. Cont HD per Nephro recs. Outpatient HD slot pending. CM to assist    Normocytic anemia  Hgb 7-8 (baseline >10-11). Iron studies 5/31 c/w anemia of chronic disease, suspect secondary to BLANCA on CKD and frequent blood draws. No active bleeding noted. Vitamin B12 adequate, Folate low 4.9, add folic supplements  Iron studies c/w chronic disease  CTM, transfuse if Hgb <7 or if symptomatic  Hemoccult pending. Hold heparin SQ. SCD's for now.     Benign hypertension with CKD stage 3  BP low- normal.  No antihypertensives needed    DKA with coma associated with T1DM  Resolved    Altered mental status, resolved  Most likely due to DKA with coma    Frailty  Adds to complexity of care  PT/OT--IRC pre-CERT pending    UTI  Most likely d/t contamination d/t DKA but has completed 7 day course of Vanc/Zosyn empirically  UCx: NGTD    Diabetes mellitus type 1  Holding home Tresiba in lispro  Continue Lantus, premeal and SSI--adjust doses  Diabetes management to assist        Discharge Planning:      Pending outpatient HD slot, CM to assist. [facility] precert pending. Diet:  ADULT DIET; Easy to Chew; 3 carb choices (45 gm/meal)  DVT PPx: Heparin SQ  Code status: Full Code    I spent 37 minutes of time caring for this patient at bedside or nearby on the unit, more than 50 percent of which was spent on coordination of care and/or counseling regarding the disease process, treatment options, and treatment plan. Hospital Problems:  Principal Problem:    Cardiac arrest [facility])  Active Problems:    Acute kidney injury superimposed on chronic kidney disease (HCC)    Benign hypertension with CKD (chronic kidney disease) stage III (HCC)    Diabetic ketoacidosis with coma associated with type 1 diabetes mellitus (HCC)    Severe sepsis with septic shock (CODE) (HCC)    Pneumonia, bacterial    Acute respiratory failure (HCC)    Altered mental status    Frailty    Septic shock (HCC)    Normocytic anemia    Urinary tract infection without hematuria    Diabetes mellitus type 1 (HCC)    BLANCA (acute kidney injury) ([facility]  Resolved Problems:    * No resolved hospital problems.  *      Objective:     Patient Vitals for the past 24 hrs:   Temp Pulse Resp BP SpO2   06/02/22 0712 99.1 °F (37.3 °C) 98 18 (!) 104/51 95 %   06/02/22 0242 99.1 °F (37.3 °C) (!) 102 -- (!) 98/55 94 %   06/01/22 2303 99.3 °F (37.4 °C) (!) 105 -- (!) 100/57 96 %   06/01/22 1929 99.1 °F (37.3 °C) (!) 102 -- (!) 96/53 94 %   06/01/22 1507 98.8 °F (37.1 °C) (!) 117 21 (!) 91/58 99 %   06/01/22 1412 -- (!) 110 20 (!) 100/50 98 %   06/01/22 1402 -- (!) 114 20 (!) 104/53 100 %   06/01/22 1351 -- (!) 123 20 (!) 112/56 96 %   06/01/22 1344 -- -- -- -- 98 %   06/01/22 1342 -- (!) 130 (!) 35 -- --   06/01/22 1341 -- (!) 129 (!) 34 (!) 164/77 100 %   06/01/22 1340 -- (!) 125 (!) 31 -- 100 %   06/01/22 1336 -- (!) 105 30 (!) 140/78 100 %   06/01/22 1335 -- (!) 102 29 -- 100 %   06/01/22 1331 -- 96 25 122/67 100 %   06/01/22 1330 -- 97 18 -- 100 %   06/01/22 1326 -- (!) 103 (!) 40 139/76 93 %   06/01/22 1325 -- 99 (!) 35 -- 99 %   06/01/22 1314 -- -- -- -- 95 %   06/01/22 1309 -- -- -- -- 96 %   06/01/22 1246 97.8 °F (36.6 °C) (!) 101 17 108/60 96 %   06/01/22 1203 -- 99 -- 103/64 --   06/01/22 1130 -- 98 -- 124/76 --   06/01/22 1100 -- 99 -- (!) 148/82 --   06/01/22 1031 -- 99 -- 128/74 --   06/01/22 1000 -- 93 -- 130/74 --   06/01/22 0955 -- 96 -- 128/68 --       Oxygen Therapy  SpO2: 95 %  Pulse Oximeter Device Mode: Continuous  Pulse Oximeter Device Location: Finger  O2 Device: None (Room air)  Skin Assessment: Clean, dry, & intact  Skin Protection for O2 Device: Yes  Orientation: Bilateral  Location: Cheek  Intervention(s): Mouth Care,Skin Barrier  FiO2 : 30 %  O2 Flow Rate (L/min): 2 L/min    Estimated body mass index is 28.1 kg/m² as calculated from the following:    Height as of this encounter: 5' 6\" (1.676 m). Weight as of this encounter: 174 lb 1.6 oz (79 kg). Intake/Output Summary (Last 24 hours) at 6/2/2022 0839  Last data filed at 6/1/2022 2218  Gross per 24 hour   Intake 600 ml   Output 1100 ml   Net -500 ml         Physical Exam:     Blood pressure (!) 104/51, pulse 98, temperature 99.1 °F (37.3 °C), temperature source Oral, resp. rate 18, height 5' 6\" (1.676 m), weight 174 lb 1.6 oz (79 kg), SpO2 95 %. General:    Chronically ill-appearing. TCC in place on R chest. Central line in L neck  Head:  Normocephalic, atraumatic  Eyes:  Sclerae appear normal.  Pupils equally round. ENT:  Nares appear normal, no drainage. Moist oral mucosa. Central line in place  Neck:  No restricted ROM. Trachea midline   CV:   RRR. No m/r/g. No jugular venous distension. Lungs:   CTAB. No wheezing, rhonchi, or rales. Respirations even, unlabored  Abdomen: Bowel sounds present. Soft, nontender, nondistended. Extremities: No cyanosis or clubbing. No edema  Skin:     No rashes and normal coloration. Warm and dry.     Neuro:  CN II-XII grossly intact. Sensation intact. A&Ox3  Psych:  Normal mood and affect.       I have reviewed ordered lab tests and independently visualized imaging below:    Recent Labs:  Recent Results (from the past 48 hour(s))   POCT Glucose    Collection Time: 05/31/22 11:31 AM   Result Value Ref Range    POC Glucose 175 (H) 65 - 100 mg/dL    Performed by: Double[employer]am    POCT Glucose    Collection Time: 05/31/22  5:53 PM   Result Value Ref Range    POC Glucose 110 (H) 65 - 100 mg/dL    Performed by: SheologyaPCT    POCT Glucose    Collection Time: 06/01/22  1:50 AM   Result Value Ref Range    POC Glucose 255 (H) 65 - 100 mg/dL    Performed by: [employer]    Basic Metabolic Panel    Collection Time: 06/01/22  4:24 AM   Result Value Ref Range    Sodium 142 136 - 145 mmol/L    Potassium 3.0 (L) 3.5 - 5.1 mmol/L    Chloride 106 98 - 107 mmol/L    CO2 27 21 - 32 mmol/L    Anion Gap 9 7 - 16 mmol/L    Glucose 113 (H) 65 - 100 mg/dL    BUN 19 6 - 23 MG/DL    CREATININE 5.70 (H) 0.6 - 1.0 MG/DL    GFR African American 10 (L) >60 ml/min/1.73m2    GFR Non- 8 (L) >60 ml/min/1.73m2    Calcium 8.3 8.3 - 10.4 MG/DL   Vancomycin Level, Random    Collection Time: 06/01/22  4:24 AM   Result Value Ref Range    Vancomycin Rm 25.2 UG/ML   CBC    Collection Time: 06/01/22  4:24 AM   Result Value Ref Range    WBC 13.9 (H) 4.3 - 11.1 K/uL    RBC 2.92 (L) 4.05 - 5.2 M/uL    Hemoglobin 7.7 (L) 11.7 - 15.4 g/dL    Hematocrit 24.3 (L) 35.8 - 46.3 %    MCV 83.2 79.6 - 97.8 FL    MCH 26.4 26.1 - 32.9 PG    MCHC 31.7 31.4 - 35.0 g/dL    RDW 15.0 (H) 11.9 - 14.6 %    Platelets 013 296 - 465 K/uL    MPV 9.5 9.4 - 12.3 FL    nRBC 0.05 0.0 - 0.2 K/uL   POCT Glucose    Collection Time: 06/01/22  4:49 AM   Result Value Ref Range    POC Glucose 113 (H) 65 - 100 mg/dL    Performed by: Suman    POCT Glucose    Collection Time: 06/01/22 10:34 AM   Result Value Ref Range    POC Glucose 212 (H) 65 - 100 mg/dL Performed by:  Carlo    POCT Glucose    Collection Time: 06/01/22  5:14 PM   Result Value Ref Range    POC Glucose 386 (H) 65 - 100 mg/dL    Performed by: [facility]    Folate    Collection Time: 06/01/22  5:40 PM   Result Value Ref Range    Folate 4.9 3.1 - 17.5 ng/mL   Vitamin B12    Collection Time: 06/01/22  5:40 PM   Result Value Ref Range    Vitamin B-12 1767 (H) 193 - 986 pg/mL   Transferrin Saturation    Collection Time: 06/01/22  5:40 PM   Result Value Ref Range    Iron 36 35 - 150 ug/dL    TIBC 153 (L) 250 - 450 ug/dL    TRANSFERRIN SATURATION 24 >20 %   Hepatitis Panel, Acute    Collection Time: 06/01/22  5:40 PM   Result Value Ref Range    Hep A IgM NONREACTIVE NR      Hep B Core Ab, IgM NONREACTIVE NR      Hepatitis B Surface Ag NONREACTIVE NR      Hepatitis C Ab NONREACTIVE NR     POCT Glucose    Collection Time: 06/01/22 10:59 PM   Result Value Ref Range    POC Glucose 518 (HH) 65 - 100 mg/dL    Performed by: Topher    POCT Glucose    Collection Time: 06/02/22  1:21 AM   Result Value Ref Range    POC Glucose 498 (HH) 65 - 100 mg/dL    Performed by: Belkys Kaba    Basic Metabolic Panel    Collection Time: 06/02/22  4:48 AM   Result Value Ref Range    Sodium 138 136 - 145 mmol/L    Potassium 3.5 3.5 - 5.1 mmol/L    Chloride 100 98 - 107 mmol/L    CO2 25 21 - 32 mmol/L    Anion Gap 13 7 - 16 mmol/L    Glucose 360 (H) 65 - 100 mg/dL    BUN 19 6 - 23 MG/DL    CREATININE 4.60 (H) 0.6 - 1.0 MG/DL    GFR African American 13 (L) >60 ml/min/1.73m2    GFR Non- 11 (L) >60 ml/min/1.73m2    Calcium 7.9 (L) 8.3 - 10.4 MG/DL   CBC    Collection Time: 06/02/22  4:48 AM   Result Value Ref Range    WBC 15.2 (H) 4.3 - 11.1 K/uL    RBC 2.76 (L) 4.05 - 5.2 M/uL    Hemoglobin 7.3 (L) 11.7 - 15.4 g/dL    Hematocrit 22.9 (L) 35.8 - 46.3 %    MCV 83.0 79.6 - 97.8 FL    MCH 26.4 26.1 - 32.9 PG    MCHC 31.9 31.4 - 35.0 g/dL    RDW 15.3 (H) 11.9 - 14.6 %    Platelets 291 112 - 566 K/uL MPV 9.3 (L) 9.4 - 12.3 FL    nRBC 0.03 0.0 - 0.2 K/uL   POCT Glucose    Collection Time: 06/02/22  7:13 AM   Result Value Ref Range    POC Glucose 388 (H) 65 - 100 mg/dL    Performed by: Jaden        Other Studies:  XR CHEST (2 VW)    Result Date: 5/30/2022  Chest 2 view CLINICAL INDICATION: Coughing, subacute, moderate, leukocytosis, follow-up of bilateral lung opacities and left retrocardiac density COMPARISON: 5/26/2022 TECHNIQUE: Sitting upright AP and lateral views of the chest FINDINGS: Lung volumes are well inflated, overall slightly improved aeration since prior. No evidence of a pneumothorax, increasing effusion, overt CHF or new consolidation. Persisting although decreased groundglass and partially consolidative opacity of the left lower lobe. Diffuse mild pulmonary reticular opacities are again seen most compatible with pulmonary edema although decreased since prior. Stable left jugular catheter. Interval removal of previous right jugular catheter. Artifact due to external tubes and wires. Cardiomediastinal silhouette and hilar contours within stable given technique and positioning. Patient is rotated slightly. No acute osseous abnormalities are seen. Lateral view details are limited given suboptimal positioning, prominence of overlapping structures. Stent tubing and/or drainage catheter is partially seen in the abdomen. 1. Decreasing left lower lobe infiltrate. 2. Decreasing pulmonary edema.        Current Meds:  Current Facility-Administered Medications   Medication Dose Route Frequency    folic acid (FOLVITE) tablet 1 mg  1 mg Oral Daily    insulin glargine (LANTUS) injection vial 10 Units  10 Units SubCUTAneous Nightly    insulin lispro (HUMALOG) injection vial 5 Units  5 Units SubCUTAneous TID WC    ferrous sulfate (IRON 325) tablet 325 mg  325 mg Oral BID WC    tuberculin injection 5 Units  5 Units IntraDERmal Once    loperamide (IMODIUM) capsule 2 mg  2 mg Oral 4x Daily PRN    insulin lispro (HUMALOG) injection vial 0-4 Units  0-4 Units SubCUTAneous Q6H    vancomycin (VANCOCIN) intermittent dosing (placeholder)   Other RX Placeholder    guaiFENesin-dextromethorphan (ROBITUSSIN DM) 100-10 MG/5ML syrup 5 mL  5 mL Oral Q4H PRN    heparin (porcine) injection 10,000 Units  10,000 Units IntraVENous PRN    albuterol (PROVENTIL) nebulizer solution 2.5 mg  2.5 mg Nebulization Q4H PRN    dextrose 5 % and 0.45 % sodium chloride infusion   IntraVENous Continuous PRN    glucose chewable tablet 16 g  4 tablet Oral PRN    dextrose bolus 10% 125 mL  125 mL IntraVENous PRN    Or    dextrose bolus 10% 250 mL  250 mL IntraVENous PRN    glucagon injection 1 mg  1 mg IntraMUSCular PRN    dextrose 5 % solution  100 mL/hr IntraVENous PRN    dextrose bolus 10% 125 mL  125 mL IntraVENous PRN    Or    dextrose bolus 10% 250 mL  250 mL IntraVENous PRN    glucagon injection 1 mg  1 mg IntraMUSCular PRN    medicated lip ointment (BLISTEX)   Topical PRN    sodium chloride flush 0.9 % injection 5-40 mL  5-40 mL IntraVENous 2 times per day    sodium chloride flush 0.9 % injection 5-40 mL  5-40 mL IntraVENous PRN    [Held by provider] heparin (porcine) injection 5,000 Units  5,000 Units SubCUTAneous q8h    ondansetron (ZOFRAN-ODT) disintegrating tablet 4 mg  4 mg Oral Q8H PRN    Or    ondansetron (ZOFRAN) injection 4 mg  4 mg IntraVENous Q6H PRN    polyethylene glycol (GLYCOLAX) packet 17 g  17 g Oral Daily PRN    acetaminophen (TYLENOL) tablet 650 mg  650 mg Oral Q6H PRN    Or    acetaminophen (TYLENOL) suppository 650 mg  650 mg Rectal Q6H PRN    dextrose bolus 10% 250 mL  250 mL IntraVENous PRN    potassium chloride 10 mEq/100 mL IVPB (Peripheral Line)  10 mEq IntraVENous PRN    magnesium sulfate 1000 mg in dextrose 5% 100 mL IVPB  1,000 mg IntraVENous PRN    sodium phosphate 10 mmol in sodium chloride 0.9 % 250 mL IVPB  10 mmol IntraVENous PRN    Or    sodium phosphate 15 mmol in dextrose 5 % 250 mL IVPB  15 mmol IntraVENous PRN    Or    sodium phosphate 20 mmol in dextrose 5 % 500 mL IVPB  20 mmol IntraVENous PRN    dextrose 5 % and 0.45 % sodium chloride infusion   IntraVENous Continuous PRN    sodium chloride flush 0.9 % injection 5-40 mL  5-40 mL IntraVENous 2 times per day    sodium chloride flush 0.9 % injection 5-40 mL  5-40 mL IntraVENous PRN    0.9 % sodium chloride infusion   IntraVENous PRN       Signed: Emely Camargo DO    Part of this note may have been written by using a voice dictation software. The note has been proof read but may still contain some grammatical/other typographical errors.

## 2022-06-02 NOTE — DISCHARGE SUMMARY
Hospitalist Discharge Summary   Admit Date:  2022  3:51 PM   DC Note date: 2022  Name:  Derick Solomon   Age:  48 y.o. Sex:  female  :  1975   MRN:  [id number]   Room:    PCP:  Lillian Lombardo MD    Presenting Complaint: Altered Mental Status     Initial Admission Diagnosis: Cardiac arrest ([address] [I46.9]  Hyperkalemia [E87.5]  Septic shock ([address] [A41.9, R65.21]  Acute respiratory failure, unspecified whether with hypoxia or hypercapnia ([address] [J96.00]  Acute renal failure, unspecified acute renal failure type ([address] [N17.9]     Problem List for this Hospitalization (present on admission):    Principal Problem:    Cardiac arrest ([address]  Active Problems:    Acute kidney injury superimposed on chronic kidney disease ([address]    Benign hypertension with CKD (chronic kidney disease) stage III ([address]    Diabetic ketoacidosis with coma associated with type 1 diabetes mellitus ([address]    Severe sepsis with septic shock (CODE) (HCC)    Pneumonia, bacterial    Acute respiratory failure (HCC)    Altered mental status    Frailty    Septic shock (HCC)    Normocytic anemia    Urinary tract infection without hematuria    Diabetes mellitus type 1 (HCC)    BLANCA (acute kidney injury) ([address]  Resolved Problems:    * No resolved hospital problems. *    Did Patient have Sepsis (YES OR NO): yes    Hospital Course:  39-year-old female with PMHx DM1, HTN, CKD stage III who was admitted to intensivist service on  with unresponsiveness. She was not heard for several days at home and was found unresponsive and brought in by EMS with numerous lab abnormalities. She was found to be in DKA with coma and BLANCA. CT head was unremarkable. She was markedly acidotic with a pH of 6.8, suffered a brief cardiac arrest due to septic shock and DKA and was intubated. There was a concern for aspiration PNA VS recurrent UTI. She was started on broad-spectrum antibiotics.   She was started on vasopressors and glucose stabilizer per DKA protocol.     Nephrology was consulted for BLANCA. She required SLED that was initiated on 5/24. Palliative care has seen and patient's mother desires full code. TTE on 5/21/2022 shows EF 70-75% with normal systolic and diastolic function; moderate stenosis of aortic valve. She was extubated on 5/25 and hospitalist was requested to assume care. Pt underwent tunneled cath placement on 6/1 and awaiting for renal recovery. CM working on outpatient HD slot.     Blood cultures on 5/24 and 5/30 NGTD. Urine culture negative. Patient remains on Vanco/Zosyn and completed course on 6/2. She has acute on chronic anemia with Hgb 7-8 during hospital course (baseline >10-11). Iron studies 5/31 c/w anemia of chronic disease, suspect secondary to BLANCA on CKD and frequent blood draws. No active bleeding noted. Vitamin B12 adequate, Folate low 4.9, so folic supplement was added. CBC and BMP remain stable    Pt was stable to be discharged to [facility] in hemodynamically stable condition. Her daily insulin for T1DM will need to be adjusted during hospital course and with PCP outpatient. Disposition: [facility] 9th floor  Diet: ADULT DIET; Easy to Chew; 3 carb choices (45 gm/meal)  Code Status: Full Code    Follow Ups: PCP once discharge from [facility]      Follow up labs/diagnostics (ultimately defer to outpatient provider):  CBC, BMP    Time spent in patient discharge and coordination 45 minutes. Plan was discussed with patient. All questions answered. Patient was stable at time of discharge. Instructions given to call a physician or return if any concerns.     Current Discharge Medication List      START taking these medications    Details   albuterol (PROVENTIL) (2.5 MG/3ML) 0.083% nebulizer solution Take 3 mLs by nebulization every 4 hours as needed for Wheezing  Qty: 120 each, Refills: 3      insulin glargine (LANTUS) 100 UNIT/ML injection vial Inject 10 Units into the skin nightly  Qty: 10 mL, Refills: 3      !! insulin lispro (HUMALOG) 100 UNIT/ML SOLN injection vial Inject 0-4 Units into the skin 4 times daily (before meals and nightly)  Qty: 10 mL, Refills: 0      !! insulin lispro (HUMALOG) 100 UNIT/ML SOLN injection vial Inject 5 Units into the skin 3 times daily (with meals)  Qty: 10 mL, Refills: 0      ferrous sulfate (IRON 325) 325 (65 Fe) MG tablet Take 1 tablet by mouth 2 times daily (with meals)  Qty: 60 tablet, Refills: 3      folic acid (FOLVITE) 1 MG tablet Take 1 tablet by mouth daily  Qty: 30 tablet, Refills: 3       !! - Potential duplicate medications found. Please discuss with provider. CONTINUE these medications which have NOT CHANGED    Details   atorvastatin (LIPITOR) 80 MG tablet Take 80 mg by mouth daily      brimonidine (ALPHAGAN P) 0.1 % SOLN 1 drop in left eye twice a day      ergocalciferol (ERGOCALCIFEROL) 1.25 MG (26133 UT) capsule Take 50,000 Units by mouth      gabapentin (NEURONTIN) 300 MG capsule Take 300 mg by mouth daily.       ondansetron (ZOFRAN-ODT) 4 MG disintegrating tablet Take 4 mg by mouth every 8 hours as needed         STOP taking these medications       Insulin Degludec (TRESIBA FLEXTOUCH) 200 UNIT/ML SOPN Comments:   Reason for Stopping:         insulin lispro, 1 Unit Dial, (HUMALOG KWIKPEN) 100 UNIT/ML SOPN Comments:   Reason for Stopping:               Procedures done this admission:  * No surgery found *    Consults this admission:  IP CONSULT TO PHARMACY  IP CONSULT TO DIETITIAN  IP CONSULT TO NEPHROLOGY  IP CONSULT TO PHARMACY  IP CONSULT TO PALLIATIVE CARE  IP CONSULT TO DIETITIAN  IP CONSULT TO PHYSICAL THERAPY  IP CONSULT TO HOSPITALIST  IP CONSULT TO PHYSICAL MEDICINE REHAB  IP CONSULT TO PHARMACY  IP CONSULT TO DIABETES MANAGEMENT  IP CONSULT TO CASE MANAGEMENT    Echocardiogram results:  05/21/22    TRANSTHORACIC ECHOCARDIOGRAM (TTE) COMPLETE (CONTRAST/BUBBLE/3D PRN) 05/22/2022  1:30 PM (Final)    Interpretation Summary    Left Ventricle: Hyperdynamic left ventricular systolic function with a visually estimated EF of 70 - 75%. Left ventricle size is normal. Mildly increased wall thickness. Normal diastolic function.   Aortic Valve: Tricuspid valve. Mild sclerosis of the aortic valve cusp. The AV is incompletely interrogated though the peak velocity and mean gradient is suggestive of moderate stenosis of the aortic valve. AV mean gradient is 23 mmHg. AV peak velocity is 3.2 m/s. Signed by: Giovanna De La Rosa MD on 5/22/2022  1:30 PM      Diagnostic Imaging/Tests:   XR ABDOMEN (KUB) (SINGLE AP VIEW)    Result Date: 5/21/2022  EXAMINATION: XR ABDOMEN (KUB) (SINGLE AP VIEW) 5/21/2022 5:12 PM ACCESSION NUMBER: [id number] COMPARISON: None available INDICATION: NG tube TECHNIQUE: A single AP supine view of the abdomen was obtained. FINDINGS: Support devices: Enteric tube tip overlies the gastric body. Nephroureteral stent is partially visualized overlying the right renal silhouette. Bowel: Nonobstructive bowel gas pattern. No evidence of pneumoperitoneum. Soft Tissues: Unremarkable. Osseous Structures: Unremarkable. Enteric tube tip overlies the gastric body. CT HEAD WO CONTRAST    Result Date: 5/21/2022  EXAMINATION: CT HEAD WO CONTRAST 5/21/2022 6:49 PM ACCESSION NUMBER: [id number] COMPARISON: None available INDICATION: Seizure, decreased consciousness TECHNIQUE: Multiple-row detector helical CT examination of the head without intravenous contrast. Radiation dose reduction techniques were used for this study. Our CT scanners use one or all of the following: Automated exposure control, adjustment of the mA and/or kV according to patient size, iterative reconstruction. FINDINGS: BRAIN: No intracranial hemorrhage. No mass effect or herniation. The grey-white matter differentiation is maintained. White matter is within normal limits for age. VENTRICLES/EXTRA-AXIAL: No hydrocephalus or extra-axial fluid collection. BONES: No acute fracture.  SOFT TISSUES: The paranasal sinuses and mastoid air cells are clear. No evidence of acute ischemia, hemorrhage, or mass effect. XR CHEST PORTABLE    Result Date: 5/21/2022  EXAMINATION: XR CHEST PORTABLE 5/21/2022 5:12 PM ACCESSION NUMBER: [id number] COMPARISON: 5/1/2020 INDICATION: Respiratory failure TECHNIQUE: A single view of the chest was obtained. FINDINGS: Support Devices: *  Interval intubation with ET tube tip proximally 4 cm above the nadiya. *  Enteric tube courses below the diaphragm. *  Right IJ vascular catheter tip overlies the inferior cavoatrial junction. Cardiac Silhouette: Within normal limits in size. Mediastinum: Normal mediastinal contours. Lungs: Low lung volumes. Mild left basilar atelectasis. No pneumothorax or sizable pleural effusion. Upper Abdomen: Normal Miscellaneous: No fracture or suspicious osseous lesion. 1.  ET tube in appropriate position. 2.  Right IJ vascular catheter tip overlies the inferior cavoatrial junction. Consider retraction of 6 cm to overlie the superior cavoatrial junction. 3.  Mild left basilar atelectasis. Transthoracic echocardiogram (TTE) complete with contrast, bubble, strain, and 3D PRN    Result Date: 5/22/2022    Left Ventricle: Hyperdynamic left ventricular systolic function with a visually estimated EF of 70 - 75%. Left ventricle size is normal. Mildly increased wall thickness. Normal diastolic function.   Aortic Valve: Tricuspid valve. Mild sclerosis of the aortic valve cusp. The AV is incompletely interrogated though the peak velocity and mean gradient is suggestive of moderate stenosis of the aortic valve. AV mean gradient is 23 mmHg. AV peak velocity is 3.2 m/s.          Labs: Results:       BMP, Mg, Phos Recent Labs     05/31/22 0411 06/01/22 0424 06/02/22 0448    142 138   K 3.6 3.0* 3.5    106 100   CO2 27 27 25   BUN 18 19 19      CBC Recent Labs     05/31/22 0411 06/01/22 0424 06/02/22 0448   WBC 14.8* 13.9* 15.2*   RBC 2.88* 2.92* 2.76*   HGB 7.4* 7.7* 7.3*   HCT 23.6* 24.3* 22.9*    389 381      LFT No results for input(s): ALT, TP, ALB, GLOB in the last 72 hours.     Invalid input(s): SGOT, TBIL, AP, AGRAT, GPT   Cardiac Testing No results found for: BNP, CPK, RCK1, CKMB   Coagulation Tests No results found for: INR, APTT   A1c No results found for: HBA1C   Lipid Panel Lab Results   Component Value Date    CHOL 156 06/19/2020    HDL 52 06/19/2020      Thyroid Panel Lab Results   Component Value Date    TSH 2.400 06/19/2020    TSH 0.972 09/16/2019        Most Recent UA Lab Results   Component Value Date    MUCUS 0 05/21/2022    UCOM RESULTS VERIFIED MANUALLY 05/17/2022          All Labs from Last 24 Hrs:  Recent Results (from the past 24 hour(s))   POCT Glucose    Collection Time: 06/01/22  5:14 PM   Result Value Ref Range    POC Glucose 386 (H) 65 - 100 mg/dL    Performed by: Sagar    Folate    Collection Time: 06/01/22  5:40 PM   Result Value Ref Range    Folate 4.9 3.1 - 17.5 ng/mL   Vitamin B12    Collection Time: 06/01/22  5:40 PM   Result Value Ref Range    Vitamin B-12 1767 (H) 193 - 986 pg/mL   Transferrin Saturation    Collection Time: 06/01/22  5:40 PM   Result Value Ref Range    Iron 36 35 - 150 ug/dL    TIBC 153 (L) 250 - 450 ug/dL    TRANSFERRIN SATURATION 24 >20 %   Hepatitis Panel, Acute    Collection Time: 06/01/22  5:40 PM   Result Value Ref Range    Hep A IgM NONREACTIVE NR      Hep B Core Ab, IgM NONREACTIVE NR      Hepatitis B Surface Ag NONREACTIVE NR      Hepatitis C Ab NONREACTIVE NR     POCT Glucose    Collection Time: 06/01/22 10:59 PM   Result Value Ref Range    POC Glucose 518 (HH) 65 - 100 mg/dL    Performed by: Topher    POCT Glucose    Collection Time: 06/02/22  1:21 AM   Result Value Ref Range    POC Glucose 498 (HH) 65 - 100 mg/dL    Performed by: Radha Blackman    Basic Metabolic Panel    Collection Time: 06/02/22  4:48 AM   Result Value Ref Range    Sodium 138 136 - 145 mmol/L    Potassium 3.5 3.5 - 5.1 mmol/L    Chloride 100 98 - 107 mmol/L    CO2 25 21 - 32 mmol/L    Anion Gap 13 7 - 16 mmol/L    Glucose 360 (H) 65 - 100 mg/dL    BUN 19 6 - 23 MG/DL    CREATININE 4.60 (H) 0.6 - 1.0 MG/DL    GFR African American 13 (L) >60 ml/min/1.73m2    GFR Non- 11 (L) >60 ml/min/1.73m2    Calcium 7.9 (L) 8.3 - 10.4 MG/DL   CBC    Collection Time: 06/02/22  4:48 AM   Result Value Ref Range    WBC 15.2 (H) 4.3 - 11.1 K/uL    RBC 2.76 (L) 4.05 - 5.2 M/uL    Hemoglobin 7.3 (L) 11.7 - 15.4 g/dL    Hematocrit 22.9 (L) 35.8 - 46.3 %    MCV 83.0 79.6 - 97.8 FL    MCH 26.4 26.1 - 32.9 PG    MCHC 31.9 31.4 - 35.0 g/dL    RDW 15.3 (H) 11.9 - 14.6 %    Platelets 457 518 - 664 K/uL    MPV 9.3 (L) 9.4 - 12.3 FL    nRBC 0.03 0.0 - 0.2 K/uL   POCT Glucose    Collection Time: 06/02/22  7:13 AM   Result Value Ref Range    POC Glucose 388 (H) 65 - 100 mg/dL    Performed by: Jaden        No Known Allergies  Immunization History   Administered Date(s) Administered    COVID-19, Pfizer Purple top, DILUTE for use, 12+ yrs, 30mcg/0.3mL dose 02/10/2021    PPD Test 06/01/2022       Recent Vital Data:  Patient Vitals for the past 24 hrs:   Temp Pulse Resp BP SpO2   06/02/22 0712 99.1 °F (37.3 °C) 98 18 (!) 104/51 95 %   06/02/22 0242 99.1 °F (37.3 °C) (!) 102 -- (!) 98/55 94 %   06/01/22 2303 99.3 °F (37.4 °C) (!) 105 -- (!) 100/57 96 %   06/01/22 1929 99.1 °F (37.3 °C) (!) 102 -- (!) 96/53 94 %   06/01/22 1507 98.8 °F (37.1 °C) (!) 117 21 (!) 91/58 99 %   06/01/22 1412 -- (!) 110 20 (!) 100/50 98 %   06/01/22 1402 -- (!) 114 20 (!) 104/53 100 %   06/01/22 1351 -- (!) 123 20 (!) 112/56 96 %   06/01/22 1344 -- -- -- -- 98 %   06/01/22 1342 -- (!) 130 (!) 35 -- --   06/01/22 1341 -- (!) 129 (!) 34 (!) 164/77 100 %   06/01/22 1340 -- (!) 125 (!) 31 -- 100 %   06/01/22 1336 -- (!) 105 30 (!) 140/78 100 %   06/01/22 1335 -- (!) 102 29 -- 100 %   06/01/22 1331 -- 96 25 122/67 100 % 06/01/22 1330 -- 97 18 -- 100 %   06/01/22 1326 -- (!) 103 (!) 40 139/76 93 %   06/01/22 1325 -- 99 (!) 35 -- 99 %   06/01/22 1314 -- -- -- -- 95 %   06/01/22 1309 -- -- -- -- 96 %   06/01/22 1246 97.8 °F (36.6 °C) (!) 101 17 108/60 96 %   06/01/22 1203 -- 99 -- 103/64 --   06/01/22 1130 -- 98 -- 124/76 --   06/01/22 1100 -- 99 -- (!) 148/82 --       Oxygen Therapy  SpO2: 95 %  Pulse Oximeter Device Mode: Continuous  Pulse Oximeter Device Location: Finger  O2 Device: None (Room air)  Skin Assessment: Clean, dry, & intact  Skin Protection for O2 Device: Yes  Orientation: Bilateral  Location: Cheek  Intervention(s): Mouth Care,Skin Barrier  FiO2 : 30 %  O2 Flow Rate (L/min): 2 L/min    Estimated body mass index is 28.1 kg/m² as calculated from the following:    Height as of this encounter: 5' 6\" (1.676 m). Weight as of this encounter: 174 lb 1.6 oz (79 kg). Intake/Output Summary (Last 24 hours) at 6/2/2022 1038  Last data filed at 6/1/2022 2218  Gross per 24 hour   Intake 600 ml   Output 1100 ml   Net -500 ml         Physical Exam:    General:          Chronically ill-appearing. TCC in place on R chest. Central line in L  Head:               Normocephalic, atraumatic  Eyes:               Sclerae appear normal.  Pupils equally round. ENT:                Nares appear normal, no drainage. Moist oral mucosa. Central line in place  Neck:               No restricted ROM. Trachea midline   CV:                  RRR. No m/r/g. No jugular venous distension. Lungs:             CTAB. No wheezing, rhonchi, or rales. Respirations even, unlabored  Abdomen: Bowel sounds present. Soft, nontender, nondistended. Extremities:     No cyanosis or clubbing. No edema  Skin:                No rashes and normal coloration. Warm and dry. Neuro:             CN II-XII grossly intact. Sensation intact. A&Ox3  Psych:             Normal mood and affect. Signed:   Gem Orr DO    Part of this note may have been written by using a voice dictation software. The note has been proof read but may still contain some grammatical/other typographical errors.

## 2022-06-02 NOTE — PROGRESS NOTES
[facility] OCCUPATIONAL THERAPY INITIAL EVALUATION    Time In 1320   Time Out 1408     HPI (per MD report): \"This patient is a right-hand dominant, previously functionally independent BF with PMH including DM1, CKD4, retinopathy, neuropathy and ureteral stones. 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 She was found unresponsive in her home by EMS and brought into the ED 5/21/2022. ED findings included BS ~1600, Cr 9.7, K+ 6.7, pH 6.8, WBC 29K. Initial /150, she was soon hypotensive to 62/31. She was intubated and admitted to ICU for septic shock, DKA and ARF. Head CT unrevealing. She had cardiac arrest that required chest compressions. Pressors, IV insulin, broad-spectrum ABX and resuscitation fluids were started. TTE 5/22 with EF 70-75%, moderate AV stenosis. Nephrology consulted for ARF; dialysis initiated when she became oliguric (5/24). UCx NGTD. Tube feeds started 5/24. She was extubated 5/25 and vancomycin was stopped. SLP kept her NPO 5/26 due to moderate-severe oropharyngeal dysphagia. She was combative and agitated 5/26 but calmed with Haldol. SLP re-evaluation 5/27 cleared patient for easy-to-chew solids with thin liquids. BP and BS normalized. BCx x 2 remained NGTD. Acute-on-chronic anemia with Hgb 7-8 during hospitalization (baseline >10-11); iron studies 5/31 with anemia of chronic disease. Vitamin B12 adequate, folate 4.9 thus supplemented. Tunneled CVC placed 6/1 while awaiting renal recovery. Vancomycin x 1 additional dose given 5/30. All IV ABX EOT 6/2. PT and OT were initiated, and patient was found to have significant mobility and self-care deficits. Physical Medicine and Rehab service was consulted, and patient was initially evaluated by JUAN MIGUEL Wei on 5/27. \"     Past Medical History:   Diagnosis Date    Acute renal failure ([facility] 1/24/2012    CKD (chronic kidney disease) stage 3, GFR 30-59 ml/min ([facility])     Gastroenteritis, acute 1/24/2012    IDDM (insulin dependent diabetes mellitus) 1/24/2012    Type 1 av -150 can tell Hypo below 70    Intractable nausea and vomiting 12/25/2014    Irregular menses     Kidney stone     Neuropathy, peripheral, idiopathic 3/13/2017    Retinopathy, bilateral 3/13/2017    Trichomoniasis 3/13/2017    Ulcer, skin, chronic ([address] 3/13/2017    Vaginal burning      Patient's Goal: \"To get back to moving around like I normally do. \"  Pain: Pt with c/o vaginal burning and pain, malaise, hurting all over. RN notified, urine specimen gathered. Precautions: Falls    Prior Level of Function: Pt reports she lives alone, is fully independent with I/ADLs, ambulation, and driving. Does not work. Reports her parents live locally and she will d/c to their house which is one level, 2 steps to enter, with a walk in shower.    HOME SET UP:    [address], 1 level   Lives Alone Yes   Support Systems Parents   Home Entrance 3 Stairs, Bilateral rails   Bathing Situation Walk in shower   Current DME None     UPPER EXTREMITY FUNCTION:   RY [place]   General Evaluation Generally decreased strength and limited shoulder flexion but overall functional Generally decreased strength and limited shoulder flexion but overall functional   GMC Intact Intact   Light Touch intact intact   Proprioception intact intact     STRENGTH:   RY MCLAIN   Shoulder Flexion AROM limited to ~140 °  AROM limited to ~140 °    Shoulder Abduction 3+/5 Completes full range of motion against gravity with minimal resistance 3+/5 Completes full range of motion against gravity with minimal resistance   Elbow Flexion 4-/5 Completes full range of motion against gravity with minimal-moderate resistance 4-/5 Completes full range of motion against gravity with minimal-moderate resistance   Elbow Extension  4-/5 Completes full range of motion against gravity with minimal-moderate resistance 4-/5 Completes full range of motion against gravity with minimal-moderate resistance    4/5 Completes full range of motion against gravity with moderate resistance 4/5 Completes full range of motion against gravity with moderate resistance     BALANCE:   Static Dynamic   Sitting Intact Intact   Standing Fair: Able to stand unsupported without UE support and with LOB for 1-2 minutes Unable to test     FUNCTIONAL MOBILITY:    Score Comments   Rolling Independent [place] with bed rail   Supine to Sit Supervision or touching assistance S   Sit to Supine Supervision or touching assistance S   Sit to Stand Supervision or touching assistance Assistance Needed: Supervision or touching assistance  Comment: CGA  CARE Score: 4   Transfer Assist Supervision or touching assistance Assistance Needed: Supervision or touching assistance  Comment: CGA SPT without DME  CARE Score: 4     ACTIVITIES OF DAILY LIVING:   Score Comments   Eating Independent     Oral Hyigene Independent     Bathing Partial/moderate assistance Assist to reach feet due to fatigue and malaise  Adaptive  Equipment:    Upper Body  Dressing Setup or clean-up assistance Seated EOB  Items Applied:    Lower Body Dressing Partial/moderate assistance Assist to thread LLE d/t malaise, CGA in stance to pull up over hips  Items Applied:   Adaptive Equipment:    Donning/[place] Footwear Partial/moderate assistance Doffs socks with I, S/U don R, Dependent dress L d/t fatigue and malaise  Items Applied:   Adaptive Equipment:    Toilet Transfer Supervision or touching assistance SBA seated, CGA in stance   [place] or touching assistance CGA with grab bars     /73   Pulse 99   Temp 99 °F (37.2 °C) (Oral)   Resp 20   SpO2 94%      Cognition: Pt scored a 14/15 on the IRF-DIONNE. No overt cognitive deficits noted, potential attention and safety awareness deficits. Vision/Perception: No changes from pre-morbid where pt wears reading glasses. Decreased but functional acuity. Problem List: Activity Tolerance, Safety Awareness, Strength, Pain and Standing Balance   Functional Limitations: ADL, IADL, Functional Transfers and Functional Mobility   Session: Pt agreeable to OT evaluation. Pt completed the following with details recorded above: MMT/ROM, ADLs, IRF-DIONNE, and informal OT interview. Interdisciplinary Communication: Collaborated with PT and RN regarding patient's current level of function, plan of care, and safety measures. Patient/Family Education: Patient educated On the role of OT, On POC and On IRC expectations. GOALS:  Long Term Goals:  Time Frame for Long term goals : 10 days   LTG 1: Patient will dress UB with [place] using AE/DME PRN. LTG 2: Patient will dress LB with Setup Assistance using AE/DME PRN. LTG 3: Patient will don footwear with [place] using AE/DME PRN. LTG 4: Patient will bathe with Supervision or Touching Assistance using AE/DME PRN. LTG 5: Patient will toilet with [place] using AE/DME PRN. LTG 6: Patient/caregiver will verbalize understanding of OT recommendations regarding ADLs, functional transfers, home safety, AE/DME, energy conservation, safety awareness, activity tolerance, and follow-up therapy to increase safety with functional tasks upon discharge. OT order received, chart reviewed, and OT orders acknowledged. Patient will benefit from skilled OT services to address deficits to maximize functional performance with self-care tasks and functional mobility. Treatment interventions are likely to include strengthening, balance training, endurance training, AE/DME training, I/ADLs, functional mobility and functional transfer training, and safety education. Patient will be seen for 1.5-2 hours of skilled OT services 5-6 days a week as appropriate. Initate POC.      Samy Resendiz OT   6/2/2022

## 2022-06-02 NOTE — PROGRESS NOTES
PHYSICAL THERAPY EXAMINATION    Time In: 1400  Time Out: 1430         HPI:  The patient is a right-hand dominant, previously functionally independent 44yo BF with PMH including DM1, CKD4, retinopathy, neuropathy and ureteral stones. 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 She was found unresponsive in her home by EMS and brought into the ED 5/21/2022. ED findings included BS ~1600, Cr 9.7, K+ 6.7, pH 6.8, WBC 29K. Initial /150, she was soon hypotensive to 62/31. She was intubated and admitted to ICU for septic shock, DKA and ARF. Head CT unrevealing. She had cardiac arrest that required chest compressions. Pressors, IV insulin, broad-spectrum ABX and resuscitation fluids were started. Nephrology consulted for ARF; dialysis initiated when she became oliguric (5/24). UCx NGTD. Tube feeds started 5/24. She was extubated 5/25 and vancomycin was stopped. SLP kept her NPO 5/26 due to moderate-severe oropharyngeal dysphagia. She was combative and agitated 5/26 but calmed with Haldol. SLP re-evaluation 5/27 cleared patient for easy-to-chew solids with thin liquids. BP and BS normalized. BCx x 2 remained NGTD; Zosyn EOT 5/28. Nephrology planned to hold HD over weekend to assess renal recovery. PT and OT were initiated, and patient was found to have significant mobility and self-care deficits.     Past Medical History:   Past Medical History:   Diagnosis Date    Acute renal failure ([facility] 1/24/2012    CKD (chronic kidney disease) stage 3, GFR 30-59 ml/min ([facility])     Gastroenteritis, acute 1/24/2012    IDDM (insulin dependent diabetes mellitus) 1/24/2012    Type 1 av -150 can tell Hypo below 70    Intractable nausea and vomiting 12/25/2014    Irregular menses     Kidney stone     Neuropathy, peripheral, idiopathic 3/13/2017    Retinopathy, bilateral 3/13/2017    Trichomoniasis 3/13/2017    Ulcer, skin, chronic ([address] 3/13/2017    Vaginal burning        Pt's Goal:  \"I want to be able to go home. \"      Precautions:  Falls    Therapy Diagnosis:   Difficulty with bed mobility  [x]     Difficulty with functional transfers  [x]     Difficulty with ambulation  [x]     Difficulty with stair negotiations  [x]       Problem List:    Decreased strength B LE  [x]     Decreased strength trunk/core  [x]     Decreased AROM   [x]     Decreased PROM  [x]    Decreased endurance  [x]     Decreased balance sitting  [x]     Decreased balance standing  [x]     Pain   [x]     Slow ambulation velocity  [x]    Decreased coordination  [x]    Decreased safety awareness  [x]      Functional Limitations:   Decreased independence with bed mobility  [x]     Decreased independence with functional transfers  [x]     Decreased independence with ambulation  [x]     Decreased independence with stair negotiation  [x]       Previous Functional Level: Pt. Reports she was independent w/o A/D, able to manage community distance & stairs. Home Situation:      Environment   Living Situation Private Residence   Home Entrance 2 Stairs and single Rail(s)   Lives Alone No   Support Systems Parent(s) and Other Family Member(s)   DME Used none   DME Available unknown            Outcome Measures:     Pain level: not rated  Pain Location:  vagina  Pain Interventions: RN made aware    Vital Signs:  /73   Pulse 99   Temp 99 °F (37.2 °C) (Oral)   Resp 20   SpO2 94%     Education:  Role of PT, orientation to [facility], gait w/ RW    Interdisciplinary Communication: collaborated w/ OT regarding POC    Cognition: Pt. Alert & follows commands consistently. Needs encouragement to participate w/ PT. Exhibits decreased insight into deficits. Lower Extremity Function:     LLE RLE   ROM WFL WFL   Fine Motor Coordination Intact Intact   Tone Normal Normal   Sensation Light Touch Intact Light Touch Intact        MMT Initial Assessment: Strength 5/5 B knees & ankle, grossly >3+/5 B hips.     PRIMARY MODE OF LOCOMOTION: Ambulation    Functional Assessment:    Balance  Comments   Static Sitting Good:  Pt. able to maintain balance w/o UE support;  exhibits some postural sway    Dynamic Sitting Good - accepts moderate challenge;  can maintain balance while picking object off the floor    Static Standing Fair:  Pt. requires UE support;  may need occasional min A    Dynamic Standing Poor - unable to accept challenge or move without LOB     Picking Up Object From Floor 88     Not attempted due to medical condition or safety concerns         BED MOBILITY & TRANSFERS Quality Indicator Score Comments   Rolling 4  Supervision or touching assistance        Supine to Sit 4  Supervision or touching assistance        Sit to Supine 4  Supervision or touching assistance        Sit to Stand 4  Supervision or touching assistance        Bed to/from Chair 4  Supervision or touching assistance        Car Transfer 88     Not attempted due to medical condition or safety concerns         WHEELCHAIR MOBILITY/MANAGEMENT Initial Assessment/Quality Indicator Score Comments   Able to Propel 50' w/ 2 Turns 9     Not applicable     Able to Propel 748' 9     Not applicable     Wheelchair set up assistance required NT    Wheelchair management NT        AMBULATION Initial Assessment/Quality Indicator Score Comments   Assistive device No A/D    Walk 10' 88     Not attempted due to medical condition or safety concerns Pt. unable to ambulate w/o A/D as per PLOF due to LE weakness   Walk 48' with 2 Turns 88     Not attempted due to medical condition or safety concerns     Walk 150' -  Supervision or touching assistance  Not attempted due to medical condition or safety concerns     Walking 10' on Unlevel Surfaces 88     Not attempted due to medical condition or safety concerns       Gait Training:  Pt. Amb. x10' w/ RW. Exhibited flexed posture, narrowed BOIS, foot flat shuffling gait pattern.       STEPS/STAIRS Initial Assessment/Quality Indicator Sore Comments   1 Curb Step 88     Not attempted due to medical condition or safety concerns     4 Steps 88     Not attempted due to medical condition or safety concerns     12 Steps 88     Not attempted due to medical condition or safety concerns     Curbs/Ramps NT        Pt. Was left supine in NAD, call bell in reach. PHYSICAL THERAPY PLAN OF CARE    Therapy Diagnosis:   Please see table above    Order received from MD for physical therapy services and chart reviewed. Pt to be seen at least 5 times per week for at least 1.5 hours of physical therapy per day for 10 daysa. Thank you for the referral.    Goals:    Long Term Goals:  1. Pt will demonstrate roll left & right & transition supine<>sit with Independent in 10 days   2. Pt. will transfer from bed to/from chair with Independent using the least restrictive device in 10 days    3. Pt. will ambulate with 150 feet with LRAD and Supervision/Standby Assist in 10 days  4. Pt. Will ambulate up/down 4 steps with single rail and Supervision/Standby Assist in 10 days  5. Pt. Will perform HEP w/ supervision in 10 days      Pt would benefit from skilled physical therapy in order to improve independent functional mobility within the home. Interventions may include range of motion (AROM, PROM B LE/trunk), motor function (B LE/trunk strengthening/coordination), activity tolerance (vitals, oxygen saturation levels), bed mobility training, balance activities, gait training (progressive ambulation program), and functional transfer training. Please see IRC; Interdisciplinary Eval, Care Plan, and Patient Education for further information regarding physical therapy examination and plan of care.        Mariah Rothman, PT  6/2/2022

## 2022-06-02 NOTE — PROGRESS NOTES
Patient has been observed during hourly rounds and has slept at long intervals. She was assisted up to [facility] where she voided continently and also had BM overnight. Lab collected her labs this am and she will continue to be monitored and assisted as needed. Bed is in low position with wheels locked, call light in reach of patient and will be prepared to give report to oncoming nurse.

## 2022-06-03 ENCOUNTER — CARE COORDINATION (OUTPATIENT)
Dept: CARE COORDINATION | Facility: CLINIC | Age: 47
End: 2022-06-03

## 2022-06-03 LAB
ANION GAP SERPL CALC-SCNC: 12 MMOL/L (ref 7–16)
ANION GAP SERPL CALC-SCNC: 26 MMOL/L (ref 7–16)
BUN SERPL-MCNC: 22 MG/DL (ref 6–23)
BUN SERPL-MCNC: 22 MG/DL (ref 6–23)
CALCIUM SERPL-MCNC: 8.3 MG/DL (ref 8.3–10.4)
CALCIUM SERPL-MCNC: 8.3 MG/DL (ref 8.3–10.4)
CHLORIDE SERPL-SCNC: 103 MMOL/L (ref 98–107)
CHLORIDE SERPL-SCNC: 98 MMOL/L (ref 98–107)
CO2 SERPL-SCNC: 15 MMOL/L (ref 21–32)
CO2 SERPL-SCNC: 27 MMOL/L (ref 21–32)
CREAT SERPL-MCNC: 5.3 MG/DL (ref 0.6–1)
CREAT SERPL-MCNC: 5.4 MG/DL (ref 0.6–1)
ERYTHROCYTE [DISTWIDTH] IN BLOOD BY AUTOMATED COUNT: 15.9 % (ref 11.9–14.6)
GLUCOSE BLD STRIP.AUTO-MCNC: 126 MG/DL (ref 65–100)
GLUCOSE BLD STRIP.AUTO-MCNC: 178 MG/DL (ref 65–100)
GLUCOSE BLD STRIP.AUTO-MCNC: 188 MG/DL (ref 65–100)
GLUCOSE BLD STRIP.AUTO-MCNC: 456 MG/DL (ref 65–100)
GLUCOSE SERPL-MCNC: 432 MG/DL (ref 65–100)
GLUCOSE SERPL-MCNC: 85 MG/DL (ref 65–100)
HCT VFR BLD AUTO: 29.2 % (ref 35.8–46.3)
HGB BLD-MCNC: 8.8 G/DL (ref 11.7–15.4)
MCH RBC QN AUTO: 26 PG (ref 26.1–32.9)
MCHC RBC AUTO-ENTMCNC: 30.1 G/DL (ref 31.4–35)
MCV RBC AUTO: 86.4 FL (ref 79.6–97.8)
MM INDURATION, POC: 0 MM (ref 0–5)
NRBC # BLD: 0.02 K/UL (ref 0–0.2)
PLATELET # BLD AUTO: 387 K/UL (ref 150–450)
PMV BLD AUTO: 9.2 FL (ref 9.4–12.3)
POTASSIUM SERPL-SCNC: 3 MMOL/L (ref 3.5–5.1)
POTASSIUM SERPL-SCNC: 4.1 MMOL/L (ref 3.5–5.1)
PPD, POC: NEGATIVE
RBC # BLD AUTO: 3.38 M/UL (ref 4.05–5.2)
SERVICE CMNT-IMP: ABNORMAL
SODIUM SERPL-SCNC: 139 MMOL/L (ref 136–145)
SODIUM SERPL-SCNC: 142 MMOL/L (ref 136–145)
WBC # BLD AUTO: 13.7 K/UL (ref 4.3–11.1)

## 2022-06-03 PROCEDURE — 97535 SELF CARE MNGMENT TRAINING: CPT

## 2022-06-03 PROCEDURE — 80048 BASIC METABOLIC PNL TOTAL CA: CPT

## 2022-06-03 PROCEDURE — 99232 SBSQ HOSP IP/OBS MODERATE 35: CPT | Performed by: PHYSICAL MEDICINE & REHABILITATION

## 2022-06-03 PROCEDURE — 85027 COMPLETE CBC AUTOMATED: CPT

## 2022-06-03 PROCEDURE — 6370000000 HC RX 637 (ALT 250 FOR IP): Performed by: PHYSICIAN ASSISTANT

## 2022-06-03 PROCEDURE — 6370000000 HC RX 637 (ALT 250 FOR IP): Performed by: PHYSICAL MEDICINE & REHABILITATION

## 2022-06-03 PROCEDURE — 2580000003 HC RX 258: Performed by: INTERNAL MEDICINE

## 2022-06-03 PROCEDURE — 6370000000 HC RX 637 (ALT 250 FOR IP): Performed by: INTERNAL MEDICINE

## 2022-06-03 PROCEDURE — 97530 THERAPEUTIC ACTIVITIES: CPT

## 2022-06-03 PROCEDURE — 1180000000 HC REHAB R&B

## 2022-06-03 PROCEDURE — 36415 COLL VENOUS BLD VENIPUNCTURE: CPT

## 2022-06-03 PROCEDURE — 82962 GLUCOSE BLOOD TEST: CPT

## 2022-06-03 RX ORDER — INSULIN LISPRO 100 [IU]/ML
0-4 INJECTION, SOLUTION INTRAVENOUS; SUBCUTANEOUS NIGHTLY
Status: DISCONTINUED | OUTPATIENT
Start: 2022-06-03 | End: 2022-06-11 | Stop reason: HOSPADM

## 2022-06-03 RX ORDER — INSULIN LISPRO 100 [IU]/ML
0-4 INJECTION, SOLUTION INTRAVENOUS; SUBCUTANEOUS
Status: DISCONTINUED | OUTPATIENT
Start: 2022-06-03 | End: 2022-06-11 | Stop reason: HOSPADM

## 2022-06-03 RX ORDER — SODIUM CHLORIDE 9 MG/ML
INJECTION, SOLUTION INTRAVENOUS CONTINUOUS
Status: DISCONTINUED | OUTPATIENT
Start: 2022-06-03 | End: 2022-06-04

## 2022-06-03 RX ORDER — INSULIN GLARGINE 100 [IU]/ML
5 INJECTION, SOLUTION SUBCUTANEOUS ONCE
Status: COMPLETED | OUTPATIENT
Start: 2022-06-03 | End: 2022-06-03

## 2022-06-03 RX ORDER — LANOLIN ALCOHOL/MO/W.PET/CERES
3 CREAM (GRAM) TOPICAL NIGHTLY PRN
Status: DISCONTINUED | OUTPATIENT
Start: 2022-06-03 | End: 2022-06-06

## 2022-06-03 RX ORDER — INSULIN LISPRO 100 [IU]/ML
12 INJECTION, SOLUTION INTRAVENOUS; SUBCUTANEOUS ONCE
Status: COMPLETED | OUTPATIENT
Start: 2022-06-03 | End: 2022-06-03

## 2022-06-03 RX ORDER — INSULIN GLARGINE 100 [IU]/ML
20 INJECTION, SOLUTION SUBCUTANEOUS NIGHTLY
Status: DISCONTINUED | OUTPATIENT
Start: 2022-06-03 | End: 2022-06-06

## 2022-06-03 RX ORDER — GLUCAGON 1 MG/ML
1 KIT INJECTION PRN
Status: DISCONTINUED | OUTPATIENT
Start: 2022-06-03 | End: 2022-06-11 | Stop reason: HOSPADM

## 2022-06-03 RX ORDER — DEXTROSE MONOHYDRATE 50 MG/ML
100 INJECTION, SOLUTION INTRAVENOUS PRN
Status: DISCONTINUED | OUTPATIENT
Start: 2022-06-03 | End: 2022-06-11 | Stop reason: HOSPADM

## 2022-06-03 RX ORDER — POTASSIUM CHLORIDE 20 MEQ/1
40 TABLET, EXTENDED RELEASE ORAL ONCE
Status: COMPLETED | OUTPATIENT
Start: 2022-06-03 | End: 2022-06-03

## 2022-06-03 RX ADMIN — INSULIN LISPRO 5 UNITS: 100 INJECTION, SOLUTION INTRAVENOUS; SUBCUTANEOUS at 12:11

## 2022-06-03 RX ADMIN — INSULIN LISPRO 12 UNITS: 100 INJECTION, SOLUTION INTRAVENOUS; SUBCUTANEOUS at 06:43

## 2022-06-03 RX ADMIN — INSULIN LISPRO 5 UNITS: 100 INJECTION, SOLUTION INTRAVENOUS; SUBCUTANEOUS at 08:54

## 2022-06-03 RX ADMIN — ONDANSETRON 4 MG: 4 TABLET, ORALLY DISINTEGRATING ORAL at 17:17

## 2022-06-03 RX ADMIN — INSULIN GLARGINE 5 UNITS: 100 INJECTION, SOLUTION SUBCUTANEOUS at 12:11

## 2022-06-03 RX ADMIN — POTASSIUM CHLORIDE 40 MEQ: 20 TABLET, EXTENDED RELEASE ORAL at 22:55

## 2022-06-03 RX ADMIN — FERROUS SULFATE TAB 325 MG (65 MG ELEMENTAL FE) 325 MG: 325 (65 FE) TAB at 16:56

## 2022-06-03 RX ADMIN — INSULIN GLARGINE 20 UNITS: 100 INJECTION, SOLUTION SUBCUTANEOUS at 21:00

## 2022-06-03 RX ADMIN — FERROUS SULFATE TAB 325 MG (65 MG ELEMENTAL FE) 325 MG: 325 (65 FE) TAB at 08:53

## 2022-06-03 RX ADMIN — INSULIN LISPRO 5 UNITS: 100 INJECTION, SOLUTION INTRAVENOUS; SUBCUTANEOUS at 16:57

## 2022-06-03 RX ADMIN — FOLIC ACID 1 MG: 1 TABLET ORAL at 08:53

## 2022-06-03 RX ADMIN — ONDANSETRON 4 MG: 4 TABLET, ORALLY DISINTEGRATING ORAL at 09:36

## 2022-06-03 RX ADMIN — SODIUM CHLORIDE: 9 INJECTION, SOLUTION INTRAVENOUS at 12:10

## 2022-06-03 NOTE — PROGRESS NOTES
CM met with patient to discuss d/ c plan and needs. Patient lying in bed alert/orient to name, place person and self. Patient verified demographic, no changes. Patient verified PCP and insurance. Patient states she has no issue with affording or purchasing medication. Patient states she lives with her parents in a one level home with 2 steps with handrails to enter into the home. Patient states she was independent and working and would like to return back to her baseline. Patient states she was independent with her ADL's and has no DME's in the home. Patient states she has good family support. Patient was informed of Wednesday, Team Meeting. Patient has provided permission to update her parents on her progress on the Wednesday meeting. CM will continue to monitor and remain available for any questions, recommendations, concerns or needs that may occur.       ASSESSMENT NOTE    Attending Physician: Robert Lopez*  Admit Problem: Debility [R53.81]  Physical debility [R53.81]  Date/Time of Admission: 6/2/2022 12:33 PM  Problem List:  Patient Active Problem List   Diagnosis    Ulcer, skin, chronic ([address]    Depression with anxiety    Moderate single current episode of major depressive disorder ([address]    Encounter to discuss test results    Encounter for annual routine gynecological examination    CKD (chronic kidney disease), stage IV ([address]    Diabetic polyneuropathy associated with type 1 diabetes mellitus ([address]    Malodorous urine    CKD (chronic kidney disease) stage 3, GFR 30-59 ml/min (HCC)    Type 1 diabetes mellitus with stage 3 chronic kidney disease ([address]    H/O gastroesophageal reflux (GERD)    Neuropathy, peripheral, idiopathic    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 associated with type 1 diabetes mellitus (HCC)    Premature ovarian failure    Noncompliance with medication regimen    Ureteric stone    Urinary tract infection without hematuria  Hydronephrosis of right kidney    Diabetes mellitus type 1 (HCC)    Nausea    [facility]CA [facility]) [facility]    Acute kidney injury superimposed on chronic kidney disease (HCC)    Benign hypertension with CKD (chronic kidney disease) stage III (HCC)    IDDM (insulin dependent diabetes mellitus)    Cardiac arrest ([facility]    Diabetic ketoacidosis with coma associated with type 1 diabetes mellitus ([facility]    Severe sepsis with septic shock (CODE) (HCC)    Pneumonia, bacterial    Acute respiratory failure (HCC)    Altered mental status    Frailty    Septic shock (HCC)    Normocytic anemia    Physical debility       Service Assessment  Patient Orientation Alert and [address]   History Provided By Patient   Primary Caregiver Family (Parents)   Accompanied By/Relationship     Support Systems Parent   Patient's Healthcare Decision Maker is: Legal Next of Annmarie 67   PCP Verified by CM Yes   Last Visit to PCP     Prior Functional Level Independent in ADLs/IADLs   Current Functional Level Assistance with the following:,Bathing,Dressing,Toileting,Cooking,Housework,Shopping,Mobility   Can patient return to prior living arrangement No (Patient will be living with her parents)   Ability to make needs known: Good   Family able to assist with home care needs: Yes   Would you like for me to discuss the discharge plan with any other family members/significant others, and if so, who?  Yes (parents)   Financial Resources     Community Resources     CM/SW Referral       Social/Functional History  Lives With Parent   Type of [address] One level   Home Access Stairs to enter without rails   Entrance Stairs - Number of Steps     Entrance Stairs - Rails     Bathroom Shower/Tub Walk-in shower,Tub/Shower unit   [address] Help From Family   ADL Assistance Needs assistance   Aiden Crespo Unable to assess(comment)   Dressing Unable to assess(comment)   Grooming Unable to assess(comment)   Feeding Unable to assess(comment)   Toileting Needs assistance   Homemaking Assistance Needs assistance   Meal Prep Unable to assess (comment)   Laundry Unable to assess (comment)   Vacuuming Unable to assess (comment)   Cleaning Unable to assess (comment)   Gardening Unable to assess (comment)   Yard Work Unable to assess (comment)   Driving Unable to assess (comment)   Shopping Unable to assess (comment)    Unable to assess (comment)   Other (Comment)     Homemaking Responsibilities Yes   Meal [address] [employer] [address] Paying/Finance Responsibility     [address] Management     Other (Comment)     Ambuation Assistance Needs assistance   Transfer Assisstance Needs assistance   Active  No   Patient's  Info     Mode of Transportation     Education     Occupation     Type of Occupation       Discharge Planning   Type of [address] Parent   Current Services Prior To Admission None   Potential [address]   DME     DME Other (Comment)   DME Ordered? Other (comment)   Potential Assistance Purchasing Medications Yes   Meds-to-Beds: Does the patient want to have any new prescriptions delivered to bedside prior to discharge? Type of Home Care Services Nursing Services,OT,PT   Patient expects to be discharged to: House   Follow Up Appointment: Best Day/Time     One/Two Story Residence: One story   # of Interior Steps     Height of Each Step (in)     [employer] Available     History of Falls? No     Services At/After Discharge  Transition of Care Consult (CM Consult):      Internal Home Health     Internal Hospice     Reason Outside Agency [address]     Partner SNF Reason Why Partner SNF Not Chosen     Internal Comfort Care     Reason Outside [address] Discharge     [address] Provided? No   Mode of Transport at Discharge Other (see comment)   Hospital Transport Time of Discharge     Confirm Follow Up Transport Family     Condition of Participation: Discharge Planning  The plan for Transition of Care is related to the following treatment goals: The Patient and/or Patient Representative was provided with a Choice of Provider? Name of the Patient Representative who was provided with the Choice of Provider and agrees with the Discharge Plan? The Patient and/or Patient Representative Agree with the Discharge Plan? Freedom of Choice list was provided with basic dialogue that supports the individualized plan of care/goals, treatment preferences, and shares the quality data associated with the providers?        Documentation for Discharge Appeal  Discharge Appealed by     Date notified by [facility] of appeal request:     Time notified by QIO of appeal request:     Detailed Notice of Discharge given to:     Date Notice of Discharge given:     Time Notice of Discharge given:     Date records sent to QIO     Time records sent to Areli Melton     Date Notified of Outcome     Time Notified of Outcome     Outcome of appeal           MARILOU Garner 06/03/22 6:20 PM

## 2022-06-03 NOTE — PROGRESS NOTES
Occupational Therapy Note:    Discussed with PT, patient on medical HOLD at this time 2° N&V. Will follow up once patient is cleared for OT treatment.      Rishabh Breg, OT

## 2022-06-03 NOTE — PROGRESS NOTES
[facility]    Follow-Up on: BLANCA on CKD stage IV    HPI: Pt seen and examined in PT, sitting up in wheelchair performing bilat upper extremity PT exercises, no issues overnight.       ROS:  General: no fever/chills  CV: no CP  Lung: no SOB, no cough  GI: no N/V/D  : no dysuria  Ext: no edema       Current Facility-Administered Medications   Medication Dose Route Frequency    acetaminophen (TYLENOL) tablet 650 mg  650 mg Oral Q6H PRN    Or    acetaminophen (TYLENOL) suppository 650 mg  650 mg Rectal Q6H PRN    polyethylene glycol (GLYCOLAX) packet 17 g  17 g Oral Daily PRN    glucagon injection 1 mg  1 mg IntraMUSCular PRN    glucose chewable tablet 16 g  4 tablet Oral PRN    albuterol (PROVENTIL) nebulizer solution 2.5 mg  2.5 mg Nebulization Q4H PRN    ferrous sulfate (IRON 325) tablet 325 mg  325 mg Oral BID WC    folic acid (FOLVITE) tablet 1 mg  1 mg Oral Daily    guaiFENesin-dextromethorphan (ROBITUSSIN DM) 100-10 MG/5ML syrup 5 mL  5 mL Oral Q4H PRN    insulin glargine (LANTUS) injection vial 10 Units  10 Units SubCUTAneous Nightly    [Held by provider] insulin lispro (HUMALOG) injection vial 0-4 Units  0-4 Units SubCUTAneous Q6H    insulin lispro (HUMALOG) injection vial 5 Units  5 Units SubCUTAneous TID     loperamide (IMODIUM) capsule 2 mg  2 mg Oral 4x Daily PRN    medicated lip ointment (BLISTEX)   Topical PRN    ondansetron (ZOFRAN-ODT) disintegrating tablet 4 mg  4 mg Oral Q6H PRN       Exam:  Vitals:    06/02/22 1400 06/02/22 1930 06/03/22 0300 06/03/22 0851   BP: 119/73 120/66  103/61   Pulse: 99 96  98   Resp:  16  16   Temp:  98.6 °F (37 °C)  98.6 °F (37 °C)   TempSrc:  Oral     SpO2: 94%   97%   Weight:   170 lb (77.1 kg)        No intake or output data in the 24 hours ending 06/03/22 0859  PE:  GEN - in no distress, alert and oriented   CV - regular rate, S1, S2, no rub  Lung - clear bilaterally  Abd - soft, nontender  Ext - +1 BLE edema  Access-  right TCC- associated with type 1 diabetes mellitus ([facility] 03/13/2017    Moderate single current episode of major depressive disorder ([facility] 02/03/2017    Diabetic polyneuropathy associated with type 1 diabetes mellitus ([facility] 02/03/2017    CKD (chronic kidney disease) stage 3, GFR 30-59 ml/min ([facility]) 12/25/2014    Nausea 01/24/2012       Issues Addressed By Nephrology:    Plan:  1. BLANCA on CKD stage IV non oliguric   1st HD 5/24, TCC placed 6/1  -next HD tomorrow for volume and clearance, TTS schedule while on the 9th floor   appreciate CW for outpt HD slot     2. Type I DM- SSI per primary     3. DKA BS>2000 on admit- un controlled- SSI per primary     4.  Anemia-  on Fe

## 2022-06-03 NOTE — PLAN OF CARE
Problem: Safety - Medical Restraint  Goal: Remains free of injury from restraints (Restraint for Interference with Medical Device)  Description: INTERVENTIONS:  1. Determine that other, less restrictive measures have been tried or would not be effective before applying the restraint  2. Evaluate the patient's condition at the time of restraint application  3. Inform patient/family regarding the reason for restraint  4.  Q2H: Monitor safety, psychosocial status, comfort, nutrition and hydration  Outcome: Progressing     Problem: Pain  Goal: Verbalizes/displays adequate comfort level or baseline comfort level  Outcome: Progressing

## 2022-06-03 NOTE — PROGRESS NOTES
Eliza Preciado MD  Medical Director  [address] [facility], [address] [facility]  Tel: [phone]       [facility] PROGRESS NOTE    Qi Concepcion  Admit Date: 6/2/2022  Admit Diagnosis:   Debility [R53.81]  Physical debility [R53.81]    Subjective     Patient seen and examined. Feeling well when I saw her earlier. bs >400 prior to breakfast after not eating supper last noc. She does not like the food. Seems to lack insight into her diabetes. Apparently had pepsi and snacks in room at one point.  After breakfast, shower and some therapy this morning, she has been having bouts of n/v.    Objective:     Current Facility-Administered Medications   Medication Dose Route Frequency    acetaminophen (TYLENOL) tablet 650 mg  650 mg Oral Q6H PRN    Or    acetaminophen (TYLENOL) suppository 650 mg  650 mg Rectal Q6H PRN    polyethylene glycol (GLYCOLAX) packet 17 g  17 g Oral Daily PRN    glucagon injection 1 mg  1 mg IntraMUSCular PRN    glucose chewable tablet 16 g  4 tablet Oral PRN    albuterol (PROVENTIL) nebulizer solution 2.5 mg  2.5 mg Nebulization Q4H PRN    ferrous sulfate (IRON 325) tablet 325 mg  325 mg Oral BID WC    folic acid (FOLVITE) tablet 1 mg  1 mg Oral Daily    guaiFENesin-dextromethorphan (ROBITUSSIN DM) 100-10 MG/5ML syrup 5 mL  5 mL Oral Q4H PRN    insulin glargine (LANTUS) injection vial 10 Units  10 Units SubCUTAneous Nightly    [Held by provider] insulin lispro (HUMALOG) injection vial 0-4 Units  0-4 Units SubCUTAneous Q6H    insulin lispro (HUMALOG) injection vial 5 Units  5 Units SubCUTAneous TID     loperamide (IMODIUM) capsule 2 mg  2 mg Oral 4x Daily PRN    medicated lip ointment (BLISTEX)   Topical PRN    ondansetron (ZOFRAN-ODT) disintegrating tablet 4 mg  4 mg Oral Q6H PRN        Review of Systems:   Denies chest pain, shortness of breath, cough, headache, visual problems, abdominal pain, dysuria, calf pain. Pertinent positives are as noted in the HPI, ROS unremarkable otherwise. Visit Vitals  /61   Pulse 98   Temp 98.6 °F (37 °C)   Resp 16   Wt 170 lb (77.1 kg)   SpO2 97%   BMI 27.44 kg/m²        Physical Exam:   General: Alert and age appropriately oriented. No acute cardiorespiratory distress. HEENT: Normocephalic, EOM intact. Oral mucosa moist without cyanosis. Lungs: Clear to auscultation bilaterally. Respiration even and unlabored. Heart: Regular rate and rhythm, S1, S2. No murmurs, clicks, rub or gallops. Abdomen: Soft, non-tender, not distended. Bowel sounds normoactive. No organomegaly. Genitourinary: Deferred. Neuromuscular:      No gross focal motor deficits noted. Generalized weakness  Mild hypophonia. Somewhat delayed processing   Skin/extremity: No rashes, no erythema. No calf tenderness B LE.   No edema                                                                                Labs/Studies:  Recent Results (from the past 72 hour(s))   POCT Glucose    Collection Time: 05/31/22 11:31 AM   Result Value Ref Range    POC Glucose 175 (H) 65 - 100 mg/dL    Performed by: [employer]    POCT Glucose    Collection Time: 05/31/22  5:53 PM   Result Value Ref Range    POC Glucose 110 (H) 65 - 100 mg/dL    Performed by: [employer]    POCT Glucose    Collection Time: 06/01/22  1:50 AM   Result Value Ref Range    POC Glucose 255 (H) 65 - 100 mg/dL    Performed by: [employer]    Basic Metabolic Panel    Collection Time: 06/01/22  4:24 AM   Result Value Ref Range    Sodium 142 136 - 145 mmol/L    Potassium 3.0 (L) 3.5 - 5.1 mmol/L    Chloride 106 98 - 107 mmol/L    CO2 27 21 - 32 mmol/L    Anion Gap 9 7 - 16 mmol/L    Glucose 113 (H) 65 - 100 mg/dL    BUN 19 6 - 23 MG/DL    CREATININE 5.70 (H) 0.6 - 1.0 MG/DL    GFR African American 10 (L) >60 ml/min/1.73m2    GFR Non- 8 (L) >60 ml/min/1.73m2    Calcium 8.3 8.3 - 10.4 MG/DL   Vancomycin Level, Random Collection Time: 06/01/22  4:24 AM   Result Value Ref Range    Vancomycin Rm 25.2 UG/ML   CBC    Collection Time: 06/01/22  4:24 AM   Result Value Ref Range    WBC 13.9 (H) 4.3 - 11.1 K/uL    RBC 2.92 (L) 4.05 - 5.2 M/uL    Hemoglobin 7.7 (L) 11.7 - 15.4 g/dL    Hematocrit 24.3 (L) 35.8 - 46.3 %    MCV 83.2 79.6 - 97.8 FL    MCH 26.4 26.1 - 32.9 PG    MCHC 31.7 31.4 - 35.0 g/dL    RDW 15.0 (H) 11.9 - 14.6 %    Platelets 136 933 - 387 K/uL    MPV 9.5 9.4 - 12.3 FL    nRBC 0.05 0.0 - 0.2 K/uL   POCT Glucose    Collection Time: 06/01/22  4:49 AM   Result Value Ref Range    POC Glucose 113 (H) 65 - 100 mg/dL    Performed by: Suman    POCT Glucose    Collection Time: 06/01/22 10:34 AM   Result Value Ref Range    POC Glucose 212 (H) 65 - 100 mg/dL    Performed by:  Carlo    POCT Glucose    Collection Time: 06/01/22  5:14 PM   Result Value Ref Range    POC Glucose 386 (H) 65 - 100 mg/dL    Performed by: Sagar    Folate    Collection Time: 06/01/22  5:40 PM   Result Value Ref Range    Folate 4.9 3.1 - 17.5 ng/mL   Vitamin B12    Collection Time: 06/01/22  5:40 PM   Result Value Ref Range    Vitamin B-12 1767 (H) 193 - 986 pg/mL   Transferrin Saturation    Collection Time: 06/01/22  5:40 PM   Result Value Ref Range    Iron 36 35 - 150 ug/dL    TIBC 153 (L) 250 - 450 ug/dL    TRANSFERRIN SATURATION 24 >20 %   Hepatitis Panel, Acute    Collection Time: 06/01/22  5:40 PM   Result Value Ref Range    Hep A IgM NONREACTIVE NR      Hep B Core Ab, IgM NONREACTIVE NR      Hepatitis B Surface Ag NONREACTIVE NR      Hepatitis C Ab NONREACTIVE NR     POCT Glucose    Collection Time: 06/01/22 10:59 PM   Result Value Ref Range    POC Glucose 518 (HH) 65 - 100 mg/dL    Performed by: Topher    POCT Glucose    Collection Time: 06/02/22  1:21 AM   Result Value Ref Range    POC Glucose 498 () 65 - 100 mg/dL    Performed by: Isela Holyesy    Basic Metabolic Panel    Collection Time: 06/02/22  4:48 AM   Result Value Ref Range    Sodium 138 136 - 145 mmol/L    Potassium 3.5 3.5 - 5.1 mmol/L    Chloride 100 98 - 107 mmol/L    CO2 25 21 - 32 mmol/L    Anion Gap 13 7 - 16 mmol/L    Glucose 360 (H) 65 - 100 mg/dL    BUN 19 6 - 23 MG/DL    CREATININE 4.60 (H) 0.6 - 1.0 MG/DL    GFR African American 13 (L) >60 ml/min/1.73m2    GFR Non- 11 (L) >60 ml/min/1.73m2    Calcium 7.9 (L) 8.3 - 10.4 MG/DL   CBC    Collection Time: 06/02/22  4:48 AM   Result Value Ref Range    WBC 15.2 (H) 4.3 - 11.1 K/uL    RBC 2.76 (L) 4.05 - 5.2 M/uL    Hemoglobin 7.3 (L) 11.7 - 15.4 g/dL    Hematocrit 22.9 (L) 35.8 - 46.3 %    MCV 83.0 79.6 - 97.8 FL    MCH 26.4 26.1 - 32.9 PG    MCHC 31.9 31.4 - 35.0 g/dL    RDW 15.3 (H) 11.9 - 14.6 %    Platelets 663 978 - 474 K/uL    MPV 9.3 (L) 9.4 - 12.3 FL    nRBC 0.03 0.0 - 0.2 K/uL   POCT Glucose    Collection Time: 06/02/22  7:13 AM   Result Value Ref Range    POC Glucose 388 (H) 65 - 100 mg/dL    Performed by: Jaden    POCT Glucose    Collection Time: 06/02/22 11:16 AM   Result Value Ref Range    POC Glucose 184 (H) 65 - 100 mg/dL    Performed by: Malathi    Culture, Urine    Collection Time: 06/02/22  2:28 PM    Specimen: URINE-CLEAN CATCH    URINE   Result Value Ref Range    Special Requests NO SPECIAL REQUESTS      Culture        No growth after short period of incubation. Further results to follow after overnight incubation.    Urinalysis with Reflex to Culture    Collection Time: 06/02/22  2:28 PM    Specimen: Urine   Result Value Ref Range    Color, UA YELLOW      Appearance CLOUDY      Specific Gravity, UA 1.020 1.001 - 1.023      pH, Urine 7.5 5.0 - 9.0      Protein, UA 30 (A) NEG mg/dL    Glucose, UA Negative NEG mg/dL    Ketones, Urine TRACE (A) NEG mg/dL    Bilirubin Urine SMALL (A) NEG      Blood, Urine LARGE (A) NEG      Urobilinogen, Urine 0.2 0.2 - 1.0 EU/dL    Nitrite, Urine Negative NEG      Leukocyte Esterase, Urine MODERATE (A) NEG WBC, UA 5-10 0 /hpf    RBC, UA 5-10 0 /hpf    BACTERIA, URINE 1+ (H) 0 /hpf    Urine Culture if Indicated CULTURE NOT INDICATED BY UA RESULT      Epithelial Cells UA 3-5 0 /hpf    Casts 0 0 /lpf    Crystals 0 0 /LPF    Mucus, UA 0 0 /lpf    OTHER OBSERVATIONS RESULTS VERIFIED MANUALLY     POCT Glucose    Collection Time: 06/02/22  4:38 PM   Result Value Ref Range    POC Glucose 198 (H) 65 - 100 mg/dL    Performed by: Azalia    POCT Glucose    Collection Time: 06/02/22  9:05 PM   Result Value Ref Range    POC Glucose 170 (H) 65 - 100 mg/dL    Performed by: Alan    POCT Glucose    Collection Time: 06/03/22  6:23 AM   Result Value Ref Range    POC Glucose 456 (HH) 65 - 100 mg/dL    Performed by: Gomez Tong    Basic Metabolic Panel    Collection Time: 06/03/22  6:41 AM   Result Value Ref Range    Sodium 139 136 - 145 mmol/L    Potassium 4.1 3.5 - 5.1 mmol/L    Chloride 98 98 - 107 mmol/L    CO2 15 (L) 21 - 32 mmol/L    Anion Gap 26 (H) 7 - 16 mmol/L    Glucose 432 (H) 65 - 100 mg/dL    BUN 22 6 - 23 MG/DL    CREATININE 5.30 (H) 0.6 - 1.0 MG/DL    GFR African American 11 (L) >60 ml/min/1.73m2    GFR Non- 9 (L) >60 ml/min/1.73m2    Calcium 8.3 8.3 - 10.4 MG/DL   CBC    Collection Time: 06/03/22  6:41 AM   Result Value Ref Range    WBC 13.7 (H) 4.3 - 11.1 K/uL    RBC 3.38 (L) 4.05 - 5.2 M/uL    Hemoglobin 8.8 (L) 11.7 - 15.4 g/dL    Hematocrit 29.2 (L) 35.8 - 46.3 %    MCV 86.4 79.6 - 97.8 FL    MCH 26.0 (L) 26.1 - 32.9 PG    MCHC 30.1 (L) 31.4 - 35.0 g/dL    RDW 15.9 (H) 11.9 - 14.6 %    Platelets 493 508 - 041 K/uL    MPV 9.2 (L) 9.4 - 12.3 FL    nRBC 0.02 0.0 - 0.2 K/uL       Assessment:     Principal Problem:    Physical debility  Active Problems:    Acute kidney injury superimposed on chronic kidney disease (HCC)    Benign hypertension with CKD (chronic kidney disease) stage III (HCC)    Cardiac arrest (HCC)    Diabetic ketoacidosis with coma associated with type 1 diabetes mellitus ([facility] [address]    Severe sepsis with septic shock (CODE) (HCC)    Pneumonia, bacterial    Acute respiratory failure (HCC)    Altered mental status    Frailty    Normocytic anemia    Depression with anxiety    Diabetic polyneuropathy associated with type 1 diabetes mellitus (HCC)    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 *     Physical debility following DKA with coma, septic shock, acute renal failure, acute respiratory failure, cardiac arrest and altered mental status     Plan / Recommendations / Medical Decision Making:      Daily physician / PA medical management:     Physical debility [R53.81] - good rehab potential, aggressive PT, OT to tolerance. SLP for swallowing surveillance, cognitive evaluation post-arrest, DKA with coma.     Cardiac arrest, acute respiratory failure - resolved; multifactorial, most likely due to DKA with coma, septic shock. Intubated 5/21, extubated 5/25, now on room air; no prior history of supplemental O2 use. TTE (5/22) with EF 49-71%, normal systolic and diastolic function, moderate AV stenosis.     Severe sepsis with septic shock - question of bacterial pneumonia vs UTI; was on vanc+Zosyn throughout acute hospitalization, EOT 6/2. UCx, BCx NGTD. Leukocytosis attributed to stress response.  -6/3 wbc trending down; had UA yesterday with + leuk (moderate), 1+ bacteria; cx neg after short incubation     Acute renal failure, BLANCA on CKD3 - Cr on admission 9.7 (baseline ~2.5), oliguric; dialysis started 5/24, temp cath switched to tunneled cath 6.1. Nephrology managing HD, patient will run next HD 6/4 for TThSa transition. Avoid hypotension and nephrotoxic drugs. -6/3 no HD today, transitioning to a T,Th,Sat schedule for therapies. Creat 5.3 (4.6 yesterday)     Blood pressure management - monitor.  Patient occasionally hypertensive but mostly normo- or hypotensive.  -6/3 103/61 this, lowest of past 24 hrs     Diabetes mellitus - reportedly type 1, last HgbA1c found in EMR was 9.6% but from 6/2020; with admission for DKA and BS >2000, patient likely with poor glycemic control. Will require ACHS fasting glucose monitoring and medication adjustment to optimize glycemic control in setting of acute illness and hospitalization. Per DM RN, current regimen is Lantus 10u QHS with Humalog 5u TID with meals. Monitor and adjust.  -6/3  this am. 170 last pm, 198 at supper and 184 at lunch. D/w Diabetic RN this a.m.; subsequent emesis,nausea; labs indicate DKA, with AG of 26 and ; Consult Hospitalist now     Anemia - normocytic, chronic. Baseline Hgb >10-11 but dipped to 7-8 in acute; Hgb 7.3 at [facility] admission on 6/2. Multifactorial: anemia of CKD, frequent blood draws; no active bleeding noted, hemoccult pending. Iron studies (6/1) c/w chronic disease. Vitamin B12 adequate, folate low 4.9; on PO iron, folic acid supplements. Holding heparin SQ for now.  -6/3 hgb 8.8 from 7.3; improving     Pain management - no current joint or muscle symptoms, essentially pain-free. Will require regular pain assessment and comprehensive pain management.     Electrolyte management - no current abnormality, has been both hyperK+ and hypoK+; monitor and replace as needed.     Pneumonia prophylaxis - incentive spirometer every hour while awake.     DVT risk / DVT prophylaxis - daily physician / PA exam to assess as patient is at increased risk for of thromboembolism. Mobilize as tolerated. Sequential pneumatic compression devices (SCDs) when in bed; thigh-high or knee-high thromboembolic deterrent hose when out of bed. Heparin SQ held due to anemia.      GI prophylaxis - consider PPI. At times may need additional antacids, Maalox prn.     History of tobacco use - former cigarette smoker, reportedly quit in 2013.  No current nicotine cravings.     Depression - noted in problem list in 2017, no current pharmacotherapy. Monitor. At risk of depression / exacerbation due to physical debility, loss of independence.     General skin care / wound prevention - monitor general skin wound status daily per staff and physician / PA. At risk for failure due to impaired mobility.     Bladder program - schedule voids q6-8h. Check post-void residual as needed; in-and-out catheter if post-void residual is more than 400ml.     Bowel program - at risk for constipation as a side effect of medications, impaired mobility, etc. MiraLAX daily for regularity, Berta-Colace for stool softener. PRN MOM, bisacodyl suppository or tablets for constipation.          Time spent was 25 minutes with over 1/2 in direct patient care/examination, consultation and coordination of care.      Signed By: Epifanio Galvan MD, MD     Joleen 3, 2022

## 2022-06-03 NOTE — PROGRESS NOTES
Attempted to see pt. For AM PT session. Pt. Is currently on a medical hold secondary to N&V. Discussed w/ PA.    Will follow up in PM.

## 2022-06-03 NOTE — PROGRESS NOTES
OT DAILY NOTE    Time In 3260; 0813   Time Out 0749; 0837     Subjective: \"I need to sit. \" Pt agreeable to treatment. Pain: Patient denies pain. Precautions: Falls     /66   Pulse 96   Temp 98.6 °F (37 °C) (Oral)   Resp 16   Wt 170 lb (77.1 kg)   SpO2 94%   BMI 27.44 kg/m²      MOBILITY:   Score Comments   Rolling Independent     Supine to Sit Supervision or touching assistance S   Sit to Supine  (NT)     Sit to Stand Supervision or touching assistance Assistance Needed: Supervision or touching assistance  Comment: SBA-CGA  CARE Score: 4   Transfer Assist Supervision or touching assistance Assistance Needed: Supervision or touching assistance  Comment: CGA for SPT  CARE Score: 4     ACTIVITIES OF DAILY LIVING:   Score Comments   Eating Independent     Oral Hygiene Independent Seated at sink   Bathing Supervision or touching assistance Adaptive Equipment: CGA in stance. Pt demonstrates poor standing tolerance, requires two trials to wash bottom with rest break after first trial.    Upper Body  Dressing Setup or clean-up assistance Items Applied: Seated in w/c. Lower Body Dressing Supervision or touching assistance Items Applied:   Adaptive Equipment: SBA-CGA in stance with RW. Pt with posterior loss of balance, uncontrolled stand > sit onto w/c. [place] [employer] Setup or clean-up assistance Items Applied:   Adaptive Equipment:     Toilet Transfer Supervision or touching assistance SBA-CGA with grab bar. [place] or touching assistance SBA seated. Education Benefits of OT, Energy Conservation, Pacing, Functional Transfer Training and Safety Awareness     - Activity Tolerance - Strengthening   Pt completed 10 minutes on the ergometer, 1/2 forwards and 1/2 backwards, with light to moderate resistance to increase UB strength and activity tolerance for integration into functional tasks. Pt with slow pace and rest breaks throughout.  SpO2 88-98% and HR up to 104 with activity. Pt cued for deep breaths. Assessment: Patient with low activity tolerance impacting ADLs and safety. Pt demonstrated good participation in OT treatment. Pt continues to benefit from skilled OT services to address remaining deficits and progress toward baseline level of independence and safety. Patient ended session seated in w/c with call bell and needs within reach. Plan: Continue OT POC.      Rishabh Berg OT   6/3/2022

## 2022-06-03 NOTE — CONSULTS
[facility] Hospitalist Consult   Admit Date:  2022 12:33 PM   Name:  Hollis Pollock   Age:  48 y.o. Sex:  female  :  1975   MRN:  [id number]   Room:  [facility]    Presenting Complaint: hyperglycemia     Reason(s) for Admission: Debility [R53.81]  Physical debility [R53.81]     Hospitalists consulted by Taylor Carter* for: management of DM and hyperglycemia     History of Presenting Illness:   Hollis Pollock is a 48 y.o. female with history of   DM type 1 with recent DKA   Recent cardiac arrest   Recent respiratory failure and was on ventilator  Hypertension   Hyperlipidemia   Recent BLANCA on CKD, HD dependent now. Depression and anxiety     who was admitted for physical rehabilitation after recent DKA and cardiac arrest and was admitted to ICU and was on ventilator. She developed BLANCA on CKD during that admission in ICU and SLED was started. She currently is on HD. Last HD was Wednesday. Her next HD is tomorrow. She still makes urine 2-3 times per day. Patient recovered from that recent ICU admission and sent to rehab floor on 2022. Today she feels nauseated. Her BS was  456 at 6 AM and she was treated with Humalog 5 units SC before breakfast. BMP shows HCO3 of 15. BUN of 22 and creatinine of 5.3. Before lunch time today her BS is 188. She received 10 units of Lantus last night. Her usual dose of Lantus is 30 units sc hs before she was admitted. Due to her hyperglycemia, acidosis with nausea, Hospitalist service is requested to give consultation help for the patient. No fever. No shaking. No chills. No chest pain. No shortness of breath. No blood per rectum. No blood per urine. No abdominal pain   No abnormal movements. She still makes urine 2-3 times per day. Medication records are reviewed. Review of Systems:  10 systems reviewed and negative except as noted in HPI.   Assessment & Plan:     Principal Problem: Physical debility  Continue physical therapy as per acute rehab team.     Active Problems:    Acute kidney injury superimposed on chronic kidney disease ([address]  Continue HD. Plan for next HD tomorrow. Monitor urine and intake and output. Benign hypertension with CKD (chronic kidney disease) stage III ([facility])  BP is 129/74 mmHg today. On no BP medications now. Monitor       Cardiac arrest ([address]  Recovered. No neuro-deficit. Echo shows EF 70-75% after cardiac arrest.   Found to have moderate AV stenosis. Diabetic ketoacidosis with coma associated with type 1 diabetes mellitus ([address]  She has hyperglycemia now. Some acidosis   No coma   Will give some IV fluid. Mindful on the rate of IV fluid since she is a HD patient now. Record intake and output carefully. Increase Lantus dose to 20 units sc hs   Monitor blood sugar. Cover with insulin sliding scale accordingly. Normocytic anemia  Noted       Depression with anxiety  Noted   Will need to be addressed later after she is improved from acute hyperglycemia problems. 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    I (or my team) will follow to care for this patient with you.        Discharge Planning:      to be determined     Diet:  ADULT DIET; Easy to Chew; 3 carb choices (45 gm/meal)  DVT PPx: SCD  Code status: Full Code    Principal Problem:    Physical debility  Active Problems:    Acute kidney injury superimposed on chronic kidney disease (HCC)    Benign hypertension with CKD (chronic kidney disease) stage III ([facility])    Cardiac arrest [address]    Diabetic ketoacidosis with coma associated with type 1 diabetes mellitus ([address]    Normocytic anemia    Depression with anxiety    Diabetic polyneuropathy associated with type 1 diabetes mellitus (HCC)    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 *      Past History:  Past Medical History:   Diagnosis Date    Acute renal failure ([facility] 2012    CKD (chronic kidney disease) stage 3, GFR 30-59 ml/min (HCC)     Gastroenteritis, acute 2012    IDDM (insulin dependent diabetes mellitus) 2012    Type 1 av -150 can tell Hypo below 70    Intractable nausea and vomiting 2014    Irregular menses     Kidney stone     Neuropathy, peripheral, idiopathic 3/13/2017    Retinopathy, bilateral 3/13/2017    Trichomoniasis 3/13/2017    Ulcer, skin, chronic ([facility] 3/13/2017    Vaginal burning       Past Surgical History:   Procedure Laterality Date    AMPUTATION Left     5th Toe due to Diabetic Ulcer    AMPUTATION  12    gangrene    CYSTOSCOPY,INSERT URETERAL STENT  2022    HX OPEN REDUCTION INTERNAL FIXATION Right     ankle    IR TUNNELED CATHETER PLACEMENT GREATER THAN 5 YEARS  2022    IR TUNNELED CATHETER PLACEMENT GREATER THAN 5 YEARS 2022 [facility]      No Known Allergies   Social History     Tobacco Use    Smoking status: Former Smoker     Quit date: 2013     Years since quittin.5    Smokeless tobacco: Never Used   Substance Use Topics    Alcohol use: Yes      Family History   Problem Relation Age of Onset    Prostate Cancer Father     Diabetes Paternal Grandmother     Colon Cancer Neg Hx     Uterine Cancer Neg Hx     Diabetes Father         DM Type II    Stroke Father     Diabetes Paternal Grandfather         DM Type II     Hypertension Maternal Grandmother     Ovarian Cancer Neg Hx     Breast Cancer Mother 76    Diabetes Maternal Grandmother         DM Type II      Family history reviewed and negative except as noted above.     Immunization History   Administered Date(s) Administered    COVID-Wallflower, Pfizer Purple top, DILUTE for use, 12+ yrs, 30mcg/0.3mL dose 02/10/2021    PPD Test 06/01/2022     Current Facility-Administered Medications   Medication Dose Route Frequency    melatonin tablet 3 mg  3 mg Oral Nightly PRN    insulin lispro (HUMALOG) injection vial 0-4 Units  0-4 Units SubCUTAneous TID WC    insulin lispro (HUMALOG) injection vial 0-4 Units  0-4 Units SubCUTAneous Nightly    insulin glargine (LANTUS) injection vial 20 Units  20 Units SubCUTAneous Nightly    glucose chewable tablet 16 g  4 tablet Oral PRN    dextrose bolus 10% 125 mL  125 mL IntraVENous PRN    Or    dextrose bolus 10% 250 mL  250 mL IntraVENous PRN    glucagon injection 1 mg  1 mg IntraMUSCular PRN    dextrose 5 % solution  100 mL/hr IntraVENous PRN    insulin glargine (LANTUS) injection vial 5 Units  5 Units SubCUTAneous Once    0.9 % sodium chloride infusion   IntraVENous Continuous    acetaminophen (TYLENOL) tablet 650 mg  650 mg Oral Q6H PRN    Or    acetaminophen (TYLENOL) suppository 650 mg  650 mg Rectal Q6H PRN    polyethylene glycol (GLYCOLAX) packet 17 g  17 g Oral Daily PRN    glucagon injection 1 mg  1 mg IntraMUSCular PRN    glucose chewable tablet 16 g  4 tablet Oral PRN    albuterol (PROVENTIL) nebulizer solution 2.5 mg  2.5 mg Nebulization Q4H PRN    ferrous sulfate (IRON 325) tablet 325 mg  325 mg Oral BID WC    folic acid (FOLVITE) tablet 1 mg  1 mg Oral Daily    guaiFENesin-dextromethorphan (ROBITUSSIN DM) 100-10 MG/5ML syrup 5 mL  5 mL Oral Q4H PRN    insulin lispro (HUMALOG) injection vial 5 Units  5 Units SubCUTAneous TID     loperamide (IMODIUM) capsule 2 mg  2 mg Oral 4x Daily PRN    medicated lip ointment (BLISTEX)   Topical PRN    ondansetron (ZOFRAN-ODT) disintegrating tablet 4 mg  4 mg Oral Q6H PRN       Objective:     Patient Vitals for the past 24 hrs:   Temp Pulse Resp BP SpO2   06/03/22 0851 98.6 °F (37 °C) 98 16 103/61 97 %   06/02/22 1930 98.6 °F (37 °C) 96 16 120/66 --   06/02/22 1400 -- 99 -- 119/73 94 %   06/02/22 1315 99 °F (37.2 °C) 100 20 129/74 94 %       Oxygen Therapy  SpO2: 97 %  O2 Device: None (Room air)    Estimated body mass index is 27.44 kg/m² as calculated from the following:    Height as of 5/22/22: 5' 6\" (1.676 m). Weight as of this encounter: 170 lb (77.1 kg). No intake or output data in the 24 hours ending 06/03/22 1152      Physical Exam:    Blood pressure 103/61, pulse 98, temperature 98.6 °F (37 °C), temperature source Oral, resp. rate 16, weight 170 lb (77.1 kg), SpO2 97 %. General:    Well nourished. No overt distress. Patient is lying in bed. She appears weak. She is talking and answering questions well. Pale. No icterus. Head:  Normocephalic, atraumatic  Eyes:  Sclerae appear normal.  Pupils equally round. ENT:  Nares appear normal, no drainage. Dry oral mucosa  Neck:  No restricted ROM. Trachea midline   CV:   RRR. No m/r/g. No jugular venous distension. Lungs:   CTAB. No wheezing, rhonchi, or rales. Respirations even, unlabored  Abdomen: Bowel sounds present. Soft, nontender, nondistended. Extremities: No cyanosis or clubbing. No edema  Skin:     No rashes and normal coloration. Warm and dry. Neuro:  CN II-XII grossly intact. Sensation intact. A&Ox3  Psych:  Normal mood and affect.       I have reviewed ordered lab tests and independently visualized imaging below:    Recent Labs:  Recent Results (from the past 48 hour(s))   POCT Glucose    Collection Time: 06/01/22  5:14 PM   Result Value Ref Range    POC Glucose 386 (H) 65 - 100 mg/dL    Performed by: Kathe Hernandez    Folate    Collection Time: 06/01/22  5:40 PM   Result Value Ref Range    Folate 4.9 3.1 - 17.5 ng/mL   Vitamin B12    Collection Time: 06/01/22  5:40 PM   Result Value Ref Range    Vitamin B-12 1767 (H) 193 - 986 pg/mL   Transferrin Saturation    Collection Time: 06/01/22  5:40 PM   Result Value Ref Range    Iron 36 35 - 150 ug/dL TIBC 153 (L) 250 - 450 ug/dL    TRANSFERRIN SATURATION 24 >20 %   Hepatitis Panel, Acute    Collection Time: 06/01/22  5:40 PM   Result Value Ref Range    Hep A IgM NONREACTIVE NR      Hep B Core Ab, IgM NONREACTIVE NR      Hepatitis B Surface Ag NONREACTIVE NR      Hepatitis C Ab NONREACTIVE NR     POCT Glucose    Collection Time: 06/01/22 10:59 PM   Result Value Ref Range    POC Glucose 518 (HH) 65 - 100 mg/dL    Performed by: Topher    POCT Glucose    Collection Time: 06/02/22  1:21 AM   Result Value Ref Range    POC Glucose 498 (HH) 65 - 100 mg/dL    Performed by: Chiki Pina    Basic Metabolic Panel    Collection Time: 06/02/22  4:48 AM   Result Value Ref Range    Sodium 138 136 - 145 mmol/L    Potassium 3.5 3.5 - 5.1 mmol/L    Chloride 100 98 - 107 mmol/L    CO2 25 21 - 32 mmol/L    Anion Gap 13 7 - 16 mmol/L    Glucose 360 (H) 65 - 100 mg/dL    BUN 19 6 - 23 MG/DL    CREATININE 4.60 (H) 0.6 - 1.0 MG/DL    GFR African American 13 (L) >60 ml/min/1.73m2    GFR Non- 11 (L) >60 ml/min/1.73m2    Calcium 7.9 (L) 8.3 - 10.4 MG/DL   CBC    Collection Time: 06/02/22  4:48 AM   Result Value Ref Range    WBC 15.2 (H) 4.3 - 11.1 K/uL    RBC 2.76 (L) 4.05 - 5.2 M/uL    Hemoglobin 7.3 (L) 11.7 - 15.4 g/dL    Hematocrit 22.9 (L) 35.8 - 46.3 %    MCV 83.0 79.6 - 97.8 FL    MCH 26.4 26.1 - 32.9 PG    MCHC 31.9 31.4 - 35.0 g/dL    RDW 15.3 (H) 11.9 - 14.6 %    Platelets 478 926 - 312 K/uL    MPV 9.3 (L) 9.4 - 12.3 FL    nRBC 0.03 0.0 - 0.2 K/uL   POCT Glucose    Collection Time: 06/02/22  7:13 AM   Result Value Ref Range    POC Glucose 388 (H) 65 - 100 mg/dL    Performed by: Jaden    POCT Glucose    Collection Time: 06/02/22 11:16 AM   Result Value Ref Range    POC Glucose 184 (H) 65 - 100 mg/dL    Performed by: Malathi    Culture, Urine    Collection Time: 06/02/22  2:28 PM    Specimen: URINE-CLEAN CATCH    URINE   Result Value Ref Range    Special Requests NO SPECIAL REQUESTS      Culture        No growth after short period of incubation. Further results to follow after overnight incubation.    Urinalysis with Reflex to Culture    Collection Time: 06/02/22  2:28 PM    Specimen: Urine   Result Value Ref Range    Color, UA YELLOW      Appearance CLOUDY      Specific Gravity, UA 1.020 1.001 - 1.023      pH, Urine 7.5 5.0 - 9.0      Protein, UA 30 (A) NEG mg/dL    Glucose, UA Negative NEG mg/dL    Ketones, Urine TRACE (A) NEG mg/dL    Bilirubin Urine SMALL (A) NEG      Blood, Urine LARGE (A) NEG      Urobilinogen, Urine 0.2 0.2 - 1.0 EU/dL    Nitrite, Urine Negative NEG      Leukocyte Esterase, Urine MODERATE (A) NEG      WBC, UA 5-10 0 /hpf    RBC, UA 5-10 0 /hpf    BACTERIA, URINE 1+ (H) 0 /hpf    Urine Culture if Indicated CULTURE NOT INDICATED BY UA RESULT      Epithelial Cells UA 3-5 0 /hpf    Casts 0 0 /lpf    Crystals 0 0 /LPF    Mucus, UA 0 0 /lpf    OTHER OBSERVATIONS RESULTS VERIFIED MANUALLY     POCT Glucose    Collection Time: 06/02/22  4:38 PM   Result Value Ref Range    POC Glucose 198 (H) 65 - 100 mg/dL    Performed by: Azalia    POCT Glucose    Collection Time: 06/02/22  9:05 PM   Result Value Ref Range    POC Glucose 170 (H) 65 - 100 mg/dL    Performed by: Alan    POCT Glucose    Collection Time: 06/03/22  6:23 AM   Result Value Ref Range    POC Glucose 456 (HH) 65 - 100 mg/dL    Performed by: Joceline Guerra    Basic Metabolic Panel    Collection Time: 06/03/22  6:41 AM   Result Value Ref Range    Sodium 139 136 - 145 mmol/L    Potassium 4.1 3.5 - 5.1 mmol/L    Chloride 98 98 - 107 mmol/L    CO2 15 (L) 21 - 32 mmol/L    Anion Gap 26 (H) 7 - 16 mmol/L    Glucose 432 (H) 65 - 100 mg/dL    BUN 22 6 - 23 MG/DL    CREATININE 5.30 (H) 0.6 - 1.0 MG/DL    GFR African American 11 (L) >60 ml/min/1.73m2    GFR Non- 9 (L) >60 ml/min/1.73m2    Calcium 8.3 8.3 - 10.4 MG/DL   CBC    Collection Time: 06/03/22  6:41 AM   Result Value Ref Range WBC 13.7 (H) 4.3 - 11.1 K/uL    RBC 3.38 (L) 4.05 - 5.2 M/uL    Hemoglobin 8.8 (L) 11.7 - 15.4 g/dL    Hematocrit 29.2 (L) 35.8 - 46.3 %    MCV 86.4 79.6 - 97.8 FL    MCH 26.0 (L) 26.1 - 32.9 PG    MCHC 30.1 (L) 31.4 - 35.0 g/dL    RDW 15.9 (H) 11.9 - 14.6 %    Platelets 544 703 - 396 K/uL    MPV 9.2 (L) 9.4 - 12.3 FL    nRBC 0.02 0.0 - 0.2 K/uL   POCT Glucose    Collection Time: 06/03/22 11:09 AM   Result Value Ref Range    POC Glucose 188 (H) 65 - 100 mg/dL    Performed by: Corey        Other Studies:  IR TUNNELED CVC PLACE WO SQ PORT/PUMP > 5 YEARS    Result Date: 6/1/2022  Title: Tunneled hemodialysis catheter placement. Indication:   47-year-old female with acute kidney injury, chronic kidney disease, status post cardiac arrest, respiratory failure. A tunneled hemodialysis catheter is placed for this patient through the right internal jugular vein vein using ultrasound and fluoroscopic guidance with maximum sterile barrier appropriately documented and fluoroscopy time and images documented in the report. :  Everton Daniel PA-C Supervising Physician: Bryan Bustamante M.D. Consent: Informed written and oral consent was obtained from the patient after explanation of benefits and risks (including, but not limited to: Infection, hemorrhage, pneumothorax). The patient's questions were answered to their satisfaction. The patient stated understanding and requested that we proceed. Procedure: This central venous catheter was inserted with all elements of maximal sterile barrier technique, and cap and mask and sterile gown and sterile gloves and sterile full-body drape and hand hygiene and 2% chlorhexidine for cutaneous antisepsis and sterile ultrasound gel and sterile ultrasound probe cover. After the patient was prepped and draped, a local field block with lidocaine was achieved. Ultrasound evaluation of all potential access sites was performed due to lack of a palpable vein.    No

## 2022-06-03 NOTE — DIABETES MGMT
Attempted to see pt for diabetes education, patient currently with nausea and vomiting. Primary care nurse at bedside. Creatinine: 5.30. FBS: 456. Anion Gap:26. Provider notified. Blood glucose range yesterday 170-388 with pt receiving Lantus 10 units and Humalog 19 units. Patient not appropriate for education at this time. Plan is to provide diabetes education when pt is willing and able to participate.

## 2022-06-03 NOTE — PROGRESS NOTES
Comprehensive Nutrition Assessment    Type and Reason for Visit: Initial,Positive Nutrition Screen  Malnutrition Screening Tool: Malnutrition Screen  Have you recently lost weight without trying?: Unsure of amount of weight loss (2 points)  Have you been eating poorly because of a decreased appetite?: No (0 points)  Malnutrition Screening Tool Score: 2    Nutrition Recommendations/Plan:   Meals and Snacks:  Diet: Continue current order  Nutrition Supplement Therapy:   Medical food supplement therapy:  Initiate Glucerna Shake three times per day (this provides 220 kcal and 10 grams protein per bottle)     Malnutrition Assessment:  Malnutrition Status: At risk for malnutrition (Comment) (poor po intake during admission)    Nutrition Assessment:  Nutrition History: Pt states she typically eat 1 \"big meal\" per day and mostly snacks during the day. She states she believes she has been wt stable, but isn't entirely sure. Do You Have Any Cultural, Roman Catholic, or Ethnic Food Preferences?: No   Nutrition Background:   H/O: HTN, HLD, DM, CKD3, recent admission for right sided ureteral obstruction s/p stent placement. P/W found unresponsiveness after not heard from the last several days. Findings of DKA, BLANCA. Had brief cardiac arrest and was intubated in ED. Pt transferred to [facility] d/t debility. Nutrition Interval:  Pt seen lying in bed at time of visit. She reports N/V today after working with PT. Noted glucose of 456. She denies eating snacks during the day. Offered Glucerna with meals and pt is agreeable. She states she drank 1 since they were ordered 5/30. Attempted to complete NFPE, but pt stated she needed to use the restroom. RN notified. Nutrition Related Findings:   No kasia muscle or subcutaneous fat wasting visible.       Current Nutrition Therapies:  ADULT DIET; Easy to Chew; 3 carb choices (45 gm/meal)    Current Intake:   Average Meal Intake: 1-25% Average Supplements Intake: None Ordered Anthropometric Measures:  Height: 5' 6\" (167.6 cm)  Current Body Wt: 169 lb 15.6 oz (77.1 kg), Weight source: Not Specified  BMI: 27.4  Admission Body Weight: 171 lb 15.3 oz (78 kg) (5/22; estimated, [facility] admission)  Ideal Body Weight (Kg) (Calculated): 59 kg (130 lbs), 130.7 %  Usual Body Wt:  (varies d/t fluid), Percent weight change:         Edema: No data recorded  BMI Category Overweight (BMI 25.0-29. 9)  Estimated Daily Nutrient Needs:  Energy (kcal/day): 3123-6133 (Kcal/kg (30-35) Weight used:   Ideal (59.1kg)  Protein (g/day): 71-77 (1.2-1.3gm/kg) Weight Used: (Ideal (59.1kg))    Fluid (ml/day):   (Standard Renal)    Nutrition Diagnosis:   · Inadequate oral intake related to  (nausea, loss of appetite) as evidenced by  (pt reports barriers to intake)    Nutrition Interventions:   Food and/or Nutrient Delivery: Continue Current Diet,Start Oral Nutrition Supplement     Coordination of Nutrition Care: Continue to monitor while inpatient       Goals: Active Goal: Meet at least 75% of estimated needs,by next RD assessment       Nutrition Monitoring and Evaluation:      Food/Nutrient Intake Outcomes: Food and Nutrient Intake,Supplement Intake  Physical Signs/Symptoms Outcomes: Biochemical Data,GI Status,Meal Time Behavior,Weight    Discharge Planning:     Too soon to determine    Aarti Dia MS, RDN, LD  Contact: [phone]

## 2022-06-04 LAB
ANION GAP SERPL CALC-SCNC: 11 MMOL/L (ref 7–16)
BACTERIA SPEC CULT: NORMAL
BACTERIA SPEC CULT: NORMAL
BUN SERPL-MCNC: 21 MG/DL (ref 6–23)
CALCIUM SERPL-MCNC: 8.4 MG/DL (ref 8.3–10.4)
CHLORIDE SERPL-SCNC: 103 MMOL/L (ref 98–107)
CO2 SERPL-SCNC: 28 MMOL/L (ref 21–32)
CREAT SERPL-MCNC: 5.5 MG/DL (ref 0.6–1)
ERYTHROCYTE [DISTWIDTH] IN BLOOD BY AUTOMATED COUNT: 15.6 % (ref 11.9–14.6)
GLUCOSE BLD STRIP.AUTO-MCNC: 114 MG/DL (ref 65–100)
GLUCOSE BLD STRIP.AUTO-MCNC: 168 MG/DL (ref 65–100)
GLUCOSE BLD STRIP.AUTO-MCNC: 196 MG/DL (ref 65–100)
GLUCOSE BLD STRIP.AUTO-MCNC: 86 MG/DL (ref 65–100)
GLUCOSE SERPL-MCNC: 209 MG/DL (ref 65–100)
HCT VFR BLD AUTO: 27 % (ref 35.8–46.3)
HGB BLD-MCNC: 8.6 G/DL (ref 11.7–15.4)
MCH RBC QN AUTO: 26.1 PG (ref 26.1–32.9)
MCHC RBC AUTO-ENTMCNC: 31.9 G/DL (ref 31.4–35)
MCV RBC AUTO: 81.8 FL (ref 79.6–97.8)
NRBC # BLD: 0.02 K/UL (ref 0–0.2)
PLATELET # BLD AUTO: 443 K/UL (ref 150–450)
PMV BLD AUTO: 9.7 FL (ref 9.4–12.3)
POTASSIUM SERPL-SCNC: 3.2 MMOL/L (ref 3.5–5.1)
RBC # BLD AUTO: 3.3 M/UL (ref 4.05–5.2)
SERVICE CMNT-IMP: ABNORMAL
SERVICE CMNT-IMP: NORMAL
SODIUM SERPL-SCNC: 142 MMOL/L (ref 136–145)
WBC # BLD AUTO: 11.8 K/UL (ref 4.3–11.1)

## 2022-06-04 PROCEDURE — 1180000000 HC REHAB R&B

## 2022-06-04 PROCEDURE — 97116 GAIT TRAINING THERAPY: CPT

## 2022-06-04 PROCEDURE — 85027 COMPLETE CBC AUTOMATED: CPT

## 2022-06-04 PROCEDURE — 97530 THERAPEUTIC ACTIVITIES: CPT

## 2022-06-04 PROCEDURE — 97110 THERAPEUTIC EXERCISES: CPT

## 2022-06-04 PROCEDURE — 6370000000 HC RX 637 (ALT 250 FOR IP): Performed by: INTERNAL MEDICINE

## 2022-06-04 PROCEDURE — 80048 BASIC METABOLIC PNL TOTAL CA: CPT

## 2022-06-04 PROCEDURE — 6370000000 HC RX 637 (ALT 250 FOR IP): Performed by: PHYSICIAN ASSISTANT

## 2022-06-04 PROCEDURE — 97535 SELF CARE MNGMENT TRAINING: CPT

## 2022-06-04 PROCEDURE — 5A1D70Z PERFORMANCE OF URINARY FILTRATION, INTERMITTENT, LESS THAN 6 HOURS PER DAY: ICD-10-PCS | Performed by: PHYSICAL MEDICINE & REHABILITATION

## 2022-06-04 PROCEDURE — 36415 COLL VENOUS BLD VENIPUNCTURE: CPT

## 2022-06-04 PROCEDURE — 8010000000 HC HEMODIALYSIS ACUTE INPT

## 2022-06-04 PROCEDURE — 82962 GLUCOSE BLOOD TEST: CPT

## 2022-06-04 PROCEDURE — 6360000002 HC RX W HCPCS: Performed by: INTERNAL MEDICINE

## 2022-06-04 PROCEDURE — 99232 SBSQ HOSP IP/OBS MODERATE 35: CPT | Performed by: PHYSICIAN ASSISTANT

## 2022-06-04 RX ORDER — ONDANSETRON 2 MG/ML
4 INJECTION INTRAMUSCULAR; INTRAVENOUS EVERY 6 HOURS PRN
Status: DISCONTINUED | OUTPATIENT
Start: 2022-06-04 | End: 2022-06-11 | Stop reason: HOSPADM

## 2022-06-04 RX ORDER — POTASSIUM CHLORIDE 20 MEQ/1
40 TABLET, EXTENDED RELEASE ORAL ONCE
Status: COMPLETED | OUTPATIENT
Start: 2022-06-04 | End: 2022-06-04

## 2022-06-04 RX ADMIN — FERROUS SULFATE TAB 325 MG (65 MG ELEMENTAL FE) 325 MG: 325 (65 FE) TAB at 17:55

## 2022-06-04 RX ADMIN — INSULIN GLARGINE 20 UNITS: 100 INJECTION, SOLUTION SUBCUTANEOUS at 21:35

## 2022-06-04 RX ADMIN — ONDANSETRON 4 MG: 2 INJECTION INTRAMUSCULAR; INTRAVENOUS at 08:14

## 2022-06-04 RX ADMIN — POTASSIUM CHLORIDE 40 MEQ: 20 TABLET, EXTENDED RELEASE ORAL at 13:38

## 2022-06-04 RX ADMIN — INSULIN LISPRO 5 UNITS: 100 INJECTION, SOLUTION INTRAVENOUS; SUBCUTANEOUS at 16:55

## 2022-06-04 NOTE — PROGRESS NOTES
[address] Nephrology    Follow-Up on: BLANCA on CKD stage IV    HPI: Pt seen and examined in dialysis, toleratying well    ROS:  General: no fever/chills  CV: no CP  Lung: no SOB, no cough  GI: no N/V/D  : no dysuria  Ext: no edema       Current Facility-Administered Medications   Medication Dose Route Frequency    potassium chloride (KLOR-CON M) extended release tablet 40 mEq  40 mEq Oral Once    ondansetron (ZOFRAN) injection 4 mg  4 mg IntraVENous Q6H PRN    melatonin tablet 3 mg  3 mg Oral Nightly PRN    insulin lispro (HUMALOG) injection vial 0-4 Units  0-4 Units SubCUTAneous TID WC    insulin lispro (HUMALOG) injection vial 0-4 Units  0-4 Units SubCUTAneous Nightly    insulin glargine (LANTUS) injection vial 20 Units  20 Units SubCUTAneous Nightly    glucose chewable tablet 16 g  4 tablet Oral PRN    dextrose bolus 10% 125 mL  125 mL IntraVENous PRN    Or    dextrose bolus 10% 250 mL  250 mL IntraVENous PRN    glucagon injection 1 mg  1 mg IntraMUSCular PRN    dextrose 5 % solution  100 mL/hr IntraVENous PRN    0.9 % sodium chloride infusion   IntraVENous Continuous    acetaminophen (TYLENOL) tablet 650 mg  650 mg Oral Q6H PRN    Or    acetaminophen (TYLENOL) suppository 650 mg  650 mg Rectal Q6H PRN    polyethylene glycol (GLYCOLAX) packet 17 g  17 g Oral Daily PRN    albuterol (PROVENTIL) nebulizer solution 2.5 mg  2.5 mg Nebulization Q4H PRN    ferrous sulfate (IRON 325) tablet 325 mg  325 mg Oral BID     folic acid (FOLVITE) tablet 1 mg  1 mg Oral Daily    guaiFENesin-dextromethorphan (ROBITUSSIN DM) 100-10 MG/5ML syrup 5 mL  5 mL Oral Q4H PRN    insulin lispro (HUMALOG) injection vial 5 Units  5 Units SubCUTAneous TID     loperamide (IMODIUM) capsule 2 mg  2 mg Oral 4x Daily PRN    medicated lip ointment (BLISTEX)   Topical PRN    ondansetron (ZOFRAN-ODT) disintegrating tablet 4 mg  4 mg Oral Q6H PRN       Exam:  Vitals:    06/04/22 0730 06/04/22 0800 06/04/22 0830 06/04/22 6360 BP: 133/62 (!) 153/82 (!) 139/27 100/61   Pulse: 92 (!) 106 (!) 104 100   Resp:       Temp: 98.1 °F (36.7 °C)      TempSrc:       SpO2:       Weight:       Height:             Intake/Output Summary (Last 24 hours) at 6/4/2022 0930  Last data filed at 6/4/2022 0435  Gross per 24 hour   Intake 1000 ml   Output 700 ml   Net 300 ml     PE:  GEN - in no distress, alert and oriented   CV - regular rate, S1, S2, no rub  Lung - clear bilaterally  Abd - soft, nontender  Ext - +1 BLE edema  Access-  right TCC- intact    Labs  Recent Labs     06/02/22  0448 06/03/22  0641 06/04/22  0526   WBC 15.2* 13.7* 11.8*   HGB 7.3* 8.8* 8.6*   HCT 22.9* 29.2* 27.0*    387 443     Recent Labs     06/03/22  0641 06/03/22  1901 06/04/22  0526    142 142   K 4.1 3.0* 3.2*   CL 98 103 103   CO2 15* 27 28   BUN 22 22 21     No results for input(s): PH, PCO2, PO2, PCO2 in the last 72 hours.     Problem List:  Patient Active Problem List    Diagnosis Date Noted    Physical debility 06/02/2022    Normocytic anemia 06/01/2022    Septic shock ([facility]     Altered mental status     Frailty     Cardiac arrest ([facility] 05/21/2022    Diabetic ketoacidosis with coma associated with type 1 diabetes mellitus ([facility] 05/21/2022    Severe sepsis with septic shock (CODE) ([facility] 05/21/2022    Pneumonia, bacterial 05/21/2022    Acute respiratory failure ([address] 05/21/2022    Benign hypertension with CKD (chronic kidney disease) stage III ([address] 01/04/2022    Acute kidney injury superimposed on chronic kidney disease ([address] 06/09/2021    IDDM (insulin dependent diabetes mellitus) 01/24/2012    BLANCA (acute kidney injury) ([facility] 04/21/2022    Ureteric stone 04/19/2022    Urinary tract infection without hematuria 04/19/2022    Hydronephrosis of right kidney 04/19/2022    Malodorous urine 11/16/2021    H/O gastroesophageal reflux (GERD) 06/19/2020    Encounter for annual routine gynecological examination 11/27/2018    Encounter to discuss test results 08/22/2018    Diabetes mellitus type 1 ([facility] 08/22/2018    Type 1 diabetes mellitus with stage 3 chronic kidney disease ([facility] 08/16/2018    Noncompliance with medication regimen 08/16/2018    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    Depression with anxiety 07/11/2017    Ulcer, skin, chronic ([facility] 03/13/2017    CKD (chronic kidney disease), stage IV ([facility]) 03/13/2017    Neuropathy, peripheral, idiopathic 03/13/2017    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 BLANCA on CKD stage IV non oliguric   1st HD 5/24, TCC placed 6/1  -Seen on HD for volume and clearance  -TTS schedule while on the 9th floor   appreciate CW for outpt HD slot   - Given significant underlying CKD she is very likely ESRD    2. Type I DM- SSI per primary     3. DKA BS>2000 on admit- un controlled- SSI per primary     4.  Anemia-  on Fe

## 2022-06-04 NOTE — PROGRESS NOTES
Shubham Li MD  Medical Director  [address] [facility], [address] [facility]  Tel: [phone]       [facility] PROGRESS NOTE    Kennedy Echeverria  Admit Date: 6/2/2022  Admit Diagnosis:   Debility [R53.81]  Physical debility [R53.81]    Subjective     Patient observed working with PT in gym and examined after lunch in her room. Doing much better today: no nausea, ate all her lunch and has snack (chicken soup) for later. Participated fully with therapy despite HD earlier today. Slept well. Voided without dysuria. /70 this AM, 133/62 at recheck 30min later. BMP with K+ 3.2, .     Objective:     Current Facility-Administered Medications   Medication Dose Route Frequency    ondansetron (ZOFRAN) injection 4 mg  4 mg IntraVENous Q6H PRN    melatonin tablet 3 mg  3 mg Oral Nightly PRN    insulin lispro (HUMALOG) injection vial 0-4 Units  0-4 Units SubCUTAneous TID WC    insulin lispro (HUMALOG) injection vial 0-4 Units  0-4 Units SubCUTAneous Nightly    insulin glargine (LANTUS) injection vial 20 Units  20 Units SubCUTAneous Nightly    glucose chewable tablet 16 g  4 tablet Oral PRN    dextrose bolus 10% 125 mL  125 mL IntraVENous PRN    Or    dextrose bolus 10% 250 mL  250 mL IntraVENous PRN    glucagon injection 1 mg  1 mg IntraMUSCular PRN    dextrose 5 % solution  100 mL/hr IntraVENous PRN    acetaminophen (TYLENOL) tablet 650 mg  650 mg Oral Q6H PRN    Or    acetaminophen (TYLENOL) suppository 650 mg  650 mg Rectal Q6H PRN    polyethylene glycol (GLYCOLAX) packet 17 g  17 g Oral Daily PRN    albuterol (PROVENTIL) nebulizer solution 2.5 mg  2.5 mg Nebulization Q4H PRN    ferrous sulfate (IRON 325) tablet 325 mg  325 mg Oral BID WC    folic acid (FOLVITE) tablet 1 mg  1 mg Oral Daily    guaiFENesin-dextromethorphan (ROBITUSSIN DM) 100-10 MG/5ML syrup 5 mL  5 mL Oral Q4H PRN    insulin lispro (HUMALOG) injection vial 5 Units  5 Units SubCUTAneous TID WC    loperamide (IMODIUM) capsule 2 mg  2 mg Oral 4x Daily PRN    medicated lip ointment (BLISTEX)   Topical PRN    ondansetron (ZOFRAN-ODT) disintegrating tablet 4 mg  4 mg Oral Q6H PRN        Review of Systems:   Denies chest pain, shortness of breath, cough, headache, visual problems, abdominal pain, calf pain. Pertinent positives are as noted in the HPI, ROS unremarkable otherwise. Visit Vitals  BP (!) 114/57   Pulse 98   Temp 98.1 °F (36.7 °C)   Resp 18   Ht 5' 6\" (1.676 m)   Wt 170 lb (77.1 kg)   SpO2 100%   BMI 27.44 kg/m²        Physical Exam:   General: Alert, appropriately oriented. Relaxing on bed, talking with friend present. HEENT: Normocephalic, EOM intact. Oral mucosa moist.   Lungs: Congestion / rhonchi at left base that clears with cough, otherwise clear to auscultation bilaterally. Respirations even and unlabored with good air exchange. Heart: Regular rate and rhythm, normal S1, S2. Systolic murmur. Abdomen: Soft, non-tender, not distended. Bowel sounds normoactive, no organomegaly. Genitourinary: Deferred. Neuromuscular:      Good attention, improved processing. Hypophonia without dysarthria. UE strength and ROM full and unencumbered. LE MMT not done. Sensation intact B LE distally. Skin/extremity: No rashes, no erythema. Calves soft, non-tender B LE.        Andujar Fall Risk Assessment:  History of Falling: No     Labs/Studies:  Recent Results (from the past 72 hour(s))   POCT Glucose    Collection Time: 06/01/22  5:14 PM   Result Value Ref Range    POC Glucose 386 (H) 65 - 100 mg/dL    Performed by: Kaitlyn Espinosa    Folate    Collection Time: 06/01/22  5:40 PM   Result Value Ref Range    Folate 4.9 3.1 - 17.5 ng/mL   Vitamin B12    Collection Time: 06/01/22  5:40 PM   Result Value Ref Range    Vitamin B-12 1767 (H) 193 - 986 pg/mL   Transferrin Saturation    Collection Time: 06/01/22  5:40 PM   Result Value Ref Range    Iron 36 35 - 150 ug/dL    TIBC 153 (L) 250 - 450 ug/dL    TRANSFERRIN SATURATION 24 >20 %   Hepatitis Panel, Acute    Collection Time: 06/01/22  5:40 PM   Result Value Ref Range    Hep A IgM NONREACTIVE NR      Hep B Core Ab, IgM NONREACTIVE NR      Hepatitis B Surface Ag NONREACTIVE NR      Hepatitis C Ab NONREACTIVE NR     POCT Glucose    Collection Time: 06/01/22 10:59 PM   Result Value Ref Range    POC Glucose 518 (HH) 65 - 100 mg/dL    Performed by: Topher    POCT Glucose    Collection Time: 06/02/22  1:21 AM   Result Value Ref Range    POC Glucose 498 (HH) 65 - 100 mg/dL    Performed by: Jame Saenz    Basic Metabolic Panel    Collection Time: 06/02/22  4:48 AM   Result Value Ref Range    Sodium 138 136 - 145 mmol/L    Potassium 3.5 3.5 - 5.1 mmol/L    Chloride 100 98 - 107 mmol/L    CO2 25 21 - 32 mmol/L    Anion Gap 13 7 - 16 mmol/L    Glucose 360 (H) 65 - 100 mg/dL    BUN 19 6 - 23 MG/DL    CREATININE 4.60 (H) 0.6 - 1.0 MG/DL    GFR African American 13 (L) >60 ml/min/1.73m2    GFR Non- 11 (L) >60 ml/min/1.73m2    Calcium 7.9 (L) 8.3 - 10.4 MG/DL   CBC    Collection Time: 06/02/22  4:48 AM   Result Value Ref Range    WBC 15.2 (H) 4.3 - 11.1 K/uL    RBC 2.76 (L) 4.05 - 5.2 M/uL    Hemoglobin 7.3 (L) 11.7 - 15.4 g/dL    Hematocrit 22.9 (L) 35.8 - 46.3 %    MCV 83.0 79.6 - 97.8 FL    MCH 26.4 26.1 - 32.9 PG    MCHC 31.9 31.4 - 35.0 g/dL    RDW 15.3 (H) 11.9 - 14.6 %    Platelets 687 132 - 817 K/uL    MPV 9.3 (L) 9.4 - 12.3 FL    nRBC 0.03 0.0 - 0.2 K/uL   POCT Glucose    Collection Time: 06/02/22  7:13 AM   Result Value Ref Range    POC Glucose 388 (H) 65 - 100 mg/dL    Performed by: Jaden    POCT Glucose    Collection Time: 06/02/22 11:16 AM   Result Value Ref Range    POC Glucose 184 (H) 65 - 100 mg/dL    Performed by: Malathi    Culture, Urine    Collection Time: 06/02/22  2:28 PM    Specimen: URINE-CLEAN CATCH    URINE   Result Value Ref Range    Special Requests NO SPECIAL REQUESTS      Culture (A)       10,000 to 50,000 COLONIES/mL YEAST IDENTIFICATION TO FOLLOW    Culture        50,000-100,000  COLONIES/mL  NORMAL SKIN MARKUS ISOLATED      Culture CULTURE IN PROGRESS,FURTHER UPDATES TO FOLLOW     Urinalysis with Reflex to Culture    Collection Time: 06/02/22  2:28 PM    Specimen: Urine   Result Value Ref Range    Color, UA YELLOW      Appearance CLOUDY      Specific Gravity, UA 1.020 1.001 - 1.023      pH, Urine 7.5 5.0 - 9.0      Protein, UA 30 (A) NEG mg/dL    Glucose, UA Negative NEG mg/dL    Ketones, Urine TRACE (A) NEG mg/dL    Bilirubin Urine SMALL (A) NEG      Blood, Urine LARGE (A) NEG      Urobilinogen, Urine 0.2 0.2 - 1.0 EU/dL    Nitrite, Urine Negative NEG      Leukocyte Esterase, Urine MODERATE (A) NEG      WBC, UA 5-10 0 /hpf    RBC, UA 5-10 0 /hpf    BACTERIA, URINE 1+ (H) 0 /hpf    Urine Culture if Indicated CULTURE NOT INDICATED BY UA RESULT      Epithelial Cells UA 3-5 0 /hpf    Casts 0 0 /lpf    Crystals 0 0 /LPF    Mucus, UA 0 0 /lpf    OTHER OBSERVATIONS RESULTS VERIFIED MANUALLY     POCT Glucose    Collection Time: 06/02/22  4:38 PM   Result Value Ref Range    POC Glucose 198 (H) 65 - 100 mg/dL    Performed by: Azalia    POCT Glucose    Collection Time: 06/02/22  9:05 PM   Result Value Ref Range    POC Glucose 170 (H) 65 - 100 mg/dL    Performed by: Alan    POCT Glucose    Collection Time: 06/03/22  6:23 AM   Result Value Ref Range    POC Glucose 456 (HH) 65 - 100 mg/dL    Performed by: Tabitha Lesches    Basic Metabolic Panel    Collection Time: 06/03/22  6:41 AM   Result Value Ref Range    Sodium 139 136 - 145 mmol/L    Potassium 4.1 3.5 - 5.1 mmol/L    Chloride 98 98 - 107 mmol/L    CO2 15 (L) 21 - 32 mmol/L    Anion Gap 26 (H) 7 - 16 mmol/L    Glucose 432 (H) 65 - 100 mg/dL    BUN 22 6 - 23 MG/DL    CREATININE 5.30 (H) 0.6 - 1.0 MG/DL    GFR African American 11 (L) >60 ml/min/1.73m2    GFR Non- 9 (L) >60 ml/min/1.73m2    Calcium 8.3 8.3 - 10.4 MG/DL   CBC    Collection Time: 06/03/22  6:41 AM   Result Value Ref Range    WBC 13.7 (H) 4.3 - 11.1 K/uL    RBC 3.38 (L) 4.05 - 5.2 M/uL    Hemoglobin 8.8 (L) 11.7 - 15.4 g/dL    Hematocrit 29.2 (L) 35.8 - 46.3 %    MCV 86.4 79.6 - 97.8 FL    MCH 26.0 (L) 26.1 - 32.9 PG    MCHC 30.1 (L) 31.4 - 35.0 g/dL    RDW 15.9 (H) 11.9 - 14.6 %    Platelets 276 032 - 675 K/uL    MPV 9.2 (L) 9.4 - 12.3 FL    nRBC 0.02 0.0 - 0.2 K/uL   POCT Glucose    Collection Time: 06/03/22 11:09 AM   Result Value Ref Range    POC Glucose 188 (H) 65 - 100 mg/dL    Performed by: Corey    POCT Glucose    Collection Time: 06/03/22  4:39 PM   Result Value Ref Range    POC Glucose 178 (H) 65 - 100 mg/dL    Performed by:  Corey    Basic Metabolic Panel    Collection Time: 06/03/22  7:01 PM   Result Value Ref Range    Sodium 142 136 - 145 mmol/L    Potassium 3.0 (L) 3.5 - 5.1 mmol/L    Chloride 103 98 - 107 mmol/L    CO2 27 21 - 32 mmol/L    Anion Gap 12 7 - 16 mmol/L    Glucose 85 65 - 100 mg/dL    BUN 22 6 - 23 MG/DL    CREATININE 5.40 (H) 0.6 - 1.0 MG/DL    GFR African American 11 (L) >60 ml/min/1.73m2    GFR Non- 9 (L) >60 ml/min/1.73m2    Calcium 8.3 8.3 - 10.4 MG/DL   POCT Glucose    Collection Time: 06/03/22  9:08 PM   Result Value Ref Range    POC Glucose 126 (H) 65 - 100 mg/dL    Performed by: Justin Bauman    Basic Metabolic Panel    Collection Time: 06/04/22  5:26 AM   Result Value Ref Range    Sodium 142 136 - 145 mmol/L    Potassium 3.2 (L) 3.5 - 5.1 mmol/L    Chloride 103 98 - 107 mmol/L    CO2 28 21 - 32 mmol/L    Anion Gap 11 7 - 16 mmol/L    Glucose 209 (H) 65 - 100 mg/dL    BUN 21 6 - 23 MG/DL    CREATININE 5.50 (H) 0.6 - 1.0 MG/DL    GFR African American 11 (L) >60 ml/min/1.73m2    GFR Non- 9 (L) >60 ml/min/1.73m2    Calcium 8.4 8.3 - 10.4 MG/DL   CBC    Collection Time: 06/04/22  5:26 AM   Result Value Ref Range    WBC 11.8 (H) 4.3 - 11.1 K/uL    RBC 3.30 (L) 4.05 - 5.2 M/uL    Hemoglobin 8.6 (L) 11.7 - 15.4 g/dL    Hematocrit 27.0 (L) 35.8 - 46.3 %    MCV 81.8 79.6 - 97.8 FL    MCH 26.1 26.1 - 32.9 PG    MCHC 31.9 31.4 - 35.0 g/dL    RDW 15.6 (H) 11.9 - 14.6 %    Platelets 271 248 - 359 K/uL    MPV 9.7 9.4 - 12.3 FL    nRBC 0.02 0.0 - 0.2 K/uL   POCT Glucose    Collection Time: 06/04/22  6:14 AM   Result Value Ref Range    POC Glucose 196 (H) 65 - 100 mg/dL    Performed by: Alan    POCT Glucose    Collection Time: 06/04/22 11:19 AM   Result Value Ref Range    POC Glucose 86 65 - 100 mg/dL    Performed by: Corey        Assessment:     Principal Problem:    Physical debility  Active Problems:    Acute kidney injury superimposed on chronic kidney disease (HCC)    Benign hypertension with CKD (chronic kidney disease) stage III (HCC)    Cardiac arrest ([facility])    Diabetic ketoacidosis with coma associated with type 1 diabetes mellitus ([facility])    Normocytic anemia    Depression with anxiety    Diabetic polyneuropathy associated with type 1 diabetes mellitus ([facility])    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 SLP for swallowing surveillance, cognitive evaluation post-arrest, DKA with coma.     Cardiac arrest, acute respiratory failure - resolved; multifactorial, most likely due to DKA with coma, septic shock. Intubated 5/21, extubated 5/25, now on room air; no prior history of supplemental O2 use.  TTE (5/22) with EF 91-47%, normal systolic and diastolic function, moderate AV stenosis.     Severe sepsis with septic shock - question of bacterial pneumonia vs UTI; was on vanc+Zosyn throughout acute hospitalization, EOT 6/2. UCx, BCx NGTD. Leukocytosis attributed to stress response.  -6/3 WBC 13.7, trending down; had UA yesterday with + leuk (moderate), 1+ bacteria; UCx negative after short incubation  -6/4 WBC 11.8     Acute renal failure, BLANCA on CKD3 - Cr on admission 9.7 (baseline ~2.5), oliguric; dialysis started 5/24, temp cath switched to tunneled cath 6.1. Nephrology managing HD, patient will run next HD 6/4 for TThSa transition. Avoid hypotension and nephrotoxic drugs. -6/3 no HD today, transitioning to a TThSa schedule for therapies; Cr 5.3 (4.6 yesterday)  -6/4 normal HD run; per Nephrologist, she is very likely ESRD     Blood pressure management - monitor. Patient occasionally hypertensive but mostly normo- or hypotensive.  -6/3 /61 this AM, lowest of past 24 hrs  -6/4 /70     Diabetes mellitus - reportedly type 1, last HgbA1c found in EMR was 9.6% but from 6/2020; with admission for DKA and BS >2000, patient likely with poor glycemic control. Will require ACHS fasting glucose monitoring and medication adjustment to optimize glycemic control in setting of acute illness and hospitalization. Per DM RN, current regimen is Lantus 10u QHS with Humalog 5u TID with meals. Monitor and adjust.  -6/3  this AM, 170 last PM, 198 at supper and 184 at lunch; d/w Diabetic RN this AM; subsequent emesis, nausea: labs indicate DKA, with AG of 26 and ; consult Hospitalist now  -6/4 patient much better today, labs more normal (x hypoK+), DKA caught early yesterday, appreciate Hospitalist     Anemia - normocytic, chronic. Baseline Hgb >10-11 but dipped to 7-8 in acute; Hgb 7.3 at [facility] admission on 6/2. Multifactorial: anemia of CKD, frequent blood draws; no active bleeding noted, hemoccult pending. Iron studies (6/1) c/w chronic disease. Vitamin B12 adequate, folate low 4.9; on PO iron, folic acid supplements.  Holding heparin SQ for now.  -6/3 Hgb 8.8 from 7.3, improving  -6/4 Hgb 8.6, stable     Pain management - no current joint or muscle symptoms, essentially pain-free. Will require regular pain assessment and comprehensive pain management.     Electrolyte management - no current abnormality, has been both hyperK+ and hypoK+; monitor and replace as needed. -6/4 was called last night with PM lab K+ 3.0 --> gave 40mEq PO x 1; recheck this AM 3.2 --> another 40mEq PO x 1 ordered, patient received after returned from HD     Pneumonia prophylaxis - incentive spirometer every hour while awake.     DVT risk / DVT prophylaxis - daily physician / PA exam to assess as patient is at increased risk for of thromboembolism. Mobilize as tolerated. Sequential pneumatic compression devices (SCDs) when in bed; thigh-high or knee-high thromboembolic deterrent hose when out of bed. Heparin SQ held due to anemia.      GI prophylaxis - consider PPI. At times may need additional antacids, Maalox prn.     History of tobacco use - former cigarette smoker, reportedly quit in 2013. No current nicotine cravings.     Depression - noted in problem list in 2017, no current pharmacotherapy. Monitor. At risk of depression / exacerbation due to physical debility, loss of independence.     General skin care / wound prevention - monitor general skin wound status daily per staff and physician / PA. At risk for failure due to impaired mobility.     Bladder program - schedule voids q6-8h. Check post-void residual as needed; in-and-out catheter if post-void residual is more than 400ml.     Bowel program - at risk for constipation as a side effect of medications, impaired mobility, etc. MiraLAX daily for regularity, Berta-Colace for stool softener. PRN MOM, bisacodyl suppository or tablets for constipation. Time spent was 25 minutes with over 1/2 in direct patient care/examination, consultation and coordination of care.      Signed By: Maverick Feng PA-C June 4, 2022      Physician Assistant with [facility]  Shantal Matamoros MD, Medical Director

## 2022-06-04 NOTE — DIALYSIS
TRANSFER OUT- DIALYSIS    Hemodialysis treatment completed. PT ran 3.5 hours, without complications. Patient alert and VS stable  BP (!) 114/57   Pulse 98   Temp 98.1 °F (36.7 °C)   Resp 18   Ht 5' 6\" (1.676 m)   Wt 170 lb (77.1 kg)   SpO2 100%   BMI 27.44 kg/m²       0.5 Kg removed. Flushed both ports with 10 mL of NS.  CVC dressing clean, dry, and intact, tego caps intact, curos caps placed. Meds given: zofran  RBCs given during dialysis: 0    Patient to 903 after dialysis.

## 2022-06-04 NOTE — PROGRESS NOTES
Reviewed notes for new spiritual concerns      Per notes:    Dm type 1 with recent DKA    Recent cardiac arrest    Depression and anxiety    Dialysis      Will continue to follow as needed

## 2022-06-04 NOTE — PLAN OF CARE
Problem: Safety - Medical Restraint  Goal: Remains free of injury from restraints (Restraint for Interference with Medical Device)  Description: INTERVENTIONS:  1. Determine that other, less restrictive measures have been tried or would not be effective before applying the restraint  2. Evaluate the patient's condition at the time of restraint application  3. Inform patient/family regarding the reason for restraint  4. Q2H: Monitor safety, psychosocial status, comfort, nutrition and hydration  Outcome: Progressing     Problem: Safety - Adult  Goal: Free from fall injury  Outcome: Progressing     Problem: Pain  Goal: Verbalizes/displays adequate comfort level or baseline comfort level  Outcome: Progressing     Problem: Skin/Tissue Integrity  Goal: Absence of new skin breakdown  Description: 1. Monitor for areas of redness and/or skin breakdown  2. Assess vascular access sites hourly  3. Every 4-6 hours minimum:  Change oxygen saturation probe site  4. Every 4-6 hours:  If on nasal continuous positive airway pressure, respiratory therapy assess nares and determine need for appliance change or resting period.   Outcome: Progressing     Problem: ABCDS Injury Assessment  Goal: Absence of physical injury  Outcome: Progressing

## 2022-06-04 NOTE — PROGRESS NOTES
Hospitalist Progress Note   Admit Date:  2022 12:33 PM   Name:  Kay Pollack   Age:  48 y.o. Sex:  female  :  1975   MRN:  [id number]   Room:  [facility]    Presenting Complaint: hyperglycemia   Recent cardiac arrest      Reason(s) for Admission: Debility [R53.81]  Physical debility [R53.81]     Hospital Course & Interval History:     Kay Pollack is a 48 y.o. female with history of   DM type 1 with recent DKA   Recent cardiac arrest   Recent respiratory failure and was on ventilator  Hypertension   Hyperlipidemia   Recent BLANCA on CKD, HD dependent now. Depression and anxiety      who was admitted for physical rehabilitation after recent DKA and cardiac arrest and was admitted to ICU and was on ventilator.      She developed BLANCA on CKD during that admission in ICU and SLED was started. She currently is on HD. Last HD was Wednesday. Her next HD is tomorrow. She still makes urine 2-3 times per day.       Patient recovered from that recent ICU admission and sent to rehab floor on 2022. Today she feels nauseated. Her BS was  456 at 6 AM and she was treated with Humalog 5 units SC before breakfast. BMP shows HCO3 of 15. BUN of 22 and creatinine of 5.3.      Before lunch time today her BS is 188. She received 10 units of Lantus last night. Her usual dose of Lantus is 30 units sc hs before she was admitted.      Due to her hyperglycemia, acidosis with nausea, Hospitalist service is requested to give consultation help for the patient.         No fever. No shaking. No chills. No chest pain. No shortness of breath. No blood per rectum. No blood per urine. No abdominal pain   No abnormal movements.      She still makes some urine 2-3 times per day.      Medication records are reviewed. Subjective/24hr Events (22):    22   Feeling OK. Working with occupational therapy on her fine muscle movements.    BS has improved to around 200s ranges and she has no acidosis. Eating OK. Assessment & Plan:     Principal Problem:    Physical debility  Continue physical therapy as per acute rehab team.      Active Problems:    Acute kidney injury superimposed on chronic kidney disease ([facility]  Continue HD. Plan for next HD as per nephrologist.   Monitor urine and intake and output.        Benign hypertension with CKD (chronic kidney disease) stage III ([facility])  BP is 153/82 mmHg today. On no BP medications now. Monitor        Cardiac arrest ([facility]  Recovered. No neuro-deficit. Echo shows EF 70-75% after cardiac arrest.   Found to have moderate AV stenosis.        Diabetic ketoacidosis with coma associated with type 1 diabetes mellitus ([address]  BS is improved. Improvement of acidosis   Will stop IV fluid. Continue Lantus dose at 20 units sc hs   Monitor blood sugar. Cover with insulin sliding scale accordingly.         Normocytic anemia  Noted        Depression with anxiety  Noted   Will need to be addressed later after she is improved from acute hyperglycemia problems.        Diabetic polyneuropathy associated with type 1 diabetes mellitus ([facility])  Noted     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 associated with type 1 diabetes mellitus ([facility] [facility]  Noted     I have discussed the plan of care with patient.         Discharge Planning:    As per acute rehab team.     Diet:  ADULT DIET; Easy to Chew; 3 carb choices (45 gm/meal)  ADULT ORAL NUTRITION SUPPLEMENT; Breakfast, Lunch, Dinner; Diabetic Oral Supplement  DVT PPx: SCD  Code status: Full Code    Hospital Problems:  Principal Problem:    Physical debility  Active Problems:    Acute kidney injury superimposed on chronic kidney disease ([facility] [address]    Benign hypertension with CKD (chronic kidney disease) stage III ([facility])    Cardiac arrest ([facility]    Diabetic ketoacidosis with coma associated with type 1 diabetes mellitus ([facility])    Normocytic anemia    Depression with anxiety    Diabetic polyneuropathy associated with type 1 diabetes mellitus ([facility])    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 *      Objective:     Patient Vitals for the past 24 hrs:   Temp Pulse Resp BP SpO2   06/04/22 1032 -- 98 18 (!) 114/57 100 %   06/04/22 0957 -- 99 -- (!) 101/59 --   06/04/22 0932 -- 100 -- (!) 139/58 --   06/04/22 0903 -- 100 -- 100/61 --   06/04/22 0830 -- (!) 104 -- (!) 139/27 --   06/04/22 0800 -- (!) 106 -- (!) 153/82 --   06/04/22 0730 98.1 °F (36.7 °C) 92 -- 133/62 --   06/04/22 0700 98.1 °F (36.7 °C) 92 16 (!) 147/70 100 %   06/03/22 1945 98.1 °F (36.7 °C) 92 16 122/72 --       Oxygen Therapy  SpO2: 100 %  O2 Device: None (Room air)    Estimated body mass index is 27.44 kg/m² as calculated from the following:    Height as of this encounter: 5' 6\" (1.676 m). Weight as of this encounter: 170 lb (77.1 kg). Intake/Output Summary (Last 24 hours) at 6/4/2022 1126  Last data filed at 6/4/2022 1032  Gross per 24 hour   Intake 1600 ml   Output 1800 ml   Net -200 ml         Physical Exam:     Blood pressure (!) 114/57, pulse 98, temperature 98.1 °F (36.7 °C), resp. rate 18, height 5' 6\" (1.676 m), weight 170 lb (77.1 kg), SpO2 100 %. General:          Well nourished. No overt distress. Patient is sitting up and working with occupational therapy. She appears stronger. She is talking and answering questions well. Pale. No icterus. she appears comfortable. Head:               Normocephalic, atraumatic  Eyes:               Sclerae appear normal.  Pupils equally round. ENT:                Nares appear normal, no drainage. Dry oral mucosa  Neck:               No restricted ROM. Trachea midline   CV:                  RRR. No m/r/g. No jugular venous distension. Lungs:             CTAB. No wheezing, rhonchi, or rales. Respirations even, unlabored  Abdomen: Bowel sounds present.   Soft, nontender, nondistended. Extremities:     No cyanosis or clubbing. No edema  Skin:                No rashes and normal coloration. Warm and dry. Neuro:             CN II-XII grossly intact. Sensation intact. A&Ox3  Psych:             Normal mood and affect.         I have reviewed ordered lab tests and independently visualized imaging below:    Recent Labs:  Recent Results (from the past 48 hour(s))   Culture, Urine    Collection Time: 06/02/22  2:28 PM    Specimen: URINE-CLEAN CATCH    URINE   Result Value Ref Range    Special Requests NO SPECIAL REQUESTS      Culture (A)       10,000 to 50,000 COLONIES/mL YEAST IDENTIFICATION TO FOLLOW    Culture        50,000-100,000  COLONIES/mL  NORMAL SKIN MARKUS ISOLATED      Culture CULTURE IN PROGRESS,FURTHER UPDATES TO FOLLOW     Urinalysis with Reflex to Culture    Collection Time: 06/02/22  2:28 PM    Specimen: Urine   Result Value Ref Range    Color, UA YELLOW      Appearance CLOUDY      Specific Gravity, UA 1.020 1.001 - 1.023      pH, Urine 7.5 5.0 - 9.0      Protein, UA 30 (A) NEG mg/dL    Glucose, UA Negative NEG mg/dL    Ketones, Urine TRACE (A) NEG mg/dL    Bilirubin Urine SMALL (A) NEG      Blood, Urine LARGE (A) NEG      Urobilinogen, Urine 0.2 0.2 - 1.0 EU/dL    Nitrite, Urine Negative NEG      Leukocyte Esterase, Urine MODERATE (A) NEG      WBC, UA 5-10 0 /hpf    RBC, UA 5-10 0 /hpf    BACTERIA, URINE 1+ (H) 0 /hpf    Urine Culture if Indicated CULTURE NOT INDICATED BY UA RESULT      Epithelial Cells UA 3-5 0 /hpf    Casts 0 0 /lpf    Crystals 0 0 /LPF    Mucus, UA 0 0 /lpf    OTHER OBSERVATIONS RESULTS VERIFIED MANUALLY     POCT Glucose    Collection Time: 06/02/22  4:38 PM   Result Value Ref Range    POC Glucose 198 (H) 65 - 100 mg/dL    Performed by: Azalia    POCT Glucose    Collection Time: 06/02/22  9:05 PM   Result Value Ref Range    POC Glucose 170 (H) 65 - 100 mg/dL    Performed by: Alan    POCT Glucose    Collection Time: 06/03/22  6:23 AM   Result Value Ref Range    POC Glucose 456 (HH) 65 - 100 mg/dL    Performed by: Prashant Jaramillo Metabolic Panel    Collection Time: 06/03/22  6:41 AM   Result Value Ref Range    Sodium 139 136 - 145 mmol/L    Potassium 4.1 3.5 - 5.1 mmol/L    Chloride 98 98 - 107 mmol/L    CO2 15 (L) 21 - 32 mmol/L    Anion Gap 26 (H) 7 - 16 mmol/L    Glucose 432 (H) 65 - 100 mg/dL    BUN 22 6 - 23 MG/DL    CREATININE 5.30 (H) 0.6 - 1.0 MG/DL    GFR African American 11 (L) >60 ml/min/1.73m2    GFR Non- 9 (L) >60 ml/min/1.73m2    Calcium 8.3 8.3 - 10.4 MG/DL   CBC    Collection Time: 06/03/22  6:41 AM   Result Value Ref Range    WBC 13.7 (H) 4.3 - 11.1 K/uL    RBC 3.38 (L) 4.05 - 5.2 M/uL    Hemoglobin 8.8 (L) 11.7 - 15.4 g/dL    Hematocrit 29.2 (L) 35.8 - 46.3 %    MCV 86.4 79.6 - 97.8 FL    MCH 26.0 (L) 26.1 - 32.9 PG    MCHC 30.1 (L) 31.4 - 35.0 g/dL    RDW 15.9 (H) 11.9 - 14.6 %    Platelets 357 032 - 127 K/uL    MPV 9.2 (L) 9.4 - 12.3 FL    nRBC 0.02 0.0 - 0.2 K/uL   POCT Glucose    Collection Time: 06/03/22 11:09 AM   Result Value Ref Range    POC Glucose 188 (H) 65 - 100 mg/dL    Performed by: Corey    POCT Glucose    Collection Time: 06/03/22  4:39 PM   Result Value Ref Range    POC Glucose 178 (H) 65 - 100 mg/dL    Performed by:  Corey    Basic Metabolic Panel    Collection Time: 06/03/22  7:01 PM   Result Value Ref Range    Sodium 142 136 - 145 mmol/L    Potassium 3.0 (L) 3.5 - 5.1 mmol/L    Chloride 103 98 - 107 mmol/L    CO2 27 21 - 32 mmol/L    Anion Gap 12 7 - 16 mmol/L    Glucose 85 65 - 100 mg/dL    BUN 22 6 - 23 MG/DL    CREATININE 5.40 (H) 0.6 - 1.0 MG/DL    GFR African American 11 (L) >60 ml/min/1.73m2    GFR Non- 9 (L) >60 ml/min/1.73m2    Calcium 8.3 8.3 - 10.4 MG/DL   POCT Glucose    Collection Time: 06/03/22  9:08 PM   Result Value Ref Range    POC Glucose 126 (H) 65 - 100 mg/dL    Performed by: Bernabe Keane    Basic Metabolic Panel Collection Time: 06/04/22  5:26 AM   Result Value Ref Range    Sodium 142 136 - 145 mmol/L    Potassium 3.2 (L) 3.5 - 5.1 mmol/L    Chloride 103 98 - 107 mmol/L    CO2 28 21 - 32 mmol/L    Anion Gap 11 7 - 16 mmol/L    Glucose 209 (H) 65 - 100 mg/dL    BUN 21 6 - 23 MG/DL    CREATININE 5.50 (H) 0.6 - 1.0 MG/DL    GFR African American 11 (L) >60 ml/min/1.73m2    GFR Non- 9 (L) >60 ml/min/1.73m2    Calcium 8.4 8.3 - 10.4 MG/DL   CBC    Collection Time: 06/04/22  5:26 AM   Result Value Ref Range    WBC 11.8 (H) 4.3 - 11.1 K/uL    RBC 3.30 (L) 4.05 - 5.2 M/uL    Hemoglobin 8.6 (L) 11.7 - 15.4 g/dL    Hematocrit 27.0 (L) 35.8 - 46.3 %    MCV 81.8 79.6 - 97.8 FL    MCH 26.1 26.1 - 32.9 PG    MCHC 31.9 31.4 - 35.0 g/dL    RDW 15.6 (H) 11.9 - 14.6 %    Platelets 784 800 - 960 K/uL    MPV 9.7 9.4 - 12.3 FL    nRBC 0.02 0.0 - 0.2 K/uL   POCT Glucose    Collection Time: 06/04/22  6:14 AM   Result Value Ref Range    POC Glucose 196 (H) 65 - 100 mg/dL    Performed by: Alan        Other Studies:  No results found.     Current Meds:  Current Facility-Administered Medications   Medication Dose Route Frequency    potassium chloride (KLOR-CON M) extended release tablet 40 mEq  40 mEq Oral Once    ondansetron (ZOFRAN) injection 4 mg  4 mg IntraVENous Q6H PRN    melatonin tablet 3 mg  3 mg Oral Nightly PRN    insulin lispro (HUMALOG) injection vial 0-4 Units  0-4 Units SubCUTAneous TID WC    insulin lispro (HUMALOG) injection vial 0-4 Units  0-4 Units SubCUTAneous Nightly    insulin glargine (LANTUS) injection vial 20 Units  20 Units SubCUTAneous Nightly    glucose chewable tablet 16 g  4 tablet Oral PRN    dextrose bolus 10% 125 mL  125 mL IntraVENous PRN    Or    dextrose bolus 10% 250 mL  250 mL IntraVENous PRN    glucagon injection 1 mg  1 mg IntraMUSCular PRN    dextrose 5 % solution  100 mL/hr IntraVENous PRN    0.9 % sodium chloride infusion   IntraVENous Continuous    acetaminophen (TYLENOL) tablet 650 mg  650 mg Oral Q6H PRN    Or    acetaminophen (TYLENOL) suppository 650 mg  650 mg Rectal Q6H PRN    polyethylene glycol (GLYCOLAX) packet 17 g  17 g Oral Daily PRN    albuterol (PROVENTIL) nebulizer solution 2.5 mg  2.5 mg Nebulization Q4H PRN    ferrous sulfate (IRON 325) tablet 325 mg  325 mg Oral BID WC    folic acid (FOLVITE) tablet 1 mg  1 mg Oral Daily    guaiFENesin-dextromethorphan (ROBITUSSIN DM) 100-10 MG/5ML syrup 5 mL  5 mL Oral Q4H PRN    insulin lispro (HUMALOG) injection vial 5 Units  5 Units SubCUTAneous TID WC    loperamide (IMODIUM) capsule 2 mg  2 mg Oral 4x Daily PRN    medicated lip ointment (BLISTEX)   Topical PRN    ondansetron (ZOFRAN-ODT) disintegrating tablet 4 mg  4 mg Oral Q6H PRN       Signed:  Aure Munguia MD    Part of this note may have been written by using a voice dictation software. The note has been proof read but may still contain some grammatical/other typographical errors.

## 2022-06-04 NOTE — PROGRESS NOTES
PHYSICAL THERAPY DAILY NOTE  Time In:  1306  Time Out:  5648  Pt. Seen for: PM, Balance Training, Gait Training, Patient Education, Therapeutic Exercise and Transfer Training     Subjective: Patient is agreeable to physical therapy treatment today. States no C/O pain or discomfort at this time. Medications taken approximately 2 hours ago per RN. Last treatment was ok. No new complaints at this time. Objective:  Patient up sitting in [facility] finishing lunch. SPT from sit to stand with RW and CGA. Orientation Assessment :   Alert and age appropriately oriented. Affect/Behavior:   Appropriate and Cooperative. Basic Command Following:   intact. Safety/Judgment:   Slightly impaired. Pain Present:   No.     Precautions: Fatigue following dialysis    GROSS ASSESSMENT Daily Assessment    Strength and balance generally decreased functionally. COGNITION Daily Assessment    Communication: extra time needed to understand requests. Social Interaction: patient presents appropriate, cooperating and participates in treatment. Problem Solving: Verbal cues to teach techniques for completing tasks. Memory: Executing requests without distractions. BED/MAT MOBILITY Daily Assessment   Patient up sitting in [facility] with friend present and finishing lunch. Rolling Right: NT  Rolling Left: NT  Supine to Sit: NT  Sit to Supine: NT       TRANSFERS Daily Assessment    Sit to Stand: Supervision/Standby Assist and CGA  Stand to Sit: Supervision/Standby Assist and CGA  Transfer Type: Sit to stand  Transfer Assistance: CGA  Car Transfers: NT         GAIT Daily Assessment   Base of Support: Narrowed; Center of Gravity altered dynamically. Speed/Lucia: Slow, Fluctuations; delayed at times. Step Length: Right shortened;Left shortened. 4 sitting rests during gait. Interventions: Safety awareness training; Tactile cues;  Verbal cues Amount of Assistance: CGA  Distance (ft): 110 (ft)  Assistive Device: RW  Surface: Level Surface       STEPS/STAIRS Daily Assessment    Steps Ambulated:  2  Level of Assistance:  CGA  Railing:yes  Assistive Device: No A/D       BALANCE Daily Assessment    Static Sitting: Normal:  Pt. able to maintain balance w/o UE support  Dynamic Sitting: Good - accepts moderate challenge;  can maintain balance while picking object off the floor  Static Standing: Good:  Pt. able to maintain balance w/o UE support;  exhibits some postural sway  Dynamic Standing: Good - accepts moderate challenge;  can maintain balance while picking object off the floor and Fair - accepts minimal challenge;  can maintain balance while turning head/trunk       WHEELCHAIR MOBILITY Daily Assessment    Able to Propel (ft): 0  Assistance: NT  Surface: NT  Wheelchair Set-up: NT       LOWER EXTREMITY EXERCISES Daily Assessment   Patient performed seated lower extremity strengthening exercises: heel raises, LAQ, hip squeezes for adduction, sliding hamstring curls with foot on floor. 1 set x 15 reps. Numerous rests needed to complete. Assessment: Patient Education During Treatment Session: Demonstration, Explanation. PT Impairments, Limitations, or Improvements:   Ambulation limited by fatigue, Balance deficits dynamically, Endurance marked limitations, No Pain limiting function, Strength deficits generally decreased functionally, Transfer needing verbal cues. Rehab Potential Physical Therapy:   Good. Grossly no focal motor deficits noted. No rashes, no erythema. No calf tenderness BLE. Patient performed all given exercises listed. Patient was transferred back to her room ,Left in sitting position, call bell and necessities in reach. Nurse notified of completion of treatment. No family present. Plan of Care:  The patient has shown the ability to tolerate and benefit from physical therapy daily in a comprehensive inpatient rehabilitation program.  Continue intensive Physical Therapy  to address bed mobility, transfers, ambulation, strengthening, balance, and endurance. Continue with plan of care and focus on goals.            Bhavana Cuevas, PTA  6/4/2022

## 2022-06-04 NOTE — FLOWSHEET NOTE
TRANSFER IN - DIALYSIS    Received patient in dialysis unit  from [facility] (unit) for ordered procedure. Consent verified for renal replacement therapy. Procedure explained to patient, opportunity for Q&A provided. Call light given. Patient alert and vital signs stable. BP (!) 147/70   Pulse 92   Temp 98.1 °F (36.7 °C)   Resp 16   Ht 5' 6\" (1.676 m)   Wt 170 lb (77.1 kg)   SpO2 100%   BMI 27.44 kg/m²       Hemodialysis initiated using right cvc. Aspirated and flushed both ports without difficulty. Dressing clean, dry and intact. Machine settings per MD order. Heparin 0 unit bolus and 0 units/hr. Will monitor during treatment.

## 2022-06-04 NOTE — PLAN OF CARE
Rachelle Mcfarlane OT DAILY NOTE    Time In: 10:52  Time Out: 12:02     Score Comments   Supine to Sit Independent Independent   Sit to Stand CGA CGA with use of RW   Transfer Assist CGA CGA with use of RW; EOB to w/c           Upper Body   Dressing Current Functional Level   Score S/U   Comments Sitting EOB     Lower Body   Dressing Current Functional Level     Functional Level Min A   Comments Assist threading pants through feet due to fatigue s/p dialysis     Donning/[place]  Footwear Current Functional Level     Functional Level S/U   Comments Donning B socks; sitting EOB     Toilet transfer Current Functional Level   Functional Level CGA   Comments CGA with use of RW     Toilet Hygiene   Current Functional Level   Score CGA   Comments CGA standing     Summary of Session: Pt. Was agreeable and cooperative throughout OT treatment session. Pt. Was very fatigued s/p dialysis. Pt. Stated brief 10/10 back pain for about two minutes while performing functional reaching activity, however, pain ceased after two minutes. Pt. utilized UE bike for 10 minutes, with 1 rest break, in order to improve BUE strength in order for carry over with ADLs, functional transfers, and desired functional activities. Pt. Carried out activity wearing B 1 lb. Wrist weights in order to perform functional reaching task with elastic bands in order for carry over with ADLs, functional transfers, and desired functional activities. Overall, pt. Demonstrated fair activity tolerance. Pt. Completed 12-piece puzzle wearing 1.5 lb. Wrist weight on L hand in order to address decreased  fine motor coordination and strength in LUE. Pt. Carried out BUE strengthening exercises (1x15) with yellow theraband with rest breaks in order to maximize endurance/ minimize fatigue. Pt. Placed graded colored clothespins on top/thickest pole with B hands in order to improve  strength, fine motor coordination, and functional reaching.  OT treatment session focused on cognition, fine motor coordination,  strength, BUE strengthening, activity tolerance, functional reaching, functional transfers, bed mobility, UB/LB dressing, and donning socks. Pt. Demonstrated poor+ activity tolerance throughout OT treatment session, requiring frequent rest breaks due to decreased endurance and fatigue s/p dialysis.      Sharri Mo, OT  6/4/2022

## 2022-06-05 LAB
ANION GAP SERPL CALC-SCNC: 5 MMOL/L (ref 7–16)
BACTERIA SPEC CULT: ABNORMAL
BACTERIA SPEC CULT: ABNORMAL
BUN SERPL-MCNC: 7 MG/DL (ref 6–23)
CALCIUM SERPL-MCNC: 8.4 MG/DL (ref 8.3–10.4)
CHLORIDE SERPL-SCNC: 105 MMOL/L (ref 98–107)
CO2 SERPL-SCNC: 30 MMOL/L (ref 21–32)
CREAT SERPL-MCNC: 3.5 MG/DL (ref 0.6–1)
ERYTHROCYTE [DISTWIDTH] IN BLOOD BY AUTOMATED COUNT: 16 % (ref 11.9–14.6)
GLUCOSE BLD STRIP.AUTO-MCNC: 129 MG/DL (ref 65–100)
GLUCOSE BLD STRIP.AUTO-MCNC: 203 MG/DL (ref 65–100)
GLUCOSE BLD STRIP.AUTO-MCNC: 214 MG/DL (ref 65–100)
GLUCOSE BLD STRIP.AUTO-MCNC: 324 MG/DL (ref 65–100)
GLUCOSE BLD STRIP.AUTO-MCNC: 35 MG/DL (ref 65–100)
GLUCOSE BLD STRIP.AUTO-MCNC: 77 MG/DL (ref 65–100)
GLUCOSE BLD STRIP.AUTO-MCNC: 98 MG/DL (ref 65–100)
GLUCOSE SERPL-MCNC: 211 MG/DL (ref 65–100)
HCT VFR BLD AUTO: 26.8 % (ref 35.8–46.3)
HGB BLD-MCNC: 8.3 G/DL (ref 11.7–15.4)
MCH RBC QN AUTO: 25.7 PG (ref 26.1–32.9)
MCHC RBC AUTO-ENTMCNC: 31 G/DL (ref 31.4–35)
MCV RBC AUTO: 83 FL (ref 79.6–97.8)
NRBC # BLD: 0 K/UL (ref 0–0.2)
PLATELET # BLD AUTO: 365 K/UL (ref 150–450)
PMV BLD AUTO: 9.4 FL (ref 9.4–12.3)
POTASSIUM SERPL-SCNC: 3.4 MMOL/L (ref 3.5–5.1)
RBC # BLD AUTO: 3.23 M/UL (ref 4.05–5.2)
SERVICE CMNT-IMP: ABNORMAL
SERVICE CMNT-IMP: NORMAL
SERVICE CMNT-IMP: NORMAL
SODIUM SERPL-SCNC: 140 MMOL/L (ref 136–145)
WBC # BLD AUTO: 9.2 K/UL (ref 4.3–11.1)

## 2022-06-05 PROCEDURE — 85027 COMPLETE CBC AUTOMATED: CPT

## 2022-06-05 PROCEDURE — 6370000000 HC RX 637 (ALT 250 FOR IP): Performed by: PHYSICAL MEDICINE & REHABILITATION

## 2022-06-05 PROCEDURE — 36415 COLL VENOUS BLD VENIPUNCTURE: CPT

## 2022-06-05 PROCEDURE — 80048 BASIC METABOLIC PNL TOTAL CA: CPT

## 2022-06-05 PROCEDURE — 82962 GLUCOSE BLOOD TEST: CPT

## 2022-06-05 PROCEDURE — 6370000000 HC RX 637 (ALT 250 FOR IP): Performed by: PHYSICIAN ASSISTANT

## 2022-06-05 PROCEDURE — 6370000000 HC RX 637 (ALT 250 FOR IP): Performed by: INTERNAL MEDICINE

## 2022-06-05 PROCEDURE — 1180000000 HC REHAB R&B

## 2022-06-05 RX ADMIN — INSULIN LISPRO 5 UNITS: 100 INJECTION, SOLUTION INTRAVENOUS; SUBCUTANEOUS at 16:42

## 2022-06-05 RX ADMIN — INSULIN LISPRO 4 UNITS: 100 INJECTION, SOLUTION INTRAVENOUS; SUBCUTANEOUS at 21:29

## 2022-06-05 RX ADMIN — INSULIN GLARGINE 20 UNITS: 100 INJECTION, SOLUTION SUBCUTANEOUS at 21:28

## 2022-06-05 RX ADMIN — FOLIC ACID 1 MG: 1 TABLET ORAL at 08:33

## 2022-06-05 RX ADMIN — FERROUS SULFATE TAB 325 MG (65 MG ELEMENTAL FE) 325 MG: 325 (65 FE) TAB at 16:40

## 2022-06-05 RX ADMIN — FERROUS SULFATE TAB 325 MG (65 MG ELEMENTAL FE) 325 MG: 325 (65 FE) TAB at 08:33

## 2022-06-05 RX ADMIN — INSULIN LISPRO 1 UNITS: 100 INJECTION, SOLUTION INTRAVENOUS; SUBCUTANEOUS at 08:35

## 2022-06-05 RX ADMIN — INSULIN LISPRO 5 UNITS: 100 INJECTION, SOLUTION INTRAVENOUS; SUBCUTANEOUS at 08:33

## 2022-06-05 RX ADMIN — INSULIN LISPRO 5 UNITS: 100 INJECTION, SOLUTION INTRAVENOUS; SUBCUTANEOUS at 11:50

## 2022-06-05 RX ADMIN — INSULIN LISPRO 1 UNITS: 100 INJECTION, SOLUTION INTRAVENOUS; SUBCUTANEOUS at 16:43

## 2022-06-05 NOTE — PROGRESS NOTES
Hospitalist Progress Note   Admit Date:  2022 12:33 PM   Name:  Jaycee Garcia   Age:  48 y.o. Sex:  female  :  1975   MRN:  [id number]   Room:  [facility]    Presenting Complaint: hyperglycemia   Recent cardiac arrest      Reason(s) for Admission: Debility [R53.81]  Physical debility [R53.81]     Hospital Course & Interval History:     Jaycee Garcia is a 48 y.o. female with history of   DM type 1 with recent DKA   Recent cardiac arrest   Recent respiratory failure and was on ventilator  Hypertension   Hyperlipidemia   Recent BLANCA on CKD, HD dependent now. Depression and anxiety      who was admitted for physical rehabilitation after recent DKA and cardiac arrest and was admitted to ICU and was on ventilator.      She developed BLANCA on CKD during that admission in ICU and SLED was started. She currently is on HD. Last HD was Wednesday. Her next HD is tomorrow. She still makes urine 2-3 times per day.       Patient recovered from that recent ICU admission and sent to rehab floor on 2022. Today she feels nauseated. Her BS was  456 at 6 AM and she was treated with Humalog 5 units SC before breakfast. BMP shows HCO3 of 15. BUN of 22 and creatinine of 5.3.      Before lunch time today her BS is 188. She received 10 units of Lantus last night. Her usual dose of Lantus is 30 units sc hs before she was admitted.      Due to her hyperglycemia, acidosis with nausea, Hospitalist service is requested to give consultation help for the patient.         No fever. No shaking. No chills. No chest pain. No shortness of breath. No blood per rectum. No blood per urine. No abdominal pain   No abnormal movements.      She still makes some urine 2-3 times per day.      Medication records are reviewed. Subjective/24hr Events (22):    22   Feeling OK. Working with occupational therapy on her fine muscle movements.    BS has improved to around 200s ranges and she has no acidosis. Eating OK.     6/5/22   Patient is doing well. No fever. No shaking. No chills. No chest pain. No shortness of breath. Patient is ambulating better. Assessment & Plan:     Principal Problem:    Physical debility  Continue physical therapy as per acute rehab team.      Active Problems:    Acute kidney injury superimposed on chronic kidney disease ([facility] [address]  Plan for next HD as per nephrologist.   Monitor urine and intake and output. Urine output seems picking up. BUN is only 7 today although creatinine is 3.5.   ?patient starts to have renal function returning? Monitor and see the need for HD.        Benign hypertension with CKD (chronic kidney disease) stage III ([facility])  BP is 106/52 mmHg today. On no BP medications now. Monitor   BP does not have symptoms of lightheadedness.        Cardiac arrest ([facility] [address]  Recovered. No neuro-deficit. Echo shows EF 70-75% after cardiac arrest.   Found to have moderate AV stenosis.        Diabetic ketoacidosis with coma associated with type 1 diabetes mellitus ([facility] [address]  BS is improved. Improvement of acidosis   Will stop IV fluid. Continue Lantus dose at 20 units sc hs   Monitor blood sugar. Cover with insulin sliding scale accordingly. Blood sugar has been in 200s ranges.        Normocytic anemia  Noted        Depression with anxiety  Noted   Will need to be addressed later after she is improved from acute hyperglycemia problems.        Diabetic polyneuropathy associated with type 1 diabetes mellitus ([facility])  Noted     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 associated with type 1 diabetes mellitus ([facility] [address]  Noted     I have discussed the plan of care with patient.         Discharge Planning:    As per acute rehab team.     Diet:  ADULT DIET; Easy to Chew; 3 carb choices (45 gm/meal)  ADULT ORAL NUTRITION SUPPLEMENT; Breakfast, Lunch, Dinner; Diabetic Oral Supplement  DVT PPx: SCD  Code status: Full Code    Hospital Problems:  Principal Problem:    Physical debility  Active Problems:    Acute kidney injury superimposed on chronic kidney disease (HCC)    Benign hypertension with CKD (chronic kidney disease) stage III (HCC)    Cardiac arrest (HCC)    Diabetic ketoacidosis with coma associated with type 1 diabetes mellitus (HCC)    Normocytic anemia    Depression with anxiety    Diabetic polyneuropathy associated with type 1 diabetes mellitus (HCC)    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 associated with type 1 diabetes mellitus ([facility] [address]  Resolved Problems:    * No resolved hospital problems. *      Objective:     Patient Vitals for the past 24 hrs:   Temp Pulse Resp BP SpO2   06/04/22 1930 98.6 °F (37 °C) 84 16 (!) 106/52 97 %   06/04/22 1032 -- 98 18 (!) 114/57 100 %   06/04/22 0957 -- 99 -- (!) 101/59 --   06/04/22 0932 -- 100 -- (!) 139/58 --   06/04/22 0903 -- 100 -- 100/61 --   06/04/22 0830 -- (!) 104 -- (!) 139/27 --   06/04/22 0800 -- (!) 106 -- (!) 153/82 --       Oxygen Therapy  SpO2: 97 %  O2 Device: None (Room air)    Estimated body mass index is 27.44 kg/m² as calculated from the following:    Height as of this encounter: 5' 6\" (1.676 m). Weight as of this encounter: 170 lb (77.1 kg). Intake/Output Summary (Last 24 hours) at 6/5/2022 0758  Last data filed at 6/4/2022 1032  Gross per 24 hour   Intake 600 ml   Output 1100 ml   Net -500 ml         Physical Exam:     Blood pressure (!) 106/52, pulse 84, temperature 98.6 °F (37 °C), temperature source Oral, resp. rate 16, height 5' 6\" (1.676 m), weight 170 lb (77.1 kg), SpO2 97 %. General:          Well nourished. No overt distress. Patient is resting well. She appears stronger. She appears comfortable. She is in good spirit. Head:               Normocephalic, atraumatic  Eyes:               Sclerae appear normal.  Pupils equally round. ENT:                Nares appear normal, no drainage.   Dry oral mucosa  Neck:               No restricted ROM. Trachea midline   CV:                  RRR. No m/r/g. No jugular venous distension. Lungs:             CTAB. No wheezing, rhonchi, or rales. Respirations even, unlabored  Abdomen: Bowel sounds present. Soft, nontender, nondistended. Extremities:     No cyanosis or clubbing. No edema  Skin:                No rashes and normal coloration. Warm and dry. Neuro:             CN II-XII grossly intact. Sensation intact. A&Ox3  Psych:             Normal mood and affect. I have reviewed ordered lab tests and independently visualized imaging below:    Recent Labs:  Recent Results (from the past 48 hour(s))   POCT Glucose    Collection Time: 06/03/22 11:09 AM   Result Value Ref Range    POC Glucose 188 (H) 65 - 100 mg/dL    Performed by: Corey    POCT Glucose    Collection Time: 06/03/22  4:39 PM   Result Value Ref Range    POC Glucose 178 (H) 65 - 100 mg/dL    Performed by:  Corey    Basic Metabolic Panel    Collection Time: 06/03/22  7:01 PM   Result Value Ref Range    Sodium 142 136 - 145 mmol/L    Potassium 3.0 (L) 3.5 - 5.1 mmol/L    Chloride 103 98 - 107 mmol/L    CO2 27 21 - 32 mmol/L    Anion Gap 12 7 - 16 mmol/L    Glucose 85 65 - 100 mg/dL    BUN 22 6 - 23 MG/DL    CREATININE 5.40 (H) 0.6 - 1.0 MG/DL    GFR African American 11 (L) >60 ml/min/1.73m2    GFR Non- 9 (L) >60 ml/min/1.73m2    Calcium 8.3 8.3 - 10.4 MG/DL   POCT Glucose    Collection Time: 06/03/22  9:08 PM   Result Value Ref Range    POC Glucose 126 (H) 65 - 100 mg/dL    Performed by: Matt Sanchez    Basic Metabolic Panel    Collection Time: 06/04/22  5:26 AM   Result Value Ref Range    Sodium 142 136 - 145 mmol/L    Potassium 3.2 (L) 3.5 - 5.1 mmol/L    Chloride 103 98 - 107 mmol/L    CO2 28 21 - 32 mmol/L    Anion Gap 11 7 - 16 mmol/L    Glucose 209 (H) 65 - 100 mg/dL    BUN 21 6 - 23 MG/DL    CREATININE 5.50 (H) 0.6 - 1.0 MG/DL    GFR  11 (L) >60 ml/min/1.73m2    GFR Non-African American 9 (L) >60 ml/min/1.73m2    Calcium 8.4 8.3 - 10.4 MG/DL   CBC    Collection Time: 06/04/22  5:26 AM   Result Value Ref Range    WBC 11.8 (H) 4.3 - 11.1 K/uL    RBC 3.30 (L) 4.05 - 5.2 M/uL    Hemoglobin 8.6 (L) 11.7 - 15.4 g/dL    Hematocrit 27.0 (L) 35.8 - 46.3 %    MCV 81.8 79.6 - 97.8 FL    MCH 26.1 26.1 - 32.9 PG    MCHC 31.9 31.4 - 35.0 g/dL    RDW 15.6 (H) 11.9 - 14.6 %    Platelets 865 005 - 080 K/uL    MPV 9.7 9.4 - 12.3 FL    nRBC 0.02 0.0 - 0.2 K/uL   POCT Glucose    Collection Time: 06/04/22  6:14 AM   Result Value Ref Range    POC Glucose 196 (H) 65 - 100 mg/dL    Performed by: Alan    POCT Glucose    Collection Time: 06/04/22 11:19 AM   Result Value Ref Range    POC Glucose 86 65 - 100 mg/dL    Performed by: Corey    POCT Glucose    Collection Time: 06/04/22  4:37 PM   Result Value Ref Range    POC Glucose 114 (H) 65 - 100 mg/dL    Performed by:  Croey    POCT Glucose    Collection Time: 06/04/22  9:24 PM   Result Value Ref Range    POC Glucose 168 (H) 65 - 100 mg/dL    Performed by: Judythe Favre    Basic Metabolic Panel    Collection Time: 06/05/22  5:32 AM   Result Value Ref Range    Sodium 140 136 - 145 mmol/L    Potassium 3.4 (L) 3.5 - 5.1 mmol/L    Chloride 105 98 - 107 mmol/L    CO2 30 21 - 32 mmol/L    Anion Gap 5 (L) 7 - 16 mmol/L    Glucose 211 (H) 65 - 100 mg/dL    BUN 7 6 - 23 MG/DL    CREATININE 3.50 (H) 0.6 - 1.0 MG/DL    GFR African American 18 (L) >60 ml/min/1.73m2    GFR Non- 15 (L) >60 ml/min/1.73m2    Calcium 8.4 8.3 - 10.4 MG/DL   CBC    Collection Time: 06/05/22  5:32 AM   Result Value Ref Range    WBC 9.2 4.3 - 11.1 K/uL    RBC 3.23 (L) 4.05 - 5.2 M/uL    Hemoglobin 8.3 (L) 11.7 - 15.4 g/dL    Hematocrit 26.8 (L) 35.8 - 46.3 %    MCV 83.0 79.6 - 97.8 FL    MCH 25.7 (L) 26.1 - 32.9 PG    MCHC 31.0 (L) 31.4 - 35.0 g/dL    RDW 16.0 (H) 11.9 - 14.6 %    Platelets 162 521 - 579 K/uL    MPV 9.4 9.4 - 12.3 FL    nRBC 0.00 0.0 - 0.2 K/uL   POCT Glucose    Collection Time: 06/05/22  6:40 AM   Result Value Ref Range    POC Glucose 203 (H) 65 - 100 mg/dL    Performed by: Thompson Rivero        Other Studies:  No results found. Current Meds:  Current Facility-Administered Medications   Medication Dose Route Frequency    ondansetron (ZOFRAN) injection 4 mg  4 mg IntraVENous Q6H PRN    melatonin tablet 3 mg  3 mg Oral Nightly PRN    insulin lispro (HUMALOG) injection vial 0-4 Units  0-4 Units SubCUTAneous TID WC    insulin lispro (HUMALOG) injection vial 0-4 Units  0-4 Units SubCUTAneous Nightly    insulin glargine (LANTUS) injection vial 20 Units  20 Units SubCUTAneous Nightly    glucose chewable tablet 16 g  4 tablet Oral PRN    dextrose bolus 10% 125 mL  125 mL IntraVENous PRN    Or    dextrose bolus 10% 250 mL  250 mL IntraVENous PRN    glucagon injection 1 mg  1 mg IntraMUSCular PRN    dextrose 5 % solution  100 mL/hr IntraVENous PRN    acetaminophen (TYLENOL) tablet 650 mg  650 mg Oral Q6H PRN    Or    acetaminophen (TYLENOL) suppository 650 mg  650 mg Rectal Q6H PRN    polyethylene glycol (GLYCOLAX) packet 17 g  17 g Oral Daily PRN    albuterol (PROVENTIL) nebulizer solution 2.5 mg  2.5 mg Nebulization Q4H PRN    ferrous sulfate (IRON 325) tablet 325 mg  325 mg Oral BID WC    folic acid (FOLVITE) tablet 1 mg  1 mg Oral Daily    guaiFENesin-dextromethorphan (ROBITUSSIN DM) 100-10 MG/5ML syrup 5 mL  5 mL Oral Q4H PRN    insulin lispro (HUMALOG) injection vial 5 Units  5 Units SubCUTAneous TID     loperamide (IMODIUM) capsule 2 mg  2 mg Oral 4x Daily PRN    medicated lip ointment (BLISTEX)   Topical PRN    ondansetron (ZOFRAN-ODT) disintegrating tablet 4 mg  4 mg Oral Q6H PRN       Signed:  Nidhi Jiménez MD    Part of this note may have been written by using a voice dictation software. The note has been proof read but may still contain some grammatical/other typographical errors.

## 2022-06-05 NOTE — PLAN OF CARE
Problem: Safety - Medical Restraint  Goal: Remains free of injury from restraints (Restraint for Interference with Medical Device)  Description: INTERVENTIONS:  1. Determine that other, less restrictive measures have been tried or would not be effective before applying the restraint  2. Evaluate the patient's condition at the time of restraint application  3. Inform patient/family regarding the reason for restraint  4. Q2H: Monitor safety, psychosocial status, comfort, nutrition and hydration  6/4/2022 1500 by Reinier Berg RN  Outcome: Progressing     Problem: Safety - Adult  Goal: Free from fall injury  6/4/2022 2040 by Tiffany Mas RN  Outcome: Progressing  6/4/2022 1500 by Reinier Berg RN  Outcome: Progressing     Problem: Pain  Goal: Verbalizes/displays adequate comfort level or baseline comfort level  6/4/2022 2040 by Tiffany Mas RN  Outcome: Progressing  6/4/2022 1500 by Reinier Berg RN  Outcome: Progressing     Problem: Skin/Tissue Integrity  Goal: Absence of new skin breakdown  Description: 1. Monitor for areas of redness and/or skin breakdown  2. Assess vascular access sites hourly  3. Every 4-6 hours minimum:  Change oxygen saturation probe site  4. Every 4-6 hours:  If on nasal continuous positive airway pressure, respiratory therapy assess nares and determine need for appliance change or resting period.   6/4/2022 2040 by Tiffany Mas RN  Outcome: Progressing  6/4/2022 1500 by Reinier Berg RN  Outcome: Progressing     Problem: ABCDS Injury Assessment  Goal: Absence of physical injury  6/4/2022 1500 by Reinier Berg RN  Outcome: Progressing

## 2022-06-06 LAB
ANION GAP SERPL CALC-SCNC: 8 MMOL/L (ref 7–16)
BUN SERPL-MCNC: 8 MG/DL (ref 6–23)
CALCIUM SERPL-MCNC: 7.9 MG/DL (ref 8.3–10.4)
CHLORIDE SERPL-SCNC: 104 MMOL/L (ref 98–107)
CO2 SERPL-SCNC: 28 MMOL/L (ref 21–32)
CREAT SERPL-MCNC: 4.2 MG/DL (ref 0.6–1)
ERYTHROCYTE [DISTWIDTH] IN BLOOD BY AUTOMATED COUNT: 16.2 % (ref 11.9–14.6)
GLUCOSE BLD STRIP.AUTO-MCNC: 139 MG/DL (ref 65–100)
GLUCOSE BLD STRIP.AUTO-MCNC: 245 MG/DL (ref 65–100)
GLUCOSE BLD STRIP.AUTO-MCNC: 254 MG/DL (ref 65–100)
GLUCOSE BLD STRIP.AUTO-MCNC: 255 MG/DL (ref 65–100)
GLUCOSE BLD STRIP.AUTO-MCNC: 352 MG/DL (ref 65–100)
GLUCOSE SERPL-MCNC: 239 MG/DL (ref 65–100)
HCT VFR BLD AUTO: 24.8 % (ref 35.8–46.3)
HGB BLD-MCNC: 7.6 G/DL (ref 11.7–15.4)
MCH RBC QN AUTO: 25.9 PG (ref 26.1–32.9)
MCHC RBC AUTO-ENTMCNC: 30.6 G/DL (ref 31.4–35)
MCV RBC AUTO: 84.6 FL (ref 79.6–97.8)
NRBC # BLD: 0 K/UL (ref 0–0.2)
PLATELET # BLD AUTO: 337 K/UL (ref 150–450)
PMV BLD AUTO: 9.8 FL (ref 9.4–12.3)
POTASSIUM SERPL-SCNC: 3.5 MMOL/L (ref 3.5–5.1)
RBC # BLD AUTO: 2.93 M/UL (ref 4.05–5.2)
SERVICE CMNT-IMP: ABNORMAL
SODIUM SERPL-SCNC: 140 MMOL/L (ref 136–145)
WBC # BLD AUTO: 7.9 K/UL (ref 4.3–11.1)

## 2022-06-06 PROCEDURE — 92610 EVALUATE SWALLOWING FUNCTION: CPT

## 2022-06-06 PROCEDURE — 82962 GLUCOSE BLOOD TEST: CPT

## 2022-06-06 PROCEDURE — 97110 THERAPEUTIC EXERCISES: CPT

## 2022-06-06 PROCEDURE — 97116 GAIT TRAINING THERAPY: CPT

## 2022-06-06 PROCEDURE — 6360000002 HC RX W HCPCS: Performed by: INTERNAL MEDICINE

## 2022-06-06 PROCEDURE — 97530 THERAPEUTIC ACTIVITIES: CPT

## 2022-06-06 PROCEDURE — 1180000000 HC REHAB R&B

## 2022-06-06 PROCEDURE — 6370000000 HC RX 637 (ALT 250 FOR IP): Performed by: PHYSICIAN ASSISTANT

## 2022-06-06 PROCEDURE — 99232 SBSQ HOSP IP/OBS MODERATE 35: CPT | Performed by: PHYSICAL MEDICINE & REHABILITATION

## 2022-06-06 PROCEDURE — 6370000000 HC RX 637 (ALT 250 FOR IP): Performed by: PHYSICAL MEDICINE & REHABILITATION

## 2022-06-06 PROCEDURE — 85027 COMPLETE CBC AUTOMATED: CPT

## 2022-06-06 PROCEDURE — 36415 COLL VENOUS BLD VENIPUNCTURE: CPT

## 2022-06-06 PROCEDURE — 97535 SELF CARE MNGMENT TRAINING: CPT

## 2022-06-06 PROCEDURE — 80048 BASIC METABOLIC PNL TOTAL CA: CPT

## 2022-06-06 RX ORDER — LANOLIN ALCOHOL/MO/W.PET/CERES
3 CREAM (GRAM) TOPICAL NIGHTLY
Status: DISCONTINUED | OUTPATIENT
Start: 2022-06-06 | End: 2022-06-11 | Stop reason: HOSPADM

## 2022-06-06 RX ORDER — INSULIN GLARGINE 100 [IU]/ML
22 INJECTION, SOLUTION SUBCUTANEOUS NIGHTLY
Status: DISCONTINUED | OUTPATIENT
Start: 2022-06-06 | End: 2022-06-08

## 2022-06-06 RX ADMIN — ONDANSETRON 4 MG: 2 INJECTION INTRAMUSCULAR; INTRAVENOUS at 11:37

## 2022-06-06 RX ADMIN — FOLIC ACID 1 MG: 1 TABLET ORAL at 10:16

## 2022-06-06 RX ADMIN — Medication 3 MG: at 21:27

## 2022-06-06 RX ADMIN — INSULIN LISPRO 5 UNITS: 100 INJECTION, SOLUTION INTRAVENOUS; SUBCUTANEOUS at 10:25

## 2022-06-06 RX ADMIN — INSULIN GLARGINE 22 UNITS: 100 INJECTION, SOLUTION SUBCUTANEOUS at 21:28

## 2022-06-06 RX ADMIN — INSULIN LISPRO 4 UNITS: 100 INJECTION, SOLUTION INTRAVENOUS; SUBCUTANEOUS at 10:24

## 2022-06-06 RX ADMIN — FERROUS SULFATE TAB 325 MG (65 MG ELEMENTAL FE) 325 MG: 325 (65 FE) TAB at 10:16

## 2022-06-06 ASSESSMENT — PAIN SCALES - GENERAL: PAINLEVEL_OUTOF10: 0

## 2022-06-06 NOTE — PROGRESS NOTES
Hospitalist Progress Note   Admit Date:  2022 12:33 PM   Name:  Ela Shook   Age:  48 y.o. Sex:  female  :  1975   MRN:  [id number]   Room:  [facility]    Presenting Complaint: hyperglycemia   Recent cardiac arrest      Reason(s) for Admission: Debility [R53.81]  Physical debility [R53.81]     Hospital Course & Interval History:     Ela Shook is a 48 y.o. female with history of   DM type 1 with recent DKA   Recent cardiac arrest   Recent respiratory failure and was on ventilator  Hypertension   Hyperlipidemia   Recent BLANCA on CKD, HD dependent now. Depression and anxiety      who was admitted for physical rehabilitation after recent DKA and cardiac arrest and was admitted to ICU and was on ventilator.      She developed BLANCA on CKD during that admission in ICU and SLED was started. She currently is on HD. Last HD was Wednesday. Her next HD is tomorrow. She still makes urine 2-3 times per day.       Patient recovered from that recent ICU admission and sent to rehab floor on 2022. Today she feels nauseated. Her BS was  456 at 6 AM and she was treated with Humalog 5 units SC before breakfast. BMP shows HCO3 of 15. BUN of 22 and creatinine of 5.3.      Before lunch time today her BS is 188. She received 10 units of Lantus last night. Her usual dose of Lantus is 30 units sc hs before she was admitted.      Due to her hyperglycemia, acidosis with nausea, Hospitalist service is requested to give consultation help for the patient.         No fever. No shaking. No chills. No chest pain. No shortness of breath. No blood per rectum. No blood per urine. No abdominal pain   No abnormal movements.      She still makes some urine 2-3 times per day.      Medication records are reviewed. Subjective/24hr Events (22):    22   Feeling OK. Working with occupational therapy on her fine muscle movements.    BS has improved to around 200s ranges and she has no acidosis. Eating OK.     6/5/22   Patient is doing well. No fever. No shaking. No chills. No chest pain. No shortness of breath. Patient is ambulating better. 6/6/22   Patient is doing well with PT and OT. BS is doing well in 200s ranges. No new complaints. Assessment & Plan:     Principal Problem:    Physical debility  Continue physical therapy as per acute rehab team.      Active Problems:    Acute kidney injury superimposed on chronic kidney disease ([facility]  Plan for next HD as per nephrologist.   Monitor urine and intake and output. Urine output seems picking up. BUN is only 8 today although creatinine is 4.20  ? patient starts to have renal function returning? Monitor and see the need for HD. Yesterday patient has urine record of only 100 mL.        Benign hypertension with CKD (chronic kidney disease) stage III ([facility])  BP is 122/77 mmHg today. On no BP medications now. Monitor   BP does not have symptoms of lightheadedness.          Cardiac arrest ([facility]  Recovered. No neuro-deficit. Echo shows EF 70-75% after cardiac arrest.   Found to have moderate AV stenosis.        Diabetic ketoacidosis with coma associated with type 1 diabetes mellitus ([address]  BS is improved. Improvement of acidosis   Continue Lantus dose at 20 units sc hs   Monitor blood sugar. Cover with insulin sliding scale accordingly. Blood sugar has been in 200s ranges. Continue current DM regimen.        Normocytic anemia  Noted        Depression with anxiety  Noted   Will need to be addressed later after she is improved from acute hyperglycemia problems.        Diabetic polyneuropathy associated with type 1 diabetes mellitus ([facility])  Noted     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 associated with type 1 diabetes mellitus ([facility]  Noted     I have discussed the plan of care with patient.         Discharge Planning:    As per acute rehab team.     Diet:  ADULT DIET; Easy to Chew; 3 carb choices (45 gm/meal)  ADULT ORAL NUTRITION SUPPLEMENT; Breakfast, Lunch, Dinner; Diabetic Oral Supplement  DVT PPx: SCD  Code status: Full Code    Hospital Problems:  Principal Problem:    Physical debility  Active Problems:    Acute kidney injury superimposed on chronic kidney disease (HCC)    Benign hypertension with CKD (chronic kidney disease) stage III (HCC)    Cardiac arrest ([facility]    Diabetic ketoacidosis with coma associated with type 1 diabetes mellitus ([facility])    Normocytic anemia    Depression with anxiety    Diabetic polyneuropathy associated with type 1 diabetes mellitus ([facility])    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 associated with type 1 diabetes mellitus ([facility]  Resolved Problems:    * No resolved hospital problems. *      Objective:     Patient Vitals for the past 24 hrs:   Temp Pulse Resp BP SpO2   06/06/22 0715 98.4 °F (36.9 °C) 93 14 122/77 95 %   06/05/22 1930 98.1 °F (36.7 °C) (!) 109 18 121/63 99 %       Oxygen Therapy  SpO2: 95 %  O2 Device: None (Room air)    Estimated body mass index is 27.44 kg/m² as calculated from the following:    Height as of this encounter: 5' 6\" (1.676 m). Weight as of this encounter: 170 lb (77.1 kg). Intake/Output Summary (Last 24 hours) at 6/6/2022 1035  Last data filed at 6/6/2022 0030  Gross per 24 hour   Intake --   Output 100 ml   Net -100 ml         Physical Exam:     Blood pressure 122/77, pulse 93, temperature 98.4 °F (36.9 °C), temperature source Oral, resp. rate 14, height 5' 6\" (1.676 m), weight 170 lb (77.1 kg), SpO2 95 %. General:          Well nourished. No overt distress. Patient is resting well. She appears stronger. She appears comfortable. She is in good spirit. Head:               Normocephalic, atraumatic  Eyes:               Sclerae appear normal.  Pupils equally round. ENT:                Nares appear normal, no drainage. Dry oral mucosa  Neck:               No restricted ROM.   Trachea midline   CV: RRR.  No m/r/g. No jugular venous distension. Lungs:             CTAB. No wheezing, rhonchi, or rales. Respirations even, unlabored  Abdomen: Bowel sounds present. Soft, nontender, nondistended. Extremities:     No cyanosis or clubbing. No edema  Skin:                No rashes and normal coloration. Warm and dry. Neuro:             CN II-XII grossly intact. Sensation intact. A&Ox3  Psych:             Normal mood and affect. I have reviewed ordered lab tests and independently visualized imaging below:    Recent Labs:  Recent Results (from the past 48 hour(s))   POCT Glucose    Collection Time: 06/04/22 11:19 AM   Result Value Ref Range    POC Glucose 86 65 - 100 mg/dL    Performed by: Corey    POCT Glucose    Collection Time: 06/04/22  4:37 PM   Result Value Ref Range    POC Glucose 114 (H) 65 - 100 mg/dL    Performed by:  Corey    POCT Glucose    Collection Time: 06/04/22  9:24 PM   Result Value Ref Range    POC Glucose 168 (H) 65 - 100 mg/dL    Performed by: Max Eddy    Basic Metabolic Panel    Collection Time: 06/05/22  5:32 AM   Result Value Ref Range    Sodium 140 136 - 145 mmol/L    Potassium 3.4 (L) 3.5 - 5.1 mmol/L    Chloride 105 98 - 107 mmol/L    CO2 30 21 - 32 mmol/L    Anion Gap 5 (L) 7 - 16 mmol/L    Glucose 211 (H) 65 - 100 mg/dL    BUN 7 6 - 23 MG/DL    CREATININE 3.50 (H) 0.6 - 1.0 MG/DL    GFR African American 18 (L) >60 ml/min/1.73m2    GFR Non- 15 (L) >60 ml/min/1.73m2    Calcium 8.4 8.3 - 10.4 MG/DL   CBC    Collection Time: 06/05/22  5:32 AM   Result Value Ref Range    WBC 9.2 4.3 - 11.1 K/uL    RBC 3.23 (L) 4.05 - 5.2 M/uL    Hemoglobin 8.3 (L) 11.7 - 15.4 g/dL    Hematocrit 26.8 (L) 35.8 - 46.3 %    MCV 83.0 79.6 - 97.8 FL    MCH 25.7 (L) 26.1 - 32.9 PG    MCHC 31.0 (L) 31.4 - 35.0 g/dL    RDW 16.0 (H) 11.9 - 14.6 %    Platelets 119 187 - 330 K/uL    MPV 9.4 9.4 - 12.3 FL    nRBC 0.00 0.0 - 0.2 K/uL   POCT Glucose Collection Time: 06/05/22  6:40 AM   Result Value Ref Range    POC Glucose 203 (H) 65 - 100 mg/dL    Performed by: Stacey Acosta    POCT Glucose    Collection Time: 06/05/22 11:33 AM   Result Value Ref Range    POC Glucose 129 (H) 65 - 100 mg/dL    Performed by: Corey    POCT Glucose    Collection Time: 06/05/22  4:39 PM   Result Value Ref Range    POC Glucose 214 (H) 65 - 100 mg/dL    Performed by:  Corey    POCT Glucose    Collection Time: 06/05/22  7:19 PM   Result Value Ref Range    POC Glucose 35 (LL) 65 - 100 mg/dL    Performed by: Perri    POCT Glucose    Collection Time: 06/05/22  7:37 PM   Result Value Ref Range    POC Glucose 77 65 - 100 mg/dL    Performed by: Perri    POCT Glucose    Collection Time: 06/05/22  7:54 PM   Result Value Ref Range    POC Glucose 98 65 - 100 mg/dL    Performed by: Stacey Acosta    POCT Glucose    Collection Time: 06/05/22  9:05 PM   Result Value Ref Range    POC Glucose 324 (H) 65 - 100 mg/dL    Performed by: Stacey Acosta    Basic Metabolic Panel    Collection Time: 06/06/22  5:44 AM   Result Value Ref Range    Sodium 140 136 - 145 mmol/L    Potassium 3.5 3.5 - 5.1 mmol/L    Chloride 104 98 - 107 mmol/L    CO2 28 21 - 32 mmol/L    Anion Gap 8 7 - 16 mmol/L    Glucose 239 (H) 65 - 100 mg/dL    BUN 8 6 - 23 MG/DL    CREATININE 4.20 (H) 0.6 - 1.0 MG/DL    GFR African American 15 (L) >60 ml/min/1.73m2    GFR Non- 12 (L) >60 ml/min/1.73m2    Calcium 7.9 (L) 8.3 - 10.4 MG/DL   CBC    Collection Time: 06/06/22  5:44 AM   Result Value Ref Range    WBC 7.9 4.3 - 11.1 K/uL    RBC 2.93 (L) 4.05 - 5.2 M/uL    Hemoglobin 7.6 (L) 11.7 - 15.4 g/dL    Hematocrit 24.8 (L) 35.8 - 46.3 %    MCV 84.6 79.6 - 97.8 FL    MCH 25.9 (L) 26.1 - 32.9 PG    MCHC 30.6 (L) 31.4 - 35.0 g/dL    RDW 16.2 (H) 11.9 - 14.6 %    Platelets 860 260 - 732 K/uL    MPV 9.8 9.4 - 12.3 FL    nRBC 0.00 0.0 - 0.2 K/uL   POCT Glucose    Collection Time: 06/06/22 6:22 AM   Result Value Ref Range    POC Glucose 255 (H) 65 - 100 mg/dL    Performed by: Ray Villafuerte    POCT Glucose    Collection Time: 06/06/22 10:16 AM   Result Value Ref Range    POC Glucose 352 (H) 65 - 100 mg/dL    Performed by: Garcia ([place])        Other Studies:  No results found. Current Meds:  Current Facility-Administered Medications   Medication Dose Route Frequency    ondansetron (ZOFRAN) injection 4 mg  4 mg IntraVENous Q6H PRN    melatonin tablet 3 mg  3 mg Oral Nightly PRN    insulin lispro (HUMALOG) injection vial 0-4 Units  0-4 Units SubCUTAneous TID WC    insulin lispro (HUMALOG) injection vial 0-4 Units  0-4 Units SubCUTAneous Nightly    insulin glargine (LANTUS) injection vial 20 Units  20 Units SubCUTAneous Nightly    glucose chewable tablet 16 g  4 tablet Oral PRN    dextrose bolus 10% 125 mL  125 mL IntraVENous PRN    Or    dextrose bolus 10% 250 mL  250 mL IntraVENous PRN    glucagon injection 1 mg  1 mg IntraMUSCular PRN    dextrose 5 % solution  100 mL/hr IntraVENous PRN    acetaminophen (TYLENOL) tablet 650 mg  650 mg Oral Q6H PRN    Or    acetaminophen (TYLENOL) suppository 650 mg  650 mg Rectal Q6H PRN    polyethylene glycol (GLYCOLAX) packet 17 g  17 g Oral Daily PRN    albuterol (PROVENTIL) nebulizer solution 2.5 mg  2.5 mg Nebulization Q4H PRN    ferrous sulfate (IRON 325) tablet 325 mg  325 mg Oral BID WC    folic acid (FOLVITE) tablet 1 mg  1 mg Oral Daily    guaiFENesin-dextromethorphan (ROBITUSSIN DM) 100-10 MG/5ML syrup 5 mL  5 mL Oral Q4H PRN    insulin lispro (HUMALOG) injection vial 5 Units  5 Units SubCUTAneous TID WC    loperamide (IMODIUM) capsule 2 mg  2 mg Oral 4x Daily PRN    medicated lip ointment (BLISTEX)   Topical PRN    ondansetron (ZOFRAN-ODT) disintegrating tablet 4 mg  4 mg Oral Q6H PRN       Signed:  Logan Forde MD    Part of this note may have been written by using a voice dictation software.   The note has been proof read but may still contain some grammatical/other typographical errors.

## 2022-06-06 NOTE — DIABETES MGMT
Patient admitted with physical debility. Blood glucose ranged  yesterday with patient receiving Lantus 20 units and Humalog 21 units. Blood glucose this morning was 255. Creatinine 4.20. GFR 15. Reviewed patient current regimen: Lantus 20 units nightly, Humalog 5 units with meals, and Humalog correctional insulin low dose. Patient would likely benefit from a small increase in basal insulin as fasting blood glucose is not at goal. Provider updated via [employer] regarding recommendations and patient glycemic control. Noted patient glucose dropped post prandial insulin yesterday, question if patient appetite varied. Patient may benefit from prandial insulin dosed post meal if patient appetite continues to vary to reduce risk of hypoglycemia.

## 2022-06-06 NOTE — PROGRESS NOTES
[facility]    Follow-Up on: BLANCA on CKD stage IV    HPI: Pt seen and examined in floors     ROS:  General: no fever/chills  CV: no CP  Lung: no SOB, no cough  GI: no N/V/D  : no dysuria  Ext: no edema       Current Facility-Administered Medications   Medication Dose Route Frequency    melatonin tablet 3 mg  3 mg Oral Nightly    insulin glargine (LANTUS) injection vial 22 Units  22 Units SubCUTAneous Nightly    ondansetron (ZOFRAN) injection 4 mg  4 mg IntraVENous Q6H PRN    insulin lispro (HUMALOG) injection vial 0-4 Units  0-4 Units SubCUTAneous TID     insulin lispro (HUMALOG) injection vial 0-4 Units  0-4 Units SubCUTAneous Nightly    glucose chewable tablet 16 g  4 tablet Oral PRN    dextrose bolus 10% 125 mL  125 mL IntraVENous PRN    Or    dextrose bolus 10% 250 mL  250 mL IntraVENous PRN    glucagon injection 1 mg  1 mg IntraMUSCular PRN    dextrose 5 % solution  100 mL/hr IntraVENous PRN    acetaminophen (TYLENOL) tablet 650 mg  650 mg Oral Q6H PRN    Or    acetaminophen (TYLENOL) suppository 650 mg  650 mg Rectal Q6H PRN    polyethylene glycol (GLYCOLAX) packet 17 g  17 g Oral Daily PRN    albuterol (PROVENTIL) nebulizer solution 2.5 mg  2.5 mg Nebulization Q4H PRN    ferrous sulfate (IRON 325) tablet 325 mg  325 mg Oral BID     folic acid (FOLVITE) tablet 1 mg  1 mg Oral Daily    guaiFENesin-dextromethorphan (ROBITUSSIN DM) 100-10 MG/5ML syrup 5 mL  5 mL Oral Q4H PRN    insulin lispro (HUMALOG) injection vial 5 Units  5 Units SubCUTAneous TID     loperamide (IMODIUM) capsule 2 mg  2 mg Oral 4x Daily PRN    medicated lip ointment (BLISTEX)   Topical PRN    ondansetron (ZOFRAN-ODT) disintegrating tablet 4 mg  4 mg Oral Q6H PRN       Exam:  Vitals:    06/04/22 1930 06/05/22 0844 06/05/22 1930 06/06/22 0715   BP: (!) 106/52 125/69 121/63 122/77   Pulse: 84 98 (!) 109 93   Resp: 16 16 18 14   Temp: 98.6 °F (37 °C) 98.2 °F (36.8 °C) 98.1 °F (36.7 °C) 98.4 °F (36.9 °C) TempSrc: Oral  Oral Oral   SpO2: 97% 97% 99% 95%   Weight:       Height:             Intake/Output Summary (Last 24 hours) at 6/6/2022 1151  Last data filed at 6/6/2022 0030  Gross per 24 hour   Intake --   Output 100 ml   Net -100 ml     PE:  GEN - in no distress, alert and oriented   CV - regular rate, S1, S2, no rub  Lung - clear bilaterally  Abd - soft, nontender  Ext - +1 BLE edema  Access-  right TCC- intact    Labs  Recent Labs     06/04/22  0526 06/05/22  0532 06/06/22  0544   WBC 11.8* 9.2 7.9   HGB 8.6* 8.3* 7.6*   HCT 27.0* 26.8* 24.8*    365 337       Recent Labs     06/04/22 0526 06/05/22  0532 06/06/22  0544    140 140   K 3.2* 3.4* 3.5    105 104   CO2 28 30 28   BUN 21 7 8   CREATININE 5.50* 3.50* 4.20*       Problem List:      Issues Addressed By Nephrology:    Plan:  1. BLANCA on CKD stage IV non oliguric   1st HD 5/24, TCC placed 6/1  -TTS schedule while on the 9th floor   -Appreciate CW for outpt HD slot   - Given significant underlying CKD she is very likely ESRD  - will follow labs and UO  in am to decide needs for dialysis     2. Type I DM- SSI per primary     3. DKA BS>2000 on admit- un controlled- SSI per primary     4.  Anemia-  on Fe

## 2022-06-06 NOTE — PROGRESS NOTES
Present blood sugar 77. Patient report not eating very little at dinner. Patient given turkey sandwich tray.

## 2022-06-06 NOTE — PROGRESS NOTES
Got report patients BS was 35 last night, this morning patients BS was 255, 352, & then 139 at 1633   afternoon and dinner . insulin was held  Patient has not eaten much all day. Patient had 1 occurrence of vomiting in afternoon.

## 2022-06-06 NOTE — PROGRESS NOTES
OT DAILY NOTE  Time In 1035   Time Out 1116     Subjective: \"I'm just tired. \" Pt agreeable to treatment. Pain: No pain expressed. Interdisciplinary Communication: Collaborated with PA and PT regarding patient status. Precautions: Falls    /77   Pulse 93   Temp 98.4 °F (36.9 °C) (Oral)   Resp 14   Ht 5' 6\" (1.676 m)   Wt 170 lb (77.1 kg)   SpO2 95%   BMI 27.44 kg/m²      FUNCTIONAL MOBILITY:    Score Comments   Sit to Stand Supervision or Touching Assist SBA, cues for safety         - Activity Tolerance - Strengthening   Pt completed 10 minutes on the ergometer, 1/2 forwards and 1/2 backwards, with mild to moderate resistance to increase UB strength and activity tolerance for integration into functional tasks. Pt required rest break after 5 minutes. - Activity Tolerance - Balance - Visual-Perceptual    Patient completed visual perceptual reasoning task using bicolor blocks to replicate simple to visual designs with grid overlay. Pt initially with great difficulty conceptualizing task despite verbal explanation. Pt required visual cues/demonstration and verbal cues throughout for strategy and identifying errors. Pt participated in activity to address visual perceptual skills and problem solving for improved safety and independence with functional tasks. Pt able to complete 4 designs, the first three in standing before requiring seated rest break. Pt then worked on word search to further address deficits. Pt able to find 8 words in appropriate time frame without assist despite appearing to have some visual perceptual deficits. Pt would benefit from further assessment. Education   Benefits of OT, Energy Conservation, Pacing and Functional Transfer Training     Assessment: Patient drowsy throughout this session, reports her blood sugar was high but that she received insulin. Discussed with PA who reports pt requesting d/c on Wednesday.  Discussed with PT and agree that Wednesday d/c is not recommended, pt would benefit from a longer rehab stay to address remaining safety deficits. Pt demonstrated good participation in OT treatment. Pt continues to benefit from skilled OT services to address remaining deficits and progress toward baseline level of independence and safety. Patient ended session seated in w/c with PT. Plan: Continue OT POC.      Marika Chamorro OT   6/6/2022

## 2022-06-06 NOTE — PROGRESS NOTES
Physical Therapy  PHYSICAL THERAPY DAILY NOTE  Time In:  0395 AM  Time Out:  1151 AM  Pt. Seen for: AM, Gait Training, Therapeutic Exercise, and Transfer Training     Subjective: \" I just want to go home. \"         Objective:  Precautions: Poor Safety Awareness and Impulsive     GROSS ASSESSMENT Daily Assessment           COGNITION Daily Assessment    intact       BED/MAT MOBILITY Daily Assessment    Rolling Right: Supervision/Standby Assist  Rolling Left: Supervision/Standby Assist  Supine to Sit: Supervision/Standby Assist  Sit to Supine: Supervision/Standby Assist       TRANSFERS Daily Assessment    Sit to Stand: Supervision/Standby Assist  Stand to Sit: Supervision/Standby Assist  Transfer Type: Stand Pivot  Transfer Assistance: Supervision/Standby Assist  Car Transfers: Supervision/Standby Assist         GAIT Daily Assessment   Patient seemed very fatigued . \" I can just go back to bed. \" Amount of Assistance: CGA  Distance (ft): 80  Assistive Device: RW  Surface: Level Surface       STEPS/STAIRS Daily Assessment    Steps Ambulated:  4  Level of Assistance:  CGA  Railing:yes  Assistive Device: No A/D       BALANCE Daily Assessment    Static Sitting:   Dynamic Sitting:   Static Standing:   Dynamic Standing:        WHEELCHAIR MOBILITY Daily Assessment    Able to Propel (ft):   Assistance:   Surface:   Wheelchair Set-up:        LOWER EXTREMITY EXERCISES Daily Assessment    SEATED EXERCISES Sets Reps Comments   Ankle Pumps 2 10    Hip Flexion 2 10    Long Arc Quads 2 10    Hip Adduction/Ball Squeeze 2 10                            Pain level: No pain noted. Patient did complain of nausea. Pain Location:    Pain Interventions: Nausea medicine given during treatment    Vital Signs:  /77   Pulse 93   Temp 98.4 °F (36.9 °C) (Oral)   Resp 14   Ht 5' 6\" (1.676 m)   Wt 170 lb (77.1 kg)   SpO2 95%   BMI 27.44 kg/m²       Education:      Interdisciplinary Communication:     Pt.  Left supine in bed at the end of

## 2022-06-06 NOTE — PROGRESS NOTES
STG: Pt will tolerate regular textures/thin liquids without overt signs/sx of aspiration with 100% accuracy  STG: Pt will participate with modified barium swallow study (MBS) x1 as indicated for further assessment of pharyngeal swallow  STG: Pt will participate with dysphonia assessment/treatment x1  LTG: Pt will tolerate the least restrictive diet at discharge without respiratory compromise    SPEECH LANGUAGE PATHOLOGY: DYSPHAGIA  Initial Assessment    NAME: Aaron Leyva  : 1975  MRN: [id number]     ADMISSION DATE: 2022  ADMITTING DIAGNOSIS: has Ulcer, skin, chronic ([address] Depression with anxiety; Moderate single current episode of major depressive disorder ([address] Encounter to discuss test results; Encounter for annual routine gynecological examination; CKD (chronic kidney disease), stage IV ([address] Diabetic polyneuropathy associated with type 1 diabetes mellitus ([address] Malodorous urine; CKD (chronic kidney disease) stage 3, GFR 30-59 ml/min ([address] Type 1 diabetes mellitus with stage 3 chronic kidney disease ([address] H/O gastroesophageal reflux (GERD); Neuropathy, peripheral, idiopathic; 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 associated with type 1 diabetes mellitus ([address] Premature ovarian failure; Noncompliance with medication regimen; Ureteric stone; Urinary tract infection without hematuria; Hydronephrosis of right kidney; Diabetes mellitus type 1 ([address] Nausea; BLANCA (acute kidney injury) ([address] Acute kidney injury superimposed on chronic kidney disease ([address] Benign hypertension with CKD (chronic kidney disease) stage III (HCC); IDDM (insulin dependent diabetes mellitus); Cardiac arrest ([address] Diabetic ketoacidosis with coma associated with type 1 diabetes mellitus ([address] Severe sepsis with septic shock (CODE) ([address] Pneumonia, bacterial; Acute respiratory failure ([address] Altered mental status; Frailty; Septic shock ([address]  Normocytic anemia; and Physical debility on their problem list.  Date of Eval: 6/6/2022  ICD-10: Treatment Diagnosis: R13.12 Dysphagia, Oropharyngeal Phase    RECOMMENDATIONS   Diet:  Diet Solids Recommendation: Regular  Liquid Consistency Recommendation: Thin    Medications:  (one at a time)         Compensatory Swallowing Strategies:Small bites/sips   Therapeutic Intervention:Diet tolerance monitoring;Pharyngeal exercises   Patient continues to require skilled intervention: Yes        ASSESSMENT    Dysphagia Diagnosis: Mild pharyngeal stage dysphagia  Patient participated with a bedside swallowing assessment. Presents with dysphagia and dysphonia s/p intubation. Reports improvement in voicing since initially extubated; however, remains below baseline with reduced vocal intensity and hoarse vocal quality. Patient requested easy to chew consistencies during last session observed by ST in acute care because of not having her upper plates but at this time would like foods such as salad consistent with regular diet and has been eating regular consistency brought in by family. Reports that her family has been unable to locate her upper plate at home. Patient demonstrated mild increased mastication time with regular textures but complete oral clearance without overt signs/sx of aspiration. However, patient demonstrating weak cough with initial cup sip of thin liquids and additional cough x2 over 8oz of trials during the session. Patient did present with improved tolerance with small, single sips with bolus hold prior to initiation of the swallow. Discussed possibility of modified barium swallow study (MBS) with patient which was also discussed during acute care stay but with patient declining each time. Recommend upgrade solids to regular textures/thin liquids via single sips with bolus hold. Limit distractions at meal time. Will continue to monitor for diet tolerance and f/u for dysphagia/dysphonia treatment.       GENERAL    History of Present Injury/Illness: Ms. Nella Negrete  has a past medical history of Acute renal failure ([facility] CKD (chronic kidney disease) stage 3, GFR 30-59 ml/min ([facility] Gastroenteritis, acute, IDDM (insulin dependent diabetes mellitus), Intractable nausea and vomiting, Irregular menses, Kidney stone, Neuropathy, peripheral, idiopathic, Retinopathy, bilateral, Trichomoniasis, Ulcer, skin, chronic ([address] and Vaginal burning. . She also  has a past surgical history that includes cystoscopy,insert ureteral stent (04/2022); amputation (Left); amputation (1/27/12); hx open reduction internal fixation (Right, 2011); and IR TUNNELED CVC PLACE WO SQ PORT/PUMP > 5 YEARS (6/1/2022). Pain:   Patient does not c/o pain                    OBJECTIVE        Oral Motor   Labial: No impairment  Dentition: Limited;Natural  Lingual: No impairment              Oropharyngeal Phase:  Regular; Thin  Vocal Quality: Low volume;Hoarse  How Presented: Self-fed/presented;Cup/sip  Bolus Acceptance: No impairment  Bolus Formation/Control: Impaired  Type of Impairment: Delayed  Propulsion: No impairment  Oral Residue: None  Initiation of Swallow: No impairment  Aspiration Signs/Symptoms: Weak cough              PLAN    Duration/Frequency: Continue to follow patient 5x/week for duration of hospitalization and/or until goals met    Dysphagia Outcome and Severity Scale (OTIS)  Dysphagia Outcome Severity Scale: Level 5: Mild dysphagia- Distant supervision. May need one diet consistency restricted  Interpretation of Tool: The Dysphagia Outcome and Severity Scale (OTIS) is a simple, easy-to-use, 7-point scale developed to systematically rate the functional severity of dysphagia based on objective assessment and make recommendations for diet level, independence level, and type of nutrition.    Normal(7), Functional(6), Mild(5), Mild-Moderate(4), Moderate(3), Moderate-Severe(2), Severe(1)         Education:       Patient Education Response: Needs reinforcement    Current Medications:   No current facility-administered medications on file prior to encounter. Current Outpatient Medications on File Prior to Encounter   Medication Sig Dispense Refill    albuterol (PROVENTIL) (2.5 MG/3ML) 0.083% nebulizer solution Take 3 mLs by nebulization every 4 hours as needed for Wheezing 120 each 3    insulin glargine (LANTUS) 100 UNIT/ML injection vial Inject 10 Units into the skin nightly 10 mL 3    insulin lispro (HUMALOG) 100 UNIT/ML SOLN injection vial Inject 0-4 Units into the skin 4 times daily (before meals and nightly) 10 mL 0    insulin lispro (HUMALOG) 100 UNIT/ML SOLN injection vial Inject 5 Units into the skin 3 times daily (with meals) 10 mL 0    ferrous sulfate (IRON 325) 325 (65 Fe) MG tablet Take 1 tablet by mouth 2 times daily (with meals) 60 tablet 3    folic acid (FOLVITE) 1 MG tablet Take 1 tablet by mouth daily 30 tablet 3    atorvastatin (LIPITOR) 80 MG tablet Take 80 mg by mouth daily (Patient not taking: Reported on 5/27/2022)      brimonidine (ALPHAGAN P) 0.1 % SOLN 1 drop in left eye twice a day (Patient not taking: Reported on 5/27/2022)      ergocalciferol (ERGOCALCIFEROL) 1.25 MG (95022 UT) capsule Take 50,000 Units by mouth (Patient not taking: Reported on 5/27/2022)      gabapentin (NEURONTIN) 300 MG capsule Take 300 mg by mouth daily. (Patient not taking: Reported on 5/27/2022)      ondansetron (ZOFRAN-ODT) 4 MG disintegrating tablet Take 4 mg by mouth every 8 hours as needed (Patient not taking: Reported on 5/27/2022)         PRECAUTIONS/ALLERGIES: Patient has no known allergies. Therapy Time  SLP Individual Minutes  Time In: 0316  Time Out: 1033  Minutes: 32     SLP Total Treatment Time  Total Treatment Time: [address].  Bonpalma Ori, [address]  6/6/2022 12:04 PM

## 2022-06-06 NOTE — PROGRESS NOTES
Patient alert. Skin warm and dry. Present blood sugar 324. Scheduled  Lantus 20 unit and 4 units  humalog SSC to be given.

## 2022-06-06 NOTE — CARE COORDINATION
Chart reviewed. Pt needs outpatient HD slot. PPD is final need for [facility] admissions. Faxed PPD results. Waiting slot time and location. Discharge plan remains for pt to discharge with and stay with parents. CM to continue to follow and monitor for any further needs. Update 1105: [facility] Admissions returned call and pt accepted to [place] [id number] with T/Th/Sa at 11:15 chair time. Set start date for Tuesday 6/14 but this can be adjusted based on set d/c date.

## 2022-06-06 NOTE — PROGRESS NOTES
Jass Pereira MD  Medical Director  [address] [facility], [address] [facility]  Tel: [phone]       [facility] PROGRESS NOTE    Gayathri Medina  Admit Date: 6/2/2022  Admit Diagnosis:   Debility [R53.81]  Physical debility [R53.81]    Subjective     Patient seen and examined in gym during break in therapy. Events of last night re: BS reviewed. Discussed likelihood that she will manage her DM at home by dosing Humalog after she eats (if she eats), since her BS is so variable and influenced by her intake. This in addition to basal Lantus. Not sleeping well here but states she does not sleep well at home, uses melatonin at home; did not know she had melatonin as PRN med here, will schedule it. [facility] [address] [facility]. /77. Hgb 7.6.     Objective:     Current Facility-Administered Medications   Medication Dose Route Frequency    ondansetron (ZOFRAN) injection 4 mg  4 mg IntraVENous Q6H PRN    melatonin tablet 3 mg  3 mg Oral Nightly PRN    insulin lispro (HUMALOG) injection vial 0-4 Units  0-4 Units SubCUTAneous TID WC    insulin lispro (HUMALOG) injection vial 0-4 Units  0-4 Units SubCUTAneous Nightly    insulin glargine (LANTUS) injection vial 20 Units  20 Units SubCUTAneous Nightly    glucose chewable tablet 16 g  4 tablet Oral PRN    dextrose bolus 10% 125 mL  125 mL IntraVENous PRN    Or    dextrose bolus 10% 250 mL  250 mL IntraVENous PRN    glucagon injection 1 mg  1 mg IntraMUSCular PRN    dextrose 5 % solution  100 mL/hr IntraVENous PRN    acetaminophen (TYLENOL) tablet 650 mg  650 mg Oral Q6H PRN    Or    acetaminophen (TYLENOL) suppository 650 mg  650 mg Rectal Q6H PRN    polyethylene glycol (GLYCOLAX) packet 17 g  17 g Oral Daily PRN    albuterol (PROVENTIL) nebulizer solution 2.5 mg  2.5 mg Nebulization Q4H PRN    ferrous sulfate (IRON 325) tablet 325 mg  325 mg Oral BID     folic acid (FOLVITE) tablet 1 mg  1 mg Oral Daily    guaiFENesin-dextromethorphan (ROBITUSSIN DM) 100-10 MG/5ML syrup 5 mL  5 mL Oral Q4H PRN    insulin lispro (HUMALOG) injection vial 5 Units  5 Units SubCUTAneous TID     loperamide (IMODIUM) capsule 2 mg  2 mg Oral 4x Daily PRN    medicated lip ointment (BLISTEX)   Topical PRN    ondansetron (ZOFRAN-ODT) disintegrating tablet 4 mg  4 mg Oral Q6H PRN        Review of Systems:   Denies chest pain, shortness of breath, cough, headache, visual problems, abdominal pain, dysuria, calf pain. Pertinent positives are as noted in the HPI, ROS unremarkable otherwise. Visit Vitals  /77   Pulse 93   Temp 98.4 °F (36.9 °C) (Oral)   Resp 14   Ht 5' 6\" (1.676 m)   Wt 170 lb (77.1 kg)   SpO2 95%   BMI 27.44 kg/m²        Physical Exam:   General: Alert, appropriately oriented. Concentrating on puzzle in gym, limited eye contact. HEENT: Normocephalic, EOM intact. Oral mucosa moist.   Lungs: Rhonchi that clears with cough, otherwise lungs clear bilaterally. Respirations even and unlabored. Heart: Regular rate and rhythm, S1, S2. Systolic murmur. Abdomen: Soft, non-tender, not distended. Bowel sounds normoactive, no organomegaly. Genitourinary: Deferred. Neuromuscular:      Good attention, improved processing. Hypophonia without dysarthria. UE strength and ROM full and unencumbered. LE MMT not done. Sensation intact B LE distally. Skin/extremity: No rashes, no erythema. Calves soft, non-tender B LE. Functional Assessment:  Amount of Assistance: Supervision/Standby Assist  Distance (ft): 125  Assistive Device: RW  Surface: Level Surface    Andujar Fall Risk Assessment:  History of Falling: No     Ambulation:  Patient has a narrow base of support with gait. Attempted ambulating without walker but patient voiced how unsteady she felt.  Explained to her she will need rolling walker for now until she gets stronger. Will try her walking shoes next session. Labs/Studies:  Recent Results (from the past 72 hour(s))   POCT Glucose    Collection Time: 06/03/22  4:39 PM   Result Value Ref Range    POC Glucose 178 (H) 65 - 100 mg/dL    Performed by:  Corey    PLEASE READ & DOCUMENT PPD TEST IN 48 HRS    Collection Time: 06/03/22  5:30 PM   Result Value Ref Range    PPD, (POC) Negative Negative    mm Induration 0 0 - 5 mm   Basic Metabolic Panel    Collection Time: 06/03/22  7:01 PM   Result Value Ref Range    Sodium 142 136 - 145 mmol/L    Potassium 3.0 (L) 3.5 - 5.1 mmol/L    Chloride 103 98 - 107 mmol/L    CO2 27 21 - 32 mmol/L    Anion Gap 12 7 - 16 mmol/L    Glucose 85 65 - 100 mg/dL    BUN 22 6 - 23 MG/DL    CREATININE 5.40 (H) 0.6 - 1.0 MG/DL    GFR African American 11 (L) >60 ml/min/1.73m2    GFR Non- 9 (L) >60 ml/min/1.73m2    Calcium 8.3 8.3 - 10.4 MG/DL   POCT Glucose    Collection Time: 06/03/22  9:08 PM   Result Value Ref Range    POC Glucose 126 (H) 65 - 100 mg/dL    Performed by: Heather Smith    Basic Metabolic Panel    Collection Time: 06/04/22  5:26 AM   Result Value Ref Range    Sodium 142 136 - 145 mmol/L    Potassium 3.2 (L) 3.5 - 5.1 mmol/L    Chloride 103 98 - 107 mmol/L    CO2 28 21 - 32 mmol/L    Anion Gap 11 7 - 16 mmol/L    Glucose 209 (H) 65 - 100 mg/dL    BUN 21 6 - 23 MG/DL    CREATININE 5.50 (H) 0.6 - 1.0 MG/DL    GFR African American 11 (L) >60 ml/min/1.73m2    GFR Non- 9 (L) >60 ml/min/1.73m2    Calcium 8.4 8.3 - 10.4 MG/DL   CBC    Collection Time: 06/04/22  5:26 AM   Result Value Ref Range    WBC 11.8 (H) 4.3 - 11.1 K/uL    RBC 3.30 (L) 4.05 - 5.2 M/uL    Hemoglobin 8.6 (L) 11.7 - 15.4 g/dL    Hematocrit 27.0 (L) 35.8 - 46.3 %    MCV 81.8 79.6 - 97.8 FL    MCH 26.1 26.1 - 32.9 PG    MCHC 31.9 31.4 - 35.0 g/dL    RDW 15.6 (H) 11.9 - 14.6 %    Platelets 440 378 - 400 K/uL    MPV 9.7 9.4 - 12.3 FL    nRBC 0.02 0.0 - 0.2 K/uL   POCT Glucose Collection Time: 06/04/22  6:14 AM   Result Value Ref Range    POC Glucose 196 (H) 65 - 100 mg/dL    Performed by: Alan    POCT Glucose    Collection Time: 06/04/22 11:19 AM   Result Value Ref Range    POC Glucose 86 65 - 100 mg/dL    Performed by: Corey    POCT Glucose    Collection Time: 06/04/22  4:37 PM   Result Value Ref Range    POC Glucose 114 (H) 65 - 100 mg/dL    Performed by: Corey    POCT Glucose    Collection Time: 06/04/22  9:24 PM   Result Value Ref Range    POC Glucose 168 (H) 65 - 100 mg/dL    Performed by: Deja Silva    Basic Metabolic Panel    Collection Time: 06/05/22  5:32 AM   Result Value Ref Range    Sodium 140 136 - 145 mmol/L    Potassium 3.4 (L) 3.5 - 5.1 mmol/L    Chloride 105 98 - 107 mmol/L    CO2 30 21 - 32 mmol/L    Anion Gap 5 (L) 7 - 16 mmol/L    Glucose 211 (H) 65 - 100 mg/dL    BUN 7 6 - 23 MG/DL    CREATININE 3.50 (H) 0.6 - 1.0 MG/DL    GFR African American 18 (L) >60 ml/min/1.73m2    GFR Non- 15 (L) >60 ml/min/1.73m2    Calcium 8.4 8.3 - 10.4 MG/DL   CBC    Collection Time: 06/05/22  5:32 AM   Result Value Ref Range    WBC 9.2 4.3 - 11.1 K/uL    RBC 3.23 (L) 4.05 - 5.2 M/uL    Hemoglobin 8.3 (L) 11.7 - 15.4 g/dL    Hematocrit 26.8 (L) 35.8 - 46.3 %    MCV 83.0 79.6 - 97.8 FL    MCH 25.7 (L) 26.1 - 32.9 PG    MCHC 31.0 (L) 31.4 - 35.0 g/dL    RDW 16.0 (H) 11.9 - 14.6 %    Platelets 398 603 - 090 K/uL    MPV 9.4 9.4 - 12.3 FL    nRBC 0.00 0.0 - 0.2 K/uL   POCT Glucose    Collection Time: 06/05/22  6:40 AM   Result Value Ref Range    POC Glucose 203 (H) 65 - 100 mg/dL    Performed by: Deja Silva    POCT Glucose    Collection Time: 06/05/22 11:33 AM   Result Value Ref Range    POC Glucose 129 (H) 65 - 100 mg/dL    Performed by: Corey    POCT Glucose    Collection Time: 06/05/22  4:39 PM   Result Value Ref Range    POC Glucose 214 (H) 65 - 100 mg/dL    Performed by:  Corey    POCT Glucose    Collection Time: 06/05/22  7:19 PM   Result Value Ref Range    POC Glucose 35 (LL) 65 - 100 mg/dL    Performed by: Perri    POCT Glucose    Collection Time: 06/05/22  7:37 PM   Result Value Ref Range    POC Glucose 77 65 - 100 mg/dL    Performed by: Perri    POCT Glucose    Collection Time: 06/05/22  7:54 PM   Result Value Ref Range    POC Glucose 98 65 - 100 mg/dL    Performed by: Dawn Kennedy    POCT Glucose    Collection Time: 06/05/22  9:05 PM   Result Value Ref Range    POC Glucose 324 (H) 65 - 100 mg/dL    Performed by: Dawn Kennedy    Basic Metabolic Panel    Collection Time: 06/06/22  5:44 AM   Result Value Ref Range    Sodium 140 136 - 145 mmol/L    Potassium 3.5 3.5 - 5.1 mmol/L    Chloride 104 98 - 107 mmol/L    CO2 28 21 - 32 mmol/L    Anion Gap 8 7 - 16 mmol/L    Glucose 239 (H) 65 - 100 mg/dL    BUN 8 6 - 23 MG/DL    CREATININE 4.20 (H) 0.6 - 1.0 MG/DL    GFR African American 15 (L) >60 ml/min/1.73m2    GFR Non- 12 (L) >60 ml/min/1.73m2    Calcium 7.9 (L) 8.3 - 10.4 MG/DL   CBC    Collection Time: 06/06/22  5:44 AM   Result Value Ref Range    WBC 7.9 4.3 - 11.1 K/uL    RBC 2.93 (L) 4.05 - 5.2 M/uL    Hemoglobin 7.6 (L) 11.7 - 15.4 g/dL    Hematocrit 24.8 (L) 35.8 - 46.3 %    MCV 84.6 79.6 - 97.8 FL    MCH 25.9 (L) 26.1 - 32.9 PG    MCHC 30.6 (L) 31.4 - 35.0 g/dL    RDW 16.2 (H) 11.9 - 14.6 %    Platelets 633 813 - 725 K/uL    MPV 9.8 9.4 - 12.3 FL    nRBC 0.00 0.0 - 0.2 K/uL   POCT Glucose    Collection Time: 06/06/22  6:22 AM   Result Value Ref Range    POC Glucose 255 (H) 65 - 100 mg/dL    Performed by: Dawn Kennedy    POCT Glucose    Collection Time: 06/06/22 10:16 AM   Result Value Ref Range    POC Glucose 352 (H) 65 - 100 mg/dL    Performed by: Gilma)CNA        Assessment:     Principal Problem:    Physical debility  Active Problems:    Acute kidney injury superimposed on chronic kidney disease (HCC)    Benign hypertension with CKD (chronic kidney disease) stage III ([facility]    Cardiac arrest ([facility] [facility]    Diabetic ketoacidosis with coma associated with type 1 diabetes mellitus ([facility] [address] [facility]    Normocytic anemia    Depression with anxiety    Diabetic polyneuropathy associated with type 1 diabetes mellitus (HCC)    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 SLP for swallowing surveillance, cognitive evaluation post-arrest, DKA with coma.     Cardiac arrest, acute respiratory failure - resolved; multifactorial, most likely due to DKA with coma, septic shock. Intubated 5/21, extubated 5/25, now on room air; no prior history of supplemental O2 use. TTE (5/22) with EF 41-02%, normal systolic and diastolic function, moderate AV stenosis.     Severe sepsis with septic shock - question of bacterial pneumonia vs UTI; was on vanc+Zosyn throughout acute hospitalization, EOT 6/2. UCx, BCx NGTD. Leukocytosis attributed to stress response.  -6/3 WBC 13.7, trending down; had UA yesterday with + leuk (moderate), 1+ bacteria; UCx negative after short incubation  -6/4 WBC 11.8  -6/6 WBC 7.9     Acute renal failure, BLANCA on CKD3 - Cr on admission 9.7 (baseline ~2.5), oliguric; dialysis started 5/24, temp cath switched to tunneled cath 6.1. Nephrology managing HD, patient will run next HD 6/4 for TThSa transition. Avoid hypotension and nephrotoxic drugs.   -6/3 no HD today, transitioning to a TThSa schedule for therapies; Cr 5.3 (4.6 yesterday)  -6/4 normal HD run; per Nephrologist, she is very likely ESRD  -6/6 has HD chair at [address] [place], [place] [address]can Dr  Blood pressure management - monitor.  Patient occasionally hypertensive but mostly normo- or hypotensive.  -6/3 /61 this AM, lowest of past 24 hrs  -6/4 /70  -6/6 122/77     Diabetes mellitus - reportedly type 1, last HgbA1c found in EMR was 9.6% but from 6/2020; with admission for DKA and BS >2000, patient likely with poor glycemic control. Will require ACHS fasting glucose monitoring and medication adjustment to optimize glycemic control in setting of acute illness and hospitalization. Per DM RN, current regimen is Lantus 10u QHS with Humalog 5u TID with meals. Monitor and adjust.  -6/3  this AM, 170 last PM, 198 at supper and 184 at lunch; d/w Diabetic RN this AM; subsequent emesis, nausea: labs indicate DKA, with AG of 26 and ; consult Hospitalist now  -6/4 patient much better today, labs more normal (x hypoK+), DKA caught early yesterday, appreciate Hospitalist  -6/6 BS dropped to 35 last evening, appears from EMR that she received her pre-prandial supper Humalog but then did not eat any / much supper; also, DM RN recommends small increase in Lantus --> will bump to 22u QHS     Anemia - normocytic, chronic. Baseline Hgb >10-11 but dipped to 7-8 in acute; Hgb 7.3 at [facility] admission on 6/2. Multifactorial: anemia of CKD, frequent blood draws; no active bleeding noted, hemoccult pending. Iron studies (6/1) c/w chronic disease. Vitamin B12 adequate, folate low 4.9; on PO iron, folic acid supplements. Holding heparin SQ for now.  -6/3 Hgb 8.8 from 7.3, improving  -6/4 Hgb 8.6, stable  -6/6 Hgb 7.6 today, was 8.3 yesterday; monitor and transfuse (with HD) PRN     Pain management - no current joint or muscle symptoms, essentially pain-free. Will require regular pain assessment and comprehensive pain management.     Electrolyte management - no current abnormality, has been both hyperK+ and hypoK+; monitor and replace as needed.   -6/4 was called last night with PM lab K+ 3.0 --> gave 40mEq PO x 1; recheck this AM 3.2 --> another 40mEq PO x 1 ordered, patient received after returned from HD  -6/6 K+, Na normal     Pneumonia prophylaxis - incentive spirometer every hour while awake.     DVT risk / DVT prophylaxis - daily physician / PA exam to assess as patient is at increased risk for of thromboembolism. Mobilize as tolerated. Sequential pneumatic compression devices (SCDs) when in bed; thigh-high or knee-high thromboembolic deterrent hose when out of bed. Heparin SQ held due to anemia.      GI prophylaxis - consider PPI. At times may need additional antacids, Maalox prn.     History of tobacco use - former cigarette smoker, reportedly quit in 2013. No current nicotine cravings.     Depression - noted in problem list in 2017, no current pharmacotherapy. Monitor. At risk of depression / exacerbation due to physical debility, loss of independence.     General skin care / wound prevention - monitor general skin wound status daily per staff and physician / PA. At risk for failure due to impaired mobility.     Bladder program - schedule voids q6-8h. Check post-void residual as needed; in-and-out catheter if post-void residual is more than 400ml.     Bowel program - at risk for constipation as a side effect of medications, impaired mobility, etc. MiraLAX daily for regularity, Berta-Colace for stool softener. PRN MOM, bisacodyl suppository or tablets for constipation. Time spent was 25 minutes with over 1/2 in direct patient care/examination, consultation and coordination of care. Signed By: Raheel Tamayo PA-C    June 6, 2022      Physician Assistant with [facility]  Amy R. Frankey Dibble, MD, Medical Director

## 2022-06-06 NOTE — PROGRESS NOTES
Physical Therapy  PHYSICAL THERAPY DAILY NOTE  Time In:  830 AM  Time Out:  054 AM  Pt. Seen for: AM, Gait Training, Therapeutic Exercise, and Transfer Training     Subjective: \" I want to go home on Wednesday. I talked to the doctor this morning about it. \"         Objective:  Precautions:     GROSS ASSESSMENT Daily Assessment           COGNITION Daily Assessment    intact       BED/MAT MOBILITY Daily Assessment    Rolling Right: Supervision/Standby Assist  Rolling Left: Supervision/Standby Assist  Supine to Sit: Supervision/Standby Assist  Sit to Supine: Supervision/Standby Assist       TRANSFERS Daily Assessment    Sit to Stand: CGA  Stand to Sit: CGA  Transfer Type: Stand Pivot  Transfer Assistance: Supervision/Standby Assist  Car Transfers: NT         GAIT Daily Assessment   Patient has a narrow base of support with gait. Attempted ambulating without walker but patient voiced how unsteady she felt. Explained to her she will need rolling walker for now until she gets stronger. Will try her walking shoes next session. Amount of Assistance: Supervision/Standby Assist  Distance (ft): 125    Assistive Device: RW  Surface: Level Surface       STEPS/STAIRS Daily Assessment    Steps Ambulated:    Level of Assistance:    Railing:  Assistive Device:        BALANCE Daily Assessment    Static Sitting:   Dynamic Sitting:   Static Standing:   Dynamic Standing:        WHEELCHAIR MOBILITY Daily Assessment    Able to Propel (ft): Assistance: NT  Surface: NT  Wheelchair Set-up: NT       LOWER EXTREMITY EXERCISES Daily Assessment    Motmed x 10 minute on gear2. SUPINE EXERCISES Sets Reps Comments   Ankle Pumps 2 10    Quad Sets 2 10    Glut Sets 2 10    Heel Slides 2 10    Hip Abduction 2 10    Short Arc Quad 2 10    Straight Leg Raise 2 10     Core exs with swiss ball in supine. Pain level: No pain noted.   Pain Location:    Pain Interventions:     Vital Signs:  /77   Pulse 93   Temp 98.4 °F (36.9 °C) (Oral)   Resp 14   Ht 5' 6\" (1.676 m)   Wt 170 lb (77.1 kg)   SpO2 95%   BMI 27.44 kg/m²       Education:      Interdisciplinary Communication:     Pt. Left in wheelchair to be assessed by speech therapy and call bell at reach. Assessment: Patient seems very motivated with therapy. Plan of Care: Continue with plan of care.       Castillo Barboza, PTA  6/6/2022

## 2022-06-06 NOTE — PROGRESS NOTES
OT DAILY NOTE    Time In 0710   Time Out 0809     Subjective: \"I'm ready to get out of this bed. \" Pt agreeable to treatment. Pain: No pain expressed. Interdisciplinary Communication: Collaborated with SLP regarding pt on easy to chew diet. Precautions: Falls    /77   Pulse 93   Temp 98.4 °F (36.9 °C) (Oral)   Resp 14   Ht 5' 6\" (1.676 m)   Wt 170 lb (77.1 kg)   SpO2 95%   BMI 27.44 kg/m²      MOBILITY:   Score Comments   Rolling Independent [place] using bed rail   Supine to Sit  (NT)     Sit to Supine Independent     Sit to Stand Supervision or touching assistance Assistance Needed: Supervision or touching assistance  Comment: SBA-CGA, cues for safety and controlled stand > sit. CARE Score: 4   Transfer Assist Supervision or touching assistance Assistance Needed: Supervision or touching assistance  Comment: SBA-CGA with RW. CARE Score: 4     ACTIVITIES OF DAILY LIVING:   Score Comments   Eating Independent     Oral Hygiene Independent Seated at sink   Bathing Supervision or touching assistance Issued long handled sponge to wash back. S-[place] seated, SBA in stance with cues for safety. Upper Body  Dressing Independent     Lower Body Dressing Supervision or touching assistance SBA in stance with RW. Donning/[place] Footwear Setup or clean-up assistance Doff/don socks. Toilet Transfer Supervision or touching assistance SBA with RW and grab bars, cues for safety and controlled stand > sit. [place] or touching assistance SBA in stance to manage clothing. Cues for hygiene sequencing for infection prevention. Education Adaptive ADL Techniques, AE/DME Training, Functional Transfer Training, Rolling Walker Management and Safety Awareness     Assessment: Patient more alert and with better attention and tolerance this session vs Friday. Pt progressing with functional mobility and activity tolerance for ADLs and safety.  Pt demonstrates weakness impacting safety, required cues

## 2022-06-06 NOTE — PLAN OF CARE
Problem: Safety - Medical Restraint  Goal: Remains free of injury from restraints (Restraint for Interference with Medical Device)  Description: INTERVENTIONS:  1. Determine that other, less restrictive measures have been tried or would not be effective before applying the restraint  2. Evaluate the patient's condition at the time of restraint application  3. Inform patient/family regarding the reason for restraint  4. Q2H: Monitor safety, psychosocial status, comfort, nutrition and hydration  6/5/2022 2045 by Neftali Brown RN  Outcome: Progressing  6/5/2022 1424 by Ramsey Deluca RN  Outcome: Progressing     Problem: Safety - Adult  Goal: Free from fall injury  6/5/2022 2045 by Neftali Brown RN  Outcome: Progressing  6/5/2022 1424 by Ramsey Deluca RN  Outcome: Progressing     Problem: Pain  Goal: Verbalizes/displays adequate comfort level or baseline comfort level  6/5/2022 2045 by Neftali Brown RN  Outcome: Progressing  6/5/2022 1424 by Ramsey Deluca RN  Outcome: Progressing     Problem: Skin/Tissue Integrity  Goal: Absence of new skin breakdown  Description: 1. Monitor for areas of redness and/or skin breakdown  2. Assess vascular access sites hourly  3. Every 4-6 hours minimum:  Change oxygen saturation probe site  4. Every 4-6 hours:  If on nasal continuous positive airway pressure, respiratory therapy assess nares and determine need for appliance change or resting period.   6/5/2022 2045 by Neftali Brown RN  Outcome: Progressing  6/5/2022 1424 by Ramsey Deluca RN  Outcome: Progressing     Problem: ABCDS Injury Assessment  Goal: Absence of physical injury  6/5/2022 2045 by Neftali Brown RN  Outcome: Progressing  6/5/2022 1424 by Ramsey Deluca RN  Outcome: Progressing

## 2022-06-06 NOTE — PROGRESS NOTES
Patient called and stated her blood sugar has dropped. Patient alert. Skin cool and dry. Blood sugar 35. Apple juice with sugar added given.

## 2022-06-07 LAB
ALBUMIN SERPL-MCNC: 1.8 G/DL (ref 3.5–5)
ANION GAP SERPL CALC-SCNC: 7 MMOL/L (ref 7–16)
BUN SERPL-MCNC: 9 MG/DL (ref 6–23)
CALCIUM SERPL-MCNC: 8.1 MG/DL (ref 8.3–10.4)
CHLORIDE SERPL-SCNC: 105 MMOL/L (ref 98–107)
CO2 SERPL-SCNC: 30 MMOL/L (ref 21–32)
CREAT SERPL-MCNC: 4.4 MG/DL (ref 0.6–1)
ERYTHROCYTE [DISTWIDTH] IN BLOOD BY AUTOMATED COUNT: 16.3 % (ref 11.9–14.6)
GLUCOSE BLD STRIP.AUTO-MCNC: 153 MG/DL (ref 65–100)
GLUCOSE BLD STRIP.AUTO-MCNC: 179 MG/DL (ref 65–100)
GLUCOSE BLD STRIP.AUTO-MCNC: 180 MG/DL (ref 65–100)
GLUCOSE BLD STRIP.AUTO-MCNC: 212 MG/DL (ref 65–100)
GLUCOSE SERPL-MCNC: 148 MG/DL (ref 65–100)
HCT VFR BLD AUTO: 27.7 % (ref 35.8–46.3)
HGB BLD-MCNC: 8.6 G/DL (ref 11.7–15.4)
MCH RBC QN AUTO: 26 PG (ref 26.1–32.9)
MCHC RBC AUTO-ENTMCNC: 31 G/DL (ref 31.4–35)
MCV RBC AUTO: 83.7 FL (ref 79.6–97.8)
NRBC # BLD: 0 K/UL (ref 0–0.2)
PHOSPHATE SERPL-MCNC: 3.4 MG/DL (ref 2.5–4.5)
PLATELET # BLD AUTO: 375 K/UL (ref 150–450)
PMV BLD AUTO: 9.4 FL (ref 9.4–12.3)
POTASSIUM SERPL-SCNC: 3.5 MMOL/L (ref 3.5–5.1)
RBC # BLD AUTO: 3.31 M/UL (ref 4.05–5.2)
SERVICE CMNT-IMP: ABNORMAL
SODIUM SERPL-SCNC: 142 MMOL/L (ref 136–145)
WBC # BLD AUTO: 7.5 K/UL (ref 4.3–11.1)

## 2022-06-07 PROCEDURE — 1180000000 HC REHAB R&B

## 2022-06-07 PROCEDURE — 80069 RENAL FUNCTION PANEL: CPT

## 2022-06-07 PROCEDURE — 99232 SBSQ HOSP IP/OBS MODERATE 35: CPT | Performed by: PHYSICAL MEDICINE & REHABILITATION

## 2022-06-07 PROCEDURE — 82962 GLUCOSE BLOOD TEST: CPT

## 2022-06-07 PROCEDURE — 97110 THERAPEUTIC EXERCISES: CPT

## 2022-06-07 PROCEDURE — 97116 GAIT TRAINING THERAPY: CPT

## 2022-06-07 PROCEDURE — 85027 COMPLETE CBC AUTOMATED: CPT

## 2022-06-07 PROCEDURE — 97530 THERAPEUTIC ACTIVITIES: CPT

## 2022-06-07 PROCEDURE — 6370000000 HC RX 637 (ALT 250 FOR IP): Performed by: PHYSICIAN ASSISTANT

## 2022-06-07 PROCEDURE — 97535 SELF CARE MNGMENT TRAINING: CPT

## 2022-06-07 PROCEDURE — 6370000000 HC RX 637 (ALT 250 FOR IP): Performed by: PHYSICAL MEDICINE & REHABILITATION

## 2022-06-07 PROCEDURE — 92523 SPEECH SOUND LANG COMPREHEN: CPT

## 2022-06-07 PROCEDURE — 36415 COLL VENOUS BLD VENIPUNCTURE: CPT

## 2022-06-07 RX ADMIN — FERROUS SULFATE TAB 325 MG (65 MG ELEMENTAL FE) 325 MG: 325 (65 FE) TAB at 18:02

## 2022-06-07 RX ADMIN — FOLIC ACID 1 MG: 1 TABLET ORAL at 08:39

## 2022-06-07 RX ADMIN — INSULIN LISPRO 5 UNITS: 100 INJECTION, SOLUTION INTRAVENOUS; SUBCUTANEOUS at 13:16

## 2022-06-07 RX ADMIN — INSULIN GLARGINE 22 UNITS: 100 INJECTION, SOLUTION SUBCUTANEOUS at 20:43

## 2022-06-07 RX ADMIN — Medication 3 MG: at 20:35

## 2022-06-07 RX ADMIN — FERROUS SULFATE TAB 325 MG (65 MG ELEMENTAL FE) 325 MG: 325 (65 FE) TAB at 08:39

## 2022-06-07 RX ADMIN — INSULIN LISPRO 5 UNITS: 100 INJECTION, SOLUTION INTRAVENOUS; SUBCUTANEOUS at 08:05

## 2022-06-07 NOTE — PROGRESS NOTES
Physical Therapy  PHYSICAL THERAPY DAILY NOTE  Time In:  1031 AM  Time Out:  1120 AM  Pt. Seen for: AM, Gait Training, Therapeutic Exercise, and Transfer Training     Subjective: \" I want to be able to walk. \"         Objective:Explained to patient needing a walker right now does not mean she is not going to walk. Explained living alone and having a walker will make her more safe with mobility especially after dialysis. Precautions: Poor Safety Awareness    GROSS ASSESSMENT Daily Assessment           COGNITION Daily Assessment           BED/MAT MOBILITY Daily Assessment    Rolling Right:   Rolling Left:   Supine to Sit: Supervision/Standby Assist  Sit to Supine: Supervision/Standby Assist       TRANSFERS Daily Assessment    Sit to Stand: Supervision/Standby Assist  Stand to Sit: Supervision/Standby Assist  Transfer Type: Stand Pivot  Transfer Assistance: Independent  Car Transfers: NT         GAIT Daily Assessment   Patient ambulates with small base of support and small steps. Amount of Assistance: CGA  Distance (ft): 150    Assistive Device: RW  Surface: Level Surface       STEPS/STAIRS Daily Assessment    Steps Ambulated:    Level of Assistance:    Railing:  Assistive Device:        BALANCE Daily Assessment    Static Sitting:   Dynamic Sitting:   Static Standing:   Dynamic Standing:        WHEELCHAIR MOBILITY Daily Assessment    Able to Propel (ft):   Assistance:   Surface:   Wheelchair Set-up:        LOWER EXTREMITY EXERCISES Daily Assessment    SUPINE EXERCISES Sets Reps Comments   Ankle Pumps 2 10    Quad Sets 2 10    Glut Sets 2 10    Heel Slides 2 10    Hip Abduction 2 10    Short Arc Quad 2 10    Straight Leg Raise 2 10          Pain level: no pain noted.   Pain Location:    Pain Interventions:     Vital Signs:  /67   Pulse 100   Temp 99.1 °F (37.3 °C) (Oral)   Resp 18   Ht 5' 6\" (1.676 m)   Wt 170 lb (77.1 kg)   SpO2 94%   BMI 27.44 kg/m²       Education:      Interdisciplinary Communication:

## 2022-06-07 NOTE — PROGRESS NOTES
Occupational Therapy Note:    Scheduled OT treatment attempted. Patient reports she is visiting with her aunt at this time, requesting to defer treatment session.      Serene Cifuentes, OTR/L

## 2022-06-07 NOTE — PROGRESS NOTES
[address] Nephrology    Follow-Up on: BLANCA on CKD stage IV    HPI: Pt seen and examined in floors     ROS:  General: no fever/chills  CV: no CP  Lung: no SOB, no cough  GI: no N/V/D  : no dysuria  Ext: edema       Current Facility-Administered Medications   Medication Dose Route Frequency    melatonin tablet 3 mg  3 mg Oral Nightly    insulin glargine (LANTUS) injection vial 22 Units  22 Units SubCUTAneous Nightly    ondansetron (ZOFRAN) injection 4 mg  4 mg IntraVENous Q6H PRN    insulin lispro (HUMALOG) injection vial 0-4 Units  0-4 Units SubCUTAneous TID WC    insulin lispro (HUMALOG) injection vial 0-4 Units  0-4 Units SubCUTAneous Nightly    glucose chewable tablet 16 g  4 tablet Oral PRN    dextrose bolus 10% 125 mL  125 mL IntraVENous PRN    Or    dextrose bolus 10% 250 mL  250 mL IntraVENous PRN    glucagon injection 1 mg  1 mg IntraMUSCular PRN    dextrose 5 % solution  100 mL/hr IntraVENous PRN    acetaminophen (TYLENOL) tablet 650 mg  650 mg Oral Q6H PRN    Or    acetaminophen (TYLENOL) suppository 650 mg  650 mg Rectal Q6H PRN    polyethylene glycol (GLYCOLAX) packet 17 g  17 g Oral Daily PRN    albuterol (PROVENTIL) nebulizer solution 2.5 mg  2.5 mg Nebulization Q4H PRN    ferrous sulfate (IRON 325) tablet 325 mg  325 mg Oral BID WC    folic acid (FOLVITE) tablet 1 mg  1 mg Oral Daily    guaiFENesin-dextromethorphan (ROBITUSSIN DM) 100-10 MG/5ML syrup 5 mL  5 mL Oral Q4H PRN    insulin lispro (HUMALOG) injection vial 5 Units  5 Units SubCUTAneous TID     loperamide (IMODIUM) capsule 2 mg  2 mg Oral 4x Daily PRN    medicated lip ointment (BLISTEX)   Topical PRN    ondansetron (ZOFRAN-ODT) disintegrating tablet 4 mg  4 mg Oral Q6H PRN       Exam:  Vitals:    06/05/22 1930 06/06/22 0715 06/06/22 1455 06/06/22 1947   BP: 121/63 122/77 (!) 105/58 106/67   Pulse: (!) 109 93 97 100   Resp: 18 14 16 18   Temp: 98.1 °F (36.7 °C) 98.4 °F (36.9 °C) 98.8 °F (37.1 °C) 99.1 °F (37.3 °C) TempSrc: Oral Oral Oral Oral   SpO2: 99% 95% 97% 94%   Weight:       Height:             Intake/Output Summary (Last 24 hours) at 6/7/2022 1216  Last data filed at 6/7/2022 1036  Gross per 24 hour   Intake 240 ml   Output 802 ml   Net -562 ml     PE:  GEN - in no distress, alert and oriented   CV - regular rate, S1, S2, no rub  Lung - clear bilaterally  Abd - soft, nontender  Ext - +1 BLE edema  Access-  right TCC- intact    Labs  Recent Labs     06/05/22  0532 06/06/22  0544 06/07/22  0501   WBC 9.2 7.9 7.5   HGB 8.3* 7.6* 8.6*   HCT 26.8* 24.8* 27.7*    337 375       Recent Labs     06/05/22  0532 06/06/22  0544 06/07/22  0501    140 142   K 3.4* 3.5 3.5    104 105   CO2 30 28 30   BUN 7 8 9   CREATININE 3.50* 4.20* 4.40*   PHOS  --   --  3.4       Problem List:      Issues Addressed By Nephrology:    Plan:  1. BLANCA on CKD stage IV non oliguric   1st HD 5/24, TCC placed 6/1  -TTS schedule while on the 9th floor   -Appreciate CW for outpt HD slot   - dialysis was held today, but Creat rising slowly  - will follow labs and UO  in am to decide needs for dialysis     2. Type I DM- SSI per primary     3. DKA BS>2000 on admit- un controlled- SSI per primary     4.  Anemia-  on Fe

## 2022-06-07 NOTE — PROGRESS NOTES
OT DAILY NOTE  Time In 0904   Time Out 1030     Subjective: \"I don't like it here. This is not what I expected. \" Pt agreeable to treatment. Pain: No pain expressed. Interdisciplinary Communication: Collaborated with PT and RN regarding pt status/pt with two episodes of urgently needing to void loose stool this session. Precautions: Falls    /67   Pulse 100   Temp 99.1 °F (37.3 °C) (Oral)   Resp 18   Ht 5' 6\" (1.676 m)   Wt 170 lb (77.1 kg)   SpO2 94%   BMI 27.44 kg/m²      FUNCTIONAL MOBILITY:    Score Comments   Sit to Stand Supervision or touching assistance SBA, verbal cues for controlled descent   Transfer Assist Supervision or touching assistance CGA, cues for hand placement and RW management. - Activity Tolerance - Cognition - Visual-Perceptual    Pt completed a 24 piece jigsaw puzzle with bilateral wrist weights donned to address overall endurance, visual perceptual skills, cognition, sequencing, BUE strength, and problem solving. Pt completed puzzle while seated in w/c with mod-max assistance and significant additional time. Pt required frequent cues for strategies, specifically attention to boarder and puzzle piece content. Pt cued to use visual aid/picture of completed puzzle as a guide to complete puzzle. Pt with poor problem solving and poor carryover of simple strategies. Pt attempting to match corner pieces together and put boarder pieces to interior spots in puzzle despite verbal and visual explanation regarding boarder of the puzzle. Pt acuity diminished but functional enough to complete large font word search and identify content on puzzle pieces, so pt likely with cognitive deficits impacting activity.  Discussed findings with SLP.         - Self-Care    Score Comments   Toilet Transfer Supervision or touching assistance SBA with grab bars   [place] or touching assistance SBA in stance, cues for hygiene thoroughness      Pt urgently required bathroom break x2 during gentle OT session. Pt with loose stool each void, RN notified. Education   Benefits of OT, Functional Transfer Training and Safety Awareness     Assessment: Patient with flat affect, reports she wants to go home and do home yosi therapy. Pt reports she does not like inpatient rehab and that it is not what she expected. Discussed at length the purpose of inpatient rehab stay to help pt regain strength and endurance to be able to return to functional baseline and safely d/c home. Discussed the differences between inpatient rehab stay and home health therapy and patient's rehab potential. Pt agreeable to participate in therapy, reports she just does not want to feel bad and thinks that working out is making her throw up. Patient concerns passed on to RN. Discussed potential for unregulated blood sugar to cause nausea/vomiting, and the benefits of exercise and its impact on pt's diabetes/blood sugar regulation. Pt continues to benefit from skilled OT services to address remaining deficits and progress toward baseline level of independence and safety. Patient ended session seated in w/c with PT. Plan: Continue OT POC.      Samson iFgueroa OT   6/7/2022

## 2022-06-07 NOTE — PROGRESS NOTES
OT DAILY NOTE    Time In 0702   Time Out 0748     Subjective: \"I'll stay till Monday. \" Discussed recommendation to defer pt's requested Wednesday (6/8/22) d/c date due to safety concerns. Pt agreeable to stay through Monday as indicated. Pt agreeable to agreeable to treatment. Pain: Patient denies pain. Interdisciplinary Communication: Collaborated with RN, requested Eucerin order. Precautions: Falls    /67   Pulse 100   Temp 99.1 °F (37.3 °C) (Oral)   Resp 18   Ht 5' 6\" (1.676 m)   Wt 170 lb (77.1 kg)   SpO2 94%   BMI 27.44 kg/m²      MOBILITY:   Score Comments   Rolling Independent Roll L. Supine to Sit Supervision or touching assistance S   Sit to Supine  (NT)     Sit to Stand Supervision or touching assistance Assistance Needed: Supervision or touching assistance  Comment: CGA-SBA, cues for safety. CARE Score: 4   Transfer Assist Supervision or touching assistance Assistance Needed: Supervision or touching assistance  Comment: CGA, cues for hand placement and RW management. CARE Score: 4     ACTIVITIES OF DAILY LIVING:   Score Comments   Eating Independent     Oral Hygiene Independent Seated at sink. Bathing Supervision or touching assistance CGA in stance, cues for safety and hygiene thoroughness. Upper Body  Dressing Setup or clean-up assistance Seated. Lower Body Dressing Supervision or touching assistance CGA in stance, cues for safe transfer, rest breaks throughout. Donning/[place] Footwear Setup or clean-up assistance Don/doff socks. Toilet Transfer Supervision or touching assistance CGA with RW and grab bar. [place] or touching assistance CGA in stance, cues for hygiene thoroughness. Education AE/DME Training, Benefits of OT, Functional Transfer Training, Rolling Walker Management and Safety Awareness     Assessment: Patient demonstrated good participation in OT treatment.  Pt continues to benefit from skilled OT services to address activity tolerance, strength, safety awareness deficits, and to progress toward baseline level of independence for I/ADLs. Patient ended session seated in w/c with call bell and needs within reach. Plan: Continue OT POC.      Marika Chamorro OT   6/7/2022

## 2022-06-07 NOTE — PROGRESS NOTES
Physical Therapy  PHYSICAL THERAPY DAILY NOTE  Time In:  3277 PM  Time Out:  1432 PM  Pt. Seen for: PM, Gait Training, Therapeutic Exercise, and Transfer Training     Subjective: \"I have liked therapy today. \"         Objective:  Precautions: Poor Safety Awareness    GROSS ASSESSMENT Daily Assessment           COGNITION Daily Assessment           BED/MAT MOBILITY Daily Assessment    Rolling Right: Supervision/Standby Assist  Rolling Left: Supervision/Standby Assist  Supine to Sit: Supervision/Standby Assist  Sit to Supine: Supervision/Standby Assist       TRANSFERS Daily Assessment    Sit to Stand: Supervision/Standby Assist  Stand to Sit: Supervision/Standby Assist  Transfer Type: Stand Pivot  Transfer Assistance: Supervision/Standby Assist  Car Transfers: NT         GAIT Daily Assessment    Amount of Assistance: Supervision/Standby Assist  Distance (ft): 120  Assistive Device: RW  Surface: Level Surface       STEPS/STAIRS Daily Assessment    Steps Ambulated:    Level of Assistance:    Railing:  Assistive Device:        BALANCE Daily Assessment    Static Sitting:   Dynamic Sitting:   Static Standing:   Dynamic Standing:        WHEELCHAIR MOBILITY Daily Assessment    Able to Propel (ft):   Assistance:   Surface:   Wheelchair Set-up:        LOWER EXTREMITY EXERCISES Daily Assessment    Standing exercises moving LE s UE s at the same time. Pain level: no pain noted. Pain Location:    Pain Interventions:     Vital Signs:  /67   Pulse 100   Temp 99.1 °F (37.3 °C) (Oral)   Resp 18   Ht 5' 6\" (1.676 m)   Wt 170 lb (77.1 kg)   SpO2 94%   BMI 27.44 kg/m²       Education:      Interdisciplinary Communication:     Pt. Left returned to room to bed with call bell at reach. Assessment: Patient seemed motivated with todays session. Plan of Care: Continue with plan of care.       Parminder Born, PTA  6/7/2022

## 2022-06-07 NOTE — PROGRESS NOTES
Hospitalist Progress Note   Admit Date:  2022 12:33 PM   Name:  Ela Shook   Age:  48 y.o. Sex:  female  :  1975   MRN:  [id number]   Room:  [facility] [place]    Presenting Complaint: hyperglycemia   Recent cardiac arrest      Reason(s) for Admission: Debility [R53.81]  Physical debility [R53.81]     Hospital Course & Interval History:     Ela Shook is a 48 y.o. female with history of   DM type 1 with recent DKA   Recent cardiac arrest   Recent respiratory failure and was on ventilator  Hypertension   Hyperlipidemia   Recent BLANCA on CKD, HD dependent now. Depression and anxiety      who was admitted for physical rehabilitation after recent DKA and cardiac arrest and was admitted to ICU and was on ventilator.      She developed BLANCA on CKD during that admission in ICU and SLED was started. She currently is on HD. Last HD was Wednesday. Her next HD is tomorrow. She still makes urine 2-3 times per day.       Patient recovered from that recent ICU admission and sent to rehab floor on 2022. Today she feels nauseated. Her BS was  456 at 6 AM and she was treated with Humalog 5 units SC before breakfast. BMP shows HCO3 of 15. BUN of 22 and creatinine of 5.3.      Before lunch time today her BS is 188. She received 10 units of Lantus last night. Her usual dose of Lantus is 30 units sc hs before she was admitted.      Due to her hyperglycemia, acidosis with nausea, Hospitalist service is requested to give consultation help for the patient.         No fever. No shaking. No chills. No chest pain. No shortness of breath. No blood per rectum. No blood per urine. No abdominal pain   No abnormal movements.      She still makes some urine 2-3 times per day.      Medication records are reviewed. Subjective/24hr Events (22):    22   Feeling OK. Working with occupational therapy on her fine muscle movements.    BS has improved to around 200s ranges and she has no acidosis. Eating OK.     6/5/22   Patient is doing well. No fever. No shaking. No chills. No chest pain. No shortness of breath. Patient is ambulating better. 6/6/22   Patient is doing well with PT and OT. BS is doing well in 200s ranges. No new complaints. 6/7/22  Patient is doing well with PT and OT. Assessment & Plan:     Principal Problem:    Physical debility  Continue physical therapy as per acute rehab team.          Acute kidney injury superimposed on chronic kidney disease ([facility] [address]  Plan for next HD as per nephrologist.   Monitor urine and intake and output. Urine output seems picking up. BUN is only 9 today although creatinine is 4.40  Monitor and see the need for HD. Yesterday patient has urine record of only 100 mL.        Benign hypertension with CKD (chronic kidney disease) stage III ([facility])  BP is 122/77 mmHg today. On no BP medications now. Monitor   BP does not have symptoms of lightheadedness.          Cardiac arrest ([facility] [address]  Recovered. No neuro-deficit. Echo shows EF 70-75% after cardiac arrest.   Found to have moderate AV stenosis.        Diabetic ketoacidosis with coma associated with type 1 diabetes mellitus ([address]  BS is improved. Improvement of acidosis   Continue Lantus dose at 20 units sc hs   Monitor blood sugar. Cover with insulin sliding scale accordingly. Continue current DM regimen.        Normocytic anemia  Noted        Depression with anxiety  Noted   Will need to be addressed later after she is improved from acute hyperglycemia problems.        Diabetic polyneuropathy associated with type 1 diabetes mellitus ([facility])  Noted     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 associated with type 1 diabetes mellitus ([facility] [address]  Noted     I have discussed the plan of care with patient.         Discharge Planning:    As per acute rehab team.     Diet:  ADULT ORAL NUTRITION SUPPLEMENT; Breakfast, Lunch, Dinner; Diabetic Oral Supplement  ADULT DIET; Regular; 3 carb choices (45 gm/meal)  DVT PPx: SCD  Code status: Full Code    Hospital Problems:  Principal Problem:    Physical debility  Active Problems:    Acute kidney injury superimposed on chronic kidney disease (HCC)    Benign hypertension with CKD (chronic kidney disease) stage III ([facility])    Cardiac arrest ([facility] [address]    Diabetic ketoacidosis with coma associated with type 1 diabetes mellitus ([facility])    Normocytic anemia    Depression with anxiety    Diabetic polyneuropathy associated with type 1 diabetes mellitus ([facility])    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 associated with type 1 diabetes mellitus ([facility]  Resolved Problems:    * No resolved hospital problems. *      Objective:     Patient Vitals for the past 24 hrs:   Temp Pulse Resp BP SpO2   06/06/22 1947 99.1 °F (37.3 °C) 100 18 106/67 94 %       Oxygen Therapy  SpO2: 94 %  O2 Device: None (Room air)    Estimated body mass index is 27.44 kg/m² as calculated from the following:    Height as of this encounter: 5' 6\" (1.676 m). Weight as of this encounter: 170 lb (77.1 kg). Intake/Output Summary (Last 24 hours) at 6/7/2022 1637  Last data filed at 6/7/2022 1036  Gross per 24 hour   Intake 240 ml   Output 802 ml   Net -562 ml         Physical Exam:     Blood pressure 106/67, pulse 100, temperature 99.1 °F (37.3 °C), temperature source Oral, resp. rate 18, height 5' 6\" (1.676 m), weight 170 lb (77.1 kg), SpO2 94 %. General:          Well nourished. No overt distress. Patient is resting well. She appears stronger. She appears comfortable. She is in good spirit. Head:               Normocephalic, atraumatic  Eyes:               Sclerae appear normal.  Pupils equally round. ENT:                Nares appear normal, no drainage. Dry oral mucosa  Neck:               No restricted ROM. Trachea midline   CV:                  RRR. No m/r/g. No jugular venous distension. Lungs:             CTAB.   No wheezing, rhonchi, or rales. Respirations even, unlabored  Abdomen: Bowel sounds present. Soft, nontender, nondistended. Extremities:     No cyanosis or clubbing. No edema  Skin:                No rashes and normal coloration. Warm and dry. Neuro:             CN II-XII grossly intact. Sensation intact. A&Ox3  Psych:             Normal mood and affect. I have reviewed ordered lab tests and independently visualized imaging below:    Recent Labs:  Recent Results (from the past 48 hour(s))   POCT Glucose    Collection Time: 06/05/22  4:39 PM   Result Value Ref Range    POC Glucose 214 (H) 65 - 100 mg/dL    Performed by:  Corey    POCT Glucose    Collection Time: 06/05/22  7:19 PM   Result Value Ref Range    POC Glucose 35 (LL) 65 - 100 mg/dL    Performed by: Perri    POCT Glucose    Collection Time: 06/05/22  7:37 PM   Result Value Ref Range    POC Glucose 77 65 - 100 mg/dL    Performed by: Perri    POCT Glucose    Collection Time: 06/05/22  7:54 PM   Result Value Ref Range    POC Glucose 98 65 - 100 mg/dL    Performed by: Lety López    POCT Glucose    Collection Time: 06/05/22  9:05 PM   Result Value Ref Range    POC Glucose 324 (H) 65 - 100 mg/dL    Performed by: Lety López    Basic Metabolic Panel    Collection Time: 06/06/22  5:44 AM   Result Value Ref Range    Sodium 140 136 - 145 mmol/L    Potassium 3.5 3.5 - 5.1 mmol/L    Chloride 104 98 - 107 mmol/L    CO2 28 21 - 32 mmol/L    Anion Gap 8 7 - 16 mmol/L    Glucose 239 (H) 65 - 100 mg/dL    BUN 8 6 - 23 MG/DL    CREATININE 4.20 (H) 0.6 - 1.0 MG/DL    GFR African American 15 (L) >60 ml/min/1.73m2    GFR Non- 12 (L) >60 ml/min/1.73m2    Calcium 7.9 (L) 8.3 - 10.4 MG/DL   CBC    Collection Time: 06/06/22  5:44 AM   Result Value Ref Range    WBC 7.9 4.3 - 11.1 K/uL    RBC 2.93 (L) 4.05 - 5.2 M/uL    Hemoglobin 7.6 (L) 11.7 - 15.4 g/dL    Hematocrit 24.8 (L) 35.8 - 46.3 %    MCV 84.6 79.6 - 97.8 FL MCH 25.9 (L) 26.1 - 32.9 PG    MCHC 30.6 (L) 31.4 - 35.0 g/dL    RDW 16.2 (H) 11.9 - 14.6 %    Platelets 626 368 - 685 K/uL    MPV 9.8 9.4 - 12.3 FL    nRBC 0.00 0.0 - 0.2 K/uL   POCT Glucose    Collection Time: 06/06/22  6:22 AM   Result Value Ref Range    POC Glucose 255 (H) 65 - 100 mg/dL    Performed by: Thompson Rivero    POCT Glucose    Collection Time: 06/06/22 10:16 AM   Result Value Ref Range    POC Glucose 352 (H) 65 - 100 mg/dL    Performed by: Gilma)VON    POCT Glucose    Collection Time: 06/06/22 12:09 PM   Result Value Ref Range    POC Glucose 254 (H) 65 - 100 mg/dL    Performed by: Gilma)CNA    POCT Glucose    Collection Time: 06/06/22  4:11 PM   Result Value Ref Range    POC Glucose 139 (H) 65 - 100 mg/dL    Performed by: Gilma)VON    POCT Glucose    Collection Time: 06/06/22  9:25 PM   Result Value Ref Range    POC Glucose 245 (H) 65 - 100 mg/dL    Performed by: [facility]    CBC    Collection Time: 06/07/22  5:01 AM   Result Value Ref Range    WBC 7.5 4.3 - 11.1 K/uL    RBC 3.31 (L) 4.05 - 5.2 M/uL    Hemoglobin 8.6 (L) 11.7 - 15.4 g/dL    Hematocrit 27.7 (L) 35.8 - 46.3 %    MCV 83.7 79.6 - 97.8 FL    MCH 26.0 (L) 26.1 - 32.9 PG    MCHC 31.0 (L) 31.4 - 35.0 g/dL    RDW 16.3 (H) 11.9 - 14.6 %    Platelets 169 167 - 536 K/uL    MPV 9.4 9.4 - 12.3 FL    nRBC 0.00 0.0 - 0.2 K/uL   POCT Glucose    Collection Time: 06/07/22  5:01 AM   Result Value Ref Range    POC Glucose 153 (H) 65 - 100 mg/dL    Performed by: [facility]    Renal Function Panel    Collection Time: 06/07/22  5:01 AM   Result Value Ref Range    Sodium 142 136 - 145 mmol/L    Potassium 3.5 3.5 - 5.1 mmol/L    Chloride 105 98 - 107 mmol/L    CO2 30 21 - 32 mmol/L    Anion Gap 7 7 - 16 mmol/L    Glucose 148 (H) 65 - 100 mg/dL    BUN 9 6 - 23 MG/DL    CREATININE 4.40 (H) 0.6 - 1.0 MG/DL    GFR African American 14 (L) >60 ml/min/1.73m2    GFR Non- 11 (L) >60 ml/min/1.73m2 Calcium 8.1 (L) 8.3 - 10.4 MG/DL    Phosphorus 3.4 2.5 - 4.5 MG/DL    Albumin 1.8 (L) 3.5 - 5.0 g/dL   POCT Glucose    Collection Time: 06/07/22 11:40 AM   Result Value Ref Range    POC Glucose 180 (H) 65 - 100 mg/dL    Performed by: Azalia        Other Studies:  No results found. Current Meds:  Current Facility-Administered Medications   Medication Dose Route Frequency    melatonin tablet 3 mg  3 mg Oral Nightly    insulin glargine (LANTUS) injection vial 22 Units  22 Units SubCUTAneous Nightly    ondansetron (ZOFRAN) injection 4 mg  4 mg IntraVENous Q6H PRN    insulin lispro (HUMALOG) injection vial 0-4 Units  0-4 Units SubCUTAneous TID WC    insulin lispro (HUMALOG) injection vial 0-4 Units  0-4 Units SubCUTAneous Nightly    glucose chewable tablet 16 g  4 tablet Oral PRN    dextrose bolus 10% 125 mL  125 mL IntraVENous PRN    Or    dextrose bolus 10% 250 mL  250 mL IntraVENous PRN    glucagon injection 1 mg  1 mg IntraMUSCular PRN    dextrose 5 % solution  100 mL/hr IntraVENous PRN    acetaminophen (TYLENOL) tablet 650 mg  650 mg Oral Q6H PRN    Or    acetaminophen (TYLENOL) suppository 650 mg  650 mg Rectal Q6H PRN    polyethylene glycol (GLYCOLAX) packet 17 g  17 g Oral Daily PRN    albuterol (PROVENTIL) nebulizer solution 2.5 mg  2.5 mg Nebulization Q4H PRN    ferrous sulfate (IRON 325) tablet 325 mg  325 mg Oral BID WC    folic acid (FOLVITE) tablet 1 mg  1 mg Oral Daily    guaiFENesin-dextromethorphan (ROBITUSSIN DM) 100-10 MG/5ML syrup 5 mL  5 mL Oral Q4H PRN    insulin lispro (HUMALOG) injection vial 5 Units  5 Units SubCUTAneous TID WC    loperamide (IMODIUM) capsule 2 mg  2 mg Oral 4x Daily PRN    medicated lip ointment (BLISTEX)   Topical PRN    ondansetron (ZOFRAN-ODT) disintegrating tablet 4 mg  4 mg Oral Q6H PRN       Signed:  Jolynn Gibbons MD    Part of this note may have been written by using a voice dictation software.   The note has been proof read but may still contain some grammatical/other typographical errors.

## 2022-06-07 NOTE — PROGRESS NOTES
Theadora Harada, MD  Medical Director  [address] [facility], [address] [facility]  Tel: [phone]       [facility] PROGRESS NOTE    Shyann Claudio  Admit Date: 6/2/2022  Admit Diagnosis:   Debility [R53.81]  Physical debility [R53.81]    Subjective     Patient seen and examined. Doing well. No complaints. Wants to go home but told OT she would stay to complete her therapies. She is resistant to using an AD at home.  No cp, sob, n/v    Objective:     Current Facility-Administered Medications   Medication Dose Route Frequency    melatonin tablet 3 mg  3 mg Oral Nightly    insulin glargine (LANTUS) injection vial 22 Units  22 Units SubCUTAneous Nightly    ondansetron (ZOFRAN) injection 4 mg  4 mg IntraVENous Q6H PRN    insulin lispro (HUMALOG) injection vial 0-4 Units  0-4 Units SubCUTAneous TID WC    insulin lispro (HUMALOG) injection vial 0-4 Units  0-4 Units SubCUTAneous Nightly    glucose chewable tablet 16 g  4 tablet Oral PRN    dextrose bolus 10% 125 mL  125 mL IntraVENous PRN    Or    dextrose bolus 10% 250 mL  250 mL IntraVENous PRN    glucagon injection 1 mg  1 mg IntraMUSCular PRN    dextrose 5 % solution  100 mL/hr IntraVENous PRN    acetaminophen (TYLENOL) tablet 650 mg  650 mg Oral Q6H PRN    Or    acetaminophen (TYLENOL) suppository 650 mg  650 mg Rectal Q6H PRN    polyethylene glycol (GLYCOLAX) packet 17 g  17 g Oral Daily PRN    albuterol (PROVENTIL) nebulizer solution 2.5 mg  2.5 mg Nebulization Q4H PRN    ferrous sulfate (IRON 325) tablet 325 mg  325 mg Oral BID WC    folic acid (FOLVITE) tablet 1 mg  1 mg Oral Daily    guaiFENesin-dextromethorphan (ROBITUSSIN DM) 100-10 MG/5ML syrup 5 mL  5 mL Oral Q4H PRN    insulin lispro (HUMALOG) injection vial 5 Units  5 Units SubCUTAneous TID WC    loperamide (IMODIUM) capsule 2 mg  2 mg Oral 4x Daily PRN    medicated lip ointment (BLISTEX)   Topical PRN    ondansetron (ZOFRAN-ODT) disintegrating tablet 4 mg  4 mg Oral Q6H PRN        Review of Systems:   Denies chest pain, shortness of breath, cough, headache, visual problems, abdominal pain, dysuria, calf pain. Pertinent positives are as noted in the HPI, ROS unremarkable otherwise. Visit Vitals  /67   Pulse 100   Temp 99.1 °F (37.3 °C) (Oral)   Resp 18   Ht 5' 6\" (1.676 m)   Wt 170 lb (77.1 kg)   SpO2 94%   BMI 27.44 kg/m²        Physical Exam:   General: Alert and age appropriately oriented. No acute cardiorespiratory distress. HEENT: Normocephalic, EOM intact. Oral mucosa moist without cyanosis. Lungs: Clear to auscultation bilaterally. Respiration even and unlabored. Heart: Regular rate and rhythm, S1, S2. No murmurs, clicks, rub or gallops. Abdomen: Soft, non-tender, not distended. Bowel sounds normoactive. No organomegaly. Genitourinary: Deferred. Neuromuscular:      No gross focal motor deficits noted. Insight improving   Skin/extremity: No rashes, no erythema. No calf tenderness B LE. Trace edema                                                                             Labs/Studies:  Recent Results (from the past 72 hour(s))   POCT Glucose    Collection Time: 06/04/22 11:19 AM   Result Value Ref Range    POC Glucose 86 65 - 100 mg/dL    Performed by: Corey    POCT Glucose    Collection Time: 06/04/22  4:37 PM   Result Value Ref Range    POC Glucose 114 (H) 65 - 100 mg/dL    Performed by:  Corey    POCT Glucose    Collection Time: 06/04/22  9:24 PM   Result Value Ref Range    POC Glucose 168 (H) 65 - 100 mg/dL    Performed by: De Kulkarni    Basic Metabolic Panel    Collection Time: 06/05/22  5:32 AM   Result Value Ref Range    Sodium 140 136 - 145 mmol/L    Potassium 3.4 (L) 3.5 - 5.1 mmol/L    Chloride 105 98 - 107 mmol/L    CO2 30 21 - 32 mmol/L    Anion Gap 5 (L) 7 - 16 mmol/L    Glucose 211 (H) 65 - 100 mg/dL    BUN 7 6 - 23 MG/DL    CREATININE 3.50 (H) 0.6 - 1.0 MG/DL    GFR African American 18 (L) >60 ml/min/1.73m2    GFR Non- 15 (L) >60 ml/min/1.73m2    Calcium 8.4 8.3 - 10.4 MG/DL   CBC    Collection Time: 06/05/22  5:32 AM   Result Value Ref Range    WBC 9.2 4.3 - 11.1 K/uL    RBC 3.23 (L) 4.05 - 5.2 M/uL    Hemoglobin 8.3 (L) 11.7 - 15.4 g/dL    Hematocrit 26.8 (L) 35.8 - 46.3 %    MCV 83.0 79.6 - 97.8 FL    MCH 25.7 (L) 26.1 - 32.9 PG    MCHC 31.0 (L) 31.4 - 35.0 g/dL    RDW 16.0 (H) 11.9 - 14.6 %    Platelets 332 145 - 928 K/uL    MPV 9.4 9.4 - 12.3 FL    nRBC 0.00 0.0 - 0.2 K/uL   POCT Glucose    Collection Time: 06/05/22  6:40 AM   Result Value Ref Range    POC Glucose 203 (H) 65 - 100 mg/dL    Performed by: Kandi Medina    POCT Glucose    Collection Time: 06/05/22 11:33 AM   Result Value Ref Range    POC Glucose 129 (H) 65 - 100 mg/dL    Performed by: Corey    POCT Glucose    Collection Time: 06/05/22  4:39 PM   Result Value Ref Range    POC Glucose 214 (H) 65 - 100 mg/dL    Performed by:  Corey    POCT Glucose    Collection Time: 06/05/22  7:19 PM   Result Value Ref Range    POC Glucose 35 (LL) 65 - 100 mg/dL    Performed by: Perri    POCT Glucose    Collection Time: 06/05/22  7:37 PM   Result Value Ref Range    POC Glucose 77 65 - 100 mg/dL    Performed by: Perri    POCT Glucose    Collection Time: 06/05/22  7:54 PM   Result Value Ref Range    POC Glucose 98 65 - 100 mg/dL    Performed by: Kandi Medina    POCT Glucose    Collection Time: 06/05/22  9:05 PM   Result Value Ref Range    POC Glucose 324 (H) 65 - 100 mg/dL    Performed by: Kandi Medina    Basic Metabolic Panel    Collection Time: 06/06/22  5:44 AM   Result Value Ref Range    Sodium 140 136 - 145 mmol/L    Potassium 3.5 3.5 - 5.1 mmol/L    Chloride 104 98 - 107 mmol/L    CO2 28 21 - 32 mmol/L    Anion Gap 8 7 - 16 mmol/L    Glucose 239 (H) 65 - 100 mg/dL    BUN 8 6 - 23 MG/DL    CREATININE 4.20 (H) 0.6 - 1.0 MG/DL    GFR  American 15 (L) >60 ml/min/1.73m2    GFR Non- 12 (L) >60 ml/min/1.73m2    Calcium 7.9 (L) 8.3 - 10.4 MG/DL   CBC    Collection Time: 06/06/22  5:44 AM   Result Value Ref Range    WBC 7.9 4.3 - 11.1 K/uL    RBC 2.93 (L) 4.05 - 5.2 M/uL    Hemoglobin 7.6 (L) 11.7 - 15.4 g/dL    Hematocrit 24.8 (L) 35.8 - 46.3 %    MCV 84.6 79.6 - 97.8 FL    MCH 25.9 (L) 26.1 - 32.9 PG    MCHC 30.6 (L) 31.4 - 35.0 g/dL    RDW 16.2 (H) 11.9 - 14.6 %    Platelets 787 716 - 856 K/uL    MPV 9.8 9.4 - 12.3 FL    nRBC 0.00 0.0 - 0.2 K/uL   POCT Glucose    Collection Time: 06/06/22  6:22 AM   Result Value Ref Range    POC Glucose 255 (H) 65 - 100 mg/dL    Performed by: Stacey Acosta    POCT Glucose    Collection Time: 06/06/22 10:16 AM   Result Value Ref Range    POC Glucose 352 (H) 65 - 100 mg/dL    Performed by: Garcia ([place])    POCT Glucose    Collection Time: 06/06/22 12:09 PM   Result Value Ref Range    POC Glucose 254 (H) 65 - 100 mg/dL    Performed by: Wu ([place])CNA    POCT Glucose    Collection Time: 06/06/22  4:11 PM   Result Value Ref Range    POC Glucose 139 (H) 65 - 100 mg/dL    Performed by: Garcia ([place])    POCT Glucose    Collection Time: 06/06/22  9:25 PM   Result Value Ref Range    POC Glucose 245 (H) 65 - 100 mg/dL    Performed by: [facility]    CBC    Collection Time: 06/07/22  5:01 AM   Result Value Ref Range    WBC 7.5 4.3 - 11.1 K/uL    RBC 3.31 (L) 4.05 - 5.2 M/uL    Hemoglobin 8.6 (L) 11.7 - 15.4 g/dL    Hematocrit 27.7 (L) 35.8 - 46.3 %    MCV 83.7 79.6 - 97.8 FL    MCH 26.0 (L) 26.1 - 32.9 PG    MCHC 31.0 (L) 31.4 - 35.0 g/dL    RDW 16.3 (H) 11.9 - 14.6 %    Platelets 686 144 - 176 K/uL    MPV 9.4 9.4 - 12.3 FL    nRBC 0.00 0.0 - 0.2 K/uL   POCT Glucose    Collection Time: 06/07/22  5:01 AM   Result Value Ref Range    POC Glucose 153 (H) 65 - 100 mg/dL    Performed by: [facility]    Renal Function Panel    Collection Time: 06/07/22  5:01 AM   Result Value Ref Range    Sodium 142 136 - 145 mmol/L    Potassium 3.5 3.5 - 5.1 mmol/L    Chloride 105 98 - 107 mmol/L    CO2 30 21 - 32 mmol/L    Anion Gap 7 7 - 16 mmol/L    Glucose 148 (H) 65 - 100 mg/dL    BUN 9 6 - 23 MG/DL    CREATININE 4.40 (H) 0.6 - 1.0 MG/DL    GFR African American 14 (L) >60 ml/min/1.73m2    GFR Non- 11 (L) >60 ml/min/1.73m2    Calcium 8.1 (L) 8.3 - 10.4 MG/DL    Phosphorus 3.4 2.5 - 4.5 MG/DL    Albumin 1.8 (L) 3.5 - 5.0 g/dL       Assessment:     Principal Problem:    Physical debility  Active Problems:    Acute kidney injury superimposed on chronic kidney disease (HCC)    Benign hypertension with CKD (chronic kidney disease) stage III (HCC)    Cardiac arrest ([facility])    Diabetic ketoacidosis with coma associated with type 1 diabetes mellitus ([facility])    Normocytic anemia    Depression with anxiety    Diabetic polyneuropathy associated with type 1 diabetes mellitus ([facility])    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 *       Physical debility following DKA with coma, septic shock, acute renal failure, acute respiratory failure, cardiac arrest and altered mental status     Plan / Recommendations / Medical Decision Making:      Continue daily physician / PA medical management:     Physical debility [R53.81] - good rehab potential, aggressive PT, OT to tolerance. SLP for swallowing surveillance, cognitive evaluation post-arrest, DKA with coma.     Cardiac arrest, acute respiratory failure - resolved; multifactorial, most likely due to DKA with coma, septic shock. Intubated 5/21, extubated 5/25, now on room air; no prior history of supplemental O2 use.  TTE (5/22) with EF 94-29%, normal systolic and diastolic function, moderate AV stenosis.     Severe sepsis with septic shock - question of bacterial pneumonia vs UTI; was on vanc+Zosyn throughout acute hospitalization, EOT 6/2. UCx, BCx NGTD. Leukocytosis attributed to stress response.  -6/3 WBC 13.7, trending down; had UA yesterday with + leuk (moderate), 1+ bacteria; UCx negative after short incubation  -6/4 WBC 11.8  -6/6 WBC 7.9; 6/7 7.5 nl     Acute renal failure, BLANCA on CKD3 - Cr on admission 9.7 (baseline ~2.5), oliguric; dialysis started 5/24, temp cath switched to tunneled cath 6.1. Nephrology managing HD, patient will run next HD 6/4 for TThSa transition. Avoid hypotension and nephrotoxic drugs.   -6/3 no HD today, transitioning to a TThSa schedule for therapies; Cr 5.3 (4.6 yesterday)  -6/4 normal HD run; per Nephrologist, she is very likely ESRD  -6/6 has HD chair at [facility]  -6/7 can likely dc after HD on Sat if she is going home with parents. Needs supervision . Recommend no driving ; UOP 176UA     Blood pressure management - monitor. Patient occasionally hypertensive but mostly normo- or hypotensive.  -6/3 BP 103/61 this AM, lowest of past 24 hrs  -6/4 /70  -6/6 122/77     Diabetes mellitus - reportedly type 1, last HgbA1c found in EMR was 9.6% but from 6/2020; with admission for DKA and BS >2000, patient likely with poor glycemic control. Will require ACHS fasting glucose monitoring and medication adjustment to optimize glycemic control in setting of acute illness and hospitalization. Per DM RN, current regimen is Lantus 10u QHS with Humalog 5u TID with meals.  Monitor and adjust.  -6/3  this AM, 170 last PM, 198 at supper and 184 at lunch; d/w Diabetic RN this AM; subsequent emesis, nausea: labs indicate DKA, with AG of 26 and ; consult Hospitalist now  -6/4 patient much better today, labs more normal (x hypoK+), DKA caught early yesterday, appreciate Hospitalist  -6/6 BS dropped to 35 last evening, appears from EMR that she received her pre-prandial supper Humalog but then did not eat any / much supper; also, DM RN recommends small increase in Lantus --> will bump to 22u QHS  -6/7  this am., 245 last noc, 139 at supper, 254 at lunch yesterday; 180 at lunch today. slt improved.      Anemia - normocytic, chronic. Baseline Hgb >10-11 but dipped to 7-8 in acute; Hgb 7.3 at [facility] admission on 6/2. Multifactorial: anemia of CKD, frequent blood draws; no active bleeding noted, hemoccult pending. Iron studies (6/1) c/w chronic disease. Vitamin B12 adequate, folate low 4.9; on PO iron, folic acid supplements. Holding heparin SQ for now.  -6/3 Hgb 8.8 from 7.3, improving  -6/4 Hgb 8.6, stable  -6/6 Hgb 7.6 today, was 8.3 yesterday; monitor and transfuse (with HD) PRN  -6/7 hgb 8.6 improved     Pain management - no current joint or muscle symptoms, essentially pain-free. Will require regular pain assessment and comprehensive pain management.     Electrolyte management - no current abnormality, has been both hyperK+ and hypoK+; monitor and replace as needed. -6/4 was called last night with PM lab K+ 3.0 --> gave 40mEq PO x 1; recheck this AM 3.2 --> another 40mEq PO x 1 ordered, patient received after returned from HD  -6/6 K+, Na normal     Pneumonia prophylaxis - incentive spirometer every hour while awake.     DVT risk / DVT prophylaxis - daily physician / PA exam to assess as patient is at increased risk for of thromboembolism. Mobilize as tolerated. Sequential pneumatic compression devices (SCDs) when in bed; thigh-high or knee-high thromboembolic deterrent hose when out of bed. Heparin SQ held due to anemia.      GI prophylaxis - consider PPI. At times may need additional antacids, Maalox prn.     History of tobacco use - former cigarette smoker, reportedly quit in 2013. No current nicotine cravings.     Depression - noted in problem list in 2017, no current pharmacotherapy. Monitor. At risk of depression / exacerbation due to physical debility, loss of independence.     General skin care / wound prevention - monitor general skin wound status daily per staff and physician / PA.  At risk for failure due to impaired mobility.     Bladder program - schedule voids q6-8h. Check post-void residual as needed; in-and-out catheter if post-void residual is more than 400ml.     Bowel program - at risk for constipation as a side effect of medications, impaired mobility, etc. MiraLAX daily for regularity, Berta-Colace for stool softener. PRN MOM, bisacodyl suppository or tablets for constipation.                         Time spent was 25 minutes with over 1/2 in direct patient care/examination, consultation and coordination of care.      Signed By: Tika Morin MD, MD     June 7, 2022

## 2022-06-07 NOTE — PROGRESS NOTES
Pt sitting up in the recliner. States, \"I know I will be tired today. \"  Pt melatonin lying on the table. This RN thought pt put it in her mouth. She also dropped a melatonin that this RN wasted. Given sugar free popcicle. Pt urinated x 2 since 7pm (300ml total).

## 2022-06-07 NOTE — PROGRESS NOTES
OT DAILY NOTE  Time In 1330   Time Out 1346     Subjective: \"I'm just glad I'm not sick. \" Pt agreeable to treatment. Pain: Patient denies pain. Interdisciplinary Communication: Collaborated with PT regarding handoff communication. Precautions: Falls    /67   Pulse 100   Temp 99.1 °F (37.3 °C) (Oral)   Resp 18   Ht 5' 6\" (1.676 m)   Wt 170 lb (77.1 kg)   SpO2 94%   BMI 27.44 kg/m²      FUNCTIONAL MOBILITY:    Score Comments   Sit to Stand Supervision or touching assistance SBA   Transfer Assist Supervision or touching assistance SBA recliner to w/c         - Activity Tolerance - Strengthening   Pt completed the following exercises with 5 lb abundio to promote UB strength, activity tolerance, and shoulder stabilization for integration into functional tasks:  Exercise Reps Comments   Protraction/Retraction x25    Abduction/Adduction x20    Circles x20 1/2 CW, 1/2 CCW   Vs x25         Education   Benefits of OT     Assessment: Patient demonstrated good participation in OT treatment. Pt continues to benefit from skilled OT services to address remaining deficits and progress toward baseline level of independence and safety. Patient ended session seated in w/c with PT. Plan: Continue OT POC.      Pamela Khan OT   6/7/2022

## 2022-06-07 NOTE — PROGRESS NOTES
GOALS:   STG: Patient will complete intermediate level verbal/visual reasoning tasks with 80% accuracy with minimal assistance. STG: Patient will complete mental manipulation tasks with 80% accuracy with minimal assistance. STG: Patient will complete functional short-term memory tasks with 80% accuracy with minimal assistance. STG: Patient will demonstrate understanding of and implement diaphragmatic breathing technique with 90% accuracy and SBA  STG: Patient will complete basic level resonant voicing exercises with 90% accuracy and SBA for improved vocal quality    LTG: Patient will increase neuro-linguistic abilities to increase safety and awareness of deficits. SPEECH LANGUAGE PATHOLOGY: COMMUNICATION Initial Assessment and VOICE TREATMENT    MRN: [id number]    ADMISSION DATE: 6/2/2022  ADMITTING DIAGNOSIS: has Ulcer, skin, chronic ([address] Depression with anxiety; Moderate single current episode of major depressive disorder ([address] Encounter to discuss test results; Encounter for annual routine gynecological examination; CKD (chronic kidney disease), stage IV ([address] Diabetic polyneuropathy associated with type 1 diabetes mellitus ([address] Malodorous urine; CKD (chronic kidney disease) stage 3, GFR 30-59 ml/min ([address] Type 1 diabetes mellitus with stage 3 chronic kidney disease ([address] H/O gastroesophageal reflux (GERD); Neuropathy, peripheral, idiopathic; 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 associated with type 1 diabetes mellitus ([address] Premature ovarian failure; Noncompliance with medication regimen; Ureteric stone; Urinary tract infection without hematuria; Hydronephrosis of right kidney; Diabetes mellitus type 1 ([address] Nausea; BLANCA (acute kidney injury) ([address] Acute kidney injury superimposed on chronic kidney disease ([address] Benign hypertension with CKD (chronic kidney disease) stage III (HCC); IDDM (insulin dependent diabetes mellitus); Cardiac arrest ([address]  Diabetic ketoacidosis with coma associated with type 1 diabetes mellitus ([facility] [address] Severe sepsis with septic shock (CODE) ([facility] [address] Pneumonia, bacterial; Acute respiratory failure ([facility] [address] Altered mental status; Frailty; Septic shock ([facility] [address] Normocytic anemia; and Physical debility on their problem list.  Date of Eval: 6/7/2022    ICD-10: Treatment Diagnosis: R41.841 Cognitive-Communication Deficit    RECOMMENDATIONS:   Recommendations:    Dysphonia treatment  Dysphagia treatment  Cognitive treatment     Patient continues to require skilled intervention: Yes     ASSESSMENT    Patient participated with the ACEIII to assess cognitive function. Difficulty completing basic visual reasoning activities during OT session this am with problem solving component more of an issue versus visual deficits. Overall score of 68/100 on ACEIII with a norm of 88. Patient reports she was independent with ADLs prior to admission; on disability. Immediate memory was fair but with decreased short-term recall (free recall impaired but able to choose correctly in multiple choice format consistently). Decreased visuospatial skills with impaired planning and execution of fairly basic tasks. Patient demonstrated decreased naming abilities with pictures at 8/12; she reported pre-morbid with less familiar pictures. Full results of cognitive screening outlined below. Also introduced beginner vocal exercises. Clavicular breathing noted but able to demonstrate back diaphragmatic breathing technique with a direct model and verbal cues provided. Moderate cueing for resonant voicing with humming to improve forward resonance and reduce vocal strain. Observed with trials of thin liquids via the straw with no overt signs/sx of aspiration this date. Patient independently applying safe swallowing strategies. Reports she was happy to have a salad with her meal yesterday after diet upgraded. Continued ST for dysphonia/dysphagia/cognition indicated.     GENERAL Cooperative. Pain:   Patient does not c/o pain              OBJECTIVE     Tests Given: [place]'s Cognitive Examination-ACE III; Patient was assessed using the [facility] [place] Cognitive Examination. This examination assesses memory, attention, fluency, language and visual spatial skills. Results were as follows: Attention: 17/18  Memory: 17/26  Fluency: 7/14  Language: 18/26  Visuospatial: 9/16  Total Score: 68/100  Cut-off score criterion for total score is 88. Patient's score is 68/100. PLAN    Duration/Frequency: Continue to follow patient 5x/week for duration of hospitalization and/or until goals met         MODIFIED DUNIA SCALE (mRS) SCORE: 3  Interpretation of Tool: The Modified Dunia Scale is a scale used to quantify level of disability as it relates to a patient's functional abilities. No Symptoms(0); No significant disability despite symptoms; able to carry out all usual duties and activities(1); Slight disability; unable to carry out all previous activities but able to look after own affairs without assistance(2); Moderate disability; requiring some help but able to walk without assistance(3); Moderately severe disability; unable to walk without assistance and unable to attend to own bodily needs without assistance(4); Severe disability; bedridden, incontinent, and requiring constant nursing care and attention(5)      Education:       Patient Education Response: Needs reinforcement    Current Medications:   No current facility-administered medications on file prior to encounter.      Current Outpatient Medications on File Prior to Encounter   Medication Sig Dispense Refill    albuterol (PROVENTIL) (2.5 MG/3ML) 0.083% nebulizer solution Take 3 mLs by nebulization every 4 hours as needed for Wheezing 120 each 3    insulin glargine (LANTUS) 100 UNIT/ML injection vial Inject 10 Units into the skin nightly 10 mL 3    insulin lispro (HUMALOG) 100 UNIT/ML SOLN injection vial Inject 0-4 Units into the skin 4 times daily (before meals and nightly) 10 mL 0    insulin lispro (HUMALOG) 100 UNIT/ML SOLN injection vial Inject 5 Units into the skin 3 times daily (with meals) 10 mL 0    ferrous sulfate (IRON 325) 325 (65 Fe) MG tablet Take 1 tablet by mouth 2 times daily (with meals) 60 tablet 3    folic acid (FOLVITE) 1 MG tablet Take 1 tablet by mouth daily 30 tablet 3    atorvastatin (LIPITOR) 80 MG tablet Take 80 mg by mouth daily (Patient not taking: Reported on 5/27/2022)      brimonidine (ALPHAGAN P) 0.1 % SOLN 1 drop in left eye twice a day (Patient not taking: Reported on 5/27/2022)      ergocalciferol (ERGOCALCIFEROL) 1.25 MG (49043 UT) capsule Take 50,000 Units by mouth (Patient not taking: Reported on 5/27/2022)      gabapentin (NEURONTIN) 300 MG capsule Take 300 mg by mouth daily. (Patient not taking: Reported on 5/27/2022)      ondansetron (ZOFRAN-ODT) 4 MG disintegrating tablet Take 4 mg by mouth every 8 hours as needed (Patient not taking: Reported on 5/27/2022)       PRECAUTIONS/ALLERGIES: Patient has no known allergies. Therapy Time  SLP Individual Minutes  Time In: 6124  Time Out: 9779  Minutes: 39     SLP Total Treatment Time  Total Treatment Time: [facility].  RYLEY Kim  6/7/2022 12:11 PM

## 2022-06-08 LAB
ANION GAP SERPL CALC-SCNC: 12 MMOL/L (ref 7–16)
BUN SERPL-MCNC: 9 MG/DL (ref 6–23)
CALCIUM SERPL-MCNC: 7.7 MG/DL (ref 8.3–10.4)
CHLORIDE SERPL-SCNC: 109 MMOL/L (ref 98–107)
CO2 SERPL-SCNC: 23 MMOL/L (ref 21–32)
CREAT SERPL-MCNC: 4.1 MG/DL (ref 0.6–1)
ERYTHROCYTE [DISTWIDTH] IN BLOOD BY AUTOMATED COUNT: 17 % (ref 11.9–14.6)
GLUCOSE BLD STRIP.AUTO-MCNC: 193 MG/DL (ref 65–100)
GLUCOSE BLD STRIP.AUTO-MCNC: 196 MG/DL (ref 65–100)
GLUCOSE BLD STRIP.AUTO-MCNC: 222 MG/DL (ref 65–100)
GLUCOSE BLD STRIP.AUTO-MCNC: 77 MG/DL (ref 65–100)
GLUCOSE BLD STRIP.AUTO-MCNC: 96 MG/DL (ref 65–100)
GLUCOSE SERPL-MCNC: 215 MG/DL (ref 65–100)
HCT VFR BLD AUTO: 29.6 % (ref 35.8–46.3)
HGB BLD-MCNC: 9 G/DL (ref 11.7–15.4)
MCH RBC QN AUTO: 26.2 PG (ref 26.1–32.9)
MCHC RBC AUTO-ENTMCNC: 30.4 G/DL (ref 31.4–35)
MCV RBC AUTO: 86 FL (ref 79.6–97.8)
NRBC # BLD: 0 K/UL (ref 0–0.2)
PLATELET # BLD AUTO: 335 K/UL (ref 150–450)
PMV BLD AUTO: 9.4 FL (ref 9.4–12.3)
POTASSIUM SERPL-SCNC: 3.7 MMOL/L (ref 3.5–5.1)
RBC # BLD AUTO: 3.44 M/UL (ref 4.05–5.2)
SERVICE CMNT-IMP: ABNORMAL
SERVICE CMNT-IMP: NORMAL
SERVICE CMNT-IMP: NORMAL
SODIUM SERPL-SCNC: 144 MMOL/L (ref 136–145)
WBC # BLD AUTO: 7.3 K/UL (ref 4.3–11.1)

## 2022-06-08 PROCEDURE — 97535 SELF CARE MNGMENT TRAINING: CPT

## 2022-06-08 PROCEDURE — 80048 BASIC METABOLIC PNL TOTAL CA: CPT

## 2022-06-08 PROCEDURE — 6370000000 HC RX 637 (ALT 250 FOR IP): Performed by: PHYSICIAN ASSISTANT

## 2022-06-08 PROCEDURE — 85027 COMPLETE CBC AUTOMATED: CPT

## 2022-06-08 PROCEDURE — 82962 GLUCOSE BLOOD TEST: CPT

## 2022-06-08 PROCEDURE — 97116 GAIT TRAINING THERAPY: CPT

## 2022-06-08 PROCEDURE — 1180000000 HC REHAB R&B

## 2022-06-08 PROCEDURE — 97530 THERAPEUTIC ACTIVITIES: CPT

## 2022-06-08 PROCEDURE — 97110 THERAPEUTIC EXERCISES: CPT

## 2022-06-08 PROCEDURE — 99232 SBSQ HOSP IP/OBS MODERATE 35: CPT | Performed by: PHYSICAL MEDICINE & REHABILITATION

## 2022-06-08 PROCEDURE — 92526 ORAL FUNCTION THERAPY: CPT

## 2022-06-08 PROCEDURE — 6370000000 HC RX 637 (ALT 250 FOR IP): Performed by: STUDENT IN AN ORGANIZED HEALTH CARE EDUCATION/TRAINING PROGRAM

## 2022-06-08 PROCEDURE — 36415 COLL VENOUS BLD VENIPUNCTURE: CPT

## 2022-06-08 PROCEDURE — 92507 TX SP LANG VOICE COMM INDIV: CPT

## 2022-06-08 PROCEDURE — 6370000000 HC RX 637 (ALT 250 FOR IP): Performed by: PHYSICAL MEDICINE & REHABILITATION

## 2022-06-08 RX ORDER — INSULIN GLARGINE 100 [IU]/ML
26 INJECTION, SOLUTION SUBCUTANEOUS NIGHTLY
Status: DISCONTINUED | OUTPATIENT
Start: 2022-06-08 | End: 2022-06-10

## 2022-06-08 RX ORDER — INSULIN LISPRO 100 [IU]/ML
5 INJECTION, SOLUTION INTRAVENOUS; SUBCUTANEOUS
Status: DISCONTINUED | OUTPATIENT
Start: 2022-06-08 | End: 2022-06-11 | Stop reason: HOSPADM

## 2022-06-08 RX ORDER — INSULIN LISPRO 100 [IU]/ML
7 INJECTION, SOLUTION INTRAVENOUS; SUBCUTANEOUS
Status: DISCONTINUED | OUTPATIENT
Start: 2022-06-08 | End: 2022-06-08

## 2022-06-08 RX ORDER — INSULIN GLARGINE 100 [IU]/ML
24 INJECTION, SOLUTION SUBCUTANEOUS NIGHTLY
Status: DISCONTINUED | OUTPATIENT
Start: 2022-06-08 | End: 2022-06-08

## 2022-06-08 RX ADMIN — INSULIN GLARGINE 26 UNITS: 100 INJECTION, SOLUTION SUBCUTANEOUS at 21:29

## 2022-06-08 RX ADMIN — FOLIC ACID 1 MG: 1 TABLET ORAL at 08:28

## 2022-06-08 RX ADMIN — INSULIN LISPRO 1 UNITS: 100 INJECTION, SOLUTION INTRAVENOUS; SUBCUTANEOUS at 07:32

## 2022-06-08 RX ADMIN — INSULIN LISPRO 7 UNITS: 100 INJECTION, SOLUTION INTRAVENOUS; SUBCUTANEOUS at 07:44

## 2022-06-08 RX ADMIN — FERROUS SULFATE TAB 325 MG (65 MG ELEMENTAL FE) 325 MG: 325 (65 FE) TAB at 17:52

## 2022-06-08 RX ADMIN — FERROUS SULFATE TAB 325 MG (65 MG ELEMENTAL FE) 325 MG: 325 (65 FE) TAB at 08:28

## 2022-06-08 NOTE — PROGRESS NOTES
Hospitalist Progress Note   Admit Date:  2022 12:33 PM   Name:  Manjit Molian   Age:  48 y.o. Sex:  female  :  1975   MRN:  [id number]   Room:  [facility] [place]    Presenting Complaint: hyperglycemia   Recent cardiac arrest      Reason(s) for Admission: Debility [R53.81]  Physical debility [R53.81]     Hospital Course & Interval History:     Manjit Molina is a 48 y.o. female with history of   DM type 1 with recent DKA   Recent cardiac arrest   Recent respiratory failure and was on ventilator  Hypertension   Hyperlipidemia   Recent BLANCA on CKD, HD dependent now. Depression and anxiety      who was admitted for physical rehabilitation after recent DKA and cardiac arrest and was admitted to ICU and was on ventilator.      She developed BLANCA on CKD during that admission in ICU and SLED was started. She currently is on HD. Last HD was Wednesday. Her next HD is tomorrow. She still makes urine 2-3 times per day.       Patient recovered from that recent ICU admission and sent to rehab floor on 2022. Today she feels nauseated. Her BS was  456 at 6 AM and she was treated with Humalog 5 units SC before breakfast. BMP shows HCO3 of 15. BUN of 22 and creatinine of 5.3.      Before lunch time today her BS is 188. She received 10 units of Lantus last night. Her usual dose of Lantus is 30 units sc hs before she was admitted.      Due to her hyperglycemia, acidosis with nausea, Hospitalist service is requested to give consultation help for the patient.         No fever. No shaking. No chills. No chest pain. No shortness of breath. No blood per rectum. No blood per urine. No abdominal pain   No abnormal movements.      She still makes some urine 2-3 times per day.      Medication records are reviewed. Subjective/24hr Events (22):    22   Feeling OK. Working with occupational therapy on her fine muscle movements.    BS has improved to around 200s ranges and she has no acidosis. Eating OK.     6/5/22   Patient is doing well. No fever. No shaking. No chills. No chest pain. No shortness of breath. Patient is ambulating better. 6/6/22   Patient is doing well with PT and OT. BS is doing well in 200s ranges. No new complaints. 6/7/22  Patient is doing well with PT and OT.     6/8  Pt was sitting on chair and very comfortable. She was having apple juice. Assessment & Plan:     Principal Problem:    Physical debility  Continue physical therapy as per acute rehab team.          Acute kidney injury superimposed on chronic kidney disease ([address]  Plan for next HD as per nephrologist.   Monitor urine and intake and output. Urine output seems picking up. BUN is only 9 today although creatinine is 4.10  Monitor and see the need for HD.         Benign hypertension with CKD (chronic kidney disease) stage III ([facility])  Pt is normotensive  Monitor   Not on anti hypertensives     Cardiac arrest ([facility]  Recovered. No neuro-deficit. Echo shows EF 70-75% after cardiac arrest.   Found to have moderate AV stenosis.        Diabetic ketoacidosis with coma associated with type 1 diabetes mellitus (HCC)  BS is improved 215    Improvement of acidosis    Lantus dose decreased to 16units sc hs from 24   Monitor blood sugar. Cover with insulin sliding scale accordingly. Continue current DM regimen.        Normocytic anemia  Noted        Depression with anxiety  Noted   Will need to be addressed later after she is improved from acute hyperglycemia problems.        Diabetic polyneuropathy associated with type 1 diabetes mellitus (HCC)  Noted     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 associated with type 1 diabetes mellitus ([facility]  Noted     I have discussed the plan of care with patient. Discharge Planning:  Discharge on 6/11     Diet:  ADULT ORAL NUTRITION SUPPLEMENT; Breakfast, Lunch, Dinner; Diabetic Oral Supplement  ADULT DIET;  Regular; 3 carb choices (45 gm/meal)  DVT PPx: SCD  Code status: Full Code    Hospital Problems:  Principal Problem:    Physical debility  Active Problems:    Acute kidney injury superimposed on chronic kidney disease (HCC)    Benign hypertension with CKD (chronic kidney disease) stage III ([facility])    Cardiac arrest ([facility]    Diabetic ketoacidosis with coma associated with type 1 diabetes mellitus ([facility])    Normocytic anemia    Depression with anxiety    Diabetic polyneuropathy associated with type 1 diabetes mellitus ([facility])    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 associated with type 1 diabetes mellitus ([facility]  Resolved Problems:    * No resolved hospital problems. *      Objective:     Patient Vitals for the past 24 hrs:   Temp Pulse Resp BP SpO2   06/08/22 0734 98.6 °F (37 °C) 95 -- 93/76 100 %   06/07/22 1930 99.4 °F (37.4 °C) 92 17 (!) 96/52 --       Oxygen Therapy  SpO2: 100 %  O2 Device: None (Room air)    Estimated body mass index is 27.44 kg/m² as calculated from the following:    Height as of this encounter: 5' 6\" (1.676 m). Weight as of this encounter: 170 lb (77.1 kg). Intake/Output Summary (Last 24 hours) at 6/8/2022 1600  Last data filed at 6/8/2022 1228  Gross per 24 hour   Intake 714 ml   Output 700 ml   Net 14 ml         Physical Exam:     Blood pressure 93/76, pulse 95, temperature 98.6 °F (37 °C), resp. rate 17, height 5' 6\" (1.676 m), weight 170 lb (77.1 kg), SpO2 100 %. General:          Well nourished. No overt distress. Patient is resting well. She appears stronger. She appears comfortable. She is in good spirit. Head:               Normocephalic, atraumatic  Eyes:               Sclerae appear normal.  Pupils equally round. ENT:                Nares appear normal, no drainage. Dry oral mucosa  Neck:               No restricted ROM. Trachea midline   CV:                  RRR. No m/r/g. No jugular venous distension. Lungs:             CTAB. No wheezing, rhonchi, or rales. Respirations even, unlabored  Abdomen: Bowel sounds present. Soft, nontender, nondistended. Extremities:     No cyanosis or clubbing. No edema  Skin:                No rashes and normal coloration. Warm and dry. Neuro:             CN II-XII grossly intact. Sensation intact. A&Ox3  Psych:             Normal mood and affect.         I have reviewed ordered lab tests and independently visualized imaging below:    Recent Labs:  Recent Results (from the past 48 hour(s))   POCT Glucose    Collection Time: 06/06/22  4:11 PM   Result Value Ref Range    POC Glucose 139 (H) 65 - 100 mg/dL    Performed by: Garcia ([facility])    POCT Glucose    Collection Time: 06/06/22  9:25 PM   Result Value Ref Range    POC Glucose 245 (H) 65 - 100 mg/dL    Performed by: [facility]    CBC    Collection Time: 06/07/22  5:01 AM   Result Value Ref Range    WBC 7.5 4.3 - 11.1 K/uL    RBC 3.31 (L) 4.05 - 5.2 M/uL    Hemoglobin 8.6 (L) 11.7 - 15.4 g/dL    Hematocrit 27.7 (L) 35.8 - 46.3 %    MCV 83.7 79.6 - 97.8 FL    MCH 26.0 (L) 26.1 - 32.9 PG    MCHC 31.0 (L) 31.4 - 35.0 g/dL    RDW 16.3 (H) 11.9 - 14.6 %    Platelets 593 466 - 366 K/uL    MPV 9.4 9.4 - 12.3 FL    nRBC 0.00 0.0 - 0.2 K/uL   POCT Glucose    Collection Time: 06/07/22  5:01 AM   Result Value Ref Range    POC Glucose 153 (H) 65 - 100 mg/dL    Performed by: [facility]    Renal Function Panel    Collection Time: 06/07/22  5:01 AM   Result Value Ref Range    Sodium 142 136 - 145 mmol/L    Potassium 3.5 3.5 - 5.1 mmol/L    Chloride 105 98 - 107 mmol/L    CO2 30 21 - 32 mmol/L    Anion Gap 7 7 - 16 mmol/L    Glucose 148 (H) 65 - 100 mg/dL    BUN 9 6 - 23 MG/DL    CREATININE 4.40 (H) 0.6 - 1.0 MG/DL    GFR African American 14 (L) >60 ml/min/1.73m2    GFR Non- 11 (L) >60 ml/min/1.73m2    Calcium 8.1 (L) 8.3 - 10.4 MG/DL    Phosphorus 3.4 2.5 - 4.5 MG/DL    Albumin 1.8 (L) 3.5 - 5.0 g/dL   POCT Glucose    Collection Time: 06/07/22 11:40 AM Result Value Ref Range    POC Glucose 180 (H) 65 - 100 mg/dL    Performed by: [facility]    POCT Glucose    Collection Time: 06/07/22  5:29 PM   Result Value Ref Range    POC Glucose 179 (H) 65 - 100 mg/dL    Performed by: [facility]    POCT Glucose    Collection Time: 06/07/22  8:34 PM   Result Value Ref Range    POC Glucose 212 (H) 65 - 100 mg/dL    Performed by: Loise Ganser    Basic Metabolic Panel    Collection Time: 06/08/22  6:16 AM   Result Value Ref Range    Sodium 144 136 - 145 mmol/L    Potassium 3.7 3.5 - 5.1 mmol/L    Chloride 109 (H) 98 - 107 mmol/L    CO2 23 21 - 32 mmol/L    Anion Gap 12 7 - 16 mmol/L    Glucose 215 (H) 65 - 100 mg/dL    BUN 9 6 - 23 MG/DL    CREATININE 4.10 (H) 0.6 - 1.0 MG/DL    GFR African American 15 (L) >60 ml/min/1.73m2    GFR Non- 12 (L) >60 ml/min/1.73m2    Calcium 7.7 (L) 8.3 - 10.4 MG/DL   POCT Glucose    Collection Time: 06/08/22  6:16 AM   Result Value Ref Range    POC Glucose 222 (H) 65 - 100 mg/dL    Performed by: Loise Ganser    CBC    Collection Time: 06/08/22 11:23 AM   Result Value Ref Range    WBC 7.3 4.3 - 11.1 K/uL    RBC 3.44 (L) 4.05 - 5.2 M/uL    Hemoglobin 9.0 (L) 11.7 - 15.4 g/dL    Hematocrit 29.6 (L) 35.8 - 46.3 %    MCV 86.0 79.6 - 97.8 FL    MCH 26.2 26.1 - 32.9 PG    MCHC 30.4 (L) 31.4 - 35.0 g/dL    RDW 17.0 (H) 11.9 - 14.6 %    Platelets 683 519 - 598 K/uL    MPV 9.4 9.4 - 12.3 FL    nRBC 0.00 0.0 - 0.2 K/uL   POCT Glucose    Collection Time: 06/08/22 11:30 AM   Result Value Ref Range    POC Glucose 77 65 - 100 mg/dL    Performed by: [facility] ([facility])    POCT Glucose    Collection Time: 06/08/22 12:28 PM   Result Value Ref Range    POC Glucose 96 65 - 100 mg/dL    Performed by: [facility]        Other Studies:  No results found.     Current Meds:  Current Facility-Administered Medications   Medication Dose Route Frequency    insulin lispro (HUMALOG) injection vial 5 Units  5 Units SubCUTAneous TID WC  insulin glargine (LANTUS) injection vial 26 Units  26 Units SubCUTAneous Nightly    melatonin tablet 3 mg  3 mg Oral Nightly    ondansetron (ZOFRAN) injection 4 mg  4 mg IntraVENous Q6H PRN    insulin lispro (HUMALOG) injection vial 0-4 Units  0-4 Units SubCUTAneous TID     insulin lispro (HUMALOG) injection vial 0-4 Units  0-4 Units SubCUTAneous Nightly    glucose chewable tablet 16 g  4 tablet Oral PRN    dextrose bolus 10% 125 mL  125 mL IntraVENous PRN    Or    dextrose bolus 10% 250 mL  250 mL IntraVENous PRN    glucagon injection 1 mg  1 mg IntraMUSCular PRN    dextrose 5 % solution  100 mL/hr IntraVENous PRN    acetaminophen (TYLENOL) tablet 650 mg  650 mg Oral Q6H PRN    Or    acetaminophen (TYLENOL) suppository 650 mg  650 mg Rectal Q6H PRN    polyethylene glycol (GLYCOLAX) packet 17 g  17 g Oral Daily PRN    albuterol (PROVENTIL) nebulizer solution 2.5 mg  2.5 mg Nebulization Q4H PRN    ferrous sulfate (IRON 325) tablet 325 mg  325 mg Oral BID     folic acid (FOLVITE) tablet 1 mg  1 mg Oral Daily    guaiFENesin-dextromethorphan (ROBITUSSIN DM) 100-10 MG/5ML syrup 5 mL  5 mL Oral Q4H PRN    loperamide (IMODIUM) capsule 2 mg  2 mg Oral 4x Daily PRN    medicated lip ointment (BLISTEX)   Topical PRN    ondansetron (ZOFRAN-ODT) disintegrating tablet 4 mg  4 mg Oral Q6H PRN       Signed:  Stanley Jain MD    Part of this note may have been written by using a voice dictation software. The note has been proof read but may still contain some grammatical/other typographical errors.

## 2022-06-08 NOTE — DIABETES MGMT
Patient seen for follow up diabetes education. Explained basic physiology of diabetes, as well as causes, signs and symptoms, and treatments for hypoglycemia. Described the effects of poor glycemic control and the development of long-term complications such as renal, eye, nerve, and cardiovascular disease. Educated patient regarding current basal/bolus regimen of Lantus 24 units nightly and Humalog correctional insulin including type of insulin, timing with meals, onset, duration of effect, and peak of insulin dose. Educated patient on the differences between prandial insulin and correctional insulin. Instructed patient to always seek guidance from their endocrinologist about adjusting their insulin doses and not to adjust them on their own as this could negatively impact their glycemic control or result in hypoglycemia. Patient verbalizes understanding. Patient has endocrinology appointment 6/14/22 at 0930. Patient would likely benefit from continued diabetes outpatient education. Noted per chart review patient had wanted to attend diabetes education in 2021 but unsure if insurance coverage was a barrier as patient was not interested after being updated regarding coverage? New referral sent to outpatient diabetes program as patient may have new insurance coverage. Update at 1515: patient seen for follow up diabetes education. Explained the importance of blood glucose monitoring to patient and how this will provide their endocrinologist with more information to help them safely titrate their regimen. Recommended frequency of ACHS and to record in log book to take to primary care provider appointment. Patient was able to return demonstrate correct use of meter. Patient correctly verbalized insulin administration sites.  Patient correctly verbalized the importance of site rotation, reviewed with patient proper storage of insulin as she states she may have left her insulin in a hot car before, and patient correctly verbalized proper sharps disposal. Patient was able to return demonstrate proper use of insulin pen using injection model and saline demo pen. Patient educated regarding sick day management and DKA prevention. Educational handouts provided regarding sick day management and DKA prevention. Discussed the importance of determining a sick day plan with PCP. Educated patient on the importance of frequent blood glucose checks (every 2 to 4 hours) while sick. Also discussed the importance of recording all test results. Educated patient that if blood glucose is greater than 240mg/dL patient should check urine for ketones every 4 hours using ketone urine test strips. Educated patient that if ketones are moderate to large they should call their PCP as they may need medical treatment including extra fluids or insulin. Educated patient to continue taking diabetes medications unless otherwise instructed by their PCP. Encouraged patient to discuss insulin use while sick with their PCP as they may need less or more insulin when they are sick. Discussed the importance of hydration while sick and encouraged patient to drink 8 ounces of sugar-free fluids every hour. Adults should aim to have about 45-50g of carbohydrates every 3-4hrs or 15g of carbohydrates every hour. Also educated patient on the importance of avoiding beverages containing caffeine while sick as these can worsen dehydration. Patient instructed to call PCP for fever >101, persistent nausea/vomiting/diarrhea, shortness of breath, inability to eat or drink for > 4 hours, or persistent hyperglycemia >240mg/dL. Patient verbalized understanding and voices no further questions regarding diabetes management at this time. Patient thinks she may have gone into DKA because she was sick, couldn't eat so she was drinking regular Gatorade. Encouraged compliance with discharge regimen.  Encouraged patient to continue to work on lifestyle modifications and to follow up with endocrinology Wu Moore for further titration of regimen. Patient verbalized understanding and voices no further questions regarding diabetes management. Patient provided coupon savings cards to reduce price of Humalog and Basaglar to $35 per month each as patient states this was an affordable option. At discharge patient will likely need prescriptions for Humalog pens, Basaglar pens, pen needles, glucometer kit, test strips, and lancets.

## 2022-06-08 NOTE — PROGRESS NOTES
Physical Therapy  PHYSICAL THERAPY DAILY NOTE  Time In:  1330 PM  Time Out:  2837 PM  Pt. Seen for: PM, Gait Training, Therapeutic Exercise, and Transfer Training     Subjective: \" I have lots of friends that will help me when I get home. \"         Objective:  Precautions: Poor Safety Awareness    GROSS ASSESSMENT Daily Assessment           COGNITION Daily Assessment           BED/MAT MOBILITY Daily Assessment    Rolling Right: Independent  Rolling Left: Independent  Supine to Sit: Independent  Sit to Supine: Independent       TRANSFERS Daily Assessment    Sit to Stand: Independent  Stand to Sit: Independent  Transfer Type: Stand Pivot  Transfer Assistance: Supervision/Standby Assist  Car Transfers: NT         GAIT Daily Assessment   Patient has a shuffling gait. Left leg seems weaker than right. Patient voices she thinks left leg has always been weaker. Attempted no walker vs. Rolling walker . She is safer with walker when ambulating. Amount of Assistance: Supervision/Standby Assist  Distance (ft): 250  Assistive Device: RW  Surface: Level Surface       STEPS/STAIRS Daily Assessment    Steps Ambulated:    Level of Assistance:    Railing:  Assistive Device:        BALANCE Daily Assessment    Static Sitting:   Dynamic Sitting:   Static Standing:   Dynamic Standing:        WHEELCHAIR MOBILITY Daily Assessment    Able to Propel (ft):   Assistance:   Surface:   Wheelchair Set-up:        LOWER EXTREMITY EXERCISES Daily Assessment    Worked on standing on blue foam hitting balloon. She lost her balance after about 3 mites on blue foam. She became fatigued but she realizes she needs walker now. Pain level: no pain noted. Pain Location:    Pain Interventions:     Vital Signs:  BP 93/76   Pulse 95   Temp 98.6 °F (37 °C)   Resp 17   Ht 5' 6\" (1.676 m)   Wt 170 lb (77.1 kg)   SpO2 100%   BMI 27.44 kg/m²       Education:      Interdisciplinary Communication:     Pt. Left in bed with call bell at reach. Assessment: Patient had company at the beginning of treatment and someone at the end of treatment . Discussed with patient that would be safer to have someone with her at discharge for awhile. Plan of Care: Continue with plan of care.       Jeffry Davis, PTA  6/8/2022

## 2022-06-08 NOTE — DIABETES MGMT
Patient admitted with physical debility. Blood glucose ranged 148-212 yesterday with patient receiving Lantus 22 units and Humalog 10 units. Blood glucose this morning was 215. Creatinine 4.10. GFR 15. Reviewed patient current regimen: Lantus 24 units nightly, Humalog 7 units with meals (to be given post prandial, if patient doesn't eat anything primary RN to hold dose), and Humalog correctional insulin low dose. Noted patient only received 2 doses of prandial insulin yesterday. Patient may benefit from a reduction in prandial insulin to reduce risk of hypoglycemia. Provider updated via [employer] regarding recommendations and patient glycemic control. Update at 6221: attempted to see patient for follow up diabetes education, noted patient working with therapy today but will have a break from [phone] and after [phone] will follow along.

## 2022-06-08 NOTE — CARE COORDINATION
Team conference today with discharge set for Saturday, 6/11. BSN, CM met with pt at bedside and updated on d/c date. HH needs include RN/PT/OT/SLP/SW. Preference list for [facility] left with pt at bedside to review. Visitor in room suggested using \"her\" Brayden Morel but pt prefers to review choice list for decision. Will follow up on choice. DME needs include RW and possible BSC. Will provide from [facility] once decision final. Reported request for family training and pt reports she will let CM know if she owuld like to have family in for training. Pt also reports plans to update family on plan herself. Pt aware of Outpatient HD location and chair time but pt being monitored for renal recovery and will follow for HD need. Discussed DM Management referral and ENT possible need for referral. Will follow up on these needs. CM to continue to follow and monitor for any further needs. Update [id number]: Faxed updated clinicals to insurance as requested.

## 2022-06-08 NOTE — PROGRESS NOTES
OT DAILY NOTE  Time In 1034   Time Out 1114     Subjective: \"I want to go home. \" Pt agreeable to treatment. Pain: No pain expressed. Interdisciplinary Communication: Team Conference. Precautions: Falls and Poor Safety Awareness    BP 93/76   Pulse 95   Temp 98.6 °F (37 °C)   Resp 17   Ht 5' 6\" (1.676 m)   Wt 170 lb (77.1 kg)   SpO2 100%   BMI 27.44 kg/m²      FUNCTIONAL MOBILITY:    Score Comments   Sit to Stand Supervision or touching assistance SBA   Transfer Assist Supervision or touching assistance SBA     Ambulation Supervision or Touching Assistance CGA, pt pushing w/c         - Activity Tolerance - Strengthening   Patient educated regarding Loran Brien for BUE in seated position. Pt issued yellow TheraBand. Pt completed 1 set of the following exercises: elbow flexion/extension x20 reps, shoulder external rotation x25 reps, shoulder flexion x10 reps, shoulder horizontal abduction x25 reps. Pt demonstrates deficits in arm strength, proximally > distally. Required rest breaks throughout. Pt then practiced sit <> stand x 10 reps. Pt with poor activity tolerance, required 4 seated rest breaks. Education   Benefits of OT, Functional Transfer Training and Safety Awareness     Assessment: Pt requesting to go outside, assisted to the [employer] for activities to address functional strengthening. Pt demonstrated good participation in OT treatment, poor activity tolerance thorughout. Pt continues to benefit from skilled OT services to address remaining deficits and progress toward baseline level of independence and safety. Patient ended session seated in w/c with call bell and needs within reach. Plan: Continue OT POC.      Yuniel Shah OT   6/8/2022

## 2022-06-08 NOTE — PROGRESS NOTES
OT DAILY NOTE    Time In 0706   Time Out 0734     Subjective: \"I don't want to, I don't feel good. \" Pt agreeable to limited treatment. Pain: No pain expressed. Interdisciplinary Communication: Collaborated with RN regarding pt c/o feeling nauseated. Precautions: Falls and Poor Safety Awareness    BP 93/76   Pulse 95   Temp 98.6 °F (37 °C)   Resp 17   Ht 5' 6\" (1.676 m)   Wt 170 lb (77.1 kg)   SpO2 100%   BMI 27.44 kg/m²      MOBILITY:   Score Comments   Sit to Stand Supervision or touching assistance Assistance Needed: Supervision or touching assistance  Comment: SBA  CARE Score: 4   Transfer Assist Supervision or touching assistance Assistance Needed: Supervision or touching assistance  Comment: SBA recliner > w/c  CARE Score: 4     ACTIVITIES OF DAILY LIVING:   Score Comments   Eating Independent     Oral Hygiene Independent Seated in w/c   Education Benefits of OT and Functional Transfer Training     Assessment: Patient with c/o nausea, requesting to defer OT treatment till after breakfast due to nausea, agreeable to get up to w/c. Discussed nausea with RN. Pt continues to benefit from skilled OT services to address remaining deficits and progress toward baseline level of independence and safety. Patient ended session seated in w/c with call bell and needs within reach. Plan: Continue OT POC.      Chad Hollingsworth OT   6/8/2022

## 2022-06-08 NOTE — PROGRESS NOTES
Harish White MD  Medical Director  [address] [facility], [address] [facility]  Tel: [phone]       [facility] PROGRESS NOTE    Aixa Cherry  Admit Date: 6/2/2022  Admit Diagnosis:   Debility [R53.81]  Physical debility [R53.81]    Subjective     Patient seen and examined. Dialysis held yesterday to see if kidneys are possibly rebounding. UOP improving. Creat slt improved today. Feels tired. \" that melatonin got me\" Sitting up in bedside recliner. Awaiting OT. No cp, sob, n.  Bowels functional    Objective:     Current Facility-Administered Medications   Medication Dose Route Frequency    melatonin tablet 3 mg  3 mg Oral Nightly    insulin glargine (LANTUS) injection vial 22 Units  22 Units SubCUTAneous Nightly    ondansetron (ZOFRAN) injection 4 mg  4 mg IntraVENous Q6H PRN    insulin lispro (HUMALOG) injection vial 0-4 Units  0-4 Units SubCUTAneous TID WC    insulin lispro (HUMALOG) injection vial 0-4 Units  0-4 Units SubCUTAneous Nightly    glucose chewable tablet 16 g  4 tablet Oral PRN    dextrose bolus 10% 125 mL  125 mL IntraVENous PRN    Or    dextrose bolus 10% 250 mL  250 mL IntraVENous PRN    glucagon injection 1 mg  1 mg IntraMUSCular PRN    dextrose 5 % solution  100 mL/hr IntraVENous PRN    acetaminophen (TYLENOL) tablet 650 mg  650 mg Oral Q6H PRN    Or    acetaminophen (TYLENOL) suppository 650 mg  650 mg Rectal Q6H PRN    polyethylene glycol (GLYCOLAX) packet 17 g  17 g Oral Daily PRN    albuterol (PROVENTIL) nebulizer solution 2.5 mg  2.5 mg Nebulization Q4H PRN    ferrous sulfate (IRON 325) tablet 325 mg  325 mg Oral BID WC    folic acid (FOLVITE) tablet 1 mg  1 mg Oral Daily    guaiFENesin-dextromethorphan (ROBITUSSIN DM) 100-10 MG/5ML syrup 5 mL  5 mL Oral Q4H PRN    insulin lispro (HUMALOG) injection vial 5 Units  5 Units SubCUTAneous TID WC    loperamide (IMODIUM) capsule 2 mg  2 mg Oral 4x Daily PRN    medicated lip ointment (BLISTEX)   Topical PRN    ondansetron (ZOFRAN-ODT) disintegrating tablet 4 mg  4 mg Oral Q6H PRN        Review of Systems:   Denies chest pain, shortness of breath, cough, headache, visual problems, abdominal pain, dysuria, calf pain. Pertinent positives are as noted in the HPI, ROS unremarkable otherwise. Visit Vitals  BP (!) 96/52   Pulse 92   Temp 99.4 °F (37.4 °C) (Oral)   Resp 17   Ht 5' 6\" (1.676 m)   Wt 170 lb (77.1 kg)   SpO2 94%   BMI 27.44 kg/m²        Physical Exam:   General: Alert and age appropriately oriented. No acute cardiorespiratory distress. HEENT: Normocephalic, EOM intact. Oral mucosa moist without cyanosis. Lungs: Clear to auscultation bilaterally. Respiration even and unlabored. Heart: Regular rate and rhythm, S1, S2. No murmurs, clicks, rub or gallops. Abdomen: Soft, non-tender, not distended. Bowel sounds normoactive. No organomegaly. Genitourinary: Deferred. Neuromuscular:      No gross focal motor deficits noted. Skin/extremity: No rashes, no erythema. No calf tenderness B LE. No edema                 Labs/Studies:  Recent Results (from the past 72 hour(s))   POCT Glucose    Collection Time: 06/05/22 11:33 AM   Result Value Ref Range    POC Glucose 129 (H) 65 - 100 mg/dL    Performed by: Corey    POCT Glucose    Collection Time: 06/05/22  4:39 PM   Result Value Ref Range    POC Glucose 214 (H) 65 - 100 mg/dL    Performed by:  Corey    POCT Glucose    Collection Time: 06/05/22  7:19 PM   Result Value Ref Range    POC Glucose 35 (LL) 65 - 100 mg/dL    Performed by: Perri    POCT Glucose    Collection Time: 06/05/22  7:37 PM   Result Value Ref Range    POC Glucose 77 65 - 100 mg/dL    Performed by: Perri    POCT Glucose    Collection Time: 06/05/22  7:54 PM   Result Value Ref Range    POC Glucose 98 65 - 100 mg/dL    Performed by: Ellyn Brownlee    POCT Glucose    Collection Time: 06/05/22  9:05 PM   Result Value Ref Range    POC Glucose 324 (H) 65 - 100 mg/dL    Performed by: Floyd Majano    Basic Metabolic Panel    Collection Time: 06/06/22  5:44 AM   Result Value Ref Range    Sodium 140 136 - 145 mmol/L    Potassium 3.5 3.5 - 5.1 mmol/L    Chloride 104 98 - 107 mmol/L    CO2 28 21 - 32 mmol/L    Anion Gap 8 7 - 16 mmol/L    Glucose 239 (H) 65 - 100 mg/dL    BUN 8 6 - 23 MG/DL    CREATININE 4.20 (H) 0.6 - 1.0 MG/DL    GFR African American 15 (L) >60 ml/min/1.73m2    GFR Non- 12 (L) >60 ml/min/1.73m2    Calcium 7.9 (L) 8.3 - 10.4 MG/DL   CBC    Collection Time: 06/06/22  5:44 AM   Result Value Ref Range    WBC 7.9 4.3 - 11.1 K/uL    RBC 2.93 (L) 4.05 - 5.2 M/uL    Hemoglobin 7.6 (L) 11.7 - 15.4 g/dL    Hematocrit 24.8 (L) 35.8 - 46.3 %    MCV 84.6 79.6 - 97.8 FL    MCH 25.9 (L) 26.1 - 32.9 PG    MCHC 30.6 (L) 31.4 - 35.0 g/dL    RDW 16.2 (H) 11.9 - 14.6 %    Platelets 041 869 - 809 K/uL    MPV 9.8 9.4 - 12.3 FL    nRBC 0.00 0.0 - 0.2 K/uL   POCT Glucose    Collection Time: 06/06/22  6:22 AM   Result Value Ref Range    POC Glucose 255 (H) 65 - 100 mg/dL    Performed by: Floyd Majano    POCT Glucose    Collection Time: 06/06/22 10:16 AM   Result Value Ref Range    POC Glucose 352 (H) 65 - 100 mg/dL    Performed by: Garcia ([place])    POCT Glucose    Collection Time: 06/06/22 12:09 PM   Result Value Ref Range    POC Glucose 254 (H) 65 - 100 mg/dL    Performed by: Wu ([place])CNA    POCT Glucose    Collection Time: 06/06/22  4:11 PM   Result Value Ref Range    POC Glucose 139 (H) 65 - 100 mg/dL    Performed by: Garcia ([place])    POCT Glucose    Collection Time: 06/06/22  9:25 PM   Result Value Ref Range    POC Glucose 245 (H) 65 - 100 mg/dL    Performed by: Rama    CBC    Collection Time: 06/07/22  5:01 AM   Result Value Ref Range    WBC 7.5 4.3 - 11.1 K/uL    RBC 3.31 (L) 4.05 - 5.2 M/uL    Hemoglobin 8.6 (L) 11.7 - 15.4 g/dL    Hematocrit 27.7 (L) 35.8 - 46.3 %    MCV 83.7 79.6 - 97.8 FL    MCH 26.0 (L) 26.1 - 32.9 PG    MCHC 31.0 (L) 31.4 - 35.0 g/dL    RDW 16.3 (H) 11.9 - 14.6 %    Platelets 095 813 - 807 K/uL    MPV 9.4 9.4 - 12.3 FL    nRBC 0.00 0.0 - 0.2 K/uL   POCT Glucose    Collection Time: 06/07/22  5:01 AM   Result Value Ref Range    POC Glucose 153 (H) 65 - 100 mg/dL    Performed by: [facility]    Renal Function Panel    Collection Time: 06/07/22  5:01 AM   Result Value Ref Range    Sodium 142 136 - 145 mmol/L    Potassium 3.5 3.5 - 5.1 mmol/L    Chloride 105 98 - 107 mmol/L    CO2 30 21 - 32 mmol/L    Anion Gap 7 7 - 16 mmol/L    Glucose 148 (H) 65 - 100 mg/dL    BUN 9 6 - 23 MG/DL    CREATININE 4.40 (H) 0.6 - 1.0 MG/DL    GFR African American 14 (L) >60 ml/min/1.73m2    GFR Non- 11 (L) >60 ml/min/1.73m2    Calcium 8.1 (L) 8.3 - 10.4 MG/DL    Phosphorus 3.4 2.5 - 4.5 MG/DL    Albumin 1.8 (L) 3.5 - 5.0 g/dL   POCT Glucose    Collection Time: 06/07/22 11:40 AM   Result Value Ref Range    POC Glucose 180 (H) 65 - 100 mg/dL    Performed by: Azalia    POCT Glucose    Collection Time: 06/07/22  5:29 PM   Result Value Ref Range    POC Glucose 179 (H) 65 - 100 mg/dL    Performed by: Azalia    POCT Glucose    Collection Time: 06/07/22  8:34 PM   Result Value Ref Range    POC Glucose 212 (H) 65 - 100 mg/dL    Performed by: Blanco Raymond    Basic Metabolic Panel    Collection Time: 06/08/22  6:16 AM   Result Value Ref Range    Sodium 144 136 - 145 mmol/L    Potassium 3.7 3.5 - 5.1 mmol/L    Chloride 109 (H) 98 - 107 mmol/L    CO2 23 21 - 32 mmol/L    Anion Gap 12 7 - 16 mmol/L    Glucose 215 (H) 65 - 100 mg/dL    BUN 9 6 - 23 MG/DL    CREATININE 4.10 (H) 0.6 - 1.0 MG/DL    GFR African American 15 (L) >60 ml/min/1.73m2    GFR Non- 12 (L) >60 ml/min/1.73m2    Calcium 7.7 (L) 8.3 - 10.4 MG/DL   POCT Glucose    Collection Time: 06/08/22  6:16 AM   Result Value Ref Range    POC Glucose 222 (H) 65 - 100 mg/dL    Performed by: Peter Dillard        Assessment:     Principal Problem:    Physical debility  Active Problems:    Acute kidney injury superimposed on chronic kidney disease (HCC)    Benign hypertension with CKD (chronic kidney disease) stage III (HCC)    Cardiac arrest ([facility] [address]    Diabetic ketoacidosis with coma associated with type 1 diabetes mellitus (HCC)    Normocytic anemia    Depression with anxiety    Diabetic polyneuropathy associated with type 1 diabetes mellitus (HCC)    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 *       Physical debility following DKA with coma, septic shock, acute renal failure, acute respiratory failure, cardiac arrest and altered mental status     Plan / Recommendations / Medical Decision Making:      Continue daily physician / PA medical management:     Physical debility [R53.81] - good rehab potential, aggressive PT, OT to tolerance. SLP for swallowing surveillance, cognitive evaluation post-arrest, DKA with coma.     Cardiac arrest, acute respiratory failure - resolved; multifactorial, most likely due to DKA with coma, septic shock. Intubated 5/21, extubated 5/25, now on room air; no prior history of supplemental O2 use. TTE (5/22) with EF 12-36%, normal systolic and diastolic function, moderate AV stenosis.     Severe sepsis with septic shock - question of bacterial pneumonia vs UTI; was on vanc+Zosyn throughout acute hospitalization, EOT 6/2. UCx, BCx NGTD. Leukocytosis attributed to stress response.  -6/3 WBC 13.7, trending down; had UA yesterday with + leuk (moderate), 1+ bacteria; UCx negative after short incubation  -6/4 WBC 11.8  -6/6 WBC 7.9; 6/7 7.5 nl     Acute renal failure, BLANCA on CKD3 - Cr on admission 9.7 (baseline ~2.5), oliguric; dialysis started 5/24, temp cath switched to tunneled cath 6.1.  Nephrology managing HD, patient will run next HD 6/4 for TThSa transition. Avoid hypotension and nephrotoxic drugs.   -6/3 no HD today, transitioning to a TThSa schedule for therapies; Cr 5.3 (4.6 yesterday)  -6/4 normal HD run; per Nephrologist, she is very likely ESRD  -6/6 has HD chair at [facility]  -6/7 can likely dc after HD on Sat if she is going home with parents. Needs supervision . Recommend no driving ; UOP [id number]  -6/8 [id number], -548 net. Improving. Dialysis on hold     Blood pressure management - monitor. Patient occasionally hypertensive but mostly normo- or hypotensive.  -6/3 BP 103/61 this AM, lowest of past 24 hrs  -6/4 /70  -6/6 122/77  -VSS     Diabetes mellitus - reportedly type 1, last HgbA1c found in EMR was 9.6% but from 6/2020; with admission for DKA and BS >2000, patient likely with poor glycemic control. Will require ACHS fasting glucose monitoring and medication adjustment to optimize glycemic control in setting of acute illness and hospitalization. Per DM RN, current regimen is Lantus 10u QHS with Humalog 5u TID with meals. Monitor and adjust.  -6/3  this AM, 170 last PM, 198 at supper and 184 at lunch; d/w Diabetic RN this AM; subsequent emesis, nausea: labs indicate DKA, with AG of 26 and ; consult Hospitalist now  -6/4 patient much better today, labs more normal (x hypoK+), DKA caught early yesterday, appreciate Hospitalist  -6/6 BS dropped to 35 last evening, appears from EMR that she received her pre-prandial supper Humalog but then did not eat any / much supper; also, DM RN recommends small increase in Lantus --> will bump to 22u QHS  -6/7  this am., 245 last noc, 139 at supper, 254 at lunch yesterday; 180 at lunch today. slt improved. -6/8  this a.m, 212 at bedtime, 179 at supper, 180 at lunch; overall slowly improving. Increase lantus from 22 to 24units. keep post prandial insulin 5units     Anemia - normocytic, chronic.  Baseline Hgb >10-11 but dipped to 7-8 in acute; Hgb 7.3 at [facility] admission on 6/2. Multifactorial: anemia of CKD, frequent blood draws; no active bleeding noted, hemoccult pending. Iron studies (6/1) c/w chronic disease. Vitamin B12 adequate, folate low 4.9; on PO iron, folic acid supplements. Holding heparin SQ for now.  -6/3 Hgb 8.8 from 7.3, improving  -6/4 Hgb 8.6, stable  -6/6 Hgb 7.6 today, was 8.3 yesterday; monitor and transfuse (with HD) PRN  -6/7 hgb 8.6 improved     Pain management - no current joint or muscle symptoms, essentially pain-free. Will require regular pain assessment and comprehensive pain management.     Electrolyte management - no current abnormality, has been both hyperK+ and hypoK+; monitor and replace as needed. -6/4 was called last night with PM lab K+ 3.0 --> gave 40mEq PO x 1; recheck this AM 3.2 --> another 40mEq PO x 1 ordered, patient received after returned from HD  -6/6 K+, Na normal  -6/8 K 2.7 nl. Na 144, calc low at 7.7     Pneumonia prophylaxis - incentive spirometer every hour while awake.     DVT risk / DVT prophylaxis - daily physician / PA exam to assess as patient is at increased risk for of thromboembolism. Mobilize as tolerated. Sequential pneumatic compression devices (SCDs) when in bed; thigh-high or knee-high thromboembolic deterrent hose when out of bed. Heparin SQ held due to anemia.      GI prophylaxis - consider PPI. At times may need additional antacids, Maalox prn.     History of tobacco use - former cigarette smoker, reportedly quit in 2013. No current nicotine cravings.     Depression - noted in problem list in 2017, no current pharmacotherapy. Monitor. At risk of depression / exacerbation due to physical debility, loss of independence.     General skin care / wound prevention - monitor general skin wound status daily per staff and physician / PA. At risk for failure due to impaired mobility.     Bladder program - schedule voids q6-8h.  Check post-void residual as needed; in-and-out catheter if post-void residual is more than 400ml.  -uop improving     Bowel program - at risk for constipation as a side effect of medications, impaired mobility, etc. MiraLAX daily for regularity, Berta-Colace for stool softener. PRN MOM, bisacodyl suppository or tablets for constipation.         Time spent was 25 minutes with over 1/2 in direct patient care/examination, consultation and coordination of care.      Signed By: Teto Wilson MD, MD     June 8, 2022

## 2022-06-08 NOTE — PROGRESS NOTES
GOALS:   STG: Patient will complete intermediate level verbal/visual reasoning tasks with 80% accuracy with minimal assistance. STG: Patient will complete mental manipulation tasks with 80% accuracy with minimal assistance. STG: Patient will complete functional short-term memory tasks with 80% accuracy with minimal assistance. STG: Patient will demonstrate understanding of and implement diaphragmatic breathing technique with 90% accuracy and SBA  STG: Patient will complete basic level resonant voicing exercises with 90% accuracy and SBA for improved vocal quality    LTG: Patient will increase neuro-linguistic abilities to increase safety and awareness of deficits. SPEECH LANGUAGE PATHOLOGY: COMMUNICATION and SWALLOWING Daily Note #1     MRN: [id number]    ADMISSION DATE: 6/2/2022  ADMITTING DIAGNOSIS: has Ulcer, skin, chronic ([facility] [address] Depression with anxiety; Moderate single current episode of major depressive disorder ([address] Encounter to discuss test results; Encounter for annual routine gynecological examination; CKD (chronic kidney disease), stage IV ([address] Diabetic polyneuropathy associated with type 1 diabetes mellitus ([address] Malodorous urine; CKD (chronic kidney disease) stage 3, GFR 30-59 ml/min ([address] Type 1 diabetes mellitus with stage 3 chronic kidney disease ([address] H/O gastroesophageal reflux (GERD); Neuropathy, peripheral, idiopathic; 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 associated with type 1 diabetes mellitus ([address] Premature ovarian failure; Noncompliance with medication regimen; Ureteric stone; Urinary tract infection without hematuria; Hydronephrosis of right kidney; Diabetes mellitus type 1 ([address] Nausea; BLANCA (acute kidney injury) ([address] Acute kidney injury superimposed on chronic kidney disease ([address] Benign hypertension with CKD (chronic kidney disease) stage III (HCC); IDDM (insulin dependent diabetes mellitus); Cardiac arrest ([address]  Diabetic ketoacidosis with coma associated with type 1 diabetes mellitus ([facility] [address] Severe sepsis with septic shock (CODE) ([facility] [address] Pneumonia, bacterial; Acute respiratory failure ([facility] [address] Altered mental status; Frailty; Septic shock ([facility] [address] Normocytic anemia; and Physical debility on their problem list.  Date of Eval: 6/8/2022    ICD-10: Treatment Diagnosis: R41.841 Cognitive-Communication Deficit    RECOMMENDATIONS:   Recommendations:    Dysphonia treatment  Dysphagia treatment  Cognitive treatment     Patient continues to require skilled intervention: Yes     ASSESSMENT    Patient less motivated to participate with treatment this am versus previous session. Reports her stomach aches but when asked about requesting meds states she doesn't really feel nauseous just \"icky\". BS lower when tested at the beginning of the session with juice and crackers provided. Tolerating regular textures and thin liquids without overt signs/sx of aspiration. Independently taking small, single sips to drink. Continue current diet with general aspiration precautions in place. Attempted to introduce straw phonation exercises for voice with patient attempting the first step with moderate cues; stated she needed to try the rest of the steps another time because she didn't feel well but was willing to complete basic resonant voicing exercises. Completed x5 with moderate cues needed. GENERAL    Cooperative.      Pain:   Patient does not c/o pain              OBJECTIVE     Oropharyngeal Phase     Vocal Quality: Hoarse;Breathy  Consistency Presented: Thin;Regular  How Presented: Self-fed/presented  Bolus Acceptance: No impairment  Bolus Formation/Control: No impairment  Type of Impairment: Delayed  Propulsion: No impairment  Oral Residue: None  Initiation of Swallow: No impairment  Aspiration Signs/Symptoms: None  Pharyngeal Phase Characteristics: No impairment, issues, or problems      PLAN    Duration/Frequency: Continue to follow patient 5x/week for duration of Reported on 5/27/2022)      ergocalciferol (ERGOCALCIFEROL) 1.25 MG (55207 UT) capsule Take 50,000 Units by mouth (Patient not taking: Reported on 5/27/2022)      gabapentin (NEURONTIN) 300 MG capsule Take 300 mg by mouth daily. (Patient not taking: Reported on 5/27/2022)      ondansetron (ZOFRAN-ODT) 4 MG disintegrating tablet Take 4 mg by mouth every 8 hours as needed (Patient not taking: Reported on 5/27/2022)       PRECAUTIONS/ALLERGIES: Patient has no known allergies. Therapy Time  SLP Individual Minutes  Time In: 6019  Time Out: 4560  Minutes: 23     SLP Total Treatment Time  Total Treatment Time: [address] [facility] , [address]  6/8/2022 12:33 PM

## 2022-06-08 NOTE — PROGRESS NOTES
Education:      Interdisciplinary Communication:     Pt. Left in gym for OT session. Assessment: Patient doesn't realize all her deficits with LE weakness. Did agree to rolling walker. Plan of Care: Continue with plan of care.       Jules Valles, PTA  6/8/2022

## 2022-06-08 NOTE — PROGRESS NOTES
[facility]    Follow-Up on: BLANCA on CKD stage IV    HPI: Pt seen and examined in floors     ROS:  General: no fever/chills  CV: no CP  Lung: no SOB, no cough  GI: no N/V/D  : no dysuria  Ext: edema       Current Facility-Administered Medications   Medication Dose Route Frequency    insulin lispro (HUMALOG) injection vial 5 Units  5 Units SubCUTAneous TID     insulin glargine (LANTUS) injection vial 26 Units  26 Units SubCUTAneous Nightly    melatonin tablet 3 mg  3 mg Oral Nightly    ondansetron (ZOFRAN) injection 4 mg  4 mg IntraVENous Q6H PRN    insulin lispro (HUMALOG) injection vial 0-4 Units  0-4 Units SubCUTAneous TID WC    insulin lispro (HUMALOG) injection vial 0-4 Units  0-4 Units SubCUTAneous Nightly    glucose chewable tablet 16 g  4 tablet Oral PRN    dextrose bolus 10% 125 mL  125 mL IntraVENous PRN    Or    dextrose bolus 10% 250 mL  250 mL IntraVENous PRN    glucagon injection 1 mg  1 mg IntraMUSCular PRN    dextrose 5 % solution  100 mL/hr IntraVENous PRN    acetaminophen (TYLENOL) tablet 650 mg  650 mg Oral Q6H PRN    Or    acetaminophen (TYLENOL) suppository 650 mg  650 mg Rectal Q6H PRN    polyethylene glycol (GLYCOLAX) packet 17 g  17 g Oral Daily PRN    albuterol (PROVENTIL) nebulizer solution 2.5 mg  2.5 mg Nebulization Q4H PRN    ferrous sulfate (IRON 325) tablet 325 mg  325 mg Oral BID WC    folic acid (FOLVITE) tablet 1 mg  1 mg Oral Daily    guaiFENesin-dextromethorphan (ROBITUSSIN DM) 100-10 MG/5ML syrup 5 mL  5 mL Oral Q4H PRN    loperamide (IMODIUM) capsule 2 mg  2 mg Oral 4x Daily PRN    medicated lip ointment (BLISTEX)   Topical PRN    ondansetron (ZOFRAN-ODT) disintegrating tablet 4 mg  4 mg Oral Q6H PRN       Exam:  Vitals:    06/06/22 1455 06/06/22 1947 06/07/22 1930 06/08/22 0734   BP: (!) 105/58 106/67 (!) 96/52 93/76   Pulse: 97 100 92 95   Resp: 16 18 17    Temp: 98.8 °F (37.1 °C) 99.1 °F (37.3 °C) 99.4 °F (37.4 °C) 98.6 °F (37 °C)   TempSrc: Oral Oral Oral    SpO2: 97% 94%  100%   Weight:       Height:             Intake/Output Summary (Last 24 hours) at 6/8/2022 1104  Last data filed at 6/8/2022 7354  Gross per 24 hour   Intake 354 ml   Output 700 ml   Net -346 ml     PE:  GEN - in no distress, alert and oriented   CV - regular rate, S1, S2, no rub  Lung - clear bilaterally  Abd - soft, nontender  Ext - +1 BLE edema  Access-  right TCC- intact    Labs  Recent Labs     06/06/22  0544 06/07/22  0501   WBC 7.9 7.5   HGB 7.6* 8.6*   HCT 24.8* 27.7*    375       Recent Labs     06/06/22  0544 06/07/22  0501 06/08/22  0616    142 144   K 3.5 3.5 3.7    105 109*   CO2 28 30 23   BUN 8 9 9   CREATININE 4.20* 4.40* 4.10*   PHOS  --  3.4  --        Problem List:      Issues Addressed By Nephrology:    Plan:  1. BLANCA on CKD stage IV non oliguric   1st HD 5/24, TCC placed 6/1  - dialysis on hold ,  Non oliguric, Creat stable   - will follow labs and UO  in am to decide needs for dialysis F.u     2. Type I DM- SSI per primary     3. DKA BS>2000 on admit- un controlled- SSI per primary     4.  Anemia-  on Fe

## 2022-06-09 PROBLEM — Z89.422 H/O AMPUTATION OF LESSER TOE, LEFT (HCC): Status: ACTIVE | Noted: 2022-06-09

## 2022-06-09 LAB
ANION GAP SERPL CALC-SCNC: 9 MMOL/L (ref 7–16)
BUN SERPL-MCNC: 9 MG/DL (ref 6–23)
CALCIUM SERPL-MCNC: 7.8 MG/DL (ref 8.3–10.4)
CHLORIDE SERPL-SCNC: 109 MMOL/L (ref 98–107)
CO2 SERPL-SCNC: 28 MMOL/L (ref 21–32)
CREAT SERPL-MCNC: 3.8 MG/DL (ref 0.6–1)
ERYTHROCYTE [DISTWIDTH] IN BLOOD BY AUTOMATED COUNT: 16.7 % (ref 11.9–14.6)
GLUCOSE BLD STRIP.AUTO-MCNC: 116 MG/DL (ref 65–100)
GLUCOSE BLD STRIP.AUTO-MCNC: 178 MG/DL (ref 65–100)
GLUCOSE BLD STRIP.AUTO-MCNC: 197 MG/DL (ref 65–100)
GLUCOSE BLD STRIP.AUTO-MCNC: 265 MG/DL (ref 65–100)
GLUCOSE SERPL-MCNC: 115 MG/DL (ref 65–100)
HCT VFR BLD AUTO: 25 % (ref 35.8–46.3)
HGB BLD-MCNC: 7.8 G/DL (ref 11.7–15.4)
MCH RBC QN AUTO: 26.3 PG (ref 26.1–32.9)
MCHC RBC AUTO-ENTMCNC: 31.2 G/DL (ref 31.4–35)
MCV RBC AUTO: 84.2 FL (ref 79.6–97.8)
NRBC # BLD: 0 K/UL (ref 0–0.2)
PLATELET # BLD AUTO: 341 K/UL (ref 150–450)
PMV BLD AUTO: 9.5 FL (ref 9.4–12.3)
POTASSIUM SERPL-SCNC: 3.6 MMOL/L (ref 3.5–5.1)
RBC # BLD AUTO: 2.97 M/UL (ref 4.05–5.2)
SERVICE CMNT-IMP: ABNORMAL
SODIUM SERPL-SCNC: 146 MMOL/L (ref 136–145)
WBC # BLD AUTO: 7.6 K/UL (ref 4.3–11.1)

## 2022-06-09 PROCEDURE — 99232 SBSQ HOSP IP/OBS MODERATE 35: CPT | Performed by: PHYSICAL MEDICINE & REHABILITATION

## 2022-06-09 PROCEDURE — 97530 THERAPEUTIC ACTIVITIES: CPT

## 2022-06-09 PROCEDURE — 97110 THERAPEUTIC EXERCISES: CPT

## 2022-06-09 PROCEDURE — 6370000000 HC RX 637 (ALT 250 FOR IP): Performed by: PHYSICAL MEDICINE & REHABILITATION

## 2022-06-09 PROCEDURE — 36415 COLL VENOUS BLD VENIPUNCTURE: CPT

## 2022-06-09 PROCEDURE — 1180000000 HC REHAB R&B

## 2022-06-09 PROCEDURE — 85027 COMPLETE CBC AUTOMATED: CPT

## 2022-06-09 PROCEDURE — 92507 TX SP LANG VOICE COMM INDIV: CPT

## 2022-06-09 PROCEDURE — 97116 GAIT TRAINING THERAPY: CPT

## 2022-06-09 PROCEDURE — 97535 SELF CARE MNGMENT TRAINING: CPT

## 2022-06-09 PROCEDURE — 80048 BASIC METABOLIC PNL TOTAL CA: CPT

## 2022-06-09 PROCEDURE — 82962 GLUCOSE BLOOD TEST: CPT

## 2022-06-09 PROCEDURE — 6370000000 HC RX 637 (ALT 250 FOR IP): Performed by: STUDENT IN AN ORGANIZED HEALTH CARE EDUCATION/TRAINING PROGRAM

## 2022-06-09 PROCEDURE — 6370000000 HC RX 637 (ALT 250 FOR IP): Performed by: PHYSICIAN ASSISTANT

## 2022-06-09 RX ADMIN — INSULIN LISPRO 5 UNITS: 100 INJECTION, SOLUTION INTRAVENOUS; SUBCUTANEOUS at 16:41

## 2022-06-09 RX ADMIN — FOLIC ACID 1 MG: 1 TABLET ORAL at 08:11

## 2022-06-09 RX ADMIN — FERROUS SULFATE TAB 325 MG (65 MG ELEMENTAL FE) 325 MG: 325 (65 FE) TAB at 16:41

## 2022-06-09 RX ADMIN — INSULIN GLARGINE 26 UNITS: 100 INJECTION, SOLUTION SUBCUTANEOUS at 21:19

## 2022-06-09 RX ADMIN — FERROUS SULFATE TAB 325 MG (65 MG ELEMENTAL FE) 325 MG: 325 (65 FE) TAB at 08:11

## 2022-06-09 RX ADMIN — INSULIN LISPRO 2 UNITS: 100 INJECTION, SOLUTION INTRAVENOUS; SUBCUTANEOUS at 12:42

## 2022-06-09 RX ADMIN — INSULIN LISPRO 5 UNITS: 100 INJECTION, SOLUTION INTRAVENOUS; SUBCUTANEOUS at 12:44

## 2022-06-09 ASSESSMENT — PAIN SCALES - GENERAL: PAINLEVEL_OUTOF10: 0

## 2022-06-09 NOTE — PROGRESS NOTES
Comprehensive Nutrition Assessment    Type and Reason for Visit: Reassess  Malnutrition Screening Tool: Malnutrition Screen  Have you recently lost weight without trying?: Unsure of amount of weight loss (2 points)  Have you been eating poorly because of a decreased appetite?: No (0 points)  Malnutrition Screening Tool Score: 2    Nutrition Recommendations/Plan:   Meals and Snacks:  Diet: Continue current order  Nutrition Supplement Therapy:   Medical food supplement therapy:  Discontinue Glucerna Shake three times per day (this provides 220 kcal and 10 grams protein per bottle)     Malnutrition Assessment:  Malnutrition Status: At risk for malnutrition (Comment) (poor po intake during admission)    Nutrition Assessment:  Nutrition History: Pt states she typically eat 1 \"big meal\" per day and mostly snacks during the day. She states she believes she has been wt stable, but isn't entirely sure. Do You Have Any Cultural, Buddhism, or Ethnic Food Preferences?: No   Nutrition Background:   H/O: HTN, HLD, DM, CKD3, recent admission for right sided ureteral obstruction s/p stent placement. P/W found unresponsiveness after not heard from the last several days. Findings of DKA, BLANCA. Had brief cardiac arrest and was intubated in ED. Pt transferred to [facility] d/t debility. Nutrition Interval:  Pt seen sitting in wheelchair with friend present in room. Pt's friend brought her a bowl of clam chowder that pt ate 50% of. Pt ate ~50% of a chicken salad this afternoon and 0% of breakfast per RN. Pt reports no further nausea since last seen. Pt states she doesn't like Glucerna. Offered other flavor options, but pt declined. Pt states she's leaving Saturday and doesn't have any questions regarding nutrition at this time. Nutrition Related Findings:   No kasia muscle or subcutaneous fat wasting visible.       Current Nutrition Therapies:  ADULT ORAL NUTRITION SUPPLEMENT; Breakfast, Lunch, Dinner; Diabetic Oral Supplement  ADULT DIET; Regular; 3 carb choices (45 gm/meal)    Current Intake:   Average Meal Intake: 1-25% Average Supplements Intake: Refusing to take      Anthropometric Measures:  Height: 5' 6\" (167.6 cm)  Current Body Wt: 169 lb 15.6 oz (77.1 kg), Weight source: Not Specified  BMI: 27.4  Admission Body Weight: 171 lb 15.3 oz (78 kg) (5/22; estimated, [facility] admission)  Ideal Body Weight (Kg) (Calculated): 59 kg (130 lbs), 130.7 %  Usual Body Wt:  (varies d/t fluid), Percent weight change:         Edema: No data recorded  BMI Category Overweight (BMI 25.0-29. 9)  Estimated Daily Nutrient Needs:  Energy (kcal/day): 3644-0978 (Kcal/kg (30-35) Weight used:   Ideal (59.1kg)  Protein (g/day): 71-77 (1.2-1.3gm/kg) Weight Used: (Ideal (59.1kg))    Fluid (ml/day):   (Standard Renal)    Nutrition Diagnosis:   · Inadequate oral intake related to  (food preferences) as evidenced by intake 0-25% (pt states barrier to intake)    Nutrition Interventions:   Food and/or Nutrient Delivery: Continue Current Diet,Discontinue Oral Nutrition Supplement     Coordination of Nutrition Care: Continue to monitor while inpatient       Goals:   Previous Goal Met: No Progress toward Goal(s)  Active Goal: PO intake 50% or greater,by next RD assessment       Nutrition Monitoring and Evaluation:      Food/Nutrient Intake Outcomes: Food and Nutrient Intake  Physical Signs/Symptoms Outcomes: Biochemical Data,GI Status,Meal Time Behavior,Weight    Discharge Planning:     Too soon to determine    Navdeep Shetty MS, RDN, LD  Contact: [phone]

## 2022-06-09 NOTE — PROGRESS NOTES
GOALS:   STG: Patient will complete intermediate level verbal/visual reasoning tasks with 80% accuracy with minimal assistance. STG: Patient will complete mental manipulation tasks with 80% accuracy with minimal assistance. STG: Patient will complete functional short-term memory tasks with 80% accuracy with minimal assistance. STG: Patient will demonstrate understanding of and implement diaphragmatic breathing technique with 90% accuracy and SBA  STG: Patient will complete basic level resonant voicing exercises with 90% accuracy and SBA for improved vocal quality    LTG: Patient will increase neuro-linguistic abilities to increase safety and awareness of deficits. SPEECH LANGUAGE PATHOLOGY: COMMUNICATION and SWALLOWING Daily Note #3 and DISCHARGE    MRN: [id number]    ADMISSION DATE: 6/2/2022  ADMITTING DIAGNOSIS: has Ulcer, skin, chronic ([address] Depression with anxiety; Moderate single current episode of major depressive disorder ([address] Encounter to discuss test results; Encounter for annual routine gynecological examination; CKD (chronic kidney disease), stage IV ([address] Diabetic polyneuropathy associated with type 1 diabetes mellitus ([address] Malodorous urine; CKD (chronic kidney disease) stage 3, GFR 30-59 ml/min ([address] Type 1 diabetes mellitus with stage 3 chronic kidney disease ([address] H/O gastroesophageal reflux (GERD); Neuropathy, peripheral, idiopathic; 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 associated with type 1 diabetes mellitus ([address] Premature ovarian failure; Noncompliance with medication regimen; Ureteric stone; Urinary tract infection without hematuria; Hydronephrosis of right kidney; Diabetes mellitus type 1 ([address] Nausea; BLANCA (acute kidney injury) ([address] Acute kidney injury superimposed on chronic kidney disease ([address] Benign hypertension with CKD (chronic kidney disease) stage III (HCC); IDDM (insulin dependent diabetes mellitus); Cardiac arrest ([address]  Diabetic ketoacidosis with coma associated with type 1 diabetes mellitus ([address] Severe sepsis with septic shock (CODE) ([address] Pneumonia, bacterial; Acute respiratory failure ([address] Altered mental status; Frailty; Septic shock ([address] Normocytic anemia; and Physical debility on their problem list.  Date of Eval: 6/9/2022    ICD-10: Treatment Diagnosis: R41.841 Cognitive-Communication Deficit    e       Patient continues to require skilled intervention: Yes     ASSESSMENT    Patient was seen for dysphonia treatment but with limited motivation and participation. Discrepancies in performance with therapy. Stated repeatedly that she didn't want to be in the hospital and felt depressed at the beginning of the session. Agreeable to allow ST to at least demonstrate straw phonation and resonant voicing exercises so she is aware of them. Patient demonstrated back x5 each with moderate cues needed for improved resonance. A copy of the exercises was left in the room if patient should show interest.  Discussed role of ENT in differential diagnosis and f/u ST at d/c. Reports that she would prefer to wait a few weeks to see if voice improves prior to ENT consult. Discussed pt preferences with . GENERAL    Limited motivation. Pain:   Patient does not c/o pain         OBJECTIVE               06/09/22 1237   Voice Exercises   Vocal Techniques Forward resonance;Relaxed throat breathing/open throat  (straw phonation)   Respiratory Training   Trained in Use of Lower Abdominal Muscles Yes   Trained in Use of Relaxed Throat Breathing Yes   Phonatory Retraining Yes   Exercise/Activities Tolerance   Exercise Tolerance/Response Cueing required (moderate); Poor motivation         PLAN    Duration/Frequency: Continue to follow patient 5x/week for duration of hospitalization and/or until goals met         MODIFIED RADHA SCALE (mRS) SCORE: 3  Interpretation of Tool: The Modified [place] Scale is a scale used to quantify level of disability as it relates to a patient's functional abilities. No Symptoms(0); No significant disability despite symptoms; able to carry out all usual duties and activities(1); Slight disability; unable to carry out all previous activities but able to look after own affairs without assistance(2); Moderate disability; requiring some help but able to walk without assistance(3); Moderately severe disability; unable to walk without assistance and unable to attend to own bodily needs without assistance(4); Severe disability; bedridden, incontinent, and requiring constant nursing care and attention(5)      Education:       Patient Education Response: Needs reinforcement    Current Medications:   No current facility-administered medications on file prior to encounter.      Current Outpatient Medications on File Prior to Encounter   Medication Sig Dispense Refill    albuterol (PROVENTIL) (2.5 MG/3ML) 0.083% nebulizer solution Take 3 mLs by nebulization every 4 hours as needed for Wheezing 120 each 3    insulin glargine (LANTUS) 100 UNIT/ML injection vial Inject 10 Units into the skin nightly 10 mL 3    insulin lispro (HUMALOG) 100 UNIT/ML SOLN injection vial Inject 0-4 Units into the skin 4 times daily (before meals and nightly) 10 mL 0    insulin lispro (HUMALOG) 100 UNIT/ML SOLN injection vial Inject 5 Units into the skin 3 times daily (with meals) 10 mL 0    ferrous sulfate (IRON 325) 325 (65 Fe) MG tablet Take 1 tablet by mouth 2 times daily (with meals) 60 tablet 3    folic acid (FOLVITE) 1 MG tablet Take 1 tablet by mouth daily 30 tablet 3    atorvastatin (LIPITOR) 80 MG tablet Take 80 mg by mouth daily (Patient not taking: Reported on 5/27/2022)      brimonidine (ALPHAGAN P) 0.1 % SOLN 1 drop in left eye twice a day (Patient not taking: Reported on 5/27/2022)      ergocalciferol (ERGOCALCIFEROL) 1.25 MG (16685 UT) capsule Take 50,000 Units by mouth (Patient not taking: Reported on 5/27/2022)      gabapentin (NEURONTIN) 300 MG capsule Take 300 mg by mouth daily. (Patient not taking: Reported on 5/27/2022)      ondansetron (ZOFRAN-ODT) 4 MG disintegrating tablet Take 4 mg by mouth every 8 hours as needed (Patient not taking: Reported on 5/27/2022)       PRECAUTIONS/ALLERGIES: Patient has no known allergies. Therapy Time  SLP Individual Minutes  Time In: 8929  Time Out: [address] [facility]  Minutes: 26     SLP Total Treatment Time  Total Treatment Time: 6456 [facility] HospitalJ[facility] Joy, SLP  6/9/2022 12:14 PM

## 2022-06-09 NOTE — CARE COORDINATION
Met with pt to discussed d/c planning. Pt agreeable to RW but rascon snot want BSC. Pt reports having shower chair already she can use. Pt would like [address] [place] [address] for [facility] needs and referral placed. Liaison aware. SLP did report no ENT or SLP needs as pt rascon snot want these services at this time. Confirmed this with pt and she also confirmed no ENT or SLP needs. Pt reports she does not want family training. Diabetic educator reported she is sending referral for outpatient diabetic education and pt already has f/u with endocrinology. CM to continue to follow and monitor for any further needs.

## 2022-06-09 NOTE — PLAN OF CARE
Problem: Safety - Adult  Goal: Free from fall injury  Outcome: Progressing     Problem: Pain  Goal: Verbalizes/displays adequate comfort level or baseline comfort level  Outcome: Progressing     Problem: Skin/Tissue Integrity  Goal: Absence of new skin breakdown  Description: 1. Monitor for areas of redness and/or skin breakdown  2. Assess vascular access sites hourly  3. Every 4-6 hours minimum:  Change oxygen saturation probe site  4. Every 4-6 hours:  If on nasal continuous positive airway pressure, respiratory therapy assess nares and determine need for appliance change or resting period.   Outcome: Progressing

## 2022-06-09 NOTE — PROGRESS NOTES
OT WEEKLY PROGRESS NOTE    Time In 0717   Time Out 0744     GOALS:  Long Term Goals:  Time Frame for Long term goals : 10 days   LTG 1: Patient will dress UB with [place] using AE/DME PRN. 6/9/22 Goal Met. LTG 2: Patient will dress LB with Setup Assistance using AE/DME PRN. 6/9/22 Progressing. LTG 3: Patient will don footwear with [place] using AE/DME PRN. 6/9/22 Goal Met. LTG 4: Patient will bathe with Supervision or Touching Assistance using AE/DME PRN. 6/9/22 Goal Met. LTG 5: Patient will toilet with [place] using AE/DME PRN. 6/9/22 Progressing. LTG 6: Patient/caregiver will verbalize understanding of OT recommendations regarding ADLs, functional transfers, home safety, AE/DME, energy conservation, safety awareness, activity tolerance, and follow-up therapy to increase safety with functional tasks upon discharge. 6/9/22 Progressing. Subjective: \"I'm ready to go home. \" Patient agreeable to therapy. Pain: No pain expressed.   Precautions: Falls and Poor Safety Awareness    ACTIVITIES OF DAILY LIVING:    Initial Score Initial Comments Current Score Current Comments   Eating Independent   Independent     Oral Hyigene Independent   Independent     Bathing Partial/moderate assistance Assist to reach feet due to fatigue and malaise Supervision or touching assistance S with long handled sponge and tub transfer bench   Upper Body  Dressing Setup or clean-up assistance Seated EOB Independent     Lower Body Dressing Partial/moderate assistance Assist to thread LLE d/t malaise, CGA in stance to pull up over hips  Supervision or touching assistance S in stance with RW   Donning/[place] Footwear Partial/moderate assistance  Doffs socks with I, S/U don R, Dependent dress L d/t fatigue and malaise Independent       Toilet Transfer Supervision or touching assistance CGA with grab bars Supervision or touching assistance SBA with [address] [place] or touching assistance SBA seated, CGA in stance Supervision or touching assistance S   Initial Score: Patient's lowest score within first 72 hrs of stay as gathered by OT. Current Score: Patient's average performance level over the last 48 hours. Family Training: To be completed closer to d/c as patient's family is able. Summary of Progress: Patient demonstrates good progress with Activity Tolerance, Safety Awareness, Strength and Standing Balance for performance of ADLs and functional mobility, see above for details. Patient continues to require skilled OT services to address safety awareness, strength, and endurance deficits, provide education, and progress towards goals as stated in POC. Summary of Session: Focus of session was on ADL routine and progress assessment. Collaborated with PT and confirmed patient is on track to reach goals. Education: Benefits of OT, Functional Transfer Training and Safety Awareness  Plan: Continue OT POC to address ADL skills, functional mobility, safety training, strength, balance, and activity tolerance to maximize safety and independence. Patient ended session seated in w/c with call bell and needs within reach.     Christian Winkler OT  6/9/2022

## 2022-06-09 NOTE — PROGRESS NOTES
Physical Therapy  PHYSICAL THERAPY DAILY NOTE  Time In:  830 AM  Time Out:  938 AM  Pt. Seen for: AM, Gait Training, Therapeutic Exercise, and Transfer Training     Subjective: \" I just want to go home. I am over this. \"         Objective:  Precautions: Poor Safety Awareness    GROSS ASSESSMENT Daily Assessment           COGNITION Daily Assessment    Intact         BED/MAT MOBILITY Daily Assessment    Rolling Right: Independent  Rolling Left: Independent  Supine to Sit: Independent  Sit to Supine: Independent       TRANSFERS Daily Assessment    Sit to Stand: Independent  Stand to Sit: Independent  Transfer Type: Stand Pivot  Transfer Assistance: Independent  Car Transfers: NT         GAIT Daily Assessment   Patient takes small steps . Attempted without rolling walker but seems more steady with walker. Ordered rolling walker. Amount of Assistance: Independent  Distance (ft): 250  Assistive Device: RW  Surface: Level Surface       STEPS/STAIRS Daily Assessment    Steps Ambulated:    Level of Assistance:    Railing:  Assistive Device:        BALANCE Daily Assessment    Static Sitting:   Dynamic Sitting:   Static Standing:   Dynamic Standing:        WHEELCHAIR MOBILITY Daily Assessment    Able to Propel (ft):   Assistance:   Surface:   Wheelchair Set-up:        LOWER EXTREMITY EXERCISES Daily Assessment    SUPINE EXERCISES Sets Reps Comments   Ankle Pumps 2 10    Quad Sets 2 10    Glut Sets 2 10    Heel Slides 2 10    Hip Abduction 2 10    Short Arc Quad 2 10    Straight Leg Raise 2 10    Motomed x10 minutes on gear 3. Pain level: no pain noted  Pain Location:    Pain Interventions:     Vital Signs:  /77   Pulse 94   Temp 99.2 °F (37.3 °C)   Resp 16   Ht 5' 6\" (1.676 m)   Wt 170 lb (77.1 kg)   SpO2 98%   BMI 27.44 kg/m²       Education:      Interdisciplinary Communication:     Pt. Left in recliner with call bell at reach. Assessment: Patient seemed very frustrated today.           Plan of Care: Continue with plan of care.       Re Darden, PTA  6/9/2022

## 2022-06-09 NOTE — PROGRESS NOTES
Physical Therapy     06/09/22 1446   PT Individual Minutes   Time In 1356   Time Out 1418   Minutes 22   PHYSICAL THERAPY DAILY NOTE  Time In:  8065 PM  Time Out:  1418 PM  Pt. Seen for: PM and Gait Training     Subjective: \" I dont know how to get in car. \"         Objective:  Precautions: Poor Safety Awareness    GROSS ASSESSMENT Daily Assessment           COGNITION Daily Assessment    intact       BED/MAT MOBILITY Daily Assessment    Rolling Right: Independent  Rolling Left: Independent  Supine to Sit: Independent  Sit to Supine: Independent       TRANSFERS Daily Assessment   She declined on getting in and out of rehab car. Sit to Stand: Independent  Stand to Sit: Independent  Transfer Type: Stand Pivot  Transfer Assistance: Independent  Car Transfers: NT         GAIT Daily Assessment   Patient takes short steps and small base of support. Amount of Assistance: Supervision/Standby Assist  Distance (ft): 250  Assistive Device: RW  Surface: Level Surface       STEPS/STAIRS Daily Assessment    Steps Ambulated:    Level of Assistance:    Railing:  Assistive Device:        BALANCE Daily Assessment    Static Sitting:   Dynamic Sitting:   Static Standing:   Dynamic Standing:        WHEELCHAIR MOBILITY Daily Assessment    Able to Propel (ft):   Assistance:   Surface:   Wheelchair Set-up:        LOWER EXTREMITY EXERCISES Daily Assessment         Pain level: no pain noted. Pain Location:    Pain Interventions:     Vital Signs:  /77   Pulse 94   Temp 99.2 °F (37.3 °C)   Resp 16   Ht 5' 6\" (1.676 m)   Wt 170 lb (77.1 kg)   SpO2 98%   BMI 27.44 kg/m²       Education:      Interdisciplinary Communication:     Pt. Left in bed after treatment with call bell at reach. Assessment: Patient making good progress with gait . Will need rolling walker for safety ambulating in home and community . Plan of Care: Continue with plan of care.       Eliza Vee, PTA  6/9/2022

## 2022-06-09 NOTE — PROGRESS NOTES
OT DAILY NOTE  Time In 1035   Time Out 1115     Subjective: \"Can we do something else? \" Pt agreeable to treatment. Pain: No pain expressed. Interdisciplinary Communication: Collaborated with PT. Precautions: Falls and Poor Safety Awareness    /77   Pulse 94   Temp 99.2 °F (37.3 °C)   Resp 16   Ht 5' 6\" (1.676 m)   Wt 170 lb (77.1 kg)   SpO2 98%   BMI 27.44 kg/m²      FUNCTIONAL MOBILITY:    Score Comments   Sit to Stand Supervision or touching assistance S to I   Transfer Assist Supervision or touching assistance SBA to S         - Self-Care    Score Comments   Toilet Transfer Supervision or touching assistance S   Toileting Hygiene Supervision or touching assistance S-[place]         - Activity Tolerance - Strengthening   Patient educated regarding Angie Sharonda for BUE in seated position. Pt issued HEP utilizing yellow TheraBand. Pt completed the following exercises: anterior punches x20 reps, chest press 2x10 reps, shoulder external rotation x20 reps, horizontal abduction x10 reps, and shoulder flexion x10 reps. Printed copy of HEP provided to patient. Pt required rest breaks and min cues throughout for body mechanics, including UE positioning and reducing compensatory trunk movement. Education   Benefits of OT, Functional Transfer Training and Safety Awareness     Assessment: Patient reports she is ready to go home and is feeling better. Pt demonstrated good participation in OT treatment. Pt continues to benefit from skilled OT services to address remaining deficits and progress toward baseline level of independence and safety. Patient ended session seated in w/c with call bell and needs within reach. Plan: Continue OT POC.      Bob Clark OT   6/9/2022

## 2022-06-09 NOTE — PROGRESS NOTES
Yaa Albarado MD  Medical Director  [address] [facility], [address] [facility]  Tel: [phone]       [facility] PROGRESS NOTE    [facility] [address]  Admit Date: 6/2/2022  Admit Diagnosis:   Debility [R53.81]  Physical debility [R53.81]    Subjective     Patient seen and examined. Inaccurate UOP recorded. Pt has been going to the restroom unassisted . She reports that she has been \"peeing a lot\". BMP not drawn yet. Denies pain, cp, sob, n. No fever. No dysuria  HD has been on hold.  No HD since Sat. 6/4  Objective:     Current Facility-Administered Medications   Medication Dose Route Frequency    insulin lispro (HUMALOG) injection vial 5 Units  5 Units SubCUTAneous TID     insulin glargine (LANTUS) injection vial 26 Units  26 Units SubCUTAneous Nightly    melatonin tablet 3 mg  3 mg Oral Nightly    ondansetron (ZOFRAN) injection 4 mg  4 mg IntraVENous Q6H PRN    insulin lispro (HUMALOG) injection vial 0-4 Units  0-4 Units SubCUTAneous TID     insulin lispro (HUMALOG) injection vial 0-4 Units  0-4 Units SubCUTAneous Nightly    glucose chewable tablet 16 g  4 tablet Oral PRN    dextrose bolus 10% 125 mL  125 mL IntraVENous PRN    Or    dextrose bolus 10% 250 mL  250 mL IntraVENous PRN    glucagon injection 1 mg  1 mg IntraMUSCular PRN    dextrose 5 % solution  100 mL/hr IntraVENous PRN    acetaminophen (TYLENOL) tablet 650 mg  650 mg Oral Q6H PRN    Or    acetaminophen (TYLENOL) suppository 650 mg  650 mg Rectal Q6H PRN    polyethylene glycol (GLYCOLAX) packet 17 g  17 g Oral Daily PRN    albuterol (PROVENTIL) nebulizer solution 2.5 mg  2.5 mg Nebulization Q4H PRN    ferrous sulfate (IRON 325) tablet 325 mg  325 mg Oral BID WC    folic acid (FOLVITE) tablet 1 mg  1 mg Oral Daily    guaiFENesin-dextromethorphan (ROBITUSSIN DM) 100-10 MG/5ML syrup 5 mL  5 mL Oral Q4H PRN    loperamide (IMODIUM) capsule 2 mg  2 mg Oral 4x Daily 4.3 - 11.1 K/uL    RBC 3.31 (L) 4.05 - 5.2 M/uL    Hemoglobin 8.6 (L) 11.7 - 15.4 g/dL    Hematocrit 27.7 (L) 35.8 - 46.3 %    MCV 83.7 79.6 - 97.8 FL    MCH 26.0 (L) 26.1 - 32.9 PG    MCHC 31.0 (L) 31.4 - 35.0 g/dL    RDW 16.3 (H) 11.9 - 14.6 %    Platelets 327 492 - 080 K/uL    MPV 9.4 9.4 - 12.3 FL    nRBC 0.00 0.0 - 0.2 K/uL   POCT Glucose    Collection Time: 06/07/22  5:01 AM   Result Value Ref Range    POC Glucose 153 (H) 65 - 100 mg/dL    Performed by: [facility]    Renal Function Panel    Collection Time: 06/07/22  5:01 AM   Result Value Ref Range    Sodium 142 136 - 145 mmol/L    Potassium 3.5 3.5 - 5.1 mmol/L    Chloride 105 98 - 107 mmol/L    CO2 30 21 - 32 mmol/L    Anion Gap 7 7 - 16 mmol/L    Glucose 148 (H) 65 - 100 mg/dL    BUN 9 6 - 23 MG/DL    CREATININE 4.40 (H) 0.6 - 1.0 MG/DL    GFR African American 14 (L) >60 ml/min/1.73m2    GFR Non- 11 (L) >60 ml/min/1.73m2    Calcium 8.1 (L) 8.3 - 10.4 MG/DL    Phosphorus 3.4 2.5 - 4.5 MG/DL    Albumin 1.8 (L) 3.5 - 5.0 g/dL   POCT Glucose    Collection Time: 06/07/22 11:40 AM   Result Value Ref Range    POC Glucose 180 (H) 65 - 100 mg/dL    Performed by: Azalia    POCT Glucose    Collection Time: 06/07/22  5:29 PM   Result Value Ref Range    POC Glucose 179 (H) 65 - 100 mg/dL    Performed by: Azalia    POCT Glucose    Collection Time: 06/07/22  8:34 PM   Result Value Ref Range    POC Glucose 212 (H) 65 - 100 mg/dL    Performed by: Nehemias Harrison    Basic Metabolic Panel    Collection Time: 06/08/22  6:16 AM   Result Value Ref Range    Sodium 144 136 - 145 mmol/L    Potassium 3.7 3.5 - 5.1 mmol/L    Chloride 109 (H) 98 - 107 mmol/L    CO2 23 21 - 32 mmol/L    Anion Gap 12 7 - 16 mmol/L    Glucose 215 (H) 65 - 100 mg/dL    BUN 9 6 - 23 MG/DL    CREATININE 4.10 (H) 0.6 - 1.0 MG/DL    GFR African American 15 (L) >60 ml/min/1.73m2    GFR Non- 12 (L) >60 ml/min/1.73m2    Calcium 7.7 (L) 8.3 - 10.4 MG/DL POCT Glucose    Collection Time: 06/08/22  6:16 AM   Result Value Ref Range    POC Glucose 222 (H) 65 - 100 mg/dL    Performed by: Alan    CBC    Collection Time: 06/08/22 11:23 AM   Result Value Ref Range    WBC 7.3 4.3 - 11.1 K/uL    RBC 3.44 (L) 4.05 - 5.2 M/uL    Hemoglobin 9.0 (L) 11.7 - 15.4 g/dL    Hematocrit 29.6 (L) 35.8 - 46.3 %    MCV 86.0 79.6 - 97.8 FL    MCH 26.2 26.1 - 32.9 PG    MCHC 30.4 (L) 31.4 - 35.0 g/dL    RDW 17.0 (H) 11.9 - 14.6 %    Platelets 739 265 - 893 K/uL    MPV 9.4 9.4 - 12.3 FL    nRBC 0.00 0.0 - 0.2 K/uL   POCT Glucose    Collection Time: 06/08/22 11:30 AM   Result Value Ref Range    POC Glucose 77 65 - 100 mg/dL    Performed by: Garcia (Hammonds)    POCT Glucose    Collection Time: 06/08/22 12:28 PM   Result Value Ref Range    POC Glucose 96 65 - 100 mg/dL    Performed by: Azalia    POCT Glucose    Collection Time: 06/08/22  4:09 PM   Result Value Ref Range    POC Glucose 193 (H) 65 - 100 mg/dL    Performed by: Garcia ([place]onds)    POCT Glucose    Collection Time: 06/08/22  9:48 PM   Result Value Ref Range    POC Glucose 196 (H) 65 - 100 mg/dL    Performed by: Alan    CBC    Collection Time: 06/09/22  4:52 AM   Result Value Ref Range    WBC 7.6 4.3 - 11.1 K/uL    RBC 2.97 (L) 4.05 - 5.2 M/uL    Hemoglobin 7.8 (L) 11.7 - 15.4 g/dL    Hematocrit 25.0 (L) 35.8 - 46.3 %    MCV 84.2 79.6 - 97.8 FL    MCH 26.3 26.1 - 32.9 PG    MCHC 31.2 (L) 31.4 - 35.0 g/dL    RDW 16.7 (H) 11.9 - 14.6 %    Platelets 676 773 - 578 K/uL    MPV 9.5 9.4 - 12.3 FL    nRBC 0.00 0.0 - 0.2 K/uL       Assessment:     Principal Problem:    Physical debility  Active Problems:    Acute kidney injury superimposed on chronic kidney disease (HCC)    Benign hypertension with CKD (chronic kidney disease) stage III (HCC)    Cardiac arrest (HCC)    Diabetic ketoacidosis with coma associated with type 1 diabetes mellitus (HCC)    Normocytic anemia    Depression with anxiety    Diabetic polyneuropathy associated with type 1 diabetes mellitus (HCC)    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 associated with type 1 diabetes mellitus ([facility] [address]  Resolved Problems:    * No resolved hospital problems. *         Physical debility following DKA with coma, septic shock, acute renal failure, acute respiratory failure, cardiac arrest and altered mental status     Plan / Recommendations / Medical Decision Making:      Continue daily physician / PA medical management:     Physical debility [R53.81] - good rehab potential, aggressive PT, OT to tolerance. SLP for swallowing surveillance, cognitive evaluation post-arrest, DKA with coma.     Cardiac arrest, acute respiratory failure - resolved; multifactorial, most likely due to DKA with coma, septic shock. Intubated 5/21, extubated 5/25, now on room air; no prior history of supplemental O2 use. TTE (5/22) with EF 33-64%, normal systolic and diastolic function, moderate AV stenosis.     Severe sepsis with septic shock - question of bacterial pneumonia vs UTI; was on vanc+Zosyn throughout acute hospitalization, EOT 6/2. UCx, BCx NGTD. Leukocytosis attributed to stress response.  -6/3 WBC 13.7, trending down; had UA yesterday with + leuk (moderate), 1+ bacteria; UCx negative after short incubation  -6/4 WBC 11.8  -6/6 WBC 7.9; 6/7 7.5 nl     Acute renal failure, BLANCA on CKD3 - Cr on admission 9.7 (baseline ~2.5), oliguric; dialysis started 5/24, temp cath switched to tunneled cath 6.1. Nephrology managing HD, patient will run next HD 6/4 for TThSa transition. Avoid hypotension and nephrotoxic drugs.   -6/3 no HD today, transitioning to a TThSa schedule for therapies; Cr 5.3 (4.6 yesterday)  -6/4 normal HD run; per Nephrologist, she is very likely ESRD  -6/6 has HD chair at [facility]  -6/7 can likely dc after HD on Sat if she is going home with parents. Needs supervision .  Recommend no driving ; UOP [id number]  -6/8 [id number], -548 net. Improving. Dialysis on hold  -6/9 creat 3.8 continues to improve; UOP not recorded accurately; pt has been going to the restroom without nursing aware, thus measurements unaccounted for. Discussed the importance of measuring urine accurately to help us detm need for HD     Blood pressure management - monitor. Patient occasionally hypertensive but mostly normo- or hypotensive.  -6/3 BP 103/61 this AM, lowest of past 24 hrs  -6/4 /70  -6/6 122/77  -VSS     Diabetes mellitus - reportedly type 1, last HgbA1c found in EMR was 9.6% but from 6/2020; with admission for DKA and BS >2000, patient likely with poor glycemic control. Will require ACHS fasting glucose monitoring and medication adjustment to optimize glycemic control in setting of acute illness and hospitalization. Per DM RN, current regimen is Lantus 10u QHS with Humalog 5u TID with meals. Monitor and adjust.  -6/3  this AM, 170 last PM, 198 at supper and 184 at lunch; d/w Diabetic RN this AM; subsequent emesis, nausea: labs indicate DKA, with AG of 26 and ; consult Hospitalist now  -6/4 patient much better today, labs more normal (x hypoK+), DKA caught early yesterday, appreciate Hospitalist  -6/6 BS dropped to 35 last evening, appears from EMR that she received her pre-prandial supper Humalog but then did not eat any / much supper; also, DM RN recommends small increase in Lantus --> will bump to 22u QHS  -6/7  this am., 245 last noc, 139 at supper, 254 at lunch yesterday; 180 at lunch today. slt improved. -6/8  this a.m, 212 at bedtime, 179 at supper, 180 at lunch; overall slowly improving. Increase lantus from 22 to 24units. keep post prandial insulin 5units  -6/9 a.m bs pending. 196 at bedtime, 193 at supper, 96 at lunch/77, 222 in a.m.; further insulin adjustments to follow. Discussed with Felicia Dunham RN yesterday re; diabetic mgt. I am concerned that this is too difficulty for pt to follow.  Will need assistance with med mgt at [facility].     Anemia - normocytic, chronic. Baseline Hgb >10-11 but dipped to 7-8 in acute; Hgb 7.3 at [facility] admission on 6/2. Multifactorial: anemia of CKD, frequent blood draws; no active bleeding noted, hemoccult pending. Iron studies (6/1) c/w chronic disease. Vitamin B12 adequate, folate low 4.9; on PO iron, folic acid supplements. Holding heparin SQ for now.  -6/3 Hgb 8.8 from 7.3, improving  -6/4 Hgb 8.6, stable  -6/6 Hgb 7.6 today, was 8.3 yesterday; monitor and transfuse (with HD) PRN  -6/7 hgb 8.6 improved  -6/9 hbg 7.8 this a.m from 9.0 yesterday. Varies frequently. No melena     Pain management - no current joint or muscle symptoms, essentially pain-free. Will require regular pain assessment and comprehensive pain management.     Electrolyte management - no current abnormality, has been both hyperK+ and hypoK+; monitor and replace as needed. -6/4 was called last night with PM lab K+ 3.0 --> gave 40mEq PO x 1; recheck this AM 3.2 --> another 40mEq PO x 1 ordered, patient received after returned from HD  -6/6 K+, Na normal  -6/8 K 2.7 nl. Na 144, calc low at 7.7  -6/9 labs p     Pneumonia prophylaxis - incentive spirometer every hour while awake.     DVT risk / DVT prophylaxis - daily physician / PA exam to assess as patient is at increased risk for of thromboembolism. Mobilize as tolerated. Sequential pneumatic compression devices (SCDs) when in bed; thigh-high or knee-high thromboembolic deterrent hose when out of bed. Heparin SQ held due to anemia.      GI prophylaxis - consider PPI. At times may need additional antacids, Maalox prn.     History of tobacco use - former cigarette smoker, reportedly quit in 2013. No current nicotine cravings.     Depression - noted in problem list in 2017, no current pharmacotherapy.  Monitor. At risk of depression / exacerbation due to physical debility, loss of independence.     General skin care / wound prevention - monitor general skin wound status daily per staff and physician / PA. At risk for failure due to impaired mobility.     Bladder program - schedule voids q6-8h. Check post-void residual as needed; in-and-out catheter if post-void residual is more than 400ml. -uop improving     Bowel program - at risk for constipation as a side effect of medications, impaired mobility, etc. MiraLAX daily for regularity, Berta-Colace for stool softener. PRN MOM, bisacodyl suppository or tablets for constipation.       Time spent was 25 minutes with over 1/2 in direct patient care/examination, consultation and coordination of care.      Signed By: Mariama Werner MD, MD     June 9, 2022

## 2022-06-10 ENCOUNTER — APPOINTMENT (OUTPATIENT)
Dept: INTERVENTIONAL RADIOLOGY/VASCULAR | Age: 47
DRG: 948 | End: 2022-06-10
Attending: PHYSICAL MEDICINE & REHABILITATION
Payer: COMMERCIAL

## 2022-06-10 LAB
ANION GAP SERPL CALC-SCNC: 6 MMOL/L (ref 7–16)
BUN SERPL-MCNC: 12 MG/DL (ref 6–23)
CALCIUM SERPL-MCNC: 7.4 MG/DL (ref 8.3–10.4)
CHLORIDE SERPL-SCNC: 109 MMOL/L (ref 98–107)
CO2 SERPL-SCNC: 28 MMOL/L (ref 21–32)
CREAT SERPL-MCNC: 3.5 MG/DL (ref 0.6–1)
ERYTHROCYTE [DISTWIDTH] IN BLOOD BY AUTOMATED COUNT: 16.3 % (ref 11.9–14.6)
GLUCOSE BLD STRIP.AUTO-MCNC: 195 MG/DL (ref 65–100)
GLUCOSE BLD STRIP.AUTO-MCNC: 260 MG/DL (ref 65–100)
GLUCOSE BLD STRIP.AUTO-MCNC: 322 MG/DL (ref 65–100)
GLUCOSE BLD STRIP.AUTO-MCNC: 74 MG/DL (ref 65–100)
GLUCOSE SERPL-MCNC: 48 MG/DL (ref 65–100)
HCT VFR BLD AUTO: 25.8 % (ref 35.8–46.3)
HGB BLD-MCNC: 8.1 G/DL (ref 11.7–15.4)
MCH RBC QN AUTO: 26.3 PG (ref 26.1–32.9)
MCHC RBC AUTO-ENTMCNC: 31.4 G/DL (ref 31.4–35)
MCV RBC AUTO: 83.8 FL (ref 79.6–97.8)
NRBC # BLD: 0 K/UL (ref 0–0.2)
PLATELET # BLD AUTO: 331 K/UL (ref 150–450)
PMV BLD AUTO: 9.2 FL (ref 9.4–12.3)
POTASSIUM SERPL-SCNC: 3.5 MMOL/L (ref 3.5–5.1)
RBC # BLD AUTO: 3.08 M/UL (ref 4.05–5.2)
SERVICE CMNT-IMP: ABNORMAL
SERVICE CMNT-IMP: NORMAL
SODIUM SERPL-SCNC: 143 MMOL/L (ref 136–145)
WBC # BLD AUTO: 7.9 K/UL (ref 4.3–11.1)

## 2022-06-10 PROCEDURE — 80048 BASIC METABOLIC PNL TOTAL CA: CPT

## 2022-06-10 PROCEDURE — 82962 GLUCOSE BLOOD TEST: CPT

## 2022-06-10 PROCEDURE — 1180000000 HC REHAB R&B

## 2022-06-10 PROCEDURE — 99232 SBSQ HOSP IP/OBS MODERATE 35: CPT | Performed by: PHYSICAL MEDICINE & REHABILITATION

## 2022-06-10 PROCEDURE — 02PYX3Z REMOVAL OF INFUSION DEVICE FROM GREAT VESSEL, EXTERNAL APPROACH: ICD-10-PCS | Performed by: RADIOLOGY

## 2022-06-10 PROCEDURE — 97530 THERAPEUTIC ACTIVITIES: CPT

## 2022-06-10 PROCEDURE — 6370000000 HC RX 637 (ALT 250 FOR IP): Performed by: PHYSICIAN ASSISTANT

## 2022-06-10 PROCEDURE — 85027 COMPLETE CBC AUTOMATED: CPT

## 2022-06-10 PROCEDURE — 36415 COLL VENOUS BLD VENIPUNCTURE: CPT

## 2022-06-10 PROCEDURE — 6370000000 HC RX 637 (ALT 250 FOR IP): Performed by: PHYSICAL MEDICINE & REHABILITATION

## 2022-06-10 PROCEDURE — 36589 REMOVAL TUNNELED CV CATH: CPT

## 2022-06-10 PROCEDURE — 97535 SELF CARE MNGMENT TRAINING: CPT

## 2022-06-10 PROCEDURE — 97116 GAIT TRAINING THERAPY: CPT

## 2022-06-10 RX ORDER — INSULIN GLARGINE 100 [IU]/ML
24 INJECTION, SOLUTION SUBCUTANEOUS NIGHTLY
Status: DISCONTINUED | OUTPATIENT
Start: 2022-06-10 | End: 2022-06-11 | Stop reason: HOSPADM

## 2022-06-10 RX ORDER — INSULIN GLARGINE 100 [IU]/ML
24 INJECTION, SOLUTION SUBCUTANEOUS NIGHTLY
Qty: 10 ML | Refills: 3 | Status: SHIPPED | OUTPATIENT
Start: 2022-06-10 | End: 2022-06-11

## 2022-06-10 RX ORDER — FERROUS SULFATE 325(65) MG
325 TABLET ORAL 2 TIMES DAILY WITH MEALS
Qty: 60 TABLET | Refills: 3 | Status: SHIPPED | OUTPATIENT
Start: 2022-06-10 | End: 2022-07-08

## 2022-06-10 RX ORDER — INSULIN LISPRO 100 [IU]/ML
5 INJECTION, SOLUTION INTRAVENOUS; SUBCUTANEOUS
Qty: 10 ML | Refills: 3 | Status: SHIPPED | OUTPATIENT
Start: 2022-06-10

## 2022-06-10 RX ORDER — FOLIC ACID 1 MG/1
1 TABLET ORAL DAILY
Qty: 30 TABLET | Refills: 3 | Status: SHIPPED | OUTPATIENT
Start: 2022-06-10 | End: 2022-07-08

## 2022-06-10 RX ORDER — ALBUTEROL SULFATE 2.5 MG/3ML
2.5 SOLUTION RESPIRATORY (INHALATION) EVERY 4 HOURS PRN
Qty: 120 EACH | Refills: 3 | Status: SHIPPED | OUTPATIENT
Start: 2022-06-10

## 2022-06-10 RX ADMIN — FERROUS SULFATE TAB 325 MG (65 MG ELEMENTAL FE) 325 MG: 325 (65 FE) TAB at 16:19

## 2022-06-10 RX ADMIN — INSULIN LISPRO 3 UNITS: 100 INJECTION, SOLUTION INTRAVENOUS; SUBCUTANEOUS at 16:20

## 2022-06-10 RX ADMIN — INSULIN LISPRO 5 UNITS: 100 INJECTION, SOLUTION INTRAVENOUS; SUBCUTANEOUS at 16:23

## 2022-06-10 RX ADMIN — FOLIC ACID 1 MG: 1 TABLET ORAL at 08:59

## 2022-06-10 RX ADMIN — FERROUS SULFATE TAB 325 MG (65 MG ELEMENTAL FE) 325 MG: 325 (65 FE) TAB at 08:59

## 2022-06-10 RX ADMIN — INSULIN GLARGINE 24 UNITS: 100 INJECTION, SOLUTION SUBCUTANEOUS at 21:23

## 2022-06-10 ASSESSMENT — PAIN - FUNCTIONAL ASSESSMENT: PAIN_FUNCTIONAL_ASSESSMENT: NONE - DENIES PAIN

## 2022-06-10 NOTE — CARE COORDINATION
Pt to discharge home today. HH needs include RN/PT/OT/SW and referral placed to [address] [place] [address] as pt requested. Liaison aware. DME needs include RW and provided from [address] [place] [address]. Pt decline SLP and ENT referrals. Pt off HD and Outpatient HD slot cancelled. Pt declined family training. DM educator reported sending referral to outpatient diabetic education and pt has follow up with Endocrinology. All needs met. Mother to transport pt home. Discharge summary to be sent to [facility] and insurance as requested. CM available if any new needs arise. 06/10/22 1227   Service Assessment   Patient Orientation Alert and Isabella Marino 89 Parent; Family Members   PCP Verified by CM Yes   Can patient return to prior living arrangement No  (STaying iwth parents as needed)   Family able to assist with home care needs: Yes   Social/Functional History   Lives With Parent   Type of [facility] One level   Home Access Stairs to enter without rails   Entrance Stairs - Number of Steps 2   Receives Help From Family   Discharge Planning   Type of Residence House   Living Arrangements Parent   Current Services Prior To Admission [address]   DME Glucometer   DME Ordered? Walker   Potential Assistance Purchasing Medications No   Type of Home Care Services PT;OT;Skilled Therapy;Nursing Services   Patient expects to be discharged to: House   One/Two Story Residence One story   Services At/After Discharge   Transition of Care Consult (CM Consult) Home Health;Discharge Planning;DME/Supply Assistance   Internal Home Health No   Reason Outside Agency Chosen   (choice)   [facility]; Home Health;PT;OT   [facility] Provided?  No   Mode of Transport at Discharge Other (see comment)  (Family to transport home)   Confirm Follow Up Transport Family   Condition of Participation: Discharge Planning   The Plan for Transition of Care is related to the following treatment goals: Return to prior level of functioning. The Patient and/or Patient Representative was provided with a Choice of Provider? Patient   The Patient and/Or Patient Representative agree with the Discharge Plan? Yes   Freedom of Choice list was provided with basic dialogue that supports the patient's individualized plan of care/goals, treatment preferences, and shares the quality data associated with the providers?   Yes

## 2022-06-10 NOTE — PROGRESS NOTES
Will give report to oncoming nurse. Pt sitting in recliner, call light within reach. R chest tunneled catheter removed in IR this shift, no bleeding or swelling observed, dressing c/d/i.

## 2022-06-10 NOTE — PROGRESS NOTES
Attempted to see pt this morning. Pt is currently off floor for a procedure. Will follow up as able.

## 2022-06-10 NOTE — PROGRESS NOTES
Physical Therapy    PHYSICAL THERAPY DISCHARGE SUMMARY    TIME IN: 0827  TIME OUT: 0908      Precautions at discharge:   Falls     Problem List:    Decreased strength B LE  [x]     Decreased strength trunk/core  [x]     Decreased AROM   []     Decreased PROM  []     Decreased balance sitting  []     Decreased balance standing  [x]     Decreased endurance  [x]     Pain  []        Functional Limitations:   Decreased independence with bed mobility  []     Decreased independence with functional transfers  [x]     Decreased independence with ambulation  [x]     Decreased independence with stair negotiation  [x]               Objective:     Vital Signs:BP (!) 164/84   Pulse (!) 103   Temp 98.2 °F (36.8 °C) (Temporal)   Resp 20   Ht 5' 6\" (1.676 m)   Wt 170 lb (77.1 kg)   SpO2 100%   BMI 27.44 kg/m²     Pain level: 0/10  Pain location: no pain  Pain interventions: Medication per order, rest, positioning,relaxation     Patient education: Pt was educated on car transfers, stair training, ambulation and bed mobility. Interdisciplinary Communication: Collaborated with OT about POC     Cognition: Pt is alert & follows simple commands. Lower Extremity Function:     LLE RLE   ROM WFL WFL   Fine Motor Coordination Intact Intact   Tone Normal Normal   Sensation Intact Intact        MMT Initial Asssessment: Grossly assessed 4/5 with functional mobility.      PRIMARY MODE OF LOCOMOTION: Ambulation      Functional Assessment:    Balance  Comments   Static Sitting Good:  Pt. able to maintain balance w/o UE support;  exhibits some postural sway    Dynamic Sitting Good - accepts moderate challenge;  can maintain balance while picking object off the floor    Static Standing Good:  Pt. able to maintain balance w/o UE support;  exhibits some postural sway    Dynamic Standing Fair - accepts minimal challenge;  can maintain balance while turning head/trunk    Picking Up Object From Floor 4  Supervision or touching assistance BED MOBILITY &TRANSFERS Discharge Assessment Comments   Rolling  4  Supervision or touching assistance        Supine to Sit 4  Supervision or touching assistance        Sit to to Supine 4  Supervision or touching assistance        Sit to Stand 4  Supervision or touching assistance        Chair To/From Bed 4  Supervision or touching assistance        Car Transfer 88     Not attempted due to medical condition or safety concerns           [facility] MOBILITY/MANAGEMENT Discharge Assessment Comments   Able to Propel 48' w/ 2 Turns 9     Not applicable     Able to Propel 784' 9     Not applicable     Wheelchair set up assistance required NT    Wheelchair management NT          AMBULATION Discharge Assessment Comments   Assistive device RW    Walk 10' 4     Not attempted due to medical condition or safety concerns     Walk 48' with 2 Turns 4  Supervision or touching assistance  Not attempted due to medical condition or safety concerns     Walk 150' 4  Supervision or touching assistance  Not attempted due to medical condition or safety concerns     Walking 10' on Unlevel Surface 88     Not attempted due to medical condition or safety concerns        Gait: Pt ambulated 150ft X3 using roller walker with SBA with no LOB. STEPS/STAIRS Discharge Assessment Comments   1 Curb Step 4  Supervision or touching assistance  Not attempted due to medical condition or safety concerns     4 Steps 4  Supervision or touching assistance  Not attempted due to medical condition or safety concerns     12 Steps 4  Supervision or touching assistance  Not attempted due to medical condition or safety concerns  Pt required rest break. Ramp CGA        Pt. Left Pt was returned to room and left up in recliner with all needs within reach and RN present. PHYSICAL THERAPY PLAN OF CARE     LTGs:  1. Pt will demonstrate roll left & right & transition supine<>sit with Independent in 10 days. 6/10 Goal not met.   2.   Pt. will transfer from bed to/from chair with Independent using the least restrictive device in 10 days  6/10 Goal met. 3.   Pt. will ambulate with 150 feet with LRAD and Supervision/Standby Assist in 10 days. 6/10 Goal met. 4.   Pt. Will ambulate up/down 4 steps with single rail and Supervision/Standby Assist in 10 days. 6/10 Goal met. 5.   Pt. Will perform HEP w/ supervision in 10 days. 6/10 Goal not met. Pt would benefit from continued skilled physical therapy in order to improve independent functional mobility within the home with use of least restrictive device. Interventions may include range of motion (AROM, PROM B LE/trunk), motor function (B LE/trunk strengthening/coordination), activity tolerance (vitals, oxygen saturation levels), bed mobility training, balance activities, gait training (progressive ambulation program), and functional transfer training. HEP handout: Deferred per patient request.       Pt to be discharged to parents home with 24 hour assistance. Therapy Recommendations upon discharge:   Pt will benefit from home health PT. Equipment needs at discharge:  Rolling walker     Please see IRC; Interdisciplinary Eval, Care Plan, and Patient Education for further information regarding physical therapy discharge summary and plan of care.        Roshan Hollingsworth, PT  6/10/2022

## 2022-06-10 NOTE — DIABETES MGMT
Patient admitted with CVA. Blood glucose ranged 116-265 yesterday with patient receiving Lantus 26 units and Humalog 12 units. Blood glucose this morning was 74. Creatinine 3.50. GFR 18. Reviewed patient current regimen: Lantus 24 units HS, Humalog 5 units prandial, and Humalog correctional insulin. Patient would likely benefit from a decrease in basal insulin to reduce the risk of morning hypoglycemia. Provider updated via [employer] regarding recommendation and glycemic control.

## 2022-06-10 NOTE — PROGRESS NOTES
[facility]    Follow-Up on: BLANCA on CKD stage IV    HPI: Pt seen and examined in room, sitting up in chair, cousin at the bedside, pt reports PT going well gaining in strength, good uop.      ROS:  General: no fever/chills  CV: no CP  Lung: no SOB, no cough  GI: no N/V/D  : no dysuria  Ext: edema       Current Facility-Administered Medications   Medication Dose Route Frequency    insulin glargine (LANTUS) injection vial 24 Units  24 Units SubCUTAneous Nightly    insulin lispro (HUMALOG) injection vial 5 Units  5 Units SubCUTAneous TID     melatonin tablet 3 mg  3 mg Oral Nightly    ondansetron (ZOFRAN) injection 4 mg  4 mg IntraVENous Q6H PRN    insulin lispro (HUMALOG) injection vial 0-4 Units  0-4 Units SubCUTAneous TID WC    insulin lispro (HUMALOG) injection vial 0-4 Units  0-4 Units SubCUTAneous Nightly    glucose chewable tablet 16 g  4 tablet Oral PRN    dextrose bolus 10% 125 mL  125 mL IntraVENous PRN    Or    dextrose bolus 10% 250 mL  250 mL IntraVENous PRN    glucagon injection 1 mg  1 mg IntraMUSCular PRN    dextrose 5 % solution  100 mL/hr IntraVENous PRN    acetaminophen (TYLENOL) tablet 650 mg  650 mg Oral Q6H PRN    Or    acetaminophen (TYLENOL) suppository 650 mg  650 mg Rectal Q6H PRN    polyethylene glycol (GLYCOLAX) packet 17 g  17 g Oral Daily PRN    albuterol (PROVENTIL) nebulizer solution 2.5 mg  2.5 mg Nebulization Q4H PRN    ferrous sulfate (IRON 325) tablet 325 mg  325 mg Oral BID WC    folic acid (FOLVITE) tablet 1 mg  1 mg Oral Daily    guaiFENesin-dextromethorphan (ROBITUSSIN DM) 100-10 MG/5ML syrup 5 mL  5 mL Oral Q4H PRN    loperamide (IMODIUM) capsule 2 mg  2 mg Oral 4x Daily PRN    medicated lip ointment (BLISTEX)   Topical PRN    ondansetron (ZOFRAN-ODT) disintegrating tablet 4 mg  4 mg Oral Q6H PRN       Exam:  Vitals:    06/09/22 0700 06/09/22 1700 06/09/22 1930 06/10/22 0800   BP: 111/77 126/77 110/76 126/82   Pulse: 94 (!) 109 100 100   Resp: 16 16 18 18   Temp: 99.2 °F (37.3 °C) 98.9 °F (37.2 °C) 98.5 °F (36.9 °C) 98.9 °F (37.2 °C)   TempSrc:   Oral    SpO2: 98% 100%  100%   Weight:       Height:             Intake/Output Summary (Last 24 hours) at 6/10/2022 0850  Last data filed at 6/10/2022 0117  Gross per 24 hour   Intake 540 ml   Output 800 ml   Net -260 ml     PE:  GEN - in no distress, alert and oriented   CV - regular rate, S1, S2, no rub  Lung - clear bilaterally  Abd - soft, nontender  Ext - trace BLE edema  Access-  right TCC- intact    Labs  Recent Labs     06/08/22  1123 06/09/22  0452 06/10/22  0504   WBC 7.3 7.6 7.9   HGB 9.0* 7.8* 8.1*   HCT 29.6* 25.0* 25.8*    341 331       Recent Labs     06/08/22  0616 06/09/22  0452 06/10/22  0504    146* 143   K 3.7 3.6 3.5   * 109* 109*   CO2 23 28 28   BUN 9 9 12   CREATININE 4.10* 3.80* 3.50*       Problem List:      Issues Addressed By Nephrology:    Plan:  1. BLANCA on CKD stage IV non oliguric   1st HD 5/24, TCC placed 6/1  - dialysis on hold, last HD 6/4  Renal function improving, non oliguric  Will consult IR to d/c TCC and order outpt nephrology Follow up    2. Type I DM- SSI per primary     3. DKA BS>2000 on admit- un controlled- SSI per primary     4.  Anemia-  on Fe

## 2022-06-10 NOTE — PROGRESS NOTES
OT DISCHARGE NOTE    Time In: 0700  Time Out: 0725    Goals:  Long Term Goals:  Time Frame for Long term goals : 10 days   LTG 1: Patient will dress UB with [place] using AE/DME PRN. 6/9/22, 6/10/2022 Goal Met. LTG 2: Patient will dress LB with Setup Assistance using AE/DME PRN. 6/9/22 Progressing. GOAL MET 6/10/2022  LTG 3: Patient will don footwear with Medina using AE/DME PRN. 6/9/22, 6/10/2022 Goal Met. LTG 4: Patient will bathe with Supervision or Touching Assistance using AE/DME PRN. 6/9/22, 6/10/2022 Goal Met. LTG 5: Patient will toilet with Medina using AE/DME PRN. 6/9/22 Progressing. GOAL MET 6/10/2022  LTG 6: Patient/caregiver will verbalize understanding of OT recommendations regarding ADLs, functional transfers, home safety, AE/DME, energy conservation, safety awareness, activity tolerance, and follow-up therapy to increase safety with functional tasks upon discharge. 6/9/22, Progressing.  GOAL MET 6/10/2022    *Initial Assessment is the worst a patient did on that activity in the first 72 hrs of being admitted as gathered by the OT   *Discharge Assessment is an average of the patient's performance over the last 48 hrs  Eating Initial Assessment Discharge Assessment 6/10/2022   Score Independent Independent   Comments         Oral Hygiene  Initial Assessment Discharge Assessment 6/10/2022   Score Independent Independent   Comments         Bathing Initial Assessment Discharge Assessment 6/10/2022   Score Partial/moderate assistance Independent   Comments Assist to reach feet due to fatigue and malaise       Upper Body   Dressing Initial Assessment Discharge Assessment 6/10/2022   Score Setup or clean-up assistance Independent   Comments Seated EOB       Lower Body   Dressing Initial Assessment Discharge Assessment 6/10/2022   Functional Level Partial/moderate assistance Independent   Comments Assist to thread LLE d/t malaise, CGA in stance to pull up over hips [place]  Footwear Initial Assessment Discharge Assessment 6/10/2022   Functional Level Partial/moderate assistance Independent   Comments Doffs socks with I, S/U don R, Dependent dress L d/t fatigue and malaise       Toilet transfer Initial Assessment Discharge Assessment 6/10/2022   Functional Level Supervision or touching assistance Supervision or touching assistance   Comments CGA with grab bars SBA with RW     Toilet Hygiene   Initial Assessment Discharge Assessment 6/10/2022   Score Supervision or touching assistance Independent   Comments SBA seated, CGA in stance       Precautions at Discharge: Falls  Discharge Location: home with parents  Safety/Supervision Recommendations/Functional Level: Pt will require 24 hour supervision from parents, especially with functional mobility using RW. Family Training: not completed due to patient preference     Recommended Continuing Therapy: HHOT  Residual Deficits: decreased balance, activity tolerance, and strength   Progress over LOS: Patient demonstrates good progress with ADL skills, UE strength, Standing balance, Standing tolerance, Activity tolerance and Functional mobility for performance of ADL and functional transfers, see above for details. Patient continues to require skilled [address] [place] [address] services to address remaining deficits stated above. Summary of Session: S: \"I'm ready to go home tomorrow. \" Agreeable to therapy. Focus of session was on morning ADL routine and discharge assessment. Patient was able to ambulate ~10 feet using a RW with SBA. Pain not expressed. Collaborated with PT and confirmed patient is ready for discharge. Patient ended session in recliner with call remote and phone within reach.          Jonah Fowler, OTR/L   6/10/2022

## 2022-06-10 NOTE — CARE COORDINATION
Chart reviewed. Pt to discharge home tomorrow. DME needs include RW and provided to pt bedside from [facility] supply closet. Order sent to [facility]. New Kathleen referral already placed to [address] [place] [address] and d/c summary to be sent on day of discharge. Pt having some renal recovery and off HD. Pt HD line being removed by IR today. [facility] aware and pt HD slot cancelled at this time. Pt family to transport pt home. CM to continue to follow and monitor for any further needs.

## 2022-06-10 NOTE — PROGRESS NOTES
OT DAILY NOTE    Time In: 6074  Time Out: 1050     Score Comments   Rolling Independent     Supine to Sit Independent     Sit to Supine Independent     Sit to Stand Independent     Transfer Assist Supervision or touching assistance SBA with RW         Toilet transfer Current Functional Level   Functional Level Supervision or touching assistance   Comments SBA with RW     Toilet Hygiene   Current Functional Level   Score Independent   Comments       Summary of Session: S: \"Transport is coming to get my so I can get my dialysis line out. \" Agreeable to therapy. Focus of session was on toileting prior to transport to IR. Patient was able to ambulate ~10 feet using a RW with SBA. Pain not expressed. Collaborated with PT and confirmed patient is on track to reach goals as documented in the care plan. Continue OT POC with focus on ADL/IADL skills, functional transfers, functional mobility, coordination, strength, static and dynamic balance, and activity tolerance to maximize safety and independence with ADLs and functional transfers. Patient ended session on transport stretcher with call remote and phone within reach.          Addison Antoinette Hodgkins, OTR/L  6/10/2022

## 2022-06-10 NOTE — OP NOTE
Department of Interventional Radiology  [phone]        Interventional Radiology Brief Procedure Note    Patient: Maurilio Medina MRN: [id number]  SSN: [id number]    YOB: 1975  Age: 48 y.o.   Sex: female      Date of Procedure: 6/10/2022    Pre-Procedure Diagnosis: CKD, renal recovery    Post-Procedure Diagnosis: SAME    Procedure(s): Temporary Central Venous Catheter removal    Brief Description of Procedure: as above    Performed By: Gadiel Colunga PA-C     Assistants: None    Anesthesia:none    Estimated Blood Loss: None    Specimens:  None    Implants:  None    Findings: removed intact    Complications: None    Recommendations: routine wound care     Follow Up: prn    Signed By: Gadiel Colunga PA-C     Joleen 10, 2022

## 2022-06-10 NOTE — PROGRESS NOTES
Radha Duarte MD  Medical Director  [address] [facility], [address] [facility]  Tel: [phone]       [facility] PROGRESS NOTE    Ela Shook  Admit Date: 6/2/2022  Admit Diagnosis:   Debility [R53.81]  Physical debility [R53.81]    Subjective     Patient seen and examined. Feeling well. No complaints. Affect flat but states she is just fine. Denies being depressed (but told ST she was yesterday). Anxious to go home tomorrow.      Objective:     Current Facility-Administered Medications   Medication Dose Route Frequency    insulin lispro (HUMALOG) injection vial 5 Units  5 Units SubCUTAneous TID     insulin glargine (LANTUS) injection vial 26 Units  26 Units SubCUTAneous Nightly    melatonin tablet 3 mg  3 mg Oral Nightly    ondansetron (ZOFRAN) injection 4 mg  4 mg IntraVENous Q6H PRN    insulin lispro (HUMALOG) injection vial 0-4 Units  0-4 Units SubCUTAneous TID     insulin lispro (HUMALOG) injection vial 0-4 Units  0-4 Units SubCUTAneous Nightly    glucose chewable tablet 16 g  4 tablet Oral PRN    dextrose bolus 10% 125 mL  125 mL IntraVENous PRN    Or    dextrose bolus 10% 250 mL  250 mL IntraVENous PRN    glucagon injection 1 mg  1 mg IntraMUSCular PRN    dextrose 5 % solution  100 mL/hr IntraVENous PRN    acetaminophen (TYLENOL) tablet 650 mg  650 mg Oral Q6H PRN    Or    acetaminophen (TYLENOL) suppository 650 mg  650 mg Rectal Q6H PRN    polyethylene glycol (GLYCOLAX) packet 17 g  17 g Oral Daily PRN    albuterol (PROVENTIL) nebulizer solution 2.5 mg  2.5 mg Nebulization Q4H PRN    ferrous sulfate (IRON 325) tablet 325 mg  325 mg Oral BID WC    folic acid (FOLVITE) tablet 1 mg  1 mg Oral Daily    guaiFENesin-dextromethorphan (ROBITUSSIN DM) 100-10 MG/5ML syrup 5 mL  5 mL Oral Q4H PRN    loperamide (IMODIUM) capsule 2 mg  2 mg Oral 4x Daily PRN    medicated lip ointment (BLISTEX)   Topical PRN    ondansetron (ZOFRAN-ODT) disintegrating tablet 4 mg  4 mg Oral Q6H PRN        Review of Systems:   Denies chest pain, shortness of breath, cough, headache, visual problems, abdominal pain, dysuria, calf pain. Pertinent positives are as noted in the HPI, ROS unremarkable otherwise. Visit Vitals  /76   Pulse 100   Temp 98.5 °F (36.9 °C) (Oral)   Resp 18   Ht 5' 6\" (1.676 m)   Wt 170 lb (77.1 kg)   SpO2 100%   BMI 27.44 kg/m²    intake/output +100    Physical Exam:   General: Alert and age appropriately oriented. No acute cardiorespiratory distress. HEENT: Normocephalic, EOM intact. Oral mucosa moist without cyanosis. Lungs: Clear to auscultation bilaterally. Respiration even and unlabored. Heart: Regular rate and rhythm, S1, S2. No murmurs, clicks, rub or gallops. Abdomen: Soft, non-tender, not distended. Bowel sounds normoactive. No organomegaly. Genitourinary: Deferred. Neuromuscular:      No gross focal motor deficits noted. Affect flat. Poor attn  Dysphonia; improving   Skin/extremity: No rashes, no erythema.  No calf tenderness B LE.  1+ edema                                                                                  Labs/Studies:  Recent Results (from the past 72 hour(s))   POCT Glucose    Collection Time: 06/07/22 11:40 AM   Result Value Ref Range    POC Glucose 180 (H) 65 - 100 mg/dL    Performed by: Azalia    POCT Glucose    Collection Time: 06/07/22  5:29 PM   Result Value Ref Range    POC Glucose 179 (H) 65 - 100 mg/dL    Performed by: Azalia    POCT Glucose    Collection Time: 06/07/22  8:34 PM   Result Value Ref Range    POC Glucose 212 (H) 65 - 100 mg/dL    Performed by: David Banegas    Basic Metabolic Panel    Collection Time: 06/08/22  6:16 AM   Result Value Ref Range    Sodium 144 136 - 145 mmol/L    Potassium 3.7 3.5 - 5.1 mmol/L    Chloride 109 (H) 98 - 107 mmol/L    CO2 23 21 - 32 mmol/L    Anion Gap 12 7 - 16 mmol/L    Glucose 215 (H) 65 - 100 mg/dL    BUN 9 6 - 23 MG/DL    CREATININE 4.10 (H) 0.6 - 1.0 MG/DL    GFR African American 15 (L) >60 ml/min/1.73m2    GFR Non- 12 (L) >60 ml/min/1.73m2    Calcium 7.7 (L) 8.3 - 10.4 MG/DL   POCT Glucose    Collection Time: 06/08/22  6:16 AM   Result Value Ref Range    POC Glucose 222 (H) 65 - 100 mg/dL    Performed by: Jimy Thomson    CBC    Collection Time: 06/08/22 11:23 AM   Result Value Ref Range    WBC 7.3 4.3 - 11.1 K/uL    RBC 3.44 (L) 4.05 - 5.2 M/uL    Hemoglobin 9.0 (L) 11.7 - 15.4 g/dL    Hematocrit 29.6 (L) 35.8 - 46.3 %    MCV 86.0 79.6 - 97.8 FL    MCH 26.2 26.1 - 32.9 PG    MCHC 30.4 (L) 31.4 - 35.0 g/dL    RDW 17.0 (H) 11.9 - 14.6 %    Platelets 376 608 - 223 K/uL    MPV 9.4 9.4 - 12.3 FL    nRBC 0.00 0.0 - 0.2 K/uL   POCT Glucose    Collection Time: 06/08/22 11:30 AM   Result Value Ref Range    POC Glucose 77 65 - 100 mg/dL    Performed by: Garcia (Hammonds)    POCT Glucose    Collection Time: 06/08/22 12:28 PM   Result Value Ref Range    POC Glucose 96 65 - 100 mg/dL    Performed by: Azalia    POCT Glucose    Collection Time: 06/08/22  4:09 PM   Result Value Ref Range    POC Glucose 193 (H) 65 - 100 mg/dL    Performed by: Garcia (Hammonds)    POCT Glucose    Collection Time: 06/08/22  9:48 PM   Result Value Ref Range    POC Glucose 196 (H) 65 - 100 mg/dL    Performed by: Alan    CBC    Collection Time: 06/09/22  4:52 AM   Result Value Ref Range    WBC 7.6 4.3 - 11.1 K/uL    RBC 2.97 (L) 4.05 - 5.2 M/uL    Hemoglobin 7.8 (L) 11.7 - 15.4 g/dL    Hematocrit 25.0 (L) 35.8 - 46.3 %    MCV 84.2 79.6 - 97.8 FL    MCH 26.3 26.1 - 32.9 PG    MCHC 31.2 (L) 31.4 - 35.0 g/dL    RDW 16.7 (H) 11.9 - 14.6 %    Platelets 951 148 - 382 K/uL    MPV 9.5 9.4 - 12.3 FL    nRBC 0.00 0.0 - 0.2 K/uL   Basic Metabolic Panel    Collection Time: 06/09/22  4:52 AM   Result Value Ref Range    Sodium 146 (H) 136 - 145 mmol/L    Potassium 3.6 3.5 - 5.1 mmol/L Chloride 109 (H) 98 - 107 mmol/L    CO2 28 21 - 32 mmol/L    Anion Gap 9 7 - 16 mmol/L    Glucose 115 (H) 65 - 100 mg/dL    BUN 9 6 - 23 MG/DL    CREATININE 3.80 (H) 0.6 - 1.0 MG/DL    GFR  16 (L) >60 ml/min/1.73m2    GFR Non- 13 (L) >60 ml/min/1.73m2    Calcium 7.8 (L) 8.3 - 10.4 MG/DL   POCT Glucose    Collection Time: 06/09/22  7:02 AM   Result Value Ref Range    POC Glucose 116 (H) 65 - 100 mg/dL    Performed by: Royce    POCT Glucose    Collection Time: 06/09/22 12:11 PM   Result Value Ref Range    POC Glucose 265 (H) 65 - 100 mg/dL    Performed by: Royce    POCT Glucose    Collection Time: 06/09/22  4:05 PM   Result Value Ref Range    POC Glucose 178 (H) 65 - 100 mg/dL    Performed by: Royce    POCT Glucose    Collection Time: 06/09/22  8:53 PM   Result Value Ref Range    POC Glucose 197 (H) 65 - 100 mg/dL    Performed by: Shane Conley    CBC    Collection Time: 06/10/22  5:04 AM   Result Value Ref Range    WBC 7.9 4.3 - 11.1 K/uL    RBC 3.08 (L) 4.05 - 5.2 M/uL    Hemoglobin 8.1 (L) 11.7 - 15.4 g/dL    Hematocrit 25.8 (L) 35.8 - 46.3 %    MCV 83.8 79.6 - 97.8 FL    MCH 26.3 26.1 - 32.9 PG    MCHC 31.4 31.4 - 35.0 g/dL    RDW 16.3 (H) 11.9 - 14.6 %    Platelets 260 986 - 898 K/uL    MPV 9.2 (L) 9.4 - 12.3 FL    nRBC 0.00 0.0 - 0.2 K/uL   POCT Glucose    Collection Time: 06/10/22  6:44 AM   Result Value Ref Range    POC Glucose 74 65 - 100 mg/dL    Performed by: Shane Conley        Assessment:     Principal Problem:    Physical debility  Active Problems:    Acute kidney injury superimposed on chronic kidney disease (HCC)    Benign hypertension with CKD (chronic kidney disease) stage III (HCC)    Cardiac arrest (HCC)    Diabetic ketoacidosis with coma associated with type 1 diabetes mellitus (HCC)    Normocytic anemia    Depression with anxiety    Diabetic polyneuropathy associated with type 1 diabetes mellitus (HCC)    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 associated with type 1 diabetes mellitus ([facility] [address]  Resolved Problems:    * No resolved hospital problems. *        Physical debility following DKA with coma, septic shock, acute renal failure, acute respiratory failure, cardiac arrest and altered mental status     Plan / Recommendations / Medical Decision Making:      Continue daily physician / PA medical management:     Physical debility [R53.81] - good rehab potential, aggressive PT, OT to tolerance. SLP for swallowing surveillance, cognitive evaluation post-arrest, DKA with coma.     Cardiac arrest, acute respiratory failure - resolved; multifactorial, most likely due to DKA with coma, septic shock. Intubated 5/21, extubated 5/25, now on room air; no prior history of supplemental O2 use. TTE (5/22) with EF 36-62%, normal systolic and diastolic function, moderate AV stenosis.     Severe sepsis with septic shock - question of bacterial pneumonia vs UTI; was on vanc+Zosyn throughout acute hospitalization, EOT 6/2. UCx, BCx NGTD. Leukocytosis attributed to stress response.  -6/3 WBC 13.7, trending down; had UA yesterday with + leuk (moderate), 1+ bacteria; UCx negative after short incubation  -6/4 WBC 11.8  -6/6 WBC 7.9; 6/7 7.5 nl     Acute renal failure, BLANCA on CKD3 - Cr on admission 9.7 (baseline ~2.5), oliguric; dialysis started 5/24, temp cath switched to tunneled cath 6.1. Nephrology managing HD, patient will run next HD 6/4 for TThSa transition. Avoid hypotension and nephrotoxic drugs.   -6/3 no HD today, transitioning to a TThSa schedule for therapies; Cr 5.3 (4.6 yesterday)  -6/4 normal HD run; per Nephrologist, she is very likely ESRD  -6/6 has HD chair at [facility]  -6/7 can likely dc after HD on Sat if she is going home with parents. Needs supervision . Recommend no driving ; UOP 906PS  -6/8 [id number], -548 net. Improving.  Dialysis on hold  -6/9 creat 3.8 continues to improve; UOP not recorded accurately; pt has been going to the restroom without nursing aware, thus measurements unaccounted for. Discussed the importance of measuring urine accurately to help us detm need for HD  -6/10 no further HD anticipated. Creat pending. Non-oliguric. Net + 100. Labs pending. Nephrology to decide whether tunneled cath can be removed before dc tomorrow     Blood pressure management - monitor. Patient occasionally hypertensive but mostly normo- or hypotensive.  -6/3 BP 103/61 this AM, lowest of past 24 hrs  -6/4 /70  -6/6 122/77  -VSS     Diabetes mellitus - reportedly type 1, last HgbA1c found in EMR was 9.6% but from 6/2020; with admission for DKA and BS >2000, patient likely with poor glycemic control. Will require ACHS fasting glucose monitoring and medication adjustment to optimize glycemic control in setting of acute illness and hospitalization. Per DM RN, current regimen is Lantus 10u QHS with Humalog 5u TID with meals. Monitor and adjust.  -6/3  this AM, 170 last PM, 198 at supper and 184 at lunch; d/w Diabetic RN this AM; subsequent emesis, nausea: labs indicate DKA, with AG of 26 and ; consult Hospitalist now  -6/4 patient much better today, labs more normal (x hypoK+), DKA caught early yesterday, appreciate Hospitalist  -6/6 BS dropped to 35 last evening, appears from EMR that she received her pre-prandial supper Humalog but then did not eat any / much supper; also, DM RN recommends small increase in Lantus --> will bump to 22u QHS  -6/7  this am., 245 last noc, 139 at supper, 254 at lunch yesterday; 180 at lunch today. slt improved. -6/8  this a.m, 212 at bedtime, 179 at supper, 180 at lunch; overall slowly improving. Increase lantus from 22 to 24units. keep post prandial insulin 5units  -6/9 a.m bs pending. 196 at bedtime, 193 at supper, 96 at lunch/77, 222 in a.m.; further insulin adjustments to follow. Discussed with Emily Aldrich RN yesterday re; diabetic mgt.  I am concerned that this is too difficult for pt to follow. Will need assistance with med mgt at [place].  -6/10 BS74 this a.m ;hold a.m post prandial insulin.  at bedtime, 178 with supper, 265 at lunch. I do not think pt will be able to do SSI at home. Po intake is not consistent. Decrease Lantus to 24u     Anemia - normocytic, chronic. Baseline Hgb >10-11 but dipped to 7-8 in acute; Hgb 7.3 at [facility] admission on 6/2. Multifactorial: anemia of CKD, frequent blood draws; no active bleeding noted, hemoccult pending. Iron studies (6/1) c/w chronic disease. Vitamin B12 adequate, folate low 4.9; on PO iron, folic acid supplements. Holding heparin SQ for now.  -6/3 Hgb 8.8 from 7.3, improving  -6/4 Hgb 8.6, stable  -6/6 Hgb 7.6 today, was 8.3 yesterday; monitor and transfuse (with HD) PRN  -6/7 hgb 8.6 improved  -6/9 hbg 7.8 this a.m from 9.0 yesterday. Varies frequently. No melena  -6/10 hgb 8.1      Pain management - no current joint or muscle symptoms, essentially pain-free. Will require regular pain assessment and comprehensive pain management.     Electrolyte management - no current abnormality, has been both hyperK+ and hypoK+; monitor and replace as needed. -6/4 was called last night with PM lab K+ 3.0 --> gave 40mEq PO x 1; recheck this AM 3.2 --> another 40mEq PO x 1 ordered, patient received after returned from HD  -6/6 K+, Na normal  -6/8 K 2.7 nl. Na 144, calc low at 7.7  -6/9 electrolytes stable     Pneumonia prophylaxis - incentive spirometer every hour while awake.     DVT risk / DVT prophylaxis - daily physician / PA exam to assess as patient is at increased risk for of thromboembolism. Mobilize as tolerated. Sequential pneumatic compression devices (SCDs) when in bed; thigh-high or knee-high thromboembolic deterrent hose when out of bed. Heparin SQ held due to anemia.      GI prophylaxis - consider PPI.  At times may need additional antacids, Maalox prn.     History of tobacco use - former cigarette smoker, reportedly quit in 2013. No current nicotine cravings.     Depression - noted in problem list in 2017, no current pharmacotherapy. Monitor. At risk of depression / exacerbation due to physical debility, loss of independence.     General skin care / wound prevention - monitor general skin wound status daily per staff and physician / PA. At risk for failure due to impaired mobility.     Bladder program - schedule voids q6-8h. Check post-void residual as needed; in-and-out catheter if post-void residual is more than 400ml. -uop improving     Bowel program - at risk for constipation as a side effect of medications, impaired mobility, etc. MiraLAX daily for regularity, Berta-Colace for stool softener. PRN MOM, bisacodyl suppository or tablets for constipation.       Time spent was 25 minutes with over 1/2 in direct patient care/examination, consultation and coordination of care.      Signed By: Lisa Noel MD, MD     Joleen 10, 2022

## 2022-06-10 NOTE — PROGRESS NOTES
Blood sugar 74. Verbal order received from Dr. Daja Torres, hold scheduled Humalog insulin with breakfast this morning.

## 2022-06-11 VITALS
HEIGHT: 66 IN | HEART RATE: 108 BPM | DIASTOLIC BLOOD PRESSURE: 90 MMHG | OXYGEN SATURATION: 100 % | WEIGHT: 170 LBS | SYSTOLIC BLOOD PRESSURE: 124 MMHG | TEMPERATURE: 98.2 F | BODY MASS INDEX: 27.32 KG/M2 | RESPIRATION RATE: 18 BRPM

## 2022-06-11 LAB
ANION GAP SERPL CALC-SCNC: 7 MMOL/L (ref 7–16)
BUN SERPL-MCNC: 10 MG/DL (ref 6–23)
CALCIUM SERPL-MCNC: 7.8 MG/DL (ref 8.3–10.4)
CHLORIDE SERPL-SCNC: 109 MMOL/L (ref 98–107)
CO2 SERPL-SCNC: 27 MMOL/L (ref 21–32)
CREAT SERPL-MCNC: 3.1 MG/DL (ref 0.6–1)
ERYTHROCYTE [DISTWIDTH] IN BLOOD BY AUTOMATED COUNT: 16.2 % (ref 11.9–14.6)
GLUCOSE BLD STRIP.AUTO-MCNC: 52 MG/DL (ref 65–100)
GLUCOSE BLD STRIP.AUTO-MCNC: 82 MG/DL (ref 65–100)
GLUCOSE SERPL-MCNC: 41 MG/DL (ref 65–100)
HCT VFR BLD AUTO: 27.5 % (ref 35.8–46.3)
HGB BLD-MCNC: 8.5 G/DL (ref 11.7–15.4)
MCH RBC QN AUTO: 25.7 PG (ref 26.1–32.9)
MCHC RBC AUTO-ENTMCNC: 30.9 G/DL (ref 31.4–35)
MCV RBC AUTO: 83.1 FL (ref 79.6–97.8)
NRBC # BLD: 0 K/UL (ref 0–0.2)
PLATELET # BLD AUTO: 381 K/UL (ref 150–450)
PMV BLD AUTO: 9.2 FL (ref 9.4–12.3)
POTASSIUM SERPL-SCNC: 3.4 MMOL/L (ref 3.5–5.1)
RBC # BLD AUTO: 3.31 M/UL (ref 4.05–5.2)
SERVICE CMNT-IMP: ABNORMAL
SERVICE CMNT-IMP: NORMAL
SODIUM SERPL-SCNC: 143 MMOL/L (ref 136–145)
WBC # BLD AUTO: 8.5 K/UL (ref 4.3–11.1)

## 2022-06-11 PROCEDURE — 85027 COMPLETE CBC AUTOMATED: CPT

## 2022-06-11 PROCEDURE — 82962 GLUCOSE BLOOD TEST: CPT

## 2022-06-11 PROCEDURE — 99239 HOSP IP/OBS DSCHRG MGMT >30: CPT | Performed by: PHYSICAL MEDICINE & REHABILITATION

## 2022-06-11 PROCEDURE — 6370000000 HC RX 637 (ALT 250 FOR IP): Performed by: PHYSICIAN ASSISTANT

## 2022-06-11 PROCEDURE — 36415 COLL VENOUS BLD VENIPUNCTURE: CPT

## 2022-06-11 PROCEDURE — 80048 BASIC METABOLIC PNL TOTAL CA: CPT

## 2022-06-11 RX ORDER — INSULIN GLARGINE 100 [IU]/ML
22 INJECTION, SOLUTION SUBCUTANEOUS NIGHTLY
Qty: 10 ML | Refills: 3 | Status: SHIPPED | OUTPATIENT
Start: 2022-06-11

## 2022-06-11 RX ADMIN — FERROUS SULFATE TAB 325 MG (65 MG ELEMENTAL FE) 325 MG: 325 (65 FE) TAB at 08:21

## 2022-06-11 RX ADMIN — FOLIC ACID 1 MG: 1 TABLET ORAL at 08:21

## 2022-06-11 ASSESSMENT — PAIN SCALES - GENERAL: PAINLEVEL_OUTOF10: 0

## 2022-06-11 NOTE — PROGRESS NOTES
PT discharged teaching completed with pt and family member. Gave pt handouts and answered questions. Patient left the floor in wheel chair with therapy tech and myself. She was contact guard.

## 2022-06-11 NOTE — PLAN OF CARE
Problem: Pain  Goal: Verbalizes/displays adequate comfort level or baseline comfort level  Outcome: Completed     Problem: Safety - Adult  Goal: Free from fall injury  Outcome: Completed

## 2022-06-11 NOTE — DISCHARGE SUMMARY
Lana Phillips MD  Medical Director  [address] [facility], [address] [place] [address]  Tel: [phone]       PHYSICAL MEDICINE & REHABILITATION DISCHARGE SUMMARY     Date: 6/11/2022  Admission Date: 6/2/2022  Discharge Date: 6/11/2022      Primary Care Provider: Ashwini Harvey MD    Admission Condition: good  Discharged Condition: good    Admitting Diagnosis:   Principal Problem:    Physical debility  Active Problems:    Cardiac arrest ([address]    Acute respiratory failure ([address]    Diabetic ketoacidosis with coma associated with type 1 diabetes mellitus ([address]    Severe sepsis with septic shock (CODE) ([facility]    Acute kidney injury superimposed on chronic kidney disease ([facility]    Hypertension with CKD (chronic kidney disease) stage III (HCC)    Pneumonia, bacterial    Altered mental status    Frailty    Normocytic anemia    Depression with anxiety    Diabetic polyneuropathy associated with type 1 diabetes mellitus (HCC)    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 associated with type 1 diabetes mellitus [facility])     Hospital Course: The patient was admitted to [address] [place] [address] by Shantal Chin MD on 6/2/2022 with physical debilitation s/p cardiac arrest, DKA and sepsis with septic shock    HPI: This patient is a right-hand dominant, previously functionally independent BF with PMH including DM1, CKD4, retinopathy, neuropathy and ureteral stones. 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 She was found unresponsive in her home by EMS and brought into the ED 5/21/2022. ED findings included BS ~1600, Cr 9.7, K+ 6.7, pH 6.8, WBC 29K. Initial /150, she was soon hypotensive to 62/31. She was intubated and admitted to ICU for septic shock, DKA and ARF. Head CT unrevealing. She had cardiac arrest that required chest compressions.  Pressors, IV insulin, broad-spectrum ABX and resuscitation fluids were started. TTE 5/22 with EF 70-75%, moderate AV stenosis. Nephrology consulted for ARF; dialysis initiated when she became oliguric (5/24). UCx NGTD. Tube feeds started 5/24. She was extubated 5/25 and vancomycin was stopped. SLP kept her NPO 5/26 due to moderate-severe oropharyngeal dysphagia. She was combative and agitated 5/26 but calmed with Haldol. SLP re-evaluation 5/27 cleared patient for easy-to-chew solids with thin liquids. BP and BS normalized. BCx x 2 remained NGTD. Acute-on-chronic anemia with Hgb 7-8 during hospitalization (baseline >10-11); iron studies 5/31 with anemia of chronic disease. Vitamin B12 adequate, folate 4.9 thus supplemented. Tunneled CVC placed 6/1 while awaiting renal recovery. Vancomycin x 1 additional dose given 5/30. All IV ABX EOT 6/2. PT and OT were initiated, and patient was found to have significant mobility and self-care deficits. Physical Medicine and Rehab service was consulted, and patient was initially evaluated by JUAN MIGUEL Fernández on 5/27. REHABILITATION COURSE:   Physical debility following DKA with coma, septic shock, acute renal failure, acute respiratory failure, cardiac arrest and altered mental status     Plan / Recommendations / Medical Decision Making:      Continue daily physician / PA medical management:     Physical debility [R53.81] - good rehab potential, aggressive PT, OT to tolerance. SLP for swallowing surveillance, cognitive evaluation post-arrest, DKA with coma.     Cardiac arrest, acute respiratory failure - resolved; multifactorial, most likely due to DKA with coma, septic shock. Intubated 5/21, extubated 5/25, now on room air; no prior history of supplemental O2 use.  TTE (5/22) with EF 78-23%, normal systolic and diastolic function, moderate AV stenosis.     Severe sepsis with septic shock - question of bacterial pneumonia vs UTI; was on vanc+Zosyn throughout acute hospitalization, EOT 6/2. UCx, BCx NGTD. Leukocytosis attributed to stress response.  -6/3 WBC 13.7, trending down; had UA yesterday with + leuk (moderate), 1+ bacteria; UCx negative after short incubation  -6/4 WBC 11.8  -6/6 WBC 7.9; 6/7 7.5 nl     Acute renal failure, BLANCA on CKD3 - Cr on admission 9.7 (baseline ~2.5), oliguric; dialysis started 5/24, temp cath switched to tunneled cath 6.1. Nephrology managing HD, patient will run next HD 6/4 for TThSa transition. Avoid hypotension and nephrotoxic drugs.   -6/3 no HD today, transitioning to a TThSa schedule for therapies; Cr 5.3 (4.6 yesterday)  -6/4 normal HD run; per Nephrologist, she is very likely ESRD  -6/6 has HD chair at [facility]  -6/7 can likely dc after HD on Sat if she is going home with parents. Needs supervision . Recommend no driving ; UOP 229BG  -6/8 VDS994, -548 net. Improving. Dialysis on hold  -6/9 creat 3.8 continues to improve; UOP not recorded accurately; pt has been going to the restroom without nursing aware, thus measurements unaccounted for. Discussed the importance of measuring urine accurately to help us detm need for HD  -6/10 no further HD anticipated. Creat pending. Non-oliguric. Net + 100. Labs pending. Nephrology to decide whether tunneled cath can be removed before dc tomorrow  -6/11 tunneled cath removed yesterday. Needs close f/u with nephrology     Blood pressure management - monitor. Patient occasionally hypertensive but mostly normo- or hypotensive.  -6/3 BP 103/61 this AM, lowest of past 24 hrs  -6/4 /70  -6/6 122/77  -VSS     Diabetes mellitus - reportedly type 1, last HgbA1c found in EMR was 9.6% but from 6/2020; with admission for DKA and BS >2000, patient likely with poor glycemic control. Will require ACHS fasting glucose monitoring and medication adjustment to optimize glycemic control in setting of acute illness and hospitalization. Per DM RN, current regimen is Lantus 10u QHS with Humalog 5u TID with meals.  Monitor and adjust.  -6/3  this AM, 170 last PM, 198 at supper and 184 at lunch; d/w Diabetic RN this AM; subsequent emesis, nausea: labs indicate DKA, with AG of 26 and ; consult Hospitalist now  -6/4 patient much better today, labs more normal (x hypoK+), DKA caught early yesterday, appreciate Hospitalist  -6/6 BS dropped to 35 last evening, appears from EMR that she received her pre-prandial supper Humalog but then did not eat any / much supper; also, DM RN recommends small increase in Lantus --> will bump to 22u QHS  -6/7  this am., 245 last noc, 139 at supper, 254 at lunch yesterday; 180 at lunch today. slt improved. -6/8  this a.m, 212 at bedtime, 179 at supper, 180 at lunch; overall slowly improving. Increase lantus from 22 to 24units. keep post prandial insulin 5units  -6/9 a.m bs pending. 196 at bedtime, 193 at supper, 96 at lunch/77, 222 in a.m.; further insulin adjustments to follow. Discussed with Coty Jones RN yesterday re; diabetic mgt. I am concerned that this is too difficult for pt to follow. Will need assistance with med mgt at [place].  -6/10 BS74 this a.m ;hold a.m post prandial insulin.  at bedtime, 178 with supper, 265 at lunch. I do not think pt will be able to do SSI at home. Po intake is not consistent. Decrease Lantus to 24u  -6/11 patient is not eating and making bs very erratic and difficult to manage. At supper, BS was 322 but now 52, 82  this a.m before breakfast. Dec Lantus to 22 u. She is not to take post prandial insulin if she does not eat. Keep a running log of bs at home to be evaluated by PCP     Anemia - normocytic, chronic. Baseline Hgb >10-11 but dipped to 7-8 in acute; Hgb 7.3 at [facility] admission on 6/2. Multifactorial: anemia of CKD, frequent blood draws; no active bleeding noted, hemoccult pending. Iron studies (6/1) c/w chronic disease. Vitamin B12 adequate, folate low 4.9; on PO iron, folic acid supplements.  Holding heparin SQ for now.  -6/3 Hgb 8.8 from 7.3, improving  -6/4 Hgb 8.6, stable  -6/6 Hgb 7.6 today, was 8.3 yesterday; monitor and transfuse (with HD) PRN  -6/7 hgb 8.6 improved  -6/9 hbg 7.8 this a.m from 9.0 yesterday. Varies frequently. No melena  -6/10 hgb 8.1      Pain management - no current joint or muscle symptoms, essentially pain-free. Will require regular pain assessment and comprehensive pain management.     Electrolyte management - no current abnormality, has been both hyperK+ and hypoK+; monitor and replace as needed. -6/4 was called last night with PM lab K+ 3.0 --> gave 40mEq PO x 1; recheck this AM 3.2 --> another 40mEq PO x 1 ordered, patient received after returned from HD  -6/6 K+, Na normal  -6/8 K 2.7 nl. Na 144, calc low at 7.7  -6/9 electrolytes stable     Pneumonia prophylaxis - incentive spirometer every hour while awake.     DVT risk / DVT prophylaxis - daily physician / PA exam to assess as patient is at increased risk for of thromboembolism. Mobilize as tolerated. Sequential pneumatic compression devices (SCDs) when in bed; thigh-high or knee-high thromboembolic deterrent hose when out of bed. Heparin SQ held due to anemia.      GI prophylaxis - consider PPI. At times may need additional antacids, Maalox prn.     History of tobacco use - former cigarette smoker, reportedly quit in 2013. No current nicotine cravings.     Depression - noted in problem list in 2017, no current pharmacotherapy. Monitor. At risk of depression / exacerbation due to physical debility, loss of independence.     General skin care / wound prevention - monitor general skin wound status daily per staff and physician / PA. At risk for failure due to impaired mobility.     Bladder program - schedule voids q6-8h. Check post-void residual as needed; in-and-out catheter if post-void residual is more than 400ml.   -uop improving     Bowel program - at risk for constipation as a side effect of medications, impaired mobility, etc. MiraLAX daily for regularity, Berta-Colace for stool softener. PRN MOM, bisacodyl suppository or tablets for constipation.           FUNCTIONAL LEVEL ON ADMISSION:  Per SLP: easy to chew solids, thin liquids  Per PT: SBA / CGA for bed mobility, min A x 2 (for safety) for transfers; fair+ to good for sitting and standing balance, ambulated 5' min A x 2 with RW and gait belt  Per Jessica More / set-up for feeding / grooming, dressing; max A for bathing; total A for toileting    FUNCTIONAL LEVEL AT DISCHARGE:  OT DISCHARGE NOTE     Time In: 0700  Time Out: 0725     Goals:  Long Term Goals:  Time Frame for Long term goals : 10 days   LTG 1: Patient will dress UB with [place] using AE/DME PRN. 6/9/22, 6/10/2022 Goal Met.   LTG 2: Patient will dress LB with Setup Assistance using AE/DME PRN. 6/9/22 Progressing. GOAL MET 6/10/2022  LTG 3: Patient will don footwear with [place] using AE/DME PRN. 6/9/22, 6/10/2022 Goal Met. LTG 4: Patient will bathe with Supervision or Touching Assistance using AE/DME PRN. 6/9/22, 6/10/2022 Goal Met. LTG 5: Patient will toilet with [place] using AE/DME PRN. 6/9/22 Progressing.  GOAL MET 6/10/2022  LTG 6: Patient/caregiver will verbalize understanding of OT recommendations regarding ADLs, functional transfers, home safety, AE/DME, energy conservation, safety awareness, activity tolerance, and follow-up therapy to increase safety with functional tasks upon discharge. 6/9/22, Progressing. GOAL MET 6/10/2022     *Initial Assessment is the worst a patient did on that activity in the first 72 hrs of being admitted as gathered by the OT   *Discharge Assessment is an average of the patient's performance over the last 48 hrs  Eating Initial Assessment Discharge Assessment 6/10/2022   Score Independent Independent   Comments        Oral Hygiene  Initial Assessment Discharge Assessment 6/10/2022   Score Independent Independent   Comments        Bathing Initial Assessment Discharge Assessment 6/10/2022   Score Partial/moderate assistance Independent   Comments Assist to reach feet due to fatigue and malaise       Upper Body   Dressing Initial Assessment Discharge Assessment 6/10/2022   Score Setup or clean-up assistance Independent   Comments Seated EOB       Lower Body   Dressing Initial Assessment Discharge Assessment 6/10/2022   Functional Level Partial/moderate assistance Independent   Comments Assist to thread LLE d/t malaise, CGA in stance to pull up over hips       Donning/[place]  Footwear Initial Assessment Discharge Assessment 6/10/2022   Functional Level Partial/moderate assistance Independent   Comments Doffs socks with I, S/U don R, Dependent dress L d/t fatigue and malaise       Toilet transfer Initial Assessment Discharge Assessment 6/10/2022   Functional Level Supervision or touching assistance Supervision or touching assistance   Comments CGA with grab bars SBA with RW      Toilet Hygiene   Initial Assessment Discharge Assessment 6/10/2022   Score Supervision or touching assistance Independent   Comments SBA seated, CGA in stance       Precautions at Discharge: Falls  Discharge Location: home with parents  Safety/Supervision Recommendations/Functional Level: Pt will require 24 hour supervision from parents, especially with functional mobility using RW. Family Training: not completed due to patient preference      Recommended Continuing Therapy: HHOT  Residual Deficits: decreased balance, activity tolerance, and strength   Progress over LOS: Patient demonstrates good progress with ADL skills, UE strength, Standing balance, Standing tolerance, Activity tolerance and Functional mobility for performance of ADL and functional transfers, see above for details. Patient continues to require skilled [address] [place] [address] services to address remaining deficits stated above.      Summary of Session: S: \"I'm ready to go home tomorrow. \" Agreeable to therapy. Focus of session was on morning ADL routine and discharge assessment.    Patient was able to ambulate ~10 feet using a RW with SBA. Pain not expressed. Collaborated with PT and confirmed patient is ready for discharge. Patient ended session in recliner with call remote and phone within reach.            Jonah Fowler OTR/L   6/10/2022     Physical Therapy     PHYSICAL THERAPY DISCHARGE SUMMARY     TIME IN: 0827  TIME OUT: 0908      Precautions at discharge:   Falls      Problem List:    Decreased strength B LE  [x]? ?     Decreased strength trunk/core  [x]? ?     Decreased AROM   []? ?     Decreased PROM  []? ?     Decreased balance sitting  []? ?     Decreased balance standing  [x]? ?     Decreased endurance  [x]? ?     Pain  []? ?        Functional Limitations:   Decreased independence with bed mobility  []? ?     Decreased independence with functional transfers  [x]? ?     Decreased independence with ambulation  [x]? ?     Decreased independence with stair negotiation  [x]? ?               Objective:      Vital Signs:BP (!) 164/84   Pulse (!) 103   Temp 98.2 °F (36.8 °C) (Temporal)   Resp 20   Ht 5' 6\" (1.676 m)   Wt 170 lb (77.1 kg)   SpO2 100%   BMI 27.44 kg/m²      Pain level: 0/10  Pain location: no pain  Pain interventions: Medication per order, rest, positioning,relaxation     Patient education: Pt was educated on car transfers, stair training, ambulation and bed mobility.      Interdisciplinary Communication: Collaborated with OT about POC     Cognition: Pt is alert & follows simple commands.      Lower Extremity Function:       LLE RLE   ROM WFL WFL   Fine Motor Coordination Intact Intact   Tone Normal Normal   Sensation Intact Intact                   MMT Initial Asssessment: Grossly assessed 4/5 with functional mobility.      PRIMARY MODE OF LOCOMOTION: Ambulation       Functional Assessment:     Balance   Comments   Static Sitting Good:  Pt. able to maintain balance w/o UE support;  exhibits some postural sway     Dynamic Sitting Good - accepts moderate challenge;  can maintain balance while picking object off the floor     Static Standing Good:  Pt. able to maintain balance w/o UE support;  exhibits some postural sway     Dynamic Standing Fair - accepts minimal challenge;  can maintain balance while turning head/trunk     Picking Up Object From Floor 4  Supervision or touching assistance                      BED MOBILITY &TRANSFERS Discharge Assessment Comments   Rolling  4  Supervision or touching assistance      Supine to Sit 4  Supervision or touching assistance      Sit to to Supine 4  Supervision or touching assistance      Sit to Stand 4  Supervision or touching assistance      Chair To/From Bed 4  Supervision or touching assistance      Car Transfer 88  Not attempted due to medical condition or safety concerns          WHEELCHAIR MOBILITY/MANAGEMENT Discharge Assessment Comments   Able to Propel 48' w/ 2 Turns 9  Not applicable    Able to Propel 411' 9  Not applicable    Wheelchair set up assistance required NT     Wheelchair management NT           AMBULATION Discharge Assessment Comments   Assistive device RW     Walk 10' 4  Not attempted due to medical condition or safety concerns    Walk 48' with 2 Turns 4  Supervision or touching assistance  Not attempted due to medical condition or safety concerns    Walk 150' 4  Supervision or touching assistance  Not attempted due to medical condition or safety concerns    Walking 10' on Unlevel Surface 88  Not attempted due to medical condition or safety concerns       Gait: Pt ambulated 150ft X3 using roller walker with SBA with no LOB.       STEPS/STAIRS Discharge Assessment Comments   1 Curb Step 4  Supervision or touching assistance  Not attempted due to medical condition or safety concerns    4 Steps 4  Supervision or touching assistance  Not attempted due to medical condition or safety concerns    12 Steps 4  Supervision or touching assistance  Not attempted due to medical condition or safety concerns  Pt required rest break.    Ramp CGA         Pt. Left Pt was returned to room and left up in recliner with all needs within reach and RN present.             PHYSICAL THERAPY PLAN OF CARE     LTGs:  1. Pt will demonstrate roll left & right & transition supine<>sit with Independent in 10 days. 6/10 Goal not met. 2.   Pt. will transfer from bed to/from chair with Independent using the least restrictive device in 10 days  6/10 Goal met. 3.   Pt. will ambulate with 150 feet with LRAD and Supervision/Standby Assist in 10 days. 6/10 Goal met. 4.   Pt. Will ambulate up/down 4 steps with single rail and Supervision/Standby Assist in 10 days. 6/10 Goal met. 5.   Pt. Will perform HEP w/ supervision in 10 days. 6/10 Goal not met.      Pt would benefit from continued skilled physical therapy in order to improve independent functional mobility within the home with use of least restrictive device. Interventions may include range of motion (AROM, PROM B LE/trunk), motor function (B LE/trunk strengthening/coordination), activity tolerance (vitals, oxygen saturation levels), bed mobility training, balance activities, gait training (progressive ambulation program), and functional transfer training.      HEP handout: Deferred per patient request.       Pt to be discharged to parents home with 24 hour assistance.     Therapy Recommendations upon discharge:   Pt will benefit from home health PT. Equipment needs at discharge:  Rolling walker     Please see IRC; Interdisciplinary Eval, Care Plan, and Patient Education for further information regarding physical therapy discharge summary and plan of care.         Shubham Price, PT  6/10/2022                               SPEECH LANGUAGE PATHOLOGY: COMMUNICATION and SWALLOWING Daily Note #3 and DISCHARGE     MRN: [id number]     ADMISSION DATE: 6/2/2022  ADMITTING DIAGNOSIS: has Ulcer, skin, chronic ([facility] Depression with anxiety; Moderate single current episode of major depressive disorder ([facility]  Encounter to discuss test results; Encounter for annual routine gynecological examination; CKD (chronic kidney disease), stage IV ([address] Diabetic polyneuropathy associated with type 1 diabetes mellitus ([address] Malodorous urine; CKD (chronic kidney disease) stage 3, GFR 30-59 ml/min ([address] Type 1 diabetes mellitus with stage 3 chronic kidney disease ([address] H/O gastroesophageal reflux (GERD); Neuropathy, peripheral, idiopathic; 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 associated with type 1 diabetes mellitus ([address] Premature ovarian failure; Noncompliance with medication regimen; Ureteric stone; Urinary tract infection without hematuria; Hydronephrosis of right kidney; Diabetes mellitus type 1 ([address] Nausea; BLANCA (acute kidney injury) ([address] Acute kidney injury superimposed on chronic kidney disease ([address] Benign hypertension with CKD (chronic kidney disease) stage III (HCC); IDDM (insulin dependent diabetes mellitus); Cardiac arrest ([address] Diabetic ketoacidosis with coma associated with type 1 diabetes mellitus ([address] Severe sepsis with septic shock (CODE) ([facility] Pneumonia, bacterial; Acute respiratory failure ([address] Altered mental status; Frailty; Septic shock ([address] Normocytic anemia; and Physical debility on their problem list.  Date of Eval: 6/9/2022     ICD-10: Treatment Diagnosis: R41.841 Cognitive-Communication Deficit     e        Patient continues to require skilled intervention: Yes      ASSESSMENT    Patient was seen for dysphonia treatment but with limited motivation and participation. Discrepancies in performance with therapy. Stated repeatedly that she didn't want to be in the hospital and felt depressed at the beginning of the session. Agreeable to allow ST to at least demonstrate straw phonation and resonant voicing exercises so she is aware of them. Patient demonstrated back x5 each with moderate cues needed for improved resonance.   A copy of the exercises was left in the room if patient should show interest.  Discussed role of ENT in differential diagnosis and f/u ST at d/c. Reports that she would prefer to wait a few weeks to see if voice improves prior to ENT consult. Discussed pt preferences with .        GENERAL    Limited motivation.       Pain:   Patient does not c/o pain          OBJECTIVE           06/09/22 1237   Voice Exercises   Vocal Techniques Forward resonance;Relaxed throat breathing/open throat  (straw phonation)   Respiratory Training   Trained in Use of Lower Abdominal Muscles Yes   Trained in Use of Relaxed Throat Breathing Yes   Phonatory Retraining Yes   Exercise/Activities Tolerance   Exercise Tolerance/Response Cueing required (moderate); Poor motivation            PLAN    Duration/Frequency: Continue to follow patient 5x/week for duration of hospitalization and/or until goals met        MODIFIED DUNIA SCALE (mRS) SCORE: 3  Interpretation of Tool: The Modified Dunia Scale is a scale used to quantify level of disability as it relates to a patient's functional abilities. No Symptoms(0); No significant disability despite symptoms; able to carry out all usual duties and activities(1); Slight disability; unable to carry out all previous activities but able to look after own affairs without assistance(2); Moderate disability; requiring some help but able to walk without assistance(3); Moderately severe disability; unable to walk without assistance and unable to attend to own bodily needs without assistance(4);  Severe disability; bedridden, incontinent, and requiring constant nursing care and attention(5)        Education:    ·   · Patient Education Response: Needs reinforcement     Current Medications:   No current facility-administered medications on file prior to encounter.             Current Outpatient Medications on File Prior to Encounter   Medication Sig Dispense Refill    albuterol (PROVENTIL) (2.5 MG/3ML) 0.083% nebulizer solution Take 3 mLs by nebulization every 4 hours as needed for Wheezing 120 each 3    insulin glargine (LANTUS) 100 UNIT/ML injection vial Inject 10 Units into the skin nightly 10 mL 3    insulin lispro (HUMALOG) 100 UNIT/ML SOLN injection vial Inject 0-4 Units into the skin 4 times daily (before meals and nightly) 10 mL 0    insulin lispro (HUMALOG) 100 UNIT/ML SOLN injection vial Inject 5 Units into the skin 3 times daily (with meals) 10 mL 0    ferrous sulfate (IRON 325) 325 (65 Fe) MG tablet Take 1 tablet by mouth 2 times daily (with meals) 60 tablet 3    folic acid (FOLVITE) 1 MG tablet Take 1 tablet by mouth daily 30 tablet 3    atorvastatin (LIPITOR) 80 MG tablet Take 80 mg by mouth daily (Patient not taking: Reported on 5/27/2022)        brimonidine (ALPHAGAN P) 0.1 % SOLN 1 drop in left eye twice a day (Patient not taking: Reported on 5/27/2022)        ergocalciferol (ERGOCALCIFEROL) 1.25 MG (98931 UT) capsule Take 50,000 Units by mouth (Patient not taking: Reported on 5/27/2022)        gabapentin (NEURONTIN) 300 MG capsule Take 300 mg by mouth daily. (Patient not taking: Reported on 5/27/2022)        ondansetron (ZOFRAN-ODT) 4 MG disintegrating tablet Take 4 mg by mouth every 8 hours as needed (Patient not taking: Reported on 5/27/2022)          PRECAUTIONS/ALLERGIES: Patient has no known allergies.         Therapy Time  SLP Individual Minutes  Time In: 9029  Time Out: [facility]  Minutes: 26  SLP Total Treatment Time  Total Treatment Time: 26     RYLEY Quintanilla  6/9/2022 12:14 PM          HOME ARCHITECTURE:  Lives alone in a private residence. Will be discharging with her parents     Previous Level of Function / Work / Activity:   At baseline, independent for ADLs, IADLs, mobility and driving. Worked at [employer] x 11y until 12/2021, when she was deemed disabled.     Past Medical History:   Diagnosis Date    Acute renal failure ([facility] [address] 1/24/2012    CKD (chronic kidney disease) stage 3, GFR 30-59 ml/min 6/10/22  Yes Shantal Mg MD   albuterol (PROVENTIL) (2.5 MG/3ML) 0.083% nebulizer solution Take 3 mLs by nebulization every 4 hours as needed for Wheezing 6/10/22  Yes Shantal Mg MD   insulin lispro (HUMALOG) 100 UNIT/ML SOLN injection vial Inject 5 Units into the skin 3 times daily (with meals) 6/10/22  Yes Shantal Mg MD   insulin lispro (HUMALOG) 100 UNIT/ML SOLN injection vial Inject 0-4 Units into the skin 4 times daily (before meals and nightly) 6/2/22   Thu Ratliff, DO   brimonidine (ALPHAGAN P) 0.1 % SOLN 1 drop in left eye twice a day  Patient not taking: Reported on 5/27/2022 6/23/21   Ar Automatic Reconciliation   ergocalciferol (ERGOCALCIFEROL) 1.25 MG (23164 UT) capsule Take 50,000 Units by mouth  Patient not taking: Reported on 5/27/2022 4/7/21   Ar Automatic Reconciliation   gabapentin (NEURONTIN) 300 MG capsule Take 300 mg by mouth daily.   Patient not taking: Reported on 5/27/2022 5/20/21   Ar Automatic Reconciliation       Lab Review:   Recent Results (from the past 67 hour(s))   CBC    Collection Time: 06/08/22 11:23 AM   Result Value Ref Range    WBC 7.3 4.3 - 11.1 K/uL    RBC 3.44 (L) 4.05 - 5.2 M/uL    Hemoglobin 9.0 (L) 11.7 - 15.4 g/dL    Hematocrit 29.6 (L) 35.8 - 46.3 %    MCV 86.0 79.6 - 97.8 FL    MCH 26.2 26.1 - 32.9 PG    MCHC 30.4 (L) 31.4 - 35.0 g/dL    RDW 17.0 (H) 11.9 - 14.6 %    Platelets 992 604 - 342 K/uL    MPV 9.4 9.4 - 12.3 FL    nRBC 0.00 0.0 - 0.2 K/uL   POCT Glucose    Collection Time: 06/08/22 11:30 AM   Result Value Ref Range    POC Glucose 77 65 - 100 mg/dL    Performed by: Wu (Hammonds)CNA    POCT Glucose    Collection Time: 06/08/22 12:28 PM   Result Value Ref Range    POC Glucose 96 65 - 100 mg/dL    Performed by: Azalia    POCT Glucose    Collection Time: 06/08/22  4:09 PM   Result Value Ref Range    POC Glucose 193 (H) 65 - 100 mg/dL    Performed by: Gilma)CNA    POCT Glucose    Collection Time: 06/08/22  9:48 PM   Result Value Ref Range    POC Glucose 196 (H) 65 - 100 mg/dL    Performed by: Alan    CBC    Collection Time: 06/09/22  4:52 AM   Result Value Ref Range    WBC 7.6 4.3 - 11.1 K/uL    RBC 2.97 (L) 4.05 - 5.2 M/uL    Hemoglobin 7.8 (L) 11.7 - 15.4 g/dL    Hematocrit 25.0 (L) 35.8 - 46.3 %    MCV 84.2 79.6 - 97.8 FL    MCH 26.3 26.1 - 32.9 PG    MCHC 31.2 (L) 31.4 - 35.0 g/dL    RDW 16.7 (H) 11.9 - 14.6 %    Platelets 610 402 - 759 K/uL    MPV 9.5 9.4 - 12.3 FL    nRBC 0.00 0.0 - 0.2 K/uL   Basic Metabolic Panel    Collection Time: 06/09/22  4:52 AM   Result Value Ref Range    Sodium 146 (H) 136 - 145 mmol/L    Potassium 3.6 3.5 - 5.1 mmol/L    Chloride 109 (H) 98 - 107 mmol/L    CO2 28 21 - 32 mmol/L    Anion Gap 9 7 - 16 mmol/L    Glucose 115 (H) 65 - 100 mg/dL    BUN 9 6 - 23 MG/DL    CREATININE 3.80 (H) 0.6 - 1.0 MG/DL    GFR  16 (L) >60 ml/min/1.73m2    GFR Non- 13 (L) >60 ml/min/1.73m2    Calcium 7.8 (L) 8.3 - 10.4 MG/DL   POCT Glucose    Collection Time: 06/09/22  7:02 AM   Result Value Ref Range    POC Glucose 116 (H) 65 - 100 mg/dL    Performed by: Royce    POCT Glucose    Collection Time: 06/09/22 12:11 PM   Result Value Ref Range    POC Glucose 265 (H) 65 - 100 mg/dL    Performed by: Royce    POCT Glucose    Collection Time: 06/09/22  4:05 PM   Result Value Ref Range    POC Glucose 178 (H) 65 - 100 mg/dL    Performed by: Royce    POCT Glucose    Collection Time: 06/09/22  8:53 PM   Result Value Ref Range    POC Glucose 197 (H) 65 - 100 mg/dL    Performed by: Drake Barrow    CBC    Collection Time: 06/10/22  5:04 AM   Result Value Ref Range    WBC 7.9 4.3 - 11.1 K/uL    RBC 3.08 (L) 4.05 - 5.2 M/uL    Hemoglobin 8.1 (L) 11.7 - 15.4 g/dL    Hematocrit 25.8 (L) 35.8 - 46.3 %    MCV 83.8 79.6 - 97.8 FL    MCH 26.3 26.1 - 32.9 PG    MCHC 31.4 31.4 - 35.0 g/dL    RDW 16.3 (H) 11.9 - 14.6 %    Platelets 504 566 - 743 K/uL    MPV 9.2 (L) 9.4 - 12.3 FL    nRBC 0.00 0.0 - 0.2 K/uL   Basic Metabolic Panel    Collection Time: 06/10/22  5:04 AM   Result Value Ref Range    Sodium 143 136 - 145 mmol/L    Potassium 3.5 3.5 - 5.1 mmol/L    Chloride 109 (H) 98 - 107 mmol/L    CO2 28 21 - 32 mmol/L    Anion Gap 6 (L) 7 - 16 mmol/L    Glucose 48 (L) 65 - 100 mg/dL    BUN 12 6 - 23 MG/DL    CREATININE 3.50 (H) 0.6 - 1.0 MG/DL    GFR African American 18 (L) >60 ml/min/1.73m2    GFR Non- 15 (L) >60 ml/min/1.73m2    Calcium 7.4 (L) 8.3 - 10.4 MG/DL   POCT Glucose    Collection Time: 06/10/22  6:44 AM   Result Value Ref Range    POC Glucose 74 65 - 100 mg/dL    Performed by: Deja Silva    POCT Glucose    Collection Time: 06/10/22  1:46 PM   Result Value Ref Range    POC Glucose 260 (H) 65 - 100 mg/dL    Performed by: AlbumaticumRNKimberlyS    POCT Glucose    Collection Time: 06/10/22  4:11 PM   Result Value Ref Range    POC Glucose 322 (H) 65 - 100 mg/dL    Performed by: AlbumaticumRNKimberlyS    POCT Glucose    Collection Time: 06/10/22  8:59 PM   Result Value Ref Range    POC Glucose 195 (H) 65 - 100 mg/dL    Performed by: Deja Silva    Basic Metabolic Panel    Collection Time: 06/11/22  6:19 AM   Result Value Ref Range    Sodium 143 136 - 145 mmol/L    Potassium 3.4 (L) 3.5 - 5.1 mmol/L    Chloride 109 (H) 98 - 107 mmol/L    CO2 27 21 - 32 mmol/L    Anion Gap 7 7 - 16 mmol/L    Glucose 41 (L) 65 - 100 mg/dL    BUN 10 6 - 23 MG/DL    CREATININE 3.10 (H) 0.6 - 1.0 MG/DL    GFR African American 21 (L) >60 ml/min/1.73m2    GFR Non- 17 (L) >60 ml/min/1.73m2    Calcium 7.8 (L) 8.3 - 10.4 MG/DL   CBC    Collection Time: 06/11/22  6:19 AM   Result Value Ref Range    WBC 8.5 4.3 - 11.1 K/uL    RBC 3.31 (L) 4.05 - 5.2 M/uL    Hemoglobin 8.5 (L) 11.7 - 15.4 g/dL    Hematocrit 27.5 (L) 35.8 - 46.3 %    MCV 83.1 79.6 - 97.8 FL    MCH 25.7 (L) 26.1 - 32.9 PG    MCHC 30.9 (L) 31.4 - 35.0 g/dL    RDW 16.2 (H) 11.9 - 14.6 % Platelets 700 993 - 533 K/uL    MPV 9.2 (L) 9.4 - 12.3 FL    nRBC 0.00 0.0 - 0.2 K/uL   POCT Glucose    Collection Time: 06/11/22  6:45 AM   Result Value Ref Range    POC Glucose 52 (L) 65 - 100 mg/dL    Performed by: Dank    POCT Glucose    Collection Time: 06/11/22  7:08 AM   Result Value Ref Range    POC Glucose 82 65 - 100 mg/dL    Performed by: Igor Rossi        Visit Vitals  BP (!) 124/90   Pulse (!) 108   Temp 98.2 °F (36.8 °C) (Oral)   Resp 18   Ht 5' 6\" (1.676 m)   Wt 170 lb (77.1 kg)   SpO2 100%   BMI 27.44 kg/m²        Discharge Exam:  General: Alert and age appropriately oriented. No acute cardiorespiratory distress. HEENT: Normocephalic, EOM intact. Oral mucosa moist without cyanosis. Lungs: Clear to auscultation bilaterally. Respiration even and unlabored. Heart: Regular rate and rhythm, S1, S2. No murmurs, clicks, rub or gallops. Abdomen: Soft, non-tender, not distended. Bowel sounds normoactive. No organomegaly. Genitourinary: Deferred. Neuromuscular:        No gross focal motor deficits noted. Affect flat. Poor attn  Dysphonia; improving   Skin/extremity: No rashes, no erythema. No calf tenderness B LE.  1+ edema  Right upper chest;tunelled cath now removed. C/d/i. No hematoma         DISCHARGE INSTRUCTIONS:   1. Diet:diabetic  2. Activity: as tolerated.  No driving      Current Discharge Medication List      CONTINUE these medications which have CHANGED    Details   insulin glargine (LANTUS) 100 UNIT/ML injection vial Inject 22 Units into the skin nightly  Qty: 10 mL, Refills: 3      ferrous sulfate (IRON 325) 325 (65 Fe) MG tablet Take 1 tablet by mouth 2 times daily (with meals)  Qty: 60 tablet, Refills: 3      folic acid (FOLVITE) 1 MG tablet Take 1 tablet by mouth daily  Qty: 30 tablet, Refills: 3      albuterol (PROVENTIL) (2.5 MG/3ML) 0.083% nebulizer solution Take 3 mLs by nebulization every 4 hours as needed for Wheezing  Qty: 120 each, Refills: 3      !! insulin lispro (HUMALOG) 100 UNIT/ML SOLN injection vial Inject 5 Units into the skin 3 times daily (with meals)  Qty: 10 mL, Refills: 3       !! - Potential duplicate medications found. Please discuss with provider. CONTINUE these medications which have NOT CHANGED    Details   !! insulin lispro (HUMALOG) 100 UNIT/ML SOLN injection vial Inject 0-4 Units into the skin 4 times daily (before meals and nightly)  Qty: 10 mL, Refills: 0      brimonidine (ALPHAGAN P) 0.1 % SOLN 1 drop in left eye twice a day      ergocalciferol (ERGOCALCIFEROL) 1.25 MG (05024 UT) capsule Take 50,000 Units by mouth      gabapentin (NEURONTIN) 300 MG capsule Take 300 mg by mouth daily. !! - Potential duplicate medications found. Please discuss with provider. STOP taking these medications       atorvastatin (LIPITOR) 80 MG tablet Comments:   Reason for Stopping:         ondansetron (ZOFRAN-ODT) 4 MG disintegrating tablet Comments:   Reason for Stopping:               I have reviewed the patients controlled substance prescription history as maintained in the [place] prescription monitoring program () so that prescription(s) for controlled substance, if any provided, could be given. REHABILITATION MANAGEMENT:   1. Devices: RW  2. Consult: PT/OT/ST/CM  DISPOSITION:HH needs include RN/PT/OT/SW and referral placed to [address] [place] [address] as pt requested. Will f/u with PCP and nephrology. Time spent in patient discharge planning and coordination: >35 minutes.

## 2022-06-11 NOTE — PROGRESS NOTES
Patient alert. Skin warmer blood sugar 82. Breakfast tray delivered. Reinforced the importance of consuming her meals.

## 2022-06-13 ENCOUNTER — CARE COORDINATION (OUTPATIENT)
Dept: CARE COORDINATION | Facility: CLINIC | Age: 47
End: 2022-06-13

## 2022-06-13 RX ORDER — PEN NEEDLE, DIABETIC 32GX 5/32"
NEEDLE, DISPOSABLE MISCELLANEOUS
Qty: 200 EACH | Refills: 3 | OUTPATIENT
Start: 2022-06-13

## 2022-06-13 NOTE — CARE COORDINATION
Care Transitions Outreach Attempt    Call within 2 business days of discharge: Yes   Attempted to reach patient for transitions of care follow up. Unable to reach patient. Patient: Marquez Shane Patient : 1975 MRN: [id number]    Last Discharge [facility]       Complaint Diagnosis Description Type Department Provider    22   Admission (Discharged) Annabelle Hopkins MD            Was this an external facility discharge?  No Discharge Facility: [facility]    Noted following upcoming appointments from discharge chart review:   [facility] follow up appointment(s):   Future Appointments   Date Time Provider Debo Aleman   2022  9:30 AM FELIZ Umanzor GVL AMB     Non-[facility] follow up appointment(s): n/a

## 2022-06-14 ENCOUNTER — CARE COORDINATION (OUTPATIENT)
Dept: CARE COORDINATION | Facility: CLINIC | Age: 47
End: 2022-06-14

## 2022-06-14 NOTE — CARE COORDINATION
Care Transitions Outreach Attempt    Call within 2 business days of discharge: Yes   Attempted to reach patient for transitions of care follow up. Unable to reach patient. Patient: Aixa Cherry Patient : 1975 MRN: [id number]    Last Discharge [facility]       Complaint Diagnosis Description Type Department Provider    22   Admission (Discharged) Melvyn Epley, MD            Was this an external facility discharge? No Discharge Facility: [facility]    Noted following upcoming appointments from discharge chart review:   [facility] follow up appointment(s): No future appointments.   Non-[facility] follow up appointment(s):n/a

## 2022-06-15 ENCOUNTER — CARE COORDINATION (OUTPATIENT)
Dept: CARE COORDINATION | Facility: CLINIC | Age: 47
End: 2022-06-15

## 2022-06-15 NOTE — CARE COORDINATION
Care Transitions Outreach Attempt    Call within 2 business days of discharge: Yes   Attempted to reach patient for transitions of care follow up. Unable to reach patient. Patient: Hollis Pollock Patient : 1975 MRN: [id number]    Last Discharge [facility]       Complaint Diagnosis Description Type Department Provider    22   Admission (Discharged) Holly Buchanan MD            Was this an external facility discharge? No Discharge Facility: [facility]    Noted following upcoming appointments from discharge chart review:   [facility] follow up appointment(s): No future appointments.   Non-[facility] follow up appointment(s): n/a

## 2022-06-26 ENCOUNTER — HOSPITAL ENCOUNTER (EMERGENCY)
Dept: GENERAL RADIOLOGY | Age: 47
Discharge: HOME | DRG: 872 | End: 2022-06-29
Payer: COMMERCIAL

## 2022-06-26 ENCOUNTER — HOSPITAL ENCOUNTER (INPATIENT)
Age: 47
LOS: 10 days | Discharge: LEFT BEFORE DISCHARGE | DRG: 872 | End: 2022-07-06
Attending: EMERGENCY MEDICINE | Admitting: FAMILY MEDICINE
Payer: COMMERCIAL

## 2022-06-26 DIAGNOSIS — R11.2 NAUSEA AND VOMITING, INTRACTABILITY OF VOMITING NOT SPECIFIED, UNSPECIFIED VOMITING TYPE: ICD-10-CM

## 2022-06-26 DIAGNOSIS — N18.9 CHRONIC KIDNEY DISEASE, UNSPECIFIED CKD STAGE: ICD-10-CM

## 2022-06-26 DIAGNOSIS — A41.9 SEPTICEMIA (HCC): Primary | ICD-10-CM

## 2022-06-26 DIAGNOSIS — N10 ACUTE PYELONEPHRITIS: ICD-10-CM

## 2022-06-26 PROBLEM — R65.21 SEVERE SEPSIS WITH SEPTIC SHOCK (CODE) (HCC): Status: RESOLVED | Noted: 2022-05-21 | Resolved: 2022-06-26

## 2022-06-26 PROBLEM — I12.9 BENIGN HYPERTENSION WITH CKD (CHRONIC KIDNEY DISEASE) STAGE III (HCC): Chronic | Status: ACTIVE | Noted: 2022-01-04

## 2022-06-26 PROBLEM — Z01.419 ENCOUNTER FOR ANNUAL ROUTINE GYNECOLOGICAL EXAMINATION: Status: RESOLVED | Noted: 2018-11-27 | Resolved: 2022-06-26

## 2022-06-26 PROBLEM — R82.90 MALODOROUS URINE: Status: RESOLVED | Noted: 2021-11-16 | Resolved: 2022-06-26

## 2022-06-26 PROBLEM — N18.30 BENIGN HYPERTENSION WITH CKD (CHRONIC KIDNEY DISEASE) STAGE III (HCC): Chronic | Status: ACTIVE | Noted: 2022-01-04

## 2022-06-26 PROBLEM — Z91.148 NONCOMPLIANCE WITH MEDICATION REGIMEN: Status: RESOLVED | Noted: 2018-08-16 | Resolved: 2022-06-26

## 2022-06-26 PROBLEM — Z89.422 H/O AMPUTATION OF LESSER TOE, LEFT (HCC): Status: RESOLVED | Noted: 2022-06-09 | Resolved: 2022-06-26

## 2022-06-26 PROBLEM — N17.9 AKI (ACUTE KIDNEY INJURY) (HCC): Status: RESOLVED | Noted: 2022-04-21 | Resolved: 2022-06-26

## 2022-06-26 PROBLEM — J15.9 PNEUMONIA, BACTERIAL: Status: RESOLVED | Noted: 2022-05-21 | Resolved: 2022-06-26

## 2022-06-26 PROBLEM — Z71.2 ENCOUNTER TO DISCUSS TEST RESULTS: Status: RESOLVED | Noted: 2018-08-22 | Resolved: 2022-06-26

## 2022-06-26 PROBLEM — Z91.14 NONCOMPLIANCE WITH MEDICATION REGIMEN: Status: RESOLVED | Noted: 2018-08-16 | Resolved: 2022-06-26

## 2022-06-26 PROBLEM — N18.4 CKD (CHRONIC KIDNEY DISEASE), STAGE IV (HCC): Status: RESOLVED | Noted: 2017-03-13 | Resolved: 2022-06-26

## 2022-06-26 PROBLEM — N20.1 URETERIC STONE: Status: RESOLVED | Noted: 2022-04-19 | Resolved: 2022-06-26

## 2022-06-26 PROBLEM — N17.9 ACUTE KIDNEY INJURY SUPERIMPOSED ON CHRONIC KIDNEY DISEASE (HCC): Status: RESOLVED | Noted: 2021-06-09 | Resolved: 2022-06-26

## 2022-06-26 PROBLEM — J96.00 ACUTE RESPIRATORY FAILURE (HCC): Status: RESOLVED | Noted: 2022-05-21 | Resolved: 2022-06-26

## 2022-06-26 PROBLEM — E28.39 PREMATURE OVARIAN FAILURE: Status: RESOLVED | Noted: 2018-01-30 | Resolved: 2022-06-26

## 2022-06-26 PROBLEM — N39.0 URINARY TRACT INFECTION WITHOUT HEMATURIA: Status: RESOLVED | Noted: 2022-04-19 | Resolved: 2022-06-26

## 2022-06-26 PROBLEM — R65.20 SEVERE SEPSIS (HCC): Status: ACTIVE | Noted: 2022-06-26

## 2022-06-26 PROBLEM — N18.4 BENIGN HYPERTENSION WITH CKD (CHRONIC KIDNEY DISEASE) STAGE IV (HCC): Chronic | Status: ACTIVE | Noted: 2022-01-04

## 2022-06-26 LAB
ALBUMIN SERPL-MCNC: 2.3 G/DL (ref 3.5–5)
ALBUMIN/GLOB SERPL: 0.4 {RATIO} (ref 1.2–3.5)
ALP SERPL-CCNC: 313 U/L (ref 50–130)
ALT SERPL-CCNC: 45 U/L (ref 12–65)
ANION GAP SERPL CALC-SCNC: 10 MMOL/L (ref 7–16)
APPEARANCE UR: ABNORMAL
AST SERPL-CCNC: 32 U/L (ref 15–37)
BACTERIA URNS QL MICRO: ABNORMAL /HPF
BASOPHILS # BLD: 0 K/UL (ref 0–0.2)
BASOPHILS NFR BLD: 0 % (ref 0–2)
BILIRUB SERPL-MCNC: 0.4 MG/DL (ref 0.2–1.1)
BILIRUB UR QL: NEGATIVE
BUN SERPL-MCNC: 19 MG/DL (ref 6–23)
CALCIUM SERPL-MCNC: 9.5 MG/DL (ref 8.3–10.4)
CHLORIDE SERPL-SCNC: 108 MMOL/L (ref 98–107)
CO2 SERPL-SCNC: 29 MMOL/L (ref 21–32)
COLOR UR: YELLOW
CREAT SERPL-MCNC: 2.88 MG/DL (ref 0.6–1)
DIFFERENTIAL METHOD BLD: ABNORMAL
EKG ATRIAL RATE: 111 BPM
EKG DIAGNOSIS: NORMAL
EKG P AXIS: 71 DEGREES
EKG P-R INTERVAL: 112 MS
EKG Q-T INTERVAL: 352 MS
EKG QRS DURATION: 74 MS
EKG QTC CALCULATION (BAZETT): 478 MS
EKG R AXIS: 85 DEGREES
EKG T AXIS: 35 DEGREES
EKG VENTRICULAR RATE: 111 BPM
EOSINOPHIL # BLD: 0.1 K/UL (ref 0–0.8)
EOSINOPHIL NFR BLD: 1 % (ref 0.5–7.8)
EPI CELLS #/AREA URNS HPF: ABNORMAL /HPF
ERYTHROCYTE [DISTWIDTH] IN BLOOD BY AUTOMATED COUNT: 15.7 % (ref 11.9–14.6)
EST. AVERAGE GLUCOSE BLD GHB EST-MCNC: 192 MG/DL
GLOBULIN SER CALC-MCNC: 6.4 G/DL (ref 2.3–3.5)
GLUCOSE BLD STRIP.AUTO-MCNC: 126 MG/DL (ref 65–100)
GLUCOSE SERPL-MCNC: 209 MG/DL (ref 65–100)
GLUCOSE UR STRIP.AUTO-MCNC: 100 MG/DL
HBA1C MFR BLD: 8.3 % (ref 4.2–6.3)
HCG UR QL: NEGATIVE
HCT VFR BLD AUTO: 33.2 % (ref 35.8–46.3)
HGB BLD-MCNC: 10 G/DL (ref 11.7–15.4)
HGB UR QL STRIP: ABNORMAL
IMM GRANULOCYTES # BLD AUTO: 0.2 K/UL (ref 0–0.5)
IMM GRANULOCYTES NFR BLD AUTO: 1 % (ref 0–5)
KETONES UR QL STRIP.AUTO: ABNORMAL MG/DL
LACTATE SERPL-SCNC: 1 MMOL/L (ref 0.5–2)
LACTATE SERPL-SCNC: 2.5 MMOL/L (ref 0.4–2)
LEUKOCYTE ESTERASE UR QL STRIP.AUTO: ABNORMAL
LYMPHOCYTES # BLD: 2.8 K/UL (ref 0.5–4.6)
LYMPHOCYTES NFR BLD: 15 % (ref 13–44)
MCH RBC QN AUTO: 25.2 PG (ref 26.1–32.9)
MCHC RBC AUTO-ENTMCNC: 30.1 G/DL (ref 31.4–35)
MCV RBC AUTO: 83.6 FL (ref 79.6–97.8)
MONOCYTES # BLD: 1 K/UL (ref 0.1–1.3)
MONOCYTES NFR BLD: 5 % (ref 4–12)
NEUTS SEG # BLD: 15 K/UL (ref 1.7–8.2)
NEUTS SEG NFR BLD: 78 % (ref 43–78)
NITRITE UR QL STRIP.AUTO: NEGATIVE
NRBC # BLD: 0.02 K/UL (ref 0–0.2)
OTHER OBSERVATIONS: ABNORMAL
PH UR STRIP: 7 [PH] (ref 5–9)
PLATELET # BLD AUTO: 681 K/UL (ref 150–450)
PMV BLD AUTO: 9 FL (ref 9.4–12.3)
POTASSIUM SERPL-SCNC: 3.8 MMOL/L (ref 3.5–5.1)
PROCALCITONIN SERPL-MCNC: 12.43 NG/ML (ref 0–0.49)
PROT SERPL-MCNC: 8.7 G/DL (ref 6.3–8.2)
PROT UR STRIP-MCNC: 30 MG/DL
RBC # BLD AUTO: 3.97 M/UL (ref 4.05–5.2)
RBC #/AREA URNS HPF: ABNORMAL /HPF
SERVICE CMNT-IMP: ABNORMAL
SODIUM SERPL-SCNC: 147 MMOL/L (ref 136–145)
SP GR UR REFRACTOMETRY: 1.01 (ref 1–1.02)
UROBILINOGEN UR QL STRIP.AUTO: 0.2 EU/DL (ref 0.2–1)
WBC # BLD AUTO: 19.2 K/UL (ref 4.3–11.1)
WBC URNS QL MICRO: >100 /HPF

## 2022-06-26 PROCEDURE — 96361 HYDRATE IV INFUSION ADD-ON: CPT

## 2022-06-26 PROCEDURE — 2580000003 HC RX 258: Performed by: EMERGENCY MEDICINE

## 2022-06-26 PROCEDURE — 96365 THER/PROPH/DIAG IV INF INIT: CPT

## 2022-06-26 PROCEDURE — 80053 COMPREHEN METABOLIC PANEL: CPT

## 2022-06-26 PROCEDURE — 87150 DNA/RNA AMPLIFIED PROBE: CPT

## 2022-06-26 PROCEDURE — 83036 HEMOGLOBIN GLYCOSYLATED A1C: CPT

## 2022-06-26 PROCEDURE — 2000000000 HC ICU R&B

## 2022-06-26 PROCEDURE — 83605 ASSAY OF LACTIC ACID: CPT

## 2022-06-26 PROCEDURE — 87077 CULTURE AEROBIC IDENTIFY: CPT

## 2022-06-26 PROCEDURE — 87040 BLOOD CULTURE FOR BACTERIA: CPT

## 2022-06-26 PROCEDURE — 2580000003 HC RX 258: Performed by: FAMILY MEDICINE

## 2022-06-26 PROCEDURE — 87205 SMEAR GRAM STAIN: CPT

## 2022-06-26 PROCEDURE — 6360000002 HC RX W HCPCS: Performed by: FAMILY MEDICINE

## 2022-06-26 PROCEDURE — 71045 X-RAY EXAM CHEST 1 VIEW: CPT

## 2022-06-26 PROCEDURE — 6370000000 HC RX 637 (ALT 250 FOR IP): Performed by: EMERGENCY MEDICINE

## 2022-06-26 PROCEDURE — 93005 ELECTROCARDIOGRAM TRACING: CPT | Performed by: EMERGENCY MEDICINE

## 2022-06-26 PROCEDURE — 81025 URINE PREGNANCY TEST: CPT

## 2022-06-26 PROCEDURE — 87086 URINE CULTURE/COLONY COUNT: CPT

## 2022-06-26 PROCEDURE — 82962 GLUCOSE BLOOD TEST: CPT

## 2022-06-26 PROCEDURE — 84145 PROCALCITONIN (PCT): CPT

## 2022-06-26 PROCEDURE — 87186 SC STD MICRODIL/AGAR DIL: CPT

## 2022-06-26 PROCEDURE — 81001 URINALYSIS AUTO W/SCOPE: CPT

## 2022-06-26 PROCEDURE — 6360000002 HC RX W HCPCS: Performed by: EMERGENCY MEDICINE

## 2022-06-26 PROCEDURE — 99285 EMERGENCY DEPT VISIT HI MDM: CPT

## 2022-06-26 PROCEDURE — 85025 COMPLETE CBC W/AUTO DIFF WBC: CPT

## 2022-06-26 PROCEDURE — 96375 TX/PRO/DX INJ NEW DRUG ADDON: CPT

## 2022-06-26 RX ORDER — ENOXAPARIN SODIUM 100 MG/ML
30 INJECTION SUBCUTANEOUS DAILY
Status: DISCONTINUED | OUTPATIENT
Start: 2022-06-27 | End: 2022-07-06 | Stop reason: HOSPADM

## 2022-06-26 RX ORDER — SODIUM CHLORIDE, SODIUM LACTATE, POTASSIUM CHLORIDE, AND CALCIUM CHLORIDE .6; .31; .03; .02 G/100ML; G/100ML; G/100ML; G/100ML
30 INJECTION, SOLUTION INTRAVENOUS ONCE
Status: COMPLETED | OUTPATIENT
Start: 2022-06-26 | End: 2022-06-26

## 2022-06-26 RX ORDER — ACETAMINOPHEN 325 MG/1
650 TABLET ORAL EVERY 6 HOURS PRN
Status: DISCONTINUED | OUTPATIENT
Start: 2022-06-26 | End: 2022-07-06 | Stop reason: HOSPADM

## 2022-06-26 RX ORDER — MAGNESIUM HYDROXIDE/ALUMINUM HYDROXICE/SIMETHICONE 120; 1200; 1200 MG/30ML; MG/30ML; MG/30ML
30 SUSPENSION ORAL EVERY 6 HOURS PRN
Status: DISCONTINUED | OUTPATIENT
Start: 2022-06-26 | End: 2022-07-06 | Stop reason: HOSPADM

## 2022-06-26 RX ORDER — SODIUM CHLORIDE 0.9 % (FLUSH) 0.9 %
5-40 SYRINGE (ML) INJECTION EVERY 12 HOURS SCHEDULED
Status: DISCONTINUED | OUTPATIENT
Start: 2022-06-26 | End: 2022-07-06 | Stop reason: HOSPADM

## 2022-06-26 RX ORDER — ACETAMINOPHEN 650 MG/1
650 SUPPOSITORY RECTAL EVERY 6 HOURS PRN
Status: DISCONTINUED | OUTPATIENT
Start: 2022-06-26 | End: 2022-07-06 | Stop reason: HOSPADM

## 2022-06-26 RX ORDER — SODIUM CHLORIDE 9 MG/ML
INJECTION, SOLUTION INTRAVENOUS PRN
Status: DISCONTINUED | OUTPATIENT
Start: 2022-06-26 | End: 2022-07-06 | Stop reason: HOSPADM

## 2022-06-26 RX ORDER — INSULIN LISPRO 100 [IU]/ML
0-4 INJECTION, SOLUTION INTRAVENOUS; SUBCUTANEOUS NIGHTLY
Status: DISCONTINUED | OUTPATIENT
Start: 2022-06-26 | End: 2022-06-30

## 2022-06-26 RX ORDER — ALBUTEROL SULFATE 2.5 MG/3ML
2.5 SOLUTION RESPIRATORY (INHALATION) EVERY 6 HOURS PRN
Status: DISCONTINUED | OUTPATIENT
Start: 2022-06-26 | End: 2022-07-06 | Stop reason: HOSPADM

## 2022-06-26 RX ORDER — GLUCAGON 1 MG/ML
1 KIT INJECTION PRN
Status: DISCONTINUED | OUTPATIENT
Start: 2022-06-26 | End: 2022-07-06 | Stop reason: HOSPADM

## 2022-06-26 RX ORDER — HALOPERIDOL 5 MG/ML
4 INJECTION INTRAMUSCULAR
Status: COMPLETED | OUTPATIENT
Start: 2022-06-26 | End: 2022-06-26

## 2022-06-26 RX ORDER — INSULIN GLARGINE 100 [IU]/ML
22 INJECTION, SOLUTION SUBCUTANEOUS NIGHTLY
Status: DISCONTINUED | OUTPATIENT
Start: 2022-06-26 | End: 2022-06-26

## 2022-06-26 RX ORDER — INSULIN GLARGINE 100 [IU]/ML
22 INJECTION, SOLUTION SUBCUTANEOUS EVERY MORNING
Status: DISCONTINUED | OUTPATIENT
Start: 2022-06-27 | End: 2022-06-27

## 2022-06-26 RX ORDER — INSULIN LISPRO 100 [IU]/ML
0-4 INJECTION, SOLUTION INTRAVENOUS; SUBCUTANEOUS
Status: DISCONTINUED | OUTPATIENT
Start: 2022-06-27 | End: 2022-06-30

## 2022-06-26 RX ORDER — 0.9 % SODIUM CHLORIDE 0.9 %
1000 INTRAVENOUS SOLUTION INTRAVENOUS
Status: COMPLETED | OUTPATIENT
Start: 2022-06-26 | End: 2022-06-26

## 2022-06-26 RX ORDER — DEXTROSE MONOHYDRATE 50 MG/ML
100 INJECTION, SOLUTION INTRAVENOUS PRN
Status: DISCONTINUED | OUTPATIENT
Start: 2022-06-26 | End: 2022-07-06 | Stop reason: HOSPADM

## 2022-06-26 RX ORDER — ONDANSETRON 8 MG/1
8 TABLET, ORALLY DISINTEGRATING ORAL
Status: COMPLETED | OUTPATIENT
Start: 2022-06-26 | End: 2022-06-26

## 2022-06-26 RX ORDER — SODIUM CHLORIDE 9 MG/ML
INJECTION, SOLUTION INTRAVENOUS CONTINUOUS
Status: DISCONTINUED | OUTPATIENT
Start: 2022-06-26 | End: 2022-06-27

## 2022-06-26 RX ORDER — SODIUM CHLORIDE 0.9 % (FLUSH) 0.9 %
5-40 SYRINGE (ML) INJECTION PRN
Status: DISCONTINUED | OUTPATIENT
Start: 2022-06-26 | End: 2022-07-06 | Stop reason: HOSPADM

## 2022-06-26 RX ADMIN — PIPERACILLIN SODIUM,TAZOBACTAM SODIUM 4500 MG: 4; .5 INJECTION, POWDER, FOR SOLUTION INTRAVENOUS at 20:49

## 2022-06-26 RX ADMIN — SODIUM CHLORIDE, POTASSIUM CHLORIDE, SODIUM LACTATE AND CALCIUM CHLORIDE 1917 ML: 600; 310; 30; 20 INJECTION, SOLUTION INTRAVENOUS at 16:17

## 2022-06-26 RX ADMIN — SODIUM CHLORIDE, PRESERVATIVE FREE 10 ML: 5 INJECTION INTRAVENOUS at 20:20

## 2022-06-26 RX ADMIN — SODIUM CHLORIDE: 9 INJECTION, SOLUTION INTRAVENOUS at 20:20

## 2022-06-26 RX ADMIN — CEFTRIAXONE 1000 MG: 1 INJECTION, POWDER, FOR SOLUTION INTRAMUSCULAR; INTRAVENOUS at 17:43

## 2022-06-26 RX ADMIN — SODIUM CHLORIDE 1000 ML: 9 INJECTION, SOLUTION INTRAVENOUS at 17:57

## 2022-06-26 RX ADMIN — ONDANSETRON 8 MG: 8 TABLET, ORALLY DISINTEGRATING ORAL at 16:05

## 2022-06-26 RX ADMIN — HALOPERIDOL LACTATE 4 MG: 5 INJECTION, SOLUTION INTRAMUSCULAR at 17:53

## 2022-06-26 ASSESSMENT — ENCOUNTER SYMPTOMS
NAUSEA: 1
VOMITING: 1
BACK PAIN: 0
ABDOMINAL PAIN: 1
COUGH: 0
SHORTNESS OF BREATH: 0

## 2022-06-26 ASSESSMENT — PAIN SCALES - GENERAL
PAINLEVEL_OUTOF10: 0
PAINLEVEL_OUTOF10: 0

## 2022-06-26 ASSESSMENT — PAIN - FUNCTIONAL ASSESSMENT: PAIN_FUNCTIONAL_ASSESSMENT: NONE - DENIES PAIN

## 2022-06-26 NOTE — ED PROVIDER NOTES
[facility] Emergency Department Provider Note                   PCP:                Daisy Moffett MD               Age: 48 y.o. Sex: female     No diagnosis found. DISPOSITION         New Prescriptions    No medications on file       Orders Placed This Encounter   Procedures    Culture, Blood 1    Culture, Blood 1    XR CHEST PORTABLE    Lactic Acid    Lactic Acid    CBC with Auto Differential    CMP    Procalcitonin    Urinalysis    Pregnancy, Urine    Straight Cath (Select if patient is unable to provide a sample)    Cardiac Monitor - ED Only    EKG 12 Lead    Saline lock IV        Enrico Valero MD 5:53 PM      MDM  Number of Diagnoses or Management Options  Acute pyelonephritis  Chronic kidney disease, unspecified CKD stage  Nausea and vomiting, intractability of vomiting not specified, unspecified vomiting type  Septicemia ([facility] [address]  Diagnosis management comments: Patient treated with IV fluids and Zofran. Continue to have nausea and vomiting was treated with IV haloperidol. Lab work shows leukocytosis 19.2, elevated lactic acid 2.5, renal insufficiency with creatinine 2.88 which is close to her baseline. Glucose is 209. Anion gap and CO2 normal.  Procalcitonin 12.4. LFTs unremarkable. Chest x-ray no acute abnormality. Urinalysis appears infected. she is being treated with Rocephin for possible sepsis. Will discuss with hospitalist for admission. Critical care time 35 minutes management of sepsis.        Amount and/or Complexity of Data Reviewed  Clinical lab tests: ordered and reviewed  Tests in the radiology section of CPT®: ordered and reviewed  Tests in the medicine section of CPT®: ordered and reviewed  Discuss the patient with other providers: yes  Independent visualization of images, tracings, or specimens: yes    Risk of Complications, Morbidity, and/or Mortality  Presenting problems: high  Diagnostic procedures: high  Management options: high Uriah Finney Carmel Ruelas is a 48 y.o. female who presents to the Emergency Department with chief complaint of    Chief Complaint   Patient presents with    Nausea    Emesis      15-year-old -American female history of diabetes, gastroparesis, chronic kidney disease recently admitted to [place] June 13 through 21 with pyelonephritis and DKA. Since being discharged she is again developed nausea and vomiting. States she has had minimal intake for the past couple days. Glucose this morning 189. No fever. She does report diffuse abdominal cramping. She is on Cipro for pyelonephritis. The history is provided by the patient. Review of Systems   Constitutional: Negative for fever. HENT: Negative for congestion. Respiratory: Negative for cough and shortness of breath. Cardiovascular: Negative for chest pain. Gastrointestinal: Positive for abdominal pain, nausea and vomiting. Genitourinary: Negative for dysuria. Musculoskeletal: Negative for back pain and neck pain. Skin: Negative for rash. Neurological: Negative for headaches. All other systems reviewed and are negative.       Past Medical History:   Diagnosis Date    Acute renal failure ([facility] [address] 1/24/2012    CKD (chronic kidney disease) stage 3, GFR 30-59 ml/min ([facility])     Gastroenteritis, acute 1/24/2012    Hemodialysis patient ([facility] [address]     IDDM (insulin dependent diabetes mellitus) 1/24/2012    Type 1 av -150 can tell Hypo below 70    Intractable nausea and vomiting 12/25/2014    Irregular menses     Kidney stone     Neuropathy, peripheral, idiopathic 3/13/2017    Retinopathy, bilateral 3/13/2017    Trichomoniasis 3/13/2017    Ulcer, skin, chronic ([facility] [address] 3/13/2017    Vaginal burning         Past Surgical History:   Procedure Laterality Date    AMPUTATION Left     5th Toe due to Diabetic Ulcer    AMPUTATION  1/27/12    gangrene    CYSTOSCOPY,INSERT URETERAL STENT  04/2022    HX OPEN REDUCTION INTERNAL FIXATION Right 2011    ankle  IR TUNNELED CATHETER PLACEMENT GREATER THAN 5 YEARS  6/1/2022    IR TUNNELED CATHETER PLACEMENT GREATER THAN 5 YEARS 6/1/2022 [facility]        Family History   Problem Relation Age of Onset    Prostate Cancer Father     Diabetes Paternal Grandmother     Colon Cancer Neg Hx     Uterine Cancer Neg Hx     Diabetes Father         DM Type II    Stroke Father     Diabetes Paternal Grandfather         DM Type II     Hypertension Maternal Grandmother     Ovarian Cancer Neg Hx     Breast Cancer Mother 76    Diabetes Maternal Grandmother         DM Type II           Social Connections:     Frequency of Communication with Friends and Family: Not on file    Frequency of Social Gatherings with Friends and Family: Not on file    Attends Mosque Services: Not on file    Active Member of Clubs or Organizations: Not on file    Attends Club or Organization Meetings: Not on file    Marital Status: Not on file        No Known Allergies     Vitals signs and nursing note reviewed. Patient Vitals for the past 4 hrs:   Temp Pulse Resp BP SpO2   06/26/22 1738 -- (!) 114 -- (!) 175/84 100 %   06/26/22 1708 -- (!) 113 -- (!) 167/81 100 %   06/26/22 1638 -- (!) 117 -- (!) 161/84 100 %   06/26/22 1609 -- -- -- 115/66 --   06/26/22 1503 98.4 °F (36.9 °C) (!) 112 20 93/60 100 %          Physical Exam  Vitals and nursing note reviewed. Constitutional:       General: She is not in acute distress. Appearance: Normal appearance. She is ill-appearing. She is not toxic-appearing. HENT:      Head: Normocephalic and atraumatic. Nose: Nose normal.      Mouth/Throat:      Mouth: Mucous membranes are dry. Pharynx: Oropharynx is clear. Eyes:      Conjunctiva/sclera: Conjunctivae normal.      Pupils: Pupils are equal, round, and reactive to light. Cardiovascular:      Rate and Rhythm: Regular rhythm. Tachycardia present.    Pulmonary:      Effort: Pulmonary effort is normal.      Breath sounds: Normal following components:       Result Value    Lactic Acid, Plasma 2.5 (*)     All other components within normal limits   CBC WITH AUTO DIFFERENTIAL - Abnormal; Notable for the following components:    WBC 19.2 (*)     RBC 3.97 (*)     Hemoglobin 10.0 (*)     Hematocrit 33.2 (*)     MCH 25.2 (*)     MCHC 30.1 (*)     RDW 15.7 (*)     Platelets 695 (*)     MPV 9.0 (*)     Segs Absolute 15.0 (*)     All other components within normal limits   COMPREHENSIVE METABOLIC PANEL - Abnormal; Notable for the following components:    Sodium 147 (*)     Chloride 108 (*)     Glucose 209 (*)     CREATININE 2.88 (*)     GFR  22 (*)     GFR Non-African American 19 (*)     Alk Phosphatase 313 (*)     Total Protein 8.7 (*)     Albumin 2.3 (*)     Globulin 6.4 (*)     Albumin/Globulin Ratio 0.4 (*)     All other components within normal limits   PROCALCITONIN - Abnormal; Notable for the following components:    Procalcitonin 12.43 (*)     All other components within normal limits   CULTURE, BLOOD 1   CULTURE, BLOOD 1   URINALYSIS   PREGNANCY, URINE        XR CHEST PORTABLE   Final Result   No acute findings in the chest                                Voice dictation software was used during the making of this note. This software is not perfect and grammatical and other typographical errors may be present. This note has not been completely proofread for errors.      Gabby Akhtar MD  06/26/22 2585

## 2022-06-26 NOTE — ED NOTES
TRANSFER - OUT REPORT:    Verbal report given to Deirdre TURPIN on [employer]  being transferred to  for routine progression of patient care       Report consisted of patient's Situation, Background, Assessment and   Recommendations(SBAR). Information from the following report(s) ED SBAR was reviewed with the receiving nurse. Lines:   Peripheral IV 85/67/67 Left Basilic (Active)       Tunneled Hemodialysis Catheter Right Neck (Active)        Opportunity for questions and clarification was provided.       Patient transported with:  Registered Nurse   Monitor       Evy Anaya RN  06/26/22 0524

## 2022-06-26 NOTE — ED TRIAGE NOTES
Patient reports nausea and vomiting, weakness x several days. patient states she has recently been discharged for [facility] for similar complaints.

## 2022-06-27 PROBLEM — N12 PYELONEPHRITIS: Status: ACTIVE | Noted: 2022-06-27

## 2022-06-27 LAB
ACCESSION NUMBER, LLC1M: ABNORMAL
ACINETOBACTER CALCOAC BAUMANNII COMPLEX BY PCR: NOT DETECTED
ALBUMIN SERPL-MCNC: 1.8 G/DL (ref 3.5–5)
ALBUMIN/GLOB SERPL: 0.4 {RATIO} (ref 1.2–3.5)
ALP SERPL-CCNC: 228 U/L (ref 50–136)
ALT SERPL-CCNC: 40 U/L (ref 12–65)
ANION GAP SERPL CALC-SCNC: 5 MMOL/L (ref 7–16)
AST SERPL-CCNC: 26 U/L (ref 15–37)
BACTEROIDES FRAGILIS BY PCR: NOT DETECTED
BASOPHILS # BLD: 0 K/UL (ref 0–0.2)
BASOPHILS NFR BLD: 0 % (ref 0–2)
BILIRUB SERPL-MCNC: 0.3 MG/DL (ref 0.2–1.1)
BIOFIRE TEST COMMENT: ABNORMAL
BLACTX-M ISLT/SPM QL: NOT DETECTED
BLAIMP ISLT/SPM QL: NOT DETECTED
BLAKPC BLD POS QL NAA+NON-PROBE: NOT DETECTED
BLAOXA-48-LIKE ISLT/SPM QL: NOT DETECTED
BLAVIM ISLT/SPM QL: NOT DETECTED
BUN SERPL-MCNC: 15 MG/DL (ref 6–23)
C ALBICANS DNA BLD POS QL NAA+NON-PROBE: NOT DETECTED
C GLABRATA DNA BLD POS QL NAA+NON-PROBE: NOT DETECTED
C KRUSEI DNA BLD POS QL NAA+NON-PROBE: NOT DETECTED
C PARAP DNA BLD POS QL NAA+NON-PROBE: NOT DETECTED
C TROPICLS DNA BLD POS QL NAA+NON-PROBE: NOT DETECTED
CALCIUM SERPL-MCNC: 8.1 MG/DL (ref 8.3–10.4)
CANDIDA AURIS BY PCR: NOT DETECTED
CHLORIDE SERPL-SCNC: 117 MMOL/L (ref 98–107)
CO2 SERPL-SCNC: 28 MMOL/L (ref 21–32)
COLISTIN RES MCR-1 ISLT/SPM QL: NOT DETECTED
CREAT SERPL-MCNC: 2.31 MG/DL (ref 0.6–1)
CRYPTOCOCCUS NEOFORMANS/GATTII BY PCR: NOT DETECTED
DIFFERENTIAL METHOD BLD: ABNORMAL
E CLOAC COMP DNA BLD POS NAA+NON-PROBE: DETECTED
E COLI DNA BLD POS QL NAA+NON-PROBE: NOT DETECTED
ENTEROBACTERALES BY PCR: DETECTED
ENTEROCOCCUS FAECALIS BY PCR: NOT DETECTED
ENTEROCOCCUS FAECIUM BY PCR: NOT DETECTED
EOSINOPHIL # BLD: 0.1 K/UL (ref 0–0.8)
EOSINOPHIL NFR BLD: 1 % (ref 0.5–7.8)
ERYTHROCYTE [DISTWIDTH] IN BLOOD BY AUTOMATED COUNT: 15.7 % (ref 11.9–14.6)
GLOBULIN SER CALC-MCNC: 4.7 G/DL (ref 2.3–3.5)
GLUCOSE BLD STRIP.AUTO-MCNC: 101 MG/DL (ref 65–100)
GLUCOSE BLD STRIP.AUTO-MCNC: 119 MG/DL (ref 65–100)
GLUCOSE BLD STRIP.AUTO-MCNC: 240 MG/DL (ref 65–100)
GLUCOSE BLD STRIP.AUTO-MCNC: 243 MG/DL (ref 65–100)
GLUCOSE SERPL-MCNC: 134 MG/DL (ref 65–100)
GP B STREP DNA BLD POS QL NAA+NON-PROBE: NOT DETECTED
HAEM INFLU DNA BLD POS QL NAA+NON-PROBE: NOT DETECTED
HCT VFR BLD AUTO: 25.3 % (ref 35.8–46.3)
HGB BLD-MCNC: 7.8 G/DL (ref 11.7–15.4)
IMM GRANULOCYTES # BLD AUTO: 0.2 K/UL (ref 0–0.5)
IMM GRANULOCYTES NFR BLD AUTO: 1 % (ref 0–5)
K OXYTOCA DNA BLD POS QL NAA+NON-PROBE: NOT DETECTED
KLEBSIELLA AEROGENES BY PCR: NOT DETECTED
KLEBSIELLA PNEUMONIAE GROUP BY PCR: NOT DETECTED
L MONOCYTOG DNA BLD POS QL NAA+NON-PROBE: NOT DETECTED
LYMPHOCYTES # BLD: 2.2 K/UL (ref 0.5–4.6)
LYMPHOCYTES NFR BLD: 14 % (ref 13–44)
MAGNESIUM SERPL-MCNC: 1.4 MG/DL (ref 1.8–2.4)
MCH RBC QN AUTO: 25.7 PG (ref 26.1–32.9)
MCHC RBC AUTO-ENTMCNC: 30.8 G/DL (ref 31.4–35)
MCV RBC AUTO: 83.5 FL (ref 79.6–97.8)
MONOCYTES # BLD: 0.8 K/UL (ref 0.1–1.3)
MONOCYTES NFR BLD: 5 % (ref 4–12)
N MEN DNA BLD POS QL NAA+NON-PROBE: NOT DETECTED
NEUTS SEG # BLD: 12.7 K/UL (ref 1.7–8.2)
NEUTS SEG NFR BLD: 79 % (ref 43–78)
NRBC # BLD: 0 K/UL (ref 0–0.2)
P AERUGINOSA DNA BLD POS NAA+NON-PROBE: NOT DETECTED
PHOSPHATE SERPL-MCNC: 3.2 MG/DL (ref 2.5–4.5)
PLATELET # BLD AUTO: 525 K/UL (ref 150–450)
PMV BLD AUTO: 8.9 FL (ref 9.4–12.3)
POTASSIUM SERPL-SCNC: 3.8 MMOL/L (ref 3.5–5.1)
PROT SERPL-MCNC: 6.5 G/DL (ref 6.3–8.2)
PROTEUS SP DNA BLD POS QL NAA+NON-PROBE: NOT DETECTED
RBC # BLD AUTO: 3.03 M/UL (ref 4.05–5.2)
RESISTANT GENE NDM BY PCR: NOT DETECTED
RESISTANT GENE TARGETS: ABNORMAL
S AUREUS DNA BLD POS QL NAA+NON-PROBE: NOT DETECTED
S AUREUS+CONS DNA BLD POS NAA+NON-PROBE: NOT DETECTED
S MARCESCENS DNA BLD POS NAA+NON-PROBE: NOT DETECTED
S PNEUM DNA BLD POS QL NAA+NON-PROBE: NOT DETECTED
S PYO DNA BLD POS QL NAA+NON-PROBE: NOT DETECTED
SALMONELLA SPECIES BY PCR: NOT DETECTED
SERVICE CMNT-IMP: ABNORMAL
SODIUM SERPL-SCNC: 150 MMOL/L (ref 136–145)
STAPHYLOCOCCUS EPIDERMIDIS BY PCR: NOT DETECTED
STAPHYLOCOCCUS LUGDUNENSIS BY PCR: NOT DETECTED
STENOTROPHOMONAS MALTOPHILIA BY PCR: NOT DETECTED
STREPTOCOCCUS DNA BLD POS NAA+NON-PROBE: NOT DETECTED
WBC # BLD AUTO: 15.9 K/UL (ref 4.3–11.1)

## 2022-06-27 PROCEDURE — 2000000000 HC ICU R&B

## 2022-06-27 PROCEDURE — 6370000000 HC RX 637 (ALT 250 FOR IP): Performed by: FAMILY MEDICINE

## 2022-06-27 PROCEDURE — 80053 COMPREHEN METABOLIC PANEL: CPT

## 2022-06-27 PROCEDURE — 6360000002 HC RX W HCPCS: Performed by: FAMILY MEDICINE

## 2022-06-27 PROCEDURE — 85025 COMPLETE CBC W/AUTO DIFF WBC: CPT

## 2022-06-27 PROCEDURE — 76937 US GUIDE VASCULAR ACCESS: CPT

## 2022-06-27 PROCEDURE — 2580000003 HC RX 258: Performed by: FAMILY MEDICINE

## 2022-06-27 PROCEDURE — 82962 GLUCOSE BLOOD TEST: CPT

## 2022-06-27 PROCEDURE — 84100 ASSAY OF PHOSPHORUS: CPT

## 2022-06-27 PROCEDURE — 83735 ASSAY OF MAGNESIUM: CPT

## 2022-06-27 RX ORDER — INSULIN GLARGINE 100 [IU]/ML
25 INJECTION, SOLUTION SUBCUTANEOUS EVERY MORNING
Status: DISCONTINUED | OUTPATIENT
Start: 2022-06-27 | End: 2022-06-30

## 2022-06-27 RX ORDER — PEN NEEDLE, DIABETIC 31 GX5/16"
120 NEEDLE, DISPOSABLE MISCELLANEOUS 4 TIMES DAILY
COMMUNITY
Start: 2022-06-21 | End: 2022-07-21

## 2022-06-27 RX ORDER — MAGNESIUM SULFATE IN WATER 40 MG/ML
2000 INJECTION, SOLUTION INTRAVENOUS
Status: DISPENSED | OUTPATIENT
Start: 2022-06-27 | End: 2022-06-27

## 2022-06-27 RX ORDER — ONDANSETRON 2 MG/ML
4 INJECTION INTRAMUSCULAR; INTRAVENOUS EVERY 6 HOURS PRN
Status: DISCONTINUED | OUTPATIENT
Start: 2022-06-27 | End: 2022-07-06 | Stop reason: HOSPADM

## 2022-06-27 RX ORDER — DEXTROSE MONOHYDRATE 50 MG/ML
INJECTION, SOLUTION INTRAVENOUS CONTINUOUS
Status: DISCONTINUED | OUTPATIENT
Start: 2022-06-27 | End: 2022-06-28

## 2022-06-27 RX ORDER — MAGNESIUM SULFATE IN WATER 40 MG/ML
2000 INJECTION, SOLUTION INTRAVENOUS PRN
Status: DISCONTINUED | OUTPATIENT
Start: 2022-06-27 | End: 2022-07-06 | Stop reason: HOSPADM

## 2022-06-27 RX ADMIN — SODIUM CHLORIDE, PRESERVATIVE FREE 10 ML: 5 INJECTION INTRAVENOUS at 09:03

## 2022-06-27 RX ADMIN — INSULIN LISPRO 2 UNITS: 100 INJECTION, SOLUTION INTRAVENOUS; SUBCUTANEOUS at 12:09

## 2022-06-27 RX ADMIN — MAGNESIUM SULFATE HEPTAHYDRATE 2000 MG: 40 INJECTION, SOLUTION INTRAVENOUS at 05:33

## 2022-06-27 RX ADMIN — ENOXAPARIN SODIUM 30 MG: 100 INJECTION SUBCUTANEOUS at 08:49

## 2022-06-27 RX ADMIN — PIPERACILLIN AND TAZOBACTAM 3375 MG: 3; .375 INJECTION, POWDER, LYOPHILIZED, FOR SOLUTION INTRAVENOUS at 20:23

## 2022-06-27 RX ADMIN — PIPERACILLIN AND TAZOBACTAM 3375 MG: 3; .375 INJECTION, POWDER, LYOPHILIZED, FOR SOLUTION INTRAVENOUS at 04:11

## 2022-06-27 RX ADMIN — MAGNESIUM SULFATE HEPTAHYDRATE 2000 MG: 40 INJECTION, SOLUTION INTRAVENOUS at 07:57

## 2022-06-27 RX ADMIN — SODIUM CHLORIDE: 9 INJECTION, SOLUTION INTRAVENOUS at 03:07

## 2022-06-27 RX ADMIN — PIPERACILLIN AND TAZOBACTAM 3375 MG: 3; .375 INJECTION, POWDER, LYOPHILIZED, FOR SOLUTION INTRAVENOUS at 12:10

## 2022-06-27 RX ADMIN — INSULIN LISPRO 2 UNITS: 100 INJECTION, SOLUTION INTRAVENOUS; SUBCUTANEOUS at 08:48

## 2022-06-27 RX ADMIN — ONDANSETRON 4 MG: 2 INJECTION INTRAMUSCULAR; INTRAVENOUS at 20:28

## 2022-06-27 RX ADMIN — ONDANSETRON 4 MG: 2 INJECTION INTRAMUSCULAR; INTRAVENOUS at 08:48

## 2022-06-27 RX ADMIN — DEXTROSE MONOHYDRATE: 5 INJECTION, SOLUTION INTRAVENOUS at 09:00

## 2022-06-27 RX ADMIN — INSULIN GLARGINE 25 UNITS: 100 INJECTION, SOLUTION SUBCUTANEOUS at 08:50

## 2022-06-27 RX ADMIN — SODIUM CHLORIDE, PRESERVATIVE FREE 10 ML: 5 INJECTION INTRAVENOUS at 21:00

## 2022-06-27 RX ADMIN — DEXTROSE MONOHYDRATE: 5 INJECTION, SOLUTION INTRAVENOUS at 20:21

## 2022-06-27 ASSESSMENT — PAIN SCALES - GENERAL
PAINLEVEL_OUTOF10: 0

## 2022-06-27 NOTE — H&P
(Chronic) 2022 Yes    Type 1 diabetes mellitus with hyperglycemia ([address] (Chronic) 2022 Yes    Overview Signed 2022  7:32 PM by Kayleigh Mcdowell DO     Formatting of this note might be different from the original.  Pt using OTC insulin  As needed               Past History:  Past Medical History:   Diagnosis Date    Acute renal failure ([address] 2012    CKD (chronic kidney disease) stage 3, GFR 30-59 ml/min ([address]     Gastroenteritis, acute 2012    H/O amputation of lesser toe, left ([address] 2022    This status condition was added to the problem list by Jonathan SALDANA of the [facility] Team, after medical record review and validation.        Hemodialysis patient ([address]     IDDM (insulin dependent diabetes mellitus) 2012    Type 1 av -150 can tell Hypo below 70    Intractable nausea and vomiting 2014    Irregular menses     Kidney stone     Neuropathy, peripheral, idiopathic 3/13/2017    Retinopathy, bilateral 3/13/2017    Trichomoniasis 3/13/2017    Ulcer, skin, chronic ([address] 3/13/2017    Vaginal burning      Past Surgical History:   Procedure Laterality Date    AMPUTATION Left     5th Toe due to Diabetic Ulcer    AMPUTATION  12    gangrene    CYSTOSCOPY,INSERT URETERAL STENT  2022    HX OPEN REDUCTION INTERNAL FIXATION Right     ankle    IR TUNNELED CATHETER PLACEMENT GREATER THAN 5 YEARS  2022    IR TUNNELED CATHETER PLACEMENT GREATER THAN 5 YEARS 2022 [facility]      No Known Allergies   Social History     Tobacco Use    Smoking status: Former Smoker     Quit date: 2013     Years since quittin.6    Smokeless tobacco: Never Used   Substance Use Topics    Alcohol use: Yes      Family History   Problem Relation Age of Onset    Prostate Cancer Father     Diabetes Paternal Grandmother     Colon Cancer Neg Hx     Uterine Cancer Neg Hx     Diabetes Father         DM Type II    Stroke Father     Diabetes Paternal Grandfather         DM Type II     Hypertension Maternal Grandmother     Ovarian Cancer Neg Hx     Breast Cancer Mother 76    Diabetes Maternal Grandmother         DM Type II      Family history reviewed and negative except as noted above. Immunization History   Administered Date(s) Administered    COVID-19, Pfizer Purple top, DILUTE for use, 12+ yrs, 30mcg/0.3mL dose 02/10/2021    PPD Test 06/01/2022     Prior to Admit Medications:  Current Outpatient Medications   Medication Instructions    albuterol (PROVENTIL) 2.5 mg, Nebulization, EVERY 4 HOURS PRN    brimonidine (ALPHAGAN P) 0.1 % SOLN 1 drop in left eye twice a day    ergocalciferol (ERGOCALCIFEROL) 50,000 Units    ferrous sulfate (IRON 325) 325 mg, Oral, 2 TIMES DAILY WITH MEALS    folic acid (FOLVITE) 1 mg, Oral, DAILY    insulin glargine (LANTUS) 22 Units, SubCUTAneous, NIGHTLY    insulin lispro (HUMALOG) 0-4 Units, SubCUTAneous, 4 TIMES DAILY BEFORE MEALS & NIGHTLY    insulin lispro (HUMALOG) 5 Units, SubCUTAneous, 3 TIMES DAILY WITH MEALS         Objective:     Patient Vitals for the past 24 hrs:   Temp Pulse Resp BP SpO2   06/26/22 1915 -- (!) 104 -- -- 99 %   06/26/22 1838 -- (!) 103 -- (!) 156/84 98 %   06/26/22 1808 -- -- -- (!) 156/72 --   06/26/22 1759 98.8 °F (37.1 °C) -- -- -- --   06/26/22 1738 -- (!) 114 -- (!) 175/84 100 %   06/26/22 1708 -- (!) 113 -- (!) 167/81 100 %   06/26/22 1638 -- (!) 117 -- (!) 161/84 100 %   06/26/22 1609 -- -- -- 115/66 --   06/26/22 1503 98.4 °F (36.9 °C) (!) 112 20 93/60 100 %       Estimated body mass index is 24.18 kg/m² as calculated from the following:    Height as of this encounter: 5' 8\" (1.727 m). Weight as of this encounter: 159 lb (72.1 kg). No intake or output data in the 24 hours ending 06/26/22 2009      Physical Exam:    Blood pressure (!) 156/84, pulse (!) 104, temperature 98.8 °F (37.1 °C), temperature source Oral, resp.  rate 20, height 5' 8\" (1.727 m), weight 159 lb (72.1 kg), NEG           Other Studies:  XR CHEST PORTABLE    Result Date: 6/26/2022  Chest X-ray INDICATION: Vomiting and weakness. COMPARISON: Chest x-ray 5/30/2022. A portable AP view of the chest was obtained. FINDINGS: The lungs are clear. There are no infiltrates or effusions. The heart size is normal.  The bony thorax is intact. No acute findings in the chest       05/21/22    TRANSTHORACIC ECHOCARDIOGRAM (TTE) COMPLETE (CONTRAST/BUBBLE/3D PRN) 05/22/2022  1:30 PM (Final)    Interpretation Summary    Left Ventricle: Hyperdynamic left ventricular systolic function with a visually estimated EF of 70 - 75%. Left ventricle size is normal. Mildly increased wall thickness. Normal diastolic function.   Aortic Valve: Tricuspid valve. Mild sclerosis of the aortic valve cusp. The AV is incompletely interrogated though the peak velocity and mean gradient is suggestive of moderate stenosis of the aortic valve. AV mean gradient is 23 mmHg. AV peak velocity is 3.2 m/s. Signed by: Jassi Mckeon MD on 5/22/2022  1:30 PM    Patient is critically ill. Without intervention, there is a high probability of acute organ impairment or life-threatening deterioration in the patient's condition from: Severe sepsis  Critical care interventions: IV antibiotics, continuous monitoring in ICU  Total critical care time spent: 55 minutes. Time is indicative of direct patient attendance at bedside and on the patient's floor nearby. Includes time spent at bedside performing history and exam, performing chart review, discussing findings and treatment plan with patient and/or family, discussing patient with nursing staff, consultants and colleagues, and ordering/reviewing pertinent laboratory and radiographic evaluations. Time excludes procedures. CPT:  16772: First 30-74 minutes  81066: Each block of 30 min.  beyond 76        BLOOD CONSENT:  I have discussed with the patient the rationale for blood component transfusion; its benefits in treating or preventing fatigue, organ damage, or death; and its risk which includes mild transfusion reactions, rare risk of blood borne infection, or more serious but rare reactions. I have discussed the alternatives to transfusion, including the risk and consequences of not receiving transfusion. The patient had an opportunity to ask questions and had agreed to proceed with transfusion of blood components. Signed:  French Sandhu MD    Part of this note may have been written by using a voice dictation software. The note has been proof read but may still contain some grammatical/other typographical errors.

## 2022-06-27 NOTE — CARE COORDINATION
RN CM attempted to meet with the patient. The primary nurse was at the bedside providing patient care. Case Management will attempt to follow up with the patient at a later time.

## 2022-06-27 NOTE — PROGRESS NOTES
Patient vomited all her jello and approximately 1/3 of an emesis bag full directly following [facility] breakfast. Notified Dr. Su Thompson for anti-emetics. No advancement of diet at this time. Update at 1713- Patient tolerated 75% of [facility] lunch tray and 100% of [facility] dinner tray without nausea or vomiting.

## 2022-06-27 NOTE — CARE COORDINATION
06/27/22 1042   Service Assessment   Patient Orientation Alert and Oriented   Cognition Alert   History Provided By Patient   Primary [address]  Parent   Patient's Healthcare Decision Maker is: Legal Next of Kin   PCP Verified by CM Yes   Prior Functional Level Independent in ADLs/IADLs   Current Functional Level Independent in ADLs/IADLs   Social/Functional History   Lives With [address]   Mode of Transportation Car   Occupation On disability  (from Diabetes)   Visited with pt to establish prior to admission baseline and discuss their anticipated goals at time of d/c. Pt A&O, resting in bed upon my arrival, on her phone. Pt states that it was a good time to talk, pt remains on phone during our conversation. PT came in c/o N/V x several days, pt with Hx of uncontrolled DM along with HTN. Pt with recent admission in May to [address] [place] [address] for similar in May. States d/c and went to [facility] 2 days later for same. When asked about barriers to her care, she denied any. Pt has medications, DM supplies, transportation and support. She has not seen PCP since those admissions, but admits not reaching out to make a appointment. Sees \"someone\" at [facility]. Has not been to PCP in over 1 yr. Rx are filled at [facility] off [facility]. Pt goal is to return home, has family support. CM to follow for any d/c needs.

## 2022-06-27 NOTE — PROGRESS NOTES
Hospitalist Progress Note   Admit Date:  2022  3:52 PM   Name:  Denisa Calderón   Age:  48 y.o. Sex:  female  :  1975   MRN:  [id number]   Room:  [facility]/    Presenting Complaint: Nausea and Emesis     Reason(s) for Admission: Septicemia ([facility] [A41.9]  Acute pyelonephritis [N10]  Severe sepsis ([facility] [A41.9, R65.20]  Nausea and vomiting, intractability of vomiting not specified, unspecified vomiting type [R11.2]  Chronic kidney disease, unspecified CKD stage [N18.9]     Hospital Course & Interval History:   Denisa Calderón is a 48 y.o. female with medical history of uncontrolled diabetes with complications, hypertension, CKD, and 2 recent hospitalizations, who presented with nausea and vomiting. She reports that she was just recently hospitalized at [facility] from  through  with pyelonephritis and DKA. Subjective/24hr Events (22): Patient is seen at the bedside. Reports nausea is better, still vomiting. Denies chest pain, palpitation, shortness of breath or dizziness. Denies flank pain. Assessment & Plan:     Severe sepsis secondary to UTI:  Patient with recent history of pyelonephritis, CVA tenderness negative  Follow-up on cultures  Continue Zosyn  Lactic acidosis resolved    Nausea/vomiting:  Likely secondary to above  Antiemetic as needed for nausea/vomiting    Hypernatremia:  Sodium 150  Start patient on D5W, careful due to history of DKA in the past  Increase free water intake    Type 1 diabetes mellitus:  Patient started on D5W due to hypernatremia   Increase Lantus to 25 units daily and increase sensitivity of sliding scale    CKD stage IV:  Stable  Continue to monitor    Hypertension:  Continue home meds      Discharge Planning: Anticipate discharge in 48 hours  Diet:  ADULT DIET;  Clear Liquid; 4 carb choices (60 gm/meal)  DVT PPx: Heparin  Code status: Full Code    Hospital Problems:  Principal Problem:    Severe sepsis ([facility]  Active Intake 1484.02 ml   Output --   Net 1484.02 ml         Physical Exam:     Blood pressure 116/71, pulse 96, temperature 97.3 °F (36.3 °C), temperature source Temporal, resp. rate 15, height 5' 8\" (1.727 m), weight 153 lb 12.8 oz (69.8 kg), SpO2 96 %. General:    Well nourished. Head:  Normocephalic, atraumatic  Eyes:  Sclerae appear normal.  Pupils equally round. ENT:  Nares appear normal, no drainage. Moist oral mucosa  Neck:  No restricted ROM. Trachea midline   CV:   RRR. No m/r/g. No jugular venous distension. Lungs:   CTAB. No wheezing, rhonchi, or rales. Respirations even, unlabored  Abdomen: Bowel sounds present. Soft, nontender, nondistended. No CVA tenderness  Extremities: No cyanosis or clubbing. No edema  Skin:     No rashes and normal coloration. Warm and dry. Neuro:  CN II-XII grossly intact. Sensation intact. A&Ox3  Psych:  Normal mood and affect.       I have reviewed ordered lab tests and independently visualized imaging below:    Recent Labs:  Recent Results (from the past 48 hour(s))   EKG 12 Lead    Collection Time: 06/26/22  3:22 PM   Result Value Ref Range    Ventricular Rate 111 BPM    Atrial Rate 111 BPM    P-R Interval 112 ms    QRS Duration 74 ms    Q-T Interval 352 ms    QTc Calculation (Bazett) 478 ms    P Axis 71 degrees    R Axis 85 degrees    T Axis 35 degrees    Diagnosis Sinus tachycardia    Lactic Acid    Collection Time: 06/26/22  4:20 PM   Result Value Ref Range    Lactic Acid, Plasma 2.5 (H) 0.4 - 2.0 MMOL/L   CBC with Auto Differential    Collection Time: 06/26/22  4:20 PM   Result Value Ref Range    WBC 19.2 (H) 4.3 - 11.1 K/uL    RBC 3.97 (L) 4.05 - 5.2 M/uL    Hemoglobin 10.0 (L) 11.7 - 15.4 g/dL    Hematocrit 33.2 (L) 35.8 - 46.3 %    MCV 83.6 79.6 - 97.8 FL    MCH 25.2 (L) 26.1 - 32.9 PG    MCHC 30.1 (L) 31.4 - 35.0 g/dL    RDW 15.7 (H) 11.9 - 14.6 %    Platelets 410 (H) 634 - 450 K/uL    MPV 9.0 (L) 9.4 - 12.3 FL    nRBC 0.02 0.0 - 0.2 K/uL Differential Type AUTOMATED      Seg Neutrophils 78 43 - 78 %    Lymphocytes 15 13 - 44 %    Monocytes 5 4.0 - 12.0 %    Eosinophils % 1 0.5 - 7.8 %    Basophils 0 0.0 - 2.0 %    Immature Granulocytes 1 0.0 - 5.0 %    Segs Absolute 15.0 (H) 1.7 - 8.2 K/UL    Absolute Lymph # 2.8 0.5 - 4.6 K/UL    Absolute Mono # 1.0 0.1 - 1.3 K/UL    Absolute Eos # 0.1 0.0 - 0.8 K/UL    Basophils Absolute 0.0 0.0 - 0.2 K/UL    Absolute Immature Granulocyte 0.2 0.0 - 0.5 K/UL   CMP    Collection Time: 06/26/22  4:20 PM   Result Value Ref Range    Sodium 147 (H) 136 - 145 mmol/L    Potassium 3.8 3.5 - 5.1 mmol/L    Chloride 108 (H) 98 - 107 mmol/L    CO2 29 21 - 32 mmol/L    Anion Gap 10 7 - 16 mmol/L    Glucose 209 (H) 65 - 100 mg/dL    BUN 19 6 - 23 MG/DL    CREATININE 2.88 (H) 0.6 - 1.0 MG/DL    GFR African American 22 (L) >60 ml/min/1.73m2    GFR Non- 19 (L) >60 ml/min/1.73m2    Calcium 9.5 8.3 - 10.4 MG/DL    Total Bilirubin 0.4 0.2 - 1.1 MG/DL    ALT 45 12 - 65 U/L    AST 32 15 - 37 U/L    Alk Phosphatase 313 (H) 50 - 130 U/L    Total Protein 8.7 (H) 6.3 - 8.2 g/dL    Albumin 2.3 (L) 3.5 - 5.0 g/dL    Globulin 6.4 (H) 2.3 - 3.5 g/dL    Albumin/Globulin Ratio 0.4 (L) 1.2 - 3.5     Procalcitonin    Collection Time: 06/26/22  4:20 PM   Result Value Ref Range    Procalcitonin 12.43 (H) 0.00 - 0.49 ng/mL   Hemoglobin A1c    Collection Time: 06/26/22  4:20 PM   Result Value Ref Range    Hemoglobin A1C 8.3 (H) 4.20 - 6.30 %    eAG 192 mg/dL   Urinalysis    Collection Time: 06/26/22  6:29 PM   Result Value Ref Range    Color, UA YELLOW      Appearance TURBID      Specific Gravity, UA 1.008 1.001 - 1.023      pH, Urine 7.0 5.0 - 9.0      Protein, UA 30 (A) NEG mg/dL    Glucose,  mg/dL    Ketones, Urine TRACE (A) NEG mg/dL    Bilirubin Urine Negative NEG      Blood, Urine MODERATE (A) NEG      Urobilinogen, Urine 0.2 0.2 - 1.0 EU/dL    Nitrite, Urine Negative NEG      Leukocyte Esterase, Urine LARGE (A) NEG      WBC, UA >100 0 /hpf    RBC, UA 5-10 0 /hpf    Epithelial Cells UA 10-20 0 /hpf    BACTERIA, URINE 4+ (H) 0 /hpf    OTHER OBSERVATIONS RESULTS VERIFIED MANUALLY     Pregnancy, Urine    Collection Time: 06/26/22  6:29 PM   Result Value Ref Range    HCG(Urine) Pregnancy Test Negative NEG     Lactate, Sepsis    Collection Time: 06/26/22  8:43 PM   Result Value Ref Range    Lactic Acid, Sepsis 1.0 0.5 - 2.0 MMOL/L   POCT Glucose    Collection Time: 06/26/22  8:58 PM   Result Value Ref Range    POC Glucose 126 (H) 65 - 100 mg/dL    Performed by: Digna    CBC with Auto Differential    Collection Time: 06/27/22  4:19 AM   Result Value Ref Range    WBC 15.9 (H) 4.3 - 11.1 K/uL    RBC 3.03 (L) 4.05 - 5.2 M/uL    Hemoglobin 7.8 (L) 11.7 - 15.4 g/dL    Hematocrit 25.3 (L) 35.8 - 46.3 %    MCV 83.5 79.6 - 97.8 FL    MCH 25.7 (L) 26.1 - 32.9 PG    MCHC 30.8 (L) 31.4 - 35.0 g/dL    RDW 15.7 (H) 11.9 - 14.6 %    Platelets 052 (H) 669 - 450 K/uL    MPV 8.9 (L) 9.4 - 12.3 FL    nRBC 0.00 0.0 - 0.2 K/uL    Differential Type AUTOMATED      Seg Neutrophils 79 (H) 43 - 78 %    Lymphocytes 14 13 - 44 %    Monocytes 5 4.0 - 12.0 %    Eosinophils % 1 0.5 - 7.8 %    Basophils 0 0.0 - 2.0 %    Immature Granulocytes 1 0.0 - 5.0 %    Segs Absolute 12.7 (H) 1.7 - 8.2 K/UL    Absolute Lymph # 2.2 0.5 - 4.6 K/UL    Absolute Mono # 0.8 0.1 - 1.3 K/UL    Absolute Eos # 0.1 0.0 - 0.8 K/UL    Basophils Absolute 0.0 0.0 - 0.2 K/UL    Absolute Immature Granulocyte 0.2 0.0 - 0.5 K/UL   Comprehensive Metabolic Panel    Collection Time: 06/27/22  4:19 AM   Result Value Ref Range    Sodium 150 (H) 136 - 145 mmol/L    Potassium 3.8 3.5 - 5.1 mmol/L    Chloride 117 (H) 98 - 107 mmol/L    CO2 28 21 - 32 mmol/L    Anion Gap 5 (L) 7 - 16 mmol/L    Glucose 134 (H) 65 - 100 mg/dL    BUN 15 6 - 23 MG/DL    CREATININE 2.31 (H) 0.6 - 1.0 MG/DL    GFR African American 29 (L) >60 ml/min/1.73m2    GFR Non- 24 (L) >60 ml/min/1.73m2    Calcium 8.1 (L) 8.3 - 10.4 MG/DL    Total Bilirubin 0.3 0.2 - 1.1 MG/DL    ALT 40 12 - 65 U/L    AST 26 15 - 37 U/L    Alk Phosphatase 228 (H) 50 - 136 U/L    Total Protein 6.5 6.3 - 8.2 g/dL    Albumin 1.8 (L) 3.5 - 5.0 g/dL    Globulin 4.7 (H) 2.3 - 3.5 g/dL    Albumin/Globulin Ratio 0.4 (L) 1.2 - 3.5     Magnesium    Collection Time: 06/27/22  4:19 AM   Result Value Ref Range    Magnesium 1.4 (L) 1.8 - 2.4 mg/dL   Phosphorus    Collection Time: 06/27/22  4:19 AM   Result Value Ref Range    Phosphorus 3.2 2.5 - 4.5 MG/DL   POCT Glucose    Collection Time: 06/27/22  8:01 AM   Result Value Ref Range    POC Glucose 240 (H) 65 - 100 mg/dL    Performed by:  [place]        Other Studies:  XR CHEST PORTABLE   Final Result   No acute findings in the chest             Current Meds:  Current Facility-Administered Medications   Medication Dose Route Frequency    magnesium sulfate 2000 mg in 50 mL IVPB premix  2,000 mg IntraVENous PRN    dextrose 5 % solution   IntraVENous Continuous    ondansetron (ZOFRAN) injection 4 mg  4 mg IntraVENous Q6H PRN    insulin glargine (LANTUS) injection vial 25 Units  25 Units SubCUTAneous QAM    albuterol (PROVENTIL) nebulizer solution 2.5 mg  2.5 mg Nebulization Q6H PRN    glucose chewable tablet 16 g  4 tablet Oral PRN    dextrose bolus 10% 125 mL  125 mL IntraVENous PRN    Or    dextrose bolus 10% 250 mL  250 mL IntraVENous PRN    glucagon injection 1 mg  1 mg IntraMUSCular PRN    dextrose 5 % solution  100 mL/hr IntraVENous PRN    sodium chloride flush 0.9 % injection 5-40 mL  5-40 mL IntraVENous 2 times per day    sodium chloride flush 0.9 % injection 5-40 mL  5-40 mL IntraVENous PRN    0.9 % sodium chloride infusion   IntraVENous PRN    enoxaparin Sodium (LOVENOX) injection 30 mg  30 mg SubCUTAneous Daily    acetaminophen (TYLENOL) tablet 650 mg  650 mg Oral Q6H PRN    Or    acetaminophen (TYLENOL) suppository 650 mg  650 mg Rectal Q6H PRN    aluminum & magnesium hydroxide-simethicone (MAALOX) 354-461-44 MG/5ML suspension 30 mL  30 mL Oral Q6H PRN    insulin lispro (HUMALOG) injection vial 0-4 Units  0-4 Units SubCUTAneous TID WC    insulin lispro (HUMALOG) injection vial 0-4 Units  0-4 Units SubCUTAneous Nightly    piperacillin-tazobactam (ZOSYN) 3,375 mg in sodium chloride 0.9 % 50 mL IVPB (mini-bag)  3,375 mg IntraVENous Q8H       Signed:  Cathi Alvarez MD    Part of this note may have been written by using a voice dictation software. The note has been proof read but may still contain some grammatical/other typographical errors.

## 2022-06-27 NOTE — PROGRESS NOTES
US Guided PIV access-   Skin was cleaned and disinfected prior to IV puncture. Ultrasound was used to find the vein which was compressible and does not have any ultrasound features of an artery or nerve bundle. Under real-time ultrasound guidance peripheral access was obtained in the right forearm using 20 G 6 CM Extended Dwell Peripheral IV catheter LOT # [id number] . Blood return was present and IV flushed without difficulty with no clinical signs of infiltration. IV dressing applied and there were no immediate complications noted and patient tolerated the procedure well.

## 2022-06-27 NOTE — PROGRESS NOTES
Dual skin assessment completed with Kate Olivarez RN. No impairment noted    Reece Score 23. Dry flaky skin noted to patient's back. Left fifth toe amputation noted.

## 2022-06-28 LAB
ALBUMIN SERPL-MCNC: 1.6 G/DL (ref 3.5–5)
ALBUMIN/GLOB SERPL: 0.4 {RATIO} (ref 1.2–3.5)
ALP SERPL-CCNC: 203 U/L (ref 50–130)
ALT SERPL-CCNC: 31 U/L (ref 12–65)
ANION GAP SERPL CALC-SCNC: 8 MMOL/L (ref 7–16)
AST SERPL-CCNC: 31 U/L (ref 15–37)
BACTERIA SPEC CULT: NORMAL
BASOPHILS # BLD: 0 K/UL (ref 0–0.2)
BASOPHILS NFR BLD: 0 % (ref 0–2)
BILIRUB SERPL-MCNC: 0.3 MG/DL (ref 0.2–1.1)
BUN SERPL-MCNC: 9 MG/DL (ref 6–23)
CALCIUM SERPL-MCNC: 7.9 MG/DL (ref 8.3–10.4)
CHLORIDE SERPL-SCNC: 109 MMOL/L (ref 98–107)
CO2 SERPL-SCNC: 25 MMOL/L (ref 21–32)
CREAT SERPL-MCNC: 2.21 MG/DL (ref 0.6–1)
DIFFERENTIAL METHOD BLD: ABNORMAL
EOSINOPHIL # BLD: 0.3 K/UL (ref 0–0.8)
EOSINOPHIL NFR BLD: 2 % (ref 0.5–7.8)
ERYTHROCYTE [DISTWIDTH] IN BLOOD BY AUTOMATED COUNT: 15.4 % (ref 11.9–14.6)
GLOBULIN SER CALC-MCNC: 4.5 G/DL (ref 2.3–3.5)
GLUCOSE BLD STRIP.AUTO-MCNC: 106 MG/DL (ref 65–100)
GLUCOSE BLD STRIP.AUTO-MCNC: 113 MG/DL (ref 65–100)
GLUCOSE BLD STRIP.AUTO-MCNC: 148 MG/DL (ref 65–100)
GLUCOSE BLD STRIP.AUTO-MCNC: 230 MG/DL (ref 65–100)
GLUCOSE BLD STRIP.AUTO-MCNC: 40 MG/DL (ref 65–100)
GLUCOSE SERPL-MCNC: 226 MG/DL (ref 65–100)
HAV IGM SER QL: NONREACTIVE
HCT VFR BLD AUTO: 24.3 % (ref 35.8–46.3)
HGB BLD-MCNC: 7.7 G/DL (ref 11.7–15.4)
IMM GRANULOCYTES # BLD AUTO: 0.2 K/UL (ref 0–0.5)
IMM GRANULOCYTES NFR BLD AUTO: 2 % (ref 0–5)
LIPASE SERPL-CCNC: 112 U/L (ref 73–393)
LYMPHOCYTES # BLD: 2.5 K/UL (ref 0.5–4.6)
LYMPHOCYTES NFR BLD: 23 % (ref 13–44)
MAGNESIUM SERPL-MCNC: 1.6 MG/DL (ref 1.8–2.4)
MAGNESIUM SERPL-MCNC: 2.3 MG/DL (ref 1.8–2.4)
MCH RBC QN AUTO: 26.1 PG (ref 26.1–32.9)
MCHC RBC AUTO-ENTMCNC: 31.7 G/DL (ref 31.4–35)
MCV RBC AUTO: 82.4 FL (ref 79.6–97.8)
MONOCYTES # BLD: 0.6 K/UL (ref 0.1–1.3)
MONOCYTES NFR BLD: 5 % (ref 4–12)
NEUTS SEG # BLD: 7.3 K/UL (ref 1.7–8.2)
NEUTS SEG NFR BLD: 67 % (ref 43–78)
NRBC # BLD: 0 K/UL (ref 0–0.2)
PLATELET # BLD AUTO: 478 K/UL (ref 150–450)
PMV BLD AUTO: 9 FL (ref 9.4–12.3)
POTASSIUM SERPL-SCNC: 3.2 MMOL/L (ref 3.5–5.1)
POTASSIUM SERPL-SCNC: 3.6 MMOL/L (ref 3.5–5.1)
PROT SERPL-MCNC: 6.1 G/DL (ref 6.3–8.2)
RBC # BLD AUTO: 2.95 M/UL (ref 4.05–5.2)
SERVICE CMNT-IMP: ABNORMAL
SERVICE CMNT-IMP: NORMAL
SODIUM SERPL-SCNC: 142 MMOL/L (ref 136–145)
WBC # BLD AUTO: 10.9 K/UL (ref 4.3–11.1)

## 2022-06-28 PROCEDURE — 82962 GLUCOSE BLOOD TEST: CPT

## 2022-06-28 PROCEDURE — 83735 ASSAY OF MAGNESIUM: CPT

## 2022-06-28 PROCEDURE — 84132 ASSAY OF SERUM POTASSIUM: CPT

## 2022-06-28 PROCEDURE — 1100000000 HC RM PRIVATE

## 2022-06-28 PROCEDURE — 2580000003 HC RX 258: Performed by: FAMILY MEDICINE

## 2022-06-28 PROCEDURE — 6360000002 HC RX W HCPCS: Performed by: FAMILY MEDICINE

## 2022-06-28 PROCEDURE — 6370000000 HC RX 637 (ALT 250 FOR IP): Performed by: FAMILY MEDICINE

## 2022-06-28 PROCEDURE — 36415 COLL VENOUS BLD VENIPUNCTURE: CPT

## 2022-06-28 PROCEDURE — A4216 STERILE WATER/SALINE, 10 ML: HCPCS | Performed by: FAMILY MEDICINE

## 2022-06-28 PROCEDURE — C9113 INJ PANTOPRAZOLE SODIUM, VIA: HCPCS | Performed by: FAMILY MEDICINE

## 2022-06-28 PROCEDURE — 83690 ASSAY OF LIPASE: CPT

## 2022-06-28 PROCEDURE — 85025 COMPLETE CBC W/AUTO DIFF WBC: CPT

## 2022-06-28 PROCEDURE — 80053 COMPREHEN METABOLIC PANEL: CPT

## 2022-06-28 PROCEDURE — 86709 HEPATITIS A IGM ANTIBODY: CPT

## 2022-06-28 RX ORDER — METOCLOPRAMIDE HYDROCHLORIDE 5 MG/ML
5 INJECTION INTRAMUSCULAR; INTRAVENOUS EVERY 6 HOURS
Status: DISCONTINUED | OUTPATIENT
Start: 2022-06-28 | End: 2022-06-30

## 2022-06-28 RX ORDER — MORPHINE SULFATE 2 MG/ML
1 INJECTION, SOLUTION INTRAMUSCULAR; INTRAVENOUS EVERY 6 HOURS PRN
Status: DISCONTINUED | OUTPATIENT
Start: 2022-06-28 | End: 2022-07-06 | Stop reason: HOSPADM

## 2022-06-28 RX ORDER — ONDANSETRON 2 MG/ML
4 INJECTION INTRAMUSCULAR; INTRAVENOUS ONCE
Status: COMPLETED | OUTPATIENT
Start: 2022-06-28 | End: 2022-06-28

## 2022-06-28 RX ORDER — MAGNESIUM SULFATE IN WATER 40 MG/ML
2000 INJECTION, SOLUTION INTRAVENOUS ONCE
Status: COMPLETED | OUTPATIENT
Start: 2022-06-28 | End: 2022-06-28

## 2022-06-28 RX ORDER — POTASSIUM CHLORIDE 20 MEQ/1
40 TABLET, EXTENDED RELEASE ORAL
Status: DISCONTINUED | OUTPATIENT
Start: 2022-06-28 | End: 2022-06-28

## 2022-06-28 RX ORDER — HYDROCODONE BITARTRATE AND ACETAMINOPHEN 7.5; 325 MG/1; MG/1
1 TABLET ORAL EVERY 6 HOURS PRN
Status: DISCONTINUED | OUTPATIENT
Start: 2022-06-28 | End: 2022-07-06 | Stop reason: HOSPADM

## 2022-06-28 RX ORDER — CIPROFLOXACIN 500 MG/1
500 TABLET, FILM COATED ORAL EVERY 12 HOURS SCHEDULED
Status: DISCONTINUED | OUTPATIENT
Start: 2022-06-28 | End: 2022-06-28

## 2022-06-28 RX ORDER — CIPROFLOXACIN 2 MG/ML
400 INJECTION, SOLUTION INTRAVENOUS EVERY 12 HOURS
Status: DISCONTINUED | OUTPATIENT
Start: 2022-06-28 | End: 2022-07-03 | Stop reason: ALTCHOICE

## 2022-06-28 RX ORDER — POTASSIUM CHLORIDE 7.45 MG/ML
10 INJECTION INTRAVENOUS
Status: ACTIVE | OUTPATIENT
Start: 2022-06-28 | End: 2022-06-28

## 2022-06-28 RX ADMIN — PIPERACILLIN AND TAZOBACTAM 3375 MG: 3; .375 INJECTION, POWDER, LYOPHILIZED, FOR SOLUTION INTRAVENOUS at 04:25

## 2022-06-28 RX ADMIN — POTASSIUM CHLORIDE 10 MEQ: 7.46 INJECTION, SOLUTION INTRAVENOUS at 11:32

## 2022-06-28 RX ADMIN — DEXTROSE MONOHYDRATE: 5 INJECTION, SOLUTION INTRAVENOUS at 10:03

## 2022-06-28 RX ADMIN — POTASSIUM CHLORIDE 10 MEQ: 7.46 INJECTION, SOLUTION INTRAVENOUS at 13:32

## 2022-06-28 RX ADMIN — ONDANSETRON 4 MG: 2 INJECTION INTRAMUSCULAR; INTRAVENOUS at 00:38

## 2022-06-28 RX ADMIN — INSULIN LISPRO 2 UNITS: 100 INJECTION, SOLUTION INTRAVENOUS; SUBCUTANEOUS at 08:06

## 2022-06-28 RX ADMIN — MAGNESIUM SULFATE HEPTAHYDRATE 2000 MG: 40 INJECTION, SOLUTION INTRAVENOUS at 11:24

## 2022-06-28 RX ADMIN — MORPHINE SULFATE 1 MG: 2 INJECTION, SOLUTION INTRAMUSCULAR; INTRAVENOUS at 09:35

## 2022-06-28 RX ADMIN — SODIUM CHLORIDE 40 MG: 9 INJECTION, SOLUTION INTRAMUSCULAR; INTRAVENOUS; SUBCUTANEOUS at 12:02

## 2022-06-28 RX ADMIN — CIPROFLOXACIN 400 MG: 2 INJECTION, SOLUTION INTRAVENOUS at 23:59

## 2022-06-28 RX ADMIN — METOCLOPRAMIDE 5 MG: 5 INJECTION, SOLUTION INTRAMUSCULAR; INTRAVENOUS at 12:00

## 2022-06-28 RX ADMIN — ENOXAPARIN SODIUM 30 MG: 100 INJECTION SUBCUTANEOUS at 08:04

## 2022-06-28 RX ADMIN — INSULIN GLARGINE 25 UNITS: 100 INJECTION, SOLUTION SUBCUTANEOUS at 08:07

## 2022-06-28 RX ADMIN — CIPROFLOXACIN 400 MG: 2 INJECTION, SOLUTION INTRAVENOUS at 11:28

## 2022-06-28 RX ADMIN — DEXTROSE MONOHYDRATE 125 ML: 100 INJECTION, SOLUTION INTRAVENOUS at 21:13

## 2022-06-28 RX ADMIN — METOCLOPRAMIDE 5 MG: 5 INJECTION, SOLUTION INTRAMUSCULAR; INTRAVENOUS at 17:36

## 2022-06-28 RX ADMIN — ONDANSETRON 4 MG: 2 INJECTION INTRAMUSCULAR; INTRAVENOUS at 08:02

## 2022-06-28 RX ADMIN — SODIUM CHLORIDE, PRESERVATIVE FREE 10 ML: 5 INJECTION INTRAVENOUS at 08:12

## 2022-06-28 ASSESSMENT — PAIN SCALES - GENERAL
PAINLEVEL_OUTOF10: 0
PAINLEVEL_OUTOF10: 8
PAINLEVEL_OUTOF10: 6
PAINLEVEL_OUTOF10: 0

## 2022-06-28 ASSESSMENT — PAIN - FUNCTIONAL ASSESSMENT: PAIN_FUNCTIONAL_ASSESSMENT: ACTIVITIES ARE NOT PREVENTED

## 2022-06-28 ASSESSMENT — PAIN SCALES - WONG BAKER: WONGBAKER_NUMERICALRESPONSE: 2

## 2022-06-28 ASSESSMENT — PAIN DESCRIPTION - DESCRIPTORS: DESCRIPTORS: CRAMPING;THROBBING

## 2022-06-28 ASSESSMENT — PAIN DESCRIPTION - LOCATION: LOCATION: ABDOMEN

## 2022-06-28 NOTE — PROGRESS NOTES
Hospitalist Progress Note   Admit Date:  2022  3:52 PM   Name:  Don Orozco   Age:  48 y.o. Sex:  female  :  1975   MRN:  [id number]   Room:  [facility]    Presenting Complaint: Nausea and Emesis     Reason(s) for Admission: Septicemia ([facility] [address] [A41.9]  Acute pyelonephritis [N10]  Severe sepsis ([facility] [address] [A41.9, R65.20]  Nausea and vomiting, intractability of vomiting not specified, unspecified vomiting type [R11.2]  Chronic kidney disease, unspecified CKD stage [N18.9]     Hospital Course & Interval History:   Don Orozco is a 48 y.o. female with medical history of uncontrolled diabetes with complications, hypertension, CKD, and 2 recent hospitalizations, who presented with nausea and vomiting. Recently hospitalized at [facility] from  through  with pyelonephritis and DKA. Patient admitted with sepsis secondary to UTI. Started on empiric antibiotics. Blood culture growing Enterobacter. ID consulted. Antibiotics switched to Cipro. Subjective/24hr Events (22): Patient is seen at the bedside. Reports persistent nausea and vomiting. Had 2 episodes of vomiting this morning. .  Also complaining of upper abdominal pain. Denies chest pain, palpitation or shortness of breath. ID has switched patient to p.o. Cipro but patient refused to take due to nausea. Will switch to IV antibiotic for now. Assessment & Plan:     Severe sepsis secondary to UTI:  Enterobacter bacteremia: Possible source GI versus urine  Patient with recent history of pyelonephritis, CVA tenderness negative  Blood culture growing Enterobacter  ID consulted, antibiotics switched to Cipro. Patient unable to tolerate p.o. we will switch to IV Cipro  Repeat blood culture tomorrow a.m.     Nausea/vomiting:  Patient remained with persistent nausea/vomiting  Diabetic gastroparesis in differential  Will give a trial of Reglan today  If no improvement, consider GI consult  Supportive care    Hypernatremia:  Resolved  Discontinue D5W    Hypokalemia/hypomagnesemia:  Potassium 3.2 and magnesium 1.6  Repleted with IV potassium and magnesium  Continue to monitor    Type 1 diabetes mellitus:  Continue Lantus 25 units daily and sliding scale  Inpatient diabetes management consult    CKD stage IV:  Stable  Continue to monitor    Hypertension:  Continue home meds      Discharge Planning: Anticipate discharge in 48 hours  Diet:  ADULT DIET; Clear Liquid; 4 carb choices (60 gm/meal)  DVT PPx: Heparin  Code status: Full Code    Hospital Problems:  Principal Problem:    Severe sepsis ([facility] [address]  Active Problems:    Benign hypertension with CKD (chronic kidney disease) stage IV (HCC)    Type 1 diabetes mellitus with hyperglycemia ([facility] [address]    Pyelonephritis  Resolved Problems:    * No resolved hospital problems. *      Objective:     Patient Vitals for the past 24 hrs:   Temp Pulse Resp BP SpO2   06/28/22 1005 -- -- 21 -- --   06/28/22 0935 -- -- 29 -- --   06/28/22 0738 99 °F (37.2 °C) 87 21 131/63 96 %   06/28/22 0000 -- 84 16 120/79 98 %   06/27/22 2300 -- 85 20 135/86 99 %   06/27/22 2200 -- 87 17 (!) 153/89 99 %   06/27/22 2100 -- 97 23 (!) 143/83 95 %   06/27/22 2000 -- 88 15 124/70 98 %   06/27/22 1900 -- 88 17 (!) 100/56 99 %   06/27/22 1800 -- 93 19 129/79 99 %   06/27/22 1700 -- 97 17 123/88 99 %   06/27/22 1659 -- 97 16 (!) 112/92 99 %   06/27/22 1600 -- 85 21 -- 98 %   06/27/22 1536 97.4 °F (36.3 °C) 87 27 -- 98 %   06/27/22 1200 -- 99 19 -- 97 %   06/27/22 1154 97.3 °F (36.3 °C) 96 15 116/71 96 %       Oxygen Therapy  SpO2: 96 %  Pulse Oximetry Type: Continuous  Pulse via Oximetry: 83 beats per minute  SPO2 High Alarm Limit: 100  SPO2 Low Alarm Limit POX: 90  Pulse Oximeter Device Mode: Continuous  Pulse Oximeter Device Location: Right,Hand,Finger  O2 Device: None (Room air)    Estimated body mass index is 23.63 kg/m² as calculated from the following:    Height as of this encounter: 5' 8\" (1.727 m). Weight as of this encounter: 155 lb 6.4 oz (70.5 kg). Intake/Output Summary (Last 24 hours) at 6/28/2022 1124  Last data filed at 6/27/2022 1713  Gross per 24 hour   Intake 510 ml   Output --   Net 510 ml         Physical Exam:     Blood pressure 131/63, pulse 87, temperature 99 °F (37.2 °C), temperature source Oral, resp. rate 21, height 5' 8\" (1.727 m), weight 155 lb 6.4 oz (70.5 kg), SpO2 96 %. General:    Well nourished. Head:  Normocephalic, atraumatic  Eyes:  Sclerae appear normal.  Pupils equally round. ENT:  Nares appear normal, no drainage. Moist oral mucosa  Neck:  No restricted ROM. Trachea midline   CV:   RRR. No m/r/g. No jugular venous distension. Lungs:   CTAB. No wheezing, rhonchi, or rales. Respirations even, unlabored  Abdomen: Bowel sounds present. Soft, nontender, nondistended. No CVA tenderness  Extremities: No cyanosis or clubbing. No edema  Skin:     No rashes and normal coloration. Warm and dry. Neuro:  CN II-XII grossly intact. Sensation intact. A&Ox3  Psych:  Normal mood and affect.       I have reviewed ordered lab tests and independently visualized imaging below:    Recent Labs:  Recent Results (from the past 48 hour(s))   EKG 12 Lead    Collection Time: 06/26/22  3:22 PM   Result Value Ref Range    Ventricular Rate 111 BPM    Atrial Rate 111 BPM    P-R Interval 112 ms    QRS Duration 74 ms    Q-T Interval 352 ms    QTc Calculation (Bazett) 478 ms    P Axis 71 degrees    R Axis 85 degrees    T Axis 35 degrees    Diagnosis Sinus tachycardia    Culture, Blood 1    Collection Time: 06/26/22  4:20 PM    Specimen: Blood   Result Value Ref Range    Special Requests LEFT  Antecubital        Culture NO GROWTH 2 DAYS     Lactic Acid    Collection Time: 06/26/22  4:20 PM   Result Value Ref Range    Lactic Acid, Plasma 2.5 (H) 0.4 - 2.0 MMOL/L   CBC with Auto Differential    Collection Time: 06/26/22  4:20 PM   Result Value Ref Range    WBC 19.2 (H) 4.3 - 11.1 K/uL    RBC 3.97 (L) 4.05 - 5.2 M/uL    Hemoglobin 10.0 (L) 11.7 - 15.4 g/dL    Hematocrit 33.2 (L) 35.8 - 46.3 %    MCV 83.6 79.6 - 97.8 FL    MCH 25.2 (L) 26.1 - 32.9 PG    MCHC 30.1 (L) 31.4 - 35.0 g/dL    RDW 15.7 (H) 11.9 - 14.6 %    Platelets 582 (H) 805 - 450 K/uL    MPV 9.0 (L) 9.4 - 12.3 FL    nRBC 0.02 0.0 - 0.2 K/uL    Differential Type AUTOMATED      Seg Neutrophils 78 43 - 78 %    Lymphocytes 15 13 - 44 %    Monocytes 5 4.0 - 12.0 %    Eosinophils % 1 0.5 - 7.8 %    Basophils 0 0.0 - 2.0 %    Immature Granulocytes 1 0.0 - 5.0 %    Segs Absolute 15.0 (H) 1.7 - 8.2 K/UL    Absolute Lymph # 2.8 0.5 - 4.6 K/UL    Absolute Mono # 1.0 0.1 - 1.3 K/UL    Absolute Eos # 0.1 0.0 - 0.8 K/UL    Basophils Absolute 0.0 0.0 - 0.2 K/UL    Absolute Immature Granulocyte 0.2 0.0 - 0.5 K/UL   CMP    Collection Time: 06/26/22  4:20 PM   Result Value Ref Range    Sodium 147 (H) 136 - 145 mmol/L    Potassium 3.8 3.5 - 5.1 mmol/L    Chloride 108 (H) 98 - 107 mmol/L    CO2 29 21 - 32 mmol/L    Anion Gap 10 7 - 16 mmol/L    Glucose 209 (H) 65 - 100 mg/dL    BUN 19 6 - 23 MG/DL    CREATININE 2.88 (H) 0.6 - 1.0 MG/DL    GFR African American 22 (L) >60 ml/min/1.73m2    GFR Non- 19 (L) >60 ml/min/1.73m2    Calcium 9.5 8.3 - 10.4 MG/DL    Total Bilirubin 0.4 0.2 - 1.1 MG/DL    ALT 45 12 - 65 U/L    AST 32 15 - 37 U/L    Alk Phosphatase 313 (H) 50 - 130 U/L    Total Protein 8.7 (H) 6.3 - 8.2 g/dL    Albumin 2.3 (L) 3.5 - 5.0 g/dL    Globulin 6.4 (H) 2.3 - 3.5 g/dL    Albumin/Globulin Ratio 0.4 (L) 1.2 - 3.5     Procalcitonin    Collection Time: 06/26/22  4:20 PM   Result Value Ref Range    Procalcitonin 12.43 (H) 0.00 - 0.49 ng/mL   Hemoglobin A1c    Collection Time: 06/26/22  4:20 PM   Result Value Ref Range    Hemoglobin A1C 8.3 (H) 4.20 - 6.30 %    eAG 192 mg/dL   Urinalysis    Collection Time: 06/26/22  6:29 PM   Result Value Ref Range    Color, UA YELLOW      Appearance TURBID      Specific Gravity, UA 1.008 1.001 - 1.023 STREPTOCOCCUS NOT DETECTED NOTDET      Streptococcus agalactiae (Group B) NOT DETECTED NOTDET      Strep pneumoniae NOT DETECTED NOTDET      Strep pyogenes,(Grp. A) NOT DETECTED NOTDET      Acinetobacter calcoac baumannii complex by PCR NOT DETECTED NOTDET      Bacteroides fragilis by PCR NOT DETECTED NOTDET      Enterobacteriaceae by PCR Detected (A) NOTDET      Enterobacter cloacae complex by PCR Detected (A) NOTDET      Escherichia Coli NOT DETECTED NOTDET      Klebsiella aerogenes by PCR NOT DETECTED NOTDET      Klebsiella oxytoca by PCR NOT DETECTED NOTDET      Klebsiella pneumoniae group by PCR NOT DETECTED NOTDET      Proteus by PCR NOT DETECTED NOTDET      Salmonella species by PCR NOT DETECTED NOTDET      Serratia marcescens by PCR NOT DETECTED NOTDET      Haemophilus Influenzae by PCR NOT DETECTED NOTDET      Neisseria meningitidis by PCR NOT DETECTED NOTDET      Pseudomonas aeruginosa NOT DETECTED NOTDET      Stenotrophomonas maltophilia by PCR NOT DETECTED NOTDET      Candida albicans by PCR NOT DETECTED NOTDET      Candida auris by PCR NOT DETECTED NOTDET      Candida glabrata NOT DETECTED NOTDET      Candida krusei by PCR NOT DETECTED NOTDET      Candida parapsilosis by PCR NOT DETECTED NOTDET      Candida tropicalis by PCR NOT DETECTED NOTDET      Cryptococcus neoformans/gattii by PCR NOT DETECTED NOTDET      Resistant gene targets          Resistant gene ctx-m by PCR NOT DETECTED NOTDET      Resistant gene imp by PCR NOT DETECTED NOTDET      KPC (CARBAPENEM RESISTANCE GENE) NOT DETECTED NOTDET      Colistin Resistance mcr-1 gene by PCR NOT DETECTED NOTDET      Resistant gene ndm by PCR NOT DETECTED NOTDET      Resistant gene oxa-48-like by pcr NOT DETECTED NOTDET      Resistant gene vim by PCR NOT DETECTED NOTDET      [employer] test comment        False positive results may rarely occur.  Correlate with clinical,epidemiologic, and other laboratory findings   POCT Glucose    Collection Time: 06/26/22 8:58 PM   Result Value Ref Range    POC Glucose 126 (H) 65 - 100 mg/dL    Performed by: Digna    CBC with Auto Differential    Collection Time: 06/27/22  4:19 AM   Result Value Ref Range    WBC 15.9 (H) 4.3 - 11.1 K/uL    RBC 3.03 (L) 4.05 - 5.2 M/uL    Hemoglobin 7.8 (L) 11.7 - 15.4 g/dL    Hematocrit 25.3 (L) 35.8 - 46.3 %    MCV 83.5 79.6 - 97.8 FL    MCH 25.7 (L) 26.1 - 32.9 PG    MCHC 30.8 (L) 31.4 - 35.0 g/dL    RDW 15.7 (H) 11.9 - 14.6 %    Platelets 740 (H) 913 - 450 K/uL    MPV 8.9 (L) 9.4 - 12.3 FL    nRBC 0.00 0.0 - 0.2 K/uL    Differential Type AUTOMATED      Seg Neutrophils 79 (H) 43 - 78 %    Lymphocytes 14 13 - 44 %    Monocytes 5 4.0 - 12.0 %    Eosinophils % 1 0.5 - 7.8 %    Basophils 0 0.0 - 2.0 %    Immature Granulocytes 1 0.0 - 5.0 %    Segs Absolute 12.7 (H) 1.7 - 8.2 K/UL    Absolute Lymph # 2.2 0.5 - 4.6 K/UL    Absolute Mono # 0.8 0.1 - 1.3 K/UL    Absolute Eos # 0.1 0.0 - 0.8 K/UL    Basophils Absolute 0.0 0.0 - 0.2 K/UL    Absolute Immature Granulocyte 0.2 0.0 - 0.5 K/UL   Comprehensive Metabolic Panel    Collection Time: 06/27/22  4:19 AM   Result Value Ref Range    Sodium 150 (H) 136 - 145 mmol/L    Potassium 3.8 3.5 - 5.1 mmol/L    Chloride 117 (H) 98 - 107 mmol/L    CO2 28 21 - 32 mmol/L    Anion Gap 5 (L) 7 - 16 mmol/L    Glucose 134 (H) 65 - 100 mg/dL    BUN 15 6 - 23 MG/DL    CREATININE 2.31 (H) 0.6 - 1.0 MG/DL    GFR African American 29 (L) >60 ml/min/1.73m2    GFR Non- 24 (L) >60 ml/min/1.73m2    Calcium 8.1 (L) 8.3 - 10.4 MG/DL    Total Bilirubin 0.3 0.2 - 1.1 MG/DL    ALT 40 12 - 65 U/L    AST 26 15 - 37 U/L    Alk Phosphatase 228 (H) 50 - 136 U/L    Total Protein 6.5 6.3 - 8.2 g/dL    Albumin 1.8 (L) 3.5 - 5.0 g/dL    Globulin 4.7 (H) 2.3 - 3.5 g/dL    Albumin/Globulin Ratio 0.4 (L) 1.2 - 3.5     Magnesium    Collection Time: 06/27/22  4:19 AM   Result Value Ref Range    Magnesium 1.4 (L) 1.8 - 2.4 mg/dL   Phosphorus    Collection Time: 06/27/22  4:19 AM   Result Value Ref Range    Phosphorus 3.2 2.5 - 4.5 MG/DL   POCT Glucose    Collection Time: 06/27/22  8:01 AM   Result Value Ref Range    POC Glucose 240 (H) 65 - 100 mg/dL    Performed by:  [place]    POCT Glucose    Collection Time: 06/27/22 11:40 AM   Result Value Ref Range    POC Glucose 243 (H) 65 - 100 mg/dL    Performed by: Patsy    POCT Glucose    Collection Time: 06/27/22  4:49 PM   Result Value Ref Range    POC Glucose 101 (H) 65 - 100 mg/dL    Performed by: Patsy    POCT Glucose    Collection Time: 06/27/22  9:35 PM   Result Value Ref Range    POC Glucose 119 (H) 65 - 100 mg/dL    Performed by: Sanket Martines    Magnesium    Collection Time: 06/28/22  6:29 AM   Result Value Ref Range    Magnesium 1.6 (L) 1.8 - 2.4 mg/dL   Comprehensive Metabolic Panel    Collection Time: 06/28/22  6:29 AM   Result Value Ref Range    Sodium 142 136 - 145 mmol/L    Potassium 3.2 (L) 3.5 - 5.1 mmol/L    Chloride 109 (H) 98 - 107 mmol/L    CO2 25 21 - 32 mmol/L    Anion Gap 8 7 - 16 mmol/L    Glucose 226 (H) 65 - 100 mg/dL    BUN 9 6 - 23 MG/DL    CREATININE 2.21 (H) 0.6 - 1.0 MG/DL    GFR African American 30 (L) >60 ml/min/1.73m2    GFR Non- 25 (L) >60 ml/min/1.73m2    Calcium 7.9 (L) 8.3 - 10.4 MG/DL    Total Bilirubin 0.3 0.2 - 1.1 MG/DL    ALT 31 12 - 65 U/L    AST 31 15 - 37 U/L    Alk Phosphatase 203 (H) 50 - 130 U/L    Total Protein 6.1 (L) 6.3 - 8.2 g/dL    Albumin 1.6 (L) 3.5 - 5.0 g/dL    Globulin 4.5 (H) 2.3 - 3.5 g/dL    Albumin/Globulin Ratio 0.4 (L) 1.2 - 3.5     Lipase    Collection Time: 06/28/22  6:29 AM   Result Value Ref Range    Lipase 112 73 - 393 U/L   POCT Glucose    Collection Time: 06/28/22  7:38 AM   Result Value Ref Range    POC Glucose 230 (H) 65 - 100 mg/dL    Performed by: Patsy    CBC with Auto Differential    Collection Time: 06/28/22  9:13 AM   Result Value Ref Range    WBC 10.9 4.3 - 11.1 K/uL    RBC 2.95 (L) 4.05 - 5.2 M/uL    Hemoglobin mL/hr IntraVENous PRN    sodium chloride flush 0.9 % injection 5-40 mL  5-40 mL IntraVENous 2 times per day    sodium chloride flush 0.9 % injection 5-40 mL  5-40 mL IntraVENous PRN    0.9 % sodium chloride infusion   IntraVENous PRN    enoxaparin Sodium (LOVENOX) injection 30 mg  30 mg SubCUTAneous Daily    acetaminophen (TYLENOL) tablet 650 mg  650 mg Oral Q6H PRN    Or    acetaminophen (TYLENOL) suppository 650 mg  650 mg Rectal Q6H PRN    aluminum & magnesium hydroxide-simethicone (MAALOX) 200-200-20 MG/5ML suspension 30 mL  30 mL Oral Q6H PRN    insulin lispro (HUMALOG) injection vial 0-4 Units  0-4 Units SubCUTAneous TID WC    insulin lispro (HUMALOG) injection vial 0-4 Units  0-4 Units SubCUTAneous Nightly       Signed:  Debo Swartz MD    Part of this note may have been written by using a voice dictation software. The note has been proof read but may still contain some grammatical/other typographical errors.

## 2022-06-28 NOTE — PROGRESS NOTES
Patient refusing to take PO medications for this RN. Patient so far has refused cipro and potassium. Patient has been medicated for nausea. Dr Jonah Mccartney notified through perfect serve. Will cont to monitor.

## 2022-06-28 NOTE — CONSULTS
with coma associated with type 1 diabetes mellitus ([facility] [address]    Frailty    Normocytic anemia    Physical debility    Severe sepsis ([facility])    Pyelonephritis     Past Medical History:   Diagnosis Date    Acute renal failure ([address] 2012    CKD (chronic kidney disease) stage 3, GFR 30-59 ml/min ([facility])     Gastroenteritis, acute 2012    H/O amputation of lesser toe, left ([address] 2022    This status condition was added to the problem list by Gulshan SALDANA of the [facility] Team, after medical record review and validation.        Hemodialysis patient ([facility] [address]     IDDM (insulin dependent diabetes mellitus) 2012    Type 1 av -150 can tell Hypo below 70    Intractable nausea and vomiting 2014    Irregular menses     Kidney stone     Neuropathy, peripheral, idiopathic 3/13/2017    Retinopathy, bilateral 3/13/2017    Trichomoniasis 3/13/2017    Ulcer, skin, chronic ([facility] [address] 3/13/2017    Vaginal burning       Family History   Problem Relation Age of Onset    Prostate Cancer Father     Diabetes Paternal Grandmother     Colon Cancer Neg Hx     Uterine Cancer Neg Hx     Diabetes Father         DM Type II    Stroke Father     Diabetes Paternal Grandfather         DM Type II     Hypertension Maternal Grandmother     Ovarian Cancer Neg Hx     Breast Cancer Mother 76    Diabetes Maternal Grandmother         DM Type II      Social History     Tobacco Use    Smoking status: Former Smoker     Quit date: 2013     Years since quittin.6    Smokeless tobacco: Never Used   Substance Use Topics    Alcohol use: Yes     Past Surgical History:   Procedure Laterality Date    AMPUTATION Left     5th Toe due to Diabetic Ulcer    AMPUTATION  12    gangrene    CYSTOSCOPY,INSERT URETERAL STENT  2022    HX OPEN REDUCTION INTERNAL FIXATION Right     ankle    IR TUNNELED CATHETER PLACEMENT GREATER THAN 5 YEARS  2022    IR TUNNELED CATHETER PLACEMENT GREATER THAN 5 YEARS 2022 [facility]      Prior to Admission medications    Medication Sig Start Date End Date Taking? Authorizing Provider   Insulin Pen Needle (PEN NEEDLES 31GX5/16\") 31G X 8 MM MISC 120 each by Other route 4 times daily 22 Yes Historical Provider, MD   insulin glargine (LANTUS) 100 UNIT/ML injection vial Inject 22 Units into the skin nightly  Patient taking differently: Inject 22 Units into the skin every morning (before breakfast)  22   Shantal Sethi MD   ferrous sulfate (IRON 325) 325 (65 Fe) MG tablet Take 1 tablet by mouth 2 times daily (with meals)  Patient not taking: Reported on 2022 6/10/22   Shantal Sethi MD   folic acid (FOLVITE) 1 MG tablet Take 1 tablet by mouth daily  Patient not taking: Reported on 2022 6/10/22   Amy R Laurian Libman, MD   albuterol (PROVENTIL) (2.5 MG/3ML) 0.083% nebulizer solution Take 3 mLs by nebulization every 4 hours as needed for Wheezing 6/10/22   Amy R Laurian Libman, MD   insulin lispro (HUMALOG) 100 UNIT/ML SOLN injection vial Inject 5 Units into the skin 3 times daily (with meals) 6/10/22   Amy R Laurian Libman, MD   insulin lispro (HUMALOG) 100 UNIT/ML SOLN injection vial Inject 0-4 Units into the skin 4 times daily (before meals and nightly) 22   Thu Ratliff, DO   brimonidine (ALPHAGAN P) 0.1 % SOLN 1 drop in left eye twice a day  Patient not taking: Reported on 2022   Ar Automatic Reconciliation   ergocalciferol (ERGOCALCIFEROL) 1.25 MG (06499 UT) capsule Take 50,000 Units by mouth  Patient not taking: Reported on 2022   Ar Automatic Reconciliation       No Known Allergies     Review of Systems:    Per HPI, otherwise complete review of systems negative.       Objective:     Visit Vitals  /63   Pulse 87   Temp 99 °F (37.2 °C) (Oral)   Resp 21   Ht 5' 8\" (1.727 m)   Wt 155 lb 6.4 oz (70.5 kg)   SpO2 96%   BMI 23.63 kg/m²     Temp (24hrs), Av.9 °F (36.6 °C), Min:97.3 °F (36.3 °C), Max:99 °F (37.2 °C)       Lines:  Peripheral IV:       Physical Exam:    General:  Alert, cooperative, well nourished, well developed, appears stated age; ill appearing with vomiting while I was in the room   Eyes:  Sclera anicteric. Pupils equally round and reactive to light. Mouth/Throat: Mucous membranes normal, oral pharynx clear   Neck: Supple   Lungs:   Clear to auscultation bilaterally, good effort   CV:  Regular rate and rhythm,no murmur, click, rub or gallop   Abdomen:   Soft, non-tender. Absent bowel sounds.  non-distended   Extremities: No cyanosis or edema   Skin: Skin color, texture, turgor normal. no acute rash or lesions   Lymph nodes: Cervical and supraclavicular normal   Musculoskeletal: No swelling or deformity   Lines/Devices:  Intact, no erythema, drainage or tenderness   Psych: Alert and oriented, normal mood affect given the setting       Data Review:     CBC:  Recent Labs     06/26/22  1620 06/27/22  0419   WBC 19.2* 15.9*   HGB 10.0* 7.8*   HCT 33.2* 25.3*   * 525*       BMP:  Recent Labs     06/26/22  1620 06/27/22  0419   BUN 19 15   * 150*   K 3.8 3.8   * 117*   CO2 29 28       LFTS:  Recent Labs     06/26/22  1620 06/27/22  0419   ALT 45 40       Microbiology:   Blood culture (6/26/2022) enterobacter    Imaging:   CXR (6/26/2022) normal    Signed By: Jose C Oh MD     June 28, 2022

## 2022-06-29 LAB
ALBUMIN SERPL-MCNC: 1.7 G/DL (ref 3.5–5)
ALBUMIN/GLOB SERPL: 0.3 {RATIO} (ref 1.2–3.5)
ALP SERPL-CCNC: 253 U/L (ref 50–130)
ALT SERPL-CCNC: 37 U/L (ref 12–65)
ANION GAP SERPL CALC-SCNC: 8 MMOL/L (ref 7–16)
AST SERPL-CCNC: 44 U/L (ref 15–37)
BACTERIA SPEC CULT: ABNORMAL
BACTERIA SPEC CULT: ABNORMAL
BASOPHILS # BLD: 0 K/UL (ref 0–0.2)
BASOPHILS NFR BLD: 0 % (ref 0–2)
BILIRUB SERPL-MCNC: 0.2 MG/DL (ref 0.2–1.1)
BUN SERPL-MCNC: 6 MG/DL (ref 6–23)
CALCIUM SERPL-MCNC: 8 MG/DL (ref 8.3–10.4)
CHLORIDE SERPL-SCNC: 108 MMOL/L (ref 98–107)
CO2 SERPL-SCNC: 26 MMOL/L (ref 21–32)
CREAT SERPL-MCNC: 2.23 MG/DL (ref 0.6–1)
DIFFERENTIAL METHOD BLD: ABNORMAL
EOSINOPHIL # BLD: 0.2 K/UL (ref 0–0.8)
EOSINOPHIL NFR BLD: 2 % (ref 0.5–7.8)
ERYTHROCYTE [DISTWIDTH] IN BLOOD BY AUTOMATED COUNT: 15.3 % (ref 11.9–14.6)
GLOBULIN SER CALC-MCNC: 4.9 G/DL (ref 2.3–3.5)
GLUCOSE BLD STRIP.AUTO-MCNC: 108 MG/DL (ref 65–100)
GLUCOSE BLD STRIP.AUTO-MCNC: 163 MG/DL (ref 65–100)
GLUCOSE BLD STRIP.AUTO-MCNC: 198 MG/DL (ref 65–100)
GLUCOSE BLD STRIP.AUTO-MCNC: 202 MG/DL (ref 65–100)
GLUCOSE BLD STRIP.AUTO-MCNC: 39 MG/DL (ref 65–100)
GLUCOSE BLD STRIP.AUTO-MCNC: 43 MG/DL (ref 65–100)
GLUCOSE BLD STRIP.AUTO-MCNC: 88 MG/DL (ref 65–100)
GLUCOSE SERPL-MCNC: 149 MG/DL (ref 65–100)
GRAM STN SPEC: ABNORMAL
HCT VFR BLD AUTO: 25.9 % (ref 35.8–46.3)
HGB BLD-MCNC: 8.1 G/DL (ref 11.7–15.4)
IMM GRANULOCYTES # BLD AUTO: 0.1 K/UL (ref 0–0.5)
IMM GRANULOCYTES NFR BLD AUTO: 1 % (ref 0–5)
LYMPHOCYTES # BLD: 2.6 K/UL (ref 0.5–4.6)
LYMPHOCYTES NFR BLD: 30 % (ref 13–44)
MCH RBC QN AUTO: 26 PG (ref 26.1–32.9)
MCHC RBC AUTO-ENTMCNC: 31.3 G/DL (ref 31.4–35)
MCV RBC AUTO: 83 FL (ref 79.6–97.8)
MONOCYTES # BLD: 0.5 K/UL (ref 0.1–1.3)
MONOCYTES NFR BLD: 6 % (ref 4–12)
NEUTS SEG # BLD: 5.4 K/UL (ref 1.7–8.2)
NEUTS SEG NFR BLD: 62 % (ref 43–78)
NRBC # BLD: 0 K/UL (ref 0–0.2)
PLATELET # BLD AUTO: 462 K/UL (ref 150–450)
PMV BLD AUTO: 8.6 FL (ref 9.4–12.3)
POTASSIUM SERPL-SCNC: 3.2 MMOL/L (ref 3.5–5.1)
POTASSIUM SERPL-SCNC: 3.4 MMOL/L (ref 3.5–5.1)
PROT SERPL-MCNC: 6.6 G/DL (ref 6.3–8.2)
RBC # BLD AUTO: 3.12 M/UL (ref 4.05–5.2)
SERVICE CMNT-IMP: ABNORMAL
SERVICE CMNT-IMP: NORMAL
SODIUM SERPL-SCNC: 142 MMOL/L (ref 136–145)
WBC # BLD AUTO: 8.8 K/UL (ref 4.3–11.1)

## 2022-06-29 PROCEDURE — 6360000002 HC RX W HCPCS: Performed by: FAMILY MEDICINE

## 2022-06-29 PROCEDURE — 82962 GLUCOSE BLOOD TEST: CPT

## 2022-06-29 PROCEDURE — 85025 COMPLETE CBC W/AUTO DIFF WBC: CPT

## 2022-06-29 PROCEDURE — C9113 INJ PANTOPRAZOLE SODIUM, VIA: HCPCS | Performed by: FAMILY MEDICINE

## 2022-06-29 PROCEDURE — 84132 ASSAY OF SERUM POTASSIUM: CPT

## 2022-06-29 PROCEDURE — 1100000000 HC RM PRIVATE

## 2022-06-29 PROCEDURE — 36415 COLL VENOUS BLD VENIPUNCTURE: CPT

## 2022-06-29 PROCEDURE — 97161 PT EVAL LOW COMPLEX 20 MIN: CPT

## 2022-06-29 PROCEDURE — 6370000000 HC RX 637 (ALT 250 FOR IP): Performed by: FAMILY MEDICINE

## 2022-06-29 PROCEDURE — 80053 COMPREHEN METABOLIC PANEL: CPT

## 2022-06-29 PROCEDURE — A4216 STERILE WATER/SALINE, 10 ML: HCPCS | Performed by: FAMILY MEDICINE

## 2022-06-29 PROCEDURE — 6360000002 HC RX W HCPCS: Performed by: STUDENT IN AN ORGANIZED HEALTH CARE EDUCATION/TRAINING PROGRAM

## 2022-06-29 PROCEDURE — 2580000003 HC RX 258: Performed by: FAMILY MEDICINE

## 2022-06-29 RX ORDER — POTASSIUM CHLORIDE 7.45 MG/ML
10 INJECTION INTRAVENOUS PRN
Status: DISCONTINUED | OUTPATIENT
Start: 2022-06-29 | End: 2022-07-04

## 2022-06-29 RX ORDER — POTASSIUM CHLORIDE 20 MEQ/1
20 TABLET, EXTENDED RELEASE ORAL ONCE
Status: DISCONTINUED | OUTPATIENT
Start: 2022-06-29 | End: 2022-06-29

## 2022-06-29 RX ORDER — POLYETHYLENE GLYCOL 3350 17 G/17G
17 POWDER, FOR SOLUTION ORAL DAILY
Status: DISCONTINUED | OUTPATIENT
Start: 2022-06-29 | End: 2022-07-06 | Stop reason: HOSPADM

## 2022-06-29 RX ORDER — POTASSIUM CHLORIDE 7.45 MG/ML
10 INJECTION INTRAVENOUS
Status: COMPLETED | OUTPATIENT
Start: 2022-06-29 | End: 2022-06-29

## 2022-06-29 RX ADMIN — ONDANSETRON 4 MG: 2 INJECTION INTRAMUSCULAR; INTRAVENOUS at 09:27

## 2022-06-29 RX ADMIN — ONDANSETRON 4 MG: 2 INJECTION INTRAMUSCULAR; INTRAVENOUS at 04:17

## 2022-06-29 RX ADMIN — POTASSIUM CHLORIDE 10 MEQ: 7.46 INJECTION, SOLUTION INTRAVENOUS at 23:11

## 2022-06-29 RX ADMIN — POTASSIUM CHLORIDE 10 MEQ: 7.46 INJECTION, SOLUTION INTRAVENOUS at 21:22

## 2022-06-29 RX ADMIN — MORPHINE SULFATE 1 MG: 2 INJECTION, SOLUTION INTRAMUSCULAR; INTRAVENOUS at 04:27

## 2022-06-29 RX ADMIN — SODIUM CHLORIDE, PRESERVATIVE FREE 10 ML: 5 INJECTION INTRAVENOUS at 08:55

## 2022-06-29 RX ADMIN — MORPHINE SULFATE 1 MG: 2 INJECTION, SOLUTION INTRAMUSCULAR; INTRAVENOUS at 09:29

## 2022-06-29 RX ADMIN — METOCLOPRAMIDE 5 MG: 5 INJECTION, SOLUTION INTRAMUSCULAR; INTRAVENOUS at 17:25

## 2022-06-29 RX ADMIN — SODIUM CHLORIDE: 9 INJECTION, SOLUTION INTRAVENOUS at 00:00

## 2022-06-29 RX ADMIN — CIPROFLOXACIN 400 MG: 2 INJECTION, SOLUTION INTRAVENOUS at 11:05

## 2022-06-29 RX ADMIN — METOCLOPRAMIDE 5 MG: 5 INJECTION, SOLUTION INTRAMUSCULAR; INTRAVENOUS at 00:02

## 2022-06-29 RX ADMIN — DEXTROSE MONOHYDRATE 125 ML: 100 INJECTION, SOLUTION INTRAVENOUS at 20:38

## 2022-06-29 RX ADMIN — INSULIN LISPRO 2 UNITS: 100 INJECTION, SOLUTION INTRAVENOUS; SUBCUTANEOUS at 11:11

## 2022-06-29 RX ADMIN — POTASSIUM CHLORIDE 10 MEQ: 7.46 INJECTION, SOLUTION INTRAVENOUS at 11:08

## 2022-06-29 RX ADMIN — SODIUM CHLORIDE 40 MG: 9 INJECTION, SOLUTION INTRAMUSCULAR; INTRAVENOUS; SUBCUTANEOUS at 08:52

## 2022-06-29 RX ADMIN — METOCLOPRAMIDE 5 MG: 5 INJECTION, SOLUTION INTRAMUSCULAR; INTRAVENOUS at 05:34

## 2022-06-29 RX ADMIN — SODIUM CHLORIDE, PRESERVATIVE FREE 10 ML: 5 INJECTION INTRAVENOUS at 04:18

## 2022-06-29 RX ADMIN — POTASSIUM CHLORIDE 10 MEQ: 7.46 INJECTION, SOLUTION INTRAVENOUS at 08:50

## 2022-06-29 RX ADMIN — POTASSIUM CHLORIDE 10 MEQ: 7.46 INJECTION, SOLUTION INTRAVENOUS at 13:07

## 2022-06-29 RX ADMIN — ENOXAPARIN SODIUM 30 MG: 100 INJECTION SUBCUTANEOUS at 08:55

## 2022-06-29 RX ADMIN — INSULIN GLARGINE 25 UNITS: 100 INJECTION, SOLUTION SUBCUTANEOUS at 08:55

## 2022-06-29 RX ADMIN — ONDANSETRON 4 MG: 2 INJECTION INTRAMUSCULAR; INTRAVENOUS at 16:43

## 2022-06-29 RX ADMIN — POTASSIUM CHLORIDE 10 MEQ: 7.46 INJECTION, SOLUTION INTRAVENOUS at 22:10

## 2022-06-29 RX ADMIN — METOCLOPRAMIDE 5 MG: 5 INJECTION, SOLUTION INTRAMUSCULAR; INTRAVENOUS at 11:06

## 2022-06-29 RX ADMIN — DEXTROSE MONOHYDRATE 250 ML: 10 INJECTION, SOLUTION INTRAVENOUS at 16:18

## 2022-06-29 ASSESSMENT — PAIN DESCRIPTION - LOCATION
LOCATION: ABDOMEN
LOCATION: ABDOMEN

## 2022-06-29 ASSESSMENT — PAIN DESCRIPTION - DESCRIPTORS: DESCRIPTORS: ACHING;CRAMPING;THROBBING

## 2022-06-29 ASSESSMENT — PAIN SCALES - GENERAL
PAINLEVEL_OUTOF10: 5
PAINLEVEL_OUTOF10: 7
PAINLEVEL_OUTOF10: 0
PAINLEVEL_OUTOF10: 0
PAINLEVEL_OUTOF10: 7
PAINLEVEL_OUTOF10: 0

## 2022-06-29 ASSESSMENT — PAIN SCALES - WONG BAKER: WONGBAKER_NUMERICALRESPONSE: 2

## 2022-06-29 ASSESSMENT — PAIN - FUNCTIONAL ASSESSMENT: PAIN_FUNCTIONAL_ASSESSMENT: ACTIVITIES ARE NOT PREVENTED

## 2022-06-29 NOTE — PROGRESS NOTES
Hypoglycemia    Patient with PM blood glucose of 40mg/dL. Verification by second check with result of 45mg/dL. 125mL of 10 Dextrose infused over 8mins. Recheck blood glucose of 106mg/dL. Patient provided with appropriate snacks at bedside.

## 2022-06-29 NOTE — CARE COORDINATION
0800: Discharge planning was discussed with the Interdisciplinary Team. The patient is requiring IV treatments and is pending therapy evaluations. ID is following this patient.

## 2022-06-29 NOTE — PROGRESS NOTES
Hospitalist Progress Note   Admit Date:  2022  3:52 PM   Name:  Drew Faulkner   Age:  48 y.o. Sex:  female  :  1975   MRN:  [id number]   Room:  [facility]/    Presenting Complaint: Nausea and Emesis     Reason(s) for Admission: Septicemia ([facility] [address] [A41.9]  Acute pyelonephritis [N10]  Severe sepsis ([facility] [address] [A41.9, R65.20]  Nausea and vomiting, intractability of vomiting not specified, unspecified vomiting type [R11.2]  Chronic kidney disease, unspecified CKD stage [N18.9]     Hospital Course & Interval History:   Drew Faulkner is a 48 y.o. female with medical history of uncontrolled diabetes with complications, hypertension, CKD, and 2 recent hospitalizations, who presented with nausea and vomiting. Recently hospitalized at [facility] from  through  with pyelonephritis and DKA. Patient admitted with sepsis secondary to UTI. Started on empiric antibiotics. Blood culture growing Enterobacter. ID consulted. Antibiotics switched to Cipro. Subjective/24hr Events (22): Patient is seen at the bedside. Reports persistent nausea and vomiting. She had 1 episode of vomiting. Also complaining of upper abdominal pain. Her appetite is good. She is unable to tolerate p.o. medications and diet. Denies chest pain, palpitations or shortness of breath. Assessment & Plan:     Severe sepsis secondary to UTI:  Enterobacter bacteremia: Possible source GI versus urine  Patient with recent history of pyelonephritis, CVA tenderness negative  Blood culture growing Enterobacter  Continue Cipro IV as patient is unable to tolerate p.o. F/u Repeat blood culture and Enterobacter susceptibilities  Follow-up with hepatitis A IgM  ID following  PT/OT recommended no further skilled therapy    Diabetic gastroparesis  Patient remained with persistent nausea/vomiting  Continue Reglan   Continue with PPI IV   No plans for Endoscopy, at this time.   Started on clear liquids and will advance the diet as tolerated  GI consulted    Hypernatremia:  Resolved    Constipation  Started on MiraLAX    Hypokalemia  Potassium 3.2  Repleted with IV potassium     Elevated alkaline phosphatase    Type 1 diabetes mellitus:  Continue Lantus 25 units daily and sliding scale  Inpatient diabetes management consult    CKD stage IV:  Stable  Continue to monitor    Hypertension:  Continue home meds      Discharge Planning: Anticipate discharge in 48 hours  Diet:  ADULT DIET; Clear Liquid; 4 carb choices (60 gm/meal)  DVT PPx: Heparin  Code status: Full Code    Hospital Problems:  Principal Problem:    Severe sepsis ([facility] [address]  Active Problems:    Benign hypertension with CKD (chronic kidney disease) stage IV (HCC)    Type 1 diabetes mellitus with hyperglycemia ([facility] [address]    Pyelonephritis  Resolved Problems:    * No resolved hospital problems. *      Objective:     Patient Vitals for the past 24 hrs:   Temp Pulse Resp BP SpO2   06/29/22 0959 -- -- 18 -- --   06/29/22 0929 -- -- 20 -- --   06/29/22 0749 98.4 °F (36.9 °C) (!) 108 20 127/70 99 %   06/29/22 0742 -- -- -- 127/70 99 %   06/29/22 0457 -- -- 18 -- --   06/29/22 0422 98.1 °F (36.7 °C) 87 19 (!) 155/99 100 %   06/29/22 0000 99.7 °F (37.6 °C) 88 18 103/67 100 %   06/28/22 1901 98.9 °F (37.2 °C) 88 14 119/66 99 %   06/28/22 1900 -- 87 15 119/66 99 %   06/28/22 1800 -- 94 24 130/77 96 %   06/28/22 1700 -- 86 (!) 32 126/77 95 %   06/28/22 1600 -- 90 23 139/83 97 %   06/28/22 1500 -- 84 15 133/81 97 %   06/28/22 1400 -- 85 19 117/75 98 %   06/28/22 1300 -- 89 17 (!) 145/84 96 %       Oxygen Therapy  SpO2: 99 %  Pulse Oximetry Type:  Intermittent  Pulse via Oximetry: 86 beats per minute  SPO2 High Alarm Limit: 100  SPO2 Low Alarm Limit POX: 90  Pulse Oximeter Device Mode: Continuous  Pulse Oximeter Device Location: Right,Hand,Finger  O2 Device: None (Room air)    Estimated body mass index is 23.6 kg/m² as calculated from the following:    Height as of this encounter: 5' 8\" (1.727 m). Weight as of this encounter: 155 lb 3.2 oz (70.4 kg). Intake/Output Summary (Last 24 hours) at 6/29/2022 1256  Last data filed at 6/29/2022 9328  Gross per 24 hour   Intake 559.27 ml   Output 450 ml   Net 109.27 ml         Physical Exam:     Blood pressure 127/70, pulse (!) 108, temperature 98.4 °F (36.9 °C), temperature source Oral, resp. rate 18, height 5' 8\" (1.727 m), weight 155 lb 3.2 oz (70.4 kg), SpO2 99 %. General:    Well nourished. Head:  Normocephalic, atraumatic  Eyes:  Sclerae appear normal.  Pupils equally round. ENT:  Nares appear normal, no drainage. Moist oral mucosa  Neck:  No restricted ROM. Trachea midline   CV:   RRR. No m/r/g. No jugular venous distension. Lungs:   CTAB. No wheezing, rhonchi, or rales. Respirations even, unlabored  Abdomen: Bowel sounds present. Soft, nontender, nondistended. No CVA tenderness  Extremities: No cyanosis or clubbing. No edema  Skin:     No rashes and normal coloration. Warm and dry. Neuro:  CN II-XII grossly intact. Sensation intact. A&Ox3  Psych:  Normal mood and affect.       I have reviewed ordered lab tests and independently visualized imaging below:    Recent Labs:  Recent Results (from the past 48 hour(s))   POCT Glucose    Collection Time: 06/27/22  4:49 PM   Result Value Ref Range    POC Glucose 101 (H) 65 - 100 mg/dL    Performed by: Patsy    POCT Glucose    Collection Time: 06/27/22  9:35 PM   Result Value Ref Range    POC Glucose 119 (H) 65 - 100 mg/dL    Performed by: Shruti Cordova    Magnesium    Collection Time: 06/28/22  6:29 AM   Result Value Ref Range    Magnesium 1.6 (L) 1.8 - 2.4 mg/dL   Comprehensive Metabolic Panel    Collection Time: 06/28/22  6:29 AM   Result Value Ref Range    Sodium 142 136 - 145 mmol/L    Potassium 3.2 (L) 3.5 - 5.1 mmol/L    Chloride 109 (H) 98 - 107 mmol/L    CO2 25 21 - 32 mmol/L    Anion Gap 8 7 - 16 mmol/L    Glucose 226 (H) 65 - 100 mg/dL    BUN 9 6 - 23 MG/DL CREATININE 2.21 (H) 0.6 - 1.0 MG/DL    GFR African American 30 (L) >60 ml/min/1.73m2    GFR Non- 25 (L) >60 ml/min/1.73m2    Calcium 7.9 (L) 8.3 - 10.4 MG/DL    Total Bilirubin 0.3 0.2 - 1.1 MG/DL    ALT 31 12 - 65 U/L    AST 31 15 - 37 U/L    Alk Phosphatase 203 (H) 50 - 130 U/L    Total Protein 6.1 (L) 6.3 - 8.2 g/dL    Albumin 1.6 (L) 3.5 - 5.0 g/dL    Globulin 4.5 (H) 2.3 - 3.5 g/dL    Albumin/Globulin Ratio 0.4 (L) 1.2 - 3.5     Lipase    Collection Time: 06/28/22  6:29 AM   Result Value Ref Range    Lipase 112 73 - 393 U/L   POCT Glucose    Collection Time: 06/28/22  7:38 AM   Result Value Ref Range    POC Glucose 230 (H) 65 - 100 mg/dL    Performed by: Patsy    CBC with Auto Differential    Collection Time: 06/28/22  9:13 AM   Result Value Ref Range    WBC 10.9 4.3 - 11.1 K/uL    RBC 2.95 (L) 4.05 - 5.2 M/uL    Hemoglobin 7.7 (L) 11.7 - 15.4 g/dL    Hematocrit 24.3 (L) 35.8 - 46.3 %    MCV 82.4 79.6 - 97.8 FL    MCH 26.1 26.1 - 32.9 PG    MCHC 31.7 31.4 - 35.0 g/dL    RDW 15.4 (H) 11.9 - 14.6 %    Platelets 389 (H) 090 - 450 K/uL    MPV 9.0 (L) 9.4 - 12.3 FL    nRBC 0.00 0.0 - 0.2 K/uL    Differential Type AUTOMATED      Seg Neutrophils 67 43 - 78 %    Lymphocytes 23 13 - 44 %    Monocytes 5 4.0 - 12.0 %    Eosinophils % 2 0.5 - 7.8 %    Basophils 0 0.0 - 2.0 %    Immature Granulocytes 2 0.0 - 5.0 %    Segs Absolute 7.3 1.7 - 8.2 K/UL    Absolute Lymph # 2.5 0.5 - 4.6 K/UL    Absolute Mono # 0.6 0.1 - 1.3 K/UL    Absolute Eos # 0.3 0.0 - 0.8 K/UL    Basophils Absolute 0.0 0.0 - 0.2 K/UL    Absolute Immature Granulocyte 0.2 0.0 - 0.5 K/UL   POCT Glucose    Collection Time: 06/28/22 11:38 AM   Result Value Ref Range    POC Glucose 148 (H) 65 - 100 mg/dL    Performed by: Patsy    Hepatitis A Antibody, IgM    Collection Time: 06/28/22  2:15 PM   Result Value Ref Range    Hep A IgM NONREACTIVE NR     POCT Glucose    Collection Time: 06/28/22  5:33 PM   Result Value Ref Range POC Glucose 113 (H) 65 - 100 mg/dL    Performed by: Tristen    Potassium    Collection Time: 06/28/22  7:33 PM   Result Value Ref Range    Potassium 3.6 3.5 - 5.1 mmol/L   Magnesium    Collection Time: 06/28/22  7:33 PM   Result Value Ref Range    Magnesium 2.3 1.8 - 2.4 mg/dL   POCT Glucose    Collection Time: 06/28/22  9:06 PM   Result Value Ref Range    POC Glucose 40 (L) 65 - 100 mg/dL    Performed by: Digna    POCT Glucose    Collection Time: 06/28/22  9:39 PM   Result Value Ref Range    POC Glucose 106 (H) 65 - 100 mg/dL    Performed by: Digna    POCT Glucose    Collection Time: 06/29/22  2:04 AM   Result Value Ref Range    POC Glucose 88 65 - 100 mg/dL    Performed by: Digna    CBC with Auto Differential    Collection Time: 06/29/22  4:42 AM   Result Value Ref Range    WBC 8.8 4.3 - 11.1 K/uL    RBC 3.12 (L) 4.05 - 5.2 M/uL    Hemoglobin 8.1 (L) 11.7 - 15.4 g/dL    Hematocrit 25.9 (L) 35.8 - 46.3 %    MCV 83.0 79.6 - 97.8 FL    MCH 26.0 (L) 26.1 - 32.9 PG    MCHC 31.3 (L) 31.4 - 35.0 g/dL    RDW 15.3 (H) 11.9 - 14.6 %    Platelets 608 (H) 702 - 450 K/uL    MPV 8.6 (L) 9.4 - 12.3 FL    nRBC 0.00 0.0 - 0.2 K/uL    Differential Type AUTOMATED      Seg Neutrophils 62 43 - 78 %    Lymphocytes 30 13 - 44 %    Monocytes 6 4.0 - 12.0 %    Eosinophils % 2 0.5 - 7.8 %    Basophils 0 0.0 - 2.0 %    Immature Granulocytes 1 0.0 - 5.0 %    Segs Absolute 5.4 1.7 - 8.2 K/UL    Absolute Lymph # 2.6 0.5 - 4.6 K/UL    Absolute Mono # 0.5 0.1 - 1.3 K/UL    Absolute Eos # 0.2 0.0 - 0.8 K/UL    Basophils Absolute 0.0 0.0 - 0.2 K/UL    Absolute Immature Granulocyte 0.1 0.0 - 0.5 K/UL   Comprehensive Metabolic Panel    Collection Time: 06/29/22  4:42 AM   Result Value Ref Range    Sodium 142 136 - 145 mmol/L    Potassium 3.2 (L) 3.5 - 5.1 mmol/L    Chloride 108 (H) 98 - 107 mmol/L    CO2 26 21 - 32 mmol/L    Anion Gap 8 7 - 16 mmol/L    Glucose 149 (H) 65 - 100 mg/dL    BUN 6 6 - 23 MG/DL glucagon injection 1 mg  1 mg IntraMUSCular PRN    dextrose 5 % solution  100 mL/hr IntraVENous PRN    sodium chloride flush 0.9 % injection 5-40 mL  5-40 mL IntraVENous 2 times per day    sodium chloride flush 0.9 % injection 5-40 mL  5-40 mL IntraVENous PRN    0.9 % sodium chloride infusion   IntraVENous PRN    enoxaparin Sodium (LOVENOX) injection 30 mg  30 mg SubCUTAneous Daily    acetaminophen (TYLENOL) tablet 650 mg  650 mg Oral Q6H PRN    Or    acetaminophen (TYLENOL) suppository 650 mg  650 mg Rectal Q6H PRN    aluminum & magnesium hydroxide-simethicone (MAALOX) 200-200-20 MG/5ML suspension 30 mL  30 mL Oral Q6H PRN    insulin lispro (HUMALOG) injection vial 0-4 Units  0-4 Units SubCUTAneous TID WC    insulin lispro (HUMALOG) injection vial 0-4 Units  0-4 Units SubCUTAneous Nightly       Signed:  Cody Vasquez MD    Part of this note may have been written by using a voice dictation software. The note has been proof read but may still contain some grammatical/other typographical errors.

## 2022-06-29 NOTE — PROGRESS NOTES
Infectious Disease Consult    Today's Date: 2022   Admit Date: 2022    Impression:   · Enterobacter bacteremia; possible urinary source vs GI source/gastroenteritis. Abdominal exam is benign  · Diabetes with gastroparesis  · Nausea/vomiting; she is still vomiting this morning  · CKD    Plan:   · continue ciprofloxacin based on the local antibiogram for E cloacae  · Follow up enterobacter susceptibility  · hepatitis A IgM pending    Anti-infectives:   · Pip/tazo (2022 - )  · cipro (2022 - )    Subjective:   She is still vomiting once this morning. No diarrhea. No fever. Patient is a 48 y.o. female with DM2, CKD, and gastroparesis who presented to the ED with nausea/vomiting. She was hospitalized at [facility] from 2022 - 2022 with pyelonephritis and DKA. No fever/chills at home. + generalized abd pain/cramping. In 2022 she was also in the ICU for DKA and septic shock. Blood cultures is now growing enterobacter. Urine culture is negative although urinalysis shows pyuria and bacteriuria. CXR is unremarkable. She has been on pip/tazo since admission. She had a loose bowel movement this morning. No Known Allergies     Review of Systems:    Per HPI, otherwise complete review of systems negative. Objective:     Visit Vitals  /70   Pulse (!) 108   Temp 98.4 °F (36.9 °C) (Oral)   Resp 20   Ht 5' 8\" (1.727 m)   Wt 155 lb 3.2 oz (70.4 kg)   SpO2 99%   BMI 23.60 kg/m²     Temp (24hrs), Av.7 °F (37.1 °C), Min:98.1 °F (36.7 °C), Max:99.7 °F (37.6 °C)       Lines:  Peripheral IV:       Physical Exam:    General:  Alert, cooperative, well nourished, well developed, appears stated age; ill appearing with vomiting while I was in the room   Eyes:  Sclera anicteric. Pupils equally round and reactive to light.    Mouth/Throat: Mucous membranes normal, oral pharynx clear   Neck: Supple   Lungs:   Clear to auscultation bilaterally, good effort   CV:  Regular rate and rhythm,no murmur, click, rub or gallop   Abdomen:   Soft, non-tender. Decreased bowel sounds. non-distended   Extremities: No cyanosis or edema   Skin: Skin color, texture, turgor normal. no acute rash or lesions   Lymph nodes: Cervical and supraclavicular normal   Musculoskeletal: No swelling or deformity   Lines/Devices:  Intact, no erythema, drainage or tenderness   Psych: Alert and oriented, normal mood affect given the setting       Data Review:     CBC:  Recent Labs     06/27/22 0419 06/28/22  0913 06/29/22 0442   WBC 15.9* 10.9 8.8   HGB 7.8* 7.7* 8.1*   HCT 25.3* 24.3* 25.9*   * 478* 462*       BMP:  Recent Labs     06/27/22 0419 06/27/22 0419 06/28/22  0629 06/28/22  1933 06/29/22 0442   BUN 15  --  9  --  6   *  --  142  --  142   K 3.8   < > 3.2* 3.6 3.2*   *  --  109*  --  108*   CO2 28  --  25  --  26    < > = values in this interval not displayed.        LFTS:  Recent Labs     06/27/22 0419 06/28/22 0629 06/29/22 0442   ALT 40 31 40       Microbiology:   Blood culture (6/26/2022) enterobacter    Imaging:   CXR (6/26/2022) normal    Signed By: Kavita Chris MD     June 29, 2022

## 2022-06-29 NOTE — PROGRESS NOTES
Patient glucose 39. Hypoglycemic protocol initiated. Patient A/O x 4, and states to RN prior to check, \"I feel like my sugar is getting low\". Infusion of d10 given over 16 minutes per PRN orders. Will cont to monitor. 1638: repeat glucose 163.  Patient encouraged increase PO intake, nausea medication administered per PRN orders

## 2022-06-29 NOTE — PLAN OF CARE
Problem: Discharge Planning  Goal: Discharge to home or other facility with appropriate resources  6/29/2022 0957 by Odalys Jurado RN  Outcome: Progressing  Flowsheets (Taken 6/29/2022 0730)  Discharge to home or other facility with appropriate resources:   Identify barriers to discharge with patient and caregiver   Arrange for needed discharge resources and transportation as appropriate   Refer to discharge planning if patient needs post-hospital services based on physician order or complex needs related to functional status, cognitive ability or social support system  6/28/2022 2155 by Delmy Fernandez RN  Outcome: Progressing     Problem: Safety - Adult  Goal: Free from fall injury  6/29/2022 0957 by Odalys Jurado RN  Outcome: Progressing  6/28/2022 2155 by Delmy Fernandez RN  Outcome: Progressing     Problem: ABCDS Injury Assessment  Goal: Absence of physical injury  6/29/2022 0957 by Odalys Jurado RN  Outcome: Progressing  6/28/2022 2155 by Delmy Fernandez RN  Outcome: Progressing     Problem: Chronic Conditions and Co-morbidities  Goal: Patient's chronic conditions and co-morbidity symptoms are monitored and maintained or improved  6/29/2022 0957 by Odalys Jurado RN  Outcome: Progressing  Flowsheets (Taken 6/29/2022 0730)  Care Plan - Patient's Chronic Conditions and Co-Morbidity Symptoms are Monitored and Maintained or Improved:   Monitor and assess patient's chronic conditions and comorbid symptoms for stability, deterioration, or improvement   Collaborate with multidisciplinary team to address chronic and comorbid conditions and prevent exacerbation or deterioration  6/28/2022 2155 by Delmy Fernandez RN  Outcome: Progressing     Problem: Skin/Tissue Integrity  Goal: Absence of new skin breakdown  Description: 1. Monitor for areas of redness and/or skin breakdown  2. Assess vascular access sites hourly  3. Every 4-6 hours minimum:  Change oxygen saturation probe site  4.   Every 4-6 hours:  If on nasal continuous positive airway pressure, respiratory therapy assess nares and determine need for appliance change or resting period.   6/29/2022 0957 by Nolan Horta RN  Outcome: Progressing  6/28/2022 2155 by Elmira Barry RN  Outcome: Progressing     Problem: Neurosensory - Adult  Goal: Achieves maximal functionality and self care  Outcome: Progressing  Flowsheets (Taken 6/29/2022 0730)  Achieves maximal functionality and self care: Monitor swallowing and airway patency with patient fatigue and changes in neurological status     Problem: Respiratory - Adult  Goal: Achieves optimal ventilation and oxygenation  Outcome: Progressing  Flowsheets (Taken 6/29/2022 0730)  Achieves optimal ventilation and oxygenation:   Assess for changes in respiratory status   Assess for changes in mentation and behavior   Position to facilitate oxygenation and minimize respiratory effort   Oxygen supplementation based on oxygen saturation or arterial blood gases   Respiratory therapy support as indicated     Problem: Cardiovascular - Adult  Goal: Maintains optimal cardiac output and hemodynamic stability  Outcome: Progressing  Flowsheets (Taken 6/29/2022 0730)  Maintains optimal cardiac output and hemodynamic stability:   Monitor blood pressure and heart rate   Monitor urine output and notify Licensed Independent Practitioner for values outside of normal range   Assess for signs of decreased cardiac output   Administer fluid and/or volume expanders as ordered     Problem: Skin/Tissue Integrity - Adult  Goal: Skin integrity remains intact  Outcome: Progressing  Flowsheets (Taken 6/29/2022 0730)  Skin Integrity Remains Intact: Monitor for areas of redness and/or skin breakdown     Problem: Musculoskeletal - Adult  Goal: Return mobility to safest level of function  Outcome: Progressing  Flowsheets (Taken 6/29/2022 0730)  Return Mobility to Safest Level of Function:   Assess patient stability and activity tolerance for standing, transferring and ambulating with or without assistive devices   Assist with transfers and ambulation using safe patient handling equipment as needed   Ensure adequate protection for wounds/incisions during mobilization   Obtain physical therapy/occupational therapy consults as needed   Apply continuous passive motion per provider or physical therapy orders to increase flexion toward goal  Goal: Maintain proper alignment of affected body part  Outcome: Progressing  Flowsheets (Taken 6/29/2022 0730)  Maintain proper alignment of affected body part: Support and protect limb and body alignment per provider's orders  Goal: Return ADL status to a safe level of function  Outcome: Progressing  Flowsheets (Taken 6/29/2022 0730)  Return ADL Status to a Safe Level of Function:   Administer medication as ordered   Assess activities of daily living deficits and provide assistive devices as needed   Obtain physical therapy/occupational therapy consults as needed     Problem: Gastrointestinal - Adult  Goal: Minimal or absence of nausea and vomiting  Outcome: Progressing  Flowsheets (Taken 6/29/2022 0730)  Minimal or absence of nausea and vomiting:   Administer IV fluids as ordered to ensure adequate hydration   Maintain NPO status until nausea and vomiting are resolved   Nasogastric tube to low intermittent suction as ordered   Administer ordered antiemetic medications as needed   Provide nonpharmacologic comfort measures as appropriate   Advance diet as tolerated, if ordered   Nutrition consult to assist patient with adequate nutrition and appropriate food choices  Goal: Maintains or returns to baseline bowel function  Outcome: Progressing  Flowsheets (Taken 6/29/2022 0730)  Maintains or returns to baseline bowel function:   Assess bowel function   Encourage oral fluids to ensure adequate hydration   Administer IV fluids as ordered to ensure adequate hydration   Administer ordered medications as needed Encourage mobilization and activity  Goal: Maintains adequate nutritional intake  Outcome: Progressing  Flowsheets (Taken 6/29/2022 0730)  Maintains adequate nutritional intake:   Monitor percentage of each meal consumed   Identify factors contributing to decreased intake, treat as appropriate     Problem: Genitourinary - Adult  Goal: Absence of urinary retention  Outcome: Progressing  Flowsheets (Taken 6/29/2022 0730)  Absence of urinary retention:   Assess patients ability to void and empty bladder   Monitor intake/output and perform bladder scan as needed     Problem: Infection - Adult  Goal: Absence of infection at discharge  Outcome: Progressing  Flowsheets (Taken 6/29/2022 0730)  Absence of infection at discharge:   Assess and monitor for signs and symptoms of infection   Monitor lab/diagnostic results   Monitor all insertion sites i.e., indwelling lines, tubes and drains   Monitor endotracheal (as able) and nasal secretions for changes in amount and color   [place] appropriate cooling/warming therapies per order   Instruct and encourage patient and family to use good hand hygiene technique   Administer medications as ordered     Problem: Metabolic/Fluid and Electrolytes - Adult  Goal: Electrolytes maintained within normal limits  Outcome: Progressing  Flowsheets (Taken 6/29/2022 0730)  Electrolytes maintained within normal limits:   Monitor labs and assess patient for signs and symptoms of electrolyte imbalances   Administer electrolyte replacement as ordered   Monitor response to electrolyte replacements, including repeat lab results as appropriate  Goal: Glucose maintained within prescribed range  Outcome: Progressing  Flowsheets (Taken 6/29/2022 0730)  Glucose maintained within prescribed range:   Monitor blood glucose as ordered   Assess for signs and symptoms of hyperglycemia and hypoglycemia   Administer ordered medications to maintain glucose within target range   Assess barriers to adequate nutritional intake and initiate nutrition consult as needed     Problem: Hematologic - Adult  Goal: Maintains hematologic stability  Outcome: Progressing  Flowsheets (Taken 6/29/2022 0730)  Maintains hematologic stability: Assess for signs and symptoms of bleeding or hemorrhage     Problem: Pain  Goal: Verbalizes/displays adequate comfort level or baseline comfort level  Outcome: Progressing

## 2022-06-29 NOTE — CONSULTS
[facility] Consult Note       Primary GI Physician: Dr. Kierra Mckeon    Referring Provider:  Dr. Yvette Jerez    Consult Date:  6/29/2022    Admit Date:  6/26/2022    Chief Complaint:  Persistent Nausea/vomiting    Subjective:     History of Present Illness:  Patient is a 48 y.o. female with PMH including but not limited to type 1 DM, Gastroparesis, CKD IV, Cardiac Arrest, Charcot's Arthropathy,  pyelonephritis, KENYATTA, who is seen in consultation at the request of Dr. Yvette Jerez for persistent nausea and vomiting. Patient was recently admitted at [facility] from 6/13/2022-6/21/2022 with DKA and pyelonephritis. She was admitted here on 6/26/2022 for nausea, vomiting, sepsis due to UTI. She is being followed by Infectious Disease. BC growing enterobacter. We have been consulted due to persistent nausea and vomiting. Mrs. Irma Mary reports that nausea and vomiting started in May and has been persistent since that time. Symptoms are made worse with PO intake. Emesis is described as undigested food. She denies any hematemesis/coffee ground emesis. Nothing makes the symptoms better. She has vomited twice in the past 24 hours. Reports that her last BM was 2-3 days ago. Reports longstanding constipation. She does not take medications for constipation. Denies any melena/hematochezia. Mrs. Irma Mary was started on Reglan IV 6/28/2022. Also, receiving PPI daily. She does not take any PPI or H2 blockers at home. She takes occasional Tums and Rolaids for GERD. Labs 6/29: TB 0.2, AST 44, ALT 37, , K 3.2, creatinine 2.23, WBC 8.8, HGB 8.1, MCV 83. EGD 12/26/2014 with Dr. Kierra Mckeon for nausea and vomiting: Gastritis. Path: Changes of repair. Patient denies any other endoscopies performed since the one in 2014. She was seen in our office in July 2020 and scheduled for an EGD and Colonoscopy which were ultimately cancelled due to Blood glucose issues, by our Centers.        PMH:  Past Medical History: Diagnosis Date    Acute renal failure ([facility] 1/24/2012    CKD (chronic kidney disease) stage 3, GFR 30-59 ml/min (formerly [facility])     Gastroenteritis, acute 1/24/2012    H/O [facility]putation of lesser toe[facility] ([facility] 6/9/2022    This status condition was added to the problem list by Aneta SALDANA of the [facility] Team, after medical record review and validation.  Hemodialysis patient ([facility]     IDDM (insulin dependent diabetes mellitus) 1/24/2012    Type 1 av -150 can tell Hypo below 70    Intractable nausea and vomiting 12/25/2014    Irregular menses     Kidney stone     Neuropathy, peripheral, idiopathic 3/13/2017    Retinopathy, bilateral 3/13/2017    Trichomoniasis 3/13/2017    Ulcer, skin, chronic ([facility] 3/13/2017    Vaginal burning        PSH:  Past Surgical History:   Procedure Laterality Date    AMPUTATION Left     5th Toe due to Diabetic Ulcer    AMPUTATION  1/27/12    gangrene    CYSTOSCOPY,INSERT URETERAL STENT  04/2022    HX OPEN REDUCTION INTERNAL FIXATION Right 2011    ankle    IR TUNNELED CATHETER PLACEMENT GREATER THAN 5 YEARS  6/1/2022    IR TUNNELED CATHETER PLACEMENT GREATER THAN 5 YEARS 6/1/2022 [facility]       Allergies:  No Known Allergies    Home Medications:  Prior to Admission medications    Medication Sig Start Date End Date Taking?  Authorizing Provider   Insulin Pen Needle (PEN NEEDLES 31GX5/16\") 31G X 8 MM MISC 120 each by Other route 4 times daily 6/21/22 7/21/22 Yes Historical Provider, MD   insulin glargine (LANTUS) 100 UNIT/ML injection vial Inject 22 Units into the skin nightly  Patient taking differently: Inject 22 Units into the skin every morning (before breakfast)  6/11/22   Shantal Schmitz MD   ferrous sulfate (IRON 325) 325 (65 Fe) MG tablet Take 1 tablet by mouth 2 times daily (with meals)  Patient not taking: Reported on 6/27/2022 6/10/22   Shantal Fairchild MD   folic acid (FOLVITE) 1 MG tablet Take 1 tablet by mouth daily  Patient not taking: Reported on 6/27/2022 6/10/22   Shantal Jennings MD   albuterol (PROVENTIL) (2.5 MG/3ML) 0.083% nebulizer solution Take 3 mLs by nebulization every 4 hours as needed for Wheezing 6/10/22   Shantal Jennings MD   insulin lispro (HUMALOG) 100 UNIT/ML SOLN injection vial Inject 5 Units into the skin 3 times daily (with meals) 6/10/22   Shantal Jennings MD   insulin lispro (HUMALOG) 100 UNIT/ML SOLN injection vial Inject 0-4 Units into the skin 4 times daily (before meals and nightly) 6/2/22   Thu Ratliff, DO   brimonidine (ALPHAGAN P) 0.1 % SOLN 1 drop in left eye twice a day  Patient not taking: Reported on 5/27/2022 6/23/21   Ar Automatic Reconciliation   ergocalciferol (ERGOCALCIFEROL) 1.25 MG (78574 UT) capsule Take 50,000 Units by mouth  Patient not taking: Reported on 5/27/2022 4/7/21   Ar Automatic Reconciliation       [facility] Medications:  Current Facility-Administered Medications   Medication Dose Route Frequency    potassium chloride 10 mEq/100 mL IVPB (Peripheral Line)  10 mEq IntraVENous Q2H    morphine (PF) injection 1 mg  1 mg IntraVENous Q6H PRN    HYDROcodone-acetaminophen (NORCO) 7.5-325 MG per tablet 1 tablet  1 tablet Oral Q6H PRN    ciprofloxacin (CIPRO) IVPB 400 mg  400 mg IntraVENous Q12H    pantoprazole (PROTONIX) 40 mg in sodium chloride (PF) 10 mL injection  40 mg IntraVENous Daily    metoclopramide (REGLAN) injection 5 mg  5 mg IntraVENous Q6H    magnesium sulfate 2000 mg in 50 mL IVPB premix  2,000 mg IntraVENous PRN    ondansetron (ZOFRAN) injection 4 mg  4 mg IntraVENous Q6H PRN    insulin glargine (LANTUS) injection vial 25 Units  25 Units SubCUTAneous QAM    albuterol (PROVENTIL) nebulizer solution 2.5 mg  2.5 mg Nebulization Q6H PRN    glucose chewable tablet 16 g  4 tablet Oral PRN    dextrose bolus 10% 125 mL  125 mL IntraVENous PRN    Or    dextrose bolus 10% 250 mL  250 mL IntraVENous PRN    glucagon injection 1 mg  1 mg IntraMUSCular PRN    dextrose 5 % solution  100 mL/hr IntraVENous PRN    sodium chloride flush 0.9 % injection 5-40 mL  5-40 mL IntraVENous 2 times per day    sodium chloride flush 0.9 % injection 5-40 mL  5-40 mL IntraVENous PRN    0.9 % sodium chloride infusion   IntraVENous PRN    enoxaparin Sodium (LOVENOX) injection 30 mg  30 mg SubCUTAneous Daily    acetaminophen (TYLENOL) tablet 650 mg  650 mg Oral Q6H PRN    Or    acetaminophen (TYLENOL) suppository 650 mg  650 mg Rectal Q6H PRN    aluminum & magnesium hydroxide-simethicone (MAALOX) 200-200-20 MG/5ML suspension 30 mL  30 mL Oral Q6H PRN    insulin lispro (HUMALOG) injection vial 0-4 Units  0-4 Units SubCUTAneous TID WC    insulin lispro (HUMALOG) injection vial 0-4 Units  0-4 Units SubCUTAneous Nightly       Social History:  Social History     Tobacco Use    Smoking status: Former Smoker     Quit date: 2013     Years since quittin.6    Smokeless tobacco: Never Used   Substance Use Topics    Alcohol use: Yes         Family History:  Family History   Problem Relation Age of Onset    Prostate Cancer Father     Diabetes Paternal Grandmother     Colon Cancer Neg Hx     Uterine Cancer Neg Hx     Diabetes Father         DM Type II    Stroke Father     Diabetes Paternal Grandfather         DM Type II     Hypertension Maternal Grandmother     Ovarian Cancer Neg Hx     Breast Cancer Mother 76    Diabetes Maternal Grandmother         DM Type II       Review of Systems:  A detailed 10 system ROS is obtained, with pertinent positives as listed above. All others are negative. Diet:  Clear liquid    Objective:     Physical Exam:  Vitals:  /70   Pulse (!) 108   Temp 98.4 °F (36.9 °C) (Oral)   Resp 20   Ht 5' 8\" (1.727 m)   Wt 155 lb 3.2 oz (70.4 kg)   SpO2 99%   BMI 23.60 kg/m²   Gen:  Pt is alert, cooperative, no acute distress, APPEARS CHRONICALLY ILL. Skin:  Extremities and face reveal no rashes. HEENT: Sclerae anicteric. Extra-occular muscles are intact. No oral ulcers. No abnormal pigmentation of the lips. The neck is supple. Cardiovascular: Regular rate and rhythm. No murmurs, gallops, or rubs. Respiratory:  Comfortable breathing with no accessory muscle use. Clear breath sounds anteriorly with no wheezes, rales, or rhonchi. GI:  Abdomen nondistended, soft, and TTP ACROSS THE LOWER ABDOMEN. .  Normal active bowel sounds. No enlargement of the liver or spleen. No masses palpable. Rectal:  Deferred  Musculoskeletal:  No pitting edema of the lower legs. Neurological:  Gross memory appears intact. Patient is alert and oriented. Psychiatric:  Mood appears appropriate with judgement intact. Lymphatic:  No cervical or supraclavicular adenopathy. Laboratory:    Recent Labs     06/26/22  1620 06/27/22  0419 06/28/22  0629 06/28/22  0913 06/28/22  1933 06/29/22  0442   WBC 19.2* 15.9*  --  10.9  --  8.8   HGB 10.0* 7.8*  --  7.7*  --  8.1*   HCT 33.2* 25.3*  --  24.3*  --  25.9*   * 525*  --  478*  --  462*   MCV 83.6 83.5  --  82.4  --  83.0   * 150* 142  --   --  142   K 3.8 3.8 3.2*  --  3.6 3.2*   * 117* 109*  --   --  108*   CO2 29 28 25  --   --  26   BUN 19 15 9  --   --  6   MG  --  1.4* 1.6*  --  2.3  --    AST 32 26 31  --   --  44*   ALT 45 40 31  --   --  37        CT A/P without contrast 5/14/2022      FINDINGS:   Visualized thorax: Normal.       Liver: Normal in size and morphology.  No focal lesions.       Gallbladder/biliary: Normal gallbladder. No biliary dilatation.       Pancreas: Normal.       Spleen: Normal.       Adrenals: Normal.       Kidneys: Right double-J nephroureteral stent. The proximal pigtail is located   within the renal pelvis. Mild right pelvocaliectasis. Small stones appear to be   present within the proximal ureter on axial image 38. Left renal simple   appearing cysts.  Tiny nonobstructing left renal stone.       Bladder: Normal.       Pelvic organs: Status post hysterectomy.  No adnexal mass.       Gastrointestinal: Normal.       Peritoneum/retroperitoneum: Normal.       Lymph nodes: Normal.       Vessels: Normal.       Bones/Soft tissues: Normal.           Impression   1.  Tiny nonobstructing left renal stone. 2.  Right double-J nephroureteral stent with proximal pigtail located within the   renal pelvis. Mild right pelvocaliectasis. 3.  Tiny calculi within the right proximal ureter. Imaging reviewed from [facility]:    6/13/2022 CT:  NONCONTRAST RENAL CT FINDINGS:      Right Kidney and Ureter:    Mild right hydronephrosis and proximal hydroureter. A right-sided ureteral stent is in place with proximal loop in the right upper pole collecting system and distal loop within the right posterior bladder lumen. No right-sided nephrolithiasis.      Left Kidney and Ureter:    No hydronephroureter or nephroureteral calculi.      Bladder:    No calculi in the bladder.      Lower Chest:     No basilar consolidation or effusion.      Liver:     Normal contour.      Gallbladder - Biliary Tree:      No radiopaque gallstones. No biliary dilatation.      Pancreas:     Normal      Spleen:      Normal contour.      Adrenals:     No measurable adrenal nodule.      Aorto - Cava:     Nonaneurysmal.      Retroperitoneum:     No adenopathy.      Mesentery - Peritoneum:     No ascites. No pneumoperitoneum.      GI:     Bowel is normal in caliber without obstruction. Normal appendix. No pericolonic inflammation.      Pelvis:    No free fluid or adenopathy.      Soft tissues - MSK:      No acute osseous abnormality. Procedure Note      RUQ US at [facility] 6/13/2022:    FINDINGS:     Pancreas:  Visualized portions of the pancreas are unremarkable. Liver: The liver echo architecture is within normal limits. No intrahepatic ductal dilatation is seen.       Gallbladder: There are 2 mm nonmobile echogenic foci associated with the gallbladder wall. No wall thickening or pericholecystic fluid. No tenderness at sonographic interrogation, however, pain medication has been administered. Common Bile Duct: Common bile duct diameter is 3 mm.       Right Kidney: There is no hydronephrosis, renal stones or perinephric fluid. Renal parenchyma is diffusely echogenic with a partially visualized linear echogenic focus in the right renal hilum suggestive of stent.       Other: No free fluid is appreciated. Procedure Note        Assessment:     Principal Problem:    Severe sepsis ([address]  Active Problems:    Benign hypertension with CKD (chronic kidney disease) stage IV (HCC)    Type 1 diabetes mellitus with hyperglycemia ([address]    Pyelonephritis  Resolved Problems:    * No resolved hospital problems. *      Patient is a 48 y.o. female with PMH including but not limited to type 1 DM, Gastroparesis, CKD IV, Cardiac Arrest, Charcot's Arthropathy,  pyelonephritis, KENYATTA, who is seen in consultation at the request of Dr. Sari Villarreal for persistent nausea and vomiting. She has gastroparesis which is likely exacerbated by sepsis and recent DKA. HGB A1C: 8.3. She has had recent imaging on 6/13/2022 at [facility] including CT of the abdomen with renal stone protocol and RUQ US. RUQ us showed a CBD of 3mm, 2mm nonmobile echogenic foci associated with the GB wall, no thickening or pericholecystic fluid seen. Plan:     Continue with Reglan 5mg IV, which was started on 6/28. If nausea and vomiting do not improve with reglan will switch over to scheduled zofran  Continue with PPI IV   No plans for Endoscopy, at this time. Clear liquid diet for now and slowly advance to a low fat and low roughage diet, as tolerated  Add in Miralax for constipation  Follow electrolytes and correct, as needed.   Tight Glucose Control is imperative    Currently on Cipro IV for enterobacter bacteremia     TANISHA Quach    Patient is seen and examined in collaboration with Dr. Eduardo Rudd.   Assessment and plan as per Dr. Eduardo Rudd.

## 2022-06-29 NOTE — PROGRESS NOTES
PHYSICAL THERAPY Initial Assessment and AM  (Link to Caseload Tracking: PT Visit Days : 1  Acknowledge Orders  Time In/Out  PT Charge Capture  Rehab Caseload Tracker    Marina Nye is a 48 y.o. female   PRIMARY DIAGNOSIS: Severe sepsis ([facility]  Septicemia ([facility] [A41.9]  Acute pyelonephritis [N10]  Severe sepsis ([facility] [A41.9, R65.20]  Nausea and vomiting, intractability of vomiting not specified, unspecified vomiting type [R11.2]  Chronic kidney disease, unspecified CKD stage [N18.9]       Reason for Referral: Generalized Muscle Weakness (M62.81)  Other abnormalities of gait and mobility (R26.89)  Inpatient: Payor: Kalee [address] / Plan: [employer]BS - [place] PPO / Product Type: *No Product type* /     ASSESSMENT:     REHAB RECOMMENDATIONS:   Recommendation to date pending progress:  Setting:   No further skilled therapy after discharge from hospital    Equipment:     None     ASSESSMENT:  Ms. Angel Luis Sapp is admitted with severe sepsis and is experiencing a slight decline in her premorbid level of function. She would benefit from a  Session or 2 of PT to address these deficits: decreased general strength, standing balance and functional mobility. She lives alone and should be fine going home without any further therapy. This am, she is laying in bed and doesn't want to get up for therapy. I was able to convince to stand and then she chose to use the restroom. She returned to bed in side lying. Will check back for another visit or 2 to address transfers and ambulation and stair climbing. .     [facility] [address] [address] AM-PAC 6 Clicks Basic Mobility Inpatient Short Form  AM-PAC Mobility Inpatient   How much difficulty turning over in bed?: None  How much difficulty sitting down on / standing up from a chair with arms?: A Little  How much difficulty moving from lying on back to sitting on side of bed?: None  How much help from another person moving to and from a bed to a chair?: A Little  How much help from another person needed to walk in hospital room?: A Little  How much help from another person for climbing 3-5 steps with a railing?: A Little  AM-PAC Inpatient Mobility Raw Score : 20  AM-PAC Inpatient T-Scale Score : 47.67  Mobility Inpatient CMS 0-100% Score: 35.83  Mobility Inpatient CMS G-Code Modifier : CJ    SUBJECTIVE:   Ms. Breezy Vicente states, \"I don't want to get up.  I can do just fine\"     Social/Functional Lives With: Alone  Type of Home: House  Home Layout: One level  Home Access: Stairs to enter with rails  Entrance Stairs - Number of Steps: 3  Entrance Stairs - Rails: Both  Bathroom Shower/Tub: Tub/Shower unit  Bathroom Toilet: Standard  ADL Assistance: Independent  Homemaking Assistance: Independent  Ambulation Assistance: Independent  Transfer Assistance: Independent  Mode of Transportation: Car  Occupation: On disability    OBJECTIVE:     PAIN: Gonzalo Muskrat / O2: Jericho Emmer / Dwana Laughter / DRAINS:   Pre Treatment:   Pain Assessment: None - Denies Pain      Post Treatment: 0 Vitals        Oxygen      Telemetry     RESTRICTIONS/PRECAUTIONS:                    GROSS EVALUATION: Intact Impaired (Comments):   AROM [x]     PROM []    Strength [x]  grossly 3+-4/5 B LEs   Balance [x]     Posture [] N/A   Sensation [x]     Coordination [x]      Tone []     Edema []    Activity Tolerance []  just wants to sleep    []      COGNITION/  PERCEPTION: Intact Impaired (Comments):   Orientation [x]     Vision [x]     Hearing [x]     Cognition  [x]       MOBILITY: I Mod I S SBA CGA Min Mod Max Total  NT x2 Comments:   Bed Mobility    Rolling [x] [] [] [] [] [] [] [] [] [] []    Supine to Sit [x] [] [] [] [] [] [] [] [] [] []    Scooting [x] [] [] [] [] [] [] [] [] [] []    Sit to Supine [x] [] [] [] [] [] [] [] [] [] []    Transfers    Sit to Stand [] [] [] [x] [] [] [] [] [] [] []    Bed to Chair [] [] [] [x] [] [] [] [] [] [] []    Stand to Sit [] [] [] [x] [] [] [] [] [] [] []     [] [] [] [] [] [] [] [] [] [] []    I=Independent, Mod I=Modified Independent, S=Supervision, SBA=Standby Assistance, CGA=Contact Guard Assistance,   Min=Minimal Assistance, Mod=Moderate Assistance, Max=Maximal Assistance, Total=Total Assistance, NT=Not Tested    GAIT: I Mod I S SBA CGA Min Mod Max Total  NT x2 Comments:   Level of Assistance [] [] [] [x] [] [] [] [] [] [] []    Distance 10 feet    DME None    Gait Quality Decreased cindy  and Decreased step clearance    Weightbearing Status      Stairs      I=Independent, Mod I=Modified Independent, S=Supervision, SBA=Standby Assistance, CGA=Contact Guard Assistance,   Min=Minimal Assistance, Mod=Moderate Assistance, Max=Maximal Assistance, Total=Total Assistance, NT=Not Tested    PLAN:   ACUTE PHYSICAL THERAPY GOALS:   (Developed with and agreed upon by patient and/or caregiver.)  STG:  (1.)Ms. Jose Ramos will climb up/down 5 steps with rails and SBA  within 3 treatment day(s). (2.)Ms. Jose Ramos will transfer from bed to chair and chair to bed with INDEPENDENT  within 3 treatment day(s). (3.)Ms. Jose Ramos will ambulate with INDEPENDENCE for 200 feet  within 3 treatment day(s).        ________________________________________________________________________________________________    FREQUENCY AND DURATION: 4 times/week for duration of hospital stay or until stated goals are met, whichever comes first.    THERAPY PROGNOSIS: Good    PROBLEM LIST:   (Skilled intervention is medically necessary to address:)  Decreased Activity Tolerance  Decreased Balance  Decreased Gait Ability  Decreased Transfer Abilities INTERVENTIONS PLANNED:   (Benefits and precautions of physical therapy have been discussed with the patient.)  Therapeutic Activity  Gait Training  transfers and endurance       TREATMENT:   EVALUATION: LOW COMPLEXITY: (Untimed Charge)    TREATMENT:   none    TREATMENT GRID:  N/A    AFTER TREATMENT PRECAUTIONS: Bed, Bed/Chair Locked, Call light within reach, Needs within reach, RN notified and Side rails x3    INTERDISCIPLINARY COLLABORATION:  RN/ PCT and PT/ PTA    EDUCATION: Education Given To: Patient  Education Provided: Role of Therapy;Plan of Care;Precautions;Transfer Training  Education Method: Demonstration;Verbal  Education Outcome: Verbalized understanding;Demonstrated understanding    TIME IN/OUT:  Time In: 1030  Time Out: [address]  Minutes: Leonardo Chowdary PT

## 2022-06-30 ENCOUNTER — APPOINTMENT (OUTPATIENT)
Dept: GENERAL RADIOLOGY | Age: 47
DRG: 872 | End: 2022-06-30
Payer: COMMERCIAL

## 2022-06-30 LAB
ALBUMIN SERPL-MCNC: 1.9 G/DL (ref 3.5–5)
ALBUMIN/GLOB SERPL: 0.4 {RATIO} (ref 1.2–3.5)
ALP SERPL-CCNC: 277 U/L (ref 50–136)
ALT SERPL-CCNC: 37 U/L (ref 12–65)
ANION GAP SERPL CALC-SCNC: 7 MMOL/L (ref 7–16)
AST SERPL-CCNC: 43 U/L (ref 15–37)
BASOPHILS # BLD: 0 K/UL (ref 0–0.2)
BASOPHILS NFR BLD: 0 % (ref 0–2)
BILIRUB SERPL-MCNC: 0.3 MG/DL (ref 0.2–1.1)
BUN SERPL-MCNC: 4 MG/DL (ref 6–23)
CALCIUM SERPL-MCNC: 8.2 MG/DL (ref 8.3–10.4)
CHLORIDE SERPL-SCNC: 109 MMOL/L (ref 98–107)
CO2 SERPL-SCNC: 27 MMOL/L (ref 21–32)
CREAT SERPL-MCNC: 2.07 MG/DL (ref 0.6–1)
DIFFERENTIAL METHOD BLD: ABNORMAL
EOSINOPHIL # BLD: 0.2 K/UL (ref 0–0.8)
EOSINOPHIL NFR BLD: 2 % (ref 0.5–7.8)
ERYTHROCYTE [DISTWIDTH] IN BLOOD BY AUTOMATED COUNT: 15.2 % (ref 11.9–14.6)
GLOBULIN SER CALC-MCNC: 4.3 G/DL (ref 2.3–3.5)
GLUCOSE BLD STRIP.AUTO-MCNC: 123 MG/DL (ref 65–100)
GLUCOSE BLD STRIP.AUTO-MCNC: 159 MG/DL (ref 65–100)
GLUCOSE BLD STRIP.AUTO-MCNC: 164 MG/DL (ref 65–100)
GLUCOSE BLD STRIP.AUTO-MCNC: 170 MG/DL (ref 65–100)
GLUCOSE BLD STRIP.AUTO-MCNC: 61 MG/DL (ref 65–100)
GLUCOSE BLD STRIP.AUTO-MCNC: 96 MG/DL (ref 65–100)
GLUCOSE SERPL-MCNC: 118 MG/DL (ref 65–100)
HCT VFR BLD AUTO: 26.3 % (ref 35.8–46.3)
HGB BLD-MCNC: 8.1 G/DL (ref 11.7–15.4)
IMM GRANULOCYTES # BLD AUTO: 0.2 K/UL (ref 0–0.5)
IMM GRANULOCYTES NFR BLD AUTO: 2 % (ref 0–5)
LYMPHOCYTES # BLD: 2.4 K/UL (ref 0.5–4.6)
LYMPHOCYTES NFR BLD: 26 % (ref 13–44)
MAGNESIUM SERPL-MCNC: 1.6 MG/DL (ref 1.8–2.4)
MCH RBC QN AUTO: 25.5 PG (ref 26.1–32.9)
MCHC RBC AUTO-ENTMCNC: 30.8 G/DL (ref 31.4–35)
MCV RBC AUTO: 82.7 FL (ref 79.6–97.8)
MONOCYTES # BLD: 0.6 K/UL (ref 0.1–1.3)
MONOCYTES NFR BLD: 6 % (ref 4–12)
NEUTS SEG # BLD: 6 K/UL (ref 1.7–8.2)
NEUTS SEG NFR BLD: 64 % (ref 43–78)
NRBC # BLD: 0 K/UL (ref 0–0.2)
PHOSPHATE SERPL-MCNC: 2.4 MG/DL (ref 2.5–4.5)
PLATELET # BLD AUTO: 516 K/UL (ref 150–450)
PMV BLD AUTO: 8.8 FL (ref 9.4–12.3)
POTASSIUM SERPL-SCNC: 4 MMOL/L (ref 3.5–5.1)
PROT SERPL-MCNC: 6.2 G/DL (ref 6.3–8.2)
RBC # BLD AUTO: 3.18 M/UL (ref 4.05–5.2)
SERVICE CMNT-IMP: ABNORMAL
SERVICE CMNT-IMP: NORMAL
SODIUM SERPL-SCNC: 143 MMOL/L (ref 136–145)
WBC # BLD AUTO: 9.4 K/UL (ref 4.3–11.1)

## 2022-06-30 PROCEDURE — 84100 ASSAY OF PHOSPHORUS: CPT

## 2022-06-30 PROCEDURE — 2580000003 HC RX 258: Performed by: NURSE PRACTITIONER

## 2022-06-30 PROCEDURE — 97165 OT EVAL LOW COMPLEX 30 MIN: CPT

## 2022-06-30 PROCEDURE — 6360000002 HC RX W HCPCS: Performed by: NURSE PRACTITIONER

## 2022-06-30 PROCEDURE — 6370000000 HC RX 637 (ALT 250 FOR IP): Performed by: INTERNAL MEDICINE

## 2022-06-30 PROCEDURE — 6360000002 HC RX W HCPCS: Performed by: FAMILY MEDICINE

## 2022-06-30 PROCEDURE — 87040 BLOOD CULTURE FOR BACTERIA: CPT

## 2022-06-30 PROCEDURE — 6360000002 HC RX W HCPCS: Performed by: STUDENT IN AN ORGANIZED HEALTH CARE EDUCATION/TRAINING PROGRAM

## 2022-06-30 PROCEDURE — 85025 COMPLETE CBC W/AUTO DIFF WBC: CPT

## 2022-06-30 PROCEDURE — 82962 GLUCOSE BLOOD TEST: CPT

## 2022-06-30 PROCEDURE — 1100000000 HC RM PRIVATE

## 2022-06-30 PROCEDURE — 2580000003 HC RX 258: Performed by: FAMILY MEDICINE

## 2022-06-30 PROCEDURE — 6360000002 HC RX W HCPCS: Performed by: INTERNAL MEDICINE

## 2022-06-30 PROCEDURE — 74018 RADEX ABDOMEN 1 VIEW: CPT

## 2022-06-30 PROCEDURE — 80053 COMPREHEN METABOLIC PANEL: CPT

## 2022-06-30 PROCEDURE — C9113 INJ PANTOPRAZOLE SODIUM, VIA: HCPCS | Performed by: NURSE PRACTITIONER

## 2022-06-30 PROCEDURE — A4216 STERILE WATER/SALINE, 10 ML: HCPCS | Performed by: NURSE PRACTITIONER

## 2022-06-30 PROCEDURE — 97530 THERAPEUTIC ACTIVITIES: CPT

## 2022-06-30 PROCEDURE — 6370000000 HC RX 637 (ALT 250 FOR IP): Performed by: STUDENT IN AN ORGANIZED HEALTH CARE EDUCATION/TRAINING PROGRAM

## 2022-06-30 PROCEDURE — 97535 SELF CARE MNGMENT TRAINING: CPT

## 2022-06-30 PROCEDURE — 83735 ASSAY OF MAGNESIUM: CPT

## 2022-06-30 RX ORDER — BISACODYL 10 MG
10 SUPPOSITORY, RECTAL RECTAL DAILY
Status: COMPLETED | OUTPATIENT
Start: 2022-06-30 | End: 2022-07-01

## 2022-06-30 RX ORDER — INSULIN LISPRO 100 [IU]/ML
0-4 INJECTION, SOLUTION INTRAVENOUS; SUBCUTANEOUS NIGHTLY
Status: DISCONTINUED | OUTPATIENT
Start: 2022-06-30 | End: 2022-07-06 | Stop reason: HOSPADM

## 2022-06-30 RX ORDER — MAGNESIUM SULFATE 1 G/100ML
1000 INJECTION INTRAVENOUS ONCE
Status: COMPLETED | OUTPATIENT
Start: 2022-06-30 | End: 2022-06-30

## 2022-06-30 RX ORDER — PROCHLORPERAZINE EDISYLATE 5 MG/ML
10 INJECTION INTRAMUSCULAR; INTRAVENOUS ONCE
Status: COMPLETED | OUTPATIENT
Start: 2022-06-30 | End: 2022-06-30

## 2022-06-30 RX ORDER — MAGNESIUM SULFATE IN WATER 40 MG/ML
2000 INJECTION, SOLUTION INTRAVENOUS ONCE
Status: DISCONTINUED | OUTPATIENT
Start: 2022-06-30 | End: 2022-06-30

## 2022-06-30 RX ORDER — PROCHLORPERAZINE EDISYLATE 5 MG/ML
10 INJECTION INTRAMUSCULAR; INTRAVENOUS EVERY 6 HOURS PRN
Status: DISCONTINUED | OUTPATIENT
Start: 2022-06-30 | End: 2022-07-02

## 2022-06-30 RX ORDER — INSULIN GLARGINE 100 [IU]/ML
10 INJECTION, SOLUTION SUBCUTANEOUS EVERY MORNING
Status: DISCONTINUED | OUTPATIENT
Start: 2022-06-30 | End: 2022-07-02

## 2022-06-30 RX ORDER — ONDANSETRON 2 MG/ML
8 INJECTION INTRAMUSCULAR; INTRAVENOUS EVERY 8 HOURS SCHEDULED
Status: DISCONTINUED | OUTPATIENT
Start: 2022-06-30 | End: 2022-07-02

## 2022-06-30 RX ORDER — INSULIN LISPRO 100 [IU]/ML
0-4 INJECTION, SOLUTION INTRAVENOUS; SUBCUTANEOUS
Status: DISCONTINUED | OUTPATIENT
Start: 2022-06-30 | End: 2022-07-06 | Stop reason: HOSPADM

## 2022-06-30 RX ADMIN — SODIUM CHLORIDE, PRESERVATIVE FREE 10 ML: 5 INJECTION INTRAVENOUS at 20:55

## 2022-06-30 RX ADMIN — PROCHLORPERAZINE EDISYLATE 10 MG: 5 INJECTION INTRAMUSCULAR; INTRAVENOUS at 23:51

## 2022-06-30 RX ADMIN — ONDANSETRON 4 MG: 2 INJECTION INTRAMUSCULAR; INTRAVENOUS at 07:47

## 2022-06-30 RX ADMIN — POTASSIUM CHLORIDE 10 MEQ: 7.46 INJECTION, SOLUTION INTRAVENOUS at 00:16

## 2022-06-30 RX ADMIN — ENOXAPARIN SODIUM 30 MG: 100 INJECTION SUBCUTANEOUS at 09:27

## 2022-06-30 RX ADMIN — BISACODYL 10 MG: 10 SUPPOSITORY RECTAL at 13:52

## 2022-06-30 RX ADMIN — SODIUM CHLORIDE, PRESERVATIVE FREE 40 MG: 5 INJECTION INTRAVENOUS at 20:55

## 2022-06-30 RX ADMIN — ONDANSETRON 4 MG: 2 INJECTION INTRAMUSCULAR; INTRAVENOUS at 02:21

## 2022-06-30 RX ADMIN — ONDANSETRON 8 MG: 2 INJECTION INTRAMUSCULAR; INTRAVENOUS at 13:52

## 2022-06-30 RX ADMIN — INSULIN GLARGINE 10 UNITS: 100 INJECTION, SOLUTION SUBCUTANEOUS at 09:28

## 2022-06-30 RX ADMIN — MORPHINE SULFATE 1 MG: 2 INJECTION, SOLUTION INTRAMUSCULAR; INTRAVENOUS at 12:21

## 2022-06-30 RX ADMIN — MORPHINE SULFATE 1 MG: 2 INJECTION, SOLUTION INTRAMUSCULAR; INTRAVENOUS at 02:53

## 2022-06-30 RX ADMIN — METOCLOPRAMIDE 5 MG: 5 INJECTION, SOLUTION INTRAMUSCULAR; INTRAVENOUS at 05:14

## 2022-06-30 RX ADMIN — DEXTROSE MONOHYDRATE 125 ML: 100 INJECTION, SOLUTION INTRAVENOUS at 02:21

## 2022-06-30 RX ADMIN — SODIUM CHLORIDE, PRESERVATIVE FREE 10 ML: 5 INJECTION INTRAVENOUS at 02:54

## 2022-06-30 RX ADMIN — ONDANSETRON 8 MG: 2 INJECTION INTRAMUSCULAR; INTRAVENOUS at 20:55

## 2022-06-30 RX ADMIN — SODIUM CHLORIDE: 9 INJECTION, SOLUTION INTRAVENOUS at 00:17

## 2022-06-30 RX ADMIN — CIPROFLOXACIN 400 MG: 2 INJECTION, SOLUTION INTRAVENOUS at 23:51

## 2022-06-30 RX ADMIN — CIPROFLOXACIN 400 MG: 2 INJECTION, SOLUTION INTRAVENOUS at 12:17

## 2022-06-30 RX ADMIN — SODIUM CHLORIDE, PRESERVATIVE FREE 40 MG: 5 INJECTION INTRAVENOUS at 09:27

## 2022-06-30 RX ADMIN — MAGNESIUM SULFATE HEPTAHYDRATE 1000 MG: 1 INJECTION, SOLUTION INTRAVENOUS at 07:57

## 2022-06-30 RX ADMIN — PROCHLORPERAZINE EDISYLATE 10 MG: 5 INJECTION INTRAMUSCULAR; INTRAVENOUS at 05:48

## 2022-06-30 RX ADMIN — METOCLOPRAMIDE 5 MG: 5 INJECTION, SOLUTION INTRAMUSCULAR; INTRAVENOUS at 00:14

## 2022-06-30 RX ADMIN — CIPROFLOXACIN 400 MG: 2 INJECTION, SOLUTION INTRAVENOUS at 00:15

## 2022-06-30 ASSESSMENT — PAIN SCALES - GENERAL
PAINLEVEL_OUTOF10: 0
PAINLEVEL_OUTOF10: 0
PAINLEVEL_OUTOF10: 8
PAINLEVEL_OUTOF10: 0
PAINLEVEL_OUTOF10: 0

## 2022-06-30 ASSESSMENT — PAIN DESCRIPTION - DESCRIPTORS: DESCRIPTORS: ACHING

## 2022-06-30 ASSESSMENT — PAIN - FUNCTIONAL ASSESSMENT: PAIN_FUNCTIONAL_ASSESSMENT: PREVENTS OR INTERFERES SOME ACTIVE ACTIVITIES AND ADLS

## 2022-06-30 ASSESSMENT — PAIN DESCRIPTION - LOCATION: LOCATION: ABDOMEN

## 2022-06-30 NOTE — PROGRESS NOTES
Infectious Disease Consult    Today's Date: 2022   Admit Date: 2022    Impression:   · Enterobacter bacteremia; possible urinary source vs GI source/gastroenteritis. Abdominal exam is benign  · Diabetes with gastroparesis  · Nausea/vomiting; she is still vomiting overnight  · CKD    Plan:   · continue ciprofloxacin through 2022. · I will set stop date and sign off. Anti-infectives:   · Pip/tazo (2022 - )  · cipro (2022 - )    Subjective:   She is still nauseated and having intermittent vomiting. Afebrile    Patient is a 48 y.o. female with DM2, CKD, and gastroparesis who presented to the ED with nausea/vomiting. She was hospitalized at [facility] from 2022 - 2022 with pyelonephritis and DKA. No fever/chills at home. + generalized abd pain/cramping. In 2022 she was also in the ICU for DKA and septic shock. Blood cultures is now growing enterobacter. Urine culture is negative although urinalysis shows pyuria and bacteriuria. CXR is unremarkable. She has been on pip/tazo since admission. She had a loose bowel movement this morning. No Known Allergies     Review of Systems:    Per HPI, otherwise complete review of systems negative. Objective:     Visit Vitals  /83   Pulse 99   Temp 98.1 °F (36.7 °C) (Axillary)   Resp 18   Ht 5' 8\" (1.727 m)   Wt 156 lb 14.4 oz (71.2 kg)   SpO2 98%   BMI 23.86 kg/m²     Temp (24hrs), Av.7 °F (37.1 °C), Min:98.1 °F (36.7 °C), Max:99.2 °F (37.3 °C)       Lines:  Peripheral IV:       Physical Exam:    General:  Alert, cooperative, well nourished, well developed, appears stated age; she looks comfortable this morning. Eyes:  Sclera anicteric. Pupils equally round and reactive to light. Mouth/Throat: Mucous membranes normal, oral pharynx clear   Neck: Supple   Lungs:   Clear to auscultation bilaterally, good effort   CV:  Regular rate and rhythm,no murmur, click, rub or gallop   Abdomen:   Soft, non-tender.  Decreased bowel sounds. non-distended   Extremities: No cyanosis or edema   Skin: Skin color, texture, turgor normal. no acute rash or lesions   Lymph nodes: Cervical and supraclavicular normal   Musculoskeletal: No swelling or deformity   Lines/Devices:  Intact, no erythema, drainage or tenderness   Psych: Alert and oriented, normal mood affect given the setting       Data Review:     CBC:  Recent Labs     06/28/22  0913 06/29/22 0442 06/30/22  0514   WBC 10.9 8.8 9.4   HGB 7.7* 8.1* 8.1*   HCT 24.3* 25.9* 26.3*   * 462* 516*       BMP:  Recent Labs     06/28/22  0629 06/28/22  1933 06/29/22 0442 06/29/22  1852 06/30/22  0514   BUN 9  --  6  --  4*     --  142  --  143   K 3.2*   < > 3.2* 3.4* 4.0   *  --  108*  --  109*   CO2 25  --  26  --  27    < > = values in this interval not displayed.        LFTS:  Recent Labs     06/28/22  0629 06/29/22 0442 06/30/22  0514   ALT 31 37 40       Microbiology:   Blood culture (6/26/2022) enterobacter    Imaging:   CXR (6/26/2022) normal    Signed By: Chaparro Velázquez MD     June 30, 2022

## 2022-06-30 NOTE — PROGRESS NOTES
Hypokalemia    Patient's repeat potassium level 3.4. Patient unable to tolerate PO potassium replacement. IV electrolyte replacement ordered and provided.

## 2022-06-30 NOTE — PROGRESS NOTES
OCCUPATIONAL THERAPY Initial Assessment, Daily Note, Discharge and AM       OT Visit Days: 1  Acknowledge Orders  Time  OT Charge Capture  Rehab Caseload Tracker      Ar Cooper is a 48 y.o. female   PRIMARY DIAGNOSIS: Severe sepsis ([facility] [address]  Septicemia ([facility] [address] [A41.9]  Acute pyelonephritis [N10]  Severe sepsis ([address] [A41.9, R65.20]  Nausea and vomiting, intractability of vomiting not specified, unspecified vomiting type [R11.2]  Chronic kidney disease, unspecified CKD stage [N18.9]       Reason for Referral: Generalized Muscle Weakness (M62.81)  Inpatient: Payor: Kalee Proctor 150 / Plan: BCBS - [place] PPO / Product Type: *No Product type* /     ASSESSMENT:     REHAB RECOMMENDATIONS:   Recommendation to date pending progress:  Setting:   No further skilled therapy after discharge from hospital    Equipment:     None     ASSESSMENT:  Ms. Siena New was admitted with above diagnosis and was seen in ICU room. Pt with nausea but willing to get up with therapy. Pt up in room with supervision and assistance with IV pole. Pt up in [place] and ambulated household distance with supervision. Pt supervision to independent with self care, no further OT warranted. Pt was stoic and said very little but seem to enjoy being out of bed. Pt left up in straight chair with PCT present.       [employer] AM-PAC 6 Clicks Daily Activity Inpatient Short Form:    AM-PAC Daily Activity Inpatient   How much help for putting on and taking off regular lower body clothing?: None  How much help for Bathing?: None  How much help for Toileting?: None  How much help for putting on and taking off regular upper body clothing?: None  How much help for taking care of personal grooming?: None  How much help for eating meals?: None  AM-PAC Inpatient Daily Activity Raw Score: 24  AM-PAC Inpatient ADL T-Scale Score : 57.54  ADL Inpatient CMS 0-100% Score: 0  ADL Inpatient CMS G-Code Modifier : CH           SUBJECTIVE:     Ms. Siena New states she will get out of bed     Social/Functional Lives With: Alone  Type of Home: House  Home Layout: One level  Home Access: Stairs to enter with rails  Entrance Stairs - Number of Steps: 3  Entrance Stairs - Rails: Both  Bathroom Shower/Tub: Tub/Shower unit  Bathroom Toilet: Standard  ADL Assistance: Independent  Homemaking Assistance: Independent  Ambulation Assistance: Independent  Transfer Assistance: Independent  Mode of Transportation: Car  Occupation: On disability    OBJECTIVE:     James Winsome / Nivia Calle / Mundo Sharp: IV    RESTRICTIONS/PRECAUTIONS:       PAIN: Lupie Sober / O2:   Pre Treatment:          Post Treatment: no complaint of pain, but with nausea RN aware        Vitals          Oxygen            GROSS EVALUATION: INTACT IMPAIRED   (See Comments)   UE AROM [x] []   UE PROM [x] []   Strength []  generalized weakness     Posture / Balance [x]     Sensation [x]     Coordination [x]       Tone [x]       Edema []    Activity Tolerance [x]       Hand Dominance R [] L []      COGNITION/  PERCEPTION: INTACT IMPAIRED   (See Comments)   Orientation [x]     Vision [x]     Hearing [x]     Cognition  [x]     Perception [x]       MOBILITY: I Mod I S SBA CGA Min Mod Max Total  NT x2 Comments:   Bed Mobility    Rolling [x] [] [] [] [] [] [] [] [] [] []    Supine to Sit [x] [] [] [] [] [] [] [] [] [] []    Scooting [x] [] [] [] [] [] [] [] [] [] []    Sit to Supine [x] [] [] [] [] [] [] [] [] [] []    Transfers    Sit to Stand [] [] [x] [] [] [] [] [] [] [] []    Bed to Chair [] [] [x] [] [] [] [] [] [] [] []    Stand to Sit [] [] [x] [] [] [] [] [] [] [] []    Tub/Shower [] [] [x] [] [] [] [] [] [] [] []     Toilet [] [] [x] [] [] [] [] [] [] [] []      [] [] [] [] [] [] [] [] [] [] []    I=Independent, Mod I=Modified Independent, S=Supervision/Setup, SBA=Standby Assistance, CGA=Contact Guard Assistance, Min=Minimal Assistance, Mod=Moderate Assistance, Max=Maximal Assistance, Total=Total Assistance, NT=Not Tested    ACTIVITIES OF DAILY LIVING: I Mod I S SBA CGA Min Mod Max Total NT Comments   BASIC ADLs:              Upper Body Bathing  [] [] [x] [] [] [] [] [] [] []    Lower Body Bathing [] [] [x] [] [] [] [] [] [] []    Toileting [] [] [x] [] [] [] [] [] [] []    Upper Body Dressing [] [] [x] [] [] [] [] [] [] []    Lower Body Dressing [] [] [x] [] [] [] [] [] [] []    Feeding [] [] [x] [] [] [] [] [] [] []    Grooming [] [] [x] [] [] [] [] [] [] []    Personal Device Care [] [] [] [] [] [] [] [] [] []    Functional Mobility [] [] [x] [] [] [] [] [] [] []    I=Independent, Mod I=Modified Independent, S=Supervision/Setup, SBA=Standby Assistance, CGA=Contact Guard Assistance, Min=Minimal Assistance, Mod=Moderate Assistance, Max=Maximal Assistance, Total=Total Assistance, NT=Not Tested    PLAN:     FREQUENCY/DURATION   OT Plan of Care:  (evaluation and one treatment) for duration of hospital stay or until stated goals are met, whichever comes first.    ACUTE OCCUPATIONAL THERAPY GOALS:   (Developed with and agreed upon by patient and/or caregiver.)  1. Pt will supervision to  (I) with ADL's and ambulated short distances. PROBLEM LIST:   (Skilled intervention is medically necessary to address:)  Decreased Activity Tolerance  Decreased Strength   INTERVENTIONS PLANNED:  (Benefits and precautions of occupational therapy have been discussed with the patient.)  Self Care Training  Education         TREATMENT:     EVALUATION: LOW COMPLEXITY: (Untimed Charge)    TREATMENT:   Self Care: (15 min): Procedure(s) (per grid) utilized to improve and/or restore self-care/home management as related to functional mobility. Required minimal verbal cueing to facilitate activities of daily living skills and compensatory activities.       TREATMENT GRID:  N/A    AFTER TREATMENT PRECAUTIONS: Bed/Chair Locked, Call light within reach, Chair, Needs within reach and PCT at bedside    INTERDISCIPLINARY COLLABORATION:  RN/ PCT, PT/ PTA and OT/ KAUR    EDUCATION:  Education Given To: Patient  Education Provided: Role of Therapy;Plan of Care    TOTAL TREATMENT DURATION AND TIME:  Time In: 1120  Time Out: 46  Minutes: [address], OT

## 2022-06-30 NOTE — PROGRESS NOTES
Hypoglycemia    Patient with 0200 blood glucose of 61mg/dL. Patient actively vomiting. 125mL of 10 Dextrose infused over 8mins. Recheck blood glucose of 123mg/dL.

## 2022-06-30 NOTE — PROGRESS NOTES
Bedside and verbal shift change report received from  [facility] (offgoing nurse). Report included the following information SBAR, Kardex, ED Summary, Intake/Output, MAR, Recent Results and Alarm Parameters .      Dual skin assessment completed at bedside: Skin c/d/i (list pertinent skin assessment findings)    Dual verification of gtts completed (name of gtts verified): N/A

## 2022-06-30 NOTE — PROGRESS NOTES
Hypoglycemia    Patient with PM blood glucose of 43mg/dL. 125mL of 10 Dextrose infused over 8mins. Recheck blood glucose of 108mg/dL.

## 2022-06-30 NOTE — PROGRESS NOTES
PHYSICAL THERAPY Daily Note and AM  (Link to Caseload Tracking: PT Visit Days : 2  Time In/Out PT Charge Capture  Rehab Caseload Tracker  Orders      Don Orozco is a 48 y.o. female   PRIMARY DIAGNOSIS: Severe sepsis ([facility] [address]  Septicemia ([facility] [address] [A41.9]  Acute pyelonephritis [N10]  Severe sepsis ([facility] [address] [A41.9, R65.20]  Nausea and vomiting, intractability of vomiting not specified, unspecified vomiting type [R11.2]  Chronic kidney disease, unspecified CKD stage [N18.9]       Inpatient: Payor: BCBS / Plan: BCBS - [place] PPO / Product Type: *No Product type* /     ASSESSMENT:     REHAB RECOMMENDATIONS:   Recommendation to date pending progress:  Setting:   No further skilled therapy after discharge from hospital    Equipment:     None     ASSESSMENT:  Ms. Yury Hernandez  Supine upon arrival.  Independent with all bed mobility. Ambulated 100 ft using no AD with SBA. Return to the chair with needs in reach and instructed to call for assist, before you get up.      SUBJECTIVE:   Ms. Yury Hernandez states, \"I will sit in the chair\"    Social/Functional Lives With: Alone  Type of Home: House  Home Layout: One level  Home Access: Stairs to enter with rails  Entrance Stairs - Number of Steps: 3  Entrance Stairs - Rails: Both  Bathroom Shower/Tub: Tub/Shower unit  Bathroom Toilet: Standard  ADL Assistance: Independent  Homemaking Assistance: Independent  Ambulation Assistance: Independent  Transfer Assistance: Independent  Mode of Transportation: Car  Occupation: On disability  OBJECTIVE:     PAIN: Erla Carmenza / O2: Gala Preciado / Anuradha Peralta / Simran Bath:   Pre Treatment:   Pain Assessment: None - Denies Pain      Post Treatment: none Vitals        Oxygen    IV    RESTRICTIONS/PRECAUTIONS:        MOBILITY: I Mod I S SBA CGA Min Mod Max Total  NT x2 Comments:   Bed Mobility    Rolling [x] [] [] [] [] [] [] [] [] [] []    Supine to Sit [x] [] [] [] [] [] [] [] [] [] []    Scooting [x] [] [] [] [] [] [] [] [] [] []    Sit to Supine [] [] [] [] [] [] [] [] [] [] []    Transfers    Sit to Stand [] [] [] [x] [] [] [] [] [] [] []    Bed to Chair [] [] [] [x] [] [] [] [] [] [] []    Stand to Sit [] [] [] [x] [] [] [] [] [] [] []     [] [] [] [] [] [] [] [] [] [] []    I=Independent, Mod I=Modified Independent, S=Supervision, SBA=Standby Assistance, CGA=Contact Guard Assistance,   Min=Minimal Assistance, Mod=Moderate Assistance, Max=Maximal Assistance, Total=Total Assistance, NT=Not Tested    BALANCE: Good Fair+ Fair Fair- Poor NT Comments   Sitting Static [] [] [x] [] [] []    Sitting Dynamic [] [] [] [] [] []              Standing Static [] [] [x] [] [] []    Standing Dynamic [] [] [] [] [] []      GAIT: I Mod I S SBA CGA Min Mod Max Total  NT x2 Comments:   Level of Assistance [] [] [] [x] [] [] [] [] [] [] []    Distance 100 feet    DME None    Gait Quality Decreased step clearance and Decreased step length    Weightbearing Status      Stairs      I=Independent, Mod I=Modified Independent, S=Supervision, SBA=Standby Assistance, CGA=Contact Guard Assistance,   Min=Minimal Assistance, Mod=Moderate Assistance, Max=Maximal Assistance, Total=Total Assistance, NT=Not Tested    PLAN:   ACUTE PHYSICAL THERAPY GOALS:   (Developed with and agreed upon by patient and/or caregiver.)  (1.)Ms. Renea Fry will climb up/down 5 steps with rails and SBA  within 3 treatment day(s). (2.)Ms. Renea Fry will transfer from bed to chair and chair to bed with INDEPENDENT  within 3 treatment day(s). (3.)Ms. Renea Fry will ambulate with INDEPENDENCE for 200 feet  within 3 treatment day(s).        FREQUENCY AND DURATION: Daily for duration of hospital stay or until stated goals are met, whichever comes first.    TREATMENT:   TREATMENT:   Therapeutic Activity (23 Minutes): Therapeutic activity included Supine to Sit, Ambulation on level ground, Sitting balance  and Standing balance to improve functional Activity tolerance, Balance, Coordination and Mobility.     TREATMENT GRID:      AFTER TREATMENT PRECAUTIONS: Call light within reach, Chair, Needs within reach and RN notified    INTERDISCIPLINARY COLLABORATION:  PT/ PTA and OT/ KAUR    EDUCATION: Education Given To: Patient  Education Provided: Role of Therapy  Education Method: Demonstration;Verbal    TIME IN/OUT:  Time In: 1130  Time Out: Reese Olmedo 105  Minutes: Isabella Marroquin 1560, PTA

## 2022-06-30 NOTE — PROGRESS NOTES
[facility] Progress Note         Admit Date:  6/26/2022    Today's Date:  6/30/2022    CC:  Nausea, Vomiting    Subjective:     Patient had episodes of hypoglycemia overnight. She reports having a \"rough night\" with nausea. Reports some vomiting overnight. She has not touched her breakfast tray as she is \"afraid\" to eat. Reports lower abdominal pain. No BM in several days. States that she could not take the miralax yesterday.     Medications:   Current Facility-Administered Medications   Medication Dose Route Frequency    insulin glargine (LANTUS) injection vial 10 Units  10 Units SubCUTAneous QAM    insulin lispro (HUMALOG) injection vial 0-4 Units  0-4 Units SubCUTAneous TID WC    insulin lispro (HUMALOG) injection vial 0-4 Units  0-4 Units SubCUTAneous Nightly    magnesium sulfate 1000 mg in dextrose 5% 100 mL IVPB  1,000 mg IntraVENous Once    polyethylene glycol (GLYCOLAX) packet 17 g  17 g Oral Daily    potassium chloride 10 mEq/100 mL IVPB (Peripheral Line)  10 mEq IntraVENous PRN    morphine (PF) injection 1 mg  1 mg IntraVENous Q6H PRN    HYDROcodone-acetaminophen (NORCO) 7.5-325 MG per tablet 1 tablet  1 tablet Oral Q6H PRN    ciprofloxacin (CIPRO) IVPB 400 mg  400 mg IntraVENous Q12H    pantoprazole (PROTONIX) 40 mg in sodium chloride (PF) 10 mL injection  40 mg IntraVENous Daily    metoclopramide (REGLAN) injection 5 mg  5 mg IntraVENous Q6H    magnesium sulfate 2000 mg in 50 mL IVPB premix  2,000 mg IntraVENous PRN    ondansetron (ZOFRAN) injection 4 mg  4 mg IntraVENous Q6H PRN    albuterol (PROVENTIL) nebulizer solution 2.5 mg  2.5 mg Nebulization Q6H PRN    glucose chewable tablet 16 g  4 tablet Oral PRN    dextrose bolus 10% 125 mL  125 mL IntraVENous PRN    Or    dextrose bolus 10% 250 mL  250 mL IntraVENous PRN    glucagon injection 1 mg  1 mg IntraMUSCular PRN    dextrose 5 % solution  100 mL/hr IntraVENous PRN    sodium chloride flush 0.9 % injection 5-40 mL 5-40 mL IntraVENous 2 times per day    sodium chloride flush 0.9 % injection 5-40 mL  5-40 mL IntraVENous PRN    0.9 % sodium chloride infusion   IntraVENous PRN    enoxaparin Sodium (LOVENOX) injection 30 mg  30 mg SubCUTAneous Daily    acetaminophen (TYLENOL) tablet 650 mg  650 mg Oral Q6H PRN    Or    acetaminophen (TYLENOL) suppository 650 mg  650 mg Rectal Q6H PRN    aluminum & magnesium hydroxide-simethicone (MAALOX) 200-200-20 MG/5ML suspension 30 mL  30 mL Oral Q6H PRN       Review of Systems:  ROS was obtained, with pertinent positives as listed above. No chest pain or SOB. Diet:  Clear liquids    Objective:   Vitals:  /83   Pulse 99   Temp 98.1 °F (36.7 °C) (Axillary)   Resp 18   Ht 5' 8\" (1.727 m)   Wt 155 lb 3.2 oz (70.4 kg)   SpO2 98%   BMI 23.60 kg/m²   Intake/Output:  No intake/output data recorded. 06/28 1901 - 06/30 0700  In: 2130.8 [I.V.:1142.4]  Out: 950   Exam:  General appearance: alert, cooperative, no distress  Lungs: clear to auscultation bilaterally anteriorly  Heart: regular rate and rhythm  Abdomen: soft, TTP ACROSS THE LOWER ABDOMEN.  Bowel sounds normal. No masses, no organomegaly  Extremities: extremities normal, atraumatic, no cyanosis or edema  Neuro:  alert and oriented    Data Review (Labs):    Recent Labs     06/28/22  0629 06/28/22  0913 06/28/22  1933 06/29/22  0442 06/29/22  1852 06/30/22  0514   WBC  --  10.9  --  8.8  --  9.4   HGB  --  7.7*  --  8.1*  --  8.1*   HCT  --  24.3*  --  25.9*  --  26.3*   PLT  --  478*  --  462*  --  516*   MCV  --  82.4  --  83.0  --  82.7     --   --  142  --  143   K 3.2*  --  3.6 3.2* 3.4* 4.0   *  --   --  108*  --  109*   CO2 25  --   --  26  --  27   BUN 9  --   --  6  --  4*   MG 1.6*  --  2.3  --   --  1.6*   AST 31  --   --  44*  --  43*   ALT 31  --   --  37  --  37       Assessment:     Principal Problem:    Severe sepsis (HCC)  Active Problems:    Benign hypertension with CKD (chronic kidney disease) stage IV ([facility] [address]    Type 1 diabetes mellitus with hyperglycemia (HCC)    Pyelonephritis  Resolved Problems:    * No resolved hospital problems. *      Patient is a 48 y.o. female with PMH including but not limited to type 1 DM, Gastroparesis, CKD IV, Cardiac Arrest, Charcot's Arthropathy,  pyelonephritis, KENYATTA, who is seen in consultation at the request of Dr. Florencio Goel for persistent nausea and vomiting. She has gastroparesis which is likely exacerbated by sepsis and recent DKA. HGB A1C: 8.3. She has had recent imaging on 6/13/2022 at [facility] including CT of the abdomen with renal stone protocol and RUQ US. RUQ us showed a CBD of 3mm, 2mm nonmobile echogenic foci associated with the GB wall, no thickening or pericholecystic fluid seen.     6/30: K 4.0, creatinine 2.07, mg 1.6, TB 0.3, AST 43, ALT 37, , WBC 9.4, HGB 8.1, MCV 82. Plan:   Discontinue Reglan, which was started 6/28, and has not helped. Start on Zofran 8mg IV q8 hours today. Monitor for improvement. Continue with PPI IV BID  No plans for Endoscopy, at this time. Clear liquid diet for now and slowly advance to a low fat and low roughage diet, as tolerated   Miralax for constipation  Check Abdominal Xray  Follow electrolytes and correct, as needed. Tight Glucose Control is imperative    TANISHA Edwards    Patient is seen and examined in collaboration with Dr. Yon Beck.  Assessment and plan as per Dr. Yon Beck. .

## 2022-06-30 NOTE — CARE COORDINATION
Discharge planning was discussed with the Interdisciplinary Team. The patient is not yet medically ready for discharge and the plan is to transition her to oral antibiotics at discharge.  She has been evaluated by physical therapy and there are no recommendations for continued therapy services after she has been discharged from the hospital.

## 2022-06-30 NOTE — PROGRESS NOTES
Hospitalist Progress Note   Admit Date:  2022  3:52 PM   Name:  Kalani Lam   Age:  48 y.o. Sex:  female  :  1975   MRN:  [id number]   Room:  [facility]    Presenting Complaint: Nausea and Emesis     Reason(s) for Admission: Septicemia ([facility] [address] [A41.9]  Acute pyelonephritis [N10]  Severe sepsis ([facility] [address] [A41.9, R65.20]  Nausea and vomiting, intractability of vomiting not specified, unspecified vomiting type [R11.2]  Chronic kidney disease, unspecified CKD stage [N18.9]     Hospital Course & Interval History:   Kalani Lam is a 48 y.o. female with medical history of uncontrolled diabetes with complications, hypertension, CKD, and 2 recent hospitalizations, who presented with nausea and vomiting. Recently hospitalized at [facility] from  through  with pyelonephritis and DKA. Patient admitted with sepsis secondary to UTI. Started on empiric antibiotics. Blood culture growing Enterobacter. ID consulted. Antibiotics switched to Cipro. Gastroenterologist was consulted. She was treated with Reglan with minimal relief. She was started on Zofran and no plans for endoscopy. Repeat blood culture negative. Abdominal x-ray showed    Subjective/24hr Events (22): Patient is seen at the bedside. Reports persistent nausea and vomiting. She had multiple episodes of vomiting. She is unable to tolerate p.o. medications and diet. She was hypoglycemic yesterday and was given D5. Denies chest pain, palpitations or shortness of breath. Assessment & Plan:     Severe sepsis secondary to UTI:  Enterobacter bacteremia: Possible source GI versus urine  Patient with recent history of pyelonephritis, CVA tenderness negative  Continue Cipro IV EOT  as patient is unable to tolerate p.o.     Repeat blood culture negative    ID signed off  PT/OT recommended no further skilled therapy    Hypoglycemia  Yesterday she was hypoglycemic to 43 and 39  S/p D5  Blood glucose improved to 100    Diabetic gastroparesis  Patient remained with persistent nausea/vomiting   Reglan discontinued and started on Zofran every 8  Continue with PPI IV   Check abdominal x-ray  No plans for Endoscopy, at this time. Not tolerating clear liquids and will advance the diet as tolerated  GI following    Hypernatremia:  Resolved    Constipation  Continue MiraLAX    Electrolyte abnormalities  Magnesium was 1.6 and phosphorus 2.4  Repleted magnesium and phosphorus    Elevated alkaline phosphatase    Type 1 diabetes mellitus:  Patient is not tolerating diet and will increase insulin after her nutrition improves  Decreased Lantus to 10 units from 25 units daily and sliding scale  Inpatient diabetes management consult    CKD stage IV:  Stable  Continue to monitor    Hypertension:  Continue home meds      Discharge Planning: Anticipate discharge in 48 hours  Diet:  ADULT DIET; Clear Liquid; 4 carb choices (60 gm/meal)  DVT PPx: Heparin  Code status: Full Code    Hospital Problems:  Principal Problem:    Severe sepsis ([facility] [address]  Active Problems:    Benign hypertension with CKD (chronic kidney disease) stage IV (HCC)    Type 1 diabetes mellitus with hyperglycemia ([facility] [address]    Pyelonephritis  Resolved Problems:    * No resolved hospital problems. *      Objective:     Patient Vitals for the past 24 hrs:   Temp Pulse Resp BP SpO2   06/30/22 1151 98.1 °F (36.7 °C) (!) 109 18 123/88 99 %   06/30/22 0719 98.1 °F (36.7 °C) 99 18 134/83 98 %   06/30/22 0344 98.6 °F (37 °C) 100 16 (!) 148/71 100 %   06/30/22 0323 -- -- 17 -- --   06/30/22 0253 -- -- 18 -- --   06/30/22 0014 98.7 °F (37.1 °C) 98 18 121/74 98 %   06/29/22 1920 99.2 °F (37.3 °C) 86 20 124/76 100 %       Oxygen Therapy  SpO2: 99 %  Pulse Oximetry Type:  Intermittent  Pulse via Oximetry: 100 beats per minute  SPO2 High Alarm Limit: 100  SPO2 Low Alarm Limit POX: 90  Pulse Oximeter Device Mode: Intermittent  Pulse Oximeter Device Location: Right,Hand,Finger  O2 Device: None (Room air)    Estimated body mass index is 23.86 kg/m² as calculated from the following:    Height as of this encounter: 5' 8\" (1.727 m). Weight as of this encounter: 156 lb 14.4 oz (71.2 kg). Intake/Output Summary (Last 24 hours) at 6/30/2022 1158  Last data filed at 6/30/2022 1000  Gross per 24 hour   Intake 1756.84 ml   Output 625 ml   Net 1131.84 ml         Physical Exam:     Blood pressure 123/88, pulse (!) 109, temperature 98.1 °F (36.7 °C), temperature source Oral, resp. rate 18, height 5' 8\" (1.727 m), weight 156 lb 14.4 oz (71.2 kg), SpO2 99 %. General:    Well nourished. Head:  Normocephalic, atraumatic  Eyes:  Sclerae appear normal.  Pupils equally round. ENT:  Nares appear normal, no drainage. Moist oral mucosa  Neck:  No restricted ROM. Trachea midline   CV:   RRR. No m/r/g. No jugular venous distension. Lungs:   CTAB. No wheezing, rhonchi, or rales. Respirations even, unlabored  Abdomen: Bowel sounds present. Soft, nontender, nondistended. No CVA tenderness  Extremities: No cyanosis or clubbing. No edema  Skin:     No rashes and normal coloration. Warm and dry. Neuro:  CN II-XII grossly intact. Sensation intact. A&Ox3  Psych:  Normal mood and affect.       I have reviewed ordered lab tests and independently visualized imaging below:    Recent Labs:  Recent Results (from the past 48 hour(s))   Hepatitis A Antibody, IgM    Collection Time: 06/28/22  2:15 PM   Result Value Ref Range    Hep A IgM NONREACTIVE NR     POCT Glucose    Collection Time: 06/28/22  5:33 PM   Result Value Ref Range    POC Glucose 113 (H) 65 - 100 mg/dL    Performed by: Tristen    Potassium    Collection Time: 06/28/22  7:33 PM   Result Value Ref Range    Potassium 3.6 3.5 - 5.1 mmol/L   Magnesium    Collection Time: 06/28/22  7:33 PM   Result Value Ref Range    Magnesium 2.3 1.8 - 2.4 mg/dL   POCT Glucose    Collection Time: 06/28/22  9:06 PM   Result Value Ref Range    POC Glucose 40 (L) 65 - 100 mg/dL    Performed by: Digna    POCT Glucose    Collection Time: 06/28/22  9:39 PM   Result Value Ref Range    POC Glucose 106 (H) 65 - 100 mg/dL    Performed by: Digna    POCT Glucose    Collection Time: 06/29/22  2:04 AM   Result Value Ref Range    POC Glucose 88 65 - 100 mg/dL    Performed by: Digna    CBC with Auto Differential    Collection Time: 06/29/22  4:42 AM   Result Value Ref Range    WBC 8.8 4.3 - 11.1 K/uL    RBC 3.12 (L) 4.05 - 5.2 M/uL    Hemoglobin 8.1 (L) 11.7 - 15.4 g/dL    Hematocrit 25.9 (L) 35.8 - 46.3 %    MCV 83.0 79.6 - 97.8 FL    MCH 26.0 (L) 26.1 - 32.9 PG    MCHC 31.3 (L) 31.4 - 35.0 g/dL    RDW 15.3 (H) 11.9 - 14.6 %    Platelets 828 (H) 456 - 450 K/uL    MPV 8.6 (L) 9.4 - 12.3 FL    nRBC 0.00 0.0 - 0.2 K/uL    Differential Type AUTOMATED      Seg Neutrophils 62 43 - 78 %    Lymphocytes 30 13 - 44 %    Monocytes 6 4.0 - 12.0 %    Eosinophils % 2 0.5 - 7.8 %    Basophils 0 0.0 - 2.0 %    Immature Granulocytes 1 0.0 - 5.0 %    Segs Absolute 5.4 1.7 - 8.2 K/UL    Absolute Lymph # 2.6 0.5 - 4.6 K/UL    Absolute Mono # 0.5 0.1 - 1.3 K/UL    Absolute Eos # 0.2 0.0 - 0.8 K/UL    Basophils Absolute 0.0 0.0 - 0.2 K/UL    Absolute Immature Granulocyte 0.1 0.0 - 0.5 K/UL   Comprehensive Metabolic Panel    Collection Time: 06/29/22  4:42 AM   Result Value Ref Range    Sodium 142 136 - 145 mmol/L    Potassium 3.2 (L) 3.5 - 5.1 mmol/L    Chloride 108 (H) 98 - 107 mmol/L    CO2 26 21 - 32 mmol/L    Anion Gap 8 7 - 16 mmol/L    Glucose 149 (H) 65 - 100 mg/dL    BUN 6 6 - 23 MG/DL    CREATININE 2.23 (H) 0.6 - 1.0 MG/DL    GFR African American 30 (L) >60 ml/min/1.73m2    GFR Non- 25 (L) >60 ml/min/1.73m2    Calcium 8.0 (L) 8.3 - 10.4 MG/DL    Total Bilirubin 0.2 0.2 - 1.1 MG/DL    ALT 37 12 - 65 U/L    AST 44 (H) 15 - 37 U/L    Alk Phosphatase 253 (H) 50 - 130 U/L    Total Protein 6.6 6.3 - 8.2 g/dL    Albumin 1.7 (L) 3.5 - 5.0 g/dL    Globulin 4.9 (H) 2.3 - 3.5 g/dL    Albumin/Globulin Ratio 0.3 (L) 1.2 - 3.5     POCT Glucose    Collection Time: 06/29/22  7:38 AM   Result Value Ref Range    POC Glucose 198 (H) 65 - 100 mg/dL    Performed by: Patsy    POCT Glucose    Collection Time: 06/29/22 11:11 AM   Result Value Ref Range    POC Glucose 202 (H) 65 - 100 mg/dL    Performed by: Tristen    POCT Glucose    Collection Time: 06/29/22  4:13 PM   Result Value Ref Range    POC Glucose 39 (LL) 65 - 100 mg/dL    Performed by: Tristen    POCT Glucose    Collection Time: 06/29/22  4:40 PM   Result Value Ref Range    POC Glucose 163 (H) 65 - 100 mg/dL    Performed by: Tristen    Potassium    Collection Time: 06/29/22  6:52 PM   Result Value Ref Range    Potassium 3.4 (L) 3.5 - 5.1 mmol/L   POCT Glucose    Collection Time: 06/29/22  8:33 PM   Result Value Ref Range    POC Glucose 43 (L) 65 - 100 mg/dL    Performed by: Kai Slaughter    POCT Glucose    Collection Time: 06/29/22  9:07 PM   Result Value Ref Range    POC Glucose 108 (H) 65 - 100 mg/dL    Performed by: Dinga    POCT Glucose    Collection Time: 06/30/22  2:17 AM   Result Value Ref Range    POC Glucose 61 (L) 65 - 100 mg/dL    Performed by: Digna    POCT Glucose    Collection Time: 06/30/22  2:48 AM   Result Value Ref Range    POC Glucose 123 (H) 65 - 100 mg/dL    Performed by: Digna    CBC with Auto Differential    Collection Time: 06/30/22  5:14 AM   Result Value Ref Range    WBC 9.4 4.3 - 11.1 K/uL    RBC 3.18 (L) 4.05 - 5.2 M/uL    Hemoglobin 8.1 (L) 11.7 - 15.4 g/dL    Hematocrit 26.3 (L) 35.8 - 46.3 %    MCV 82.7 79.6 - 97.8 FL    MCH 25.5 (L) 26.1 - 32.9 PG    MCHC 30.8 (L) 31.4 - 35.0 g/dL    RDW 15.2 (H) 11.9 - 14.6 %    Platelets 222 (H) 846 - 450 K/uL    MPV 8.8 (L) 9.4 - 12.3 FL    nRBC 0.00 0.0 - 0.2 K/uL    Differential Type AUTOMATED      Seg Neutrophils 64 43 - 78 %    Lymphocytes 26 13 - 44 %    Monocytes 6 4.0 - 12.0 % Eosinophils % 2 0.5 - 7.8 %    Basophils 0 0.0 - 2.0 %    Immature Granulocytes 2 0.0 - 5.0 %    Segs Absolute 6.0 1.7 - 8.2 K/UL    Absolute Lymph # 2.4 0.5 - 4.6 K/UL    Absolute Mono # 0.6 0.1 - 1.3 K/UL    Absolute Eos # 0.2 0.0 - 0.8 K/UL    Basophils Absolute 0.0 0.0 - 0.2 K/UL    Absolute Immature Granulocyte 0.2 0.0 - 0.5 K/UL   Comprehensive Metabolic Panel    Collection Time: 06/30/22  5:14 AM   Result Value Ref Range    Sodium 143 136 - 145 mmol/L    Potassium 4.0 3.5 - 5.1 mmol/L    Chloride 109 (H) 98 - 107 mmol/L    CO2 27 21 - 32 mmol/L    Anion Gap 7 7 - 16 mmol/L    Glucose 118 (H) 65 - 100 mg/dL    BUN 4 (L) 6 - 23 MG/DL    CREATININE 2.07 (H) 0.6 - 1.0 MG/DL    GFR African American 33 (L) >60 ml/min/1.73m2    GFR Non- 27 (L) >60 ml/min/1.73m2    Calcium 8.2 (L) 8.3 - 10.4 MG/DL    Total Bilirubin 0.3 0.2 - 1.1 MG/DL    ALT 37 12 - 65 U/L    AST 43 (H) 15 - 37 U/L    Alk Phosphatase 277 (H) 50 - 136 U/L    Total Protein 6.2 (L) 6.3 - 8.2 g/dL    Albumin 1.9 (L) 3.5 - 5.0 g/dL    Globulin 4.3 (H) 2.3 - 3.5 g/dL    Albumin/Globulin Ratio 0.4 (L) 1.2 - 3.5     Magnesium    Collection Time: 06/30/22  5:14 AM   Result Value Ref Range    Magnesium 1.6 (L) 1.8 - 2.4 mg/dL   Phosphorus    Collection Time: 06/30/22  5:14 AM   Result Value Ref Range    Phosphorus 2.4 (L) 2.5 - 4.5 MG/DL   Culture, Blood 1    Collection Time: 06/30/22  5:14 AM    Specimen: Blood   Result Value Ref Range    Special Requests RIGHT  FOREARM        Culture PENDING    POCT Glucose    Collection Time: 06/30/22  7:36 AM   Result Value Ref Range    POC Glucose 170 (H) 65 - 100 mg/dL    Performed by: Madie    POCT Glucose    Collection Time: 06/30/22 11:22 AM   Result Value Ref Range    POC Glucose 164 (H) 65 - 100 mg/dL    Performed by: Yashira Damon        Other Studies:  XR ABDOMEN (KUB) (SINGLE AP VIEW)   Final Result   RIGHT URETERAL STENT IN PLACE WITH NO ACUTE ABDOMINAL ABNORMALITY   IDENTIFIED. MG/5ML suspension 30 mL  30 mL Oral Q6H PRN       Signed:  Paradise Garzon MD    Part of this note may have been written by using a voice dictation software. The note has been proof read but may still contain some grammatical/other typographical errors.

## 2022-07-01 LAB
ALBUMIN SERPL-MCNC: 2 G/DL (ref 3.5–5)
ALBUMIN/GLOB SERPL: 0.5 {RATIO} (ref 1.2–3.5)
ALP SERPL-CCNC: 243 U/L (ref 50–136)
ALT SERPL-CCNC: 35 U/L (ref 12–65)
ANION GAP SERPL CALC-SCNC: 7 MMOL/L (ref 7–16)
AST SERPL-CCNC: 47 U/L (ref 15–37)
BACTERIA SPEC CULT: NORMAL
BASOPHILS # BLD: 0 K/UL (ref 0–0.2)
BASOPHILS NFR BLD: 0 % (ref 0–2)
BILIRUB SERPL-MCNC: 0.2 MG/DL (ref 0.2–1.1)
BUN SERPL-MCNC: 6 MG/DL (ref 6–23)
CALCIUM SERPL-MCNC: 8.4 MG/DL (ref 8.3–10.4)
CHLORIDE SERPL-SCNC: 110 MMOL/L (ref 98–107)
CO2 SERPL-SCNC: 26 MMOL/L (ref 21–32)
CREAT SERPL-MCNC: 2.28 MG/DL (ref 0.6–1)
DIFFERENTIAL METHOD BLD: ABNORMAL
EOSINOPHIL # BLD: 0.1 K/UL (ref 0–0.8)
EOSINOPHIL NFR BLD: 1 % (ref 0.5–7.8)
ERYTHROCYTE [DISTWIDTH] IN BLOOD BY AUTOMATED COUNT: 15.5 % (ref 11.9–14.6)
GLOBULIN SER CALC-MCNC: 4.4 G/DL (ref 2.3–3.5)
GLUCOSE BLD STRIP.AUTO-MCNC: 163 MG/DL (ref 65–100)
GLUCOSE BLD STRIP.AUTO-MCNC: 173 MG/DL (ref 65–100)
GLUCOSE BLD STRIP.AUTO-MCNC: 181 MG/DL (ref 65–100)
GLUCOSE BLD STRIP.AUTO-MCNC: 188 MG/DL (ref 65–100)
GLUCOSE BLD STRIP.AUTO-MCNC: 245 MG/DL (ref 65–100)
GLUCOSE BLD STRIP.AUTO-MCNC: 287 MG/DL (ref 65–100)
GLUCOSE SERPL-MCNC: 188 MG/DL (ref 65–100)
HCT VFR BLD AUTO: 24.9 % (ref 35.8–46.3)
HGB BLD-MCNC: 7.7 G/DL (ref 11.7–15.4)
IMM GRANULOCYTES # BLD AUTO: 0.2 K/UL (ref 0–0.5)
IMM GRANULOCYTES NFR BLD AUTO: 2 % (ref 0–5)
LYMPHOCYTES # BLD: 2 K/UL (ref 0.5–4.6)
LYMPHOCYTES NFR BLD: 21 % (ref 13–44)
MAGNESIUM SERPL-MCNC: 1.7 MG/DL (ref 1.8–2.4)
MCH RBC QN AUTO: 25.7 PG (ref 26.1–32.9)
MCHC RBC AUTO-ENTMCNC: 30.9 G/DL (ref 31.4–35)
MCV RBC AUTO: 83 FL (ref 79.6–97.8)
MONOCYTES # BLD: 0.7 K/UL (ref 0.1–1.3)
MONOCYTES NFR BLD: 7 % (ref 4–12)
NEUTS SEG # BLD: 6.7 K/UL (ref 1.7–8.2)
NEUTS SEG NFR BLD: 69 % (ref 43–78)
NRBC # BLD: 0 K/UL (ref 0–0.2)
PHOSPHATE SERPL-MCNC: 3.4 MG/DL (ref 2.5–4.5)
PLATELET # BLD AUTO: 437 K/UL (ref 150–450)
PMV BLD AUTO: 8.7 FL (ref 9.4–12.3)
POTASSIUM SERPL-SCNC: 3.8 MMOL/L (ref 3.5–5.1)
PROT SERPL-MCNC: 6.4 G/DL (ref 6.3–8.2)
RBC # BLD AUTO: 3 M/UL (ref 4.05–5.2)
SERVICE CMNT-IMP: ABNORMAL
SERVICE CMNT-IMP: NORMAL
SODIUM SERPL-SCNC: 143 MMOL/L (ref 136–145)
WBC # BLD AUTO: 9.3 K/UL (ref 4.3–11.1)

## 2022-07-01 PROCEDURE — C9113 INJ PANTOPRAZOLE SODIUM, VIA: HCPCS | Performed by: NURSE PRACTITIONER

## 2022-07-01 PROCEDURE — 84100 ASSAY OF PHOSPHORUS: CPT

## 2022-07-01 PROCEDURE — 6370000000 HC RX 637 (ALT 250 FOR IP): Performed by: INTERNAL MEDICINE

## 2022-07-01 PROCEDURE — 2580000003 HC RX 258: Performed by: FAMILY MEDICINE

## 2022-07-01 PROCEDURE — 36415 COLL VENOUS BLD VENIPUNCTURE: CPT

## 2022-07-01 PROCEDURE — 6370000000 HC RX 637 (ALT 250 FOR IP): Performed by: STUDENT IN AN ORGANIZED HEALTH CARE EDUCATION/TRAINING PROGRAM

## 2022-07-01 PROCEDURE — 82962 GLUCOSE BLOOD TEST: CPT

## 2022-07-01 PROCEDURE — 85025 COMPLETE CBC W/AUTO DIFF WBC: CPT

## 2022-07-01 PROCEDURE — 97530 THERAPEUTIC ACTIVITIES: CPT

## 2022-07-01 PROCEDURE — 6360000002 HC RX W HCPCS: Performed by: FAMILY MEDICINE

## 2022-07-01 PROCEDURE — 2580000003 HC RX 258: Performed by: NURSE PRACTITIONER

## 2022-07-01 PROCEDURE — 83735 ASSAY OF MAGNESIUM: CPT

## 2022-07-01 PROCEDURE — 80053 COMPREHEN METABOLIC PANEL: CPT

## 2022-07-01 PROCEDURE — A4216 STERILE WATER/SALINE, 10 ML: HCPCS | Performed by: NURSE PRACTITIONER

## 2022-07-01 PROCEDURE — 97535 SELF CARE MNGMENT TRAINING: CPT

## 2022-07-01 PROCEDURE — 6360000002 HC RX W HCPCS: Performed by: NURSE PRACTITIONER

## 2022-07-01 PROCEDURE — 1100000000 HC RM PRIVATE

## 2022-07-01 PROCEDURE — 6360000002 HC RX W HCPCS: Performed by: INTERNAL MEDICINE

## 2022-07-01 RX ORDER — SODIUM CHLORIDE, SODIUM LACTATE, POTASSIUM CHLORIDE, CALCIUM CHLORIDE 600; 310; 30; 20 MG/100ML; MG/100ML; MG/100ML; MG/100ML
INJECTION, SOLUTION INTRAVENOUS CONTINUOUS
Status: DISCONTINUED | OUTPATIENT
Start: 2022-07-01 | End: 2022-07-05

## 2022-07-01 RX ORDER — HALOPERIDOL 5 MG/ML
1 INJECTION INTRAMUSCULAR ONCE
Status: COMPLETED | OUTPATIENT
Start: 2022-07-01 | End: 2022-07-01

## 2022-07-01 RX ADMIN — INSULIN GLARGINE 10 UNITS: 100 INJECTION, SOLUTION SUBCUTANEOUS at 08:31

## 2022-07-01 RX ADMIN — ENOXAPARIN SODIUM 30 MG: 100 INJECTION SUBCUTANEOUS at 08:30

## 2022-07-01 RX ADMIN — SODIUM CHLORIDE, PRESERVATIVE FREE 40 MG: 5 INJECTION INTRAVENOUS at 21:38

## 2022-07-01 RX ADMIN — INSULIN LISPRO 2 UNITS: 100 INJECTION, SOLUTION INTRAVENOUS; SUBCUTANEOUS at 13:19

## 2022-07-01 RX ADMIN — SODIUM CHLORIDE, PRESERVATIVE FREE 40 MG: 5 INJECTION INTRAVENOUS at 08:30

## 2022-07-01 RX ADMIN — SODIUM CHLORIDE, POTASSIUM CHLORIDE, SODIUM LACTATE AND CALCIUM CHLORIDE: 600; 310; 30; 20 INJECTION, SOLUTION INTRAVENOUS at 22:05

## 2022-07-01 RX ADMIN — SODIUM CHLORIDE, PRESERVATIVE FREE 10 ML: 5 INJECTION INTRAVENOUS at 08:40

## 2022-07-01 RX ADMIN — ONDANSETRON 4 MG: 2 INJECTION INTRAMUSCULAR; INTRAVENOUS at 21:44

## 2022-07-01 RX ADMIN — HALOPERIDOL LACTATE 1 MG: 5 INJECTION, SOLUTION INTRAMUSCULAR at 22:15

## 2022-07-01 RX ADMIN — ONDANSETRON 4 MG: 2 INJECTION INTRAMUSCULAR; INTRAVENOUS at 18:49

## 2022-07-01 RX ADMIN — ONDANSETRON 4 MG: 2 INJECTION INTRAMUSCULAR; INTRAVENOUS at 11:39

## 2022-07-01 RX ADMIN — ONDANSETRON 8 MG: 2 INJECTION INTRAMUSCULAR; INTRAVENOUS at 05:26

## 2022-07-01 RX ADMIN — ONDANSETRON 8 MG: 2 INJECTION INTRAMUSCULAR; INTRAVENOUS at 16:07

## 2022-07-01 RX ADMIN — CIPROFLOXACIN 400 MG: 2 INJECTION, SOLUTION INTRAVENOUS at 11:42

## 2022-07-01 RX ADMIN — PROCHLORPERAZINE EDISYLATE 10 MG: 5 INJECTION INTRAMUSCULAR; INTRAVENOUS at 20:03

## 2022-07-01 RX ADMIN — SODIUM CHLORIDE, PRESERVATIVE FREE 10 ML: 5 INJECTION INTRAVENOUS at 21:37

## 2022-07-01 RX ADMIN — BISACODYL 10 MG: 10 SUPPOSITORY RECTAL at 08:30

## 2022-07-01 ASSESSMENT — PAIN SCALES - GENERAL
PAINLEVEL_OUTOF10: 0

## 2022-07-01 NOTE — PROGRESS NOTES
Hospitalist Progress Note   Admit Date:  2022  3:52 PM   Name:  Devon Mcknight   Age:  48 y.o. Sex:  female  :  1975   MRN:  [id number]   Room:  [facility]/    Presenting Complaint: Nausea and Emesis     Reason(s) for Admission: Septicemia ([facility] [address] [A41.9]  Acute pyelonephritis [N10]  Severe sepsis ([facility] [address] [A41.9, R65.20]  Nausea and vomiting, intractability of vomiting not specified, unspecified vomiting type [R11.2]  Chronic kidney disease, unspecified CKD stage [N18.9]     Hospital Course & Interval History:   Devon Mcknight is a 48 y.o. female with medical history of uncontrolled diabetes with complications, hypertension, CKD, and 2 recent hospitalizations, who presented with nausea and vomiting. Recently hospitalized at [facility] from  through  with pyelonephritis and DKA. Patient admitted with sepsis secondary to UTI. Started on empiric antibiotics. Blood culture growing Enterobacter. ID consulted. Antibiotics switched to Cipro. Gastroenterologist was consulted. She was treated with Reglan with minimal relief. She was started on Zofran and no plans for endoscopy. Repeat blood culture negative. Abdominal x-ray showed    Subjective/24hr Events (22): Patient is seen at the bedside. Reports persistent nausea and vomiting. She had multiple episodes of vomiting. She is unable to tolerate p.o. medications and diet. Her blood glucose improved. Denies chest pain, palpitations or shortness of breath. Assessment & Plan:     Severe sepsis secondary to UTI:  Enterobacter bacteremia: Possible source GI versus urine  Patient with recent history of pyelonephritis, CVA tenderness negative  Continue Cipro IV EOT  as patient is unable to tolerate p.o.     Repeat blood culture negative    ID signed off  PT/OT recommended no further skilled therapy    Hypoglycemia  Resolved    Diabetic gastroparesis  Patient remained with persistent nausea/vomiting   Reglan discontinued and started on Zofran every 8  Continue with PPI IV   Abdominal x-ray showed right ureteral stent  No plans for Endoscopy, at this time. clear liquids and will advance the diet as tolerated  GI following    Hypernatremia:  Resolved    Constipation  Continue MiraLAX    Electrolyte abnormalities  Resolved    Elevated alkaline phosphatase    Type 1 diabetes mellitus:  Patient is not tolerating diet and will increase insulin after her nutrition improves  Decreased Lantus to 10 units from 25 units daily and sliding scale  Inpatient diabetes management consult    CKD stage IV:  Stable  Continue to monitor    Hypertension:  Continue home meds      Discharge Planning: Anticipate discharge in 48 hours  Diet:  ADULT DIET; Clear Liquid; 4 carb choices (60 gm/meal)  DVT PPx: Heparin  Code status: Full Code    Hospital Problems:  Principal Problem:    Severe sepsis ([facility] [address]  Active Problems:    Benign hypertension with CKD (chronic kidney disease) stage IV (HCC)    Type 1 diabetes mellitus with hyperglycemia ([facility] [address]    Pyelonephritis  Resolved Problems:    * No resolved hospital problems. *      Objective:     Patient Vitals for the past 24 hrs:   Temp Pulse Resp BP SpO2   07/01/22 0712 98.9 °F (37.2 °C) 98 18 111/71 99 %   07/01/22 0303 99.7 °F (37.6 °C) 96 16 (!) 146/93 --   06/30/22 2311 98.8 °F (37.1 °C) (!) 104 18 107/80 --   06/30/22 1926 99.1 °F (37.3 °C) 99 16 (!) 143/84 98 %   06/30/22 1603 98.9 °F (37.2 °C) 97 19 139/81 99 %   06/30/22 1151 98.1 °F (36.7 °C) (!) 109 18 123/88 99 %       Oxygen Therapy  SpO2: 99 %  Pulse Oximetry Type: Intermittent  Pulse via Oximetry: 99 beats per minute  SPO2 High Alarm Limit: 100  SPO2 Low Alarm Limit POX: 90  Pulse Oximeter Device Mode: Intermittent  Pulse Oximeter Device Location: Right,Hand,Finger  O2 Device: None (Room air)    Estimated body mass index is 22.84 kg/m² as calculated from the following:    Height as of this encounter: 5' 8\" (1.727 m).     Weight as of this encounter: 150 lb 3.2 oz (68.1 kg). Intake/Output Summary (Last 24 hours) at 7/1/2022 1101  Last data filed at 7/1/2022 0904  Gross per 24 hour   Intake 295 ml   Output --   Net 295 ml         Physical Exam:     Blood pressure 111/71, pulse 98, temperature 98.9 °F (37.2 °C), temperature source Oral, resp. rate 18, height 5' 8\" (1.727 m), weight 150 lb 3.2 oz (68.1 kg), SpO2 99 %. General:    Well nourished. Head:  Normocephalic, atraumatic  Eyes:  Sclerae appear normal.  Pupils equally round. ENT:  Nares appear normal, no drainage. Moist oral mucosa  Neck:  No restricted ROM. Trachea midline   CV:   RRR. No m/r/g. No jugular venous distension. Lungs:   CTAB. No wheezing, rhonchi, or rales. Respirations even, unlabored  Abdomen: Bowel sounds present. Soft, nontender, nondistended. No CVA tenderness  Extremities: No cyanosis or clubbing. No edema  Skin:     No rashes and normal coloration. Warm and dry. Neuro:  CN II-XII grossly intact. Sensation intact. A&Ox3  Psych:  Normal mood and affect.       I have reviewed ordered lab tests and independently visualized imaging below:    Recent Labs:  Recent Results (from the past 48 hour(s))   POCT Glucose    Collection Time: 06/29/22 11:11 AM   Result Value Ref Range    POC Glucose 202 (H) 65 - 100 mg/dL    Performed by: Tristen    POCT Glucose    Collection Time: 06/29/22  4:13 PM   Result Value Ref Range    POC Glucose 39 (LL) 65 - 100 mg/dL    Performed by: Tristen    POCT Glucose    Collection Time: 06/29/22  4:40 PM   Result Value Ref Range    POC Glucose 163 (H) 65 - 100 mg/dL    Performed by: Tristen    Potassium    Collection Time: 06/29/22  6:52 PM   Result Value Ref Range    Potassium 3.4 (L) 3.5 - 5.1 mmol/L   POCT Glucose    Collection Time: 06/29/22  8:33 PM   Result Value Ref Range    POC Glucose 43 (L) 65 - 100 mg/dL    Performed by: Heriberto Martines    POCT Glucose    Collection Time: 06/29/22  9:07 PM   Result Value Ref Range    POC Glucose 108 (H) 65 - 100 mg/dL    Performed by: Digna    POCT Glucose    Collection Time: 06/30/22  2:17 AM   Result Value Ref Range    POC Glucose 61 (L) 65 - 100 mg/dL    Performed by: Digna    POCT Glucose    Collection Time: 06/30/22  2:48 AM   Result Value Ref Range    POC Glucose 123 (H) 65 - 100 mg/dL    Performed by: Digna    CBC with Auto Differential    Collection Time: 06/30/22  5:14 AM   Result Value Ref Range    WBC 9.4 4.3 - 11.1 K/uL    RBC 3.18 (L) 4.05 - 5.2 M/uL    Hemoglobin 8.1 (L) 11.7 - 15.4 g/dL    Hematocrit 26.3 (L) 35.8 - 46.3 %    MCV 82.7 79.6 - 97.8 FL    MCH 25.5 (L) 26.1 - 32.9 PG    MCHC 30.8 (L) 31.4 - 35.0 g/dL    RDW 15.2 (H) 11.9 - 14.6 %    Platelets 625 (H) 738 - 450 K/uL    MPV 8.8 (L) 9.4 - 12.3 FL    nRBC 0.00 0.0 - 0.2 K/uL    Differential Type AUTOMATED      Seg Neutrophils 64 43 - 78 %    Lymphocytes 26 13 - 44 %    Monocytes 6 4.0 - 12.0 %    Eosinophils % 2 0.5 - 7.8 %    Basophils 0 0.0 - 2.0 %    Immature Granulocytes 2 0.0 - 5.0 %    Segs Absolute 6.0 1.7 - 8.2 K/UL    Absolute Lymph # 2.4 0.5 - 4.6 K/UL    Absolute Mono # 0.6 0.1 - 1.3 K/UL    Absolute Eos # 0.2 0.0 - 0.8 K/UL    Basophils Absolute 0.0 0.0 - 0.2 K/UL    Absolute Immature Granulocyte 0.2 0.0 - 0.5 K/UL   Comprehensive Metabolic Panel    Collection Time: 06/30/22  5:14 AM   Result Value Ref Range    Sodium 143 136 - 145 mmol/L    Potassium 4.0 3.5 - 5.1 mmol/L    Chloride 109 (H) 98 - 107 mmol/L    CO2 27 21 - 32 mmol/L    Anion Gap 7 7 - 16 mmol/L    Glucose 118 (H) 65 - 100 mg/dL    BUN 4 (L) 6 - 23 MG/DL    CREATININE 2.07 (H) 0.6 - 1.0 MG/DL    GFR African American 33 (L) >60 ml/min/1.73m2    GFR Non- 27 (L) >60 ml/min/1.73m2    Calcium 8.2 (L) 8.3 - 10.4 MG/DL    Total Bilirubin 0.3 0.2 - 1.1 MG/DL    ALT 37 12 - 65 U/L    AST 43 (H) 15 - 37 U/L    Alk Phosphatase 277 (H) 50 - 136 U/L    Total Protein 6.2 (L) 6.3 - 8.2 g/dL Albumin 1.9 (L) 3.5 - 5.0 g/dL    Globulin 4.3 (H) 2.3 - 3.5 g/dL    Albumin/Globulin Ratio 0.4 (L) 1.2 - 3.5     Magnesium    Collection Time: 06/30/22  5:14 AM   Result Value Ref Range    Magnesium 1.6 (L) 1.8 - 2.4 mg/dL   Phosphorus    Collection Time: 06/30/22  5:14 AM   Result Value Ref Range    Phosphorus 2.4 (L) 2.5 - 4.5 MG/DL   Culture, Blood 1    Collection Time: 06/30/22  5:14 AM    Specimen: Blood   Result Value Ref Range    Special Requests RIGHT  FOREARM        Culture PENDING    POCT Glucose    Collection Time: 06/30/22  7:36 AM   Result Value Ref Range    POC Glucose 170 (H) 65 - 100 mg/dL    Performed by: [facility]    POCT Glucose    Collection Time: 06/30/22 11:22 AM   Result Value Ref Range    POC Glucose 164 (H) 65 - 100 mg/dL    Performed by: [facility]    POCT Glucose    Collection Time: 06/30/22  4:38 PM   Result Value Ref Range    POC Glucose 159 (H) 65 - 100 mg/dL    Performed by: [facility]    POCT Glucose    Collection Time: 06/30/22  8:57 PM   Result Value Ref Range    POC Glucose 96 65 - 100 mg/dL    Performed by: Deandre    POCT Glucose    Collection Time: 07/01/22  2:53 AM   Result Value Ref Range    POC Glucose 173 (H) 65 - 100 mg/dL    Performed by: Jesse    CBC with Auto Differential    Collection Time: 07/01/22  7:24 AM   Result Value Ref Range    WBC 9.3 4.3 - 11.1 K/uL    RBC 3.00 (L) 4.05 - 5.2 M/uL    Hemoglobin 7.7 (L) 11.7 - 15.4 g/dL    Hematocrit 24.9 (L) 35.8 - 46.3 %    MCV 83.0 79.6 - 97.8 FL    MCH 25.7 (L) 26.1 - 32.9 PG    MCHC 30.9 (L) 31.4 - 35.0 g/dL    RDW 15.5 (H) 11.9 - 14.6 %    Platelets 632 420 - 634 K/uL    MPV 8.7 (L) 9.4 - 12.3 FL    nRBC 0.00 0.0 - 0.2 K/uL    Differential Type PENDING    Magnesium    Collection Time: 07/01/22  7:24 AM   Result Value Ref Range    Magnesium 1.7 (L) 1.8 - 2.4 mg/dL   Comprehensive Metabolic Panel    Collection Time: 07/01/22  7:24 AM   Result Value Ref Range    Sodium 143 136 - 145 HYDROcodone-acetaminophen (NORCO) 7.5-325 MG per tablet 1 tablet  1 tablet Oral Q6H PRN    ciprofloxacin (CIPRO) IVPB 400 mg  400 mg IntraVENous Q12H    magnesium sulfate 2000 mg in 50 mL IVPB premix  2,000 mg IntraVENous PRN    ondansetron (ZOFRAN) injection 4 mg  4 mg IntraVENous Q6H PRN    albuterol (PROVENTIL) nebulizer solution 2.5 mg  2.5 mg Nebulization Q6H PRN    glucose chewable tablet 16 g  4 tablet Oral PRN    dextrose bolus 10% 125 mL  125 mL IntraVENous PRN    Or    dextrose bolus 10% 250 mL  250 mL IntraVENous PRN    glucagon injection 1 mg  1 mg IntraMUSCular PRN    dextrose 5 % solution  100 mL/hr IntraVENous PRN    sodium chloride flush 0.9 % injection 5-40 mL  5-40 mL IntraVENous 2 times per day    sodium chloride flush 0.9 % injection 5-40 mL  5-40 mL IntraVENous PRN    0.9 % sodium chloride infusion   IntraVENous PRN    enoxaparin Sodium (LOVENOX) injection 30 mg  30 mg SubCUTAneous Daily    acetaminophen (TYLENOL) tablet 650 mg  650 mg Oral Q6H PRN    Or    acetaminophen (TYLENOL) suppository 650 mg  650 mg Rectal Q6H PRN    aluminum & magnesium hydroxide-simethicone (MAALOX) 200-200-20 MG/5ML suspension 30 mL  30 mL Oral Q6H PRN       Signed:  Enid Casey MD    Part of this note may have been written by using a voice dictation software. The note has been proof read but may still contain some grammatical/other typographical errors.

## 2022-07-01 NOTE — PROGRESS NOTES
OCCUPATIONAL THERAPY Daily Note, Re-evaluation and AM       OT Visit Days: 1  Acknowledge Orders  Time  OT Charge Capture  Rehab Caseload Tracker      Tj Orosco is a 48 y.o. female   PRIMARY DIAGNOSIS: Severe sepsis ([address]  Septicemia ([address] [A41.9]  Acute pyelonephritis [N10]  Severe sepsis ([address] [A41.9, R65.20]  Nausea and vomiting, intractability of vomiting not specified, unspecified vomiting type [R11.2]  Chronic kidney disease, unspecified CKD stage [N18.9]       Reason for Referral: Generalized Muscle Weakness (M62.81)  Inpatient: Payor: Jason Mata / Plan: [employer]BS - [place] PPO / Product Type: *No Product type* /     ASSESSMENT:     REHAB RECOMMENDATIONS:   Recommendation to date pending progress:  Setting:   No further skilled therapy after discharge from hospital    Equipment:     None     ASSESSMENT:  Ms. Chikis Del Toro was admitted with above diagnosis and was seen in ICU room. Pt with nausea but willing to get up with therapy. Pt up in room with supervision and assistance with IV pole. Pt up in del castillo and ambulated household distance with supervision. Pt supervision to independent with self care, no further OT warranted. Pt was stoic and said very little but seem to enjoy being out of bed. Pt left up in straight chair with PCT present. 7/1/2022  Pt was evaluated on 6/30 as noted above but was supervision to independent, new order received and pt at a CGA level. OT will follow to maximize safety and independence with self care and functional mobility. Pt continues to have flat affect and does not verbalize much but is oriented. Pt should progress well and quickly. Pt up in room and del castillo, ambulated community distance as she wanted to walk only. Pt left in recliner with all needs, RN aware.         [employer] AM-PAC 6 Clicks Daily Activity Inpatient Short Form:    AM-PAC Daily Activity Inpatient   How much help for putting on and taking off regular lower body clothing?: A Little  How much help for Bathing?: A Little  How much help for Toileting?: A Little  How much help for putting on and taking off regular upper body clothing?: None  How much help for taking care of personal grooming?: None  How much help for eating meals?: None  AM-PAC Inpatient Daily Activity Raw Score: 21  AM-PAC Inpatient ADL T-Scale Score : 44.27  ADL Inpatient CMS 0-100% Score: 32.79  ADL Inpatient CMS G-Code Modifier : CJ           SUBJECTIVE:     Ms. Mateo Winn states she would like to walk     Social/Functional Lives With: Alone  Type of Home: House  Home Layout: One level  Home Access: Stairs to enter with rails  Entrance Stairs - Number of Steps: 3  Entrance Stairs - Rails: Both  Bathroom Shower/Tub: Tub/Shower unit  Bathroom Toilet: Standard  ADL Assistance: Independent  Homemaking Assistance: Independent  Ambulation Assistance: Independent  Transfer Assistance: Independent  Mode of Transportation: Car  Occupation: On disability    OBJECTIVE:     Vince Hi / Shani Garzon / Andujar Pretty: IV    RESTRICTIONS/PRECAUTIONS:       PAIN: Teretha Backers / O2:   Pre Treatment:          Post Treatment: no complaint of pain      Vitals          Oxygen            GROSS EVALUATION: INTACT IMPAIRED   (See Comments)   UE AROM [x] []   UE PROM [x] []   Strength []  generalized weakness     Posture / Balance [x]     Sensation [x]     Coordination [x]       Tone [x]       Edema []    Activity Tolerance [x]       Hand Dominance R [] L []      COGNITION/  PERCEPTION: INTACT IMPAIRED   (See Comments)   Orientation [x]     Vision [x]     Hearing [x]     Cognition  [x]     Perception [x]       MOBILITY: I Mod I S SBA CGA Min Mod Max Total  NT x2 Comments:   Bed Mobility    Rolling [x] [] [] [] [] [] [] [] [] [] []    Supine to Sit [x] [] [] [] [] [] [] [] [] [] []    Scooting [x] [] [] [] [] [] [] [] [] [] []    Sit to Supine [x] [] [] [] [] [] [] [] [] [] []    Transfers    Sit to Stand [] [] [] [] [x] [] [] [] [] [] []    Bed to Chair [] [] [x] [] [x] [] [] [] [] [] [] Stand to Sit [] [] [] [] [x] [] [] [] [] [] []    Tub/Shower [] [] [] [] [x] [] [] [] [] [] []     Toilet [] [] [] [] [x] [] [] [] [] [] []      [] [] [] [] [] [] [] [] [] [] []    I=Independent, Mod I=Modified Independent, S=Supervision/Setup, SBA=Standby Assistance, CGA=Contact Guard Assistance, Min=Minimal Assistance, Mod=Moderate Assistance, Max=Maximal Assistance, Total=Total Assistance, NT=Not Tested    ACTIVITIES OF DAILY LIVING: I Mod I S SBA CGA Min Mod Max Total NT Comments   BASIC ADLs:              Upper Body Bathing  [] [] [x] [] [] [] [] [] [] []    Lower Body Bathing [] [] [] [] [x] [] [] [] [] []    Toileting [] [] [] [] [x] [] [] [] [] []    Upper Body Dressing [] [] [x] [] [] [] [] [] [] []    Lower Body Dressing [] [] [] [] [x] [] [] [] [] []    Feeding [] [] [x] [] [] [] [] [] [] []    Grooming [] [] [x] [] [] [] [] [] [] []    Personal Device Care [] [] [] [] [] [] [] [] [] []    Functional Mobility [] [] [] [] [x] [] [] [] [] []    I=Independent, Mod I=Modified Independent, S=Supervision/Setup, SBA=Standby Assistance, CGA=Contact Guard Assistance, Min=Minimal Assistance, Mod=Moderate Assistance, Max=Maximal Assistance, Total=Total Assistance, NT=Not Tested    PLAN:     FREQUENCY/DURATION   OT Plan of Care: 3 times/week for duration of hospital stay or until stated goals are met, whichever comes first.    ACUTE OCCUPATIONAL THERAPY GOALS:   (Developed with and agreed upon by patient and/or caregiver.)  1. Patient will perform grooming with supervision. 2. Patient will perform upper body dressing with supervision. 3. Patient will perform lower body dressing with supervision. 4. Patient will perform bathing with supervision. 5. Patient will perform toileting and toilet transfer with supervision. 6. Patient will perform ADL functional mobility and tranfers in room with supervision. 7. Patient/family to demonstrate knowledge of home safety and DME recommendations.     Goals to be achieved in 7 days. PROBLEM LIST:   (Skilled intervention is medically necessary to address:)  Decreased ADL/Functional Activities  Decreased Activity Tolerance  Decreased Balance   INTERVENTIONS PLANNED:  (Benefits and precautions of occupational therapy have been discussed with the patient.)  Self Care Training  Education         TREATMENT:     EVALUATION: LOW COMPLEXITY: (Untimed Charge)    TREATMENT:   Self Care: (25 min min): Procedure(s) (per grid) utilized to improve and/or restore self-care/home management as related to functional mobility. Required minimal verbal cueing to facilitate activities of daily living skills and compensatory activities.       TREATMENT GRID:  N/A    AFTER TREATMENT PRECAUTIONS: Bed/Chair Locked, Call light within reach, Chair and Needs within reach    INTERDISCIPLINARY COLLABORATION:  RN/ PCT, PT/ PTA and OT/ KAUR    EDUCATION:  Education Given To: Patient  Education Provided: Role of Therapy;Plan of Care;Transfer Training;Energy Conservation    TOTAL TREATMENT DURATION AND TIME:  Time In: 1205  Time Out: Bharat Martino  Minutes: DERRICK Javier

## 2022-07-01 NOTE — CARE COORDINATION
RN CM met with the patient at the bedside and reviewed her discharge plan. She lives alone and confirmed she has transportation home at discharge. She was evaluated by therapy services and no recommendations were made for continued therapy services after the patient has been discharged from the hospital. RN CM encouraged her to ask questions.

## 2022-07-01 NOTE — PROGRESS NOTES
Comprehensive Nutrition Assessment    Type and Reason for Visit: Initial,NPO/Clear Liquid    Nutrition Recommendations/Plan:   Meals and Snacks:  Diet: Continue current order; diet progression per MD  Nutrition Supplement Therapy:   Unable to add Ensure Clear at this time d/t pt with DM I    NPO/Clear liquid diet status day number 5 is related to N/V . Malnutrition Status: At risk for malnutrition (Comment) (CLQ since 6/26)    If anticipated patient will remain NPO/Clear liquid for greater than 3-4 days, consider nutrition support for primary needs (with tube feeding route preferred if medically appropriate). If FT placed, consider NJ d/t hx of gastroparesis. If tube feeding not appropriate, would necessitate a central line for TPN for primary nutrition. Patient currently has 1 PIV.  If nutrition support pursued, order appropriate route and an IP Nutrition Consult for RD to manage. Malnutrition Assessment:  Malnutrition Status: At risk for malnutrition (Comment) (CLQ since 6/26)    Nutrition Assessment:  Nutrition History: Pt known to nutrition services from past admissions. Pt last seen in [facility] 6/[facility]. At that time she stated she was eating 1 \"big meal\" per day and mostly eating snacks during the day. Pt states after D/C she was eating mostly applesauce at home, but had ongoing N/V.     Do You Have Any Cultural, Catholic, or Ethnic Food Preferences?: No   Nutrition Background:   Pt with PMH notable for HTN, HLD, DM I, CKD IV, recent cardiac arrest requiring intubation, and gastroparesis. She presents with N/V. She is admitted for severe sepsis d/t pyelonephritis. Nutrition Interval:  Pt seen sitting in bed with clear liquid tray on bedside table. Pt only drank a small amount of apple juice. Venus, RN states pt has not been tolerating CLQ well. Reglan trialed 6/29. KUB 6/30 showing no obstruction. Dulcolax suppository, scheduled zofran started and Reglan D/C 6/30.  Compazine added last night with some

## 2022-07-01 NOTE — PLAN OF CARE
Problem: Gastrointestinal - Adult  Goal: Maintains adequate nutritional intake  Outcome: Not Progressing  Flowsheets (Taken 7/1/2022 0758)  Maintains adequate nutritional intake:   Monitor percentage of each meal consumed   Assist with meals as needed   Monitor intake and output, weight and lab values  Note: Unable to progress diet due to CLD intolerance issues. Problem: Safety - Adult  Goal: Free from fall injury  Outcome: Progressing     Problem: ABCDS Injury Assessment  Goal: Absence of physical injury  Outcome: Progressing     Problem: Chronic Conditions and Co-morbidities  Goal: Patient's chronic conditions and co-morbidity symptoms are monitored and maintained or improved  Outcome: Progressing  Flowsheets (Taken 7/1/2022 0758)  Care Plan - Patient's Chronic Conditions and Co-Morbidity Symptoms are Monitored and Maintained or Improved:   Monitor and assess patient's chronic conditions and comorbid symptoms for stability, deterioration, or improvement   Collaborate with multidisciplinary team to address chronic and comorbid conditions and prevent exacerbation or deterioration     Problem: Skin/Tissue Integrity  Goal: Absence of new skin breakdown  Description: 1. Monitor for areas of redness and/or skin breakdown  2. Assess vascular access sites hourly  3. Every 4-6 hours minimum:  Change oxygen saturation probe site  4. Every 4-6 hours:  If on nasal continuous positive airway pressure, respiratory therapy assess nares and determine need for appliance change or resting period. Outcome: Progressing     Problem: Neurosensory - Adult  Goal: Achieves maximal functionality and self care  Outcome: Progressing  Flowsheets (Taken 7/1/2022 0758)  Achieves maximal functionality and self care: Monitor swallowing and airway patency with patient fatigue and changes in neurological status  Note: Patient was able to get up to recliner and ambulate with PT/OT.      Problem: Respiratory - Adult  Goal: Achieves optimal vomiting  Outcome: Progressing  Flowsheets (Taken 7/1/2022 0758)  Minimal or absence of nausea and vomiting:   Administer ordered antiemetic medications as needed   Provide nonpharmacologic comfort measures as appropriate  Note: Patient ate breakfast (CLD) without issue, however patient vomited apple juice within a few minutes of consuming it at lunch. PRN Zofran given. Patient with no further vomiting after zofran as of this afternoon, but did not feel like trying any other liquids.   Goal: Maintains or returns to baseline bowel function  Outcome: Progressing  Flowsheets (Taken 7/1/2022 0758)  Maintains or returns to baseline bowel function:   Assess bowel function   Encourage oral fluids to ensure adequate hydration   Administer ordered medications as needed   Encourage mobilization and activity     Problem: Genitourinary - Adult  Goal: Absence of urinary retention  Outcome: Progressing     Problem: Metabolic/Fluid and Electrolytes - Adult  Goal: Electrolytes maintained within normal limits  Outcome: Progressing  Flowsheets (Taken 7/1/2022 0758)  Electrolytes maintained within normal limits:   Monitor labs and assess patient for signs and symptoms of electrolyte imbalances   Administer electrolyte replacement as ordered   Monitor response to electrolyte replacements, including repeat lab results as appropriate  Goal: Glucose maintained within prescribed range  Outcome: Progressing  Flowsheets (Taken 7/1/2022 0758)  Glucose maintained within prescribed range:   Monitor blood glucose as ordered   Administer ordered medications to maintain glucose within target range     Problem: Hematologic - Adult  Goal: Maintains hematologic stability  Outcome: Progressing     Problem: Pain  Goal: Verbalizes/displays adequate comfort level or baseline comfort level  Outcome: Progressing     Problem: Discharge Planning  Goal: Discharge to home or other facility with appropriate resources  Recent Flowsheet Documentation  Taken 7/1/2022 0758 by Shyam López RN  Discharge to home or other facility with appropriate resources: Identify barriers to discharge with patient and caregiver     Problem: Infection - Adult  Goal: Absence of infection at discharge  Recent Flowsheet Documentation  Taken 7/1/2022 0758 by Shyam López RN  Absence of infection at discharge:   Assess and monitor for signs and symptoms of infection   Monitor lab/diagnostic results

## 2022-07-01 NOTE — PROGRESS NOTES
Pt c/o nausea despite scheduled zofran. Episode of vomiting earlier this evening. Dr. Kathy Belle notified, MD to place orders for compazine.

## 2022-07-01 NOTE — PROGRESS NOTES
[facility] Progress Note         Admit Date:  6/26/2022    Today's Date:  7/1/2022    CC:  Nausea and Vomiting    Subjective:     Patient reports 1 episode of nausea and vomiting overnight. Patient denies any nausea or vomiting this morning. Had 1 BM yesterday. Reports that lower abdominal pain has improved following BM.        PORTABLE ABDOMEN, June 30, 2022 at 0940 hours:       CLINICAL HISTORY:  Nausea and vomiting.       COMPARISON:  None.       FINDINGS:  AP supine images demonstrate a small amount of stool scattered in the   colon with no dilated  small bowel loops.  No abnormal soft tissue mass or   calcific density is noted.  Right double pigtail ureteral stent is in place.           Impression   RIGHT URETERAL STENT IN PLACE WITH NO ACUTE ABDOMINAL ABNORMALITY   IDENTIFIED.             Medications:   Current Facility-Administered Medications   Medication Dose Route Frequency    insulin glargine (LANTUS) injection vial 10 Units  10 Units SubCUTAneous QAM    insulin lispro (HUMALOG) injection vial 0-4 Units  0-4 Units SubCUTAneous TID WC    insulin lispro (HUMALOG) injection vial 0-4 Units  0-4 Units SubCUTAneous Nightly    pantoprazole (PROTONIX) 40 mg in sodium chloride (PF) 10 mL injection  40 mg IntraVENous Q12H    ondansetron (ZOFRAN) injection 8 mg  8 mg IntraVENous 3 times per day    bisacodyl (DULCOLAX) suppository 10 mg  10 mg Rectal Daily    prochlorperazine (COMPAZINE) injection 10 mg  10 mg IntraVENous Q6H PRN    polyethylene glycol (GLYCOLAX) packet 17 g  17 g Oral Daily    potassium chloride 10 mEq/100 mL IVPB (Peripheral Line)  10 mEq IntraVENous PRN    morphine (PF) injection 1 mg  1 mg IntraVENous Q6H PRN    HYDROcodone-acetaminophen (NORCO) 7.5-325 MG per tablet 1 tablet  1 tablet Oral Q6H PRN    ciprofloxacin (CIPRO) IVPB 400 mg  400 mg IntraVENous Q12H    magnesium sulfate 2000 mg in 50 mL IVPB premix  2,000 mg IntraVENous PRN    ondansetron (ZOFRAN) injection 4 mg  4 mg IntraVENous Q6H PRN    albuterol (PROVENTIL) nebulizer solution 2.5 mg  2.5 mg Nebulization Q6H PRN    glucose chewable tablet 16 g  4 tablet Oral PRN    dextrose bolus 10% 125 mL  125 mL IntraVENous PRN    Or    dextrose bolus 10% 250 mL  250 mL IntraVENous PRN    glucagon injection 1 mg  1 mg IntraMUSCular PRN    dextrose 5 % solution  100 mL/hr IntraVENous PRN    sodium chloride flush 0.9 % injection 5-40 mL  5-40 mL IntraVENous 2 times per day    sodium chloride flush 0.9 % injection 5-40 mL  5-40 mL IntraVENous PRN    0.9 % sodium chloride infusion   IntraVENous PRN    enoxaparin Sodium (LOVENOX) injection 30 mg  30 mg SubCUTAneous Daily    acetaminophen (TYLENOL) tablet 650 mg  650 mg Oral Q6H PRN    Or    acetaminophen (TYLENOL) suppository 650 mg  650 mg Rectal Q6H PRN    aluminum & magnesium hydroxide-simethicone (MAALOX) 200-200-20 MG/5ML suspension 30 mL  30 mL Oral Q6H PRN       Review of Systems:  ROS was obtained, with pertinent positives as listed above. No chest pain or SOB. Diet:  Clear liquids    Objective:   Vitals:  /71   Pulse 98   Temp 98.9 °F (37.2 °C) (Oral)   Resp 18   Ht 5' 8\" (1.727 m)   Wt 150 lb 3.2 oz (68.1 kg)   SpO2 99%   BMI 22.84 kg/m²   Intake/Output:  No intake/output data recorded. 06/29 1901 - 07/01 0700  In: 1756.8 [I.V.:968.5]  Out: 425   Exam:  General appearance: alert, cooperative, no distress  Lungs: clear to auscultation bilaterally anteriorly  Heart: regular rate and rhythm  Abdomen: soft, non-tender.  Bowel sounds normal. No masses, no organomegaly  Extremities: extremities normal, atraumatic, no cyanosis or edema  Neuro:  alert and oriented    Data Review (Labs):    Recent Labs     06/28/22  0913 06/28/22  1933 06/29/22  0442 06/29/22  1852 06/30/22  0514 07/01/22  0724   WBC 10.9  --  8.8  --  9.4 9.3   HGB 7.7*  --  8.1*  --  8.1* 7.7*   HCT 24.3*  --  25.9*  --  26.3* 24.9*   *  --  462*  --  516* 437   MCV 82.4  --  83.0  --  82.7 83.0   NA  --   --  142  --  143 143   K  --  3.6 3.2* 3.4* 4.0 3.8   CL  --   --  108*  --  109* 110*   CO2  --   --  26  --  27 26   BUN  --   --  6  --  4* 6   MG  --  2.3  --   --  1.6* 1.7*   AST  --   --  44*  --  43* 47*   ALT  --   --  37  --  37 35       Assessment:     Principal Problem:    Severe sepsis (HCC)  Active Problems:    Benign hypertension with CKD (chronic kidney disease) stage IV (HCC)    Type 1 diabetes mellitus with hyperglycemia (HCC)    Pyelonephritis  Resolved Problems:    * No resolved hospital problems. *       Patient is a 48 y. o. female with PMH including but not limited to type 1 DM, Gastroparesis, CKD IV, Cardiac Arrest, Charcot's Arthropathy,  pyelonephritis, KENYATTA, who is seen in consultation at the request of Dr. Nguyen for persistent nausea and vomiting.      She has gastroparesis which is likely exacerbated by sepsis and recent DKA. HGB A1C: 8.3.      She has had recent imaging on 6/13/2022 at [facility] including CT of the abdomen with renal stone protocol and RUQ US. RUQ us showed a CBD of 3mm, 2mm nonmobile echogenic foci associated with the GB wall, no thickening or pericholecystic fluid seen.     6/30: K 4.0, creatinine 2.07, mg 1.6, TB 0.3, AST 43, ALT 37, , WBC 9.4, HGB 8.1, MCV 82. KUB: No obstruction. Reglan Discontinued as it was not improving symptoms. 7/1: Mg 1.7, WBC 9.3, HGB 7.7, MCV 83, . Reports improvement in Nausea and Vomiting. Had a BM yesterday. Plan:     Continue Zofran 8mg IV q8 hours today.      Continue with PPI IV BID  No plans for Endoscopy, at this time. Clear liquid diet for now and slowly advance to a low fat and low roughage diet, as tolerated   Miralax for constipation once taking PO   Follow electrolytes and correct, as needed.   Tight Glucose Control is imperative    TANISHA Juárez    Patient is seen and examined in collaboration with Dr. Max Hernandez.   Assessment and plan as per Dr. Max Hernandez.

## 2022-07-02 LAB
ALBUMIN SERPL-MCNC: 2.1 G/DL (ref 3.5–5)
ALBUMIN/GLOB SERPL: 0.5 {RATIO} (ref 1.2–3.5)
ALP SERPL-CCNC: 272 U/L (ref 50–130)
ALT SERPL-CCNC: 47 U/L (ref 12–65)
ANION GAP SERPL CALC-SCNC: 7 MMOL/L (ref 7–16)
AST SERPL-CCNC: 58 U/L (ref 15–37)
BASOPHILS # BLD: 0 K/UL (ref 0–0.2)
BASOPHILS NFR BLD: 0 % (ref 0–2)
BILIRUB SERPL-MCNC: 0.4 MG/DL (ref 0.2–1.1)
BUN SERPL-MCNC: 8 MG/DL (ref 6–23)
CALCIUM SERPL-MCNC: 8.4 MG/DL (ref 8.3–10.4)
CHLORIDE SERPL-SCNC: 107 MMOL/L (ref 98–107)
CO2 SERPL-SCNC: 26 MMOL/L (ref 21–32)
CREAT SERPL-MCNC: 2.54 MG/DL (ref 0.6–1)
DIFFERENTIAL METHOD BLD: ABNORMAL
EOSINOPHIL # BLD: 0.1 K/UL (ref 0–0.8)
EOSINOPHIL NFR BLD: 2 % (ref 0.5–7.8)
ERYTHROCYTE [DISTWIDTH] IN BLOOD BY AUTOMATED COUNT: 15.5 % (ref 11.9–14.6)
GLOBULIN SER CALC-MCNC: 4.5 G/DL (ref 2.3–3.5)
GLUCOSE BLD STRIP.AUTO-MCNC: 122 MG/DL (ref 65–100)
GLUCOSE BLD STRIP.AUTO-MCNC: 125 MG/DL (ref 65–100)
GLUCOSE BLD STRIP.AUTO-MCNC: 129 MG/DL (ref 65–100)
GLUCOSE BLD STRIP.AUTO-MCNC: 137 MG/DL (ref 65–100)
GLUCOSE BLD STRIP.AUTO-MCNC: 213 MG/DL (ref 65–100)
GLUCOSE BLD STRIP.AUTO-MCNC: 217 MG/DL (ref 65–100)
GLUCOSE BLD STRIP.AUTO-MCNC: 89 MG/DL (ref 65–100)
GLUCOSE SERPL-MCNC: 247 MG/DL (ref 65–100)
HCT VFR BLD AUTO: 27 % (ref 35.8–46.3)
HGB BLD-MCNC: 8.3 G/DL (ref 11.7–15.4)
IMM GRANULOCYTES # BLD AUTO: 0.1 K/UL (ref 0–0.5)
IMM GRANULOCYTES NFR BLD AUTO: 1 % (ref 0–5)
LYMPHOCYTES # BLD: 2.6 K/UL (ref 0.5–4.6)
LYMPHOCYTES NFR BLD: 28 % (ref 13–44)
MAGNESIUM SERPL-MCNC: 1.3 MG/DL (ref 1.8–2.4)
MCH RBC QN AUTO: 25.9 PG (ref 26.1–32.9)
MCHC RBC AUTO-ENTMCNC: 30.7 G/DL (ref 31.4–35)
MCV RBC AUTO: 84.1 FL (ref 79.6–97.8)
MONOCYTES # BLD: 0.6 K/UL (ref 0.1–1.3)
MONOCYTES NFR BLD: 6 % (ref 4–12)
NEUTS SEG # BLD: 6 K/UL (ref 1.7–8.2)
NEUTS SEG NFR BLD: 63 % (ref 43–78)
NRBC # BLD: 0 K/UL (ref 0–0.2)
PHOSPHATE SERPL-MCNC: 3.1 MG/DL (ref 2.5–4.5)
PLATELET # BLD AUTO: 414 K/UL (ref 150–450)
PMV BLD AUTO: 8.9 FL (ref 9.4–12.3)
POTASSIUM SERPL-SCNC: 3.6 MMOL/L (ref 3.5–5.1)
PROT SERPL-MCNC: 6.6 G/DL (ref 6.3–8.2)
RBC # BLD AUTO: 3.21 M/UL (ref 4.05–5.2)
SERVICE CMNT-IMP: ABNORMAL
SERVICE CMNT-IMP: NORMAL
SODIUM SERPL-SCNC: 140 MMOL/L (ref 136–145)
WBC # BLD AUTO: 9.5 K/UL (ref 4.3–11.1)

## 2022-07-02 PROCEDURE — 6360000002 HC RX W HCPCS: Performed by: INTERNAL MEDICINE

## 2022-07-02 PROCEDURE — 6370000000 HC RX 637 (ALT 250 FOR IP): Performed by: STUDENT IN AN ORGANIZED HEALTH CARE EDUCATION/TRAINING PROGRAM

## 2022-07-02 PROCEDURE — C9113 INJ PANTOPRAZOLE SODIUM, VIA: HCPCS | Performed by: NURSE PRACTITIONER

## 2022-07-02 PROCEDURE — 6360000002 HC RX W HCPCS: Performed by: FAMILY MEDICINE

## 2022-07-02 PROCEDURE — 2580000003 HC RX 258: Performed by: FAMILY MEDICINE

## 2022-07-02 PROCEDURE — 83735 ASSAY OF MAGNESIUM: CPT

## 2022-07-02 PROCEDURE — 6360000002 HC RX W HCPCS: Performed by: NURSE PRACTITIONER

## 2022-07-02 PROCEDURE — 84100 ASSAY OF PHOSPHORUS: CPT

## 2022-07-02 PROCEDURE — 85025 COMPLETE CBC W/AUTO DIFF WBC: CPT

## 2022-07-02 PROCEDURE — 1100000000 HC RM PRIVATE

## 2022-07-02 PROCEDURE — A4216 STERILE WATER/SALINE, 10 ML: HCPCS | Performed by: NURSE PRACTITIONER

## 2022-07-02 PROCEDURE — 2580000003 HC RX 258: Performed by: NURSE PRACTITIONER

## 2022-07-02 PROCEDURE — 80053 COMPREHEN METABOLIC PANEL: CPT

## 2022-07-02 PROCEDURE — 82962 GLUCOSE BLOOD TEST: CPT

## 2022-07-02 PROCEDURE — 6370000000 HC RX 637 (ALT 250 FOR IP): Performed by: INTERNAL MEDICINE

## 2022-07-02 RX ORDER — MAGNESIUM SULFATE IN WATER 40 MG/ML
4000 INJECTION, SOLUTION INTRAVENOUS ONCE
Status: COMPLETED | OUTPATIENT
Start: 2022-07-02 | End: 2022-07-02

## 2022-07-02 RX ORDER — MAGNESIUM SULFATE IN WATER 40 MG/ML
4000 INJECTION, SOLUTION INTRAVENOUS ONCE
Status: DISCONTINUED | OUTPATIENT
Start: 2022-07-02 | End: 2022-07-02 | Stop reason: SDUPTHER

## 2022-07-02 RX ORDER — METOCLOPRAMIDE HYDROCHLORIDE 5 MG/ML
10 INJECTION INTRAMUSCULAR; INTRAVENOUS 3 TIMES DAILY
Status: DISCONTINUED | OUTPATIENT
Start: 2022-07-02 | End: 2022-07-03 | Stop reason: ALTCHOICE

## 2022-07-02 RX ORDER — INSULIN GLARGINE 100 [IU]/ML
12 INJECTION, SOLUTION SUBCUTANEOUS EVERY MORNING
Status: DISCONTINUED | OUTPATIENT
Start: 2022-07-02 | End: 2022-07-03

## 2022-07-02 RX ORDER — LANOLIN ALCOHOL/MO/W.PET/CERES
400 CREAM (GRAM) TOPICAL DAILY
Status: DISCONTINUED | OUTPATIENT
Start: 2022-07-02 | End: 2022-07-06 | Stop reason: HOSPADM

## 2022-07-02 RX ORDER — PROMETHAZINE HYDROCHLORIDE 25 MG/1
25 SUPPOSITORY RECTAL 2 TIMES DAILY PRN
Status: DISCONTINUED | OUTPATIENT
Start: 2022-07-02 | End: 2022-07-06 | Stop reason: HOSPADM

## 2022-07-02 RX ADMIN — SODIUM CHLORIDE, PRESERVATIVE FREE 40 MG: 5 INJECTION INTRAVENOUS at 09:10

## 2022-07-02 RX ADMIN — INSULIN LISPRO 1 UNITS: 100 INJECTION, SOLUTION INTRAVENOUS; SUBCUTANEOUS at 09:08

## 2022-07-02 RX ADMIN — MORPHINE SULFATE 1 MG: 2 INJECTION, SOLUTION INTRAMUSCULAR; INTRAVENOUS at 06:13

## 2022-07-02 RX ADMIN — METOCLOPRAMIDE HYDROCHLORIDE 10 MG: 5 INJECTION INTRAMUSCULAR; INTRAVENOUS at 17:33

## 2022-07-02 RX ADMIN — PROMETHAZINE HYDROCHLORIDE 25 MG: 25 SUPPOSITORY RECTAL at 17:32

## 2022-07-02 RX ADMIN — Medication 400 MG: at 09:10

## 2022-07-02 RX ADMIN — INSULIN GLARGINE 12 UNITS: 100 INJECTION, SOLUTION SUBCUTANEOUS at 09:08

## 2022-07-02 RX ADMIN — SODIUM CHLORIDE, POTASSIUM CHLORIDE, SODIUM LACTATE AND CALCIUM CHLORIDE: 600; 310; 30; 20 INJECTION, SOLUTION INTRAVENOUS at 16:16

## 2022-07-02 RX ADMIN — METOCLOPRAMIDE HYDROCHLORIDE 10 MG: 5 INJECTION INTRAMUSCULAR; INTRAVENOUS at 09:10

## 2022-07-02 RX ADMIN — METOCLOPRAMIDE HYDROCHLORIDE 10 MG: 5 INJECTION INTRAMUSCULAR; INTRAVENOUS at 12:21

## 2022-07-02 RX ADMIN — SODIUM CHLORIDE, PRESERVATIVE FREE 10 ML: 5 INJECTION INTRAVENOUS at 20:19

## 2022-07-02 RX ADMIN — CIPROFLOXACIN 400 MG: 2 INJECTION, SOLUTION INTRAVENOUS at 12:21

## 2022-07-02 RX ADMIN — ENOXAPARIN SODIUM 30 MG: 100 INJECTION SUBCUTANEOUS at 09:09

## 2022-07-02 RX ADMIN — ONDANSETRON 8 MG: 2 INJECTION INTRAMUSCULAR; INTRAVENOUS at 05:46

## 2022-07-02 RX ADMIN — MAGNESIUM SULFATE HEPTAHYDRATE 4000 MG: 40 INJECTION, SOLUTION INTRAVENOUS at 05:46

## 2022-07-02 RX ADMIN — SODIUM CHLORIDE, PRESERVATIVE FREE 10 ML: 5 INJECTION INTRAVENOUS at 09:11

## 2022-07-02 RX ADMIN — CIPROFLOXACIN 400 MG: 2 INJECTION, SOLUTION INTRAVENOUS at 00:48

## 2022-07-02 ASSESSMENT — PAIN SCALES - GENERAL
PAINLEVEL_OUTOF10: 0
PAINLEVEL_OUTOF10: 7

## 2022-07-02 ASSESSMENT — PAIN DESCRIPTION - ORIENTATION: ORIENTATION: RIGHT;LEFT;LOWER

## 2022-07-02 ASSESSMENT — PAIN DESCRIPTION - DESCRIPTORS: DESCRIPTORS: CRAMPING;ACHING

## 2022-07-02 ASSESSMENT — PAIN DESCRIPTION - LOCATION: LOCATION: LEG

## 2022-07-02 NOTE — PROGRESS NOTES
Hospitalist Progress Note   Admit Date:  2022  3:52 PM   Name:  Renetta Benton   Age:  48 y.o. Sex:  female  :  1975   MRN:  [id number]   Room:  [facility]/    Presenting Complaint: Nausea and Emesis     Reason(s) for Admission: Septicemia ([facility] [address] [A41.9]  Acute pyelonephritis [N10]  Severe sepsis ([facility] [address] [A41.9, R65.20]  Nausea and vomiting, intractability of vomiting not specified, unspecified vomiting type [R11.2]  Chronic kidney disease, unspecified CKD stage [N18.9]     Hospital Course & Interval History:   Renetta Benton is a 48 y.o. female with medical history of uncontrolled diabetes with complications, hypertension, CKD, and 2 recent hospitalizations, who presented with nausea and vomiting. Recently hospitalized at [facility] from  through  with pyelonephritis and DKA. Patient admitted with sepsis secondary to UTI. Started on empiric antibiotics. Blood culture growing Enterobacter. ID consulted. Antibiotics switched to Cipro. Gastroenterologist was consulted. She was treated with Reglan with minimal relief. She was started on Zofran and no plans for endoscopy. Repeat blood culture negative. Abdominal x-ray showed    Subjective/24hr Events (22): Patient is seen at the bedside. Reports persistent nausea and vomiting. She had multiple episodes of vomiting. She is unable to tolerate p.o. medications and diet. Her blood glucose is high today. Denies chest pain, palpitations or shortness of breath. Assessment & Plan:     Severe sepsis secondary to UTI:  Enterobacter bacteremia: Possible source GI versus urine  Patient with recent history of pyelonephritis, CVA tenderness negative  Continue Cipro IV EOT  as patient is unable to tolerate p.o.     Repeat blood culture negative    ID signed off  PT/OT recommended no further skilled therapy    Diabetic gastroparesis  Patient remained with persistent nausea/vomiting  Zofran discontinued and started on Reglan  Added Phenergan prn  Continue with PPI IV   Abdominal x-ray showed right ureteral stent  No plans for Endoscopy, at this time. clear liquids and will advance the diet as tolerated  GI following      Constipation  Continue MiraLAX    Hypomagnesemia  Most likely due to vomiting  Mg is 1.3  S/p 2gr of Mg   Will repeat Mg again     Elevated alkaline phosphatase    Type 1 diabetes mellitus:  Patient is not tolerating diet and will increase insulin after her nutrition improves  Increased  Lantus to 12 units daily and sliding scale  Inpatient diabetes management consult    CKD stage IV:  Stable  Continue to monitor    Hypertension:  Continue home meds    Discharge Planning: Anticipate discharge in 48 hours  Diet:  ADULT DIET; Clear Liquid; 4 carb choices (60 gm/meal)  DVT PPx: Heparin  Code status: Full Code    Hospital Problems:  Principal Problem:    Severe sepsis ([facility] [address]  Active Problems:    Benign hypertension with CKD (chronic kidney disease) stage IV (HCC)    Type 1 diabetes mellitus with hyperglycemia ([facility] [address]    Pyelonephritis  Resolved Problems:    * No resolved hospital problems. *      Objective:     Patient Vitals for the past 24 hrs:   Temp Pulse Resp BP SpO2   07/02/22 0711 98.4 °F (36.9 °C) 85 17 124/79 --   07/02/22 0329 98.5 °F (36.9 °C) 96 16 (!) 129/100 100 %   07/02/22 0257 -- 96 16 -- --   07/01/22 2335 98.5 °F (36.9 °C) (!) 101 17 (!) 151/91 98 %   07/01/22 2331 -- (!) 101 17 -- --   07/01/22 2300 -- 100 23 -- --   07/01/22 2258 -- (!) 101 15 -- --   07/01/22 1959 99.3 °F (37.4 °C) (!) 112 18 122/74 100 %   07/01/22 1500 98.9 °F (37.2 °C) 94 18 -- --   07/01/22 1107 98.3 °F (36.8 °C) 95 16 (!) 145/93 --       Oxygen Therapy  SpO2: 100 %  Pulse Oximetry Type:  Intermittent  Pulse via Oximetry: 109 beats per minute  SPO2 High Alarm Limit: 100  SPO2 Low Alarm Limit POX: 90  Pulse Oximeter Device Mode: Intermittent  Pulse Oximeter Device Location: Right,Hand,Finger  O2 Device: None (Room Auto Differential    Collection Time: 07/01/22  7:24 AM   Result Value Ref Range    WBC 9.3 4.3 - 11.1 K/uL    RBC 3.00 (L) 4.05 - 5.2 M/uL    Hemoglobin 7.7 (L) 11.7 - 15.4 g/dL    Hematocrit 24.9 (L) 35.8 - 46.3 %    MCV 83.0 79.6 - 97.8 FL    MCH 25.7 (L) 26.1 - 32.9 PG    MCHC 30.9 (L) 31.4 - 35.0 g/dL    RDW 15.5 (H) 11.9 - 14.6 %    Platelets 882 019 - 208 K/uL    MPV 8.7 (L) 9.4 - 12.3 FL    nRBC 0.00 0.0 - 0.2 K/uL    Differential Type AUTOMATED      Seg Neutrophils 69 43 - 78 %    Lymphocytes 21 13 - 44 %    Monocytes 7 4.0 - 12.0 %    Eosinophils % 1 0.5 - 7.8 %    Basophils 0 0.0 - 2.0 %    Immature Granulocytes 2 0.0 - 5.0 %    Segs Absolute 6.7 1.7 - 8.2 K/UL    Absolute Lymph # 2.0 0.5 - 4.6 K/UL    Absolute Mono # 0.7 0.1 - 1.3 K/UL    Absolute Eos # 0.1 0.0 - 0.8 K/UL    Basophils Absolute 0.0 0.0 - 0.2 K/UL    Absolute Immature Granulocyte 0.2 0.0 - 0.5 K/UL   Magnesium    Collection Time: 07/01/22  7:24 AM   Result Value Ref Range    Magnesium 1.7 (L) 1.8 - 2.4 mg/dL   Comprehensive Metabolic Panel    Collection Time: 07/01/22  7:24 AM   Result Value Ref Range    Sodium 143 136 - 145 mmol/L    Potassium 3.8 3.5 - 5.1 mmol/L    Chloride 110 (H) 98 - 107 mmol/L    CO2 26 21 - 32 mmol/L    Anion Gap 7 7 - 16 mmol/L    Glucose 188 (H) 65 - 100 mg/dL    BUN 6 6 - 23 MG/DL    CREATININE 2.28 (H) 0.6 - 1.0 MG/DL    GFR African American 29 (L) >60 ml/min/1.73m2    GFR Non- 24 (L) >60 ml/min/1.73m2    Calcium 8.4 8.3 - 10.4 MG/DL    Total Bilirubin 0.2 0.2 - 1.1 MG/DL    ALT 35 12 - 65 U/L    AST 47 (H) 15 - 37 U/L    Alk Phosphatase 243 (H) 50 - 136 U/L    Total Protein 6.4 6.3 - 8.2 g/dL    Albumin 2.0 (L) 3.5 - 5.0 g/dL    Globulin 4.4 (H) 2.3 - 3.5 g/dL    Albumin/Globulin Ratio 0.5 (L) 1.2 - 3.5     Phosphorus    Collection Time: 07/01/22  7:24 AM   Result Value Ref Range    Phosphorus 3.4 2.5 - 4.5 MG/DL   POCT Glucose    Collection Time: 07/01/22  7:52 AM   Result Value Ref Range POC Glucose 181 (H) 65 - 100 mg/dL    Performed by: Lyubov    POCT Glucose    Collection Time: 07/01/22 11:53 AM   Result Value Ref Range    POC Glucose 245 (H) 65 - 100 mg/dL    Performed by: Lyubov    POCT Glucose    Collection Time: 07/01/22  1:15 PM   Result Value Ref Range    POC Glucose 287 (H) 65 - 100 mg/dL    Performed by: Lyubov    POCT Glucose    Collection Time: 07/01/22  4:18 PM   Result Value Ref Range    POC Glucose 188 (H) 65 - 100 mg/dL    Performed by: Lyubov    POCT Glucose    Collection Time: 07/01/22  9:36 PM   Result Value Ref Range    POC Glucose 163 (H) 65 - 100 mg/dL    Performed by: Della    CBC with Auto Differential    Collection Time: 07/02/22  3:25 AM   Result Value Ref Range    WBC 9.5 4.3 - 11.1 K/uL    RBC 3.21 (L) 4.05 - 5.2 M/uL    Hemoglobin 8.3 (L) 11.7 - 15.4 g/dL    Hematocrit 27.0 (L) 35.8 - 46.3 %    MCV 84.1 79.6 - 97.8 FL    MCH 25.9 (L) 26.1 - 32.9 PG    MCHC 30.7 (L) 31.4 - 35.0 g/dL    RDW 15.5 (H) 11.9 - 14.6 %    Platelets 186 386 - 153 K/uL    MPV 8.9 (L) 9.4 - 12.3 FL    nRBC 0.00 0.0 - 0.2 K/uL    Differential Type AUTOMATED      Seg Neutrophils 63 43 - 78 %    Lymphocytes 28 13 - 44 %    Monocytes 6 4.0 - 12.0 %    Eosinophils % 2 0.5 - 7.8 %    Basophils 0 0.0 - 2.0 %    Immature Granulocytes 1 0.0 - 5.0 %    Segs Absolute 6.0 1.7 - 8.2 K/UL    Absolute Lymph # 2.6 0.5 - 4.6 K/UL    Absolute Mono # 0.6 0.1 - 1.3 K/UL    Absolute Eos # 0.1 0.0 - 0.8 K/UL    Basophils Absolute 0.0 0.0 - 0.2 K/UL    Absolute Immature Granulocyte 0.1 0.0 - 0.5 K/UL   Comprehensive Metabolic Panel    Collection Time: 07/02/22  3:25 AM   Result Value Ref Range    Sodium 140 136 - 145 mmol/L    Potassium 3.6 3.5 - 5.1 mmol/L    Chloride 107 98 - 107 mmol/L    CO2 26 21 - 32 mmol/L    Anion Gap 7 7 - 16 mmol/L    Glucose 247 (H) 65 - 100 mg/dL    BUN 8 6 - 23 MG/DL    CREATININE 2.54 (H) 0.6 - 1.0 MG/DL    GFR  26 (L) >60 ml/min/1.73m2    GFR Non-African American 21 (L) >60 ml/min/1.73m2    Calcium 8.4 8.3 - 10.4 MG/DL    Total Bilirubin 0.4 0.2 - 1.1 MG/DL    ALT 47 12 - 65 U/L    AST 58 (H) 15 - 37 U/L    Alk Phosphatase 272 (H) 50 - 130 U/L    Total Protein 6.6 6.3 - 8.2 g/dL    Albumin 2.1 (L) 3.5 - 5.0 g/dL    Globulin 4.5 (H) 2.3 - 3.5 g/dL    Albumin/Globulin Ratio 0.5 (L) 1.2 - 3.5     Magnesium    Collection Time: 07/02/22  3:25 AM   Result Value Ref Range    Magnesium 1.3 (L) 1.8 - 2.4 mg/dL   Phosphorus    Collection Time: 07/02/22  3:25 AM   Result Value Ref Range    Phosphorus 3.1 2.5 - 4.5 MG/DL   POCT Glucose    Collection Time: 07/02/22  7:25 AM   Result Value Ref Range    POC Glucose 213 (H) 65 - 100 mg/dL    Performed by: Lyubov    POCT Glucose    Collection Time: 07/02/22  9:03 AM   Result Value Ref Range    POC Glucose 217 (H) 65 - 100 mg/dL    Performed by: [place]        Other Studies:  XR ABDOMEN (KUB) (SINGLE AP VIEW)   Final Result   RIGHT URETERAL STENT IN PLACE WITH NO ACUTE ABDOMINAL ABNORMALITY   IDENTIFIED.       XR CHEST PORTABLE   Final Result   No acute findings in the chest             Current Meds:  Current Facility-Administered Medications   Medication Dose Route Frequency    metoclopramide (REGLAN) injection 10 mg  10 mg IntraVENous TID    promethazine (PHENERGAN) suppository 25 mg  25 mg Rectal BID PRN    magnesium oxide (MAG-OX) tablet 400 mg  400 mg Oral Daily    insulin glargine (LANTUS) injection vial 12 Units  12 Units SubCUTAneous QAM    lactated ringers infusion   IntraVENous Continuous    insulin lispro (HUMALOG) injection vial 0-4 Units  0-4 Units SubCUTAneous TID WC    insulin lispro (HUMALOG) injection vial 0-4 Units  0-4 Units SubCUTAneous Nightly    pantoprazole (PROTONIX) 40 mg in sodium chloride (PF) 10 mL injection  40 mg IntraVENous Q12H    polyethylene glycol (GLYCOLAX) packet 17 g  17 g Oral Daily    potassium chloride 10 mEq/100 mL IVPB (Peripheral Line)  10 mEq IntraVENous PRN    morphine (PF) injection 1 mg  1 mg IntraVENous Q6H PRN    HYDROcodone-acetaminophen (NORCO) 7.5-325 MG per tablet 1 tablet  1 tablet Oral Q6H PRN    ciprofloxacin (CIPRO) IVPB 400 mg  400 mg IntraVENous Q12H    magnesium sulfate 2000 mg in 50 mL IVPB premix  2,000 mg IntraVENous PRN    ondansetron (ZOFRAN) injection 4 mg  4 mg IntraVENous Q6H PRN    albuterol (PROVENTIL) nebulizer solution 2.5 mg  2.5 mg Nebulization Q6H PRN    glucose chewable tablet 16 g  4 tablet Oral PRN    dextrose bolus 10% 125 mL  125 mL IntraVENous PRN    Or    dextrose bolus 10% 250 mL  250 mL IntraVENous PRN    glucagon injection 1 mg  1 mg IntraMUSCular PRN    dextrose 5 % solution  100 mL/hr IntraVENous PRN    sodium chloride flush 0.9 % injection 5-40 mL  5-40 mL IntraVENous 2 times per day    sodium chloride flush 0.9 % injection 5-40 mL  5-40 mL IntraVENous PRN    0.9 % sodium chloride infusion   IntraVENous PRN    enoxaparin Sodium (LOVENOX) injection 30 mg  30 mg SubCUTAneous Daily    acetaminophen (TYLENOL) tablet 650 mg  650 mg Oral Q6H PRN    Or    acetaminophen (TYLENOL) suppository 650 mg  650 mg Rectal Q6H PRN    aluminum & magnesium hydroxide-simethicone (MAALOX) 200-200-20 MG/5ML suspension 30 mL  30 mL Oral Q6H PRN       Signed:  Soila Carrillo MD    Part of this note may have been written by using a voice dictation software. The note has been proof read but may still contain some grammatical/other typographical errors.

## 2022-07-02 NOTE — PLAN OF CARE
Cardiovascular - Adult  Goal: Maintains optimal cardiac output and hemodynamic stability  Outcome: Progressing  Flowsheets (Taken 7/2/2022 0758)  Maintains optimal cardiac output and hemodynamic stability: Monitor blood pressure and heart rate     Problem: Skin/Tissue Integrity - Adult  Goal: Skin integrity remains intact  Outcome: Progressing     Problem: Musculoskeletal - Adult  Goal: Return mobility to safest level of function  Outcome: Progressing  Goal: Maintain proper alignment of affected body part  Outcome: Progressing  Goal: Return ADL status to a safe level of function  Outcome: Progressing     Problem: Gastrointestinal - Adult  Goal: Minimal or absence of nausea and vomiting  Outcome: Progressing  Flowsheets (Taken 7/2/2022 0758)  Minimal or absence of nausea and vomiting:   Administer IV fluids as ordered to ensure adequate hydration   Nutrition consult to assist patient with adequate nutrition and appropriate food choices  Goal: Maintains or returns to baseline bowel function  Outcome: Progressing  Flowsheets (Taken 7/2/2022 0758)  Maintains or returns to baseline bowel function: Assess bowel function     Problem: Infection - Adult  Goal: Absence of infection at discharge  Outcome: Progressing     Problem: Metabolic/Fluid and Electrolytes - Adult  Goal: Electrolytes maintained within normal limits  Outcome: Progressing  Goal: Glucose maintained within prescribed range  Outcome: Progressing  Note: Dr. Sari Villarreal reviewed glucose levels and adjusted Lantus this AM.     Problem: Hematologic - Adult  Goal: Maintains hematologic stability  Outcome: Progressing     Problem: Pain  Goal: Verbalizes/displays adequate comfort level or baseline comfort level  Outcome: Progressing

## 2022-07-02 NOTE — PROGRESS NOTES
Physical Therapy Note:    Attempted to see patient this AM for physical therapy treatment  session. Patient declined. Stated she had an upset stomach and asked that PT return later. Encouraged patient to participate and informed her that PT may not be able to return later today. Patient still refused. RN notified. Will follow and re-attempt as schedule permits/patient available.  Thank you,    Enedelia Villafuerte, PT     Rehab Caseload Tracker

## 2022-07-02 NOTE — PROGRESS NOTES
GI DAILY PROGRESS NOTE    Admit Date:  6/26/2022    Today's Date:  7/2/2022    CC:  N/V    Subjective:     Patient seen and examined this AM. No acute events overnight.  Continues to have N/V    Medications:   Current Facility-Administered Medications   Medication Dose Route Frequency    magnesium sulfate 4000 mg in 100 mL IVPB premix  4,000 mg IntraVENous Once    lactated ringers infusion   IntraVENous Continuous    insulin glargine (LANTUS) injection vial 10 Units  10 Units SubCUTAneous QAM    insulin lispro (HUMALOG) injection vial 0-4 Units  0-4 Units SubCUTAneous TID WC    insulin lispro (HUMALOG) injection vial 0-4 Units  0-4 Units SubCUTAneous Nightly    pantoprazole (PROTONIX) 40 mg in sodium chloride (PF) 10 mL injection  40 mg IntraVENous Q12H    ondansetron (ZOFRAN) injection 8 mg  8 mg IntraVENous 3 times per day    prochlorperazine (COMPAZINE) injection 10 mg  10 mg IntraVENous Q6H PRN    polyethylene glycol (GLYCOLAX) packet 17 g  17 g Oral Daily    potassium chloride 10 mEq/100 mL IVPB (Peripheral Line)  10 mEq IntraVENous PRN    morphine (PF) injection 1 mg  1 mg IntraVENous Q6H PRN    HYDROcodone-acetaminophen (NORCO) 7.5-325 MG per tablet 1 tablet  1 tablet Oral Q6H PRN    ciprofloxacin (CIPRO) IVPB 400 mg  400 mg IntraVENous Q12H    magnesium sulfate 2000 mg in 50 mL IVPB premix  2,000 mg IntraVENous PRN    ondansetron (ZOFRAN) injection 4 mg  4 mg IntraVENous Q6H PRN    albuterol (PROVENTIL) nebulizer solution 2.5 mg  2.5 mg Nebulization Q6H PRN    glucose chewable tablet 16 g  4 tablet Oral PRN    dextrose bolus 10% 125 mL  125 mL IntraVENous PRN    Or    dextrose bolus 10% 250 mL  250 mL IntraVENous PRN    glucagon injection 1 mg  1 mg IntraMUSCular PRN    dextrose 5 % solution  100 mL/hr IntraVENous PRN    sodium chloride flush 0.9 % injection 5-40 mL  5-40 mL IntraVENous 2 times per day    sodium chloride flush 0.9 % injection 5-40 mL  5-40 mL IntraVENous PRN    0.9 % sodium chloride infusion IntraVENous PRN    enoxaparin Sodium (LOVENOX) injection 30 mg  30 mg SubCUTAneous Daily    acetaminophen (TYLENOL) tablet 650 mg  650 mg Oral Q6H PRN    Or    acetaminophen (TYLENOL) suppository 650 mg  650 mg Rectal Q6H PRN    aluminum & magnesium hydroxide-simethicone (MAALOX) 200-200-20 MG/5ML suspension 30 mL  30 mL Oral Q6H PRN       Review of Systems:  ROS was obtained, with pertinent positives as listed above. No chest pain or SOB. Diet:      Objective:   Vitals:  BP (!) 129/100   Pulse 96   Temp 98.5 °F (36.9 °C) (Oral)   Resp 16   Ht 5' 8\" (1.727 m)   Wt 146 lb 11.2 oz (66.5 kg)   SpO2 100%   BMI 22.31 kg/m²   Intake/Output:  No intake/output data recorded. 06/30 0701 - 07/01 1900  In: 996.3 [P.O.:413; I.V.:383.3]  Out: 125   Exam:  General appearance: alert, cooperative, no distress  Lungs: clear to auscultation bilaterally anteriorly  Heart: regular rate and rhythm  Abdomen: soft, non-tender. Bowel sounds normal. No masses, no organomegaly  Extremities: extremities normal, atraumatic, no cyanosis or edema  Neuro:  alert and oriented    Data Review (Labs):    Recent Labs     06/29/22  1852 06/30/22  0514 07/01/22  0724 07/02/22  0325   WBC  --  9.4 9.3 9.5   HGB  --  8.1* 7.7* 8.3*   HCT  --  26.3* 24.9* 27.0*   PLT  --  516* 437 414   MCV  --  82.7 83.0 84.1   NA  --  143 143 140   K 3.4* 4.0 3.8 3.6   CL  --  109* 110* 107   CO2  --  27 26 26   BUN  --  4* 6 8   MG  --  1.6* 1.7* 1.3*   ALT  --  37 35 47   AST  --  43* 47* 58*       Radiology:  [unfilled]    Assessment:     Principal Problem:    Severe sepsis (HCC)  Active Problems:    Benign hypertension with CKD (chronic kidney disease) stage IV (HCC)    Type 1 diabetes mellitus with hyperglycemia (HCC)    Pyelonephritis  Resolved Problems:    * No resolved hospital problems. *      Plan:      Will d/c zofran  Restart reglan at 10mg TID  PRN phenergan supp  Follow up in AM      Barbara Uribe MD  [facility], [address]

## 2022-07-03 LAB
GLUCOSE BLD STRIP.AUTO-MCNC: 124 MG/DL (ref 65–100)
GLUCOSE BLD STRIP.AUTO-MCNC: 131 MG/DL (ref 65–100)
GLUCOSE BLD STRIP.AUTO-MCNC: 352 MG/DL (ref 65–100)
SERVICE CMNT-IMP: ABNORMAL

## 2022-07-03 PROCEDURE — 1100000000 HC RM PRIVATE

## 2022-07-03 PROCEDURE — 76937 US GUIDE VASCULAR ACCESS: CPT

## 2022-07-03 PROCEDURE — 6360000002 HC RX W HCPCS: Performed by: INTERNAL MEDICINE

## 2022-07-03 PROCEDURE — 6370000000 HC RX 637 (ALT 250 FOR IP): Performed by: STUDENT IN AN ORGANIZED HEALTH CARE EDUCATION/TRAINING PROGRAM

## 2022-07-03 PROCEDURE — 2580000003 HC RX 258: Performed by: NURSE PRACTITIONER

## 2022-07-03 PROCEDURE — 6360000002 HC RX W HCPCS: Performed by: FAMILY MEDICINE

## 2022-07-03 PROCEDURE — C9113 INJ PANTOPRAZOLE SODIUM, VIA: HCPCS | Performed by: NURSE PRACTITIONER

## 2022-07-03 PROCEDURE — 6360000002 HC RX W HCPCS: Performed by: NURSE PRACTITIONER

## 2022-07-03 PROCEDURE — 6370000000 HC RX 637 (ALT 250 FOR IP): Performed by: INTERNAL MEDICINE

## 2022-07-03 PROCEDURE — 82962 GLUCOSE BLOOD TEST: CPT

## 2022-07-03 PROCEDURE — A4216 STERILE WATER/SALINE, 10 ML: HCPCS | Performed by: NURSE PRACTITIONER

## 2022-07-03 PROCEDURE — 6370000000 HC RX 637 (ALT 250 FOR IP): Performed by: FAMILY MEDICINE

## 2022-07-03 RX ORDER — METOCLOPRAMIDE 10 MG/1
10 TABLET ORAL
Status: DISCONTINUED | OUTPATIENT
Start: 2022-07-03 | End: 2022-07-06 | Stop reason: HOSPADM

## 2022-07-03 RX ORDER — PANTOPRAZOLE SODIUM 40 MG/1
40 TABLET, DELAYED RELEASE ORAL
Status: DISCONTINUED | OUTPATIENT
Start: 2022-07-03 | End: 2022-07-06 | Stop reason: HOSPADM

## 2022-07-03 RX ORDER — CIPROFLOXACIN 500 MG/1
500 TABLET, FILM COATED ORAL ONCE
Status: COMPLETED | OUTPATIENT
Start: 2022-07-03 | End: 2022-07-03

## 2022-07-03 RX ORDER — CIPROFLOXACIN 500 MG/1
500 TABLET, FILM COATED ORAL EVERY 12 HOURS SCHEDULED
Status: DISCONTINUED | OUTPATIENT
Start: 2022-07-03 | End: 2022-07-06 | Stop reason: HOSPADM

## 2022-07-03 RX ORDER — INSULIN GLARGINE 100 [IU]/ML
15 INJECTION, SOLUTION SUBCUTANEOUS EVERY MORNING
Status: DISCONTINUED | OUTPATIENT
Start: 2022-07-04 | End: 2022-07-06 | Stop reason: HOSPADM

## 2022-07-03 RX ADMIN — Medication 400 MG: at 08:44

## 2022-07-03 RX ADMIN — METOCLOPRAMIDE 10 MG: 10 TABLET ORAL at 16:02

## 2022-07-03 RX ADMIN — SODIUM CHLORIDE, PRESERVATIVE FREE 40 MG: 5 INJECTION INTRAVENOUS at 09:17

## 2022-07-03 RX ADMIN — ENOXAPARIN SODIUM 30 MG: 100 INJECTION SUBCUTANEOUS at 08:43

## 2022-07-03 RX ADMIN — PANTOPRAZOLE SODIUM 40 MG: 40 TABLET, DELAYED RELEASE ORAL at 16:01

## 2022-07-03 RX ADMIN — METOCLOPRAMIDE HYDROCHLORIDE 10 MG: 5 INJECTION INTRAMUSCULAR; INTRAVENOUS at 08:44

## 2022-07-03 RX ADMIN — CIPROFLOXACIN 500 MG: 500 TABLET, FILM COATED ORAL at 11:56

## 2022-07-03 RX ADMIN — CIPROFLOXACIN 500 MG: 500 TABLET, FILM COATED ORAL at 00:56

## 2022-07-03 RX ADMIN — CIPROFLOXACIN 500 MG: 500 TABLET, FILM COATED ORAL at 20:30

## 2022-07-03 RX ADMIN — PROMETHAZINE HYDROCHLORIDE 25 MG: 25 SUPPOSITORY RECTAL at 00:50

## 2022-07-03 RX ADMIN — INSULIN LISPRO 4 UNITS: 100 INJECTION, SOLUTION INTRAVENOUS; SUBCUTANEOUS at 21:29

## 2022-07-03 ASSESSMENT — PAIN SCALES - GENERAL: PAINLEVEL_OUTOF10: 0

## 2022-07-03 ASSESSMENT — PAIN SCALES - WONG BAKER: WONGBAKER_NUMERICALRESPONSE: 0

## 2022-07-03 NOTE — PROGRESS NOTES
GI DAILY PROGRESS NOTE    Admit Date:  6/26/2022    Today's Date:  7/3/2022    CC:  N/V    Subjective:     Patient seen and examined this AM. Still having some N/V but slowly improving    Medications:   Current Facility-Administered Medications   Medication Dose Route Frequency    [START ON 7/4/2022] insulin glargine (LANTUS) injection vial 15 Units  15 Units SubCUTAneous QAM    metoclopramide (REGLAN) tablet 10 mg  10 mg Oral TID AC    pantoprazole (PROTONIX) tablet 40 mg  40 mg Oral BID AC    ciprofloxacin (CIPRO) tablet 500 mg  500 mg Oral 2 times per day    promethazine (PHENERGAN) suppository 25 mg  25 mg Rectal BID PRN    magnesium oxide (MAG-OX) tablet 400 mg  400 mg Oral Daily    lactated ringers infusion   IntraVENous Continuous    insulin lispro (HUMALOG) injection vial 0-4 Units  0-4 Units SubCUTAneous TID WC    insulin lispro (HUMALOG) injection vial 0-4 Units  0-4 Units SubCUTAneous Nightly    polyethylene glycol (GLYCOLAX) packet 17 g  17 g Oral Daily    potassium chloride 10 mEq/100 mL IVPB (Peripheral Line)  10 mEq IntraVENous PRN    morphine (PF) injection 1 mg  1 mg IntraVENous Q6H PRN    HYDROcodone-acetaminophen (NORCO) 7.5-325 MG per tablet 1 tablet  1 tablet Oral Q6H PRN    magnesium sulfate 2000 mg in 50 mL IVPB premix  2,000 mg IntraVENous PRN    ondansetron (ZOFRAN) injection 4 mg  4 mg IntraVENous Q6H PRN    albuterol (PROVENTIL) nebulizer solution 2.5 mg  2.5 mg Nebulization Q6H PRN    glucose chewable tablet 16 g  4 tablet Oral PRN    dextrose bolus 10% 125 mL  125 mL IntraVENous PRN    Or    dextrose bolus 10% 250 mL  250 mL IntraVENous PRN    glucagon injection 1 mg  1 mg IntraMUSCular PRN    dextrose 5 % solution  100 mL/hr IntraVENous PRN    sodium chloride flush 0.9 % injection 5-40 mL  5-40 mL IntraVENous 2 times per day    sodium chloride flush 0.9 % injection 5-40 mL  5-40 mL IntraVENous PRN    0.9 % sodium chloride infusion   IntraVENous PRN    enoxaparin Sodium (LOVENOX) injection 30 mg  30 mg SubCUTAneous Daily    acetaminophen (TYLENOL) tablet 650 mg  650 mg Oral Q6H PRN    Or    acetaminophen (TYLENOL) suppository 650 mg  650 mg Rectal Q6H PRN    aluminum & magnesium hydroxide-simethicone (MAALOX) 200-200-20 MG/5ML suspension 30 mL  30 mL Oral Q6H PRN       Review of Systems:  ROS was obtained, with pertinent positives as listed above. No chest pain or SOB. Diet:      Objective:   Vitals:  BP (!) 150/95   Pulse (!) 109   Temp 97.5 °F (36.4 °C) (Oral)   Resp 16   Ht 5' 8\" (1.727 m)   Wt 146 lb 11.2 oz (66.5 kg)   SpO2 99%   BMI 22.31 kg/m²   Intake/Output:  No intake/output data recorded. 07/01 1901 - 07/03 0700  In: 2966.5 [P.O.:177; I.V.:2302]  Out: -   Exam:  General appearance: alert, cooperative, no distress  Lungs: clear to auscultation bilaterally anteriorly  Heart: regular rate and rhythm  Abdomen: soft, non-tender. Bowel sounds normal. No masses, no organomegaly  Extremities: extremities normal, atraumatic, no cyanosis or edema  Neuro:  alert and oriented    Data Review (Labs):    Recent Labs     07/01/22  0724 07/02/22  0325   WBC 9.3 9.5   HGB 7.7* 8.3*   HCT 24.9* 27.0*    414   MCV 83.0 84.1    140   K 3.8 3.6   * 107   CO2 26 26   BUN 6 8   MG 1.7* 1.3*   ALT 35 47   AST 47* 58*       Radiology:  [email or web address]    Assessment:     Principal Problem:    Severe sepsis (HCC)  Active Problems:    Benign hypertension with CKD (chronic kidney disease) stage IV (HCC)    Type 1 diabetes mellitus with hyperglycemia (HCC)    Pyelonephritis  Resolved Problems:    * No resolved hospital problems.  *      Plan:     Continue current treatment plan   Consider endoscopy on Tuesday depending on how she progresses    Missy Rodriguez MD  [facility], [place]

## 2022-07-03 NOTE — PROGRESS NOTES
TRANSFER - OUT REPORT:    Verbal report given to Em Machado RN on [employer]  being transferred to  for routine progression of patient care       Report consisted of patient's Situation, Background, Assessment and   Recommendations(SBAR). Information from the following report(s) Intake/Output, MAR and Recent Results was reviewed with the receiving nurse. Lines:   Extended Dwell Peripherial IV 06/27/22 Right  (Active)   Criteria for Appropriate Use Limited/no vessel suitable for conventional peripheral access 07/02/22 1920   Site Assessment Clean, dry & intact 07/02/22 1920   Phlebitis Assessment No symptoms 07/02/22 1920   Infiltration Assessment 0 07/02/22 1920   Line Status Infusing 07/02/22 [facility] checked and tightened 06/30/22 1926   Alcohol Cap Used Yes 07/02/22 1920   Date of Last Dressing Change 06/27/22 07/02/22 1507   Dressing Type Transparent 07/02/22 1920   Dressing Status Clean, dry & intact 07/02/22 1920        Opportunity for questions and clarification was provided.       Patient transported with:  Registered Nurse and Thong Hopper

## 2022-07-03 NOTE — PROGRESS NOTES
Comprehensive Nutrition Assessment    Type and Reason for Visit: Reassess,NPO/Clear Liquid    Nutrition Recommendations/Plan:   Meals and Snacks:  Diet: Continue current order; diet progression per MD  Nutrition Supplement Therapy:   Unable to add Ensure Clear at this time d/t pt with DM I    NPO/Clear liquid diet status day number 7 is related to N/V . Malnutrition Status: At risk for malnutrition (Comment) (CLQ since 6/26)    If anticipated patient will remain NPO/Clear liquid for greater than 2-3 days, consider nutrition support for primary needs (with tube feeding route preferred if medically appropriate). If FT placed, consider NJ d/t hx of gastroparesis. If tube feeding not appropriate, would necessitate a central line for TPN for primary nutrition. Patient currently has 1 PIV.  If nutrition support pursued, order appropriate route and an IP Nutrition Consult for RD to manage. Malnutrition Assessment:  Malnutrition Status: At risk for malnutrition (Comment) (CLQ since 6/26)    Nutrition Assessment:  Nutrition History: Pt known to nutrition services from past admissions. Pt last seen in [facility] 6/9. At that time she stated she was eating 1 \"big meal\" per day and mostly eating snacks during the day. Pt states after D/C she was eating mostly applesauce at home, but had ongoing N/V.     Do You Have Any Cultural, Sikh, or Ethnic Food Preferences?: No   Nutrition Background:   Pt with PMH notable for HTN, HLD, DM I, CKD IV, recent cardiac arrest requiring intubation, and gastroparesis. She presents with N/V. She is admitted for severe sepsis d/t pyelonephritis. Nutrition Interval:  Patient laying in bed with several open clear liquids on bedside table. Patient reports that she has been able to tolerate water, diet soda and broth without issue. She reports juice and jello are too sweet and make her feel like she maybe sick.  She reports her nausea is improved greatly and she has only had one episode of vomiting in last 24 hours. Patient is unable to tolerate po medications at this time and had to get new IV access placed today. Nutrition Related Findings:   No kasia muscle or subcutaneous fat wasting      Current Nutrition Therapies:  ADULT DIET; Clear Liquid; 4 carb choices (60 gm/meal)    Current Intake:   Average Meal Intake: 1-25% Average Supplements Intake: None Ordered      Anthropometric Measures:  Height: 5' 8\" (172.7 cm)  Current Body Wt: 146 lb 9.7 oz (66.5 kg) (7/2), Weight source: Bed Scale  BMI: 22.3  Admission Body Weight: 149 lb 14.6 oz (68 kg) (6/26; bed scale)  Ideal Body Weight (Kg) (Calculated): 64 kg (140 lbs), 104.7 %  Usual Body Wt:  (varies; hx CKD), Percent weight change:         Edema:    BMI Category Normal Weight (BMI 18.5-24. 9)  Estimated Daily Nutrient Needs:  Energy (kcal/day): 5787-2294 (Kcal/kg (25-30) Weight used:   Current (68.1kg)  Protein (g/day): 68-82 (1-1.2gm/kg) Weight Used: (Current (68.1kg))    Fluid (ml/day):   (1 ml/kcal)    Nutrition Diagnosis:   · Inadequate oral intake related to altered GI function as evidenced by NPO or clear liquid status due to medical condition    Nutrition Interventions:   Food and/or Nutrient Delivery: Continue Current Diet     Coordination of Nutrition Care: Continue to monitor while inpatient    Goals:   Previous Goal Met: No Progress toward Goal(s)  Active Goal:  (Diet advancement or initiation of nutrition support by next RD assessment)       Nutrition Monitoring and Evaluation:      Food/Nutrient Intake Outcomes: Diet Advancement/Tolerance,Food and Nutrient Intake  Physical Signs/Symptoms Outcomes: Nausea or Vomiting,GI Status,Meal Time Behavior,Weight    Discharge Planning:     Too soon to determine    Electronically signed by Rosie Genao MS, RD, LD on 7/3/2022 at 12:17 PM.

## 2022-07-04 LAB
GLUCOSE BLD STRIP.AUTO-MCNC: 172 MG/DL (ref 65–100)
GLUCOSE BLD STRIP.AUTO-MCNC: 318 MG/DL (ref 65–100)
SERVICE CMNT-IMP: ABNORMAL
SERVICE CMNT-IMP: ABNORMAL

## 2022-07-04 PROCEDURE — 82962 GLUCOSE BLOOD TEST: CPT

## 2022-07-04 PROCEDURE — 1100000000 HC RM PRIVATE

## 2022-07-04 PROCEDURE — 6360000002 HC RX W HCPCS: Performed by: FAMILY MEDICINE

## 2022-07-04 PROCEDURE — 6370000000 HC RX 637 (ALT 250 FOR IP): Performed by: FAMILY MEDICINE

## 2022-07-04 PROCEDURE — 6370000000 HC RX 637 (ALT 250 FOR IP): Performed by: INTERNAL MEDICINE

## 2022-07-04 PROCEDURE — 6370000000 HC RX 637 (ALT 250 FOR IP): Performed by: STUDENT IN AN ORGANIZED HEALTH CARE EDUCATION/TRAINING PROGRAM

## 2022-07-04 RX ORDER — MAGNESIUM SULFATE IN WATER 40 MG/ML
2000 INJECTION, SOLUTION INTRAVENOUS PRN
Status: DISCONTINUED | OUTPATIENT
Start: 2022-07-04 | End: 2022-07-06 | Stop reason: HOSPADM

## 2022-07-04 RX ORDER — POTASSIUM CHLORIDE 20 MEQ/1
40 TABLET, EXTENDED RELEASE ORAL PRN
Status: DISCONTINUED | OUTPATIENT
Start: 2022-07-04 | End: 2022-07-06 | Stop reason: HOSPADM

## 2022-07-04 RX ORDER — PROCHLORPERAZINE EDISYLATE 5 MG/ML
10 INJECTION INTRAMUSCULAR; INTRAVENOUS ONCE
Status: COMPLETED | OUTPATIENT
Start: 2022-07-04 | End: 2022-07-04

## 2022-07-04 RX ORDER — ONDANSETRON 4 MG/1
4 TABLET, ORALLY DISINTEGRATING ORAL EVERY 6 HOURS PRN
Status: DISCONTINUED | OUTPATIENT
Start: 2022-07-04 | End: 2022-07-06 | Stop reason: HOSPADM

## 2022-07-04 RX ORDER — POTASSIUM CHLORIDE 7.45 MG/ML
10 INJECTION INTRAVENOUS PRN
Status: DISCONTINUED | OUTPATIENT
Start: 2022-07-04 | End: 2022-07-06 | Stop reason: HOSPADM

## 2022-07-04 RX ADMIN — ONDANSETRON 4 MG: 4 TABLET, ORALLY DISINTEGRATING ORAL at 04:22

## 2022-07-04 RX ADMIN — INSULIN GLARGINE 15 UNITS: 100 INJECTION, SOLUTION SUBCUTANEOUS at 12:50

## 2022-07-04 RX ADMIN — PROCHLORPERAZINE EDISYLATE 10 MG: 5 INJECTION INTRAMUSCULAR; INTRAVENOUS at 05:51

## 2022-07-04 RX ADMIN — ONDANSETRON 4 MG: 4 TABLET, ORALLY DISINTEGRATING ORAL at 21:08

## 2022-07-04 RX ADMIN — PROMETHAZINE HYDROCHLORIDE 25 MG: 25 SUPPOSITORY RECTAL at 01:50

## 2022-07-04 RX ADMIN — CIPROFLOXACIN 500 MG: 500 TABLET, FILM COATED ORAL at 22:45

## 2022-07-04 RX ADMIN — INSULIN LISPRO 3 UNITS: 100 INJECTION, SOLUTION INTRAVENOUS; SUBCUTANEOUS at 12:49

## 2022-07-04 RX ADMIN — ONDANSETRON 4 MG: 4 TABLET, ORALLY DISINTEGRATING ORAL at 10:30

## 2022-07-04 NOTE — PROGRESS NOTES
Patient had multiple episodes of N/V through the night. Promethazine suppository 25mg (0150), ondansetron disintegrating tablet 4mg (6622), and prochlorerazine 10mg IM (3100) were administered. The patient has refused morning (0400) vitals, AM labs, her morning PO meds, and morning BSG checks for 630.

## 2022-07-04 NOTE — PLAN OF CARE
Problem: Safety - Adult  Goal: Free from fall injury  Outcome: Progressing     Problem: ABCDS Injury Assessment  Goal: Absence of physical injury  Outcome: Progressing     Problem: Chronic Conditions and Co-morbidities  Goal: Patient's chronic conditions and co-morbidity symptoms are monitored and maintained or improved  Outcome: Progressing     Problem: Skin/Tissue Integrity  Goal: Absence of new skin breakdown  Description: 1. Monitor for areas of redness and/or skin breakdown  2. Assess vascular access sites hourly  3. Every 4-6 hours minimum:  Change oxygen saturation probe site  4. Every 4-6 hours:  If on nasal continuous positive airway pressure, respiratory therapy assess nares and determine need for appliance change or resting period.   Outcome: Progressing     Problem: Respiratory - Adult  Goal: Achieves optimal ventilation and oxygenation  Outcome: Progressing     Problem: Skin/Tissue Integrity - Adult  Goal: Skin integrity remains intact  Outcome: Progressing     Problem: Pain  Goal: Verbalizes/displays adequate comfort level or baseline comfort level  Outcome: Progressing

## 2022-07-04 NOTE — PROGRESS NOTES
Hospitalist Progress Note   Admit Date:  2022  3:52 PM   Name:  Blanca Falk   Age:  48 y.o. Sex:  female  :  1975   MRN:  [id number]   Room:  [facility]    Presenting Complaint: Nausea and Emesis     Reason(s) for Admission: Septicemia ([facility] [address] [A41.9]  Acute pyelonephritis [N10]  Severe sepsis ([facility] [address] [A41.9, R65.20]  Nausea and vomiting, intractability of vomiting not specified, unspecified vomiting type [R11.2]  Chronic kidney disease, unspecified CKD stage [N18.9]     Hospital Course & Interval History:   Martinez Leal is a 48 y. o. female with medical history of uncontrolled diabetes with complications, hypertension, CKD, and 2 recent hospitalizations, who presented with nausea and vomiting.  Recently hospitalized at [facility] from  through  with pyelonephritis and DKA.    Patient admitted with sepsis secondary to UTI. Started on empiric antibiotics. Blood culture growing Enterobacter. ID consulted. Antibiotics switched to Cipro. Gastroenterologist was consulted. She was treated with Reglan with minimal relief. She was started on Zofran and no plans for endoscopy. Repeat blood culture negative. Subjective/24hr Events (22): Refusing labs, VS, meds today. GI following and plan for EGD tomorrow however has no IV site despite multiple attempts and pt refusing. Denies CP, SOB. Assessment & Plan:     Principal Problem:   Severe sepsis secondary to UTI:  Enterobacter bacteremia: Possible source GI versus urine  Patient with recent history of pyelonephritis, CVA tenderness negative  Continue Cipro IV EOT  as patient is unable to tolerate p.o.     Repeat blood culture negative    ID signed off  PT/OT recommended no further skilled therapy     Diabetic gastroparesis  Patient remained with persistent nausea/vomiting  Zofran discontinued and started on Reglan  Added Phenergan prn  Continue with PPI IV   Abdominal x-ray showed right ureteral stent  No plans for Endoscopy, at this time. clear liquids and will advance the diet as tolerated  GI following  7/4 plans for EGD tomorrow        Constipation  Continue MiraLAX     Hypomagnesemia  Pt is refusing labs.     Elevated alkaline phosphatase     Type 1 diabetes mellitus:  Patient is not tolerating diet and will increase insulin after her nutrition improves  Increased  Lantus to 15 units daily and sliding scale  Inpatient diabetes management consult     CKD stage IV:  Stable  Continue to monitor     Hypertension:  Continue home meds      Discharge Planning:      Pending clinical course    Diet:  ADULT DIET; Clear Liquid; 4 carb choices (60 gm/meal)  Diet NPO  DVT PPx: heparin sq  Code status: Full Code    Hospital Problems:  Principal Problem:    Severe sepsis ([facility] [address]  Active Problems:    Benign hypertension with CKD (chronic kidney disease) stage IV (HCC)    Type 1 diabetes mellitus with hyperglycemia ([facility] [address]    Pyelonephritis  Resolved Problems:    * No resolved hospital problems. *      Objective:     Patient Vitals for the past 24 hrs:   Temp Pulse Resp BP SpO2   07/04/22 1119 97.7 °F (36.5 °C) (!) 113 16 (!) 158/95 97 %   07/04/22 0742 97.9 °F (36.6 °C) (!) 113 16 (!) 140/79 99 %   07/03/22 2251 98.1 °F (36.7 °C) (!) 106 18 (!) 151/83 100 %   07/03/22 1910 98.2 °F (36.8 °C) (!) 112 18 (!) 135/94 100 %   07/03/22 1640 98.8 °F (37.1 °C) (!) 102 18 (!) 163/93 100 %       Oxygen Therapy  SpO2: 97 %  Pulse Oximetry Type: Intermittent  Pulse via Oximetry: 89 beats per minute  SPO2 High Alarm Limit: 100  SPO2 Low Alarm Limit POX: 90  Pulse Oximeter Device Mode: Intermittent  Pulse Oximeter Device Location: Right,Hand,Finger  O2 Device: None (Room air)    Estimated body mass index is 22.31 kg/m² as calculated from the following:    Height as of this encounter: 5' 8\" (1.727 m). Weight as of this encounter: 146 lb 11.2 oz (66.5 kg).   No intake or output data in the 24 hours ending 07/04/22 1504      Physical Exam: (H) 65 - 100 mg/dL    Performed by: Rupa Rodriguez        Other Studies:  XR ABDOMEN (KUB) (SINGLE AP VIEW)   Final Result   RIGHT URETERAL STENT IN PLACE WITH NO ACUTE ABDOMINAL ABNORMALITY   IDENTIFIED.       XR CHEST PORTABLE   Final Result   No acute findings in the chest             Current Meds:  Current Facility-Administered Medications   Medication Dose Route Frequency    ondansetron (ZOFRAN-ODT) disintegrating tablet 4 mg  4 mg Oral Q6H PRN    sodium phosphate 10 mmol in sodium chloride 0.9 % 250 mL IVPB  10 mmol IntraVENous PRN    Or    sodium phosphate 15 mmol in sodium chloride 0.9 % 250 mL IVPB  15 mmol IntraVENous PRN    Or    sodium phosphate 20 mmol in sodium chloride 0.9 % 500 mL IVPB  20 mmol IntraVENous PRN    magnesium sulfate 2000 mg in 50 mL IVPB premix  2,000 mg IntraVENous PRN    potassium chloride (KLOR-CON M) extended release tablet 40 mEq  40 mEq Oral PRN    Or    potassium bicarb-citric acid (EFFER-K) effervescent tablet 40 mEq  40 mEq Oral PRN    Or    potassium chloride 10 mEq/100 mL IVPB (Peripheral Line)  10 mEq IntraVENous PRN    insulin glargine (LANTUS) injection vial 15 Units  15 Units SubCUTAneous QAM    metoclopramide (REGLAN) tablet 10 mg  10 mg Oral TID AC    pantoprazole (PROTONIX) tablet 40 mg  40 mg Oral BID AC    ciprofloxacin (CIPRO) tablet 500 mg  500 mg Oral 2 times per day    promethazine (PHENERGAN) suppository 25 mg  25 mg Rectal BID PRN    magnesium oxide (MAG-OX) tablet 400 mg  400 mg Oral Daily    lactated ringers infusion   IntraVENous Continuous    insulin lispro (HUMALOG) injection vial 0-4 Units  0-4 Units SubCUTAneous TID WC    insulin lispro (HUMALOG) injection vial 0-4 Units  0-4 Units SubCUTAneous Nightly    polyethylene glycol (GLYCOLAX) packet 17 g  17 g Oral Daily    morphine (PF) injection 1 mg  1 mg IntraVENous Q6H PRN    HYDROcodone-acetaminophen (NORCO) 7.5-325 MG per tablet 1 tablet  1 tablet Oral Q6H PRN    magnesium sulfate 2000 mg in 50 mL IVPB premix  2,000 mg IntraVENous PRN    ondansetron (ZOFRAN) injection 4 mg  4 mg IntraVENous Q6H PRN    albuterol (PROVENTIL) nebulizer solution 2.5 mg  2.5 mg Nebulization Q6H PRN    glucose chewable tablet 16 g  4 tablet Oral PRN    dextrose bolus 10% 125 mL  125 mL IntraVENous PRN    Or    dextrose bolus 10% 250 mL  250 mL IntraVENous PRN    glucagon injection 1 mg  1 mg IntraMUSCular PRN    dextrose 5 % solution  100 mL/hr IntraVENous PRN    sodium chloride flush 0.9 % injection 5-40 mL  5-40 mL IntraVENous 2 times per day    sodium chloride flush 0.9 % injection 5-40 mL  5-40 mL IntraVENous PRN    0.9 % sodium chloride infusion   IntraVENous PRN    enoxaparin Sodium (LOVENOX) injection 30 mg  30 mg SubCUTAneous Daily    acetaminophen (TYLENOL) tablet 650 mg  650 mg Oral Q6H PRN    Or    acetaminophen (TYLENOL) suppository 650 mg  650 mg Rectal Q6H PRN    aluminum & magnesium hydroxide-simethicone (MAALOX) 200-200-20 MG/5ML suspension 30 mL  30 mL Oral Q6H PRN       Signed:  Johny Sparks, APRN - CNP    Notes, labs, VS, diagnostic testing reviewed  Case discussed with pt, care team ,Dr. Hadley Perez

## 2022-07-04 NOTE — PROGRESS NOTES
PRN    0.9 % sodium chloride infusion   IntraVENous PRN    enoxaparin Sodium (LOVENOX) injection 30 mg  30 mg SubCUTAneous Daily    acetaminophen (TYLENOL) tablet 650 mg  650 mg Oral Q6H PRN    Or    acetaminophen (TYLENOL) suppository 650 mg  650 mg Rectal Q6H PRN    aluminum & magnesium hydroxide-simethicone (MAALOX) 200-200-20 MG/5ML suspension 30 mL  30 mL Oral Q6H PRN       Review of Systems:  ROS was obtained, with pertinent positives as listed above. No chest pain or SOB. Diet:      Objective:   Vitals:  BP (!) 151/83   Pulse (!) 106   Temp 98.1 °F (36.7 °C) (Axillary)   Resp 18   Ht 5' 8\" (1.727 m)   Wt 146 lb 11.2 oz (66.5 kg)   SpO2 100%   BMI 22.31 kg/m²   Intake/Output:  No intake/output data recorded. 07/02 0701 - 07/03 1900  In: 1694.8 [P.O.:177; I.V.:1227]  Out: -   Exam:  General appearance: alert, cooperative, no distress  Lungs: clear to auscultation bilaterally anteriorly  Heart: regular rate and rhythm  Abdomen: soft, non-tender. Bowel sounds normal. No masses, no organomegaly  Extremities: extremities normal, atraumatic, no cyanosis or edema  Neuro:  alert and oriented    Data Review (Labs):    Recent Labs     07/01/22  0724 07/02/22  0325   WBC 9.3 9.5   HGB 7.7* 8.3*   HCT 24.9* 27.0*    414   MCV 83.0 84.1    140   K 3.8 3.6   * 107   CO2 26 26   BUN 6 8   MG 1.7* 1.3*   ALT 35 47   AST 47* 58*       Radiology:  @[facility]@    Assessment:     Principal Problem:    Severe sepsis (HCC)  Active Problems:    Benign hypertension with CKD (chronic kidney disease) stage IV (HCC)    Type 1 diabetes mellitus with hyperglycemia (HCC)    Pyelonephritis  Resolved Problems:    * No resolved hospital problems.  *      Plan:     Continue current treatment  EGD tomorrow  Suspect due to underlying poor gastric motility compounded by acute illness    Kartik Zavaleta MD  [facility] [place]

## 2022-07-05 ENCOUNTER — ANESTHESIA (OUTPATIENT)
Dept: ENDOSCOPY | Age: 47
DRG: 872 | End: 2022-07-05
Payer: COMMERCIAL

## 2022-07-05 ENCOUNTER — ANESTHESIA EVENT (OUTPATIENT)
Dept: ENDOSCOPY | Age: 47
DRG: 872 | End: 2022-07-05
Payer: COMMERCIAL

## 2022-07-05 LAB
ALBUMIN SERPL-MCNC: 2.3 G/DL (ref 3.5–5)
ALBUMIN/GLOB SERPL: 0.5 {RATIO} (ref 1.2–3.5)
ALP SERPL-CCNC: 245 U/L (ref 50–130)
ALT SERPL-CCNC: 39 U/L (ref 12–65)
ANION GAP SERPL CALC-SCNC: 7 MMOL/L (ref 7–16)
AST SERPL-CCNC: 26 U/L (ref 15–37)
BACTERIA SPEC CULT: NORMAL
BASOPHILS # BLD: 0 K/UL (ref 0–0.2)
BASOPHILS NFR BLD: 0 % (ref 0–2)
BILIRUB SERPL-MCNC: 0.4 MG/DL (ref 0.2–1.1)
BUN SERPL-MCNC: 14 MG/DL (ref 6–23)
CALCIUM SERPL-MCNC: 9 MG/DL (ref 8.3–10.4)
CHLORIDE SERPL-SCNC: 108 MMOL/L (ref 98–107)
CO2 SERPL-SCNC: 29 MMOL/L (ref 21–32)
CREAT SERPL-MCNC: 2.73 MG/DL (ref 0.6–1)
DIFFERENTIAL METHOD BLD: ABNORMAL
EOSINOPHIL # BLD: 0.3 K/UL (ref 0–0.8)
EOSINOPHIL NFR BLD: 3 % (ref 0.5–7.8)
ERYTHROCYTE [DISTWIDTH] IN BLOOD BY AUTOMATED COUNT: 15.6 % (ref 11.9–14.6)
GLOBULIN SER CALC-MCNC: 4.4 G/DL (ref 2.3–3.5)
GLUCOSE BLD STRIP.AUTO-MCNC: 151 MG/DL (ref 65–100)
GLUCOSE BLD STRIP.AUTO-MCNC: 180 MG/DL (ref 65–100)
GLUCOSE BLD STRIP.AUTO-MCNC: 228 MG/DL (ref 65–100)
GLUCOSE BLD STRIP.AUTO-MCNC: 328 MG/DL (ref 65–100)
GLUCOSE SERPL-MCNC: 166 MG/DL (ref 65–100)
HCT VFR BLD AUTO: 27.4 % (ref 35.8–46.3)
HGB BLD-MCNC: 8.5 G/DL (ref 11.7–15.4)
IMM GRANULOCYTES # BLD AUTO: 0.1 K/UL (ref 0–0.5)
IMM GRANULOCYTES NFR BLD AUTO: 1 % (ref 0–5)
LYMPHOCYTES # BLD: 2.5 K/UL (ref 0.5–4.6)
LYMPHOCYTES NFR BLD: 24 % (ref 13–44)
MAGNESIUM SERPL-MCNC: 1.8 MG/DL (ref 1.8–2.4)
MCH RBC QN AUTO: 25.5 PG (ref 26.1–32.9)
MCHC RBC AUTO-ENTMCNC: 31 G/DL (ref 31.4–35)
MCV RBC AUTO: 82.3 FL (ref 79.6–97.8)
MONOCYTES # BLD: 0.8 K/UL (ref 0.1–1.3)
MONOCYTES NFR BLD: 8 % (ref 4–12)
NEUTS SEG # BLD: 6.5 K/UL (ref 1.7–8.2)
NEUTS SEG NFR BLD: 64 % (ref 43–78)
NRBC # BLD: 0 K/UL (ref 0–0.2)
PHOSPHATE SERPL-MCNC: 3.6 MG/DL (ref 2.5–4.5)
PLATELET # BLD AUTO: 377 K/UL (ref 150–450)
PMV BLD AUTO: 9.2 FL (ref 9.4–12.3)
POTASSIUM SERPL-SCNC: 3.3 MMOL/L (ref 3.5–5.1)
PROT SERPL-MCNC: 6.7 G/DL (ref 6.3–8.2)
RBC # BLD AUTO: 3.33 M/UL (ref 4.05–5.2)
SERVICE CMNT-IMP: ABNORMAL
SERVICE CMNT-IMP: NORMAL
SODIUM SERPL-SCNC: 144 MMOL/L (ref 136–145)
WBC # BLD AUTO: 10.2 K/UL (ref 4.3–11.1)

## 2022-07-05 PROCEDURE — 2709999900 HC NON-CHARGEABLE SUPPLY: Performed by: INTERNAL MEDICINE

## 2022-07-05 PROCEDURE — 6370000000 HC RX 637 (ALT 250 FOR IP): Performed by: STUDENT IN AN ORGANIZED HEALTH CARE EDUCATION/TRAINING PROGRAM

## 2022-07-05 PROCEDURE — 80053 COMPREHEN METABOLIC PANEL: CPT

## 2022-07-05 PROCEDURE — 83735 ASSAY OF MAGNESIUM: CPT

## 2022-07-05 PROCEDURE — 3700000001 HC ADD 15 MINUTES (ANESTHESIA): Performed by: INTERNAL MEDICINE

## 2022-07-05 PROCEDURE — 6360000002 HC RX W HCPCS: Performed by: FAMILY MEDICINE

## 2022-07-05 PROCEDURE — 7100000011 HC PHASE II RECOVERY - ADDTL 15 MIN: Performed by: INTERNAL MEDICINE

## 2022-07-05 PROCEDURE — 88312 SPECIAL STAINS GROUP 1: CPT

## 2022-07-05 PROCEDURE — 3700000000 HC ANESTHESIA ATTENDED CARE: Performed by: INTERNAL MEDICINE

## 2022-07-05 PROCEDURE — 0DB68ZX EXCISION OF STOMACH, VIA NATURAL OR ARTIFICIAL OPENING ENDOSCOPIC, DIAGNOSTIC: ICD-10-PCS | Performed by: INTERNAL MEDICINE

## 2022-07-05 PROCEDURE — 76937 US GUIDE VASCULAR ACCESS: CPT

## 2022-07-05 PROCEDURE — 2500000003 HC RX 250 WO HCPCS: Performed by: NURSE ANESTHETIST, CERTIFIED REGISTERED

## 2022-07-05 PROCEDURE — 36415 COLL VENOUS BLD VENIPUNCTURE: CPT

## 2022-07-05 PROCEDURE — 6370000000 HC RX 637 (ALT 250 FOR IP): Performed by: INTERNAL MEDICINE

## 2022-07-05 PROCEDURE — 6360000002 HC RX W HCPCS: Performed by: NURSE ANESTHETIST, CERTIFIED REGISTERED

## 2022-07-05 PROCEDURE — 7100000010 HC PHASE II RECOVERY - FIRST 15 MIN: Performed by: INTERNAL MEDICINE

## 2022-07-05 PROCEDURE — 88305 TISSUE EXAM BY PATHOLOGIST: CPT

## 2022-07-05 PROCEDURE — 6360000002 HC RX W HCPCS: Performed by: STUDENT IN AN ORGANIZED HEALTH CARE EDUCATION/TRAINING PROGRAM

## 2022-07-05 PROCEDURE — 2580000003 HC RX 258: Performed by: FAMILY MEDICINE

## 2022-07-05 PROCEDURE — 3609012400 HC EGD TRANSORAL BIOPSY SINGLE/MULTIPLE: Performed by: INTERNAL MEDICINE

## 2022-07-05 PROCEDURE — 84100 ASSAY OF PHOSPHORUS: CPT

## 2022-07-05 PROCEDURE — 85025 COMPLETE CBC W/AUTO DIFF WBC: CPT

## 2022-07-05 PROCEDURE — 1100000000 HC RM PRIVATE

## 2022-07-05 PROCEDURE — 82962 GLUCOSE BLOOD TEST: CPT

## 2022-07-05 RX ORDER — LIDOCAINE HYDROCHLORIDE 20 MG/ML
INJECTION, SOLUTION EPIDURAL; INFILTRATION; INTRACAUDAL; PERINEURAL PRN
Status: DISCONTINUED | OUTPATIENT
Start: 2022-07-05 | End: 2022-07-05 | Stop reason: SDUPTHER

## 2022-07-05 RX ORDER — PROPOFOL 10 MG/ML
INJECTION, EMULSION INTRAVENOUS PRN
Status: DISCONTINUED | OUTPATIENT
Start: 2022-07-05 | End: 2022-07-05 | Stop reason: SDUPTHER

## 2022-07-05 RX ORDER — POTASSIUM CHLORIDE 7.45 MG/ML
10 INJECTION INTRAVENOUS
Status: DISPENSED | OUTPATIENT
Start: 2022-07-05 | End: 2022-07-05

## 2022-07-05 RX ORDER — MAGNESIUM SULFATE 1 G/100ML
1000 INJECTION INTRAVENOUS ONCE
Status: COMPLETED | OUTPATIENT
Start: 2022-07-05 | End: 2022-07-05

## 2022-07-05 RX ORDER — SUCRALFATE 1 G/1
1 TABLET ORAL
Status: DISCONTINUED | OUTPATIENT
Start: 2022-07-05 | End: 2022-07-06 | Stop reason: HOSPADM

## 2022-07-05 RX ADMIN — Medication 20 MG: at 15:05

## 2022-07-05 RX ADMIN — PROPOFOL 200 MCG/KG/MIN: 10 INJECTION, EMULSION INTRAVENOUS at 15:05

## 2022-07-05 RX ADMIN — PROPOFOL 50 MG: 10 INJECTION, EMULSION INTRAVENOUS at 15:04

## 2022-07-05 RX ADMIN — POTASSIUM CHLORIDE 10 MEQ: 7.46 INJECTION, SOLUTION INTRAVENOUS at 17:51

## 2022-07-05 RX ADMIN — INSULIN LISPRO 4 UNITS: 100 INJECTION, SOLUTION INTRAVENOUS; SUBCUTANEOUS at 21:38

## 2022-07-05 RX ADMIN — SODIUM CHLORIDE, PRESERVATIVE FREE 10 ML: 5 INJECTION INTRAVENOUS at 21:36

## 2022-07-05 RX ADMIN — CIPROFLOXACIN 500 MG: 500 TABLET, FILM COATED ORAL at 21:36

## 2022-07-05 RX ADMIN — ONDANSETRON 4 MG: 2 INJECTION INTRAMUSCULAR; INTRAVENOUS at 17:55

## 2022-07-05 RX ADMIN — METOCLOPRAMIDE 10 MG: 10 TABLET ORAL at 05:43

## 2022-07-05 RX ADMIN — POTASSIUM CHLORIDE 10 MEQ: 7.46 INJECTION, SOLUTION INTRAVENOUS at 21:37

## 2022-07-05 RX ADMIN — MAGNESIUM SULFATE HEPTAHYDRATE 1000 MG: 1 INJECTION, SOLUTION INTRAVENOUS at 17:51

## 2022-07-05 RX ADMIN — ONDANSETRON 4 MG: 2 INJECTION INTRAMUSCULAR; INTRAVENOUS at 23:56

## 2022-07-05 RX ADMIN — POTASSIUM CHLORIDE 10 MEQ: 7.46 INJECTION, SOLUTION INTRAVENOUS at 19:53

## 2022-07-05 RX ADMIN — PANTOPRAZOLE SODIUM 40 MG: 40 TABLET, DELAYED RELEASE ORAL at 05:43

## 2022-07-05 ASSESSMENT — PAIN SCALES - GENERAL
PAINLEVEL_OUTOF10: 0

## 2022-07-05 NOTE — PROCEDURES
Esophagogastroduodenoscopy (EGD) Procedure Note    Procedure: EGD    Pre-operative Diagnosis: Nausea and vomiting. Post-operative Diagnosis: Severe esophagitis - Grade B   Small hiatal hernia   Nonerosive gastritis - bx's to r/o h.pylori    Recommendations:  See inpt notes. Follow up:   Inpt f/u    Anesthesia/Sedation:  MAC, (see separate report). Procedure Details:  Informed consent was obtained for the procedure, including sedation. Risks of perforation, hemorrhage, adverse drug reaction and aspiration were discussed. The patient was placed in the left lateral decubitus position. Based on the pre-procedure assessment, including review of the patient's medical history, medications, allergies, and review of systems, she had been deemed to be an appropriate candidate for anesthesia. The patient was monitored continuously with ECG tracing, pulse oximetry, blood pressure monitoring, and direct observations. Please refer to the anesthesia record for details and dosages of medications. The esophagus was intubated with direct visualization. The esophagus, stomach and duodenum were traversed to the full extent of the endoscope. Retroflexed views of the cardia and fundus were performed. The stomach was fully insufflated and deflated. Findings:   OROPHARYNX: The oropharynx was briefly viewed on entry and withdrawal with very brief evaluation of the cords, arytenoids and pyriform sinuses. No abnormalities found. ESOPHAGUS:  The esophagus was severely inflammed with acute looking erosions and red streaks thoroughout the lower 1/2. No exudate. Small hiatal hernia. No Barretts noted. STOMACH: The gastric pouch inflated and deflated normally. The mucosal surface and gastric rugae were normal.  The antrum was erythematous but without erosions, ulcerations, raised lesions, vascular ectasias or other anomalies. The pylorus was patent to passage of the endoscope without difficulty. DUODENUM:  The endoscope was easily passed into the third section of the duodenum. The villous mucosa was normal without blunting or scalloped folds. There were no mucosal erosions, ulcerations, raised lesions or vascular ectasias. EBL: 0 cc's. Pathology speciment: gastric biopsies - antral.           Complications: No apparent complications and the patient tolerated sedation and the procedure well. Attending Attestation: I performed the procedure.     Rekha Mathew MD

## 2022-07-05 NOTE — PERIOP NOTE
TRANSFER - IN REPORT:    Verbal report received from [address] [facility] [address] on [employer]  being received from [facility] for EGD. Report consisted of patients Situation, Background, Assessment and   Recommendations(SBAR). Information from the following report(s) verbal was reveiwed with the receiving nurse. Opportunity for questions and clarification was provided. Assessment completed upon patients arrival to unit and care assumed.

## 2022-07-05 NOTE — PROGRESS NOTES
TRANSFER - IN REPORT:    Verbal report received from [address] on [address]  being received from PACU for routine post-op      Report consisted of patient's Situation, Background, Assessment and   Recommendations(SBAR). Information from the following report(s) Surgery Report was reviewed with the receiving nurse. Opportunity for questions and clarification was provided. Assessment completed upon patient's arrival to unit and care assumed.

## 2022-07-05 NOTE — PLAN OF CARE
Problem: Discharge Planning  Goal: Discharge to home or other facility with appropriate resources  7/5/2022 0554 by Ang Ricardo RN  Outcome: Not Progressing  7/5/2022 0553 by Ang Ricardo RN  Outcome: Progressing  7/5/2022 0551 by Ang Ricardo RN  Outcome: Progressing     Problem: Safety - Adult  Goal: Free from fall injury  7/5/2022 0554 by Ang Ricardo RN  Outcome: Progressing  7/5/2022 0553 by Ang Ricardo RN  Outcome: Progressing  7/5/2022 0551 by Ang Ricardo RN  Outcome: Progressing     Problem: ABCDS Injury Assessment  Goal: Absence of physical injury  7/5/2022 0554 by Ang Ricardo RN  Outcome: Progressing  7/5/2022 0553 by Ang Ricardo RN  Outcome: Progressing  7/5/2022 0551 by Ang Ricardo RN  Outcome: Progressing     Problem: Chronic Conditions and Co-morbidities  Goal: Patient's chronic conditions and co-morbidity symptoms are monitored and maintained or improved  7/5/2022 0554 by Ang Ricardo RN  Outcome: Progressing  7/5/2022 0553 by Ang Ricardo RN  Outcome: Progressing  7/5/2022 0551 by Ang Ricardo RN  Outcome: Progressing     Problem: Skin/Tissue Integrity  Goal: Absence of new skin breakdown  Description: 1. Monitor for areas of redness and/or skin breakdown  2. Assess vascular access sites hourly  3. Every 4-6 hours minimum:  Change oxygen saturation probe site  4. Every 4-6 hours:  If on nasal continuous positive airway pressure, respiratory therapy assess nares and determine need for appliance change or resting period.   7/5/2022 0554 by Ang Ricardo RN  Outcome: Progressing  7/5/2022 0553 by Ang Ricardo RN  Outcome: Progressing  7/5/2022 0551 by Ang Ricardo RN  Outcome: Progressing     Problem: Neurosensory - Adult  Goal: Achieves maximal functionality and self care  7/5/2022 0554 by Ang Ricardo RN  Outcome: Progressing  7/5/2022 0553 by Ang Ricardo RN  Outcome: Not Progressing  7/5/2022 0551 by Ang Ricardo RN  Outcome: Progressing     Problem: Respiratory - Adult  Goal: Achieves optimal ventilation and oxygenation  7/5/2022 0554 by Gulshan Ga RN  Outcome: Progressing  7/5/2022 0553 by Gulshan Ga RN  Outcome: Progressing  7/5/2022 0551 by Gulshan Ga RN  Outcome: Progressing     Problem: Cardiovascular - Adult  Goal: Maintains optimal cardiac output and hemodynamic stability  7/5/2022 0554 by Gulshan Ga RN  Outcome: Progressing  7/5/2022 0551 by Gulshan Ga RN  Outcome: Progressing     Problem: Skin/Tissue Integrity - Adult  Goal: Skin integrity remains intact  7/5/2022 0554 by Gulshan Ga RN  Outcome: Progressing  7/5/2022 0551 by Gulshan Ga RN  Outcome: Progressing     Problem: Musculoskeletal - Adult  Goal: Return mobility to safest level of function  7/5/2022 0554 by Gulshan Ga RN  Outcome: Progressing  7/5/2022 0551 by Gulshan Ga RN  Outcome: Progressing  Goal: Maintain proper alignment of affected body part  7/5/2022 0554 by Gulshan Ga RN  Outcome: Progressing  7/5/2022 0551 by Gulshan Ga RN  Outcome: Progressing  Goal: Return ADL status to a safe level of function  7/5/2022 0554 by Gulshan Ga RN  Outcome: Progressing  7/5/2022 0551 by Gulshan Ga RN  Outcome: Progressing     Problem: Gastrointestinal - Adult  Goal: Minimal or absence of nausea and vomiting  7/5/2022 0554 by Gulshan Ga RN  Outcome: Progressing  7/5/2022 0551 by Gulshan Ga RN  Outcome: Progressing  Goal: Maintains or returns to baseline bowel function  7/5/2022 0554 by Gulshan Ga RN  Outcome: Progressing  7/5/2022 0551 by Gulshan Ga RN  Outcome: Progressing  Goal: Maintains adequate nutritional intake  7/5/2022 0554 by Gulshan Ga RN  Outcome: Progressing  7/5/2022 0551 by Gulshan Ga RN  Outcome: Not Progressing     Problem: Infection - Adult  Goal: Absence of infection at discharge  7/5/2022 0554 by Gulshan Ga RN  Outcome: Progressing  7/5/2022 0553 by Gulshan Ga RN  Outcome: Progressing  7/5/2022 0551 by Gulshan Ga RN  Outcome: Progressing     Problem: Metabolic/Fluid and Electrolytes - Adult  Goal: Electrolytes maintained within normal limits  7/5/2022 0554 by Harriet Lerner, RN  Outcome: Progressing  7/5/2022 0551 by Harriet Lerner, RN  Outcome: Progressing  Goal: Glucose maintained within prescribed range  7/5/2022 0554 by Harriet Lerner, RN  Outcome: Progressing  7/5/2022 0551 by Harriet Lerner, RN  Outcome: Progressing     Problem: Hematologic - Adult  Goal: Maintains hematologic stability  7/5/2022 0554 by Harriet Lerner, RN  Outcome: Progressing  7/5/2022 0551 by Harriet Lerner, RN  Outcome: Progressing     Problem: Pain  Goal: Verbalizes/displays adequate comfort level or baseline comfort level  7/5/2022 0554 by Harriet Lerner, RN  Outcome: Progressing  7/5/2022 0551 by Harriet Lerner, RN  Outcome: Progressing     Problem: ABCDS Injury Assessment  Goal: Absence of physical injury  7/5/2022 0554 by Harriet Lernre, RN  Outcome: Progressing  7/5/2022 0553 by Harriet Lerner, RN  Outcome: Progressing  7/5/2022 0551 by Harriet Lerner, RN  Outcome: Progressing     Problem: Chronic Conditions and Co-morbidities  Goal: Patient's chronic conditions and co-morbidity symptoms are monitored and maintained or improved  7/5/2022 0554 by Harriet Lerner, RN  Outcome: Progressing  7/5/2022 0553 by Harriet Lerner, RN  Outcome: Progressing  7/5/2022 0551 by Harriet Lerner, RN  Outcome: Progressing     Problem: Skin/Tissue Integrity  Goal: Absence of new skin breakdown  Description: 1. Monitor for areas of redness and/or skin breakdown  2. Assess vascular access sites hourly  3. Every 4-6 hours minimum:  Change oxygen saturation probe site  4. Every 4-6 hours:  If on nasal continuous positive airway pressure, respiratory therapy assess nares and determine need for appliance change or resting period.   7/5/2022 0554 by Harriet Lerner, RN  Outcome: Progressing  7/5/2022 0553 by Harriet Lerner, RN  Outcome: Progressing  7/5/2022 0551 by Harriet Lerner, RN  Outcome: Progressing Problem: Neurosensory - Adult  Goal: Achieves maximal functionality and self care  7/5/2022 0554 by Camacho Elena RN  Outcome: Progressing  7/5/2022 0553 by Camacho Elena RN  Outcome: Not Progressing  7/5/2022 0551 by Camacho Elena RN  Outcome: Progressing     Problem: Respiratory - Adult  Goal: Achieves optimal ventilation and oxygenation  7/5/2022 0554 by Camacho Elena RN  Outcome: Progressing  7/5/2022 0553 by Camacho Elena RN  Outcome: Progressing  7/5/2022 0551 by Camacho Elena RN  Outcome: Progressing     Problem: Cardiovascular - Adult  Goal: Maintains optimal cardiac output and hemodynamic stability  7/5/2022 0554 by Camacho Elena RN  Outcome: Progressing  7/5/2022 0551 by Camacho Elena RN  Outcome: Progressing     Problem: Skin/Tissue Integrity - Adult  Goal: Skin integrity remains intact  7/5/2022 0554 by Camacho Elena RN  Outcome: Progressing  7/5/2022 0551 by Camacho Elena RN  Outcome: Progressing     Problem: Musculoskeletal - Adult  Goal: Return mobility to safest level of function  7/5/2022 0554 by Camacho Elena RN  Outcome: Progressing  7/5/2022 0551 by Camacho Elena RN  Outcome: Progressing     Problem: Musculoskeletal - Adult  Goal: Maintain proper alignment of affected body part  7/5/2022 0554 by Camacho Elena RN  Outcome: Progressing  7/5/2022 0551 by Camacho Elena RN  Outcome: Progressing     Problem: Musculoskeletal - Adult  Goal: Return ADL status to a safe level of function  7/5/2022 0554 by Camacho Elena RN  Outcome: Progressing  7/5/2022 0551 by Camacho Elena RN  Outcome: Progressing     Problem: Gastrointestinal - Adult  Goal: Minimal or absence of nausea and vomiting  7/5/2022 0554 by Camacho Elena RN  Outcome: Progressing  7/5/2022 0551 by Camacho Elena RN  Outcome: Progressing

## 2022-07-05 NOTE — PROGRESS NOTES
US Guided PIV access-   Skin was cleaned and disinfected prior to IV puncture. Ultrasound was used to find the vein which was compressible and does not have any ultrasound features of an artery or nerve bundle. Under real-time ultrasound guidance peripheral access was obtained in the right forearm using 20 G 1.75\" Peripheral IV catheter . Blood return was present and IV flushed without difficulty with no clinical signs of infiltration. IV dressing applied and there were no immediate complications noted and patient tolerated the procedure well.

## 2022-07-05 NOTE — PROGRESS NOTES
Physical Therapy Attempt Note    Pt refusing PT services today. Educated pt regarding consequences of bedrest, benefits of OOB mobility, and role of PT, but pt continued to refuse. Will attempt again as able.      Dallas Bocanegra, PT

## 2022-07-05 NOTE — PROGRESS NOTES
Hospitalist Progress Note   Admit Date:  2022  3:52 PM   Name:  Sera Leyva   Age:  48 y.o. Sex:  female  :  1975   MRN:  [id number]   Room:  [facility]/    Presenting Complaint: Nausea and Emesis     Reason(s) for Admission: Septicemia ([facility] [address] [A41.9]  Acute pyelonephritis [N10]  Severe sepsis ([address] [A41.9, R65.20]  Nausea and vomiting, intractability of vomiting not specified, unspecified vomiting type [R11.2]  Chronic kidney disease, unspecified CKD stage [N18.9]     Hospital Course & Interval History:   Sera Leyva is a 48 y.o. female with medical history of uncontrolled diabetes with complications, hypertension, CKD, and 2 recent hospitalizations, who presented with nausea and vomiting. Recently hospitalized at [facility] from  through  with pyelonephritis and DKA. Patient admitted with sepsis secondary to UTI. Started on empiric antibiotics. Blood culture growing Enterobacter. ID consulted. Antibiotics switched to Cipro based on senitivities. Gastroenterologist was consulted. She was treated with Reglan with minimal relief. She was started on Zofran and plans for endoscopy on . Repeat blood culture negative. Abdominal x-ray showed right ureteral stent. Subjective/24hr Events (22): Patient is seen at the bedside. Pt states her nausea and vomiting is improving. She is unable to tolerate p.o. medications and diet. Denies chest pain, palpitations or shortness of breath. Assessment & Plan:     Severe sepsis secondary to UTI:  Enterobacter bacteremia: Possible source GI versus urine  Patient with recent history of pyelonephritis, CVA tenderness negative  Continue Cipro IV EOT  as patient is unable to tolerate p.o.     Repeat blood culture negative    ID signed off  PT/OT recommended no further skilled therapy    Diabetic gastroparesis  Patient remained with persistent nausea/vomiting  Zofran discontinued and started on Reglan  Added Phenergan prn  Continue with PPI IV   Abdominal x-ray showed right ureteral stent  Plan for  Endoscopy on 7/5   clear liquids  GI following      Constipation  Continue MiraLAX    Hypokalemia/ hypomagnesemia  K 3.3 and Mg is 1.8  K and Mg repleted  Follow-up with K and Mg in am      Elevated alkaline phosphatase    Type 1 diabetes mellitus:  Patient is not tolerating diet and will increase insulin after her nutrition improves  Continue  Lantus 15 units daily and sliding scale  Inpatient diabetes management consult    CKD stage IV:  Stable  Continue to monitor    Hypertension:  Continue home meds    Discharge Planning: Anticipate discharge in 48 hours  Diet:  Diet NPO  DVT PPx: Heparin  Code status: Full Code    Hospital Problems:  Principal Problem:    Severe sepsis ([facility] [address]  Active Problems:    Benign hypertension with CKD (chronic kidney disease) stage IV (HCC)    Type 1 diabetes mellitus with hyperglycemia ([facility] [address]    Pyelonephritis  Resolved Problems:    * No resolved hospital problems. *      Objective:     Patient Vitals for the past 24 hrs:   Temp Pulse Resp BP SpO2   07/05/22 1051 98.2 °F (36.8 °C) (!) 102 16 119/74 100 %   07/05/22 0733 98 °F (36.7 °C) (!) 105 16 120/73 100 %   07/05/22 0425 98.1 °F (36.7 °C) 100 16 127/83 100 %   07/04/22 2316 98.2 °F (36.8 °C) (!) 110 16 133/71 100 %   07/04/22 1919 97.9 °F (36.6 °C) (!) 116 16 (!) 146/74 100 %   07/04/22 1530 98.5 °F (36.9 °C) (!) 104 17 (!) 145/81 98 %       Oxygen Therapy  SpO2: 100 %  Pulse Oximetry Type: Intermittent  Pulse via Oximetry: 89 beats per minute  SPO2 High Alarm Limit: 100  SPO2 Low Alarm Limit POX: 90  Pulse Oximeter Device Mode: Intermittent  Pulse Oximeter Device Location: Right,Hand,Finger  O2 Device: None (Room air)    Estimated body mass index is 22.31 kg/m² as calculated from the following:    Height as of this encounter: 5' 8\" (1.727 m). Weight as of this encounter: 146 lb 11.2 oz (66.5 kg).     Intake/Output Summary (Last 24 hours) at 7/5/2022 1245  Last data filed at 7/4/2022 1919  Gross per 24 hour   Intake --   Output 350 ml   Net -350 ml         Physical Exam:     Blood pressure 119/74, pulse (!) 102, temperature 98.2 °F (36.8 °C), temperature source Axillary, resp. rate 16, height 5' 8\" (1.727 m), weight 146 lb 11.2 oz (66.5 kg), SpO2 100 %. General:    Well nourished. Head:  Normocephalic, atraumatic  Eyes:  Sclerae appear normal.  Pupils equally round. ENT:  Nares appear normal, no drainage. Moist oral mucosa  Neck:  No restricted ROM. Trachea midline   CV:   RRR. No m/r/g. No jugular venous distension. Lungs:   CTAB. No wheezing, rhonchi, or rales. Respirations even, unlabored  Abdomen: Bowel sounds present. Soft, nontender, nondistended. No CVA tenderness  Extremities: No cyanosis or clubbing. No edema  Skin:     No rashes and normal coloration. Warm and dry. Neuro:  CN II-XII grossly intact. Sensation intact. A&Ox3  Psych:  Normal mood and affect.       I have reviewed ordered lab tests and independently visualized imaging below:    Recent Labs:  Recent Results (from the past 48 hour(s))   POCT Glucose    Collection Time: 07/03/22  8:59 PM   Result Value Ref Range    POC Glucose 352 (H) 65 - 100 mg/dL    Performed by: Carrington Colvin    POCT Glucose    Collection Time: 07/04/22 11:17 AM   Result Value Ref Range    POC Glucose 318 (H) 65 - 100 mg/dL    Performed by: Giselle    POCT Glucose    Collection Time: 07/04/22  9:33 PM   Result Value Ref Range    POC Glucose 172 (H) 65 - 100 mg/dL    Performed by: Jax    POCT Glucose    Collection Time: 07/05/22  6:29 AM   Result Value Ref Range    POC Glucose 151 (H) 65 - 100 mg/dL    Performed by: Jax    CBC with Auto Differential    Collection Time: 07/05/22  6:57 AM   Result Value Ref Range    WBC 10.2 4.3 - 11.1 K/uL    RBC 3.33 (L) 4.05 - 5.2 M/uL    Hemoglobin 8.5 (L) 11.7 - 15.4 g/dL    Hematocrit 27.4 (L) 35.8 - 46.3 %    MCV 82.3 79.6 - 97.8 FL    MCH 25.5 (L) 26.1 - 32.9 PG    MCHC 31.0 (L) 31.4 - 35.0 g/dL    RDW 15.6 (H) 11.9 - 14.6 %    Platelets 109 291 - 147 K/uL    MPV 9.2 (L) 9.4 - 12.3 FL    nRBC 0.00 0.0 - 0.2 K/uL    Differential Type AUTOMATED      Seg Neutrophils 64 43 - 78 %    Lymphocytes 24 13 - 44 %    Monocytes 8 4.0 - 12.0 %    Eosinophils % 3 0.5 - 7.8 %    Basophils 0 0.0 - 2.0 %    Immature Granulocytes 1 0.0 - 5.0 %    Segs Absolute 6.5 1.7 - 8.2 K/UL    Absolute Lymph # 2.5 0.5 - 4.6 K/UL    Absolute Mono # 0.8 0.1 - 1.3 K/UL    Absolute Eos # 0.3 0.0 - 0.8 K/UL    Basophils Absolute 0.0 0.0 - 0.2 K/UL    Absolute Immature Granulocyte 0.1 0.0 - 0.5 K/UL   Phosphorus    Collection Time: 07/05/22  6:57 AM   Result Value Ref Range    Phosphorus 3.6 2.5 - 4.5 MG/DL   Comprehensive Metabolic Panel w/ Reflex to MG    Collection Time: 07/05/22  6:57 AM   Result Value Ref Range    Sodium 144 136 - 145 mmol/L    Potassium 3.3 (L) 3.5 - 5.1 mmol/L    Chloride 108 (H) 98 - 107 mmol/L    CO2 29 21 - 32 mmol/L    Anion Gap 7 7 - 16 mmol/L    Glucose 166 (H) 65 - 100 mg/dL    BUN 14 6 - 23 MG/DL    CREATININE 2.73 (H) 0.6 - 1.0 MG/DL    GFR  24 (L) >60 ml/min/1.73m2    GFR Non- 20 (L) >60 ml/min/1.73m2    Calcium 9.0 8.3 - 10.4 MG/DL    Total Bilirubin 0.4 0.2 - 1.1 MG/DL    ALT 39 12 - 65 U/L    AST 26 15 - 37 U/L    Alk Phosphatase 245 (H) 50 - 130 U/L    Total Protein 6.7 6.3 - 8.2 g/dL    Albumin 2.3 (L) 3.5 - 5.0 g/dL    Globulin 4.4 (H) 2.3 - 3.5 g/dL    Albumin/Globulin Ratio 0.5 (L) 1.2 - 3.5     Magnesium    Collection Time: 07/05/22  6:57 AM   Result Value Ref Range    Magnesium 1.8 1.8 - 2.4 mg/dL   POCT Glucose    Collection Time: 07/05/22 10:53 AM   Result Value Ref Range    POC Glucose 180 (H) 65 - 100 mg/dL    Performed by: [facility]        Other Studies:  XR ABDOMEN (KUB) (SINGLE AP VIEW)   Final Result   RIGHT URETERAL STENT IN PLACE WITH NO ACUTE ABDOMINAL ABNORMALITY   IDENTIFIED.       XR CHEST PORTABLE   Final Result   No acute findings in the chest             Current Meds:  Current Facility-Administered Medications   Medication Dose Route Frequency    ondansetron (ZOFRAN-ODT) disintegrating tablet 4 mg  4 mg Oral Q6H PRN    sodium phosphate 10 mmol in sodium chloride 0.9 % 250 mL IVPB  10 mmol IntraVENous PRN    Or    sodium phosphate 15 mmol in sodium chloride 0.9 % 250 mL IVPB  15 mmol IntraVENous PRN    Or    sodium phosphate 20 mmol in sodium chloride 0.9 % 500 mL IVPB  20 mmol IntraVENous PRN    magnesium sulfate 2000 mg in 50 mL IVPB premix  2,000 mg IntraVENous PRN    potassium chloride (KLOR-CON M) extended release tablet 40 mEq  40 mEq Oral PRN    Or    potassium bicarb-citric acid (EFFER-K) effervescent tablet 40 mEq  40 mEq Oral PRN    Or    potassium chloride 10 mEq/100 mL IVPB (Peripheral Line)  10 mEq IntraVENous PRN    insulin glargine (LANTUS) injection vial 15 Units  15 Units SubCUTAneous QAM    metoclopramide (REGLAN) tablet 10 mg  10 mg Oral TID AC    pantoprazole (PROTONIX) tablet 40 mg  40 mg Oral BID AC    ciprofloxacin (CIPRO) tablet 500 mg  500 mg Oral 2 times per day    promethazine (PHENERGAN) suppository 25 mg  25 mg Rectal BID PRN    magnesium oxide (MAG-OX) tablet 400 mg  400 mg Oral Daily    lactated ringers infusion   IntraVENous Continuous    insulin lispro (HUMALOG) injection vial 0-4 Units  0-4 Units SubCUTAneous TID WC    insulin lispro (HUMALOG) injection vial 0-4 Units  0-4 Units SubCUTAneous Nightly    polyethylene glycol (GLYCOLAX) packet 17 g  17 g Oral Daily    morphine (PF) injection 1 mg  1 mg IntraVENous Q6H PRN    HYDROcodone-acetaminophen (NORCO) 7.5-325 MG per tablet 1 tablet  1 tablet Oral Q6H PRN    magnesium sulfate 2000 mg in 50 mL IVPB premix  2,000 mg IntraVENous PRN    ondansetron (ZOFRAN) injection 4 mg  4 mg IntraVENous Q6H PRN    albuterol (PROVENTIL) nebulizer solution 2.5 mg  2.5 mg Nebulization Q6H PRN    glucose chewable tablet 16 g  4 tablet Oral PRN    dextrose bolus 10% 125 mL  125 mL IntraVENous PRN    Or    dextrose bolus 10% 250 mL  250 mL IntraVENous PRN    glucagon injection 1 mg  1 mg IntraMUSCular PRN    dextrose 5 % solution  100 mL/hr IntraVENous PRN    sodium chloride flush 0.9 % injection 5-40 mL  5-40 mL IntraVENous 2 times per day    sodium chloride flush 0.9 % injection 5-40 mL  5-40 mL IntraVENous PRN    0.9 % sodium chloride infusion   IntraVENous PRN    enoxaparin Sodium (LOVENOX) injection 30 mg  30 mg SubCUTAneous Daily    acetaminophen (TYLENOL) tablet 650 mg  650 mg Oral Q6H PRN    Or    acetaminophen (TYLENOL) suppository 650 mg  650 mg Rectal Q6H PRN    aluminum & magnesium hydroxide-simethicone (MAALOX) 200-200-20 MG/5ML suspension 30 mL  30 mL Oral Q6H PRN       Signed:  Karan Lopez MD    Part of this note may have been written by using a voice dictation software. The note has been proof read but may still contain some grammatical/other typographical errors.

## 2022-07-05 NOTE — PROGRESS NOTES
PRE-PROCEDURE ASSESSMENT:  Chief complaint/HPI and PMH:  Please refer consultation note and progress notes. LUNGS:  Clear and equal.  COR:  RRR without murmurs heard. NEURO:  A and Oriented fully. I have thoroughly explained the preparation, procedure and sedation process to the patient as well as the risks and alternatives. They will sign informed consent prior to the procedure.         Noris Campos MD

## 2022-07-05 NOTE — CARE COORDINATION
07/05/22 1204   Service Assessment   Patient Orientation Alert and Oriented   Cognition Alert   Primary Caregiver Self   Support Systems Parent   PCP Verified by CM Yes  (Physician at the [facility])   Prior Functional Level Independent in ADLs/IADLs   Current Functional Level Independent in ADLs/IADLs   Ability to make needs known: Good   Family able to assist with home care needs: Yes   Social/Functional History   Lives With Alone   Type of [address] One level   Home Access Stairs to enter with rails   Entrance Stairs - Number of Steps 3 stairs   Receives Help From Family   ADL Assistance Independent   Ambulation Assistance Independent   Transfer Assistance Independent   Discharge Planning   Type of Residence House   Current Services Prior To Admission Durable Medical Equipment  (glucometer, shower chair and rolling walker(provided by [employer] when patient on [facility]))   Potential [address] [place] [address] referral was sent last admission spoke with Griselda olmedo but they never saw patient due to readmission to [facility])   DME Glucometer;Walker  (has glucometer, and RW was given last admit from [employer] and patient has shower chair)   DME Walker;Glucometer   DME Ordered? No   Patient expects to be discharged to: [facility]   Interdisciplinary team meeting with Dr. Abi Eddy, RADHA, nursing, PT and nutritional services for plan of care. Patient continues with nausea and vomiting and limited po intake. Patient to have EGD today. Prior to admission patient was d/c from [facility] with [facility] care for PT/OT and was given DME walker from [employer]. CM spoke with Ayaz Ahn the liaison for [facility] home care and she confirms they had received referral however case was never opened due to patient being readmitted to [facility]. If patient needs home health will need to send orders and confirm with patient that they agree to home health f/u and chose [address] [place] [address]. PT/OT notes state no further f/u needed and patient declined PT therapy today. CM continues to follow for d/c and needs.

## 2022-07-05 NOTE — ANESTHESIA PRE PROCEDURE
Department of Anesthesiology  Preprocedure Note       Name:  Rito Medrano   Age:  48 y.o.  :  1975                                          MRN:  [id number]         Date:  2022      Surgeon: Tricia Springer):  Brett Christie MD    Procedure: Procedure(s):  EGD ESOPHAGOGASTRODUODENOSCOPY    Medications prior to admission:   Prior to Admission medications    Medication Sig Start Date End Date Taking?  Authorizing Provider   Insulin Pen Needle (PEN NEEDLES 31GX5/16\") 31G X 8 MM MISC 120 each by Other route 4 times daily 22 Yes Historical Provider, MD   insulin glargine (LANTUS) 100 UNIT/ML injection vial Inject 22 Units into the skin nightly  Patient taking differently: Inject 22 Units into the skin every morning (before breakfast)  22   hSantal Romero MD   ferrous sulfate (IRON 325) 325 (65 Fe) MG tablet Take 1 tablet by mouth 2 times daily (with meals)  Patient not taking: Reported on 2022 6/10/22   Shantal Romero MD   folic acid (FOLVITE) 1 MG tablet Take 1 tablet by mouth daily  Patient not taking: Reported on 2022 6/10/22   Shantal Edwards MD   albuterol (PROVENTIL) (2.5 MG/3ML) 0.083% nebulizer solution Take 3 mLs by nebulization every 4 hours as needed for Wheezing 6/10/22   Shantal Edwards MD   insulin lispro (HUMALOG) 100 UNIT/ML SOLN injection vial Inject 5 Units into the skin 3 times daily (with meals) 6/10/22   Shantal Edwards MD   insulin lispro (HUMALOG) 100 UNIT/ML SOLN injection vial Inject 0-4 Units into the skin 4 times daily (before meals and nightly) 22   Thu Ratliff, DO   brimonidine (ALPHAGAN P) 0.1 % SOLN 1 drop in left eye twice a day  Patient not taking: Reported on 2022   Ar Automatic Reconciliation   ergocalciferol (ERGOCALCIFEROL) 1.25 MG (61638 UT) capsule Take 50,000 Units by mouth  Patient not taking: Reported on 2022   Ar Automatic Reconciliation       Current medications:    Current Facility-Administered Medications   Medication Dose Route Frequency Provider Last Rate Last Admin    potassium chloride 10 mEq/100 mL IVPB (Peripheral Line)  10 mEq IntraVENous Q2H Robbie Panchal MD        magnesium sulfate 1000 mg in dextrose 5% 100 mL IVPB  1,000 mg IntraVENous Once Robbie Panchal MD        ondansetron (ZOFRAN-ODT) disintegrating tablet 4 mg  4 mg Oral Q6H PRN Von Snow MD   4 mg at 07/04/22 2108    sodium phosphate 10 mmol in sodium chloride 0.9 % 250 mL IVPB  10 mmol IntraVENous PRN Elsworth Scheuermann, MD        Or    sodium phosphate 15 mmol in sodium chloride 0.9 % 250 mL IVPB  15 mmol IntraVENous PRN Elsworth Scheuermann, MD        Or    sodium phosphate 20 mmol in sodium chloride 0.9 % 500 mL IVPB  20 mmol IntraVENous PRN Elsworth Scheuermann, MD        magnesium sulfate 2000 mg in 50 mL IVPB premix  2,000 mg IntraVENous PRN Elsworth Scheuermann, MD        potassium chloride (KLOR-CON M) extended release tablet 40 mEq  40 mEq Oral PRN Elsworth Scheuermann, MD        Or    potassium bicarb-citric acid (EFFER-K) effervescent tablet 40 mEq  40 mEq Oral PRN Elsworth Scheuermann, MD        Or    potassium chloride 10 mEq/100 mL IVPB (Peripheral Line)  10 mEq IntraVENous PRN Elsworth Scheuermann, MD        insulin glargine (LANTUS) injection vial 15 Units  15 Units SubCUTAneous QAM Robbie Panchal MD   15 Units at 07/04/22 1250    metoclopramide (REGLAN) tablet 10 mg  10 mg Oral TID AC Garry Cleaning MD   10 mg at 07/05/22 0543    pantoprazole (PROTONIX) tablet 40 mg  40 mg Oral BID AC Garry Cleaning MD   40 mg at 07/05/22 0543    ciprofloxacin (CIPRO) tablet 500 mg  500 mg Oral 2 times per day Rickey Dalal MD   500 mg at 07/04/22 2245    promethazine (PHENERGAN) suppository 25 mg  25 mg Rectal BID PRN Garry Cleaning MD   25 mg at 07/04/22 0150    magnesium oxide (MAG-OX) tablet 400 mg  400 mg Oral Daily Robbie Panchal MD   400 mg at 07/03/22 0844    lactated ringers infusion IntraVENous Continuous Joaquina MD Helen 125 mL/hr at 07/02/22 1616 New Bag at 07/02/22 1616    insulin lispro (HUMALOG) injection vial 0-4 Units  0-4 Units SubCUTAneous TID WC Rogers Marie MD   3 Units at 07/04/22 1249    insulin lispro (HUMALOG) injection vial 0-4 Units  0-4 Units SubCUTAneous Nightly Rogers Marie MD   4 Units at 07/03/22 2129    polyethylene glycol (GLYCOLAX) packet 17 g  17 g Oral Daily TANISHA Carver        morphine (PF) injection 1 mg  1 mg IntraVENous Q6H PRN Grant Crawford MD   1 mg at 07/02/22 0613    HYDROcodone-acetaminophen (Miguel [facility]ngel Leader) 7.5-325 MG per tablet 1 tablet  1 tablet Oral Q6H PRN Grant Crawford MD        magnesium sulfate 2000 mg in 50 mL IVPB premix  2,000 mg IntraVENous PRN Abisai Medina MD   Stopped at 06/27/22 0755    ondansetron (ZOFRAN) injection 4 mg  4 mg IntraVENous Q6H PRN Grant Crawford MD   4 mg at 07/01/22 1849    albuterol (PROVENTIL) nebulizer solution 2.5 mg  2.5 mg Nebulization Q6H PRN Abisai Medina MD        glucose chewable tablet 16 g  4 tablet Oral PRN Abisai Medina MD        dextrose bolus 10% 125 mL  125 mL IntraVENous PRN Abisai Medina MD   Stopped at 06/30/22 0229    Or    dextrose bolus 10% 250 mL  250 mL IntraVENous PRN Abisai Medina MD   Stopped at 06/29/22 1638    glucagon injection 1 mg  1 mg IntraMUSCular PRN Abisai Medina MD        dextrose 5 % solution  100 mL/hr IntraVENous PRN Abisai Medina MD        sodium chloride flush 0.9 % injection 5-40 mL  5-40 mL IntraVENous 2 times per day Abisai Medina MD   10 mL at 07/02/22 2019    sodium chloride flush 0.9 % injection 5-40 mL  5-40 mL IntraVENous PRN Abisai Medina MD   10 mL at 06/30/22 0254    0.9 % sodium chloride infusion   IntraVENous PRN Abisai Medina MD 20 mL/hr at 06/30/22 0223 Rate Change at 06/30/22 0223    enoxaparin Sodium (LOVENOX) injection 30 mg  30 mg SubCUTAneous Daily Abisai Medina MD   30 2012    Type 1 av -150 can tell Hypo below 70    Intractable nausea and vomiting 2014    Irregular menses     Kidney stone     Neuropathy, peripheral, idiopathic 3/13/2017    Retinopathy, bilateral 3/13/2017    Trichomoniasis 3/13/2017    Ulcer, skin, chronic ([address] 3/13/2017    Vaginal burning        Past Surgical History:        Procedure Laterality Date    AMPUTATION Left     5th Toe due to Diabetic Ulcer    AMPUTATION  12    gangrene    CYSTOSCOPY,INSERT URETERAL STENT  2022    HX OPEN REDUCTION INTERNAL FIXATION Right 2011    ankle    IR TUNNELED CATHETER PLACEMENT GREATER THAN 5 YEARS  2022    IR TUNNELED CATHETER PLACEMENT GREATER THAN 5 YEARS 2022 [facility]       Social History:    Social History     Tobacco Use    Smoking status: Former Smoker     Quit date: 2013     Years since quittin.6    Smokeless tobacco: Never Used   Substance Use Topics    Alcohol use: Yes                                Counseling given: Not Answered      Vital Signs (Current):   Vitals:    22 2316 22 0425 22 0733 22 1051   BP: 133/71 127/83 120/73 119/74   Pulse: (!) 110 100 (!) 105 (!) 102   Resp: 16 16 16 16   Temp: 98.2 °F (36.8 °C) 98.1 °F (36.7 °C) 98 °F (36.7 °C) 98.2 °F (36.8 °C)   TempSrc: Axillary Axillary Oral Axillary   SpO2: 100% 100% 100% 100%   Weight:       Height:                                                  BP Readings from Last 3 Encounters:   22 119/74   22 (!) 124/90   22 (!) 101/52       NPO Status:                                                                                 BMI:   Wt Readings from Last 3 Encounters:   22 146 lb 11.2 oz (66.5 kg)   06/10/22 170 lb (77.1 kg)   22 174 lb 1.6 oz (79 kg)     Body mass index is 22.31 kg/m².     CBC:   Lab Results   Component Value Date/Time    WBC 10.2 2022 06:57 AM    RBC 3.33 2022 06:57 AM    HGB 8.5 2022 06:57 AM HCT 27.4 07/05/2022 06:57 AM    MCV 82.3 07/05/2022 06:57 AM    RDW 15.6 07/05/2022 06:57 AM     07/05/2022 06:57 AM       CMP:   Lab Results   Component Value Date/Time     07/05/2022 06:57 AM    K 3.3 07/05/2022 06:57 AM     07/05/2022 06:57 AM    CO2 29 07/05/2022 06:57 AM    BUN 14 07/05/2022 06:57 AM    CREATININE 2.73 07/05/2022 06:57 AM    GFRAA 24 07/05/2022 06:57 AM    AGRATIO Cannot be calculated 05/14/2022 01:05 PM    LABGLOM 20 07/05/2022 06:57 AM    GLUCOSE 166 07/05/2022 06:57 AM    PROT 6.7 07/05/2022 06:57 AM    CALCIUM 9.0 07/05/2022 06:57 AM    BILITOT 0.4 07/05/2022 06:57 AM    ALKPHOS 245 07/05/2022 06:57 AM    ALKPHOS 252 05/14/2022 01:05 PM    AST 26 07/05/2022 06:57 AM    ALT 39 07/05/2022 06:57 AM       POC Tests:   Recent Labs     07/05/22  1053   POCGLU 180*       Coags: No results found for: PROTIME, INR, APTT    HCG (If Applicable):   Lab Results   Component Value Date    PREGTESTUR Negative 06/26/2022        ABGs: No results found for: PHART, PO2ART, CQQ3HOE, LRF4TMW, BEART, D8VSDJMF     Type & Screen (If Applicable):  No results found for: LABABO, LABRH    Drug/Infectious Status (If Applicable):  Lab Results   Component Value Date/Time    HEPCAB NONREACTIVE 06/01/2022 05:40 PM       COVID-19 Screening (If Applicable): No results found for: COVID19        Anesthesia Evaluation  Patient summary reviewed  Airway: Mallampati: II  TM distance: >3 FB   Neck ROM: full  Mouth opening: > = 3 FB   Dental:    (+) upper dentures and lower dentures      Pulmonary:Negative Pulmonary ROS and normal exam                               Cardiovascular:  Exercise tolerance: good (>4 METS),   (+) hypertension: mild,                   Neuro/Psych:   Negative Neuro/Psych ROS              GI/Hepatic/Renal:   (+) renal disease: CRI,           Endo/Other:    (+) DiabetesType II DM, poorly controlled, using insulin, blood dyscrasia: anemia:., .                 Abdominal:             Vascular:

## 2022-07-05 NOTE — ANESTHESIA PRE PROCEDURE
Department of Anesthesiology  Preprocedure Note       Name:  Lakisha Magana   Age:  48 y.o.  :  1975                                          MRN:  [id number]         Date:  2022      Surgeon: Romario Friedman):  Reji Bryan MD    Procedure: Procedure(s):  EGD ESOPHAGOGASTRODUODENOSCOPY    Medications prior to admission:   Prior to Admission medications    Medication Sig Start Date End Date Taking?  Authorizing Provider   Insulin Pen Needle (PEN NEEDLES 31GX5/16\") 31G X 8 MM MISC 120 each by Other route 4 times daily 22 Yes Historical Provider, MD   insulin glargine (LANTUS) 100 UNIT/ML injection vial Inject 22 Units into the skin nightly  Patient taking differently: Inject 22 Units into the skin every morning (before breakfast)  22   Shantal Warren MD   ferrous sulfate (IRON 325) 325 (65 Fe) MG tablet Take 1 tablet by mouth 2 times daily (with meals)  Patient not taking: Reported on 2022 6/10/22   Shantal Warren MD   folic acid (FOLVITE) 1 MG tablet Take 1 tablet by mouth daily  Patient not taking: Reported on 2022 6/10/22   Shantal Duke Chi, MD   albuterol (PROVENTIL) (2.5 MG/3ML) 0.083% nebulizer solution Take 3 mLs by nebulization every 4 hours as needed for Wheezing 6/10/22   Shantal Duke Chi, MD   insulin lispro (HUMALOG) 100 UNIT/ML SOLN injection vial Inject 5 Units into the skin 3 times daily (with meals) 6/10/22   Shantal Duke Chi, MD   insulin lispro (HUMALOG) 100 UNIT/ML SOLN injection vial Inject 0-4 Units into the skin 4 times daily (before meals and nightly) 22   Thu Ratliff, DO   brimonidine (ALPHAGAN P) 0.1 % SOLN 1 drop in left eye twice a day  Patient not taking: Reported on 2022   Ar Automatic Reconciliation   ergocalciferol (ERGOCALCIFEROL) 1.25 MG (64483 UT) capsule Take 50,000 Units by mouth  Patient not taking: Reported on 2022   Ar Automatic Reconciliation       Current medications:    Current Facility-Administered Medications   Medication Dose Route Frequency Provider Last Rate Last Admin    potassium chloride 10 mEq/100 mL IVPB (Peripheral Line)  10 mEq IntraVENous Q2H Anne Calderon MD        magnesium sulfate 1000 mg in dextrose 5% 100 mL IVPB  1,000 mg IntraVENous Once Anne Calderon MD        ondansetron (ZOFRAN-ODT) disintegrating tablet 4 mg  4 mg Oral Q6H PRN Doug Garcia MD   4 mg at 07/04/22 2108    sodium phosphate 10 mmol in sodium chloride 0.9 % 250 mL IVPB  10 mmol IntraVENous PRN Carlyn Parnell MD        Or    sodium phosphate 15 mmol in sodium chloride 0.9 % 250 mL IVPB  15 mmol IntraVENous PRN Carlyn Parnell MD        Or    sodium phosphate 20 mmol in sodium chloride 0.9 % 500 mL IVPB  20 mmol IntraVENous PRN Carlyn Parnell MD        magnesium sulfate 2000 mg in 50 mL IVPB premix  2,000 mg IntraVENous PRN Carlyn Parnell MD        potassium chloride (KLOR-CON M) extended release tablet 40 mEq  40 mEq Oral PRN Carlyn Parnell MD        Or    potassium bicarb-citric acid (EFFER-K) effervescent tablet 40 mEq  40 mEq Oral PRN Carlyn Parnell MD        Or    potassium chloride 10 mEq/100 mL IVPB (Peripheral Line)  10 mEq IntraVENous PRN Carlyn Parnell MD        insulin glargine (LANTUS) injection vial 15 Units  15 Units SubCUTAneous QAM Anne Calderon MD   15 Units at 07/04/22 1250    metoclopramide (REGLAN) tablet 10 mg  10 mg Oral TID AC Shelba Heimlich, MD   10 mg at 07/05/22 0543    pantoprazole (PROTONIX) tablet 40 mg  40 mg Oral BID AC Shelba Heimlich, MD   40 mg at 07/05/22 0543    ciprofloxacin (CIPRO) tablet 500 mg  500 mg Oral 2 times per day Rickey Head MD   500 mg at 07/04/22 2245    promethazine (PHENERGAN) suppository 25 mg  25 mg Rectal BID PRN Shelba Heimlich, MD   25 mg at 07/04/22 0150    magnesium oxide (MAG-OX) tablet 400 mg  400 mg Oral Daily Anne Calderon MD   400 mg at 07/03/22 0844    lactated ringers infusion IntraVENous Continuous Tu Aldrich  mL/hr at 07/02/22 1616 New Bag at 07/02/22 1616    insulin lispro (HUMALOG) injection vial 0-4 Units  0-4 Units SubCUTAneous TID WC Angelina Villafuerte MD   3 Units at 07/04/22 1249    insulin lispro (HUMALOG) injection vial 0-4 Units  0-4 Units SubCUTAneous Nightly Angelina Villafuerte MD   4 Units at 07/03/22 2129    polyethylene glycol (GLYCOLAX) packet 17 g  17 g Oral Daily TANISHA Julio        morphine (PF) injection 1 mg  1 mg IntraVENous Q6H PRN Herman Barber MD   1 mg at 07/02/22 0613    HYDROcodone-acetaminophen ([facility]) 7.5-325 MG per tablet 1 tablet  1 tablet Oral Q6H PRN Herman Barber MD        magnesium sulfate 2000 mg in 50 mL IVPB premix  2,000 mg IntraVENous PRN Ludivina Oliver MD   Stopped at 06/27/22 0755    ondansetron (ZOFRAN) injection 4 mg  4 mg IntraVENous Q6H PRN Herman Barber MD   4 mg at 07/01/22 1849    albuterol (PROVENTIL) nebulizer solution 2.5 mg  2.5 mg Nebulization Q6H PRN Ludivina Oliver MD        glucose chewable tablet 16 g  4 tablet Oral PRN Ludivina Oliver MD        dextrose bolus 10% 125 mL  125 mL IntraVENous PRN Ludivina Oliver MD   Stopped at 06/30/22 0229    Or    dextrose bolus 10% 250 mL  250 mL IntraVENous PRN Ludivina Oliver MD   Stopped at 06/29/22 1638    glucagon injection 1 mg  1 mg IntraMUSCular PRN Ludivina Oliver MD        dextrose 5 % solution  100 mL/hr IntraVENous PRN Ludivina Oliver MD        sodium chloride flush 0.9 % injection 5-40 mL  5-40 mL IntraVENous 2 times per day Ludivina Oliver MD   10 mL at 07/02/22 2019    sodium chloride flush 0.9 % injection 5-40 mL  5-40 mL IntraVENous PRN Ludivina Oliver MD   10 mL at 06/30/22 0254    0.9 % sodium chloride infusion   IntraVENous PRN Ludivina Oliver MD 20 mL/hr at 06/30/22 0223 Rate Change at 06/30/22 0223    enoxaparin Sodium (LOVENOX) injection 30 mg  30 mg SubCUTAneous Daily Ludivina Oliver MD   30 mg at 07/03/22 0843    acetaminophen (TYLENOL) tablet 650 mg  650 mg Oral Q6H PRN Bishnu Bright MD        Or    acetaminophen (TYLENOL) suppository 650 mg  650 mg Rectal Q6H PRN Bishnu Bright MD        aluminum & magnesium hydroxide-simethicone (MAALOX) 200-200-20 MG/5ML suspension 30 mL  30 mL Oral Q6H PRN Bishnu Bright MD           Allergies:  No Known Allergies    Problem List:    Patient Active Problem List   Diagnosis Code    Ulcer, skin, chronic ([facility] L98.499    Depression with anxiety F41.8    Moderate single current episode of major depressive disorder ([facility] F32.1    Diabetic polyneuropathy associated with type 1 diabetes mellitus ([facility]) E10.42    Type 1 diabetes mellitus with stage 3 chronic kidney disease ([facility]) E10.22, N18.30    H/O gastroesophageal reflux (GERD) Z87.19    Neuropathy, peripheral, idiopathic G60.9    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 associated with type 1 diabetes mellitus ([facility] U16.0880    Hydronephrosis of right kidney N13.30    Diabetes mellitus type 1 ([facility]) E10.9    Benign hypertension with CKD (chronic kidney disease) stage IV ([facility]) I12.9, N18.4    Type 1 diabetes mellitus with hyperglycemia ([facility]) E10.65    Cardiac arrest ([facility]) I46.9    Diabetic ketoacidosis with coma associated with type 1 diabetes mellitus ([facility]) E10.11    Frailty R54    Normocytic anemia D64.9    Physical debility R53.81    Severe sepsis ([facility]) A41.9, R65.20    Pyelonephritis N12       Past Medical History:        Diagnosis Date    Acute renal failure ([facility] 1/24/2012    CKD (chronic kidney disease) stage 3, GFR 30-59 ml/min ([facility])     Gastroenteritis, acute 1/24/2012    H/O amputation of lesser toe, left ([facility] 6/9/2022    This status condition was added to the problem list by Stephen SALDANA of the [employer], after medical record review and validation.        Hemodialysis patient ([facility]     IDDM (insulin dependent diabetes mellitus) 2012    Type 1 av -150 can tell Hypo below 70    Intractable nausea and vomiting 2014    Irregular menses     Kidney stone     Neuropathy, peripheral, idiopathic 3/13/2017    Retinopathy, bilateral 3/13/2017    Trichomoniasis 3/13/2017    Ulcer, skin, chronic ([address] 3/13/2017    Vaginal burning        Past Surgical History:        Procedure Laterality Date    AMPUTATION Left     5th Toe due to Diabetic Ulcer    AMPUTATION  12    gangrene    CYSTOSCOPY,INSERT URETERAL STENT  2022    HX OPEN REDUCTION INTERNAL FIXATION Right 2011    ankle    IR TUNNELED CATHETER PLACEMENT GREATER THAN 5 YEARS  2022    IR TUNNELED CATHETER PLACEMENT GREATER THAN 5 YEARS 2022 [facility]       Social History:    Social History     Tobacco Use    Smoking status: Former Smoker     Quit date: 2013     Years since quittin.6    Smokeless tobacco: Never Used   Substance Use Topics    Alcohol use: Yes                                Counseling given: Not Answered      Vital Signs (Current):   Vitals:    22 2316 22 0425 22 0733 22 1051   BP: 133/71 127/83 120/73 119/74   Pulse: (!) 110 100 (!) 105 (!) 102   Resp: 16 16 16 16   Temp: 98.2 °F (36.8 °C) 98.1 °F (36.7 °C) 98 °F (36.7 °C) 98.2 °F (36.8 °C)   TempSrc: Axillary Axillary Oral Axillary   SpO2: 100% 100% 100% 100%   Weight:       Height:                                                  BP Readings from Last 3 Encounters:   22 119/74   22 (!) 124/90   22 (!) 101/52       NPO Status:                                                                                 BMI:   Wt Readings from Last 3 Encounters:   22 146 lb 11.2 oz (66.5 kg)   06/10/22 170 lb (77.1 kg)   22 174 lb 1.6 oz (79 kg)     Body mass index is 22.31 kg/m².     CBC:   Lab Results   Component Value Date/Time    WBC 10.2 2022 06:57 AM    RBC 3.33 2022 06:57 AM    HGB 8.5 2022 06:57 AM HCT 27.4 07/05/2022 06:57 AM    MCV 82.3 07/05/2022 06:57 AM    RDW 15.6 07/05/2022 06:57 AM     07/05/2022 06:57 AM       CMP:   Lab Results   Component Value Date/Time     07/05/2022 06:57 AM    K 3.3 07/05/2022 06:57 AM     07/05/2022 06:57 AM    CO2 29 07/05/2022 06:57 AM    BUN 14 07/05/2022 06:57 AM    CREATININE 2.73 07/05/2022 06:57 AM    GFRAA 24 07/05/2022 06:57 AM    AGRATIO Cannot be calculated 05/14/2022 01:05 PM    LABGLOM 20 07/05/2022 06:57 AM    GLUCOSE 166 07/05/2022 06:57 AM    PROT 6.7 07/05/2022 06:57 AM    CALCIUM 9.0 07/05/2022 06:57 AM    BILITOT 0.4 07/05/2022 06:57 AM    ALKPHOS 245 07/05/2022 06:57 AM    ALKPHOS 252 05/14/2022 01:05 PM    AST 26 07/05/2022 06:57 AM    ALT 39 07/05/2022 06:57 AM       POC Tests:   Recent Labs     07/05/22  1053   POCGLU 180*       Coags: No results found for: PROTIME, INR, APTT    HCG (If Applicable):   Lab Results   Component Value Date    PREGTESTUR Negative 06/26/2022        ABGs: No results found for: PHART, PO2ART, QPP6FDD, XIL5CXM, BEART, H0GSSVTD     Type & Screen (If Applicable):  No results found for: LABABO, LABRH    Drug/Infectious Status (If Applicable):  Lab Results   Component Value Date/Time    HEPCAB NONREACTIVE 06/01/2022 05:40 PM       COVID-19 Screening (If Applicable): No results found for: COVID19        Anesthesia Evaluation  Patient summary reviewed  Airway:           Dental:          Pulmonary:Negative Pulmonary ROS                              Cardiovascular:  Exercise tolerance: good (>4 METS),   (+) hypertension: mild,                   Neuro/Psych:   Negative Neuro/Psych ROS              GI/Hepatic/Renal: Neg GI/Hepatic/Renal ROS  (+) renal disease: CRI,           Endo/Other:    (+) DiabetesType II DM, well controlled, using insulin, blood dyscrasia: anemia:., .                 Abdominal:             Vascular: negative vascular ROS.          Other Findings:           Anesthesia Plan        Laurel Kinds, MD   7/5/2022

## 2022-07-05 NOTE — PROGRESS NOTES
TRANSFER - OUT REPORT:    Verbal report given to [place] on Ambika Crew  being transferred to 3rd floor for routine post-op       Report consisted of patient's Situation, Background, Assessment and   Recommendations(SBAR). Information from the following report(s) Surgery Report was reviewed with the receiving nurse. Lines:   Peripheral IV 07/05/22 Right Forearm (Active)       Peripheral IV 07/05/22 Right;Upper Arm (Active)        Opportunity for questions and clarification was provided.       Patient transported with:  [employer]

## 2022-07-05 NOTE — PLAN OF CARE
Problem: Gastrointestinal - Adult  Goal: Maintains adequate nutritional intake  Outcome: Not Progressing

## 2022-07-05 NOTE — PROGRESS NOTES
Comprehensive Nutrition Assessment    Type and Reason for Visit: Reassess,Consult,Patient Education   Consult for education: GERD and PEG tube (GI)    Nutrition Recommendations/Plan:   Meals and Snacks:  Diet: Continue current order; diet progression per MD  Nutrition Supplement Therapy:   Medical food supplement therapy:  Initiate Ensure Clear once per day (this provides 240 kcal and 8 grams protein per bottle)- apple, receives SSI coverage and with very poor intake, any amount consumed is beneficial.     NPO/Clear liquid diet status day number 9 is related to N/V. Malnutrition Status: At risk for malnutrition (Comment) (CLQ since 6/26)    If anticipated patient will remain NPO/Clear liquid for greater than 2-3 days, consider nutrition support for primary needs (with tube feeding route preferred if medically appropriate). If FT placed, consider NJ d/t hx of gastroparesis. If tube feeding not appropriate, would necessitate a central line for TPN for primary nutrition. Patient currently has 2 PIVs.   If nutrition support pursued, order appropriate route and an IP Nutrition Consult for RD to manage. Malnutrition Assessment:  Malnutrition Status: At risk for malnutrition (Comment) (CLQ since 6/26)    Nutrition Assessment:  Nutrition History: Pt known to nutrition services from past admissions. Pt last seen in [facility] 6/9. At that time she stated she was eating 1 \"big meal\" per day and mostly eating snacks during the day. Pt states after D/C she was eating mostly applesauce at home, but had ongoing N/V.     Do You Have Any Cultural, [employer], or Ethnic Food Preferences?: No   Nutrition Background:   Pt with PMH notable for HTN, HLD, DM I, CKD IV, recent cardiac arrest requiring intubation, and gastroparesis. She presents with N/V. She is admitted for severe sepsis d/t pyelonephritis. Nutrition Interval:  Patient laying in bed with several open clear liquids on bedside table.  Patient continues with n/v and EGD today found severe esophagitis, small hiatal hernia, non erosive gastritis (biopsies sent to r/o h.pylori). Tolerating sips of clear liquids- open to ensure clear supplement, but even that will not come close to meeting her estimated needs. Patient may be open to tube feeding. Per pt, does not currently have a PEG tube. Per patient has never received tube feeding in the past. Asks, \"Can I go home if I get one? \". Nutrition Related Findings:   No kasia muscle or subcutaneous fat wasting      Current Nutrition Therapies:  ADULT DIET; Clear Liquid; 3 carb choices (45 gm/meal); Low Phosphorus (Less than 1000 mg)    Current Intake:   Average Meal Intake: 1-25% Average Supplements Intake: None Ordered      Anthropometric Measures:  Height: 5' 8\" (172.7 cm)  Current Body Wt: 146 lb 9.7 oz (66.5 kg) (7/2), Weight source: Bed Scale  BMI: 22.3  Admission Body Weight: 149 lb 14.6 oz (68 kg) (6/26; bed scale)  Ideal Body Weight (Kg) (Calculated): 64 kg (140 lbs), 104.7 %  Usual Body Wt:  (varies; hx CKD), Percent weight change:         Edema:Peripheral Vascular  Peripheral Vascular (WDL): Within Defined Limits   BMI Category Normal Weight (BMI 18.5-24. 9)  Estimated Daily Nutrient Needs:  Energy (kcal/day): 7749-7484 (Kcal/kg (25-30) Weight used:   Current (68.1kg)  Protein (g/day): 68-82 (1-1.2gm/kg) Weight Used: (Current (68.1kg))    Fluid (ml/day):   (1 ml/kcal)    Nutrition Diagnosis:   · Inadequate oral intake related to altered GI function as evidenced by NPO or clear liquid status due to medical condition    Nutrition Interventions:   Food and/or Nutrient Delivery: Continue Current Diet,Start Oral Nutrition Supplement  Nutrition Education/Counseling: Survival skills/brief education completed (7/4: brief GERD edu, intro to TF for nutrition)  Coordination of Nutrition Care: Continue to monitor while inpatient,Interdisciplinary Rounds    Goals:   Previous Goal Met: No Progress toward Goal(s)  Active Goal:  (Diet

## 2022-07-06 VITALS
SYSTOLIC BLOOD PRESSURE: 132 MMHG | BODY MASS INDEX: 22.16 KG/M2 | HEART RATE: 101 BPM | DIASTOLIC BLOOD PRESSURE: 87 MMHG | HEIGHT: 68 IN | OXYGEN SATURATION: 100 % | TEMPERATURE: 98.2 F | WEIGHT: 146.21 LBS | RESPIRATION RATE: 16 BRPM

## 2022-07-06 LAB
ALBUMIN SERPL-MCNC: 2.2 G/DL (ref 3.5–5)
ALBUMIN/GLOB SERPL: 0.5 {RATIO} (ref 1.2–3.5)
ALP SERPL-CCNC: 249 U/L (ref 50–136)
ALT SERPL-CCNC: 35 U/L (ref 12–65)
ANION GAP SERPL CALC-SCNC: 12 MMOL/L (ref 7–16)
AST SERPL-CCNC: 43 U/L (ref 15–37)
BASOPHILS # BLD: 0 K/UL (ref 0–0.2)
BASOPHILS NFR BLD: 0 % (ref 0–2)
BILIRUB SERPL-MCNC: 0.4 MG/DL (ref 0.2–1.1)
BUN SERPL-MCNC: 16 MG/DL (ref 6–23)
CALCIUM SERPL-MCNC: 8.5 MG/DL (ref 8.3–10.4)
CHLORIDE SERPL-SCNC: 105 MMOL/L (ref 98–107)
CO2 SERPL-SCNC: 21 MMOL/L (ref 21–32)
CREAT SERPL-MCNC: 2.41 MG/DL (ref 0.6–1)
DIFFERENTIAL METHOD BLD: ABNORMAL
EOSINOPHIL # BLD: 0.3 K/UL (ref 0–0.8)
EOSINOPHIL NFR BLD: 3 % (ref 0.5–7.8)
ERYTHROCYTE [DISTWIDTH] IN BLOOD BY AUTOMATED COUNT: 15.7 % (ref 11.9–14.6)
GLOBULIN SER CALC-MCNC: 4.4 G/DL (ref 2.3–3.5)
GLUCOSE BLD STRIP.AUTO-MCNC: 104 MG/DL (ref 65–100)
GLUCOSE BLD STRIP.AUTO-MCNC: 154 MG/DL (ref 65–100)
GLUCOSE BLD STRIP.AUTO-MCNC: 344 MG/DL (ref 65–100)
GLUCOSE BLD STRIP.AUTO-MCNC: 66 MG/DL (ref 65–100)
GLUCOSE SERPL-MCNC: 329 MG/DL (ref 65–100)
HCT VFR BLD AUTO: 28.4 % (ref 35.8–46.3)
HGB BLD-MCNC: 8.9 G/DL (ref 11.7–15.4)
IMM GRANULOCYTES # BLD AUTO: 0.1 K/UL (ref 0–0.5)
IMM GRANULOCYTES NFR BLD AUTO: 1 % (ref 0–5)
LYMPHOCYTES # BLD: 2.5 K/UL (ref 0.5–4.6)
LYMPHOCYTES NFR BLD: 25 % (ref 13–44)
MCH RBC QN AUTO: 25.7 PG (ref 26.1–32.9)
MCHC RBC AUTO-ENTMCNC: 31.3 G/DL (ref 31.4–35)
MCV RBC AUTO: 82.1 FL (ref 79.6–97.8)
MONOCYTES # BLD: 0.5 K/UL (ref 0.1–1.3)
MONOCYTES NFR BLD: 5 % (ref 4–12)
NEUTS SEG # BLD: 6.5 K/UL (ref 1.7–8.2)
NEUTS SEG NFR BLD: 65 % (ref 43–78)
NRBC # BLD: 0 K/UL (ref 0–0.2)
PHOSPHATE SERPL-MCNC: 3.3 MG/DL (ref 2.5–4.5)
PLATELET # BLD AUTO: 291 K/UL (ref 150–450)
PMV BLD AUTO: 9.7 FL (ref 9.4–12.3)
POTASSIUM SERPL-SCNC: 4.6 MMOL/L (ref 3.5–5.1)
PROT SERPL-MCNC: 6.6 G/DL (ref 6.3–8.2)
RBC # BLD AUTO: 3.46 M/UL (ref 4.05–5.2)
SERVICE CMNT-IMP: ABNORMAL
SERVICE CMNT-IMP: NORMAL
SODIUM SERPL-SCNC: 138 MMOL/L (ref 136–145)
WBC # BLD AUTO: 10 K/UL (ref 4.3–11.1)

## 2022-07-06 PROCEDURE — 36415 COLL VENOUS BLD VENIPUNCTURE: CPT

## 2022-07-06 PROCEDURE — 82962 GLUCOSE BLOOD TEST: CPT

## 2022-07-06 PROCEDURE — 6370000000 HC RX 637 (ALT 250 FOR IP): Performed by: INTERNAL MEDICINE

## 2022-07-06 PROCEDURE — 6360000002 HC RX W HCPCS: Performed by: FAMILY MEDICINE

## 2022-07-06 PROCEDURE — 80053 COMPREHEN METABOLIC PANEL: CPT

## 2022-07-06 PROCEDURE — 6370000000 HC RX 637 (ALT 250 FOR IP): Performed by: STUDENT IN AN ORGANIZED HEALTH CARE EDUCATION/TRAINING PROGRAM

## 2022-07-06 PROCEDURE — 2580000003 HC RX 258: Performed by: FAMILY MEDICINE

## 2022-07-06 PROCEDURE — 85025 COMPLETE CBC W/AUTO DIFF WBC: CPT

## 2022-07-06 PROCEDURE — 84100 ASSAY OF PHOSPHORUS: CPT

## 2022-07-06 RX ADMIN — SODIUM CHLORIDE, PRESERVATIVE FREE 10 ML: 5 INJECTION INTRAVENOUS at 09:00

## 2022-07-06 RX ADMIN — METOCLOPRAMIDE 10 MG: 10 TABLET ORAL at 11:42

## 2022-07-06 RX ADMIN — SUCRALFATE 1 G: 1 TABLET ORAL at 16:52

## 2022-07-06 RX ADMIN — Medication 400 MG: at 08:08

## 2022-07-06 RX ADMIN — METOCLOPRAMIDE 10 MG: 10 TABLET ORAL at 16:52

## 2022-07-06 RX ADMIN — CIPROFLOXACIN 500 MG: 500 TABLET, FILM COATED ORAL at 08:08

## 2022-07-06 RX ADMIN — PANTOPRAZOLE SODIUM 40 MG: 40 TABLET, DELAYED RELEASE ORAL at 16:52

## 2022-07-06 RX ADMIN — INSULIN GLARGINE 15 UNITS: 100 INJECTION, SOLUTION SUBCUTANEOUS at 11:38

## 2022-07-06 RX ADMIN — INSULIN LISPRO 3 UNITS: 100 INJECTION, SOLUTION INTRAVENOUS; SUBCUTANEOUS at 11:37

## 2022-07-06 RX ADMIN — SUCRALFATE 1 G: 1 TABLET ORAL at 11:42

## 2022-07-06 RX ADMIN — METOCLOPRAMIDE 10 MG: 10 TABLET ORAL at 06:27

## 2022-07-06 RX ADMIN — ENOXAPARIN SODIUM 30 MG: 100 INJECTION SUBCUTANEOUS at 08:08

## 2022-07-06 ASSESSMENT — PAIN SCALES - GENERAL: PAINLEVEL_OUTOF10: 0

## 2022-07-06 NOTE — ADT AUTH CERT
Urinary Tract Infection (UTI) - Care Day 9 (7/4/2022) by Caryle Reveal, RN       Review Status Review Entered   Completed 7/5/2022 13:00      Criteria Review      Care Day: 9 Care Date: 7/4/2022 Level of Care: ICU    Guideline Day 2    Level Of Care    ( ) Floor    7/5/2022 1:00 PM EDT by Guerline Zaragoza      ICU    Clinical Status    (X) * Hypotension absent    7/5/2022 1:00 PM EDT by Guerline Zaragoza      /74    (X) * Mental status at baseline    7/5/2022 1:00 PM EDT by Guerline Zaragoza      A&Ox3    (X) * Afebrile or fever improved    7/5/2022 1:00 PM EDT by Guerline Zaragoza      Temp 98.2    ( ) * Vomiting absent or improved    7/5/2022 1:00 PM EDT by Jaimee King Patient remained with persistent nausea/vomiting    Activity    (X) * Ambulatory    7/5/2022 1:00 PM EDT by Guerline Zaragoza      bedrest with bathroom privileges    Routes    (X) IV medications    ( ) Advance diet as tolerated    7/5/2022 1:00 PM EDT by Guerline Zaragoza      npo    Interventions    ( ) * Reversible urinary system abnormality (eg, obstruction, abscess) absent, addressed, or to be treated at next level of care    7/5/2022 1:00 PM EDT by Guerline Zaragoza      Continue Cipro IV EOT 7/7 as patient is unable to tolerate p.o. Medications    (X) Antibiotics    7/5/2022 1:00 PM EDT by Guerline Zaragoza      Cipro 500mg PO BID    * Milestone   Additional Notes   Date of care: 7/4/2022      IP-LOC- ICU      GI PN: Plan:   Continue current treatment   EGD tomorrow   Suspect due to underlying poor gastric motility compounded by acute illness      IM Note: Subjective/24hr Events (07/04/22):     Refusing labs, VS, meds today. Arnel Lu following and plan for EGD tomorrow however has no IV site despite multiple attempts and pt refusing.  Denies CP, SOB.     Assessment & Plan:   Principal Problem:    Severe sepsis secondary to UTI:   Enterobacter bacteremia: Possible source GI versus urine   Patient with recent history of pyelonephritis, CVA tenderness negative   Continue Cipro IV EOT  as patient is unable to tolerate p.o.     Repeat blood culture negative     ID signed off   PT/OT recommended no further skilled therapy       Diabetic gastroparesis   Patient remained with persistent nausea/vomiting   Zofran discontinued and started on Reglan   Added Phenergan prn   Continue with PPI IV    Abdominal x-ray showed right ureteral stent   No plans for Endoscopy, at this time.    clear liquids and will advance the diet as tolerated   GI following    plans for EGD tomorrow       Constipation   Continue MiraLAX       Hypomagnesemia   Pt is refusing labs.       Elevated alkaline phosphatase       Type 1 diabetes mellitus:   Patient is not tolerating diet and will increase insulin after her nutrition improves   Increased  Lantus to 15 units daily and sliding scale   Inpatient diabetes management consult       CKD stage IV:   Stable   Continue to monitor       Hypertension:   Continue home meds   General:          Well nourished.     Head:               Normocephalic, atraumatic   Eyes:               Sclerae appear normal.  Pupils equally round. ENT:                Nares appear normal, no drainage.  Moist oral mucosa   Neck:               No restricted ROM.  Trachea midline    CV:                  RRR.  No m/r/g.  No jugular venous distension.    Lungs:             CTAB.  No wheezing, rhonchi, or rales.  Respirations even, unlabored   Abdomen:        Bowel sounds present.  Soft, nontender, nondistended.  No CVA tenderness   Extremities:     No cyanosis or clubbing.  No edema   Skin:                No rashes and normal coloration.   Warm and dry.     Neuro:             CN II-XII grossly intact.  Sensation intact.  A&Ox3   Psych:             Normal mood and affect.        Vitals: Temp 98.2, , 113, 116, /74, RR 16, 100% on RA      Labs:   B, 172, 151      Medications:   Cipro 500mg PO BID   Insulin lantus 15units SC every morning   Insulin lispro SC TID with meals SSI   Compazine 10mg IM x1   Zofran 4mg PO q6 PRN x3   Phenergan 25mg rectal BID PRN x1      Plan: daily CBC, daily CMP, daily phosphorus, POCT glucose QID ACHS, npo, PT, bedrest with bathroom privileges, daily weights, I&Os q8                       Urinary Tract Infection (UTI) - Care Day 8 (7/3/2022) by Myla Baer RN       Review Status Review Entered   Completed 7/4/2022 13:54      Criteria Review      Care Day: 8 Care Date: 7/3/2022 Level of Care: ICU    Guideline Day 2    Level Of Care    ( ) Floor    7/4/2022 1:54 PM EDT by Bernie Belle      ICU    Clinical Status    (X) * Hypotension absent    7/4/2022 1:54 PM EDT by Bernie Belle      /93    (X) * Mental status at baseline    7/4/2022 1:54 PM EDT by Bernie Belle      A&Ox3    (X) * Afebrile or fever improved    7/4/2022 1:54 PM EDT by Bernie Belle      Temp 98.1    ( ) * Vomiting absent or improved    7/4/2022 1:54 PM EDT by Bernie Belle      Reports persistent nausea and vomiting    Activity    (X) * Ambulatory    7/4/2022 1:54 PM EDT by Bernie Belle      bedrest with bathroom privileges    Routes    (X) IV fluids    7/4/2022 1:54 PM EDT by Bernie Belle      LR 125mL/hr IV    (X) IV medications    ( ) Advance diet as tolerated    7/4/2022 1:54 PM EDT by Bernie Belle      adult clear liquid diet    Interventions    ( ) * Reversible urinary system abnormality (eg, obstruction, abscess) absent, addressed, or to be treated at next level of care    7/4/2022 1:54 PM EDT by Bernie Belle      Continue Cipro IV EOT 7/7 as patient is unable to tolerate p.o. Medications    (X) Antibiotics    7/4/2022 1:54 PM EDT by Bernie Belle      Cipro 500mg PO BID  Cipro 500mg PO x1    (X) Possible analgesics    * Milestone   Additional Notes   Date of care: 7/3/2022      IP-LOC- ICU      IM PN: Subjective/24hr Events (07/03/22):     Patient is seen at the bedside.  Reports persistent nausea and vomiting.  She had 1 episodes of vomiting yesterday. She has no iv access and PICC line team is notified. She is refusing labs and PT. Jo Banegas is unable to tolerate p.o. medications and diet. Denies chest pain, palpitations or shortness of breath. Assessment & Plan:   Severe sepsis secondary to UTI:   Enterobacter bacteremia: Possible source GI versus urine   Patient with recent history of pyelonephritis, CVA tenderness negative   Continue Cipro IV EOT 7/7 as patient is unable to tolerate p.o.     Repeat blood culture negative     ID signed off   PT/OT recommended no further skilled therapy       Diabetic gastroparesis   Patient remained with persistent nausea/vomiting   Zofran discontinued and started on Reglan   Added Phenergan prn   Continue with PPI IV    Abdominal x-ray showed right ureteral stent   No plans for Endoscopy, at this time.    clear liquids and will advance the diet as tolerated   GI following       Constipation   Continue MiraLAX       Hypomagnesemia   Pt is refusing labs.       Elevated alkaline phosphatase       Type 1 diabetes mellitus:   Patient is not tolerating diet and will increase insulin after her nutrition improves   Increased  Lantus to 15 units daily and sliding scale   Inpatient diabetes management consult       CKD stage IV:   Stable   Continue to monitor       Hypertension:   Continue home meds   General:          Well nourished.     Head:               Normocephalic, atraumatic   Eyes:               Sclerae appear normal.  Pupils equally round. ENT:                Nares appear normal, no drainage.  Moist oral mucosa   Neck:               No restricted ROM.  Trachea midline    CV:                  RRR.  No m/r/g.  No jugular venous distension.    Lungs:             CTAB.  No wheezing, rhonchi, or rales.  Respirations even, unlabored   Abdomen:        Bowel sounds present.  Soft, nontender, nondistended.  No CVA tenderness   Extremities:     No cyanosis or clubbing.  No edema   Skin:                No rashes and normal coloration.   Warm and dry.     Neuro:             CN II-XII grossly intact.  Sensation intact.  A&Ox3   Psych:             Normal mood and affect.        GI PN: Assessment:   Principal Problem:     Severe sepsis (HCC)   Active Problems:     Benign hypertension with CKD (chronic kidney disease) stage IV (HCC)     Type 1 diabetes mellitus with hyperglycemia (HCC)     Pyelonephritis   Plan:   Continue current treatment plan    Consider endoscopy on Tuesday depending on how she progresses      Vitals: Temp 98.1, , 102, 112, /93, RR 18, 97% on RA      Labs:   B, 124, 352      Medications:   Lovenox 30mg SC daily   Insulin lispro SC nightly SSI   Mag-ox 400mg PO daily   Reglan 10mg IV TID   Reglan 10mg PO TID before meals   Protonix 40mg IV daily   Protonix 40mg PO BID before meals   Phenergan 25mg rectal BID PRN x1      Plan: daily CBC, daily CMP, daily phosphorus, POCT glucose QID ACHS, adult clear liquid diet, bedrest with bathroom privileges, daily weights, I&Os q8                 Urinary Tract Infection (UTI) - Care Day 7 (2022) by Felice Zamarripa RN       Review Status Review Entered   Completed 2022 13:47      Criteria Review      Care Day: 7 Care Date: 2022 Level of Care: ICU    Guideline Day 2    Level Of Care    ( ) Floor    2022 1:47 PM EDT by Yulisa Mckeon      ICU    Clinical Status    (X) * Hypotension absent    2022 1:47 PM EDT by Yulisa Mckeon      /90    (X) * Mental status at baseline    2022 1:47 PM EDT by Yulisa Mckeon      A&Ox3    (X) * Afebrile or fever improved    2022 1:47 PM EDT by Yulisa Mckeon      Temp 98.3    ( ) * Vomiting absent or improved    2022 1:47 PM EDT by Yulisa Mckeon      Patient remained with persistent nausea/vomiting    Activity    (X) * Ambulatory    2022 1:47 PM EDT by Yulisa Mckeon      bedrest with bathroom privileges    Routes    (X) IV fluids    7/4/2022 1:47 PM EDT by Bernie Belle      LR 125mL/hr IV    (X) IV medications    ( ) Advance diet as tolerated    7/4/2022 1:47 PM EDT by Bernie Belle      adult clear liquid diet    Interventions    ( ) * Reversible urinary system abnormality (eg, obstruction, abscess) absent, addressed, or to be treated at next level of care    7/4/2022 1:47 PM EDT by John Paredes IV EOT 7/7 as patient is unable to tolerate p.o.    (X) WBC    7/4/2022 1:47 PM EDT by Bernie Belle      WBC: 9.5    (X) Renal function tests    7/4/2022 1:47 PM EDT by Ranjan Dumont: [place]  Creatinine: 2.54 (H)    Medications    (X) Antibiotics    7/4/2022 1:47 PM EDT by Bernie Belle      Cipro 400mg IV q12    (X) Possible analgesics    7/4/2022 1:47 PM EDT by Bernie Belle      Morphine 1mg IV q6 PRN x1    * Milestone   Additional Notes   Date of care: 7/2/2022      IP-LOC- ICU      GI PN: Plan: Will d/c zofran   Restart reglan at 10mg TID   PRN phenergan supp   Follow up in AM      IM PN: Subjective/24hr Events (07/02/22): Patient is seen at the bedside.  Reports persistent nausea and vomiting.  She had multiple episodes of vomiting.  She is unable to tolerate p.o. medications and diet. Wendi Benjamin blood glucose is high today.   Denies chest pain, palpitations or shortness of breath.     Assessment & Plan:       Severe sepsis secondary to UTI:   Enterobacter bacteremia: Possible source GI versus urine   Patient with recent history of pyelonephritis, CVA tenderness negative   Continue Cipro IV EOT 7/7 as patient is unable to tolerate p.o.     Repeat blood culture negative     ID signed off   PT/OT recommended no further skilled therapy       Diabetic gastroparesis   Patient remained with persistent nausea/vomiting   Zofran discontinued and started on Reglan   Added Phenergan prn   Continue with PPI IV    Abdominal x-ray showed right ureteral stent   No plans for Endoscopy, at this time.    clear liquids and will advance the diet as tolerated   GI following       Constipation   Continue MiraLAX       Hypomagnesemia   Most likely due to vomiting   Mg is 1.3   S/p 2gr of Mg    Will repeat Mg again        Elevated alkaline phosphatase       Type 1 diabetes mellitus:   Patient is not tolerating diet and will increase insulin after her nutrition improves   Increased  Lantus to 12 units daily and sliding scale   Inpatient diabetes management consult       CKD stage IV:   Stable   Continue to monitor       Hypertension:   Continue home meds   General:          Well nourished.     Head:               Normocephalic, atraumatic   Eyes:               Sclerae appear normal.  Pupils equally round. ENT:                Nares appear normal, no drainage.  Moist oral mucosa   Neck:               No restricted ROM.  Trachea midline    CV:                  RRR.  No m/r/g.  No jugular venous distension.    Lungs:             CTAB.  No wheezing, rhonchi, or rales.  Respirations even, unlabored   Abdomen:        Bowel sounds present.  Soft, nontender, nondistended.  No CVA tenderness   Extremities:     No cyanosis or clubbing.  No edema   Skin:                No rashes and normal coloration.   Warm and dry.     Neuro:             CN II-XII grossly intact.  Sensation intact.  A&Ox3   Psych:             Normal mood and affect.        Vitals: Temp 98.3, HR 89, /90, RR 17, 99% on RA      Labs:   7/2/2022 03:25   Sodium: 140   Potassium: 3.6   Chloride: 107   CO2: 26   Anion Gap: 7   GFR Non-: 21 (L)   GFR African American: 26 (L)   Magnesium: 1.3 (L)   GLUCOSE, FASTING,GF: 247 (H)   CALCIUM, SERUM, [id number]: 8.4   ALBUMIN/GLOBULIN RATIO: 0.5 (L)   Phosphorus: 3.1   Total Protein: 6.6   Albumin: 2.1 (L)   Globulin: 4.5 (H)   Alk Phosphatase: 272 (H)   ALT: 47   AST: 58 (H)   Bilirubin: 0.4   RBC: 3.21 (L)   Hemoglobin Quant: 8.3 (L)   Hematocrit: 27.0     Constipation   Continue MiraLAX       Electrolyte abnormalities   Resolved       Elevated alkaline phosphatase       Type 1 diabetes mellitus:   Patient is not tolerating diet and will increase insulin after her nutrition improves   Decreased Lantus to 10 units from 25 units daily and sliding scale   Inpatient diabetes management consult       CKD stage IV:   Stable   Continue to monitor       Hypertension:   Continue home meds   General:          Well nourished.     Head:               Normocephalic, atraumatic   Eyes:               Sclerae appear normal.  Pupils equally round. ENT:                Nares appear normal, no drainage.  Moist oral mucosa   Neck:               No restricted ROM.  Trachea midline    CV:                  RRR.  No m/r/g.  No jugular venous distension.    Lungs:             CTAB.  No wheezing, rhonchi, or rales.  Respirations even, unlabored   Abdomen:        Bowel sounds present.  Soft, nontender, nondistended.  No CVA tenderness   Extremities:     No cyanosis or clubbing.  No edema   Skin:                No rashes and normal coloration.   Warm and dry.     Neuro:             CN II-XII grossly intact.  Sensation intact.  A&Ox3   Psych:             Normal mood and affect.        Vitals: Temp 99.7, HR 95, /93, RR 18, 99% on RA      Labs:   7/1/2022 07:24   Sodium: 143   Potassium: 3.8   Chloride: 110 (H)   CO2: 26   Anion Gap: 7   GFR Non-: 24 (L)   GFR African American: 29 (L)   Magnesium: 1.7 (L)   GLUCOSE, FASTING,GF: 188 (H)   CALCIUM, SERUM, [id number]: 8.4   ALBUMIN/GLOBULIN RATIO: 0.5 (L)   Phosphorus: 3.4   Total Protein: 6.4   Albumin: 2.0 (L)   Globulin: 4.4 (H)   Alk Phosphatase: 243 (H)   ALT: 35   AST: 47 (H)   Bilirubin: 0.2   RBC: 3.00 (L)   Hemoglobin Quant: 7.7 (L)   Hematocrit: 24.9 (L)   MCV: 83.0   MCH: 25.7 (L)   MCHC: 30.9 (L)   MPV: 8.7 (L)   RDW: 15.5 (H)   Platelet Count: 386   Absolute Mono #: 0.7   Eosinophils %: 1   Seg Neutrophils: 69 Segs Absolute: 6.7   Lymphocytes: 21   Monocytes: 7   Immature Granulocytes: 2   Absolute Immature Granulocyte: 0.2      Medications:   Dulcolax 10mg rectal daily   Lovenox 30mg SC daily   Insulin lantus 10units SC every morning   Insulin lispro SC nightly SSI   Insulin lispro SC TID with meals SSI   Zofran 8mg IV TID   Protonix 40mg IV q12   Zofran 4mg IV q6 PRN x1      Plan: daily CBC, daily CMP, daily magnesium, daily phosphorus, POCT glucose QID ACHS, adult clear liquid diet, bedrest with bathroom privileges, daily weights, I&Os q8, PT/OT                    Urinary Tract Infection (UTI) - Care Day 5 (6/30/2022) by Janna Dyson RN       Review Status Review Entered   Completed 6/30/2022 13:32      Criteria Review      Care Day: 5 Care Date: 6/30/2022 Level of Care: ICU    Guideline Day 2    Level Of Care    ( ) Floor    6/30/2022 1:32 PM EDT by Surya May      ICU    Clinical Status    (X) * Hypotension absent    6/30/2022 1:32 PM EDT by Surya May      /71    (X) * Mental status at baseline    6/30/2022 1:32 PM EDT by Surya May      A&Ox3    (X) * Afebrile or fever improved    6/30/2022 1:32 PM EDT by Surya May      Temp 98.6    ( ) * Vomiting absent or improved    6/30/2022 1:32 PM EDT by Surya May      persistent nausea and vomiting. Gloria Barbosa had multiple episodes of vomiting.  She is unable to tolerate p.o. medications and diet    Activity    (X) * Ambulatory    6/30/2022 1:32 PM EDT by Surya May      bedrest with bathroom privileges    Routes    (X) IV medications    ( ) Advance diet as tolerated    6/30/2022 1:32 PM EDT by Surya May      adult clear liquid diet    Interventions    ( ) * Reversible urinary system abnormality (eg, obstruction, abscess) absent, addressed, or to be treated at next level of care    6/30/2022 1:32 PM EDT by Surya May      Enterobacter bacteremia: Possible source GI versus urine    (X) WBC    6/30/2022 1:32 PM EDT by John De La O      WBC: 9.4    (X) Renal function tests    6/30/2022 1:32 PM EDT by Will Farm: 4 (L)  Creatinine: 2.07 (H)    Medications    (X) Antibiotics    6/30/2022 1:32 PM EDT by John De La O      Cipro 400mg IV q12    (X) Possible analgesics    6/30/2022 1:32 PM EDT by John De La O      Morphine 1mg IV q6 PRN x2    * Milestone   Additional Notes   Date of care: 6/30/2022      IP-LOC-ICU      GI PN: Plan:   Discontinue Reglan, which was started 6/28, and has not helped. Start on Zofran 8mg IV q8 hours today. Monitor for improvement. Continue with PPI IV BID   No plans for Endoscopy, at this time. Clear liquid diet for now and slowly advance to a low fat and low roughage diet, as tolerated    Miralax for constipation   Check Abdominal Xray   Follow electrolytes and correct, as needed. Tight Glucose Control is imperative      ID PN: Impression:   · Enterobacter bacteremia; possible urinary source vs GI source/gastroenteritis.  Abdominal exam is benign   · Diabetes with gastroparesis   · Nausea/vomiting; she is still vomiting overnight   · CKD   Plan:   · continue ciprofloxacin through 7/7/2022. · I will set stop date and sign off. Anti-infectives:   · Pip/tazo (6/26/2022 - )   · cipro (6/28/2022 - )      IM PN: Subjective/24hr Events (06/30/22): Patient is seen at the bedside.  Reports persistent nausea and vomiting.  She had multiple episodes of vomiting.  She is unable to tolerate p.o. medications and diet.  She was hypoglycemic yesterday and was given D5.  Denies chest pain, palpitations or shortness of breath.     Assessment & Plan:   Severe sepsis secondary to UTI:   Enterobacter bacteremia: Possible source GI versus urine   Patient with recent history of pyelonephritis, CVA tenderness negative   Continue Cipro IV EOT 7/7 as patient is unable to tolerate p.o.     Repeat blood culture negative     ID signed off   PT/OT recommended no further skilled therapy       Hypoglycemia   Yesterday she was hypoglycemic to 43 and 39   S/p D5   Blood glucose improved to 100       Diabetic gastroparesis   Patient remained with persistent nausea/vomiting    Reglan discontinued and started on Zofran every 8   Continue with PPI IV    Check abdominal x-ray   No plans for Endoscopy, at this time. Not tolerating clear liquids and will advance the diet as tolerated   GI following       Hypernatremia:   Resolved       Constipation   Continue MiraLAX       Electrolyte abnormalities   Magnesium was 1.6 and phosphorus 2.4   Repleted magnesium and phosphorus       Elevated alkaline phosphatase       Type 1 diabetes mellitus:   Patient is not tolerating diet and will increase insulin after her nutrition improves   Decreased Lantus to 10 units from 25 units daily and sliding scale   Inpatient diabetes management consult       CKD stage IV:   Stable   Continue to monitor       Hypertension:   Continue home meds   General:          Well nourished.     Head:               Normocephalic, atraumatic   Eyes:               Sclerae appear normal.  Pupils equally round. ENT:                Nares appear normal, no drainage.  Moist oral mucosa   Neck:               No restricted ROM.  Trachea midline    CV:                  RRR.  No m/r/g.  No jugular venous distension.    Lungs:             CTAB.  No wheezing, rhonchi, or rales.  Respirations even, unlabored   Abdomen:        Bowel sounds present.  Soft, nontender, nondistended.  No CVA tenderness   Extremities:     No cyanosis or clubbing.  No edema   Skin:                No rashes and normal coloration.   Warm and dry.     Neuro:             CN II-XII grossly intact.  Sensation intact.  A&Ox3   Psych:             Normal mood and affect.         Vitals: Temp 98.6, , /71, RR 18, 98% on RA      Labs:   6/30/2022 05:14   Sodium: 143   Potassium: 4.0   Chloride: 109 (H)   CO2: 27   Anion Gap: 7   GFR Non- American: 27 (L)   GFR African American: 33 (L)   Magnesium: 1.6 (L)   GLUCOSE, FASTING,GF: 118 (H)   CALCIUM, SERUM, [id number]: 8.2 (L)   ALBUMIN/GLOBULIN RATIO: 0.4 (L)   Phosphorus: 2.4 (L)   Total Protein: 6.2 (L)   Albumin: 1.9 (L)   Globulin: 4.3 (H)   Alk Phosphatase: 277 (H)   ALT: 37   AST: 43 (H)   Bilirubin: 0.3   RBC: 3.18 (L)   Hemoglobin Quant: 8.1 (L)   Hematocrit: 26.3 (L)   MCV: 82.7   MCH: 25.5 (L)   MCHC: 30.8 (L)   MPV: 8.8 (L)   RDW: 15.2 (H)   Platelet Count: 566 (H)   Absolute Mono #: 0.6   Eosinophils %: 2   Seg Neutrophils: 64   Segs Absolute: 6.0   Lymphocytes: 26   Monocytes: 6   Immature Granulocytes: 2   Absolute Immature Granulocyte: 0.2   Blood cultures: pending   Abdomen x-ray: Impression   RIGHT URETERAL STENT IN PLACE WITH NO ACUTE ABDOMINAL ABNORMALITY   IDENTIFIED.       Medications:    Dulcolax 10mg rectal daily   Lovenox 30mg SC daily   Insulin lantus 10units SC every morning   Insulin lispro SC nightly SSI   Insulin lispro SC TID with meals SSI   Magnesium sulfate 1,000mg IV x1   Reglan 5mg IV q6   Zofran 8mg IV TID   Protonix 40mg IV q12   Compazine 10mg IV x1   D10 125mL IV bolus x1   Zofran 4mg IV q6 PRN x2   Potassium chloride 10mEq IV PRN x1      Plan: daily CBC, daily CMP, daily magnesium, daily phosphorus, POCT glucose QID ACHS, adult clear liquid diet, bedrest with bathroom privileges, daily weights, I&Os q8, PT

## 2022-07-06 NOTE — CARE COORDINATION
requested [facility] provider)   Interdisciplinary team rounds with Dr. Mina Pittman, RADHA, nursing, PT, hospice and nutritional services for plan of care. Patient may be ready for discharge in 1-2 days pending progress. CM followed up with patient and she is in agreement with d/c and request home health from [facility] that was ordered prior to admission but she never go to see them due to being readmitted. Patient to d/c home with parent and [address] [place] [address] PT/OT/RN.

## 2022-07-06 NOTE — PROGRESS NOTES
Progress Note    Patient: Rito Medrano MRN: [id number]  SSN: [id number]    YOB: 1975  Age: 48 y.o. Sex: female      Admit Date: 6/26/2022    LOS: 10 days     Assessment and Plan:   48 y. o. female with medical history of uncontrolled diabetes with complications, hypertension, CKD, and 2 recent hospitalizations, who presented with nausea and vomiting    1. Severe sepsis secondary to Enterobacter bacteremia in the setting of urinary tract infection  -Continue ciprofloxacin  -Blood culture positive for Enterobacter susceptible to ciprofloxacin  -ID recommendations appreciated    2. Severe esophagitis, gastritis  -Continue PPI and sucralfate  -Advance diet to full liquids  -Gastroenterology recommendations appreciated    3. Diabetic gastroparesis  -Full liquid diet  -Symptomatic management with antiemetics    4. Diabetes mellitus  -Insulin basal  -Insulin sliding scale  -Blood sugar checks before meals and at bedtime    5. CKD stage IV  -Avoid nephrotoxic agents  -Monitor renal functions    DVT prophylaxis with Lovenox      Subjective:   48 y. o. female with medical history of uncontrolled diabetes with complications, hypertension, CKD, and 2 recent hospitalizations, who presented with nausea and vomiting.  Recently hospitalized at [facility] from June 13 through 21 with pyelonephritis and DKA.    Patient admitted with sepsis secondary to UTI. Started on empiric antibiotics. Blood culture growing Enterobacter. ID consulted. Antibiotics switched to Cipro based on senitivities. Gastroenterologist was consulted. Patient seen and examined at bedside. This morning still presenting with some nausea but denies any vomiting. Tolerating liquid diet. Denies any diarrhea, no chest pain, no shortness of breath.     Objective:     Vitals:    07/06/22 0340 07/06/22 0554 07/06/22 0822 07/06/22 1118   BP: 120/75  134/85 128/69   Pulse: 95  100 95   Resp: 16  16 16   Temp: 98.1 °F (36.7 °C)  97.2 °F (36.2 °C) 98.1 °F (36.7 °C)   TempSrc: Axillary  Oral Oral   SpO2: 100%  100% 100%   Weight:  146 lb 3.4 oz (66.3 kg)     Height:            Intake and Output:  Current Shift: No intake/output data recorded.   Last three shifts: 07/04 1901 - 07/06 0700  In: 400 [I.V.:400]  Out: 550 [Urine:550]    ROS  10 ROS negative except from stated on subjective    Physical Exam:   General: Alert, oriented, NAD  HEENT: NC/AT, EOM are intact  Neck: supple, no JVD  Cardiovascular: RRR, S1, S2, no murmurs  Respiratory: Lungs are clear, no wheezes or rales  Abdomen: Soft, NT, ND  Back: No CVA tenderness, no paraspinal tenderness  Extremities: LE without pedal edema, no erythema  Neuro: A&O, CN are intact, no focal deficits  Skin: no rash or ulcers  Psych: good mood and affect    Lab/Data Review:  I have personally reviewed patients laboratory data showing  Recent Results (from the past 24 hour(s))   POCT Glucose    Collection Time: 07/05/22  4:11 PM   Result Value Ref Range    POC Glucose 228 (H) 65 - 100 mg/dL    Performed by: Giselle    POCT Glucose    Collection Time: 07/05/22  9:29 PM   Result Value Ref Range    POC Glucose 328 (H) 65 - 100 mg/dL    Performed by: Phill    POCT Glucose    Collection Time: 07/06/22  6:32 AM   Result Value Ref Range    POC Glucose 154 (H) 65 - 100 mg/dL    Performed by: Jax    POCT Glucose    Collection Time: 07/06/22 11:19 AM   Result Value Ref Range    POC Glucose 344 (H) 65 - 100 mg/dL    Performed by: Giselle    CBC with Auto Differential    Collection Time: 07/06/22 12:07 PM   Result Value Ref Range    WBC 10.0 4.3 - 11.1 K/uL    RBC 3.46 (L) 4.05 - 5.2 M/uL    Hemoglobin 8.9 (L) 11.7 - 15.4 g/dL    Hematocrit 28.4 (L) 35.8 - 46.3 %    MCV 82.1 79.6 - 97.8 FL    MCH 25.7 (L) 26.1 - 32.9 PG    MCHC 31.3 (L) 31.4 - 35.0 g/dL    RDW 15.7 (H) 11.9 - 14.6 %    Platelets 557 866 - 575 K/uL    MPV 9.7 9.4 - 12.3 FL    nRBC 0.00 0.0 - 0.2 K/uL    Differential Type AUTOMATED      Seg Neutrophils 65 43 - 78 %    Lymphocytes 25 13 - 44 %    Monocytes 5 4.0 - 12.0 %    Eosinophils % 3 0.5 - 7.8 %    Basophils 0 0.0 - 2.0 %    Immature Granulocytes 1 0.0 - 5.0 %    Segs Absolute 6.5 1.7 - 8.2 K/UL    Absolute Lymph # 2.5 0.5 - 4.6 K/UL    Absolute Mono # 0.5 0.1 - 1.3 K/UL    Absolute Eos # 0.3 0.0 - 0.8 K/UL    Basophils Absolute 0.0 0.0 - 0.2 K/UL    Absolute Immature Granulocyte 0.1 0.0 - 0.5 K/UL      [unfilled]     Image:  I have personally reviewed patients imaging showing  XR ABDOMEN (KUB) (SINGLE AP VIEW)   Final Result   RIGHT URETERAL STENT IN PLACE WITH NO ACUTE ABDOMINAL ABNORMALITY   IDENTIFIED.       XR CHEST PORTABLE   Final Result   No acute findings in the chest              Current Facility-Administered Medications   Medication Dose Route Frequency Provider Last Rate Last Admin    sucralfate (CARAFATE) tablet 1 g  1 g Oral TID AC Andreina Yoder MD   1 g at 07/06/22 1142    ondansetron (ZOFRAN-ODT) disintegrating tablet 4 mg  4 mg Oral Q6H PRN Lyssa Chavez MD   4 mg at 07/04/22 2108    sodium phosphate 10 mmol in sodium chloride 0.9 % 250 mL IVPB  10 mmol IntraVENous PRN Hao Perez MD        Or    sodium phosphate 15 mmol in sodium chloride 0.9 % 250 mL IVPB  15 mmol IntraVENous PRN Hao Perez MD        Or    sodium phosphate 20 mmol in sodium chloride 0.9 % 500 mL IVPB  20 mmol IntraVENous PRN Hao Perez MD        magnesium sulfate 2000 mg in 50 mL IVPB premix  2,000 mg IntraVENous PRN Hao Perez MD        potassium chloride (KLOR-CON M) extended release tablet 40 mEq  40 mEq Oral PRN Hao Perez MD        Or    potassium bicarb-citric acid (EFFER-K) effervescent tablet 40 mEq  40 mEq Oral PRN Hao Perez MD        Or    potassium chloride 10 mEq/100 mL IVPB (Peripheral Line)  10 mEq IntraVENous PRN Hao Perez  mL/hr at 07/05/22 2137 10 mEq at 07/05/22 2137  insulin glargine (LANTUS) injection vial 15 Units  15 Units SubCUTAneous QAM Robbie Panchal MD   15 Units at 07/06/22 1138    metoclopramide (REGLAN) tablet 10 mg  10 mg Oral TID AC Garry Cleaning MD   10 mg at 07/06/22 1142    pantoprazole (PROTONIX) tablet 40 mg  40 mg Oral BID AC Garry Cleaning MD   40 mg at 07/05/22 0543    ciprofloxacin (CIPRO) tablet 500 mg  500 mg Oral 2 times per day Rickey Dalal MD   500 mg at 07/06/22 0808    promethazine (PHENERGAN) suppository 25 mg  25 mg Rectal BID PRN Garry Cleaning MD   25 mg at 07/04/22 0150    magnesium oxide (MAG-OX) tablet 400 mg  400 mg Oral Daily Robbie Panchal MD   400 mg at 07/06/22 0808    insulin lispro (HUMALOG) injection vial 0-4 Units  0-4 Units SubCUTAneous TID WC Robbie Panchal MD   3 Units at 07/06/22 1137    insulin lispro (HUMALOG) injection vial 0-4 Units  0-4 Units SubCUTAneous Nightly Robbie Panhcal MD   4 Units at 07/05/22 2138    polyethylene glycol (GLYCOLAX) packet 17 g  17 g Oral Daily TANISHA [place]        morphine (PF) injection 1 mg  1 mg IntraVENous Q6H PRN Timo MD Edwige   1 mg at 07/02/22 0613    HYDROcodone-acetaminophen (Weyman Escalona) 7.5-325 MG per tablet 1 tablet  1 tablet Oral Q6H PRN Timo MD Edwige        magnesium sulfate 2000 mg in 50 mL IVPB premix  2,000 mg IntraVENous PRN Sherryle Howell, MD   Stopped at 06/27/22 0755    ondansetron (ZOFRAN) injection 4 mg  4 mg IntraVENous Q6H PRN Timolinda Gibbons MD   4 mg at 07/05/22 3466    albuterol (PROVENTIL) nebulizer solution 2.5 mg  2.5 mg Nebulization Q6H PRN Sherryle Howell, MD        glucose chewable tablet 16 g  4 tablet Oral PRN Sherryle Howell, MD        dextrose bolus 10% 125 mL  125 mL IntraVENous PRN Sherryle Howell, MD   Stopped at 06/30/22 0229    Or    dextrose bolus 10% 250 mL  250 mL IntraVENous PRN Sherryle Howell, MD   Stopped at 06/29/22 1638    glucagon injection 1 mg  1 mg IntraMUSCular PRN Sherryle Howell, MD        dextrose 5 % solution  100 mL/hr IntraVENous PRN Ludivina Oliver MD        sodium chloride flush 0.9 % injection 5-40 mL  5-40 mL IntraVENous 2 times per day Ludivina Oliver MD   10 mL at 07/06/22 0900    sodium chloride flush 0.9 % injection 5-40 mL  5-40 mL IntraVENous PRN Ludivina Oliver MD   10 mL at 06/30/22 0254    0.9 % sodium chloride infusion   IntraVENous PRN Ludivina Oliver MD 20 mL/hr at 06/30/22 0223 Rate Change at 06/30/22 0223    enoxaparin Sodium (LOVENOX) injection 30 mg  30 mg SubCUTAneous Daily Ludivina Oliver MD   30 mg at 07/06/22 2972    acetaminophen (TYLENOL) tablet 650 mg  650 mg Oral Q6H PRN Ludivina Oliver MD        Or    acetaminophen (TYLENOL) suppository 650 mg  650 mg Rectal Q6H PRN Ludivina Oliver MD        aluminum & magnesium hydroxide-simethicone (MAALOX) 200-200-20 MG/5ML suspension 30 mL  30 mL Oral Q6H PRN Ludivina Oliver MD            Hospital problems     Patient Active Problem List   Diagnosis    Ulcer, skin, chronic ([address]    Depression with anxiety    Moderate single current episode of major depressive disorder ([address]    Diabetic polyneuropathy associated with type 1 diabetes mellitus ([address]    Type 1 diabetes mellitus with stage 3 chronic kidney disease ([address]    H/O gastroesophageal reflux (GERD)    Neuropathy, peripheral, idiopathic    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 associated with type 1 diabetes mellitus ([address]    Hydronephrosis of right kidney    Diabetes mellitus type 1 ([address]    Benign hypertension with CKD (chronic kidney disease) stage IV (HCC)    Type 1 diabetes mellitus with hyperglycemia ([address]    Cardiac arrest ([address]    Diabetic ketoacidosis with coma associated with type 1 diabetes mellitus ([address]    Frailty    Normocytic anemia    Physical debility    Severe sepsis ([address]    Pyelonephritis        I have reviewed, updated, and verified this note's content and spent 38 minutes of my 42 minutes visit performing counseling and coordination of care regarding medical management.        Signed By: Ajith Sales MD     July 6, 2022

## 2022-07-06 NOTE — ANESTHESIA POSTPROCEDURE EVALUATION
Department of Anesthesiology  Postprocedure Note    Patient: Harlan Quarles  MRN: [id number]  YOB: 1975  Date of evaluation: 7/5/2022      Procedure Summary     Date: 07/05/22 [place] / [place] [facility]    Anesthesia Start: 5528 Anesthesia Stop: 0159    Procedure: EGD BIOPSY (N/A Upper GI Region) Diagnosis:       Intractable vomiting, presence of nausea not specified, unspecified vomiting type      (Intractable vomiting, presence of nausea not specified, unspecified vomiting type [R11.10])    Providers: Rosa Sommers MD Responsible Provider: Diana Park MD    Anesthesia Type: MAC ASA Status: 3 - Emergent          Anesthesia Type: No value filed.     Lennie Phase I:      Lennie Phase II: Lennie Score: 10      Anesthesia Post Evaluation    Patient location during evaluation: PACU  Patient participation: complete - patient participated  Level of consciousness: awake and alert  Pain score: 0  Airway patency: patent  Nausea & Vomiting: no nausea and no vomiting  Complications: no  Cardiovascular status: blood pressure returned to baseline  Respiratory status: acceptable  Hydration status: euvolemic  Comments: BP (!) 144/86   Pulse (!) 101   Temp 98.4 °F (36.9 °C) (Oral)   Resp 17   Ht 5' 8\" (1.727 m)   Wt 146 lb 11.2 oz (66.5 kg)   SpO2 100%   BMI 22.31 kg/m²   Multimodal analgesia pain management approach

## 2022-07-06 NOTE — PROGRESS NOTES
This nurse walked into the pt's room to assess the pt. This pt immediately stated that she would like to leave AMA due to a death in her family. Perfect serve sent to MD to notify. Pt is A&O. MD to come see.

## 2022-07-06 NOTE — PROGRESS NOTES
PT note: Attempted PT session with patient today. Patient up in bathroom on her own. Offered to assist patient with shower as she had told CNA she wanted to shower today. Patient stated she wanted to wait until her mother got here. Told patient we could help her get set up but she continued to decline. Asked patient if she was getting around well in the room and she stated she was. Suggested walking with PT in the [place] but patient declined. Patient then stated she didn't want to be bothered. Patient has not worked with PT since 7/1. Prior to today's attempt, she has refused the past 3 attempts. Will d/c acute therapy services at this time due to patient non-compliance.       Rainer Mata, PT

## 2022-07-06 NOTE — PROGRESS NOTES
[facility] Progress Note         Admit Date:  6/26/2022    Today's Date:  7/6/2022    CC:  N/V    Subjective:     Patient is feeling better today and denies any further nausea or vomiting. She denies any abd pain. She has a BM yesterday, normal, without bleeding. She is tolerating clear liquids. Discussed EGD.     Medications:   Current Facility-Administered Medications   Medication Dose Route Frequency    sucralfate (CARAFATE) tablet 1 g  1 g Oral TID AC    ondansetron (ZOFRAN-ODT) disintegrating tablet 4 mg  4 mg Oral Q6H PRN    sodium phosphate 10 mmol in sodium chloride 0.9 % 250 mL IVPB  10 mmol IntraVENous PRN    Or    sodium phosphate 15 mmol in sodium chloride 0.9 % 250 mL IVPB  15 mmol IntraVENous PRN    Or    sodium phosphate 20 mmol in sodium chloride 0.9 % 500 mL IVPB  20 mmol IntraVENous PRN    magnesium sulfate 2000 mg in 50 mL IVPB premix  2,000 mg IntraVENous PRN    potassium chloride (KLOR-CON M) extended release tablet 40 mEq  40 mEq Oral PRN    Or    potassium bicarb-citric acid (EFFER-K) effervescent tablet 40 mEq  40 mEq Oral PRN    Or    potassium chloride 10 mEq/100 mL IVPB (Peripheral Line)  10 mEq IntraVENous PRN    insulin glargine (LANTUS) injection vial 15 Units  15 Units SubCUTAneous QAM    metoclopramide (REGLAN) tablet 10 mg  10 mg Oral TID AC    pantoprazole (PROTONIX) tablet 40 mg  40 mg Oral BID AC    ciprofloxacin (CIPRO) tablet 500 mg  500 mg Oral 2 times per day    promethazine (PHENERGAN) suppository 25 mg  25 mg Rectal BID PRN    magnesium oxide (MAG-OX) tablet 400 mg  400 mg Oral Daily    insulin lispro (HUMALOG) injection vial 0-4 Units  0-4 Units SubCUTAneous TID WC    insulin lispro (HUMALOG) injection vial 0-4 Units  0-4 Units SubCUTAneous Nightly    polyethylene glycol (GLYCOLAX) packet 17 g  17 g Oral Daily    morphine (PF) injection 1 mg  1 mg IntraVENous Q6H PRN    HYDROcodone-acetaminophen (NORCO) 7.5-325 MG per tablet 1 tablet 1 tablet Oral Q6H PRN    magnesium sulfate 2000 mg in 50 mL IVPB premix  2,000 mg IntraVENous PRN    ondansetron (ZOFRAN) injection 4 mg  4 mg IntraVENous Q6H PRN    albuterol (PROVENTIL) nebulizer solution 2.5 mg  2.5 mg Nebulization Q6H PRN    glucose chewable tablet 16 g  4 tablet Oral PRN    dextrose bolus 10% 125 mL  125 mL IntraVENous PRN    Or    dextrose bolus 10% 250 mL  250 mL IntraVENous PRN    glucagon injection 1 mg  1 mg IntraMUSCular PRN    dextrose 5 % solution  100 mL/hr IntraVENous PRN    sodium chloride flush 0.9 % injection 5-40 mL  5-40 mL IntraVENous 2 times per day    sodium chloride flush 0.9 % injection 5-40 mL  5-40 mL IntraVENous PRN    0.9 % sodium chloride infusion   IntraVENous PRN    enoxaparin Sodium (LOVENOX) injection 30 mg  30 mg SubCUTAneous Daily    acetaminophen (TYLENOL) tablet 650 mg  650 mg Oral Q6H PRN    Or    acetaminophen (TYLENOL) suppository 650 mg  650 mg Rectal Q6H PRN    aluminum & magnesium hydroxide-simethicone (MAALOX) 200-200-20 MG/5ML suspension 30 mL  30 mL Oral Q6H PRN       Review of Systems:  ROS was obtained, with pertinent positives as listed above. No chest pain or SOB. Diet:  Clear liquids, 3 card choices    Objective:   Vitals:  /85   Pulse 100   Temp 97.2 °F (36.2 °C) (Oral)   Resp 16   Ht 5' 8\" (1.727 m)   Wt 146 lb 3.4 oz (66.3 kg)   SpO2 100%   BMI 22.23 kg/m²   Intake/Output:  No intake/output data recorded. 07/04 1901 - 07/06 0700  In: 400 [I.V.:400]  Out: 550 [Urine:550]  Exam:  General appearance: alert, cooperative, no distress, lying in bed  Lungs: clear to auscultation bilaterally anteriorly  Heart: regular rate and rhythm  Abdomen: soft, non-tender.  Bowel sounds normal. No masses, no organomegaly  Neuro:  alert and oriented    Data Review (Labs):    Recent Labs     07/05/22  0657   WBC 10.2   HGB 8.5*   HCT 27.4*      MCV 82.3      K 3.3*   *   CO2 29   BUN 14   CREATININE 2.73*   CALCIUM 9.0   MG 1.8   PHOS 3.6   GLUCOSE 166*   ALKPHOS 245*   AST 26   ALT 39   BILITOT 0.4   ALBUMIN 0.5*   PROT 6.7     EGD 5 July 2022 with Dr. Monster Leyva  Post-operative Diagnosis: Severe esophagitis - Grade B              Small hiatal hernia              Nonerosive gastritis - bx's to r/o h.pylori  Recommendations:  See inpt notes. Follow up:   Inpt f/u  Findings:   OROPHARYNX: The oropharynx was briefly viewed on entry and withdrawal with very brief evaluation of the cords, arytenoids and pyriform sinuses. No abnormalities found.     ESOPHAGUS:  The esophagus was severely inflammed with acute looking erosions and red streaks thoroughout the lower 1/2. No exudate. Small hiatal hernia. No Barretts noted.   STOMACH: The gastric pouch inflated and deflated normally. The mucosal surface and gastric rugae were normal.  The antrum was erythematous but without erosions, ulcerations, raised lesions, vascular ectasias or other anomalies. The pylorus was patent to passage of the endoscope without difficulty.    DUODENUM:  The endoscope was easily passed into the third section of the duodenum. The villous mucosa was normal without blunting or scalloped folds. There were no mucosal erosions, ulcerations, raised lesions or vascular ectasias. Assessment:     Principal Problem:    Severe sepsis (HCC)  Active Problems:    Benign hypertension with CKD (chronic kidney disease) stage IV (HCC)    Type 1 diabetes mellitus with hyperglycemia ([facility]    Pyelonephritis  Resolved Problems:    * No resolved hospital problems. *    53 yo female pt of Dr. Rosina Johansen with PMH including but not limited to type 1 DM, Gastroparesis, CKD IV, Cardiac Arrest, Charcot's Arthropathy,  pyelonephritis, KENYATTA, who was seen in consultation 29 June 2022 at the request of Dr. Ramana Prakash for persistent nausea and vomiting. She has gastroparesis which is likely exacerbated by sepsis and recent DKA - HGB A1C 8.3.  She has had recent imaging on 6/13/2022 at [facility] including CT of the abdomen with renal stone protocol and RUQ US. RUQ US noted a CBD of 3mm, 2mm nonmobile echogenic foci associated with the GB wall, no thickening or pericholecystic fluid seen. EGD 5 July 2022 noted severe esophagitis - Grade B, small hiatal hernia, nonerosive gastritis - bx's to r/o H pylori. Plan:     - Supportive care, maintain electrolytes, antiemetics PRN. - Continue with Reglan 5mg IV, which was started on 6/28. - PPI 40 mg po BID and Carafate 1 gm po TID. - Advance to full liquids. - Miralax for constipation.  - Tight Glucose Control is imperative. - Currently on Cipro IV for enterobacter bacteremia.  - Follow. Patient is seen and examined in collaboration with Dr. Orquidea Hurd. Assessment and plan as per Dr. Kattie Kayser. JUAN MIGUEL Pool-[facility]  [facility]  ATTENDING NOTE:  I have seen and examined the patient along with my NP/PA. The assessment and plan above is my own. She is improved. Esophagitis is due to vomiting and not likely the underlying cause of n/v'ing   Can discharge on PPI BID, Carafate slurry ac, Reglan prn for nausea and f/u with primary GI at [facility] sign off.     Ne Ellington MD

## 2022-07-07 ENCOUNTER — TELEPHONE (OUTPATIENT)
Dept: FAMILY MEDICINE CLINIC | Facility: CLINIC | Age: 47
End: 2022-07-07

## 2022-07-07 ENCOUNTER — CARE COORDINATION (OUTPATIENT)
Dept: OTHER | Facility: CLINIC | Age: 47
End: 2022-07-07

## 2022-07-07 RX ORDER — PANTOPRAZOLE SODIUM 40 MG/1
40 TABLET, DELAYED RELEASE ORAL
Qty: 60 TABLET | Refills: 0 | Status: SHIPPED | OUTPATIENT
Start: 2022-07-07 | End: 2022-07-08 | Stop reason: SINTOL

## 2022-07-07 RX ORDER — SUCRALFATE 1 G/1
1 TABLET ORAL 4 TIMES DAILY
Qty: 120 TABLET | Refills: 0 | Status: SHIPPED | OUTPATIENT
Start: 2022-07-07 | End: 2022-07-08

## 2022-07-07 NOTE — TELEPHONE ENCOUNTER
Called patient regarding discharge from [facility] 07/06/2022.     Patient states she has a follow up with Dr Osbaldo Hobbs 07/08/2022

## 2022-07-07 NOTE — DISCHARGE SUMMARY
Date of Admission: 6/26/2022  Date of AMA: 7/6/2022    Discharge Diagnoses:  Principal Problem:    Severe sepsis ([facility] [address]  Active Problems:    Benign hypertension with CKD (chronic kidney disease) stage IV ([facility])    Type 1 diabetes mellitus with hyperglycemia ([facility] [facility]    Pyelonephritis  Resolved Problems:    * No resolved hospital problems. *       History of Present Illness:  Per Dr Mishel Smith  \"16 y.o. female with medical history of uncontrolled diabetes with complications, hypertension, CKD, and 2 recent hospitalizations, who presented with nausea and vomiting. She reports that she was just recently hospitalized at [facility] from June 13 through 21 with pyelonephritis and DKA. She reports she is now developing nausea and vomiting again. She does have a history of diabetic gastroparesis however. She denies fever/chills, chest pain, shortness of breath, cough. She does report generalized abdominal pain/cramping. She also reports decreased p.o. intake, both food and liquid over the past several days. Her labs do not show her to have DKA at this time. However her urine is infected again. \"    Past Medical History:  Past Medical History:   Diagnosis Date    Acute renal failure ([facility] [facility] 1/24/2012    CKD (chronic kidney disease) stage 3, GFR 30-59 ml/min ([facility])     Gastroenteritis, acute 1/24/2012    H/O amputation of lesser toe, left ([facility] [facility] 6/9/2022    This status condition was added to the problem list by Evelyn SALDANA of the [facility] Team, after medical record review and validation.        Hemodialysis patient ([facility] [facility]     IDDM (insulin dependent diabetes mellitus) 1/24/2012    Type 1 av -150 can tell Hypo below 70    Intractable nausea and vomiting 12/25/2014    Irregular menses     Kidney stone     Neuropathy, peripheral, idiopathic 3/13/2017    Retinopathy, bilateral 3/13/2017    Trichomoniasis 3/13/2017    Ulcer, skin, chronic ([facility] 3/13/2017    Vaginal burning        Allergies:  No [address] Course:  48 y. o. female with medical history of uncontrolled diabetes with complications, hypertension, CKD, and 2 recent hospitalizations, who presented with nausea and vomiting     1. Severe sepsis secondary to Enterobacter bacteremia in the setting of urinary tract infection  -Startedciprofloxacin  -Blood culture positive for Enterobacter susceptible to ciprofloxacin  -ID consulted  -Patient left AMA     2. Severe esophagitis, gastritis  -GI consulted  -S/p EGD  -Started PPI and sucralfate  -Patient left AMA    Apparently patient left AMA overnight. I was not in house at that time.  Per documentation patient left before she was seen by puneet ALARCON.    Recent Results (from the past 24 hour(s))   POCT Glucose    Collection Time: 07/06/22 11:19 AM   Result Value Ref Range    POC Glucose 344 (H) 65 - 100 mg/dL    Performed by: Bethany Horne    CBC with Auto Differential    Collection Time: 07/06/22 12:07 PM   Result Value Ref Range    WBC 10.0 4.3 - 11.1 K/uL    RBC 3.46 (L) 4.05 - 5.2 M/uL    Hemoglobin 8.9 (L) 11.7 - 15.4 g/dL    Hematocrit 28.4 (L) 35.8 - 46.3 %    MCV 82.1 79.6 - 97.8 FL    MCH 25.7 (L) 26.1 - 32.9 PG    MCHC 31.3 (L) 31.4 - 35.0 g/dL    RDW 15.7 (H) 11.9 - 14.6 %    Platelets 517 059 - 506 K/uL    MPV 9.7 9.4 - 12.3 FL    nRBC 0.00 0.0 - 0.2 K/uL    Differential Type AUTOMATED      Seg Neutrophils 65 43 - 78 %    Lymphocytes 25 13 - 44 %    Monocytes 5 4.0 - 12.0 %    Eosinophils % 3 0.5 - 7.8 %    Basophils 0 0.0 - 2.0 %    Immature Granulocytes 1 0.0 - 5.0 %    Segs Absolute 6.5 1.7 - 8.2 K/UL    Absolute Lymph # 2.5 0.5 - 4.6 K/UL    Absolute Mono # 0.5 0.1 - 1.3 K/UL    Absolute Eos # 0.3 0.0 - 0.8 K/UL    Basophils Absolute 0.0 0.0 - 0.2 K/UL    Absolute Immature Granulocyte 0.1 0.0 - 0.5 K/UL   Phosphorus    Collection Time: 07/06/22 12:07 PM   Result Value Ref Range    Phosphorus 3.3 2.5 - 4.5 MG/DL   Comprehensive Metabolic Panel w/ Reflex to MG    Collection Time: 07/06/22 12:07 PM   Result Value Ref Range    Sodium 138 136 - 145 mmol/L    Potassium 4.6 3.5 - 5.1 mmol/L    Chloride 105 98 - 107 mmol/L    CO2 21 21 - 32 mmol/L    Anion Gap 12 7 - 16 mmol/L    Glucose 329 (H) 65 - 100 mg/dL    BUN 16 6 - 23 MG/DL    CREATININE 2.41 (H) 0.6 - 1.0 MG/DL    GFR African American 28 (L) >60 ml/min/1.73m2    GFR Non- 23 (L) >60 ml/min/1.73m2    Calcium 8.5 8.3 - 10.4 MG/DL    Total Bilirubin 0.4 0.2 - 1.1 MG/DL    ALT 35 12 - 65 U/L    AST 43 (H) 15 - 37 U/L    Alk Phosphatase 249 (H) 50 - 136 U/L    Total Protein 6.6 6.3 - 8.2 g/dL    Albumin 2.2 (L) 3.5 - 5.0 g/dL    Globulin 4.4 (H) 2.3 - 3.5 g/dL    Albumin/Globulin Ratio 0.5 (L) 1.2 - 3.5     POCT Glucose    Collection Time: 07/06/22  4:39 PM   Result Value Ref Range    POC Glucose 66 65 - 100 mg/dL    Performed by: XtremIO    POCT Glucose    Collection Time: 07/06/22  5:23 PM   Result Value Ref Range    POC Glucose 104 (H) 65 - 100 mg/dL    Performed by: XtremIO         XR ABDOMEN (KUB) (SINGLE AP VIEW)   Final Result   RIGHT URETERAL STENT IN PLACE WITH NO ACUTE ABDOMINAL ABNORMALITY   IDENTIFIED.       XR CHEST PORTABLE   Final Result   No acute findings in the chest                Disposition: AMA

## 2022-07-07 NOTE — CARE COORDINATION
Care Transitions Outreach Attempt    Call within 2 business days of discharge: Yes   Attempted to reach patient for transitions of care follow up. Unable to reach patient. Patient: Jc Green Patient : 1975 MRN: [id number]    Last Discharge [facility]       Complaint Diagnosis Description Type Department Provider    22 Nausea; Emesis Septicemia ([facility] [address] . .. ED to Hosp-Admission (Discharged) (ADMITTED) Anastasia Paredes MD; Cady Smart... Was this an external facility discharge?  No Discharge Facility: N/A    Noted following upcoming appointments from discharge chart review:   [facility] follow up appointment(s):   Future Appointments   Date Time Provider Debo Aleman   2022  9:00 Padmini Bailey MD [facility]     Non-[facility] follow up appointment(s):

## 2022-07-07 NOTE — PROGRESS NOTES
The pt refused an assessment. Pt signed AMA papers. This nurse removed the pt's IV line. The pt left the floor on her own and refused to wait to see the physician.

## 2022-07-08 ENCOUNTER — OFFICE VISIT (OUTPATIENT)
Dept: INTERNAL MEDICINE CLINIC | Facility: CLINIC | Age: 47
End: 2022-07-08
Payer: COMMERCIAL

## 2022-07-08 ENCOUNTER — CARE COORDINATION (OUTPATIENT)
Dept: OTHER | Facility: CLINIC | Age: 47
End: 2022-07-08

## 2022-07-08 VITALS — DIASTOLIC BLOOD PRESSURE: 76 MMHG | HEIGHT: 68 IN | BODY MASS INDEX: 22.23 KG/M2 | SYSTOLIC BLOOD PRESSURE: 118 MMHG

## 2022-07-08 DIAGNOSIS — I95.1 ORTHOSTASIS: ICD-10-CM

## 2022-07-08 DIAGNOSIS — K29.60 OTHER GASTRITIS WITHOUT HEMORRHAGE, UNSPECIFIED CHRONICITY: Primary | ICD-10-CM

## 2022-07-08 DIAGNOSIS — E10.65 TYPE 1 DIABETES MELLITUS WITH HYPERGLYCEMIA (HCC): ICD-10-CM

## 2022-07-08 PROBLEM — K29.70 GASTRITIS WITHOUT BLEEDING: Status: ACTIVE | Noted: 2022-07-08

## 2022-07-08 PROCEDURE — 99496 TRANSJ CARE MGMT HIGH F2F 7D: CPT | Performed by: INTERNAL MEDICINE

## 2022-07-08 RX ORDER — PANTOPRAZOLE SODIUM 40 MG/1
40 TABLET, DELAYED RELEASE ORAL
Qty: 90 TABLET | Refills: 1 | Status: SHIPPED | OUTPATIENT
Start: 2022-07-08

## 2022-07-08 ASSESSMENT — ENCOUNTER SYMPTOMS
NAUSEA: 1
VOMITING: 0

## 2022-07-08 NOTE — CARE COORDINATION
Care Transitions Outreach Attempt    Call within 2 business days of discharge: Yes   Attempted to reach patient for transitions of care follow up. Unable to reach patient. Patient: Denisa Calderón Patient : 1975 MRN: [id number]    Last Discharge [facility]       Complaint Diagnosis Description Type Department Provider    22 Nausea; Emesis Septicemia ([facility] [address] . .. ED to Hosp-Admission (Discharged) (ADMITTED) Chino Mayorga MD; Jolynn Starr... Was this an external facility discharge? No Discharge Facility: N/A    Noted following upcoming appointments from discharge chart review:   [address] follow up appointment(s): No future appointments.   Non-[facility] follow up appointment(s):

## 2022-07-08 NOTE — PROGRESS NOTES
She went to the hospital for nausea and vomiting. She was found to have gastritis by EGD. She is now eating sold food. . She missed her last endo apptmt  She had pyuria but the culture grew out Candida mixed skin edward. She now states she cant stand up without passing out. She is staying with her mother until she gets come better. Does look like she has acutely lost 10 pounds in the hospital.   Long hx IDDM, since age 23. She is not taking most of her DC meds and states she did not leave AMA. She is not taking most of the meds prescribed a DC including PPI and Carafate. Past Medical History, Past Surgical History, Family history, Social History, and Medications were all reviewed with the patient today and updated as necessary. Current Outpatient Medications   Medication Sig Dispense Refill    pantoprazole (PROTONIX) 40 MG tablet Take 1 tablet by mouth every morning (before breakfast) 90 tablet 1    Insulin Pen Needle (PEN NEEDLES 31GX5/16\") 31G X 8 MM MISC 120 each by Other route 4 times daily      insulin glargine (LANTUS) 100 UNIT/ML injection vial Inject 22 Units into the skin nightly (Patient taking differently: Inject 22 Units into the skin every morning (before breakfast) ) 10 mL 3    insulin lispro (HUMALOG) 100 UNIT/ML SOLN injection vial Inject 5 Units into the skin 3 times daily (with meals) 10 mL 3    insulin lispro (HUMALOG) 100 UNIT/ML SOLN injection vial Inject 0-4 Units into the skin 4 times daily (before meals and nightly) 10 mL 0    albuterol (PROVENTIL) (2.5 MG/3ML) 0.083% nebulizer solution Take 3 mLs by nebulization every 4 hours as needed for Wheezing (Patient not taking: Reported on 7/8/2022) 120 each 3     No current facility-administered medications for this visit.      No Known Allergies  Patient Active Problem List   Diagnosis    Ulcer, skin, chronic ([address]    Depression with anxiety    Moderate single current episode of major depressive disorder ([address]    Diabetic polyneuropathy associated with type 1 diabetes mellitus (HCC)    Type 1 diabetes mellitus with stage 3 chronic kidney disease ([facility]    H/O gastroesophageal reflux (GERD)    Neuropathy, peripheral, idiopathic    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 associated with type 1 diabetes mellitus (HCC)    Hydronephrosis of right kidney    Diabetes mellitus type 1 (HCC)    Benign hypertension with CKD (chronic kidney disease) stage IV (HCC)    Type 1 diabetes mellitus with hyperglycemia (HCC)    Cardiac arrest ([facility]    Diabetic ketoacidosis with coma associated with type 1 diabetes mellitus (HCC)    Frailty    Normocytic anemia    Physical debility    Severe sepsis (HCC)    Pyelonephritis    Gastritis without bleeding    Orthostasis         Review of Systems   Constitutional: Positive for unexpected weight change. Negative for appetite change. Eyes: Positive for visual disturbance. Gastrointestinal: Positive for nausea. Negative for vomiting. Neurological: Positive for dizziness and light-headedness. OBJECTIVE:  /76   Ht 5' 8\" (1.727 m)   BMI 22.23 kg/m²      Physical Exam  Constitutional:       Appearance: Normal appearance. Cardiovascular:      Rate and Rhythm: Normal rate and regular rhythm. Pulmonary:      Effort: Pulmonary effort is normal.      Breath sounds: Normal breath sounds. Abdominal:      General: Abdomen is flat. Bowel sounds are normal.   Musculoskeletal:      Right lower leg: No edema. Left lower leg: No edema. Neurological:      Mental Status: She is alert. Medical problems and test results were reviewed with the patient today.      Recent Results (from the past 672 hour(s))   POCT Glucose    Collection Time: 06/10/22  1:46 PM   Result Value Ref Range    POC Glucose 260 (H) 65 - 100 mg/dL    Performed by: Zoraida    POCT Glucose    Collection Time: 06/10/22  4:11 PM   Result Value Ref Range    POC Glucose 322 (H) 65 - 100 mg/dL    Performed by: Zoraida    POCT Glucose    Collection Time: 06/10/22  8:59 PM   Result Value Ref Range    POC Glucose 195 (H) 65 - 100 mg/dL    Performed by: Collins Delatorre    Basic Metabolic Panel    Collection Time: 06/11/22  6:19 AM   Result Value Ref Range    Sodium 143 136 - 145 mmol/L    Potassium 3.4 (L) 3.5 - 5.1 mmol/L    Chloride 109 (H) 98 - 107 mmol/L    CO2 27 21 - 32 mmol/L    Anion Gap 7 7 - 16 mmol/L    Glucose 41 (L) 65 - 100 mg/dL    BUN 10 6 - 23 MG/DL    CREATININE 3.10 (H) 0.6 - 1.0 MG/DL    GFR African American 21 (L) >60 ml/min/1.73m2    GFR Non- 17 (L) >60 ml/min/1.73m2    Calcium 7.8 (L) 8.3 - 10.4 MG/DL   CBC    Collection Time: 06/11/22  6:19 AM   Result Value Ref Range    WBC 8.5 4.3 - 11.1 K/uL    RBC 3.31 (L) 4.05 - 5.2 M/uL    Hemoglobin 8.5 (L) 11.7 - 15.4 g/dL    Hematocrit 27.5 (L) 35.8 - 46.3 %    MCV 83.1 79.6 - 97.8 FL    MCH 25.7 (L) 26.1 - 32.9 PG    MCHC 30.9 (L) 31.4 - 35.0 g/dL    RDW 16.2 (H) 11.9 - 14.6 %    Platelets 537 470 - 610 K/uL    MPV 9.2 (L) 9.4 - 12.3 FL    nRBC 0.00 0.0 - 0.2 K/uL   POCT Glucose    Collection Time: 06/11/22  6:45 AM   Result Value Ref Range    POC Glucose 52 (L) 65 - 100 mg/dL    Performed by:  Dank    POCT Glucose    Collection Time: 06/11/22  7:08 AM   Result Value Ref Range    POC Glucose 82 65 - 100 mg/dL    Performed by: Collins Delatorre    EKG 12 Lead    Collection Time: 06/26/22  3:22 PM   Result Value Ref Range    Ventricular Rate 111 BPM    Atrial Rate 111 BPM    P-R Interval 112 ms    QRS Duration 74 ms    Q-T Interval 352 ms    QTc Calculation (Bazett) 478 ms    P Axis 71 degrees    R Axis 85 degrees    T Axis 35 degrees    Diagnosis Sinus tachycardia    Culture, Blood 1    Collection Time: 06/26/22  4:20 PM    Specimen: Blood   Result Value Ref Range    Special Requests LEFT  Antecubital        Culture NO GROWTH 5 DAYS     Lactic Acid    Collection Time: 06/26/22 4:20 PM   Result Value Ref Range    Lactic Acid, Plasma 2.5 (H) 0.4 - 2.0 MMOL/L   CBC with Auto Differential    Collection Time: 06/26/22  4:20 PM   Result Value Ref Range    WBC 19.2 (H) 4.3 - 11.1 K/uL    RBC 3.97 (L) 4.05 - 5.2 M/uL    Hemoglobin 10.0 (L) 11.7 - 15.4 g/dL    Hematocrit 33.2 (L) 35.8 - 46.3 %    MCV 83.6 79.6 - 97.8 FL    MCH 25.2 (L) 26.1 - 32.9 PG    MCHC 30.1 (L) 31.4 - 35.0 g/dL    RDW 15.7 (H) 11.9 - 14.6 %    Platelets 332 (H) 688 - 450 K/uL    MPV 9.0 (L) 9.4 - 12.3 FL    nRBC 0.02 0.0 - 0.2 K/uL    Differential Type AUTOMATED      Seg Neutrophils 78 43 - 78 %    Lymphocytes 15 13 - 44 %    Monocytes 5 4.0 - 12.0 %    Eosinophils % 1 0.5 - 7.8 %    Basophils 0 0.0 - 2.0 %    Immature Granulocytes 1 0.0 - 5.0 %    Segs Absolute 15.0 (H) 1.7 - 8.2 K/UL    Absolute Lymph # 2.8 0.5 - 4.6 K/UL    Absolute Mono # 1.0 0.1 - 1.3 K/UL    Absolute Eos # 0.1 0.0 - 0.8 K/UL    Basophils Absolute 0.0 0.0 - 0.2 K/UL    Absolute Immature Granulocyte 0.2 0.0 - 0.5 K/UL   CMP    Collection Time: 06/26/22  4:20 PM   Result Value Ref Range    Sodium 147 (H) 136 - 145 mmol/L    Potassium 3.8 3.5 - 5.1 mmol/L    Chloride 108 (H) 98 - 107 mmol/L    CO2 29 21 - 32 mmol/L    Anion Gap 10 7 - 16 mmol/L    Glucose 209 (H) 65 - 100 mg/dL    BUN 19 6 - 23 MG/DL    CREATININE 2.88 (H) 0.6 - 1.0 MG/DL    GFR African American 22 (L) >60 ml/min/1.73m2    GFR Non- 19 (L) >60 ml/min/1.73m2    Calcium 9.5 8.3 - 10.4 MG/DL    Total Bilirubin 0.4 0.2 - 1.1 MG/DL    ALT 45 12 - 65 U/L    AST 32 15 - 37 U/L    Alk Phosphatase 313 (H) 50 - 130 U/L    Total Protein 8.7 (H) 6.3 - 8.2 g/dL    Albumin 2.3 (L) 3.5 - 5.0 g/dL    Globulin 6.4 (H) 2.3 - 3.5 g/dL    Albumin/Globulin Ratio 0.4 (L) 1.2 - 3.5     Procalcitonin    Collection Time: 06/26/22  4:20 PM   Result Value Ref Range    Procalcitonin 12.43 (H) 0.00 - 0.49 ng/mL   Hemoglobin A1c    Collection Time: 06/26/22  4:20 PM   Result Value Ref Range    Hemoglobin A1C 8.3 (H) 4.20 - 6.30 %    eAG 192 mg/dL   Urinalysis    Collection Time: 06/26/22  6:29 PM   Result Value Ref Range    Color, UA YELLOW      Appearance TURBID      Specific Gravity, UA 1.008 1.001 - 1.023      pH, Urine 7.0 5.0 - 9.0      Protein, UA 30 (A) NEG mg/dL    Glucose,  mg/dL    Ketones, Urine TRACE (A) NEG mg/dL    Bilirubin Urine Negative NEG      Blood, Urine MODERATE (A) NEG      Urobilinogen, Urine 0.2 0.2 - 1.0 EU/dL    Nitrite, Urine Negative NEG      Leukocyte Esterase, Urine LARGE (A) NEG      WBC, UA >100 0 /hpf    RBC, UA 5-10 0 /hpf    Epithelial Cells UA 10-20 0 /hpf    BACTERIA, URINE 4+ (H) 0 /hpf    OTHER OBSERVATIONS RESULTS VERIFIED MANUALLY     Pregnancy, Urine    Collection Time: 06/26/22  6:29 PM   Result Value Ref Range    HCG(Urine) Pregnancy Test Negative NEG     Culture, Urine    Collection Time: 06/26/22  6:29 PM    Specimen: URINE-CLEAN CATCH   Result Value Ref Range    Special Requests NO SPECIAL REQUESTS      Culture >100,000 COLONIES/mL MIXED SKIN MARKUS ISOLATED     Lactate, Sepsis    Collection Time: 06/26/22  8:43 PM   Result Value Ref Range    Lactic Acid, Sepsis 1.0 0.5 - 2.0 MMOL/L   Culture, Blood 1    Collection Time: 06/26/22  8:43 PM    Specimen: Blood   Result Value Ref Range    Special Requests RIGHT  Antecubital        Gram stain GRAM NEGATIVE RODS      Gram stain ANAEROBIC BOTTLE POSITIVE      Gram stain        RESULTS VERIFIED, PHONED TO AND READ BACK BY NIRU PANG RN @ 0940 ON 6/27/22 BY [facility]    Culture ENTEROBACTER CLOACAE (A)      Culture        Refer to Blood Culture ID Panel Accession [id number]       Susceptibility    Enterobacter cloacae - BACTERIAL SUSCEPTIBILITY PANEL SALVATORE     trimethoprim-sulfamethoxazole <=0.5/9.5 Sensitive ug/mL     ciprofloxacin <=0.25 Sensitive ug/mL     gentamicin <=2 Sensitive ug/mL     tobramycin <=2 Sensitive ug/mL     ceFAZolin >16 Resistant ug/mL     cefTRIAXone >32 Resistant ug/mL     levofloxacin <=0.5 Sensitive ug/mL     aztreonam >16 Resistant ug/mL     cefepime <=1 Sensitive ug/mL     piperacillin-tazobactam 32/4 Intermediate ug/mL     meropenem <=0.5 Sensitive ug/mL   Culture, Blood, PCR ID Panel    Collection Time: 06/26/22  8:43 PM    Specimen: Blood   Result Value Ref Range    ACCESSION NUMBER, [id number]     Enterococcus faecalis by PCR NOT DETECTED NOTDET      Enterococcus faecium by PCR NOT DETECTED NOTDET      Listeria monocytogenes by PCR NOT DETECTED NOTDET      STAPHYLOCOCCUS NOT DETECTED NOTDET      Staphylococcus Aureus NOT DETECTED NOTDET      Staphylococcus epidermidis by PCR NOT DETECTED NOTDET      Staphylococcus lugdunensis by PCR NOT DETECTED NOTDET      STREPTOCOCCUS NOT DETECTED NOTDET      Streptococcus agalactiae (Group B) NOT DETECTED NOTDET      Strep pneumoniae NOT DETECTED NOTDET      Strep pyogenes,(Grp. A) NOT DETECTED NOTDET      Acinetobacter calcoac baumannii complex by PCR NOT DETECTED NOTDET      Bacteroides fragilis by PCR NOT DETECTED NOTDET      Enterobacteriaceae by PCR Detected (A) NOTDET      Enterobacter cloacae complex by PCR Detected (A) NOTDET      Escherichia Coli NOT DETECTED NOTDET      Klebsiella aerogenes by PCR NOT DETECTED NOTDET      Klebsiella oxytoca by PCR NOT DETECTED NOTDET      Klebsiella pneumoniae group by PCR NOT DETECTED NOTDET      Proteus by PCR NOT DETECTED NOTDET      Salmonella species by PCR NOT DETECTED NOTDET      Serratia marcescens by PCR NOT DETECTED NOTDET      Haemophilus Influenzae by PCR NOT DETECTED NOTDET      Neisseria meningitidis by PCR NOT DETECTED NOTDET      Pseudomonas aeruginosa NOT DETECTED NOTDET      Stenotrophomonas maltophilia by PCR NOT DETECTED NOTDET      Candida albicans by PCR NOT DETECTED NOTDET      Candida auris by PCR NOT DETECTED NOTDET      Candida glabrata NOT DETECTED NOTDET      Candida krusei by PCR NOT DETECTED NOTDET      Candida parapsilosis by PCR NOT DETECTED NOTDET      Candida tropicalis by PCR NOT DETECTED NOTDET      Cryptococcus neoformans/gattii by PCR NOT DETECTED NOTDET      Resistant gene targets          Resistant gene ctx-m by PCR NOT DETECTED NOTDET      Resistant gene imp by PCR NOT DETECTED NOTDET      KPC (CARBAPENEM RESISTANCE GENE) NOT DETECTED NOTDET      Colistin Resistance mcr-1 gene by PCR NOT DETECTED NOTDET      Resistant gene ndm by PCR NOT DETECTED NOTDET      Resistant gene oxa-48-like by pcr NOT DETECTED NOTDET      Resistant gene vim by PCR NOT DETECTED NOTDET      [employer] test comment        False positive results may rarely occur.  Correlate with clinical,epidemiologic, and other laboratory findings   POCT Glucose    Collection Time: 06/26/22  8:58 PM   Result Value Ref Range    POC Glucose 126 (H) 65 - 100 mg/dL    Performed by: Digna    CBC with Auto Differential    Collection Time: 06/27/22  4:19 AM   Result Value Ref Range    WBC 15.9 (H) 4.3 - 11.1 K/uL    RBC 3.03 (L) 4.05 - 5.2 M/uL    Hemoglobin 7.8 (L) 11.7 - 15.4 g/dL    Hematocrit 25.3 (L) 35.8 - 46.3 %    MCV 83.5 79.6 - 97.8 FL    MCH 25.7 (L) 26.1 - 32.9 PG    MCHC 30.8 (L) 31.4 - 35.0 g/dL    RDW 15.7 (H) 11.9 - 14.6 %    Platelets 353 (H) 354 - 450 K/uL    MPV 8.9 (L) 9.4 - 12.3 FL    nRBC 0.00 0.0 - 0.2 K/uL    Differential Type AUTOMATED      Seg Neutrophils 79 (H) 43 - 78 %    Lymphocytes 14 13 - 44 %    Monocytes 5 4.0 - 12.0 %    Eosinophils % 1 0.5 - 7.8 %    Basophils 0 0.0 - 2.0 %    Immature Granulocytes 1 0.0 - 5.0 %    Segs Absolute 12.7 (H) 1.7 - 8.2 K/UL    Absolute Lymph # 2.2 0.5 - 4.6 K/UL    Absolute Mono # 0.8 0.1 - 1.3 K/UL    Absolute Eos # 0.1 0.0 - 0.8 K/UL    Basophils Absolute 0.0 0.0 - 0.2 K/UL    Absolute Immature Granulocyte 0.2 0.0 - 0.5 K/UL   Comprehensive Metabolic Panel    Collection Time: 06/27/22  4:19 AM   Result Value Ref Range    Sodium 150 (H) 136 - 145 mmol/L    Potassium 3.8 3.5 - 5.1 mmol/L    Chloride 117 (H) 98 - 107 mmol/L    CO2 28 21 - 32 mmol/L    Anion Gap 5 (L) 7 - 16 mmol/L    Glucose 134 (H) 65 - 100 mg/dL    BUN 15 6 - 23 MG/DL    CREATININE 2.31 (H) 0.6 - 1.0 MG/DL    GFR African American 29 (L) >60 ml/min/1.73m2    GFR Non- 24 (L) >60 ml/min/1.73m2    Calcium 8.1 (L) 8.3 - 10.4 MG/DL    Total Bilirubin 0.3 0.2 - 1.1 MG/DL    ALT 40 12 - 65 U/L    AST 26 15 - 37 U/L    Alk Phosphatase 228 (H) 50 - 136 U/L    Total Protein 6.5 6.3 - 8.2 g/dL    Albumin 1.8 (L) 3.5 - 5.0 g/dL    Globulin 4.7 (H) 2.3 - 3.5 g/dL    Albumin/Globulin Ratio 0.4 (L) 1.2 - 3.5     Magnesium    Collection Time: 06/27/22  4:19 AM   Result Value Ref Range    Magnesium 1.4 (L) 1.8 - 2.4 mg/dL   Phosphorus    Collection Time: 06/27/22  4:19 AM   Result Value Ref Range    Phosphorus 3.2 2.5 - 4.5 MG/DL   POCT Glucose    Collection Time: 06/27/22  8:01 AM   Result Value Ref Range    POC Glucose 240 (H) 65 - 100 mg/dL    Performed by:  [place]    POCT Glucose    Collection Time: 06/27/22 11:40 AM   Result Value Ref Range    POC Glucose 243 (H) 65 - 100 mg/dL    Performed by: [employer]    POCT Glucose    Collection Time: 06/27/22  4:49 PM   Result Value Ref Range    POC Glucose 101 (H) 65 - 100 mg/dL    Performed by: [employer]    POCT Glucose    Collection Time: 06/27/22  9:35 PM   Result Value Ref Range    POC Glucose 119 (H) 65 - 100 mg/dL    Performed by: Christie Da Silva    Magnesium    Collection Time: 06/28/22  6:29 AM   Result Value Ref Range    Magnesium 1.6 (L) 1.8 - 2.4 mg/dL   Comprehensive Metabolic Panel    Collection Time: 06/28/22  6:29 AM   Result Value Ref Range    Sodium 142 136 - 145 mmol/L    Potassium 3.2 (L) 3.5 - 5.1 mmol/L    Chloride 109 (H) 98 - 107 mmol/L    CO2 25 21 - 32 mmol/L    Anion Gap 8 7 - 16 mmol/L    Glucose 226 (H) 65 - 100 mg/dL    BUN 9 6 - 23 MG/DL    CREATININE 2.21 (H) 0.6 - 1.0 MG/DL    GFR African American 30 (L) >60 ml/min/1.73m2    GFR Non- 25 (L) >60 ml/min/1.73m2    Calcium 7.9 (L) 8.3 - 10.4 MG/DL Total Bilirubin 0.3 0.2 - 1.1 MG/DL    ALT 31 12 - 65 U/L    AST 31 15 - 37 U/L    Alk Phosphatase 203 (H) 50 - 130 U/L    Total Protein 6.1 (L) 6.3 - 8.2 g/dL    Albumin 1.6 (L) 3.5 - 5.0 g/dL    Globulin 4.5 (H) 2.3 - 3.5 g/dL    Albumin/Globulin Ratio 0.4 (L) 1.2 - 3.5     Lipase    Collection Time: 06/28/22  6:29 AM   Result Value Ref Range    Lipase 112 73 - 393 U/L   POCT Glucose    Collection Time: 06/28/22  7:38 AM   Result Value Ref Range    POC Glucose 230 (H) 65 - 100 mg/dL    Performed by: Patsy    CBC with Auto Differential    Collection Time: 06/28/22  9:13 AM   Result Value Ref Range    WBC 10.9 4.3 - 11.1 K/uL    RBC 2.95 (L) 4.05 - 5.2 M/uL    Hemoglobin 7.7 (L) 11.7 - 15.4 g/dL    Hematocrit 24.3 (L) 35.8 - 46.3 %    MCV 82.4 79.6 - 97.8 FL    MCH 26.1 26.1 - 32.9 PG    MCHC 31.7 31.4 - 35.0 g/dL    RDW 15.4 (H) 11.9 - 14.6 %    Platelets 213 (H) 621 - 450 K/uL    MPV 9.0 (L) 9.4 - 12.3 FL    nRBC 0.00 0.0 - 0.2 K/uL    Differential Type AUTOMATED      Seg Neutrophils 67 43 - 78 %    Lymphocytes 23 13 - 44 %    Monocytes 5 4.0 - 12.0 %    Eosinophils % 2 0.5 - 7.8 %    Basophils 0 0.0 - 2.0 %    Immature Granulocytes 2 0.0 - 5.0 %    Segs Absolute 7.3 1.7 - 8.2 K/UL    Absolute Lymph # 2.5 0.5 - 4.6 K/UL    Absolute Mono # 0.6 0.1 - 1.3 K/UL    Absolute Eos # 0.3 0.0 - 0.8 K/UL    Basophils Absolute 0.0 0.0 - 0.2 K/UL    Absolute Immature Granulocyte 0.2 0.0 - 0.5 K/UL   POCT Glucose    Collection Time: 06/28/22 11:38 AM   Result Value Ref Range    POC Glucose 148 (H) 65 - 100 mg/dL    Performed by: Patsy    Hepatitis A Antibody, IgM    Collection Time: 06/28/22  2:15 PM   Result Value Ref Range    Hep A IgM NONREACTIVE NR     POCT Glucose    Collection Time: 06/28/22  5:33 PM   Result Value Ref Range    POC Glucose 113 (H) 65 - 100 mg/dL    Performed by: Tristen    Potassium    Collection Time: 06/28/22  7:33 PM   Result Value Ref Range    Potassium 3.6 3.5 - 5.1 mmol/L Total Bilirubin 0.2 0.2 - 1.1 MG/DL    ALT 37 12 - 65 U/L    AST 44 (H) 15 - 37 U/L    Alk Phosphatase 253 (H) 50 - 130 U/L    Total Protein 6.6 6.3 - 8.2 g/dL    Albumin 1.7 (L) 3.5 - 5.0 g/dL    Globulin 4.9 (H) 2.3 - 3.5 g/dL    Albumin/Globulin Ratio 0.3 (L) 1.2 - 3.5     POCT Glucose    Collection Time: 06/29/22  7:38 AM   Result Value Ref Range    POC Glucose 198 (H) 65 - 100 mg/dL    Performed by: Patsy    POCT Glucose    Collection Time: 06/29/22 11:11 AM   Result Value Ref Range    POC Glucose 202 (H) 65 - 100 mg/dL    Performed by: Tristen    POCT Glucose    Collection Time: 06/29/22  4:13 PM   Result Value Ref Range    POC Glucose 39 (LL) 65 - 100 mg/dL    Performed by: Tristen    POCT Glucose    Collection Time: 06/29/22  4:40 PM   Result Value Ref Range    POC Glucose 163 (H) 65 - 100 mg/dL    Performed by: Tristen    Potassium    Collection Time: 06/29/22  6:52 PM   Result Value Ref Range    Potassium 3.4 (L) 3.5 - 5.1 mmol/L   POCT Glucose    Collection Time: 06/29/22  8:33 PM   Result Value Ref Range    POC Glucose 43 (L) 65 - 100 mg/dL    Performed by: Mayur Noguera    POCT Glucose    Collection Time: 06/29/22  9:07 PM   Result Value Ref Range    POC Glucose 108 (H) 65 - 100 mg/dL    Performed by: Digna    POCT Glucose    Collection Time: 06/30/22  2:17 AM   Result Value Ref Range    POC Glucose 61 (L) 65 - 100 mg/dL    Performed by: Digna    POCT Glucose    Collection Time: 06/30/22  2:48 AM   Result Value Ref Range    POC Glucose 123 (H) 65 - 100 mg/dL    Performed by: Digna    CBC with Auto Differential    Collection Time: 06/30/22  5:14 AM   Result Value Ref Range    WBC 9.4 4.3 - 11.1 K/uL    RBC 3.18 (L) 4.05 - 5.2 M/uL    Hemoglobin 8.1 (L) 11.7 - 15.4 g/dL    Hematocrit 26.3 (L) 35.8 - 46.3 %    MCV 82.7 79.6 - 97.8 FL    MCH 25.5 (L) 26.1 - 32.9 PG    MCHC 30.8 (L) 31.4 - 35.0 g/dL    RDW 15.2 (H) 11.9 - 14.6 % Platelets 248 (H) 780 - 450 K/uL    MPV 8.8 (L) 9.4 - 12.3 FL    nRBC 0.00 0.0 - 0.2 K/uL    Differential Type AUTOMATED      Seg Neutrophils 64 43 - 78 %    Lymphocytes 26 13 - 44 %    Monocytes 6 4.0 - 12.0 %    Eosinophils % 2 0.5 - 7.8 %    Basophils 0 0.0 - 2.0 %    Immature Granulocytes 2 0.0 - 5.0 %    Segs Absolute 6.0 1.7 - 8.2 K/UL    Absolute Lymph # 2.4 0.5 - 4.6 K/UL    Absolute Mono # 0.6 0.1 - 1.3 K/UL    Absolute Eos # 0.2 0.0 - 0.8 K/UL    Basophils Absolute 0.0 0.0 - 0.2 K/UL    Absolute Immature Granulocyte 0.2 0.0 - 0.5 K/UL   Comprehensive Metabolic Panel    Collection Time: 06/30/22  5:14 AM   Result Value Ref Range    Sodium 143 136 - 145 mmol/L    Potassium 4.0 3.5 - 5.1 mmol/L    Chloride 109 (H) 98 - 107 mmol/L    CO2 27 21 - 32 mmol/L    Anion Gap 7 7 - 16 mmol/L    Glucose 118 (H) 65 - 100 mg/dL    BUN 4 (L) 6 - 23 MG/DL    CREATININE 2.07 (H) 0.6 - 1.0 MG/DL    GFR African American 33 (L) >60 ml/min/1.73m2    GFR Non- 27 (L) >60 ml/min/1.73m2    Calcium 8.2 (L) 8.3 - 10.4 MG/DL    Total Bilirubin 0.3 0.2 - 1.1 MG/DL    ALT 37 12 - 65 U/L    AST 43 (H) 15 - 37 U/L    Alk Phosphatase 277 (H) 50 - 136 U/L    Total Protein 6.2 (L) 6.3 - 8.2 g/dL    Albumin 1.9 (L) 3.5 - 5.0 g/dL    Globulin 4.3 (H) 2.3 - 3.5 g/dL    Albumin/Globulin Ratio 0.4 (L) 1.2 - 3.5     Magnesium    Collection Time: 06/30/22  5:14 AM   Result Value Ref Range    Magnesium 1.6 (L) 1.8 - 2.4 mg/dL   Phosphorus    Collection Time: 06/30/22  5:14 AM   Result Value Ref Range    Phosphorus 2.4 (L) 2.5 - 4.5 MG/DL   Culture, Blood 1    Collection Time: 06/30/22  5:14 AM    Specimen: Blood   Result Value Ref Range    Special Requests RIGHT  FOREARM        Culture NO GROWTH 5 DAYS     POCT Glucose    Collection Time: 06/30/22  7:36 AM   Result Value Ref Range    POC Glucose 170 (H) 65 - 100 mg/dL    Performed by: Arash Sharif    POCT Glucose    Collection Time: 06/30/22 11:22 AM   Result Value Ref Range    POC Glucose 164 (H) 65 - 100 mg/dL    Performed by: Ramin Mata    POCT Glucose    Collection Time: 06/30/22  4:38 PM   Result Value Ref Range    POC Glucose 159 (H) 65 - 100 mg/dL    Performed by: Ramin Mata    POCT Glucose    Collection Time: 06/30/22  8:57 PM   Result Value Ref Range    POC Glucose 96 65 - 100 mg/dL    Performed by: Deandre    POCT Glucose    Collection Time: 07/01/22  2:53 AM   Result Value Ref Range    POC Glucose 173 (H) 65 - 100 mg/dL    Performed by: Matt Riley    CBC with Auto Differential    Collection Time: 07/01/22  7:24 AM   Result Value Ref Range    WBC 9.3 4.3 - 11.1 K/uL    RBC 3.00 (L) 4.05 - 5.2 M/uL    Hemoglobin 7.7 (L) 11.7 - 15.4 g/dL    Hematocrit 24.9 (L) 35.8 - 46.3 %    MCV 83.0 79.6 - 97.8 FL    MCH 25.7 (L) 26.1 - 32.9 PG    MCHC 30.9 (L) 31.4 - 35.0 g/dL    RDW 15.5 (H) 11.9 - 14.6 %    Platelets 711 623 - 289 K/uL    MPV 8.7 (L) 9.4 - 12.3 FL    nRBC 0.00 0.0 - 0.2 K/uL    Differential Type AUTOMATED      Seg Neutrophils 69 43 - 78 %    Lymphocytes 21 13 - 44 %    Monocytes 7 4.0 - 12.0 %    Eosinophils % 1 0.5 - 7.8 %    Basophils 0 0.0 - 2.0 %    Immature Granulocytes 2 0.0 - 5.0 %    Segs Absolute 6.7 1.7 - 8.2 K/UL    Absolute Lymph # 2.0 0.5 - 4.6 K/UL    Absolute Mono # 0.7 0.1 - 1.3 K/UL    Absolute Eos # 0.1 0.0 - 0.8 K/UL    Basophils Absolute 0.0 0.0 - 0.2 K/UL    Absolute Immature Granulocyte 0.2 0.0 - 0.5 K/UL   Magnesium    Collection Time: 07/01/22  7:24 AM   Result Value Ref Range    Magnesium 1.7 (L) 1.8 - 2.4 mg/dL   Comprehensive Metabolic Panel    Collection Time: 07/01/22  7:24 AM   Result Value Ref Range    Sodium 143 136 - 145 mmol/L    Potassium 3.8 3.5 - 5.1 mmol/L    Chloride 110 (H) 98 - 107 mmol/L    CO2 26 21 - 32 mmol/L    Anion Gap 7 7 - 16 mmol/L    Glucose 188 (H) 65 - 100 mg/dL    BUN 6 6 - 23 MG/DL    CREATININE 2.28 (H) 0.6 - 1.0 MG/DL    GFR African American 29 (L) >60 ml/min/1.73m2    GFR Non- 24 (L) >60 ml/min/1.73m2    Calcium 8.4 8.3 - 10.4 MG/DL    Total Bilirubin 0.2 0.2 - 1.1 MG/DL    ALT 35 12 - 65 U/L    AST 47 (H) 15 - 37 U/L    Alk Phosphatase 243 (H) 50 - 136 U/L    Total Protein 6.4 6.3 - 8.2 g/dL    Albumin 2.0 (L) 3.5 - 5.0 g/dL    Globulin 4.4 (H) 2.3 - 3.5 g/dL    Albumin/Globulin Ratio 0.5 (L) 1.2 - 3.5     Phosphorus    Collection Time: 07/01/22  7:24 AM   Result Value Ref Range    Phosphorus 3.4 2.5 - 4.5 MG/DL   POCT Glucose    Collection Time: 07/01/22  7:52 AM   Result Value Ref Range    POC Glucose 181 (H) 65 - 100 mg/dL    Performed by: Lyubov    POCT Glucose    Collection Time: 07/01/22 11:53 AM   Result Value Ref Range    POC Glucose 245 (H) 65 - 100 mg/dL    Performed by: Lyubov    POCT Glucose    Collection Time: 07/01/22  1:15 PM   Result Value Ref Range    POC Glucose 287 (H) 65 - 100 mg/dL    Performed by: Lyubov    POCT Glucose    Collection Time: 07/01/22  4:18 PM   Result Value Ref Range    POC Glucose 188 (H) 65 - 100 mg/dL    Performed by: Lyubov    POCT Glucose    Collection Time: 07/01/22  9:36 PM   Result Value Ref Range    POC Glucose 163 (H) 65 - 100 mg/dL    Performed by: Della    CBC with Auto Differential    Collection Time: 07/02/22  3:25 AM   Result Value Ref Range    WBC 9.5 4.3 - 11.1 K/uL    RBC 3.21 (L) 4.05 - 5.2 M/uL    Hemoglobin 8.3 (L) 11.7 - 15.4 g/dL    Hematocrit 27.0 (L) 35.8 - 46.3 %    MCV 84.1 79.6 - 97.8 FL    MCH 25.9 (L) 26.1 - 32.9 PG    MCHC 30.7 (L) 31.4 - 35.0 g/dL    RDW 15.5 (H) 11.9 - 14.6 %    Platelets 275 778 - 333 K/uL    MPV 8.9 (L) 9.4 - 12.3 FL    nRBC 0.00 0.0 - 0.2 K/uL    Differential Type AUTOMATED      Seg Neutrophils 63 43 - 78 %    Lymphocytes 28 13 - 44 %    Monocytes 6 4.0 - 12.0 %    Eosinophils % 2 0.5 - 7.8 %    Basophils 0 0.0 - 2.0 %    Immature Granulocytes 1 0.0 - 5.0 %    Segs Absolute 6.0 1.7 - 8.2 K/UL    Absolute Lymph # 2.6 0.5 - 4.6 K/UL    Absolute Mono # 0.6 0.1 - 1.3 K/UL    Absolute Eos # 0.1 0.0 - 0.8 K/UL    Basophils Absolute 0.0 0.0 - 0.2 K/UL    Absolute Immature Granulocyte 0.1 0.0 - 0.5 K/UL   Comprehensive Metabolic Panel    Collection Time: 07/02/22  3:25 AM   Result Value Ref Range    Sodium 140 136 - 145 mmol/L    Potassium 3.6 3.5 - 5.1 mmol/L    Chloride 107 98 - 107 mmol/L    CO2 26 21 - 32 mmol/L    Anion Gap 7 7 - 16 mmol/L    Glucose 247 (H) 65 - 100 mg/dL    BUN 8 6 - 23 MG/DL    CREATININE 2.54 (H) 0.6 - 1.0 MG/DL    GFR  26 (L) >60 ml/min/1.73m2    GFR Non- 21 (L) >60 ml/min/1.73m2    Calcium 8.4 8.3 - 10.4 MG/DL    Total Bilirubin 0.4 0.2 - 1.1 MG/DL    ALT 47 12 - 65 U/L    AST 58 (H) 15 - 37 U/L    Alk Phosphatase 272 (H) 50 - 130 U/L    Total Protein 6.6 6.3 - 8.2 g/dL    Albumin 2.1 (L) 3.5 - 5.0 g/dL    Globulin 4.5 (H) 2.3 - 3.5 g/dL    Albumin/Globulin Ratio 0.5 (L) 1.2 - 3.5     Magnesium    Collection Time: 07/02/22  3:25 AM   Result Value Ref Range    Magnesium 1.3 (L) 1.8 - 2.4 mg/dL   Phosphorus    Collection Time: 07/02/22  3:25 AM   Result Value Ref Range    Phosphorus 3.1 2.5 - 4.5 MG/DL   POCT Glucose    Collection Time: 07/02/22  7:25 AM   Result Value Ref Range    POC Glucose 213 (H) 65 - 100 mg/dL    Performed by: Lyubov    POCT Glucose    Collection Time: 07/02/22  9:03 AM   Result Value Ref Range    POC Glucose 217 (H) 65 - 100 mg/dL    Performed by:  [place]    POCT Glucose    Collection Time: 07/02/22 11:14 AM   Result Value Ref Range    POC Glucose 129 (H) 65 - 100 mg/dL    Performed by: Lyubov    POCT Glucose    Collection Time: 07/02/22 12:45 PM   Result Value Ref Range    POC Glucose 89 65 - 100 mg/dL    Performed by: Lyubov    POCT Glucose    Collection Time: 07/02/22  1:51 PM   Result Value Ref Range    POC Glucose 125 (H) 65 - 100 mg/dL    Performed by: Lyubov    POCT Glucose    Collection Time: 07/02/22  4:25 PM   Result Value Ref Range K/UL    Basophils Absolute 0.0 0.0 - 0.2 K/UL    Absolute Immature Granulocyte 0.1 0.0 - 0.5 K/UL   Phosphorus    Collection Time: 07/05/22  6:57 AM   Result Value Ref Range    Phosphorus 3.6 2.5 - 4.5 MG/DL   Comprehensive Metabolic Panel w/ Reflex to MG    Collection Time: 07/05/22  6:57 AM   Result Value Ref Range    Sodium 144 136 - 145 mmol/L    Potassium 3.3 (L) 3.5 - 5.1 mmol/L    Chloride 108 (H) 98 - 107 mmol/L    CO2 29 21 - 32 mmol/L    Anion Gap 7 7 - 16 mmol/L    Glucose 166 (H) 65 - 100 mg/dL    BUN 14 6 - 23 MG/DL    CREATININE 2.73 (H) 0.6 - 1.0 MG/DL    GFR  24 (L) >60 ml/min/1.73m2    GFR Non- 20 (L) >60 ml/min/1.73m2    Calcium 9.0 8.3 - 10.4 MG/DL    Total Bilirubin 0.4 0.2 - 1.1 MG/DL    ALT 39 12 - 65 U/L    AST 26 15 - 37 U/L    Alk Phosphatase 245 (H) 50 - 130 U/L    Total Protein 6.7 6.3 - 8.2 g/dL    Albumin 2.3 (L) 3.5 - 5.0 g/dL    Globulin 4.4 (H) 2.3 - 3.5 g/dL    Albumin/Globulin Ratio 0.5 (L) 1.2 - 3.5     Magnesium    Collection Time: 07/05/22  6:57 AM   Result Value Ref Range    Magnesium 1.8 1.8 - 2.4 mg/dL   POCT Glucose    Collection Time: 07/05/22 10:53 AM   Result Value Ref Range    POC Glucose 180 (H) 65 - 100 mg/dL    Performed by: SilvianoskyrockitnCevolso    POCT Glucose    Collection Time: 07/05/22  4:11 PM   Result Value Ref Range    POC Glucose 228 (H) 65 - 100 mg/dL    Performed by: SilvianoMoxsie    POCT Glucose    Collection Time: 07/05/22  9:29 PM   Result Value Ref Range    POC Glucose 328 (H) 65 - 100 mg/dL    Performed by: Phill    POCT Glucose    Collection Time: 07/06/22  6:32 AM   Result Value Ref Range    POC Glucose 154 (H) 65 - 100 mg/dL    Performed by: Jax    POCT Glucose    Collection Time: 07/06/22 11:19 AM   Result Value Ref Range    POC Glucose 344 (H) 65 - 100 mg/dL    Performed by: Isela Malhotra    CBC with Auto Differential    Collection Time: 07/06/22 12:07 PM Result Value Ref Range    WBC 10.0 4.3 - 11.1 K/uL    RBC 3.46 (L) 4.05 - 5.2 M/uL    Hemoglobin 8.9 (L) 11.7 - 15.4 g/dL    Hematocrit 28.4 (L) 35.8 - 46.3 %    MCV 82.1 79.6 - 97.8 FL    MCH 25.7 (L) 26.1 - 32.9 PG    MCHC 31.3 (L) 31.4 - 35.0 g/dL    RDW 15.7 (H) 11.9 - 14.6 %    Platelets 703 514 - 957 K/uL    MPV 9.7 9.4 - 12.3 FL    nRBC 0.00 0.0 - 0.2 K/uL    Differential Type AUTOMATED      Seg Neutrophils 65 43 - 78 %    Lymphocytes 25 13 - 44 %    Monocytes 5 4.0 - 12.0 %    Eosinophils % 3 0.5 - 7.8 %    Basophils 0 0.0 - 2.0 %    Immature Granulocytes 1 0.0 - 5.0 %    Segs Absolute 6.5 1.7 - 8.2 K/UL    Absolute Lymph # 2.5 0.5 - 4.6 K/UL    Absolute Mono # 0.5 0.1 - 1.3 K/UL    Absolute Eos # 0.3 0.0 - 0.8 K/UL    Basophils Absolute 0.0 0.0 - 0.2 K/UL    Absolute Immature Granulocyte 0.1 0.0 - 0.5 K/UL   Phosphorus    Collection Time: 07/06/22 12:07 PM   Result Value Ref Range    Phosphorus 3.3 2.5 - 4.5 MG/DL   Comprehensive Metabolic Panel w/ Reflex to MG    Collection Time: 07/06/22 12:07 PM   Result Value Ref Range    Sodium 138 136 - 145 mmol/L    Potassium 4.6 3.5 - 5.1 mmol/L    Chloride 105 98 - 107 mmol/L    CO2 21 21 - 32 mmol/L    Anion Gap 12 7 - 16 mmol/L    Glucose 329 (H) 65 - 100 mg/dL    BUN 16 6 - 23 MG/DL    CREATININE 2.41 (H) 0.6 - 1.0 MG/DL    GFR African American 28 (L) >60 ml/min/1.73m2    GFR Non- 23 (L) >60 ml/min/1.73m2    Calcium 8.5 8.3 - 10.4 MG/DL    Total Bilirubin 0.4 0.2 - 1.1 MG/DL    ALT 35 12 - 65 U/L    AST 43 (H) 15 - 37 U/L    Alk Phosphatase 249 (H) 50 - 136 U/L    Total Protein 6.6 6.3 - 8.2 g/dL    Albumin 2.2 (L) 3.5 - 5.0 g/dL    Globulin 4.4 (H) 2.3 - 3.5 g/dL    Albumin/Globulin Ratio 0.5 (L) 1.2 - 3.5     POCT Glucose    Collection Time: 07/06/22  4:39 PM   Result Value Ref Range    POC Glucose 66 65 - 100 mg/dL    Performed by: Giselle    POCT Glucose    Collection Time: 07/06/22  5:23 PM   Result Value Ref Range    POC Glucose 104 (H) 65 - 100 mg/dL    Performed by: Sheyla See and PLAN    1. Other gastritis without hemorrhage, unspecified chronicity  -     pantoprazole (PROTONIX) 40 MG tablet; Take 1 tablet by mouth every morning (before breakfast), Disp-90 tablet, R-1Normal  2. Type 1 diabetes mellitus with hyperglycemia (HCC)  3.  Orthostasis     orthostasis probably from volume depletion, long hx IDDM and could be autonomic dysfunction as well, could not do orthostatic Bps on her as she stated she would pass out  Could not find positive urine culture so will not continue the Cipro  Restarted the PPI and asked her to take this for at least a month while she is pushing fluids and increasing her salt intake      She will check with endo on next apptmt

## 2022-07-12 ENCOUNTER — CARE COORDINATION (OUTPATIENT)
Dept: OTHER | Facility: CLINIC | Age: 47
End: 2022-07-12

## 2022-07-14 ENCOUNTER — CARE COORDINATION (OUTPATIENT)
Dept: OTHER | Facility: CLINIC | Age: 47
End: 2022-07-14

## 2022-07-14 NOTE — CARE COORDINATION
Care Transitions Outreach Attempt    Call within 2 business days of discharge: Yes   Attempted to reach patient for transitions of care follow up. Unable to reach patient. Patient: Pam Mata Patient : 1975 MRN: [id number]    Last Discharge [facility]       Complaint Diagnosis Description Type Department Provider    22 Nausea; Emesis Septicemia ([facility] [address] . .. ED to Hosp-Admission (Discharged) (ADMITTED) Kwaku Olson MD; Luanne Oh... Was this an external facility discharge? No Discharge Facility: N/A    Noted following upcoming appointments from discharge chart review:   [address] follow up appointment(s): No future appointments.   Non-[facility] follow up appointment(s):

## 2022-07-18 ENCOUNTER — CARE COORDINATION (OUTPATIENT)
Dept: OTHER | Facility: CLINIC | Age: 47
End: 2022-07-18

## 2022-10-25 ENCOUNTER — TELEPHONE (OUTPATIENT)
Dept: INTERNAL MEDICINE CLINIC | Facility: CLINIC | Age: 47
End: 2022-10-25

## 2022-10-25 NOTE — TELEPHONE ENCOUNTER
Per Dr Frank Robbins, I called [facility] at [phone], to advise them that this pt has not been seen by him in over a year and that if Dr Emily Wallace feels this is an unsafe discharge then she should not discharge the pt. Also if Dr Emily Wallace wants to talk with Dr Frank Robbins is may call him at the office tomorrow. I was put through to the clinical staff Javier Holbrook and I left a message asking that she call be back regarding this pt and Dr Gilberto Powell advise.

## 2022-10-25 NOTE — TELEPHONE ENCOUNTER
[facility] is [facility] today - unsafe discharge (at home and health) \"limited decisional capacity\". Dr. Marshall Munguia is requesting she be seen as soon as possible, Dr. Toscano Noel first available is 10/31 that I worked her into. Please let us know if she should/can be worked in sooner.

## 2022-10-26 ENCOUNTER — TELEPHONE (OUTPATIENT)
Dept: INTERNAL MEDICINE CLINIC | Facility: CLINIC | Age: 47
End: 2022-10-26

## 2022-10-26 NOTE — TELEPHONE ENCOUNTER
----- Message from Pili Lopez sent at 10/26/2022  2:35 PM EDT -----  Subject: Message to Provider    QUESTIONS  Information for Provider? Mom, Kimber Mireles wants to make sure the PCP has   seen all of the pts chart information from [facility] before she comes in on   10/31 for her follow up. She will be with her at the appt and she wants to   go over all her recent hospital stays.   ---------------------------------------------------------------------------  --------------  [address] [employer]  [phone]; OK to leave message on voicemail  ---------------------------------------------------------------------------  --------------  SCRIPT ANSWERS  Relationship to Patient? Parent  Representative Name? Danay  Patient is under 25 and the Parent has custody? No  Is the Representative on the appropriate HIPAA document in Epic?  Yes

## 2022-10-26 NOTE — TELEPHONE ENCOUNTER
----- Message from Kb Jacobson sent at 10/26/2022  2:35 PM EDT -----  Subject: Message to Provider    QUESTIONS  Information for Provider? Mom, Sara Floyd wants to make sure the PCP has   seen all of the pts chart information from [facility] before she comes in on   10/31 for her follow up. She will be with her at the appt and she wants to   go over all her recent hospital stays.   ---------------------------------------------------------------------------  --------------  [address] [employer]  [phone]; OK to leave message on voicemail  ---------------------------------------------------------------------------  --------------  SCRIPT ANSWERS  Relationship to Patient? Parent  Representative Name? Danay  Patient is under 25 and the Parent has custody? No  Is the Representative on the appropriate HIPAA document in Epic?  Yes

## 2022-10-26 NOTE — TELEPHONE ENCOUNTER
----- Message from Pili Lopez sent at 10/26/2022  2:35 PM EDT -----  Subject: Message to Provider    QUESTIONS  Information for Provider? Mom, Kimber Mireles wants to make sure the PCP has   seen all of the pts chart information from [facility] before she comes in on   10/31 for her follow up. She will be with her at the appt and she wants to   go over all her recent hospital stays.   ---------------------------------------------------------------------------  --------------  [address] [employer]  [phone]; OK to leave message on voicemail  ---------------------------------------------------------------------------  --------------  SCRIPT ANSWERS  Relationship to Patient? Parent  Representative Name? Danay  Patient is under 25 and the Parent has custody? No  Is the Representative on the appropriate HIPAA document in Epic?  Yes

## 2022-10-28 ENCOUNTER — TELEPHONE (OUTPATIENT)
Dept: INTERNAL MEDICINE CLINIC | Facility: CLINIC | Age: 47
End: 2022-10-28

## 2022-10-28 NOTE — TELEPHONE ENCOUNTER
[facility] was assigned to patient with requests for:    -Skilled Nurse  -[id number] [employer]    They need to know if Dr. Tenisha Strange will be sending further orders, I informed her the pt has a follow up appt on 10/31 and he will probably wait until this follow up before placing any orders.

## 2022-12-20 ENCOUNTER — OFFICE VISIT (OUTPATIENT)
Dept: ENDOCRINOLOGY | Age: 47
End: 2022-12-20
Payer: COMMERCIAL

## 2022-12-20 VITALS — HEART RATE: 83 BPM | SYSTOLIC BLOOD PRESSURE: 123 MMHG | OXYGEN SATURATION: 92 % | DIASTOLIC BLOOD PRESSURE: 80 MMHG

## 2022-12-20 DIAGNOSIS — E10.65 TYPE 1 DIABETES MELLITUS WITH HYPERGLYCEMIA (HCC): Primary | ICD-10-CM

## 2022-12-20 PROCEDURE — 3052F HG A1C>EQUAL 8.0%<EQUAL 9.0%: CPT | Performed by: PHYSICIAN ASSISTANT

## 2022-12-20 PROCEDURE — 99214 OFFICE O/P EST MOD 30 MIN: CPT | Performed by: PHYSICIAN ASSISTANT

## 2022-12-20 RX ORDER — FLASH GLUCOSE SENSOR
KIT MISCELLANEOUS
Qty: 6 EACH | Refills: 3 | Status: SHIPPED | OUTPATIENT
Start: 2022-12-20

## 2022-12-20 RX ORDER — GLUCAGON INJECTION, SOLUTION 1 MG/.2ML
1 INJECTION, SOLUTION SUBCUTANEOUS PRN
Qty: 2 EACH | Refills: 1 | Status: SHIPPED | OUTPATIENT
Start: 2022-12-20

## 2022-12-20 RX ORDER — INSULIN LISPRO 100 [IU]/ML
INJECTION, SOLUTION INTRAVENOUS; SUBCUTANEOUS
Qty: 30 ML | Refills: 3 | Status: SHIPPED | OUTPATIENT
Start: 2022-12-20

## 2022-12-20 RX ORDER — INSULIN GLARGINE 100 [IU]/ML
15 INJECTION, SOLUTION SUBCUTANEOUS NIGHTLY
Qty: 10 ML | Refills: 3 | Status: SHIPPED | OUTPATIENT
Start: 2022-12-20 | End: 2022-12-20

## 2022-12-20 RX ORDER — FLASH GLUCOSE SCANNING READER
EACH MISCELLANEOUS
Qty: 1 EACH | Refills: 0 | Status: SHIPPED | OUTPATIENT
Start: 2022-12-20

## 2022-12-20 RX ORDER — INSULIN DEGLUDEC INJECTION 100 U/ML
15 INJECTION, SOLUTION SUBCUTANEOUS DAILY
Qty: 15 ML | Refills: 3 | Status: SHIPPED | OUTPATIENT
Start: 2022-12-20

## 2022-12-20 RX ORDER — INSULIN LISPRO 100 [IU]/ML
4 INJECTION, SOLUTION INTRAVENOUS; SUBCUTANEOUS
Qty: 10 ML | Refills: 3 | Status: SHIPPED | OUTPATIENT
Start: 2022-12-20 | End: 2022-12-20

## 2022-12-20 RX ORDER — GABAPENTIN 100 MG/1
100 CAPSULE ORAL 3 TIMES DAILY
COMMUNITY

## 2022-12-20 NOTE — PROGRESS NOTES
[facility]    Adrian Colón PA-C  [facility]  [address], [address]  [place], [place]  Phone [phone]  Facsimile [phone]          Rosita Lr is a 50 y.o. female seen 12/20/2022 for follow up evaluation of type 1 diabetes         Assessment and Plan:    In office COVID-19 PPE worn and precautions taken    1. Type 1 diabetes mellitus with hyperglycemia [facility])  Patient with type 1 diabetes transitioning from inpatient rehabilitation facility to her mother's house. Currently on basal bolus insulin regimen with recent changes secondary to hypoglycemia. No blood glucose data available today. On current treatment regimen of 15 units of basal insulin 4 units of prandial insulin without scale patient denies any recent hypoglycemia since this medication change. Importance of medication compliance reviewed with patient and mother. Prescriptions to continue current treatment regimen at rehabilitation facility provided to patient however we will also provide home medication including 15 units of Tresiba once daily and Humalog 4 units 3 times daily AC +1 per 50 greater than 150 correction. Insulin administration techniques were reviewed with patient and mother given patient's weakness and dexterity issues. I anticipate she will be dependent on mother for care. Would benefit from restarting CGM therapy with early warning of hypoglycemia. Patient is having cell phone issues so we will prescribe rubens 2 with both sensor and reader.   From [facility] 3 or Dexcom at future visit when she has a working phone      - Felice Mcghee 100 UNIT/ML SOPN; 4 units TIDAC correction scale 1/50>150 AC/HS, max daily dose 30 units  Dispense: 30 mL; Refill: 3  - TRESIBA FLEXTOUCH 100 UNIT/ML SOPN; Inject 15 Units into the skin daily  Dispense: 15 mL; Refill: 3  - Continuous Blood Gluc Sensor (FREESTYLE RUBENS 2 SENSOR) MISC; Use to monitor glucose E11.65  Dispense: 6 each; Refill: 3  - Continuous Blood Gluc  (FREESTYLE SILVESTRE 2 READER) VENITA; Use to monitor glucose, E11.65  Dispense: 1 each; Refill: 0  - GVOKE HYPOPEN 2-PACK 1 MG/0.2ML SOAJ; Inject 1 mg into the skin as needed (severe hypoglycemia)  Dispense: 2 each; Refill: 1  - acetone, urine, test strip; Use if glucose >250 for 4 hours, if positive, proceed to ER E10.9  Dispense: 50 strip; Refill: 1          Frank Howe was seen today for diabetes. Diagnoses and all orders for this visit:    Type 1 diabetes mellitus with hyperglycemia (HCC)  -     HUMALOG KWIKPEN 100 UNIT/ML SOPN; 4 units TIDAC correction scale 1/50>150 AC/HS, max daily dose 30 units  -     TRESIBA FLEXTOUCH 100 UNIT/ML SOPN; Inject 15 Units into the skin daily  -     Continuous Blood Gluc Sensor (FREESTYLE SILVESTRE 2 SENSOR) MISC; Use to monitor glucose E11.65  -     Continuous Blood Gluc  (FREESTYLE SILVESTRE 2 READER) VENITA; Use to monitor glucose, E11.65  -     GVOKE HYPOPEN 2-PACK 1 MG/0.2ML SOAJ; Inject 1 mg into the skin as needed (severe hypoglycemia)  -     Discontinue: insulin glargine (LANTUS) 100 UNIT/ML injection vial; Inject 15 Units into the skin nightly  -     Discontinue: insulin lispro (HUMALOG) 100 UNIT/ML SOLN injection vial; Inject 4 Units into the skin 3 times daily (with meals)  -     acetone, urine, test strip; Use if glucose >250 for 4 hours, if positive, proceed to ER E10.9          History of Present Illness:        12/20/2022   Interim diabetes HPI:    Patient returns to clinic after 1 year hiatus for management of type 1 diabetes. Unfortunately she has had a rough year with multiple hospital ligations.   She is very weak and currently residing in a rehabilitation facility with plans to transition to her mother's home as soon as Friday    Interim medical history changes:   Recurrent hospitalization for DKA and sepsis     Lifestyle Update:  Weak, dependent    Current Regimen: lantus 15 units, lispro 4   units    Glucose data:  No data available today, unable to recall      Failed past therapies:     Relevant co morbidities:  Recurrent DKA, gastroparesis  Denies  HX pancreatitis or foot ulcer    Optho:   Last eye exam was March 2022 and demonstrated stable diabetic retinopathy. Eye care specialist is Pilar. Obesity:         There is no height or weight on file to calculate BMI. decreasing rapidly      Wt Readings from Last 3 Encounters:   07/06/22 146 lb 3.4 oz (66.3 kg)   06/10/22 170 lb (77.1 kg)   06/01/22 174 lb 1.6 oz (79 kg)         CardioVascular:    None   Renal:    Under care of nephro? Yes, [address] nephrology    NOT On ARB/ACE-I  This patient does not have an active medication from one of the medication groupers. 12/27/2014      Cr 1.90, GFR 37                           04/12/2017      Cr 2.06, GFR 34                           01/18/2019      Cr 2.04, GFR 33, microalbumin/Cr ratio 97.0               Now Under care of [place] nephrology                           04/18/2019      Cr 1.78, GFR 39 (critical potassium 9.3)                           04/20/2019      Cr 2.06, GFR 33 (repeat potassium 4.5)                           09/16/2019      Cr 1.91, GFR 36                           04/15/2020      Cr 1.83, GFR 38                              06/09/2021      Cr 2.60, GFR 24     12/05/2022 Cr 1.39, GFR 47   Lipids:     Current therapy This patient does not have an active medication from one of the medication groupers.    06/19/2020  TC- 156, LDL- 84, VLDL- 20,  HDL- 52, TG- 100          Lab Results   Component Value Date    CHOL 156 06/19/2020     Lab Results   Component Value Date    LDLCALC 84 06/19/2020      Lab Results   Component Value Date    LABVLDL 20 06/19/2020      Lab Results   Component Value Date    HDL 52 06/19/2020      Lab Results   Component Value Date    HDL 52 06/19/2020      Lab Results   Component Value Date    TRIG 100 06/19/2020     No results found for: CONSTANZA      Hemoglobin A1c:   12/25/2014      10.4%               07/12/2016      10.3%               01/18/2019      10.7%               04/18/2019      9.3%               06/27/2019      9.3%               09/16/2019      9.1%               02/07/2020      9.4%                           Reports 4/17/2020      9.8%               06/19/2020      9.6%                  06/09/2021      9.6%      11/22/2022 6.5%        Hemoglobin A1C, POC   Date Value Ref Range Status   02/07/2020 9.4 % Final   09/16/2019 9.1 % Final   06/27/2019 9.3 % Final        Thyroid:                04/24/2017      1.630               09/16/2019      0.972               06/19/2020      2.400         Lab Results   Component Value Date/Time    TSH 2.400 06/19/2020 12:11 PM    TSH 0.972 09/16/2019 02:50 PM                          Allergies & Medications:  Reviewed in chart. Review of Systems    Vital Signs:  /80 (Site: Right Upper Arm, Position: Sitting)   Pulse 83   SpO2 92%       Physical Exam  Constitutional:       Comments: Weak, in WC, unable to stand with assistance to be weighed   HENT:      Head: Normocephalic. Neck:      Thyroid: No thyroid mass or thyromegaly. Vascular: No carotid bruit. Cardiovascular:      Rate and Rhythm: Normal rate and regular rhythm. Pulmonary:      Effort: Pulmonary effort is normal.      Breath sounds: Normal breath sounds. Abdominal:      Palpations: Abdomen is soft. Musculoskeletal:      Cervical back: Neck supple. Right lower leg: No edema. Left lower leg: No edema. Feet:      Right foot:      Protective Sensation: 3 sites tested. 3 sites sensed. Skin integrity: Skin integrity normal.      Left foot:      Protective Sensation: 3 sites tested. 3 sites sensed. Skin integrity: Skin integrity normal.   Lymphadenopathy:      Cervical: No cervical adenopathy. Skin:     General: Skin is warm and dry.    Neurological:      Mental Status: She is alert. Sensory: Sensation is intact. Comments: Neuropathic changes to bilateral hands with resulting poor dexterity    Psychiatric:         Mood and Affect: Mood normal.         Behavior: Behavior normal.         Thought Content: Thought content normal.         Judgment: Judgment normal.           Return telephone only in late jan, follow up 3 months, for Type 1 Diabetes f/u. Portions of this note were generated with the assistance of voice recogniton software. As such, some errors in transcription may be present.

## 2022-12-20 NOTE — PATIENT INSTRUCTIONS
INSULIN ADMINISTRATION INSTRUCTIONS:    Patient Name:  Aleja Coello   YOB: 1975    Provider Name:  Kristen Callejas PA-C  Today's Date:  12/20/22      LONG-ACTING INSULIN     Oneta Narrow (lantus)    15 units every 24 hours    SHORT-ACTING INSULIN     Humalog (lispro) BY THE CHART    4 units BEFORE each meal (if glucose >100)    Be sure to check blood glucose before meals and at bedtime. Based on the blood glucose reading, take the following amount of insulin:    Blood glucose 150 or less  No insulin  Blood glucose 151-200  1 units  Blood glucose 201-250  2 units  Blood glucose 251-300  3 units  Blood glucose 301-350  4 units  Blood glucose greater than 350 5 units      If there are questions, send an electronic [employer]edic[employer] message to for nonurgent questions or call the office at [phone].     [facility]  [address] [place] [address] Drive [id number]

## 2023-01-06 ENCOUNTER — TELEPHONE (OUTPATIENT)
Dept: INTERNAL MEDICINE CLINIC | Facility: CLINIC | Age: 48
End: 2023-01-06

## 2023-01-06 NOTE — TELEPHONE ENCOUNTER
Jasmin from [facility] called requesting verbal orders for pt. She was discharged yesterday from rehab on 1/5. Please call back at [phone] and give orders when able.

## 2023-01-09 ENCOUNTER — TELEPHONE (OUTPATIENT)
Dept: INTERNAL MEDICINE CLINIC | Facility: CLINIC | Age: 48
End: 2023-01-09

## 2023-01-09 NOTE — TELEPHONE ENCOUNTER
Home Health requesting physical therapy two times a week for two weeks and one time a week for six weeks pending insurance approval. Please call the home health nurse to confirm orders.

## 2023-01-09 NOTE — TELEPHONE ENCOUNTER
[facility] went to see the patient today. When they got to the home, they checked the patient's blood pressure on both arms and checked with the patient's machine and the patient's blood pressure was 80/50. The patient reported feeling faint as well. The nurse helped the patient back into the bed and changed her. Approximately 15 minutes after helping the patient lay down, the nurse took her blood pressure again and it was 120/75. The nurse believes the patient was dehydrated and encouraged water as well as going to the Emergency room if her symptoms persist. Another member of the care team will be coming to the patient's home tomorrow.

## 2023-01-10 NOTE — TELEPHONE ENCOUNTER
Spoke to Danielle at [facility]  per Dr. Michael Andrade pt. Needs appointment in office before orders can be approved.

## 2023-01-13 ENCOUNTER — TELEPHONE (OUTPATIENT)
Dept: INTERNAL MEDICINE CLINIC | Facility: CLINIC | Age: 48
End: 2023-01-13

## 2023-01-13 NOTE — TELEPHONE ENCOUNTER
Ricardo Bowman from [facility] called need verbal orders for OT to be once a week for 8 weeks. Pt also will be needing orders verified for a beside commode. Please call back when able to at [phone].

## 2023-01-13 NOTE — TELEPHONE ENCOUNTER
Spoke to Glenny Gaitan from [facility] to let him know we can NOT give verbal orders. Patient has not been seen since 2021.

## 2023-01-16 ENCOUNTER — TELEPHONE (OUTPATIENT)
Dept: INTERNAL MEDICINE CLINIC | Facility: CLINIC | Age: 48
End: 2023-01-16

## 2023-01-16 NOTE — TELEPHONE ENCOUNTER
----- Message from Asuncion Metcalf sent at 1/16/2023  1:19 PM EST -----  Subject: Hospital Follow Up    QUESTIONS  What hospital was the Patient Discharged from? [facility]  Date of Discharge? 2023-01-07  Discharge Location? Nursing Facility  Reason for hospitalization as patient stated? Diabetes, elevated blood   sugar  What question does the patient have, if applicable?   ---------------------------------------------------------------------------  --------------  CALL BACK INFO  What is the best way for the office to contact you? OK to leave message on   voicemail  Preferred Call Back Phone Number? [phone]  ---------------------------------------------------------------------------  --------------  SCRIPT ANSWERS  Relationship to Patient?  Self

## 2023-01-19 ENCOUNTER — OFFICE VISIT (OUTPATIENT)
Dept: INTERNAL MEDICINE CLINIC | Facility: CLINIC | Age: 48
End: 2023-01-19
Payer: COMMERCIAL

## 2023-01-19 VITALS — OXYGEN SATURATION: 99 % | SYSTOLIC BLOOD PRESSURE: 110 MMHG | DIASTOLIC BLOOD PRESSURE: 64 MMHG | HEART RATE: 93 BPM

## 2023-01-19 DIAGNOSIS — N18.4 BENIGN HYPERTENSION WITH CKD (CHRONIC KIDNEY DISEASE) STAGE IV (HCC): ICD-10-CM

## 2023-01-19 DIAGNOSIS — Z86.74 HISTORY OF CARDIAC ARREST: ICD-10-CM

## 2023-01-19 DIAGNOSIS — Z23 NEED FOR PNEUMOCOCCAL VACCINE: ICD-10-CM

## 2023-01-19 DIAGNOSIS — E10.65 TYPE 1 DIABETES MELLITUS WITH HYPERGLYCEMIA (HCC): ICD-10-CM

## 2023-01-19 DIAGNOSIS — I12.9 BENIGN HYPERTENSION WITH CKD (CHRONIC KIDNEY DISEASE) STAGE IV (HCC): ICD-10-CM

## 2023-01-19 DIAGNOSIS — Z12.31 BREAST CANCER SCREENING BY MAMMOGRAM: ICD-10-CM

## 2023-01-19 DIAGNOSIS — D64.9 ANEMIA, UNSPECIFIED TYPE: ICD-10-CM

## 2023-01-19 DIAGNOSIS — F32.1 MODERATE SINGLE CURRENT EPISODE OF MAJOR DEPRESSIVE DISORDER (HCC): ICD-10-CM

## 2023-01-19 DIAGNOSIS — N18.30 STAGE 3 CHRONIC KIDNEY DISEASE, UNSPECIFIED WHETHER STAGE 3A OR 3B CKD (HCC): ICD-10-CM

## 2023-01-19 DIAGNOSIS — Z11.4 ENCOUNTER FOR SCREENING FOR HIV: ICD-10-CM

## 2023-01-19 DIAGNOSIS — Z87.442 HISTORY OF KIDNEY STONES: ICD-10-CM

## 2023-01-19 DIAGNOSIS — I10 ESSENTIAL HYPERTENSION: Primary | ICD-10-CM

## 2023-01-19 PROCEDURE — 99215 OFFICE O/P EST HI 40 MIN: CPT | Performed by: INTERNAL MEDICINE

## 2023-01-19 PROCEDURE — 3078F DIAST BP <80 MM HG: CPT | Performed by: INTERNAL MEDICINE

## 2023-01-19 PROCEDURE — 3074F SYST BP LT 130 MM HG: CPT | Performed by: INTERNAL MEDICINE

## 2023-01-19 RX ORDER — DOCUSATE SODIUM 100 MG/1
CAPSULE, LIQUID FILLED ORAL
COMMUNITY
Start: 2023-01-04

## 2023-01-19 RX ORDER — POTASSIUM CHLORIDE 20 MEQ/1
TABLET, EXTENDED RELEASE ORAL
COMMUNITY
Start: 2023-01-04

## 2023-01-19 RX ORDER — ACETAMINOPHEN 325 MG/1
650 TABLET ORAL EVERY 6 HOURS PRN
COMMUNITY
Start: 2023-01-04 | End: 2023-02-03

## 2023-01-19 RX ORDER — METOPROLOL SUCCINATE 25 MG/1
TABLET, EXTENDED RELEASE ORAL
COMMUNITY
Start: 2022-10-24

## 2023-01-19 RX ORDER — TAMSULOSIN HYDROCHLORIDE 0.4 MG/1
CAPSULE ORAL
COMMUNITY
Start: 2022-10-24

## 2023-01-19 RX ORDER — ATORVASTATIN CALCIUM 40 MG/1
TABLET, FILM COATED ORAL
COMMUNITY
Start: 2023-01-04

## 2023-01-19 RX ORDER — MAGNESIUM OXIDE 400 MG/1
TABLET ORAL
COMMUNITY
Start: 2023-01-04

## 2023-01-19 ASSESSMENT — ENCOUNTER SYMPTOMS
ABDOMINAL PAIN: 0
SHORTNESS OF BREATH: 0
COUGH: 0

## 2023-01-19 ASSESSMENT — PATIENT HEALTH QUESTIONNAIRE - PHQ9
SUM OF ALL RESPONSES TO PHQ QUESTIONS 1-9: 0
6. FEELING BAD ABOUT YOURSELF - OR THAT YOU ARE A FAILURE OR HAVE LET YOURSELF OR YOUR FAMILY DOWN: 0
9. THOUGHTS THAT YOU WOULD BE BETTER OFF DEAD, OR OF HURTING YOURSELF: 0
SUM OF ALL RESPONSES TO PHQ QUESTIONS 1-9: 0
7. TROUBLE CONCENTRATING ON THINGS, SUCH AS READING THE NEWSPAPER OR WATCHING TELEVISION: 0
3. TROUBLE FALLING OR STAYING ASLEEP: 0
4. FEELING TIRED OR HAVING LITTLE ENERGY: 0
5. POOR APPETITE OR OVEREATING: 0
8. MOVING OR SPEAKING SO SLOWLY THAT OTHER PEOPLE COULD HAVE NOTICED. OR THE OPPOSITE, BEING SO FIGETY OR RESTLESS THAT YOU HAVE BEEN MOVING AROUND A LOT MORE THAN USUAL: 0
1. LITTLE INTEREST OR PLEASURE IN DOING THINGS: 0
SUM OF ALL RESPONSES TO PHQ9 QUESTIONS 1 & 2: 0
2. FEELING DOWN, DEPRESSED OR HOPELESS: 0
10. IF YOU CHECKED OFF ANY PROBLEMS, HOW DIFFICULT HAVE THESE PROBLEMS MADE IT FOR YOU TO DO YOUR WORK, TAKE CARE OF THINGS AT HOME, OR GET ALONG WITH OTHER PEOPLE: 0

## 2023-01-19 NOTE — PROGRESS NOTES
SUBJECTIVE:   Cha Riddle is a 50 y.o. female seen for a visit regarding   Chief Complaint   Patient presents with    Follow-Up from Hospital        HPI  Was last seen in August, 2021, has had multiple no shows since then, now living with mom    Diabetes type 1 - seeing Colt Gaines, Endocrinology  CKD-3 - not seeing nephrology at this time  [facility]   Elevated LFTs on lab, Gastroparesis, h/o esophagitis/gastritis - seeing GI  H/o Cardiac Arrest in 2022  H/o KENYATTA   H/o kidney stone s/p stent  Neuropathy  Has home health  Per patient she is seeing her Gyn  [facility] - seeing Podiatry     Past Medical History, Past Surgical History, Family history, Social History, and Medications were all reviewed with the patient today and updated as necessary. Current Outpatient Medications   Medication Sig Dispense Refill    acetaminophen (TYLENOL) 325 MG tablet Take 650 mg by mouth every 6 hours as needed      atorvastatin (LIPITOR) 40 MG tablet       docusate sodium (COLACE) 100 MG capsule       magnesium oxide (MAG-OX) 400 MG tablet       metoprolol succinate (TOPROL XL) 25 MG extended release tablet       potassium chloride (KLOR-CON M) 20 MEQ extended release tablet       tamsulosin (FLOMAX) 0.4 MG capsule       gabapentin (NEURONTIN) 100 MG capsule Take 100 mg by mouth 3 times daily.       HUMALOG KWIKPEN 100 UNIT/ML SOPN 4 units TIDAC correction scale 1/50>150 AC/HS, max daily dose 30 units 30 mL 3    TRESIBA FLEXTOUCH 100 UNIT/ML SOPN Inject 15 Units into the skin daily 15 mL 3    Continuous Blood Gluc Sensor (FREESTYLE SILVESTRE 2 SENSOR) MISC Use to monitor glucose E11.65 6 each 3    Continuous Blood Gluc  (FREESTYLE SILVESTRE 2 READER) VENITA Use to monitor glucose, E11.65 1 each 0    GVOKE HYPOPEN 2-PACK 1 MG/0.2ML SOAJ Inject 1 mg into the skin as needed (severe hypoglycemia) 2 each 1    acetone, urine, test strip Use if glucose >250 for 4 hours, if positive, proceed to ER E10.9 50 strip 1 pantoprazole (PROTONIX) 40 MG tablet Take 1 tablet by mouth every morning (before breakfast) 90 tablet 1    Insulin Pen Needle (PEN NEEDLES 31GX5/16\") 31G X 8 MM MISC 120 each by Other route 4 times daily      albuterol (PROVENTIL) (2.5 MG/3ML) 0.083% nebulizer solution Take 3 mLs by nebulization every 4 hours as needed for Wheezing (Patient not taking: No sig reported) 120 each 3     No current facility-administered medications for this visit. 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 associated with type 1 diabetes mellitus ([address]    Hydronephrosis of right kidney    Diabetes mellitus type 1 ([facility])    Benign hypertension with CKD (chronic kidney disease) stage IV ([facility])    Type 1 diabetes mellitus with hyperglycemia ([address]    Cardiac arrest ([address]    Diabetic ketoacidosis with coma associated with type 1 diabetes mellitus ([address]    Frailty    Normocytic anemia    Physical debility    Severe sepsis ([address]    Pyelonephritis    Gastritis without bleeding    Orthostasis     Past Medical History:   Diagnosis Date    Acute renal failure ([address] 1/24/2012    CKD (chronic kidney disease) stage 3, GFR 30-59 ml/min ([facility])     Gastroenteritis, acute 1/24/2012    H/O amputation of lesser toe, left ([address] 6/9/2022    This status condition was added to the problem list by Sheree SALDANA of the [facility] Team, after medical record review and validation.        Hemodialysis patient ([address]     IDDM (insulin dependent diabetes mellitus) 1/24/2012    Type 1 av -150 can tell Hypo below 70    Intractable nausea and vomiting 2014    Irregular menses     Kidney stone     Neuropathy, peripheral, idiopathic 3/13/2017    Retinopathy, bilateral 3/13/2017    Trichomoniasis 3/13/2017    Ulcer, skin, chronic ([address] 3/13/2017    Vaginal burning      Past Surgical History:   Procedure Laterality Date    AMPUTATION Left     5th Toe due to Diabetic Ulcer    AMPUTATION  12    gangrene    CYSTOSCOPY,INSERT URETERAL STENT  2022    HX OPEN REDUCTION INTERNAL FIXATION Right 2011    ankle    IR TUNNELED CATHETER PLACEMENT GREATER THAN 5 YEARS  2022    IR TUNNELED CATHETER PLACEMENT GREATER THAN 5 YEARS 2022 [facility]    UPPER GASTROINTESTINAL ENDOSCOPY N/A 2022    EGD BIOPSY performed by Gene Moore MD at [address]     Family History   Problem Relation Age of Onset    Prostate Cancer Father     Diabetes Paternal Grandmother     Colon Cancer Neg Hx     Uterine Cancer Neg Hx     Diabetes Father         DM Type II    Stroke Father     Diabetes Paternal Grandfather         DM Type II     Hypertension Maternal Grandmother     Ovarian Cancer Neg Hx     Breast Cancer Mother 76    Diabetes Maternal Grandmother         DM Type II     Social History     Tobacco Use    Smoking status: Former     Types: Cigarettes     Quit date: 2013     Years since quittin.2    Smokeless tobacco: Never   Substance Use Topics    Alcohol use: Yes         Review of Systems   Constitutional:  Negative for fever. Respiratory:  Negative for cough and shortness of breath. Cardiovascular:  Negative for chest pain and leg swelling. Gastrointestinal:  Negative for abdominal pain. Psychiatric/Behavioral:  Negative for behavioral problems and confusion. OBJECTIVE:  /64 (Site: Left Upper Arm, Position: Sitting, Cuff Size: Small Adult)   Pulse 93   SpO2 99%      Physical Exam  Vitals and nursing note reviewed. Constitutional:       General: She is not in acute distress.   Cardiovascular:      Rate and Rhythm: Normal rate and regular rhythm. Pulmonary:      Effort: Pulmonary effort is normal.      Breath sounds: No wheezing. Neurological:      General: No focal deficit present. Mental Status: She is oriented to person, place, and time. Psychiatric:         Mood and Affect: Mood normal.         Behavior: Behavior normal.       Medical problems and test results were reviewed with the patient today. No results found for this or any previous visit (from the past 672 hour(s)). ASSESSMENT and PLAN    Max Travis was seen today for follow-up from hospital.    Diagnoses and all orders for this visit:    Essential hypertension  -     Comprehensive Metabolic Panel; Future  -     Lipid Panel; Future  -     TSH; Future    Anemia, unspecified type  -     CBC with Auto Differential; Future  -     Ferritin; Future  -     Iron; Future  -     Vitamin B12; Future  -     Folate; Future  -     Path Review, Smear; Future    History of cardiac arrest  -     [address] [facility] [place]    Moderate single current episode of major depressive disorder ([facility]    Type 1 diabetes mellitus with hyperglycemia (HCC)    Benign hypertension with CKD (chronic kidney disease) stage IV ([facility]    Encounter for screening for HIV    Stage 3 chronic kidney disease, unspecified whether stage 3a or 3b CKD (HCC)  -     Vitamin D 25 Hydroxy; Future  -     HIV 1/2 Ag/Ab, 4TH Generation,W Rflx Confirm; Future  -     Protein / creatinine ratio, urine; Future    History of kidney stones  -     [facility]    Breast cancer screening by mammogram  -     [facility] DIGITAL SCREEN W OR WO CAD BILATERAL; Future    Need for pneumococcal vaccine  -     Cancel: Pneumococcal, PCV20, PREVNAR 20, (age 25 yrs+), IM, PF      Return in about 2 weeks (around 2/2/2023) for Chronic Condition follow up .      It took more than 40 min to care for this pt today  Over 50% of today's office visit was spent in face to face time in counseling   (may include anyor all of the following: reviewing test results, prognosis, importance of compliance, education about disease process, benefits of medications, instructions for management of acute flare-ups, and follow up plans).

## 2023-01-20 DIAGNOSIS — D64.9 ANEMIA, UNSPECIFIED TYPE: ICD-10-CM

## 2023-01-20 DIAGNOSIS — I10 ESSENTIAL HYPERTENSION: ICD-10-CM

## 2023-01-20 DIAGNOSIS — N18.30 STAGE 3 CHRONIC KIDNEY DISEASE, UNSPECIFIED WHETHER STAGE 3A OR 3B CKD (HCC): Primary | ICD-10-CM

## 2023-01-20 DIAGNOSIS — N18.30 STAGE 3 CHRONIC KIDNEY DISEASE, UNSPECIFIED WHETHER STAGE 3A OR 3B CKD (HCC): ICD-10-CM

## 2023-01-20 LAB
25(OH)D3 SERPL-MCNC: 15.4 NG/ML (ref 30–100)
ALBUMIN SERPL-MCNC: 2.8 G/DL (ref 3.5–5)
ALBUMIN/GLOB SERPL: 0.8 (ref 0.4–1.6)
ALP SERPL-CCNC: 298 U/L (ref 50–136)
ALT SERPL-CCNC: 140 U/L (ref 12–65)
ANION GAP SERPL CALC-SCNC: 8 MMOL/L (ref 2–11)
AST SERPL-CCNC: 159 U/L (ref 15–37)
BASOPHILS # BLD: 0 K/UL (ref 0–0.2)
BASOPHILS NFR BLD: 0 % (ref 0–2)
BILIRUB SERPL-MCNC: 0.3 MG/DL (ref 0.2–1.1)
BUN SERPL-MCNC: 31 MG/DL (ref 6–23)
CALCIUM SERPL-MCNC: 9.6 MG/DL (ref 8.3–10.4)
CHLORIDE SERPL-SCNC: 121 MMOL/L (ref 101–110)
CHOLEST SERPL-MCNC: 110 MG/DL
CO2 SERPL-SCNC: 17 MMOL/L (ref 21–32)
CREAT SERPL-MCNC: 2.5 MG/DL (ref 0.6–1)
DIFFERENTIAL METHOD BLD: ABNORMAL
EOSINOPHIL # BLD: 0.4 K/UL (ref 0–0.8)
EOSINOPHIL NFR BLD: 7 % (ref 0.5–7.8)
ERYTHROCYTE [DISTWIDTH] IN BLOOD BY AUTOMATED COUNT: 15 % (ref 11.9–14.6)
FERRITIN SERPL-MCNC: 489 NG/ML (ref 8–388)
FOLATE SERPL-MCNC: 15.3 NG/ML (ref 3.1–17.5)
GLOBULIN SER CALC-MCNC: 3.4 G/DL (ref 2.8–4.5)
GLUCOSE SERPL-MCNC: 241 MG/DL (ref 65–100)
HCT VFR BLD AUTO: 35.8 % (ref 35.8–46.3)
HDLC SERPL-MCNC: 64 MG/DL (ref 40–60)
HDLC SERPL: 1.7
HGB BLD-MCNC: 10.8 G/DL (ref 11.7–15.4)
HIV 1+2 AB+HIV1 P24 AG SERPL QL IA: NONREACTIVE
HIV 1/2 RESULT COMMENT: NORMAL
IMM GRANULOCYTES # BLD AUTO: 0 K/UL (ref 0–0.5)
IMM GRANULOCYTES NFR BLD AUTO: 0 % (ref 0–5)
IRON SERPL-MCNC: 68 UG/DL (ref 35–150)
LDLC SERPL CALC-MCNC: 31.6 MG/DL
LYMPHOCYTES # BLD: 2.3 K/UL (ref 0.5–4.6)
LYMPHOCYTES NFR BLD: 38 % (ref 13–44)
MCH RBC QN AUTO: 28.9 PG (ref 26.1–32.9)
MCHC RBC AUTO-ENTMCNC: 30.2 G/DL (ref 31.4–35)
MCV RBC AUTO: 95.7 FL (ref 82–102)
MONOCYTES # BLD: 0.5 K/UL (ref 0.1–1.3)
MONOCYTES NFR BLD: 9 % (ref 4–12)
NEUTS SEG # BLD: 2.7 K/UL (ref 1.7–8.2)
NEUTS SEG NFR BLD: 45 % (ref 43–78)
NRBC # BLD: 0 K/UL (ref 0–0.2)
PLATELET # BLD AUTO: 267 K/UL (ref 150–450)
PMV BLD AUTO: 10.3 FL (ref 9.4–12.3)
POTASSIUM SERPL-SCNC: 4.8 MMOL/L (ref 3.5–5.1)
PROT SERPL-MCNC: 6.2 G/DL (ref 6.3–8.2)
RBC # BLD AUTO: 3.74 M/UL (ref 4.05–5.2)
SODIUM SERPL-SCNC: 146 MMOL/L (ref 133–143)
TRIGL SERPL-MCNC: 72 MG/DL (ref 35–150)
TSH, 3RD GENERATION: 2.95 UIU/ML (ref 0.36–3.74)
VIT B12 SERPL-MCNC: 763 PG/ML (ref 193–986)
VLDLC SERPL CALC-MCNC: 14.4 MG/DL (ref 6–23)
WBC # BLD AUTO: 5.9 K/UL (ref 4.3–11.1)

## 2023-01-23 DIAGNOSIS — N18.30 STAGE 3 CHRONIC KIDNEY DISEASE, UNSPECIFIED WHETHER STAGE 3A OR 3B CKD (HCC): Primary | ICD-10-CM

## 2023-01-23 LAB — PATH REV BLD -IMP: NORMAL

## 2023-01-24 ENCOUNTER — TELEPHONE (OUTPATIENT)
Dept: ENDOCRINOLOGY | Age: 48
End: 2023-01-24

## 2023-01-24 NOTE — TELEPHONE ENCOUNTER
Called patient regarding disability paperwork we received. I have a few questions. LVM asking pt to call back.

## 2023-01-26 ENCOUNTER — TELEPHONE (OUTPATIENT)
Dept: DIABETES SERVICES | Age: 48
End: 2023-01-26

## 2023-01-26 ENCOUNTER — SCHEDULED TELEPHONE ENCOUNTER (OUTPATIENT)
Dept: ENDOCRINOLOGY | Age: 48
End: 2023-01-26
Payer: COMMERCIAL

## 2023-01-26 DIAGNOSIS — E10.65 TYPE 1 DIABETES MELLITUS WITH HYPERGLYCEMIA (HCC): ICD-10-CM

## 2023-01-26 PROCEDURE — 99442 PR PHYS/QHP TELEPHONE EVALUATION 11-20 MIN: CPT | Performed by: PHYSICIAN ASSISTANT

## 2023-01-26 RX ORDER — INSULIN DEGLUDEC INJECTION 100 U/ML
9 INJECTION, SOLUTION SUBCUTANEOUS DAILY
Qty: 15 ML | Refills: 3
Start: 2023-01-26

## 2023-01-26 RX ORDER — INSULIN LISPRO 100 [IU]/ML
INJECTION, SOLUTION INTRAVENOUS; SUBCUTANEOUS
Qty: 30 ML | Refills: 3 | Status: SHIPPED | OUTPATIENT
Start: 2023-01-26

## 2023-01-26 NOTE — PROGRESS NOTES
Vanessa Alvarado is a 50 y.o. female evaluated via audio-only technology on 1/26/2023 for No chief complaint on file. Leonid Spindle 10 units   Humalog 4 units TIDAC plus 1/50>150      Home blood glucose monitoring frequency: 3 times per day    By review of home glucose log     Typical log   Fasting  77, 189, 97, 78, 33   AC lunch  185, 77, 21, 44, 134, 86   AC supper  48, 185, 220, 119, 235, 230   Bedtime       Blood glucose levels are uncontrolled, frequent hypoglycemia     Not yet using CGM. Does not have phone. Has Moobia 2 sensors and reader but has not opened the box    Assessment & Plan:   Type 1 diabetes mellitus with hyperglycemia ([facility])  -     [facility]  -     HUMALOG KWIKPEN 100 UNIT/ML SOPN; 3 units AC breakfast, 4 units AC lunch and supper correction scale 1/50>150 AC/HS, max daily dose 30 units, Disp-30 mL, R-3, DAWNormal  -     TRESIBA FLEXTOUCH 100 UNIT/ML SOPN; Inject 9 Units into the skin daily, Disp-15 mL, R-3, DAWNO PRINT  Return as scheduled. 1. Type 1 diabetes mellitus with hyperglycemia ([address]  Pt with hypoglycemia associated with improved compliance. Further downtitrate tresiba to 9, decrease prandial insulin at breakfast from 4 down to 3. Do not take prandial insulin if not eating. Refer for CGM start  - [facility]  - HUMALOG KWIKPEN 100 UNIT/ML SOPN; 3 units AC breakfast, 4 units AC lunch and supper correction scale 1/50>150 AC/HS, max daily dose 30 units  Dispense: 30 mL; Refill: 3  - TRESIBA FLEXTOUCH 100 UNIT/ML SOPN; Inject 9 Units into the skin daily  Dispense: 15 mL; Refill: 3    Subjective:       Prior to Admission medications    Medication Sig Start Date End Date Taking?  Authorizing Provider   HUMALOG KWIKPEN 100 UNIT/ML SOPN 3 units AC breakfast, 4 units AC lunch and supper correction scale 1/50>150 AC/HS, max daily dose 30 units 1/26/23  Yes Anna Hoskins PA-C   TRESIBA FLEXTOUCH 100 UNIT/ML SOPN Inject 9 Units into the skin daily 1/26/23  Yes Fer Hoskins PA-C   acetaminophen (TYLENOL) 325 MG tablet Take 650 mg by mouth every 6 hours as needed 1/4/23 2/3/23  Historical Provider, MD   atorvastatin (LIPITOR) 40 MG tablet  1/4/23   Historical Provider, MD   docusate sodium (COLACE) 100 MG capsule  1/4/23   Historical Provider, MD   magnesium oxide (MAG-OX) 400 MG tablet  1/4/23   Historical Provider, MD   metoprolol succinate (TOPROL XL) 25 MG extended release tablet  10/24/22   Historical Provider, MD   potassium chloride (KLOR-CON M) 20 MEQ extended release tablet  1/4/23   Historical Provider, MD   tamsulosin (FLOMAX) 0.4 MG capsule  10/24/22   Historical Provider, MD   gabapentin (NEURONTIN) 100 MG capsule Take 100 mg by mouth 3 times daily.     Historical Provider, MD   Continuous Blood Gluc Sensor (FREESTYLE SILVESTRE 2 SENSOR) MISC Use to monitor glucose E11.65 12/20/22   Fer Hoskins PA-C   Continuous Blood Gluc  (FREESTYLE SILVESTRE 2 READER) VENITA Use to monitor glucose, E11.65 12/20/22   Lucille Hoskins PA-C   GVOKE HYPOPEN 2-PACK 1 MG/0.2ML SOAJ Inject 1 mg into the skin as needed (severe hypoglycemia) 12/20/22   Fer Hoskins PA-C   acetone, urine, test strip Use if glucose >250 for 4 hours, if positive, proceed to ER E10.9 12/20/22   Lucille Hoskins PA-C   pantoprazole (PROTONIX) 40 MG tablet Take 1 tablet by mouth every morning (before breakfast) 7/8/22   Susie Fan MD   Insulin Pen Needle (PEN NEEDLES 31GX5/16\") 31G X 8 MM MISC 120 each by Other route 4 times daily 6/21/22 7/21/22  Historical Provider, MD   albuterol (PROVENTIL) (2.5 MG/3ML) 0.083% nebulizer solution Take 3 mLs by nebulization every 4 hours as needed for Wheezing  Patient not taking: No sig reported 6/10/22   Tyrone Montemayor MD     No Known Allergies  Past Medical History:   Diagnosis Date    Acute renal failure ([address] 1/24/2012    CKD (chronic kidney disease) stage 3, GFR 30-59 ml/min (HCC)     Gastroenteritis, acute 1/24/2012 H/O amputation of lesser toe, left ([facility] 6/9/2022    This status condition was added to the problem list by Eloy SALDANA of the [facility] Team, after medical record review and validation. Hemodialysis patient ([facility]     IDDM (insulin dependent diabetes mellitus) 1/24/2012    Type 1 av -150 can tell Hypo below 70    Intractable nausea and vomiting 12/25/2014    Irregular menses     Kidney stone     Neuropathy, peripheral, idiopathic 3/13/2017    Retinopathy, bilateral 3/13/2017    Trichomoniasis 3/13/2017    Ulcer, skin, chronic ([facility] 3/13/2017    Vaginal burning      Review of Systems    No data recorded    Nate Ortiz was evaluated through a patient-initiated, synchronous (real-time) audio only encounter. She (or guardian if applicable) is aware that it is a billable service, which includes applicable co-pays, with coverage as determined by her insurance carrier. This visit was conducted with the patient's (and/or Maciele Big guardian's) verbal consent. She has not had a related appointment within my department in the past 7 days or scheduled within the next 24 hours. The patient was located in a state where the provider was licensed to provide care. The patient was located at: Home: [address] [place] [address] [address] [address]  The provider was located at:  Facility (Appt Dept):  [address],  [facility]    Total Time: minutes: 17 minutes    Larisa Antonio PA-C

## 2023-01-27 ENCOUNTER — TELEPHONE (OUTPATIENT)
Dept: DIABETES SERVICES | Age: 48
End: 2023-01-27

## 2023-01-30 ENCOUNTER — HOSPITAL ENCOUNTER (OUTPATIENT)
Dept: DIABETES SERVICES | Age: 48
Setting detail: RECURRING SERIES
Discharge: HOME | End: 2023-02-02
Payer: COMMERCIAL

## 2023-01-30 PROCEDURE — 95249 CONT GLUC MNTR PT PROV EQP: CPT

## 2023-01-30 NOTE — PROGRESS NOTES
Pt seen today for initial insertion of  FreeStyle Karely 2  system.  Pt instructed on overview of continuous glucose monitoring (CGM) device and use.   Instructed on sensor, sensor applicator and .    Instructed on alarms or alerts with Karely system. Scan the sensor that is on the arm for readings.   Must scan sensor minimal every eight hours. If do not scan in the eight hour time frame, data during that eight hour period will be lost.  Can print reports of blood sugars in graph form.   Instructed on storage, use, prep, insertion and removal of sensor and system  Instructed on interfering substances and x-rays.  Remove the sensor before MRI, CT scan, X-ray, or diathermy treatment.   Instructed on troubleshooting, alerts, alarms, communication between sensor and ,  starting sensor session, ending sensor session, and site rotation.  Instructed only approved site is the back of the arm.  When giving injections near Karely sensor make sure at least 2 inches away.  She reports she gives injections in her abdomen.   Instructed on Vitamin C and Asprin use. Possible false high if over 500 mg Vitamin C or false low with over 650 mg of Asprin   All questions and concerns addressed.   Provided [employer] technical support phone number. [phone]  Medicine Reconciliation Completed. No surgery or procedures reported.

## 2023-01-31 ENCOUNTER — OFFICE VISIT (OUTPATIENT)
Dept: INTERNAL MEDICINE CLINIC | Facility: CLINIC | Age: 48
End: 2023-01-31
Payer: COMMERCIAL

## 2023-01-31 VITALS
WEIGHT: 126.8 LBS | HEART RATE: 82 BPM | SYSTOLIC BLOOD PRESSURE: 118 MMHG | OXYGEN SATURATION: 100 % | BODY MASS INDEX: 19.28 KG/M2 | DIASTOLIC BLOOD PRESSURE: 70 MMHG

## 2023-01-31 DIAGNOSIS — R79.89 ELEVATED LFTS: Primary | ICD-10-CM

## 2023-01-31 DIAGNOSIS — R29.898 WEAKNESS OF BOTH LOWER EXTREMITIES: ICD-10-CM

## 2023-01-31 DIAGNOSIS — E10.65 TYPE 1 DIABETES MELLITUS WITH HYPERGLYCEMIA (HCC): ICD-10-CM

## 2023-01-31 PROCEDURE — 99215 OFFICE O/P EST HI 40 MIN: CPT | Performed by: INTERNAL MEDICINE

## 2023-01-31 RX ORDER — INSULIN DEGLUDEC INJECTION 100 U/ML
9 INJECTION, SOLUTION SUBCUTANEOUS DAILY
Qty: 15 ML | Refills: 3 | Status: SHIPPED | OUTPATIENT
Start: 2023-01-31

## 2023-01-31 ASSESSMENT — PATIENT HEALTH QUESTIONNAIRE - PHQ9
SUM OF ALL RESPONSES TO PHQ9 QUESTIONS 1 & 2: 0
SUM OF ALL RESPONSES TO PHQ QUESTIONS 1-9: 0
SUM OF ALL RESPONSES TO PHQ QUESTIONS 1-9: 0
3. TROUBLE FALLING OR STAYING ASLEEP: 0
7. TROUBLE CONCENTRATING ON THINGS, SUCH AS READING THE NEWSPAPER OR WATCHING TELEVISION: 0
2. FEELING DOWN, DEPRESSED OR HOPELESS: 0
5. POOR APPETITE OR OVEREATING: 0
4. FEELING TIRED OR HAVING LITTLE ENERGY: 0
6. FEELING BAD ABOUT YOURSELF - OR THAT YOU ARE A FAILURE OR HAVE LET YOURSELF OR YOUR FAMILY DOWN: 0
1. LITTLE INTEREST OR PLEASURE IN DOING THINGS: 0
9. THOUGHTS THAT YOU WOULD BE BETTER OFF DEAD, OR OF HURTING YOURSELF: 0
8. MOVING OR SPEAKING SO SLOWLY THAT OTHER PEOPLE COULD HAVE NOTICED. OR THE OPPOSITE, BEING SO FIGETY OR RESTLESS THAT YOU HAVE BEEN MOVING AROUND A LOT MORE THAN USUAL: 0
SUM OF ALL RESPONSES TO PHQ QUESTIONS 1-9: 0
10. IF YOU CHECKED OFF ANY PROBLEMS, HOW DIFFICULT HAVE THESE PROBLEMS MADE IT FOR YOU TO DO YOUR WORK, TAKE CARE OF THINGS AT HOME, OR GET ALONG WITH OTHER PEOPLE: 0
SUM OF ALL RESPONSES TO PHQ QUESTIONS 1-9: 0

## 2023-01-31 ASSESSMENT — ENCOUNTER SYMPTOMS
SHORTNESS OF BREATH: 0
ABDOMINAL PAIN: 0
COUGH: 0

## 2023-01-31 NOTE — PROGRESS NOTES
SUBJECTIVE:   Narciso Don is a 50 y.o. female seen for a visit regarding   Chief Complaint   Patient presents with    Follow-up    Diabetes    Hypertension        HPI  Was last seen in August, 2021, has had multiple no shows since then, now living with mom     Diabetes type 1 - seeing Conni Jeans, Endocrinology  CKD-4, Mild anemia - plans to call Dr. Celina Santiago Nephrology  [facility]   Elevated LFTs on lab, Gastroparesis, h/o esophagitis/gastritis - seeing GI  H/o Cardiac Arrest in 2022 - has cardiology appt. on March 3rd  H/o kidney stone s/p stent - seeing Urologist on Feb 9th  Leg weakness, unsteady gait, h/o Polyneuropathy, - has home health PT  Per patient she is seeing her Gyn  Charcot's joint - seeing Podiatry  Mammogram pending    Past Medical History, Past Surgical History, Family history, Social History, and Medications were all reviewed with the patient today and updated as necessary. Current Outpatient Medications   Medication Sig Dispense Refill    TRESIBA FLEXTOUCH 100 UNIT/ML SOPN Inject 9 Units into the skin daily 15 mL 3    HUMALOG KWIKPEN 100 UNIT/ML SOPN 3 units AC breakfast, 4 units AC lunch and supper correction scale 1/50>150 AC/HS, max daily dose 30 units 30 mL 3    acetaminophen (TYLENOL) 325 MG tablet Take 650 mg by mouth every 6 hours as needed      atorvastatin (LIPITOR) 40 MG tablet       magnesium oxide (MAG-OX) 400 MG tablet       potassium chloride (KLOR-CON M) 20 MEQ extended release tablet       tamsulosin (FLOMAX) 0.4 MG capsule       gabapentin (NEURONTIN) 100 MG capsule Take 100 mg by mouth 3 times daily.       Continuous Blood Gluc Sensor (FREESTYLE SILVESTRE 2 SENSOR) [facility] Use to monitor glucose E11.65 6 each 3    Continuous Blood Gluc  (FREESTYLE SILVESTRE 2 READER) [facility] Use to monitor glucose, E11.65 1 each 0    docusate sodium (COLACE) 100 MG capsule  (Patient not taking: Reported on 1/31/2023)      metoprolol succinate (TOPROL XL) 25 MG extended release tablet  (Patient not taking: Reported on 1/31/2023)      Oletta  2-PACK 1 MG/0.2ML SOAJ Inject 1 mg into the skin as needed (severe hypoglycemia) 2 each 1    acetone, urine, test strip Use if glucose >250 for 4 hours, if positive, proceed to ER E10.9 (Patient not taking: Reported on 1/31/2023) 50 strip 1    pantoprazole (PROTONIX) 40 MG tablet Take 1 tablet by mouth every morning (before breakfast) (Patient not taking: Reported on 1/31/2023) 90 tablet 1    Insulin Pen Needle (PEN NEEDLES 31GX5/16\") 31G X 8 MM MISC 120 each by Other route 4 times daily      albuterol (PROVENTIL) (2.5 MG/3ML) 0.083% nebulizer solution Take 3 mLs by nebulization every 4 hours as needed for Wheezing (Patient not taking: No sig reported) 120 each 3     No current facility-administered medications for this visit.      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 associated with type 1 diabetes mellitus ([address]    Hydronephrosis of right kidney    Diabetes mellitus type 1 ([facility])    Benign hypertension with CKD (chronic kidney disease) stage IV ([facility])    Type 1 diabetes mellitus with hyperglycemia ([facility])    Cardiac arrest ([address]    Diabetic ketoacidosis with coma associated with type 1 diabetes mellitus ([address]    Frailty    Normocytic anemia    Physical debility    Severe sepsis ([facility])    Pyelonephritis    Gastritis without bleeding    Orthostasis     Past Medical History:   Diagnosis Date    Acute renal failure ([address] 1/24/2012    CKD (chronic kidney disease) stage 3, GFR 30-59 ml/min ([facility])     Gastroenteritis, acute 2012    H/O amputation of lesser toe, left ([facility] 2022    This status condition was added to the problem list by Shaji SALDANA of the [facility] Team, after medical record review and validation. Hemodialysis patient ([facility]     IDDM (insulin dependent diabetes mellitus) 2012    Type 1 av -150 can tell Hypo below 70    Intractable nausea and vomiting 2014    Irregular menses     Kidney stone     Neuropathy, peripheral, idiopathic 3/13/2017    Retinopathy, bilateral 3/13/2017    Trichomoniasis 3/13/2017    Ulcer, skin, chronic ([facility] 3/13/2017    Vaginal burning      Past Surgical History:   Procedure Laterality Date    AMPUTATION Left     5th Toe due to Diabetic Ulcer    AMPUTATION  12    gangrene    CYSTOSCOPY,INSERT URETERAL STENT  2022    HX OPEN REDUCTION INTERNAL FIXATION Right     ankle    IR TUNNELED CATHETER PLACEMENT GREATER THAN 5 YEARS  2022    IR TUNNELED CATHETER PLACEMENT GREATER THAN 5 YEARS 2022 [facility]    UPPER GASTROINTESTINAL ENDOSCOPY N/A 2022    EGD BIOPSY performed by Zita Roque MD at [address] [place] [address]     Family History   Problem Relation Age of Onset    Prostate Cancer Father     Diabetes Paternal Grandmother     Colon Cancer Neg Hx     Uterine Cancer Neg Hx     Diabetes Father         DM Type II    Stroke Father     Diabetes Paternal Grandfather         DM Type II     Hypertension Maternal Grandmother     Ovarian Cancer Neg Hx     Breast Cancer Mother 76    Diabetes Maternal Grandmother         DM Type II     Social History     Tobacco Use    Smoking status: Former     Types: Cigarettes     Quit date: 2013     Years since quittin.2    Smokeless tobacco: Never   Substance Use Topics    Alcohol use: Yes         Review of Systems   Constitutional:  Negative for fever. Respiratory:  Negative for cough and shortness of breath. Cardiovascular:  Negative for chest pain and leg swelling.    Gastrointestinal:  Negative for abdominal pain. Psychiatric/Behavioral:  Negative for behavioral problems and confusion. OBJECTIVE:  /70 (Site: Left Upper Arm, Position: Sitting, Cuff Size: Small Adult)   Pulse 82   Wt 126 lb 12.8 oz (57.5 kg)   SpO2 100%   BMI 19.28 kg/m²      Physical Exam  Vitals and nursing note reviewed. Constitutional:       General: She is not in acute distress. Cardiovascular:      Rate and Rhythm: Normal rate and regular rhythm. Pulmonary:      Effort: Pulmonary effort is normal.      Breath sounds: No wheezing. Neurological:      General: No focal deficit present. Mental Status: She is oriented to person, place, and time. Psychiatric:         Mood and Affect: Mood normal.         Behavior: Behavior normal.     Medical problems and test results were reviewed with the patient today.      Recent Results (from the past 672 hour(s))   TSH    Collection Time: 01/20/23  9:37 AM   Result Value Ref Range    TSH, 3RD GENERATION 2.950 0.358 - 3.740 uIU/mL   HIV 1/2 Ag/Ab, 4TH Generation,W Rflx Confirm    Collection Time: 01/20/23  9:37 AM   Result Value Ref Range    HIV 1/2 Interp NONREACTIVE NONREACTIVE      HIV 1/2 Result Comment SEE NOTE     Path Review, Smear    Collection Time: 01/20/23  9:37 AM   Result Value Ref Range    Pathologist Review (NOTE)    Folate    Collection Time: 01/20/23  9:37 AM   Result Value Ref Range    Folate 15.3 3.1 - 17.5 ng/mL   Vitamin B12    Collection Time: 01/20/23  9:37 AM   Result Value Ref Range    Vitamin B-12 763 193 - 986 pg/mL   Iron    Collection Time: 01/20/23  9:37 AM   Result Value Ref Range    Iron 68 35 - 150 ug/dL   Ferritin    Collection Time: 01/20/23  9:37 AM   Result Value Ref Range    Ferritin 489 (H) 8 - 388 NG/ML   CBC with Auto Differential    Collection Time: 01/20/23  9:37 AM   Result Value Ref Range    WBC 5.9 4.3 - 11.1 K/uL    RBC 3.74 (L) 4.05 - 5.2 M/uL    Hemoglobin 10.8 (L) 11.7 - 15.4 g/dL    Hematocrit 35.8 35.8 - 46.3 %    MCV 95.7 82 - 102 FL    MCH 28.9 26.1 - 32.9 PG    MCHC 30.2 (L) 31.4 - 35.0 g/dL    RDW 15.0 (H) 11.9 - 14.6 %    Platelets 002 582 - 113 K/uL    MPV 10.3 9.4 - 12.3 FL    nRBC 0.00 0.0 - 0.2 K/uL    Differential Type AUTOMATED      Seg Neutrophils 45 43 - 78 %    Lymphocytes 38 13 - 44 %    Monocytes 9 4.0 - 12.0 %    Eosinophils % 7 0.5 - 7.8 %    Basophils 0 0.0 - 2.0 %    Immature Granulocytes 0 0.0 - 5.0 %    Segs Absolute 2.7 1.7 - 8.2 K/UL    Absolute Lymph # 2.3 0.5 - 4.6 K/UL    Absolute Mono # 0.5 0.1 - 1.3 K/UL    Absolute Eos # 0.4 0.0 - 0.8 K/UL    Basophils Absolute 0.0 0.0 - 0.2 K/UL    Absolute Immature Granulocyte 0.0 0.0 - 0.5 K/UL   Vitamin D 25 Hydroxy    Collection Time: 01/20/23  9:37 AM   Result Value Ref Range    Vit D, 25-Hydroxy 15.4 (L) 30.0 - 100.0 ng/mL   Lipid Panel    Collection Time: 01/20/23  9:37 AM   Result Value Ref Range    Cholesterol, Total 110 <200 MG/DL    Triglycerides 72 35 - 150 MG/DL    HDL 64 (H) 40 - 60 MG/DL    LDL Calculated 31.6 <100 MG/DL    VLDL Cholesterol Calculated 14.4 6.0 - 23.0 MG/DL    Chol/HDL Ratio 1.7     Comprehensive Metabolic Panel    Collection Time: 01/20/23  9:37 AM   Result Value Ref Range    Sodium 146 (H) 133 - 143 mmol/L    Potassium 4.8 3.5 - 5.1 mmol/L    Chloride 121 (H) 101 - 110 mmol/L    CO2 17 (L) 21 - 32 mmol/L    Anion Gap 8 2 - 11 mmol/L    Glucose 241 (H) 65 - 100 mg/dL    BUN 31 (H) 6 - 23 MG/DL    Creatinine 2.50 (H) 0.6 - 1.0 MG/DL    Est, Glom Filt Rate 23 (L) >60 ml/min/1.73m2    Calcium 9.6 8.3 - 10.4 MG/DL    Total Bilirubin 0.3 0.2 - 1.1 MG/DL     (H) 12 - 65 U/L     (H) 15 - 37 U/L    Alk Phosphatase 298 (H) 50 - 136 U/L    Total Protein 6.2 (L) 6.3 - 8.2 g/dL    Albumin 2.8 (L) 3.5 - 5.0 g/dL    Globulin 3.4 2.8 - 4.5 g/dL    Albumin/Globulin Ratio 0.8 0.4 - 1.6           ASSESSMENT and PLAN    [place] was seen today for follow-up, diabetes and hypertension.     Diagnoses and all orders for this visit:    Elevated LFTs  -     Hepatic Function Panel; Future    Type 1 diabetes mellitus with hyperglycemia (HCC)  -     TRESIBA FLEXTOUCH 100 UNIT/ML SOPN; Inject 9 Units into the skin daily    Weakness of both lower extremities  -     [facility]          Return in about 6 weeks (around 3/14/2023) for Chronic Condition follow up . It took more than 40 min to care for this pt today  Over 50% of today's office visit was spent in face to face time in counseling   (may include anyor all of the following: reviewing test results, prognosis, importance of compliance, education about disease process, benefits of medications, instructions for management of acute flare-ups, and follow up plans).

## 2023-02-02 ENCOUNTER — TELEPHONE (OUTPATIENT)
Dept: INTERNAL MEDICINE CLINIC | Facility: CLINIC | Age: 48
End: 2023-02-02

## 2023-02-02 NOTE — TELEPHONE ENCOUNTER
Rodriguez Hung from [facility] called and stated Patient blood sugar is dropping below 60 during the evening. 309 at 9:12 PM  63 at 10:50 PM  53 at 11:17 PM   Please advise.

## 2023-02-02 NOTE — TELEPHONE ENCOUNTER
Patient and Marga Orr per Dr. Sarah Brannon patient sees Endocrinology for Diabetes - advise to stop Tresiba 9 units daily that she takes and contact Endo for instructions.

## 2023-02-06 ENCOUNTER — HOSPITAL ENCOUNTER (OUTPATIENT)
Dept: PHYSICAL THERAPY | Age: 48
Setting detail: RECURRING SERIES
Discharge: HOME | End: 2023-02-09
Payer: COMMERCIAL

## 2023-02-06 PROCEDURE — 97162 PT EVAL MOD COMPLEX 30 MIN: CPT

## 2023-02-06 PROCEDURE — 97110 THERAPEUTIC EXERCISES: CPT

## 2023-02-06 ASSESSMENT — PAIN DESCRIPTION - DESCRIPTORS: DESCRIPTORS: BURNING;SHARP;SHOOTING

## 2023-02-06 ASSESSMENT — PAIN DESCRIPTION - ORIENTATION: ORIENTATION: RIGHT;LEFT

## 2023-02-06 ASSESSMENT — PAIN SCALES - GENERAL: PAINLEVEL_OUTOF10: 6

## 2023-02-06 ASSESSMENT — PAIN DESCRIPTION - LOCATION: LOCATION: HAND

## 2023-02-06 NOTE — PROGRESS NOTES
Heaven Peteyesy  : 1975  Primary: Reema Castellano ([facility])  Secondary:  [facility] [address] [address]  Phone: [phone]  Fax: [phone] Plan Frequency: 1-2 times a week for 90 days. Plan of Care/Certification Expiration Date: 23      PT Visit Info:  Plan Frequency: 1-2 times a week for 90 days. Plan of Care/Certification Expiration Date: 23  Total # of Visits to Date: 1      Visit Count:  1    OUTPATIENT PHYSICAL THERAPY:OP NOTE TYPE: Treatment Note 2023       Episode  }Appt Desk             Treatment Diagnosis:  Difficulty in walking, Not elsewhere classified (R26.2)  Generalized Muscle Weakness (M62.81)  Medical/Referring Diagnosis:  Weakness of both lower extremities [R29.898]  Referring Physician:  Mary Pelletier MD MD Orders:  PT Eval and Treat   Date of Onset:  Onset Date:  (Chronic)     Allergies:   Patient has no known allergies. Restrictions/Precautions:  Restrictions/Precautions: Fall Risk; General Precautions  No data recorded     Interventions Planned (Treatment may consist of any combination of the following):    Current Treatment Recommendations: Strengthening; ROM; Balance training; Functional mobility training; Gait training; Home exercise program     Subjective Comments:  Patient complains of weakness and difficulty walking. Initial:}Right, Left Hand 10Post Session:  Right, Left  Hand 610  Medications Last Reviewed:  2023  Updated Objective Findings:  See evaluation note from today  Treatment   THERAPEUTIC EXERCISE: (8 minutes):    Exercises per grid below to improve mobility and strength. Required minimal verbal cues to promote proper body alignment. Progressed repetitions as indicated.    Date:  2023 Date:   Date:     Activity/Exercise Parameters Parameters Parameters   Nustep  Level 1 x 8 minutes                                             Treatment/Session Summary: Treatment Assessment:  Patient reports increased fatigue at the end of treatment. Communication/Consultation:  None today  Equipment provided today:  Home exercise program consisting of bilateral lower extremity strengthening. Recommendations/Intent for next treatment session: Next visit will focus on strengthening, balance, and gait.     Total Treatment Billable Duration:  8 minutes+ evaluation  Time In: 2522  Time Out: 1600    TOI Gunter, PT       Charge Capture  }Post Session Pain  PT Visit Info  [email or web address] Portal  MD Guidelines  Scanned Media  Benefits  MyChart    Future Appointments   Date Time Provider Debo Aleman   2/9/2023  1:30 PM TANISHA Reyes - NP PGUMAU [facility]   2/14/2023  2:30 PM Robin Mendoza, PT SFEORPT [facility]   2/21/2023  3:15 PM Dennis Debar Vismelvin, PT SFE[facility]PT [facility]   2/28/2023  3:15 PM Dennis Debmiguel Mcdonald, PT [facility]FE[facility]PT [facility]   3/3/2023  3:30 PM Charlene Wheat MD UCDG [facility]   3/20/2023  3:00 PM Abran Cheema MD MLMIM [facility]   3/28/2023 11:30 AM FELIZ Oneal [facility]

## 2023-02-06 NOTE — THERAPY EVALUATION
Juan Antonio Craig  : 1975  Primary: Darlene Castellano ([place] [employer])  Secondary:  [employer] [employer] [address] [address]  Phone: [phone]  Fax: [phone] Plan Frequency: 1-2 times a week for 90 days. Plan of Care/Certification Expiration Date: 23    PT Visit Info:  Plan Frequency: 1-2 times a week for 90 days. Plan of Care/Certification Expiration Date: 23  Total # of Visits to Date: 1    Visit Count:  1                OUTPATIENT PHYSICAL THERAPY:             OP NOTE TYPE: Initial Assessment 2023               Episode (PT-Bilateral lower extremity weakness) Appt Desk         Treatment Diagnosis:  Difficulty in walking, Not elsewhere classified (R26.2)  Generalized Muscle Weakness (M62.81)  Medical/Referring Diagnosis:  Weakness of both lower extremities [R29.898]  Referring Physician:  Ana Lilia Barnes MD MD Orders:  PT Eval and Treat   Return MD Appt:  unknown   Date of Onset:  Onset Date:  (Chronic)    Allergies:  Patient has no known allergies. Restrictions/Precautions:    Restrictions/Precautions: Fall Risk; General Precautions        Medications Last Reviewed:  2023     SUBJECTIVE   History of Injury/Illness (Reason for Referral):  Patient reports multiple hospitalizations involving Type 1 diabetes and chronic kidney disease. Patient reports most recent was around six months ago. Patient complains of weakness and difficulty walking. Patient has had no falls. Patient reports using a walker at home and a wheelchair for community distances. Patient rates current dizziness as 3/10 and bilateral hand pain as 6/10 due to neuropathy. Patient reports she was walking with no assistive device prior to most recent hospitalization. Patient Stated Goal(s):  \"I am ready to walk\".   Initial: Right, Left Hand 6/10 Post Session: Right, Left Hand 6/10  Past Medical History/Comorbidities:   Ms. Fabi Lund  has a past medical history of Acute renal failure ([facility] CKD (chronic kidney disease) stage 3, GFR 30-59 ml/min ([facility]), Gastroenteritis, acute, H/O amputation of lesser toe, left ([facility] Hemodialysis patient ([facility] IDDM (insulin dependent diabetes mellitus), Intractable nausea and vomiting, Irregular menses, Kidney stone, Neuropathy, peripheral, idiopathic, Retinopathy, bilateral, Trichomoniasis, Ulcer, skin, chronic ([facility] and Vaginal burning. Ms. Lisandro Garcia  has a past surgical history that includes cystoscopy,insert ureteral stent (04/2022); amputation (Left); amputation (1/27/12); hx open reduction internal fixation (Right, 2011); IR TUNNELED CVC PLACE WO SQ PORT/PUMP > 5 YEARS (6/1/2022); and Upper gastrointestinal endoscopy (N/A, 7/5/2022).   Social History/Living Environment:   Lives With: Alone (patient states will be staying with her parent)  Type of Home: House  Home Layout: One level  Home Access: Stairs to enter with rails  Entrance Stairs - Rails: Both  Entrance Stairs - Number of Steps: 3 stairs  Bathroom Shower/Tub: Tub/Shower unit  Bathroom Toilet: Standard     Prior Level of Function/Work/Activity:   Occupation: On disability  Receives Help From: Family  ADL Assistance: Independent  Bath: Unable to assess(comment)  Dressing: Unable to assess(comment)  Grooming: Unable to assess(comment)  Feeding: Unable to assess(comment)  Toileting: Needs assistance  Homemaking Assistance: Independent  Meal Prep: Unable to assess (comment)  Laundry: Unable to assess (comment)  Vacuuming: Unable to assess (comment)  Cleaning: Unable to assess (comment)  Gardening: Unable to assess (comment)  Yard Work: Unable to assess (comment)  Driving: Unable to assess (comment)  Shopping: Unable to assess (comment)  : Unable to assess (comment)  Homemaking Responsibilities: Yes  Ambulation Assistance: Independent  Transfer Assistance: Independent  Active : No  Mode of Transportation: Car        Learning:   Does the patient/guardian have any barriers to learning?: No barriers  Will there be a co-learner?: No  What is the preferred language of the patient/guardian?: English  Is an  required?: No  How does the patient/guardian prefer to learn new concepts?: Listening; Demonstration; Reading     Fall Risk Scale: Andujar Total Score: 30  Andujar Fall Risk: Medium (25-44)     Dominant Side:  right handed  Personal Factors:        Sex:  female        Age:  50 y.o. OBJECTIVE   Observations: Forward head/rounded shoulder posture. Ambulation/WC:     Patient ambulates with rolling walker demonstrating decreased gait speed and decreased heel strike on initial contact and decreased step length during swing phase. Sensation:   Within normal limits bilateral lower extremity. Strength:   Hip flexion 4-/5, hip abduction right 4-/5 and left 3+/5, hip adduction right 4-/5 and left 3+/5, quadriceps 4-/5, hamstrings 3-/5, ankle dorsiflexion 3-/5, ankle plantarflexion 2-/5. ASSESSMENT   Initial Assessment:  Patient presents with decreased strength, imbalance, and difficulty walking. Patient is at higher fall risk based on scores received on Wei Balance Scale and Timed Up and Go Test.  Patient would benefit from skilled physical therapy to address problems and goals. Thank you for this referral.    Problem List: (Impacting functional limitations): Body Structures, Functions, Activity Limitations Requiring Skilled Therapeutic Intervention: Decreased functional mobility ; Decreased strength; Decreased endurance; Decreased balance; Decreased coordination     Therapy Prognosis:   Therapy Prognosis: Good     Initial Assessment Complexity:   Decision Making: Medium Complexity    PLAN   Effective Dates: 2/6/2023 TO Plan of Care/Certification Expiration Date: 05/07/23   Frequency/Duration: Plan Frequency: 1-2 times a week for 90 days.    Interventions Planned (Treatment may consist of any combination of the following):    Current Treatment Recommendations: Strengthening; ROM; Balance training; Functional mobility training; Gait training; Home exercise program     Goals: (Goals have been discussed and agreed upon with patient.)  Short-Term Functional Goals: Time Frame: 30 days  Patient will be independent with home exercise program to improve balance and strength. Patient will decrease score on Timed Up and Go Test to less than or equal to 38 seconds demonstrating improved gait speed. Discharge Goals: Time Frame: 90 days   Patient will decrease score on Timed Up and Go Test to less than or equal to 25 seconds demonstrating improved gait speed. Patient will score greater than or equal to 35 on Wei Balance Scale indicating improved balance and decreased fall risk with daily activities. Patient will ambulate independently with least restrictive assistive device greater than 200 feet without loss of balance or fear of falling. Outcome Measure: Tool Used: Wei Balance Scale  Score:  Initial: 20/56 Most Recent: X/56 (Date: -- )   Interpretation of Score: Each section is scored on a 0-4 scale, 0 representing the patients inability to perform the task and 4 representing independence. The scores of each section are added together for a total score of 56. The higher the patients score, the more independent the patient is. Any score below 45 indicates increased risk for falls. Tool Used: Timed Up and Go (TUG)  Score:  Initial: 44.8 seconds Most Recent: X seconds (Date: -- )   Interpretation of Score: The test measures, in seconds, the time taken by an individual to stand up from a standard arm chair (seat height 46 cm [18 in], arm height 65 cm [25.6 in]), walk a distance of 3 meters (118 in, approx 10 ft), turn, walk back to the chair and sit down. If the individual takes longer than 14 seconds to complete TUG, this indicates risk for falls.     Medical Necessity:   > Patient is expected to demonstrate progress in strength, balance, coordination, and mobility to improve safety during activities of daily living. Reason For Services/Other Comments:  > Patient continues to require skilled intervention due to higher fall risk with activities of daily living. Total Duration:  Time In: 1525  Time Out: 1600    Regarding Vishnu Leal's therapy, I certify that the treatment plan above will be carried out by a therapist or under their direction.   Thank you for this referral,  Nohemy Darby, PT     Referring Physician Signature: Tomy Wheatley MD _______________________________ Date _____________        Post Session Pain  Charge Capture  PT Visit Info MD Guidelines  William

## 2023-02-08 ENCOUNTER — NURSE ONLY (OUTPATIENT)
Dept: ENDOCRINOLOGY | Age: 48
End: 2023-02-08
Payer: COMMERCIAL

## 2023-02-08 DIAGNOSIS — E10.65 TYPE 1 DIABETES MELLITUS WITH HYPERGLYCEMIA (HCC): Primary | ICD-10-CM

## 2023-02-08 LAB — GLUCOSE, POC: 408 MG/DL

## 2023-02-08 PROCEDURE — 82962 GLUCOSE BLOOD TEST: CPT | Performed by: PHYSICIAN ASSISTANT

## 2023-02-08 RX ORDER — INSULIN DEGLUDEC INJECTION 100 U/ML
10 INJECTION, SOLUTION SUBCUTANEOUS DAILY
Qty: 15 ML | Refills: 3
Start: 2023-02-08 | End: 2023-02-10 | Stop reason: SDUPTHER

## 2023-02-08 RX ORDER — INSULIN LISPRO 100 [IU]/ML
INJECTION, SOLUTION INTRAVENOUS; SUBCUTANEOUS
Qty: 30 ML | Refills: 3
Start: 2023-02-08 | End: 2023-02-10 | Stop reason: SDUPTHER

## 2023-02-08 NOTE — PROGRESS NOTES
Pt came to office unannounced and unscheduled explaining that she did not have a  for her CGM  She asked for a fingerstick glucose, it was 408  She then explained that she was having hypoglycemia, as low as 21 overnight on treatment regimen and was \"told by PCP to discontinue tresiba\"   Not from 1/30/2023 with PCP shows down titration to 9 units  Pt encouraged to take lower amount of basal insulin but needs to take basal insulin daily  Willing to take 10 units and aware of plan to up or down titrate by 1 unit every 4 days  Continue prandial insulin and correction  Has home health coming to her house today at [phone] and will take both prandial and tresiba  Has urine ketone test strips at home  Discussed risk of DKA and critical importance of insulin compliance.  Aware to reach out to this office for assistance with dose adjustment

## 2023-02-09 ENCOUNTER — OFFICE VISIT (OUTPATIENT)
Dept: UROLOGY | Age: 48
End: 2023-02-09

## 2023-02-09 DIAGNOSIS — N20.0 KIDNEY STONE: Primary | ICD-10-CM

## 2023-02-09 ASSESSMENT — ENCOUNTER SYMPTOMS
WHEEZING: 0
CONSTIPATION: 0
EYE PAIN: 0
INDIGESTION: 0
ABDOMINAL PAIN: 0
HEARTBURN: 0
BACK PAIN: 0
SKIN LESIONS: 0
NAUSEA: 0
COUGH: 0
SHORTNESS OF BREATH: 0
DIARRHEA: 0
VOMITING: 0
EYE DISCHARGE: 0
BLOOD IN STOOL: 0

## 2023-02-09 NOTE — PROGRESS NOTES
[address], [facility] [address]  [phone]          Radhika Juan  : 1975    Chief Complaint   Patient presents with    New Patient    Nephrolithiasis          HPI     Radhika Juan is a 50 y.o. female  Patient is here today for follow-up on history of stone disease. Patient was treated emergently 2022 with right stent placement after being admitted with urosepsis. Patient has significant history of CKD, hypertension, DM 1 presented to ER in 2022 with complaints of right lower quadrant pain. The pain had been present several days prior to that admission. Upon her ER visit her UA revealed large WBCs RBCs and 1+ bacteria. Her creatinine was elevated to 1.68. Patient's baseline creatinine is 1.8-2.0. A CT at that time revealed a 3 mm proximal right ureteral stone with mild hydronephrosis. She is admitted to the hospitalist and we were consulted. Dr. Chicho Apodaca who performed a right stent placement on 2022. 2022 she went back to the OR for cystoscopy, right ureteroscopy laser lithotripsy basket extraction of right stone fragments and right ureteral stent exchange. Her stent was removed by herself. Does not appear there is any follow-up in the office after that last procedure. She is here today for evaluation. She denies any flank pain, hematuria or dysuria. She is here for KUB and evaluation. She is accompanied by her mother. Past Medical History:   Diagnosis Date    Acute renal failure ([address] 2012    CKD (chronic kidney disease) stage 3, GFR 30-59 ml/min ([facility])     Gastroenteritis, acute 2012    H/O amputation of lesser toe, left ([address] 2022    This status condition was added to the problem list by Erwin SALDANA of the [facility] Team, after medical record review and validation.        Hemodialysis patient ([facility]     IDDM (insulin dependent diabetes mellitus) 2012    Type 1 av -150 can tell Hypo below 70    Intractable nausea and vomiting 12/25/2014    Irregular menses     Kidney stone     Neuropathy, peripheral, idiopathic 3/13/2017    Retinopathy, bilateral 3/13/2017    Trichomoniasis 3/13/2017    Ulcer, skin, chronic ([facility] [address] 3/13/2017    Vaginal burning      Past Surgical History:   Procedure Laterality Date    AMPUTATION Left     5th Toe due to Diabetic Ulcer    AMPUTATION  1/27/12    gangrene    CYSTOSCOPY,INSERT URETERAL STENT  04/2022    HX OPEN REDUCTION INTERNAL FIXATION Right 2011    ankle    IR TUNNELED CATHETER PLACEMENT GREATER THAN 5 YEARS  6/1/2022    IR TUNNELED CATHETER PLACEMENT GREATER THAN 5 YEARS 6/1/2022 [facility] RADIOLOGY SPECIALS    UPPER GASTROINTESTINAL ENDOSCOPY N/A 7/5/2022    EGD BIOPSY performed by Clara Morton MD at [address] [place] [address]     Current Outpatient Medications   Medication Sig Dispense Refill    TRESIBA FLEXTOUCH 100 UNIT/ML SOPN Inject 10 Units into the skin daily 15 mL 3    HUMALOG KWIKPEN 100 UNIT/ML SOPN 3 units AC breakfast, 4 units AC lunch and supper correction scale 1/50>150 AC/HS, max daily dose 30 units 30 mL 3    atorvastatin (LIPITOR) 40 MG tablet       magnesium oxide (MAG-OX) 400 MG tablet       potassium chloride (KLOR-CON M) 20 MEQ extended release tablet       tamsulosin (FLOMAX) 0.4 MG capsule       gabapentin (NEURONTIN) 100 MG capsule Take 100 mg by mouth 3 times daily.       Continuous Blood Gluc Sensor (FREESTYLE SILVESTRE 2 SENSOR) [facility] Use to monitor glucose E11.65 6 each 3    Continuous Blood Gluc  (FREESTYLE SILVESTRE 2 READER) [facility] Use to monitor glucose, E11.65 1 each 0    GVOKE HYPOPEN 2-PACK 1 MG/0.2ML SOAJ Inject 1 mg into the skin as needed (severe hypoglycemia) 2 each 1    docusate sodium (COLACE) 100 MG capsule  (Patient not taking: No sig reported)      metoprolol succinate (TOPROL XL) 25 MG extended release tablet  (Patient not taking: No sig reported)      acetone, urine, test strip Use if glucose >250 for 4 hours, if positive, proceed to ER E10.9 (Patient not taking: No sig reported) 50 strip 1    pantoprazole (PROTONIX) 40 MG tablet Take 1 tablet by mouth every morning (before breakfast) (Patient not taking: No sig reported) 90 tablet 1    Insulin Pen Needle (PEN NEEDLES 31GX5/16\") 31G X 8 MM MISC 120 each by Other route 4 times daily      albuterol (PROVENTIL) (2.5 MG/3ML) 0.083% nebulizer solution Take 3 mLs by nebulization every 4 hours as needed for Wheezing (Patient not taking: No sig reported) 120 each 3     No current facility-administered medications for this visit.      No Known Allergies  Social History     Socioeconomic History    Marital status: Single     Spouse name: Not on file    Number of children: Not on file    Years of education: Not on file    Highest education level: Not on file   Occupational History    Not on file   Tobacco Use    Smoking status: Former     Types: Cigarettes     Quit date: 2013     Years since quittin.2    Smokeless tobacco: Never   Vaping Use    Vaping Use: Never used   Substance and Sexual Activity    Alcohol use: Yes    Drug use: No    Sexual activity: Not on file   Other Topics Concern    Not on file   Social History Narrative    Abuse: Feels safe at home, no history of physical abuse, no history of sexual abuse       Social Determinants of Health     Financial Resource Strain: Not on file   Food Insecurity: Not on file   Transportation Needs: Not on file   Physical Activity: Not on file   Stress: Not on file   Social Connections: Not on file   Intimate Partner Violence: Not on file   Housing Stability: Not on file     Family History   Problem Relation Age of Onset    Prostate Cancer Father     Diabetes Paternal Grandmother     Colon Cancer Neg Hx     Uterine Cancer Neg Hx     Diabetes Father         DM Type II    Stroke Father     Diabetes Paternal Grandfather         DM Type II     Hypertension Maternal Grandmother     Ovarian Cancer Neg Hx     Breast Cancer Mother 76    Diabetes Maternal Grandmother         DM Type II       Review of Systems  Constitutional: Positive for weight loss. Negative for fever, chills, appetite change, malaise/fatigue and headaches. Skin:  Negative for skin lesions, rash and itching. Eyes:  Negative for visual disturbance, eye pain and eye discharge. ENT:  Negative for difficulty articulating words, pain swallowing, high frequency hearing loss and dry mouth. Respiratory:  Negative for cough, blood in sputum, shortness of breath and wheezing. Cardiovascular:  Negative for chest pain, hypertension, irregular heartbeat, leg pain, leg swelling, regular rate and rhythm and varicose veins. GI:  Negative for nausea, vomiting, abdominal pain, blood in stool, constipation, diarrhea, indigestion and heartburn. Genitourinary: Positive for endometriosis. Negative for urinary burning, hematuria, flank pain, recurrent UTIs, history of urolithiasis, nocturia, slower stream, straining, urgency, leakage w/ urge, frequent urination, incomplete emptying, sexually transmitted disease, menstrual problem, vaginal pain and hysterectomy. Number of pregnancies: 0. Number of births: 0.  Musculoskeletal:  Negative for back pain, bone pain, arthralgias, tenderness, muscle weakness and neck pain. Neurological: Positive for numbness. Negative for dizziness, focal weakness, seizures and tremors. Psychological:  Negative for depression and psychiatric problem. Endocrine:  Negative for cold intolerance, thirst, excessive urination, fatigue and heat intolerance. Hem/Lymphatic:  Negative for easy bleeding, easy bruising and frequent infections.     Urinalysis  UA - Dipstick  Results for orders placed or performed in visit on 11/26/21   AMB POC URINALYSIS DIP STICK AUTO W/O MICRO   Result Value Ref Range    Color (UA POC) Yellow     Clarity (UA POC) Clear     Glucose, Urine, POC Negative Negative    Bilirubin, Urine, POC Negative Negative    Ketones, Urine, POC Negative Negative Specific Gravity, Urine, POC 1.025 1.001 - 1.035 NA    Blood (UA POC) Trace Negative    pH, Urine, POC 6.0 4.6 - 8.0 NA    Protein, Urine, POC 1+ Negative    Urobilinogen, POC 0.2 mg/dL 0.2 - 1    Nitrite, Urine, POC Positive Negative    Leukocyte Esterase, Urine, POC 2+ Negative       UA - Micro  PHYSICAL EXAM    GENERAL: No acute distress, Awake, Alert, Thin. ABDOMEN: soft, non tender, non-distended, positive bowel sounds, no organomegaly, no palpable masses, no guarding, no rebound tenderness  SKIN: No rash, no erythema, no lacerations or abrasions, no ecchymosis  MUSCULOSKELETAL -bilateral lower extremity weakness due to polyneuropathy. Ambulates with walker. KUB-normal gas pattern is present. I see no calcifications in either kidney along the course of either ureter or within the bladder. Assessment and Plan    ICD-10-CM    1. Kidney stone  N20.0 XR ABDOMEN (KUB) (SINGLE AP VIEW)        KUB was discussed  Patient was unable to give me a urine sample  She reports she is voiding sufficiently and has not had any urinary infections. At this point I feel we can follow-up with her as needed. She understands if any urinary issues occur to call the office and we will see her. TANISHA Welch - DEANDRE Stanford is supervising physician today and he approves plan of care. Return if symptoms worsen or fail to improve. Elements of this note have been dictated using speech recognition software. Although reviewed, errors of speech recognition may have occurred.

## 2023-02-13 ENCOUNTER — CARE COORDINATION (OUTPATIENT)
Dept: CARE COORDINATION | Facility: CLINIC | Age: 48
End: 2023-02-13

## 2023-02-13 NOTE — CARE COORDINATION
Ambulatory Care Management Note        Date/Time:  2/13/2023 10:27 AM    This patient was received as a referral from  Provider for case management of referral from endo. Ccm outreached to patient. No answer at this time,  message left. Awaiting response. If no response, [facility] will outreach tomorrow.

## 2023-02-14 ENCOUNTER — CARE COORDINATION (OUTPATIENT)
Dept: CARE COORDINATION | Facility: CLINIC | Age: 48
End: 2023-02-14

## 2023-02-14 ENCOUNTER — HOSPITAL ENCOUNTER (OUTPATIENT)
Dept: PHYSICAL THERAPY | Age: 48
Setting detail: RECURRING SERIES
End: 2023-02-14
Payer: COMMERCIAL

## 2023-02-14 NOTE — CARE COORDINATION
Patient is being followed by another acm. Also per patient's mother, she is currently hospitalized and in ICU. Ccm will close case.

## 2023-02-14 NOTE — PROGRESS NOTES
Vickey Durant  : 1975  Primary: Ying Aguila Castellano  Secondary:  Kelsey Persaud  [address] [facility] [address]  Phone: [phone]  Fax: [phone]    PT Visit Info: Total # of Visits to Date: 1  Canceled Appointment: 1     OT Visit Info:  No data recorded    OUTPATIENT THERAPY:OP NOTE TYPE: Progress Report 2023               Episode  Appt Desk           Vickey Durant cancelled her appointment for today due to  hospitalization . Will plan to follow up next during next appointment.   Thank you,  Grisel Ceballos, PT    Future Appointments   Date Time Provider Debo Aleman   2023  7:00 PM Shahid Bennett, PT [facility]   2023  3:15 PM Shahid Bennett, PT [facility]   2023  3:15 PM [facility]   3/3/2023  3:30 PM Jessica Diaz MD [facility]   3/20/2023  3:00 PM Ziyad Dey MD [facility]   3/28/2023 11:30 AM FELIZ Michelle [facility] AMB

## 2023-03-01 ENCOUNTER — TELEPHONE (OUTPATIENT)
Dept: INTERNAL MEDICINE CLINIC | Facility: CLINIC | Age: 48
End: 2023-03-01

## 2023-03-02 NOTE — PROGRESS NOTES
[facility] CARDIOLOGY History & Physical                 Reason for Visit: History of cardiac arrest    Subjective:     Patient is a 50 y.o. female with a PMH of cardiac arrest, diabetes and CKD stage IV who presents as a referral for history of cardiac arrest.  She was admitted to [address] [place] [address] with [facility] in February 2023. The patient was ultimately admitted with septic shock thought to be secondary from a UTI. Blood cultures were negative. She was treated for DKA. According to pulmonology in May 2022, the patient had a presentation for DKA, acute renal failure, and hyperkalemia with acidosis with a pH of 6.8. At that time, she developed a \"brief cardiac arrest\". According to hospital medicine, her cardiac arrest was thought to be \"multifactorial most likely due to DKA with coma and septic shock\". She was treated for severe sepsis with broad-spectrum antibiotics. The patient had a TTE that was noted to demonstrate a hyperdynamic LV. She was noted to have aortic valve stenosis with a mean gradient of 23 mmHg. Her peak velocity was noted to be 3.2 m/s. However, the patient had a TTE on February 13 for her aortic valve was noted to be normal without stenosis. Her V-max was noted to be 1.7 m/s, JOE 2.6 cm² and mean gradient 6 mmHg. Her EF was noted to be low normal.  The patient uses a walker when she is outside of her house. She lives with her parents at this time. She denies chest pain and shortness of breath. Past Medical History:   Diagnosis Date    Acute renal failure ([facility] [address] 1/24/2012    CKD (chronic kidney disease) stage 3, GFR 30-59 ml/min ([facility])     Gastroenteritis, acute 1/24/2012    H/O amputation of lesser toe, left ([facility] [address] 6/9/2022    This status condition was added to the problem list by Genaro SALDANA of the [facility] Team, after medical record review and validation.        Hemodialysis patient ([facility] [address]     IDDM (insulin dependent diabetes mellitus) 1/24/2012    Type 1 av -150 can tell Hypo below 70    Intractable nausea and vomiting 2014    Irregular menses     Kidney stone     Neuropathy, peripheral, idiopathic 3/13/2017    Retinopathy, bilateral 3/13/2017    Trichomoniasis 3/13/2017    Ulcer, skin, chronic ([address] 3/13/2017    Vaginal burning       Past Surgical History:   Procedure Laterality Date    AMPUTATION Left     5th Toe due to Diabetic Ulcer    AMPUTATION  12    gangrene    CYSTOSCOPY,INSERT URETERAL STENT  2022    HX OPEN REDUCTION INTERNAL FIXATION Right 2011    ankle    IR TUNNELED CATHETER PLACEMENT GREATER THAN 5 YEARS  2022    IR TUNNELED CATHETER PLACEMENT GREATER THAN 5 YEARS 2022 [facility] RADIOLOGY SPECIALS    UPPER GASTROINTESTINAL ENDOSCOPY N/A 2022    EGD BIOPSY performed by Tylor Her MD at [facility]      Family History   Problem Relation Age of Onset    Prostate Cancer Father     Diabetes Paternal Grandmother     Colon Cancer Neg Hx     Uterine Cancer Neg Hx     Diabetes Father         DM Type II    Stroke Father     Diabetes Paternal Grandfather         DM Type II     Hypertension Maternal Grandmother     Ovarian Cancer Neg Hx     Breast Cancer Mother 76    Diabetes Maternal Grandmother         DM Type II      Social History     Tobacco Use    Smoking status: Former     Types: Cigarettes     Quit date: 2013     Years since quittin.3    Smokeless tobacco: Never   Substance Use Topics    Alcohol use: Yes      No Known Allergies      ROS:  No obvious pertinent positives on review of systems except for what was outlined above.        Objective:       /76   Pulse 72   Ht 5' 8.5\" (1.74 m)   Wt 126 lb (57.2 kg)   BMI 18.88 kg/m²     BP Readings from Last 3 Encounters:   23 122/76   23 118/70   23 110/64       Wt Readings from Last 3 Encounters:   23 126 lb (57.2 kg)   23 126 lb 12.8 oz (57.5 kg)   22 146 lb 3.4 oz (66.3 kg)       General/Constitutional:   Alert and oriented x 3, no acute distress  HEENT:   normocephalic, atraumatic, moist mucous membranes  Neck:   No JVD or carotid bruits bilaterally  Cardiovascular:   regular rate and rhythm, no rub/gallop appreciated  Pulmonary:   clear to auscultation bilaterally, no respiratory distress  Abdomen:   soft, non-tender, non-distended  Ext:   No sig LE edema bilaterally  Skin:  warm and dry, no obvious rashes seen  Neuro:   no obvious sensory or motor deficits  Psychiatric:   normal mood and affect    Data Review:   Lab Results   Component Value Date    CHOL 110 01/20/2023    CHOL 156 06/19/2020     Lab Results   Component Value Date    TRIG 72 01/20/2023    TRIG 100 06/19/2020     Lab Results   Component Value Date    HDL 64 (H) 01/20/2023    HDL 52 06/19/2020     Lab Results   Component Value Date    LDLCALC 31.6 01/20/2023    LDLCALC 84 06/19/2020     Lab Results   Component Value Date    LABVLDL 14.4 01/20/2023    LABVLDL 20 06/19/2020     Lab Results   Component Value Date    CHOLHDLRATIO 1.7 01/20/2023        Lab Results   Component Value Date/Time     01/20/2023 09:37 AM     07/06/2022 12:07 PM     07/05/2022 06:57 AM    K 4.8 01/20/2023 09:37 AM    K 4.6 07/06/2022 12:07 PM    K 3.3 07/05/2022 06:57 AM     01/20/2023 09:37 AM     07/06/2022 12:07 PM     07/05/2022 06:57 AM    CO2 17 01/20/2023 09:37 AM    CO2 21 07/06/2022 12:07 PM    CO2 29 07/05/2022 06:57 AM    BUN 31 01/20/2023 09:37 AM    BUN 16 07/06/2022 12:07 PM    BUN 14 07/05/2022 06:57 AM    CREATININE 2.50 01/20/2023 09:37 AM    CREATININE 2.41 07/06/2022 12:07 PM    CREATININE 2.73 07/05/2022 06:57 AM    GLUCOSE 241 01/20/2023 09:37 AM    GLUCOSE 329 07/06/2022 12:07 PM    GLUCOSE 166 07/05/2022 06:57 AM    CALCIUM 9.6 01/20/2023 09:37 AM    CALCIUM 8.5 07/06/2022 12:07 PM    CALCIUM 9.0 07/05/2022 06:57 AM         Lab Results   Component Value Date     (H) 01/20/2023    ALT 35 07/06/2022    ALT 39 07/05/2022     (H) 01/20/2023 AST 43 (H) 07/06/2022    AST 26 07/05/2022        Assessment/Plan:   1. History of cardiac arrest  - Occurred in May 2022 in the setting of hyperkalemia and acidosis secondary to critical illness, including septic shock and DKA  - TTE in February 2023 with documented normal EF and no significant valvular abnormalities  - Pertinent negatives include angina and anginal equivalents at this time  - No further cardiac work up indicated at this time    2. Chronic kidney disease (CKD), stage IV (severe) ([facility] [address]  - Nephrology note reviewed  - Continue to follow with nephrology    3.  Type 1 diabetes mellitus with hyperglycemia [facility])  - Patient with multiple hospitalizations   - Reviewed inpatient documentation from 2022 when cardiac arrest documented   - Endocrinology note reviewed  - Continue to follow with endocrinology    F/U: As needed    Sugey Lam MD

## 2023-03-03 ENCOUNTER — INITIAL CONSULT (OUTPATIENT)
Dept: CARDIOLOGY CLINIC | Age: 48
End: 2023-03-03

## 2023-03-03 VITALS
BODY MASS INDEX: 18.66 KG/M2 | SYSTOLIC BLOOD PRESSURE: 122 MMHG | WEIGHT: 126 LBS | DIASTOLIC BLOOD PRESSURE: 76 MMHG | HEIGHT: 69 IN | HEART RATE: 72 BPM

## 2023-03-03 DIAGNOSIS — N18.4 CHRONIC KIDNEY DISEASE (CKD), STAGE IV (SEVERE) (HCC): ICD-10-CM

## 2023-03-03 DIAGNOSIS — E10.65 TYPE 1 DIABETES MELLITUS WITH HYPERGLYCEMIA (HCC): ICD-10-CM

## 2023-03-03 DIAGNOSIS — Z86.74 HISTORY OF CARDIAC ARREST: Primary | ICD-10-CM

## 2023-03-03 RX ORDER — FOLIC ACID 1 MG/1
TABLET ORAL
COMMUNITY
Start: 2023-02-19

## 2023-03-06 ENCOUNTER — TELEPHONE (OUTPATIENT)
Dept: INTERNAL MEDICINE CLINIC | Facility: CLINIC | Age: 48
End: 2023-03-06

## 2023-03-06 NOTE — TELEPHONE ENCOUNTER
[facility] called saying needs her PT and OT orders recertified. Please call Galina Patiño at [phone] to do so.

## 2023-03-06 NOTE — TELEPHONE ENCOUNTER
Spoke to Ransom Halsted from [address] [place] [address] Elias Gardiner regarding re certification  for PT and OT

## 2023-03-20 ENCOUNTER — OFFICE VISIT (OUTPATIENT)
Dept: INTERNAL MEDICINE CLINIC | Facility: CLINIC | Age: 48
End: 2023-03-20
Payer: COMMERCIAL

## 2023-03-20 VITALS
RESPIRATION RATE: 14 BRPM | OXYGEN SATURATION: 97 % | HEIGHT: 69 IN | HEART RATE: 85 BPM | SYSTOLIC BLOOD PRESSURE: 120 MMHG | DIASTOLIC BLOOD PRESSURE: 70 MMHG | WEIGHT: 127 LBS | BODY MASS INDEX: 18.81 KG/M2

## 2023-03-20 DIAGNOSIS — N18.4 CKD (CHRONIC KIDNEY DISEASE) STAGE 4, GFR 15-29 ML/MIN (HCC): ICD-10-CM

## 2023-03-20 DIAGNOSIS — Z12.31 BREAST CANCER SCREENING BY MAMMOGRAM: Primary | ICD-10-CM

## 2023-03-20 DIAGNOSIS — G62.9 NEUROPATHY: ICD-10-CM

## 2023-03-20 DIAGNOSIS — Z23 NEED FOR PNEUMOCOCCAL VACCINATION: ICD-10-CM

## 2023-03-20 PROBLEM — L98.499: Status: RESOLVED | Noted: 2017-03-13 | Resolved: 2023-03-20

## 2023-03-20 PROCEDURE — 90471 IMMUNIZATION ADMIN: CPT | Performed by: INTERNAL MEDICINE

## 2023-03-20 PROCEDURE — 99214 OFFICE O/P EST MOD 30 MIN: CPT | Performed by: INTERNAL MEDICINE

## 2023-03-20 PROCEDURE — 90677 PCV20 VACCINE IM: CPT | Performed by: INTERNAL MEDICINE

## 2023-03-20 RX ORDER — GABAPENTIN 300 MG/1
300 CAPSULE ORAL DAILY
Qty: 30 CAPSULE | Refills: 0
Start: 2023-03-20 | End: 2023-04-19

## 2023-03-20 SDOH — ECONOMIC STABILITY: HOUSING INSECURITY
IN THE LAST 12 MONTHS, WAS THERE A TIME WHEN YOU DID NOT HAVE A STEADY PLACE TO SLEEP OR SLEPT IN A SHELTER (INCLUDING NOW)?: NO

## 2023-03-20 SDOH — ECONOMIC STABILITY: FOOD INSECURITY: WITHIN THE PAST 12 MONTHS, YOU WORRIED THAT YOUR FOOD WOULD RUN OUT BEFORE YOU GOT MONEY TO BUY MORE.: SOMETIMES TRUE

## 2023-03-20 SDOH — ECONOMIC STABILITY: INCOME INSECURITY: HOW HARD IS IT FOR YOU TO PAY FOR THE VERY BASICS LIKE FOOD, HOUSING, MEDICAL CARE, AND HEATING?: NOT HARD AT ALL

## 2023-03-20 SDOH — ECONOMIC STABILITY: FOOD INSECURITY: WITHIN THE PAST 12 MONTHS, THE FOOD YOU BOUGHT JUST DIDN'T LAST AND YOU DIDN'T HAVE MONEY TO GET MORE.: SOMETIMES TRUE

## 2023-03-20 ASSESSMENT — ENCOUNTER SYMPTOMS
COUGH: 0
SHORTNESS OF BREATH: 0
ABDOMINAL PAIN: 0

## 2023-03-20 ASSESSMENT — PATIENT HEALTH QUESTIONNAIRE - PHQ9
SUM OF ALL RESPONSES TO PHQ QUESTIONS 1-9: 0
DEPRESSION UNABLE TO ASSESS: PT REFUSES
8. MOVING OR SPEAKING SO SLOWLY THAT OTHER PEOPLE COULD HAVE NOTICED. OR THE OPPOSITE, BEING SO FIGETY OR RESTLESS THAT YOU HAVE BEEN MOVING AROUND A LOT MORE THAN USUAL: 0
SUM OF ALL RESPONSES TO PHQ QUESTIONS 1-9: 0
1. LITTLE INTEREST OR PLEASURE IN DOING THINGS: 0
SUM OF ALL RESPONSES TO PHQ9 QUESTIONS 1 & 2: 0
3. TROUBLE FALLING OR STAYING ASLEEP: 0
7. TROUBLE CONCENTRATING ON THINGS, SUCH AS READING THE NEWSPAPER OR WATCHING TELEVISION: 0
SUM OF ALL RESPONSES TO PHQ QUESTIONS 1-9: 0
5. POOR APPETITE OR OVEREATING: 0
9. THOUGHTS THAT YOU WOULD BE BETTER OFF DEAD, OR OF HURTING YOURSELF: 0
SUM OF ALL RESPONSES TO PHQ QUESTIONS 1-9: 0
2. FEELING DOWN, DEPRESSED OR HOPELESS: 0
10. IF YOU CHECKED OFF ANY PROBLEMS, HOW DIFFICULT HAVE THESE PROBLEMS MADE IT FOR YOU TO DO YOUR WORK, TAKE CARE OF THINGS AT HOME, OR GET ALONG WITH OTHER PEOPLE: 0
4. FEELING TIRED OR HAVING LITTLE ENERGY: 0
6. FEELING BAD ABOUT YOURSELF - OR THAT YOU ARE A FAILURE OR HAVE LET YOURSELF OR YOUR FAMILY DOWN: 0

## 2023-03-20 NOTE — PROGRESS NOTES
After obtaining consent, and per orders of Dr. Avinash Garcia, injection of Prevnar 20 given in Right deltoid by Dajuan Dos Santos MA. Patient instructed to remain in clinic for 20 minutes afterwards, and to report any adverse reaction to me immediately.
No focal deficit present. Mental Status: She is oriented to person, place, and time. Psychiatric:         Mood and Affect: Mood normal.         Behavior: Behavior normal.       Medical problems and test results were reviewed with the patient today. No results found for this or any previous visit (from the past 672 hour(s)). ASSESSMENT and PLAN    Tree Vega was seen today for follow-up, hypertension and discuss medications. Diagnoses and all orders for this visit:    Breast cancer screening by mammogram  -     TYRA DIGITAL SCREEN W OR WO CAD BILATERAL; Future    Need for pneumococcal vaccination  -     Pneumococcal, PCV20, PREVNAR 20, (age 25 yrs+), IM, PF    Neuropathy  -     gabapentin (NEURONTIN) 300 MG capsule; Take 1 capsule by mouth daily for 30 days. CKD (chronic kidney disease) stage 4, GFR 15-29 ml/min ([facility])  -     [facility] Outpatient Nutrition Counseling      Return in about 8 weeks (around 5/15/2023) for Chronic Condition follow up . It took more than 30 min to care for this pt today  Over 50% of today's office visit was spent in face to face time in counseling   (may include anyor all of the following: reviewing test results, prognosis, importance of compliance, education about disease process, benefits of medications, instructions for management of acute flare-ups, and follow up plans).

## 2023-03-28 ENCOUNTER — OFFICE VISIT (OUTPATIENT)
Dept: ENDOCRINOLOGY | Age: 48
End: 2023-03-28
Payer: COMMERCIAL

## 2023-03-28 VITALS
OXYGEN SATURATION: 98 % | BODY MASS INDEX: 17.83 KG/M2 | DIASTOLIC BLOOD PRESSURE: 72 MMHG | HEIGHT: 69 IN | HEART RATE: 96 BPM | WEIGHT: 120.4 LBS | SYSTOLIC BLOOD PRESSURE: 106 MMHG

## 2023-03-28 DIAGNOSIS — R27.8 POOR MANUAL DEXTERITY: ICD-10-CM

## 2023-03-28 DIAGNOSIS — E10.65 TYPE 1 DIABETES MELLITUS WITH HYPERGLYCEMIA (HCC): Primary | ICD-10-CM

## 2023-03-28 LAB — HBA1C MFR BLD: 9.5 %

## 2023-03-28 PROCEDURE — 99214 OFFICE O/P EST MOD 30 MIN: CPT | Performed by: PHYSICIAN ASSISTANT

## 2023-03-28 PROCEDURE — 83036 HEMOGLOBIN GLYCOSYLATED A1C: CPT | Performed by: PHYSICIAN ASSISTANT

## 2023-03-28 RX ORDER — INSULIN PMP CART,AUT,G6/7,CNTR
EACH SUBCUTANEOUS
Qty: 1 KIT | Refills: 0 | Status: SHIPPED | OUTPATIENT
Start: 2023-03-28

## 2023-03-28 RX ORDER — PROCHLORPERAZINE 25 MG/1
SUPPOSITORY RECTAL
Qty: 9 EACH | Refills: 3 | Status: SHIPPED | OUTPATIENT
Start: 2023-03-28

## 2023-03-28 RX ORDER — INSULIN PMP CART,AUT,G6/7,CNTR
EACH SUBCUTANEOUS
Qty: 30 EACH | Refills: 3 | Status: SHIPPED | OUTPATIENT
Start: 2023-03-28

## 2023-03-28 RX ORDER — PROCHLORPERAZINE 25 MG/1
SUPPOSITORY RECTAL
Qty: 1 EACH | Refills: 3 | Status: SHIPPED | OUTPATIENT
Start: 2023-03-28

## 2023-03-28 NOTE — PROGRESS NOTES
07/12/2016      10.3%               01/18/2019      10.7%               04/18/2019      9.3%               06/27/2019      9.3%               09/16/2019      9.1%               02/07/2020      9.4%                           Reports 4/17/2020      9.8%               06/19/2020      9.6%                  06/09/2021      9.6%      11/22/2022 6.5%    03/28/2023 9.5%      Hemoglobin A1C, POC   Date Value Ref Range Status   03/28/2023 9.5 % Final   02/07/2020 9.4 % Final   09/16/2019 9.1 % Final        Thyroid:                04/24/2017      1.630               09/16/2019      0.972               06/19/2020      2.400         Lab Results   Component Value Date/Time    TSH 2.400 06/19/2020 12:11 PM    TSH 0.972 09/16/2019 02:50 PM                          Allergies & Medications:  Reviewed in chart. Review of Systems    Vital Signs:  /72   Pulse 96   Ht 5' 8.5\" (1.74 m)   Wt 120 lb 6.4 oz (54.6 kg)   SpO2 98%   BMI 18.04 kg/m²       Physical Exam  Constitutional:       Comments: Weak, in WC, unable to stand with assistance to be weighed   HENT:      Head: Normocephalic. Neck:      Thyroid: No thyroid mass or thyromegaly. Vascular: No carotid bruit. Cardiovascular:      Rate and Rhythm: Normal rate and regular rhythm. Pulmonary:      Effort: Pulmonary effort is normal.      Breath sounds: Normal breath sounds. Abdominal:      Palpations: Abdomen is soft. Musculoskeletal:      Cervical back: Neck supple. Right lower leg: No edema. Left lower leg: No edema. Feet:      Right foot:      Protective Sensation: 3 sites tested. 3 sites sensed. Skin integrity: Skin integrity normal.      Left foot:      Protective Sensation: 3 sites tested. 0 sites sensed.       Comments: Left foot s/p 5th toe amputation, well healed  Distal tip of left hallux with contusion (blood blister) no heat, erythema, purulence, or sign of infection    Lymphadenopathy:      Cervical: No cervical

## 2023-03-31 ENCOUNTER — TELEPHONE (OUTPATIENT)
Dept: ENDOCRINOLOGY | Age: 48
End: 2023-03-31

## 2023-03-31 ENCOUNTER — NURSE ONLY (OUTPATIENT)
Dept: ENDOCRINOLOGY | Age: 48
End: 2023-03-31

## 2023-03-31 VITALS — DIASTOLIC BLOOD PRESSURE: 40 MMHG | SYSTOLIC BLOOD PRESSURE: 57 MMHG

## 2023-03-31 DIAGNOSIS — E10.65 TYPE 1 DIABETES MELLITUS WITH HYPERGLYCEMIA (HCC): Primary | ICD-10-CM

## 2023-03-31 NOTE — TELEPHONE ENCOUNTER
Pt here for dexcom assistance    Weak with unsteady gait    BP was low 57/40 standing with orthostasis, pt adamently refused EMS transport. Drank fluids, had salty snack (cheese crackers) with 5 units of insulin (2 for snack 3 for correction). BP improved to 118/78, improved symptoms.      Aware to have a LOW threshold to go to ER

## 2023-03-31 NOTE — TELEPHONE ENCOUNTER
Pt came in office for Dexcom G6 placement, CGM was placed on pt, and pt was infirmed to change sensor every  10 days she expressed understanding. Pt reports feeling weak and only ate a orange for breakfast and did not check her suagrs, POC glucose was 267. Pt was given instructions for omnipod, which she picked up from pharm earlier and is in need of insulin vials. Prov made aware of BG and BP. Prov advised pt to sit in lobby, drink water and crackers were given to patient, due to pt feeling weak and unbalanced when standing/walking. Mary Jane Vazquez verbally informed me pt's phone was not compatible to the G6 and provided a pt a .

## 2023-04-01 ENCOUNTER — APPOINTMENT (OUTPATIENT)
Dept: GENERAL RADIOLOGY | Age: 48
DRG: 638 | End: 2023-04-01
Payer: COMMERCIAL

## 2023-04-01 ENCOUNTER — HOSPITAL ENCOUNTER (INPATIENT)
Age: 48
LOS: 1 days | Discharge: HOME | DRG: 638 | End: 2023-04-02
Attending: STUDENT IN AN ORGANIZED HEALTH CARE EDUCATION/TRAINING PROGRAM | Admitting: FAMILY MEDICINE
Payer: COMMERCIAL

## 2023-04-01 DIAGNOSIS — N17.9 AKI (ACUTE KIDNEY INJURY) (HCC): ICD-10-CM

## 2023-04-01 DIAGNOSIS — R73.9 HYPERGLYCEMIA: Primary | ICD-10-CM

## 2023-04-01 LAB
ALBUMIN SERPL-MCNC: 2.5 G/DL (ref 3.5–5)
ALBUMIN/GLOB SERPL: 0.5 (ref 0.4–1.6)
ALP SERPL-CCNC: 936 U/L (ref 50–136)
ALT SERPL-CCNC: 141 U/L (ref 12–65)
ANION GAP SERPL CALC-SCNC: 9 MMOL/L (ref 2–11)
AST SERPL-CCNC: 250 U/L (ref 15–37)
BASOPHILS # BLD: 0.1 K/UL (ref 0–0.2)
BASOPHILS NFR BLD: 1 % (ref 0–2)
BILIRUB SERPL-MCNC: 1 MG/DL (ref 0.2–1.1)
BUN SERPL-MCNC: 47 MG/DL (ref 6–23)
CALCIUM SERPL-MCNC: 8.6 MG/DL (ref 8.3–10.4)
CHLORIDE SERPL-SCNC: 109 MMOL/L (ref 101–110)
CO2 SERPL-SCNC: 17 MMOL/L (ref 21–32)
CREAT SERPL-MCNC: 3.24 MG/DL (ref 0.6–1)
DIFFERENTIAL METHOD BLD: ABNORMAL
EOSINOPHIL # BLD: 0.5 K/UL (ref 0–0.8)
EOSINOPHIL NFR BLD: 7 % (ref 0.5–7.8)
ERYTHROCYTE [DISTWIDTH] IN BLOOD BY AUTOMATED COUNT: 18.8 % (ref 11.9–14.6)
FLUAV RNA SPEC QL NAA+PROBE: NOT DETECTED
FLUBV RNA SPEC QL NAA+PROBE: NOT DETECTED
GLOBULIN SER CALC-MCNC: 4.9 G/DL (ref 2.8–4.5)
GLUCOSE BLD STRIP.AUTO-MCNC: 447 MG/DL (ref 65–100)
GLUCOSE SERPL-MCNC: 609 MG/DL (ref 65–100)
HCT VFR BLD AUTO: 30.8 % (ref 35.8–46.3)
HGB BLD-MCNC: 10.1 G/DL (ref 11.7–15.4)
IMM GRANULOCYTES # BLD AUTO: 0 K/UL (ref 0–0.5)
IMM GRANULOCYTES NFR BLD AUTO: 0 % (ref 0–5)
LACTATE SERPL-SCNC: 1.7 MMOL/L (ref 0.4–2)
LYMPHOCYTES # BLD: 2 K/UL (ref 0.5–4.6)
LYMPHOCYTES NFR BLD: 26 % (ref 13–44)
MAGNESIUM SERPL-MCNC: 1.8 MG/DL (ref 1.8–2.4)
MCH RBC QN AUTO: 26.8 PG (ref 26.1–32.9)
MCHC RBC AUTO-ENTMCNC: 32.8 G/DL (ref 31.4–35)
MCV RBC AUTO: 81.7 FL (ref 82–102)
MONOCYTES # BLD: 0.8 K/UL (ref 0.1–1.3)
MONOCYTES NFR BLD: 11 % (ref 4–12)
NEUTS SEG # BLD: 4 K/UL (ref 1.7–8.2)
NEUTS SEG NFR BLD: 54 % (ref 43–78)
NRBC # BLD: 0 K/UL (ref 0–0.2)
PLATELET # BLD AUTO: 434 K/UL (ref 150–450)
PMV BLD AUTO: 10.2 FL (ref 9.4–12.3)
POTASSIUM SERPL-SCNC: 5.3 MMOL/L (ref 3.5–5.1)
PROT SERPL-MCNC: 7.4 G/DL (ref 6.3–8.2)
RBC # BLD AUTO: 3.77 M/UL (ref 4.05–5.2)
SARS-COV-2 RDRP RESP QL NAA+PROBE: NOT DETECTED
SERVICE CMNT-IMP: ABNORMAL
SODIUM SERPL-SCNC: 135 MMOL/L (ref 133–143)
SOURCE: NORMAL
TROPONIN I SERPL HS-MCNC: 7.5 PG/ML (ref 0–14)
TSH, 3RD GENERATION: 1.93 UIU/ML (ref 0.36–3.74)
WBC # BLD AUTO: 7.4 K/UL (ref 4.3–11.1)

## 2023-04-01 PROCEDURE — 84484 ASSAY OF TROPONIN QUANT: CPT

## 2023-04-01 PROCEDURE — 96374 THER/PROPH/DIAG INJ IV PUSH: CPT

## 2023-04-01 PROCEDURE — 85025 COMPLETE CBC W/AUTO DIFF WBC: CPT

## 2023-04-01 PROCEDURE — 82962 GLUCOSE BLOOD TEST: CPT

## 2023-04-01 PROCEDURE — 2580000003 HC RX 258: Performed by: STUDENT IN AN ORGANIZED HEALTH CARE EDUCATION/TRAINING PROGRAM

## 2023-04-01 PROCEDURE — 71046 X-RAY EXAM CHEST 2 VIEWS: CPT

## 2023-04-01 PROCEDURE — 83735 ASSAY OF MAGNESIUM: CPT

## 2023-04-01 PROCEDURE — 83605 ASSAY OF LACTIC ACID: CPT

## 2023-04-01 PROCEDURE — 99285 EMERGENCY DEPT VISIT HI MDM: CPT

## 2023-04-01 PROCEDURE — 80053 COMPREHEN METABOLIC PANEL: CPT

## 2023-04-01 PROCEDURE — 84443 ASSAY THYROID STIM HORMONE: CPT

## 2023-04-01 PROCEDURE — 87635 SARS-COV-2 COVID-19 AMP PRB: CPT

## 2023-04-01 PROCEDURE — 87502 INFLUENZA DNA AMP PROBE: CPT

## 2023-04-01 RX ORDER — 0.9 % SODIUM CHLORIDE 0.9 %
1000 INTRAVENOUS SOLUTION INTRAVENOUS
Status: COMPLETED | OUTPATIENT
Start: 2023-04-01 | End: 2023-04-02

## 2023-04-01 RX ADMIN — SODIUM CHLORIDE 1000 ML: 9 INJECTION, SOLUTION INTRAVENOUS at 22:41

## 2023-04-01 ASSESSMENT — ENCOUNTER SYMPTOMS
RHINORRHEA: 0
COUGH: 0
PHOTOPHOBIA: 0
WHEEZING: 0
EYE PAIN: 0
TROUBLE SWALLOWING: 0
EYE REDNESS: 0
EYE DISCHARGE: 0
ABDOMINAL PAIN: 0
FACIAL SWELLING: 0
APNEA: 0
VOICE CHANGE: 0
VOMITING: 0
CHEST TIGHTNESS: 0
DIARRHEA: 0
SORE THROAT: 0
SHORTNESS OF BREATH: 0

## 2023-04-01 ASSESSMENT — LIFESTYLE VARIABLES
HOW OFTEN DO YOU HAVE A DRINK CONTAINING ALCOHOL: NEVER
HOW MANY STANDARD DRINKS CONTAINING ALCOHOL DO YOU HAVE ON A TYPICAL DAY: PATIENT DOES NOT DRINK

## 2023-04-01 ASSESSMENT — PAIN - FUNCTIONAL ASSESSMENT: PAIN_FUNCTIONAL_ASSESSMENT: NONE - DENIES PAIN

## 2023-04-02 VITALS
HEIGHT: 68 IN | OXYGEN SATURATION: 100 % | SYSTOLIC BLOOD PRESSURE: 114 MMHG | DIASTOLIC BLOOD PRESSURE: 83 MMHG | BODY MASS INDEX: 19.25 KG/M2 | TEMPERATURE: 98.2 F | HEART RATE: 93 BPM | RESPIRATION RATE: 17 BRPM | WEIGHT: 127 LBS

## 2023-04-02 PROBLEM — E10.65 UNCONTROLLED TYPE 1 DIABETES MELLITUS WITH HYPERGLYCEMIA (HCC): Status: ACTIVE | Noted: 2023-04-02

## 2023-04-02 PROBLEM — E11.65 UNCONTROLLED TYPE 2 DIABETES MELLITUS WITH HYPERGLYCEMIA (HCC): Status: ACTIVE | Noted: 2023-04-02

## 2023-04-02 LAB
ANION GAP SERPL CALC-SCNC: 4 MMOL/L (ref 2–11)
ANION GAP SERPL CALC-SCNC: 5 MMOL/L (ref 2–11)
ANION GAP SERPL CALC-SCNC: 5 MMOL/L (ref 2–11)
ANION GAP SERPL CALC-SCNC: 8 MMOL/L (ref 2–11)
BUN SERPL-MCNC: 34 MG/DL (ref 6–23)
BUN SERPL-MCNC: 39 MG/DL (ref 6–23)
BUN SERPL-MCNC: 43 MG/DL (ref 6–23)
BUN SERPL-MCNC: 45 MG/DL (ref 6–23)
CALCIUM SERPL-MCNC: 8.5 MG/DL (ref 8.3–10.4)
CALCIUM SERPL-MCNC: 8.5 MG/DL (ref 8.3–10.4)
CALCIUM SERPL-MCNC: 8.6 MG/DL (ref 8.3–10.4)
CALCIUM SERPL-MCNC: 8.7 MG/DL (ref 8.3–10.4)
CHLORIDE SERPL-SCNC: 110 MMOL/L (ref 101–110)
CHLORIDE SERPL-SCNC: 117 MMOL/L (ref 101–110)
CHLORIDE SERPL-SCNC: 117 MMOL/L (ref 101–110)
CHLORIDE SERPL-SCNC: 120 MMOL/L (ref 101–110)
CO2 SERPL-SCNC: 17 MMOL/L (ref 21–32)
CO2 SERPL-SCNC: 19 MMOL/L (ref 21–32)
CREAT SERPL-MCNC: 2.57 MG/DL (ref 0.6–1)
CREAT SERPL-MCNC: 2.68 MG/DL (ref 0.6–1)
CREAT SERPL-MCNC: 2.88 MG/DL (ref 0.6–1)
CREAT SERPL-MCNC: 3.28 MG/DL (ref 0.6–1)
EST. AVERAGE GLUCOSE BLD GHB EST-MCNC: 209 MG/DL
GLUCOSE BLD STRIP.AUTO-MCNC: 198 MG/DL (ref 65–100)
GLUCOSE BLD STRIP.AUTO-MCNC: 300 MG/DL (ref 65–100)
GLUCOSE BLD STRIP.AUTO-MCNC: 327 MG/DL (ref 65–100)
GLUCOSE BLD STRIP.AUTO-MCNC: 423 MG/DL (ref 65–100)
GLUCOSE BLD STRIP.AUTO-MCNC: 522 MG/DL (ref 65–100)
GLUCOSE BLD STRIP.AUTO-MCNC: 67 MG/DL (ref 65–100)
GLUCOSE BLD STRIP.AUTO-MCNC: 99 MG/DL (ref 65–100)
GLUCOSE SERPL-MCNC: 150 MG/DL (ref 65–100)
GLUCOSE SERPL-MCNC: 194 MG/DL (ref 65–100)
GLUCOSE SERPL-MCNC: 360 MG/DL (ref 65–100)
GLUCOSE SERPL-MCNC: 628 MG/DL (ref 65–100)
HBA1C MFR BLD: 8.9 % (ref 4.8–5.6)
LACTATE SERPL-SCNC: 1.7 MMOL/L (ref 0.4–2)
LACTATE SERPL-SCNC: 2.5 MMOL/L (ref 0.4–2)
LACTATE SERPL-SCNC: 2.5 MMOL/L (ref 0.4–2)
LACTATE SERPL-SCNC: 3.2 MMOL/L (ref 0.4–2)
MAGNESIUM SERPL-MCNC: 1.8 MG/DL (ref 1.8–2.4)
POTASSIUM SERPL-SCNC: 3.6 MMOL/L (ref 3.5–5.1)
POTASSIUM SERPL-SCNC: 4 MMOL/L (ref 3.5–5.1)
POTASSIUM SERPL-SCNC: 4.4 MMOL/L (ref 3.5–5.1)
POTASSIUM SERPL-SCNC: 4.7 MMOL/L (ref 3.5–5.1)
SERVICE CMNT-IMP: ABNORMAL
SERVICE CMNT-IMP: NORMAL
SERVICE CMNT-IMP: NORMAL
SODIUM SERPL-SCNC: 135 MMOL/L (ref 133–143)
SODIUM SERPL-SCNC: 140 MMOL/L (ref 133–143)
SODIUM SERPL-SCNC: 141 MMOL/L (ref 133–143)
SODIUM SERPL-SCNC: 144 MMOL/L (ref 133–143)

## 2023-04-02 PROCEDURE — 6370000000 HC RX 637 (ALT 250 FOR IP): Performed by: STUDENT IN AN ORGANIZED HEALTH CARE EDUCATION/TRAINING PROGRAM

## 2023-04-02 PROCEDURE — 80048 BASIC METABOLIC PNL TOTAL CA: CPT

## 2023-04-02 PROCEDURE — 83735 ASSAY OF MAGNESIUM: CPT

## 2023-04-02 PROCEDURE — 96374 THER/PROPH/DIAG INJ IV PUSH: CPT

## 2023-04-02 PROCEDURE — 6370000000 HC RX 637 (ALT 250 FOR IP)

## 2023-04-02 PROCEDURE — 6370000000 HC RX 637 (ALT 250 FOR IP): Performed by: FAMILY MEDICINE

## 2023-04-02 PROCEDURE — 83605 ASSAY OF LACTIC ACID: CPT

## 2023-04-02 PROCEDURE — 1100000000 HC RM PRIVATE

## 2023-04-02 PROCEDURE — 83036 HEMOGLOBIN GLYCOSYLATED A1C: CPT

## 2023-04-02 PROCEDURE — 36415 COLL VENOUS BLD VENIPUNCTURE: CPT

## 2023-04-02 PROCEDURE — 2580000003 HC RX 258: Performed by: FAMILY MEDICINE

## 2023-04-02 PROCEDURE — 82962 GLUCOSE BLOOD TEST: CPT

## 2023-04-02 RX ORDER — SODIUM BICARBONATE 650 MG/1
650 TABLET ORAL 3 TIMES DAILY
Status: DISCONTINUED | OUTPATIENT
Start: 2023-04-02 | End: 2023-04-02 | Stop reason: HOSPADM

## 2023-04-02 RX ORDER — SODIUM CHLORIDE 9 MG/ML
INJECTION, SOLUTION INTRAVENOUS PRN
Status: DISCONTINUED | OUTPATIENT
Start: 2023-04-02 | End: 2023-04-02 | Stop reason: HOSPADM

## 2023-04-02 RX ORDER — POLYETHYLENE GLYCOL 3350 17 G/17G
17 POWDER, FOR SOLUTION ORAL DAILY
Status: DISCONTINUED | OUTPATIENT
Start: 2023-04-02 | End: 2023-04-02 | Stop reason: HOSPADM

## 2023-04-02 RX ORDER — INSULIN LISPRO 100 [IU]/ML
0-8 INJECTION, SOLUTION INTRAVENOUS; SUBCUTANEOUS NIGHTLY
Status: DISCONTINUED | OUTPATIENT
Start: 2023-04-02 | End: 2023-04-02 | Stop reason: HOSPADM

## 2023-04-02 RX ORDER — METOPROLOL SUCCINATE 25 MG/1
25 TABLET, EXTENDED RELEASE ORAL DAILY
Status: DISCONTINUED | OUTPATIENT
Start: 2023-04-02 | End: 2023-04-02 | Stop reason: HOSPADM

## 2023-04-02 RX ORDER — ONDANSETRON 2 MG/ML
4 INJECTION INTRAMUSCULAR; INTRAVENOUS EVERY 6 HOURS PRN
Status: DISCONTINUED | OUTPATIENT
Start: 2023-04-02 | End: 2023-04-02 | Stop reason: HOSPADM

## 2023-04-02 RX ORDER — PANTOPRAZOLE SODIUM 40 MG/1
40 TABLET, DELAYED RELEASE ORAL
Status: DISCONTINUED | OUTPATIENT
Start: 2023-04-02 | End: 2023-04-02 | Stop reason: HOSPADM

## 2023-04-02 RX ORDER — DIPHENHYDRAMINE HCL 25 MG
25 CAPSULE ORAL EVERY 4 HOURS PRN
Status: DISCONTINUED | OUTPATIENT
Start: 2023-04-02 | End: 2023-04-02 | Stop reason: HOSPADM

## 2023-04-02 RX ORDER — PROMETHAZINE HYDROCHLORIDE 25 MG/1
12.5 TABLET ORAL EVERY 6 HOURS PRN
Status: DISCONTINUED | OUTPATIENT
Start: 2023-04-02 | End: 2023-04-02 | Stop reason: HOSPADM

## 2023-04-02 RX ORDER — ACETAMINOPHEN 325 MG/1
650 TABLET ORAL EVERY 6 HOURS PRN
Status: DISCONTINUED | OUTPATIENT
Start: 2023-04-02 | End: 2023-04-02 | Stop reason: HOSPADM

## 2023-04-02 RX ORDER — FOLIC ACID 1 MG/1
1 TABLET ORAL DAILY
Status: DISCONTINUED | OUTPATIENT
Start: 2023-04-02 | End: 2023-04-02 | Stop reason: HOSPADM

## 2023-04-02 RX ORDER — SODIUM CHLORIDE 0.9 % (FLUSH) 0.9 %
5-40 SYRINGE (ML) INJECTION PRN
Status: DISCONTINUED | OUTPATIENT
Start: 2023-04-02 | End: 2023-04-02 | Stop reason: HOSPADM

## 2023-04-02 RX ORDER — SODIUM CHLORIDE 9 MG/ML
INJECTION, SOLUTION INTRAVENOUS CONTINUOUS
Status: ACTIVE | OUTPATIENT
Start: 2023-04-02 | End: 2023-04-02

## 2023-04-02 RX ORDER — INSULIN LISPRO 100 [IU]/ML
2 INJECTION, SOLUTION INTRAVENOUS; SUBCUTANEOUS ONCE
Status: COMPLETED | OUTPATIENT
Start: 2023-04-02 | End: 2023-04-02

## 2023-04-02 RX ORDER — INSULIN LISPRO 100 [IU]/ML
8 INJECTION, SOLUTION INTRAVENOUS; SUBCUTANEOUS ONCE
Status: COMPLETED | OUTPATIENT
Start: 2023-04-02 | End: 2023-04-02

## 2023-04-02 RX ORDER — 0.9 % SODIUM CHLORIDE 0.9 %
1000 INTRAVENOUS SOLUTION INTRAVENOUS
Status: COMPLETED | OUTPATIENT
Start: 2023-04-02 | End: 2023-04-02

## 2023-04-02 RX ORDER — DEXTROSE MONOHYDRATE 100 MG/ML
INJECTION, SOLUTION INTRAVENOUS CONTINUOUS PRN
Status: DISCONTINUED | OUTPATIENT
Start: 2023-04-02 | End: 2023-04-02 | Stop reason: HOSPADM

## 2023-04-02 RX ORDER — SODIUM CHLORIDE 0.9 % (FLUSH) 0.9 %
5-40 SYRINGE (ML) INJECTION EVERY 12 HOURS SCHEDULED
Status: DISCONTINUED | OUTPATIENT
Start: 2023-04-02 | End: 2023-04-02 | Stop reason: HOSPADM

## 2023-04-02 RX ORDER — DIPHENHYDRAMINE HCL 25 MG
CAPSULE ORAL
Status: COMPLETED
Start: 2023-04-02 | End: 2023-04-02

## 2023-04-02 RX ORDER — SODIUM BICARBONATE 650 MG/1
650 TABLET ORAL 3 TIMES DAILY
Qty: 21 TABLET | Refills: 0 | Status: SHIPPED | OUTPATIENT
Start: 2023-04-02 | End: 2023-04-09

## 2023-04-02 RX ORDER — INSULIN GLARGINE 100 [IU]/ML
10 INJECTION, SOLUTION SUBCUTANEOUS DAILY
Status: DISCONTINUED | OUTPATIENT
Start: 2023-04-02 | End: 2023-04-02 | Stop reason: HOSPADM

## 2023-04-02 RX ORDER — LANOLIN ALCOHOL/MO/W.PET/CERES
400 CREAM (GRAM) TOPICAL 2 TIMES DAILY
Status: DISCONTINUED | OUTPATIENT
Start: 2023-04-02 | End: 2023-04-02 | Stop reason: HOSPADM

## 2023-04-02 RX ORDER — MAGNESIUM HYDROXIDE/ALUMINUM HYDROXICE/SIMETHICONE 120; 1200; 1200 MG/30ML; MG/30ML; MG/30ML
30 SUSPENSION ORAL EVERY 6 HOURS PRN
Status: DISCONTINUED | OUTPATIENT
Start: 2023-04-02 | End: 2023-04-02 | Stop reason: HOSPADM

## 2023-04-02 RX ORDER — MAGNESIUM SULFATE IN WATER 40 MG/ML
2000 INJECTION, SOLUTION INTRAVENOUS PRN
Status: DISCONTINUED | OUTPATIENT
Start: 2023-04-02 | End: 2023-04-02 | Stop reason: HOSPADM

## 2023-04-02 RX ORDER — ENOXAPARIN SODIUM 100 MG/ML
30 INJECTION SUBCUTANEOUS DAILY
Status: DISCONTINUED | OUTPATIENT
Start: 2023-04-02 | End: 2023-04-02 | Stop reason: HOSPADM

## 2023-04-02 RX ORDER — POTASSIUM CHLORIDE 7.45 MG/ML
10 INJECTION INTRAVENOUS PRN
Status: DISCONTINUED | OUTPATIENT
Start: 2023-04-02 | End: 2023-04-02 | Stop reason: HOSPADM

## 2023-04-02 RX ORDER — DIPHENHYDRAMINE HCL 25 MG
25 CAPSULE ORAL EVERY 4 HOURS PRN
Status: DISCONTINUED | OUTPATIENT
Start: 2023-04-02 | End: 2023-04-02 | Stop reason: SDUPTHER

## 2023-04-02 RX ORDER — INSULIN LISPRO 100 [IU]/ML
0-10 INJECTION, SOLUTION INTRAVENOUS; SUBCUTANEOUS
Status: DISCONTINUED | OUTPATIENT
Start: 2023-04-02 | End: 2023-04-02 | Stop reason: HOSPADM

## 2023-04-02 RX ORDER — BISACODYL 5 MG/1
5 TABLET, DELAYED RELEASE ORAL DAILY PRN
Status: DISCONTINUED | OUTPATIENT
Start: 2023-04-02 | End: 2023-04-02 | Stop reason: HOSPADM

## 2023-04-02 RX ORDER — POTASSIUM CHLORIDE 20 MEQ/1
40 TABLET, EXTENDED RELEASE ORAL PRN
Status: DISCONTINUED | OUTPATIENT
Start: 2023-04-02 | End: 2023-04-02 | Stop reason: HOSPADM

## 2023-04-02 RX ORDER — ACETAMINOPHEN 650 MG/1
650 SUPPOSITORY RECTAL EVERY 6 HOURS PRN
Status: DISCONTINUED | OUTPATIENT
Start: 2023-04-02 | End: 2023-04-02 | Stop reason: HOSPADM

## 2023-04-02 RX ADMIN — INSULIN HUMAN 10 UNITS: 100 INJECTION, SOLUTION PARENTERAL at 00:01

## 2023-04-02 RX ADMIN — INSULIN LISPRO 8 UNITS: 100 INJECTION, SOLUTION INTRAVENOUS; SUBCUTANEOUS at 11:50

## 2023-04-02 RX ADMIN — SODIUM CHLORIDE: 9 INJECTION, SOLUTION INTRAVENOUS at 10:37

## 2023-04-02 RX ADMIN — DIPHENHYDRAMINE HYDROCHLORIDE: 25 CAPSULE ORAL at 03:20

## 2023-04-02 RX ADMIN — SODIUM CHLORIDE 1000 ML: 900 INJECTION, SOLUTION INTRAVENOUS at 00:55

## 2023-04-02 RX ADMIN — SODIUM BICARBONATE 650 MG TABLET 650 MG: at 14:04

## 2023-04-02 RX ADMIN — FOLIC ACID 1 MG: 1 TABLET ORAL at 08:46

## 2023-04-02 RX ADMIN — Medication 400 MG: at 08:46

## 2023-04-02 RX ADMIN — INSULIN LISPRO 8 UNITS: 100 INJECTION, SOLUTION INTRAVENOUS; SUBCUTANEOUS at 03:29

## 2023-04-02 RX ADMIN — SODIUM BICARBONATE 650 MG TABLET 650 MG: at 03:15

## 2023-04-02 RX ADMIN — INSULIN LISPRO 8 UNITS: 100 INJECTION, SOLUTION INTRAVENOUS; SUBCUTANEOUS at 16:45

## 2023-04-02 RX ADMIN — METOPROLOL SUCCINATE 25 MG: 25 TABLET, FILM COATED, EXTENDED RELEASE ORAL at 08:46

## 2023-04-02 RX ADMIN — SODIUM BICARBONATE 650 MG TABLET 650 MG: at 08:46

## 2023-04-02 RX ADMIN — Medication 400 MG: at 03:15

## 2023-04-02 RX ADMIN — INSULIN LISPRO 8 UNITS: 100 INJECTION, SOLUTION INTRAVENOUS; SUBCUTANEOUS at 01:10

## 2023-04-02 RX ADMIN — INSULIN LISPRO 2 UNITS: 100 INJECTION, SOLUTION INTRAVENOUS; SUBCUTANEOUS at 01:10

## 2023-04-02 RX ADMIN — SODIUM CHLORIDE: 9 INJECTION, SOLUTION INTRAVENOUS at 05:54

## 2023-04-02 RX ADMIN — SODIUM CHLORIDE: 9 INJECTION, SOLUTION INTRAVENOUS at 02:49

## 2023-04-02 NOTE — ED PROVIDER NOTES
Emergency Department Provider Note       PCP: Milton Rodriguez MD   Age: 50 y.o. Sex: female     [address] Admitted 04/02/2023 12:03:49 AM       ICD-10-CM    1. Hyperglycemia  R73.9       2. BLANCA (acute kidney injury) ([facility]  N17.9           Medical Decision Making     Complexity of Problems Addressed:  1 or more chronic illnesses with a severe exacerbation or progression. Data Reviewed and Analyzed:  Category 1:   I independently ordered and reviewed each unique test.  I reviewed external records: provider visit note from PCP. I reviewed external records: provider visit note from outside specialist.   The patients assessment required an independent historian: Patient's friend. The reason they were needed is important historical information not provided by the patient. Category 2:   I independently ordered and interpreted the ED EKG in the absence of a Cardiologist.    Rate: 91  EKG Interpretation: EKG Interpretation: sinus rhythm, no evidence of arrhythmia  ST Segments: Normal ST segments - NO STEMI  I independently interpreted the cardiac monitor rhythm strip no signs of ischemia. I interpreted the X-rays agree with radiology unremarkable. Category 3: Discussion of management or test interpretation. In summary this is a 57-year-old female patient who presented today for evaluation of generalized weakness and fatigue over the past several days. Intermittent hypotension. Work-up today was remarkable for severely elevated blood glucose as well as BLANCA and hyperkalemia. Plan for this patient is admission for management of her multiple medical chronic comorbidities. Discussed this with hospitalist Dr. Yadiel Fairchild who agreed to admit the patient. Patient stable at time of admission.   Patient history, ROS, physical exam, labs, radiological workup and all pertinent findings were discussed with attending Antonio Reno MD     The patient was admitted and I have discussed patient management with the

## 2023-04-02 NOTE — CARE COORDINATION
Patient with discharge orders this day. No supportive needs made known to CM. Patient's family to provide transportation home. Patient has met all treatment goals and milestones. CM following until discharged.        ASSESSMENT NOTE    Attending Physician: Andres Quarles MD  Admit Problem: Hyperglycemia [R73.9]  BLANCA (acute kidney injury) ([address] [N17.9]  Uncontrolled type 2 diabetes mellitus with hyperglycemia ([address] [E11.65]  Date/Time of Admission: 4/1/2023  8:50 PM  Problem List:  Patient Active Problem List   Diagnosis    Depression with anxiety    Moderate single current episode of major depressive disorder ([address]    Diabetic polyneuropathy associated with type 1 diabetes mellitus (HCC)    Type 1 diabetes mellitus with stage 3 chronic kidney disease (HCC)    H/O gastroesophageal reflux (GERD)    Neuropathy, peripheral, idiopathic    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 associated with type 1 diabetes mellitus (HCC)    Hydronephrosis of right kidney    Diabetes mellitus type 1 (HCC)    Chronic kidney disease (CKD), stage IV (severe) (HCC)    Type 1 diabetes mellitus with hyperglycemia (HCC)    Cardiac arrest ([address]    Diabetic ketoacidosis with coma associated with type 1 diabetes mellitus ([address]    Frailty    Normocytic anemia    Physical debility    Severe sepsis (HCC)    Pyelonephritis    Gastritis without bleeding    Orthostasis    History of cardiac arrest    Uncontrolled type 1 diabetes mellitus with hyperglycemia [facility])       Service Assessment  Patient Orientation Alert and Oriented   Cognition Alert   History Provided By Patient   Primary Caregiver Self   Accompanied By/Relationship     Support Systems Family Members, Parent   Patient's Healthcare Decision Maker is: Legal Next of Kin (patient's mother: Aparna Slice [phone])   PCP Verified by CM Yes (Dr Consuelo Fox [phone] and uses [facility] on [facility])   Last Visit to PCP Within last 6 months
Level Assistance with the following:, Mobility   Current Functional Level Assistance with the following:, Mobility   Can patient return to prior living arrangement Yes   Ability to make needs known: Good   Family able to assist with home care needs: Yes   Would you like for me to discuss the discharge plan with any other family members/significant others, and if so, who? Yes   Financial Resources     Community Resources     CM/SW Referral       Social/Functional History  Lives With Alone   Type of Home     Home Layout One level   Home Access Level entry   Entrance Stairs - Number of Steps     Entrance Stairs - Rails     Bathroom Shower/Tub     Bathroom Toilet     Bathroom Equipment     Bathroom Accessibility     Home Equipment Walker, rolling, [address] Help From Family   ADL Assistance     [employer]     Grooming     Feeding     Toileting     [address]     Meal Prep     Laundry     Vacuuming     Cleaning     Gardening     Yard Work     Driving     Shopping          Other (Comment)     Homemaking Responsibilities     Meal Prep Responsibility     [address] Paying/Finance Responsibility     [address] Management     Other (Comment)     Ambulation Assistance     Transfer Assistance     Active      Patient's  Info     Mode of Transportation Family   Education     Occupation Unemployed   Type of Occupation       Discharge Planning   Type of [facility] Family Members, Parent   Current Services Prior To Admission [address]   DME Walker, Wheelchair   DME     DME Ordered? No   Potential Assistance Purchasing Medications No   Meds-to-Beds: Does the patient want to have any new prescriptions delivered to bedside prior to discharge?      Type of Home Care

## 2023-04-02 NOTE — DISCHARGE SUMMARY
Sodium 140 133 - 143 mmol/L    Potassium 4.7 3.5 - 5.1 mmol/L    Chloride 117 (H) 101 - 110 mmol/L    CO2 19 (L) 21 - 32 mmol/L    Anion Gap 4 2 - 11 mmol/L    Glucose 360 (H) 65 - 100 mg/dL    BUN 34 (H) 6 - 23 MG/DL    Creatinine 2.57 (H) 0.6 - 1.0 MG/DL    Est, Glom Filt Rate 22 (L) >60 ml/min/1.73m2    Calcium 8.5 8.3 - 10.4 MG/DL   Magnesium    Collection Time: 04/02/23  2:45 PM   Result Value Ref Range    Magnesium 1.8 1.8 - 2.4 mg/dL   Lactic Acid    Collection Time: 04/02/23  2:45 PM   Result Value Ref Range    Lactic Acid, Plasma 1.7 0.4 - 2.0 MMOL/L   POCT Glucose    Collection Time: 04/02/23  3:27 PM   Result Value Ref Range    POC Glucose 300 (H) 65 - 100 mg/dL    Performed by: Zeferino        No Known Allergies  Immunization History   Administered Date(s) Administered    COVID-19, PFIZER PURPLE top, DILUTE for use, (age 15 y+), 30mcg/0.3mL 01/23/2021, 02/10/2021    Influenza Virus Vaccine 01/15/2020    Influenza, FLUCELVAX, (age 10 mo+), MDCK, PF, 0.5mL 10/12/2020, 10/11/2021    PPD Test 06/01/2022, 11/07/2022    Pneumococcal, PCV20, PREVNAR 21, (age 18y+), IM, 0.5mL 03/20/2023       Recent Vital Data:  Patient Vitals for the past 24 hrs:   Temp Pulse Resp BP SpO2   04/02/23 1514 98.2 °F (36.8 °C) 93 17 114/83 100 %   04/02/23 1037 98.3 °F (36.8 °C) 85 18 112/68 100 %   04/02/23 0842 98.3 °F (36.8 °C) 79 16 110/71 100 %   04/02/23 0550 97.8 °F (36.6 °C) 81 16 113/75 100 %   04/01/23 2329 97.8 °F (36.6 °C) 85 16 (!) 138/94 100 %   04/01/23 2040 99 °F (37.2 °C) (!) 103 18 116/68 98 %       Oxygen Therapy  SpO2: 100 %  O2 Device: None (Room air)    Estimated body mass index is 19.31 kg/m² as calculated from the following:    Height as of this encounter: 5' 8\" (1.727 m). Weight as of this encounter: 127 lb (57.6 kg).     Intake/Output Summary (Last 24 hours) at 4/2/2023 1625  Last data filed at 4/2/2023 0940  Gross per 24 hour   Intake 240 ml   Output --   Net 240 ml         Physical

## 2023-04-02 NOTE — PROGRESS NOTES
RRT Clinical Rounding Nurse Update    Vitals:    04/01/23 2329 04/02/23 0550 04/02/23 0842 04/02/23 1037   BP: (!) 138/94 113/75 110/71 112/68   Pulse: 85 81 79 85   Resp: 16 16 16 18   Temp: 97.8 °F (36.6 °C) 97.8 °F (36.6 °C) 98.3 °F (36.8 °C) 98.3 °F (36.8 °C)   TempSrc: Oral Oral Oral Oral   SpO2: 100% 100% 100% 100%   Weight:       Height:            DETERIORATION INDEX SCORE: 24    ASSESSMENT:  Previous outreach assessment was reviewed. There have been no significant changes since previous assessment. BS elevated at 324. Lactic acid still elevated at 2.5. pt denies any needs at this time    PLAN:  Will follow per RRT Clinical Rounding Program protocol.     Bogdan Burton, [address]: [facility]: [phone]

## 2023-04-02 NOTE — ED NOTES
TRANSFER - OUT REPORT:    Verbal report given to Braden Sanchez on Declan Patricia  being transferred to 365 for routine progression of patient care       Report consisted of patient's Situation, Background, Assessment and   Recommendations(SBAR). Information from the following report(s) ED SBAR was reviewed with the receiving nurse. [place] Assessment: Presents to emergency department  because of falls (Syncope, seizure, or loss of consciousness): No, Age > 79: No, Altered Mental Status, Intoxication with alcohol or substance confusion (Disorientation, impaired judgment, poor safety awaremess, or inability to follow instructions): No, Impaired Mobility: Ambulates or transfers with assistive devices or assistance; Unable to ambulate or transer.: No, Nursing Judgement: No  Lines:   Peripheral IV 58/78/53 Right Cephalic (Active)        Opportunity for questions and clarification was provided.       Patient transported with:  Registered Nurse           Elvia Yan RN  04/02/23 6252

## 2023-04-02 NOTE — PROGRESS NOTES
RRT Clinical Rounding Nurse Update    Vitals:    04/01/23 2040 04/01/23 2329   BP: 116/68 (!) 138/94   Pulse: (!) 103 85   Resp: 18 16   Temp: 99 °F (37.2 °C) 97.8 °F (36.6 °C)   TempSrc: Oral Oral   SpO2: 98% 100%   Weight: 127 lb (57.6 kg)    Height: 5' 8\" (1.727 m)         DETERIORATION INDEX SCORE: 18    ASSESSMENT:  Pt in bed resting quietly. VSS. Bilateral lungs clear upon ascultation. BG trending down. Last lactic acid 3.2 @ 0130. Orders placed to redraw at [facility]. Previous outreach assessment was reviewed. There have been no significant changes since previous assessment. PLAN:  Will follow per RRT Clinical Rounding Program protocol.     Sarah Warner, [address]: [facility]: [phone]

## 2023-04-02 NOTE — FLOWSHEET NOTE
RRT Clinical Rounding Nurse Update    Vitals:    04/01/23 2040 04/01/23 2329 04/02/23 0550 04/02/23 0842   BP: 116/68 (!) 138/94 113/75 110/71   Pulse: (!) 103 85 81 79   Resp: 18 16 16 16   Temp: 99 °F (37.2 °C) 97.8 °F (36.6 °C) 97.8 °F (36.6 °C) 98.3 °F (36.8 °C)   TempSrc: Oral Oral Oral Oral   SpO2: 98% 100% 100% 100%   Weight: 127 lb (57.6 kg)      Height: 5' 8\" (1.727 m)           DETERIORATION INDEX SCORE: 18    ASSESSMENT:  Previous outreach assessment was reviewed. There have been no significant changes since previous assessment. Potassium now WNL. Creatinine better; pt currently on NS 250ml/hr. Blood sugar a little low at 67. Poor appetitie. Orange juice given by primary RN. Recheck blood sugar in 30 minutes. Q2 BG checks discontinued. Lactic acid elevated at 2.5.  repeat lactic acid scheduled for 0915. Pt a/0x4. No needs expressed at this time. VSS. Primary RN to call with any concerns. PLAN:  Will follow per RRT Clinical Rounding Program protocol.     Payal Florence, [address]: [facility]: [phone]

## 2023-04-02 NOTE — ED TRIAGE NOTES
Pt ambulatory to triage and A&Ox4. Pt c/o low blood pressure readings at home. /68 in triage. Pt states she is feeling more tired than usual. Also states no energy since Thursday. Hx of diabetes.

## 2023-04-02 NOTE — PROGRESS NOTES
RRT Clinical Rounding Nurse Progress Report      SUBJECTIVE: Patient assessed secondary to RN/provider concern - Pt noted to have elevated BG in ED. Vitals:    04/01/23 2040 04/01/23 2329   BP: 116/68 (!) 138/94   Pulse: (!) 103 85   Resp: 18 16   Temp: 99 °F (37.2 °C) 97.8 °F (36.6 °C)   TempSrc: Oral Oral   SpO2: 98% 100%   Weight: 127 lb (57.6 kg)    Height: 5' 8\" (1.727 m)         DETERIORATION INDEX SCORE: 18    ASSESSMENT:  Pt in bed awake watching TV. Bilateral lungs clear upon ascultation. VSS. BG still elevated. Dr. Graves Pert notified by primary RN and orders received to administer 10 units insulin X 1 and check BG every 2 hours to ensure that BG is not dropping to fast. Repeat lactic ordered. 2nd liter of NS bolus started after IV start. Primary RN made aware of visit and no questions or concerns voiced. PLAN:  Will follow per RRT Clinical Rounding Program protocol.     Meg Moeller, [address]: [facility]: [phone]

## 2023-04-02 NOTE — H&P
Hospitalist History and Physical   Admit Date:  2023  8:50 PM   Name:  Leisa Cordero   Age:  50 y.o. Sex:  female  :  1975   MRN:  [id number]   Room:  ER02/02    Presenting Complaint: Fatigue     Reason(s) for Admission: Uncontrolled type 2 diabetes mellitus with hyperglycemia ([facility] [E11.65]     History of Present Illness:   Patient was discussed with the ER provider prior to seeing the patient. Leisa Cordero is a 50 y.o. female with medical history of brittle type 1 diabetes, and CKD 4, who presented with hyperglycemia. Patient reports she has been having weakness and fatigue over the last 3 to 4 days. She can think of no reason for this. But she states her blood sugars and blood pressures have been \"all over the place\". She was actually seen by a nurse visit yesterday with a blood pressure of 60/40. She tells me that earlier today her blood sugar was low at 67. She was feeling bad, so she ate lunch of [employer]. She came to the ER maybe a couple hours later, and her glucose was over 600. Her bicarb was noted to be low, but anion gap is normal.  She does follow with an endocrinologist.  She denies fever/chills, NVD, abdominal pain, chest pain, shortness of breath. Of note she was hospitalized about 3 weeks ago for severe sepsis. She tells me she is feeling better now than when she first arrived to the ER. Review of Systems:  10 systems reviewed and negative except as noted in HPI. Assessment & Plan:     Principal Problem:    Uncontrolled type 1 diabetes mellitus with hyperglycemia ([facility]  Plan: Fortunately she is not in DKA, but will watch closely. Fingerstick blood sugars every 2 hours for now. Will order insulin based on those results. BMP every 4 hours.   Aggressive fluids    Active Problems:    Chronic kidney disease (CKD), stage IV (severe) ([facility])  Plan: avoid nephrotoxic medications; monitor labs  Start oral bicarb      Dispo/Discharge Planning:

## 2023-04-02 NOTE — PROGRESS NOTES
Pt repeat lactic resulted at 2.5. POCT BG 99. Notified Dr. Shelby Mir. New orders received to discontinue every 2 hour BG checks and repeat lactic in 4 hours. Will place orders and monitor.

## 2023-04-03 ENCOUNTER — CARE COORDINATION (OUTPATIENT)
Dept: CARE COORDINATION | Facility: CLINIC | Age: 48
End: 2023-04-03

## 2023-04-03 NOTE — CARE COORDINATION
patient, who verbalizes understanding of administration of home medications. Medications reviewed, 1111F entered: yes    Was patient discharged with a pulse oximeter? no    Non-face-to-face services provided:  Obtained and reviewed discharge summary and/or continuity of care documents    Offered patient enrollment in the Remote Patient Monitoring (RPM) program for in-home monitoring: NA.    Care Transitions 24 Hour Call    Schedule Follow Up Appointment with PCP: Completed  Do you have a copy of your discharge instructions?: Yes  Do you have all of your prescriptions and are they filled?: Yes  Have you been contacted by a [id number] [employer] Pharmacist?: No  Have you scheduled your follow up appointment?: Yes (Comment: declined sooner appt)  How are you going to get to your appointment?: Car - family or friend to transport  Do you feel like you have everything you need to keep you well at home?: Yes  Care Transitions Interventions         Discussed follow-up appointments. If no appointment was previously scheduled, appointment scheduling offered: Yes. Is follow up appointment scheduled within 7 days of discharge? No.  Declined CTN assistance. Follow Up  Future Appointments   Date Time Provider Debo Aleman   5/15/2023  3:40 PM [facility]   8/21/2023 11:30 AM FELIZ Kapoor [facility] AMB       Care Transition Nurse provided contact information. No further follow-up call indicated based on severity of symptoms and risk factors. Plan for next call:  see above. CTN to follow through chart review PRN only for remainder of WILBERT episode.     Kaleb Colón RN

## 2023-04-26 ENCOUNTER — TELEPHONE (OUTPATIENT)
Dept: ENDOCRINOLOGY | Age: 48
End: 2023-04-26

## 2023-04-26 NOTE — TELEPHONE ENCOUNTER
Pt came in office for G6 assistance, stated her sensor came off after 3 days. Pt brought in new sensor, successfully  applied together. Pt reports she has not had a meal all day request a BG reading. @1127AM BG was 259 BP 75/51   (Pt verbaly made aware)  *prov made aware*  Per prov recheck BP at noon. 12:04PM pt reports a headache and feeling weak , BP was 79/55 provided pt with a cup of water. Prov made aware.

## 2023-04-26 NOTE — TELEPHONE ENCOUNTER
Patient came in for assistance with her CGM, was found to have hypotension. Patient has a complex medical history and is seen by a nephrology as well as primary care. I have encouraged her to keep a blood pressure log today and to discuss her hypotension and orthostasis with her other providers.     I will check a fasting ACTH/cortisol at 8am to r/o adrenal insufficiency as previously ordered on 3/31/2023

## 2023-04-27 NOTE — TELEPHONE ENCOUNTER
Spoke to patient to get current BP reading she stated she was not at home and didn't take today. Patient will schedule f/u appointment.

## 2023-05-01 ENCOUNTER — OFFICE VISIT (OUTPATIENT)
Dept: INTERNAL MEDICINE CLINIC | Facility: CLINIC | Age: 48
End: 2023-05-01
Payer: COMMERCIAL

## 2023-05-01 VITALS
BODY MASS INDEX: 17.79 KG/M2 | SYSTOLIC BLOOD PRESSURE: 98 MMHG | HEART RATE: 95 BPM | WEIGHT: 117 LBS | DIASTOLIC BLOOD PRESSURE: 58 MMHG | OXYGEN SATURATION: 99 %

## 2023-05-01 DIAGNOSIS — G62.9 NEUROPATHY: Primary | ICD-10-CM

## 2023-05-01 PROCEDURE — 99213 OFFICE O/P EST LOW 20 MIN: CPT | Performed by: INTERNAL MEDICINE

## 2023-05-01 ASSESSMENT — PATIENT HEALTH QUESTIONNAIRE - PHQ9
SUM OF ALL RESPONSES TO PHQ QUESTIONS 1-9: 0
SUM OF ALL RESPONSES TO PHQ9 QUESTIONS 1 & 2: 0
SUM OF ALL RESPONSES TO PHQ QUESTIONS 1-9: 0
6. FEELING BAD ABOUT YOURSELF - OR THAT YOU ARE A FAILURE OR HAVE LET YOURSELF OR YOUR FAMILY DOWN: 0
SUM OF ALL RESPONSES TO PHQ QUESTIONS 1-9: 0
7. TROUBLE CONCENTRATING ON THINGS, SUCH AS READING THE NEWSPAPER OR WATCHING TELEVISION: 0
3. TROUBLE FALLING OR STAYING ASLEEP: 0
9. THOUGHTS THAT YOU WOULD BE BETTER OFF DEAD, OR OF HURTING YOURSELF: 0
10. IF YOU CHECKED OFF ANY PROBLEMS, HOW DIFFICULT HAVE THESE PROBLEMS MADE IT FOR YOU TO DO YOUR WORK, TAKE CARE OF THINGS AT HOME, OR GET ALONG WITH OTHER PEOPLE: 0
1. LITTLE INTEREST OR PLEASURE IN DOING THINGS: 0
8. MOVING OR SPEAKING SO SLOWLY THAT OTHER PEOPLE COULD HAVE NOTICED. OR THE OPPOSITE, BEING SO FIGETY OR RESTLESS THAT YOU HAVE BEEN MOVING AROUND A LOT MORE THAN USUAL: 0
2. FEELING DOWN, DEPRESSED OR HOPELESS: 0
5. POOR APPETITE OR OVEREATING: 0
4. FEELING TIRED OR HAVING LITTLE ENERGY: 0
SUM OF ALL RESPONSES TO PHQ QUESTIONS 1-9: 0

## 2023-05-01 NOTE — PROGRESS NOTES
SUBJECTIVE:   Mustapha Sexton is a 50 y.o. female seen for a visit regarding   Chief Complaint   Patient presents with    Other     Low BP         HPI  Living with mom     Diabetes type 1 - seeing Peggy Leon, Endocrinology  CKD-4, Mild anemia - [facility]  [facility]   Elevated LFTs, Fatty liver - seeing GI, imaging scheduled   H/o Cardiac Arrest in 2022 - saw Cardiology  H/o kidney stone s/p stent - saw Urology  h/o Polyneuropathy - has home health PT  Per patient she is seeing her Gyn - advised to call for follow up  Charcot's joint - seeing Podiatry  Mammogram pending    Past Medical History, Past Surgical History, Family history, Social History, and Medications were all reviewed with the patient today and updated as necessary. Current Outpatient Medications   Medication Sig Dispense Refill    Continuous Blood Gluc Sensor (DEXCOM G6 SENSOR) MISC Use to monitor blood glucose, E10.65 9 each 3    Continuous Blood Gluc Transmit (DEXCOM G6 TRANSMITTER) MISC Use to monitor blood glucose, E10.65 1 each 3    Insulin Disposable Pump (OMNIPOD 5 G6 INTRO, GEN 5,) KIT Use to deliver insulin, E10.65 1 kit 0    Insulin Disposable Pump (OMNIPOD 5 G6 POD, GEN 5,) MISC Use to deliver insulin, E10.65 30 each 3    TRESIBA FLEXTOUCH 100 UNIT/ML SOPN Inject 10 Units into the skin daily 15 mL 3    HUMALOG KWIKPEN 100 UNIT/ML SOPN 3 units AC breakfast, 4 units AC lunch and supper correction scale 1/50>150 AC/HS, max daily dose 30 units 30 mL 3    gabapentin (NEURONTIN) 300 MG capsule Take 1 capsule by mouth daily for 30 days. 30 capsule 0    Insulin Pen Needle (PEN NEEDLES 31GX5/16\") 31G X 8 MM MISC 120 each by Other route 4 times daily       No current facility-administered medications for this visit.      No Known Allergies  Patient Active Problem List   Diagnosis    Depression with anxiety    Moderate single current episode of major depressive disorder (HCC)    Diabetic polyneuropathy associated with

## 2023-05-02 PROCEDURE — 99285 EMERGENCY DEPT VISIT HI MDM: CPT

## 2023-05-02 ASSESSMENT — PAIN - FUNCTIONAL ASSESSMENT: PAIN_FUNCTIONAL_ASSESSMENT: NONE - DENIES PAIN

## 2023-05-03 ENCOUNTER — HOSPITAL ENCOUNTER (INPATIENT)
Age: 48
LOS: 1 days | Discharge: HOME | DRG: 683 | End: 2023-05-04
Attending: STUDENT IN AN ORGANIZED HEALTH CARE EDUCATION/TRAINING PROGRAM | Admitting: INTERNAL MEDICINE
Payer: COMMERCIAL

## 2023-05-03 ENCOUNTER — CARE COORDINATION (OUTPATIENT)
Dept: CARE COORDINATION | Facility: CLINIC | Age: 48
End: 2023-05-03

## 2023-05-03 DIAGNOSIS — R73.9 HYPERGLYCEMIA: Primary | ICD-10-CM

## 2023-05-03 DIAGNOSIS — G62.9 NEUROPATHY: ICD-10-CM

## 2023-05-03 DIAGNOSIS — E10.65 TYPE 1 DIABETES MELLITUS WITH HYPERGLYCEMIA (HCC): ICD-10-CM

## 2023-05-03 DIAGNOSIS — N17.9 AKI (ACUTE KIDNEY INJURY) (HCC): ICD-10-CM

## 2023-05-03 DIAGNOSIS — N17.9 ACUTE KIDNEY INJURY (HCC): ICD-10-CM

## 2023-05-03 LAB
ALBUMIN SERPL-MCNC: 2.8 G/DL (ref 3.5–5)
ALBUMIN/GLOB SERPL: 0.5 (ref 0.4–1.6)
ALP SERPL-CCNC: 1172 U/L (ref 50–136)
ALT SERPL-CCNC: 102 U/L (ref 12–65)
ANION GAP SERPL CALC-SCNC: 6 MMOL/L (ref 2–11)
ANION GAP SERPL CALC-SCNC: 8 MMOL/L (ref 2–11)
ARTERIAL PATENCY WRIST A: ABNORMAL
AST SERPL-CCNC: 131 U/L (ref 15–37)
BASE DEFICIT BLDV-SCNC: 9.4 MMOL/L
BASOPHILS # BLD: 0.1 K/UL (ref 0–0.2)
BASOPHILS NFR BLD: 1 % (ref 0–2)
BDY SITE: ABNORMAL
BILIRUB SERPL-MCNC: 1.1 MG/DL (ref 0.2–1.1)
BUN SERPL-MCNC: 54 MG/DL (ref 6–23)
BUN SERPL-MCNC: 56 MG/DL (ref 6–23)
CALCIUM SERPL-MCNC: 8.5 MG/DL (ref 8.3–10.4)
CALCIUM SERPL-MCNC: 9.8 MG/DL (ref 8.3–10.4)
CHLORIDE SERPL-SCNC: 109 MMOL/L (ref 101–110)
CHLORIDE SERPL-SCNC: 116 MMOL/L (ref 101–110)
CO2 SERPL-SCNC: 15 MMOL/L (ref 21–32)
CO2 SERPL-SCNC: 17 MMOL/L (ref 21–32)
CREAT SERPL-MCNC: 3.58 MG/DL (ref 0.6–1)
CREAT SERPL-MCNC: 3.89 MG/DL (ref 0.6–1)
DIFFERENTIAL METHOD BLD: ABNORMAL
EOSINOPHIL # BLD: 0.6 K/UL (ref 0–0.8)
EOSINOPHIL NFR BLD: 7 % (ref 0.5–7.8)
ERYTHROCYTE [DISTWIDTH] IN BLOOD BY AUTOMATED COUNT: 17.8 % (ref 11.9–14.6)
GLOBULIN SER CALC-MCNC: 5.8 G/DL (ref 2.8–4.5)
GLUCOSE BLD STRIP.AUTO-MCNC: 104 MG/DL (ref 65–100)
GLUCOSE BLD STRIP.AUTO-MCNC: 326 MG/DL (ref 65–100)
GLUCOSE BLD STRIP.AUTO-MCNC: 434 MG/DL (ref 65–100)
GLUCOSE BLD STRIP.AUTO-MCNC: 441 MG/DL (ref 65–100)
GLUCOSE BLD STRIP.AUTO-MCNC: 448 MG/DL (ref 65–100)
GLUCOSE BLD STRIP.AUTO-MCNC: 500 MG/DL (ref 65–100)
GLUCOSE BLD STRIP.AUTO-MCNC: 561 MG/DL (ref 65–100)
GLUCOSE BLD STRIP.AUTO-MCNC: 590 MG/DL (ref 65–100)
GLUCOSE BLD STRIP.AUTO-MCNC: 63 MG/DL (ref 65–100)
GLUCOSE SERPL-MCNC: 415 MG/DL (ref 65–100)
GLUCOSE SERPL-MCNC: 474 MG/DL (ref 65–100)
HCO3 BLDV-SCNC: 16 MMOL/L (ref 23–28)
HCT VFR BLD AUTO: 34.4 % (ref 35.8–46.3)
HGB BLD-MCNC: 10.9 G/DL (ref 11.7–15.4)
IMM GRANULOCYTES # BLD AUTO: 0.1 K/UL (ref 0–0.5)
IMM GRANULOCYTES NFR BLD AUTO: 1 % (ref 0–5)
LYMPHOCYTES # BLD: 1.8 K/UL (ref 0.5–4.6)
LYMPHOCYTES NFR BLD: 20 % (ref 13–44)
MCH RBC QN AUTO: 26.8 PG (ref 26.1–32.9)
MCHC RBC AUTO-ENTMCNC: 31.7 G/DL (ref 31.4–35)
MCV RBC AUTO: 84.7 FL (ref 82–102)
MONOCYTES # BLD: 0.8 K/UL (ref 0.1–1.3)
MONOCYTES NFR BLD: 9 % (ref 4–12)
NEUTS SEG # BLD: 5.8 K/UL (ref 1.7–8.2)
NEUTS SEG NFR BLD: 64 % (ref 43–78)
NRBC # BLD: 0 K/UL (ref 0–0.2)
PCO2 BLDV: 32.4 MMHG (ref 41–51)
PH BLDV: 7.3 (ref 7.32–7.42)
PLATELET # BLD AUTO: 446 K/UL (ref 150–450)
PMV BLD AUTO: 10.3 FL (ref 9.4–12.3)
PO2 BLDV: 48 MMHG
POTASSIUM SERPL-SCNC: 5.2 MMOL/L (ref 3.5–5.1)
POTASSIUM SERPL-SCNC: 5.3 MMOL/L (ref 3.5–5.1)
PROT SERPL-MCNC: 8.6 G/DL (ref 6.3–8.2)
RBC # BLD AUTO: 4.06 M/UL (ref 4.05–5.2)
SAO2 % BLDV: 80.1 % (ref 65–88)
SERVICE CMNT-IMP: ABNORMAL
SODIUM SERPL-SCNC: 134 MMOL/L (ref 133–143)
SODIUM SERPL-SCNC: 137 MMOL/L (ref 133–143)
SPECIMEN TYPE: ABNORMAL
WBC # BLD AUTO: 9.1 K/UL (ref 4.3–11.1)

## 2023-05-03 PROCEDURE — 96374 THER/PROPH/DIAG INJ IV PUSH: CPT

## 2023-05-03 PROCEDURE — 82803 BLOOD GASES ANY COMBINATION: CPT

## 2023-05-03 PROCEDURE — 6370000000 HC RX 637 (ALT 250 FOR IP): Performed by: INTERNAL MEDICINE

## 2023-05-03 PROCEDURE — 2580000003 HC RX 258: Performed by: INTERNAL MEDICINE

## 2023-05-03 PROCEDURE — 85025 COMPLETE CBC W/AUTO DIFF WBC: CPT

## 2023-05-03 PROCEDURE — 36600 WITHDRAWAL OF ARTERIAL BLOOD: CPT

## 2023-05-03 PROCEDURE — 80053 COMPREHEN METABOLIC PANEL: CPT

## 2023-05-03 PROCEDURE — 2580000003 HC RX 258: Performed by: STUDENT IN AN ORGANIZED HEALTH CARE EDUCATION/TRAINING PROGRAM

## 2023-05-03 PROCEDURE — 82962 GLUCOSE BLOOD TEST: CPT

## 2023-05-03 PROCEDURE — 6370000000 HC RX 637 (ALT 250 FOR IP): Performed by: STUDENT IN AN ORGANIZED HEALTH CARE EDUCATION/TRAINING PROGRAM

## 2023-05-03 PROCEDURE — 94760 N-INVAS EAR/PLS OXIMETRY 1: CPT

## 2023-05-03 PROCEDURE — 6360000002 HC RX W HCPCS: Performed by: INTERNAL MEDICINE

## 2023-05-03 PROCEDURE — 96376 TX/PRO/DX INJ SAME DRUG ADON: CPT

## 2023-05-03 PROCEDURE — 1100000000 HC RM PRIVATE

## 2023-05-03 PROCEDURE — 96361 HYDRATE IV INFUSION ADD-ON: CPT

## 2023-05-03 RX ORDER — 0.9 % SODIUM CHLORIDE 0.9 %
1000 INTRAVENOUS SOLUTION INTRAVENOUS ONCE
Status: COMPLETED | OUTPATIENT
Start: 2023-05-03 | End: 2023-05-03

## 2023-05-03 RX ORDER — INSULIN LISPRO 100 [IU]/ML
0-16 INJECTION, SOLUTION INTRAVENOUS; SUBCUTANEOUS
Status: DISCONTINUED | OUTPATIENT
Start: 2023-05-03 | End: 2023-05-04

## 2023-05-03 RX ORDER — DEXTROSE MONOHYDRATE 100 MG/ML
INJECTION, SOLUTION INTRAVENOUS CONTINUOUS PRN
Status: DISCONTINUED | OUTPATIENT
Start: 2023-05-03 | End: 2023-05-04 | Stop reason: HOSPADM

## 2023-05-03 RX ORDER — HEPARIN SODIUM 5000 [USP'U]/ML
5000 INJECTION, SOLUTION INTRAVENOUS; SUBCUTANEOUS EVERY 8 HOURS SCHEDULED
Status: DISCONTINUED | OUTPATIENT
Start: 2023-05-03 | End: 2023-05-04 | Stop reason: HOSPADM

## 2023-05-03 RX ORDER — INSULIN LISPRO 100 [IU]/ML
7 INJECTION, SOLUTION INTRAVENOUS; SUBCUTANEOUS ONCE
Status: COMPLETED | OUTPATIENT
Start: 2023-05-03 | End: 2023-05-03

## 2023-05-03 RX ORDER — INSULIN LISPRO 100 [IU]/ML
17 INJECTION, SOLUTION INTRAVENOUS; SUBCUTANEOUS ONCE
Status: COMPLETED | OUTPATIENT
Start: 2023-05-03 | End: 2023-05-03

## 2023-05-03 RX ORDER — INSULIN LISPRO 100 [IU]/ML
0-8 INJECTION, SOLUTION INTRAVENOUS; SUBCUTANEOUS
Status: DISCONTINUED | OUTPATIENT
Start: 2023-05-03 | End: 2023-05-03

## 2023-05-03 RX ORDER — IBUPROFEN 600 MG/1
1 TABLET ORAL PRN
Status: DISCONTINUED | OUTPATIENT
Start: 2023-05-03 | End: 2023-05-04 | Stop reason: HOSPADM

## 2023-05-03 RX ORDER — CALCIUM CARBONATE 200(500)MG
500 TABLET,CHEWABLE ORAL 3 TIMES DAILY PRN
Status: DISCONTINUED | OUTPATIENT
Start: 2023-05-03 | End: 2023-05-04 | Stop reason: HOSPADM

## 2023-05-03 RX ORDER — SODIUM CHLORIDE 9 MG/ML
INJECTION, SOLUTION INTRAVENOUS CONTINUOUS
Status: DISCONTINUED | OUTPATIENT
Start: 2023-05-03 | End: 2023-05-04 | Stop reason: HOSPADM

## 2023-05-03 RX ORDER — 0.9 % SODIUM CHLORIDE 0.9 %
1000 INTRAVENOUS SOLUTION INTRAVENOUS
Status: COMPLETED | OUTPATIENT
Start: 2023-05-03 | End: 2023-05-03

## 2023-05-03 RX ORDER — GABAPENTIN 300 MG/1
300 CAPSULE ORAL DAILY
Status: DISCONTINUED | OUTPATIENT
Start: 2023-05-03 | End: 2023-05-04 | Stop reason: HOSPADM

## 2023-05-03 RX ORDER — SODIUM CHLORIDE 9 MG/ML
INJECTION, SOLUTION INTRAVENOUS PRN
Status: DISCONTINUED | OUTPATIENT
Start: 2023-05-03 | End: 2023-05-04 | Stop reason: HOSPADM

## 2023-05-03 RX ORDER — SODIUM CHLORIDE 0.9 % (FLUSH) 0.9 %
5-40 SYRINGE (ML) INJECTION PRN
Status: DISCONTINUED | OUTPATIENT
Start: 2023-05-03 | End: 2023-05-04 | Stop reason: HOSPADM

## 2023-05-03 RX ORDER — INSULIN LISPRO 100 [IU]/ML
3 INJECTION, SOLUTION INTRAVENOUS; SUBCUTANEOUS
Status: DISCONTINUED | OUTPATIENT
Start: 2023-05-03 | End: 2023-05-03

## 2023-05-03 RX ORDER — INSULIN LISPRO 100 [IU]/ML
0-4 INJECTION, SOLUTION INTRAVENOUS; SUBCUTANEOUS NIGHTLY
Status: DISCONTINUED | OUTPATIENT
Start: 2023-05-03 | End: 2023-05-04

## 2023-05-03 RX ORDER — POLYETHYLENE GLYCOL 3350 17 G/17G
17 POWDER, FOR SOLUTION ORAL DAILY PRN
Status: DISCONTINUED | OUTPATIENT
Start: 2023-05-03 | End: 2023-05-04 | Stop reason: HOSPADM

## 2023-05-03 RX ORDER — LANOLIN ALCOHOL/MO/W.PET/CERES
3 CREAM (GRAM) TOPICAL NIGHTLY PRN
Status: DISCONTINUED | OUTPATIENT
Start: 2023-05-03 | End: 2023-05-04 | Stop reason: HOSPADM

## 2023-05-03 RX ORDER — ONDANSETRON 2 MG/ML
4 INJECTION INTRAMUSCULAR; INTRAVENOUS EVERY 6 HOURS PRN
Status: DISCONTINUED | OUTPATIENT
Start: 2023-05-03 | End: 2023-05-04 | Stop reason: HOSPADM

## 2023-05-03 RX ORDER — MAGNESIUM HYDROXIDE/ALUMINUM HYDROXICE/SIMETHICONE 120; 1200; 1200 MG/30ML; MG/30ML; MG/30ML
30 SUSPENSION ORAL EVERY 6 HOURS PRN
Status: DISCONTINUED | OUTPATIENT
Start: 2023-05-03 | End: 2023-05-04 | Stop reason: HOSPADM

## 2023-05-03 RX ORDER — ACETAMINOPHEN 650 MG/1
650 SUPPOSITORY RECTAL EVERY 6 HOURS PRN
Status: DISCONTINUED | OUTPATIENT
Start: 2023-05-03 | End: 2023-05-04 | Stop reason: HOSPADM

## 2023-05-03 RX ORDER — INSULIN GLARGINE 100 [IU]/ML
10 INJECTION, SOLUTION SUBCUTANEOUS DAILY
Status: DISCONTINUED | OUTPATIENT
Start: 2023-05-03 | End: 2023-05-04 | Stop reason: HOSPADM

## 2023-05-03 RX ORDER — INSULIN GLARGINE 100 [IU]/ML
10 INJECTION, SOLUTION SUBCUTANEOUS NIGHTLY
Status: DISCONTINUED | OUTPATIENT
Start: 2023-05-03 | End: 2023-05-03

## 2023-05-03 RX ORDER — INSULIN LISPRO 100 [IU]/ML
0-4 INJECTION, SOLUTION INTRAVENOUS; SUBCUTANEOUS NIGHTLY
Status: DISCONTINUED | OUTPATIENT
Start: 2023-05-03 | End: 2023-05-03

## 2023-05-03 RX ORDER — ACETAMINOPHEN 325 MG/1
650 TABLET ORAL EVERY 6 HOURS PRN
Status: DISCONTINUED | OUTPATIENT
Start: 2023-05-03 | End: 2023-05-04 | Stop reason: HOSPADM

## 2023-05-03 RX ORDER — SODIUM CHLORIDE 0.9 % (FLUSH) 0.9 %
5-40 SYRINGE (ML) INJECTION EVERY 12 HOURS SCHEDULED
Status: DISCONTINUED | OUTPATIENT
Start: 2023-05-03 | End: 2023-05-04 | Stop reason: HOSPADM

## 2023-05-03 RX ORDER — ONDANSETRON 4 MG/1
4 TABLET, ORALLY DISINTEGRATING ORAL EVERY 8 HOURS PRN
Status: DISCONTINUED | OUTPATIENT
Start: 2023-05-03 | End: 2023-05-04 | Stop reason: HOSPADM

## 2023-05-03 RX ADMIN — Medication 16 G: at 22:04

## 2023-05-03 RX ADMIN — INSULIN LISPRO 3 UNITS: 100 INJECTION, SOLUTION INTRAVENOUS; SUBCUTANEOUS at 14:17

## 2023-05-03 RX ADMIN — GABAPENTIN 300 MG: 300 CAPSULE ORAL at 11:33

## 2023-05-03 RX ADMIN — SODIUM CHLORIDE, PRESERVATIVE FREE 10 ML: 5 INJECTION INTRAVENOUS at 22:52

## 2023-05-03 RX ADMIN — SODIUM CHLORIDE: 9 INJECTION, SOLUTION INTRAVENOUS at 10:38

## 2023-05-03 RX ADMIN — HEPARIN SODIUM 5000 UNITS: 5000 INJECTION INTRAVENOUS; SUBCUTANEOUS at 14:46

## 2023-05-03 RX ADMIN — INSULIN GLARGINE 10 UNITS: 100 INJECTION, SOLUTION SUBCUTANEOUS at 11:32

## 2023-05-03 RX ADMIN — INSULIN HUMAN 10 UNITS: 100 INJECTION, SOLUTION PARENTERAL at 01:58

## 2023-05-03 RX ADMIN — SODIUM CHLORIDE, PRESERVATIVE FREE 10 ML: 5 INJECTION INTRAVENOUS at 10:40

## 2023-05-03 RX ADMIN — INSULIN HUMAN 10 UNITS: 100 INJECTION, SOLUTION PARENTERAL at 06:39

## 2023-05-03 RX ADMIN — INSULIN LISPRO 16 UNITS: 100 INJECTION, SOLUTION INTRAVENOUS; SUBCUTANEOUS at 18:40

## 2023-05-03 RX ADMIN — HEPARIN SODIUM 5000 UNITS: 5000 INJECTION INTRAVENOUS; SUBCUTANEOUS at 22:44

## 2023-05-03 RX ADMIN — SODIUM CHLORIDE 1000 ML: 9 INJECTION, SOLUTION INTRAVENOUS at 03:55

## 2023-05-03 RX ADMIN — INSULIN LISPRO 17 UNITS: 100 INJECTION, SOLUTION INTRAVENOUS; SUBCUTANEOUS at 14:18

## 2023-05-03 RX ADMIN — SODIUM CHLORIDE 1000 ML: 9 INJECTION, SOLUTION INTRAVENOUS at 00:24

## 2023-05-03 RX ADMIN — INSULIN LISPRO 7 UNITS: 100 INJECTION, SOLUTION INTRAVENOUS; SUBCUTANEOUS at 18:41

## 2023-05-03 ASSESSMENT — ENCOUNTER SYMPTOMS
NAUSEA: 0
DIARRHEA: 0
ABDOMINAL PAIN: 0
VOMITING: 0

## 2023-05-03 NOTE — ED NOTES
TRANSFER - OUT REPORT:    Verbal report given to [facility] RN on [employer]  being transferred to [facility] for routine progression of patient care       Report consisted of patient's Situation, Background, Assessment and   Recommendations(SBAR). Information from the following report(s) ED SBAR, Intake/Output, MAR, and Recent Results was reviewed with the receiving nurse. Lines:   Peripheral IV 05/03/23 Right Hand (Active)        Opportunity for questions and clarification was provided.       Patient transported with:  Taya Thakur RN  05/03/23 3328

## 2023-05-03 NOTE — ED PROVIDER NOTES
Single     Spouse name: None    Number of children: None    Years of education: None    Highest education level: None   Tobacco Use    Smoking status: Former     Types: Cigarettes     Quit date: 2013     Years since quittin.5    Smokeless tobacco: Never   Vaping Use    Vaping Use: Never used   Substance and Sexual Activity    Alcohol use: Yes    Drug use: No    Sexual activity: Defer     Birth control/protection: None   Social History Narrative    Abuse: Feels safe at home, no history of physical abuse, no history of sexual abuse       Social Determinants of Health     Financial Resource Strain: Low Risk     Difficulty of Paying Living Expenses: Not hard at all   Food Insecurity: Food Insecurity Present    Worried About [employer] in the Last Year: Sometimes true    Ran Out of Food in the Last Year: Sometimes true   Transportation Needs: Unknown    Lack of Transportation (Non-Medical): No   Housing Stability: Unknown    Unstable Housing in the Last Year: No        Previous Medications    CONTINUOUS BLOOD GLUC SENSOR (DEXCOM G6 SENSOR) MISC    Use to monitor blood glucose, E10.65    CONTINUOUS BLOOD GLUC TRANSMIT (DEXCOM G6 TRANSMITTER) MISC    Use to monitor blood glucose, E10.65    GABAPENTIN (NEURONTIN) 300 MG CAPSULE    Take 1 capsule by mouth daily for 30 days.     HUMALOG KWIKPEN 100 UNIT/ML SOPN    3 units AC breakfast, 4 units AC lunch and supper correction scale 1/50>150 AC/HS, max daily dose 30 units    INSULIN DISPOSABLE PUMP (OMNIPOD 5 G6 INTRO, GEN 5,) KIT    Use to deliver insulin, E10.65    INSULIN DISPOSABLE PUMP (OMNIPOD 5 G6 POD, GEN 5,) MISC    Use to deliver insulin, E10.65    INSULIN PEN NEEDLE (PEN NEEDLES 31GX5/16\") 31G X 8 MM MISC    120 each by Other route 4 times daily    TRESIBA FLEXTOUCH 100 UNIT/ML SOPN    Inject 10 Units into the skin daily        Results for orders placed or performed during the hospital encounter of 23   CBC with Auto Differential   Result Value

## 2023-05-03 NOTE — H&P
Hospitalist History and Physical   Admit Date:  5/3/2023 12:32 AM   Name:  Soheila Musa   Age:  50 y.o. Sex:  female  :  1975   MRN:  [id number]   Room:  ER08/08    Presenting/Chief Complaint: Hyperglycemia     Reason(s) for Admission: [facility]CA ([facility]) [facility] [N17.9]     History of Present Illness:   Soheila Musa is a 50 y.o. female with a h/o DM and CKD4 who presents to the ER with c/o hyperglycemia for the past few days. She last took her basal insulin on , then saw PCP on  but has not taken insulin since. Glucose at home was elevated. She has otherwise felt fine. No N/V/D, has had good oral intake, no syncope, chest pain, abdo pain, bleeding, fevers. She came to the ER only because she was out of insulin, she didn't have any other concerns or complaints. Her initial BMP was notable for K 5.3, CO2 17, BUN 56, sCr 3.89, glucose 415. She was given 10U IV insulin and 2L IVFs. Repeat BMP showed K 5.2, CO2 15, BUN 54, sCr 3.58. She apparently had been eating potato chips intermittently while in the ER. Repeat glucose remains elevated. She had a fairly significant episode of diarrhea while in the ER as well which was new. She was reluctant to be admitted but we discussed her kidney function now with diarrhea and need for glucose control, so she is agreeable to staying. We are consulted for admission and further management. Assessment & Plan:   # BLANCA on CKD4 // non-anion gap metabolic acidosis   - Baseline sCr mid to high 2s since last summer aside from her admission last month (did bump >3 when she was here last month for the same issues). Lactic acid is normal. Con't IVFs, encourage oral intake, hold any offending meds. Daily BMP. She follows with [facility]. # Uncontrolled DM1   - A1C end of .   - Resume basal insulin + SSI, adjust based on glucose trends and oral intake.      # Diarrhea, non-bloody   - She thinks due to [place] food she

## 2023-05-03 NOTE — ED TRIAGE NOTES
Pt states she has ran out of her long acting insulin and her sugar readings have been saying high. Pt has been taking her short acting does more frequently.

## 2023-05-03 NOTE — DIABETES MGMT
Patient admitted with BLANCA. Admitting blood glucose 415. Patient has received Regular insulin IV 20 units. Blood glucose this morning was 448 at 0934. Creatinine 3.58. GFR 15. Reviewed patient current regimen: Lantus 10 units nightly, Humalog correctional insulin and Humalog 3 units with meals. Noted patient Lantus not due until 2100. Patient would likely benefit from a change in basal insulin to daily to reduce risk of DKA. Noted patient has PMH of type 1 diabetes. Provider updated via [employer] regarding recommendations and patient glycemic control. Patient well known to diabetes management team as she has been seen multiple times during previous admissions. Will follow along.

## 2023-05-03 NOTE — DIABETES MGMT
Patient admitted 5/3/23 with BLANCA. Blood glucose on admission 434. HbA1c 8.9% (eA). Patient seen for assessment regarding diabetes management. History of type 1 DM, CKD stage 4, and neuropathy. Pt states that they have been living with type 1 DM for over 21 years. Pt states that prior to admission medications include tresiba 9 units hs, Humalog 4 units 3x/day ac and Humalog correctional scale which she states that she has written down at home. Pt states that she had been taking Tresiba 15 units, but she was decreasing it due to episodes of am hypoglycemia. Pt states that she ran out of Kyoger and had not taken any for the past 4-5 days. Discussed the importance of medication compliance and explained the importance of basal insulin dosing due to diagnosis of type 1 DM. Discussed long acting insulins vs rapid acting. Pt states that she has [place] insulin pens available to  at her pharmacy and no difficulty affording her supplies. Pt states that she has also started using s Dexcom G6 and is waiting for her new sensor to arrive via mail. Pt states that the Dexcom G6 has been very helpful for managing her blood glucose levels and alerting her to hyper/hypoglycemia episodes. Pt states that she is not aware if her blood glucose goes low, so she has been thankful to have the Dexcom G6. Per chart review pt has also bee prescribed a Omnipod 5 insulin pump. Pt states that she is concerned about affordability and plans to look into it at discharge and follow up with endocrinology office, JUAN MIGUEL Jacobo. Pt states that the endocrinology office has helped her with her Dexcom G6 applications due to decreased dexterity, but she is able to use her insulin pens. Patient given educational material, \"Diabetes Self-Management: A Patient Teaching Guide\", which was reviewed with patient. Explained basic physiology of diabetes, as well as causes, signs and symptoms, and treatments for hypoglycemia and hyperglycemia.  Pt

## 2023-05-03 NOTE — CARE COORDINATION
CTN opened outreach for final follow up after PRN episode for WILBERT. Patient current admitted to hospital, CTN to follow up accordingly.

## 2023-05-04 VITALS
OXYGEN SATURATION: 100 % | SYSTOLIC BLOOD PRESSURE: 130 MMHG | HEART RATE: 75 BPM | RESPIRATION RATE: 16 BRPM | DIASTOLIC BLOOD PRESSURE: 78 MMHG | TEMPERATURE: 97.5 F | BODY MASS INDEX: 17.79 KG/M2 | HEIGHT: 68 IN

## 2023-05-04 LAB
ANION GAP SERPL CALC-SCNC: 6 MMOL/L (ref 2–11)
BUN SERPL-MCNC: 46 MG/DL (ref 6–23)
CALCIUM SERPL-MCNC: 9.3 MG/DL (ref 8.3–10.4)
CHLORIDE SERPL-SCNC: 121 MMOL/L (ref 101–110)
CO2 SERPL-SCNC: 15 MMOL/L (ref 21–32)
CREAT SERPL-MCNC: 3.15 MG/DL (ref 0.6–1)
GLUCOSE BLD STRIP.AUTO-MCNC: 172 MG/DL (ref 65–100)
GLUCOSE BLD STRIP.AUTO-MCNC: 268 MG/DL (ref 65–100)
GLUCOSE SERPL-MCNC: 247 MG/DL (ref 65–100)
POTASSIUM SERPL-SCNC: 5.6 MMOL/L (ref 3.5–5.1)
SERVICE CMNT-IMP: ABNORMAL
SERVICE CMNT-IMP: ABNORMAL
SODIUM SERPL-SCNC: 142 MMOL/L (ref 133–143)

## 2023-05-04 PROCEDURE — 6370000000 HC RX 637 (ALT 250 FOR IP): Performed by: FAMILY MEDICINE

## 2023-05-04 PROCEDURE — 80048 BASIC METABOLIC PNL TOTAL CA: CPT

## 2023-05-04 PROCEDURE — 6370000000 HC RX 637 (ALT 250 FOR IP): Performed by: INTERNAL MEDICINE

## 2023-05-04 PROCEDURE — 36415 COLL VENOUS BLD VENIPUNCTURE: CPT

## 2023-05-04 PROCEDURE — 82962 GLUCOSE BLOOD TEST: CPT

## 2023-05-04 PROCEDURE — 2580000003 HC RX 258: Performed by: INTERNAL MEDICINE

## 2023-05-04 RX ORDER — GABAPENTIN 300 MG/1
300 CAPSULE ORAL DAILY
Qty: 30 CAPSULE | Refills: 0 | Status: SHIPPED | OUTPATIENT
Start: 2023-05-04 | End: 2023-06-03

## 2023-05-04 RX ORDER — INSULIN DEGLUDEC INJECTION 100 U/ML
12 INJECTION, SOLUTION SUBCUTANEOUS DAILY
Qty: 15 ML | Refills: 3
Start: 2023-05-04

## 2023-05-04 RX ORDER — SODIUM BICARBONATE 650 MG/1
650 TABLET ORAL 2 TIMES DAILY
Qty: 60 TABLET | Refills: 0 | Status: SHIPPED | OUTPATIENT
Start: 2023-05-04

## 2023-05-04 RX ORDER — DIPHENHYDRAMINE HCL 25 MG
25 CAPSULE ORAL EVERY 6 HOURS PRN
Status: DISCONTINUED | OUTPATIENT
Start: 2023-05-04 | End: 2023-05-04 | Stop reason: HOSPADM

## 2023-05-04 RX ORDER — SODIUM BICARBONATE 650 MG/1
650 TABLET ORAL 2 TIMES DAILY
Status: DISCONTINUED | OUTPATIENT
Start: 2023-05-04 | End: 2023-05-04 | Stop reason: HOSPADM

## 2023-05-04 RX ORDER — INSULIN LISPRO 100 [IU]/ML
0-8 INJECTION, SOLUTION INTRAVENOUS; SUBCUTANEOUS
Status: DISCONTINUED | OUTPATIENT
Start: 2023-05-04 | End: 2023-05-04 | Stop reason: HOSPADM

## 2023-05-04 RX ORDER — INSULIN LISPRO 100 [IU]/ML
0-4 INJECTION, SOLUTION INTRAVENOUS; SUBCUTANEOUS NIGHTLY
Status: DISCONTINUED | OUTPATIENT
Start: 2023-05-04 | End: 2023-05-04 | Stop reason: HOSPADM

## 2023-05-04 RX ADMIN — SODIUM CHLORIDE, PRESERVATIVE FREE 10 ML: 5 INJECTION INTRAVENOUS at 08:21

## 2023-05-04 RX ADMIN — DIPHENHYDRAMINE HCL 25 MG: 25 CAPSULE ORAL at 02:11

## 2023-05-04 RX ADMIN — SODIUM ZIRCONIUM CYCLOSILICATE 10 G: 10 POWDER, FOR SUSPENSION ORAL at 09:09

## 2023-05-04 RX ADMIN — INSULIN LISPRO 4 UNITS: 100 INJECTION, SOLUTION INTRAVENOUS; SUBCUTANEOUS at 08:18

## 2023-05-04 RX ADMIN — INSULIN GLARGINE 10 UNITS: 100 INJECTION, SOLUTION SUBCUTANEOUS at 08:17

## 2023-05-04 RX ADMIN — ACETAMINOPHEN 650 MG: 325 TABLET, FILM COATED ORAL at 02:02

## 2023-05-04 RX ADMIN — GABAPENTIN 300 MG: 300 CAPSULE ORAL at 08:17

## 2023-05-04 ASSESSMENT — PAIN SCALES - GENERAL: PAINLEVEL_OUTOF10: 3

## 2023-05-04 ASSESSMENT — PAIN DESCRIPTION - LOCATION: LOCATION: HAND

## 2023-05-04 ASSESSMENT — PAIN DESCRIPTION - ORIENTATION: ORIENTATION: RIGHT;LEFT

## 2023-05-04 NOTE — CARE COORDINATION
CM spoke w/ patient at bedside for discharge planning. Demographics verified and updated. Patient states she lives alone. She is independent w/ ADLs and ambulates w/o use of assistive device. She has family in the area to assist as needed, including her mother, Chantell Correa ([phone]). DME in the home includes a walker and wheelchair. Patient not currently receiving HH, but states she has used [facility] in the past. PCP is Dr. France Carlos; last seen 5/1. Patient uses [facility] on [facility] for medications. She states she ran out of her Lantus prior to admission, but also states she did not take it because of how it makes her blood sugar drop. She has prescription drug coverage w/ BCBS and denies difficulties obtaining medications. She verified that she has a glucometer and all needed glucometer supplies. CM will continue to follow to assist w/ discharge needs that may arise. 05/03/23 1243   Service Assessment   Patient Orientation Alert and Oriented   Cognition Alert   History Provided By Patient   Primary [facility] [address] Family Members;Parent   PCP Verified by CM Yes  (Dr. France Carlos)   Last Visit to PCP Within last 3 months   Prior Functional Level Independent in ADLs/IADLs   Current Functional Level Independent in ADLs/IADLs   Can patient return to prior living arrangement Yes   Ability to make needs known: Good   Family able to assist with home care needs: Yes   Would you like for me to discuss the discharge plan with any other family members/significant others, and if so, who? No   Services At/After Discharge   Transition of Care Consult (CM Consult) Discharge Planning   Condition of Participation: Discharge Planning   The Plan for Transition of Care is related to the following treatment goals: Return to baseline.
Patient to be discharged home today. She has prescription drug coverage and denies difficulties obtaining meds. She has glucometer and all needed supplies. Diabetes management has referred her to [facility] outpatient diabetes education. No discharge needs identified. CM remains available. 05/04/23 1141   Service Assessment   Patient Orientation Alert and Oriented   Services At/After Discharge   Transition of Care Consult (CM Consult) N/A   Services At/After Discharge None   Mode of Transport at Discharge Self   Condition of Participation: Discharge Planning   The Plan for Transition of Care is related to the following treatment goals: Return to baseline.
You can access the FollowMyHealth Patient Portal offered by [facility] by registering at the following website: [email or web address]. By joining [employer]’s FollowMyHealth portal, you will also be able to view your health information using other applications (apps) compatible with our system.

## 2023-05-04 NOTE — DIABETES MGMT
Patient admitted with BLANCA. Blood glucose ranged  yesterday with patient receiving Lantus 10 units, Humalog 43 units, and Dextrose tablets 16 gm. Blood glucose this morning was 268. Reviewed patient current regimen: Lantus 10 units daily and Humalog correctional insulin. Patient would likely benefit from initiation of prandial insulin to help offset hyperglycemia during the day related to caloric intake. Provider updated via [employer] regarding recommendations and patient glycemic control.

## 2023-05-04 NOTE — PLAN OF CARE
Problem: Discharge Planning  Goal: Discharge to home or other facility with appropriate resources  Outcome: Progressing     Problem: Chronic Conditions and Co-morbidities  Goal: Patient's chronic conditions and co-morbidity symptoms are monitored and maintained or improved  Outcome: Progressing     Problem: Metabolic/Fluid and Electrolytes - Adult  Goal: Glucose maintained within prescribed range  Outcome: Progressing

## 2023-05-04 NOTE — DISCHARGE SUMMARY
Hospitalist Discharge Summary   Admit Date:  5/3/2023 12:32 AM   DC Note date: 2023  Name:  Radhika Juan   Age:  50 y.o. Sex:  female  :  1975   MRN:  [id number]   Room:  [facility]  PCP:  Paul Avelar MD    Presenting Complaint: Hyperglycemia     Initial Admission Diagnosis: Hyperglycemia [R73.9]  BLANCA (acute kidney injury) ([address] [N17.9]  Acute kidney injury ([address] [N17.9]     Problem List for this Hospitalization (present on admission):    Principal Problem:    BLANCA (acute kidney injury) ([address]  Resolved Problems:    * No resolved hospital problems. [facility] Course:  Ms. Patrick Jones is a 50 y.o. female with a h/o DM and CKD4 who was admitted to our service on 5/3 with BLANCA on CKD and hyperglycemia. She last took her basal insulin on , then saw PCP on  but has not taken insulin since. Glucose at home was elevated. She came to the ER only because she was out of insulin, she didn't have any other concerns or complaints. Her initial BMP was notable for K 5.3, CO2 17, BUN 56, sCr 3.89, glucose 415. She was given 10U IV insulin and 2L IVFs. Repeat BMP showed K 5.2, CO2 15, BUN 54, sCr 3.58. She apparently had been eating potato chips intermittently while in the ER. Repeat glucose remains elevated. She had 1 episode of diarrhea in the ER. She was admitted and started back on basal + bolus insulin and IVFs. Her sCr has improved today to 3.1. She is making good urine and tolerating her diet. Oral sodium bicarb was started. Her potassium mildly elevated and she is given a dose of Lokelma. I will increase her basal insulin from 9 to 12U QHS. Con't with her Humalog 3U prandial, keep a glucose log. She has glucometer supplies at home. She is medically stable for discharge home today. She needs a repeat BMP in 1 week to recheck K and Cr. Disposition: Home  Diet: ADULT DIET; Regular; 4 carb choices (60 gm/meal);  Low Potassium (Less than 3000 mg/day)  Code Status: Full Code    Follow

## 2023-05-05 NOTE — PROGRESS NOTES
2156: BG 63, notified MD, gave glucose tablets per protocol. MD held Lant.      2241: Rechecked     0159: 
Discharge instructions reviewed with Pt. Opportunity for questions and clarification given. IV removed and cath tip intact. Scripts sent to pharmacy / sent with pt. Education given to pt and pt was able to teach back. No needs at this time and pt waiting on ride to arrive.
Glucose 561.  10 scheduled lantus given. Provider notified, stated check BS at 1:30 and let him know what it is.
Physician Progress Note      PATIENT:               Shelby Ruano  CSN #:                  [id number]  :                       1975  ADMIT DATE:       5/3/2023 12:32 AM  DISCH DATE:        2023 12:30 PM  RESPONDING  PROVIDER #:        Jennifer Rodriguez MD          QUERY TEXT:    Pt admitted with BLANCA and has malnutrition documented in  note. Please   further specify type of malnutrition with documentation in the medical record. The medical record reflects the following:  Risk Factors: 50 y.o. female with type 1 DM, BMI 17.79  Clinical Indicators: Per RD note:  Malnutrition Status: Mild malnutrition  Context: Chronic Illness  Findings of clinical characteristics of malnutrition:  Energy Intake:  Unable to assess  Weight Loss:  Greater than 20% over 1 year  Body Fat Loss:  No significant body fat loss  Muscle Mass Loss:  No significant muscle mass loss  Fluid Accumulation:  No significant fluid accumulation   Strength:  Not Performed  No significant signs of wasting or fat loss  Treatment: ADULT DIET; Regular; 4 carb choices (60 gm/meal); Low Potassium   (Less than 3000 mg/day)    ASPEN Criteria:    https://[email or web address] onlinelibrary. duran. com/doi/full/10.1177/712895436623637  5    Thank you,  Jennifer Claros RN, BSN, CDI  [email or web address]  . Options provided:  -- Mild Malnutrition  -- Other - I will add my own diagnosis  -- Disagree - Not applicable / Not valid  -- Disagree - Clinically unable to determine / Unknown  -- Refer to Clinical Documentation Reviewer    PROVIDER RESPONSE TEXT:    This patient has mild malnutrition. Query created by: Jessica Moore on 2023 1:42 PM      Electronically signed by:   Jennifer Rodriguez MD 2023 7:29 AM
above report. She also asked about what she should focus on to eat better. RD explained focus on protein such as greek yogurt with breakfast, also just slightly larger portion sizes would be beneficial. Mentioned protein shakes which patient responded with I dislikes most of those. Patient asked if it would be possible to eat less beef and pork, RD said yes but again need to focus on getting good amounts of protein from other sources, such as beans, lentils, nuts, dairy. Patient understands and feels like she got some additional meals ideas to move forward with. Current Nutrition Therapies:  ADULT DIET; Regular; 4 carb choices (60 gm/meal); Low Potassium (Less than 3000 mg/day)    Current Intake:   Average Meal Intake: 51-75% Average Supplements Intake: None Ordered      Anthropometric Measures:  Height: 5' 8\" (172.7 cm)  Current Body Wt: 117 lb 1 oz (53.1 kg) (5/1), Weight source: Not Specified  BMI: 17.8, Underweight (BMI less than 18.5)     Ideal Body Weight (Kg) (Calculated): 64 kg (140 lbs), 83.6 %  Usual Body Wt: 149 lb 14.6 oz (68 kg) (6/26/22, prior RD notes), Percent weight change: -21.9       BMI Category Underweight (BMI less than 18.5)  Estimated Daily Nutrient Needs:  Energy (kcal/day): 5112-7903 (25-30 kcal/kg) (Kcal/kg Weight used: 53.1 kg Current  Protein (g/day): 53-66 g (1-1.25 g/kg) Weight Used: (Current) 53.1 kg  Fluid (ml/day):   (1 ml/kcal)    Nutrition Diagnosis:   Unintended weight loss related to inadequate protein-energy intake as evidenced by weight loss (prior hospital stay)  Nutrition Interventions:   Food and/or Nutrient Delivery: Continue Current Diet     Coordination of Nutrition Care: Continue to monitor while inpatient, Interdisciplinary Rounds       Goals:       Active Goal: Meet at least 75% of estimated needs, by next RD assessment       Nutrition Monitoring and Evaluation:      Food/Nutrient Intake Outcomes: Food and Nutrient Intake  Physical Signs/Symptoms Outcomes: Weight,

## 2023-05-15 ENCOUNTER — HOSPITAL ENCOUNTER (EMERGENCY)
Age: 48
Discharge: HOME | End: 2023-05-16
Attending: EMERGENCY MEDICINE
Payer: COMMERCIAL

## 2023-05-15 ENCOUNTER — OFFICE VISIT (OUTPATIENT)
Dept: ENDOCRINOLOGY | Age: 48
End: 2023-05-15
Payer: COMMERCIAL

## 2023-05-15 VITALS
BODY MASS INDEX: 17.94 KG/M2 | SYSTOLIC BLOOD PRESSURE: 114 MMHG | OXYGEN SATURATION: 100 % | WEIGHT: 118 LBS | HEART RATE: 84 BPM | DIASTOLIC BLOOD PRESSURE: 80 MMHG

## 2023-05-15 VITALS
DIASTOLIC BLOOD PRESSURE: 75 MMHG | HEART RATE: 90 BPM | OXYGEN SATURATION: 100 % | RESPIRATION RATE: 18 BRPM | TEMPERATURE: 98.4 F | SYSTOLIC BLOOD PRESSURE: 118 MMHG

## 2023-05-15 DIAGNOSIS — Z91.199 MEDICAL NON-COMPLIANCE: ICD-10-CM

## 2023-05-15 DIAGNOSIS — B37.9 YEAST INFECTION: ICD-10-CM

## 2023-05-15 DIAGNOSIS — E10.65 TYPE 1 DIABETES MELLITUS WITH HYPERGLYCEMIA (HCC): Primary | ICD-10-CM

## 2023-05-15 DIAGNOSIS — R73.9 HYPERGLYCEMIA: Primary | ICD-10-CM

## 2023-05-15 DIAGNOSIS — R27.8 POOR MANUAL DEXTERITY: ICD-10-CM

## 2023-05-15 LAB
ALBUMIN SERPL-MCNC: 2.8 G/DL (ref 3.5–5)
ALBUMIN/GLOB SERPL: 0.6 (ref 0.4–1.6)
ALP SERPL-CCNC: 1099 U/L (ref 50–136)
ALT SERPL-CCNC: 87 U/L (ref 12–65)
ANION GAP SERPL CALC-SCNC: 5 MMOL/L (ref 2–11)
AST SERPL-CCNC: 89 U/L (ref 15–37)
BASOPHILS # BLD: 0 K/UL (ref 0–0.2)
BASOPHILS NFR BLD: 0 % (ref 0–2)
BILIRUB SERPL-MCNC: 0.8 MG/DL (ref 0.2–1.1)
BUN SERPL-MCNC: 56 MG/DL (ref 6–23)
CALCIUM SERPL-MCNC: 9.7 MG/DL (ref 8.3–10.4)
CHLORIDE SERPL-SCNC: 109 MMOL/L (ref 101–110)
CO2 SERPL-SCNC: 20 MMOL/L (ref 21–32)
CREAT SERPL-MCNC: 4.18 MG/DL (ref 0.6–1)
DIFFERENTIAL METHOD BLD: ABNORMAL
EOSINOPHIL # BLD: 0.5 K/UL (ref 0–0.8)
EOSINOPHIL NFR BLD: 7 % (ref 0.5–7.8)
ERYTHROCYTE [DISTWIDTH] IN BLOOD BY AUTOMATED COUNT: 17.2 % (ref 11.9–14.6)
GLOBULIN SER CALC-MCNC: 4.9 G/DL (ref 2.8–4.5)
GLUCOSE SERPL-MCNC: 659 MG/DL (ref 65–100)
GLUCOSE, POC: 578 MG/DL
HCT VFR BLD AUTO: 32.9 % (ref 35.8–46.3)
HGB BLD-MCNC: 10.1 G/DL (ref 11.7–15.4)
IMM GRANULOCYTES # BLD AUTO: 0 K/UL (ref 0–0.5)
IMM GRANULOCYTES NFR BLD AUTO: 0 % (ref 0–5)
LYMPHOCYTES # BLD: 2.1 K/UL (ref 0.5–4.6)
LYMPHOCYTES NFR BLD: 28 % (ref 13–44)
MCH RBC QN AUTO: 27.2 PG (ref 26.1–32.9)
MCHC RBC AUTO-ENTMCNC: 30.7 G/DL (ref 31.4–35)
MCV RBC AUTO: 88.7 FL (ref 82–102)
MONOCYTES # BLD: 0.5 K/UL (ref 0.1–1.3)
MONOCYTES NFR BLD: 7 % (ref 4–12)
NEUTS SEG # BLD: 4.3 K/UL (ref 1.7–8.2)
NEUTS SEG NFR BLD: 58 % (ref 43–78)
NRBC # BLD: 0 K/UL (ref 0–0.2)
PLATELET # BLD AUTO: 439 K/UL (ref 150–450)
PMV BLD AUTO: 11 FL (ref 9.4–12.3)
POTASSIUM SERPL-SCNC: 5.2 MMOL/L (ref 3.5–5.1)
PROT SERPL-MCNC: 7.7 G/DL (ref 6.3–8.2)
RBC # BLD AUTO: 3.71 M/UL (ref 4.05–5.2)
SODIUM SERPL-SCNC: 134 MMOL/L (ref 133–143)
WBC # BLD AUTO: 7.4 K/UL (ref 4.3–11.1)

## 2023-05-15 PROCEDURE — 82962 GLUCOSE BLOOD TEST: CPT

## 2023-05-15 PROCEDURE — 99215 OFFICE O/P EST HI 40 MIN: CPT | Performed by: PHYSICIAN ASSISTANT

## 2023-05-15 PROCEDURE — 3052F HG A1C>EQUAL 8.0%<EQUAL 9.0%: CPT | Performed by: PHYSICIAN ASSISTANT

## 2023-05-15 PROCEDURE — 99284 EMERGENCY DEPT VISIT MOD MDM: CPT | Performed by: PHYSICIAN ASSISTANT

## 2023-05-15 PROCEDURE — 80053 COMPREHEN METABOLIC PANEL: CPT

## 2023-05-15 PROCEDURE — 82962 GLUCOSE BLOOD TEST: CPT | Performed by: PHYSICIAN ASSISTANT

## 2023-05-15 PROCEDURE — 85025 COMPLETE CBC W/AUTO DIFF WBC: CPT

## 2023-05-15 PROCEDURE — 81001 URINALYSIS AUTO W/SCOPE: CPT

## 2023-05-15 PROCEDURE — 2580000003 HC RX 258: Performed by: EMERGENCY MEDICINE

## 2023-05-15 PROCEDURE — 96374 THER/PROPH/DIAG INJ IV PUSH: CPT | Performed by: PHYSICIAN ASSISTANT

## 2023-05-15 PROCEDURE — 2580000003 HC RX 258: Performed by: PHYSICIAN ASSISTANT

## 2023-05-15 PROCEDURE — 0202U NFCT DS 22 TRGT SARS-COV-2: CPT

## 2023-05-15 PROCEDURE — 87086 URINE CULTURE/COLONY COUNT: CPT

## 2023-05-15 PROCEDURE — 6370000000 HC RX 637 (ALT 250 FOR IP): Performed by: PHYSICIAN ASSISTANT

## 2023-05-15 PROCEDURE — 96361 HYDRATE IV INFUSION ADD-ON: CPT | Performed by: PHYSICIAN ASSISTANT

## 2023-05-15 RX ORDER — 0.9 % SODIUM CHLORIDE 0.9 %
1000 INTRAVENOUS SOLUTION INTRAVENOUS ONCE
Status: COMPLETED | OUTPATIENT
Start: 2023-05-15 | End: 2023-05-15

## 2023-05-15 RX ORDER — 0.9 % SODIUM CHLORIDE 0.9 %
1000 INTRAVENOUS SOLUTION INTRAVENOUS
Status: COMPLETED | OUTPATIENT
Start: 2023-05-15 | End: 2023-05-15

## 2023-05-15 RX ORDER — INSULIN DEGLUDEC INJECTION 100 U/ML
INJECTION, SOLUTION SUBCUTANEOUS
Qty: 15 ML | Refills: 3 | Status: SHIPPED | OUTPATIENT
Start: 2023-05-15

## 2023-05-15 RX ORDER — GLUCAGON INJECTION, SOLUTION 1 MG/.2ML
1 INJECTION, SOLUTION SUBCUTANEOUS PRN
Qty: 2 EACH | Refills: 1 | Status: SHIPPED | OUTPATIENT
Start: 2023-05-15

## 2023-05-15 RX ADMIN — SODIUM CHLORIDE 1000 ML: 9 INJECTION, SOLUTION INTRAVENOUS at 22:56

## 2023-05-15 RX ADMIN — SODIUM CHLORIDE 1000 ML: 9 INJECTION, SOLUTION INTRAVENOUS at 21:04

## 2023-05-15 RX ADMIN — INSULIN HUMAN 10 UNITS: 100 INJECTION, SOLUTION PARENTERAL at 21:02

## 2023-05-15 ASSESSMENT — ENCOUNTER SYMPTOMS
ALLERGIC/IMMUNOLOGIC NEGATIVE: 1
RESPIRATORY NEGATIVE: 1
ABDOMINAL PAIN: 0
EYES NEGATIVE: 1
GASTROINTESTINAL NEGATIVE: 1
BLURRED VISION: 0
VOMITING: 0
NAUSEA: 0
SHORTNESS OF BREATH: 0

## 2023-05-15 NOTE — ED TRIAGE NOTES
Pt states her BS has been in the HIGH range in the past couple of days, pt states she was recently admitted for hyperglycemic coma and d/c in jan.

## 2023-05-15 NOTE — PROGRESS NOTES
cost prohibitive we discussed using an insulin patch to help deliver insulin given her dexterity issues    She is high risk for multiple complications from her diabetes ranging from hypoglycemia into diabetic ketoacidosis. She is ambulating barefoot in this habit was strongly discouraged, risk of additional amputation reviewed    At today's visit we discussed sequela associated with uncontrolled diabetes including increased risk of stroke, heart attack, kidney failure, amputation, retinopathy, neuropathy, and nephropathy. Patient was strongly encouraged to comply with treatment regimen as well as dietary and exercise recommendations to aid in control of this chronic disease to help prevent complications associated with uncontrolled diabetes. Mom with pt in office today trained on glucagon administration, gvoke Rx'd        In kidney function mentioned, risk of kidney failure reviewed      - TRESIBA FLEXTOUCH 100 UNIT/ML SOPN; Start 9 units daily increase by 1 unit every 5 days until -150, max daily dose 12  Dispense: 15 mL; Refill: 3  - AMB POC GLUCOSE BLOOD, BY GLUCOSE MONITORING DEVICE  - LYUMJEV KWIKPEN 100 UNIT/ML SOPN; SAMPLE- 6 units TIDAC plus correction scale 1/50>150 AC/HS, max daily dose 30 units  Dispense: 3 mL; Refill: 0  - HUMALOG KWIKPEN 100 UNIT/ML SOPN; 6 units TIDAC plus correction scale 1/50>150 AC/HS, max daily dose 30 units  Dispense: 30 mL; Refill: 3      2. Poor manual dexterity  Needs help at times to deliver insulin by pen, alternate methods of insulin delivery reviewed, pt will price omnipod 5, I am open to ceQur patch     3. Medical non-compliance  Major barrier to care drastically increasing risk of sequela         Emi Red was seen today for diabetes.     Diagnoses and all orders for this visit:    Type 1 diabetes mellitus with hyperglycemia ([facility]  -     TRESIBA FLEXTOUCH 100 UNIT/ML SOPN; Start 9 units daily increase by 1 unit every 5 days until -150, max daily dose

## 2023-05-16 LAB
APPEARANCE UR: CLEAR
B PERT DNA SPEC QL NAA+PROBE: NOT DETECTED
BACTERIA URNS QL MICRO: 0 /HPF
BILIRUB UR QL: NEGATIVE
BORDETELLA PARAPERTUSSIS BY PCR: NOT DETECTED
C PNEUM DNA SPEC QL NAA+PROBE: NOT DETECTED
COLOR UR: ABNORMAL
EPI CELLS #/AREA URNS HPF: ABNORMAL /HPF
FLUAV SUBTYP SPEC NAA+PROBE: NOT DETECTED
FLUBV RNA SPEC QL NAA+PROBE: NOT DETECTED
GLUCOSE BLD STRIP.AUTO-MCNC: 466 MG/DL (ref 65–100)
GLUCOSE UR STRIP.AUTO-MCNC: >1000 MG/DL
HADV DNA SPEC QL NAA+PROBE: NOT DETECTED
HCOV 229E RNA SPEC QL NAA+PROBE: NOT DETECTED
HCOV HKU1 RNA SPEC QL NAA+PROBE: NOT DETECTED
HCOV NL63 RNA SPEC QL NAA+PROBE: NOT DETECTED
HCOV OC43 RNA SPEC QL NAA+PROBE: NOT DETECTED
HGB UR QL STRIP: ABNORMAL
HMPV RNA SPEC QL NAA+PROBE: NOT DETECTED
HPIV1 RNA SPEC QL NAA+PROBE: NOT DETECTED
HPIV2 RNA SPEC QL NAA+PROBE: NOT DETECTED
HPIV3 RNA SPEC QL NAA+PROBE: NOT DETECTED
HPIV4 RNA SPEC QL NAA+PROBE: NOT DETECTED
KETONES UR QL STRIP.AUTO: NEGATIVE MG/DL
LEUKOCYTE ESTERASE UR QL STRIP.AUTO: ABNORMAL
M PNEUMO DNA SPEC QL NAA+PROBE: NOT DETECTED
NITRITE UR QL STRIP.AUTO: NEGATIVE
OTHER OBSERVATIONS: ABNORMAL
PH UR STRIP: 6 (ref 5–9)
PROT UR STRIP-MCNC: 30 MG/DL
RBC #/AREA URNS HPF: ABNORMAL /HPF
RSV RNA SPEC QL NAA+PROBE: NOT DETECTED
RV+EV RNA SPEC QL NAA+PROBE: NOT DETECTED
SARS-COV-2 RNA RESP QL NAA+PROBE: NOT DETECTED
SERVICE CMNT-IMP: ABNORMAL
SP GR UR REFRACTOMETRY: 1.02 (ref 1–1.02)
UROBILINOGEN UR QL STRIP.AUTO: 0.2 EU/DL (ref 0.2–1)
WBC URNS QL MICRO: ABNORMAL /HPF
YEAST URNS QL MICRO: ABNORMAL

## 2023-05-16 RX ORDER — FLUCONAZOLE 150 MG/1
150 TABLET ORAL EVERY OTHER DAY
Qty: 3 TABLET | Refills: 0 | Status: SHIPPED | OUTPATIENT
Start: 2023-05-16 | End: 2023-05-21

## 2023-05-16 RX ORDER — INSULIN LISPRO-AABC 100 [IU]/ML
INJECTION, SOLUTION SUBCUTANEOUS
Qty: 3 ML | Refills: 0
Start: 2023-05-16

## 2023-05-16 RX ORDER — INSULIN LISPRO 100 [IU]/ML
INJECTION, SOLUTION INTRAVENOUS; SUBCUTANEOUS
Qty: 30 ML | Refills: 3
Start: 2023-05-16

## 2023-05-16 NOTE — ED NOTES
I have reviewed discharge instructions with the patient. The patient verbalized understanding. Patient left ED via Discharge Method: ambulatory to Home by self     Opportunity for questions and clarification provided. Patient given 1 scripts. To continue your aftercare when you leave the hospital, you may receive an automated call from our care team to check in on how you are doing. This is a free service and part of our promise to provide the best care and service to meet your aftercare needs.  If you have questions, or wish to unsubscribe from this service please call [phone]. Thank you for Choosing our [facility] Emergency Department.        Guerline Mcclain RN  05/16/23 0371

## 2023-05-16 NOTE — ED PROVIDER NOTES
Emergency Department Provider Note       PCP: Sophia Kelsey MD   Age: 50 y.o. Sex: female     DISPOSITION Decision To Discharge 05/16/2023 12:33:13 AM       ICD-10-CM    1. Hyperglycemia  R73.9       2. Yeast infection  B37.9           Medical Decision Making     Complexity of Problems Addressed:  1 or more acute illnesses that pose a threat to life or bodily function. Data Reviewed and Analyzed:  Category 1:   I independently ordered and reviewed each unique test.  I reviewed external records: ED visit note from an outside group. The patients assessment required an independent historian: none. The reason they were needed is . Category 2:     I interpreted the labs. Category 3: Discussion of management or test interpretation. 9:40 PM Spoke with Dr. Tutu Hayward regarding patient and hyperglycemia. 11:49 PM Spoke again with Dr. Tutu Hayward regarding patient. He states if patient is feeling better, these are chronic problems for her and she can go home. She does have all of her insulin that she needs at home. He reviewed the labs and compared to the past.  She actually is feeling much better. Still waiting on the urinalysis. They are running it now. Urine does not show any bacteria. Urine culture was added. We will call her if it shows she does need antibiotics. She has yeast in her urine so I have added Diflucan for 3 doses, 1 every other day x3 doses. She should follow-up with her primary care physician for recheck or the endocrinologist.  Return immediately to the ED if worsening in any way. She is feeling much better. She is stable for discharge and ambulatory out of the ED without difficulty at this time. Risk of Complications and/or Morbidity of Patient Management:  Prescription drug management performed. Patient was discharged risks and benefits of hospitalization were considered. Chronic medical problems impacting care include diabetes.   Shared medical decision making was

## 2023-05-16 NOTE — DISCHARGE INSTRUCTIONS
Keep a close eye on your sugars. Drink plenty water. Take the Diflucan as directed. We will call you with the urine shows you do need antibiotics. Follow-up with your primary care physician and/or endocrinologist for recheck. Return to the ED if worsening in any way.

## 2023-05-18 LAB
BACTERIA SPEC CULT: NORMAL
SERVICE CMNT-IMP: NORMAL

## 2023-05-22 DIAGNOSIS — E10.65 TYPE 1 DIABETES MELLITUS WITH HYPERGLYCEMIA (HCC): ICD-10-CM

## 2023-05-22 RX ORDER — PROCHLORPERAZINE 25 MG/1
SUPPOSITORY RECTAL
Qty: 1 EACH | Refills: 3 | Status: ON HOLD | OUTPATIENT
Start: 2023-05-22

## 2023-05-25 ENCOUNTER — TELEPHONE (OUTPATIENT)
Dept: DIABETES SERVICES | Age: 48
End: 2023-05-25

## 2023-05-25 NOTE — TELEPHONE ENCOUNTER
Call to patient about DM Education referral.  Let her know at no cost.  She scheduled all 4 classes. Wants me to e mail her dates. Email sent.

## 2023-05-31 ENCOUNTER — FOLLOWUP TELEPHONE ENCOUNTER (OUTPATIENT)
Dept: DIABETES SERVICES | Age: 48
End: 2023-05-31

## 2023-05-31 ENCOUNTER — HOSPITAL ENCOUNTER (EMERGENCY)
Age: 48
Discharge: HOME | End: 2023-05-31
Attending: EMERGENCY MEDICINE
Payer: COMMERCIAL

## 2023-05-31 VITALS
WEIGHT: 127 LBS | DIASTOLIC BLOOD PRESSURE: 83 MMHG | BODY MASS INDEX: 19.25 KG/M2 | TEMPERATURE: 98.1 F | OXYGEN SATURATION: 98 % | SYSTOLIC BLOOD PRESSURE: 138 MMHG | RESPIRATION RATE: 16 BRPM | HEIGHT: 68 IN | HEART RATE: 74 BPM

## 2023-05-31 DIAGNOSIS — H92.01 OTALGIA OF RIGHT EAR: Primary | ICD-10-CM

## 2023-05-31 PROCEDURE — 99282 EMERGENCY DEPT VISIT SF MDM: CPT | Performed by: EMERGENCY MEDICINE

## 2023-05-31 ASSESSMENT — LIFESTYLE VARIABLES
HOW MANY STANDARD DRINKS CONTAINING ALCOHOL DO YOU HAVE ON A TYPICAL DAY: PATIENT DOES NOT DRINK
HOW OFTEN DO YOU HAVE A DRINK CONTAINING ALCOHOL: NEVER

## 2023-05-31 NOTE — ED TRIAGE NOTES
Pt presents to the ED with c/o right sided ear pain/itching. Patient said she woke up this morning and it felt like this. Pt also states she feels some fluid moving in her ears/head. Also reports a minor headache.

## 2023-05-31 NOTE — ED NOTES
I have reviewed discharge instructions with the patient. The patient verbalized understanding. Patient left ED via Discharge Method: ambulatory to Home with self. Opportunity for questions and clarification provided. Patient given 0 scripts. To continue your aftercare when you leave the hospital, you may receive an automated call from our care team to check in on how you are doing. This is a free service and part of our promise to provide the best care and service to meet your aftercare needs.  If you have questions, or wish to unsubscribe from this service please call [phone]. Thank you for Choosing our [facility] Emergency Department.         Avelino Alvarenga RN  05/31/23 8022

## 2023-05-31 NOTE — ED PROVIDER NOTES
Emergency Department Provider Note       PCP: Lourdes Russ MD   Age: 50 y.o. Sex: female     DISPOSITION Decision To Discharge 05/31/2023 06:24:49 AM       ICD-10-CM    1. Otalgia of right ear  H92.01           Medical Decision Making     Complexity of Problems Addressed:  Complexity of Problem: 1 acute, uncomplicated illness or injury. Data Reviewed and Analyzed:  Category 1:   I independently ordered and reviewed each unique test.         Category 2:       Category 3: Discussion of management or test interpretation. No sign of infection. Advised mucinex D       Risk of Complications and/or Morbidity of Patient Management:      History     Su García is a 50 y.o. female who presents to the Emergency Department with chief complaint of    Chief Complaint   Patient presents with    Otalgia      Pt woke with right ear pain and itching this morning. She has had some sinus drainage. No fever, cough, sob, cp, ear drainage. Physical Exam     Vitals signs and nursing note reviewed. Vitals:    05/31/23 0622   BP: 138/83   Pulse: 74   Resp: 16   Temp: 98.1 °F (36.7 °C)   TempSrc: Oral   SpO2: 98%   Weight: 127 lb (57.6 kg)   Height: 5' 8\" (1.727 m)       Physical Exam  Vitals and nursing note reviewed. Constitutional:       Appearance: Normal appearance. She is well-developed. HENT:      Head: Normocephalic and atraumatic. Right Ear: Tympanic membrane, ear canal and external ear normal.      Left Ear: Tympanic membrane, ear canal and external ear normal.      Nose: Nose normal.      Mouth/Throat:      Mouth: Mucous membranes are moist.      Pharynx: No posterior oropharyngeal erythema. Eyes:      Extraocular Movements: Extraocular movements intact. Pupils: Pupils are equal, round, and reactive to light. Cardiovascular:      Rate and Rhythm: Normal rate and regular rhythm. Pulmonary:      Effort: Pulmonary effort is normal. No respiratory distress.    Abdominal:      General:

## 2023-05-31 NOTE — DISCHARGE INSTRUCTIONS
Take Mucinex D. Use over the counter ear drops as needed. Return for worsening or concerning symptoms.

## 2023-06-01 ENCOUNTER — FOLLOWUP TELEPHONE ENCOUNTER (OUTPATIENT)
Dept: DIABETES SERVICES | Age: 48
End: 2023-06-01

## 2023-06-15 ENCOUNTER — HOSPITAL ENCOUNTER (OUTPATIENT)
Dept: DIABETES SERVICES | Age: 48
Setting detail: RECURRING SERIES
Discharge: HOME | End: 2023-06-18

## 2023-06-21 ENCOUNTER — TELEPHONE (OUTPATIENT)
Dept: ENDOCRINOLOGY | Age: 48
End: 2023-06-21

## 2023-06-21 DIAGNOSIS — R27.8 POOR MANUAL DEXTERITY: ICD-10-CM

## 2023-06-21 DIAGNOSIS — E10.65 TYPE 1 DIABETES MELLITUS WITH HYPERGLYCEMIA (HCC): Primary | ICD-10-CM

## 2023-06-21 NOTE — TELEPHONE ENCOUNTER
Pt left message, stating she was in the hospital and they changed her long lasting insulin to Lantus and the Pharmacy is stating it needs a PA with BC/BS for this Rx.

## 2023-06-22 ENCOUNTER — TELEPHONE (OUTPATIENT)
Dept: DIABETES SERVICES | Age: 48
End: 2023-06-22

## 2023-06-23 RX ORDER — INSULIN GLARGINE 300 U/ML
10 INJECTION, SOLUTION SUBCUTANEOUS NIGHTLY
Qty: 1.5 ML | Refills: 11 | Status: SHIPPED | OUTPATIENT
Start: 2023-06-23

## 2023-06-23 NOTE — TELEPHONE ENCOUNTER
Patient was prescribed tresiba by [facility]. Per ED notes from 5/15/23 \"She had stopped taking her long-acting insulin because her sugars would either get too high or too low. She does not remember when she stopped taking it. She went to the endocrinologist today and they checked her sugar and it was 597 around 2:00 this afternoon so she took 11 units of regular insulin. She checked it prior to coming in this evening and it was still in the 590 range so she came to the emergency room. \" I do not see where she was given Lantus.      Insulins preferred Araseli Forman (YFGN), Mee Ndiaye

## 2023-06-23 NOTE — TELEPHONE ENCOUNTER
Toujeo sent to [facility]. She should take 10 units every 24 hours. This pen seems to push into the skin a bit easier than the others.      Let me know if issues

## 2023-07-13 ENCOUNTER — TELEPHONE (OUTPATIENT)
Dept: ENDOCRINOLOGY | Age: 48
End: 2023-07-13

## 2023-07-13 NOTE — TELEPHONE ENCOUNTER
Pt left message on voicemail wanting a prescription of Gabapentin sent to [facility]. She was prescribed this medication while in the hospital and is out now. Please advice.

## 2023-07-23 ENCOUNTER — HOSPITAL ENCOUNTER (INPATIENT)
Age: 48
LOS: 2 days | Discharge: HOME | DRG: 690 | End: 2023-07-25
Attending: EMERGENCY MEDICINE | Admitting: FAMILY MEDICINE
Payer: COMMERCIAL

## 2023-07-23 ENCOUNTER — APPOINTMENT (OUTPATIENT)
Dept: GENERAL RADIOLOGY | Age: 48
DRG: 690 | End: 2023-07-23
Payer: COMMERCIAL

## 2023-07-23 DIAGNOSIS — G62.9 NEUROPATHY: ICD-10-CM

## 2023-07-23 DIAGNOSIS — E10.65 TYPE 1 DIABETES MELLITUS WITH HYPERGLYCEMIA (HCC): ICD-10-CM

## 2023-07-23 DIAGNOSIS — R65.21 SEPTIC SHOCK (HCC): Primary | ICD-10-CM

## 2023-07-23 DIAGNOSIS — R27.8 POOR MANUAL DEXTERITY: ICD-10-CM

## 2023-07-23 DIAGNOSIS — N30.00 ACUTE CYSTITIS WITHOUT HEMATURIA: ICD-10-CM

## 2023-07-23 DIAGNOSIS — A41.9 SEPTIC SHOCK (HCC): Primary | ICD-10-CM

## 2023-07-23 PROBLEM — E10.3319: Status: RESOLVED | Noted: 2017-03-13 | Resolved: 2023-07-23

## 2023-07-23 PROBLEM — N39.0 UTI (URINARY TRACT INFECTION): Status: ACTIVE | Noted: 2023-07-23

## 2023-07-23 PROBLEM — F41.8 DEPRESSION WITH ANXIETY: Status: RESOLVED | Noted: 2017-07-11 | Resolved: 2023-07-23

## 2023-07-23 PROBLEM — N13.30 HYDRONEPHROSIS OF RIGHT KIDNEY: Status: RESOLVED | Noted: 2022-04-19 | Resolved: 2023-07-23

## 2023-07-23 PROBLEM — Z86.74 HISTORY OF CARDIAC ARREST: Status: RESOLVED | Noted: 2023-03-03 | Resolved: 2023-07-23

## 2023-07-23 PROBLEM — I46.9 CARDIAC ARREST (HCC): Status: RESOLVED | Noted: 2022-05-21 | Resolved: 2023-07-23

## 2023-07-23 PROBLEM — E10.9 DIABETES MELLITUS TYPE 1 (HCC): Status: RESOLVED | Noted: 2018-08-22 | Resolved: 2023-07-23

## 2023-07-23 PROBLEM — I95.1 ORTHOSTASIS: Status: RESOLVED | Noted: 2022-07-08 | Resolved: 2023-07-23

## 2023-07-23 PROBLEM — N17.9 AKI (ACUTE KIDNEY INJURY) (HCC): Status: RESOLVED | Noted: 2023-05-03 | Resolved: 2023-07-23

## 2023-07-23 PROBLEM — N18.30 TYPE 1 DIABETES MELLITUS WITH STAGE 3 CHRONIC KIDNEY DISEASE (HCC): Status: RESOLVED | Noted: 2018-08-16 | Resolved: 2023-07-23

## 2023-07-23 PROBLEM — K29.70 GASTRITIS WITHOUT BLEEDING: Status: RESOLVED | Noted: 2022-07-08 | Resolved: 2023-07-23

## 2023-07-23 PROBLEM — G60.9 NEUROPATHY, PERIPHERAL, IDIOPATHIC: Status: RESOLVED | Noted: 2017-03-13 | Resolved: 2023-07-23

## 2023-07-23 PROBLEM — N12 PYELONEPHRITIS: Status: RESOLVED | Noted: 2022-06-27 | Resolved: 2023-07-23

## 2023-07-23 PROBLEM — Z87.19 H/O GASTROESOPHAGEAL REFLUX (GERD): Status: RESOLVED | Noted: 2020-06-19 | Resolved: 2023-07-23

## 2023-07-23 PROBLEM — E10.11 DIABETIC KETOACIDOSIS WITH COMA ASSOCIATED WITH TYPE 1 DIABETES MELLITUS (HCC): Status: RESOLVED | Noted: 2022-05-21 | Resolved: 2023-07-23

## 2023-07-23 PROBLEM — E10.22 TYPE 1 DIABETES MELLITUS WITH STAGE 3 CHRONIC KIDNEY DISEASE (HCC): Status: RESOLVED | Noted: 2018-08-16 | Resolved: 2023-07-23

## 2023-07-23 PROBLEM — E11.610 CHARCOT FOOT DUE TO DIABETES MELLITUS (HCC): Chronic | Status: ACTIVE | Noted: 2023-07-23

## 2023-07-23 LAB
ALBUMIN SERPL-MCNC: 3.2 G/DL (ref 3.5–5)
ALBUMIN/GLOB SERPL: 0.6 (ref 0.4–1.6)
ALP SERPL-CCNC: 701 U/L (ref 50–136)
ALT SERPL-CCNC: 87 U/L (ref 12–65)
ANION GAP SERPL CALC-SCNC: 9 MMOL/L (ref 2–11)
APPEARANCE UR: ABNORMAL
ARTERIAL PATENCY WRIST A: ABNORMAL
AST SERPL-CCNC: 110 U/L (ref 15–37)
BACTERIA URNS QL MICRO: ABNORMAL /HPF
BASE DEFICIT BLD-SCNC: 5.4 MMOL/L
BASOPHILS # BLD: 0 K/UL (ref 0–0.2)
BASOPHILS NFR BLD: 0 % (ref 0–2)
BILIRUB SERPL-MCNC: 0.3 MG/DL (ref 0.2–1.1)
BILIRUB UR QL: NEGATIVE
BUN SERPL-MCNC: 46 MG/DL (ref 6–23)
CA-I BLD-MCNC: 1.25 MMOL/L (ref 1.12–1.32)
CALCIUM SERPL-MCNC: 9.8 MG/DL (ref 8.3–10.4)
CHLORIDE SERPL-SCNC: 106 MMOL/L (ref 101–110)
CO2 BLD-SCNC: 20 MMOL/L (ref 13–23)
CO2 SERPL-SCNC: 21 MMOL/L (ref 21–32)
COLOR UR: ABNORMAL
CREAT SERPL-MCNC: 3.85 MG/DL (ref 0.6–1)
DIFFERENTIAL METHOD BLD: ABNORMAL
EOSINOPHIL # BLD: 0.7 K/UL (ref 0–0.8)
EOSINOPHIL NFR BLD: 8 % (ref 0.5–7.8)
EPI CELLS #/AREA URNS HPF: ABNORMAL /HPF
ERYTHROCYTE [DISTWIDTH] IN BLOOD BY AUTOMATED COUNT: 17.2 % (ref 11.9–14.6)
FIO2 ON VENT: 21 %
GAS FLOW.O2 O2 DELIVERY SYS: ABNORMAL
GLOBULIN SER CALC-MCNC: 5.3 G/DL (ref 2.8–4.5)
GLUCOSE BLD STRIP.AUTO-MCNC: 205 MG/DL (ref 65–100)
GLUCOSE BLD STRIP.AUTO-MCNC: 211 MG/DL (ref 65–100)
GLUCOSE BLD STRIP.AUTO-MCNC: 227 MG/DL (ref 65–100)
GLUCOSE SERPL-MCNC: 382 MG/DL (ref 65–100)
GLUCOSE UR STRIP.AUTO-MCNC: >1000 MG/DL
HCG UR QL: NEGATIVE
HCO3 BLD-SCNC: 20.1 MMOL/L (ref 22–26)
HCT VFR BLD AUTO: 25.3 % (ref 35.8–46.3)
HGB BLD-MCNC: 8.6 G/DL (ref 11.7–15.4)
HGB UR QL STRIP: ABNORMAL
IMM GRANULOCYTES # BLD AUTO: 0 K/UL (ref 0–0.5)
IMM GRANULOCYTES NFR BLD AUTO: 0 % (ref 0–5)
KETONES UR QL STRIP.AUTO: NEGATIVE MG/DL
LACTATE SERPL-SCNC: 3.8 MMOL/L (ref 0.4–2)
LACTATE SERPL-SCNC: 4 MMOL/L (ref 0.4–2)
LACTATE SERPL-SCNC: 5.4 MMOL/L (ref 0.4–2)
LEUKOCYTE ESTERASE UR QL STRIP.AUTO: ABNORMAL
LIPASE SERPL-CCNC: 51 U/L (ref 73–393)
LYMPHOCYTES # BLD: 3.6 K/UL (ref 0.5–4.6)
LYMPHOCYTES NFR BLD: 41 % (ref 13–44)
MAGNESIUM SERPL-MCNC: 2.3 MG/DL (ref 1.8–2.4)
MCH RBC QN AUTO: 30.5 PG (ref 26.1–32.9)
MCHC RBC AUTO-ENTMCNC: 34 G/DL (ref 31.4–35)
MCV RBC AUTO: 89.7 FL (ref 82–102)
MONOCYTES # BLD: 0.6 K/UL (ref 0.1–1.3)
MONOCYTES NFR BLD: 7 % (ref 4–12)
NEUTS SEG # BLD: 3.8 K/UL (ref 1.7–8.2)
NEUTS SEG NFR BLD: 44 % (ref 43–78)
NITRITE UR QL STRIP.AUTO: NEGATIVE
NRBC # BLD: 0 K/UL (ref 0–0.2)
OTHER OBSERVATIONS: ABNORMAL
PCO2 BLD: 37.9 MMHG (ref 35–45)
PH BLD: 7.33 (ref 7.35–7.45)
PH UR STRIP: 5 (ref 5–9)
PLATELET # BLD AUTO: 408 K/UL (ref 150–450)
PMV BLD AUTO: 10.6 FL (ref 9.4–12.3)
PO2 BLD: 91 MMHG (ref 75–100)
POTASSIUM BLD-SCNC: 3.4 MMOL/L (ref 3.5–5.1)
POTASSIUM SERPL-SCNC: 5.1 MMOL/L (ref 3.5–5.1)
PROCALCITONIN SERPL-MCNC: 3.42 NG/ML (ref 0–0.49)
PROT SERPL-MCNC: 8.5 G/DL (ref 6.3–8.2)
PROT UR STRIP-MCNC: 30 MG/DL
RBC # BLD AUTO: 2.82 M/UL (ref 4.05–5.2)
RBC #/AREA URNS HPF: ABNORMAL /HPF
SAO2 % BLD: 97 %
SERVICE CMNT-IMP: ABNORMAL
SODIUM BLD-SCNC: 143 MMOL/L (ref 136–145)
SODIUM SERPL-SCNC: 136 MMOL/L (ref 133–143)
SP GR UR REFRACTOMETRY: 1.02 (ref 1–1.02)
SPECIMEN SITE: ABNORMAL
UROBILINOGEN UR QL STRIP.AUTO: 0.2 EU/DL (ref 0.2–1)
WBC # BLD AUTO: 8.7 K/UL (ref 4.3–11.1)
WBC URNS QL MICRO: ABNORMAL /HPF

## 2023-07-23 PROCEDURE — 71046 X-RAY EXAM CHEST 2 VIEWS: CPT

## 2023-07-23 PROCEDURE — 81025 URINE PREGNANCY TEST: CPT

## 2023-07-23 PROCEDURE — 96374 THER/PROPH/DIAG INJ IV PUSH: CPT

## 2023-07-23 PROCEDURE — 6360000002 HC RX W HCPCS: Performed by: EMERGENCY MEDICINE

## 2023-07-23 PROCEDURE — 99285 EMERGENCY DEPT VISIT HI MDM: CPT

## 2023-07-23 PROCEDURE — 81001 URINALYSIS AUTO W/SCOPE: CPT

## 2023-07-23 PROCEDURE — 6370000000 HC RX 637 (ALT 250 FOR IP): Performed by: FAMILY MEDICINE

## 2023-07-23 PROCEDURE — 84132 ASSAY OF SERUM POTASSIUM: CPT

## 2023-07-23 PROCEDURE — 6370000000 HC RX 637 (ALT 250 FOR IP): Performed by: EMERGENCY MEDICINE

## 2023-07-23 PROCEDURE — 2580000003 HC RX 258: Performed by: EMERGENCY MEDICINE

## 2023-07-23 PROCEDURE — 82947 ASSAY GLUCOSE BLOOD QUANT: CPT

## 2023-07-23 PROCEDURE — 84295 ASSAY OF SERUM SODIUM: CPT

## 2023-07-23 PROCEDURE — 87186 SC STD MICRODIL/AGAR DIL: CPT

## 2023-07-23 PROCEDURE — 87040 BLOOD CULTURE FOR BACTERIA: CPT

## 2023-07-23 PROCEDURE — 93005 ELECTROCARDIOGRAM TRACING: CPT | Performed by: EMERGENCY MEDICINE

## 2023-07-23 PROCEDURE — 87086 URINE CULTURE/COLONY COUNT: CPT

## 2023-07-23 PROCEDURE — 2580000003 HC RX 258: Performed by: FAMILY MEDICINE

## 2023-07-23 PROCEDURE — 96375 TX/PRO/DX INJ NEW DRUG ADDON: CPT

## 2023-07-23 PROCEDURE — 1100000000 HC RM PRIVATE

## 2023-07-23 PROCEDURE — 85025 COMPLETE CBC W/AUTO DIFF WBC: CPT

## 2023-07-23 PROCEDURE — 82803 BLOOD GASES ANY COMBINATION: CPT

## 2023-07-23 PROCEDURE — 82330 ASSAY OF CALCIUM: CPT

## 2023-07-23 PROCEDURE — 36415 COLL VENOUS BLD VENIPUNCTURE: CPT

## 2023-07-23 PROCEDURE — 82962 GLUCOSE BLOOD TEST: CPT

## 2023-07-23 PROCEDURE — 83735 ASSAY OF MAGNESIUM: CPT

## 2023-07-23 PROCEDURE — 80053 COMPREHEN METABOLIC PANEL: CPT

## 2023-07-23 PROCEDURE — 6360000002 HC RX W HCPCS: Performed by: FAMILY MEDICINE

## 2023-07-23 PROCEDURE — 87088 URINE BACTERIA CULTURE: CPT

## 2023-07-23 PROCEDURE — 83605 ASSAY OF LACTIC ACID: CPT

## 2023-07-23 PROCEDURE — 83690 ASSAY OF LIPASE: CPT

## 2023-07-23 PROCEDURE — 36600 WITHDRAWAL OF ARTERIAL BLOOD: CPT

## 2023-07-23 PROCEDURE — 84145 PROCALCITONIN (PCT): CPT

## 2023-07-23 RX ORDER — ENOXAPARIN SODIUM 100 MG/ML
30 INJECTION SUBCUTANEOUS DAILY
Status: DISCONTINUED | OUTPATIENT
Start: 2023-07-24 | End: 2023-07-23 | Stop reason: ALTCHOICE

## 2023-07-23 RX ORDER — SODIUM CHLORIDE 9 MG/ML
INJECTION, SOLUTION INTRAVENOUS CONTINUOUS
Status: DISCONTINUED | OUTPATIENT
Start: 2023-07-23 | End: 2023-07-24

## 2023-07-23 RX ORDER — INSULIN LISPRO 100 [IU]/ML
0-8 INJECTION, SOLUTION INTRAVENOUS; SUBCUTANEOUS NIGHTLY
Status: DISCONTINUED | OUTPATIENT
Start: 2023-07-23 | End: 2023-07-25 | Stop reason: HOSPADM

## 2023-07-23 RX ORDER — 0.9 % SODIUM CHLORIDE 0.9 %
1000 INTRAVENOUS SOLUTION INTRAVENOUS ONCE
Status: COMPLETED | OUTPATIENT
Start: 2023-07-23 | End: 2023-07-23

## 2023-07-23 RX ORDER — ONDANSETRON 2 MG/ML
4 INJECTION INTRAMUSCULAR; INTRAVENOUS EVERY 6 HOURS PRN
Status: DISCONTINUED | OUTPATIENT
Start: 2023-07-23 | End: 2023-07-25 | Stop reason: HOSPADM

## 2023-07-23 RX ORDER — SODIUM CHLORIDE 0.9 % (FLUSH) 0.9 %
5-40 SYRINGE (ML) INJECTION PRN
Status: DISCONTINUED | OUTPATIENT
Start: 2023-07-23 | End: 2023-07-25 | Stop reason: HOSPADM

## 2023-07-23 RX ORDER — DEXTROSE MONOHYDRATE 100 MG/ML
INJECTION, SOLUTION INTRAVENOUS CONTINUOUS PRN
Status: DISCONTINUED | OUTPATIENT
Start: 2023-07-23 | End: 2023-07-25 | Stop reason: HOSPADM

## 2023-07-23 RX ORDER — SODIUM BICARBONATE 650 MG/1
650 TABLET ORAL 2 TIMES DAILY
Status: DISCONTINUED | OUTPATIENT
Start: 2023-07-23 | End: 2023-07-25 | Stop reason: HOSPADM

## 2023-07-23 RX ORDER — INSULIN LISPRO 100 [IU]/ML
0-10 INJECTION, SOLUTION INTRAVENOUS; SUBCUTANEOUS
Status: DISCONTINUED | OUTPATIENT
Start: 2023-07-24 | End: 2023-07-25 | Stop reason: HOSPADM

## 2023-07-23 RX ORDER — ACETAMINOPHEN 325 MG/1
650 TABLET ORAL EVERY 6 HOURS PRN
Status: DISCONTINUED | OUTPATIENT
Start: 2023-07-23 | End: 2023-07-25 | Stop reason: HOSPADM

## 2023-07-23 RX ORDER — SODIUM CHLORIDE 0.9 % (FLUSH) 0.9 %
5-40 SYRINGE (ML) INJECTION EVERY 12 HOURS SCHEDULED
Status: DISCONTINUED | OUTPATIENT
Start: 2023-07-23 | End: 2023-07-25 | Stop reason: HOSPADM

## 2023-07-23 RX ORDER — SODIUM CHLORIDE 9 MG/ML
INJECTION, SOLUTION INTRAVENOUS PRN
Status: DISCONTINUED | OUTPATIENT
Start: 2023-07-23 | End: 2023-07-25 | Stop reason: HOSPADM

## 2023-07-23 RX ORDER — INSULIN GLARGINE 100 [IU]/ML
10 INJECTION, SOLUTION SUBCUTANEOUS NIGHTLY
Status: DISCONTINUED | OUTPATIENT
Start: 2023-07-23 | End: 2023-07-25 | Stop reason: HOSPADM

## 2023-07-23 RX ORDER — SODIUM CHLORIDE, SODIUM LACTATE, POTASSIUM CHLORIDE, AND CALCIUM CHLORIDE .6; .31; .03; .02 G/100ML; G/100ML; G/100ML; G/100ML
30 INJECTION, SOLUTION INTRAVENOUS
Status: COMPLETED | OUTPATIENT
Start: 2023-07-23 | End: 2023-07-23

## 2023-07-23 RX ORDER — ACETAMINOPHEN 650 MG/1
650 SUPPOSITORY RECTAL EVERY 6 HOURS PRN
Status: DISCONTINUED | OUTPATIENT
Start: 2023-07-23 | End: 2023-07-25 | Stop reason: HOSPADM

## 2023-07-23 RX ORDER — GABAPENTIN 300 MG/1
300 CAPSULE ORAL DAILY
Status: DISCONTINUED | OUTPATIENT
Start: 2023-07-24 | End: 2023-07-24

## 2023-07-23 RX ORDER — HEPARIN SODIUM 5000 [USP'U]/ML
5000 INJECTION, SOLUTION INTRAVENOUS; SUBCUTANEOUS EVERY 8 HOURS SCHEDULED
Status: DISCONTINUED | OUTPATIENT
Start: 2023-07-24 | End: 2023-07-25 | Stop reason: HOSPADM

## 2023-07-23 RX ADMIN — SODIUM CHLORIDE 1000 ML: 9 INJECTION, SOLUTION INTRAVENOUS at 19:54

## 2023-07-23 RX ADMIN — SODIUM CHLORIDE, POTASSIUM CHLORIDE, SODIUM LACTATE AND CALCIUM CHLORIDE 1728 ML: 600; 310; 30; 20 INJECTION, SOLUTION INTRAVENOUS at 21:05

## 2023-07-23 RX ADMIN — SODIUM BICARBONATE 650 MG TABLET 650 MG: at 23:15

## 2023-07-23 RX ADMIN — VANCOMYCIN HYDROCHLORIDE 1000 MG: 1 INJECTION, POWDER, LYOPHILIZED, FOR SOLUTION INTRAVENOUS at 23:28

## 2023-07-23 RX ADMIN — SODIUM CHLORIDE: 9 INJECTION, SOLUTION INTRAVENOUS at 23:10

## 2023-07-23 RX ADMIN — SODIUM CHLORIDE, PRESERVATIVE FREE 10 ML: 5 INJECTION INTRAVENOUS at 23:15

## 2023-07-23 RX ADMIN — PIPERACILLIN AND TAZOBACTAM 4500 MG: 4; .5 INJECTION, POWDER, LYOPHILIZED, FOR SOLUTION INTRAVENOUS at 21:06

## 2023-07-23 RX ADMIN — INSULIN GLARGINE 10 UNITS: 100 INJECTION, SOLUTION SUBCUTANEOUS at 23:14

## 2023-07-23 RX ADMIN — CEFTRIAXONE 1000 MG: 1 INJECTION, POWDER, FOR SOLUTION INTRAMUSCULAR; INTRAVENOUS at 23:25

## 2023-07-23 RX ADMIN — INSULIN HUMAN 6 UNITS: 100 INJECTION, SOLUTION PARENTERAL at 19:52

## 2023-07-23 ASSESSMENT — PAIN SCALES - GENERAL
PAINLEVEL_OUTOF10: 0
PAINLEVEL_OUTOF10: 10

## 2023-07-23 NOTE — ED TRIAGE NOTES
Pt came by ems for c/o hyperglycemia, pt glucose by ems 519, 404,   Pt also c/o ha and dizziness for few days, pt states she  may have a yeast infection  Pt bp per ems 94/ 60 than 80/50

## 2023-07-24 PROBLEM — E87.20 LACTIC ACIDOSIS: Status: ACTIVE | Noted: 2023-07-24

## 2023-07-24 PROBLEM — I95.9 HYPOTENSION: Status: ACTIVE | Noted: 2023-07-24

## 2023-07-24 LAB
ANION GAP SERPL CALC-SCNC: 10 MMOL/L (ref 2–11)
ARTERIAL PATENCY WRIST A: ABNORMAL
BASE DEFICIT BLDV-SCNC: 5.5 MMOL/L
BASOPHILS # BLD: 0 K/UL (ref 0–0.2)
BASOPHILS NFR BLD: 1 % (ref 0–2)
BUN SERPL-MCNC: 39 MG/DL (ref 6–23)
CALCIUM SERPL-MCNC: 8.9 MG/DL (ref 8.3–10.4)
CHLORIDE SERPL-SCNC: 116 MMOL/L (ref 101–110)
CO2 BLD-SCNC: 22 MMOL/L (ref 13–23)
CO2 SERPL-SCNC: 21 MMOL/L (ref 21–32)
CREAT SERPL-MCNC: 3.28 MG/DL (ref 0.6–1)
DIFFERENTIAL METHOD BLD: ABNORMAL
EKG ATRIAL RATE: 81 BPM
EKG DIAGNOSIS: NORMAL
EKG P AXIS: 79 DEGREES
EKG P-R INTERVAL: 137 MS
EKG Q-T INTERVAL: 365 MS
EKG QRS DURATION: 83 MS
EKG QTC CALCULATION (BAZETT): 424 MS
EKG R AXIS: 100 DEGREES
EKG T AXIS: 12 DEGREES
EKG VENTRICULAR RATE: 81 BPM
EOSINOPHIL # BLD: 0.6 K/UL (ref 0–0.8)
EOSINOPHIL NFR BLD: 9 % (ref 0.5–7.8)
ERYTHROCYTE [DISTWIDTH] IN BLOOD BY AUTOMATED COUNT: 16 % (ref 11.9–14.6)
EST. AVERAGE GLUCOSE BLD GHB EST-MCNC: 332 MG/DL
GAS FLOW.O2 O2 DELIVERY SYS: ABNORMAL
GLUCOSE BLD STRIP.AUTO-MCNC: 182 MG/DL (ref 65–100)
GLUCOSE BLD STRIP.AUTO-MCNC: 298 MG/DL (ref 65–100)
GLUCOSE BLD STRIP.AUTO-MCNC: 303 MG/DL (ref 65–100)
GLUCOSE SERPL-MCNC: 227 MG/DL (ref 65–100)
HBA1C MFR BLD: 13.2 % (ref 4.8–5.6)
HCO3 BLDV-SCNC: 21.5 MMOL/L (ref 23–28)
HCT VFR BLD AUTO: 30 % (ref 35.8–46.3)
HGB BLD-MCNC: 9.3 G/DL (ref 11.7–15.4)
IMM GRANULOCYTES # BLD AUTO: 0 K/UL (ref 0–0.5)
IMM GRANULOCYTES NFR BLD AUTO: 0 % (ref 0–5)
LACTATE SERPL-SCNC: 3.2 MMOL/L (ref 0.4–2)
LACTATE SERPL-SCNC: 3.2 MMOL/L (ref 0.4–2)
LACTATE SERPL-SCNC: 4.2 MMOL/L (ref 0.4–2)
LYMPHOCYTES # BLD: 2.2 K/UL (ref 0.5–4.6)
LYMPHOCYTES NFR BLD: 35 % (ref 13–44)
MCH RBC QN AUTO: 27 PG (ref 26.1–32.9)
MCHC RBC AUTO-ENTMCNC: 31 G/DL (ref 31.4–35)
MCV RBC AUTO: 87 FL (ref 82–102)
MONOCYTES # BLD: 0.4 K/UL (ref 0.1–1.3)
MONOCYTES NFR BLD: 6 % (ref 4–12)
NEUTS SEG # BLD: 3.1 K/UL (ref 1.7–8.2)
NEUTS SEG NFR BLD: 49 % (ref 43–78)
NRBC # BLD: 0 K/UL (ref 0–0.2)
O2/TOTAL GAS SETTING VFR VENT: 21 %
PCO2 BLDV: 47 MMHG (ref 41–51)
PH BLDV: 7.27 (ref 7.32–7.42)
PLATELET # BLD AUTO: 241 K/UL (ref 150–450)
PMV BLD AUTO: 10.4 FL (ref 9.4–12.3)
PO2 BLDV: 30 MMHG
POTASSIUM SERPL-SCNC: 4 MMOL/L (ref 3.5–5.1)
PROCALCITONIN SERPL-MCNC: 2.34 NG/ML (ref 0–0.49)
RBC # BLD AUTO: 3.45 M/UL (ref 4.05–5.2)
SAO2 % BLDV: 47.1 % (ref 65–88)
SERVICE CMNT-IMP: ABNORMAL
SODIUM SERPL-SCNC: 147 MMOL/L (ref 133–143)
SPECIMEN TYPE: ABNORMAL
WBC # BLD AUTO: 6.3 K/UL (ref 4.3–11.1)

## 2023-07-24 PROCEDURE — 6370000000 HC RX 637 (ALT 250 FOR IP): Performed by: PHYSICIAN ASSISTANT

## 2023-07-24 PROCEDURE — 82803 BLOOD GASES ANY COMBINATION: CPT

## 2023-07-24 PROCEDURE — 36415 COLL VENOUS BLD VENIPUNCTURE: CPT

## 2023-07-24 PROCEDURE — 2580000003 HC RX 258: Performed by: PHYSICIAN ASSISTANT

## 2023-07-24 PROCEDURE — 80048 BASIC METABOLIC PNL TOTAL CA: CPT

## 2023-07-24 PROCEDURE — 6370000000 HC RX 637 (ALT 250 FOR IP): Performed by: FAMILY MEDICINE

## 2023-07-24 PROCEDURE — 83605 ASSAY OF LACTIC ACID: CPT

## 2023-07-24 PROCEDURE — 82962 GLUCOSE BLOOD TEST: CPT

## 2023-07-24 PROCEDURE — 83036 HEMOGLOBIN GLYCOSYLATED A1C: CPT

## 2023-07-24 PROCEDURE — 2580000003 HC RX 258: Performed by: FAMILY MEDICINE

## 2023-07-24 PROCEDURE — 1100000000 HC RM PRIVATE

## 2023-07-24 PROCEDURE — 84145 PROCALCITONIN (PCT): CPT

## 2023-07-24 PROCEDURE — 85025 COMPLETE CBC W/AUTO DIFF WBC: CPT

## 2023-07-24 PROCEDURE — 93010 ELECTROCARDIOGRAM REPORT: CPT | Performed by: INTERNAL MEDICINE

## 2023-07-24 PROCEDURE — 6360000002 HC RX W HCPCS: Performed by: FAMILY MEDICINE

## 2023-07-24 PROCEDURE — 36600 WITHDRAWAL OF ARTERIAL BLOOD: CPT

## 2023-07-24 RX ORDER — GABAPENTIN 100 MG/1
100 CAPSULE ORAL 3 TIMES DAILY
Status: DISCONTINUED | OUTPATIENT
Start: 2023-07-24 | End: 2023-07-25 | Stop reason: HOSPADM

## 2023-07-24 RX ORDER — SODIUM CHLORIDE, SODIUM LACTATE, POTASSIUM CHLORIDE, CALCIUM CHLORIDE 600; 310; 30; 20 MG/100ML; MG/100ML; MG/100ML; MG/100ML
INJECTION, SOLUTION INTRAVENOUS CONTINUOUS
Status: DISCONTINUED | OUTPATIENT
Start: 2023-07-24 | End: 2023-07-25 | Stop reason: HOSPADM

## 2023-07-24 RX ORDER — INSULIN LISPRO 100 [IU]/ML
5 INJECTION, SOLUTION INTRAVENOUS; SUBCUTANEOUS
Status: DISCONTINUED | OUTPATIENT
Start: 2023-07-24 | End: 2023-07-25 | Stop reason: HOSPADM

## 2023-07-24 RX ADMIN — SODIUM CHLORIDE, POTASSIUM CHLORIDE, SODIUM LACTATE AND CALCIUM CHLORIDE: 600; 310; 30; 20 INJECTION, SOLUTION INTRAVENOUS at 12:11

## 2023-07-24 RX ADMIN — INSULIN LISPRO 8 UNITS: 100 INJECTION, SOLUTION INTRAVENOUS; SUBCUTANEOUS at 12:29

## 2023-07-24 RX ADMIN — GABAPENTIN 100 MG: 100 CAPSULE ORAL at 08:52

## 2023-07-24 RX ADMIN — SODIUM BICARBONATE 650 MG TABLET 650 MG: at 08:36

## 2023-07-24 RX ADMIN — CEFTRIAXONE 1000 MG: 1 INJECTION, POWDER, FOR SOLUTION INTRAMUSCULAR; INTRAVENOUS at 23:06

## 2023-07-24 RX ADMIN — SODIUM CHLORIDE: 9 INJECTION, SOLUTION INTRAVENOUS at 06:07

## 2023-07-24 RX ADMIN — SODIUM BICARBONATE 650 MG TABLET 650 MG: at 21:09

## 2023-07-24 RX ADMIN — SODIUM CHLORIDE, POTASSIUM CHLORIDE, SODIUM LACTATE AND CALCIUM CHLORIDE: 600; 310; 30; 20 INJECTION, SOLUTION INTRAVENOUS at 19:43

## 2023-07-24 RX ADMIN — GABAPENTIN 100 MG: 100 CAPSULE ORAL at 21:09

## 2023-07-24 RX ADMIN — INSULIN LISPRO 5 UNITS: 100 INJECTION, SOLUTION INTRAVENOUS; SUBCUTANEOUS at 12:29

## 2023-07-24 RX ADMIN — HEPARIN SODIUM 5000 UNITS: 5000 INJECTION INTRAVENOUS; SUBCUTANEOUS at 05:37

## 2023-07-24 RX ADMIN — SODIUM CHLORIDE, PRESERVATIVE FREE 10 ML: 5 INJECTION INTRAVENOUS at 21:06

## 2023-07-24 RX ADMIN — HEPARIN SODIUM 5000 UNITS: 5000 INJECTION INTRAVENOUS; SUBCUTANEOUS at 14:22

## 2023-07-24 RX ADMIN — GABAPENTIN 100 MG: 100 CAPSULE ORAL at 14:22

## 2023-07-24 RX ADMIN — INSULIN GLARGINE 10 UNITS: 100 INJECTION, SOLUTION SUBCUTANEOUS at 21:09

## 2023-07-24 RX ADMIN — SODIUM CHLORIDE, PRESERVATIVE FREE 10 ML: 5 INJECTION INTRAVENOUS at 08:52

## 2023-07-24 RX ADMIN — HEPARIN SODIUM 5000 UNITS: 5000 INJECTION INTRAVENOUS; SUBCUTANEOUS at 21:09

## 2023-07-24 ASSESSMENT — PAIN SCALES - GENERAL: PAINLEVEL_OUTOF10: 0

## 2023-07-24 NOTE — PROGRESS NOTES
TRANSFER - IN REPORT:    Verbal report received from [address] [facility],[address] on [employer]  being received from ER for routine progression of patient care      Report consisted of patient's Situation, Background, Assessment and   Recommendations(SBAR). Information from the following report(s) Nurse Handoff Report was reviewed with the receiving nurse. Opportunity for questions and clarification was provided. Assessment completed upon patient's arrival to unit and care assumed.

## 2023-07-24 NOTE — ED PROVIDER NOTES
amputation of lesser toe, left ([address] ) 2022    This status condition was added to the problem list by [facility] of the [facility] Team, after medical record review and validation.        Hemodialysis patient ([address])     IDDM (insulin dependent diabetes mellitus) 2012    Type 1 av -150 can tell Hypo below 70    Intractable nausea and vomiting 2014    Irregular menses     Kidney stone     Neuropathy, peripheral, idiopathic 3/13/2017    Retinopathy, bilateral 3/13/2017    Trichomoniasis 3/13/2017    Ulcer, skin, chronic ([address]) 3/13/2017    Vaginal burning         Past Surgical History:   Procedure Laterality Date    AMPUTATION Left     5th Toe due to Diabetic Ulcer    AMPUTATION  12    gangrene    CYSTOSCOPY,INSERT URETERAL STENT  2022    HX OPEN REDUCTION INTERNAL FIXATION Right     ankle    IR TUNNELED CATHETER PLACEMENT GREATER THAN 5 YEARS  2022    IR TUNNELED CATHETER PLACEMENT GREATER THAN 5 YEARS 2022 [facility]    UPPER GASTROINTESTINAL ENDOSCOPY N/A 2022    EGD BIOPSY performed by Arletha Oppenheim, MD at [facility] History     Socioeconomic History    Marital status: Single   Tobacco Use    Smoking status: Former     Types: Cigarettes     Quit date: 2013     Years since quittin.7    Smokeless tobacco: Never   Vaping Use    Vaping Use: Never used   Substance and Sexual Activity    Alcohol use: Yes    Drug use: No    Sexual activity: Defer     Birth control/protection: None   Social History Narrative    Abuse: Feels safe at home, no history of physical abuse, no history of sexual abuse       Social Determinants of Health     Financial Resource Strain: Low Risk     Difficulty of Paying Living Expenses: Not hard at all   Food Insecurity: Food Insecurity Present    Worried About [place] in the Last Year: Sometimes true    Ran Out of Food in the Last Year: Sometimes true   Transportation Needs: Unknown    Lack of Transportation

## 2023-07-24 NOTE — CARE COORDINATION
07/24/23 1234   Service Assessment   Patient Orientation Alert and Oriented   Cognition Alert   History Provided By Patient   Primary Iván Yuen Parent   PCP Verified by CM Yes   Prior Functional Level Independent in ADLs/IADLs   Current Functional Level Independent in ADLs/IADLs   Can patient return to prior living arrangement Yes   Ability to make needs known: Good   Family able to assist with home care needs: No   Would you like for me to discuss the discharge plan with any other family members/significant others, and if so, who? Yes  Victoriano Williamson)   Financial Resources Other (Comment)  (BCBS)   Community Resources None   Social/Functional History   Lives With Alone   Type of [address] Walker, rolling; Wheelchair-manual   ADL Assistance Independent   Mode of Transportation Car   Discharge Planning   Type of [address] Alone   Current Services Prior To Admission None   Potential Assistance Needed N/A   DME Ordered? No   Potential Assistance Purchasing Medications No   Type of Home Care Services None   Patient expects to be discharged to: [facility]   Transition of Care Consult (CM Consult) Discharge Planning   Services At/After Discharge None     RN CM met with the patient at the bedside and discussed discharge planning. She lives alone in an apartment and plans on returning home at discharge. She is independent of ADLs and does not use external services. She confirmed she has diabetic supplies. She has a walker and wheelchair at home but stated she does not use them. She has health insurance and a PCP. No case management needs were identified at this time.

## 2023-07-24 NOTE — H&P
mg/dL    Glucose, UA >1000 mg/dL    Ketones, Urine Negative NEG mg/dL    Bilirubin Urine Negative NEG      Blood, Urine SMALL (A) NEG      Urobilinogen, Urine 0.2 0.2 - 1.0 EU/dL    Nitrite, Urine Negative NEG      Leukocyte Esterase, Urine MODERATE (A) NEG      WBC, UA  0 /hpf    RBC, UA 0-3 0 /hpf    Epithelial Cells UA 0-3 0 /hpf    BACTERIA, URINE 4+ (H) 0 /hpf    Other observations RESULTS VERIFIED MANUALLY     Pregnancy, Urine    Collection Time: 07/23/23  8:47 PM   Result Value Ref Range    HCG(Urine) Pregnancy Test Negative NEG     Lactate, Sepsis    Collection Time: 07/23/23  9:53 PM   Result Value Ref Range    Lactic Acid, Sepsis 3.8 (HH) 0.4 - 2.0 MMOL/L   POCT Glucose    Collection Time: 07/23/23 10:05 PM   Result Value Ref Range    POC Glucose 211 (H) 65 - 100 mg/dL    Performed by: Pierce Jackson Lactate, Sepsis    Collection Time: 07/23/23 10:59 PM   Result Value Ref Range    Lactic Acid, Sepsis 4.0 (HH) 0.4 - 2.0 MMOL/L   POCT Glucose    Collection Time: 07/23/23 11:12 PM   Result Value Ref Range    POC Glucose 227 (H) 65 - 100 mg/dL    Performed by: Sridevi Spring    Lactate, Sepsis    Collection Time: 07/24/23  1:02 AM   Result Value Ref Range    Lactic Acid, Sepsis 3.2 (H) 0.4 - 2.0 MMOL/L         Other Studies:  XR CHEST (2 VW)    Result Date: 7/23/2023  EXAM:XR CHEST (2 VW) DATE: 7/23/2023 7:58 PM HISTORY: Sepsis COMPARISON:April 1, 2023 FINDINGS: No focal pulmonary process. No pneumothorax. Cardiomediastinal silhouette is within normal limits. No acute osseous abnormality. No acute pulmonary abnormality. Thank you for the referral of this patient. This exam was interpreted by an American Board of Radiology certified radiologist with subspecialty fellowship training in [address] [place] [address]. If there are any questions regarding this exam please feel free to contact a radiologist directly at [phone].   Bailey Neely MD 7/23/2023 9:59:00 PM      05/21/22    TRANSTHORACIC

## 2023-07-24 NOTE — PROGRESS NOTES
Hospitalist Progress Note   Admit Date:  2023  7:11 PM   Name:  Rox Hamilton   Age:  50 y.o. Sex:  female  :  1975   MRN:  [id number]   Room:  [facility]/    Presenting/Chief Complaint: Hyperglycemia     Reason(s) for Admission: Acute cystitis without hematuria [N30.00]  Septic shock ([address]) [A41.9, R65.21]  Severe sepsis ([address]) [A41.9, R65.20]     Hospital Course:   Rox Hamilton is a 50 y.o. female with medical history of uncontrolled type 1 diabetes (A1C 13.2), CKD stage IV who presented to the hospital overnight on  with a complaint of hyperglycemia, vision changes and a headache. Initially presented hypotensive. Prior to admission had been treating herself for a vaginal yeast infection    Subjective & 24hr Events:   States she is feeling much better, no longer has any vaginal itching, denies any fevers or chills. No longer having a headache. Denies dysuria, urgency or frequency. Assessment & Plan:       Severe sepsis-ruled out/deleted from problem list.  She does not have endorgan damage: Her CKD is at its baseline and her liver function tests are at their baseline. She did have an elevated procalcitonin and lactic acidosis but her procalcitonin elevation is difficult to interpret given her CKD stage IV. Her lactic acidosis may have been due in part to her diabetes and CKD.       Urinary tract infection: UA with small blood and moderate leukocyte esterase   - Continue on Rocephin and follow culture      Chronic kidney disease, stage IV   - Monitor renal function      Hypotension  - Continue on IV fluids       Lactic acidosis  - Did not initially present in DKA: pH 7.33, no ketones in urine  --  I will obtain a VBG  -- Continue on oral sodium bicarb  - Continue on IV fluids, changed to lactated Ringer's but depending on VBG may need bicarb  -- Support BP/prevent hypotension and hypo-perfusion      Uncontrolled type 1 diabetes mellitus with hyperglycemia

## 2023-07-24 NOTE — ED NOTES
TRANSFER - OUT REPORT:    Verbal report given to joel  on Shelia Schilling  being transferred to [phone] for routine progression of patient care       Report consisted of patient's Situation, Background, Assessment and   Recommendations(SBAR). Information from the following report(s) Nurse Handoff Report was reviewed with the receiving nurse. Lines:   Peripheral IV Posterior;Right Hand (Active)       Peripheral IV Right Antecubital (Active)        Opportunity for questions and clarification was provided.       Patient transported with:  Delmy Conley RN  07/23/23 2054

## 2023-07-25 VITALS
DIASTOLIC BLOOD PRESSURE: 80 MMHG | HEIGHT: 68 IN | WEIGHT: 127 LBS | HEART RATE: 70 BPM | BODY MASS INDEX: 19.25 KG/M2 | OXYGEN SATURATION: 100 % | SYSTOLIC BLOOD PRESSURE: 126 MMHG | TEMPERATURE: 97.7 F | RESPIRATION RATE: 16 BRPM

## 2023-07-25 PROBLEM — E87.20 LACTIC ACIDOSIS: Status: RESOLVED | Noted: 2023-07-24 | Resolved: 2023-07-25

## 2023-07-25 PROBLEM — I95.9 HYPOTENSION: Status: RESOLVED | Noted: 2023-07-24 | Resolved: 2023-07-25

## 2023-07-25 LAB
ALBUMIN SERPL-MCNC: 2.4 G/DL (ref 3.5–5)
ALBUMIN/GLOB SERPL: 0.6 (ref 0.4–1.6)
ALP SERPL-CCNC: 511 U/L (ref 50–136)
ALT SERPL-CCNC: 80 U/L (ref 12–65)
ANION GAP SERPL CALC-SCNC: 6 MMOL/L (ref 2–11)
AST SERPL-CCNC: 104 U/L (ref 15–37)
BASOPHILS # BLD: 0 K/UL (ref 0–0.2)
BASOPHILS NFR BLD: 0 % (ref 0–2)
BILIRUB SERPL-MCNC: 0.2 MG/DL (ref 0.2–1.1)
BUN SERPL-MCNC: 24 MG/DL (ref 6–23)
CALCIUM SERPL-MCNC: 8 MG/DL (ref 8.3–10.4)
CHLORIDE SERPL-SCNC: 118 MMOL/L (ref 101–110)
CO2 SERPL-SCNC: 23 MMOL/L (ref 21–32)
CREAT SERPL-MCNC: 2.58 MG/DL (ref 0.6–1)
DIFFERENTIAL METHOD BLD: ABNORMAL
EOSINOPHIL # BLD: 0.4 K/UL (ref 0–0.8)
EOSINOPHIL NFR BLD: 7 % (ref 0.5–7.8)
ERYTHROCYTE [DISTWIDTH] IN BLOOD BY AUTOMATED COUNT: 15.8 % (ref 11.9–14.6)
GLOBULIN SER CALC-MCNC: 3.8 G/DL (ref 2.8–4.5)
GLUCOSE SERPL-MCNC: 252 MG/DL (ref 65–100)
HCT VFR BLD AUTO: 27.3 % (ref 35.8–46.3)
HGB BLD-MCNC: 8.9 G/DL (ref 11.7–15.4)
IMM GRANULOCYTES # BLD AUTO: 0 K/UL (ref 0–0.5)
IMM GRANULOCYTES NFR BLD AUTO: 0 % (ref 0–5)
LACTATE SERPL-SCNC: 2.2 MMOL/L (ref 0.4–2)
LYMPHOCYTES # BLD: 2.6 K/UL (ref 0.5–4.6)
LYMPHOCYTES NFR BLD: 46 % (ref 13–44)
MCH RBC QN AUTO: 27.4 PG (ref 26.1–32.9)
MCHC RBC AUTO-ENTMCNC: 32.6 G/DL (ref 31.4–35)
MCV RBC AUTO: 84 FL (ref 82–102)
MONOCYTES # BLD: 0.4 K/UL (ref 0.1–1.3)
MONOCYTES NFR BLD: 7 % (ref 4–12)
NEUTS SEG # BLD: 2.2 K/UL (ref 1.7–8.2)
NEUTS SEG NFR BLD: 39 % (ref 43–78)
NRBC # BLD: 0 K/UL (ref 0–0.2)
PLATELET # BLD AUTO: 208 K/UL (ref 150–450)
PMV BLD AUTO: 9.9 FL (ref 9.4–12.3)
POTASSIUM SERPL-SCNC: 3.8 MMOL/L (ref 3.5–5.1)
PROT SERPL-MCNC: 6.2 G/DL (ref 6.3–8.2)
RBC # BLD AUTO: 3.25 M/UL (ref 4.05–5.2)
SODIUM SERPL-SCNC: 147 MMOL/L (ref 133–143)
WBC # BLD AUTO: 5.6 K/UL (ref 4.3–11.1)

## 2023-07-25 PROCEDURE — 80053 COMPREHEN METABOLIC PANEL: CPT

## 2023-07-25 PROCEDURE — 6370000000 HC RX 637 (ALT 250 FOR IP): Performed by: PHYSICIAN ASSISTANT

## 2023-07-25 PROCEDURE — 85025 COMPLETE CBC W/AUTO DIFF WBC: CPT

## 2023-07-25 PROCEDURE — 83605 ASSAY OF LACTIC ACID: CPT

## 2023-07-25 PROCEDURE — 2580000003 HC RX 258: Performed by: PHYSICIAN ASSISTANT

## 2023-07-25 PROCEDURE — 6370000000 HC RX 637 (ALT 250 FOR IP): Performed by: FAMILY MEDICINE

## 2023-07-25 PROCEDURE — 36415 COLL VENOUS BLD VENIPUNCTURE: CPT

## 2023-07-25 RX ORDER — INSULIN GLARGINE 300 U/ML
10 INJECTION, SOLUTION SUBCUTANEOUS NIGHTLY
Qty: 1.5 ML | Refills: 11 | Status: SHIPPED | OUTPATIENT
Start: 2023-07-25

## 2023-07-25 RX ORDER — CEFDINIR 300 MG/1
300 CAPSULE ORAL DAILY
Qty: 5 CAPSULE | Refills: 0 | Status: SHIPPED | OUTPATIENT
Start: 2023-07-25 | End: 2023-07-30

## 2023-07-25 RX ORDER — GABAPENTIN 100 MG/1
100 CAPSULE ORAL 3 TIMES DAILY
Qty: 270 CAPSULE | Refills: 0 | Status: SHIPPED | OUTPATIENT
Start: 2023-07-25 | End: 2023-10-23

## 2023-07-25 RX ADMIN — INSULIN LISPRO 6 UNITS: 100 INJECTION, SOLUTION INTRAVENOUS; SUBCUTANEOUS at 08:21

## 2023-07-25 RX ADMIN — SODIUM CHLORIDE, POTASSIUM CHLORIDE, SODIUM LACTATE AND CALCIUM CHLORIDE: 600; 310; 30; 20 INJECTION, SOLUTION INTRAVENOUS at 03:23

## 2023-07-25 RX ADMIN — INSULIN LISPRO 5 UNITS: 100 INJECTION, SOLUTION INTRAVENOUS; SUBCUTANEOUS at 08:22

## 2023-07-25 RX ADMIN — GABAPENTIN 100 MG: 100 CAPSULE ORAL at 08:22

## 2023-07-25 RX ADMIN — SODIUM BICARBONATE 650 MG TABLET 650 MG: at 08:22

## 2023-07-25 NOTE — CARE COORDINATION
07/25/23 0953   Service Assessment   Patient Orientation Alert and Oriented   Cognition Alert   History Provided By Patient   Primary [address] Parent   PCP Verified by CM Yes   Prior Functional Level Independent in ADLs/IADLs   Current Functional Level Independent in ADLs/IADLs   Can patient return to prior living arrangement Yes   Ability to make needs known: Good   Financial Resources Other (Comment)  (BCBS)   Community Resources None   Social/Functional History   Lives With Alone   Type of Home Apartment   Bathroom Equipment None   Home Equipment Walker, rolling; Wheelchair-manual   ADL Assistance Independent   Mode of Transportation Car   Discharge Planning   Type of [facility] [address] Alone   Current Services Prior To Admission None   Potential Assistance Needed N/A   DME Ordered? No   Potential Assistance Purchasing Medications No   Type of Home Care Services None   Patient expects to be discharged to: [facility]   Services At/After Discharge None     The patient is discharging home. No case management needs were identified.

## 2023-07-25 NOTE — PROGRESS NOTES
[phone] [place] attempted to visit PT, but PT had been discharged. [id number] [phone] [facility] prayed silently for PT, PT's Family, and Staff as chart states PT is [address]

## 2023-07-25 NOTE — DISCHARGE SUMMARY
Units Date/Time    Culture, Urine [[id number]]  (Abnormal) Collected: 07/23/23 2047    Order Status: Completed Specimen: Urine, clean catch Updated: 07/25/23 0746     Special Requests NO SPECIAL REQUESTS        Culture       >100,000 COLONIES/mL Gram negative rods IDENTIFICATION AND SUSCEPTIBILITY TO FOLLOW          Blood Culture 2 [[id number]] Collected: 07/23/23 1955    Order Status: Completed Specimen: Blood Updated: 07/25/23 0712     Special Requests --        LEFT  Antecubital       Culture NO GROWTH 2 DAYS       Blood Culture 1 [[id number]] Collected: 07/23/23 1940    Order Status: Completed Specimen: Blood Updated: 07/25/23 0712     Special Requests RIGHT HAND        Culture NO GROWTH 2 DAYS               All Labs from Last 24 Hrs:  Recent Results (from the past 24 hour(s))   Lactic Acid    Collection Time: 07/24/23 10:07 AM   Result Value Ref Range    Lactic Acid, Plasma 4.2 (HH) 0.4 - 2.0 MMOL/L   POCT Glucose    Collection Time: 07/24/23 11:30 AM   Result Value Ref Range    POC Glucose 303 (H) 65 - 100 mg/dL    Performed by: [employer] (Wayin)    Venous Blood Gas, POC    Collection Time: 07/24/23  2:59 PM   Result Value Ref Range    DEVICE ROOM AIR      FIO2 21 %    PH, VENOUS (POC) 7.27 (L) 7.32 - 7.42      PCO2, Sonia, POC 47.0 41 - 51 MMHG    PO2, VENOUS (POC) 30 mmHg    HCO3, Venous 21.5 (L) 23 - 28 MMOL/L    SO2, VENOUS (POC) 47.1 (L) 65 - 88 %    Base Deficit, Venous 5.5 mmol/L    POC Anthony's Test NOT APPLICABLE      Specimen type: VENOUS BLOOD      Performed by: Anita     POC TCO2 22 13 - 23 MMOL/L   POCT Glucose    Collection Time: 07/24/23  4:25 PM   Result Value Ref Range    POC Glucose 298 (H) 65 - 100 mg/dL    Performed by: [employer] (Wayin)    Lactic Acid    Collection Time: 07/25/23  6:51 AM   Result Value Ref Range    Lactic Acid, Plasma 2.2 (H) 0.4 - 2.0 MMOL/L   CBC with Auto Differential    Collection Time: 07/25/23  6:51 AM   Result Value Ref Range    WBC 5.6

## 2023-07-26 ENCOUNTER — CARE COORDINATION (OUTPATIENT)
Dept: CARE COORDINATION | Facility: CLINIC | Age: 48
End: 2023-07-26

## 2023-07-26 LAB
BACTERIA SPEC CULT: ABNORMAL
SERVICE CMNT-IMP: ABNORMAL

## 2023-07-26 NOTE — CARE COORDINATION
Care Transitions Outreach Attempt    Call within 2 business days of discharge: Yes   Attempted to reach patient for transitions of care follow up. Unable to reach patient. Patient: Eliza Villalba Patient : 1975 MRN: [id number]    Last Discharge [address]       Date Complaint Diagnosis Description Type Department Provider    23 Hyperglycemia Septic shock ([address]) . .. ED to Hosp-Admission (Discharged) (ADMITTED) Shaun Woods MD; Katie Avelar. .. Was this an external facility discharge?  No Discharge Facility: [facility]    Noted following upcoming appointments from discharge chart review:   [facility] follow up appointment(s):   Future Appointments   Date Time Provider [address]   2023  2:20 PM Roxana Medina MD [facility] AMB   2023 11:30 AM FELIZ Stark [facility] AMB   2023  9:00 AM Keeley Causey RD [facility]     Non-[facility] follow up appointment(s): na

## 2023-07-27 ENCOUNTER — CARE COORDINATION (OUTPATIENT)
Dept: CARE COORDINATION | Facility: CLINIC | Age: 48
End: 2023-07-27

## 2023-07-27 NOTE — CARE COORDINATION
Care Transitions Outreach Attempt    Call within 2 business days of discharge: Yes   Attempted to reach patient for transitions of care follow up. Unable to reach patient. Patient: Elizabeth Hummel Patient : 1975 MRN: [id number]    Last Discharge [address]       Date Complaint Diagnosis Description Type Department Provider    23 Hyperglycemia Septic shock ([address]) . .. ED to Hosp-Admission (Discharged) (ADMITTED) Andrew Infante MD; Ni Ley. .. Was this an external facility discharge?  No Discharge Facility: [facility]    Noted following upcoming appointments from discharge chart review:   [facility] follow up appointment(s):   Future Appointments   Date Time Provider [address]   2023  2:20 PM [facility]   2023 11:30 AM FELIZ Brady [facility] AMB   2023  9:00 AM Re Ball RD [facility]     Non-[facility] follow up appointment(s): na

## 2023-08-01 ENCOUNTER — TELEPHONE (OUTPATIENT)
Dept: ENDOCRINOLOGY | Age: 48
End: 2023-08-01

## 2023-08-01 NOTE — TELEPHONE ENCOUNTER
Received fax from [facility] for diabetic shoes from [address]. Spoke to pt stated she will have FOOT EXAM during her f/u appt 8/21/2023 11:30 AM    Called Sugar at [facility] informed of above msg.

## 2023-08-02 ENCOUNTER — OFFICE VISIT (OUTPATIENT)
Dept: INTERNAL MEDICINE CLINIC | Facility: CLINIC | Age: 48
End: 2023-08-02
Payer: COMMERCIAL

## 2023-08-02 VITALS
BODY MASS INDEX: 18.34 KG/M2 | DIASTOLIC BLOOD PRESSURE: 72 MMHG | HEART RATE: 88 BPM | OXYGEN SATURATION: 95 % | SYSTOLIC BLOOD PRESSURE: 98 MMHG | RESPIRATION RATE: 16 BRPM | TEMPERATURE: 97.1 F | HEIGHT: 68 IN | WEIGHT: 121 LBS

## 2023-08-02 DIAGNOSIS — Z12.31 BREAST CANCER SCREENING BY MAMMOGRAM: Primary | ICD-10-CM

## 2023-08-02 PROCEDURE — 99213 OFFICE O/P EST LOW 20 MIN: CPT | Performed by: INTERNAL MEDICINE

## 2023-08-02 ASSESSMENT — ENCOUNTER SYMPTOMS
SHORTNESS OF BREATH: 0
COUGH: 0
ABDOMINAL PAIN: 0

## 2023-08-02 NOTE — PROGRESS NOTES
3.42 (H) 0.00 - 0.49 ng/mL   EKG 12 Lead    Collection Time: 07/23/23  7:46 PM   Result Value Ref Range    Ventricular Rate 81 BPM    Atrial Rate 81 BPM    P-R Interval 137 ms    QRS Duration 83 ms    Q-T Interval 365 ms    QTc Calculation (Bazett) 424 ms    P Axis 79 degrees    R Axis 100 degrees    T Axis 12 degrees    Diagnosis       Sinus rhythm  Right axis deviation  Borderline ST elevation, anterolateral leads    Confirmed by Vince Franco MD, ARABELLA ([id number]) on 7/24/2023 6:32:55 AM     Blood Culture 2    Collection Time: 07/23/23  7:55 PM    Specimen: Blood   Result Value Ref Range    Special Requests LEFT  Antecubital        Culture NO GROWTH 5 DAYS     POCT Blood Gas & Electrolytes    Collection Time: 07/23/23  8:35 PM   Result Value Ref Range    pH, Arterial, POC 7.33 (L) 7.35 - 7.45      pCO2, Arterial, POC 37.9 35 - 45 MMHG    pO2, Arterial, POC 91 75 - 100 MMHG    POC Sodium 143 136 - 145 MMOL/L    POC Potassium 3.4 (L) 3.5 - 5.1 MMOL/L    POC Ionized Calcium 1.25 1.12 - 1.32 mmol/L    POC Glucose 205 (H) 65 - 100 MG/DL    BASE DEFICIT (POC) 5.4 mmol/L    HCO3, Mixed 20.1 (L) 22 - 26 MMOL/L    POC TCO2 20 13 - 23 MMOL/L    POC O2 SAT 97 %    Source ARTERIAL      Anthony Test NOT APPLICABLE      DEVICE ROOM AIR      FIO2 Arterial 21 %    Performed by: Corcoran (Dee)RT     eGFR, POC Cannot be calculated >60 ml/min/1.73m2    Critical Value Read Back DR. CALDWELL    Urinalysis w rflx microscopic    Collection Time: 07/23/23  8:47 PM   Result Value Ref Range    Color, UA YELLOW/STRAW      Appearance CLOUDY      Specific Gravity, UA 1.018 1.001 - 1.023      pH, Urine 5.0 5.0 - 9.0      Protein, UA 30 (A) NEG mg/dL    Glucose, UA >1000 mg/dL    Ketones, Urine Negative NEG mg/dL    Bilirubin Urine Negative NEG      Blood, Urine SMALL (A) NEG      Urobilinogen, Urine 0.2 0.2 - 1.0 EU/dL    Nitrite, Urine Negative NEG      Leukocyte Esterase, Urine MODERATE (A) NEG      WBC, UA  0 /hpf    RBC, UA 0-3 0 /hpf

## 2023-08-03 ENCOUNTER — CARE COORDINATION (OUTPATIENT)
Dept: CARE COORDINATION | Facility: CLINIC | Age: 48
End: 2023-08-03

## 2023-08-03 NOTE — CARE COORDINATION
CTN unable to contact patient after recent IP admission. Patient compliant with recent appt. CTN to resolve WILBERT episode and reopen with return call.

## 2023-08-07 ENCOUNTER — TRANSCRIBE ORDERS (OUTPATIENT)
Dept: SCHEDULING | Age: 48
End: 2023-08-07

## 2023-08-07 DIAGNOSIS — Z12.31 SCREENING MAMMOGRAM FOR HIGH-RISK PATIENT: Primary | ICD-10-CM

## 2023-08-08 LAB
GLUCOSE BLD STRIP.AUTO-MCNC: 235 MG/DL (ref 65–100)
GLUCOSE BLD STRIP.AUTO-MCNC: 294 MG/DL (ref 65–100)

## 2023-08-21 NOTE — TELEPHONE ENCOUNTER
Pt NO SHOWED APPT called to notify Evelyn Richard, Pt Acct Spec. [facility] , she expressed understanding. .  Advised once pt r/s the foot exam will be completed a later date.

## 2023-08-23 ENCOUNTER — OFFICE VISIT (OUTPATIENT)
Dept: ENDOCRINOLOGY | Age: 48
End: 2023-08-23
Payer: COMMERCIAL

## 2023-08-23 VITALS
WEIGHT: 121 LBS | HEART RATE: 84 BPM | OXYGEN SATURATION: 99 % | BODY MASS INDEX: 18.4 KG/M2 | SYSTOLIC BLOOD PRESSURE: 102 MMHG | DIASTOLIC BLOOD PRESSURE: 73 MMHG

## 2023-08-23 DIAGNOSIS — Z91.199 MEDICAL NON-COMPLIANCE: ICD-10-CM

## 2023-08-23 DIAGNOSIS — R27.8 POOR MANUAL DEXTERITY: ICD-10-CM

## 2023-08-23 DIAGNOSIS — E10.65 TYPE 1 DIABETES MELLITUS WITH HYPERGLYCEMIA (HCC): Primary | ICD-10-CM

## 2023-08-23 PROBLEM — N39.0 UTI (URINARY TRACT INFECTION): Status: RESOLVED | Noted: 2023-07-23 | Resolved: 2023-08-23

## 2023-08-23 PROCEDURE — 99214 OFFICE O/P EST MOD 30 MIN: CPT | Performed by: PHYSICIAN ASSISTANT

## 2023-08-23 PROCEDURE — 3046F HEMOGLOBIN A1C LEVEL >9.0%: CPT | Performed by: PHYSICIAN ASSISTANT

## 2023-08-23 RX ORDER — ACYCLOVIR 400 MG/1
TABLET ORAL
Qty: 9 EACH | Refills: 3 | Status: SHIPPED | OUTPATIENT
Start: 2023-08-23

## 2023-08-23 RX ORDER — PEN NEEDLE, DIABETIC 32GX 5/32"
NEEDLE, DISPOSABLE MISCELLANEOUS
Qty: 100 EACH | Refills: 3 | Status: SHIPPED | OUTPATIENT
Start: 2023-08-23

## 2023-08-23 NOTE — TELEPHONE ENCOUNTER
Pt r/s appt for today 8/23/23  Foot exam completed and ppw and faxed. [facility].

## 2023-08-25 ENCOUNTER — FOLLOWUP TELEPHONE ENCOUNTER (OUTPATIENT)
Dept: DIABETES SERVICES | Age: 48
End: 2023-08-25

## 2023-09-05 ENCOUNTER — HOSPITAL ENCOUNTER (EMERGENCY)
Age: 48
Discharge: HOME | End: 2023-09-05
Attending: EMERGENCY MEDICINE
Payer: COMMERCIAL

## 2023-09-05 VITALS
HEIGHT: 68 IN | WEIGHT: 127 LBS | RESPIRATION RATE: 16 BRPM | HEART RATE: 86 BPM | OXYGEN SATURATION: 100 % | TEMPERATURE: 98.1 F | SYSTOLIC BLOOD PRESSURE: 146 MMHG | DIASTOLIC BLOOD PRESSURE: 89 MMHG | BODY MASS INDEX: 19.25 KG/M2

## 2023-09-05 DIAGNOSIS — Z91.148 NONCOMPLIANCE WITH MEDICATION REGIMEN: ICD-10-CM

## 2023-09-05 DIAGNOSIS — E11.65 HYPERGLYCEMIA DUE TO DIABETES MELLITUS (HCC): ICD-10-CM

## 2023-09-05 DIAGNOSIS — N39.0 ACUTE UTI: Primary | ICD-10-CM

## 2023-09-05 LAB
APPEARANCE UR: CLEAR
BACTERIA URNS QL MICRO: ABNORMAL /HPF
BILIRUB UR QL: NEGATIVE
CHP ED QC CHECK: 424
COLOR UR: ABNORMAL
EPI CELLS #/AREA URNS HPF: ABNORMAL /HPF
GLUCOSE BLD STRIP.AUTO-MCNC: 424 MG/DL (ref 65–100)
GLUCOSE UR STRIP.AUTO-MCNC: 100 MG/DL
HGB UR QL STRIP: NEGATIVE
KETONES UR QL STRIP.AUTO: NEGATIVE MG/DL
LEUKOCYTE ESTERASE UR QL STRIP.AUTO: ABNORMAL
NITRITE UR QL STRIP.AUTO: NEGATIVE
PH UR STRIP: 5.5 (ref 5–9)
PROT UR STRIP-MCNC: 30 MG/DL
RBC #/AREA URNS HPF: ABNORMAL /HPF
SERVICE CMNT-IMP: ABNORMAL
SP GR UR REFRACTOMETRY: 1.01 (ref 1–1.02)
UROBILINOGEN UR QL STRIP.AUTO: 0.2 EU/DL (ref 0.2–1)
WBC URNS QL MICRO: ABNORMAL /HPF
YEAST URNS QL MICRO: ABNORMAL

## 2023-09-05 PROCEDURE — 82962 GLUCOSE BLOOD TEST: CPT

## 2023-09-05 PROCEDURE — 99283 EMERGENCY DEPT VISIT LOW MDM: CPT

## 2023-09-05 PROCEDURE — 81001 URINALYSIS AUTO W/SCOPE: CPT

## 2023-09-05 RX ORDER — SULFAMETHOXAZOLE AND TRIMETHOPRIM 800; 160 MG/1; MG/1
1 TABLET ORAL 2 TIMES DAILY
Qty: 10 TABLET | Refills: 0 | Status: SHIPPED | OUTPATIENT
Start: 2023-09-05 | End: 2023-09-10

## 2023-09-05 RX ORDER — 0.9 % SODIUM CHLORIDE 0.9 %
1000 INTRAVENOUS SOLUTION INTRAVENOUS
Status: DISCONTINUED | OUTPATIENT
Start: 2023-09-05 | End: 2023-09-05 | Stop reason: HOSPADM

## 2023-09-05 ASSESSMENT — PAIN - FUNCTIONAL ASSESSMENT: PAIN_FUNCTIONAL_ASSESSMENT: 0-10

## 2023-09-05 ASSESSMENT — PAIN SCALES - GENERAL: PAINLEVEL_OUTOF10: 0

## 2023-09-05 NOTE — ED NOTES
At bedside for rounding. Pt asked about urine results. Will inform md and wait reassessment.      Yakelin Draper RN  09/05/23 7524

## 2023-09-05 NOTE — DISCHARGE INSTRUCTIONS
Make sure you are taking your insulin long-acting and sliding scale as directed. Get a new glucose monitor on today. Follow-up with your family doctor in the next 1 to 2 days and follow a diabetic diet. Take full course of antibiotics as prescribed.

## 2023-09-08 ENCOUNTER — TELEPHONE (OUTPATIENT)
Dept: ENDOCRINOLOGY | Age: 48
End: 2023-09-08

## 2023-09-08 NOTE — TELEPHONE ENCOUNTER
Pt called office at 4:30pm requesting a sample of insulin because she cannot financially afford her refill at the pharmacy. Verbal with prov, offered to send a refill, pt declined and said \":well ok\" and disconnected the call.

## 2023-09-09 ENCOUNTER — HOSPITAL ENCOUNTER (EMERGENCY)
Age: 48
Discharge: HOME | End: 2023-09-09
Attending: EMERGENCY MEDICINE
Payer: COMMERCIAL

## 2023-09-09 VITALS
OXYGEN SATURATION: 99 % | HEIGHT: 68 IN | HEART RATE: 92 BPM | BODY MASS INDEX: 19.25 KG/M2 | DIASTOLIC BLOOD PRESSURE: 80 MMHG | TEMPERATURE: 98.7 F | WEIGHT: 127 LBS | SYSTOLIC BLOOD PRESSURE: 104 MMHG | RESPIRATION RATE: 18 BRPM

## 2023-09-09 DIAGNOSIS — N76.0 BACTERIAL VAGINOSIS: ICD-10-CM

## 2023-09-09 DIAGNOSIS — B96.89 BACTERIAL VAGINOSIS: ICD-10-CM

## 2023-09-09 DIAGNOSIS — N34.2 URETHRITIS: Primary | ICD-10-CM

## 2023-09-09 LAB
APPEARANCE UR: ABNORMAL
BACTERIA URNS QL MICRO: ABNORMAL /HPF
BILIRUB UR QL: NEGATIVE
CASTS URNS QL MICRO: ABNORMAL /LPF
COLOR UR: ABNORMAL
EPI CELLS #/AREA URNS HPF: ABNORMAL /HPF
GLUCOSE UR STRIP.AUTO-MCNC: NEGATIVE MG/DL
HCG UR QL: NEGATIVE
HGB UR QL STRIP: NEGATIVE
KETONES UR QL STRIP.AUTO: NEGATIVE MG/DL
LEUKOCYTE ESTERASE UR QL STRIP.AUTO: ABNORMAL
NITRITE UR QL STRIP.AUTO: NEGATIVE
PH UR STRIP: 5.5 (ref 5–9)
PROT UR STRIP-MCNC: 30 MG/DL
RBC #/AREA URNS HPF: ABNORMAL /HPF
SERVICE CMNT-IMP: NORMAL
SP GR UR REFRACTOMETRY: 1.01 (ref 1–1.02)
UROBILINOGEN UR QL STRIP.AUTO: 0.2 EU/DL (ref 0.2–1)
WBC URNS QL MICRO: ABNORMAL /HPF
WET PREP GENITAL: NORMAL

## 2023-09-09 PROCEDURE — 81025 URINE PREGNANCY TEST: CPT

## 2023-09-09 PROCEDURE — 99283 EMERGENCY DEPT VISIT LOW MDM: CPT

## 2023-09-09 PROCEDURE — 81001 URINALYSIS AUTO W/SCOPE: CPT

## 2023-09-09 PROCEDURE — 87210 SMEAR WET MOUNT SALINE/INK: CPT

## 2023-09-09 PROCEDURE — 87086 URINE CULTURE/COLONY COUNT: CPT

## 2023-09-09 RX ORDER — CEPHALEXIN 500 MG/1
500 CAPSULE ORAL 2 TIMES DAILY
Qty: 14 CAPSULE | Refills: 0 | Status: SHIPPED | OUTPATIENT
Start: 2023-09-09 | End: 2023-09-16

## 2023-09-09 RX ORDER — METRONIDAZOLE 500 MG/1
500 TABLET ORAL 2 TIMES DAILY
Qty: 14 TABLET | Refills: 0 | Status: SHIPPED | OUTPATIENT
Start: 2023-09-09 | End: 2023-09-16

## 2023-09-09 ASSESSMENT — ENCOUNTER SYMPTOMS
PHOTOPHOBIA: 0
ABDOMINAL PAIN: 1
SHORTNESS OF BREATH: 0
COUGH: 0
VOMITING: 0
FACIAL SWELLING: 0
TROUBLE SWALLOWING: 0

## 2023-09-09 ASSESSMENT — PAIN DESCRIPTION - LOCATION: LOCATION: VAGINA

## 2023-09-09 ASSESSMENT — PAIN SCALES - GENERAL: PAINLEVEL_OUTOF10: 6

## 2023-09-09 ASSESSMENT — PAIN - FUNCTIONAL ASSESSMENT: PAIN_FUNCTIONAL_ASSESSMENT: 0-10

## 2023-09-09 NOTE — ED TRIAGE NOTES
Lower abdominal pain , vaginal burning, states recently diagnosed with UTI and not getting any better. Denies discharge.

## 2023-09-09 NOTE — DISCHARGE INSTRUCTIONS
Your work-up today revealed findings consistent with urinary tract infection as well as bacterial vaginosis. Please take both antibiotics for the full course to help treat your symptoms. A urine culture has been sent. We will call you if you need to switch the antibiotic. Continue managing your sugar. Return for new or worsening symptoms. As we discussed, I did not find a life threatening cause of your symptoms today. However, THAT DOES NOT MEAN IT COULD NOT DEVELOP. If you develop ANY new or worsening symptoms, it is critical that you return for re-evaluation. This includes any symptoms that are concerning to you, especially symptoms such as fevers, back pain, abdominal pain. If you do not return for re-evaluation, you risk serious complications, including death.

## 2023-09-09 NOTE — ED PROVIDER NOTES
Emergency Department Provider Note       PCP: Amol Loco MD   Age: 50 y.o. Sex: female     DISPOSITION Decision To Discharge 09/09/2023 12:48:55 PM       ICD-10-CM    1. Urethritis  N34.2       2. Bacterial vaginosis  N76.0     B96.89           Medical Decision Making     Complexity of Problems Addressed:  1 acute problem    Data Reviewed and Analyzed:  I independently ordered and reviewed each unique test.             Discussion of management or test interpretation. In summary this is a 49-year-old female patient who presented for symptoms most consistent with urinary tract infection. She did have similar symptoms about a week ago but they did resolve up until yesterday when they came back. Today her urine again show signs of bacteria in the urine. We will switch the antibiotic that she was on. She did have clue cells so we will treat her for bacterial vaginosis. Few days ago her sugar was over 400 so I did advise we should check some labs today but she refused. There was no glucose in the urine which does make it less likely that she is significantly hyperglycemic. She has no CVA tenderness or signs of pyelonephritis. She has no temperature or tachycardia to suggest sepsis. Plan is outpatient therapy. We will treat her with antibiotics. Counseled on signs to return for. Patient has verbalized understanding and agreed with the plan. Discharged in stable condition. Risk of Complications and/or Morbidity of Patient Management:  Prescription drug management performed. Patient was discharged risks and benefits of hospitalization were considered. ED Course as of 09/09/23 1651   Sat Sep 09, 2023   1158 11:58 AM  Advised the patient I recommended we check lab work including her blood sugar and kidney function given this is the same complaint but the patient is refusing all blood work. She also had a sugar of over 400 at last visit.   She understands this is AGAINST MEDICAL ADVICE and

## 2023-09-10 LAB
BACTERIA SPEC CULT: NORMAL
SERVICE CMNT-IMP: NORMAL

## 2023-09-12 LAB
BACTERIA SPEC CULT: NORMAL
BACTERIA SPEC CULT: NORMAL
SERVICE CMNT-IMP: NORMAL

## 2023-09-22 ENCOUNTER — HOSPITAL ENCOUNTER (EMERGENCY)
Age: 48
Discharge: HOME | End: 2023-09-22
Attending: EMERGENCY MEDICINE
Payer: COMMERCIAL

## 2023-09-22 VITALS
BODY MASS INDEX: 19.61 KG/M2 | WEIGHT: 129 LBS | OXYGEN SATURATION: 99 % | TEMPERATURE: 98.5 F | HEART RATE: 96 BPM | SYSTOLIC BLOOD PRESSURE: 99 MMHG | RESPIRATION RATE: 18 BRPM | DIASTOLIC BLOOD PRESSURE: 78 MMHG

## 2023-09-22 DIAGNOSIS — R29.898 HEAVY SENSATION OF LOWER EXTREMITY: Primary | ICD-10-CM

## 2023-09-22 LAB
ALBUMIN SERPL-MCNC: 3.3 G/DL (ref 3.5–5)
ALBUMIN/GLOB SERPL: 0.7 (ref 0.4–1.6)
ALP SERPL-CCNC: 423 U/L (ref 50–136)
ALT SERPL-CCNC: 84 U/L (ref 12–65)
ANION GAP SERPL CALC-SCNC: 8 MMOL/L (ref 2–11)
AST SERPL-CCNC: 97 U/L (ref 15–37)
BASOPHILS # BLD: 0 K/UL (ref 0–0.2)
BASOPHILS NFR BLD: 0 % (ref 0–2)
BILIRUB SERPL-MCNC: 0.1 MG/DL (ref 0.2–1.1)
BUN SERPL-MCNC: 42 MG/DL (ref 6–23)
CALCIUM SERPL-MCNC: 9.6 MG/DL (ref 8.3–10.4)
CHLORIDE SERPL-SCNC: 113 MMOL/L (ref 101–110)
CO2 SERPL-SCNC: 22 MMOL/L (ref 21–32)
CREAT SERPL-MCNC: 3.29 MG/DL (ref 0.6–1)
DIFFERENTIAL METHOD BLD: ABNORMAL
EOSINOPHIL # BLD: 0.5 K/UL (ref 0–0.8)
EOSINOPHIL NFR BLD: 10 % (ref 0.5–7.8)
ERYTHROCYTE [DISTWIDTH] IN BLOOD BY AUTOMATED COUNT: 14.2 % (ref 11.9–14.6)
GLOBULIN SER CALC-MCNC: 4.5 G/DL (ref 2.8–4.5)
GLUCOSE BLD STRIP.AUTO-MCNC: 90 MG/DL (ref 65–100)
GLUCOSE SERPL-MCNC: 53 MG/DL (ref 65–100)
HCT VFR BLD AUTO: 36.7 % (ref 35.8–46.3)
HGB BLD-MCNC: 11.5 G/DL (ref 11.7–15.4)
IMM GRANULOCYTES # BLD AUTO: 0 K/UL (ref 0–0.5)
IMM GRANULOCYTES NFR BLD AUTO: 0 % (ref 0–5)
LYMPHOCYTES # BLD: 2.3 K/UL (ref 0.5–4.6)
LYMPHOCYTES NFR BLD: 41 % (ref 13–44)
MAGNESIUM SERPL-MCNC: 2.2 MG/DL (ref 1.8–2.4)
MCH RBC QN AUTO: 27.3 PG (ref 26.1–32.9)
MCHC RBC AUTO-ENTMCNC: 31.3 G/DL (ref 31.4–35)
MCV RBC AUTO: 87.2 FL (ref 82–102)
MONOCYTES # BLD: 0.5 K/UL (ref 0.1–1.3)
MONOCYTES NFR BLD: 9 % (ref 4–12)
NEUTS SEG # BLD: 2.2 K/UL (ref 1.7–8.2)
NEUTS SEG NFR BLD: 39 % (ref 43–78)
NRBC # BLD: 0 K/UL (ref 0–0.2)
PLATELET # BLD AUTO: 246 K/UL (ref 150–450)
PMV BLD AUTO: 9.5 FL (ref 9.4–12.3)
POTASSIUM SERPL-SCNC: 4.1 MMOL/L (ref 3.5–5.1)
PROT SERPL-MCNC: 7.8 G/DL (ref 6.3–8.2)
RBC # BLD AUTO: 4.21 M/UL (ref 4.05–5.2)
SERVICE CMNT-IMP: NORMAL
SODIUM SERPL-SCNC: 143 MMOL/L (ref 133–143)
WBC # BLD AUTO: 5.5 K/UL (ref 4.3–11.1)

## 2023-09-22 PROCEDURE — 85025 COMPLETE CBC W/AUTO DIFF WBC: CPT

## 2023-09-22 PROCEDURE — 83735 ASSAY OF MAGNESIUM: CPT

## 2023-09-22 PROCEDURE — 80053 COMPREHEN METABOLIC PANEL: CPT

## 2023-09-22 PROCEDURE — 99283 EMERGENCY DEPT VISIT LOW MDM: CPT

## 2023-09-22 PROCEDURE — 82962 GLUCOSE BLOOD TEST: CPT

## 2023-09-22 ASSESSMENT — PAIN - FUNCTIONAL ASSESSMENT: PAIN_FUNCTIONAL_ASSESSMENT: NONE - DENIES PAIN

## 2023-09-22 ASSESSMENT — PAIN SCALES - GENERAL: PAINLEVEL_OUTOF10: 0

## 2023-09-22 NOTE — ED PROVIDER NOTES
Emergency Department Provider Note                   PCP:                Percy Hensley MD               Age: 50 y.o. Sex: female     DISPOSITION Decision To Discharge 09/22/2023 07:07:52 PM       ICD-10-CM    1. Heavy sensation of lower extremity  R29.898           MEDICAL DECISION MAKING  Complexity of Problems Addressed:  Complexity of Problem: 1 acute, uncomplicated illness or injury. Data Reviewed and Analyzed:  Category 1:   I reviewed external records: ED visit note from an outside group. I reviewed external records: provider visit note from PCP. I reviewed external records: provider visit note from outside specialist.  I reviewed external records: previous lab results from outside ED. I ordered each unique test.  I reviewed the results of each unique test.        Category 3: Discussion of management or test interpretation. Patient is a 52-year-old female with past medical history of uncontrolled type 1 diabetes, Charcot foot due to diabetes, CKD, and polyneuropathy due to diabetes presenting with \"leg heaviness\". On presentation, patient's vital signs are stable, she is well-appearing in no acute distress. She is ambulatory into our facility without any abnormal gait. She has full range of motion in bilateral lower extremities. No evidence of swelling, warmth, erythema, tenderness, or any evidence of infection. No calf tenderness, swelling, Prisca Edwards' sign is negative, no concern for DVT. She has full strength and sensation. No concern for emergent cause at today's visit. CBC with evidence of anemia that appears to be improved from patient's baseline. CMP with elevated creatinine and liver enzymes but appears to be around patient's baseline. Glucose was 53, patient ate some chips and starburst in her room. Repeat was 90. Patient will continue to monitor sugars closely at home. She will call her primary care provider in the next few days for reevaluation.  Was given signs and

## 2023-09-22 NOTE — DISCHARGE INSTRUCTIONS
Please continue to closely monitor your blood sugars at home. Follow-up with your primary care provider in the next few days for reevaluation.  Return to the ED with any new or worsening symptoms

## 2023-09-22 NOTE — ED TRIAGE NOTES
Patient reports \"heavy feeling\" in bilateral legs since yesterday. Patient ambulatory to triage without difficulty. Patient denies any injury or trauma.

## 2023-10-03 ENCOUNTER — OFFICE VISIT (OUTPATIENT)
Dept: ENDOCRINOLOGY | Age: 48
End: 2023-10-03
Payer: COMMERCIAL

## 2023-10-03 VITALS
BODY MASS INDEX: 18.37 KG/M2 | OXYGEN SATURATION: 98 % | WEIGHT: 120.8 LBS | TEMPERATURE: 83 F | SYSTOLIC BLOOD PRESSURE: 104 MMHG | DIASTOLIC BLOOD PRESSURE: 62 MMHG

## 2023-10-03 DIAGNOSIS — E10.65 TYPE 1 DIABETES MELLITUS WITH HYPERGLYCEMIA (HCC): Primary | ICD-10-CM

## 2023-10-03 DIAGNOSIS — Z91.199 MEDICAL NON-COMPLIANCE: ICD-10-CM

## 2023-10-03 PROCEDURE — 99214 OFFICE O/P EST MOD 30 MIN: CPT | Performed by: PHYSICIAN ASSISTANT

## 2023-10-03 PROCEDURE — 3046F HEMOGLOBIN A1C LEVEL >9.0%: CPT | Performed by: PHYSICIAN ASSISTANT

## 2023-10-03 RX ORDER — INSULIN LISPRO 100 [IU]/ML
INJECTION, SOLUTION INTRAVENOUS; SUBCUTANEOUS
Qty: 30 ML | Refills: 3 | Status: SHIPPED | OUTPATIENT
Start: 2023-10-03

## 2023-10-03 NOTE — PROGRESS NOTES
alert.      Sensory: Sensation is intact. Comments: Neuropathic changes to bilateral hands with resulting poor dexterity    Psychiatric:         Mood and Affect: Mood normal.         Behavior: Behavior normal.         Thought Content: Thought content normal.         Judgment: Judgment normal.             Return in about 16 weeks (around 1/23/2024) for Type 1 Diabetes f/u. Portions of this note were generated with the assistance of voice recogniton software. As such, some errors in transcription may be present.

## 2023-10-05 ENCOUNTER — CARE COORDINATION (OUTPATIENT)
Dept: CARE COORDINATION | Facility: CLINIC | Age: 48
End: 2023-10-05

## 2023-10-05 NOTE — CARE COORDINATION
Ambulatory Care Coordination Note  10/5/2023    Patient Current Location:  Home: [address] [place] [id number]    ACM contacted the patient by telephone. Verified name and  with patient as identifiers. Provided introduction to self, and explanation of the ACM role. ACM: Letty Reynoso RN    Challenges to be reviewed by the provider   Additional needs identified to be addressed with provider: Yes  Ongoing attention to medication affordability. Method of communication with provider: chart routing. Summary Note -  PLAN: for weekly call with patient. Will make sure patient attends appointment with EDWIN dunlap 10/30    Offered patient enrollment in the Remote Patient Monitoring (RPM) program for in-home monitoring: NA. Ambulatory Care Coordination Assessment    Care Coordination Protocol  Referral from Primary Care Provider: No  Week 1 - Initial Assessment     Do you have all of your prescriptions and are they filled?: No  Barriers to medication adherence: Forgets to take  Are you able to afford your medications?: With Assistance  Medication Assistance Program: [facility] Meds/Vouchers  How often do you have trouble taking your medications the way you have been told to take them?: Sometimes I take them as prescribed.            Current Housing: Apartment  Who do you live with?: Alone  Are you an active caregiver in your home?: No                 How would you rate their home environment in terms of safety and stability (including domestic violence, insecure housing, neighbor harassment)?: Safe, stable, but with some inconsistency   How do daily activities impact on the patient's well-being? (include current or anticipated unemployment, work, caregiving, access to transportation or other): Some general dissatisfaction but no concern   How would you rate their social network (family, work, friends)?: Adequate participation with social networks   How would you rate their financial resources

## 2023-10-10 ENCOUNTER — TELEPHONE (OUTPATIENT)
Dept: DIABETES SERVICES | Age: 48
End: 2023-10-10

## 2023-10-10 ENCOUNTER — CARE COORDINATION (OUTPATIENT)
Dept: CARE COORDINATION | Facility: CLINIC | Age: 48
End: 2023-10-10

## 2023-10-10 NOTE — TELEPHONE ENCOUNTER
Called and set appointment after talking with  [facility] : [facility]. Will see her tomorrow at 10:30 AM. Asked her to bring her Dexcom and her glucometer both. Verbalized understanding.

## 2023-10-11 ENCOUNTER — CARE COORDINATION (OUTPATIENT)
Dept: CARE COORDINATION | Facility: CLINIC | Age: 48
End: 2023-10-11

## 2023-10-11 DIAGNOSIS — G62.9 NEUROPATHY: ICD-10-CM

## 2023-10-11 NOTE — CARE COORDINATION
Outreach with patient subsequent to appointment with Diabetes Education. Patient reports that she now has better understanding of her Dexcom. Planning to download the Clarity Marlo. Discussed insulin dosing and requested per her endocrinology provider that she begin to keep track of how much insulin she is taking. We will review this in a week. Also discussed choices from the menu where patient is planning to eat lunch. Please see Diabetes educator notes for further clarification. PLAN:  call patient in 1 week. Patient understands to call this ACM prior to that with questions/concerns.

## 2023-10-11 NOTE — CARE COORDINATION
Patient requesting prescription for Gabapentin for pain in her hand. Reports she was prescribed this while in the hospital, but she no longer has any of the medication.     Will route to PCP

## 2023-10-12 DIAGNOSIS — G62.9 NEUROPATHY: ICD-10-CM

## 2023-10-12 RX ORDER — GABAPENTIN 100 MG/1
100 CAPSULE ORAL 3 TIMES DAILY
Qty: 270 CAPSULE | Refills: 0 | Status: CANCELLED | OUTPATIENT
Start: 2023-10-12 | End: 2024-01-10

## 2023-10-12 RX ORDER — GABAPENTIN 100 MG/1
100 CAPSULE ORAL 3 TIMES DAILY
Qty: 270 CAPSULE | Refills: 0 | Status: SHIPPED | OUTPATIENT
Start: 2023-10-12 | End: 2024-01-10

## 2023-10-17 ENCOUNTER — CARE COORDINATION (OUTPATIENT)
Dept: CARE COORDINATION | Facility: CLINIC | Age: 48
End: 2023-10-17

## 2023-10-17 NOTE — CARE COORDINATION
Ambulatory Care Coordination Note  10/17/2023    Patient Current Location:  Home:   [address] [place] [phone]     Follow up outreach with patient   - reports BS are better controlled   - watching her nutrition closely   - no problems at this point    PLAN: weekly call next week    ACM: Dulce Mota RN    Challenges to be reviewed by the provider   Additional needs identified to be addressed with provider: No  none               Method of communication with provider: chart routing. Offered patient enrollment in the Remote Patient Monitoring (RPM) program for in-home monitoring: Yes, but did not enroll at this time.               Future Appointments   Date Time Provider [address] [place] [address]   10/30/2023 10:00 AM Costa Corporal, [address] [facility]   11/2/2023  2:00 PM Antonia Carmichael MD [facility] AMB   1/23/2024 11:30 AM FELIZ Nunn [facility] AMB   1/31/2024 10:30 AM MD ALICE Chicas [facility] AMB

## 2023-10-19 ENCOUNTER — TELEPHONE (OUTPATIENT)
Dept: DIABETES SERVICES | Age: 48
End: 2023-10-19

## 2023-10-19 NOTE — TELEPHONE ENCOUNTER
Received e mail from [facility] : [facility] saying patient needed additional help with her Dexcom. Called and left message with patient and informed her if she does not call back before Friday, I will be out of town, but will help her when get back.   Also instructed her to call Dexcom they are there 24/7 to assist.

## 2023-10-24 ENCOUNTER — CARE COORDINATION (OUTPATIENT)
Dept: CARE COORDINATION | Facility: CLINIC | Age: 48
End: 2023-10-24

## 2023-10-24 NOTE — CARE COORDINATION
Outreach for weekly follow up.   - yesterday patient's birthday  Cam Duane to reach  Left VM requesting return call.

## 2023-10-26 ENCOUNTER — CARE COORDINATION (OUTPATIENT)
Dept: CARE COORDINATION | Facility: CLINIC | Age: 48
End: 2023-10-26

## 2023-10-26 NOTE — CARE COORDINATION
Ambulatory Care Coordination Note  10/26/2023    Incoming call from patient to check in with ACM  Reports very low BS at night - as low as 48. Patient keeps an orange at bedside - ate this and BS was 170 by the morning. States she does not \"feel\" like her BS is low - has been relying on Dexcom readouts. Call with Diabetes Education for next week. Patient is well-engaged with ACM and checking in regularly. Continue to monitor. ACM: John Paul Talley RN    Challenges to be reviewed by the provider   Additional needs identified to be addressed with provider: No  none               Method of communication with provider: chart routing. Lab Results       None            Care Coordination Interventions    Referral from Primary Care Provider: No  Suggested Interventions and Community Resources  Diabetes Education:  In Process          Goals Addressed    None         Future Appointments   Date Time Provider [address]   10/30/2023 10:00 AM Kaelyn Silva RD [facility]   11/2/2023  2:00 PM Robert Horn MD [facility] AMB   1/23/2024 11:30 AM FELIZ Dinero [facility] AMB   1/31/2024 10:30 AM Luis Enrique Harris MD [facility] AMB

## 2023-10-28 ENCOUNTER — TELEPHONE (OUTPATIENT)
Dept: ENDOCRINOLOGY | Age: 48
End: 2023-10-28

## 2023-10-28 DIAGNOSIS — R27.8 POOR MANUAL DEXTERITY: Primary | ICD-10-CM

## 2023-10-28 DIAGNOSIS — E10.65 TYPE 1 DIABETES MELLITUS WITH HYPERGLYCEMIA (HCC): ICD-10-CM

## 2023-10-28 RX ORDER — INSULIN GLARGINE 300 U/ML
10 INJECTION, SOLUTION SUBCUTANEOUS NIGHTLY
Qty: 6 ADJUSTABLE DOSE PRE-FILLED PEN SYRINGE | Refills: 3 | Status: SHIPPED | OUTPATIENT
Start: 2023-10-28 | End: 2023-11-03 | Stop reason: SDUPTHER

## 2023-10-30 ENCOUNTER — CARE COORDINATION (OUTPATIENT)
Dept: CARE COORDINATION | Facility: CLINIC | Age: 48
End: 2023-10-30

## 2023-10-30 NOTE — CARE COORDINATION
Incoming call from patient  Reports a low blood sugar from Friday 10/27 night that caused her to pass out briefly. As discussed with patient last week (see notes by this author) she has had BS as low as 48. Patient had thought her Dexcom was incorrect. However, now this latest and third episode has occurred. She does not know what the BS was reading:  was on the phone with a friend around midnight, felt woozy - got up to get a Sprite, sat down in a chair and then fell out of the chair onto the floor. She came to - her friend calling her name. Patient reports she had a good meal earlier in the evening. She notes that these low BS only happen at night. Verified medications as listed here in the chart.     PLAN:  contact Provider

## 2023-10-31 NOTE — TELEPHONE ENCOUNTER
Spoke to patient, she uses her dexcom on her phone and was going to set it up. She told me to send her how to do so on [employer]. It looks like it still has not been seen.

## 2023-11-01 ENCOUNTER — TELEPHONE (OUTPATIENT)
Dept: DIABETES SERVICES | Age: 48
End: 2023-11-01

## 2023-11-01 ENCOUNTER — CARE COORDINATION (OUTPATIENT)
Dept: CARE COORDINATION | Facility: CLINIC | Age: 48
End: 2023-11-01

## 2023-11-01 NOTE — TELEPHONE ENCOUNTER
Call to patient again to ask about her Dexcom. She needed assist with the over patch. Talked her thru applying over patch, but she still wants overview of Dexcom . Scheduled appointment.  She did not want AM.

## 2023-11-01 NOTE — CARE COORDINATION
Ambulatory Care Coordination Note  11/1/2023    Patient Current Location:    Home: [address] [facility] [phone]     Follow up call with patient -    - she did not go by Endocrinology yesterday   - states she misunderstood this directive and she will go by there today on the way to her parents' home. - explained that we need to fully address the low blood sugars she has been experiencing.   - explained she needs to go by Endocrinology and also to link her Dexcom with MyChart. PLAN - patient and I will follow up together after she goes by Endocrinology. Method of communication with provider: chart routing. ACM: John Paul Talley RN    Lab Results       None            Care Coordination Interventions    Referral from Primary Care Provider: No  Suggested Interventions and Community Resources  Diabetes Education: In Process          Goals Addressed                   This Visit's Progress     Patient verbalizes understanding of self -management goals of living with Diabetes.    On track     ACM to support in self management of diabetes   - may encourage a return to diabetes classes   - weekly call with patient to ascertain blood sugar trends   - involve SW as needed to support insulin affordability              Future Appointments   Date Time Provider [address]   11/2/2023  2:00 PM Robert Horn MD [facility] AMB   1/23/2024 11:30 AM FELIZ Dinero [facility] AMB   1/31/2024 10:30 AM Luis Enrique Harris MD [facility] AMB

## 2023-11-02 ENCOUNTER — TELEMEDICINE (OUTPATIENT)
Dept: INTERNAL MEDICINE CLINIC | Facility: CLINIC | Age: 48
End: 2023-11-02
Payer: COMMERCIAL

## 2023-11-02 DIAGNOSIS — N18.4 CHRONIC KIDNEY DISEASE (CKD), STAGE IV (SEVERE) (HCC): Primary | Chronic | ICD-10-CM

## 2023-11-02 PROBLEM — E46 PROTEIN CALORIE MALNUTRITION (HCC): Status: ACTIVE | Noted: 2023-11-02

## 2023-11-02 PROCEDURE — 99213 OFFICE O/P EST LOW 20 MIN: CPT | Performed by: INTERNAL MEDICINE

## 2023-11-02 RX ORDER — ACYCLOVIR 400 MG/1
TABLET ORAL
COMMUNITY
Start: 2023-10-18

## 2023-11-02 ASSESSMENT — ENCOUNTER SYMPTOMS
COUGH: 0
ABDOMINAL PAIN: 0
SHORTNESS OF BREATH: 0

## 2023-11-02 NOTE — PROGRESS NOTES
guardian if applicable) is aware that this is a billable service, which includes applicable co-pays. This Virtual Visit was conducted with patient's (and/or legal guardian's) consent. Patient identification was verified, and a caregiver was present when appropriate. The patient was located at Home: [address] [place] [phone]  Provider was located at Home ([address] [place] [address]): [facility]        Total time spent on this encounter:  21 minutes    --Yarely Guo MD on 11/2/2023 at 2:59 PM    An electronic signature was used to authenticate this note.

## 2023-11-03 DIAGNOSIS — R27.8 POOR MANUAL DEXTERITY: ICD-10-CM

## 2023-11-03 DIAGNOSIS — E10.65 TYPE 1 DIABETES MELLITUS WITH HYPERGLYCEMIA (HCC): ICD-10-CM

## 2023-11-03 DIAGNOSIS — Z91.199 MEDICAL NON-COMPLIANCE: Primary | ICD-10-CM

## 2023-11-03 RX ORDER — INSULIN LISPRO 100 [IU]/ML
INJECTION, SOLUTION INTRAVENOUS; SUBCUTANEOUS
Qty: 30 ML | Refills: 3 | Status: SHIPPED | OUTPATIENT
Start: 2023-11-03

## 2023-11-03 RX ORDER — INSULIN GLARGINE 300 U/ML
9 INJECTION, SOLUTION SUBCUTANEOUS NIGHTLY
Qty: 6 ADJUSTABLE DOSE PRE-FILLED PEN SYRINGE | Refills: 3
Start: 2023-11-03 | End: 2024-11-02

## 2023-11-03 RX ORDER — INSULIN LISPRO 100 [IU]/ML
INJECTION, SOLUTION INTRAVENOUS; SUBCUTANEOUS
Qty: 30 ML | Refills: 3 | Status: SHIPPED | OUTPATIENT
Start: 2023-11-03 | End: 2023-11-03 | Stop reason: SDUPTHER

## 2023-11-03 NOTE — TELEPHONE ENCOUNTER
Download reviewed   4% hypoglycemia  62% hyperglycemia    glycemic control is highly variable    I suspect Patient is that patient is taking prandial insulin plus correction sometimes postprandially and even at bedtime. I would like her to stop using correction scale at bedtime. Do not use correction if taking prandial insulin late. Continue 6 units if taking before meals. Use 2 units if taking before snacks. Avoid all high-calorie beverages but focus on drinking water    I would like her to focus on taking prandial insulin 5 to 10 minutes before eating    As majority of hypoglycemia is overnight we can reduce basal insulin from 10 units down to 9 units    Believe patient would benefit from attending type 1 diabetes education and we will put in a referral today        myCSenseDatat message: This is Saulo Buffalo. I would like you to reduce Toujeo from 10 units down to 9 units    Also please focus on always taking your mealtime insulin of 6 units before you eat.   If you are taking this late like after your meal or during your meal do not use the correction scale    Please take 2 units of short acting insulin before snacks without any correction scale    This time do not use any short acting insulin at bedtime but focus on taking mealtime insulin before supper for best results    ~Anna

## 2023-11-03 NOTE — TELEPHONE ENCOUNTER
Pt came into the office today for dexcom download, she also mentioned she was having trouble getting humalog. She said she could not afford it this month. I did show her how to get onto the [facility] website and download a coupon.  She asked if we could send her rx over to [facility]

## 2023-11-06 ENCOUNTER — TELEPHONE (OUTPATIENT)
Dept: DIABETES SERVICES | Age: 48
End: 2023-11-06

## 2023-11-06 NOTE — TELEPHONE ENCOUNTER
Patient already has appointment scheduled this week for assist with her Dexcom. Called about education. Free. She just want help with Dexcom. She does not want any other education.   Reminded her Thursday at 2 PM

## 2023-11-08 ENCOUNTER — CARE COORDINATION (OUTPATIENT)
Dept: CARE COORDINATION | Facility: CLINIC | Age: 48
End: 2023-11-08

## 2023-11-08 NOTE — CARE COORDINATION
Ambulatory Care Coordination Note  11/8/2023    Patient Current Location:    Home: [address] [place] [phone]     Outreach in follow up to medication adjustments as ordered by endocrinology provider. Confirmed that patient understands these and that patient has implemented the changes. She reports \"still few high [BS] but no lows. \"  Patient has appointment with Diabetes Edu tomorrow - help with her Dexcom. Method of communication with provider: chart routing. PLAN:  follow up in one week. ACM: Ulises Mulligan RN    Lab Results       None            Care Coordination Interventions    Referral from Primary Care Provider: No  Suggested Interventions and Community Resources  Diabetes Education:  In Process          Goals Addressed    None         Future Appointments   Date Time Provider [address]   11/9/2023  2:00 PM Benny Ramirez RN [facility]   1/23/2024 11:30 AM Rumbaut, Jerrald Cooks, PA-C END [facility] AMB   1/31/2024 10:30 AM Mary Jo Garibay MD [facility] AMB   2/2/2024  1:40 PM MD MCKAY BeMIDES [facility] AMB   2/9/2024 10:40 AM MD DARYN Be [facility] AMB

## 2023-11-21 ENCOUNTER — CARE COORDINATION (OUTPATIENT)
Dept: CARE COORDINATION | Facility: CLINIC | Age: 48
End: 2023-11-21

## 2023-11-21 NOTE — CARE COORDINATION
Ambulatory Care Coordination Note  11/21/2023    Patient has returned calls from earlier  She feels like she is doing pretty well. Blood sugars \"all over the place. \"  Discussed carb watching, particularly around the holidays as food has a front and center focus. Picking up her sensors today. Discussed moving calls to every 2 weeks. Patient aware that she can call this ACM when she has questions/concerns. Challenges to be reviewed by the provider   Additional needs identified to be addressed with provider: No  none               Method of communication with provider: chart routing. ACM: Jessica Cruz, RN    Lab Results       None            Care Coordination Interventions    Referral from Primary Care Provider: No  Suggested Interventions and Community Resources  Diabetes Education:  In Process          Goals Addressed    None         Future Appointments   Date Time Provider [address]   1/23/2024 11:30 AM FELIZ Colunga [facility] AMB   1/31/2024 10:30 AM MD RONEY Rodriguez [facility] AMB   2/2/2024  1:40 PM MD DARYN Cao [facility] AMB   2/9/2024 10:40 AM MD DARYN Cao [facility] AMB

## 2023-11-21 NOTE — CARE COORDINATION
Outreach to patient:   - calls 11/16; 11/21   - text messages:  11/16; 11/17    Unable to reach  VM has been left for patient x2

## 2023-11-22 ENCOUNTER — HOSPITAL ENCOUNTER (EMERGENCY)
Age: 48
Discharge: HOME | End: 2023-11-22
Attending: EMERGENCY MEDICINE
Payer: COMMERCIAL

## 2023-11-22 VITALS
RESPIRATION RATE: 15 BRPM | SYSTOLIC BLOOD PRESSURE: 110 MMHG | BODY MASS INDEX: 19.25 KG/M2 | DIASTOLIC BLOOD PRESSURE: 68 MMHG | WEIGHT: 127 LBS | HEART RATE: 87 BPM | TEMPERATURE: 97.9 F | HEIGHT: 68 IN | OXYGEN SATURATION: 99 %

## 2023-11-22 DIAGNOSIS — N30.00 ACUTE CYSTITIS WITHOUT HEMATURIA: ICD-10-CM

## 2023-11-22 DIAGNOSIS — R73.9 HYPERGLYCEMIA: Primary | ICD-10-CM

## 2023-11-22 LAB
ALBUMIN SERPL-MCNC: 3 G/DL (ref 3.5–5)
ALBUMIN/GLOB SERPL: 0.7 (ref 0.4–1.6)
ALP SERPL-CCNC: 246 U/L (ref 50–136)
ALT SERPL-CCNC: 57 U/L (ref 12–65)
ANION GAP SERPL CALC-SCNC: 8 MMOL/L (ref 2–11)
AST SERPL-CCNC: 65 U/L (ref 15–37)
BACTERIA URNS QL MICRO: ABNORMAL /HPF
BASOPHILS # BLD: 0 K/UL (ref 0–0.2)
BASOPHILS NFR BLD: 0 % (ref 0–2)
BILIRUB SERPL-MCNC: 0.3 MG/DL (ref 0.2–1.1)
BUN SERPL-MCNC: 37 MG/DL (ref 6–23)
CALCIUM SERPL-MCNC: 8.4 MG/DL (ref 8.3–10.4)
CASTS URNS QL MICRO: 0 /LPF
CHLORIDE SERPL-SCNC: 107 MMOL/L (ref 101–110)
CO2 SERPL-SCNC: 22 MMOL/L (ref 21–32)
CREAT SERPL-MCNC: 3.3 MG/DL (ref 0.6–1)
CRYSTALS URNS QL MICRO: 0 /LPF
DIFFERENTIAL METHOD BLD: ABNORMAL
EKG ATRIAL RATE: 86 BPM
EKG DIAGNOSIS: NORMAL
EKG P AXIS: 75 DEGREES
EKG P-R INTERVAL: 155 MS
EKG Q-T INTERVAL: 380 MS
EKG QRS DURATION: 96 MS
EKG QTC CALCULATION (BAZETT): 455 MS
EKG R AXIS: 85 DEGREES
EKG T AXIS: 63 DEGREES
EKG VENTRICULAR RATE: 86 BPM
EOSINOPHIL # BLD: 0.5 K/UL (ref 0–0.8)
EOSINOPHIL NFR BLD: 9 % (ref 0.5–7.8)
EPI CELLS #/AREA URNS HPF: ABNORMAL /HPF
ERYTHROCYTE [DISTWIDTH] IN BLOOD BY AUTOMATED COUNT: 14.4 % (ref 11.9–14.6)
GLOBULIN SER CALC-MCNC: 4.2 G/DL (ref 2.8–4.5)
GLUCOSE BLD STRIP.AUTO-MCNC: 419 MG/DL (ref 65–100)
GLUCOSE BLD STRIP.AUTO-MCNC: 476 MG/DL (ref 65–100)
GLUCOSE BLD STRIP.AUTO-MCNC: 486 MG/DL (ref 65–100)
GLUCOSE BLD STRIP.AUTO-MCNC: 506 MG/DL (ref 65–100)
GLUCOSE BLD STRIP.AUTO-MCNC: >600 MG/DL (ref 65–100)
GLUCOSE SERPL-MCNC: 702 MG/DL (ref 65–100)
HCG UR QL: NEGATIVE
HCT VFR BLD AUTO: 33.5 % (ref 35.8–46.3)
HGB BLD-MCNC: 10 G/DL (ref 11.7–15.4)
IMM GRANULOCYTES # BLD AUTO: 0 K/UL (ref 0–0.5)
IMM GRANULOCYTES NFR BLD AUTO: 0 % (ref 0–5)
LIPASE SERPL-CCNC: 41 U/L (ref 73–393)
LYMPHOCYTES # BLD: 2.2 K/UL (ref 0.5–4.6)
LYMPHOCYTES NFR BLD: 37 % (ref 13–44)
MAGNESIUM SERPL-MCNC: 2.2 MG/DL (ref 1.8–2.4)
MCH RBC QN AUTO: 25.7 PG (ref 26.1–32.9)
MCHC RBC AUTO-ENTMCNC: 29.9 G/DL (ref 31.4–35)
MCV RBC AUTO: 86.1 FL (ref 82–102)
MONOCYTES # BLD: 0.5 K/UL (ref 0.1–1.3)
MONOCYTES NFR BLD: 8 % (ref 4–12)
MUCOUS THREADS URNS QL MICRO: 0 /LPF
NEUTS SEG # BLD: 2.8 K/UL (ref 1.7–8.2)
NEUTS SEG NFR BLD: 47 % (ref 43–78)
NRBC # BLD: 0 K/UL (ref 0–0.2)
PHOSPHATE SERPL-MCNC: 4.7 MG/DL (ref 2.5–4.5)
PLATELET # BLD AUTO: 246 K/UL (ref 150–450)
PMV BLD AUTO: 10.3 FL (ref 9.4–12.3)
POTASSIUM SERPL-SCNC: 5 MMOL/L (ref 3.5–5.1)
PROT SERPL-MCNC: 7.2 G/DL (ref 6.3–8.2)
RBC # BLD AUTO: 3.89 M/UL (ref 4.05–5.2)
RBC #/AREA URNS HPF: ABNORMAL /HPF
SERVICE CMNT-IMP: ABNORMAL
SODIUM SERPL-SCNC: 137 MMOL/L (ref 133–143)
WBC # BLD AUTO: 6 K/UL (ref 4.3–11.1)
WBC URNS QL MICRO: ABNORMAL /HPF

## 2023-11-22 PROCEDURE — 96376 TX/PRO/DX INJ SAME DRUG ADON: CPT

## 2023-11-22 PROCEDURE — 96361 HYDRATE IV INFUSION ADD-ON: CPT

## 2023-11-22 PROCEDURE — 83735 ASSAY OF MAGNESIUM: CPT

## 2023-11-22 PROCEDURE — 6370000000 HC RX 637 (ALT 250 FOR IP): Performed by: STUDENT IN AN ORGANIZED HEALTH CARE EDUCATION/TRAINING PROGRAM

## 2023-11-22 PROCEDURE — 83690 ASSAY OF LIPASE: CPT

## 2023-11-22 PROCEDURE — 2580000003 HC RX 258: Performed by: EMERGENCY MEDICINE

## 2023-11-22 PROCEDURE — 6370000000 HC RX 637 (ALT 250 FOR IP): Performed by: EMERGENCY MEDICINE

## 2023-11-22 PROCEDURE — 84100 ASSAY OF PHOSPHORUS: CPT

## 2023-11-22 PROCEDURE — 82962 GLUCOSE BLOOD TEST: CPT

## 2023-11-22 PROCEDURE — 99284 EMERGENCY DEPT VISIT MOD MDM: CPT

## 2023-11-22 PROCEDURE — 93005 ELECTROCARDIOGRAM TRACING: CPT | Performed by: EMERGENCY MEDICINE

## 2023-11-22 PROCEDURE — 93010 ELECTROCARDIOGRAM REPORT: CPT | Performed by: INTERNAL MEDICINE

## 2023-11-22 PROCEDURE — 80053 COMPREHEN METABOLIC PANEL: CPT

## 2023-11-22 PROCEDURE — 81015 MICROSCOPIC EXAM OF URINE: CPT

## 2023-11-22 PROCEDURE — 81025 URINE PREGNANCY TEST: CPT

## 2023-11-22 PROCEDURE — 85025 COMPLETE CBC W/AUTO DIFF WBC: CPT

## 2023-11-22 PROCEDURE — 96374 THER/PROPH/DIAG INJ IV PUSH: CPT

## 2023-11-22 RX ORDER — CEPHALEXIN 500 MG/1
500 CAPSULE ORAL 2 TIMES DAILY
Qty: 14 CAPSULE | Refills: 0 | Status: SHIPPED | OUTPATIENT
Start: 2023-11-22 | End: 2023-11-29

## 2023-11-22 RX ORDER — 0.9 % SODIUM CHLORIDE 0.9 %
1000 INTRAVENOUS SOLUTION INTRAVENOUS
Status: COMPLETED | OUTPATIENT
Start: 2023-11-22 | End: 2023-11-22

## 2023-11-22 RX ADMIN — INSULIN HUMAN 5 UNITS: 100 INJECTION, SOLUTION PARENTERAL at 11:28

## 2023-11-22 RX ADMIN — INSULIN HUMAN 5 UNITS: 100 INJECTION, SOLUTION PARENTERAL at 06:20

## 2023-11-22 RX ADMIN — INSULIN HUMAN 5 UNITS: 100 INJECTION, SOLUTION PARENTERAL at 12:22

## 2023-11-22 RX ADMIN — SODIUM CHLORIDE 1000 ML: 9 INJECTION, SOLUTION INTRAVENOUS at 06:11

## 2023-11-22 ASSESSMENT — ENCOUNTER SYMPTOMS
WHEEZING: 0
EYE REDNESS: 0
EYE ITCHING: 0
NAUSEA: 0
CONSTIPATION: 0
BLURRED VISION: 1
COUGH: 0
SINUS PAIN: 0
BACK PAIN: 0
ABDOMINAL PAIN: 0
TROUBLE SWALLOWING: 0
DIARRHEA: 1
EYE PAIN: 0
SHORTNESS OF BREATH: 0
VOMITING: 0

## 2023-11-22 ASSESSMENT — PAIN - FUNCTIONAL ASSESSMENT: PAIN_FUNCTIONAL_ASSESSMENT: NONE - DENIES PAIN

## 2023-11-22 NOTE — ED NOTES
Checked PT's BGL on POC and # is too high the monitor will not read.    Provider- Myrna Jaramillo MD notified      Law Max RN  11/22/23 0146

## 2023-11-22 NOTE — ED NOTES
POCT . Unsuccessful IV insertion attempts by Whit Carlson, RN and Feli Hood RN.  Requesting assistance with US IV      Enedelia Meter, RN  11/22/23 3878

## 2023-11-22 NOTE — ED NOTES
Report given to [facility], transfer of care at this time.      Laura Sterling RN  11/22/23 5646

## 2023-11-22 NOTE — CARE COORDINATION
Chart reviewed, patient current with ambulatory case management and diabetes education. SW met with the patient who confirmed demographic information, states that she lives alone with local family who is supportive. Patient is insured and current with primary care. Patient does not use assistive devices to ambulate, denies any falls, and is independent in her ADLs. Patient's ambulatory CM aware of ER visit. No needs identified by JOHN at this time.      Ruy Boykin LMSW    [address] [facility]

## 2023-11-22 NOTE — ED TRIAGE NOTES
Pt presents to Ed with c/o frequent urination that she states smells bad. Pt also has been having diarrhea and had an accident on herself. C/o glucose level reading \"high\" on her meter.

## 2023-11-22 NOTE — ED PROVIDER NOTES
Emergency Department Provider Signout / Continuation of Care Note         DISPOSITION Decision To Discharge 11/22/2023 12:55:31 PM       ICD-10-CM    1. Hyperglycemia  R73.9       2. Acute cystitis without hematuria  N30.00           The patient's care was signed out to me at shift change. Please see Dr. Renaldo Hoffman note for complete history and physical.  In summary patient presents with foul-smelling urine as well as hyperglycemia. Patient was given 5 units IV insulin, improvement in blood glucose into the 400s, patient also receiving 1 L bolus normal saline, will recheck glucose after that. Lab works unremarkable, urinalysis does show evidence of urinary tract infection. Will place on Keflex. Patient required additional doses of insulin, with improvement of glucose down to 419. Will discharge home with outpatient follow-up and return precautions. She voiced understanding and agreement. Final Plan      Discharge home with outpatient primary care physician follow-up, prescription for urinary tract infection.         Ulysses Linn,   11/22/23 9021
weight. She is ill-appearing. HENT:      Head: Normocephalic and atraumatic. Right Ear: External ear normal.      Left Ear: External ear normal.      Nose: Nose normal.      Mouth/Throat:      Mouth: Mucous membranes are dry. Pharynx: Oropharynx is clear. Eyes:      General: No scleral icterus. Extraocular Movements: Extraocular movements intact. Conjunctiva/sclera: Conjunctivae normal.      Pupils: Pupils are equal, round, and reactive to light. Cardiovascular:      Rate and Rhythm: Normal rate and regular rhythm. Pulses: Normal pulses. Heart sounds: Normal heart sounds. Pulmonary:      Effort: Pulmonary effort is normal. No respiratory distress. Breath sounds: Normal breath sounds. Abdominal:      General: Abdomen is flat. Bowel sounds are normal. There is no distension. Palpations: Abdomen is soft. There is no mass. Tenderness: There is no abdominal tenderness. Musculoskeletal:         General: No deformity or signs of injury. Normal range of motion. Cervical back: Normal range of motion and neck supple. Skin:     General: Skin is warm and dry. Capillary Refill: Capillary refill takes less than 2 seconds. Neurological:      General: No focal deficit present. Mental Status: She is alert and oriented to person, place, and time. Psychiatric:         Mood and Affect: Mood normal.         Behavior: Behavior normal.         Thought Content:  Thought content normal.         Judgment: Judgment normal.          Procedures     EKG 12 Lead    Date/Time: 11/22/2023 6:20 AM    Performed by: Tino Oglesby MD  Authorized by: Tino Oglesby MD    ECG reviewed by ED Physician in the absence of a cardiologist: yes    Interpretation:     Interpretation: non-specific    Quality:     Tracing quality:  Limited by artifact  Rate:     ECG rate:  86    ECG rate assessment: normal    Rhythm:     Rhythm: sinus rhythm    Ectopy:     Ectopy: none    ST segments:

## 2023-11-22 NOTE — DISCHARGE INSTR - COC
Continuity of Care Form    Patient Name: Tobie Ormond   :  1975  MRN:  [id number]    Admit date:  2023  Discharge date:  ***    Code Status Order: Prior   Advance Directives:     Admitting Physician:  No admitting provider for patient encounter.   PCP: Jay Johansen MD    Discharging Nurse: [facility] Unit/Room#: ER08/08  Discharging Unit Phone Number: ***    Emergency Contact:   Extended Emergency Contact Information  Primary Emergency Contact: Viktoria Ceballos [facility] [address] [address] Phone: [phone]  Mobile Phone: [phone]  Relation: Parent  Secondary Emergency Contact: Price Leal  Relation: Parent    Past Surgical History:  Past Surgical History:   Procedure Laterality Date    AMPUTATION Left     5th Toe due to Diabetic Ulcer    AMPUTATION  10    gangrene    CYSTOSCOPY,INSERT URETERAL STENT  2022    HX OPEN REDUCTION INTERNAL FIXATION Right     ankle    IR TUNNELED CATHETER PLACEMENT GREATER THAN 5 YEARS  2022    IR TUNNELED CATHETER PLACEMENT GREATER THAN 5 YEARS 2022 [facility] RADIOLOGY SPECIALS    UPPER GASTROINTESTINAL ENDOSCOPY N/A 2022    EGD BIOPSY performed by Aaliyah Nice MD at [facility] ENDOSCOPY       Immunization History:   Immunization History   Administered Date(s) Administered    COVID-19, PFIZER PURPLE top, DILUTE for use, (age 15 y+), 30mcg/0.3mL 2021, 02/10/2021    Influenza Virus Vaccine 01/15/2020    Influenza, FLUCELVAX, (age 10 mo+), MDCK, PF, 0.5mL 10/12/2020, 10/11/2021    PPD Test 2022, 2022    Pneumococcal, PCV20, PREVNAR 21, (age 6w+), IM, 0.5mL 2023       Active Problems:  Patient Active Problem List   Diagnosis Code    Moderate single current episode of major depressive disorder ([address]) F32.1    Diabetic polyneuropathy associated with type 1 diabetes mellitus ([address]) E10.42    Chronic kidney disease (CKD), stage IV (severe) ([address]) N18.4    Normocytic anemia D64.9    Physical debility

## 2023-11-22 NOTE — DISCHARGE INSTRUCTIONS
Take antibiotics as prescribed. Take your insulin as directed. Follow-up with your family physician within 1 week. Return to the ER for worsening or worrisome symptoms.

## 2023-12-06 ENCOUNTER — CARE COORDINATION (OUTPATIENT)
Dept: CARE COORDINATION | Facility: CLINIC | Age: 48
End: 2023-12-06

## 2023-12-06 NOTE — CARE COORDINATION
Ongoing follow up outreach r/t diabetes   - patient feeling well, had a good Thanksgiving   - BS running around 120; today 124   - making good effort to eat correctly    Currently out for lunch - will follow up again in approximately 10-14 days

## 2023-12-11 ENCOUNTER — CARE COORDINATION (OUTPATIENT)
Dept: CARE COORDINATION | Facility: CLINIC | Age: 48
End: 2023-12-11

## 2023-12-11 DIAGNOSIS — G62.9 NEUROPATHY: ICD-10-CM

## 2023-12-11 RX ORDER — GABAPENTIN 100 MG/1
100 CAPSULE ORAL 3 TIMES DAILY
Qty: 270 CAPSULE | Refills: 0 | Status: CANCELLED
Start: 2023-12-11 | End: 2024-03-10

## 2023-12-11 NOTE — CARE COORDINATION
Incoming call from patient   - she is in need of gabapentin refill. She will leave message on refill line at the PCP. Will route this note to provider and MA.

## 2023-12-12 ENCOUNTER — TELEPHONE (OUTPATIENT)
Dept: ENDOCRINOLOGY | Age: 48
End: 2023-12-12

## 2023-12-12 NOTE — TELEPHONE ENCOUNTER
Per Catherine Abdalla, she is not filling it out. Spoke to the Pt, she expressed understanding and will let her PCP fill it out, since they are overseeing her for the neuropathy.

## 2024-01-02 ENCOUNTER — OFFICE VISIT (OUTPATIENT)
Dept: ENDOCRINOLOGY | Age: 49
End: 2024-01-02
Payer: COMMERCIAL

## 2024-01-02 ENCOUNTER — TELEPHONE (OUTPATIENT)
Dept: ENDOCRINOLOGY | Age: 49
End: 2024-01-02

## 2024-01-02 VITALS
DIASTOLIC BLOOD PRESSURE: 80 MMHG | OXYGEN SATURATION: 99 % | WEIGHT: 128 LBS | SYSTOLIC BLOOD PRESSURE: 130 MMHG | HEART RATE: 84 BPM | BODY MASS INDEX: 19.4 KG/M2 | HEIGHT: 68 IN

## 2024-01-02 DIAGNOSIS — E11.621 DIABETIC ULCER OF LEFT GREAT TOE (HCC): ICD-10-CM

## 2024-01-02 DIAGNOSIS — E11.610 CHARCOT FOOT DUE TO DIABETES MELLITUS (HCC): Chronic | ICD-10-CM

## 2024-01-02 DIAGNOSIS — E10.65 TYPE 1 DIABETES MELLITUS WITH HYPERGLYCEMIA (HCC): Primary | ICD-10-CM

## 2024-01-02 DIAGNOSIS — L97.529 DIABETIC ULCER OF LEFT GREAT TOE (HCC): ICD-10-CM

## 2024-01-02 DIAGNOSIS — N18.4 CHRONIC KIDNEY DISEASE (CKD), STAGE IV (SEVERE) (HCC): ICD-10-CM

## 2024-01-02 LAB — HBA1C MFR BLD: 10.7 %

## 2024-01-02 PROCEDURE — 99214 OFFICE O/P EST MOD 30 MIN: CPT | Performed by: PHYSICIAN ASSISTANT

## 2024-01-02 PROCEDURE — 83036 HEMOGLOBIN GLYCOSYLATED A1C: CPT | Performed by: PHYSICIAN ASSISTANT

## 2024-01-02 PROCEDURE — 95251 CONT GLUC MNTR ANALYSIS I&R: CPT | Performed by: PHYSICIAN ASSISTANT

## 2024-01-02 RX ORDER — GLUCAGON INJECTION, SOLUTION 1 MG/.2ML
1 INJECTION, SOLUTION SUBCUTANEOUS PRN
Qty: 2 EACH | Refills: 1 | Status: SHIPPED | OUTPATIENT
Start: 2024-01-02

## 2024-01-02 RX ORDER — INSULIN LISPRO 100 [IU]/ML
INJECTION, SOLUTION INTRAVENOUS; SUBCUTANEOUS
Qty: 30 ML | Refills: 3
Start: 2024-01-02

## 2024-01-02 NOTE — TELEPHONE ENCOUNTER
3rd call  Left VM about eating protein and fat to keep sugars stable  Glucagon sent to pharmacy  If unable to keep glucose in range if took extra dose of toujeo, may need to seek emergency treatment at ER for support

## 2024-01-02 NOTE — TELEPHONE ENCOUNTER
Patient called stating that she went to take her humalog but she accidentally took her Toujeo 8 units. After she gave herself Toujeo she noticed and then took 8 units of humalog. She is wanting to know what to do?

## 2024-01-03 ENCOUNTER — TELEPHONE (OUTPATIENT)
Dept: DIABETES SERVICES | Age: 49
End: 2024-01-03

## 2024-01-03 ENCOUNTER — CARE COORDINATION (OUTPATIENT)
Dept: CARE COORDINATION | Facility: CLINIC | Age: 49
End: 2024-01-03

## 2024-01-03 NOTE — CARE COORDINATION
diabetes classes   - weekly call with patient to ascertain blood sugar trends   - involve SW as needed to support insulin affordability              Future Appointments   Date Time Provider Department Center   1/31/2024 10:30 AM Rey Beach MD BS [facility] AM[facility]   2/2/2024  1:40 PM Savita Calloway MD [facility] AMB   2/9/2024 10:40 AM Savita Calloway MD [facility] AMB   4/2/2024  2:30 PM Anna Hoskins PA-C END [facility] AMB

## 2024-01-09 ENCOUNTER — FOLLOWUP TELEPHONE ENCOUNTER (OUTPATIENT)
Dept: DIABETES SERVICES | Age: 49
End: 2024-01-09

## 2024-01-10 ENCOUNTER — CARE COORDINATION (OUTPATIENT)
Dept: CARE COORDINATION | Facility: CLINIC | Age: 49
End: 2024-01-10

## 2024-01-10 NOTE — CARE COORDINATION
Incoming call from patient   - reports BS during the day are \"fine\" such as a BS of 112 today   - dropping to 74 in evenings, she eats crackers with peanut butter   - then in the mornings, BS reads \"high\"   - per most recent Endocrinology note, patient does not have any clear understanding re: how to operate her \"device\", diet, or of her disease.   - as noted previously there are some literacy issues and some dexterity issues presenting barriers for patient.    Discussed Diabetes Education with patient telling her that they are trying to get hold of her.  She says \"I don't have any Voicemail messages (?)  Have asked patient to take on the responsibility to make this appointment herself - that I will get her the phone number(s).  Email sent to CDE.    This ACM willing to support Diabetes Education once patient gets on board with their assistance.  She has attending previously but apparently information is not solid in her head.    PLAN:  Route note to provider  Call patient back as needed

## 2024-01-12 ENCOUNTER — CARE COORDINATION (OUTPATIENT)
Dept: CARE COORDINATION | Facility: CLINIC | Age: 49
End: 2024-01-12

## 2024-01-12 NOTE — CARE COORDINATION
Outreach for Care Coordination  Left VM - will outreach again Monday 1/15  Patient has Diabetes Education scheduled 1/23/2024  This ACM will consider attending - need to make sure this is alright with CDCES

## 2024-01-13 ENCOUNTER — HOSPITAL ENCOUNTER (EMERGENCY)
Age: 49
Discharge: HOME | End: 2024-01-13
Payer: COMMERCIAL

## 2024-01-13 VITALS
HEART RATE: 84 BPM | SYSTOLIC BLOOD PRESSURE: 125 MMHG | OXYGEN SATURATION: 98 % | WEIGHT: 127 LBS | BODY MASS INDEX: 19.25 KG/M2 | RESPIRATION RATE: 18 BRPM | TEMPERATURE: 97.4 F | HEIGHT: 68 IN | DIASTOLIC BLOOD PRESSURE: 81 MMHG

## 2024-01-13 DIAGNOSIS — N30.00 ACUTE CYSTITIS WITHOUT HEMATURIA: Primary | ICD-10-CM

## 2024-01-13 LAB
BACTERIA URNS QL MICRO: ABNORMAL /HPF
CASTS URNS QL MICRO: 0 /LPF
CRYSTALS URNS QL MICRO: 0 /LPF
EPI CELLS #/AREA URNS HPF: ABNORMAL /HPF
MUCOUS THREADS URNS QL MICRO: 0 /LPF
RBC #/AREA URNS HPF: 0 /HPF
WBC URNS QL MICRO: >100 /HPF

## 2024-01-13 PROCEDURE — 81015 MICROSCOPIC EXAM OF URINE: CPT

## 2024-01-13 PROCEDURE — 99283 EMERGENCY DEPT VISIT LOW MDM: CPT

## 2024-01-13 RX ORDER — CEPHALEXIN 500 MG/1
500 CAPSULE ORAL 2 TIMES DAILY
Qty: 14 CAPSULE | Refills: 0 | Status: SHIPPED | OUTPATIENT
Start: 2024-01-13 | End: 2024-01-18 | Stop reason: ALTCHOICE

## 2024-01-13 ASSESSMENT — PAIN - FUNCTIONAL ASSESSMENT: PAIN_FUNCTIONAL_ASSESSMENT: 0-10

## 2024-01-13 ASSESSMENT — PAIN SCALES - GENERAL: PAINLEVEL_OUTOF10: 0

## 2024-01-13 NOTE — ED PROVIDER NOTES
Emergency Department Provider Note       PCP: Savita Calloway MD   Age: 48 y.o.   Sex: female     DISPOSITION Decision To Discharge 01/13/2024 12:55:54 PM       ICD-10-CM    1. Acute cystitis without hematuria  N30.00           Medical Decision Making     Complexity of Problems Addressed:  Complexity of Problem: 1 acute, uncomplicated illness or injury.    Data Reviewed and Analyzed:  I independently ordered and reviewed each unique test.             Discussion of management or test interpretation.  Overall well-appearing 48-year-old female presents emergency department today with concern for UTI.  She appears in no acute distress.  No CVA tenderness.  Patient with Dexcom and BGL's currently 209.  Through shared decision-making, we will check urinalysis.    Urine concerning for UTI.  Will cover with Keflex.  Last creatinine 3.3.  Creatinine clearance 18.  Will prescribe twice a day for renal adjusted dosing.  Encouraged follow-up with PCP.  Return precautions discussed.       Risk of Complications and/or Morbidity of Patient Management:  Prescription drug management performed and Shared medical decision making was utilized in creating the patients health plan today.    History      48-year-old female presents emergency department today with complaint of UTI.  Patient reports malodorous urine and increased frequency for 3 or 4 days.  She states she has a history of diabetes.  Her sugars have been in the 200s.  She states this typically occurs when she has a UTI.  She denies any nausea, vomiting, diarrhea, fever, abdominal pain, pelvic pain, or back pain.  She denies any treatment for her symptoms prior to arrival.  She denies any aggravating or relieving factors.    The history is provided by the patient.        Physical Exam     Vitals signs and nursing note reviewed.   Vitals:    01/13/24 1202 01/13/24 1203   BP: 125/81    Pulse: 84    Resp: 18    Temp:  97.4 °F (36.3 °C)   TempSrc:  Oral   SpO2: 98%

## 2024-01-13 NOTE — ED NOTES
I have reviewed discharge instructions with the patient.  The patient verbalized understanding.    Patient left ED via Discharge Method: ambulatory to Home with self.    Opportunity for questions and clarification provided.       Patient given e scripts.         To continue your aftercare when you leave the hospital, you may receive an automated call from our care team to check in on how you are doing.  This is a free service and part of our promise to provide the best care and service to meet your aftercare needs.” If you have questions, or wish to unsubscribe from this service please call [phone].  Thank you for Choosing our [facility] Emergency Department.

## 2024-01-13 NOTE — ED TRIAGE NOTES
Pt reports feels like having UTI. Reporting increased urinary frequency x 3-4 days. Pt reports IDDM. Reports glucose elevated over last few days. Checked and states 284.

## 2024-01-13 NOTE — DISCHARGE INSTRUCTIONS
Take medication as prescribed.  Follow-up with your primary care provider for recheck of your symptoms.  Return to the emergency department for any new, worsening, or concerning symptoms.

## 2024-01-15 ENCOUNTER — CARE COORDINATION (OUTPATIENT)
Dept: CARE COORDINATION | Facility: CLINIC | Age: 49
End: 2024-01-15

## 2024-01-15 NOTE — CARE COORDINATION
Ambulatory Care Coordination Note  1/15/2024    Patient Current Location:    Home: [place] [address]     Outreach in follow up to ED visit 1/13/2024; and for High Risk Care Management   - found to have UTI   - patient not understanding why she keeps getting these.  Explained she is at risk simply because she has DM.  Emphasized the importance of water consumption and managing her insulin to maintain blood sugar within parameters.  Discussed ACM joining her at diabetes education next week - patient is comfortable with this idea.    Challenges to be reviewed by the provider   Additional needs identified to be addressed with provider: No  none               Method of communication with provider: chart routing.    ACM: Heather Redman, RN      Offered patient enrollment in the Remote Patient Monitoring (RPM) program for in-home monitoring: Yes, but did not enroll at this time.    Lab Results       None            Care Coordination Interventions    Referral from Primary Care Provider: No  Suggested Interventions and Community Resources  Diabetes Education: In Process          Goals Addressed                   This Visit's Progress     Patient verbalizes understanding of self -management goals of living with Diabetes.   Not on track     ACM to support in self management of diabetes   - may encourage a return to diabetes classes   - weekly call with patient to ascertain blood sugar trends   - involve SW as needed to support insulin affordability              Future Appointments   Date Time Provider Department Center   1/23/2024  8:30 AM Hortencia Patel RD [facility]   1/23/2024 10:30 AM Hortencia Patel RD [facility]   1/31/2024 10:30 AM Rey Beach MD [facility]   2/2/2024  1:40 PM Savita Calloway MD [facility]   2/9/2024 10:40 AM Savita Calloway MD [facility]   4/2/2024  2:30 PM Anna Hoskins PA-C [facility]

## 2024-01-18 ENCOUNTER — HOSPITAL ENCOUNTER (EMERGENCY)
Age: 49
Discharge: HOME | End: 2024-01-18
Payer: COMMERCIAL

## 2024-01-18 VITALS
SYSTOLIC BLOOD PRESSURE: 121 MMHG | HEIGHT: 68 IN | BODY MASS INDEX: 19.25 KG/M2 | OXYGEN SATURATION: 97 % | DIASTOLIC BLOOD PRESSURE: 81 MMHG | HEART RATE: 85 BPM | RESPIRATION RATE: 18 BRPM | TEMPERATURE: 98.7 F | WEIGHT: 127 LBS

## 2024-01-18 DIAGNOSIS — N39.0 URINARY TRACT INFECTION WITHOUT HEMATURIA, SITE UNSPECIFIED: Primary | ICD-10-CM

## 2024-01-18 LAB
APPEARANCE UR: CLEAR
BACTERIA URNS QL MICRO: ABNORMAL /HPF
BILIRUB UR QL: NEGATIVE
COLOR UR: ABNORMAL
EPI CELLS #/AREA URNS HPF: ABNORMAL /HPF
GLUCOSE BLD STRIP.AUTO-MCNC: 202 MG/DL (ref 65–100)
GLUCOSE UR STRIP.AUTO-MCNC: NEGATIVE MG/DL
HCG UR QL: NEGATIVE
HGB UR QL STRIP: NEGATIVE
KETONES UR QL STRIP.AUTO: NEGATIVE MG/DL
LEUKOCYTE ESTERASE UR QL STRIP.AUTO: ABNORMAL
NITRITE UR QL STRIP.AUTO: NEGATIVE
PH UR STRIP: 5.5 (ref 5–9)
PROT UR STRIP-MCNC: 30 MG/DL
RBC #/AREA URNS HPF: ABNORMAL /HPF
SERVICE CMNT-IMP: ABNORMAL
SP GR UR REFRACTOMETRY: 1.01 (ref 1–1.02)
UROBILINOGEN UR QL STRIP.AUTO: 0.2 EU/DL (ref 0.2–1)
WBC URNS QL MICRO: ABNORMAL /HPF
YEAST URNS QL MICRO: ABNORMAL

## 2024-01-18 PROCEDURE — 6360000002 HC RX W HCPCS: Performed by: PHYSICIAN ASSISTANT

## 2024-01-18 PROCEDURE — 96372 THER/PROPH/DIAG INJ SC/IM: CPT

## 2024-01-18 PROCEDURE — 81001 URINALYSIS AUTO W/SCOPE: CPT

## 2024-01-18 PROCEDURE — 81025 URINE PREGNANCY TEST: CPT

## 2024-01-18 PROCEDURE — 99284 EMERGENCY DEPT VISIT MOD MDM: CPT

## 2024-01-18 PROCEDURE — 87086 URINE CULTURE/COLONY COUNT: CPT

## 2024-01-18 PROCEDURE — 2500000003 HC RX 250 WO HCPCS: Performed by: PHYSICIAN ASSISTANT

## 2024-01-18 PROCEDURE — 82962 GLUCOSE BLOOD TEST: CPT

## 2024-01-18 RX ORDER — CEFDINIR 300 MG/1
300 CAPSULE ORAL
Status: DISCONTINUED | OUTPATIENT
Start: 2024-01-18 | End: 2024-01-19 | Stop reason: HOSPADM

## 2024-01-18 RX ORDER — CEFDINIR 300 MG/1
300 CAPSULE ORAL 2 TIMES DAILY
Qty: 20 CAPSULE | Refills: 0 | Status: SHIPPED | OUTPATIENT
Start: 2024-01-18 | End: 2024-01-28

## 2024-01-18 RX ORDER — FLUCONAZOLE 150 MG/1
150 TABLET ORAL EVERY OTHER DAY
Qty: 3 TABLET | Refills: 0 | Status: SHIPPED | OUTPATIENT
Start: 2024-01-18 | End: 2024-01-23

## 2024-01-18 RX ADMIN — LIDOCAINE HYDROCHLORIDE 1000 MG: 10 INJECTION, SOLUTION INFILTRATION; PERINEURAL at 22:44

## 2024-01-18 ASSESSMENT — PAIN SCALES - GENERAL: PAINLEVEL_OUTOF10: 0

## 2024-01-18 ASSESSMENT — PAIN - FUNCTIONAL ASSESSMENT: PAIN_FUNCTIONAL_ASSESSMENT: 0-10

## 2024-01-19 NOTE — ED PROVIDER NOTES
does not feel like she is getting better.  No fever, nausea, vomiting, chest pain, shortness of breath, abdominal pain, back pain, blood in her urine, flank pain, swelling/tingling or weakness to her arms or legs, vaginal discharge or other new symptoms.  She did ambulate to the room without difficulty and is well-hydrated.    The history is provided by the patient.        Review of Systems    Physical Exam     Vitals signs and nursing note reviewed:  Vitals:    01/18/24 2107   BP: 121/81   Pulse: 85   Resp: 18   Temp: 98.7 °F (37.1 °C)   TempSrc: Oral   SpO2: 97%   Weight: 57.6 kg (127 lb)   Height: 1.727 m (5' 8\")      Physical Exam  Vitals and nursing note reviewed.   Constitutional:       Appearance: Normal appearance.   HENT:      Head: Normocephalic and atraumatic.      Right Ear: External ear normal.      Left Ear: External ear normal.      Nose: Nose normal.      Mouth/Throat:      Mouth: Mucous membranes are moist.   Eyes:      Extraocular Movements: Extraocular movements intact.      Conjunctiva/sclera: Conjunctivae normal.      Pupils: Pupils are equal, round, and reactive to light.   Cardiovascular:      Rate and Rhythm: Normal rate.      Pulses: Normal pulses.   Pulmonary:      Effort: Pulmonary effort is normal.   Abdominal:      General: Abdomen is flat.      Palpations: Abdomen is soft.   Musculoskeletal:         General: Normal range of motion.      Cervical back: Normal range of motion.   Skin:     General: Skin is warm.      Capillary Refill: Capillary refill takes less than 2 seconds.   Neurological:      General: No focal deficit present.      Mental Status: She is alert and oriented to person, place, and time. Mental status is at baseline.   Psychiatric:         Mood and Affect: Mood normal.         Behavior: Behavior normal.         Thought Content: Thought content normal.         Judgment: Judgment normal.          Procedures     Procedures    Orders Placed This Encounter   Procedures

## 2024-01-19 NOTE — ED NOTES
I have reviewed discharge instructions with the patient.  The patient verbalized understanding.    Patient left ED via Discharge Method: ambulatory to Home with self.     Opportunity for questions and clarification provided.       Px sent to the pharmacy.         To continue your aftercare when you leave the hospital, you may receive an automated call from our care team to check in on how you are doing.  This is a free service and part of our promise to provide the best care and service to meet your aftercare needs.” If you have questions, or wish to unsubscribe from this service please call [phone].  Thank you for Choosing our [facility] Emergency Department.

## 2024-01-19 NOTE — DISCHARGE INSTRUCTIONS
Stop the Keflex, start the Omnicef, take the Diflucan as directed, follow-up with your primary care physician for recheck.  We will call you if the urine shows we need to change her antibiotics.  This could take 4 to 5 days for the culture to come back.  Make sure you are drinking plenty of water, rest and return if worsening in any way.

## 2024-01-19 NOTE — ED TRIAGE NOTES
Pt presents to Ed with c/o general malaise. Pt states she was diagnosed with a uti Saturday and continues to feel ill.   Pt states her bgl has also been high and that her monitor just says high as the reading.

## 2024-01-21 LAB
BACTERIA SPEC CULT: NORMAL
BACTERIA SPEC CULT: NORMAL
SERVICE CMNT-IMP: NORMAL

## 2024-01-23 ENCOUNTER — CARE COORDINATION (OUTPATIENT)
Dept: CARE COORDINATION | Facility: CLINIC | Age: 49
End: 2024-01-23

## 2024-01-24 ENCOUNTER — HOSPITAL ENCOUNTER (EMERGENCY)
Age: 49
Discharge: HOME | End: 2024-01-24
Payer: COMMERCIAL

## 2024-01-24 ENCOUNTER — CARE COORDINATION (OUTPATIENT)
Dept: CARE COORDINATION | Facility: CLINIC | Age: 49
End: 2024-01-24

## 2024-01-24 VITALS
RESPIRATION RATE: 18 BRPM | HEIGHT: 68 IN | BODY MASS INDEX: 19.25 KG/M2 | DIASTOLIC BLOOD PRESSURE: 72 MMHG | SYSTOLIC BLOOD PRESSURE: 101 MMHG | WEIGHT: 127 LBS | HEART RATE: 96 BPM | OXYGEN SATURATION: 97 % | TEMPERATURE: 98.3 F

## 2024-01-24 DIAGNOSIS — R73.9 HYPERGLYCEMIA: Primary | ICD-10-CM

## 2024-01-24 PROCEDURE — 99281 EMR DPT VST MAYX REQ PHY/QHP: CPT

## 2024-01-24 RX ORDER — 0.9 % SODIUM CHLORIDE 0.9 %
1000 INTRAVENOUS SOLUTION INTRAVENOUS
Status: DISCONTINUED | OUTPATIENT
Start: 2024-01-24 | End: 2024-01-24 | Stop reason: HOSPADM

## 2024-01-24 ASSESSMENT — ENCOUNTER SYMPTOMS
PHOTOPHOBIA: 0
COUGH: 0
TROUBLE SWALLOWING: 0
VOMITING: 0
SHORTNESS OF BREATH: 0
ABDOMINAL PAIN: 0
FACIAL SWELLING: 0

## 2024-01-24 ASSESSMENT — PAIN - FUNCTIONAL ASSESSMENT: PAIN_FUNCTIONAL_ASSESSMENT: NONE - DENIES PAIN

## 2024-01-24 NOTE — CARE COORDINATION
Ambulatory CM Heather reached out to JOHN to ask how to consult diabetes education RN. JOHN advised that diabetes education is stationed at  so they can see patients in the ER but usually only if there is already a member of the team at [facility]. JOHN provided Heather with the names of the diabetes education team and advised her to send a PerfectServe to the group to determine if someone is available and on campus to meet with the patient. JOHN also provided information about the [facility] outpatient diabetes education program that is free should a RN not be available in the ER today.     Maria Elena Yu, JOHN    [facility]

## 2024-01-24 NOTE — CARE COORDINATION
Chart Review indicates patient has presented at the [facility] Emergency Department.   - via text message, patient states her BS is 327 and she is not feeling good.    Care Coordination Call to ED Case Manager.   - spoke with MARILOU Crouch who reports it can be difficult to get Diabetes RN to visit [facility] ED.    It is of note that patient does not always take her insulin as prescribed > BS become labile.  Today patient reports that she has NOT eaten today, took 8 unites of insulin earlier.    PLAN:  Notify Endocrinology provider  Follow up with patient

## 2024-01-24 NOTE — DIABETES MGMT
At this time patient location in ER waiting room (room PP2).  Attempted to see patient for assessment of home management and diabetes education.  Patient not in ER waiting room at this time.  Patient known to diabetes management team, last seen by inpatient diabetes education 5/3/2023, outpatient diabetes education 1/23/2024 for Nutrition class, and Endocrinology 1/2/2024.  Per chart review patient has appointment scheduled with PCP 2/2/2024 and Endocrinology 4/2/2024.    Update 1315:  Attempted to see patient.  Patient not in ER waiting room at this time.       Update 1330:  Attempted to see patient.  Patient not in ER waiting room at this time.

## 2024-01-24 NOTE — ED PROVIDER NOTES
Determinants of Health     Financial Resource Strain: Low Risk  (3/20/2023)    Overall Financial Resource Strain (CARDIA)     Difficulty of Paying Living Expenses: Not hard at all   Food Insecurity:   Recent Concern: Food Insecurity - Food Insecurity Present (3/20/2023)    Hunger Vital Sign     Worried About Running Out of Food in the Last Year: Sometimes true     Ran Out of Food in the Last Year: Sometimes true   Transportation Needs: Unknown (3/20/2023)    PRAPARE - Transportation     Lack of Transportation (Non-Medical): No   Housing Stability: Unknown (3/20/2023)    Housing Stability Vital Sign     Unstable Housing in the Last Year: No        Discharge Medication List as of 1/24/2024  1:40 PM        CONTINUE these medications which have NOT CHANGED    Details   cefdinir (OMNICEF) 300 MG capsule Take 1 capsule by mouth 2 times daily for 10 days, Disp-20 capsule, R-0Normal      HUMALOG KWIKPEN 100 UNIT/ML SOPN 6 units TIDAC plus correction scale 1/50>150 AC, 3 units AC snacks, max daily dose 30 units, Disp-30 mL, R-3, DAWNO PRINT      GVOKE HYPOPEN 2-PACK 1 MG/0.2ML SOAJ Inject 1 mg into the skin as needed (severe hypoglycemia), Disp-2 each, R-1, DAWNormal      Continuous Blood Gluc Sensor (DEXCOM G7 SENSOR) MISC Change every 10 days, Disp-9 each, R-3Normal      TOUJEO SOLOSTAR 300 UNIT/ML concentrated injection pen Inject 9 Units into the skin nightly If taking in morning helps with compliance, take in the morning, Disp-6 Adjustable Dose Pre-filled Pen Syringe, R-3, DAWNO PRINT      gabapentin (NEURONTIN) 100 MG capsule Take 1 capsule by mouth 3 times daily for 90 days., Disp-270 capsule, R-0Normal      BD PEN NEEDLE EZEKIEL 2ND GEN 32G X 4 MM MISC Use with insulin up to 5 times daily, E10.65, Disp-100 each, R-3, DAWNormal              No results found for any visits on 01/24/24.     No orders to display                     Voice dictation software was used during the making of this note.  This software is not

## 2024-01-24 NOTE — TELEPHONE ENCOUNTER
Infection can aggravate insulin resistance  It is important that pt is taking her long acting insulin Every 24 hours at the same dose  It is important that 4 times a day, Before mealtime and at bedtime pt is taking mealtime insulin (if eating) plus correction, or correction only if not eating at mealtime/bedtime    Continue to attend diabetes education. Once pt is taking insulin consistently, we can review dexcom data for medication adjustment     AVOID JUICE, AVOID SWEET TEA, AVOID SODA    Drink water, eat protein, take antibiotic until it is gone. Follow up with PCP about UTI if symptoms continue

## 2024-01-24 NOTE — ED TRIAGE NOTES
Patient has a UTI and was diagnosed on Friday and was here. Now she is saying that she is having trouble with her sugar. The latest BGL is 348.   
N/A due to pt. becoming agitated

## 2024-01-24 NOTE — CARE COORDINATION
Incoming call from patient   - states she is leaving the ED; too many sick people there and she doesn't want to  any \"bugs.\"   - Perfect Serve message sent to alert Diabetes RN   - will notify Endocrinology provider.    Patient notes she is still on antibiotics for UTI    - plans to go home   - rest, eat some soup    PLAN:  patient agrees that we will talk again tomorrow.

## 2024-01-25 ENCOUNTER — CARE COORDINATION (OUTPATIENT)
Dept: CARE COORDINATION | Facility: CLINIC | Age: 49
End: 2024-01-25

## 2024-01-25 NOTE — CARE COORDINATION
Outreach for High Risk Case Management.   - patient reports feeling some better today   - Blood sugar is 398   - on her way to get some food/lunch    Discussed lunch, planning for chicken tortilla soup at [facility]  (29 gms carbs).  Advised this was a good choice.  Encouraged plenty of water (no soda or lemonade) to help stabilize the BS.  Patient planning to take insulin per scale prescribed by endocrinology provider.  Reminded patient to continue her antibiotics - last day is Sunday.    Agreeable to another call tomorrow.    Will route chart to endocrinology

## 2024-01-26 ENCOUNTER — CARE COORDINATION (OUTPATIENT)
Dept: CARE COORDINATION | Facility: CLINIC | Age: 49
End: 2024-01-26

## 2024-01-26 NOTE — CARE COORDINATION
Ambulatory Care Coordination Note  1/26/2024    Outreach for High Risk Care Management  Patient states she is feeling really good today.  Discussed the ED and does she feel like she will need to go this weekend.  No she doesn't.  Blood sugar 179 this morning.  Blood sugar 379 after a breakfast bowl at [employer]  (Chicken, veggies)  Blood sugar at time of call - 275    Patient states she will finish antibiotics  Drinking plenty of water.    Challenges to be reviewed by the provider   Additional needs identified to be addressed with provider: No  none               Method of communication with provider: chart routing.    ACM: Heather Redman, RN    Lab Results       None            Care Coordination Interventions    Referral from Primary Care Provider: No  Suggested Interventions and Community Resources  Diabetes Education: In Process          Goals Addressed                   This Visit's Progress     Patient verbalizes understanding of self -management goals of living with Diabetes.   On track     ACM to support in self management of diabetes   - may encourage a return to diabetes classes   - weekly call with patient to ascertain blood sugar trends   - involve SW as needed to support insulin affordability              Future Appointments   Date Time Provider Department Center   1/31/2024 10:30 AM Rey Beach MD [facility]   2/2/2024  1:40 PM Savita Calloway MD [facility]   2/9/2024 10:40 AM Savita Calloway MD [facility]   4/2/2024  2:30 PM Anna Hoskins PA-C [facility]

## 2024-01-31 ENCOUNTER — PROCEDURE VISIT (OUTPATIENT)
Dept: NEUROLOGY | Age: 49
End: 2024-01-31

## 2024-01-31 VITALS — HEIGHT: 68 IN | HEART RATE: 87 BPM | OXYGEN SATURATION: 97 % | WEIGHT: 129 LBS | BODY MASS INDEX: 19.55 KG/M2

## 2024-01-31 DIAGNOSIS — E10.42 DIABETIC POLYNEUROPATHY ASSOCIATED WITH TYPE 1 DIABETES MELLITUS (HCC): ICD-10-CM

## 2024-01-31 DIAGNOSIS — R20.2 PARESTHESIA OF BOTH FEET: Primary | ICD-10-CM

## 2024-01-31 DIAGNOSIS — N18.4 CHRONIC KIDNEY DISEASE (CKD), STAGE IV (SEVERE) (HCC): Chronic | ICD-10-CM

## 2024-01-31 NOTE — PROGRESS NOTES
EMG/Nerve Conduction Study Procedure Note  [address]    [address]  [place]  [address]   [phone]      Hx:    Exam:     48 y.o.   AA  female    EMG = polyneruopahty   lowers... generally small LE mm with no atrophy and dec tone. No Babinski.     IDDM. DM1.   No fascic. Dec sensory length dependent.   Ref: Dr. Calloway   PCP  Tech::   Pop tilley  Hgt::   5'8\"        Summary               needle EMG lower extremities selected muscles with CV.       Controlled environmental factors / EMG lab.  Temperature.   NCV : sensory segments:    Markedly abnormal = = ABSENT SNAP /no response bilateral sural nerves.  NCV plantar sensory segments:     Deferred.  NCV Motor MCV segments:     Markedly abnormal = bilateral peroneal bilateral tibial = ABSENT  /no response of the CMAP.  The peroneal to the tibialis anterior on the other hand are normal bilaterally proximal segments.  F-wave studies:         Markedly abnormal because of notable absence of the CMAP.  H-REFLEX Studies:    Markedly abnormal = = ABSENT //no response of right and left H-reflexes.   NEEDLE EMG:   Tested muscles::    Bilateral TA MG VL muscle studied with CV lower extremities.      INTERPRETATION:      THESE FINDINGS ARE ELECTROPHYSIOLOGIC MARKEDLY ABNORMAL RESULTS REVEALING SEVERE SENSORIMOTOR LENGTH-DEPENDENT AXONAL AND AXONAL DEMYELINATING MIXED POLYNEUROPATHY WITHOUT EVIDENCE OF MYOPATHY MYOTONIA OR FASCICULATIONS.           CONCLUSION:      Compatible with a sensorimotor polyneuropathy that is severe, distal, length-dependent, axonal and demyelinating.      Procedure Details:       Correlates with axonal demyelinating symmetric length-dependent sensorimotor polyneuropathy.  This could be from  numerous etiologies including diabetes polyneuropathy mixed other etiologies.    Patient is made fully aware.              Please Note::     Data and waveforms * filed under Procedure category [facility].    See Procedure Files for complete data

## 2024-02-02 ENCOUNTER — TELEMEDICINE (OUTPATIENT)
Dept: INTERNAL MEDICINE CLINIC | Facility: CLINIC | Age: 49
End: 2024-02-02
Payer: COMMERCIAL

## 2024-02-02 DIAGNOSIS — N18.4 CKD (CHRONIC KIDNEY DISEASE) STAGE 4, GFR 15-29 ML/MIN (HCC): Primary | ICD-10-CM

## 2024-02-02 DIAGNOSIS — Z12.31 BREAST CANCER SCREENING BY MAMMOGRAM: ICD-10-CM

## 2024-02-02 PROCEDURE — 99441 PR PHYS/QHP TELEPHONE EVALUATION 5-10 MIN: CPT | Performed by: INTERNAL MEDICINE

## 2024-02-02 ASSESSMENT — PATIENT HEALTH QUESTIONNAIRE - PHQ9
8. MOVING OR SPEAKING SO SLOWLY THAT OTHER PEOPLE COULD HAVE NOTICED. OR THE OPPOSITE, BEING SO FIGETY OR RESTLESS THAT YOU HAVE BEEN MOVING AROUND A LOT MORE THAN USUAL: 0
SUM OF ALL RESPONSES TO PHQ QUESTIONS 1-9: 0
SUM OF ALL RESPONSES TO PHQ9 QUESTIONS 1 & 2: 0
6. FEELING BAD ABOUT YOURSELF - OR THAT YOU ARE A FAILURE OR HAVE LET YOURSELF OR YOUR FAMILY DOWN: 0
5. POOR APPETITE OR OVEREATING: 0
SUM OF ALL RESPONSES TO PHQ QUESTIONS 1-9: 0
10. IF YOU CHECKED OFF ANY PROBLEMS, HOW DIFFICULT HAVE THESE PROBLEMS MADE IT FOR YOU TO DO YOUR WORK, TAKE CARE OF THINGS AT HOME, OR GET ALONG WITH OTHER PEOPLE: 0
SUM OF ALL RESPONSES TO PHQ QUESTIONS 1-9: 0
9. THOUGHTS THAT YOU WOULD BE BETTER OFF DEAD, OR OF HURTING YOURSELF: 0
3. TROUBLE FALLING OR STAYING ASLEEP: 0
2. FEELING DOWN, DEPRESSED OR HOPELESS: 0
SUM OF ALL RESPONSES TO PHQ QUESTIONS 1-9: 0
1. LITTLE INTEREST OR PLEASURE IN DOING THINGS: 0
7. TROUBLE CONCENTRATING ON THINGS, SUCH AS READING THE NEWSPAPER OR WATCHING TELEVISION: 0
4. FEELING TIRED OR HAVING LITTLE ENERGY: 0

## 2024-02-02 ASSESSMENT — ENCOUNTER SYMPTOMS
SHORTNESS OF BREATH: 0
ABDOMINAL PAIN: 0
COUGH: 0

## 2024-02-02 NOTE — PROGRESS NOTES
2024    TELEHEALTH EVALUATION -- Audio    HPI:    Jelena Leal (:  1975) has requested an audio/video evaluation for the following concern(s):    Diabetes type 1 - seeing [facility]  CKD-4, Mild anemia - seeing [facility] Dr. Paredes; advised follow up  Sees [facility]   Elevated LFTs - seeing GI  H/o Cardiac Arrest in  - saw Cardiology  H/o kidney stone s/p stent - saw Urology  h/o Polyneuropathy - saw neurology  Per patient she is seeing her Gyn  Charcot's joint - seeing Podiatry  Mammogram still not done - reminded again today - 2023 - given phone number for scheduling    Review of Systems   Constitutional:  Negative for fever.   Respiratory:  Negative for cough and shortness of breath.    Cardiovascular:  Negative for chest pain and leg swelling.   Gastrointestinal:  Negative for abdominal pain.   Psychiatric/Behavioral:  Negative for behavioral problems and confusion.        Prior to Visit Medications    Medication Sig Taking? Authorizing Provider   HUMALOG KWIKPEN 100 UNIT/ML SOPN 6 units TIDAC plus correction scale >150 AC, 3 units AC snacks, max daily dose 30 units Yes Anna Hoskins PA-C   GVOKE HYPOPEN 2-PACK 1 MG/0.2ML SOAJ Inject 1 mg into the skin as needed (severe hypoglycemia) Yes Anna Hoskins PA-C   Continuous Blood Gluc Sensor (DEXCOM G7 SENSOR) MISC Change every 10 days Yes Anna Hoskins PA-C   TOUJEO SOLOSTAR 300 UNIT/ML concentrated injection pen Inject 9 Units into the skin nightly If taking in morning helps with compliance, take in the morning Yes Anna Hoskins PA-C   BD PEN NEEDLE EZEKIEL 2ND GEN 32G X 4 MM MISC Use with insulin up to 5 times daily, E10.65 Yes Anna Hoskins PA-C   gabapentin (NEURONTIN) 100 MG capsule Take 1 capsule by mouth 3 times daily for 90 days.  Savita Calloway MD   Insulin Pen Needle (PEN NEEDLES 31GX5/16\") 31G X 8 MM MISC 120 each by Other route 4 times daily  Provider,

## 2024-02-03 ENCOUNTER — CARE COORDINATION (OUTPATIENT)
Dept: CARE COORDINATION | Facility: CLINIC | Age: 49
End: 2024-02-03

## 2024-02-03 NOTE — CARE COORDINATION
Outreach for High Risk Care Management x2  Unable to reach.  Of note - patient has PCP [facility] today    PLAN:  outreach next week.

## 2024-02-07 ENCOUNTER — TELEPHONE (OUTPATIENT)
Dept: ENDOCRINOLOGY | Age: 49
End: 2024-02-07

## 2024-02-07 DIAGNOSIS — N18.4 CKD (CHRONIC KIDNEY DISEASE) STAGE 4, GFR 15-29 ML/MIN (HCC): ICD-10-CM

## 2024-02-07 LAB
ALBUMIN SERPL-MCNC: 3.4 G/DL (ref 3.5–5)
ALBUMIN/GLOB SERPL: 0.8 (ref 0.4–1.6)
ALP SERPL-CCNC: 319 U/L (ref 50–136)
ALT SERPL-CCNC: 79 U/L (ref 12–65)
ANION GAP SERPL CALC-SCNC: 6 MMOL/L (ref 2–11)
AST SERPL-CCNC: 51 U/L (ref 15–37)
BASOPHILS # BLD: 0 K/UL (ref 0–0.2)
BASOPHILS NFR BLD: 0 % (ref 0–2)
BILIRUB SERPL-MCNC: 0.2 MG/DL (ref 0.2–1.1)
BUN SERPL-MCNC: 56 MG/DL (ref 6–23)
CALCIUM SERPL-MCNC: 9 MG/DL (ref 8.3–10.4)
CHLORIDE SERPL-SCNC: 115 MMOL/L (ref 103–113)
CO2 SERPL-SCNC: 23 MMOL/L (ref 21–32)
CREAT SERPL-MCNC: 3.4 MG/DL (ref 0.6–1)
DIFFERENTIAL METHOD BLD: ABNORMAL
EOSINOPHIL # BLD: 0.7 K/UL (ref 0–0.8)
EOSINOPHIL NFR BLD: 13 % (ref 0.5–7.8)
ERYTHROCYTE [DISTWIDTH] IN BLOOD BY AUTOMATED COUNT: 14.9 % (ref 11.9–14.6)
GLOBULIN SER CALC-MCNC: 4.2 G/DL (ref 2.8–4.5)
GLUCOSE SERPL-MCNC: 172 MG/DL (ref 65–100)
HCT VFR BLD AUTO: 34.1 % (ref 35.8–46.3)
HGB BLD-MCNC: 11 G/DL (ref 11.7–15.4)
IMM GRANULOCYTES # BLD AUTO: 0 K/UL (ref 0–0.5)
IMM GRANULOCYTES NFR BLD AUTO: 0 % (ref 0–5)
LYMPHOCYTES # BLD: 2 K/UL (ref 0.5–4.6)
LYMPHOCYTES NFR BLD: 38 % (ref 13–44)
MCH RBC QN AUTO: 28.7 PG (ref 26.1–32.9)
MCHC RBC AUTO-ENTMCNC: 32.3 G/DL (ref 31.4–35)
MCV RBC AUTO: 89 FL (ref 82–102)
MONOCYTES # BLD: 0.3 K/UL (ref 0.1–1.3)
MONOCYTES NFR BLD: 6 % (ref 4–12)
NEUTS SEG # BLD: 2.2 K/UL (ref 1.7–8.2)
NEUTS SEG NFR BLD: 43 % (ref 43–78)
NRBC # BLD: 0 K/UL (ref 0–0.2)
PLATELET # BLD AUTO: 230 K/UL (ref 150–450)
PMV BLD AUTO: 10.5 FL (ref 9.4–12.3)
POTASSIUM SERPL-SCNC: 5 MMOL/L (ref 3.5–5.1)
PROT SERPL-MCNC: 7.6 G/DL (ref 6.3–8.2)
RBC # BLD AUTO: 3.83 M/UL (ref 4.05–5.2)
SODIUM SERPL-SCNC: 144 MMOL/L (ref 136–146)
WBC # BLD AUTO: 5.3 K/UL (ref 4.3–11.1)

## 2024-02-07 NOTE — TELEPHONE ENCOUNTER
Patient came in today needing help putting on her Dexcom G7 sensor. She stated that she didn't know how to put it on and that nobody ever taught her. I took her to room 5 and I told her how to put it on and gave it to her to put it on herself. She put it on herself and is very happy that she knows how to do it now.

## 2024-02-08 ENCOUNTER — CARE COORDINATION (OUTPATIENT)
Dept: CARE COORDINATION | Facility: CLINIC | Age: 49
End: 2024-02-08

## 2024-02-08 NOTE — CARE COORDINATION
Ambulatory Care Coordination Note  2/8/2024    Patient Current Location:    Home: [place] [address]     Outreach for High Risk Care Management  Patient is under CM services r/t improving glycemic control.    Discussed today that patient is drinking plenty of water - goal is to avoid recurrent UTIs.  Patient confirms that she \"doesn't want to go through that again.\"  Patient sitting down to her lunch at time of call - has taken her insulin.  Blood sugar a little while ago - 219.  Patient open to idea of another diabetes education class - we would go together.  ACM to check on availability dates.    Challenges to be reviewed by the provider   Additional needs identified to be addressed with provider: No  none               Method of communication with provider: chart routing.    ACM: Heather Redman RN    Lab Results       None            Care Coordination Interventions    Referral from Primary Care Provider: No  Suggested Interventions and Community Resources  Diabetes Education: In Process          Goals Addressed                   This Visit's Progress     Patient verbalizes understanding of self -management goals of living with Diabetes.   On track     ACM to support in self management of diabetes   - may encourage a return to diabetes classes   - weekly call with patient to ascertain blood sugar trends   - involve SW as needed to support insulin affordability              Future Appointments   Date Time Provider Department Center   2/9/2024 10:40 AM Savita Calloway MD [facility]   2/20/2024  3:00 PM Anna Hoskins PA-C [facility]   5/28/2024  1:30 PM Rey Beach MD [facility]

## 2024-02-09 ENCOUNTER — TELEPHONE (OUTPATIENT)
Dept: DIABETES SERVICES | Age: 49
End: 2024-02-09

## 2024-02-09 DIAGNOSIS — E10.65 TYPE 1 DIABETES MELLITUS WITH HYPERGLYCEMIA (HCC): ICD-10-CM

## 2024-02-09 RX ORDER — PEN NEEDLE, DIABETIC 32GX 5/32"
NEEDLE, DISPOSABLE MISCELLANEOUS
Qty: 100 EACH | Refills: 3 | Status: CANCELLED | OUTPATIENT
Start: 2024-02-09

## 2024-02-09 NOTE — TELEPHONE ENCOUNTER
Pt called and she is about out of her Humalog and needs it refill to [facility] on [address].    01/02/2024 - Rx was given on paper and lost.

## 2024-02-09 NOTE — TELEPHONE ENCOUNTER
Called Jelena and scheduled a one on one to review Living and Coping with Diabetes for an hour but allowing an extra 30 minutes if have more questions. Heather Redman Ambulatory  on coming with her.  Let Heather know date and time and advised to let me know if not a good time.  Otherwise will be 3-1-24 at 10:30 AM.

## 2024-02-12 RX ORDER — PEN NEEDLE, DIABETIC 32GX 5/32"
NEEDLE, DISPOSABLE MISCELLANEOUS
Qty: 100 EACH | Refills: 3 | Status: SHIPPED | OUTPATIENT
Start: 2024-02-12

## 2024-02-12 RX ORDER — INSULIN LISPRO 100 [IU]/ML
INJECTION, SOLUTION INTRAVENOUS; SUBCUTANEOUS
Qty: 30 ML | Refills: 3 | Status: SHIPPED | OUTPATIENT
Start: 2024-02-12

## 2024-02-12 RX ORDER — PEN NEEDLE, DIABETIC 32GX 5/32"
NEEDLE, DISPOSABLE MISCELLANEOUS
Qty: 500 EACH | Refills: 0 | Status: SHIPPED | OUTPATIENT
Start: 2024-02-12

## 2024-02-15 ENCOUNTER — CARE COORDINATION (OUTPATIENT)
Dept: CARE COORDINATION | Facility: CLINIC | Age: 49
End: 2024-02-15

## 2024-02-15 NOTE — CARE COORDINATION
Ongoing outreach for high risk case management  Incoming call from patient today   - unable to afford long-acting insulin (toujeo)   - but has found a pen at her mother's place with expiry 2026.  Advised patient - per provider - that this is OK to use.   - advised patient that there are online coupons available.  She may have fallen into the donut hole as far as insurance coverage - will need to check.    Discussed  current BS readings which she says are all over the place.  Admits to sugary drinks, crackers and other snacks when her BS drops - \"I get nervous...\"   Advised small glass of orange juice or milk with a protein snack when this occurs.  To wait a  little while and check her sugar to see if it's on its way up.  Will plan to discuss this with diabetes educator at upcoming education on 3/1/2024    PLAN:    Follow up outreach 7-10 days

## 2024-02-20 ENCOUNTER — OFFICE VISIT (OUTPATIENT)
Dept: ENDOCRINOLOGY | Age: 49
End: 2024-02-20
Payer: COMMERCIAL

## 2024-02-20 VITALS
OXYGEN SATURATION: 99 % | DIASTOLIC BLOOD PRESSURE: 76 MMHG | BODY MASS INDEX: 20.16 KG/M2 | WEIGHT: 133 LBS | HEIGHT: 68 IN | HEART RATE: 84 BPM | RESPIRATION RATE: 14 BRPM | SYSTOLIC BLOOD PRESSURE: 134 MMHG

## 2024-02-20 DIAGNOSIS — L97.529 DIABETIC ULCER OF LEFT GREAT TOE (HCC): ICD-10-CM

## 2024-02-20 DIAGNOSIS — E11.610 CHARCOT FOOT DUE TO DIABETES MELLITUS (HCC): Chronic | ICD-10-CM

## 2024-02-20 DIAGNOSIS — E10.65 TYPE 1 DIABETES MELLITUS WITH HYPERGLYCEMIA (HCC): Primary | ICD-10-CM

## 2024-02-20 DIAGNOSIS — N18.4 CHRONIC KIDNEY DISEASE (CKD), STAGE IV (SEVERE) (HCC): Chronic | ICD-10-CM

## 2024-02-20 DIAGNOSIS — E11.621 DIABETIC ULCER OF LEFT GREAT TOE (HCC): ICD-10-CM

## 2024-02-20 DIAGNOSIS — E10.649 TYPE 1 DIABETES MELLITUS WITH HYPOGLYCEMIA AND WITHOUT COMA (HCC): ICD-10-CM

## 2024-02-20 PROCEDURE — 99214 OFFICE O/P EST MOD 30 MIN: CPT | Performed by: PHYSICIAN ASSISTANT

## 2024-02-20 PROCEDURE — 95251 CONT GLUC MNTR ANALYSIS I&R: CPT | Performed by: PHYSICIAN ASSISTANT

## 2024-02-20 RX ORDER — INSULIN DEGLUDEC INJECTION 100 U/ML
INJECTION, SOLUTION SUBCUTANEOUS
COMMUNITY
Start: 2024-02-15 | End: 2024-02-20 | Stop reason: SDUPTHER

## 2024-02-20 RX ORDER — INSULIN DEGLUDEC INJECTION 100 U/ML
INJECTION, SOLUTION SUBCUTANEOUS
Qty: 9 ML | Refills: 1 | Status: SHIPPED | OUTPATIENT
Start: 2024-02-20

## 2024-02-20 NOTE — PROGRESS NOTES
[facility] ENDOCRINOLOGY   AND   THYROID NODULE CLINIC    Anna Hoskins PA-C  [facility] Endocrinology and Thyroid Nodule Clinic  [address], [address]  [place] [address]  Phone [phone]  Facsimile [phone]          Jelena Leal is a 48 y.o. female seen 02/20/24  for follow up evaluation of type 1 diabetes         Assessment and Plan:    Interpretation of 72 hour glucose monitor:  At least 72 hours of data were reviewed.  The patient utilizes a dexcom G7 continuous glucose monitoring system.  The average glucose during the reviewed timeframe was 257.  There is a pattern of frequent postprandial hyperglycemia, worst after hypoglycemia    1. Type 1 diabetes mellitus with hyperglycemia (HCC)  Glycemic control remains poor but is slightly improved  She is working with case management and diabetes education  She repeatedly states, \"I'm confused\" regarding insulin use    DO NOT TAKE HUMALOG AT BEDTIME  Increase tresiba from 8 units to 9 units  Decrease prandial dose from 6 units to 5 but take for meal EVEN when glucose is in range  Use 3 units for snacks  Use 1/50>150 TIDAC    Follow up with education    Cost of dexcom is a concern but critical  for safety of this patient, given sample today     Rule of 15's reviewed, avoid overcorrection. 15 g carbs, wait 15 minutes and recheck fingerstick. Consume protein    At today's visit we discussed sequela associated with uncontrolled diabetes including increased risk of stroke, heart attack, kidney failure, amputation, retinopathy, neuropathy, and nephropathy.  Patient was strongly encouraged to comply with treatment regimen as well as dietary and exercise recommendations to aid in control of this chronic disease to help prevent complications associated with uncontrolled diabetes.    - TX CONTINUOUS GLUCOSE MONITORING ANALYSIS I&R  - HM DIABETES FOOT EXAM  - TRESIBA FLEXTOUCH 100 UNIT/ML SOPN; INJECT 9 UNITS INTO THE SKIN DAILY. (90 DAY

## 2024-02-22 ENCOUNTER — OFFICE VISIT (OUTPATIENT)
Dept: INTERNAL MEDICINE CLINIC | Facility: CLINIC | Age: 49
End: 2024-02-22
Payer: COMMERCIAL

## 2024-02-22 VITALS
OXYGEN SATURATION: 100 % | BODY MASS INDEX: 20.5 KG/M2 | HEART RATE: 86 BPM | WEIGHT: 134.8 LBS | SYSTOLIC BLOOD PRESSURE: 118 MMHG | DIASTOLIC BLOOD PRESSURE: 66 MMHG

## 2024-02-22 DIAGNOSIS — I10 ESSENTIAL HYPERTENSION: Primary | ICD-10-CM

## 2024-02-22 DIAGNOSIS — Z00.00 ROUTINE GENERAL MEDICAL EXAMINATION AT A HEALTH CARE FACILITY: ICD-10-CM

## 2024-02-22 DIAGNOSIS — E10.65 TYPE 1 DIABETES MELLITUS WITH HYPERGLYCEMIA (HCC): ICD-10-CM

## 2024-02-22 PROCEDURE — 3078F DIAST BP <80 MM HG: CPT | Performed by: INTERNAL MEDICINE

## 2024-02-22 PROCEDURE — 99396 PREV VISIT EST AGE 40-64: CPT | Performed by: INTERNAL MEDICINE

## 2024-02-22 PROCEDURE — 3074F SYST BP LT 130 MM HG: CPT | Performed by: INTERNAL MEDICINE

## 2024-02-22 ASSESSMENT — ENCOUNTER SYMPTOMS
ABDOMINAL PAIN: 0
COUGH: 0
SHORTNESS OF BREATH: 0

## 2024-02-22 NOTE — PROGRESS NOTES
deficiency, hypothyroidism, Vitamin D deficiency  Immunizations including - Influenza Vaccine, Tdap, Varicella, HPV, Zoster, Pneumococcal, Meningococcal and Hepatitis B  STD and Blood Borne infections - including Chlamydia, Gonnorhea, Hepatitis B, Hepatitis C, HIV and Syphilis  Psychosocial Health conditions - including, Anxiety, Depression, substance related conditions(Alcohol, Tobacco/Other Drug) and intimate partner violence.  Osteoporosis prevention including weight bearing exercise, Vitamin D/calcium supplementation anf Bone mass measurement (DEXA) if indicated.  Medication compliance  Smoking avoidance

## 2024-02-29 ENCOUNTER — TELEPHONE (OUTPATIENT)
Dept: DIABETES SERVICES | Age: 49
End: 2024-02-29

## 2024-03-01 ENCOUNTER — TELEPHONE (OUTPATIENT)
Dept: DIABETES SERVICES | Age: 49
End: 2024-03-01

## 2024-03-01 ENCOUNTER — CARE COORDINATION (OUTPATIENT)
Dept: CARE COORDINATION | Facility: CLINIC | Age: 49
End: 2024-03-01

## 2024-03-01 NOTE — CARE COORDINATION
Ambulatory Care Coordination Note  3/1/2024    Patient Current Location:    Home: [place] [address]     Incoming call from patient  She is having car trouble today and will re-schedule her diabetes education class.    Care Coordination call with Dinh Ortiz RN [facility].  Appointment rescheduled for 3/22 @ 10:30   ACM will plan to attend.    Challenges to be reviewed by the provider   Additional needs identified to be addressed with provider: No  none               Method of communication with provider: chart routing.    ACM: Heather Redman RN    Offered patient enrollment in the Remote Patient Monitoring (RPM) program for in-home monitoring: Patient is not eligible for RPM program.    Lab Results       None            Care Coordination Interventions    Referral from Primary Care Provider: No  Suggested Interventions and Community Resources  Diabetes Education: In Process          Goals Addressed                   This Visit's Progress     Patient verbalizes understanding of self -management goals of living with Diabetes.   Not on track     ACM to support in self management of diabetes   - may encourage a return to diabetes classes   - weekly call with patient to ascertain blood sugar trends   - involve SW as needed to support insulin affordability              Future Appointments   Date Time Provider Department Center   3/22/2024 10:30 AM Dinh Ortiz RN [facility]   5/22/2024  2:30 PM Anna Hoskins PA-C END [facility]   5/28/2024  1:30 PM Rey Beach MD [facility]

## 2024-03-01 NOTE — TELEPHONE ENCOUNTER
Type One.  Patient called this AM and also let Heather Redman know she cannot make appointment this AM due to car issue with her breaks plus raining.  Called and rescheduled for 3-22-24 for 10:30 AM.  Will send e mail to Heather with new appointment date and time.

## 2024-03-06 ENCOUNTER — HOSPITAL ENCOUNTER (EMERGENCY)
Age: 49
Discharge: HOME | End: 2024-03-06
Payer: COMMERCIAL

## 2024-03-06 VITALS
DIASTOLIC BLOOD PRESSURE: 86 MMHG | WEIGHT: 179 LBS | TEMPERATURE: 98.1 F | BODY MASS INDEX: 27.22 KG/M2 | HEART RATE: 100 BPM | OXYGEN SATURATION: 98 % | SYSTOLIC BLOOD PRESSURE: 153 MMHG | RESPIRATION RATE: 18 BRPM

## 2024-03-06 DIAGNOSIS — A59.9 TRICHOMONAS INFECTION: Primary | ICD-10-CM

## 2024-03-06 LAB
BACTERIA URNS QL MICRO: NORMAL /HPF
BILIRUB UR QL: NEGATIVE
CASTS URNS QL MICRO: 0 /LPF
CRYSTALS URNS QL MICRO: 0 /LPF
EPI CELLS #/AREA URNS HPF: NORMAL /HPF
GLUCOSE UR QL STRIP.AUTO: NEGATIVE MG/DL
HCG UR QL: NEGATIVE
KETONES UR-MCNC: NEGATIVE MG/DL
LEUKOCYTE ESTERASE UR QL STRIP: ABNORMAL
MUCOUS THREADS URNS QL MICRO: 0 /LPF
NITRITE UR QL: NEGATIVE
OTHER OBSERVATIONS: NORMAL
PH UR: 7 (ref 5–9)
PROT UR QL: 30 MG/DL
RBC # UR STRIP: ABNORMAL
RBC #/AREA URNS HPF: 0 /HPF
SERVICE CMNT-IMP: ABNORMAL
SP GR UR: 1.01 (ref 1–1.02)
TRICHOMONAS UR QL MICRO: NORMAL
UROBILINOGEN UR QL: 0.2 EU/DL (ref 0.2–1)
WBC URNS QL MICRO: NORMAL /HPF

## 2024-03-06 PROCEDURE — 81025 URINE PREGNANCY TEST: CPT

## 2024-03-06 PROCEDURE — 99283 EMERGENCY DEPT VISIT LOW MDM: CPT

## 2024-03-06 PROCEDURE — 81015 MICROSCOPIC EXAM OF URINE: CPT

## 2024-03-06 PROCEDURE — 87086 URINE CULTURE/COLONY COUNT: CPT

## 2024-03-06 PROCEDURE — 81003 URINALYSIS AUTO W/O SCOPE: CPT

## 2024-03-06 RX ORDER — METRONIDAZOLE 500 MG/1
500 TABLET ORAL 2 TIMES DAILY
Qty: 14 TABLET | Refills: 0 | Status: SHIPPED | OUTPATIENT
Start: 2024-03-06 | End: 2024-03-13

## 2024-03-06 ASSESSMENT — ENCOUNTER SYMPTOMS
ABDOMINAL PAIN: 1
TROUBLE SWALLOWING: 0
SHORTNESS OF BREATH: 0
VOMITING: 0
COUGH: 0
PHOTOPHOBIA: 0
FACIAL SWELLING: 0

## 2024-03-06 ASSESSMENT — PAIN DESCRIPTION - DESCRIPTORS: DESCRIPTORS: BURNING

## 2024-03-06 ASSESSMENT — PAIN SCALES - GENERAL: PAINLEVEL_OUTOF10: 5

## 2024-03-06 ASSESSMENT — PAIN - FUNCTIONAL ASSESSMENT: PAIN_FUNCTIONAL_ASSESSMENT: 0-10

## 2024-03-07 NOTE — ED PROVIDER NOTES
TRACE 0 /hpf    Casts 0 0 /lpf    Crystals 0 0 /LPF    Mucus, UA 0 0 /lpf    Trichomonas, Urine MARKED      Other observations RESULTS VERIFIED MANUALLY     POCT Urinalysis no Micro   Result Value Ref Range    Specific Gravity, Urine, POC 1.015 1.001 - 1.023      pH, Urine, POC 7.0 5.0 - 9.0      Protein, Urine, POC 30 (A) NEG mg/dL    Glucose, UA POC Negative NEG mg/dL    Ketones, Urine, POC Negative NEG mg/dL    Bilirubin, Urine, POC Negative NEG      Blood, UA POC Trace Hgb (A) NEG      URINE UROBILINOGEN POC 0.2 0.2 - 1.0 EU/dL    Nitrite, Urine, POC Negative NEG      Leukocyte Est, UA POC LARGE (A) NEG      Performed by: Ciara Moon    POC Pregnancy Urine Qual   Result Value Ref Range    Preg Test, Ur Negative NEG           No orders to display                No results for input(s): \"COVID19\" in the last 72 hours.    Voice dictation software was used during the making of this note.  This software is not perfect and grammatical and other typographical errors may be present.  This note has not been completely proofread for errors.     Wang Noble PA  03/06/24 6505

## 2024-03-07 NOTE — ED NOTES
I have reviewed discharge instructions with the patient.  The patient verbalized understanding.    Patient left ED via Discharge Method: ambulatory to Home with self    Opportunity for questions and clarification provided.       Patient given 1 scripts.         To continue your aftercare when you leave the hospital, you may receive an automated call from our care team to check in on how you are doing.  This is a free service and part of our promise to provide the best care and service to meet your aftercare needs.” If you have questions, or wish to unsubscribe from this service please call [phone].  Thank you for Choosing our [facility] Emergency Department.

## 2024-03-07 NOTE — DISCHARGE INSTRUCTIONS
You tested positive for trichomonas today.  This is a sexually transmitted infection.  Please take the antibiotics for the full course.  Do not take alcohol while taking this medication.  Have all sexual partners to be tested and treated.  Follow-up with your family doctor.    As we discussed, I did not find a life threatening cause of your symptoms today. However, THAT DOES NOT MEAN IT COULD NOT DEVELOP. If you develop ANY new or worsening symptoms, it is critical that you return for re-evaluation. This includes any symptoms that are concerning to you, especially symptoms such as abdominal pain, fevers, vomiting.  If you do not return for re-evaluation, you risk serious complications, including death.

## 2024-03-09 LAB
BACTERIA SPEC CULT: NORMAL
BACTERIA SPEC CULT: NORMAL

## 2024-03-11 ENCOUNTER — HOSPITAL ENCOUNTER (EMERGENCY)
Age: 49
Discharge: HOME | End: 2024-03-12
Attending: EMERGENCY MEDICINE
Payer: COMMERCIAL

## 2024-03-11 DIAGNOSIS — R73.9 HYPERGLYCEMIA: Primary | ICD-10-CM

## 2024-03-11 LAB
ALBUMIN SERPL-MCNC: 3.5 G/DL (ref 3.5–5)
ALBUMIN/GLOB SERPL: 0.9 (ref 0.4–1.6)
ALP SERPL-CCNC: 281 U/L (ref 50–136)
ALT SERPL-CCNC: 45 U/L (ref 12–65)
ANION GAP SERPL CALC-SCNC: 7 MMOL/L (ref 2–11)
AST SERPL-CCNC: 28 U/L (ref 15–37)
BACTERIA URNS QL MICRO: ABNORMAL /HPF
BASOPHILS # BLD: 0 K/UL (ref 0–0.2)
BASOPHILS NFR BLD: 1 % (ref 0–2)
BILIRUB SERPL-MCNC: 0.2 MG/DL (ref 0.2–1.1)
BILIRUB UR QL: NEGATIVE
BUN SERPL-MCNC: 46 MG/DL (ref 6–23)
CALCIUM SERPL-MCNC: 8.8 MG/DL (ref 8.3–10.4)
CASTS URNS QL MICRO: 0 /LPF
CHLORIDE SERPL-SCNC: 113 MMOL/L (ref 103–113)
CO2 SERPL-SCNC: 21 MMOL/L (ref 21–32)
CREAT SERPL-MCNC: 3.26 MG/DL (ref 0.6–1)
CRYSTALS URNS QL MICRO: 0 /LPF
DIFFERENTIAL METHOD BLD: ABNORMAL
EOSINOPHIL # BLD: 0.7 K/UL (ref 0–0.8)
EOSINOPHIL NFR BLD: 10 % (ref 0.5–7.8)
EPI CELLS #/AREA URNS HPF: ABNORMAL /HPF
ERYTHROCYTE [DISTWIDTH] IN BLOOD BY AUTOMATED COUNT: 14.6 % (ref 11.9–14.6)
GLOBULIN SER CALC-MCNC: 4.1 G/DL (ref 2.8–4.5)
GLUCOSE BLD STRIP.AUTO-MCNC: 182 MG/DL (ref 65–100)
GLUCOSE BLD STRIP.AUTO-MCNC: 356 MG/DL (ref 65–100)
GLUCOSE SERPL-MCNC: 360 MG/DL (ref 65–100)
GLUCOSE UR QL STRIP.AUTO: 250 MG/DL
HCG UR QL: NEGATIVE
HCT VFR BLD AUTO: 35.9 % (ref 35.8–46.3)
HGB BLD-MCNC: 10.9 G/DL (ref 11.7–15.4)
IMM GRANULOCYTES # BLD AUTO: 0 K/UL (ref 0–0.5)
IMM GRANULOCYTES NFR BLD AUTO: 0 % (ref 0–5)
KETONES UR-MCNC: NEGATIVE MG/DL
LEUKOCYTE ESTERASE UR QL STRIP: ABNORMAL
LIPASE SERPL-CCNC: 56 U/L (ref 73–393)
LYMPHOCYTES # BLD: 2.8 K/UL (ref 0.5–4.6)
LYMPHOCYTES NFR BLD: 39 % (ref 13–44)
MCH RBC QN AUTO: 25.8 PG (ref 26.1–32.9)
MCHC RBC AUTO-ENTMCNC: 30.4 G/DL (ref 31.4–35)
MCV RBC AUTO: 84.9 FL (ref 82–102)
MONOCYTES # BLD: 0.4 K/UL (ref 0.1–1.3)
MONOCYTES NFR BLD: 6 % (ref 4–12)
MUCOUS THREADS URNS QL MICRO: 0 /LPF
NEUTS SEG # BLD: 3.2 K/UL (ref 1.7–8.2)
NEUTS SEG NFR BLD: 45 % (ref 43–78)
NITRITE UR QL: NEGATIVE
NRBC # BLD: 0 K/UL (ref 0–0.2)
OTHER OBSERVATIONS: ABNORMAL
PH UR: 6 (ref 5–9)
PLATELET # BLD AUTO: 295 K/UL (ref 150–450)
PMV BLD AUTO: 10.5 FL (ref 9.4–12.3)
POTASSIUM SERPL-SCNC: 4.8 MMOL/L (ref 3.5–5.1)
PROT SERPL-MCNC: 7.6 G/DL (ref 6.3–8.2)
PROT UR QL: 30 MG/DL
RBC # BLD AUTO: 4.23 M/UL (ref 4.05–5.2)
RBC # UR STRIP: ABNORMAL
RBC #/AREA URNS HPF: ABNORMAL /HPF
SERVICE CMNT-IMP: ABNORMAL
SODIUM SERPL-SCNC: 141 MMOL/L (ref 136–146)
SP GR UR: 1.01 (ref 1–1.02)
UROBILINOGEN UR QL: 0.2 EU/DL (ref 0.2–1)
WBC # BLD AUTO: 7.2 K/UL (ref 4.3–11.1)
WBC URNS QL MICRO: ABNORMAL /HPF
YEAST URNS QL MICRO: ABNORMAL

## 2024-03-11 PROCEDURE — 96360 HYDRATION IV INFUSION INIT: CPT

## 2024-03-11 PROCEDURE — 81015 MICROSCOPIC EXAM OF URINE: CPT

## 2024-03-11 PROCEDURE — 85025 COMPLETE CBC W/AUTO DIFF WBC: CPT

## 2024-03-11 PROCEDURE — 2580000003 HC RX 258: Performed by: EMERGENCY MEDICINE

## 2024-03-11 PROCEDURE — 80053 COMPREHEN METABOLIC PANEL: CPT

## 2024-03-11 PROCEDURE — 99284 EMERGENCY DEPT VISIT MOD MDM: CPT

## 2024-03-11 PROCEDURE — 96361 HYDRATE IV INFUSION ADD-ON: CPT

## 2024-03-11 PROCEDURE — 83690 ASSAY OF LIPASE: CPT

## 2024-03-11 PROCEDURE — 81003 URINALYSIS AUTO W/O SCOPE: CPT

## 2024-03-11 PROCEDURE — 82962 GLUCOSE BLOOD TEST: CPT

## 2024-03-11 PROCEDURE — 81025 URINE PREGNANCY TEST: CPT

## 2024-03-11 RX ORDER — 0.9 % SODIUM CHLORIDE 0.9 %
1000 INTRAVENOUS SOLUTION INTRAVENOUS
Status: DISCONTINUED | OUTPATIENT
Start: 2024-03-11 | End: 2024-03-12 | Stop reason: HOSPADM

## 2024-03-11 RX ORDER — 0.9 % SODIUM CHLORIDE 0.9 %
1000 INTRAVENOUS SOLUTION INTRAVENOUS ONCE
Status: COMPLETED | OUTPATIENT
Start: 2024-03-11 | End: 2024-03-12

## 2024-03-11 RX ADMIN — SODIUM CHLORIDE 1000 ML: 9 INJECTION, SOLUTION INTRAVENOUS at 21:21

## 2024-03-11 ASSESSMENT — ENCOUNTER SYMPTOMS
ABDOMINAL PAIN: 0
CONSTIPATION: 0
BLURRED VISION: 0
COUGH: 0
NAUSEA: 0
DIARRHEA: 0
BACK PAIN: 0
COLOR CHANGE: 0
SHORTNESS OF BREATH: 0
VOMITING: 0

## 2024-03-11 ASSESSMENT — PAIN - FUNCTIONAL ASSESSMENT: PAIN_FUNCTIONAL_ASSESSMENT: NONE - DENIES PAIN

## 2024-03-12 VITALS
RESPIRATION RATE: 20 BRPM | SYSTOLIC BLOOD PRESSURE: 130 MMHG | TEMPERATURE: 98.6 F | WEIGHT: 127 LBS | OXYGEN SATURATION: 100 % | BODY MASS INDEX: 19.31 KG/M2 | HEART RATE: 86 BPM | DIASTOLIC BLOOD PRESSURE: 81 MMHG

## 2024-03-12 VITALS
WEIGHT: 127 LBS | HEIGHT: 68 IN | DIASTOLIC BLOOD PRESSURE: 91 MMHG | SYSTOLIC BLOOD PRESSURE: 140 MMHG | BODY MASS INDEX: 19.25 KG/M2 | HEART RATE: 84 BPM | OXYGEN SATURATION: 99 % | TEMPERATURE: 97.9 F | RESPIRATION RATE: 18 BRPM

## 2024-03-12 DIAGNOSIS — E11.65 HYPERGLYCEMIA DUE TO DIABETES MELLITUS (HCC): ICD-10-CM

## 2024-03-12 DIAGNOSIS — N30.00 ACUTE CYSTITIS WITHOUT HEMATURIA: Primary | ICD-10-CM

## 2024-03-12 DIAGNOSIS — E86.0 DEHYDRATION: ICD-10-CM

## 2024-03-12 LAB
ALBUMIN SERPL-MCNC: 3.4 G/DL (ref 3.5–5)
ALBUMIN/GLOB SERPL: 0.8 (ref 0.4–1.6)
ALP SERPL-CCNC: 268 U/L (ref 50–136)
ALT SERPL-CCNC: 38 U/L (ref 12–65)
ANION GAP SERPL CALC-SCNC: 6 MMOL/L (ref 2–11)
APPEARANCE UR: CLEAR
ARTERIAL PATENCY WRIST A: POSITIVE
AST SERPL-CCNC: 27 U/L (ref 15–37)
BACTERIA URNS QL MICRO: ABNORMAL /HPF
BASE DEFICIT BLDV-SCNC: 7.7 MMOL/L
BASOPHILS # BLD: 0 K/UL (ref 0–0.2)
BASOPHILS NFR BLD: 0 % (ref 0–2)
BDY SITE: ABNORMAL
BILIRUB SERPL-MCNC: 0.3 MG/DL (ref 0.2–1.1)
BILIRUB UR QL: NEGATIVE
BUN SERPL-MCNC: 42 MG/DL (ref 6–23)
CALCIUM SERPL-MCNC: 8.9 MG/DL (ref 8.3–10.4)
CASTS URNS QL MICRO: ABNORMAL /LPF
CHLORIDE SERPL-SCNC: 113 MMOL/L (ref 103–113)
CO2 SERPL-SCNC: 19 MMOL/L (ref 21–32)
COLOR UR: ABNORMAL
CREAT SERPL-MCNC: 2.9 MG/DL (ref 0.6–1)
DIFFERENTIAL METHOD BLD: ABNORMAL
EOSINOPHIL # BLD: 0.7 K/UL (ref 0–0.8)
EOSINOPHIL NFR BLD: 12 % (ref 0.5–7.8)
EPI CELLS #/AREA URNS HPF: ABNORMAL /HPF
ERYTHROCYTE [DISTWIDTH] IN BLOOD BY AUTOMATED COUNT: 14.7 % (ref 11.9–14.6)
GAS FLOW.O2 O2 DELIVERY SYS: ABNORMAL
GLOBULIN SER CALC-MCNC: 4.1 G/DL (ref 2.8–4.5)
GLUCOSE BLD STRIP.AUTO-MCNC: 236 MG/DL (ref 65–100)
GLUCOSE BLD STRIP.AUTO-MCNC: 288 MG/DL (ref 65–100)
GLUCOSE SERPL-MCNC: 417 MG/DL (ref 65–100)
GLUCOSE UR STRIP.AUTO-MCNC: >1000 MG/DL
HCO3 BLDV-SCNC: 18.7 MMOL/L (ref 23–28)
HCT VFR BLD AUTO: 34.3 % (ref 35.8–46.3)
HGB BLD-MCNC: 10.7 G/DL (ref 11.7–15.4)
HGB UR QL STRIP: NEGATIVE
IMM GRANULOCYTES # BLD AUTO: 0 K/UL (ref 0–0.5)
IMM GRANULOCYTES NFR BLD AUTO: 0 % (ref 0–5)
KETONES UR QL STRIP.AUTO: NEGATIVE MG/DL
LEUKOCYTE ESTERASE UR QL STRIP.AUTO: ABNORMAL
LYMPHOCYTES # BLD: 2 K/UL (ref 0.5–4.6)
LYMPHOCYTES NFR BLD: 34 % (ref 13–44)
MCH RBC QN AUTO: 26.6 PG (ref 26.1–32.9)
MCHC RBC AUTO-ENTMCNC: 31.2 G/DL (ref 31.4–35)
MCV RBC AUTO: 85.1 FL (ref 82–102)
MONOCYTES # BLD: 0.4 K/UL (ref 0.1–1.3)
MONOCYTES NFR BLD: 7 % (ref 4–12)
NEUTS SEG # BLD: 2.8 K/UL (ref 1.7–8.2)
NEUTS SEG NFR BLD: 47 % (ref 43–78)
NITRITE UR QL STRIP.AUTO: NEGATIVE
NRBC # BLD: 0 K/UL (ref 0–0.2)
O2/TOTAL GAS SETTING VFR VENT: 21 %
OTHER OBSERVATIONS: ABNORMAL
PCO2 BLDV: 40.8 MMHG (ref 41–51)
PH BLDV: 7.27 (ref 7.32–7.42)
PH UR STRIP: 6 (ref 5–9)
PLATELET # BLD AUTO: 221 K/UL (ref 150–450)
PMV BLD AUTO: 10.5 FL (ref 9.4–12.3)
PO2 BLDV: 41 MMHG
POTASSIUM SERPL-SCNC: 4.8 MMOL/L (ref 3.5–5.1)
PROT SERPL-MCNC: 7.5 G/DL (ref 6.3–8.2)
PROT UR STRIP-MCNC: NEGATIVE MG/DL
RBC # BLD AUTO: 4.03 M/UL (ref 4.05–5.2)
RBC #/AREA URNS HPF: ABNORMAL /HPF
RESPIRATORY RATE, POC: 20 (ref 5–40)
SAO2 % BLDV: 68.7 % (ref 65–88)
SERVICE CMNT-IMP: ABNORMAL
SODIUM SERPL-SCNC: 138 MMOL/L (ref 136–146)
SP GR UR REFRACTOMETRY: 1.01 (ref 1–1.02)
SPECIMEN TYPE: ABNORMAL
UROBILINOGEN UR QL STRIP.AUTO: 0.2 EU/DL (ref 0.2–1)
VENTILATION MODE VENT: 21
WBC # BLD AUTO: 6 K/UL (ref 4.3–11.1)
WBC URNS QL MICRO: ABNORMAL /HPF

## 2024-03-12 PROCEDURE — 99284 EMERGENCY DEPT VISIT MOD MDM: CPT

## 2024-03-12 PROCEDURE — 82962 GLUCOSE BLOOD TEST: CPT

## 2024-03-12 PROCEDURE — 6370000000 HC RX 637 (ALT 250 FOR IP): Performed by: EMERGENCY MEDICINE

## 2024-03-12 PROCEDURE — 81001 URINALYSIS AUTO W/SCOPE: CPT

## 2024-03-12 PROCEDURE — 96375 TX/PRO/DX INJ NEW DRUG ADDON: CPT

## 2024-03-12 PROCEDURE — 80053 COMPREHEN METABOLIC PANEL: CPT

## 2024-03-12 PROCEDURE — 96374 THER/PROPH/DIAG INJ IV PUSH: CPT

## 2024-03-12 PROCEDURE — 82803 BLOOD GASES ANY COMBINATION: CPT

## 2024-03-12 PROCEDURE — 2580000003 HC RX 258: Performed by: EMERGENCY MEDICINE

## 2024-03-12 PROCEDURE — 85025 COMPLETE CBC W/AUTO DIFF WBC: CPT

## 2024-03-12 PROCEDURE — 6360000002 HC RX W HCPCS: Performed by: EMERGENCY MEDICINE

## 2024-03-12 PROCEDURE — 96360 HYDRATION IV INFUSION INIT: CPT

## 2024-03-12 RX ORDER — CEPHALEXIN 500 MG/1
500 CAPSULE ORAL 2 TIMES DAILY
Qty: 14 CAPSULE | Refills: 0 | Status: SHIPPED | OUTPATIENT
Start: 2024-03-12 | End: 2024-03-19

## 2024-03-12 RX ORDER — 0.9 % SODIUM CHLORIDE 0.9 %
1000 INTRAVENOUS SOLUTION INTRAVENOUS
Status: COMPLETED | OUTPATIENT
Start: 2024-03-12 | End: 2024-03-12

## 2024-03-12 RX ORDER — 0.9 % SODIUM CHLORIDE 0.9 %
1000 INTRAVENOUS SOLUTION INTRAVENOUS ONCE
Status: COMPLETED | OUTPATIENT
Start: 2024-03-12 | End: 2024-03-12

## 2024-03-12 RX ADMIN — SODIUM CHLORIDE 1000 ML: 9 INJECTION, SOLUTION INTRAVENOUS at 07:10

## 2024-03-12 RX ADMIN — SODIUM CHLORIDE 1000 ML: 9 INJECTION, SOLUTION INTRAVENOUS at 11:31

## 2024-03-12 RX ADMIN — INSULIN HUMAN 6 UNITS: 100 INJECTION, SOLUTION PARENTERAL at 09:18

## 2024-03-12 RX ADMIN — CEFTRIAXONE 1000 MG: 1 INJECTION, POWDER, FOR SOLUTION INTRAMUSCULAR; INTRAVENOUS at 11:30

## 2024-03-12 ASSESSMENT — PAIN - FUNCTIONAL ASSESSMENT: PAIN_FUNCTIONAL_ASSESSMENT: NONE - DENIES PAIN

## 2024-03-12 NOTE — ED NOTES
I have reviewed discharge instructions with the patient.  The patient verbalized understanding.    Patient left ED via Discharge Method: ambulatory to Home with (SELF).    Opportunity for questions and clarification provided.       Patient given 1 scripts.         To continue your aftercare when you leave the hospital, you may receive an automated call from our care team to check in on how you are doing.  This is a free service and part of our promise to provide the best care and service to meet your aftercare needs.\" If you have questions, or wish to unsubscribe from this service please call [phone].  Thank you for Choosing our [facility] Emergency Department.        Rodriguez Robertson, RN  03/12/24 0742

## 2024-03-12 NOTE — ED TRIAGE NOTES
Patient arrives via EMS from home with hyperglycemia. Patient was seen at [facility] yesterday for the same complaint and discharged. Patient took her regular insulin about 1 hour PTA, states that her meter was reading HI. EMS . Patient arrives alert and oriented. Patient taking antibiotics currently.

## 2024-03-12 NOTE — ED PROVIDER NOTES
/hpf    Casts 0 0 /lpf    Crystals 0 0 /LPF    Mucus, UA 0 0 /lpf    Yeast, UA MODERATE      Other observations RESULTS VERIFIED MANUALLY     POCT Urinalysis no Micro   Result Value Ref Range    Specific Gravity, Urine, POC 1.015 1.001 - 1.023      pH, Urine, POC 6.0 5.0 - 9.0      Protein, Urine, POC 30 (A) NEG mg/dL    Glucose, UA  (A) NEG mg/dL    Ketones, Urine, POC Negative NEG mg/dL    Bilirubin, Urine, POC Negative NEG      Blood, UA POC Trace Intact (A) NEG      URINE UROBILINOGEN POC 0.2 0.2 - 1.0 EU/dL    Nitrite, Urine, POC Negative NEG      Leukocyte Est, UA POC SMALL (A) NEG      Performed by: Sowmya Coelho    POCT Glucose   Result Value Ref Range    POC Glucose 356 (H) 65 - 100 mg/dL    Performed by: Liseth    POC Pregnancy Urine Qual   Result Value Ref Range    Preg Test, Ur Negative NEG           No orders to display                No results for input(s): \"COVID19\" in the last 72 hours.    Voice dictation software was used during the making of this note.  This software is not perfect and grammatical and other typographical errors may be present.  This note has not been completely proofread for errors.     Alok Burt III, MD  03/11/24 9756

## 2024-03-12 NOTE — DISCHARGE INSTRUCTIONS
As we discussed your urine test appear to show urinary tract infection.  Take the antibiotic as prescribed, return to the emergency department for any worsening of symptoms    It is also important that you call your primary care doctor to set up an appointment to have your blood sugar rechecked and determine if you need to change your insulin regimen.

## 2024-03-12 NOTE — ED PROVIDER NOTES
Liseth    POC Pregnancy Urine Qual   Result Value Ref Range    Preg Test, Ur Negative NEG     POCT Glucose   Result Value Ref Range    POC Glucose 182 (H) 65 - 100 mg/dL    Performed by: Liseth          No orders to display                No results for input(s): \"COVID19\" in the last 72 hours.     Voice dictation software was used during the making of this note.  This software is not perfect and grammatical and other typographical errors may be present.  This note has not been completely proofread for errors.     Kim, Srinivasa RENTERIA MD  03/12/24 8604

## 2024-03-12 NOTE — ED TRIAGE NOTES
Her dexacom  is reading HI states she is under care for a STD and is almost finished with her antibx however she can not get her sugar down. Has been in a diabetic coma before per pt

## 2024-03-13 ENCOUNTER — CARE COORDINATION (OUTPATIENT)
Dept: CARE COORDINATION | Facility: CLINIC | Age: 49
End: 2024-03-13

## 2024-03-13 NOTE — CARE COORDINATION
Ambulatory Care Coordination Note  3/13/2024    Patient Current Location:    Home: [place] [address]     Outreach for High Risk Care Management   - patient in the ED x2 over past 2 days   - reports that she never saw a physcian during first visit.   at that visit.   -  being treated now for trichamonas infection and UTI.  Patient is sexually active, but will not be seeing this sex partner again.  She has made him aware of the infection - that he needs to be treated as well.   - At second ED visit +   - today,  at time of call.    Discussed self care for the next few days   - plenty of water, no soda   - fat fiber, protein at her meals to control BS    ACM to check back in with her in couple/3 days.    Challenges to be reviewed by the provider   Additional needs identified to be addressed with provider: No  none               Method of communication with provider: chart routing.    ACM: Heather Redman RN    Offered patient enrollment in the Remote Patient Monitoring (RPM) program for in-home monitoring: Patient is not eligible for RPM program.    Lab Results       None            Care Coordination Interventions    Referral from Primary Care Provider: No  Suggested Interventions and Community Resources  Diabetes Education: In Process          Goals Addressed                   This Visit's Progress     Patient verbalizes understanding of self -management goals of living with Diabetes.   Not on track     ACM to support in self management of diabetes   - may encourage a return to diabetes classes   - weekly call with patient to ascertain blood sugar trends   - involve SW as needed to support insulin affordability              Future Appointments   Date Time Provider Department Center   3/15/2024  9:20 AM Savita Calloway MD [facility]   3/22/2024 10:30 AM Dinh Ortiz RN [facility]   5/22/2024  2:30 PM Anna Hoskins PA-C END [facility] AM[facility]   5/28/2024  1:30 PM Yong

## 2024-03-14 NOTE — TELEPHONE ENCOUNTER
Mel- please pull up last few weeks of dexcom  Prashanth- can you verify she has urine ketone test strips and that she is not drinking high calorie beverage. Also, that she is taking her insulin as prescribed so I can see if she needs an adjustment from how I believe she is currently taking it. Let me know.     Thanks!

## 2024-03-15 ENCOUNTER — CARE COORDINATION (OUTPATIENT)
Dept: CARE COORDINATION | Facility: CLINIC | Age: 49
End: 2024-03-15

## 2024-03-15 NOTE — TELEPHONE ENCOUNTER
Called patient, she cannot get into her dexcom latonya, she said she was going to come by.       Tresiba she takes 9units before bed.   Humalog just sliding scale

## 2024-03-15 NOTE — CARE COORDINATION
Ambulatory Care Coordination Note  3/15/2024    Patient Current Location:    [place]:  [address]   [place] [address]     Outreach for High Risk Care Management   - patient feeling much better from infections.   - Blood Sugars improving but notes today, dexcom is \"out\" - will fix this today.    PLAN - meeting patient at Diabetes Education next week on Friday 2/22    Challenges to be reviewed by the provider   Additional needs identified to be addressed with provider: Yes  none               Method of communication with provider: chart routing.    ACM: Heather Redman RN    Offered patient enrollment in the Remote Patient Monitoring (RPM) program for in-home monitoring: Patient is not eligible for RPM program.    Lab Results       None            Care Coordination Interventions    Referral from Primary Care Provider: No  Suggested Interventions and Community Resources  Diabetes Education: In Process          Goals Addressed    None         Future Appointments   Date Time Provider Department Center   3/22/2024 10:30 AM Dinh Ortiz RN [facility]   5/22/2024  2:30 PM Anna Hoskins PA-C END [facility] AM[facility]   5/28/2024  1:30 PM Rey Beach MD [facility] AMB

## 2024-03-22 ENCOUNTER — TELEPHONE (OUTPATIENT)
Dept: DIABETES SERVICES | Age: 49
End: 2024-03-22

## 2024-03-22 NOTE — TELEPHONE ENCOUNTER
Call to patient about DM education today.  Asked time and let her know 10:30 AM. She forgot and has plans and wants to know if can reschedule. Instructed yes, we want to help her anyway we can and be a support. Rescheduled for April 3, 2024 @1:30 PM.  Will notify Heather Redman.

## 2024-03-25 ENCOUNTER — TELEPHONE (OUTPATIENT)
Dept: DIABETES SERVICES | Age: 49
End: 2024-03-25

## 2024-03-25 ENCOUNTER — CARE COORDINATION (OUTPATIENT)
Dept: CARE COORDINATION | Facility: CLINIC | Age: 49
End: 2024-03-25

## 2024-03-25 NOTE — TELEPHONE ENCOUNTER
Called and requested a change from the 3rd to the 16thof April at 12:30 PM.  No problem. Notified Heather Redman

## 2024-03-25 NOTE — CARE COORDINATION
Ambulatory Care Coordination Note  3/25/2024    Outreach for High Risk Case Management  Plan had been to meet patient at Diabetes Education on 3/22.  This was rescheduled to 4/3 and now again to 4/16.    Patient had been diagnosed with trichomonas during an ED visit.  She has completed antibiotics, but feels she should have it re-checked.  GYN appointments booked out until July.  Will request appointment with Dr. Felipe first available, as patient needs new PCP anyway.    BS \"all over the place.\"  States she is taking insulin as prescribed.  No soda or high calorie energy drinks.  On her way to [employer] for lunch at time of call.  Patient reports she has been able to manage application of Dexcom on her own.    Discussed her allergies.  Taking OTC cetirizine    Challenges to be reviewed by the provider   Additional needs identified to be addressed with provider: No  Needs appointment - see note               Method of communication with provider: chart routing.    ACM: Heather Redman RN    Lab Results       None            Care Coordination Interventions    Referral from Primary Care Provider: No  Suggested Interventions and Community Resources  Diabetes Education: In Process          Goals Addressed    None         Future Appointments   Date Time Provider Department Center   4/16/2024 12:30 PM Dinh Ortiz RN [facility]   5/22/2024  2:30 PM Anna Hoskins PA-C [facility]   5/28/2024  1:30 PM Rey Beach MD [facility] AM[facility]

## 2024-04-02 ENCOUNTER — TELEPHONE (OUTPATIENT)
Dept: INTERNAL MEDICINE CLINIC | Facility: CLINIC | Age: 49
End: 2024-04-02

## 2024-04-02 NOTE — TELEPHONE ENCOUNTER
I called and spoke to patient regarding missed appointment on 4/2/24 with Dr. Calloway.  Patient indicated she forgot about appointment and declined to reschedule.

## 2024-04-03 NOTE — ED TRIAGE NOTES
Patient advises pain under left breast when she presses on area only or with certain movements that started on 2/242020. Patient then advises that a few days ago she woke up with a acid/ metal taste in her mouth. Mask on during triage. Patient denies any pain at this time.
Home

## 2024-04-04 ENCOUNTER — CARE COORDINATION (OUTPATIENT)
Dept: CARE COORDINATION | Facility: CLINIC | Age: 49
End: 2024-04-04

## 2024-04-04 NOTE — TELEPHONE ENCOUNTER
Can be either from infection or high sugars. With history, should get eval from urgent care or PCP if not high overnight

## 2024-04-04 NOTE — CARE COORDINATION
Ambulatory Care Coordination Note  4/4/2024    Incoming call from patient.  She reports unusual amount of nocturia - \"normal\" amount of urine each time.  Asking if this could be another infection or something different.  States that she is not ingesting any more fluids than usual and has stopped drinking anything after 18:00    Will refer to provider.    Challenges to be reviewed by the provider   Additional needs identified to be addressed with provider: yes  Any input on what could be causing this?               Method of communication with provider: chart routing.    ACM: Heather Redman, RN      Lab Results       None            Care Coordination Interventions    Referral from Primary Care Provider: No  Suggested Interventions and Community Resources  Diabetes Education: In Process          Goals Addressed    None         Future Appointments   Date Time Provider Department Center   4/9/2024  2:45 PM Alicja Vang APRN - CNP [facility] AM[facility]   4/16/2024 12:30 PM Dinh Ortiz RN [facility]   5/22/2024  2:30 PM Anna Hoskins PA-C END [facility] AMB   5/28/2024  1:30 PM Rey Beach MD [facility] AMB   6/3/2024  2:20 PM Luke Dawson,  [facility] AMB

## 2024-04-05 ENCOUNTER — CARE COORDINATION (OUTPATIENT)
Dept: CARE COORDINATION | Facility: CLINIC | Age: 49
End: 2024-04-05

## 2024-04-05 NOTE — CARE COORDINATION
Ambulatory Care Coordination Note  4/5/2024    Multiple outreaches over the last few days for symptoms of nocturia in setting of Type I Diabetes.  Patient went to urgent care yesterday afternoon and was diagnosed with UTI.    Blood sugars continue to run high although patient states she is taking her insulin prior to meals, staying away from soda, trying to make considered food choices.  Eats most meals out.    Patient is well engaged with case management and tries to adhere.  Another diabetes education session is set up for 4/16.  ACM will accompany.    Challenges to be reviewed by the provider   Additional needs identified to be addressed with provider: No  none               Method of communication with provider: chart routing.    ACM: Heather Redman RN    Offered patient enrollment in the Remote Patient Monitoring (RPM) program for in-home monitoring: Patient is not eligible for RPM program.    Lab Results       None            Care Coordination Interventions    Referral from Primary Care Provider: No  Suggested Interventions and Community Resources  Diabetes Education: In Process          Goals Addressed    None         Future Appointments   Date Time Provider Department Center   4/9/2024  2:45 PM Alicja Vang APRN - CNP [facility]   4/16/2024 12:30 PM Dinh Ortiz RN [facility]   5/22/2024  2:30 PM Anna Hoskins PA-C END [facility]B   5/28/2024  1:30 PM Rey Beach MD [facility] AMB   6/3/2024  2:20 PM [facility]

## 2024-04-11 ENCOUNTER — CARE COORDINATION (OUTPATIENT)
Dept: CARE COORDINATION | Facility: CLINIC | Age: 49
End: 2024-04-11

## 2024-04-11 NOTE — CARE COORDINATION
Ambulatory Care Coordination Note  4/11/2024    Patient Current Location:    Home[place] [address]     Incoming call from patient; reports that she was notified there is no UTI and not to take any antibiotics.  Also states that she continues to urinate almost excessively through the day and night.  Previously this was just occurring at night.  She doesn't understand why this is happening.  Discussed with patient that the frequent urination is a symptom of poorly controlled blood sugars.  States she is taking her insulin.  Has not been doing any carb counting - finds this difficult.  Another discussion about diet - patient admits to poor snack choices.  Directed patient to American Diabetes Website which provides lists of healthy snacks.  Asked patient to strictly adhere to the \"correct\" diet choices and we would talk about the polyuria at the diabetes education session on Tuesday, 4/16.    Challenges to be reviewed by the provider   Additional needs identified to be addressed with provider: No  none               Method of communication with provider: chart routing.    ACM: Heather Redman RN      Lab Results       None            Care Coordination Interventions    Referral from Primary Care Provider: No  Suggested Interventions and Community Resources  Diabetes Education: In Process          Goals Addressed    None         Future Appointments   Date Time Provider Department Center   4/16/2024 12:30 PM Dinh Ortiz RN [facility]   5/22/2024  2:30 PM Anna Hoskins PA-C END [facility]   5/28/2024  1:30 PM Rey Beach MD [facility] AMB   6/3/2024  2:20 PM Luke Dawson DO [facility] AMB

## 2024-04-17 ENCOUNTER — CARE COORDINATION (OUTPATIENT)
Dept: CARE COORDINATION | Facility: CLINIC | Age: 49
End: 2024-04-17

## 2024-04-17 NOTE — CARE COORDINATION
Ambulatory Care Coordination Note     4/17/2024 9:12 AM  Patient Current Location:   Met with patient at Diabetes Education Office, joined her session with diabetes educator - Dinh Ortiz (separate note for this).     Care Summary Note:   Patient arrived to appointment with BS 46; had a nephrology appointment just prior.  Able to increase BS with a few peppermints and glass of OJ.  BS up to 96 when she left the appointment.  Patient reports:   - she is on the list for a kidney transplant  Discussion:   - approached education today from standpoint of controlling blood sugars to protect new kidney.   - demonstration/discussion of Cequr Insulin pump which could be used for her humalog administration.  Patient would like to try if insurance will allow.   - admits she sometimes forgets to take her Tresiba or her humalog..  Advised by CDE to set a phone alarm to administer it about the same time daily and to rotate the site.     - CDE demonstrated site rotation, patient stated understanding   - discussed food from patient's favorite restaurants in terms of choices and amounts.    PLAN:     - continual reinforcement of yesterday's discussion   - follow up call to make sure patient has set alarm for tresiba    ACM: Heather Redman RN     Challenges to be reviewed by the provider   Additional needs identified to be addressed with provider No  none               Method of communication with provider: chart routing.        Offered patient enrollment in the Remote Patient Monitoring (RPM) program for in-home monitoring: Patient is not eligible for RPM program.     Assessments Completed:   No changes since last call    Medications Reviewed:   See notes    PCP/Specialist follow up:   Future Appointments         Provider Specialty Dept Phone    5/22/2024 2:30 PM Anna Hoskins PA-C Endocrinology [phone]    5/28/2024 1:30 PM Rey Beach MD Neurology [phone]    6/3/2024 2:20 PM Luke Dawson DO Internal

## 2024-04-23 ENCOUNTER — CARE COORDINATION (OUTPATIENT)
Dept: CARE COORDINATION | Facility: CLINIC | Age: 49
End: 2024-04-23

## 2024-04-23 DIAGNOSIS — E10.65 TYPE 1 DIABETES MELLITUS WITH HYPERGLYCEMIA (HCC): ICD-10-CM

## 2024-04-23 NOTE — CARE COORDINATION
Ambulatory Care Coordination Note     4/23/2024 11:17 AM     Patient Current Location:   [facility]  Care Summary Note:   Incoming call from patient - reports her BS is \"Low\"  Talked with patient while she drank a Pepsi and ate a few cheetos.  BS came up to 103.  Patient went on to get lunch.  Patient leaving on Thursday for a trip to [place] with friends.  Discussed what to pack so she is prepared for any untoward event.    States she is trying to take Tresiba (9 units) same time every day - rotating sites as discussed during Diabetes Education.     ACM: Heather Redman, DYANA     Method of communication with provider: chart routing.    Offered patient enrollment in the Remote Patient Monitoring (RPM) program for in-home monitoring: Patient is not eligible for RPM program.     Medications Reviewed:   Discussed insulin dosing  Carrying glucagon tablets    PCP/Specialist follow up:   Future Appointments         Provider Specialty Dept Phone    5/22/2024 2:30 PM Anna Hoskins PA-C Endocrinology [phone]    5/28/2024 1:30 PM Rey Beach MD Neurology [phone]    6/3/2024 2:20 PM Luke Dawson DO Internal Medicine [phone]

## 2024-04-26 RX ORDER — INSULIN LISPRO 100 [IU]/ML
INJECTION, SOLUTION INTRAVENOUS; SUBCUTANEOUS
Qty: 30 ML | Refills: 3
Start: 2024-04-26

## 2024-04-26 NOTE — CARE COORDINATION
Highly variable control with hypoglycemia    Please ask pt to reduce her meal time insulin from 6 units to 5 units. She can subtract 1 from her current sheet. I'm happy to mail her a new sheet or leave on at the  for her to     The goal is to get her levels to be more stable/consistent, even if they are a little high then we can slowly bring her closer to goal    Avoid high calorie beverages    Take 3 units before snacks, continue 9 units of tresiba

## 2024-04-30 ENCOUNTER — CARE COORDINATION (OUTPATIENT)
Dept: CARE COORDINATION | Facility: CLINIC | Age: 49
End: 2024-04-30

## 2024-04-30 NOTE — CARE COORDINATION
Ambulatory Care Coordination Note     4/30/2024 10:56 AM     Outreach for High Risk Case Management  Care Summary Note:   Patient has returned from [place] (a vacation trip)  Blood sugars continue to be variable.  Pointed out MyChart message from last evening - decrease mealtime insulin from 6 to 5 units, continue Tresiba 9 units.  Patient reports compliance with Tresiba.  Patient still questions what is High Calorie beverage - reviewed; basically needs to drink more water, avoid sugary drinks all together.  Reviewed snack insulin dose.    PLAN for another call in approximately 2 weeks.  Patient knows how to reach ACM should she need help.     ACM: Heather Redman RN     Challenges to be reviewed by the provider   Additional needs identified to be addressed with provider No  none               Method of communication with provider: chart routing.      Offered patient enrollment in the Remote Patient Monitoring (RPM) program for in-home monitoring: Patient is not eligible for RPM program because: declined .      PCP/Specialist follow up:   Future Appointments         Provider Specialty Dept Phone    5/22/2024 2:30 PM Anna Hoskins PA-C Endocrinology [phone]    5/28/2024 1:30 PM Rey Beach MD Neurology [phone]    6/3/2024 2:20 PM Luke Dawson DO Internal Medicine [phone]

## 2024-05-07 ENCOUNTER — CARE COORDINATION (OUTPATIENT)
Dept: CARE COORDINATION | Facility: CLINIC | Age: 49
End: 2024-05-07

## 2024-05-07 NOTE — CARE COORDINATION
Ambulatory Care Coordination Note     5/7/2024 2:12 PM     Outreach for High Risk Case Management  Plan to review Dexcom overview  Review diet and drink choices    Unable to reach  Left a VM  Will repeat outreach, pending return call.      ACM: Heather Redman, DYANA     Method of communication with provider: none.    PCP/Specialist follow up:   Future Appointments         Provider Specialty Dept Phone    5/22/2024 2:30 PM Anna Hoskins PA-C Endocrinology [phone]    5/28/2024 1:30 PM Rey Beach MD Neurology [phone]    6/3/2024 2:20 PM Luke Dawson DO Internal Medicine [phone]

## 2024-05-08 ENCOUNTER — CARE COORDINATION (OUTPATIENT)
Dept: CARE COORDINATION | Facility: CLINIC | Age: 49
End: 2024-05-08

## 2024-05-08 NOTE — CARE COORDINATION
Ambulatory Care Coordination Note     5/8/2024 11:35 AM     Incoming call - patient returning outreach call from yesterday  Discussed Dexcom averages over the last 30 days (237), that she in range only about 30% of the time and the pattern shows nighttime highs.  Talked about keeping BS steadily under 200, from 100-180 using her basal insulin (Tresiba) - taking it at approximately the same time daily.  Explained to patient that a steady number would slow progress of neuropathy, retinopathy, cardiac issues.  Today patient is having a turkey sandwich on wheat bread.  She is doing Door Dash for exercise.  PLAN:  attend endocrinology appointment with patient on 5/22  Reviewed patient's other appointments     ACM: Heather Redman RN     Challenges to be reviewed by the provider   Additional needs identified to be addressed with provider No  none               Method of communication with provider: chart routing.      PCP/Specialist follow up:   Future Appointments         Provider Specialty Dept Phone    5/22/2024 2:30 PM Anna Hoskins PA-C Endocrinology [phone]    5/28/2024 1:30 PM Rey Beach MD Neurology [phone]    6/3/2024 2:20 PM Luke Dawson DO Internal Medicine [phone]               Yes

## 2024-05-14 ENCOUNTER — HOSPITAL ENCOUNTER (EMERGENCY)
Age: 49
Discharge: HOME | End: 2024-05-14
Attending: EMERGENCY MEDICINE
Payer: COMMERCIAL

## 2024-05-14 VITALS
HEIGHT: 68 IN | RESPIRATION RATE: 16 BRPM | TEMPERATURE: 98.3 F | BODY MASS INDEX: 19.25 KG/M2 | OXYGEN SATURATION: 99 % | SYSTOLIC BLOOD PRESSURE: 106 MMHG | DIASTOLIC BLOOD PRESSURE: 71 MMHG | HEART RATE: 98 BPM | WEIGHT: 127 LBS

## 2024-05-14 DIAGNOSIS — E16.2 HYPOGLYCEMIA: Primary | ICD-10-CM

## 2024-05-14 LAB
GLUCOSE BLD STRIP.AUTO-MCNC: 64 MG/DL (ref 65–100)
SERVICE CMNT-IMP: ABNORMAL

## 2024-05-14 PROCEDURE — 99282 EMERGENCY DEPT VISIT SF MDM: CPT

## 2024-05-14 PROCEDURE — 82962 GLUCOSE BLOOD TEST: CPT

## 2024-05-14 ASSESSMENT — PAIN SCALES - GENERAL: PAINLEVEL_OUTOF10: 0

## 2024-05-14 ASSESSMENT — PAIN - FUNCTIONAL ASSESSMENT: PAIN_FUNCTIONAL_ASSESSMENT: 0-10

## 2024-05-15 ENCOUNTER — CARE COORDINATION (OUTPATIENT)
Dept: CARE COORDINATION | Facility: CLINIC | Age: 49
End: 2024-05-15

## 2024-05-15 NOTE — ED TRIAGE NOTES
Ambulatory to triage   Pt type 2 diabetic, cannot keep blood sugar up, current BGL reading 54 per dexcom monitor, pt states it may be malfunctioning . Pt denies any symptoms.

## 2024-05-15 NOTE — ED NOTES
Patient mobility status  with no difficulty. Provider aware     I have reviewed discharge instructions with the patient.  The patient verbalized understanding.    Patient left ED via Discharge Method: ambulatory to Home with  self .    Opportunity for questions and clarification provided.     Patient given 0 scripts.        
Pt given juice and crackers, pt state bgl 120 prior to leaving.  
done

## 2024-05-15 NOTE — ED PROVIDER NOTES
Emergency Department Provider Note       PCP: Savita Calloway MD   Age: 48 y.o.   Sex: female     DISPOSITION Decision To Discharge 05/14/2024 10:02:34 PM       ICD-10-CM    1. Hypoglycemia  E16.2           Medical Decision Making     Patient ate in the emergency department.  Blood sugar now into the 90s.  She would like to go home.  She has no further complaints.     1 chronic illness with exacerbation.  Shared medical decision making was utilized in creating the patients health plan today.    I independently ordered and reviewed each unique test.                     History     48-year-old female presents with low blood sugar.  She uses a sliding scale insulin.  Her blood sugar was 160 before dinner.  She took 8 units.  She then ate and checked her sugar after dinner.  It was up to 260.  She took an additional 8 units.  Her monitor then alerted her that her blood sugar dropped into the 50s.  She was concerned so came to the emergency department.  She has no symptoms.  She has not yet taken her long-acting insulin.        Physical Exam     Vitals signs and nursing note reviewed:  Vitals:    05/14/24 2151 05/14/24 2152   BP:  106/71   Pulse: 98    Resp: 16    Temp: 98.3 °F (36.8 °C)    TempSrc: Oral    SpO2: 99%    Weight: 57.6 kg (127 lb)    Height: 1.727 m (5' 8\")       Physical Exam  Vitals and nursing note reviewed.   Constitutional:       Appearance: Normal appearance. She is well-developed.   HENT:      Head: Normocephalic and atraumatic.      Nose: Nose normal.      Mouth/Throat:      Mouth: Mucous membranes are moist.   Eyes:      Extraocular Movements: Extraocular movements intact.      Pupils: Pupils are equal, round, and reactive to light.   Cardiovascular:      Rate and Rhythm: Normal rate and regular rhythm.   Pulmonary:      Effort: Pulmonary effort is normal. No respiratory distress.   Abdominal:      General: Abdomen is flat. There is no distension.   Musculoskeletal:         General: Normal

## 2024-05-15 NOTE — CARE COORDINATION
Ambulatory Care Coordination Note     5/15/2024 11:32 AM     Outreach for High Risk Case Management  Patient in the ED last evening - hypoglycemia  Care Summary Note:   Feeling fine this morning.  Currently   Reports that after thinking things over she thinks she got distracted and took too much insulin.  Appointment with Endocrinology in 1 week.     ACM: Heather Redman, DYANA     Method of communication with provider: chart routing.    PCP/Specialist follow up:   Future Appointments         Provider Specialty Dept Phone    5/22/2024 2:30 PM Anna Hoskins PA-C Endocrinology [phone]    5/28/2024 1:30 PM Rey Beach MD Neurology [phone]    6/3/2024 2:20 PM Luke Dawson,  Internal Medicine [phone]

## 2024-05-22 ENCOUNTER — CARE COORDINATION (OUTPATIENT)
Dept: CARE COORDINATION | Facility: CLINIC | Age: 49
End: 2024-05-22

## 2024-05-22 ENCOUNTER — OFFICE VISIT (OUTPATIENT)
Dept: ENDOCRINOLOGY | Age: 49
End: 2024-05-22
Payer: COMMERCIAL

## 2024-05-22 VITALS
DIASTOLIC BLOOD PRESSURE: 70 MMHG | HEART RATE: 92 BPM | OXYGEN SATURATION: 98 % | SYSTOLIC BLOOD PRESSURE: 118 MMHG | BODY MASS INDEX: 20.68 KG/M2 | WEIGHT: 136 LBS

## 2024-05-22 DIAGNOSIS — N18.4 CHRONIC KIDNEY DISEASE (CKD), STAGE IV (SEVERE) (HCC): Chronic | ICD-10-CM

## 2024-05-22 DIAGNOSIS — E11.610 CHARCOT FOOT DUE TO DIABETES MELLITUS (HCC): Chronic | ICD-10-CM

## 2024-05-22 DIAGNOSIS — E10.65 TYPE 1 DIABETES MELLITUS WITH HYPERGLYCEMIA (HCC): Primary | ICD-10-CM

## 2024-05-22 DIAGNOSIS — E10.649 TYPE 1 DIABETES MELLITUS WITH HYPOGLYCEMIA AND WITHOUT COMA (HCC): ICD-10-CM

## 2024-05-22 LAB — HBA1C MFR BLD: 8.9 %

## 2024-05-22 PROCEDURE — 83036 HEMOGLOBIN GLYCOSYLATED A1C: CPT | Performed by: PHYSICIAN ASSISTANT

## 2024-05-22 PROCEDURE — 99214 OFFICE O/P EST MOD 30 MIN: CPT | Performed by: PHYSICIAN ASSISTANT

## 2024-05-22 PROCEDURE — 95251 CONT GLUC MNTR ANALYSIS I&R: CPT | Performed by: PHYSICIAN ASSISTANT

## 2024-05-22 RX ORDER — ACYCLOVIR 400 MG/1
TABLET ORAL
Qty: 9 EACH | Refills: 3 | Status: SHIPPED | OUTPATIENT
Start: 2024-05-22

## 2024-05-22 RX ORDER — PEN NEEDLE, DIABETIC 32GX 5/32"
NEEDLE, DISPOSABLE MISCELLANEOUS
Qty: 500 EACH | Refills: 0 | Status: SHIPPED | OUTPATIENT
Start: 2024-05-22

## 2024-05-22 RX ORDER — GLUCAGON INJECTION, SOLUTION 1 MG/.2ML
1 INJECTION, SOLUTION SUBCUTANEOUS PRN
Qty: 2 EACH | Refills: 1 | Status: SHIPPED | OUTPATIENT
Start: 2024-05-22

## 2024-05-22 RX ORDER — INSULIN LISPRO 100 [IU]/ML
INJECTION, SOLUTION INTRAVENOUS; SUBCUTANEOUS
Qty: 30 ML | Refills: 3
Start: 2024-05-22

## 2024-05-22 NOTE — PROGRESS NOTES
[facility] ENDOCRINOLOGY   AND   THYROID NODULE CLINIC    Anna Hoskins PA-C  [facility] Endocrinology and Thyroid Nodule Clinic  [address], [address]  [place] [address]  Phone [phone]  Facsimile [phone]          Jelena Leal is a 48 y.o. female seen 05/22/24  for follow up evaluation of type 1 diabetes         Assessment and Plan:    Interpretation of 72 hour glucose monitor:  At least 72 hours of data were reviewed.  The patient utilizes a dexcom G7 continuous glucose monitoring system.  The average glucose during the reviewed timeframe was 238.  There is a pattern of highly variable readings with steep changes in glucose and some hypoglycemia    1. Type 1 diabetes mellitus with hyperglycemia (HCC)  Glycemic control remains poor but is slightly improved  She is working with case management and diabetes education    She is not taking prandial insulin if glucose is less than 150    She is often correcting between meals    She is drinking some but less high calorie beverages    Would benefit MOST from a regimented approach to diabetes    Continue 9 units of tresiba, may need more once regimented approach reveals patterns    Decrease prandial insulin, take 4 units before meals, 2 units before snacks and light meals    Use 1/50>150 TIDAC    Rule of 15's reviewed, avoid overcorrection. 15 g carbs, wait 15 minutes and recheck fingerstick. Consume protein    Discussed use of glucagon. Gvoke ordered     At today's visit we discussed sequela associated with uncontrolled diabetes including increased risk of stroke, heart attack, kidney failure, amputation, retinopathy, neuropathy, and nephropathy.  Patient was strongly encouraged to comply with treatment regimen as well as dietary and exercise recommendations to aid in control of this chronic disease to help prevent complications associated with uncontrolled diabetes.    - AMB POC HEMOGLOBIN A1C  - GVOKE HYPOPEN 2-PACK 1 MG/0.2ML

## 2024-05-22 NOTE — CARE COORDINATION
Ambulatory Care Coordination Note  5/22/2024    In person outreach with patient - accompanied patient to Endocrinology visit.  Dexcom data indicates many highs and lows  HgbA1c decreased from 10.7 to 8.9  Directives at this point:   - Keep Tresiba at 9 Units   - Prandial Insulin at 4 Units for \"large\"meal, 2 units for small meal  Goal is to create more of a steady state, then determine any change in long acting insulin.  Patient well engaged with ACM, at this point will continue weekly and prn calls with patient to keep a check on progress with the plan.    Method of communication with provider: chart routing.    ACM: Heather Redman RN    Care Coordination Interventions    Referral from Primary Care Provider: No  Suggested Interventions and Community Resources  Diabetes Education: In Process         Future Appointments   Date Time Provider Department Center   5/28/2024  1:30 PM Rey Beach MD BS[facility] AMB   6/3/2024  2:20 PM Luke Dawson,  MLMIM [facility] AMB   8/30/2024  1:00 PM Anna Hoskins PA-C END [facility]

## 2024-05-28 ENCOUNTER — PROCEDURE VISIT (OUTPATIENT)
Dept: NEUROLOGY | Age: 49
End: 2024-05-28
Payer: COMMERCIAL

## 2024-05-28 VITALS — HEIGHT: 68 IN | OXYGEN SATURATION: 99 % | BODY MASS INDEX: 20.68 KG/M2 | HEART RATE: 94 BPM

## 2024-05-28 DIAGNOSIS — R20.2 NUMBNESS AND TINGLING IN BOTH HANDS: Primary | ICD-10-CM

## 2024-05-28 DIAGNOSIS — R20.0 NUMBNESS AND TINGLING IN BOTH HANDS: Primary | ICD-10-CM

## 2024-05-28 DIAGNOSIS — E10.42 DIABETIC POLYNEUROPATHY ASSOCIATED WITH TYPE 1 DIABETES MELLITUS (HCC): ICD-10-CM

## 2024-05-28 PROCEDURE — 95885 MUSC TST DONE W/NERV TST LIM: CPT | Performed by: PSYCHIATRY & NEUROLOGY

## 2024-05-28 PROCEDURE — 95913 NRV CNDJ TEST 13/> STUDIES: CPT | Performed by: PSYCHIATRY & NEUROLOGY

## 2024-05-29 ENCOUNTER — CARE COORDINATION (OUTPATIENT)
Dept: CARE COORDINATION | Facility: CLINIC | Age: 49
End: 2024-05-29

## 2024-05-29 NOTE — PROGRESS NOTES
[place]WARE.               Please Note::     Data and waveforms * filed under Procedure category [facility].    See Procedure Files for complete data pages.       [ *NOTE:  parts or all of this consultation are produced using artificial voice recognition software.  Some speech errors are inherent in such software and may be included in the produced record. ]           Rey Beach MD  [facility]  [place] [address]  Phone:  [phone]  Fax:   [phone]          + + +   Glossary:   MUP: motor unit potential;  SNAP: sensory nerve action potential; Fibs:  Fibrillations;  Fascic: fasciculations; IA: insertional activity;  IP: interference pattern;  SCV :  Sensory conduction velocity;  MCV: motor conduction velocity; NOTE:: muscles are abbreviated latin initials.     [place]ico[place] raw datafile::   * filed at Procedure or Media Files. *

## 2024-06-05 ENCOUNTER — CARE COORDINATION (OUTPATIENT)
Dept: CARE COORDINATION | Facility: CLINIC | Age: 49
End: 2024-06-05

## 2024-06-05 NOTE — CARE COORDINATION
Ambulatory Care Coordination Note  6/5/2024    Outreach for High Risk Care Management  Following patient for glycemic control    Reports her BS have been under 200 the last few days, 157 at time of the call.  Will try to get Dexcom download report.  Currently patient on no mealtime correction insulin.    Tomorrow patient has appointment with Nephrology re: kidney, pancreas transplant    Method of communication with provider: chart routing.    ACM: Heather Redman, RN      Lab Results       None            Care Coordination Interventions    Referral from Primary Care Provider: No  Suggested Interventions and Community Resources  Diabetes Education: In Process          Goals Addressed    None         Future Appointments   Date Time Provider Department Center   6/10/2024  4:20 PM Luke Dawson, DO MLM[facility] AMB   8/30/2024  1:00 PM Anna Hoskins PA-C END [facility] AMB

## 2024-06-10 ENCOUNTER — OFFICE VISIT (OUTPATIENT)
Dept: INTERNAL MEDICINE CLINIC | Facility: CLINIC | Age: 49
End: 2024-06-10
Payer: COMMERCIAL

## 2024-06-10 VITALS
BODY MASS INDEX: 20.95 KG/M2 | OXYGEN SATURATION: 98 % | HEART RATE: 86 BPM | SYSTOLIC BLOOD PRESSURE: 108 MMHG | DIASTOLIC BLOOD PRESSURE: 64 MMHG | WEIGHT: 137.8 LBS

## 2024-06-10 DIAGNOSIS — N30.00 ACUTE CYSTITIS WITHOUT HEMATURIA: Primary | ICD-10-CM

## 2024-06-10 PROCEDURE — 99212 OFFICE O/P EST SF 10 MIN: CPT | Performed by: STUDENT IN AN ORGANIZED HEALTH CARE EDUCATION/TRAINING PROGRAM

## 2024-06-10 RX ORDER — CEPHALEXIN 500 MG/1
500 CAPSULE ORAL 2 TIMES DAILY
Qty: 10 CAPSULE | Refills: 0 | Status: SHIPPED | OUTPATIENT
Start: 2024-06-10

## 2024-06-10 SDOH — ECONOMIC STABILITY: FOOD INSECURITY: WITHIN THE PAST 12 MONTHS, THE FOOD YOU BOUGHT JUST DIDN'T LAST AND YOU DIDN'T HAVE MONEY TO GET MORE.: SOMETIMES TRUE

## 2024-06-10 SDOH — ECONOMIC STABILITY: INCOME INSECURITY: HOW HARD IS IT FOR YOU TO PAY FOR THE VERY BASICS LIKE FOOD, HOUSING, MEDICAL CARE, AND HEATING?: NOT VERY HARD

## 2024-06-10 SDOH — ECONOMIC STABILITY: FOOD INSECURITY: WITHIN THE PAST 12 MONTHS, YOU WORRIED THAT YOUR FOOD WOULD RUN OUT BEFORE YOU GOT MONEY TO BUY MORE.: SOMETIMES TRUE

## 2024-06-10 ASSESSMENT — ENCOUNTER SYMPTOMS
COLOR CHANGE: 0
SINUS PAIN: 0
BLOOD IN STOOL: 0
BACK PAIN: 0
COUGH: 0
ABDOMINAL PAIN: 0
NAUSEA: 0
VOMITING: 0
CHEST TIGHTNESS: 0
DIARRHEA: 0
SINUS PRESSURE: 0
SHORTNESS OF BREATH: 0
CONSTIPATION: 0
ABDOMINAL DISTENTION: 0
SORE THROAT: 0

## 2024-06-10 NOTE — PROGRESS NOTES
Stroke Father     Diabetes Paternal Grandfather         DM Type II     Hypertension Maternal Grandmother     Ovarian Cancer Neg Hx     Breast Cancer Mother 74    Diabetes Maternal Grandmother         DM Type II     Social History     Tobacco Use    Smoking status: Former     Current packs/day: 0.00     Types: Cigarettes     Quit date: 11/1/2013     Years since quitting: 10.6    Smokeless tobacco: Never   Substance Use Topics    Alcohol use: Yes         Review of Systems   Constitutional:  Negative for activity change, diaphoresis, fatigue and fever.   HENT:  Negative for congestion, nosebleeds, postnasal drip, sinus pressure, sinus pain and sore throat. Voice change: keflex.   Respiratory:  Negative for cough, chest tightness and shortness of breath.    Cardiovascular:  Negative for chest pain, palpitations and leg swelling.   Gastrointestinal:  Negative for abdominal distention, abdominal pain, blood in stool, constipation, diarrhea, nausea and vomiting.   Genitourinary:  Positive for dysuria and urgency. Negative for difficulty urinating, flank pain, frequency and hematuria.   Musculoskeletal:  Negative for arthralgias, back pain, gait problem, joint swelling and myalgias.   Skin:  Negative for color change and pallor.   Neurological:  Negative for dizziness, speech difficulty, weakness, numbness and headaches.   Psychiatric/Behavioral:  Negative for agitation, confusion and hallucinations.          OBJECTIVE:  /64 (Site: Left Upper Arm, Position: Sitting, Cuff Size: Small Adult)   Pulse 86   Wt 62.5 kg (137 lb 12.8 oz)   SpO2 98%   BMI 20.95 kg/m²      Physical Exam  Constitutional:       General: She is not in acute distress.     Appearance: She is not ill-appearing or toxic-appearing.   HENT:      Head: Normocephalic and atraumatic.      Right Ear: Tympanic membrane and ear canal normal.      Left Ear: Tympanic membrane and ear canal normal.      Nose: Nose normal. No congestion or rhinorrhea.

## 2024-06-12 ENCOUNTER — CARE COORDINATION (OUTPATIENT)
Dept: CARE COORDINATION | Facility: CLINIC | Age: 49
End: 2024-06-12

## 2024-06-12 NOTE — CARE COORDINATION
Ambulatory Care Coordination Note     6/12/2024 11:12 AM     Ongoing outreach for High Risk Case Management   - New PCP appointment completed   - Evaluation by transplant team completed, still needs to go for blood work.  Found to have a UTI - now on antibiotics, which has raised her BS.  Encouraged improving water intake.   - Evaluation this Friday for cataract surgery, actual surgery not yet scheduled.    PLAN for call next week.    ACM: Heather Redman RN       Method of communication with provider: none.      PCP/Specialist follow up:   Future Appointments         Provider Specialty Dept Phone    7/6/2024 10:45 AM [facility] ROOM 2 Radiology [phone]    8/30/2024 1:00 PM Anna Hoskins PA-C Endocrinology [phone]    12/12/2024 3:00 PM Luke Dawson DO Internal Medicine [phone]

## 2024-06-13 ENCOUNTER — CARE COORDINATION (OUTPATIENT)
Dept: CARE COORDINATION | Facility: CLINIC | Age: 49
End: 2024-06-13

## 2024-06-13 NOTE — CARE COORDINATION
Ambulatory Care Coordination Note     6/13/2024 1:08 PM     Care Summary Note: Incoming call from patient.   and not responding to insulin she has taken.  Further to the point, patient was using insulin that had been in her hot car.  Having trouble recalling if she took tresiba yesterday, but she did take it today.  Also does not recall if she ate dinner last evening.  Advised eating a scramble egg with avocado, and a glass of water every 20 minutes for an hour.  Return call to patient - BS now at 140.  Reviewed need to take Tresiba around same time each day.  Reviewed nutrition  Patient agrees to get \"new\" insulin.    Patient has cataract surgery tomorrow.     ACM: Heather Redman RN     Challenges to be reviewed by the provider   Additional needs identified to be addressed with provider No  none               Method of communication with provider: chart routing.    PCP/Specialist follow up:   Future Appointments         Provider Specialty Dept Phone    7/6/2024 10:45 AM [facility] ROOM 2 Radiology [phone]    8/30/2024 1:00 PM Anna Hoskins PA-C Endocrinology [phone]    12/12/2024 3:00 PM Luke Dawson DO Internal Medicine [phone]

## 2024-06-19 ENCOUNTER — CARE COORDINATION (OUTPATIENT)
Dept: CARE COORDINATION | Facility: CLINIC | Age: 49
End: 2024-06-19

## 2024-06-19 NOTE — CARE COORDINATION
Ambulatory Care Coordination Note     6/19/2024 9:35 AM     Late entry from calls made 6/17/2024  Incoming call - patient needing to find a new PCP.   - current provider has resigned from the practice.  At referral from endocrinology, requested patient call for appointment with Linda Mckee, AUBREY @ [facility].  Patient will call.     ACM: Heather Redman, DYANA    PCP/Specialist follow up:   Future Appointments         Provider Specialty Dept Phone    7/6/2024 10:45 AM [facility] ROOM 2 Radiology [phone]    8/30/2024 1:00 PM Anna Hoskins PA-C Endocrinology [phone]    12/12/2024 3:00 PM Luke Dawson DO Internal Medicine [phone]

## 2024-06-23 ENCOUNTER — HOSPITAL ENCOUNTER (EMERGENCY)
Age: 49
Discharge: HOME | End: 2024-06-23
Payer: COMMERCIAL

## 2024-06-23 VITALS
WEIGHT: 127 LBS | DIASTOLIC BLOOD PRESSURE: 79 MMHG | OXYGEN SATURATION: 99 % | HEIGHT: 68 IN | RESPIRATION RATE: 20 BRPM | SYSTOLIC BLOOD PRESSURE: 129 MMHG | HEART RATE: 85 BPM | BODY MASS INDEX: 19.25 KG/M2 | TEMPERATURE: 98.4 F

## 2024-06-23 LAB
GLUCOSE BLD STRIP.AUTO-MCNC: 202 MG/DL (ref 65–100)
SERVICE CMNT-IMP: ABNORMAL

## 2024-06-23 PROCEDURE — 82962 GLUCOSE BLOOD TEST: CPT

## 2024-06-23 ASSESSMENT — PAIN SCALES - GENERAL: PAINLEVEL_OUTOF10: 0

## 2024-06-23 ASSESSMENT — PAIN - FUNCTIONAL ASSESSMENT: PAIN_FUNCTIONAL_ASSESSMENT: 0-10

## 2024-06-23 NOTE — ED TRIAGE NOTES
Pt reports she feels like she has a sinus infection/congestion and is having high BG. Reports her BG has been above 250 but reports this is abnormal for her. Takes insulin.

## 2024-06-24 ENCOUNTER — CARE COORDINATION (OUTPATIENT)
Dept: CARE COORDINATION | Facility: CLINIC | Age: 49
End: 2024-06-24

## 2024-06-24 NOTE — CARE COORDINATION
Ambulatory Care Coordination Note     6/24/2024 10:24 AM     Patient Current Location:    : [address] [place] [id number]     Care Summary Note:   Outreach for High Risk Case Management - spoke with patient  ED visit this past Saturday 6/22 - found to have UTI > Keflex x5 days  Blood sugar this morning is 160.     ACM: Heather Redman, RN    Method of communication with provider: chart routing.    PCP/Specialist follow up:   Future Appointments         Provider Specialty Dept Phone    7/6/2024 10:45 AM [facility] ROOM 2 Radiology [phone]    8/30/2024 1:00 PM Anna Hoskins PA-C Endocrinology [phone]    9/5/2024 9:00 AM Grant Anguiano APRN - NP Family Medicine [phone]

## 2024-06-27 DIAGNOSIS — E10.65 TYPE 1 DIABETES MELLITUS WITH HYPERGLYCEMIA (HCC): ICD-10-CM

## 2024-06-27 RX ORDER — INSULIN DEGLUDEC 100 U/ML
INJECTION, SOLUTION SUBCUTANEOUS
Qty: 9 ML | Refills: 1 | Status: SHIPPED | OUTPATIENT
Start: 2024-06-27

## 2024-07-05 ENCOUNTER — CARE COORDINATION (OUTPATIENT)
Dept: CARE COORDINATION | Facility: CLINIC | Age: 49
End: 2024-07-05

## 2024-07-05 NOTE — CARE COORDINATION
Ambulatory Care Coordination Note     7/5/2024 2:26 PM     Care Summary Note:   Outreach for High Risk Case Management - spoke with patient  Discussed medications:  taking Tresiba 9 units daily  Discussion of food/drink: has refrained from high calorie beverages, much improved with food choices.  Concerns - lack of any real routine.  Takes Tresiba at different times, eats out - also at varying times.    PLAN:  continue to reiterate and encourage routine as a means of controlling blood sugars.    ACM: Heather Redman RN     Method of communication with provider: chart routing.      PCP/Specialist follow up:   Future Appointments         Provider Specialty Dept Phone    7/6/2024 10:45 AM [facility] ROOM 2 Radiology [phone]    8/30/2024 1:00 PM Anna Hoskins PA-C Endocrinology [phone]    9/5/2024 9:00 AM Grant Anguiano, APRN - NP Family Medicine [phone]

## 2024-07-10 ENCOUNTER — CARE COORDINATION (OUTPATIENT)
Dept: CARE COORDINATION | Facility: CLINIC | Age: 49
End: 2024-07-10

## 2024-07-10 NOTE — CARE COORDINATION
Ambulatory Care Coordination Note     7/10/2024 11:37 AM     Care Summary Note:   Outreach for High Risk Case Management - spoke with patient   - labwork done yesterday at both nephrology and Transplant practice   - uti identified:   earlier, took long-acting insulin but BS is up to 320 at time of call.    Discussion emphasizing routine re: diet, medication, exercise   - taking Tresiba around same time each mornings   - using Humalog for sliding scale per worksheets provided by endocrinology   - no in between meal insulin as directed  Patient expresses understanding about routine as part of her diabetes regimen - will follow closely.    PLAN - follow up with patient re: script for UTI    ACM: Heather Redman RN     Challenges to be reviewed by the provider   Additional needs identified to be addressed with provider Yes  medications-needs medication for UTI               Method of communication with provider: chart routing.    Medications Reviewed:   Completed during this call    Advance Care Planning:   Not reviewed during this call     Care Planning:    Goals Addressed                   This Visit's Progress     Patient verbalizes understanding of self -management goals of living with Diabetes.   On track     ACM to support in self management of diabetes   - may encourage a return to diabetes classes   - weekly call with patient to ascertain blood sugar trends   - involve SW as needed to support insulin affordability               PCP/Specialist follow up:   Future Appointments         Provider Specialty Dept Phone    8/29/2024 11:00 AM Linda Mckee, APRN - CNP Family Medicine [phone]    8/30/2024 1:00 PM Anna Hoskins PA-C Endocrinology [phone]

## 2024-07-10 NOTE — TELEPHONE ENCOUNTER
I'm sorry, I cannot treat her for illness/infection. If they team that screened her does not treat, I recommend going to urgent care until she is established with new PCP

## 2024-07-11 ENCOUNTER — CARE COORDINATION (OUTPATIENT)
Dept: CARE COORDINATION | Facility: CLINIC | Age: 49
End: 2024-07-11

## 2024-07-11 NOTE — CARE COORDINATION
Ambulatory Care Coordination Note     7/11/2024 3:59 PM     Care Summary Note:   Incoming call from patient  Cannot get her BS to come down; currently 256, earlier \"hi\"  (Dexcom offline during call)  Calling ACM from Urgent Care - UA negative for UTI  Other lab work found her to be anemic  Reports fatigue, generally not feeling well.  Has eaten x1 today - fish sandwich  Has taken Tresiba and also her humalog at time of meal  Discussing increasing water intake through the rest of the day/evening  Eating a hearty meal to include red meat and some greens  Patient to call ACM in the morning.     ACM: Heather Redman RN    Method of communication with provider: chart routing.    PCP/Specialist follow up:   Future Appointments         Provider Specialty Dept Phone    8/29/2024 11:00 AM Linda Mckee APRN - CNP Family Medicine [phone]    8/30/2024 1:00 PM Anna Hoskins PA-C Endocrinology [phone]

## 2024-07-12 ENCOUNTER — TELEPHONE (OUTPATIENT)
Dept: ENDOCRINOLOGY | Age: 49
End: 2024-07-12

## 2024-07-12 NOTE — TELEPHONE ENCOUNTER
Pt came in needing a G7 sensor stating that Anna sent her a message that she can  a sample. Pt was given  a sample and it was signed out of the book.

## 2024-07-14 ENCOUNTER — CARE COORDINATION (OUTPATIENT)
Dept: CARE COORDINATION | Facility: CLINIC | Age: 49
End: 2024-07-14

## 2024-07-15 ENCOUNTER — CARE COORDINATION (OUTPATIENT)
Dept: CARE COORDINATION | Facility: CLINIC | Age: 49
End: 2024-07-15

## 2024-07-15 NOTE — CARE COORDINATION
Ambulatory Care Coordination Note     7/15/2024 10:23 AM     Care Summary Note:   Incoming call from patient this morning.  BS at 81 - getting ready to eat breakfast  Plan for frequent calls this week given difficulties of glycemic control last week.     ACM: Heather Redman RN     Method of communication with provider: chart routing.      PCP/Specialist follow up:   Future Appointments         Provider Specialty Dept Phone    8/29/2024 11:00 AM Linda Mckee, APRN - CNP Family Medicine [phone]    8/30/2024 1:00 PM Anna Hoskins PA-C Endocrinology [phone]

## 2024-07-17 ENCOUNTER — CARE COORDINATION (OUTPATIENT)
Dept: CARE COORDINATION | Facility: CLINIC | Age: 49
End: 2024-07-17

## 2024-07-17 NOTE — CARE COORDINATION
Ambulatory Care Coordination Note     7/17/2024 10:30 AM     Care Summary Note:   Outreach for High Risk Case Management - spoke with patient  Reports feeling really well last couple days  Blood sugar this morning 127    PLAN for follow up next week.  Patient understands to call with questions/concerns.     ACM: Heather Redman RN    Method of communication with provider: chart routing.     Care Planning:    Goals Addressed                   This Visit's Progress     Patient verbalizes understanding of self -management goals of living with Diabetes.   On track     ACM to support in self management of diabetes   - may encourage a return to diabetes classes   - weekly call with patient to ascertain blood sugar trends   - involve SW as needed to support insulin affordability               PCP/Specialist follow up:   Future Appointments         Provider Specialty Dept Phone    8/29/2024 11:00 AM Linda Mckee APRN - CNP Family Medicine [phone]    8/30/2024 1:00 PM Anna Hoskins PA-C Endocrinology [phone]

## 2024-07-21 ENCOUNTER — PATIENT MESSAGE (OUTPATIENT)
Dept: ENDOCRINOLOGY | Age: 49
End: 2024-07-21

## 2024-07-22 NOTE — TELEPHONE ENCOUNTER
Mary Lou response:    I'm glad your sugars are doing better. The more you change the needle and inject insulin in new areas, the better. I hear you are making great progress. Well done!!    ~Anna

## 2024-07-22 NOTE — TELEPHONE ENCOUNTER
From: Jelena Leal  To: Anna Hoskins  Sent: 7/21/2024 1:19 PM EDT  Subject: Blood Glucose    All day today my BS has been around 250 I am taking insulin but it is not working any suggestions?

## 2024-07-24 ENCOUNTER — CARE COORDINATION (OUTPATIENT)
Dept: CARE COORDINATION | Facility: CLINIC | Age: 49
End: 2024-07-24

## 2024-07-24 NOTE — CARE COORDINATION
Ambulatory Care Coordination Note     7/24/2024 9:58 AM     Care Summary Note:   Incoming call from patient - just to check in.  Reports she is feeling really well.  BS low this morning but is coming up now that she is eating.  Discussed an insulin pump.  Patient open to this idea as long as it is affordable for her.  (Patient on Long Term disability from her job with [employer] health insurance)  ACM plans to attend next endocrinology appointment with patient.     ACM: Heather Redman RN     Challenges to be reviewed by the provider   Additional needs identified to be addressed with provider No  none               Method of communication with provider: chart routing.     Care Planning:    Goals Addressed                   This Visit's Progress     Patient verbalizes understanding of self -management goals of living with Diabetes.   On track     ACM to support in self management of diabetes   - may encourage a return to diabetes classes   - weekly call with patient to ascertain blood sugar trends   - involve SW as needed to support insulin affordability               PCP/Specialist follow up:   Future Appointments         Provider Specialty Dept Phone    8/29/2024 11:00 AM Linda Mckee, APRN - CNP Family Medicine [phone]    8/30/2024 1:00 PM Anna Hoskins PA-C Endocrinology [phone]

## 2024-07-31 ENCOUNTER — CARE COORDINATION (OUTPATIENT)
Dept: CARE COORDINATION | Facility: CLINIC | Age: 49
End: 2024-07-31

## 2024-07-31 NOTE — CARE COORDINATION
Ambulatory Care Coordination Note     7/31/2024 3:17 PM     Care Summary Note:   Outreach for High Risk Case Management - spoke with patient  Patient wonders if she should take long acting insulin at night because morning BS are always high.  Discussed [again] gradually moving the long acting insulin into the evening - changing the time by 2 hours later for the next 3 days.  Patient agreeable.  Made patient aware the endocrinology appointment will likely be changed since provider out of town on the current scheduled date.     ACM: Heather Redman RN    Method of communication with provider: chart routing.    PCP/Specialist follow up:   Future Appointments         Provider Specialty Dept Phone    8/29/2024 11:00 AM Linda Mckee, APRN - CNP Family Medicine [phone]    8/30/2024 1:00 PM Anna Hoskins PA-C Endocrinology [phone]

## 2024-08-02 NOTE — PROGRESS NOTES
[facility] ENDOCRINOLOGY   AND   THYROID [facility] CLINIC    Anna Hoskins PA-C  [facility] Endocrinology and Thyroid [facility]odule [facility]  [address], [address]  [place] [address]  Phone [phone]  Facsimile [phone]          Jelena Leal is a 48 y.o. female seen 08/05/24  for follow up evaluation of type 1 diabetes         Assessment and Plan:    Interpretation of 72 hour glucose monitor:  At least 72 hours of data were reviewed.  The patient utilizes a dexcom G7 continuous glucose monitoring system.  The average glucose during the reviewed timeframe was 229.  There is a pattern of highly variable readings with steep changes in glucose and some hypoglycemia    1. Type 1 diabetes mellitus with hyperglycemia (HCC)  Glycemic control remains poor but is slightly improved  She is working with case management and has completed diabetes education    She is not taking prandial insulin if glucose is less than 150    She is often correcting between meals    She is drinking some but less high calorie beverages    Would benefit MOST from a regimented approach to diabetes    Increase tresiba from  9 units up to 10 with plans to increase to 11    continue prandial insulin, take 4 units before meals, 2 units before snacks and light meals    Use 1/50>150 TIDAC    Rule of 15's reviewed, avoid overcorrection. 15 g carbs, wait 15 minutes and recheck fingerstick. Consume protein    Discussed use of glucagon. Gvoke ordered     At today's visit we discussed sequela associated with uncontrolled diabetes including increased risk of stroke, heart attack, kidney failure, amputation, retinopathy, neuropathy, and nephropathy.  Patient was strongly encouraged to comply with treatment regimen as well as dietary and exercise recommendations to aid in control of this chronic disease to help prevent complications associated with uncontrolled diabetes.      - HM DIABETES FOOT EXAM  - IA CONTINUOUS GLUCOSE MONITORING

## 2024-08-05 ENCOUNTER — CARE COORDINATION (OUTPATIENT)
Dept: CARE COORDINATION | Facility: CLINIC | Age: 49
End: 2024-08-05

## 2024-08-05 ENCOUNTER — OFFICE VISIT (OUTPATIENT)
Dept: ENDOCRINOLOGY | Age: 49
End: 2024-08-05
Payer: COMMERCIAL

## 2024-08-05 VITALS
HEART RATE: 81 BPM | DIASTOLIC BLOOD PRESSURE: 84 MMHG | OXYGEN SATURATION: 98 % | BODY MASS INDEX: 20.86 KG/M2 | SYSTOLIC BLOOD PRESSURE: 120 MMHG | WEIGHT: 137.2 LBS

## 2024-08-05 DIAGNOSIS — N18.4 CHRONIC KIDNEY DISEASE (CKD), STAGE IV (SEVERE) (HCC): ICD-10-CM

## 2024-08-05 DIAGNOSIS — E10.65 UNCONTROLLED TYPE 1 DIABETES MELLITUS WITH HYPERGLYCEMIA (HCC): Primary | Chronic | ICD-10-CM

## 2024-08-05 DIAGNOSIS — E11.610 CHARCOT FOOT DUE TO DIABETES MELLITUS (HCC): ICD-10-CM

## 2024-08-05 DIAGNOSIS — R27.8 POOR MANUAL DEXTERITY: ICD-10-CM

## 2024-08-05 PROCEDURE — 95251 CONT GLUC MNTR ANALYSIS I&R: CPT | Performed by: PHYSICIAN ASSISTANT

## 2024-08-05 PROCEDURE — 99214 OFFICE O/P EST MOD 30 MIN: CPT | Performed by: PHYSICIAN ASSISTANT

## 2024-08-05 RX ORDER — INSULIN DEGLUDEC 100 U/ML
INJECTION, SOLUTION SUBCUTANEOUS
Qty: 9 ML | Refills: 1
Start: 2024-08-05

## 2024-08-05 NOTE — CARE COORDINATION
Ambulatory Care Coordination Note     8/5/2024 2:51 PM     In person outreach today - attended endocrinology appointment w/patient  Concerns    - for continuing high blood sugars - above 200.  Discussed insulin pump.  ACM spoke with Lucia Villafuerte - pump rep/.  She will have a person contact patient re: up front costs associated with the pump.   - foot care, patient needs to pay closer attention to her feet.   - not rotating insulin injection sites; ACM will focus on reminding patient    - increasing tresiba now to 10 and increase again by Friday to 11 (see provider notes for more specifics)    Incoming call from patient later today.  Asking about other injection site possibilities:  concerned because BS up to 236, not coming down with insulin.  Had eaten some chicken salad.  Encouraged walking and increase water intake.  Patient to call back.     ACM: Heather Redman RN    Method of communication with provider: chart routing.    PCP/Specialist follow up:   Future Appointments         Provider Specialty Dept Phone    8/29/2024 11:00 AM Linda Mckee, APRN - CNP Family Medicine [phone]    9/30/2024 9:00 AM Anna Hoskins PA-C Endocrinology [phone]    11/26/2024 11:00 AM Anna Hoskins PA-C Endocrinology [phone]

## 2024-08-12 ENCOUNTER — CARE COORDINATION (OUTPATIENT)
Dept: CARE COORDINATION | Facility: CLINIC | Age: 49
End: 2024-08-12

## 2024-08-12 NOTE — CARE COORDINATION
Ambulatory Care Coordination Note     8/12/2024 2:50 PM     Care Summary Note:   Outreach for High Risk Case Management - spoke with patient  Blood sugars doing well last few days.  Patient needs needles for her insulin pens - available at [facility], she will go by there.  Reports she is rotating sites, no between meal insulin.  Has not yet eaten today - on her way now.     ACM: Heather Redman RN    PCP/Specialist follow up:   Future Appointments         Provider Specialty Dept Phone    8/29/2024 11:00 AM Linda Mckee, APRN - CNP Family Medicine [phone]    9/30/2024 9:00 AM Anna Hoskins PA-C Endocrinology [phone]    11/26/2024 11:00 AM Anna Hoskins PA-C Endocrinology [phone]

## 2024-08-19 ENCOUNTER — CARE COORDINATION (OUTPATIENT)
Dept: CARE COORDINATION | Facility: CLINIC | Age: 49
End: 2024-08-19

## 2024-08-19 NOTE — CARE COORDINATION
Ambulatory Care Coordination Note     8/19/2024 3:44 PM     Care Summary Note:   Outreach for High  Risk Case Management - spoke with patient  Reports she is doing really well the past 7-10 days.  Blood Sugars staying 200 and below  Attending appointments as needed per the kidney/pancreas transplant service    PLAN for follow up next week.     ACM: Heather Redman RN    PCP/Specialist follow up:   Future Appointments         Provider Specialty Dept Phone    8/29/2024 11:00 AM Linda Mckee, APRN - CNP Family Medicine [phone]    9/30/2024 9:00 AM Anna Hoskins PA-C Endocrinology [phone]    11/26/2024 11:00 AM Anna Hoskins PA-C Endocrinology [phone]

## 2024-08-29 ENCOUNTER — CARE COORDINATION (OUTPATIENT)
Dept: CARE COORDINATION | Facility: CLINIC | Age: 49
End: 2024-08-29

## 2024-08-29 NOTE — CARE COORDINATION
Ambulatory Care Coordination Note     8/29/2024 11:17 AM     Care Summary Note:   Incoming call from patient  Her car has broken down, has been taken to the shop.  She has new PCP appointment - but has not been able to get through to cancel/reschedule.  ACM notified provider via Teams Message - clinic will call patient.     ACM: Heather Redman RN    PCP/Specialist follow up:   Future Appointments         Provider Specialty Dept Phone    9/16/2024 1:30 PM Linda Mckee, APRN - CNP Family Medicine [phone]    9/30/2024 9:00 AM Anna Hoskins PA-C Endocrinology [phone]    11/26/2024 11:00 AM Anna Hoskins PA-C Endocrinology [phone]

## 2024-09-04 ENCOUNTER — CARE COORDINATION (OUTPATIENT)
Dept: CARE COORDINATION | Facility: CLINIC | Age: 49
End: 2024-09-04

## 2024-09-04 ENCOUNTER — TELEPHONE (OUTPATIENT)
Dept: ENDOCRINOLOGY | Age: 49
End: 2024-09-04

## 2024-09-04 NOTE — CARE COORDINATION
Ambulatory Care Coordination Note     9/4/2024 1:25 PM     Care Summary Note:   Incoming call from patient  Reports that she has not heard a word from anyone re: insulin pump.  Call placed to rep - Lucia Villafuerte  First spoke with Lucia on 8/5     ACM: Heather Redman RN     Method of communication with provider: chart routing.      PCP/Specialist follow up:   Future Appointments         Provider Specialty Dept Phone    9/16/2024 1:30 PM Linda Mckee, APRN - CNP Family Medicine [phone]    9/30/2024 9:00 AM Anna Hoskins PA-C Endocrinology [phone]    11/18/2024 2:00 PM Anna Hoskins PA-C Endocrinology [phone]

## 2024-09-11 ENCOUNTER — TELEPHONE (OUTPATIENT)
Dept: ENDOCRINOLOGY | Age: 49
End: 2024-09-11

## 2024-09-11 RX ORDER — ACYCLOVIR 400 MG/1
TABLET ORAL
Qty: 9 EACH | Refills: 3 | Status: SHIPPED | OUTPATIENT
Start: 2024-09-11

## 2024-09-16 ENCOUNTER — CARE COORDINATION (OUTPATIENT)
Dept: CARE COORDINATION | Facility: CLINIC | Age: 49
End: 2024-09-16

## 2024-09-16 ENCOUNTER — OFFICE VISIT (OUTPATIENT)
Dept: FAMILY MEDICINE CLINIC | Facility: CLINIC | Age: 49
End: 2024-09-16
Payer: COMMERCIAL

## 2024-09-16 VITALS
BODY MASS INDEX: 21.4 KG/M2 | OXYGEN SATURATION: 97 % | HEART RATE: 78 BPM | SYSTOLIC BLOOD PRESSURE: 102 MMHG | HEIGHT: 68 IN | DIASTOLIC BLOOD PRESSURE: 62 MMHG | WEIGHT: 141.2 LBS

## 2024-09-16 DIAGNOSIS — E10.65 UNCONTROLLED TYPE 1 DIABETES MELLITUS WITH HYPERGLYCEMIA (HCC): Primary | Chronic | ICD-10-CM

## 2024-09-16 DIAGNOSIS — I10 PRIMARY HYPERTENSION: ICD-10-CM

## 2024-09-16 DIAGNOSIS — E46 PROTEIN-CALORIE MALNUTRITION, UNSPECIFIED SEVERITY (HCC): ICD-10-CM

## 2024-09-16 DIAGNOSIS — Z23 ENCOUNTER FOR IMMUNIZATION: ICD-10-CM

## 2024-09-16 DIAGNOSIS — R15.9 INCONTINENCE OF FECES, UNSPECIFIED FECAL INCONTINENCE TYPE: ICD-10-CM

## 2024-09-16 DIAGNOSIS — F32.1 MODERATE SINGLE CURRENT EPISODE OF MAJOR DEPRESSIVE DISORDER (HCC): ICD-10-CM

## 2024-09-16 DIAGNOSIS — Z12.11 COLON CANCER SCREENING: ICD-10-CM

## 2024-09-16 DIAGNOSIS — R35.0 URINARY FREQUENCY: ICD-10-CM

## 2024-09-16 PROBLEM — M62.81 MUSCLE WEAKNESS (GENERALIZED): Status: ACTIVE | Noted: 2022-11-12

## 2024-09-16 PROBLEM — H35.373 EPIRETINAL MEMBRANE (ERM) OF BOTH EYES: Status: ACTIVE | Noted: 2021-11-03

## 2024-09-16 PROBLEM — K31.84 GASTROPARESIS: Status: ACTIVE | Noted: 2022-06-14

## 2024-09-16 PROBLEM — D50.9 IRON DEFICIENCY ANEMIA OF PREGNANCY: Status: ACTIVE | Noted: 2022-06-21

## 2024-09-16 PROBLEM — Z96.0 RETAINED URETERAL STENT: Status: ACTIVE | Noted: 2022-08-29

## 2024-09-16 PROBLEM — R26.2 DIFFICULTY IN WALKING, NOT ELSEWHERE CLASSIFIED: Status: ACTIVE | Noted: 2022-11-12

## 2024-09-16 PROBLEM — R82.81 PYURIA: Status: RESOLVED | Noted: 2023-06-18 | Resolved: 2024-09-16

## 2024-09-16 PROBLEM — B37.7 CANDIDEMIA (HCC): Status: RESOLVED | Noted: 2022-09-20 | Resolved: 2024-09-16

## 2024-09-16 PROBLEM — N30.01 ACUTE CYSTITIS WITH HEMATURIA: Status: RESOLVED | Noted: 2022-07-31 | Resolved: 2024-09-16

## 2024-09-16 PROBLEM — M62.81 MUSCLE WEAKNESS: Status: ACTIVE | Noted: 2024-09-16

## 2024-09-16 PROBLEM — R65.10 SIRS (SYSTEMIC INFLAMMATORY RESPONSE SYNDROME) (HCC): Status: ACTIVE | Noted: 2022-09-19

## 2024-09-16 PROBLEM — G93.41 METABOLIC ENCEPHALOPATHY: Status: ACTIVE | Noted: 2022-11-12

## 2024-09-16 PROBLEM — R65.10 SIRS (SYSTEMIC INFLAMMATORY RESPONSE SYNDROME) (HCC): Status: RESOLVED | Noted: 2022-09-19 | Resolved: 2024-09-16

## 2024-09-16 PROBLEM — E74.09 GLYCOGENIC HEPATOPATHY (HCC): Status: RESOLVED | Noted: 2022-12-03 | Resolved: 2024-09-16

## 2024-09-16 PROBLEM — N30.01 ACUTE CYSTITIS WITH HEMATURIA: Status: ACTIVE | Noted: 2022-07-31

## 2024-09-16 PROBLEM — K52.9 COLITIS: Status: ACTIVE | Noted: 2022-08-29

## 2024-09-16 PROBLEM — I21.4 NSTEMI (NON-ST ELEVATED MYOCARDIAL INFARCTION) (HCC): Status: ACTIVE | Noted: 2022-07-10

## 2024-09-16 PROBLEM — E74.09 GLYCOGENIC HEPATOPATHY (HCC): Status: ACTIVE | Noted: 2022-12-03

## 2024-09-16 PROBLEM — K92.1 MELENA: Status: ACTIVE | Noted: 2022-07-13

## 2024-09-16 PROBLEM — K77 GLYCOGENIC HEPATOPATHY (HCC): Status: ACTIVE | Noted: 2022-12-03

## 2024-09-16 PROBLEM — Z78.9 DEFICIT IN ACTIVITIES OF DAILY LIVING (ADL): Status: ACTIVE | Noted: 2024-09-16

## 2024-09-16 PROBLEM — B37.7 CANDIDEMIA (HCC): Status: ACTIVE | Noted: 2022-09-20

## 2024-09-16 PROBLEM — R53.81 PHYSICAL DEBILITY: Status: RESOLVED | Noted: 2022-06-02 | Resolved: 2024-09-16

## 2024-09-16 PROBLEM — R26.2 DIFFICULTY IN WALKING, NOT ELSEWHERE CLASSIFIED: Status: RESOLVED | Noted: 2022-11-12 | Resolved: 2024-09-16

## 2024-09-16 PROBLEM — B37.41 CANDIDA CYSTITIS: Status: RESOLVED | Noted: 2022-07-13 | Resolved: 2024-09-16

## 2024-09-16 PROBLEM — R93.3 ABNORMAL CT SCAN, COLON: Status: ACTIVE | Noted: 2022-08-29

## 2024-09-16 PROBLEM — I21.4 NSTEMI (NON-ST ELEVATED MYOCARDIAL INFARCTION) (HCC): Status: RESOLVED | Noted: 2022-07-10 | Resolved: 2024-09-16

## 2024-09-16 PROBLEM — K77 GLYCOGENIC HEPATOPATHY (HCC): Status: RESOLVED | Noted: 2022-12-03 | Resolved: 2024-09-16

## 2024-09-16 PROBLEM — O99.019 IRON DEFICIENCY ANEMIA OF PREGNANCY: Status: ACTIVE | Noted: 2022-06-21

## 2024-09-16 PROBLEM — R27.8 OTHER LACK OF COORDINATION: Status: ACTIVE | Noted: 2022-11-12

## 2024-09-16 PROBLEM — R74.8 ELEVATED ALKALINE PHOSPHATASE LEVEL: Chronic | Status: ACTIVE | Noted: 2023-06-18

## 2024-09-16 PROBLEM — R82.81 PYURIA: Status: ACTIVE | Noted: 2023-06-18

## 2024-09-16 PROBLEM — B37.41 CANDIDA CYSTITIS: Status: ACTIVE | Noted: 2022-07-13

## 2024-09-16 PROBLEM — G93.41 METABOLIC ENCEPHALOPATHY: Status: RESOLVED | Noted: 2022-11-12 | Resolved: 2024-09-16

## 2024-09-16 PROCEDURE — 3074F SYST BP LT 130 MM HG: CPT | Performed by: NURSE PRACTITIONER

## 2024-09-16 PROCEDURE — 99214 OFFICE O/P EST MOD 30 MIN: CPT | Performed by: NURSE PRACTITIONER

## 2024-09-16 PROCEDURE — 3078F DIAST BP <80 MM HG: CPT | Performed by: NURSE PRACTITIONER

## 2024-09-16 RX ORDER — INSULIN GLARGINE 300 U/ML
9 INJECTION, SOLUTION SUBCUTANEOUS NIGHTLY
COMMUNITY
Start: 2024-06-27

## 2024-09-16 RX ORDER — ZOSTER VACCINE RECOMBINANT, ADJUVANTED 50 MCG/0.5
KIT INTRAMUSCULAR
Qty: 0.5 ML | Refills: 1 | Status: SHIPPED | OUTPATIENT
Start: 2024-09-16

## 2024-09-16 RX ORDER — LOSARTAN POTASSIUM 25 MG/1
25 TABLET ORAL DAILY
COMMUNITY

## 2024-09-16 SDOH — ECONOMIC STABILITY: INCOME INSECURITY: HOW HARD IS IT FOR YOU TO PAY FOR THE VERY BASICS LIKE FOOD, HOUSING, MEDICAL CARE, AND HEATING?: NOT HARD AT ALL

## 2024-09-16 SDOH — ECONOMIC STABILITY: FOOD INSECURITY: WITHIN THE PAST 12 MONTHS, THE FOOD YOU BOUGHT JUST DIDN'T LAST AND YOU DIDN'T HAVE MONEY TO GET MORE.: SOMETIMES TRUE

## 2024-09-16 SDOH — ECONOMIC STABILITY: FOOD INSECURITY: WITHIN THE PAST 12 MONTHS, YOU WORRIED THAT YOUR FOOD WOULD RUN OUT BEFORE YOU GOT MONEY TO BUY MORE.: SOMETIMES TRUE

## 2024-09-16 ASSESSMENT — PATIENT HEALTH QUESTIONNAIRE - PHQ9
SUM OF ALL RESPONSES TO PHQ QUESTIONS 1-9: 0
SUM OF ALL RESPONSES TO PHQ QUESTIONS 1-9: 0
3. TROUBLE FALLING OR STAYING ASLEEP: NOT AT ALL
SUM OF ALL RESPONSES TO PHQ9 QUESTIONS 1 & 2: 0
SUM OF ALL RESPONSES TO PHQ QUESTIONS 1-9: 0
2. FEELING DOWN, DEPRESSED OR HOPELESS: NOT AT ALL
SUM OF ALL RESPONSES TO PHQ QUESTIONS 1-9: 0
7. TROUBLE CONCENTRATING ON THINGS, SUCH AS READING THE NEWSPAPER OR WATCHING TELEVISION: NOT AT ALL
7. TROUBLE CONCENTRATING ON THINGS, SUCH AS READING THE NEWSPAPER OR WATCHING TELEVISION: NOT AT ALL
6. FEELING BAD ABOUT YOURSELF - OR THAT YOU ARE A FAILURE OR HAVE LET YOURSELF OR YOUR FAMILY DOWN: NOT AT ALL
6. FEELING BAD ABOUT YOURSELF - OR THAT YOU ARE A FAILURE OR HAVE LET YOURSELF OR YOUR FAMILY DOWN: NOT AT ALL
SUM OF ALL RESPONSES TO PHQ QUESTIONS 1-9: 0
3. TROUBLE FALLING OR STAYING ASLEEP: NOT AT ALL
5. POOR APPETITE OR OVEREATING: NOT AT ALL
9. THOUGHTS THAT YOU WOULD BE BETTER OFF DEAD, OR OF HURTING YOURSELF: NOT AT ALL
1. LITTLE INTEREST OR PLEASURE IN DOING THINGS: NOT AT ALL
5. POOR APPETITE OR OVEREATING: NOT AT ALL
SUM OF ALL RESPONSES TO PHQ QUESTIONS 1-9: 0
1. LITTLE INTEREST OR PLEASURE IN DOING THINGS: NOT AT ALL
2. FEELING DOWN, DEPRESSED OR HOPELESS: NOT AT ALL
9. THOUGHTS THAT YOU WOULD BE BETTER OFF DEAD, OR OF HURTING YOURSELF: NOT AT ALL
8. MOVING OR SPEAKING SO SLOWLY THAT OTHER PEOPLE COULD HAVE NOTICED. OR THE OPPOSITE, BEING SO FIGETY OR RESTLESS THAT YOU HAVE BEEN MOVING AROUND A LOT MORE THAN USUAL: NOT AT ALL
8. MOVING OR SPEAKING SO SLOWLY THAT OTHER PEOPLE COULD HAVE NOTICED. OR THE OPPOSITE, BEING SO FIGETY OR RESTLESS THAT YOU HAVE BEEN MOVING AROUND A LOT MORE THAN USUAL: NOT AT ALL
SUM OF ALL RESPONSES TO PHQ QUESTIONS 1-9: 0
10. IF YOU CHECKED OFF ANY PROBLEMS, HOW DIFFICULT HAVE THESE PROBLEMS MADE IT FOR YOU TO DO YOUR WORK, TAKE CARE OF THINGS AT HOME, OR GET ALONG WITH OTHER PEOPLE: NOT DIFFICULT AT ALL
10. IF YOU CHECKED OFF ANY PROBLEMS, HOW DIFFICULT HAVE THESE PROBLEMS MADE IT FOR YOU TO DO YOUR WORK, TAKE CARE OF THINGS AT HOME, OR GET ALONG WITH OTHER PEOPLE: NOT DIFFICULT AT ALL
SUM OF ALL RESPONSES TO PHQ9 QUESTIONS 1 & 2: 0
4. FEELING TIRED OR HAVING LITTLE ENERGY: NOT AT ALL
4. FEELING TIRED OR HAVING LITTLE ENERGY: NOT AT ALL
SUM OF ALL RESPONSES TO PHQ QUESTIONS 1-9: 0

## 2024-09-16 ASSESSMENT — ANXIETY QUESTIONNAIRES
GAD7 TOTAL SCORE: 0
5. BEING SO RESTLESS THAT IT IS HARD TO SIT STILL: NOT AT ALL
1. FEELING NERVOUS, ANXIOUS, OR ON EDGE: NOT AT ALL
4. TROUBLE RELAXING: NOT AT ALL
2. NOT BEING ABLE TO STOP OR CONTROL WORRYING: NOT AT ALL
7. FEELING AFRAID AS IF SOMETHING AWFUL MIGHT HAPPEN: NOT AT ALL
3. WORRYING TOO MUCH ABOUT DIFFERENT THINGS: NOT AT ALL
6. BECOMING EASILY ANNOYED OR IRRITABLE: NOT AT ALL
IF YOU CHECKED OFF ANY PROBLEMS ON THIS QUESTIONNAIRE, HOW DIFFICULT HAVE THESE PROBLEMS MADE IT FOR YOU TO DO YOUR WORK, TAKE CARE OF THINGS AT HOME, OR GET ALONG WITH OTHER PEOPLE: NOT DIFFICULT AT ALL

## 2024-09-20 PROBLEM — D50.9 IRON DEFICIENCY ANEMIA OF PREGNANCY: Status: RESOLVED | Noted: 2022-06-21 | Resolved: 2024-09-20

## 2024-09-20 PROBLEM — K52.9 COLITIS: Status: RESOLVED | Noted: 2022-08-29 | Resolved: 2024-09-20

## 2024-09-20 PROBLEM — M62.81 MUSCLE WEAKNESS (GENERALIZED): Status: RESOLVED | Noted: 2022-11-12 | Resolved: 2024-09-20

## 2024-09-20 PROBLEM — O99.019 IRON DEFICIENCY ANEMIA OF PREGNANCY: Status: RESOLVED | Noted: 2022-06-21 | Resolved: 2024-09-20

## 2024-09-20 PROBLEM — K92.1 MELENA: Status: RESOLVED | Noted: 2022-07-13 | Resolved: 2024-09-20

## 2024-09-20 PROBLEM — F32.1 MODERATE SINGLE CURRENT EPISODE OF MAJOR DEPRESSIVE DISORDER (HCC): Status: RESOLVED | Noted: 2017-02-03 | Resolved: 2024-09-20

## 2024-09-20 PROBLEM — M62.81 MUSCLE WEAKNESS: Status: RESOLVED | Noted: 2024-09-16 | Resolved: 2024-09-20

## 2024-09-20 ASSESSMENT — ENCOUNTER SYMPTOMS
RHINORRHEA: 0
COLOR CHANGE: 0
EYE DISCHARGE: 0
APNEA: 0
NAUSEA: 0
SORE THROAT: 0
SINUS PRESSURE: 0
RESPIRATORY NEGATIVE: 1
SINUS PAIN: 0
BLOOD IN STOOL: 0
RECTAL PAIN: 0
VOICE CHANGE: 0
ABDOMINAL DISTENTION: 0
PHOTOPHOBIA: 0
ALLERGIC/IMMUNOLOGIC NEGATIVE: 1
EYE PAIN: 0
WHEEZING: 0
DIARRHEA: 1
CHEST TIGHTNESS: 0
EYE REDNESS: 0
COUGH: 0
EYE ITCHING: 0
CONSTIPATION: 0
STRIDOR: 0
EYES NEGATIVE: 1
ANAL BLEEDING: 0
FACIAL SWELLING: 0
SHORTNESS OF BREATH: 0
BACK PAIN: 0
ABDOMINAL PAIN: 0
CHOKING: 0
TROUBLE SWALLOWING: 0
VOMITING: 0

## 2024-09-30 ENCOUNTER — CARE COORDINATION (OUTPATIENT)
Dept: CARE COORDINATION | Facility: CLINIC | Age: 49
End: 2024-09-30

## 2024-09-30 ENCOUNTER — OFFICE VISIT (OUTPATIENT)
Dept: ENDOCRINOLOGY | Age: 49
End: 2024-09-30
Payer: COMMERCIAL

## 2024-09-30 VITALS
OXYGEN SATURATION: 97 % | BODY MASS INDEX: 21.2 KG/M2 | HEART RATE: 74 BPM | WEIGHT: 139.4 LBS | SYSTOLIC BLOOD PRESSURE: 122 MMHG | DIASTOLIC BLOOD PRESSURE: 86 MMHG

## 2024-09-30 DIAGNOSIS — E10.65 TYPE 1 DIABETES MELLITUS WITH HYPERGLYCEMIA (HCC): Primary | ICD-10-CM

## 2024-09-30 DIAGNOSIS — R27.8 POOR MANUAL DEXTERITY: ICD-10-CM

## 2024-09-30 DIAGNOSIS — E11.610 CHARCOT FOOT DUE TO DIABETES MELLITUS (HCC): Chronic | ICD-10-CM

## 2024-09-30 DIAGNOSIS — N18.4 CHRONIC KIDNEY DISEASE (CKD), STAGE IV (SEVERE) (HCC): Chronic | ICD-10-CM

## 2024-09-30 PROCEDURE — 99214 OFFICE O/P EST MOD 30 MIN: CPT | Performed by: PHYSICIAN ASSISTANT

## 2024-09-30 PROCEDURE — 3074F SYST BP LT 130 MM HG: CPT | Performed by: PHYSICIAN ASSISTANT

## 2024-09-30 PROCEDURE — 3079F DIAST BP 80-89 MM HG: CPT | Performed by: PHYSICIAN ASSISTANT

## 2024-09-30 PROCEDURE — 95251 CONT GLUC MNTR ANALYSIS I&R: CPT | Performed by: PHYSICIAN ASSISTANT

## 2024-09-30 RX ORDER — ACYCLOVIR 400 MG/1
TABLET ORAL
Qty: 9 EACH | Refills: 3 | Status: SHIPPED | OUTPATIENT
Start: 2024-09-30

## 2024-09-30 RX ORDER — PEN NEEDLE, DIABETIC 32GX 5/32"
NEEDLE, DISPOSABLE MISCELLANEOUS
Qty: 500 EACH | Refills: 3 | Status: SHIPPED | OUTPATIENT
Start: 2024-09-30

## 2024-09-30 RX ORDER — INSULIN DEGLUDEC 100 U/ML
INJECTION, SOLUTION SUBCUTANEOUS
Qty: 9 ML | Refills: 1 | Status: SHIPPED | OUTPATIENT
Start: 2024-09-30

## 2024-09-30 NOTE — PROGRESS NOTES
09/16/2019      0.972               06/19/2020      2.400    04/01/2023 1.930         Lab Results   Component Value Date/Time    TSH 1.930 04/01/2023 10:34 PM    TSH 2.950 01/20/2023 09:37 AM    TSH 2.400 06/19/2020 12:11 PM    TSH 0.972 09/16/2019 02:50 PM                          Allergies & Medications:  Reviewed in chart.        Review of Systems    Vital Signs:  /86 (Site: Right Upper Arm, Position: Sitting, Cuff Size: Medium Adult)   Pulse 74   Wt 63.2 kg (139 lb 6.4 oz)   SpO2 97%   BMI 21.20 kg/m²       Physical Exam  HENT:      Head: Normocephalic.   Neck:      Thyroid: No thyroid mass or thyromegaly.      Vascular: No carotid bruit.   Cardiovascular:      Rate and Rhythm: Normal rate and regular rhythm.   Pulmonary:      Effort: Pulmonary effort is normal.      Breath sounds: Normal breath sounds.   Abdominal:      Palpations: Abdomen is soft.   Musculoskeletal:      Cervical back: Neck supple.      Right lower leg: No edema.      Left lower leg: No edema.      Right foot: Charcot foot present.   Feet:      Right foot:      Protective Sensation: 3 sites tested.  2 sites sensed.      Skin integrity: Skin integrity normal.      Left foot:      Protective Sensation: 3 sites tested.   1 site sensed.     Comments: Left foot s/p 5th toe amputation, well healed  Distal tip of left hallux with callus  Pre-ulcerative Callus in fold an plantar aspect of right foot associated with charcot changes. no heat, erythema, purulence, or sign of infection        Right mid foot pola protrusion     Debris indicating walking barefoot  Lymphadenopathy:      Cervical: No cervical adenopathy.   Skin:     General: Skin is warm and dry.   Neurological:      Mental Status: She is alert.      Sensory: Sensation is intact.      Comments: Neuropathic changes to bilateral hands with resulting poor dexterity    Psychiatric:         Mood and Affect: Mood normal.         Behavior: Behavior normal.         Thought Content:

## 2024-09-30 NOTE — CARE COORDINATION
Ambulatory Care Coordination Note     9/30/2024 11:09 AM     Care Summary Note:   Patient in for Endocrinology visit today.  Reminders to patient   - no insulin between meals; no corrective insulin   - no high calorie drinks  Patient hoping to get her pump by next month  Spoke with Yusuf Chandler (pump rep) - patient has been in touch re: amount she owes:  approximately $250.00, which meets her deductible for the year.  Plans to take care of this purchase during the month of October.     ACM: Heather Redman RN    Has the patient been seen in the ED since your last call? no    Care Planning:    Goals Addressed                   This Visit's Progress     Patient verbalizes understanding of self -management goals of living with Diabetes.   On track     ACM to support in self management of diabetes   - may encourage a return to diabetes classes   - weekly call with patient to ascertain blood sugar trends   - involve SW as needed to support insulin affordability               PCP/Specialist follow up:   Future Appointments         Provider Specialty Dept Phone    11/18/2024 2:00 PM Anna Hoskins PA-C Endocrinology [phone]    12/18/2024 9:30 AM Linda Mckee, APRN - CNP Family Medicine [phone]    2/5/2025 1:00 PM Anna Hoskins PA-C Endocrinology [phone]

## 2024-10-08 ENCOUNTER — CARE COORDINATION (OUTPATIENT)
Dept: CARE COORDINATION | Facility: CLINIC | Age: 49
End: 2024-10-08

## 2024-10-08 DIAGNOSIS — G62.9 NEUROPATHY: ICD-10-CM

## 2024-10-08 RX ORDER — GABAPENTIN 100 MG/1
100 CAPSULE ORAL 3 TIMES DAILY
Qty: 270 CAPSULE | Refills: 0 | Status: SHIPPED | OUTPATIENT
Start: 2024-10-08 | End: 2025-01-06

## 2024-10-08 NOTE — CARE COORDINATION
Ambulatory Care Coordination Note     10/8/2024 9:46 AM     Care Summary Note:   Incoming call from patient  She is in need of refill on Gabapentin  ACM will inquire of new PCP.     ACM: Heather Redman RN     Challenges to be reviewed by the provider   Additional needs identified to be addressed with provider Yes  Needs refill on gabapentine               Method of communication with provider: chart routing.    Has the patient been seen in the ED since your last call? no    PCP/Specialist follow up:   Future Appointments         Provider Specialty Dept Phone    11/18/2024 2:00 PM Anna Hoskins PA-C Endocrinology [phone]    12/18/2024 9:30 AM Linda Mckee, APRN - CNP Family Medicine [phone]    2/5/2025 1:00 PM Anna Hoskins PA-C Endocrinology [phone]

## 2024-10-15 ENCOUNTER — CARE COORDINATION (OUTPATIENT)
Dept: CARE COORDINATION | Facility: CLINIC | Age: 49
End: 2024-10-15

## 2024-10-15 NOTE — CARE COORDINATION
Ambulatory Care Coordination Note     10/15/2024 3:09 PM     Care Summary Note:   Outreach for High Risk Case Management - spoke with patient  Reports she is planning to call and pay for her pump - TODAY.  Patient to call ACM with update.     ACM: Heather Redman RN     Method of communication with provider: chart routing.    Has the patient been seen in the ED since your last call? no    PCP/Specialist follow up:   Future Appointments         Provider Specialty Dept Phone    11/18/2024 2:00 PM Anna Hoskins PA-C Endocrinology [phone]    12/18/2024 9:30 AM Linda Mckee, APRN - CNP Family Medicine [phone]    2/5/2025 1:00 PM Anna Hoskins PA-C Endocrinology [phone]

## 2024-10-21 ENCOUNTER — TELEPHONE (OUTPATIENT)
Dept: ENDOCRINOLOGY | Age: 49
End: 2024-10-21

## 2024-10-21 NOTE — TELEPHONE ENCOUNTER
Patient called the on call physician on Saturday 10/19/2024 complaining of high blood glucoses in the 200s.  She stated that she tried injecting at a different site than her usual site in her abdomen, and that seemed to help.  We discussed alternate sites for injection and I encouraged her to call back with continued problems with hyperglycemia.

## 2024-10-22 ENCOUNTER — CARE COORDINATION (OUTPATIENT)
Dept: CARE COORDINATION | Facility: CLINIC | Age: 49
End: 2024-10-22

## 2024-10-22 NOTE — CARE COORDINATION
Ambulatory Care Coordination Note     10/22/2024 11:54 AM     Care Summary Note:   Incoming call from patient  She did not order her pump  States she is having second thoughts about this, doesn't understand the reasons for getting one.  Reviewed this with patient:   - improve blood sugar control   - improved HgbA1c   - Less opportunity for hypoglycemia and large swings in blood sugar   - improved dose delivery  Reiterated with patient that all these things need to happen towards the goal of getting a transplant.  And if she is able to get a transplant, she will still need to be vigilant about BS - the pump will help with all that.  Also discussed better BS control will slow down some of the affects she is having from her diabetes - neuropathy, retinopathy, and possibly prevent other affects.    Discussed injection site rotation.  Patient has not been doing this.  Of note - called emergency line on the weekend to ask about this.    ACM will continue to follow.     ACM: Heather Redman RN     Method of communication with provider: chart routing.    Has the patient been seen in the ED since your last call? no    PCP/Specialist follow up:   Future Appointments         Provider Specialty Dept Phone    11/5/2024 9:45 AM Grinnell, Melissa R, PA-C Gastroenterology [phone]    11/18/2024 2:00 PM Anna Hoskins PA-C Endocrinology [phone]    12/18/2024 9:30 AM Linda Mckee APRN - CNP Family Medicine [phone]    2/5/2025 1:00 PM Anna Hoskins PA-C Endocrinology [phone]                
21-May-2022 03:00

## 2024-10-29 ENCOUNTER — CARE COORDINATION (OUTPATIENT)
Dept: CARE COORDINATION | Facility: CLINIC | Age: 49
End: 2024-10-29

## 2024-10-29 NOTE — CARE COORDINATION
Ambulatory Care Coordination Note     10/29/2024 1:23 PM   Care Summary Note:   Outreach for High Risk Case Management  Incoming call from patient  She has her pump.  Advised to contact endocrinology re: pump set up.     ACM: Heather Redman RN    Method of communication with provider: chart routing.    Has the patient been seen in the ED since your last call? no      PCP/Specialist follow up:   Future Appointments         Provider Specialty Dept Phone    11/5/2024 9:45 AM Grinnell, Melissa R, PA-C Gastroenterology [phone]    11/18/2024 2:00 PM Anna Hoskins PA-C Endocrinology [phone]    12/18/2024 9:30 AM Linda Mckee, APRN - CNP Family Medicine [phone]    2/5/2025 1:00 PM Anna Hoskins PA-C Endocrinology [phone]

## 2024-11-05 ENCOUNTER — CARE COORDINATION (OUTPATIENT)
Dept: CARE COORDINATION | Facility: CLINIC | Age: 49
End: 2024-11-05

## 2024-11-05 NOTE — CARE COORDINATION
Ambulatory Care Coordination Note     11/5/2024 1:24 PM     Care Summary Note:   Outreach for High Risk Case Management  Incoming call from patient  Confirming time for appointment with pump representative   - Monday 11/11 at 10:00.  ACM will try to be there.   - Endocrinology appointment 11/18     ACM: Heather Redman, DYANA     Method of communication with provider: chart routing.    Utilization: Patient has not had any utilization since our last call.     PCP/Specialist follow up:   Future Appointments         Provider Specialty Dept Phone    11/18/2024 2:00 PM Anna Hoskins PA-C Endocrinology [phone]    12/2/2024 10:15 AM Grinnell, Melissa R, PA-C Gastroenterology [phone]    12/18/2024 9:30 AM Linda Mckee, APRN - CNP Family Medicine [phone]    2/5/2025 1:00 PM Anna Hoskins PA-C Endocrinology [phone]

## 2024-11-12 ENCOUNTER — CARE COORDINATION (OUTPATIENT)
Dept: CARE COORDINATION | Facility: CLINIC | Age: 49
End: 2024-11-12

## 2024-11-12 DIAGNOSIS — E10.65 TYPE 1 DIABETES MELLITUS WITH HYPERGLYCEMIA (HCC): Primary | ICD-10-CM

## 2024-11-12 RX ORDER — INSULIN LISPRO 100 [IU]/ML
INJECTION, SOLUTION INTRAVENOUS; SUBCUTANEOUS
Qty: 60 ML | Refills: 3 | Status: SHIPPED | OUTPATIENT
Start: 2024-11-12

## 2024-11-12 NOTE — CARE COORDINATION
Ambulatory Care Coordination Note     11/12/2024 10:44 AM     Care Summary Note:   Outreach for High Risk Care Management - spoke with patient  Patient in yesterday with pump representative for hands on training.  One of patient's insulin pens was used to fill the pump.  She will need 'script for a vial.  BS low this morning - she had taken her long acting insulin last evening.  Realizes now that she doesn't need to take her long acting anymore.    Patient has endocrinology appointment next week.     ACM: Heather Redmna RN     Challenges to be reviewed by the provider   Additional needs identified to be addressed with provider Yes  Needs insulin 'script for a vial - to fill the pump.               Method of communication with provider: chart routing.    Utilization: Has the patient been seen in the ED since your last call? no    PCP/Specialist follow up:   Future Appointments         Provider Specialty Dept Phone    11/18/2024 2:00 PM Anna Hoskins PA-C Endocrinology [phone]    12/2/2024 10:15 AM Grinnell, Melissa R, PA-C Gastroenterology [phone]    12/18/2024 9:30 AM Linda Mckee, APRN - CNP Family Medicine [phone]    2/5/2025 1:00 PM Anna Hoskins PA-C Endocrinology [phone]

## 2024-11-14 ENCOUNTER — HOSPITAL ENCOUNTER (EMERGENCY)
Age: 49
Discharge: ANOTHER HOSPITAL | End: 2024-11-15
Attending: EMERGENCY MEDICINE
Payer: COMMERCIAL

## 2024-11-14 DIAGNOSIS — E10.10 TYPE 1 DIABETES MELLITUS WITH KETOACIDOSIS WITHOUT COMA (HCC): Primary | ICD-10-CM

## 2024-11-14 LAB
ALBUMIN SERPL-MCNC: 3.3 G/DL (ref 3.5–5)
ALBUMIN/GLOB SERPL: 0.9 (ref 1–1.9)
ALP SERPL-CCNC: 171 U/L (ref 35–104)
ALT SERPL-CCNC: 18 U/L (ref 8–45)
ANION GAP SERPL CALC-SCNC: 19 MMOL/L (ref 7–16)
APPEARANCE UR: ABNORMAL
AST SERPL-CCNC: 26 U/L (ref 15–37)
BACTERIA URNS QL MICRO: ABNORMAL /HPF
BASOPHILS # BLD: 0 K/UL (ref 0–0.2)
BASOPHILS NFR BLD: 0 % (ref 0–2)
BILIRUB SERPL-MCNC: 0.3 MG/DL (ref 0–1.2)
BILIRUB UR QL: NEGATIVE
BUN SERPL-MCNC: 51 MG/DL (ref 6–23)
CALCIUM SERPL-MCNC: 8.4 MG/DL (ref 8.8–10.2)
CHLORIDE SERPL-SCNC: 102 MMOL/L (ref 98–107)
CO2 SERPL-SCNC: 14 MMOL/L (ref 20–29)
COLOR UR: ABNORMAL
CREAT SERPL-MCNC: 2.71 MG/DL (ref 0.6–1.1)
DIFFERENTIAL METHOD BLD: ABNORMAL
EOSINOPHIL # BLD: 0.4 K/UL (ref 0–0.8)
EOSINOPHIL NFR BLD: 7 % (ref 0.5–7.8)
EPI CELLS #/AREA URNS HPF: ABNORMAL /HPF
ERYTHROCYTE [DISTWIDTH] IN BLOOD BY AUTOMATED COUNT: 13.9 % (ref 11.9–14.6)
GLOBULIN SER CALC-MCNC: 3.6 G/DL (ref 2.3–3.5)
GLUCOSE BLD STRIP.AUTO-MCNC: 371 MG/DL (ref 65–100)
GLUCOSE BLD STRIP.AUTO-MCNC: 423 MG/DL (ref 65–100)
GLUCOSE BLD STRIP.AUTO-MCNC: 536 MG/DL (ref 65–100)
GLUCOSE SERPL-MCNC: 630 MG/DL (ref 70–99)
GLUCOSE UR STRIP.AUTO-MCNC: >1000 MG/DL
HCT VFR BLD AUTO: 33.2 % (ref 35.8–46.3)
HGB BLD-MCNC: 10 G/DL (ref 11.7–15.4)
HGB UR QL STRIP: NEGATIVE
IMM GRANULOCYTES # BLD AUTO: 0 K/UL (ref 0–0.5)
IMM GRANULOCYTES NFR BLD AUTO: 0 % (ref 0–5)
KETONES UR QL STRIP.AUTO: ABNORMAL MG/DL
LEUKOCYTE ESTERASE UR QL STRIP.AUTO: ABNORMAL
LYMPHOCYTES # BLD: 2.1 K/UL (ref 0.5–4.6)
LYMPHOCYTES NFR BLD: 36 % (ref 13–44)
MCH RBC QN AUTO: 25.7 PG (ref 26.1–32.9)
MCHC RBC AUTO-ENTMCNC: 30.1 G/DL (ref 31.4–35)
MCV RBC AUTO: 85.3 FL (ref 82–102)
MONOCYTES # BLD: 0.4 K/UL (ref 0.1–1.3)
MONOCYTES NFR BLD: 7 % (ref 4–12)
NEUTS SEG # BLD: 2.9 K/UL (ref 1.7–8.2)
NEUTS SEG NFR BLD: 49 % (ref 43–78)
NITRITE UR QL STRIP.AUTO: NEGATIVE
NRBC # BLD: 0 K/UL (ref 0–0.2)
OTHER OBSERVATIONS: ABNORMAL
PH UR STRIP: 5.5 (ref 5–9)
PLATELET # BLD AUTO: 206 K/UL (ref 150–450)
PMV BLD AUTO: 10.7 FL (ref 9.4–12.3)
POTASSIUM SERPL-SCNC: 5.4 MMOL/L (ref 3.5–5.1)
PROT SERPL-MCNC: 6.9 G/DL (ref 6.3–8.2)
PROT UR STRIP-MCNC: ABNORMAL MG/DL
RBC # BLD AUTO: 3.89 M/UL (ref 4.05–5.2)
RBC #/AREA URNS HPF: ABNORMAL /HPF
SERVICE CMNT-IMP: ABNORMAL
SODIUM SERPL-SCNC: 135 MMOL/L (ref 136–145)
SP GR UR REFRACTOMETRY: 1.01 (ref 1–1.02)
UROBILINOGEN UR QL STRIP.AUTO: 0.2 EU/DL (ref 0.2–1)
WBC # BLD AUTO: 5.9 K/UL (ref 4.3–11.1)
WBC URNS QL MICRO: ABNORMAL /HPF

## 2024-11-14 PROCEDURE — 85025 COMPLETE CBC W/AUTO DIFF WBC: CPT

## 2024-11-14 PROCEDURE — 99285 EMERGENCY DEPT VISIT HI MDM: CPT

## 2024-11-14 PROCEDURE — 81001 URINALYSIS AUTO W/SCOPE: CPT

## 2024-11-14 PROCEDURE — 2580000003 HC RX 258: Performed by: EMERGENCY MEDICINE

## 2024-11-14 PROCEDURE — 82962 GLUCOSE BLOOD TEST: CPT

## 2024-11-14 PROCEDURE — 6370000000 HC RX 637 (ALT 250 FOR IP): Performed by: EMERGENCY MEDICINE

## 2024-11-14 PROCEDURE — 96376 TX/PRO/DX INJ SAME DRUG ADON: CPT

## 2024-11-14 PROCEDURE — 83036 HEMOGLOBIN GLYCOSYLATED A1C: CPT

## 2024-11-14 PROCEDURE — 96365 THER/PROPH/DIAG IV INF INIT: CPT

## 2024-11-14 PROCEDURE — 80053 COMPREHEN METABOLIC PANEL: CPT

## 2024-11-14 RX ORDER — SODIUM CHLORIDE 9 MG/ML
INJECTION, SOLUTION INTRAVENOUS CONTINUOUS
Status: DISCONTINUED | OUTPATIENT
Start: 2024-11-14 | End: 2024-11-15 | Stop reason: HOSPADM

## 2024-11-14 RX ORDER — 0.9 % SODIUM CHLORIDE 0.9 %
15 INTRAVENOUS SOLUTION INTRAVENOUS ONCE
Status: COMPLETED | OUTPATIENT
Start: 2024-11-14 | End: 2024-11-15

## 2024-11-14 RX ORDER — MAGNESIUM SULFATE IN WATER 40 MG/ML
2000 INJECTION, SOLUTION INTRAVENOUS PRN
Status: DISCONTINUED | OUTPATIENT
Start: 2024-11-14 | End: 2024-11-15 | Stop reason: HOSPADM

## 2024-11-14 RX ORDER — DEXTROSE MONOHYDRATE AND SODIUM CHLORIDE 5; .45 G/100ML; G/100ML
INJECTION, SOLUTION INTRAVENOUS CONTINUOUS PRN
Status: DISCONTINUED | OUTPATIENT
Start: 2024-11-14 | End: 2024-11-15 | Stop reason: HOSPADM

## 2024-11-14 RX ORDER — POTASSIUM CHLORIDE 7.45 MG/ML
10 INJECTION INTRAVENOUS PRN
Status: DISCONTINUED | OUTPATIENT
Start: 2024-11-14 | End: 2024-11-15 | Stop reason: HOSPADM

## 2024-11-14 RX ORDER — 0.9 % SODIUM CHLORIDE 0.9 %
1000 INTRAVENOUS SOLUTION INTRAVENOUS ONCE
Status: COMPLETED | OUTPATIENT
Start: 2024-11-14 | End: 2024-11-15

## 2024-11-14 RX ADMIN — SODIUM CHLORIDE 1000 ML: 9 INJECTION, SOLUTION INTRAVENOUS at 21:11

## 2024-11-14 RX ADMIN — INSULIN HUMAN 10 UNITS: 100 INJECTION, SOLUTION PARENTERAL at 21:11

## 2024-11-14 RX ADMIN — SODIUM CHLORIDE 6.2 UNITS/HR: 9 INJECTION, SOLUTION INTRAVENOUS at 23:00

## 2024-11-14 RX ADMIN — SODIUM CHLORIDE 864 ML: 9 INJECTION, SOLUTION INTRAVENOUS at 23:26

## 2024-11-14 ASSESSMENT — ENCOUNTER SYMPTOMS
SHORTNESS OF BREATH: 0
BACK PAIN: 0
NAUSEA: 0
COUGH: 0
SORE THROAT: 0
DIARRHEA: 0
VOMITING: 0
ABDOMINAL PAIN: 0

## 2024-11-14 ASSESSMENT — PAIN - FUNCTIONAL ASSESSMENT: PAIN_FUNCTIONAL_ASSESSMENT: NONE - DENIES PAIN

## 2024-11-14 ASSESSMENT — PAIN SCALES - GENERAL: PAINLEVEL_OUTOF10: 0

## 2024-11-15 ENCOUNTER — CARE COORDINATION (OUTPATIENT)
Dept: CARE COORDINATION | Facility: CLINIC | Age: 49
End: 2024-11-15

## 2024-11-15 ENCOUNTER — HOSPITAL ENCOUNTER (INPATIENT)
Age: 49
LOS: 1 days | Discharge: HOME | DRG: 638 | End: 2024-11-16
Attending: FAMILY MEDICINE | Admitting: FAMILY MEDICINE
Payer: COMMERCIAL

## 2024-11-15 VITALS
TEMPERATURE: 98 F | BODY MASS INDEX: 19.31 KG/M2 | HEART RATE: 87 BPM | DIASTOLIC BLOOD PRESSURE: 97 MMHG | OXYGEN SATURATION: 98 % | WEIGHT: 127 LBS | SYSTOLIC BLOOD PRESSURE: 150 MMHG | RESPIRATION RATE: 19 BRPM

## 2024-11-15 DIAGNOSIS — E10.65 TYPE 1 DIABETES MELLITUS WITH HYPERGLYCEMIA (HCC): ICD-10-CM

## 2024-11-15 LAB
ANION GAP SERPL CALC-SCNC: 10 MMOL/L (ref 7–16)
ANION GAP SERPL CALC-SCNC: 11 MMOL/L (ref 7–16)
ANION GAP SERPL CALC-SCNC: 12 MMOL/L (ref 7–16)
ANION GAP SERPL CALC-SCNC: 13 MMOL/L (ref 7–16)
BASOPHILS # BLD: 0 K/UL (ref 0–0.2)
BASOPHILS NFR BLD: 0 % (ref 0–2)
BUN SERPL-MCNC: 38 MG/DL (ref 6–23)
BUN SERPL-MCNC: 41 MG/DL (ref 6–23)
BUN SERPL-MCNC: 44 MG/DL (ref 6–23)
BUN SERPL-MCNC: 46 MG/DL (ref 6–23)
CALCIUM SERPL-MCNC: 7.8 MG/DL (ref 8.8–10.2)
CALCIUM SERPL-MCNC: 8.1 MG/DL (ref 8.8–10.2)
CHLORIDE SERPL-SCNC: 112 MMOL/L (ref 98–107)
CHLORIDE SERPL-SCNC: 112 MMOL/L (ref 98–107)
CHLORIDE SERPL-SCNC: 113 MMOL/L (ref 98–107)
CHLORIDE SERPL-SCNC: 115 MMOL/L (ref 98–107)
CO2 SERPL-SCNC: 15 MMOL/L (ref 20–29)
CO2 SERPL-SCNC: 16 MMOL/L (ref 20–29)
CO2 SERPL-SCNC: 17 MMOL/L (ref 20–29)
CO2 SERPL-SCNC: 18 MMOL/L (ref 20–29)
CREAT SERPL-MCNC: 2.55 MG/DL (ref 0.6–1.1)
CREAT SERPL-MCNC: 2.58 MG/DL (ref 0.6–1.1)
CREAT SERPL-MCNC: 2.58 MG/DL (ref 0.6–1.1)
CREAT SERPL-MCNC: 2.64 MG/DL (ref 0.6–1.1)
DIFFERENTIAL METHOD BLD: ABNORMAL
EOSINOPHIL # BLD: 0.5 K/UL (ref 0–0.8)
EOSINOPHIL NFR BLD: 7 % (ref 0.5–7.8)
ERYTHROCYTE [DISTWIDTH] IN BLOOD BY AUTOMATED COUNT: 14 % (ref 11.9–14.6)
EST. AVERAGE GLUCOSE BLD GHB EST-MCNC: 218 MG/DL
EST. AVERAGE GLUCOSE BLD GHB EST-MCNC: 218 MG/DL
GLUCOSE BLD STRIP.AUTO-MCNC: 156 MG/DL (ref 65–100)
GLUCOSE BLD STRIP.AUTO-MCNC: 162 MG/DL (ref 65–100)
GLUCOSE BLD STRIP.AUTO-MCNC: 164 MG/DL (ref 65–100)
GLUCOSE BLD STRIP.AUTO-MCNC: 198 MG/DL (ref 65–100)
GLUCOSE BLD STRIP.AUTO-MCNC: 202 MG/DL (ref 65–100)
GLUCOSE BLD STRIP.AUTO-MCNC: 209 MG/DL (ref 65–100)
GLUCOSE BLD STRIP.AUTO-MCNC: 224 MG/DL (ref 65–100)
GLUCOSE BLD STRIP.AUTO-MCNC: 231 MG/DL (ref 65–100)
GLUCOSE BLD STRIP.AUTO-MCNC: 274 MG/DL (ref 65–100)
GLUCOSE BLD STRIP.AUTO-MCNC: 277 MG/DL (ref 65–100)
GLUCOSE BLD STRIP.AUTO-MCNC: 285 MG/DL (ref 65–100)
GLUCOSE BLD STRIP.AUTO-MCNC: 307 MG/DL (ref 65–100)
GLUCOSE BLD STRIP.AUTO-MCNC: 330 MG/DL (ref 65–100)
GLUCOSE BLD STRIP.AUTO-MCNC: 349 MG/DL (ref 65–100)
GLUCOSE SERPL-MCNC: 186 MG/DL (ref 70–99)
GLUCOSE SERPL-MCNC: 204 MG/DL (ref 70–99)
GLUCOSE SERPL-MCNC: 291 MG/DL (ref 70–99)
GLUCOSE SERPL-MCNC: 297 MG/DL (ref 70–99)
HBA1C MFR BLD: 9.2 % (ref 0–5.6)
HBA1C MFR BLD: 9.2 % (ref 0–5.6)
HCT VFR BLD AUTO: 30.1 % (ref 35.8–46.3)
HGB BLD-MCNC: 9.2 G/DL (ref 11.7–15.4)
IMM GRANULOCYTES # BLD AUTO: 0 K/UL (ref 0–0.5)
IMM GRANULOCYTES NFR BLD AUTO: 0 % (ref 0–5)
LYMPHOCYTES # BLD: 2.2 K/UL (ref 0.5–4.6)
LYMPHOCYTES NFR BLD: 33 % (ref 13–44)
MAGNESIUM SERPL-MCNC: 1.8 MG/DL (ref 1.8–2.4)
MAGNESIUM SERPL-MCNC: 1.9 MG/DL (ref 1.8–2.4)
MCH RBC QN AUTO: 25.7 PG (ref 26.1–32.9)
MCHC RBC AUTO-ENTMCNC: 30.6 G/DL (ref 31.4–35)
MCV RBC AUTO: 84.1 FL (ref 82–102)
MONOCYTES # BLD: 0.5 K/UL (ref 0.1–1.3)
MONOCYTES NFR BLD: 8 % (ref 4–12)
NEUTS SEG # BLD: 3.4 K/UL (ref 1.7–8.2)
NEUTS SEG NFR BLD: 52 % (ref 43–78)
NRBC # BLD: 0 K/UL (ref 0–0.2)
PHOSPHATE SERPL-MCNC: 3 MG/DL (ref 2.5–4.5)
PHOSPHATE SERPL-MCNC: 3.5 MG/DL (ref 2.5–4.5)
PHOSPHATE SERPL-MCNC: 3.8 MG/DL (ref 2.5–4.5)
PHOSPHATE SERPL-MCNC: 3.8 MG/DL (ref 2.5–4.5)
PLATELET # BLD AUTO: 174 K/UL (ref 150–450)
PMV BLD AUTO: 10.1 FL (ref 9.4–12.3)
POTASSIUM SERPL-SCNC: 3.9 MMOL/L (ref 3.5–5.1)
POTASSIUM SERPL-SCNC: 4.1 MMOL/L (ref 3.5–5.1)
POTASSIUM SERPL-SCNC: 4.4 MMOL/L (ref 3.5–5.1)
POTASSIUM SERPL-SCNC: 4.7 MMOL/L (ref 3.5–5.1)
RBC # BLD AUTO: 3.58 M/UL (ref 4.05–5.2)
SERVICE CMNT-IMP: ABNORMAL
SODIUM SERPL-SCNC: 140 MMOL/L (ref 136–145)
SODIUM SERPL-SCNC: 143 MMOL/L (ref 136–145)
WBC # BLD AUTO: 6.7 K/UL (ref 4.3–11.1)

## 2024-11-15 PROCEDURE — 82962 GLUCOSE BLOOD TEST: CPT

## 2024-11-15 PROCEDURE — 6370000000 HC RX 637 (ALT 250 FOR IP): Performed by: FAMILY MEDICINE

## 2024-11-15 PROCEDURE — 1100000003 HC PRIVATE W/ TELEMETRY

## 2024-11-15 PROCEDURE — 84100 ASSAY OF PHOSPHORUS: CPT

## 2024-11-15 PROCEDURE — 80048 BASIC METABOLIC PNL TOTAL CA: CPT

## 2024-11-15 PROCEDURE — 6360000002 HC RX W HCPCS: Performed by: FAMILY MEDICINE

## 2024-11-15 PROCEDURE — 83735 ASSAY OF MAGNESIUM: CPT

## 2024-11-15 PROCEDURE — 6370000000 HC RX 637 (ALT 250 FOR IP)

## 2024-11-15 PROCEDURE — 85025 COMPLETE CBC W/AUTO DIFF WBC: CPT

## 2024-11-15 PROCEDURE — 36415 COLL VENOUS BLD VENIPUNCTURE: CPT

## 2024-11-15 PROCEDURE — 83036 HEMOGLOBIN GLYCOSYLATED A1C: CPT

## 2024-11-15 PROCEDURE — 2580000003 HC RX 258: Performed by: FAMILY MEDICINE

## 2024-11-15 RX ORDER — ENOXAPARIN SODIUM 100 MG/ML
30 INJECTION SUBCUTANEOUS DAILY
Status: DISCONTINUED | OUTPATIENT
Start: 2024-11-15 | End: 2024-11-16 | Stop reason: HOSPADM

## 2024-11-15 RX ORDER — POLYETHYLENE GLYCOL 3350 17 G/17G
17 POWDER, FOR SOLUTION ORAL DAILY PRN
Status: DISCONTINUED | OUTPATIENT
Start: 2024-11-15 | End: 2024-11-16 | Stop reason: HOSPADM

## 2024-11-15 RX ORDER — INSULIN LISPRO 100 [IU]/ML
0-8 INJECTION, SOLUTION INTRAVENOUS; SUBCUTANEOUS
Status: DISCONTINUED | OUTPATIENT
Start: 2024-11-15 | End: 2024-11-15

## 2024-11-15 RX ORDER — INSULIN LISPRO 100 [IU]/ML
0-8 INJECTION, SOLUTION INTRAVENOUS; SUBCUTANEOUS
Status: DISCONTINUED | OUTPATIENT
Start: 2024-11-15 | End: 2024-11-16

## 2024-11-15 RX ORDER — ONDANSETRON 2 MG/ML
4 INJECTION INTRAMUSCULAR; INTRAVENOUS EVERY 6 HOURS PRN
Status: DISCONTINUED | OUTPATIENT
Start: 2024-11-15 | End: 2024-11-16 | Stop reason: HOSPADM

## 2024-11-15 RX ORDER — POTASSIUM CHLORIDE 7.45 MG/ML
10 INJECTION INTRAVENOUS PRN
Status: DISCONTINUED | OUTPATIENT
Start: 2024-11-15 | End: 2024-11-16 | Stop reason: HOSPADM

## 2024-11-15 RX ORDER — GABAPENTIN 100 MG/1
100 CAPSULE ORAL 3 TIMES DAILY
Status: DISCONTINUED | OUTPATIENT
Start: 2024-11-15 | End: 2024-11-16 | Stop reason: HOSPADM

## 2024-11-15 RX ORDER — SODIUM CHLORIDE 9 MG/ML
INJECTION, SOLUTION INTRAVENOUS CONTINUOUS
Status: DISCONTINUED | OUTPATIENT
Start: 2024-11-15 | End: 2024-11-15

## 2024-11-15 RX ORDER — 0.9 % SODIUM CHLORIDE 0.9 %
15 INTRAVENOUS SOLUTION INTRAVENOUS ONCE
Status: DISCONTINUED | OUTPATIENT
Start: 2024-11-15 | End: 2024-11-16 | Stop reason: HOSPADM

## 2024-11-15 RX ORDER — OXYCODONE HYDROCHLORIDE 5 MG/1
5 TABLET ORAL EVERY 4 HOURS PRN
Status: DISCONTINUED | OUTPATIENT
Start: 2024-11-15 | End: 2024-11-16 | Stop reason: HOSPADM

## 2024-11-15 RX ORDER — INSULIN GLARGINE 100 [IU]/ML
0.25 INJECTION, SOLUTION SUBCUTANEOUS DAILY
Status: DISCONTINUED | OUTPATIENT
Start: 2024-11-15 | End: 2024-11-16 | Stop reason: HOSPADM

## 2024-11-15 RX ORDER — DEXTROSE MONOHYDRATE 100 MG/ML
INJECTION, SOLUTION INTRAVENOUS CONTINUOUS PRN
Status: DISCONTINUED | OUTPATIENT
Start: 2024-11-15 | End: 2024-11-16 | Stop reason: HOSPADM

## 2024-11-15 RX ORDER — DEXTROSE MONOHYDRATE AND SODIUM CHLORIDE 5; .45 G/100ML; G/100ML
INJECTION, SOLUTION INTRAVENOUS CONTINUOUS PRN
Status: DISCONTINUED | OUTPATIENT
Start: 2024-11-15 | End: 2024-11-15

## 2024-11-15 RX ORDER — IBUPROFEN 600 MG/1
1 TABLET ORAL PRN
Status: DISCONTINUED | OUTPATIENT
Start: 2024-11-15 | End: 2024-11-16 | Stop reason: HOSPADM

## 2024-11-15 RX ORDER — ACETAMINOPHEN 325 MG/1
650 TABLET ORAL EVERY 4 HOURS PRN
Status: DISCONTINUED | OUTPATIENT
Start: 2024-11-15 | End: 2024-11-16 | Stop reason: HOSPADM

## 2024-11-15 RX ORDER — LOSARTAN POTASSIUM 25 MG/1
25 TABLET ORAL DAILY
Status: DISCONTINUED | OUTPATIENT
Start: 2024-11-15 | End: 2024-11-16 | Stop reason: HOSPADM

## 2024-11-15 RX ORDER — MAGNESIUM SULFATE 1 G/100ML
1000 INJECTION INTRAVENOUS PRN
Status: DISCONTINUED | OUTPATIENT
Start: 2024-11-15 | End: 2024-11-16 | Stop reason: HOSPADM

## 2024-11-15 RX ADMIN — DEXTROSE AND SODIUM CHLORIDE: 5; 450 INJECTION, SOLUTION INTRAVENOUS at 08:06

## 2024-11-15 RX ADMIN — GABAPENTIN 100 MG: 100 CAPSULE ORAL at 20:40

## 2024-11-15 RX ADMIN — INSULIN LISPRO 4 UNITS: 100 INJECTION, SOLUTION INTRAVENOUS; SUBCUTANEOUS at 17:23

## 2024-11-15 RX ADMIN — INSULIN GLARGINE 15 UNITS: 100 INJECTION, SOLUTION SUBCUTANEOUS at 13:30

## 2024-11-15 RX ADMIN — INSULIN LISPRO 6 UNITS: 100 INJECTION, SOLUTION INTRAVENOUS; SUBCUTANEOUS at 20:40

## 2024-11-15 RX ADMIN — DEXTROSE AND SODIUM CHLORIDE: 5; 450 INJECTION, SOLUTION INTRAVENOUS at 01:03

## 2024-11-15 RX ADMIN — DEXTROSE AND SODIUM CHLORIDE: 5; 450 INJECTION, SOLUTION INTRAVENOUS at 15:14

## 2024-11-15 RX ADMIN — GABAPENTIN 100 MG: 100 CAPSULE ORAL at 13:41

## 2024-11-15 RX ADMIN — LOSARTAN POTASSIUM 25 MG: 25 TABLET, FILM COATED ORAL at 08:33

## 2024-11-15 RX ADMIN — SODIUM CHLORIDE 0.5 UNITS/HR: 9 INJECTION, SOLUTION INTRAVENOUS at 02:07

## 2024-11-15 RX ADMIN — ENOXAPARIN SODIUM 30 MG: 100 INJECTION SUBCUTANEOUS at 08:33

## 2024-11-15 RX ADMIN — GABAPENTIN 100 MG: 100 CAPSULE ORAL at 08:32

## 2024-11-15 ASSESSMENT — PAIN SCALES - GENERAL
PAINLEVEL_OUTOF10: 0

## 2024-11-15 ASSESSMENT — PAIN DESCRIPTION - DESCRIPTORS: DESCRIPTORS: ACHING

## 2024-11-15 NOTE — PROGRESS NOTES
TRANSFER - IN REPORT:    Verbal report received from Charisse TURPIN on Jelena Leal  being received from ICU for routine progression of patient care      Report consisted of patient's Situation, Background, Assessment and   Recommendations(SBAR).     Information from the following report(s) Nurse Handoff Report, MAR, and Recent Results was reviewed with the receiving nurse.    Opportunity for questions and clarification was provided.      Assessment completed upon patient's arrival to unit and care assumed.

## 2024-11-15 NOTE — H&P
Hospitalist History and Physical   Admit Date:  11/15/2024 12:45 AM   Name:  Jelena Leal   Age:  49 y.o.  Sex:  female  :  1975   MRN:  [id number]     Presenting Complaint: elevated BG  Reason(s) for Admission: Diabetic ketoacidosis without coma associated with type 1 diabetes mellitus (HCC) [E10.10]     History of Present Illness:   Jelena Leal is a 49 y.o. female with medical history of DM1, HTN, CKD4 who presented to [facility] ED with elevated blood glucose.  Patient has recently started using an insulin pump, but it seems to be malfunctioning.  Patient reports that her monitor just says \"HI.\"  Upon ER evaluation, BG is 630.  Anion gap is 19.  Hospitalist asked to admit.  Due to bed availability, patient transferred to [facility] for further care.    Review of Systems:  10 systems reviewed and negative except as noted in HPI.  Assessment & Plan:   * Diabetic ketoacidosis without coma associated with type 1 diabetes mellitus (HCC)  Assessment & Plan  - Recent initiation of insulin pump, seems to be malfunctioning  - Insulin drip ordered  - NPO   - NS for now, transition to D5 1/2NS once BG<250  - Check hgbA1C  - Consult to Diabetic Ed    Chronic kidney disease (CKD), stage IV (severe) ([facility])  Assessment & Plan  - Monitor renal function    Primary hypertension  Assessment & Plan  - BP stable  - Continue home meds        Disposition: inpatient      Past medical history reviewed.    Past Medical History:   Diagnosis Date    Acute renal failure (HCC) 2012    Cardiac arrest (HCC) 2022    CKD (chronic kidney disease) stage 3, GFR 30-59 ml/min ([facility])     Depression with anxiety 2017    Last Assessment & Plan:  Did not start Prozac ordered in previous visit. Has been trying other  alternatives to help with anxiety. Discussed since work is so stressful to  try low dose Buspar twice daily.      Frailty     Gastritis without bleeding 2022    Gastroenteritis, acute 2012     32.9 PG    MCHC 30.1 (L) 31.4 - 35.0 g/dL    RDW 13.9 11.9 - 14.6 %    Platelets 206 150 - 450 K/uL    MPV 10.7 9.4 - 12.3 FL    nRBC 0.00 0.0 - 0.2 K/uL    Differential Type AUTOMATED      Neutrophils % 49 43 - 78 %    Lymphocytes % 36 13 - 44 %    Monocytes % 7 4.0 - 12.0 %    Eosinophils % 7 0.5 - 7.8 %    Basophils % 0 0.0 - 2.0 %    Immature Granulocytes % 0 0.0 - 5.0 %    Neutrophils Absolute 2.9 1.7 - 8.2 K/UL    Lymphocytes Absolute 2.1 0.5 - 4.6 K/UL    Monocytes Absolute 0.4 0.1 - 1.3 K/UL    Eosinophils Absolute 0.4 0.0 - 0.8 K/UL    Basophils Absolute 0.0 0.0 - 0.2 K/UL    Immature Granulocytes Absolute 0.0 0.0 - 0.5 K/UL   Comprehensive Metabolic Panel    Collection Time: 11/14/24  9:01 PM   Result Value Ref Range    Sodium 135 (L) 136 - 145 mmol/L    Potassium 5.4 (H) 3.5 - 5.1 mmol/L    Chloride 102 98 - 107 mmol/L    CO2 14 (LL) 20 - 29 mmol/L    Anion Gap 19 (H) 7 - 16 mmol/L    Glucose 630 (HH) 70 - 99 mg/dL    BUN 51 (H) 6 - 23 MG/DL    Creatinine 2.71 (H) 0.60 - 1.10 MG/DL    Est, Glom Filt Rate 21 (L) >60 ml/min/1.73m2    Calcium 8.4 (L) 8.8 - 10.2 MG/DL    Total Bilirubin 0.3 0.0 - 1.2 MG/DL    ALT 18 8 - 45 U/L    AST 26 15 - 37 U/L    Alk Phosphatase 171 (H) 35 - 104 U/L    Total Protein 6.9 6.3 - 8.2 g/dL    Albumin 3.3 (L) 3.5 - 5.0 g/dL    Globulin 3.6 (H) 2.3 - 3.5 g/dL    Albumin/Globulin Ratio 0.9 (L) 1.0 - 1.9     Urinalysis    Collection Time: 11/14/24  9:01 PM   Result Value Ref Range    Color, UA YELLOW/STRAW      Appearance CLOUDY      Specific [place], UA 1.013 1.001 - 1.023      pH, Urine 5.5 5.0 - 9.0      Protein, UA TRACE (A) NEG mg/dL    Glucose, Ur >1000 (A) NEG mg/dL    Ketones, Urine TRACE (A) NEG mg/dL    Bilirubin, Urine Negative NEG      Blood, Urine Negative NEG      Urobilinogen, Urine 0.2 0.2 - 1.0 EU/dL    Nitrite, Urine Negative NEG      Leukocyte Esterase, Urine SMALL (A) NEG      WBC, UA  0 /hpf    RBC, UA 0-3 0 /hpf    Epithelial Cells, UA 5-10 0 /hpf     BACTERIA, URINE 4+ (H) 0 /hpf    Other observations RESULTS VERIFIED MANUALLY     POCT Glucose    Collection Time: 11/14/24 10:02 PM   Result Value Ref Range    POC Glucose 423 (H) 65 - 100 mg/dL    Performed by: Ariela    POCT Glucose    Collection Time: 11/14/24 10:58 PM   Result Value Ref Range    POC Glucose 371 (H) 65 - 100 mg/dL    Performed by: Ariela    POCT Glucose    Collection Time: 11/14/24 11:54 PM   Result Value Ref Range    POC Glucose 285 (H) 65 - 100 mg/dL    Performed by: Ariela    POCT Glucose    Collection Time: 11/15/24 12:54 AM   Result Value Ref Range    POC Glucose 162 (H) 65 - 100 mg/dL    Performed by: Holly    Basic Metabolic Panel    Collection Time: 11/15/24  2:03 AM   Result Value Ref Range    Sodium 140 136 - 145 mmol/L    Potassium 4.1 3.5 - 5.1 mmol/L    Chloride 112 (H) 98 - 107 mmol/L    CO2 15 (L) 20 - 29 mmol/L    Anion Gap 13 7 - 16 mmol/L    Glucose 291 (H) 70 - 99 mg/dL    BUN 46 (H) 6 - 23 MG/DL    Creatinine 2.58 (H) 0.60 - 1.10 MG/DL    Est, Glom Filt Rate 22 (L) >60 ml/min/1.73m2    Calcium 8.1 (L) 8.8 - 10.2 MG/DL   Magnesium    Collection Time: 11/15/24  2:03 AM   Result Value Ref Range    Magnesium 1.9 1.8 - 2.4 mg/dL   Phosphorus    Collection Time: 11/15/24  2:03 AM   Result Value Ref Range    Phosphorus 3.5 2.5 - 4.5 MG/DL   Hemoglobin A1c    Collection Time: 11/15/24  2:03 AM   Result Value Ref Range    Hemoglobin A1C 9.2 (H) 0 - 5.6 %    Estimated Avg Glucose 218 mg/dL   CBC with Auto Differential    Collection Time: 11/15/24  2:03 AM   Result Value Ref Range    WBC 6.7 4.3 - 11.1 K/uL    RBC 3.58 (L) 4.05 - 5.2 M/uL    Hemoglobin 9.2 (L) 11.7 - 15.4 g/dL    Hematocrit 30.1 (L) 35.8 - 46.3 %    MCV 84.1 82 - 102 FL    MCH 25.7 (L) 26.1 - 32.9 PG    MCHC 30.6 (L) 31.4 - 35.0 g/dL    RDW 14.0 11.9 - 14.6 %    Platelets 174 150 - 450 K/uL    MPV 10.1 9.4 - 12.3 FL    nRBC 0.00 0.0 - 0.2 K/uL    Differential Type AUTOMATED

## 2024-11-15 NOTE — PROGRESS NOTES
4 Eyes Skin Assessment     NAME:  Jelena Leal  YOB: 1975  MEDICAL RECORD NUMBER:  [id number]    The patient is being assessed for  Admission    I agree that at least one RN has performed a thorough Head to Toe Skin Assessment on the patient. ALL assessment sites listed below have been assessed.      Areas assessed by both nurses:    Head, Face, Ears, Shoulders, Back, Chest, Arms, Elbows, Hands, Sacrum. Buttock, Coccyx, Ischium, Legs. Feet and Heels, and Under Medical Devices         Does the Patient have a Wound? No noted wound(s)       Reece Prevention initiated by RN: Yes  Wound Care Orders initiated by RN: No    Pressure Injury (Stage 3,4, Unstageable, DTI, NWPT, and Complex wounds) if present, place Wound referral order by RN under : No    New Ostomies, if present place, Ostomy referral order under : No     Nurse 1 eSignature: Electronically signed by Griselda Mendez RN on 11/15/24 at 6:35 PM EST    **SHARE this note so that the co-signing nurse can place an eSignature**    Nurse 2 eSignature: {Esignature:[id number]}

## 2024-11-15 NOTE — PROGRESS NOTES
TRANSFER - OUT REPORT:    Verbal report given to Griselda TURPIN on Jelena Leal  being transferred to 704 for routine progression of patient care       Report consisted of patient's Situation, Background, Assessment and   Recommendations(SBAR).     Information from the following report(s) Nurse Handoff Report, Intake/Output, MAR, Recent Results, and Cardiac Rhythm SR 70's  was reviewed with the receiving nurse.           Lines:   Peripheral IV 11/14/24 Left;Posterior Forearm (Active)   Site Assessment Clean, dry & intact 11/15/24 1700   Line Status Infusing 11/15/24 1700   Line Care Connections checked and tightened 11/15/24 0808   Phlebitis Assessment No symptoms 11/15/24 1700   Infiltration Assessment 0 11/15/24 1700   Alcohol Cap Used Yes 11/15/24 0808   Dressing Status Clean, dry & intact 11/15/24 1700   Dressing Type Transparent 11/15/24 1700   Dressing Intervention New 11/15/24 0300       Peripheral IV 11/14/24 Posterior;Proximal;Right Forearm (Active)   Site Assessment Clean, dry & intact 11/15/24 1700   Line Status Flushed;Normal saline locked 11/15/24 0808   Line Care Connections checked and tightened 11/15/24 0808   Phlebitis Assessment No symptoms 11/15/24 1700   Infiltration Assessment 0 11/15/24 1700   Alcohol Cap Used Yes 11/15/24 0808   Dressing Status Clean, dry & intact 11/15/24 1700   Dressing Type Transparent 11/15/24 1700        Opportunity for questions and clarification was provided.      Patient transported with:  Registered Nurse  Transported to room in wheelchair alert and oriented. IV fluids infusing at 150 ml/hr.

## 2024-11-15 NOTE — ED PROVIDER NOTES
[facility] Emergency Department Provider Note                   PCP:                Linda Mckee, APRN - CNP               Age: 49 y.o.      Sex: female     MEDICAL DECISION MAKING  Complexity of Problems Addressed:   1 or more acute illness/injury that poses a threat to life or bodily function    Data Reviewed and Analyzed:  Category 1:    I have reviewed outside records from an external source for any pertinent PMH, ED visits, primary care visits, specialist visits, labs, EKG, and/or radiologic studies.    Category 2:       I independently ordered and reviewed the labs.    There was no radiologic studies ordered.      The patient was admitted and I have discussed patient management with the admitting provider.    Category 3:     Discussion of management or test interpretation:    MDM  Number of Diagnoses or Management Options  Type 1 diabetes mellitus with ketoacidosis without coma (HCC)  Diagnosis management comments: Patient is a type I diabetic who was started on an insulin pump 2 days ago but it appears to be malfunctioning.  She has been having high blood sugar readings for today.  Her glucose came back over 600 with elevated anion gap and low bicarb.  This is consistent with diabetic ketoacidosis.  Patient was given IV fluid and started on an insulin drip.  I contacted our hospitalist about admission.  We do not have any ICU beds available so patient was transferred to the [facility].  Her labs show that she has an elevated BUN and creatinine but is stable at her baseline.  Her electrolytes are stable.          Jelena Leal is a 49 y.o. female who presents to the Emergency Department with chief complaint of    Chief Complaint   Patient presents with    Blood Sugar Problem      Patient has type 1 diabetes.  She states that 2 days ago she was started on an insulin pump.  She states the first day her blood sugar was well-controlled.  Yesterday she had to move the location of the pump and since

## 2024-11-15 NOTE — ED TRIAGE NOTES
Patient recently started using insulin pump but it has been not properly managing insulin administration. Home Dexcom reads Hi

## 2024-11-15 NOTE — ASSESSMENT & PLAN NOTE
- Recent initiation of insulin pump, seems to be malfunctioning  - Insulin drip ordered  - NPO   - NS for now, transition to D5 1/2NS once BG<250  - Check hgbA1C  - Consult to Diabetic Ed

## 2024-11-15 NOTE — CARE COORDINATION
Chart reviewed, discussed with RN, and pt discussed in am IDR s/p admission to ICU for DKA. Pt lives alone, independent of ADL's. Insulin pump, dexcom, glucometer. DM following for education /assist. PCP and insurance. Also sees Endocrine specialist and followed by Ambulatory Case Management. No needs identified at present for D/c home. CM to follow for any per MD.        11/15/24 9025   Service Assessment   Patient Orientation Alert and Oriented   Cognition Alert   History Provided By Medical Record;Other (see comment)  (RN)   Primary Caregiver Self   Accompanied By/Relationship none   Support Systems Parent;Family Members   Patient's Healthcare Decision Maker is: Legal Next of Kin   PCP Verified by CM Yes   Prior Functional Level Independent in ADLs/IADLs   Current Functional Level Assistance with the following:   Can patient return to prior living arrangement Yes   Ability to make needs known: Good   Family able to assist with home care needs: Yes   Would you like for me to discuss the discharge plan with any other family members/significant others, and if so, who? Yes   Financial Resources Other (Comment)  (BCBS)   Community Resources None   CM/SW Referral Other (see comment)  (pending)   Social/Functional History   Lives With Alone   Home Equipment   (insulin pump, dexcom, glucometer)   Receives Help From Family   ADL Assistance Independent   Homemaking Assistance Independent   Homemaking Responsibilities Yes   Ambulation Assistance Independent   Transfer Assistance Independent   Discharge Planning   Type of Residence House   Living Arrangements Alone   Current Services Prior To Admission Durable Medical Equipment   Current DME Prior to Arrival Glucometer;Other (Comment)  (insulin pump, dexcom)   Potential Assistance Purchasing Medications No   Services At/After Discharge   Confirm Follow Up Transport Family   Condition of Participation: Discharge Planning   Freedom of Choice list was provided with basic  dialogue that supports the patient's individualized plan of care/goals, treatment preferences, and shares the quality data associated with the providers?  Yes

## 2024-11-15 NOTE — PROGRESS NOTES
Hospitalist Progress Note   Admit Date:  11/15/2024 12:45 AM   Name:  Jelena Leal   Age:  49 y.o.  Sex:  female  :  1975   MRN:  [id number]   Room:      Presenting/Chief Complaint: No chief complaint on file.     Reason(s) for Admission: Diabetic ketoacidosis without coma associated with type 1 diabetes mellitus (HCC) [E10.10]     Hospital Course:   Jelena Leal is a 49 y.o. female with medical history of type 1 diabetes mellitus, hypertension, CKD stage IV who presented with [facility] emergency department with elevated blood glucose.  She started using a different insulin monitoring system and had removed it in anticipation for cataract surgery.  Her monitoring device stated that her glucose was high.  She then went to the emergency department and it was 630.    Anion gap was 19 at the time.,  Potassium 5.4 bicarb of 14.  WBCs within normal limits at 5.9 hemoglobin 10 microcytic hypochromic anemia with platelets of 206,000.      Subjective & 24hr Events:   Patient was admitted overnight.  No acute events.  Glucose has been trending down successfully.    Assessment & Plan:     Diabetic ketoacidosis associated with type 1 diabetes mellitus  - On insulin drip this morning, transitioning off today  - 2 BMPs have been done indicating gap is closed, basal insulin started  - Initiating Lantus 15 units daily  - Once fully off of insulin drip will initiate preprandial and sliding scale coverage  -Diabetes education pending  - Hemoglobin A1c 9.2, improved    CKD stage IV  -Monitoring creatinine    Hypertension  -Continue home dose Cozaar    Microcytic hypochromic anemia  - Likely component of CKD stage IV and chronic disease  - Defer workup in the outpatient setting    Patient is critically ill.  Without the interventions noted, there is a high probability of imminent acute organ impairment or life-threatening deterioration.  Total critical care time spent: 45 minutes.   K/UL    Immature Granulocytes Absolute 0.0 0.0 - 0.5 K/UL   Comprehensive Metabolic Panel    Collection Time: 11/14/24  9:01 PM   Result Value Ref Range    Sodium 135 (L) 136 - 145 mmol/L    Potassium 5.4 (H) 3.5 - 5.1 mmol/L    Chloride 102 98 - 107 mmol/L    CO2 14 (LL) 20 - 29 mmol/L    Anion Gap 19 (H) 7 - 16 mmol/L    Glucose 630 (HH) 70 - 99 mg/dL    BUN 51 (H) 6 - 23 MG/DL    Creatinine 2.71 (H) 0.60 - 1.10 MG/DL    Est, Glom Filt Rate 21 (L) >60 ml/min/1.73m2    Calcium 8.4 (L) 8.8 - 10.2 MG/DL    Total Bilirubin 0.3 0.0 - 1.2 MG/DL    ALT 18 8 - 45 U/L    AST 26 15 - 37 U/L    Alk Phosphatase 171 (H) 35 - 104 U/L    Total Protein 6.9 6.3 - 8.2 g/dL    Albumin 3.3 (L) 3.5 - 5.0 g/dL    Globulin 3.6 (H) 2.3 - 3.5 g/dL    Albumin/Globulin Ratio 0.9 (L) 1.0 - 1.9     Urinalysis    Collection Time: 11/14/24  9:01 PM   Result Value Ref Range    Color, UA YELLOW/STRAW      Appearance CLOUDY      Specific [place], UA 1.013 1.001 - 1.023      pH, Urine 5.5 5.0 - 9.0      Protein, UA TRACE (A) NEG mg/dL    Glucose, Ur >1000 (A) NEG mg/dL    Ketones, Urine TRACE (A) NEG mg/dL    Bilirubin, Urine Negative NEG      Blood, Urine Negative NEG      Urobilinogen, Urine 0.2 0.2 - 1.0 EU/dL    Nitrite, Urine Negative NEG      Leukocyte Esterase, Urine SMALL (A) NEG      WBC, UA  0 /hpf    RBC, UA 0-3 0 /hpf    Epithelial Cells, UA 5-10 0 /hpf    BACTERIA, URINE 4+ (H) 0 /hpf    Other observations RESULTS VERIFIED MANUALLY     Hemoglobin A1c    Collection Time: 11/14/24  9:01 PM   Result Value Ref Range    Hemoglobin A1C 9.2 (H) 0 - 5.6 %    Estimated Avg Glucose 218 mg/dL   POCT Glucose    Collection Time: 11/14/24 10:02 PM   Result Value Ref Range    POC Glucose 423 (H) 65 - 100 mg/dL    Performed by: Ariela    POCT Glucose    Collection Time: 11/14/24 10:58 PM   Result Value Ref Range    POC Glucose 371 (H) 65 - 100 mg/dL    Performed by: Ariela    POCT Glucose    Collection Time: 11/14/24 11:54 PM  mmol/L    Anion Gap 12 7 - 16 mmol/L    Glucose 297 (H) 70 - 99 mg/dL    BUN 41 (H) 6 - 23 MG/DL    Creatinine 2.55 (H) 0.60 - 1.10 MG/DL    Est, Glom Filt Rate 22 (L) >60 ml/min/1.73m2    Calcium 8.1 (L) 8.8 - 10.2 MG/DL   Magnesium    Collection Time: 11/15/24 10:05 AM   Result Value Ref Range    Magnesium 1.8 1.8 - 2.4 mg/dL   Phosphorus    Collection Time: 11/15/24 10:05 AM   Result Value Ref Range    Phosphorus 3.8 2.5 - 4.5 MG/DL   POCT Glucose    Collection Time: 11/15/24 10:52 AM   Result Value Ref Range    POC Glucose 277 (H) 65 - 100 mg/dL    Performed by: Leonor    POCT Glucose    Collection Time: 11/15/24 11:57 AM   Result Value Ref Range    POC Glucose 231 (H) 65 - 100 mg/dL    Performed by: Loreto    POCT Glucose    Collection Time: 11/15/24  1:33 PM   Result Value Ref Range    POC Glucose 202 (H) 65 - 100 mg/dL    Performed by: Quan        No results for input(s): \"COVID19\" in the last 72 hours.    Current Meds:  Current Facility-Administered Medications   Medication Dose Route Frequency    gabapentin (NEURONTIN) capsule 100 mg  100 mg Oral TID    losartan (COZAAR) tablet 25 mg  25 mg Oral Daily    sodium chloride 0.9 % bolus 864 mL  15 mL/kg IntraVENous Once    potassium chloride 10 mEq/100 mL IVPB (Peripheral Line)  10 mEq IntraVENous PRN    magnesium sulfate 1000 mg in dextrose 5% 100 mL IVPB  1,000 mg IntraVENous PRN    sodium phosphate 15 mmol in sodium chloride 0.9 % 250 mL IVPB  15 mmol IntraVENous PRN    polyethylene glycol (GLYCOLAX) packet 17 g  17 g Oral Daily PRN    dextrose 5 % and 0.45 % sodium chloride infusion   IntraVENous Continuous PRN    enoxaparin Sodium (LOVENOX) injection 30 mg  30 mg SubCUTAneous Daily    insulin regular (HumuLIN R;NovoLIN R) 100 Units in sodium chloride 0.9 % 100 mL infusion  0.1-50 Units/hr IntraVENous Continuous    ondansetron (ZOFRAN) injection 4 mg  4 mg IntraVENous Q6H PRN    oxyCODONE (ROXICODONE) immediate release tablet 5

## 2024-11-15 NOTE — PROGRESS NOTES
TRANSFER - IN REPORT:    Verbal report received from ER on Jelena Leal  being received from [facility] ER for routine progression of patient care      Report consisted of patient's Situation, Background, Assessment and   Recommendations(SBAR).     Information from the following report(s) ED Encounter Summary was reviewed with the receiving nurse.    Opportunity for questions and clarification was provided.      Assessment completed upon patient's arrival to unit and care assumed.

## 2024-11-15 NOTE — INTERDISCIPLINARY ROUNDS
Multi-D Rounds/Checklist (leapfrog):  Lines: can any be removed?: None      DVT Prophylaxis: Ordered  Vent: N/A  Nutrition Ordered/appropriate: Per Primary Team  Can antibiotics or other drugs be stopped? N/A Yes/No  MRSA swab:   Inpat Anti-Infectives (From admission, onward)      None          Consults needed: None  A: Is pain control adequate? (has PRNs? Stop drip?) Yes  B: Sedation break and SBT? N/A  C: Is sedation choice appropriate? N/A  D: Delirium/CAM-ICU? No  E: Mobility goals/appropriateness? Yes  F: Family update and plan? Wife is surrogate decision maker and is being updated daily by primary attending and nursing staff.    JUAN MIGUEL Dasilva

## 2024-11-15 NOTE — ED NOTES
TRANSFER - OUT REPORT:    Verbal report given to Veda TURPIN on eJlena Leal  being transferred to [place] [id number] for routine progression of patient care       Report consisted of patient's Situation, Background, Assessment and   Recommendations(SBAR).     Information from the following report(s) Nurse Handoff Report was reviewed with the receiving nurse.    [place] Assessment:                           Lines:   Peripheral IV 11/14/24 Left;Posterior Forearm (Active)   Site Assessment Clean, dry & intact 11/14/24 2104   Line Status Blood return noted;Normal saline locked;Capped;Flushed;Specimen collected 11/14/24 2104   Phlebitis Assessment No symptoms 11/14/24 2104   Infiltration Assessment 0 11/14/24 2104   Dressing Type Transparent 11/14/24 2104   Dressing Intervention New 11/14/24 2104       Peripheral IV 11/14/24 Posterior;Proximal;Right Forearm (Active)        Opportunity for questions and clarification was provided.      Patient transported with:  Ambulance

## 2024-11-15 NOTE — TELEPHONE ENCOUNTER
Can we please accommodate nimisha from [employer], this patient, and the  Heather Redman next Thursday?

## 2024-11-15 NOTE — CARE COORDINATION
Ambulatory Care Coordination Note     11/15/2024 8:29 AM     Care Summary Note:   EPIC Notification of patient admission  DKA without coma  Apparent insulin pump malfunction - not certain this is the case.  Patient reports she took off the pump because she was NPO for cataract sx.  Is having trouble finding a \"good\" spot for the pump.  But states she has managed to learn how to apply it.  Care Coordination call with Lucia Villafuerte, pump representative.  Lucia unaware until yesterday morning that patient removed pump for her surgery - discussed another training session - now set for Thursday.  Will address likely need for a different/easier infusion set at that time.    Care Coordination call with JUAN MIGUEL Figueroa  Will make her aware of above.  ACM will plan to be present for Thursday appointment.  Patient states she is expecting a box of 10 infusion sets to be delivered to her place today.    Encouraged patient on calls today.  \"You're gonna be able to manage this like a pro.  It is just going to take some practice.\"    ACM: Heather Redmna RN     Method of communication with provider: chart routing.    Utilization: Has the patient been seen in the ED since your last call? Yes,   See above notes.    PCP/Specialist follow up:   Future Appointments         Provider Specialty Dept Phone    11/18/2024 2:00 PM Anna Hoskins PA-C Endocrinology [phone]    12/2/2024 10:15 AM Grinnell, Melissa R, PA-C Gastroenterology [phone]    12/18/2024 9:30 AM Linda Mckee, APRN - CNP Family Medicine [phone]    2/5/2025 1:00 PM Anna Hoskins PA-C Endocrinology [phone]

## 2024-11-16 VITALS
OXYGEN SATURATION: 96 % | BODY MASS INDEX: 23 KG/M2 | HEIGHT: 64 IN | HEART RATE: 75 BPM | TEMPERATURE: 97.7 F | RESPIRATION RATE: 18 BRPM | WEIGHT: 134.7 LBS | DIASTOLIC BLOOD PRESSURE: 75 MMHG | SYSTOLIC BLOOD PRESSURE: 133 MMHG

## 2024-11-16 LAB
ANION GAP SERPL CALC-SCNC: 10 MMOL/L (ref 7–16)
BUN SERPL-MCNC: 34 MG/DL (ref 6–23)
CALCIUM SERPL-MCNC: 8.2 MG/DL (ref 8.8–10.2)
CHLORIDE SERPL-SCNC: 112 MMOL/L (ref 98–107)
CO2 SERPL-SCNC: 18 MMOL/L (ref 20–29)
CREAT SERPL-MCNC: 2.63 MG/DL (ref 0.6–1.1)
ERYTHROCYTE [DISTWIDTH] IN BLOOD BY AUTOMATED COUNT: 13.9 % (ref 11.9–14.6)
GLUCOSE BLD STRIP.AUTO-MCNC: 232 MG/DL (ref 65–100)
GLUCOSE BLD STRIP.AUTO-MCNC: 260 MG/DL (ref 65–100)
GLUCOSE BLD STRIP.AUTO-MCNC: 366 MG/DL (ref 65–100)
GLUCOSE SERPL-MCNC: 298 MG/DL (ref 70–99)
HCT VFR BLD AUTO: 30.8 % (ref 35.8–46.3)
HGB BLD-MCNC: 9.3 G/DL (ref 11.7–15.4)
MCH RBC QN AUTO: 25.7 PG (ref 26.1–32.9)
MCHC RBC AUTO-ENTMCNC: 30.2 G/DL (ref 31.4–35)
MCV RBC AUTO: 85.1 FL (ref 82–102)
NRBC # BLD: 0 K/UL (ref 0–0.2)
PLATELET # BLD AUTO: 194 K/UL (ref 150–450)
PMV BLD AUTO: 10.4 FL (ref 9.4–12.3)
POTASSIUM SERPL-SCNC: 4.6 MMOL/L (ref 3.5–5.1)
RBC # BLD AUTO: 3.62 M/UL (ref 4.05–5.2)
SERVICE CMNT-IMP: ABNORMAL
SODIUM SERPL-SCNC: 140 MMOL/L (ref 136–145)
WBC # BLD AUTO: 6.1 K/UL (ref 4.3–11.1)

## 2024-11-16 PROCEDURE — 80048 BASIC METABOLIC PNL TOTAL CA: CPT

## 2024-11-16 PROCEDURE — 36415 COLL VENOUS BLD VENIPUNCTURE: CPT

## 2024-11-16 PROCEDURE — 6370000000 HC RX 637 (ALT 250 FOR IP): Performed by: FAMILY MEDICINE

## 2024-11-16 PROCEDURE — 82962 GLUCOSE BLOOD TEST: CPT

## 2024-11-16 PROCEDURE — 6370000000 HC RX 637 (ALT 250 FOR IP)

## 2024-11-16 PROCEDURE — 6370000000 HC RX 637 (ALT 250 FOR IP): Performed by: NURSE PRACTITIONER

## 2024-11-16 PROCEDURE — 85027 COMPLETE CBC AUTOMATED: CPT

## 2024-11-16 RX ORDER — IBUPROFEN 600 MG/1
1 TABLET ORAL PRN
Status: DISCONTINUED | OUTPATIENT
Start: 2024-11-16 | End: 2024-11-16 | Stop reason: HOSPADM

## 2024-11-16 RX ORDER — INSULIN LISPRO 100 [IU]/ML
6 INJECTION, SOLUTION INTRAVENOUS; SUBCUTANEOUS
Status: DISCONTINUED | OUTPATIENT
Start: 2024-11-16 | End: 2024-11-16 | Stop reason: HOSPADM

## 2024-11-16 RX ORDER — INSULIN LISPRO 100 [IU]/ML
6 INJECTION, SOLUTION INTRAVENOUS; SUBCUTANEOUS ONCE
Status: COMPLETED | OUTPATIENT
Start: 2024-11-16 | End: 2024-11-16

## 2024-11-16 RX ORDER — DEXTROSE MONOHYDRATE 100 MG/ML
INJECTION, SOLUTION INTRAVENOUS CONTINUOUS PRN
Status: DISCONTINUED | OUTPATIENT
Start: 2024-11-16 | End: 2024-11-16 | Stop reason: HOSPADM

## 2024-11-16 RX ORDER — PEN NEEDLE, DIABETIC 32GX 5/32"
NEEDLE, DISPOSABLE MISCELLANEOUS
Qty: 500 EACH | Refills: 3 | Status: SHIPPED | OUTPATIENT
Start: 2024-11-16

## 2024-11-16 RX ORDER — INSULIN LISPRO 100 [IU]/ML
0-16 INJECTION, SOLUTION INTRAVENOUS; SUBCUTANEOUS
Status: DISCONTINUED | OUTPATIENT
Start: 2024-11-16 | End: 2024-11-16 | Stop reason: HOSPADM

## 2024-11-16 RX ADMIN — INSULIN LISPRO 4 UNITS: 100 INJECTION, SOLUTION INTRAVENOUS; SUBCUTANEOUS at 11:29

## 2024-11-16 RX ADMIN — INSULIN LISPRO 4 UNITS: 100 INJECTION, SOLUTION INTRAVENOUS; SUBCUTANEOUS at 06:30

## 2024-11-16 RX ADMIN — INSULIN GLARGINE 15 UNITS: 100 INJECTION, SOLUTION SUBCUTANEOUS at 08:59

## 2024-11-16 RX ADMIN — INSULIN LISPRO 6 UNITS: 100 INJECTION, SOLUTION INTRAVENOUS; SUBCUTANEOUS at 02:30

## 2024-11-16 RX ADMIN — LOSARTAN POTASSIUM 25 MG: 25 TABLET, FILM COATED ORAL at 09:00

## 2024-11-16 RX ADMIN — INSULIN LISPRO 6 UNITS: 100 INJECTION, SOLUTION INTRAVENOUS; SUBCUTANEOUS at 11:29

## 2024-11-16 RX ADMIN — GABAPENTIN 100 MG: 100 CAPSULE ORAL at 09:00

## 2024-11-16 ASSESSMENT — PAIN SCALES - GENERAL: PAINLEVEL_OUTOF10: 0

## 2024-11-16 NOTE — ICUWATCH
RRT Clinical Rounding Nurse Progress Report      SUBJECTIVE: Called to assess patient secondary to transfer from critical care.      Vitals:    11/15/24 1730 11/15/24 1758 11/15/24 1811 11/15/24 1918   BP: (!) 142/69  (!) 147/75 131/72   Pulse: (!) 101 85 81 76   Resp: 24 17 18 18   Temp:   98.2 °F (36.8 °C) 98.3 °F (36.8 °C)   TempSrc:   Oral Oral   SpO2: 96% 100% 100% 98%   Weight:       Height:              ASSESSMENT:  On arrival to room, I found patient to be resting in bed quietly. Patient is oriented X 4. Denies major pain or SOB. Respirations even and unlabored. No needs or concerns expressed at this time.    PLAN:  Recent labs/notes/vitals reviewed, and patient discussed with primary RN. No concern per primary RN. Will follow per RRT Clinical Rounding Program protocol. Primary RN to call with concerns. Patient's continuous infusion discussed with on call Hospitalist via secure message. IV fluids discontinued.    Joseph Barron RN  [place]: [phone]

## 2024-11-16 NOTE — CARE COORDINATION
CM reviewed / screen patient for discharge today.  CM reviewed medical chart / discharge summary: Disposition: Home and CM weekend report: No needs identified at present for D/c home.   Family will transport patient home.  Patient has met all treatment goals / milestones.  CM will continue to follow and remain available for any needs, concerns or questions that may arise.           11/15/24 6384   Service Assessment   Patient Orientation Alert and Oriented   Cognition Alert   History Provided By Medical Record;Other (see comment)  (RN)   Primary Caregiver Self   Accompanied By/Relationship none   Support Systems Parent;Family Members   Patient's Healthcare Decision Maker is: Legal Next of Kin   PCP Verified by CM Yes   Prior Functional Level Independent in ADLs/IADLs   Current Functional Level Assistance with the following:   Can patient return to prior living arrangement Yes   Ability to make needs known: Good   Family able to assist with home care needs: Yes   Would you like for me to discuss the discharge plan with any other family members/significant others, and if so, who? Yes   Financial Resources Other (Comment)  (BCBS)   Community Resources None   CM/SW Referral Other (see comment)  (pending)   Social/Functional History   Lives With Alone   Home Equipment   (insulin pump, dexcom, glucometer)   Receives Help From Family   ADL Assistance Independent   Homemaking Assistance Independent   Homemaking Responsibilities Yes   Ambulation Assistance Independent   Transfer Assistance Independent   Discharge Planning   Type of Residence House   Living Arrangements Alone   Current Services Prior To Admission Durable Medical Equipment   Current DME Prior to Arrival Glucometer;Other (Comment)  (insulin pump, dexcom)   Potential Assistance Purchasing Medications No   Services At/After Discharge   Confirm Follow Up Transport Family   Condition of Participation: Discharge Planning   Freedom of Choice list was provided with  basic dialogue that supports the patient's individualized plan of care/goals, treatment preferences, and shares the quality data associated with the providers?  Yes

## 2024-11-16 NOTE — PROGRESS NOTES
labs/diagnostics (ultimately defer to outpatient provider):  Defer to Follow-up Provider    Plan was discussed with Patient.  All questions answered.  Patient was stable at time of discharge.  Instructions given to call a physician or return if any concerns.        Current Discharge Medication List        START taking these medications    Details   Insulin Syringe-Needle U-100 ([employer] INSULIN SYR 1CC/30G) 30G X 5/16\" 1 ML MISC 1 each by Does not apply route daily  Qty: 100 each, Refills: 3           CONTINUE these medications which have CHANGED    Details   BD PEN NEEDLE EZEKIEL 2ND GEN 32G X 4 MM MISC Use with insulin up to 5 times daily, E10.65  Qty: 500 each, Refills: 3    Associated Diagnoses: Type 1 diabetes mellitus with hyperglycemia (HCC)           CONTINUE these medications which have NOT CHANGED    Details   gabapentin (NEURONTIN) 100 MG capsule Take 1 capsule by mouth 3 times daily for 90 days.  Qty: 270 capsule, Refills: 0    Associated Diagnoses: Neuropathy      Continuous Glucose Sensor (DEXCOM G7 SENSOR) MISC Change every 10 days  Qty: 9 each, Refills: 3    Associated Diagnoses: Type 1 diabetes mellitus with hyperglycemia (HCC)      TRESIBA FLEXTOUCH 100 UNIT/ML SOPN Start 10 UNITS INTO THE SKIN DAILY, increase by 1 unit every 4 days until FBG  max daily dose 15 units  Qty: 9 mL, Refills: 1    Associated Diagnoses: Type 1 diabetes mellitus with hyperglycemia (HCC)      losartan (COZAAR) 25 MG tablet Take 1 tablet by mouth daily    Associated Diagnoses: Primary hypertension      HUMALOG 100 UNIT/ML SOLN injection vial Use with insulin pump, max daily dose 60 units  Qty: 60 mL, Refills: 3    Associated Diagnoses: Type 1 diabetes mellitus with hyperglycemia (HCC)      GVOKE HYPOPEN 2-PACK 1 MG/0.2ML SOAJ Inject 1 mg into the skin as needed (severe hypoglycemia)  Qty: 2 each, Refills: 1    Associated Diagnoses: Type 1 diabetes mellitus with hyperglycemia (HCC); Type 1 diabetes mellitus with  Creatinine 2.63 (H) 0.60 - 1.10 MG/DL    Est, Glom Filt Rate 21 (L) >60 ml/min/1.73m2    Calcium 8.2 (L) 8.8 - 10.2 MG/DL   CBC    Collection Time: 11/16/24  5:00 AM   Result Value Ref Range    WBC 6.1 4.3 - 11.1 K/uL    RBC 3.62 (L) 4.05 - 5.2 M/uL    Hemoglobin 9.3 (L) 11.7 - 15.4 g/dL    Hematocrit 30.8 (L) 35.8 - 46.3 %    MCV 85.1 82 - 102 FL    MCH 25.7 (L) 26.1 - 32.9 PG    MCHC 30.2 (L) 31.4 - 35.0 g/dL    RDW 13.9 11.9 - 14.6 %    Platelets 194 150 - 450 K/uL    MPV 10.4 9.4 - 12.3 FL    nRBC 0.00 0.0 - 0.2 K/uL   POCT Glucose    Collection Time: 11/16/24  5:48 AM   Result Value Ref Range    POC Glucose 260 (H) 65 - 100 mg/dL    Performed by: Kwame    POCT Glucose    Collection Time: 11/16/24 10:32 AM   Result Value Ref Range    POC Glucose 232 (H) 65 - 100 mg/dL    Performed by: Andreina        No results for input(s): \"COVID19\" in the last 72 hours.    Recent Vital Data:  Patient Vitals for the past 24 hrs:   Temp Pulse Resp BP SpO2   11/16/24 0714 97.7 °F (36.5 °C) 75 18 133/75 96 %   11/15/24 1918 98.3 °F (36.8 °C) 76 18 131/72 98 %   11/15/24 1811 98.2 °F (36.8 °C) 81 18 (!) 147/75 100 %   11/15/24 1758 -- 85 17 -- 100 %   11/15/24 1730 -- (!) 101 24 (!) 142/69 96 %   11/15/24 1700 98.6 °F (37 °C) 74 20 137/77 99 %   11/15/24 1630 -- 75 21 131/82 99 %   11/15/24 1600 -- 78 26 (!) 114/59 97 %   11/15/24 1530 -- 77 15 119/73 99 %   11/15/24 1500 -- 78 17 114/73 100 %   11/15/24 1430 -- 80 (!) 51 (!) 160/68 92 %   11/15/24 1400 -- 87 (!) 33 116/62 99 %   11/15/24 1330 -- 75 15 110/73 99 %   11/15/24 1300 -- 74 14 (!) 141/79 100 %   11/15/24 1230 -- 78 28 (!) 140/76 100 %   11/15/24 1201 98.2 °F (36.8 °C) (!) 109 (!) 46 114/88 --   11/15/24 1200 -- 97 (!) 40 -- 100 %   11/15/24 1130 -- 79 21 126/80 --       Oxygen Therapy  SpO2: 96 %  Pulse Oximetry Type: Continuous  Pulse via Oximetry: 88 beats per minute  SPO2 High Alarm Limit: 100  SPO2 Low Alarm Limit POX: 90  Pulse Oximeter Device Mode:

## 2024-11-16 NOTE — PLAN OF CARE
Problem: Chronic Conditions and Co-morbidities  Goal: Patient's chronic conditions and co-morbidity symptoms are monitored and maintained or improved  Outcome: Progressing  Flowsheets (Taken 11/15/2024 2100)  Care Plan - Patient's Chronic Conditions and Co-Morbidity Symptoms are Monitored and Maintained or Improved:   Monitor and assess patient's chronic conditions and comorbid symptoms for stability, deterioration, or improvement   Collaborate with multidisciplinary team to address chronic and comorbid conditions and prevent exacerbation or deterioration     Problem: Discharge Planning  Goal: Discharge to home or other facility with appropriate resources  Outcome: Progressing  Flowsheets (Taken 11/15/2024 2100)  Discharge to home or other facility with appropriate resources:   Identify barriers to discharge with patient and caregiver   Arrange for needed discharge resources and transportation as appropriate     Problem: Safety - Adult  Goal: Free from fall injury  Outcome: Progressing  Flowsheets (Taken 11/15/2024 2100)  Free From Fall Injury:   Instruct family/caregiver on patient safety   Based on caregiver fall risk screen, instruct family/caregiver to ask for assistance with transferring infant if caregiver noted to have fall risk factors

## 2024-11-16 NOTE — ICUWATCH
RRT Clinical Rounding Nurse Update    Vitals:    11/15/24 1730 11/15/24 1758 11/15/24 1811 11/15/24 1918   BP: (!) 142/69  (!) 147/75 131/72   Pulse: (!) 101 85 81 76   Resp: 24 17 18 18   Temp:   98.2 °F (36.8 °C) 98.3 °F (36.8 °C)   TempSrc:   Oral Oral   SpO2: 96% 100% 100% 98%   Weight:       Height:            ASSESSMENT:  Previous outreach assessment was reviewed. There have been no significant clinical changes since the completion of the last dated Outreach assessment. No concerns per primary RN. Anion gap 10 this morning.    PLAN:  Will follow per RRT Clinical Rounding Program protocol.    Joseph Barron RN  [place]: [phone]

## 2024-11-18 ENCOUNTER — OFFICE VISIT (OUTPATIENT)
Dept: ENDOCRINOLOGY | Age: 49
End: 2024-11-18
Payer: COMMERCIAL

## 2024-11-18 VITALS
SYSTOLIC BLOOD PRESSURE: 112 MMHG | DIASTOLIC BLOOD PRESSURE: 70 MMHG | WEIGHT: 140.8 LBS | HEART RATE: 74 BPM | OXYGEN SATURATION: 98 % | BODY MASS INDEX: 24.17 KG/M2

## 2024-11-18 DIAGNOSIS — N18.4 CHRONIC KIDNEY DISEASE (CKD), STAGE IV (SEVERE) (HCC): Chronic | ICD-10-CM

## 2024-11-18 DIAGNOSIS — E10.65 TYPE 1 DIABETES MELLITUS WITH HYPERGLYCEMIA (HCC): Primary | Chronic | ICD-10-CM

## 2024-11-18 DIAGNOSIS — E11.610 CHARCOT FOOT DUE TO DIABETES MELLITUS (HCC): Chronic | ICD-10-CM

## 2024-11-18 DIAGNOSIS — Z89.422 ACQUIRED ABSENCE OF OTHER LEFT TOE(S) (HCC): ICD-10-CM

## 2024-11-18 DIAGNOSIS — R27.8 POOR MANUAL DEXTERITY: ICD-10-CM

## 2024-11-18 LAB
GLUCOSE BLD STRIP.AUTO-MCNC: 304 MG/DL (ref 65–100)
SERVICE CMNT-IMP: ABNORMAL

## 2024-11-18 PROCEDURE — 95251 CONT GLUC MNTR ANALYSIS I&R: CPT | Performed by: PHYSICIAN ASSISTANT

## 2024-11-18 PROCEDURE — 3078F DIAST BP <80 MM HG: CPT | Performed by: PHYSICIAN ASSISTANT

## 2024-11-18 PROCEDURE — 99214 OFFICE O/P EST MOD 30 MIN: CPT | Performed by: PHYSICIAN ASSISTANT

## 2024-11-18 PROCEDURE — 3074F SYST BP LT 130 MM HG: CPT | Performed by: PHYSICIAN ASSISTANT

## 2024-11-18 PROCEDURE — 3046F HEMOGLOBIN A1C LEVEL >9.0%: CPT | Performed by: PHYSICIAN ASSISTANT

## 2024-11-18 RX ORDER — INSULIN LISPRO 100 [IU]/ML
INJECTION, SOLUTION INTRAVENOUS; SUBCUTANEOUS
Qty: 30 ML | Refills: 3
Start: 2024-11-18

## 2024-11-18 NOTE — PROGRESS NOTES
[facility] ENDOCRINOLOGY   AND   THYROID NODULE CLINIC    Anna Hoskins PA-C  [facility] Endocrinology and Thyroid [facility]odule [facility]  [address], [address]  [place] [address]  Phone [phone]  Facsimile [phone]          Jelena Leal is a 49 y.o. female seen 11/18/24  for follow up evaluation of type 1 diabetes         Assessment and Plan:    Interpretation of 72 hour glucose monitor:  At least 72 hours of data were reviewed.  The patient utilizes a dexcom G7 continuous glucose monitoring system.  The average glucose during the reviewed timeframe was 232 with a standard deviation of 96.  There is a pattern of highly variable readings with steep changes in glucose and some hypoglycemia    1. Type 1 diabetes mellitus with hyperglycemia (HCC)   Glycemic control remains poor     She is working with case management and has completed diabetes education    Would benefit MOST from a regimented approach to diabetes      continue prandial insulin, 3 units TIDAC    Use 1/50>150 TIDAC    DO NOT CORRECT BETWEEN MEALS DUE TO RISK OF HYPOGLYCEMIA     Rule of 15's reviewed, avoid overcorrection. 15 g carbs, wait 15 minutes and recheck fingerstick. Consume protein    Discussed use of glucagon.    Plan to restart pump with exchange of infusion sets    At today's visit we discussed sequela associated with uncontrolled diabetes including increased risk of stroke, heart attack, kidney failure, amputation, retinopathy, neuropathy, and nephropathy.  Patient was strongly encouraged to comply with treatment regimen as well as dietary and exercise recommendations to aid in control of this chronic disease to help prevent complications associated with uncontrolled diabetes.    - HUMALOG KWIKPEN 100 UNIT/ML SOPN; 3 units TIDAC plus correction scale 1/50>150 AC, 2 units AC snacks, max daily dose 30 units  Dispense: 30 mL; Refill: 3  - HM DIABETES FOOT EXAM  - GLUCOSE MONITOR, 72 HOUR, PHYS INTERP      2.

## 2024-11-19 ENCOUNTER — CARE COORDINATION (OUTPATIENT)
Dept: CARE COORDINATION | Facility: CLINIC | Age: 49
End: 2024-11-19

## 2024-11-19 NOTE — CARE COORDINATION
.Ambulatory Care Coordination Note     11/19/2024 9:00 AM     Care Summary Note:   Outreach for High Risk Care Management - accompanied patient to Endocrinology appointment.   - reviewed DKA episode that landed patient in the hospital.   - set up appointment for another pump training - the plan will be to switch out transfusion sets may help her given limited dexterity. (11/25 @ 10:00).  Provider reiterated need for tighter glycemic control   - take insulin at first bite of a meal   - consider eating 3 meals, about 4 hours apart (this will help pump learn her)   - remember to rotate injection sites.     ACM: Heather Redman RN     Method of communication with provider: chart routing.    Utilization: Has the patient been seen in the ED since your last call? no    Medications Reviewed:   Completed during this call    Advance Care Planning:   Not reviewed during this call     Care Planning:    Goals Addressed                   This Visit's Progress     Patient verbalizes understanding of self -management goals of living with Diabetes.   Not on track     ACM to support in self management of diabetes   - may encourage a return to diabetes classes   - weekly call with patient to ascertain blood sugar trends   - involve SW as needed to support insulin affordability               PCP/Specialist follow up:   Future Appointments         Provider Specialty Dept Phone    12/2/2024 10:15 AM Grinnell, Melissa R, PA-C Gastroenterology [phone]    12/11/2024 2:10 PM PST LAB Family Medicine [phone]    12/18/2024 9:30 AM Linda Mckee, APRN - CNP Family Medicine [phone]    2/5/2025 1:00 PM Anna Hoskins PA-C Endocrinology [phone]    4/7/2025 1:00 PM Anna Hoskins PA-C Endocrinology [phone]

## 2024-11-20 ENCOUNTER — TELEPHONE (OUTPATIENT)
Dept: FAMILY MEDICINE CLINIC | Facility: CLINIC | Age: 49
End: 2024-11-20

## 2024-11-20 NOTE — TELEPHONE ENCOUNTER
Care Transitions Initial Follow Up Call    Outreach made within 2 business days of discharge: Yes    Patient: Jelena Leal Patient : 1975   MRN: [id number]  Reason for Admission: dka  Discharge Date: 24       Spoke with: patient    Discharge department/facility: [facility] Interactive Patient Contact:  Was patient able to fill all prescriptions: Yes  Was patient instructed to bring all medications to the follow-up visit: Yes  Is patient taking all medications as directed in the discharge summary? Yes  Does patient understand their discharge instructions: Yes  Does patient have questions or concerns that need addressed prior to 7-14 day follow up office visit: no    Additional needs identified to be addressed with provider  No needs identified             Scheduled appointment with PCP within 7-14 days    Follow Up  Future Appointments   Date Time Provider Department Center   2024 10:15 AM Grinnell, Melissa R, PA-C [facility] GVL AMB   2024  9:00 AM PST LAB PST [facility] DEP   2024  9:30 AM Linda Mckee, TANISHA - CNP PST [facility] ECC DEP   2025  1:00 PM Anna Hoskins PA-C END GVL AMB   2025  1:00 PM Anna Hoskins PA-C END GVL AMB       Ela Wharton MA

## 2024-11-25 ENCOUNTER — CARE COORDINATION (OUTPATIENT)
Dept: CARE COORDINATION | Facility: CLINIC | Age: 49
End: 2024-11-25

## 2024-11-25 NOTE — CARE COORDINATION
Ambulatory Care Coordination Note     11/25/2024 1:53 PM     Care Summary Note:   In person outreach for High Risk Case Management  Met with patient and [employer] rep to insure continuing understanding of her new pump.   - patient reviewed application of new infusion set   - complete re-set of her pump   - provided access to video instructions via QR code   - provided a list of things she is to always carry with her:  QR Code card, extra dexcom sensor, supplies for pump, glucometer and supplies for a finger stick should it be needed.   - reviewed filling of pump with insulin.  Had to leave and go home to get her insulin which she had forgotten.    Difficulties with manipulating the needle piece of infusion set.  (Manual dexterity compromised by neuropathy).  Patient states she will practice.  Directed to look at video a couple times today.    Incoming call about 2.5 hours after meeting to discuss her food choice and review announcing her meal to the pump.    Patient to change out infusion set and add insulin in 3 days.    ACM: Heather Redman RN     Method of communication with provider: chart routing.    Utilization: Has the patient been seen in the ED since your last call? no    PCP/Specialist follow up:   Future Appointments         Provider Specialty Dept Phone    12/2/2024 10:15 AM Grinnell, Melissa R, PA-C Gastroenterology [phone]    12/11/2024 9:00 AM PST LAB Family Medicine [phone]    12/18/2024 9:30 AM Linda Mckee, APRN - CNP Family Medicine [phone]    2/5/2025 1:00 PM Anna Hoskins PA-C Endocrinology [phone]    4/7/2025 1:00 PM Anna Hoskins PA-C Endocrinology [phone]

## 2024-11-26 NOTE — PROGRESS NOTES
Physician Progress Note      PATIENT:               MEHUL ARGUETA  CSN #:                  [id number]  :                       1975  ADMIT DATE:       11/15/2024 12:45 AM  DISCH DATE:        2024 12:28 PM  RESPONDING  PROVIDER #:        Maciel Hall MD          QUERY TEXT:    Pt admitted with DKA. Pt noted to have a malfunctioning insulin pump. If   possible, please document in progress notes and discharge summary the   relationship, if any, between DKA and malfunctioning insulin pump.    The medical record reflects the following:  Risk Factors: 49 y.o. female with medical history of DM1, HTN, CKD4  Clinical Indicators: Per H&P \"Patient has recently started using an insulin   pump, but it seems to be malfunctioning.  Patient reports that her monitor   just says \"HI.\"\"  Treatment: Insulin gtt, NPO, IVF's, serial labs & hgbA1C  Options provided:  -- DKA due to malfunctioning insulin pump  -- DKA unrelated to malfunctioning insulin pump  -- Other - I will add my own diagnosis  -- Disagree - Not applicable / Not valid  -- Disagree - Clinically unable to determine / Unknown  -- Refer to Clinical Documentation Reviewer    PROVIDER RESPONSE TEXT:    This patient has DKA due to malfunctioning insulin pump.    Query created by: Jennifer Claros on 2024 9:13 AM      Electronically signed by:  Maciel Hall MD 2024 10:21 AM

## 2024-12-02 ENCOUNTER — CARE COORDINATION (OUTPATIENT)
Dept: CARE COORDINATION | Facility: CLINIC | Age: 49
End: 2024-12-02

## 2024-12-02 NOTE — CARE COORDINATION
Ambulatory Care Coordination Note     12/2/2024 12:15 PM     Care Summary Note:   Outreach for High Risk Case Management - spoke with patient  Report from pump representative stating that patient was doing well (following her numbers). Average BS is 157, announcing meals correctly and changing out infusion sets.  Patient gets a little concerned when sugar a bit low at night.  Encouraged her to let pump do its job as if it was her pancreas.    Missed her GI appointment today - could not find the right office suite   - eyesight issue?  Had been referred by PCP.  She will call to reschedule     ACM: Heather Redman RN     Method of communication with provider: chart routing.    Utilization: Has the patient been seen in the ED since your last call? no    PCP/Specialist follow up:   Future Appointments         Provider Specialty Dept Phone    12/11/2024 9:00 AM PST LAB Family [facility] [phone]    12/18/2024 9:30 AM Linda Mckee, APRN - CNP [facility] Medicine [phone]    2/5/2025 1:00 PM Anna Hoskins PA-C Endocrinology [phone]    4/7/2025 1:00 PM Anna Hoskins PA-C Endocrinology [phone]

## 2024-12-06 DIAGNOSIS — E10.65 TYPE 1 DIABETES MELLITUS WITH HYPERGLYCEMIA (HCC): ICD-10-CM

## 2024-12-06 RX ORDER — INSULIN LISPRO 100 [IU]/ML
INJECTION, SOLUTION INTRAVENOUS; SUBCUTANEOUS
Qty: 60 ML | Refills: 3 | Status: SHIPPED | OUTPATIENT
Start: 2024-12-06

## 2024-12-06 RX ORDER — INSULIN LISPRO 100 [IU]/ML
INJECTION, SOLUTION INTRAVENOUS; SUBCUTANEOUS
Qty: 60 ML | Refills: 3 | Status: SHIPPED | OUTPATIENT
Start: 2024-12-06 | End: 2024-12-06 | Stop reason: SDUPTHER

## 2024-12-09 ENCOUNTER — LAB (OUTPATIENT)
Dept: FAMILY MEDICINE CLINIC | Facility: CLINIC | Age: 49
End: 2024-12-09

## 2024-12-09 ENCOUNTER — CARE COORDINATION (OUTPATIENT)
Dept: CARE COORDINATION | Facility: CLINIC | Age: 49
End: 2024-12-09

## 2024-12-09 ENCOUNTER — TELEPHONE (OUTPATIENT)
Dept: FAMILY MEDICINE CLINIC | Facility: CLINIC | Age: 49
End: 2024-12-09

## 2024-12-09 DIAGNOSIS — E10.65 UNCONTROLLED TYPE 1 DIABETES MELLITUS WITH HYPERGLYCEMIA (HCC): Chronic | ICD-10-CM

## 2024-12-09 LAB
25(OH)D3 SERPL-MCNC: 18.9 NG/ML (ref 30–100)
ALBUMIN SERPL-MCNC: 3.2 G/DL (ref 3.5–5)
ALBUMIN/GLOB SERPL: 0.9 (ref 1–1.9)
ALP SERPL-CCNC: 191 U/L (ref 35–104)
ALT SERPL-CCNC: 41 U/L (ref 8–45)
ANION GAP SERPL CALC-SCNC: 13 MMOL/L (ref 7–16)
AST SERPL-CCNC: 35 U/L (ref 15–37)
BASOPHILS # BLD: 0 K/UL (ref 0–0.2)
BASOPHILS NFR BLD: 1 % (ref 0–2)
BILIRUB SERPL-MCNC: <0.2 MG/DL (ref 0–1.2)
BUN SERPL-MCNC: 41 MG/DL (ref 6–23)
CALCIUM SERPL-MCNC: 8.6 MG/DL (ref 8.8–10.2)
CHLORIDE SERPL-SCNC: 111 MMOL/L (ref 98–107)
CHOLEST SERPL-MCNC: 184 MG/DL (ref 0–200)
CO2 SERPL-SCNC: 19 MMOL/L (ref 20–29)
CREAT SERPL-MCNC: 2.9 MG/DL (ref 0.6–1.1)
CREAT UR-MCNC: 88.6 MG/DL (ref 28–217)
DIFFERENTIAL METHOD BLD: ABNORMAL
EOSINOPHIL # BLD: 0.7 K/UL (ref 0–0.8)
EOSINOPHIL NFR BLD: 11 % (ref 0.5–7.8)
ERYTHROCYTE [DISTWIDTH] IN BLOOD BY AUTOMATED COUNT: 14.6 % (ref 11.9–14.6)
EST. AVERAGE GLUCOSE BLD GHB EST-MCNC: 213 MG/DL
GLOBULIN SER CALC-MCNC: 3.8 G/DL (ref 2.3–3.5)
GLUCOSE SERPL-MCNC: 180 MG/DL (ref 70–99)
HBA1C MFR BLD: 9 % (ref 0–5.6)
HCT VFR BLD AUTO: 33.2 % (ref 35.8–46.3)
HDLC SERPL-MCNC: 95 MG/DL (ref 40–60)
HDLC SERPL: 1.9 (ref 0–5)
HGB BLD-MCNC: 10.5 G/DL (ref 11.7–15.4)
IMM GRANULOCYTES # BLD AUTO: 0 K/UL (ref 0–0.5)
IMM GRANULOCYTES NFR BLD AUTO: 0 % (ref 0–5)
LDLC SERPL CALC-MCNC: 73 MG/DL (ref 0–100)
LYMPHOCYTES # BLD: 2.5 K/UL (ref 0.5–4.6)
LYMPHOCYTES NFR BLD: 40 % (ref 13–44)
MCH RBC QN AUTO: 28.2 PG (ref 26.1–32.9)
MCHC RBC AUTO-ENTMCNC: 31.6 G/DL (ref 31.4–35)
MCV RBC AUTO: 89.2 FL (ref 82–102)
MICROALBUMIN UR-MCNC: 4.13 MG/DL (ref 0–20)
MICROALBUMIN/CREAT UR-RTO: 47 MG/G (ref 0–30)
MONOCYTES # BLD: 0.4 K/UL (ref 0.1–1.3)
MONOCYTES NFR BLD: 7 % (ref 4–12)
NEUTS SEG # BLD: 2.6 K/UL (ref 1.7–8.2)
NEUTS SEG NFR BLD: 41 % (ref 43–78)
NRBC # BLD: 0 K/UL (ref 0–0.2)
PLATELET # BLD AUTO: 237 K/UL (ref 150–450)
PMV BLD AUTO: 10.5 FL (ref 9.4–12.3)
POTASSIUM SERPL-SCNC: 4.9 MMOL/L (ref 3.5–5.1)
PROT SERPL-MCNC: 7 G/DL (ref 6.3–8.2)
RBC # BLD AUTO: 3.72 M/UL (ref 4.05–5.2)
SODIUM SERPL-SCNC: 143 MMOL/L (ref 136–145)
TRIGL SERPL-MCNC: 78 MG/DL (ref 0–150)
TSH W FREE THYROID IF ABNORMAL: 2.79 UIU/ML (ref 0.27–4.2)
VLDLC SERPL CALC-MCNC: 16 MG/DL (ref 6–23)
WBC # BLD AUTO: 6.2 K/UL (ref 4.3–11.1)

## 2024-12-09 NOTE — TELEPHONE ENCOUNTER
Patient would like to find a new endo ( she is having problems with the insulin pump and the current endo keeps wanting her to stay on it, even though she has had some hospital visits due to low bs). She also wants to know if you can write her a script for what she used to be on along with referral to a different endo even if it is mae.

## 2024-12-09 NOTE — CARE COORDINATION
Ambulatory Care Coordination Note     12/9/2024 11:13 AM     Outreach for High Risk Case Management - incoming call from patient   - requesting syringes from provider.  Patient had been in touch with this ACM over the weekend.  Her plan was to ask for more training from pump representative.   - also suggested she get help with changing out infusion sets as this has been difficult for her.  Data from pump representative:  Lucia Villafuerte RD CDECS  ACM has also been in touch with her.   - Friday 12/6 was the last day of pump data   - avg CGM = 180 mg/dl   - patient had been announcing meals late    Incoming call from patient 12:46 pm   - she has the pump on   - CGM reading is 101  Reviewed announcing meals before she eats to avoid after meal lows.  Patient still trying to reach Lucia Villafuerte.    ACM: Heather Redman RN     Method of communication with provider: chart routing.    PCP/Specialist follow up:   Future Appointments         Provider Specialty Dept Phone    12/18/2024 9:30 AM Linda Mckee, APRN - CNP Family Medicine [phone]    2/5/2025 1:00 PM Anna Hoskins PA-C Endocrinology [phone]    4/7/2025 1:00 PM Anna Hoskins PA-C Endocrinology [phone]

## 2024-12-12 ENCOUNTER — CARE COORDINATION (OUTPATIENT)
Dept: CARE COORDINATION | Facility: CLINIC | Age: 49
End: 2024-12-12

## 2024-12-12 ENCOUNTER — TELEPHONE (OUTPATIENT)
Dept: ENDOCRINOLOGY | Age: 49
End: 2024-12-12

## 2024-12-12 DIAGNOSIS — E10.65 TYPE 1 DIABETES MELLITUS WITH HYPERGLYCEMIA (HCC): Chronic | ICD-10-CM

## 2024-12-12 RX ORDER — INSULIN DEGLUDEC 100 U/ML
INJECTION, SOLUTION SUBCUTANEOUS
Qty: 9 ML | Refills: 0 | Status: SHIPPED | OUTPATIENT
Start: 2024-12-12

## 2024-12-12 RX ORDER — INSULIN LISPRO 100 [IU]/ML
INJECTION, SOLUTION INTRAVENOUS; SUBCUTANEOUS
Qty: 30 ML | Refills: 0 | Status: SHIPPED | OUTPATIENT
Start: 2024-12-12

## 2024-12-12 NOTE — CARE COORDINATION
Ambulatory Care Coordination Note     12/12/2024 1:14 PM     Care Summary Note:   Outreach for High Risk Case Management - spoke with patient  Primary problem with the pump is her dexterity in managing the infusion sets, making sure the needle is inserted properly and with some degree of security.  Yesterday the needle became dislodged and patient did not have any replacement sets.  States the pump has not been in place since yesterday early afternoon.   - today she has taken her Tresiba 10 Units   - mealtime insulin about 90 minutes ago   - current BS is 93.  Patient willing to continue with pump.  She has called the [employer] to order more infusion sets.  Patient is to call or text this ACM when those arrive.  Discussed that we need to stay ahead of ordering supplies and not order when they are all used up.  Ongoing discussion about commitment to this lifestyle change, she states commitment.   - ACM will continue to support as much as possible   - ACM will do daily contact with patient for the foreseeable future.    ACM: Heather Redman RN     Method of communication with provider: chart routing.    Utilization: Has the patient been seen in the ED since your last call? no    PCP/Specialist follow up:   Future Appointments         Provider Specialty Dept Phone    12/18/2024 9:30 AM Linda Mckee APRN - CNP Family Medicine [phone]    2/5/2025 1:00 PM Anna Hoskins PA-C Endocrinology [phone]    4/7/2025 1:00 PM Anna Hoskins PA-C Endocrinology [phone]

## 2024-12-12 NOTE — TELEPHONE ENCOUNTER
Patient came into office stating her sugars are high. She is no longer on pump. Out of insulin and freaking out. Dexcom printed

## 2024-12-12 NOTE — TELEPHONE ENCOUNTER
I sent in a short supply of tresiba and humalog pens to [facility]    She should take 10 units of Tresiba daily  She NEEDS to focus on taking her meal time insulin BEFORE eating    I would like to better understand why she is not staying on the pump that seems to have been helpful in controlling her blood sugars

## 2024-12-12 NOTE — TELEPHONE ENCOUNTER
Spoke with patient, feels she needs more training on the pump. She understands it was controlling better but she is still so confused on the pump.

## 2024-12-12 NOTE — TELEPHONE ENCOUNTER
Can we coordinate Lucia from [employer], sherry dailey from education, and marquez martinez from case management to meet one more time together for re-training? I think if sherry is more familiar then we can have someone local to help her out for the first several set changes and to reinforce the bolus stuff

## 2024-12-23 ENCOUNTER — CARE COORDINATION (OUTPATIENT)
Dept: CARE COORDINATION | Facility: CLINIC | Age: 49
End: 2024-12-23

## 2024-12-23 NOTE — CARE COORDINATION
Ambulatory Care Coordination Note     12/23/2024 12:24 PM     Outreach for High Risk Case Management - spoke with patient  Patient wearing pump - feels like it is going well.  Reports being in the ED last night for UTI  Encouraged patient with pump - citing recurrent UTIs as another reason for controlled diabetes.     ACM: Heather Redman RN     PCP/Specialist follow up:   Future Appointments         Provider Specialty Dept Phone    2/5/2025 1:00 PM Anna Hoskins PA-C Endocrinology [phone]    4/7/2025 1:00 PM Anna Hoskins PA-C Endocrinology [phone]

## 2025-01-03 ENCOUNTER — CARE COORDINATION (OUTPATIENT)
Dept: CARE COORDINATION | Facility: CLINIC | Age: 50
End: 2025-01-03

## 2025-01-03 DIAGNOSIS — G62.9 NEUROPATHY: ICD-10-CM

## 2025-01-03 RX ORDER — GABAPENTIN 100 MG/1
100 CAPSULE ORAL 3 TIMES DAILY
Qty: 270 CAPSULE | Refills: 1 | Status: SHIPPED | OUTPATIENT
Start: 2025-01-03 | End: 2025-07-02

## 2025-01-03 NOTE — CARE COORDINATION
Ambulatory Care Coordination Note     1/3/2025 9:22 AM     Care Summary Note:   Outreach for High Risk Case Management - incoming text from patient  Reports she is out of gabapentin, would like this refilled  Will pass along message to PCP     ACM: Heather Redman RN     Method of communication with provider: chart routing.    Utilization: Has the patient been seen in the ED since your last call? no    PCP/Specialist follow up:   Future Appointments         Provider Specialty Dept Phone    2/5/2025 1:00 PM Anna Hoskins PA-C Endocrinology [phone]    4/7/2025 1:00 PM Anna Hoskins PA-C Endocrinology [phone]

## 2025-01-10 ENCOUNTER — CARE COORDINATION (OUTPATIENT)
Dept: CARE COORDINATION | Facility: CLINIC | Age: 50
End: 2025-01-10

## 2025-01-10 NOTE — CARE COORDINATION
Ambulatory Care Coordination Note     1/10/2025 10:43 AM    Care Summary Note:  Outreach for High Risk Case Management - spoke with patient  Reports that she has not been using the pump for about 2 weeks.  States that the infusion sets just arrived a couple days ago.  Blood sugars have been running in the 200s.  Discussed upcoming pump training 1/20/2024 at 10:00 - 11:30  Reminded patient to please bring a support person (cousin or mother)   - will make pump rep aware that patient has not been using the pump   - review of this ACM's notes indicate that patient had ordered infusion sets around 12/12/20 (a Thursday) and was waiting on them, hoping to receive them by Saturday.  Care Coordination call with pump Rep - she will follow up and confirm delivery date of infusion sets.     ACM: Heather Redman RN     Method of communication with provider: chart routing.    Utilization: Has the patient been seen in the ED since your last call? no    PCP/Specialist follow up:   Future Appointments         Provider Specialty Dept Phone    2/5/2025 1:00 PM Anna Hoskins PA-C Endocrinology [phone]    4/7/2025 1:00 PM Anna Hoskins PA-C Endocrinology [phone]

## 2025-01-13 ENCOUNTER — TELEPHONE (OUTPATIENT)
Dept: ENDOCRINOLOGY | Age: 50
End: 2025-01-13

## 2025-01-13 ENCOUNTER — CARE COORDINATION (OUTPATIENT)
Dept: CARE COORDINATION | Facility: CLINIC | Age: 50
End: 2025-01-13

## 2025-01-13 RX ORDER — INSULIN ASPART INJECTION 100 [IU]/ML
INJECTION, SOLUTION SUBCUTANEOUS
Qty: 30 ML | Refills: 3 | Status: SHIPPED | OUTPATIENT
Start: 2025-01-13

## 2025-01-13 NOTE — CARE COORDINATION
Ambulatory Care Coordination Note     1/13/2025 11:18 AM     Care Summary Note:   Incoming call from patient enrolled in High Risk Care Management  Patient's insurance has new contract with different pharmacy provider - [facility].  They are requiring a pre-authorization for her humalog.     ACM: Heather Redman RN     Method of communication with provider: chart routing.      PCP/Specialist follow up:   Future Appointments         Provider Specialty Dept Phone    2/5/2025 1:00 PM Anna Hoskins PA-C Endocrinology [phone]    4/7/2025 1:00 PM Anna Hoskins PA-C Endocrinology [phone]

## 2025-01-20 ENCOUNTER — CARE COORDINATION (OUTPATIENT)
Dept: CARE COORDINATION | Facility: CLINIC | Age: 50
End: 2025-01-20

## 2025-01-22 NOTE — CARE COORDINATION
Ambulatory Care Coordination Note     1/20/2025 11:13 AM     Care Summary Note:   In person outreach for High Risk Case Management  (Held at endocrinology office)  In person visit with [employer] Pump Rep   - mechanisms of pump totally reviewed   - pump set up and working when patient departed   - encouraged patient to keep the pump on   - patient is to call Rep on Wednesday > time to change infusion set    ACM: Heather Redman, RN     Method of communication with provider: chart routing.    Utilization: Has the patient been seen in the ED since your last call? no    PCP/Specialist follow up:   Future Appointments         Provider Specialty Dept Phone    2/5/2025 1:00 PM Anna Hoskins PA-C Endocrinology [phone]    4/7/2025 1:00 PM Anna Hoskins PA-C Endocrinology [phone]                 Male

## 2025-01-25 ENCOUNTER — HOSPITAL ENCOUNTER (EMERGENCY)
Age: 50
Discharge: LEFT BEFORE DISCHARGE | End: 2025-01-25
Attending: GENERAL PRACTICE
Payer: COMMERCIAL

## 2025-01-25 ENCOUNTER — HOSPITAL ENCOUNTER (EMERGENCY)
Age: 50
Discharge: HOME | End: 2025-01-25
Attending: EMERGENCY MEDICINE
Payer: COMMERCIAL

## 2025-01-25 ENCOUNTER — HOSPITAL ENCOUNTER (EMERGENCY)
Age: 50
Discharge: HOME | End: 2025-01-25
Attending: STUDENT IN AN ORGANIZED HEALTH CARE EDUCATION/TRAINING PROGRAM
Payer: COMMERCIAL

## 2025-01-25 VITALS
SYSTOLIC BLOOD PRESSURE: 138 MMHG | BODY MASS INDEX: 19.93 KG/M2 | OXYGEN SATURATION: 98 % | WEIGHT: 127 LBS | RESPIRATION RATE: 16 BRPM | HEIGHT: 67 IN | TEMPERATURE: 98.4 F | HEART RATE: 93 BPM | DIASTOLIC BLOOD PRESSURE: 81 MMHG

## 2025-01-25 VITALS
DIASTOLIC BLOOD PRESSURE: 78 MMHG | SYSTOLIC BLOOD PRESSURE: 124 MMHG | HEIGHT: 67 IN | TEMPERATURE: 97.9 F | BODY MASS INDEX: 19.93 KG/M2 | RESPIRATION RATE: 16 BRPM | HEART RATE: 100 BPM | WEIGHT: 127 LBS | OXYGEN SATURATION: 98 %

## 2025-01-25 VITALS
RESPIRATION RATE: 20 BRPM | HEART RATE: 99 BPM | TEMPERATURE: 98.9 F | OXYGEN SATURATION: 99 % | DIASTOLIC BLOOD PRESSURE: 89 MMHG | SYSTOLIC BLOOD PRESSURE: 161 MMHG

## 2025-01-25 DIAGNOSIS — Z79.4 TYPE 2 DIABETES MELLITUS WITH HYPERGLYCEMIA, WITH LONG-TERM CURRENT USE OF INSULIN (HCC): Primary | ICD-10-CM

## 2025-01-25 DIAGNOSIS — N30.00 ACUTE CYSTITIS WITHOUT HEMATURIA: ICD-10-CM

## 2025-01-25 DIAGNOSIS — R73.9 HYPERGLYCEMIA: Primary | ICD-10-CM

## 2025-01-25 DIAGNOSIS — E11.65 TYPE 2 DIABETES MELLITUS WITH HYPERGLYCEMIA, WITH LONG-TERM CURRENT USE OF INSULIN (HCC): Primary | ICD-10-CM

## 2025-01-25 LAB
ALBUMIN SERPL-MCNC: 3 G/DL (ref 3.5–5)
ALBUMIN SERPL-MCNC: 3.2 G/DL (ref 3.5–5)
ALBUMIN/GLOB SERPL: 0.8 (ref 1–1.9)
ALBUMIN/GLOB SERPL: 0.9 (ref 1–1.9)
ALP SERPL-CCNC: 149 U/L (ref 35–104)
ALP SERPL-CCNC: 152 U/L (ref 35–104)
ALT SERPL-CCNC: 20 U/L (ref 8–45)
ALT SERPL-CCNC: 22 U/L (ref 8–45)
ANION GAP SERPL CALC-SCNC: 14 MMOL/L (ref 7–16)
ANION GAP SERPL CALC-SCNC: 16 MMOL/L (ref 7–16)
ANION GAP SERPL CALC-SCNC: 16 MMOL/L (ref 7–16)
AST SERPL-CCNC: 25 U/L (ref 15–37)
AST SERPL-CCNC: 33 U/L (ref 15–37)
BACTERIA URNS QL MICRO: ABNORMAL /HPF
BASOPHILS # BLD: 0.02 K/UL (ref 0–0.2)
BASOPHILS # BLD: 0.02 K/UL (ref 0–0.2)
BASOPHILS # BLD: 0.03 K/UL (ref 0–0.2)
BASOPHILS NFR BLD: 0.3 % (ref 0–2)
BASOPHILS NFR BLD: 0.3 % (ref 0–2)
BASOPHILS NFR BLD: 0.5 % (ref 0–2)
BILIRUB SERPL-MCNC: <0.2 MG/DL (ref 0–1.2)
BILIRUB SERPL-MCNC: <0.2 MG/DL (ref 0–1.2)
BILIRUB UR QL: NEGATIVE
BILIRUB UR QL: NEGATIVE
BUN SERPL-MCNC: 30 MG/DL (ref 6–23)
BUN SERPL-MCNC: 31 MG/DL (ref 6–23)
BUN SERPL-MCNC: 35 MG/DL (ref 6–23)
CALCIUM SERPL-MCNC: 8.1 MG/DL (ref 8.8–10.2)
CALCIUM SERPL-MCNC: 8.3 MG/DL (ref 8.8–10.2)
CALCIUM SERPL-MCNC: 8.7 MG/DL (ref 8.8–10.2)
CHLORIDE SERPL-SCNC: 101 MMOL/L (ref 98–107)
CHLORIDE SERPL-SCNC: 103 MMOL/L (ref 98–107)
CHLORIDE SERPL-SCNC: 107 MMOL/L (ref 98–107)
CO2 SERPL-SCNC: 16 MMOL/L (ref 20–29)
CO2 SERPL-SCNC: 16 MMOL/L (ref 20–29)
CO2 SERPL-SCNC: 19 MMOL/L (ref 20–29)
CREAT SERPL-MCNC: 2.71 MG/DL (ref 0.6–1.1)
CREAT SERPL-MCNC: 3.01 MG/DL (ref 0.6–1.1)
CREAT SERPL-MCNC: 3.05 MG/DL (ref 0.6–1.1)
DIFFERENTIAL METHOD BLD: ABNORMAL
EOSINOPHIL # BLD: 0.36 K/UL (ref 0–0.8)
EOSINOPHIL # BLD: 0.44 K/UL (ref 0–0.8)
EOSINOPHIL # BLD: 0.5 K/UL (ref 0–0.8)
EOSINOPHIL NFR BLD: 5.6 % (ref 0.5–7.8)
EOSINOPHIL NFR BLD: 6.6 % (ref 0.5–7.8)
EOSINOPHIL NFR BLD: 7.3 % (ref 0.5–7.8)
EPI CELLS #/AREA URNS HPF: ABNORMAL /HPF
ERYTHROCYTE [DISTWIDTH] IN BLOOD BY AUTOMATED COUNT: 13.9 % (ref 11.9–14.6)
ERYTHROCYTE [DISTWIDTH] IN BLOOD BY AUTOMATED COUNT: 13.9 % (ref 11.9–14.6)
ERYTHROCYTE [DISTWIDTH] IN BLOOD BY AUTOMATED COUNT: 14 % (ref 11.9–14.6)
GLOBULIN SER CALC-MCNC: 3.4 G/DL (ref 2.3–3.5)
GLOBULIN SER CALC-MCNC: 3.7 G/DL (ref 2.3–3.5)
GLUCOSE BLD STRIP.AUTO-MCNC: 307 MG/DL (ref 65–100)
GLUCOSE BLD STRIP.AUTO-MCNC: 366 MG/DL (ref 65–100)
GLUCOSE BLD STRIP.AUTO-MCNC: 419 MG/DL (ref 65–100)
GLUCOSE BLD STRIP.AUTO-MCNC: 464 MG/DL (ref 65–100)
GLUCOSE BLD STRIP.AUTO-MCNC: 490 MG/DL (ref 65–100)
GLUCOSE SERPL-MCNC: 492 MG/DL (ref 70–99)
GLUCOSE SERPL-MCNC: 540 MG/DL (ref 70–99)
GLUCOSE SERPL-MCNC: 558 MG/DL (ref 70–99)
GLUCOSE UR QL STRIP.AUTO: 500 MG/DL
GLUCOSE UR QL STRIP.AUTO: 500 MG/DL
HCT VFR BLD AUTO: 33.2 % (ref 35.8–46.3)
HCT VFR BLD AUTO: 33.3 % (ref 35.8–46.3)
HCT VFR BLD AUTO: 34.2 % (ref 35.8–46.3)
HGB BLD-MCNC: 10.1 G/DL (ref 11.7–15.4)
HGB BLD-MCNC: 10.1 G/DL (ref 11.7–15.4)
HGB BLD-MCNC: 9.8 G/DL (ref 11.7–15.4)
HYALINE CASTS URNS QL MICRO: ABNORMAL /LPF
IMM GRANULOCYTES # BLD AUTO: 0.02 K/UL (ref 0–0.5)
IMM GRANULOCYTES # BLD AUTO: 0.02 K/UL (ref 0–0.5)
IMM GRANULOCYTES # BLD AUTO: 0.03 K/UL (ref 0–0.5)
IMM GRANULOCYTES NFR BLD AUTO: 0.3 % (ref 0–5)
IMM GRANULOCYTES NFR BLD AUTO: 0.3 % (ref 0–5)
IMM GRANULOCYTES NFR BLD AUTO: 0.4 % (ref 0–5)
KETONES UR-MCNC: ABNORMAL MG/DL
KETONES UR-MCNC: NEGATIVE MG/DL
LEUKOCYTE ESTERASE UR QL STRIP: ABNORMAL
LEUKOCYTE ESTERASE UR QL STRIP: ABNORMAL
LYMPHOCYTES # BLD: 1.54 K/UL (ref 0.5–4.6)
LYMPHOCYTES # BLD: 1.88 K/UL (ref 0.5–4.6)
LYMPHOCYTES # BLD: 2.84 K/UL (ref 0.5–4.6)
LYMPHOCYTES NFR BLD: 24.1 % (ref 13–44)
LYMPHOCYTES NFR BLD: 28.2 % (ref 13–44)
LYMPHOCYTES NFR BLD: 41.2 % (ref 13–44)
MCH RBC QN AUTO: 25.2 PG (ref 26.1–32.9)
MCH RBC QN AUTO: 25.4 PG (ref 26.1–32.9)
MCH RBC QN AUTO: 25.6 PG (ref 26.1–32.9)
MCHC RBC AUTO-ENTMCNC: 29.5 G/DL (ref 31.4–35)
MCHC RBC AUTO-ENTMCNC: 29.5 G/DL (ref 31.4–35)
MCHC RBC AUTO-ENTMCNC: 30.3 G/DL (ref 31.4–35)
MCV RBC AUTO: 84.3 FL (ref 82–102)
MCV RBC AUTO: 85.3 FL (ref 82–102)
MCV RBC AUTO: 85.9 FL (ref 82–102)
MONOCYTES # BLD: 0.4 K/UL (ref 0.1–1.3)
MONOCYTES # BLD: 0.41 K/UL (ref 0.1–1.3)
MONOCYTES # BLD: 0.47 K/UL (ref 0.1–1.3)
MONOCYTES NFR BLD: 6 % (ref 4–12)
MONOCYTES NFR BLD: 6.3 % (ref 4–12)
MONOCYTES NFR BLD: 7.1 % (ref 4–12)
NEUTS SEG # BLD: 3.09 K/UL (ref 1.7–8.2)
NEUTS SEG # BLD: 3.83 K/UL (ref 1.7–8.2)
NEUTS SEG # BLD: 4.05 K/UL (ref 1.7–8.2)
NEUTS SEG NFR BLD: 44.8 % (ref 43–78)
NEUTS SEG NFR BLD: 57.5 % (ref 43–78)
NEUTS SEG NFR BLD: 63.2 % (ref 43–78)
NITRITE UR QL: NEGATIVE
NITRITE UR QL: NEGATIVE
NRBC # BLD: 0 K/UL (ref 0–0.2)
PH UR: 6 (ref 5–9)
PH UR: 6 (ref 5–9)
PLATELET # BLD AUTO: 222 K/UL (ref 150–450)
PLATELET # BLD AUTO: 231 K/UL (ref 150–450)
PLATELET # BLD AUTO: 250 K/UL (ref 150–450)
PMV BLD AUTO: 10.3 FL (ref 9.4–12.3)
PMV BLD AUTO: 10.4 FL (ref 9.4–12.3)
PMV BLD AUTO: 9.7 FL (ref 9.4–12.3)
POTASSIUM SERPL-SCNC: 4.9 MMOL/L (ref 3.5–5.1)
POTASSIUM SERPL-SCNC: 5 MMOL/L (ref 3.5–5.1)
POTASSIUM SERPL-SCNC: 5.2 MMOL/L (ref 3.5–5.1)
PROT SERPL-MCNC: 6.6 G/DL (ref 6.3–8.2)
PROT SERPL-MCNC: 6.7 G/DL (ref 6.3–8.2)
PROT UR QL: ABNORMAL MG/DL
PROT UR QL: NEGATIVE MG/DL
RBC # BLD AUTO: 3.89 M/UL (ref 4.05–5.2)
RBC # BLD AUTO: 3.95 M/UL (ref 4.05–5.2)
RBC # BLD AUTO: 3.98 M/UL (ref 4.05–5.2)
RBC # UR STRIP: ABNORMAL
RBC # UR STRIP: ABNORMAL
RBC #/AREA URNS HPF: ABNORMAL /HPF
SERVICE CMNT-IMP: ABNORMAL
SODIUM SERPL-SCNC: 135 MMOL/L (ref 136–145)
SODIUM SERPL-SCNC: 136 MMOL/L (ref 136–145)
SODIUM SERPL-SCNC: 137 MMOL/L (ref 136–145)
SP GR UR: 1.01 (ref 1–1.02)
SP GR UR: 1.02 (ref 1–1.02)
UROBILINOGEN UR QL: 0.2 EU/DL (ref 0.2–1)
UROBILINOGEN UR QL: 0.2 EU/DL (ref 0.2–1)
WBC # BLD AUTO: 6.4 K/UL (ref 4.3–11.1)
WBC # BLD AUTO: 6.7 K/UL (ref 4.3–11.1)
WBC # BLD AUTO: 6.9 K/UL (ref 4.3–11.1)
WBC URNS QL MICRO: ABNORMAL /HPF

## 2025-01-25 PROCEDURE — 96375 TX/PRO/DX INJ NEW DRUG ADDON: CPT

## 2025-01-25 PROCEDURE — 96374 THER/PROPH/DIAG INJ IV PUSH: CPT

## 2025-01-25 PROCEDURE — 99284 EMERGENCY DEPT VISIT MOD MDM: CPT

## 2025-01-25 PROCEDURE — 85025 COMPLETE CBC W/AUTO DIFF WBC: CPT

## 2025-01-25 PROCEDURE — 2580000003 HC RX 258: Performed by: STUDENT IN AN ORGANIZED HEALTH CARE EDUCATION/TRAINING PROGRAM

## 2025-01-25 PROCEDURE — 6360000002 HC RX W HCPCS: Performed by: EMERGENCY MEDICINE

## 2025-01-25 PROCEDURE — 6370000000 HC RX 637 (ALT 250 FOR IP): Performed by: STUDENT IN AN ORGANIZED HEALTH CARE EDUCATION/TRAINING PROGRAM

## 2025-01-25 PROCEDURE — 6370000000 HC RX 637 (ALT 250 FOR IP)

## 2025-01-25 PROCEDURE — 81001 URINALYSIS AUTO W/SCOPE: CPT

## 2025-01-25 PROCEDURE — 82962 GLUCOSE BLOOD TEST: CPT

## 2025-01-25 PROCEDURE — 80053 COMPREHEN METABOLIC PANEL: CPT

## 2025-01-25 PROCEDURE — 4500000002 HC ER NO CHARGE

## 2025-01-25 PROCEDURE — 2580000003 HC RX 258: Performed by: EMERGENCY MEDICINE

## 2025-01-25 PROCEDURE — 81003 URINALYSIS AUTO W/O SCOPE: CPT

## 2025-01-25 PROCEDURE — 2500000003 HC RX 250 WO HCPCS: Performed by: EMERGENCY MEDICINE

## 2025-01-25 RX ORDER — 0.9 % SODIUM CHLORIDE 0.9 %
1000 INTRAVENOUS SOLUTION INTRAVENOUS
Status: COMPLETED | OUTPATIENT
Start: 2025-01-25 | End: 2025-01-25

## 2025-01-25 RX ORDER — 0.9 % SODIUM CHLORIDE 0.9 %
1000 INTRAVENOUS SOLUTION INTRAVENOUS ONCE
Status: DISCONTINUED | OUTPATIENT
Start: 2025-01-25 | End: 2025-01-25 | Stop reason: HOSPADM

## 2025-01-25 RX ORDER — CEPHALEXIN 500 MG/1
500 CAPSULE ORAL 2 TIMES DAILY
Qty: 14 CAPSULE | Refills: 0 | Status: SHIPPED | OUTPATIENT
Start: 2025-01-25 | End: 2025-02-01

## 2025-01-25 RX ADMIN — SODIUM CHLORIDE 1000 ML: 900 INJECTION, SOLUTION INTRAVENOUS at 02:53

## 2025-01-25 RX ADMIN — INSULIN HUMAN 10 UNITS: 100 INJECTION, SOLUTION PARENTERAL at 01:50

## 2025-01-25 RX ADMIN — SODIUM CHLORIDE 1000 ML: 9 INJECTION, SOLUTION INTRAVENOUS at 15:23

## 2025-01-25 RX ADMIN — INSULIN HUMAN 10 UNITS: 100 INJECTION, SOLUTION PARENTERAL at 18:39

## 2025-01-25 RX ADMIN — SODIUM CHLORIDE 1000 ML: 900 INJECTION, SOLUTION INTRAVENOUS at 01:35

## 2025-01-25 RX ADMIN — WATER 1000 MG: 1 INJECTION INTRAMUSCULAR; INTRAVENOUS; SUBCUTANEOUS at 02:53

## 2025-01-25 ASSESSMENT — PAIN - FUNCTIONAL ASSESSMENT: PAIN_FUNCTIONAL_ASSESSMENT: NONE - DENIES PAIN

## 2025-01-25 NOTE — ED TRIAGE NOTES
Pt from urgent care for hyperglycemia, states she was here earlier this morning for UTI and hyperglycemia as well.  en route.

## 2025-01-25 NOTE — ED TRIAGE NOTES
Alpha 38- per EMS: patient is coming from home- discharged from [facility] from at 5 am- positive UTI. Bgl 557 in route. 15 units insulin this morning at home.   VS: 142/86  94HR 98% RA  Urinary frequency  98T

## 2025-01-25 NOTE — ED TRIAGE NOTES
Pt ambulated to triage    Pt states she has high blood sugar and foul smelling urine.  Pt believes that she has a UTI.  No dysuria, dizziness, n/v.

## 2025-01-25 NOTE — ED PROVIDER NOTES
Emergency Department Provider Note       PCP: Linda Mckee, APRN - CNP   Age: 49 y.o.   Sex: female     DISPOSITION Decision To Discharge 01/25/2025 08:06:45 PM    ICD-10-CM    1. Hyperglycemia  R73.9           Medical Decision Making     49-year-old female presents emergency department with concern for elevated BGL.  This is patient's third visit to the emergency department in the last 24 hours with elevated glucose.  She reports she has insulin pump but she is concerned that is not functioning appropriately.  Does not have it with her.  States she is unsure that she is loading the cartridge correctly at home.  States she has not been using the insulin pump.  Did not call her endocrinologist or their emergency contact information that she has at home.  Has picked up her prescription for antibiotics.  Denies any other symptoms.  Will give a liter bolus IV fluids.  Lab work shows normal white count, stable H&H, BGL is 558, creatinine is at baseline.  Will repeat BGL after IV fluids.  BGL remained elevated, after 10 units insulin, is down to 366.  Patient eager to be discharged.  Has no other emergent complaint.  Improvement of her glucose after interventions here.  Upon discharge, patient advised to contact her endocrinologist as well as their 24-hour nursing line who can help walk her through setting up her insulin pump at home.  Return precautions given.  She voiced understanding and agreement.     1 acute complicated illness or injury.  Shared medical decision making was utilized in creating the patients health plan today.  I independently ordered and reviewed each unique test.    I reviewed external records: provider visit note from PCP.                     History     49-year-old female presents emergency department with concern for elevated BGL.  This is patient's third visit to the emergency department in the last 24 hours with elevated glucose.  She reports she has insulin pump but she is concerned that    Result Value Ref Range    POC Glucose 464 (HH) 65 - 100 mg/dL    Performed by: Chrissie    POCT Glucose   Result Value Ref Range    POC Glucose 419 (H) 65 - 100 mg/dL    Performed by: Meryl    POCT Glucose   Result Value Ref Range    POC Glucose 307 (H) 65 - 100 mg/dL    Performed by: Meryl          No orders to display                No results for input(s): \"COVID19\" in the last 72 hours.     Voice dictation software was used during the making of this note.  This software is not perfect and grammatical and other typographical errors may be present.  This note has not been completely proofread for errors.     Pop Wilson DO  01/25/25 2008

## 2025-01-26 NOTE — DISCHARGE INSTRUCTIONS
Call your support staff for your insulin pump tonight upon returning home.  Follow-up endocrinologist this week.  Return to the ER for worsening or worrisome symptoms

## 2025-01-27 ENCOUNTER — CARE COORDINATION (OUTPATIENT)
Dept: CARE COORDINATION | Facility: CLINIC | Age: 50
End: 2025-01-27

## 2025-01-27 DIAGNOSIS — R27.8 POOR MANUAL DEXTERITY: ICD-10-CM

## 2025-01-27 DIAGNOSIS — Z96.41 INSULIN PUMP STATUS: ICD-10-CM

## 2025-01-27 DIAGNOSIS — E10.65 TYPE 1 DIABETES MELLITUS WITH HYPERGLYCEMIA (HCC): Primary | ICD-10-CM

## 2025-01-27 RX ORDER — INSULIN ASPART 100 [IU]/ML
INJECTION, SOLUTION SUBCUTANEOUS
Qty: 16 ML | Refills: 5 | Status: SHIPPED | OUTPATIENT
Start: 2025-01-27

## 2025-01-27 NOTE — CARE COORDINATION
Ambulatory Care Coordination Note     1/27/2025 9:02 AM     Care Summary Note:   Outreach for High Risk Case Management - incoming call from patient  Reports that she was in  ED x2 and discharged to home with BS > 500.  (ED notes state around 300).  Then patient went to [facility] yesterday - admitted there over night, will be discharged today.  Patient states Friday was the day (1/24) she was to change the pump cartridge.  Goes on to say that she thought she could see the insulin dripping from the infusion set - but apparently not - and her BS went up.  Hence the ED visits.  [facility] physician has told her she does not have a UTI.    Patient states that a prescription for prefilled cartridges has been sent to [facility].  They will fill for patient with a PA.     ACM: Heather Redman RN     Challenges to be reviewed by the provider   Additional needs identified to be addressed with provider Yes  medications-needs pre-authorization for pre-filled insulin cartridges.               Method of communication with provider: chart routing.    Utilization: Has the patient been seen in the ED since your last call? Yes,   Discharged twice from  [facility]  Will discharged today from [facility]    Review of Discharge Instructions:   [] AVS discharge instructions  [] Right Care, Right Place, Right Time document  [x] Medication changes  [] Follow up appointments  [] Referral follow up   [x] Route chart to [facility] for pre-authorization       PCP/Specialist follow up:   Future Appointments         Provider Specialty Dept Phone    2/5/2025 1:00 PM Anna Hoskins PA-C Endocrinology [phone]    4/7/2025 1:00 PM Anna Hoskins PA-C Endocrinology [phone]

## 2025-01-28 ENCOUNTER — TELEPHONE (OUTPATIENT)
Dept: ENDOCRINOLOGY | Age: 50
End: 2025-01-28

## 2025-01-28 NOTE — TELEPHONE ENCOUNTER
Fiasp pump cartridge approved      Prior authorization approved  Payer: [facility] [employer]  - Commercial Case ID: [id number]    [phone]  Note from payer: Your PA request has been approved. Additional information will be provided in the approval communication. (Message 1142)  Approval Details    Authorized from January 27, 2025 to January 27, 2026  Electronic appeal: Not supported  Prior auth initiated by: [facility]/pharmacy #[id number] - [place], [place] - [address] [place] [address] -  [phone]  [phone]  View History

## 2025-01-31 ENCOUNTER — CARE COORDINATION (OUTPATIENT)
Dept: CARE COORDINATION | Facility: CLINIC | Age: 50
End: 2025-01-31

## 2025-01-31 NOTE — CARE COORDINATION
Ambulatory Care Coordination Note     1/31/2025 11:20 AM     Care Summary Note:   Incoming call from patient / enrolled in High Risk Case Management  Asking about assistance with her insulin medication.  She has started working at [employer] and will not have insurance for 90 days.  Patient previously covered by [employer].  Suggested she look at a COBRA plan, but warned her that they are expensive.  Will ask SW for further suggestions.     ACM: Heather Redman RN     PCP/Specialist follow up:   Future Appointments         Provider Specialty Dept Phone    2/5/2025 1:00 PM Anna Hoskins PA-C Endocrinology [phone]    4/7/2025 1:00 PM Anna Hoskins PA-C Endocrinology [phone]

## 2025-02-05 ENCOUNTER — CARE COORDINATION (OUTPATIENT)
Dept: CARE COORDINATION | Facility: CLINIC | Age: 50
End: 2025-02-05

## 2025-02-05 ENCOUNTER — OFFICE VISIT (OUTPATIENT)
Dept: ENDOCRINOLOGY | Age: 50
End: 2025-02-05
Payer: COMMERCIAL

## 2025-02-05 VITALS
OXYGEN SATURATION: 99 % | WEIGHT: 137.8 LBS | HEIGHT: 67 IN | SYSTOLIC BLOOD PRESSURE: 122 MMHG | HEART RATE: 81 BPM | BODY MASS INDEX: 21.63 KG/M2 | DIASTOLIC BLOOD PRESSURE: 82 MMHG

## 2025-02-05 DIAGNOSIS — R27.8 POOR MANUAL DEXTERITY: ICD-10-CM

## 2025-02-05 DIAGNOSIS — E11.610 CHARCOT FOOT DUE TO DIABETES MELLITUS (HCC): Chronic | ICD-10-CM

## 2025-02-05 DIAGNOSIS — E10.65 TYPE 1 DIABETES MELLITUS WITH HYPERGLYCEMIA (HCC): Primary | Chronic | ICD-10-CM

## 2025-02-05 DIAGNOSIS — Z96.41 INSULIN PUMP STATUS: ICD-10-CM

## 2025-02-05 DIAGNOSIS — Z89.422 ACQUIRED ABSENCE OF OTHER LEFT TOE(S) (HCC): ICD-10-CM

## 2025-02-05 DIAGNOSIS — N18.4 CHRONIC KIDNEY DISEASE (CKD), STAGE IV (SEVERE) (HCC): Chronic | ICD-10-CM

## 2025-02-05 PROCEDURE — 3074F SYST BP LT 130 MM HG: CPT | Performed by: PHYSICIAN ASSISTANT

## 2025-02-05 PROCEDURE — 95251 CONT GLUC MNTR ANALYSIS I&R: CPT | Performed by: PHYSICIAN ASSISTANT

## 2025-02-05 PROCEDURE — 3079F DIAST BP 80-89 MM HG: CPT | Performed by: PHYSICIAN ASSISTANT

## 2025-02-05 PROCEDURE — 99214 OFFICE O/P EST MOD 30 MIN: CPT | Performed by: PHYSICIAN ASSISTANT

## 2025-02-05 NOTE — CARE COORDINATION
Ambulatory Care Coordination Note     2/5/2025 3:16 PM     Care Summary Note:   In person outreach for High Risk Case Management - met patient along w/endocrinology  Reviewed use of insulin pump:   - announcing meals   - suggestions by provider if there is a low - eat a protein snack, High - drink water   - patient has stopped high sugar drinks  Patient has gotten some insurance via the [employer]:  [employer]   - will speak with her pharmacy re: coupon for insulin     ACM: Heather Redman RN     Method of communication with provider: none.    PCP/Specialist follow up:   Future Appointments         Provider Specialty Dept Phone    4/7/2025 1:00 PM Anna Hoskins PA-C Endocrinology [phone]

## 2025-02-05 NOTE — PROGRESS NOTES
2.04, GFR 33, microalbumin/Cr ratio 97.0               Now Under care of [facility]                           04/18/2019      Cr 1.78, GFR 39 (critical potassium 9.3)                           04/20/2019      Cr 2.06, GFR 33 (repeat potassium 4.5)                           09/16/2019      Cr 1.91, GFR 36                           04/15/2020      Cr 1.83, GFR 38                              06/09/2021      Cr 2.60, GFR 24     12/05/2022 Cr 1.39, GFR 47     05/03/2023 Cr 3.58, GFR 15     07/25/2023 Cr 2.58, GFR 22     09/22/2023 Cr 3.29, GFR 17     03/12/2024 Cr 2.90, GFR 19     01/27/2025 Cr 2.97, GFR 18        Lipids:     Current therapy This patient does not have an active medication from one of the medication groupers.   06/19/2020  TC- 156, LDL- 84, VLDL- 20,  HDL- 52, TG- 100  01/20/2023  TC- 110, LDL- 31.6, VLDL- 14.4,  HDL- 64, TG- 72            Lab Results   Component Value Date    CHOL 184 12/09/2024    CHOL 110 01/20/2023    CHOL 156 06/19/2020     No components found for: \"LDLCHOLESTEROL\", \"LDLCALC\"     Lab Results   Component Value Date    VLDL 16 12/09/2024    VLDL 14.4 01/20/2023    VLDL 20 06/19/2020        Lab Results   Component Value Date    HDL 95 (H) 12/09/2024    HDL 64 (H) 01/20/2023    HDL 52 06/19/2020      Lab Results   Component Value Date    HDL 95 (H) 12/09/2024    HDL 64 (H) 01/20/2023    HDL 52 06/19/2020      Lab Results   Component Value Date    TRIG 78 12/09/2024    TRIG 72 01/20/2023    TRIG 100 06/19/2020     Lab Results   Component Value Date    CHOLHDLRATIO 1.9 12/09/2024    CHOLHDLRATIO 1.7 01/20/2023         Hemoglobin A1c:     12/25/2014      10.4%               07/12/2016      10.3%               01/18/2019      10.7%               04/18/2019      9.3%               06/27/2019      9.3%               09/16/2019      9.1%               02/07/2020      9.4%                           Reports 4/17/2020      9.8%               06/19/2020      9.6%

## 2025-02-12 ENCOUNTER — CARE COORDINATION (OUTPATIENT)
Dept: CARE COORDINATION | Facility: CLINIC | Age: 50
End: 2025-02-12

## 2025-02-12 NOTE — CARE COORDINATION
Ambulatory Care Coordination Note     2/12/2025 3:53 PM     Care Summary Note:   Outreach for High Risk Case Management - spoke with patient  Reports that she is managing well with her insulin pump  BS numbers have \"within range\"  New job with [employer] going well - she does not have health insurance   - has switched her PCP to [facility] d/t insurance change   - all providers except for Endocrinology are with [facility]  Discussed the ramifications of changing endocrinology providers   - stressed patient should have her diabetes managed by a specialty provider   - if she would like to change to where her insurance would cover, to ask for names through her transplant physicians.  Patient would prefer to stay with current Endocrinology provider   - suggested she call to determine if there is a sliding scale self pmt plan   - patient called - and apparently this feature is available.     ACM: Heather Redman RN     Method of communication with provider: chart routing.    Utilization: Has the patient been seen in the ED since your last call? no    PCP/Specialist follow up:   Future Appointments         Provider Specialty Dept Phone    4/7/2025 1:00 PM Anna Hoskins PA-C Endocrinology [phone]

## 2025-02-13 ENCOUNTER — CARE COORDINATION (OUTPATIENT)
Dept: CARE COORDINATION | Facility: CLINIC | Age: 50
End: 2025-02-13

## 2025-02-13 ENCOUNTER — TELEPHONE (OUTPATIENT)
Dept: ENDOCRINOLOGY | Age: 50
End: 2025-02-13

## 2025-02-13 DIAGNOSIS — N18.4 CHRONIC KIDNEY DISEASE (CKD), STAGE IV (SEVERE) (HCC): ICD-10-CM

## 2025-02-13 DIAGNOSIS — E11.610 CHARCOT FOOT DUE TO DIABETES MELLITUS (HCC): ICD-10-CM

## 2025-02-13 DIAGNOSIS — Z96.41 INSULIN PUMP STATUS: ICD-10-CM

## 2025-02-13 DIAGNOSIS — E10.65 TYPE 1 DIABETES MELLITUS WITH HYPERGLYCEMIA (HCC): Primary | ICD-10-CM

## 2025-02-13 NOTE — TELEPHONE ENCOUNTER
Patient called requesting referral to [facility] Endocrinology. Stated that her insurance is changing to a plan we are not in network [facility]

## 2025-02-13 NOTE — CARE COORDINATION
Ambulatory Care Coordination Note     2/13/2025 2:53 PM     Care Summary Note:   Outreach - multiple calls this week - for High Risk Case Management  Care Coordination calls to link patient with Endocrinology and also with [facility] Financial Assistance.  Reports that she can afford the $40.00 co-payment, but would need help with ongoing fees for her care with Endocrinology.   - patient will call Financial Assistance   - ACM will get SW support if needed   - patient is going to ask about FT work so that she might be eligible for insurance through her employer.   - has switched her Primary Care to [facility] where her other providers are already     ACM: Heather Redman RN     PCP/Specialist follow up:   Future Appointments         Provider Specialty Dept Phone    4/7/2025 1:00 PM Anna Hoskins PA-C Endocrinology [phone]

## 2025-02-18 ENCOUNTER — CARE COORDINATION (OUTPATIENT)
Dept: CARE COORDINATION | Facility: CLINIC | Age: 50
End: 2025-02-18

## 2025-02-18 NOTE — CARE COORDINATION
Ambulatory Care Coordination Note     2/18/2025 12:26 PM     Care Summary Note:   Incoming call from patient   Reports that she is not tolerating the standing involved as  in her job with [employer] - apparently causing quite a lot of pain.  Plan is to make an appeal with disability - will need documentation/letter from provider about her feet/leg neuropathy.  ACM will send message to provider.     ACM: Heather Redman RN     Method of communication with provider: staff message.    PCP/Specialist follow up:   Future Appointments         Provider Specialty Dept Phone    4/7/2025 1:00 PM Anna Hoskins PA-C Endocrinology [phone]

## 2025-02-25 ENCOUNTER — CARE COORDINATION (OUTPATIENT)
Dept: CARE COORDINATION | Facility: CLINIC | Age: 50
End: 2025-02-25

## 2025-02-25 NOTE — CARE COORDINATION
Ambulatory Care Coordination Note     2/25/2025 2:40 PM     Care Summary Note:   Outreach for High Risk Case Management   Unable to reach  Left VM requesting return call     ACM: Heather Redman, DYANA     PCP/Specialist follow up:   Future Appointments         Provider Specialty Dept Phone    4/7/2025 1:00 PM Anna Hoskins PA-C Endocrinology [phone]

## 2025-02-26 DIAGNOSIS — E10.65 TYPE 1 DIABETES MELLITUS WITH HYPERGLYCEMIA (HCC): ICD-10-CM

## 2025-02-26 RX ORDER — ACYCLOVIR 400 MG/1
TABLET ORAL
Qty: 9 EACH | Refills: 3 | Status: SHIPPED | OUTPATIENT
Start: 2025-02-26 | End: 2025-02-28 | Stop reason: SDUPTHER

## 2025-02-28 DIAGNOSIS — E10.65 TYPE 1 DIABETES MELLITUS WITH HYPERGLYCEMIA (HCC): ICD-10-CM

## 2025-02-28 DIAGNOSIS — R27.8 POOR MANUAL DEXTERITY: ICD-10-CM

## 2025-02-28 DIAGNOSIS — Z96.41 INSULIN PUMP STATUS: ICD-10-CM

## 2025-02-28 RX ORDER — INSULIN ASPART INJECTION 100 [IU]/ML
INJECTION, SOLUTION SUBCUTANEOUS
Qty: 30 ML | Refills: 3 | Status: CANCELLED | OUTPATIENT
Start: 2025-02-28

## 2025-02-28 RX ORDER — INSULIN DEGLUDEC 100 U/ML
INJECTION, SOLUTION SUBCUTANEOUS
Qty: 9 ML | Refills: 1
Start: 2025-02-28

## 2025-02-28 RX ORDER — ACYCLOVIR 400 MG/1
TABLET ORAL
Qty: 9 EACH | Refills: 3 | Status: SHIPPED | OUTPATIENT
Start: 2025-02-28

## 2025-02-28 RX ORDER — INSULIN ASPART 100 [IU]/ML
INJECTION, SOLUTION SUBCUTANEOUS
Qty: 16 ML | Refills: 5 | Status: SHIPPED | OUTPATIENT
Start: 2025-02-28

## 2025-02-28 NOTE — TELEPHONE ENCOUNTER
Can we confirm that she wants the pens... I really want her on the pump so I prefer to refill her pumpcarts and have her avoid taking insulin by pens and to remain on pump

## 2025-02-28 NOTE — TELEPHONE ENCOUNTER
----- Message from DYANA HAYES RN sent at 2/28/2025  8:44 AM EST -----  HI Mel, good morning  I know your day is so busy with messages like this one.  Just off the phone with patient who now tells me that it is a prescription refill for the Fiasp and the sensors sent to [facility] (on [address] - sent in previous message).  This was not clear to me and so my request was confusing I am sure.  If you have questions - please call her, she doesn't go in to work until 11:00.

## 2025-03-05 ENCOUNTER — CARE COORDINATION (OUTPATIENT)
Dept: CARE COORDINATION | Facility: CLINIC | Age: 50
End: 2025-03-05

## 2025-03-05 NOTE — CARE COORDINATION
.Ambulatory Care Coordination Note     3/5/2025 9:37 AM     Care Summary Note:   Outreach for High Risk Case Management - spoke with patient  All patient's refills have been \"straightened out\" and she is able to pick them up at the [facility] where she is employed.  Reports she is feeling well.  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  This will be a better situation for her health insurance     ACM: Heather Redman RN     Method of communication with provider: chart routing.    Utilization: Has the patient been seen in the ED since your last call? no    PCP/Specialist follow up:   Future Appointments         Provider Specialty Dept Phone    4/7/2025 1:00 PM Anna Hoskins PA-C Endocrinology [phone]

## 2025-03-26 ENCOUNTER — CARE COORDINATION (OUTPATIENT)
Dept: CARE COORDINATION | Facility: CLINIC | Age: 50
End: 2025-03-26
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
## 2025-04-03 ENCOUNTER — TELEPHONE (OUTPATIENT)
Dept: ENDOCRINOLOGY | Age: 50
End: 2025-04-03

## 2025-04-04 ENCOUNTER — CARE COORDINATION (OUTPATIENT)
Dept: CARE COORDINATION | Facility: CLINIC | Age: 50
End: 2025-04-04

## 2025-04-04 NOTE — CARE COORDINATION
Ambulatory Care Coordination Note     4/4/2025 3:38 PM     Care Summary Note:   Incoming call from patient  Reports a sore on her foot  Requires referral to podiatrist     ACM: Heather Redman RN    Method of communication with provider: chart routing.    PCP/Specialist follow up:   Future Appointments         Provider Specialty Dept Phone    4/7/2025 1:00 PM Anna Hoskins PA-C Endocrinology [phone]

## 2025-04-07 PROBLEM — L97.529 DIABETIC ULCER OF LEFT GREAT TOE (HCC): Status: ACTIVE | Noted: 2025-04-07

## 2025-04-07 PROBLEM — E11.621 DIABETIC ULCER OF LEFT GREAT TOE (HCC): Status: ACTIVE | Noted: 2025-04-07

## 2025-04-11 ENCOUNTER — HOSPITAL ENCOUNTER (EMERGENCY)
Age: 50
Discharge: HOME | End: 2025-04-11
Attending: EMERGENCY MEDICINE
Payer: COMMERCIAL

## 2025-04-11 ENCOUNTER — APPOINTMENT (OUTPATIENT)
Dept: GENERAL RADIOLOGY | Age: 50
End: 2025-04-11
Payer: COMMERCIAL

## 2025-04-11 VITALS
SYSTOLIC BLOOD PRESSURE: 130 MMHG | OXYGEN SATURATION: 97 % | HEART RATE: 78 BPM | BODY MASS INDEX: 19.93 KG/M2 | WEIGHT: 127 LBS | TEMPERATURE: 97.7 F | RESPIRATION RATE: 20 BRPM | DIASTOLIC BLOOD PRESSURE: 78 MMHG | HEIGHT: 67 IN

## 2025-04-11 DIAGNOSIS — S90.31XA HEMATOMA OF RIGHT FOOT: Primary | ICD-10-CM

## 2025-04-11 LAB
GLUCOSE BLD STRIP.AUTO-MCNC: 309 MG/DL (ref 65–100)
GLUCOSE BLD STRIP.AUTO-MCNC: 328 MG/DL (ref 65–100)
SERVICE CMNT-IMP: ABNORMAL
SERVICE CMNT-IMP: ABNORMAL

## 2025-04-11 PROCEDURE — 99284 EMERGENCY DEPT VISIT MOD MDM: CPT

## 2025-04-11 PROCEDURE — 73630 X-RAY EXAM OF FOOT: CPT

## 2025-04-11 PROCEDURE — 6370000000 HC RX 637 (ALT 250 FOR IP): Performed by: EMERGENCY MEDICINE

## 2025-04-11 PROCEDURE — 82962 GLUCOSE BLOOD TEST: CPT

## 2025-04-11 RX ORDER — SULFAMETHOXAZOLE AND TRIMETHOPRIM 800; 160 MG/1; MG/1
1 TABLET ORAL 2 TIMES DAILY
COMMUNITY
Start: 2025-04-03

## 2025-04-11 RX ORDER — CLINDAMYCIN HYDROCHLORIDE 150 MG/1
300 CAPSULE ORAL 4 TIMES DAILY
Qty: 56 CAPSULE | Refills: 0 | Status: SHIPPED | OUTPATIENT
Start: 2025-04-11 | End: 2025-04-18

## 2025-04-11 RX ADMIN — INSULIN HUMAN 7 UNITS: 100 INJECTION, SOLUTION PARENTERAL at 01:43

## 2025-04-11 ASSESSMENT — ENCOUNTER SYMPTOMS
SHORTNESS OF BREATH: 0
ABDOMINAL PAIN: 0

## 2025-04-11 ASSESSMENT — LIFESTYLE VARIABLES: HOW OFTEN DO YOU HAVE A DRINK CONTAINING ALCOHOL: NEVER

## 2025-04-11 NOTE — DISCHARGE INSTRUCTIONS
Exchange your insulin pump as we discussed  Call your endocrinologist in the morning to discuss any further changes to your blood sugar management    Start new antibiotics.  Discontinue the trimethoprim/sulfamethoxazole that you were previously prescribed    Elevate your leg is much as possible    Call your podiatrist this morning to schedule a appointment for recheck and further treatment    Return to ER for any worsening symptoms or new problems which may arise

## 2025-04-11 NOTE — ED TRIAGE NOTES
Pt was seen for blister on foot and put on antibiotics/pt states foot is not feeling/ pt states she is a diabetic

## 2025-04-11 NOTE — ED PROVIDER NOTES
Emergency Department Provider Note       PCP: File, Not On   Age: 49 y.o.   Sex: female     DISPOSITION Decision To Discharge 04/11/2025 02:15:16 AM    ICD-10-CM    1. Hematoma of right foot  S90.31XA           Medical Decision Making     49-year-old female patient presents with complaints of swelling and a bruise on the lateral aspect of her right foot  Seen by podiatry for same.  Has been taking Bactrim without change  No fever.  On exam area appears bruised and swollen  There is no evidence of erythema or fluctuance  Plain images only revealed a swollen area without evidence of any bony involvement or breakdown  Will go ahead and switch the patient from Bactrim to Cleocin  She is advised to follow back up with a podiatrist for reevaluation and to determine the next step in treating this area  Discussed return precautions with pain     1 chronic illness with exacerbation.  Prescription drug management performed.  Patient was discharged risks and benefits of hospitalization were considered.  Shared medical decision making was utilized in creating the patients health plan today.  I independently ordered and reviewed each unique test.    I reviewed external records: ED visit note from a different ED.   I reviewed external records: provider visit note from outside specialist.       I interpreted the X-rays plain images of the right foot reveal swollen area laterally but no evidence of fracture dislocation or bony degeneration.  Confirmed with radiologist written report.  No evidence of gas and soft tissue pain              History     49-year-old female patient presents to the ER with concerns over a sore swollen area on her right foot  Patient reports that she went to see a podiatrist on 4/3/2025.  Patient is unclear about exactly what her diagnosis was at that time  She was placed on oral antibiotics.  Records indicate Bactrim  Since that time she states that the pain and swelling has persisted  Swelling does get  Pes planus is noted. Degenerative changes are greatest along the midfoot  region. There is subcutaneous edema laterally. There are postsurgical changes of  the distal tibia with surgical screws present.      Impression    1. Subcutaneous edema laterally with no definite acute osseous abnormality.  2. Old healed fracture deformity of the fifth metatarsal bone.  3. Degenerative changes.      Electronically signed by Davy Elliott   POCT Glucose   Result Value Ref Range    POC Glucose 309 (H) 65 - 100 mg/dL    Performed by: LauriGenaeRN          XR FOOT RIGHT (MIN 3 VIEWS)   Final Result   1. Subcutaneous edema laterally with no definite acute osseous abnormality.   2. Old healed fracture deformity of the fifth metatarsal bone.   3. Degenerative changes.         Electronically signed by Davy Elliott                   No results for input(s): \"COVID19\" in the last 72 hours.     Voice dictation software was used during the making of this note.  This software is not perfect and grammatical and other typographical errors may be present.  This note has not been completely proofread for errors.     John Velasquez MD  04/11/25 0235       John Velasquez MD  04/11/25 0236

## 2025-04-15 ENCOUNTER — CARE COORDINATION (OUTPATIENT)
Dept: CARE COORDINATION | Facility: CLINIC | Age: 50
End: 2025-04-15

## 2025-04-15 NOTE — CARE COORDINATION
7/3/2024      RE: Jose Luis Butts  [address]  [place] [address]       Dear Colleague,    Thank you for the opportunity to participate in the care of your patient, Jose Luis Butts, at the [facility] [place] at [facility]. Please see a copy of my visit note below.        ELECTROPHYSIOLOGY CLINIC VISIT    Assessment/Recommendations   Assessment/Plan:    Mr. Butts is a 75 year old male who has a past medical history significant for  CAD s/p 4v CABG 2017, biventicular systolic heart failure 2/2 ICM (LVEF 15%), moderate MR, moderate to severe TR, s/p HM3 and TV ring 19, s/p CRT-D 2017, AFL (CHADSVASC 5 on Warfarin), s/p AVN ablation 21, CVA, CKD, HLD, and gout.      CAD s/p CABG X4  ICM LVEF 15%, s/p LVAD2019:  1. ACEi/ARB/ARNi: Cough with lisinopril. Continue hydralazine 125 mg TID. (has been on up to 150 TID). Continue amlodipine 5 mg daily (has never tolerated more than 5 mg per day given swelling).   2. BB: Continue Coreg.   3. Aldosterone antagonist: Not currently on d/t renal function. .  4. SGLT2i: Not currently on.   5. Antiplatlet: Continue ASA.   6. Statin: Continue Lipitor  7.  SCD prophylaxis: s/p CRTD 2017. RRT estimated in 6 months. Discussed generator change in detail including indications, risks, and benefits in detail. He states understanding and is agreeable to proceed when RRT reached.   8. Fluid status: Continue Bumex.   9. Nitroglycerin 0.4 mg PRN for chest pain. Place 1 tablet (0.4 mg) under the tongue every 5 minutes as needed for chest pain. Maximum of 3 doses in 15 minutes.  10. Lifestyle: Avoid tobacco, maintain a healthy weight, limit alcohol, cardiac diet, and exercise.    Permanent Atrial Fibrillation/Flutter:  We discussed in detail with the patient management/treatment options for AFL includin. Stroke Prophylaxis:  CHADSVASC=+age, +CAD, +HF, ++CVA  5, corresponding to a 6.7% annual stroke /  Ambulatory Care Coordination Note     4/15/2025 10:13 AM     Care Summary Note:   Outreach for High Risk Case Management  Unable to reach - left VM requesting return call   - reminded patient to [facility] Endocrinology for a new appointment   - (patient missed appointment 4/7)     ACM: Heather Redman RN                 systemic [facility] event rate. indicating need for long term oral anticoagulation. He is appropriately on Warfarin.  He also requires this for LVAD. Follows with Coumadin Clinic.   2. Rate Control: intrinsically well rate controlled d/t AVN ablation.   3. Rhythm Control:  Discussed AATs and ablation strategies with patient. He had elected to pursue ablation. Patient presented for AFL ablation on 12/2019 and was a found that he had broken out of AFL and was in AP/ rhythm. He later developed uncontrolled AF with RVR and had AVN ablation in 12/2021. No further rhythm control measures required.   4. Risk Factor Management: Statin, BP control, and TYREE evaluation    Follow up 3 months post generator change.        History of Present Illness/Subjective    Mr. Jose Luis Butts is a 75 year old male who comes in today for EP follow-up of CRTD, perm AF/AFL.    Mr. Butts is a 75 year old male who has a past medical history significant for  CAD s/p 4v CABG 4/2017, biventicular systolic heart failure 2/2 ICM (LVEF 15%), moderate MR, moderate to severe TR, s/p HM3 and TV ring 7/22/19, s/p CRT-D 9/2017, AFL (CHADSVASC 5 on Warfarin), s/p AVN ablation 12/13/21, CVA, CKD, HLD, and gout.     He has a history of CAD and had CABG in 2017. He developed ICM. On 7/22/2019 had Heart Mate 3 and Tricuspid Ring. His ICU stay was complicated by cardiorenal syndrome and fluid overload. He was discharged on 8/15/2019. He was then readmitted on 8/16/20419 after concern for heart failure exacerbation. He underwent treatment with IV Bumex and digoxin and was then transitioned to PO Bumex 3 mg BID. There was also concern for LV thrombus that was treated with Coumadin. He was then discharged and then followed up with Dr. Celestin on 11/01/2019 and was overall doing well. His device interrogation on 10/9/19 revealed an AT/AFL burden of 32% with 1302 episodes recorded. His CRT-D interrogation reveald that his AT/AFL burden was now 98% with 1209 AT/AFL  rhys. He was then referred to EP clinic for further work up. He saw Dr. Atwood and discussed management options. Given he was in sustained AFL with some symptoms of ACKERMAN and fatigue, we felt restoring sinus beneficial. He elected to pursue ablation. Patient presented for AFL ablation on 12/12/19 and was found that he had broken out of AFL and was in AP/ rhythm. We discussed that given he has surgical heart we favor having him in AFL for the procedure to increase our efficacy of procedure. We decided to defer ablation at that time and bring him back for ablation if he goes back into AFL.      EP Visit 3/143/20: He presents today for follow up. He reports feeling well. He denies any chest pain/pressures, dizziness, lightheadedness, worsening shortness of breath, leg/ankle swelling, PND, orthopnea, palpitations, or syncopal symptoms. No LVAD alarms, weights have been stable. Device interrogation shows normal ICD function.  No arrhythmias recorded.  Intrinsic rhythm = SR @ 60 bpm w/ bigeminal/trigeminal PVCs.  AP =68.2%. CRT=91%, VSRP =5.4%. OptiVol fluid index is at baseline. No short v-v intervals recorded. Lead trends appear stable. Patient reports that he is feeling well and denies complaints. Current cardiac medications include: ASA, Lipitor, Bumex,  Coreg, Digoxin, and Warfarin.      He presents today for follow up. He reports feeling well. He denies chest discomfort, palpitations, abdominal fullness/bloating or peripheral edema, shortness of breath, paroxysmal nocturnal dyspnea, orthopnea, lightheadedness, dizziness, pre-syncope, or syncope. Device interrogation shows normal device function, stable lead parameters, permanent AF/AFL (known), and BVP 95.4%. RRT estimated in 6 months. Current cardiac medications include: ASA, Lipitor, Bumex,  Coreg, Digoxin, and Warfarin. .         I have reviewed and updated the patient's Past Medical History, Social History, Family History and Medication List.      Cardiographics (Personally Reviewed) :   1/4/24 Echo:   Interpretation Summary  The patient has a HM III at [id number]  The ejection fraction is 10-15% (severely reduced).The septum is midline, the  left ventricular size is normal at 5.6cm.  The right ventricular function is mildly reuced.  There is trace continuous aortic insufficiency.  IVC diameter and respiratory changes fall into an intermediate range  suggesting an RA pressure of 8 mmHg.  Normal doppler interrogation of inflow and outflow grafts.     There has been no change.         Physical Examination   BP (!) 86/0   Wt 83 kg (182 lb 14.4 oz)   SpO2 96%   BMI 29.52 kg/m    Wt Readings from Last 3 Encounters:   06/20/24 82.3 kg (181 lb 6.4 oz)   06/14/24 77.1 kg (170 lb)   06/13/24 82.8 kg (182 lb 8 oz)     The rest of a comprehensive physical examination is deferred due to nature of video visit restrictions.  CONSITUTIONAL: no acute distress  HEENT: no icterus, no redness or discharge, neck supple  CV: no visible edema of visualized extremities. No JVD.   RESPIRATORY: respirations nonlabored, no cough  NEURO: AA&Ox3, speech fluent/appropriate, motor grossly nonfocal  PSYCH: cooperative, affect appropriate  DERM: no rashes on visualized face/neck/upper extremities         Medications  Allergies   Current Outpatient Medications   Medication Sig Dispense Refill    acetaminophen (TYLENOL) 500 MG tablet Take 1,000 mg by mouth 2 times daily      allopurinol (ZYLOPRIM) 100 MG tablet Take 200 mg by mouth daily at 2 pm      amLODIPine (NORVASC) 2.5 MG tablet Take 2 tablets (5 mg) by mouth every morning 180 tablet 3    amoxicillin (AMOXIL) 500 MG capsule Take 1 capsule (500 mg) by mouth 2 times daily 180 capsule 3    atorvastatin (LIPITOR) 80 MG tablet Take 1 tablet (80 mg) by mouth every evening      blood glucose (ACCU-CHEK GUIDE) test strip 1 each      Blood Glucose Monitoring Suppl (ACCU-CHEK GUIDE) w/Device KIT Use as directed.      chlorothiazide (DIURIL)  250 MG/5ML suspension Take 500 mg of diuril as needed if weight is greater than 171lb 300 mL 3    digoxin (LANOXIN) 125 MCG tablet Take 1 tablet (125 mcg) by mouth daily 90 tablet 3    ferrous sulfate (FEROSUL) 325 (65 Fe) MG tablet Take 1 tablet (325 mg) by mouth daily (with breakfast) 90 tablet 3    hydrALAZINE (APRESOLINE) 100 MG tablet Take 1 tab in combination with 25mg tablet for total of 125mg three times a day 270 tablet 3    hydrALAZINE (APRESOLINE) 25 MG tablet Take 1 tab in combination with 100mg tablet for total of 125mg three times a day 270 tablet 3    insulin glargine (LANTUS SOLOSTAR) 100 UNIT/ML pen Inject 46 Units Subcutaneous every morning      JARDIANCE 25 MG TABS tablet Take 1 tablet by mouth every morning      potassium chloride ER (K-TAB) 20 MEQ CR tablet Take 100 (5 tabs) mEq three times a day and as needed bedtime dose of 40mEq depending on extra diuril dosing for that day. 1530 tablet 3    pramipexole (MIRAPEX) 0.25 MG tablet Take 0.25 mg by mouth at bedtime      tamsulosin (FLOMAX) 0.4 MG capsule Take 0.4 mg by mouth every morning 30 capsule 0    torsemide (DEMADEX) 100 MG tablet Take 100mg tablet and 20mg tablet to equal 120mg three times a day. 270 tablet 3    torsemide (DEMADEX) 20 MG tablet Take 100mg tablet and 20mg tablet to equal 120mg three times a day. 270 tablet 3    traZODone (DESYREL) 50 MG tablet Take 2 tablets (100 mg) by mouth At Bedtime      warfarin ANTICOAGULANT (COUMADIN) 2 MG tablet Take 4 mg by mouth daily (with dinner) Every Monday, Wednesday, Friday, and Sunday      warfarin ANTICOAGULANT (COUMADIN) 5 MG tablet Take 5 mg by mouth Every Tuesday, Thursday, and Saturday      Allergies   Allergen Reactions    Metolazone Other (See Comments)     Syncope   Other reaction(s): Muscle Aches/Weakness  Syncope    Amiodarone      Multiple side effects, including YEYO, abdominal discomfort    Lisinopril Cough    Chlorhexidine Rash         Lab Results (Personally Reviewed)     Chemistry/lipid CBC Cardiac Enzymes/BNP/TSH/INR   Lab Results   Component Value Date    BUN 47.7 (H) 06/20/2024     06/20/2024    CO2 28 06/20/2024     Creatinine   Date Value Ref Range Status   06/20/2024 1.79 (H) 0.67 - 1.17 mg/dL Final   06/24/2021 1.79 (H) 0.66 - 1.25 mg/dL Final       Lab Results   Component Value Date    CHOL 136 04/14/2021    HDL 60 04/14/2021    LDL 67 04/14/2021      Lab Results   Component Value Date    WBC 9.3 06/20/2024    HGB 14.6 06/20/2024    HCT 45.0 06/20/2024    MCV 89 06/20/2024     (L) 06/20/2024    Lab Results   Component Value Date     (H) 05/13/2024    TSH 2.21 06/13/2024    INR 2.8 07/01/2024        The patient states understanding and is agreeable with the plan.   NIKIA Sterling CNP  Electrophysiology Consult Service  Securely message with [employer]   Text page via [facility] Paging/Directory

## 2025-04-16 NOTE — CARE COORDINATION
Ambulatory Care Coordination Note     4/16/2025 9:28 AM    Care Summary Note:    Incoming call - patient returning call from yesterday  Overall doing well, feels like the insulin pump is working well for her  Reports that her legs/feet ache from standing during her job at [employer].   - discussed getting a doctor's letter to allow her to sit some of the time.   - discussed taking good care of her feet.  Patient to get new appointment with Endocrinology and will let ACM know.     ACM: Heather Redman RN     Method of communication with provider: chart routing.    Utilization: Has the patient been seen in the ED since your last call? no

## 2025-04-22 ENCOUNTER — CARE COORDINATION (OUTPATIENT)
Dept: CARE COORDINATION | Facility: CLINIC | Age: 50
End: 2025-04-22

## 2025-04-22 NOTE — CARE COORDINATION
Ambulatory Care Coordination Note     4/22/2025 9:27 AM     Care Summary Note:   Outreach for High Risk Case Management  Reminded patient of need to scheduled appointment with endocrinology  (Missed appointment earlier this month)  Patient reports she has made a request via [employer]     [facility]: Heather Redman RN     Method of communication with provider: chart routing.    Utilization: Has the patient been seen in the ED since your last call? no

## 2025-04-30 ENCOUNTER — CARE COORDINATION (OUTPATIENT)
Dept: CARE COORDINATION | Facility: CLINIC | Age: 50
End: 2025-04-30

## 2025-04-30 NOTE — CARE COORDINATION
Ambulatory Care Coordination Note     4/30/2025 10:35 AM     Care Summary Note:   Outreach for High Risk Case Management  Unable to reach.  Unable to reach via text earlier this week and last week  Patient needs to schedule follow up appointment with Endocrinology   - reportedly made appointment via MyChart - but this is not showing as scheduled     ACM: Heather Redman RN

## 2025-05-05 ENCOUNTER — CARE COORDINATION (OUTPATIENT)
Dept: CARE COORDINATION | Facility: CLINIC | Age: 50
End: 2025-05-05

## 2025-05-05 NOTE — CARE COORDINATION
Ambulatory Care Coordination Note     5/5/2025 9:25 AM     Care Summary Note:   Incoming call from patient  Overnight stay at [facility] 5/3-5/4   - Diabetic ketoacidosis   - Cystitis   - BLANCA  Patient thought insulin for her pump not working properly (??)  Labs in ED noted for slight acidosis, mild elevated ketones, blood glucose >700. Patient reports that hyperglycemia developed after she changed her insulin pump.  Discharged to home with Tresiba pen and mealtime SS insulin; Diflucan for 3 days, Omnicef.     ACM: Heather Redman RN     Challenges to be reviewed by the provider   Additional needs identified to be addressed with provider Yes  Plans to discuss pump restart with Endocrinology tomorrow 5/6/2025               Method of communication with provider: chart routing.    Utilization: Has the patient been seen in the ED since your last call? Yes,   Discharge Date: 5.4.2025  Discharge Facility: [facility]  Reason for ED Visit: DKA  Visit Diagnosis: DKA (see above)    Number of ED visits in the last 6 months: 6      Do you have any ongoing symptoms? No  Did you call your PCP prior to going to the ED? No, did not call the PCP office.     Review of Discharge Instructions:   [x] AVS discharge instructions  [] Right Care, Right Place, Right Time document  [x] Medication changes  [x] Follow up appointments  [x] Referral follow up     Care Planning:    Goals Addressed                   This Visit's Progress     Patient verbalizes understanding of self -management goals of living with Diabetes.   On track     ACM to support in self management of diabetes   - may encourage a return to diabetes classes   - weekly call with patient to ascertain blood sugar trends   - involve SW as needed to support insulin affordability               PCP/Specialist follow up:   Future Appointments         Provider Specialty Dept Phone    5/6/2025 1:30 PM Anna Hoskins PA-C Endocrinology [phone]

## 2025-05-06 ENCOUNTER — OFFICE VISIT (OUTPATIENT)
Dept: ENDOCRINOLOGY | Age: 50
End: 2025-05-06
Payer: COMMERCIAL

## 2025-05-06 VITALS
SYSTOLIC BLOOD PRESSURE: 122 MMHG | HEIGHT: 67 IN | WEIGHT: 136.6 LBS | DIASTOLIC BLOOD PRESSURE: 78 MMHG | OXYGEN SATURATION: 97 % | BODY MASS INDEX: 21.44 KG/M2 | HEART RATE: 76 BPM

## 2025-05-06 DIAGNOSIS — E10.65 TYPE 1 DIABETES MELLITUS WITH HYPERGLYCEMIA (HCC): Primary | ICD-10-CM

## 2025-05-06 DIAGNOSIS — N18.4 CHRONIC KIDNEY DISEASE (CKD), STAGE IV (SEVERE) (HCC): Chronic | ICD-10-CM

## 2025-05-06 DIAGNOSIS — Z89.422 ACQUIRED ABSENCE OF OTHER LEFT TOE(S): ICD-10-CM

## 2025-05-06 DIAGNOSIS — R27.8 POOR MANUAL DEXTERITY: ICD-10-CM

## 2025-05-06 DIAGNOSIS — Z96.41 INSULIN PUMP STATUS: ICD-10-CM

## 2025-05-06 DIAGNOSIS — E11.610 CHARCOT FOOT DUE TO DIABETES MELLITUS (HCC): Chronic | ICD-10-CM

## 2025-05-06 PROCEDURE — 95251 CONT GLUC MNTR ANALYSIS I&R: CPT | Performed by: PHYSICIAN ASSISTANT

## 2025-05-06 PROCEDURE — 3074F SYST BP LT 130 MM HG: CPT | Performed by: PHYSICIAN ASSISTANT

## 2025-05-06 PROCEDURE — 3078F DIAST BP <80 MM HG: CPT | Performed by: PHYSICIAN ASSISTANT

## 2025-05-06 PROCEDURE — 99214 OFFICE O/P EST MOD 30 MIN: CPT | Performed by: PHYSICIAN ASSISTANT

## 2025-05-06 RX ORDER — PROCHLORPERAZINE 25 MG/1
SUPPOSITORY RECTAL
Qty: 1 EACH | Refills: 3 | Status: SHIPPED | OUTPATIENT
Start: 2025-05-06

## 2025-05-06 RX ORDER — INSULIN PMP CART,AUT,G6/7,CNTR
EACH SUBCUTANEOUS
Qty: 30 EACH | Refills: 1 | Status: SHIPPED | OUTPATIENT
Start: 2025-05-06

## 2025-05-06 RX ORDER — INSULIN PMP CART,AUT,G6/7,CNTR
EACH SUBCUTANEOUS
Qty: 1 KIT | Refills: 0 | Status: SHIPPED | OUTPATIENT
Start: 2025-05-06

## 2025-05-06 RX ORDER — PROCHLORPERAZINE 25 MG/1
SUPPOSITORY RECTAL
Qty: 9 EACH | Refills: 3 | Status: SHIPPED | OUTPATIENT
Start: 2025-05-06

## 2025-05-06 NOTE — PROGRESS NOTES
therapy This patient does not have an active medication from one of the medication groupers.   06/19/2020  TC- 156, LDL- 84, VLDL- 20,  HDL- 52, TG- 100  01/20/2023  TC- 110, LDL- 31.6, VLDL- 14.4,  HDL- 64, TG- 72            Lab Results   Component Value Date    CHOL 184 12/09/2024    CHOL 110 01/20/2023    CHOL 156 06/19/2020     No components found for: \"LDLCHOLESTEROL\", \"LDLCALC\"     Lab Results   Component Value Date    VLDL 16 12/09/2024    VLDL 14.4 01/20/2023    VLDL 20 06/19/2020        Lab Results   Component Value Date    HDL 95 (H) 12/09/2024    HDL 64 (H) 01/20/2023    HDL 52 06/19/2020      Lab Results   Component Value Date    HDL 95 (H) 12/09/2024    HDL 64 (H) 01/20/2023    HDL 52 06/19/2020      Lab Results   Component Value Date    TRIG 78 12/09/2024    TRIG 72 01/20/2023    TRIG 100 06/19/2020     Lab Results   Component Value Date    CHOLHDLRATIO 1.9 12/09/2024    CHOLHDLRATIO 1.7 01/20/2023         Hemoglobin A1c:     12/25/2014      10.4%               07/12/2016      10.3%               01/18/2019      10.7%               04/18/2019      9.3%               06/27/2019      9.3%               09/16/2019      9.1%               02/07/2020      9.4%                           Reports 4/17/2020      9.8%               06/19/2020      9.6%                  06/09/2021      9.6%      11/22/2022 6.5%    03/28/2023 9.5%     04/02/2023 8.9%    07/24/2023 13.2%    01/02/2024 10.7%    5/22/2024 8.9%    07/09/2024 8.9%    11/14/2024 9.2%    01/27/2025 8.9%    5/3/2025 7.3%              Hemoglobin A1C, POC   Date Value Ref Range Status   05/22/2024 8.9 % Final   01/02/2024 10.7 % Final   03/28/2023 9.5 % Final        Thyroid:                  04/24/2017      1.630               09/16/2019      0.972               06/19/2020      2.400    04/01/2023 1.930         Lab Results   Component Value Date/Time    TSH 1.930 04/01/2023 10:34 PM    TSH 2.950 01/20/2023 09:37 AM    TSH 2.400 06/19/2020 12:11 PM    TSH

## 2025-05-12 ENCOUNTER — CARE COORDINATION (OUTPATIENT)
Dept: CARE COORDINATION | Facility: CLINIC | Age: 50
End: 2025-05-12

## 2025-05-12 NOTE — CARE COORDINATION
Ambulatory Care Coordination Note     5/12/2025 10:03 AM     Outreach for High Risk Case Management - spoke with patient  Reports work is going alright - she is there at time of call to start her shift  States she has appointment to get diabetic shoes - currently wearing a boot.  States that affected area is improving.  Not going bare foot at all.  [facility] supplies needed for transition to Omnipod.       ACM: Heather Redman RN     Method of communication with provider: chart routing.    Utilization: Has the patient been seen in the ED since your last call? no    PCP/Specialist follow up:   Future Appointments         Provider Specialty Dept Phone    8/7/2025 1:30 PM Anna Hoskins PA-C Endocrinology [phone]

## 2025-05-20 ENCOUNTER — CARE COORDINATION (OUTPATIENT)
Dept: CARE COORDINATION | Facility: CLINIC | Age: 50
End: 2025-05-20

## 2025-05-20 NOTE — CARE COORDINATION
Ambulatory Care Coordination Note     5/20/2025 10:14 AM     Care Summary Note:   Outreach for High Risk Case Management - spoke with patient  Patient has all supplies for Omnipod - waiting to hear from Training Rep.  Reports her foot is doing reasonably well - waiting on diabetic shoes  Made patient aware that ACM will try to accompany to Omnipod training     ACM: Heather Redman, RN    Method of communication with provider: chart routing.    PCP/Specialist follow up:   Future Appointments         Provider Specialty Dept Phone    8/7/2025 1:30 PM Anna Hoskins PA-C Endocrinology [phone]

## 2025-05-23 DIAGNOSIS — E10.65 TYPE 1 DIABETES MELLITUS WITH HYPERGLYCEMIA (HCC): ICD-10-CM

## 2025-05-23 RX ORDER — PEN NEEDLE, DIABETIC 32GX 5/32"
NEEDLE, DISPOSABLE MISCELLANEOUS
Qty: 500 EACH | Refills: 3 | Status: SHIPPED | OUTPATIENT
Start: 2025-05-23

## 2025-05-23 NOTE — TELEPHONE ENCOUNTER
----- Message from DYANA HAYES RN sent at 5/23/2025  1:36 PM EDT -----  She says   [facility] Bernardino Haji  ----- Message -----  From: Mel Arnold MA  Sent: 5/23/2025  12:47 PM EDT  To: Heather Redman RN    To [facility] or [facility]?  ----- Message -----  From: Heather Redman RN  Sent: 5/23/2025  12:01 PM EDT  To: Mel Arnold MA    Patient requesting refill for pen needles  Is someone there that can send that in - I realize Anna is off.

## 2025-05-27 ENCOUNTER — CARE COORDINATION (OUTPATIENT)
Dept: CARE COORDINATION | Facility: CLINIC | Age: 50
End: 2025-05-27

## 2025-05-27 NOTE — CARE COORDINATION
Ambulatory Care Coordination Note     5/27/2025 11:45 AM     Care Summary Note:   Outreach for High Risk Case Management  Confirmed Omnipod Training appointment is tomorrow at 11:30 at endocrinology  ACM will try to be there.     ACM: Heather Redman, DYANA     PCP/Specialist follow up:   Future Appointments         Provider Specialty Dept Phone    8/7/2025 1:30 PM Anna Hoskins PA-C Endocrinology [phone]

## 2025-06-02 DIAGNOSIS — Z96.41 INSULIN PUMP STATUS: ICD-10-CM

## 2025-06-02 DIAGNOSIS — E10.65 TYPE 1 DIABETES MELLITUS WITH HYPERGLYCEMIA (HCC): Primary | ICD-10-CM

## 2025-06-02 RX ORDER — INSULIN LISPRO 100 [IU]/ML
INJECTION, SOLUTION INTRAVENOUS; SUBCUTANEOUS
Qty: 60 ML | Refills: 3 | Status: SHIPPED | OUTPATIENT
Start: 2025-06-02

## 2025-06-04 ENCOUNTER — CARE COORDINATION (OUTPATIENT)
Dept: CARE COORDINATION | Facility: CLINIC | Age: 50
End: 2025-06-04

## 2025-06-04 ENCOUNTER — TELEMEDICINE (OUTPATIENT)
Dept: FAMILY MEDICINE CLINIC | Facility: CLINIC | Age: 50
End: 2025-06-04
Payer: COMMERCIAL

## 2025-06-04 DIAGNOSIS — E10.65 TYPE 1 DIABETES MELLITUS WITH HYPERGLYCEMIA (HCC): ICD-10-CM

## 2025-06-04 DIAGNOSIS — G62.9 NEUROPATHY: Primary | ICD-10-CM

## 2025-06-04 DIAGNOSIS — E10.65 UNCONTROLLED TYPE 1 DIABETES MELLITUS WITH HYPERGLYCEMIA (HCC): ICD-10-CM

## 2025-06-04 PROCEDURE — 99214 OFFICE O/P EST MOD 30 MIN: CPT | Performed by: NURSE PRACTITIONER

## 2025-06-04 RX ORDER — ACYCLOVIR 400 MG/1
TABLET ORAL
Qty: 9 EACH | Refills: 3 | Status: SHIPPED | OUTPATIENT
Start: 2025-06-04

## 2025-06-04 RX ORDER — NORTRIPTYLINE HYDROCHLORIDE 25 MG/1
25 CAPSULE ORAL NIGHTLY
Qty: 30 CAPSULE | Refills: 3 | Status: SHIPPED | OUTPATIENT
Start: 2025-06-04

## 2025-06-04 RX ORDER — PREGABALIN 25 MG/1
25 CAPSULE ORAL 3 TIMES DAILY PRN
Qty: 90 CAPSULE | Refills: 0 | Status: SHIPPED | OUTPATIENT
Start: 2025-06-04 | End: 2025-07-04

## 2025-06-04 RX ORDER — PROCHLORPERAZINE 25 MG/1
SUPPOSITORY RECTAL
Qty: 9 EACH | Refills: 3 | Status: SHIPPED | OUTPATIENT
Start: 2025-06-04

## 2025-06-04 ASSESSMENT — ENCOUNTER SYMPTOMS
RESPIRATORY NEGATIVE: 1
EYE REDNESS: 0
EYES NEGATIVE: 1
STRIDOR: 0
BACK PAIN: 0
ALLERGIC/IMMUNOLOGIC NEGATIVE: 1
WHEEZING: 0
RHINORRHEA: 0
CHEST TIGHTNESS: 0
VOMITING: 0
SHORTNESS OF BREATH: 0
GASTROINTESTINAL NEGATIVE: 1
VOICE CHANGE: 0
EYE ITCHING: 0
SINUS PRESSURE: 0
BLOOD IN STOOL: 0
DIARRHEA: 0
COLOR CHANGE: 0
CONSTIPATION: 0
CHOKING: 0
COUGH: 0
SORE THROAT: 0
EYE PAIN: 0
FACIAL SWELLING: 0
NAUSEA: 0
ANAL BLEEDING: 0
TROUBLE SWALLOWING: 0
ABDOMINAL DISTENTION: 0
SINUS PAIN: 0
APNEA: 0
RECTAL PAIN: 0
ABDOMINAL PAIN: 0
EYE DISCHARGE: 0
PHOTOPHOBIA: 0

## 2025-06-04 ASSESSMENT — PATIENT HEALTH QUESTIONNAIRE - PHQ9
SUM OF ALL RESPONSES TO PHQ QUESTIONS 1-9: 0
1. LITTLE INTEREST OR PLEASURE IN DOING THINGS: NOT AT ALL
8. MOVING OR SPEAKING SO SLOWLY THAT OTHER PEOPLE COULD HAVE NOTICED. OR THE OPPOSITE, BEING SO FIGETY OR RESTLESS THAT YOU HAVE BEEN MOVING AROUND A LOT MORE THAN USUAL: NOT AT ALL
5. POOR APPETITE OR OVEREATING: NOT AT ALL
SUM OF ALL RESPONSES TO PHQ QUESTIONS 1-9: 0
6. FEELING BAD ABOUT YOURSELF - OR THAT YOU ARE A FAILURE OR HAVE LET YOURSELF OR YOUR FAMILY DOWN: NOT AT ALL
3. TROUBLE FALLING OR STAYING ASLEEP: NOT AT ALL
2. FEELING DOWN, DEPRESSED OR HOPELESS: NOT AT ALL
10. IF YOU CHECKED OFF ANY PROBLEMS, HOW DIFFICULT HAVE THESE PROBLEMS MADE IT FOR YOU TO DO YOUR WORK, TAKE CARE OF THINGS AT HOME, OR GET ALONG WITH OTHER PEOPLE: NOT DIFFICULT AT ALL
9. THOUGHTS THAT YOU WOULD BE BETTER OFF DEAD, OR OF HURTING YOURSELF: NOT AT ALL
SUM OF ALL RESPONSES TO PHQ QUESTIONS 1-9: 0
SUM OF ALL RESPONSES TO PHQ QUESTIONS 1-9: 0
4. FEELING TIRED OR HAVING LITTLE ENERGY: NOT AT ALL
7. TROUBLE CONCENTRATING ON THINGS, SUCH AS READING THE NEWSPAPER OR WATCHING TELEVISION: NOT AT ALL

## 2025-06-04 NOTE — CARE COORDINATION
Ambulatory Care Coordination Note     6/4/2025 11:40 AM     Care Summary Note:   In person outreach with patient during endocrinology/pump training  Reports that she needs more sensors - down to the wire on current sensor  (Admonished patient to please be more aware of her supply)    Also states she has resigned her position with [employer] - unable to stand on her bad foot.  PCP has changed patient over to Lyrica to address pain (rather than the gabapentin)    ACM: Heather Redman, RN     Method of communication with provider: none.    PCP/Specialist follow up:   Future Appointments         Provider Specialty Dept Phone    7/10/2025 11:30 AM Linda Mckee, APRN - CNP Family Medicine [phone]    8/7/2025 1:30 PM Anna Hoskins PA-C Endocrinology [phone]

## 2025-06-04 NOTE — PROGRESS NOTES
admission was from May 3, 2025, to May 4, 2025, at [facility], where she was admitted to the intermediate ICU for hyperglycemia, type 1 diabetes, and DKA. At that time, she reported that her insulin pump was malfunctioning, and she was placed in an ICU bed for an insulin drip. She was discharged with a 3-day course of Diflucan due to yeast in her urine sample indicating cystitis, and she was also given Omnicef. She had a hospital follow-up with her endocrinologist on May 6, 2025, during which her regimen was adjusted. At that time, she complained of blisters on her foot and was advised to avoid walking barefoot and to perform daily foot checks.    Today, she is complaining of having increased numbness and tingling as well as pain to bilateral feet and legs.  History of neuropathy.  Has had a nerve conduction test last year.  Has been prescribed a low-dose of gabapentin 100 mg 3 times daily but reports that she currently does not take it 3 times daily and only take it as needed due to side effects of sedation.  She also follow closely with podiatry for her Charcot foot and also a wound on foot.  She reports she has an appointment next week with her podiatrist.         Past Medical History, Past Surgical History, Family history, Social History, and Medications were all reviewed with the patient today and updated as necessary.       Current Outpatient Medications   Medication Sig Dispense Refill    nortriptyline (PAMELOR) 25 MG capsule Take 1 capsule by mouth nightly For sleep and neuropathy prevention 30 capsule 3    pregabalin (LYRICA) 25 MG capsule Take 1 capsule by mouth 3 times daily as needed (neuropathy) for up to 30 days. Max Daily Amount: 75 mg 90 capsule 0    HUMALOG 100 UNIT/ML SOLN injection vial Use with insulin pump, max daily dose 60 units 60 mL 3    Insulin Infusion Pump VENITA Pump settings:   Omnipod 5 start  standard pattern- 24 hour total 12 units     Time  00:00  0.5  Carb Ratio 00:00

## 2025-06-04 NOTE — PROGRESS NOTES
Received message from , pt needs sensor refills    G7 sensors as well as G6 sensors [facility] [address]    Please encourage pt to get just one kind

## 2025-06-11 ENCOUNTER — CARE COORDINATION (OUTPATIENT)
Dept: CARE COORDINATION | Facility: CLINIC | Age: 50
End: 2025-06-11

## 2025-06-11 NOTE — CARE COORDINATION
Ambulatory Care Coordination Note     6/11/2025 10:13 AM     Care Summary Note:   Outreach for High Risk Case Management - spoke with patient  Reports that her Omnipod has come off in the night    - had been placed on her back   - also forgot to place pump on same side as her dexcom sensor  Blood sugar 137 this morning   - has administered insulin \"manually\"   - plans to replace the pump this morning  Will follow up in a week     ACM: Heathre Redman RN     Method of communication with provider: chart routing.    PCP/Specialist follow up:   Future Appointments         Provider Specialty Dept Phone    7/10/2025 11:30 AM Linda Mckee, TANISHA - CNP Family Medicine [phone]    8/7/2025 1:30 PM Anna Hoskins PA-C Endocrinology [phone]               Surgical Defect Length In Cm (Optional): 2.1

## 2025-06-18 ENCOUNTER — CARE COORDINATION (OUTPATIENT)
Dept: CARE COORDINATION | Facility: CLINIC | Age: 50
End: 2025-06-18

## 2025-06-18 NOTE — CARE COORDINATION
Ambulatory Care Coordination Note     6/18/2025 10:10 AM     Care Summary Note:   Multiple call with patient past 3 days regarding help with her disability application  ACM spoke with SW  Recommending [employer] as well as her    has had the case for over a year - suggested patient speak with him about what is delaying forward motion on her application.    Confirms that she is using the Omnipod pump and it appears to be working alright for her.     ACM: Heather Redman RN     PCP/Specialist follow up:   Future Appointments         Provider Specialty Dept Phone    7/10/2025 11:30 AM Linda Mckee APRN - CNP Family Medicine [phone]    8/7/2025 1:30 PM Anna Hoskins PAYaquelinC Endocrinology [phone]

## 2025-06-24 ENCOUNTER — CARE COORDINATION (OUTPATIENT)
Dept: CARE COORDINATION | Facility: CLINIC | Age: 50
End: 2025-06-24

## 2025-06-24 ENCOUNTER — TELEPHONE (OUTPATIENT)
Dept: ENDOCRINOLOGY | Age: 50
End: 2025-06-24

## 2025-06-24 NOTE — TELEPHONE ENCOUNTER
May I please have her new omnipod report for review and to suggest changes-- misha is out for rest of day

## 2025-06-24 NOTE — CARE COORDINATION
Ambulatory Care Coordination Note     6/24/2025 3:15 PM     Care Summary Note:   Incoming call from patient enrolled in [facility]  Requesting syringes in order to administer her insulin  States that she took off the Omnipod pump this past Sunday - \"it just wasn't working, BS all over the place\"  Linked patient with Omnipod training rep   - now wearing a pump   - states she will hold off on syringes.  Omnipod rep recommended that she have her settings reviewed and possibly adjusted by her provider.     ACM: Heather Redman RN     Method of communication with provider: chart routing.    Utilization: Has the patient been seen in the ED since your last call? Yes,   Discharge Date: 4/11/25   Discharge Facility: [facility]  Reason for ED Visit: diabetic foot ulcer  Visit Diagnosis: (same)       PCP/Specialist follow up:   Future Appointments         Provider Specialty Dept Phone    7/10/2025 11:30 AM Linda Mckee, TANISHA - CNP Family Medicine [phone]    8/7/2025 1:30 PM Anna Hoskins PA-C Endocrinology [phone]

## 2025-06-26 ENCOUNTER — TELEPHONE (OUTPATIENT)
Dept: ENDOCRINOLOGY | Age: 50
End: 2025-06-26

## 2025-06-26 DIAGNOSIS — E10.65 UNCONTROLLED TYPE 1 DIABETES MELLITUS WITH HYPERGLYCEMIA (HCC): ICD-10-CM

## 2025-06-26 DIAGNOSIS — G62.9 NEUROPATHY: ICD-10-CM

## 2025-06-26 NOTE — TELEPHONE ENCOUNTER
Patient walked in to the office to get help with attaching her G6 transmitter to her sensor.  Assisted patient and informed her to call if she has any questions when she gets home and syncs the sensor.

## 2025-06-27 ENCOUNTER — CARE COORDINATION (OUTPATIENT)
Dept: CARE COORDINATION | Facility: CLINIC | Age: 50
End: 2025-06-27

## 2025-06-27 RX ORDER — NORTRIPTYLINE HYDROCHLORIDE 25 MG/1
25 CAPSULE ORAL NIGHTLY
Qty: 90 CAPSULE | Refills: 2 | Status: SHIPPED | OUTPATIENT
Start: 2025-06-27

## 2025-06-27 NOTE — CARE COORDINATION
Ambulatory Care Coordination Note     6/27/2025 10:34 AM     Care Summary Note:   Outreach for High Risk Case Management - spoke with patient  Currently wearing her pump  Experienced a low BS last night but this corrected with eating a popsicle  States that someone from endocrinology office called her 6/24 to walk her through:   - increasing basal rate to 0.6 (from 0.5)   - reduce carb ration down to 60   - correction down to 60   - target reduced to 120-150  Discussed all this with training rep today as well, keep everyone in the loop     ACM: Heather Redman RN     Method of communication with provider: chart routing.      PCP/Specialist follow up:   Future Appointments         Provider Specialty Dept Phone    7/10/2025 11:30 AM Linda Mckee APRN - CNP Family Medicine [phone]    8/7/2025 1:30 PM Anna Hoskins PA-C Endocrinology [phone]

## 2025-06-30 ENCOUNTER — CARE COORDINATION (OUTPATIENT)
Dept: CARE COORDINATION | Facility: CLINIC | Age: 50
End: 2025-06-30

## 2025-06-30 ENCOUNTER — PATIENT MESSAGE (OUTPATIENT)
Dept: ENDOCRINOLOGY | Age: 50
End: 2025-06-30

## 2025-06-30 DIAGNOSIS — E10.42 DIABETIC POLYNEUROPATHY ASSOCIATED WITH TYPE 1 DIABETES MELLITUS (HCC): Primary | ICD-10-CM

## 2025-06-30 DIAGNOSIS — R20.2 PARESTHESIA OF BOTH FEET: ICD-10-CM

## 2025-06-30 DIAGNOSIS — R27.8 POOR MANUAL DEXTERITY: ICD-10-CM

## 2025-06-30 NOTE — TELEPHONE ENCOUNTER
Mary Lou response:    Hi,    Are you using the omnipod??? What do you think?? Are you bolusing for every meal and snack??    I put in a referral to neurology however there can be a very long wait to establish care    I recommend you start with seeing your primary care about what you have going on to see if they can help you now as well as get things started for when you establish with neurology    Yan

## 2025-06-30 NOTE — CARE COORDINATION
Ambulatory Care Coordination Note     6/30/2025 3:09 PM     Care Summary Note:   Incoming call from patient  States she feels like neuropathy in BLE is worsening.  Chart review indicates she has requested referral to neurologist.   - also expressed to ACM; warned her this is apt to take awhile as neuro is booking into November     ACM: Heather Redman RN     Method of communication with provider: chart routing.    PCP/Specialist follow up:   Future Appointments         Provider Specialty Dept Phone    7/10/2025 11:30 AM Linda Mckee, APRN - CNP Family Medicine [phone]    8/7/2025 1:30 PM Anna Hoskins PA-C Endocrinology [phone]

## 2025-07-08 ENCOUNTER — CARE COORDINATION (OUTPATIENT)
Dept: CARE COORDINATION | Facility: CLINIC | Age: 50
End: 2025-07-08

## 2025-07-08 NOTE — CARE COORDINATION
Ambulatory Care Coordination Note     7/8/2025 10:59 AM     Care Summary Note:   Outreach for High Risk Case Management - spoke with patient  Patient reports she still has not been able to get in with neurology as they are booked out until end of the year.  ACM will investigate    Patient reports that she is using her Omnipod.  She is bolusing but admits it is not based on any carb counting  Blood sugars are running in the 180s  ACM will try to get further information on carb counting      ACM: Heather Redman RN       Method of communication with provider: chart routing.    PCP/Specialist follow up:   Future Appointments         Provider Specialty Dept Phone    7/10/2025 11:30 AM Linda Mckee APRN - CNP Family Medicine [phone]    8/7/2025 1:30 PM Anna Hoskins PA-C Endocrinology [phone]

## 2025-07-09 ENCOUNTER — TELEPHONE (OUTPATIENT)
Dept: ENDOCRINOLOGY | Age: 50
End: 2025-07-09

## 2025-07-09 ENCOUNTER — CARE COORDINATION (OUTPATIENT)
Dept: CARE COORDINATION | Facility: CLINIC | Age: 50
End: 2025-07-09

## 2025-07-09 NOTE — TELEPHONE ENCOUNTER
Mel-- can you get her on the phone and ensure she was able to set up the latonya. If not, she needs to take her long acting insulin every 24 hours until she can get back on pump and take short acting before meals as she was before starting the omnipod    Ideally, she would restart pump today

## 2025-07-09 NOTE — TELEPHONE ENCOUNTER
Patient called stating she had to get a new phone.Trying to put settings in for Omnipod. I did not see in your chart note and I want to make sure I give her the right settings. I have printed her recent report.

## 2025-07-09 NOTE — TELEPHONE ENCOUNTER
They are printed. They are also on the med list under \"insulin infusion pump VENITA\"    Here they are:    Omnipod 5 start   standard pattern- 24 hour total 12 units     Time 00:00  0.6   Carb Ratio 00:00  14   Insulin Sensitivity 60   Target low  120   Target high 150   Active Insulin time 3:00   Max Bolus 9 units   Sm 20, med 35, large 50, snack 10

## 2025-07-09 NOTE — CARE COORDINATION
Ambulatory Care Coordination Note     7/9/2025 3:20 PM     Care Summary Note:   Outreach for High Risk Case Management    - spoke with patient x2 today.  She is NOT currently wearing her Omnipod pump  However - patient has been directed by this ACM and Omnipod Training rep to call Customer Support.  ACM has emailed all parameters set forth by provider in recent InBasket Message  ACM will follow up tomorrow to make sure patient is wearing the pump and it is functioning.    Patient will attend a second training with  next Tuesday.     ACM: Heather Redman RN    Method of communication with provider: chart routing.        PCP/Specialist follow up:   Future Appointments         Provider Specialty Dept Phone    7/10/2025 11:30 AM Linda Mckee APRN - CNP Family Medicine [phone]    8/7/2025 1:30 PM Anna Hoskins PA-C Endocrinology [phone]

## 2025-07-10 ENCOUNTER — CARE COORDINATION (OUTPATIENT)
Dept: CARE COORDINATION | Facility: CLINIC | Age: 50
End: 2025-07-10

## 2025-07-10 ENCOUNTER — TELEPHONE (OUTPATIENT)
Dept: ENDOCRINOLOGY | Age: 50
End: 2025-07-10

## 2025-07-10 NOTE — TELEPHONE ENCOUNTER
Patient came by stating after putting settings in her sugars started going up. She took her pump off during the night because she got aggravated with it. Omnipod printed and put in door.

## 2025-07-10 NOTE — CARE COORDINATION
Ambulatory Care Coordination Note     7/10/2025 9:44 AM     Care Summary Note:   Incoming call from patient  Reports that she does not think the Omnipod pump is working - as last evening her BS were increasing.  I reminded her that she was most of the day yesterday without her pump and part of the day before.    This morning patient is not wearing the pump.  BS is 187  Injected insulin \"manually.\"  Eating breakfast as we spoke.    Discussed consistency with the pump, using it as she has been taught (another training session scheduled for Tuesday).  She should not be overlapping the pump with \"manually\" dosing herself.    Will route note to provider.     ACM: Heather Redman RN     Method of communication with provider: chart routing.    PCP/Specialist follow up:   Future Appointments         Provider Specialty Dept Phone    7/10/2025 11:30 AM Linda Mckee, TANISHA - CNP Family Medicine [phone]    8/7/2025 1:30 PM Anna Hoskins PA-C Endocrinology [phone]

## 2025-07-10 NOTE — TELEPHONE ENCOUNTER
Spoke with patient, wants to stay on pump. Giving injection now. Please mychart message her the new settings and she will go back on pump.

## 2025-07-10 NOTE — TELEPHONE ENCOUNTER
Mary Lou response:    Hi,    I'd like you to either stay on shots or restart the pump. If you restart the pump, let's give it a chance to work and help you.    In manual mode, you can increase the basal from 0.6 u/hr up to 0.7 u/hr    You can reduce your carb ratio from 14 down to 12    Don't hesitate to watch some videos online about using the pump so you get the information you need to use it effectively    Yan

## 2025-07-16 ENCOUNTER — TELEPHONE (OUTPATIENT)
Dept: ENDOCRINOLOGY | Age: 50
End: 2025-07-16

## 2025-07-16 NOTE — TELEPHONE ENCOUNTER
Patient needs Dexcom G7 and the compatible omnipod for G7 sent to her [facility] pharmacy.

## 2025-07-17 ENCOUNTER — OFFICE VISIT (OUTPATIENT)
Dept: ENDOCRINOLOGY | Age: 50
End: 2025-07-17

## 2025-07-17 VITALS
DIASTOLIC BLOOD PRESSURE: 74 MMHG | SYSTOLIC BLOOD PRESSURE: 126 MMHG | HEART RATE: 72 BPM | HEIGHT: 67 IN | OXYGEN SATURATION: 96 % | RESPIRATION RATE: 16 BRPM | BODY MASS INDEX: 21.5 KG/M2 | WEIGHT: 137 LBS

## 2025-07-17 DIAGNOSIS — N18.4 CHRONIC KIDNEY DISEASE (CKD), STAGE IV (SEVERE) (HCC): Chronic | ICD-10-CM

## 2025-07-17 DIAGNOSIS — Z96.41 INSULIN PUMP STATUS: ICD-10-CM

## 2025-07-17 DIAGNOSIS — Z89.422 ACQUIRED ABSENCE OF OTHER LEFT TOE(S): ICD-10-CM

## 2025-07-17 DIAGNOSIS — E11.610 CHARCOT FOOT DUE TO DIABETES MELLITUS (HCC): Chronic | ICD-10-CM

## 2025-07-17 DIAGNOSIS — R27.8 POOR MANUAL DEXTERITY: ICD-10-CM

## 2025-07-17 DIAGNOSIS — E10.65 TYPE 1 DIABETES MELLITUS WITH HYPERGLYCEMIA (HCC): Primary | ICD-10-CM

## 2025-07-17 NOTE — PROGRESS NOTES
[facility] ENDOCRINOLOGY   AND   THYROID NODULE CLINIC    Anna Hoskins PA-C  [facility] Endocrinology and Thyroid Nodule Clinic  [address], [address]  [place] [address]  Phone [phone]  Facsimile [phone]          Jelena Leal is a 49 y.o. female seen 07/17/25  for follow up evaluation of type 1 diabetes         Assessment and Plan:    Interpretation of 72 hour glucose monitor:  At least 72 hours of data were reviewed.  The patient utilizes a dexcom G7 continuous glucose monitoring system.  The average glucose during the reviewed timeframe was 191 with a standard deviation of 86.  There is a pattern of variable glycemic control   Assessment & Plan              1. Type 1 diabetes mellitus with hyperglycemia (HCC)  Type 1 diabetes mellitus: Inadequately controlled.  Issues with OmniPod and G7 sensor due to incorrect pod compatibility.  Back on G6- needs more sensors, to call dexcom about issues with sensors   Diagnostic plan: Adjusted insulin sensitivity from 10 to 55, target glucose 130, insulin-to-carb ratio from 1:12 to 1:11.  Treatment plan: Emphasized maintaining 3-inch distance between sensor and pump to prevent inaccurate readings. Advised contacting Dexcom for sensor replacement if needed. Encouraged adding custom foods to food library. Advised consuming 4 ounces of juice or soda for hypoglycemia, followed by protein-rich food once levels normalize. Instructed to inform clinic of frequent hyperglycemia.  Follow-up: next scheduled visit.  -  DIABETES FOOT EXAM  - GLUCOSE MONITOR, 72 HOUR, PHYS INTERP    2. Insulin pump status  Adjusted insulin sensitivity from 10 to 55,   target glucose from 140 down to 130, insulin-to-carb ratio from 1:12 to 1:11.    In manual mode, basal is 0.05, make it 0.6    - Insulin Infusion Pump VENITA; Pump settings:   Omnipod 5 start  standard pattern- 24 hour total 12 units     Time  00:00  0.6  Carb Ratio 00:00  11  Insulin Sensitivity

## 2025-07-21 ENCOUNTER — CARE COORDINATION (OUTPATIENT)
Dept: CARE COORDINATION | Facility: CLINIC | Age: 50
End: 2025-07-21

## 2025-07-21 NOTE — CARE COORDINATION
Ambulatory Care Coordination Note     7/21/2025 1:49 PM     Care Summary Note:   Outreach for High Risk Case Management - spoke with patient  Reports that she now has her diabetic shoes - says they are very comfortable  Confirms that she is bolusing with meals - using Omnipod, Googling carb content.  Has not yet used the simple meal (44 carbs) button.  Blood sugars this morning 123-157    States she needs refill on Gabapentin  This med is not on her list - will inquire of PCP.     ACM: Heather Redman RN     Method of communication with provider: chart routing.    PCP/Specialist follow up:   Future Appointments         Provider Specialty Dept Phone    10/22/2025 3:00 PM Anna Hoskins PA-C Endocrinology [phone]

## 2025-07-29 ENCOUNTER — CARE COORDINATION (OUTPATIENT)
Dept: CARE COORDINATION | Facility: CLINIC | Age: 50
End: 2025-07-29

## 2025-07-29 NOTE — CARE COORDINATION
Ambulatory Care Coordination Note     7/29/2025 11:43 AM     Care Summary Note:   Outreach for High Risk Case Management - spoke with patient  Discussed gabapentin - patient is out.  Sees PCP in September after which she can get a refill.  Consideration for an Urgent Care visit - patient doesn't feel she can wait that long, the nerve pain is too uncomfortable.  Discussed deferring off case load.  Patient has ACM's contact information, she can contact via text or call for questions, concerns, needs.  We will try for awhile.      ACM: Heather Redman RN     Method of communication with provider: chart routing.      PCP/Specialist follow up:   Future Appointments         Provider Specialty Dept Phone    9/17/2025 3:30 PM Linda Mckee APRN - CNP Family Medicine [phone]    10/22/2025 3:00 PM Anna Hoskins PA-C Endocrinology [phone]

## 2025-08-05 ENCOUNTER — TELEPHONE (OUTPATIENT)
Dept: NEUROLOGY | Age: 50
End: 2025-08-05

## 2025-08-07 ENCOUNTER — CARE COORDINATION (OUTPATIENT)
Dept: CARE COORDINATION | Facility: CLINIC | Age: 50
End: 2025-08-07

## 2025-08-26 DIAGNOSIS — E10.65 TYPE 1 DIABETES MELLITUS WITH HYPERGLYCEMIA (HCC): ICD-10-CM

## 2025-08-26 RX ORDER — INSULIN LISPRO 100 [IU]/ML
INJECTION, SOLUTION INTRAVENOUS; SUBCUTANEOUS
Qty: 60 ML | Refills: 3 | Status: SHIPPED | OUTPATIENT
Start: 2025-08-26 | End: 2025-08-26 | Stop reason: SDUPTHER

## 2025-09-01 ENCOUNTER — PATIENT MESSAGE (OUTPATIENT)
Dept: ENDOCRINOLOGY | Age: 50
End: 2025-09-01

## 2025-09-01 DIAGNOSIS — E10.65 TYPE 1 DIABETES MELLITUS WITH HYPERGLYCEMIA (HCC): ICD-10-CM

## 2025-09-02 RX ORDER — INSULIN GLARGINE 300 U/ML
INJECTION, SOLUTION SUBCUTANEOUS
Qty: 7.5 ML | Refills: 1 | Status: SHIPPED | OUTPATIENT
Start: 2025-09-02

## 2025-09-02 RX ORDER — INSULIN DEGLUDEC 100 U/ML
INJECTION, SOLUTION SUBCUTANEOUS
Qty: 9 ML | Refills: 1 | Status: SHIPPED | OUTPATIENT
Start: 2025-09-02

## 2025-09-04 ENCOUNTER — PATIENT MESSAGE (OUTPATIENT)
Dept: ENDOCRINOLOGY | Age: 50
End: 2025-09-04

## (undated) DEVICE — SYRINGE MED 3ML CLR PLAS STD N CTRL LUERLOCK TIP DISP

## (undated) DEVICE — NITINOL STONE RETRIEVAL DEVICE: Brand: DAKOTA

## (undated) DEVICE — CANNULA NSL ORAL AD FOR CAPNOFLEX CO2 O2 AIRLFE

## (undated) DEVICE — LEGGINGS, PAIR, 31X48, STERILE: Brand: MEDLINE

## (undated) DEVICE — SYRINGE, LUER SLIP, STERILE, 60ML: Brand: MEDLINE

## (undated) DEVICE — DRAPE,T,CYSTO,STERILE: Brand: MEDLINE

## (undated) DEVICE — FORCEPS BX L240CM JAW DIA2.8MM L CAP W/ NDL MIC MESH TOOTH

## (undated) DEVICE — SMALL BOWL SET-LF: Brand: MEDLINE INDUSTRIES, INC.

## (undated) DEVICE — AIRLIFE™ OXYGEN TUBING 7 FEET (2.1 M) CRUSH RESISTANT OXYGEN TUBING, VINYL TIPPED: Brand: AIRLIFE™

## (undated) DEVICE — DRAPE TWL SURG 16X26IN BLU ORB04] ALLCARE INC]

## (undated) DEVICE — TRAY PREP DRY W/ PREM GLV 2 APPL 6 SPNG 2 UNDPD 1 OVERWRAP

## (undated) DEVICE — SYRINGE MED 10ML LUERLOCK TIP W/O SFTY DISP

## (undated) DEVICE — SINGLE PORT MANIFOLD: Brand: NEPTUNE 2

## (undated) DEVICE — SINGLE-USE DIGITAL FLEXIBLE URETEROSCOPE: Brand: LITHOVUE

## (undated) DEVICE — YANKAUER,BULB TIP,W/O VENT,RIGID,STERILE: Brand: MEDLINE

## (undated) DEVICE — GOWN,SIRUS,NONRNF,SETINSLV,XL,20/CS: Brand: MEDLINE

## (undated) DEVICE — CYSTO/BLADDER IRRIGATION SET, REGULATING CLAMP

## (undated) DEVICE — LUBE JELLY FOIL PACK 1.4 OZ: Brand: MEDLINE INDUSTRIES, INC.

## (undated) DEVICE — FLEXIVA  PULSE  AND  FLEXIVA  PULSE  TRACTIP  LASER  FIBERS  ARE  HIGH  POWER  SINGLE-USE FIBER: Brand: FLEXIVA PULSE

## (undated) DEVICE — SOLUTION IRRIG 3000ML H2O STRL BAG

## (undated) DEVICE — JELLY LUBRICATING 10GM PREFIL SYR LUBE

## (undated) DEVICE — GARMENT,MEDLINE,DVT,INT,CALF,MED, GEN2: Brand: MEDLINE

## (undated) DEVICE — URETERAL ACCESS SHEATH SET: Brand: NAVIGATOR HD

## (undated) DEVICE — CONNECTOR TBNG OD5-7MM O2 END DISP

## (undated) DEVICE — GAUZE,SPONGE,4"X4",12PLY,WOVEN,NS,LF: Brand: MEDLINE

## (undated) DEVICE — BLOCK BITE AD 60FR W/ VELC STRP ADDRESSES MOST PT AND

## (undated) DEVICE — KENDALL RADIOLUCENT FOAM MONITORING ELECTRODE RECTANGULAR SHAPE: Brand: KENDALL

## (undated) DEVICE — NEEDLE SYR 18GA L1.5IN RED PLAS HUB S STL BLNT FILL W/O

## (undated) DEVICE — THE TORRENT IRRIGATION TUBING IS INTENDED TO PROVIDE IRRIGATION VIA IRRIGATION FLUIDS, SUCH AS STERILE WATER, DURING GASTROINTESTINAL ENDOSCOPIC PROCEDURES WHEN USED IN CONJUNCTION WITH AN IRRIGATION PUMP OR ELECTROSURGICAL UNIT.: Brand: TORRENT

## (undated) DEVICE — COVER,TABLE,44X76,STERILE: Brand: MEDLINE

## (undated) DEVICE — GDWIRE 3CM FLX-TIP 0.038X150CM -- BX/5 SENSOR

## (undated) DEVICE — PACK SURGICAL PROCEDURE KIT CYSTOSCOPY TOTE

## (undated) DEVICE — NITINOL WIRE WITH HYDROPHILIC TIP: Brand: SENSOR